# Patient Record
Sex: FEMALE | Race: WHITE | NOT HISPANIC OR LATINO | Employment: OTHER | URBAN - METROPOLITAN AREA
[De-identification: names, ages, dates, MRNs, and addresses within clinical notes are randomized per-mention and may not be internally consistent; named-entity substitution may affect disease eponyms.]

---

## 2017-02-27 ENCOUNTER — TELEPHONE (OUTPATIENT)
Dept: FAMILY MEDICINE | Facility: CLINIC | Age: 82
End: 2017-02-27

## 2017-02-27 NOTE — TELEPHONE ENCOUNTER
----- Message from Leann Meeks sent at 2/27/2017  8:59 AM CST -----  Please call patient in regards to ST ER f/u for blood in urine, [phone] (home)

## 2017-02-27 NOTE — TELEPHONE ENCOUNTER
Pt has appt with Dr Knight on Wednesday 03/01/2017 urologist for ED follow up [facility] for mass of bladder. Scheduled ED fu w Dr Crespo on 03/22/2017.

## 2017-03-03 ENCOUNTER — TELEPHONE (OUTPATIENT)
Dept: OPHTHALMOLOGY | Facility: CLINIC | Age: 82
End: 2017-03-03

## 2017-03-03 DIAGNOSIS — H40.053 OCULAR HYPERTENSION, BILATERAL: ICD-10-CM

## 2017-03-03 RX ORDER — LATANOPROST 50 UG/ML
1 SOLUTION/ DROPS OPHTHALMIC NIGHTLY
Qty: 2.5 ML | Refills: 1 | Status: SHIPPED | OUTPATIENT
Start: 2017-03-03 | End: 2017-05-09 | Stop reason: SDUPTHER

## 2017-03-03 NOTE — TELEPHONE ENCOUNTER
----- Message from Wilber Reyes sent at 3/3/2017 10:17 AM CST -----  Pt is states she needs a refill on her latanoprost .005 1 drop both eyes nightly./ States she will run about before Monday./ She can be reached at [phone]    [facility]  [address]  [address]  [place] [address]  Phone: [phone] Fax: [phone]

## 2017-03-08 ENCOUNTER — PATIENT OUTREACH (OUTPATIENT)
Dept: ADMINISTRATIVE | Facility: HOSPITAL | Age: 82
End: 2017-03-08
Payer: MEDICARE

## 2017-03-08 ENCOUNTER — INITIAL CONSULT (OUTPATIENT)
Dept: DERMATOLOGY | Facility: CLINIC | Age: 82
End: 2017-03-08
Payer: MEDICARE

## 2017-03-08 VITALS — HEIGHT: 65 IN | WEIGHT: 197 LBS | BODY MASS INDEX: 32.82 KG/M2

## 2017-03-08 DIAGNOSIS — L72.9 CYST OF SKIN: ICD-10-CM

## 2017-03-08 DIAGNOSIS — L57.0 MULTIPLE ACTINIC KERATOSES: Primary | ICD-10-CM

## 2017-03-08 DIAGNOSIS — L82.1 SEBORRHEIC KERATOSES: ICD-10-CM

## 2017-03-08 DIAGNOSIS — D48.5 NEOPLASM OF UNCERTAIN BEHAVIOR OF SKIN: ICD-10-CM

## 2017-03-08 DIAGNOSIS — Z12.83 SKIN CANCER SCREENING: ICD-10-CM

## 2017-03-08 DIAGNOSIS — Z85.828 PERSONAL HISTORY OF OTHER MALIGNANT NEOPLASM OF SKIN: ICD-10-CM

## 2017-03-08 PROCEDURE — 17000 DESTRUCT PREMALG LESION: CPT | Mod: S$GLB,,, | Performed by: DERMATOLOGY

## 2017-03-08 PROCEDURE — 17003 DESTRUCT PREMALG LES 2-14: CPT | Mod: S$GLB,,, | Performed by: DERMATOLOGY

## 2017-03-08 PROCEDURE — 1157F ADVNC CARE PLAN IN RCRD: CPT | Mod: S$GLB,,, | Performed by: DERMATOLOGY

## 2017-03-08 PROCEDURE — 1160F RVW MEDS BY RX/DR IN RCRD: CPT | Mod: S$GLB,,, | Performed by: DERMATOLOGY

## 2017-03-08 PROCEDURE — 1126F AMNT PAIN NOTED NONE PRSNT: CPT | Mod: S$GLB,,, | Performed by: DERMATOLOGY

## 2017-03-08 PROCEDURE — 1159F MED LIST DOCD IN RCRD: CPT | Mod: S$GLB,,, | Performed by: DERMATOLOGY

## 2017-03-08 PROCEDURE — 88305 TISSUE EXAM BY PATHOLOGIST: CPT | Performed by: PATHOLOGY

## 2017-03-08 PROCEDURE — 99214 OFFICE O/P EST MOD 30 MIN: CPT | Mod: 25,S$GLB,, | Performed by: DERMATOLOGY

## 2017-03-08 PROCEDURE — 99999 PR PBB SHADOW E&M-EST. PATIENT-LVL II: CPT | Mod: PBBFAC,,, | Performed by: DERMATOLOGY

## 2017-03-08 PROCEDURE — 11100 PR BIOPSY OF SKIN LESION: CPT | Mod: 59,S$GLB,, | Performed by: DERMATOLOGY

## 2017-03-08 NOTE — PROGRESS NOTES
Subjective:       Patient ID:  Holli Landrum is a 83 y.o. female who presents for   Chief Complaint   Patient presents with    Skin Check    Lesion     HPI Comments: High risk patient here for skin exam. Last seen in office on 4-  Lesion on left cheek for a few months, non healing, not treated  Lesion on right temple recently noticed, feels rough, not treated  Lesion on right neck for several months, feels rough, not treated  Lesion on right cheek recently noticed, may have been treated with cryo in the past  Lesion on left upper back for 2 years, feels firm, not treated  Lesion on left mid back for a few years, drains when squeezed, not treated    FINAL PATHOLOGIC DIAGNOSIS  1. Skin, right cheek, shave biopsy:  - PIGMENTED SEBORRHEIC KERATOSIS, MACULAR VARIANT.  MICROSCOPIC DESCRIPTION: Sections show an abrupt proliferation of benign basaloid keratinocytes exhibiting  acanthosis, hyperkeratosis, papillomatosis and horned pseudocyst formation. Numerous pigmented keratinocytes  are noted.  2. Skin, left brow, shave biopsy:  - SQUAMOUS CELL CARCINOMA IN SITU.  MICROSCOPIC DESCRIPTION: Sections show epidermis with full-thickness atypia, parakeratosis and variable  epidermal maturation. Dermal involvement is not seen.      Phx BCC right alar groove  Phx SCC central forehead  Phx BCC right forehead  Phx BCC glabella   BCC medial cheek s/p Mohs done by Dr. De La Garza 7-8-2016  SCCIS, left brow s/p Mohs done by Dr. De La Garza  History blood clot on Plavix                           recent stenting.     Review of Systems   Skin: Negative for itching, rash, daily sunscreen use, activity-related sunscreen use and recent sunburn.        Objective:    Physical Exam   Constitutional: She appears well-developed and well-nourished. No distress.   HENT:   Head:       Eyes: Lids are normal.  No conjunctival no injection.   Neurological: She is alert and oriented to person, place, and time. She is not disoriented.    Psychiatric: She has a normal mood and affect.   Skin:   Areas Examined (abnormalities noted in diagram):   Head / Face Inspection Performed  Neck Inspection Performed  Chest / Axilla Inspection Performed  Abdomen Inspection Performed  Back Inspection Performed  RUE Inspected  LUE Inspection Performed  RLE Inspected  LLE Inspection Performed              Diagram Legend     Erythematous scaling macule/papule c/w actinic keratosis       Vascular papule c/w angioma      Pigmented verrucoid papule/plaque c/w seborrheic keratosis      Yellow umbilicated papule c/w sebaceous hyperplasia      Irregularly shaped tan macule c/w lentigo     1-2 mm smooth white papules consistent with Milia      Movable subcutaneous cyst with punctum c/w epidermal inclusion cyst      Subcutaneous movable cyst c/w pilar cyst      Firm pink to brown papule c/w dermatofibroma      Pedunculated fleshy papule(s) c/w skin tag(s)      Evenly pigmented macule c/w junctional nevus     Mildly variegated pigmented, slightly irregular-bordered macule c/w mildly atypical nevus      Flesh colored to evenly pigmented papule c/w intradermal nevus       Pink pearly papule/plaque c/w basal cell carcinoma      Erythematous hyperkeratotic cursted plaque c/w SCC      Surgical scar with no sign of skin cancer recurrence      Open and closed comedones      Inflammatory papules and pustules      Verrucoid papule consistent consistent with wart     Erythematous eczematous patches and plaques     Dystrophic onycholytic nail with subungual debris c/w onychomycosis     Umbilicated papule    Erythematous-base heme-crusted tan verrucoid plaque consistent with inflamed seborrheic keratosis     Erythematous Silvery Scaling Plaque c/w Psoriasis     See annotation      Assessment / Plan:      Pathology Orders:      Normal Orders This Visit    Tissue Specimen To Pathology, Dermatology     Questions:    Directional Terms:  Other(comment)    Clinical information:  scc vs bcc vs  hak    Specific Site:  left cheek        Multiple actinic keratoses  Cryosurgery Procedure Note    Verbal consent from the patient is obtained and the patient is aware of the precancerous quality and need for treatment of these lesions. Liquid nitrogen cryosurgery is applied to the 7 actinic keratoses, as detailed in the physical exam, to produce a freeze injury. The patient is aware that blisters may form and is instructed on wound care with gentle cleansing and use of vaseline ointment to keep moist until healed. The patient is supplied a handout on cryosurgery and is instructed to call if lesions do not completely resolve. Discussed risk postinflammatory pigmentary changes.       Neoplasm of uncertain behavior of skin  -     Tissue Specimen To Pathology, Dermatology  If biopsy reveals malignancy, will refer to Dr. De La Garza for Mohs surgery consultation.    Shave biopsy procedure note:    Shave biopsy performed after verbal consent including risk of infection, scar, recurrence, need for additional treatment of site. Area prepped with alcohol, anesthetized with approximately 1.0cc of 1% lidocaine with epinephrine. Lesional tissue shaved with razor blade. Hemostasis achieved with application of aluminum chloride followed by hyfrecation. No complications. Dressing applied. Wound care explained.        Skin cancer screening  Area(s) of previous NMSC evaluated with no signs of recurrence.    Upper body skin examination performed today including at least 6 points as noted in physical examination. Suspicious lesions noted.    Personal history of other malignant neoplasm of skin  Area(s) of previous NMSC evaluated with no signs of recurrence.    Upper body skin examination performed today including at least 6 points as noted in physical examination. Suspicious lesions noted.      Cyst of skin, left upper and mid back  Will excise tomorrow     Seborrheic keratoses  These are benign inherited growths without a malignant  potential. Reassurance given to patient. No treatment is necessary.            Return in about 1 day (around 3/9/2017), or for cyst excision.

## 2017-03-08 NOTE — LETTER
March 14, 2017    Holli Landrum  [address]  [place] [address]             [facility]  [address]  [facility] [address]  Phone: [phone] Dear Mrs. Landrum:    We have tried to reach you by mychart unsuccessfully.    [facility] is committed to your overall health.  To help you get the most out of each of your visits, we will review your information to make sure you are up to date on all of your recommended tests and/or procedures.       Dr. Marcia Crespo has found that you may be due for an osteoporosis screening and possibly shingles and pneumonia immunizations.     If you have had any of the above done at another facility, please bring the records or information with you so that your record at [facility] will be complete.     If you are currently taking medication, please bring it with you to your appointment for review.     If you have any questions or concerns, please don't hesitate to call.    Thank you for letting us care for you,  Nichole Bradford LPN Clinical Care Coordinator  [facility]  [phone]

## 2017-03-08 NOTE — LETTER
March 8, 2017      Ad De La Garza MD  [address]  [facility] [address]           [facility]  [address] [facility] [address] [place] [address]  Phone: [phone]  Fax: [phone]          Patient: Holli Landrum   MR Number: [id number]   YOB: 1933   Date of Visit: 3/8/2017       Dear Dr. Ad De La Garza:    Thank you for referring Holli Landrum to me for evaluation. Attached you will find relevant portions of my assessment and plan of care.    If you have questions, please do not hesitate to call me. I look forward to following Holli Landrum along with you.    Sincerely,    Marcia Ritter MD    Enclosure  CC:  No Recipients    If you would like to receive this communication electronically, please contact [email or web address] or [phone] to request more information on [employer] access.    For providers and/or their staff who would like to refer a patient to [facility], please contact us through our one-stop-shop provider referral line, [facility], at [phone].    If you feel you have received this communication in error or would no longer like to receive these types of communications, please e-mail [email or web address]

## 2017-03-09 ENCOUNTER — PROCEDURE VISIT (OUTPATIENT)
Dept: DERMATOLOGY | Facility: CLINIC | Age: 82
End: 2017-03-09
Payer: MEDICARE

## 2017-03-09 DIAGNOSIS — L72.9 CYST OF SKIN: Primary | ICD-10-CM

## 2017-03-09 PROCEDURE — 12041 INTMD RPR N-HF/GENIT 2.5CM/<: CPT | Mod: 51,59,79,S$GLB | Performed by: DERMATOLOGY

## 2017-03-09 PROCEDURE — 99499 UNLISTED E&M SERVICE: CPT | Mod: S$GLB,,, | Performed by: DERMATOLOGY

## 2017-03-09 PROCEDURE — 11402 EXC TR-EXT B9+MARG 1.1-2 CM: CPT | Mod: 59,79,S$GLB, | Performed by: DERMATOLOGY

## 2017-03-09 PROCEDURE — 88304 TISSUE EXAM BY PATHOLOGIST: CPT | Performed by: PATHOLOGY

## 2017-03-09 PROCEDURE — 12031 INTMD RPR S/A/T/EXT 2.5 CM/<: CPT | Mod: 79,59,S$GLB, | Performed by: DERMATOLOGY

## 2017-03-09 PROCEDURE — 11421 EXC H-F-NK-SP B9+MARG 0.6-1: CPT | Mod: 79,51,S$GLB, | Performed by: DERMATOLOGY

## 2017-03-09 NOTE — PROGRESS NOTES
Pt here for excision of cyst x 2    PROCEDURE: Elliptical excision with intermediate layered repair in order to decrease dead space and decrease tension.    ANESTHETIC: 6 cc 1% Xylocaine with Epinephrine 1:100,000, buffered    SURGEON: Marcia Ritter MD      ASSISTANTS: Cecille Bradford MA      PREOPERATIVE DIAGNOSIS:  EIC     POSTOPERATIVE DIAGNOSIS:  Same as preoperative diagnosis    PATHOLOGIC DIAGNOSIS: Pending    LOCATION: left neck    INITIAL LESION SIZE: 0.6 cm    EXCISED DIAMETER: 0.8 cm    PREPARATION: The diagnosis, procedure, alternatives, benefits and risks, including but not limited to: infection, bleeding/bruising, drug reactions, pain, scar or cosmetic defect, local sensation disturbances, wound dehiscence (separation of wound edges after sutures removed) and/or recurrence of present condition were explained to the patient. The patient elected to proceed.  Patient's identity was verified using 2 patient identifiers and the side and site was verified.  Time out period with surgeon, assistant and patient in surgical suite was taken.    PROCEDURE: The location noted above was prepped and draped by Cecille Bradford MA in the usual sterile fashion. The area was anesthetized by myself. Lesional tissue was carefully marked and a fusiform elliptical excision was done with #15 blade carried down completely through the dermis into the deep subcutaneous tissues to the level of the non-muscle fascia, and dissection was carried out in that plane.  Electrocoagulation was used to obtain hemostasis. Blood loss was minimal. The wound was then approximated in a layered fashion with subcutaneous and intradermal sutures of 4.0 Monocryl, approximately 1 in number, and the wound was then superficially closed with simple interrupted sutures of 4.0 Prolene.    The patient tolerated the procedure well.    The area was cleaned and dressed appropriately and the patient was given wound care instructions, as well as an  appointment for follow-up evaluation.    LENGTH OF REPAIR: 1 cm      PROCEDURE: Elliptical excision with intermediate layered repair in order to decrease dead space and decrease tension.    ANESTHETIC: 9 cc 1% Xylocaine with Epinephrine 1:100,000, buffered    SURGEON: Marcia Ritter MD      ASSISTANTS: Cecille Bradford MA      PREOPERATIVE DIAGNOSIS:  EIC    POSTOPERATIVE DIAGNOSIS:  Same as preoperative diagnosis    PATHOLOGIC DIAGNOSIS: Pending    LOCATION: left back    INITIAL LESION SIZE: 1.5 cm    EXCISED DIAMETER: 1.5 cm    PREPARATION: The diagnosis, procedure, alternatives, benefits and risks, including but not limited to: infection, bleeding/bruising, drug reactions, pain, scar or cosmetic defect, local sensation disturbances, wound dehiscence (separation of wound edges after sutures removed) and/or recurrence of present condition were explained to the patient. The patient elected to proceed.  Patient's identity was verified using 2 patient identifiers and the side and site was verified.  Time out period with surgeon, assistant and patient in surgical suite was taken.    PROCEDURE: The location noted above was prepped and draped by Cecille Bradford MA in the usual sterile fashion. The area was anesthetized by myself. Lesional tissue was carefully marked and a fusiform elliptical excision was done with #15 blade carried down completely through the dermis into the deep subcutaneous tissues to the level of the non-muscle fascia, and dissection was carried out in that plane.  Electrocoagulation was used to obtain hemostasis. Blood loss was minimal. The wound was then approximated in a layered fashion with subcutaneous and intradermal sutures of 4.0 Monocryl, approximately 4 in number, and the wound was then superficially closed with simple interrupted sutures of 4.0 Prolene.    The patient tolerated the procedure well.    The area was cleaned and dressed appropriately and the patient was given wound care  instructions, as well as an appointment for follow-up evaluation.    LENGTH OF REPAIR: 2 cm

## 2017-03-21 ENCOUNTER — OFFICE VISIT (OUTPATIENT)
Dept: OPHTHALMOLOGY | Facility: CLINIC | Age: 82
End: 2017-03-21
Payer: MEDICARE

## 2017-03-21 DIAGNOSIS — H10.13 ALLERGIC CONJUNCTIVITIS, BILATERAL: ICD-10-CM

## 2017-03-21 DIAGNOSIS — H25.13 NUCLEAR SCLEROSIS, BILATERAL: ICD-10-CM

## 2017-03-21 DIAGNOSIS — H43.813 POSTERIOR VITREOUS DETACHMENT, BILATERAL: ICD-10-CM

## 2017-03-21 DIAGNOSIS — H33.322 ROUND HOLE OF RETINA WITHOUT DETACHMENT, LEFT: ICD-10-CM

## 2017-03-21 DIAGNOSIS — H40.053 OCULAR HYPERTENSION, BILATERAL: Primary | ICD-10-CM

## 2017-03-21 DIAGNOSIS — H57.12 EYE PAIN, LEFT: ICD-10-CM

## 2017-03-21 DIAGNOSIS — H52.7 REFRACTIVE ERROR: ICD-10-CM

## 2017-03-21 PROCEDURE — 99999 PR PBB SHADOW E&M-EST. PATIENT-LVL III: CPT | Mod: PBBFAC,,, | Performed by: OPHTHALMOLOGY

## 2017-03-21 PROCEDURE — 92012 INTRM OPH EXAM EST PATIENT: CPT | Mod: S$GLB,,, | Performed by: OPHTHALMOLOGY

## 2017-03-21 RX ORDER — KETOTIFEN FUMARATE 0.35 MG/ML
1 SOLUTION/ DROPS OPHTHALMIC 2 TIMES DAILY
COMMUNITY
End: 2017-04-07

## 2017-03-21 NOTE — MR AVS SNAPSHOT
[facility]  [address]  [facility] [address]  Phone: [phone]  Fax: [phone]                  Holli Landrum   3/21/2017 10:00 AM   Office Visit    Description:  Female : 6/15/1933   Provider:  Garcy James MD   Department:  [facility] - Ophthalmology           Reason for Visit     Glaucoma           Diagnoses this Visit        Comments    Ocular hypertension, bilateral    -  Primary     Eye pain, left         Nuclear sclerosis, bilateral         Posterior vitreous detachment, bilateral         Allergic conjunctivitis, bilateral         Round hole of retina without detachment, left         Refractive error                To Do List           Future Appointments        Provider Department Dept Phone    3/22/2017 10:20 AM Marcia Crespo MD [facility] [phone]    3/23/2017 11:15 AM NURSE, [facility] DERMATOLOGY [facility] [phone]    3/29/2017 2:15 PM Ad De La Garza MD [facility] Dermatology [phone]      Your Future Surgeries/Procedures     Apr 10, 2017   Surgery with Robb Knight MD   [facility] ([facility])    [address] [address] [address]   [phone]              Goals (5 Years of Data)     None      Follow-Up and Disposition     Return in about 3 months (around 2017) for IOP check.      [facility] On Call     [facility] On Call Nurse Care Line - 24/7 Assistance  Registered nurses in the [facility] On Call Center provide clinical advisement, health education, appointment booking, and other advisory services.  Call for this free service at [phone].             Medications           Message regarding Medications     Verify the changes and/or additions to your medication regime listed below are the same as discussed with your clinician today.  If any of these changes or additions are incorrect, please notify your healthcare provider.             Verify that the below list of  medications is an accurate representation of the medications you are currently taking.  If none reported, the list may be blank. If incorrect, please contact your healthcare provider. Carry this list with you in case of emergency.           Current Medications     clopidogrel (PLAVIX) 75 mg tablet Take 1 tablet (75 mg total) by mouth once daily.    dorzolamide (TRUSOPT) 2 % ophthalmic solution Place 1 drop into the left eye 2 (two) times daily.    fenofibrate 160 MG Tab Take 1 tablet (160 mg total) by mouth once daily.    fish oil-omega-3 fatty acids 300-1,000 mg capsule Take 2 g by mouth once daily.    fluticasone (FLONASE) 50 mcg/actuation nasal spray 2 sprays by Each Nare route once daily.    furosemide (LASIX) 40 MG tablet Take 1 tablet (40 mg total) by mouth once daily. 1 Tablet Oral Every day    GRAPE SEED EXTRACT ORAL Take by mouth.    ketotifen (ZADITOR) 0.025 % (0.035 %) ophthalmic solution Place 1 drop into both eyes 2 (two) times daily.    latanoprost (XALATAN) 0.005 % ophthalmic solution Place 1 drop into both eyes every evening.    metOLazone (ZAROXOLYN) 5 MG tablet Take 1 tablet (5 mg total) by mouth every Monday and Friday.    nitroGLYCERIN 0.4 MG/HR TD PT24 (NITRODUR) 0.4 mg/hr Place 1 patch onto the skin once daily.    OLIVE LEAF EXTRACT ORAL Take by mouth.    potassium chloride (KLOR-CON) 10 MEQ TbSR Take 1 tablet (10 mEq total) by mouth once daily.    SYMBICORT 160-4.5 mcg/actuation HFAA Take 2 puffs by mouth 2 (two) times daily.    tiotropium bromide (SPIRIVA RESPIMAT) 1.25 mcg/actuation Mist Inhale into the lungs.           Clinical Reference Information           Allergies as of 3/21/2017     Ciprofloxacin    Timoptic [Timolol Maleate]      Immunizations Administered on Date of Encounter - 3/21/2017     None      Language Assistance Services     ATTENTION: Language assistance services are available, free of charge. Please call [phone].      ATENCIÓN: sejal Dhillon  disposición servicios gratuitos de asistencia lingüística. Garth al [phone].     AR Ý: N?u b?n nói Ti?ng Vi?t, có các d?ch v? h? tr? ngôn ng? mi?n phí dành cho b?n. G?i s? [phone].         [facility] complies with applicable Federal civil rights laws and does not discriminate on the basis of race, color, national origin, age, disability, or sex.

## 2017-03-21 NOTE — PROGRESS NOTES
HPI     Glaucoma    Additional comments: 3 month iop ck           Comments   DLS: 12/20/16    Pt states no changes in va since last visit, doing well with new glasses.   Eyes have been irritated and itching for about 3+ weeks. Has been using   Zaditor BID and doing better but has not used this week. + has been having   a headache type pain behind OS x 3-4 wks. Pain ranges from 2-10 and takes   tylenol 2 tablets BID PO and this helps. Has dull ache today.     Gtts: Latanoprost QHS, Dorzolamide BID OU    Agree with above. Ache is present now, but better than it was this am   since she took Tylenol. Recent dentist appt - fine.         Last edited by Gracy James MD on 3/21/2017 11:42 AM.   (History)            Assessment /Plan     For exam results, see Encounter Report.    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 Ocular hypertension  IOP improved initially with Latanoprost (went back up off timolol - was d/c'd for breathing problems). Last HVF - scattered defects, no definite glaucoma pattern.     HRT done again last visit, dilated. There was some question of progression OD on prior visit, but not so much last time. OS seems to be progressing now. Cataracts may be starting to interfere with HVF and HRT, although SD ok today.    Remains open to TM/thin SS on gonioscopy, does not appear occludable. Optic nerve appears stable on last DFE OD, possible slight increase OS. IOP elevated OS last visit as well. Continue Latanoprost QHS OU, added Dorzolamide TWICE DAILY OS on 12/20/16 - IOP improved, eye pain persisits. RTC 3 months for IOP check.    Eye pain OS Consider having sinuses checked, does not appear to be having angle closure.     Long-standing, but constant over past 3 months.   2. Nuclear sclerosis   "Approaching visual significance, myopic shift. BAT not done. Explained this may be contributing to difficulty with vision.    3. Round hole of retina without detachment - Left Eye  Stable. RD precautions   4. Posterior vitreous detachment  "    Allergies/itching Zaditor BID prn   5. Hyperopia with astigmatism and presbyopia  MRx given last visit, feels she is doing well with new specs for now.                           "

## 2017-03-22 ENCOUNTER — OFFICE VISIT (OUTPATIENT)
Dept: FAMILY MEDICINE | Facility: CLINIC | Age: 82
End: 2017-03-22
Payer: MEDICARE

## 2017-03-22 VITALS
HEIGHT: 65 IN | TEMPERATURE: 98 F | DIASTOLIC BLOOD PRESSURE: 60 MMHG | WEIGHT: 198.19 LBS | BODY MASS INDEX: 33.02 KG/M2 | SYSTOLIC BLOOD PRESSURE: 120 MMHG

## 2017-03-22 DIAGNOSIS — R31.9 HEMATURIA, UNSPECIFIED: ICD-10-CM

## 2017-03-22 DIAGNOSIS — I70.0 ATHEROSCLEROSIS OF AORTA: ICD-10-CM

## 2017-03-22 DIAGNOSIS — J43.9 PULMONARY EMPHYSEMA, UNSPECIFIED EMPHYSEMA TYPE: Primary | ICD-10-CM

## 2017-03-22 PROCEDURE — 1159F MED LIST DOCD IN RCRD: CPT | Mod: S$GLB,,, | Performed by: FAMILY MEDICINE

## 2017-03-22 PROCEDURE — 1125F AMNT PAIN NOTED PAIN PRSNT: CPT | Mod: S$GLB,,, | Performed by: FAMILY MEDICINE

## 2017-03-22 PROCEDURE — 3078F DIAST BP <80 MM HG: CPT | Mod: S$GLB,,, | Performed by: FAMILY MEDICINE

## 2017-03-22 PROCEDURE — 99214 OFFICE O/P EST MOD 30 MIN: CPT | Mod: S$GLB,,, | Performed by: FAMILY MEDICINE

## 2017-03-22 PROCEDURE — 3074F SYST BP LT 130 MM HG: CPT | Mod: S$GLB,,, | Performed by: FAMILY MEDICINE

## 2017-03-22 PROCEDURE — 1157F ADVNC CARE PLAN IN RCRD: CPT | Mod: S$GLB,,, | Performed by: FAMILY MEDICINE

## 2017-03-22 PROCEDURE — 99499 UNLISTED E&M SERVICE: CPT | Mod: S$GLB,,, | Performed by: FAMILY MEDICINE

## 2017-03-22 PROCEDURE — 99999 PR PBB SHADOW E&M-EST. PATIENT-LVL III: CPT | Mod: PBBFAC,,, | Performed by: FAMILY MEDICINE

## 2017-03-22 PROCEDURE — 1160F RVW MEDS BY RX/DR IN RCRD: CPT | Mod: S$GLB,,, | Performed by: FAMILY MEDICINE

## 2017-03-22 RX ORDER — ACETAMINOPHEN 500 MG
5000 TABLET ORAL DAILY
COMMUNITY

## 2017-03-22 NOTE — PROGRESS NOTES
Subjective:       Patient ID: Holli Landrum is a 83 y.o. female.    Chief Complaint: Follow-up (ED follow-up. Pt went to ED for hematuria and will have bladder mass biopsy on 4/10.)    HPI   Patient here today for ER f/u.   Was seen on 2/26 for hematuria with clots. Scheduled for bladder bx on 4/10. Of note, interval improvement between scans.  No recurrence of hematuria since last scan.  Routine cards f/u. Last stent placed in October. On diuretics with fluid restriction.  States that she was told that she doesn't need a pneumonia shot currently - had them previously - squamous.  Derm excision in the spring for cancerous lesion on the L cheek.  Lungs have been ok of late. Was able to stop symbicort previously, but had to restart with winter uri/bronchitis.    Review of Systems   Constitutional: Negative for fatigue and unexpected weight change.   HENT: Negative for congestion, postnasal drip, rhinorrhea and sinus pressure.    Eyes: Negative for photophobia and visual disturbance.        Some irritation of the L eye, constant HA. ophtho susp for issue with cataract vs sinus.   Respiratory: Negative for cough and shortness of breath.    Cardiovascular: Negative for chest pain, palpitations and leg swelling.   Gastrointestinal: Negative for abdominal pain, blood in stool, constipation, diarrhea and nausea.   Genitourinary: Negative for difficulty urinating, dysuria, hematuria (see hpi), urgency and vaginal bleeding.   Musculoskeletal: Negative for arthralgias and gait problem.   Skin: Negative for rash and wound.   Neurological: Negative for dizziness (if she is dehydrated), light-headedness and headaches.   Psychiatric/Behavioral: Negative for sleep disturbance. The patient is not nervous/anxious.        Objective:      Physical Exam   Constitutional: She is oriented to person, place, and time. She appears well-developed and well-nourished. No distress.   HENT:   Head: Normocephalic and atraumatic.   Eyes:  Conjunctivae are normal. Right eye exhibits no discharge. Left eye exhibits no discharge. No scleral icterus.   Neck: Normal range of motion. Neck supple.   Cardiovascular: Normal rate and regular rhythm.    Pulmonary/Chest: Effort normal and breath sounds normal. No respiratory distress.   Abdominal: Soft. She exhibits no distension.   Musculoskeletal: Normal range of motion. She exhibits no edema.   Neurological: She is alert and oriented to person, place, and time.   Skin: Skin is warm and dry. No rash noted.   Psychiatric: She has a normal mood and affect. Her behavior is normal.   Nursing note and vitals reviewed.        Pulmonary emphysema, unspecified emphysema type  Stable without recent exacerbation. Doing well overall.  Atherosclerosis of aorta  Routine cards f/u every 4mos with lab. Last stent >6 mos ago, and maintained on plavix. Denies recent cp.  Hematuria, unspecified  Per urology, pending bx in area of concern next month.

## 2017-03-23 ENCOUNTER — CLINICAL SUPPORT (OUTPATIENT)
Dept: DERMATOLOGY | Facility: CLINIC | Age: 82
End: 2017-03-23
Payer: MEDICARE

## 2017-03-23 NOTE — PROGRESS NOTES
Suture removed from left posterior neck and left mid back  No sign of infection   Healing wound intact  Sutures removed without difficulty

## 2017-03-29 ENCOUNTER — INITIAL CONSULT (OUTPATIENT)
Dept: DERMATOLOGY | Facility: CLINIC | Age: 82
End: 2017-03-29
Payer: MEDICARE

## 2017-03-29 VITALS
SYSTOLIC BLOOD PRESSURE: 117 MMHG | BODY MASS INDEX: 32.49 KG/M2 | DIASTOLIC BLOOD PRESSURE: 81 MMHG | HEART RATE: 79 BPM | WEIGHT: 195 LBS | HEIGHT: 65 IN

## 2017-03-29 DIAGNOSIS — C44.320 SQUAMOUS CELL CARCINOMA, FACE: Primary | ICD-10-CM

## 2017-03-29 PROCEDURE — 1157F ADVNC CARE PLAN IN RCRD: CPT | Mod: S$GLB,,, | Performed by: DERMATOLOGY

## 2017-03-29 PROCEDURE — 99213 OFFICE O/P EST LOW 20 MIN: CPT | Mod: S$GLB,,, | Performed by: DERMATOLOGY

## 2017-03-29 PROCEDURE — 99999 PR PBB SHADOW E&M-EST. PATIENT-LVL III: CPT | Mod: PBBFAC,,, | Performed by: DERMATOLOGY

## 2017-03-29 PROCEDURE — 1159F MED LIST DOCD IN RCRD: CPT | Mod: S$GLB,,, | Performed by: DERMATOLOGY

## 2017-03-29 PROCEDURE — 1126F AMNT PAIN NOTED NONE PRSNT: CPT | Mod: S$GLB,,, | Performed by: DERMATOLOGY

## 2017-03-29 PROCEDURE — 1160F RVW MEDS BY RX/DR IN RCRD: CPT | Mod: S$GLB,,, | Performed by: DERMATOLOGY

## 2017-03-29 PROCEDURE — 3074F SYST BP LT 130 MM HG: CPT | Mod: S$GLB,,, | Performed by: DERMATOLOGY

## 2017-03-29 PROCEDURE — 3079F DIAST BP 80-89 MM HG: CPT | Mod: S$GLB,,, | Performed by: DERMATOLOGY

## 2017-03-29 NOTE — MR AVS SNAPSHOT
[facility]at[facility]  [address] [facility] [address]  [facility] [address]  Phone: [phone]                  Holli Landrum   3/29/2017 2:15 PM   Initial consult    Description:  Female : 6/15/1933   Provider:  Ad De La Garza MD   Department:  [place] - Dermatology           Reason for Visit     Squamous Cell Carcinoma                To Do List           Future Appointments        Provider Department Dept Phone    2017 10:30 AM Gracy James MD [facility] Ophthalmology [phone]      Your Future Surgeries/Procedures     Apr 10, 2017   Surgery with Robb Knight MD   [facility] ([facility])    [address] [facility] [address]   [phone]              Goals (5 Years of Data)     None      [facility]     [facility] -  Assistance  Registered nurses in the [facility] provide clinical advisement, health education, appointment booking, and other advisory services.  Call for this free service at [phone].             Medications           Message regarding Medications     Verify the changes and/or additions to your medication regime listed below are the same as discussed with your clinician today.  If any of these changes or additions are incorrect, please notify your healthcare provider.             Verify that the below list of medications is an accurate representation of the medications you are currently taking.  If none reported, the list may be blank. If incorrect, please contact your healthcare provider. Carry this list with you in case of emergency.           Current Medications     cholecalciferol, vitamin D3, (VITAMIN D3) 5,000 unit Tab Take 5,000 Units by mouth once daily.    clopidogrel (PLAVIX) 75 mg tablet Take 1 tablet (75 mg total) by mouth once daily.    CRANBERRY FRUIT ORAL Take by mouth.    dorzolamide (TRUSOPT) 2 % ophthalmic solution Place 1 drop into the left eye 2 (two) times daily.     "fenofibrate 160 MG Tab Take 1 tablet (160 mg total) by mouth once daily.    fish oil-omega-3 fatty acids 300-1,000 mg capsule Take 2 g by mouth once daily.    fluticasone (FLONASE) 50 mcg/actuation nasal spray 2 sprays by Each Nare route once daily.    furosemide (LASIX) 40 MG tablet Take 1 tablet (40 mg total) by mouth once daily. 1 Tablet Oral Every day    GRAPE SEED EXTRACT ORAL Take by mouth.    ketotifen (ZADITOR) 0.025 % (0.035 %) ophthalmic solution Place 1 drop into both eyes 2 (two) times daily.    Lactobacillus rhamnosus GG (CULTURELLE) 10 billion cell capsule Take 1 capsule by mouth once daily.    latanoprost (XALATAN) 0.005 % ophthalmic solution Place 1 drop into both eyes every evening.    metOLazone (ZAROXOLYN) 5 MG tablet Take 1 tablet (5 mg total) by mouth every Monday and Friday.    nitroGLYCERIN 0.4 MG/HR TD PT24 (NITRODUR) 0.4 mg/hr Place 1 patch onto the skin once daily.    OLIVE LEAF EXTRACT ORAL Take by mouth.    potassium chloride (KLOR-CON) 10 MEQ TbSR Take 1 tablet (10 mEq total) by mouth once daily.    SYMBICORT 160-4.5 mcg/actuation HFAA Take 2 puffs by mouth 2 (two) times daily.    tiotropium bromide (SPIRIVA RESPIMAT) 1.25 mcg/actuation Mist Inhale into the lungs.           Clinical Reference Information           Your Vitals Were     BP Pulse Height Weight BMI    117/81 (BP Location: Left arm, Patient Position: Sitting, BP Method: Automatic) 79 5' 5" (1.651 m) 88.5 kg (195 lb) 32.45 kg/m2      Blood Pressure          Most Recent Value    BP  117/81      Allergies as of 3/29/2017     Ciprofloxacin    Timoptic [Timolol Maleate]      Immunizations Administered on Date of Encounter - 3/29/2017     None      Language Assistance Services     ATTENTION: Language assistance services are available, free of charge. Please call [phone].      ATENCIÓN: Si habla darleen, tiene a mon disposición servicios gratuitos de asistencia lingüística. Llame al [phone].     CHÚ Ý: N?u b?n nói Ti?ng " Vi?t, có các d?ch v? h? tr? ngôn ng? mi?n phí dành cho b?n. G?i s? [phone].         [facility] complies with applicable Federal civil rights laws and does not discriminate on the basis of race, color, national origin, age, disability, or sex.

## 2017-03-29 NOTE — PROGRESS NOTES
ALLERGIES:  Ciprofloxacin and Timoptic [timolol maleate]    CHIEF COMPLAINT:  This 83 y.o. 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 Consultation requested by Marcia Ritter MD.    HISTORY OF PRESENT ILLNESS:   Location: Left cheek  Duration: 3months  Quality:   Context: status post biopsy by Marcia Ritter MD ; path = ; pathology accession #[id number], [facility]     Prior Treatment: none  See also the handwritten notes/diagrams scanned to chart for additional details.    Defibrillator: No  Pacemaker: No  Artificial heart valves: No  Artificial joints: No    REVIEW OF SYSTEMS:   General: general health good  Skin: has previous history of skin cancer(s)  CV: has hypertension, no artificial valves, has no chest pain; has multiple stents; also paroxysmal atrial fibrillation   Resp: has shortness of breath from COPD  Endo: has no diabetes  Hem/Lymph: taking prescribed anticoagulants-Plavix, has easy bruising/bleeding  Allergy/Immuno: has allergies as noted above  GI: has no history of hepatitis  MS: as noted above     PAST MEDICAL HISTORY:  Past Medical History:   Diagnosis Date    Arthritis     back pain, hands    Basal cell carcinoma     CAD (coronary artery disease)     Cataract     ou    COPD (chronic obstructive pulmonary disease)     no oxygen    DVT (deep venous thrombosis) 1968    right leg; 1978 left leg    GERD (gastroesophageal reflux disease) 11/20/2012    Glaucoma     HTN (hypertension) 11/20/2012    Hypertension     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/2012    Hx MI    JA on CPAP     Paroxysmal atrial fibrillation 11/20/2012    Paroxysmal ventricular tachycardia     per problem list    Squamous cell carcinoma     Urinary tract infection     frequent    Venous insufficiency of leg        PAST SURGICAL HISTORY:  Past Surgical History:    Procedure Laterality Date    CARDIAC CATHETERIZATION  2013, 2014    has 5 stents    CARDIAC SURGERY  2012    stents    CHOLECYSTECTOMY      HYSTERECTOMY  1969    OVARIAN CYST REMOVAL  teenager    TONSILLECTOMY      aw/denoids    VASCULAR SURGERY      to remove clot from right leg        SOCIAL HISTORY:  Dependencies: smoking status as noted below  Social History   Substance Use Topics    Smoking status: Former Smoker     Types: Cigarettes    Smokeless tobacco: Never Used      Comment: quit smoking 1954    Alcohol use No       PERTINENT MEDICATIONS:  See medications list.  Current Outpatient Prescriptions on File Prior to Visit   Medication Sig Dispense Refill    cholecalciferol, vitamin D3, (VITAMIN D3) 5,000 unit Tab Take 5,000 Units by mouth once daily.      clopidogrel (PLAVIX) 75 mg tablet Take 1 tablet (75 mg total) by mouth once daily. 90 tablet 3    CRANBERRY FRUIT ORAL Take by mouth.      dorzolamide (TRUSOPT) 2 % ophthalmic solution Place 1 drop into the left eye 2 (two) times daily. 10 mL 11    fenofibrate 160 MG Tab Take 1 tablet (160 mg total) by mouth once daily. 90 tablet 3    fish oil-omega-3 fatty acids 300-1,000 mg capsule Take 2 g by mouth once daily.      fluticasone (FLONASE) 50 mcg/actuation nasal spray 2 sprays by Each Nare route once daily. 16 g 11    furosemide (LASIX) 40 MG tablet Take 1 tablet (40 mg total) by mouth once daily. 1 Tablet Oral Every day 90 tablet 3    GRAPE SEED EXTRACT ORAL Take by mouth.      ketotifen (ZADITOR) 0.025 % (0.035 %) ophthalmic solution Place 1 drop into both eyes 2 (two) times daily.      Lactobacillus rhamnosus GG (CULTURELLE) 10 billion cell capsule Take 1 capsule by mouth once daily.      latanoprost (XALATAN) 0.005 % ophthalmic solution Place 1 drop into both eyes every evening. 2.5 mL 1    metOLazone (ZAROXOLYN) 5 MG tablet Take 1 tablet (5 mg total) by mouth every Monday and Friday. 30 tablet 3    nitroGLYCERIN 0.4 MG/HR TD PT24  (NITRODUR) 0.4 mg/hr Place 1 patch onto the skin once daily. 90 patch 3    OLIVE LEAF EXTRACT ORAL Take by mouth.      potassium chloride (KLOR-CON) 10 MEQ TbSR Take 1 tablet (10 mEq total) by mouth once daily. 90 tablet 3    SYMBICORT 160-4.5 mcg/actuation HFAA Take 2 puffs by mouth 2 (two) times daily.      tiotropium bromide (SPIRIVA RESPIMAT) 1.25 mcg/actuation Mist Inhale into the lungs.       No current facility-administered medications on file prior to visit.        ALLERGIES:  Ciprofloxacin and Timoptic [timolol maleate]    EXAM:  See also the handwritten notes/diagrams scanned to chart for additional details.  Constitutional  General appearance: well-developed, well-nourished, well-kempt older white female    Eyes  Inspection of conjunctivae and lids reveals no abnormalities; sclerae anicteric  Neurologic/Psychiatric  Alert,  normal orientation to time, place, person  Normal mood and affect with no evidence of depression, anxiety, agitation  Skin: see photo(s)  Head: background marked solar damage to exposed areas of skin; in addition, inspection and/or palpation reveals an approximately 1 cm pink sclerotic plaque on the left zygomatic cheek which feels freely movable over the underlying tissues on palpation;  she confirmed this as the site of the prior biopsy  Neck: examination reveals marked chronic solar damage  Right upper extremity: examination reveals marked chronic solar damage; few ecchymoses  Left upper extremity: examination reveals marked chronic solar damage; few ecchymoses    ASSESSMENT: 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 In particular, the discussion included, but was not limited to, the following:    One alternative at this point would be to defer further treatment and observe the lesion. With small skin cancers of this kind, it is possible that a biopsy can be sufficient to definitively treat a small skin cancer of this kind. Alternatively, some skin cancers are slow growing and do not require immediate treatment. The potential advantage of this choice would be to avoid the need for possibly unnecessary additional surgery. Among the potential disadvantages of this would be the possibility of enlargement of the lesion, more extensive spread of the lesion or recurrence at a later date, which might necessitate a larger and more complex surgery.    Radiation treatment can be an effective treatment for this type of skin cancer. The usual course of treatment is every weekday for several weeks. Local irritation will result from treatment, although no systemic side effects are expected. The potential advantage of radiation treatment is that it avoids the need for surgery. Among the disadvantages of radiation treatment are the length of treatment, the local inflammatory response, the absence of pathologic confirmation of the removal of the skin cancer, a possible increased risk of additional skin cancer in the treated area in later years, and a somewhat increased risk of recurrence at a later date.     Excisional surgery can be an effective treatment for this type of skin cancer. This would involve excision of the lesion with margin evaluation by submitting the specimen to a pathologist for either immediate marginal assessment via frozen section processing, or delayed marginal assessment by fixed-tissue processing. The potential advantage of this technique is that it offers a way of treating the lesion with some degree of histologic confirmation of tumor removal.  Among the disadvantages of this treatment are the possible need for re-excision if marginal involvement is identified, a somewhat greater likelihood of recurrence as compared to Mohs' surgery because of the less comprehensive margin evaluation inherent in the technique, and the general potential risks of surgery, including allergic reactions to the anesthetic and other materials used, infection, injury to nerves in the area with consequent loss of sensation or muscle function, and scarring or distortion of surrounding structures.    Mohs' surgery is a very effective treatment for this type of skin cancer. The potential advantage of Mohs' surgery is that this technique offers the greatest possible certainty of knowing that the skin cancer has been completely removed, with the removal of the least amount of normal tissue. The potential disadvantages of Mohs' surgery include the duration of the surgery, the possible need for a separate surgery for reconstruction following tumor removal, and scarring as a result. In addition, general potential risks of surgery as noted above also apply to treatment via Mohs' surgery.    In light of the nature of this tumor and the location on the face in an area of increased risk of recurrence,  Mohs' micrographic surgery was thought to be the most appropriate management choice, and this diagnosis is appropriate for treatment by Mohs' micrographic surgery.     Sufficient time was available for questions, and all questions were answered to her satisfaction. She fully understands the aims, risks, alternatives, and possible complications, and has elected to proceed with the surgery, and verbally consented to do so. The procedure will be scheduled in the near future.    Routine pre-op instructions were given to her.    The patient was instructed to continue Plavix prior to surgery.    --------------------------------------  Note: some or all of this note may have been generated using voice  recognition software and thus may contain grammatical and/or spelling errors.

## 2017-03-29 NOTE — LETTER
March 30, 2017      Marcia Ritter MD  [address] [facility] [address] [place] [address]           [facility]  [address] [facility] [address] [place] [address]  Phone: [phone]          Patient: Holli Landrum   MR Number: [id number]   YOB: 1933   Date of Visit: 3/29/2017       Dear Dr. Marcia Ritter:    Thank you for referring Holli Landrum to me for evaluation. Attached you will find relevant portions of my assessment and plan of care.    If you have questions, please do not hesitate to call me. I look forward to following Holli Landrum along with you.    Sincerely,    Ad De La Garza MD    Enclosure  CC:  No Recipients    If you would like to receive this communication electronically, please contact [email or web address] or [phone] to request more information on [employer] Link access.    For providers and/or their staff who would like to refer a patient to [facility], please contact us through our one-stop-shop provider referral line, [facility] , at [phone].    If you feel you have received this communication in error or would no longer like to receive these types of communications, please e-mail [email or web address]

## 2017-04-04 ENCOUNTER — PROCEDURE VISIT (OUTPATIENT)
Dept: DERMATOLOGY | Facility: CLINIC | Age: 82
End: 2017-04-04
Payer: MEDICARE

## 2017-04-04 VITALS
HEART RATE: 65 BPM | BODY MASS INDEX: 32.49 KG/M2 | SYSTOLIC BLOOD PRESSURE: 133 MMHG | HEIGHT: 65 IN | DIASTOLIC BLOOD PRESSURE: 67 MMHG | WEIGHT: 195 LBS

## 2017-04-04 DIAGNOSIS — C44.329 SQUAMOUS CELL CARCINOMA OF SKIN OF CHEEK: Primary | ICD-10-CM

## 2017-04-04 PROCEDURE — 99499 UNLISTED E&M SERVICE: CPT | Mod: S$GLB,,, | Performed by: DERMATOLOGY

## 2017-04-04 PROCEDURE — 17311 MOHS 1 STAGE H/N/HF/G: CPT | Mod: S$GLB,,, | Performed by: DERMATOLOGY

## 2017-04-04 PROCEDURE — 13132 CMPLX RPR F/C/C/M/N/AX/G/H/F: CPT | Mod: 51,S$GLB,, | Performed by: DERMATOLOGY

## 2017-04-04 NOTE — PROGRESS NOTES
ALLERGIES:  Ciprofloxacin and Timoptic [timolol maleate]    Current Outpatient Prescriptions:     cholecalciferol, vitamin D3, (VITAMIN D3) 5,000 unit Tab, Take 5,000 Units by mouth once daily., Disp: , Rfl:     clopidogrel (PLAVIX) 75 mg tablet, Take 1 tablet (75 mg total) by mouth once daily., Disp: 90 tablet, Rfl: 3    CRANBERRY FRUIT ORAL, Take by mouth., Disp: , Rfl:     dorzolamide (TRUSOPT) 2 % ophthalmic solution, Place 1 drop into the left eye 2 (two) times daily., Disp: 10 mL, Rfl: 11    fenofibrate 160 MG Tab, Take 1 tablet (160 mg total) by mouth once daily., Disp: 90 tablet, Rfl: 3    fish oil-omega-3 fatty acids 300-1,000 mg capsule, Take 2 g by mouth once daily., Disp: , Rfl:     fluticasone (FLONASE) 50 mcg/actuation nasal spray, 2 sprays by Each Nare route once daily., Disp: 16 g, Rfl: 11    furosemide (LASIX) 40 MG tablet, Take 1 tablet (40 mg total) by mouth once daily. 1 Tablet Oral Every day, Disp: 90 tablet, Rfl: 3    GRAPE SEED EXTRACT ORAL, Take by mouth., Disp: , Rfl:     ketotifen (ZADITOR) 0.025 % (0.035 %) ophthalmic solution, Place 1 drop into both eyes 2 (two) times daily., Disp: , Rfl:     Lactobacillus rhamnosus GG (CULTURELLE) 10 billion cell capsule, Take 1 capsule by mouth once daily., Disp: , Rfl:     latanoprost (XALATAN) 0.005 % ophthalmic solution, Place 1 drop into both eyes every evening., Disp: 2.5 mL, Rfl: 1    metOLazone (ZAROXOLYN) 5 MG tablet, Take 1 tablet (5 mg total) by mouth every Monday and Friday., Disp: 30 tablet, Rfl: 3    nitroGLYCERIN 0.4 MG/HR TD PT24 (NITRODUR) 0.4 mg/hr, Place 1 patch onto the skin once daily., Disp: 90 patch, Rfl: 3    OLIVE LEAF EXTRACT ORAL, Take by mouth., Disp: , Rfl:     potassium chloride (KLOR-CON) 10 MEQ TbSR, Take 1 tablet (10 mEq total) by mouth once daily., Disp: 90 tablet, Rfl: 3    SYMBICORT 160-4.5 mcg/actuation HFAA, Take 2 puffs by mouth 2 (two) times daily., Disp: , Rfl:     tiotropium bromide (SPIRIVA  RESPIMAT) 1.25 mcg/actuation Mist, Inhale into the lungs., Disp: , Rfl:   -------------------------------------------------------------  PROCEDURE: Mohs' Micrographic Surgery    SITE: left cheek    INDICATION: squamous cell carcinoma in an area at increased risk of recurrence    CASE NUMBER: [id number]      ANESTHETIC: 3 mL 1% Lidocaine with Epinephrine 1:100,000, buffered    SURGICAL PREP: Ethanol and Hibiclens    SURGEON: Ad De La Garza MD    ASSISTANTS: Marlene Sharp CST     PREOPERATIVE DIAGNOSIS: squamous cell carcinoma     POSTOPERATIVE DIAGNOSIS: squamous cell carcinoma     PATHOLOGIC DIAGNOSIS: squamous cell carcinoma     STAGES OF MOHS' SURGERY PERFORMED: one    TUMOR-FREE PLANE ACHIEVED: yes    HEMOSTASIS: Hyfrecation     SPECIMENS: one (one in stage A)    INITIAL LESION SIZE: 1.1 x 1.6 cm    FINAL DEFECT SIZE: 1.3 x 1.6 cm    WOUND REPAIR/DISPOSITION: see below    NARRATIVE:    The patient is a 83 y.o.female referred by Marcia Ritter MD with a history of cancer on the left cheek which was biopsied - pathology accession #[id number], [facility]. Findings revealed squamous cell carcinoma. Examination revealed a pink, ill-defined scar on the left zygomatic cheek at the site of prior biopsy, which was confirmed by reference to the photograph taken at the previous patient visit. In light of the ill-defined nature of this tumor and the location on the face, Mohs' micrographic surgery was thought to be the most appropriate management choice, and this diagnosis is appropriate for treatment by Mohs' micrographic surgery.  I discussed it with the patient and she fully understands the aims, risks, alternatives, and possible complications, and elects to proceed.  There are no medical or surgical contraindications to the procedure.     A signed informed consent was obtained.    PROCEDURE:  The patient was placed in the semi-recumbent position on the operating table in the Mohs' Surgery Suite. The area  "in question was thoroughly prepped with ethanol and Hibiclens, with particular care to avoid application to the immediate periocular skin . A sterile surgical marker was used to outline the clinically apparent margins of the involved area, and a narrow margin of normal-appearing skin. Reference marks were made at the periphery of the outlined area with the surgical marker. The proposed area of excision was measured and photographed. Local anesthesia of 1% Lidocaine with 1:100,000 epinephrine, buffered with sodium bicarbonate, was administered.  The total volume of anesthetic used throughout this portion of the procedure was as documented above. The area was prepared and draped in the standard manner. All of the grossly identifiable area of clinically abnormal tissue and an underlying/peripheral layer was taken and processed by the Mohs' technique.  Hemostasis was obtained with the hyfrecator. Tissue was taken from any areas of residual marginal involvement (if present) and processed by the Mohs' technique in as many stages as needed until a tumor-free plane was achieved.    Colors of inks used in the reference nicks at epidermal margins (if present) and/or inking of non-epithelial edges, if applicable, is represented on the Mohs map as follows: solid lines represent red ink, dots represent blue ink, jagged lines represent black ink, curlicues represent green ink, "xxx" represents yellow ink.    The first Mohs' layer consisted of one section(s) with 3 slide(s) evaluated. No residual tumor was noted at the margins of the first Mohs' layer. Histology of the specimen(s) showed changes consistent with chronic solar damage.    A total of one section(s) and 3 slide(s) were examined under the microscope via the Mohs technique.  A cancer free plane was reached after layer number one. Defect final size was as noted above.      The wound was covered with a nonadherent dressing between stages, and the patient allowed to wait in " the waiting area during these periods. The final defect was photographed at the completion of the Mohs' procedure.    See the separate procedure note which follows regarding repair of the defect following Mohs' surgery.       -----------------------------------------------    REPAIR FOLLOWING MOHS' MICROGRAPHIC SURGERY    PREOPERATIVE DIAGNOSIS: defect following Mohs' surgery for a squamous cell carcinoma    POSTOPERATIVE DIAGNOSIS: same    PROCEDURE PERFORMED: complex linear closure     ANESTHETIC: 5 mL 1% Lidocaine with Epinephrine 1:100,000, buffered     SURGICAL PREP: Hibiclens    SURGEON: Ad De La Garza MD     ASSISTANTS: as above    LOCATION: left cheek      INDICATIONS:  Earlier in the day, the patient underwent Mohs' micrographic surgical excision of a squamous cell carcinoma on the left cheek. Tumor free margins were achieved after layer number one.  Later in the day, the management of the resulting wound was addressed with the patient. I discussed the various wound management options with the patient and she fully understands the aims, risks, alternatives, and possible complications of the alternatives, and she elects to proceed with closure of the defect in the manner noted below.  There are no medical or surgical contraindications to the procedure.    A signed informed consent was previously obtained.    PROCEDURE:  Repair via complex closure:  The patient was returned to the procedure room following completion of the Mohs' procedure and final slide review. Because of the size, shape and location of the defect, simple closure could not be achieved without possible distortion of surrounding structures, excessive tension on the wound margins and an unacceptable risk of wound dehiscence, and the creation of standing cone deformities. Consideration was given the the site of the wound, the surrounding structures, and the orientation of closure necessary to provide the optimal functional and cosmetic  outcome. After devoting time to these considerations, and to the orientation of the vectors of maximal skin tension surrounding the defect, the area was prepped again and a fusiform closure was outlined on the skin surrounding the defect with a sterile surgical marker, to minimize tension across the wound. Additional anesthetic was infiltrated into the tissues surrounding the defect and the anticipated area of repair, to maintain anesthesia during the procedure. Preparation of the site for closure was then carried out by extending the defect through excision of triangles of superfluous tissue on either side of the wound to square the shoulders of the defect and to allow closure without distortion by standing cone deformities, creating a fusiform defect measuring 1.3 x 4.3 cm in size.  Wound margins were extensively undermined to allow advancement of the wound margins into the defect and to permit closure with minimal tension. After hemostasis was achieved with the hyfrecator, closure was accomplished with:      multiple #5-0 buried interrupted Vicryl suture(s) and    several #5-0 simple interrupted and vertical mattress Prolene suture(s) and    two #5-0 running locked Prolene suture(s) for final approximation of the wound margins.     The site was photographed following completion of the repair. Final dressing consisted of petrolatum, Telfa and tape.    Estimated blood loss for the total procedure was less than 5 mL.    Total operative time including tissue processing in the Mohs' laboratory and microscopic Mohs' frozen section slide review was 2 hour(s). Verbal and written wound care instructions were given to the patient, and she expressed understanding of these instructions. The patient tolerated the procedure well and left the operating room in good condition; she is to return in 7 days for suture removal.       Dr. De La Garza's cell phone number was given to the patient with instructions to call prn with any  problems.

## 2017-04-10 PROBLEM — D41.4 BLADDER NEOPLASM OF UNCERTAIN MALIGNANT POTENTIAL: Status: ACTIVE | Noted: 2017-04-10

## 2017-04-11 ENCOUNTER — OFFICE VISIT (OUTPATIENT)
Dept: DERMATOLOGY | Facility: CLINIC | Age: 82
End: 2017-04-11
Payer: MEDICARE

## 2017-04-11 DIAGNOSIS — Z48.02 VISIT FOR SUTURE REMOVAL: Primary | ICD-10-CM

## 2017-04-11 PROCEDURE — 99999 PR PBB SHADOW E&M-EST. PATIENT-LVL III: CPT | Mod: PBBFAC,,, | Performed by: DERMATOLOGY

## 2017-04-11 PROCEDURE — 99024 POSTOP FOLLOW-UP VISIT: CPT | Mod: S$GLB,,, | Performed by: DERMATOLOGY

## 2017-04-11 NOTE — PROGRESS NOTES
CC: 83 y.o.female patient is here for suture removal.     HPI: Patient is one week(s) s/p Mohs' micrographic surgery, fresh tissue technique of a squamous cell carcinoma on the left cheek, with subsequent repair   Patient reports no problems.    EXAM:  Sutures intact.  Wound healing well.  Good approximation of skin edges.  No undue erythema to surrounding skin or signs or symptoms of infection.    IMPRESSION:  Healing well post Mohs' micrographic surgery and repair    PLAN:  Site cleaned with peroxide, sutures removed  Dressed with petrolatum   Reviewed further care and expected course  Followup 6 weeks; call prn sooner

## 2017-05-08 ENCOUNTER — PATIENT MESSAGE (OUTPATIENT)
Dept: OPHTHALMOLOGY | Facility: CLINIC | Age: 82
End: 2017-05-08

## 2017-05-09 DIAGNOSIS — H40.053 OCULAR HYPERTENSION, BILATERAL: ICD-10-CM

## 2017-05-09 RX ORDER — LATANOPROST 50 UG/ML
1 SOLUTION/ DROPS OPHTHALMIC NIGHTLY
Qty: 2.5 ML | Refills: 1 | Status: SHIPPED | OUTPATIENT
Start: 2017-05-09 | End: 2017-07-26 | Stop reason: SDUPTHER

## 2017-05-24 ENCOUNTER — OFFICE VISIT (OUTPATIENT)
Dept: DERMATOLOGY | Facility: CLINIC | Age: 82
End: 2017-05-24
Payer: MEDICARE

## 2017-05-24 ENCOUNTER — HOSPITAL ENCOUNTER (OUTPATIENT)
Dept: RADIOLOGY | Facility: HOSPITAL | Age: 82
Discharge: HOME | End: 2017-05-24
Attending: FAMILY MEDICINE
Payer: MEDICARE

## 2017-05-24 DIAGNOSIS — Z98.890 HISTORY OF MOH'S MICROGRAPHIC SURGERY FOR SKIN CANCER: ICD-10-CM

## 2017-05-24 DIAGNOSIS — Z12.31 ENCOUNTER FOR SCREENING MAMMOGRAM FOR MALIGNANT NEOPLASM OF BREAST: ICD-10-CM

## 2017-05-24 DIAGNOSIS — Z48.02 VISIT FOR SUTURE REMOVAL: Primary | ICD-10-CM

## 2017-05-24 DIAGNOSIS — Z85.828 HISTORY OF MOH'S MICROGRAPHIC SURGERY FOR SKIN CANCER: ICD-10-CM

## 2017-05-24 PROCEDURE — 77067 SCR MAMMO BI INCL CAD: CPT | Mod: 26,,, | Performed by: RADIOLOGY

## 2017-05-24 PROCEDURE — 99999 PR PBB SHADOW E&M-EST. PATIENT-LVL II: CPT | Mod: PBBFAC,,, | Performed by: DERMATOLOGY

## 2017-05-24 PROCEDURE — 99024 POSTOP FOLLOW-UP VISIT: CPT | Mod: S$GLB,,, | Performed by: DERMATOLOGY

## 2017-05-24 PROCEDURE — 77063 BREAST TOMOSYNTHESIS BI: CPT | Mod: 26,,, | Performed by: RADIOLOGY

## 2017-07-06 NOTE — PROGRESS NOTES
"        Assessment /Plan     For exam results, see Encounter Report.    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 Ocular hypertension  IOP improved initially with Latanoprost (went back up off timolol - was d/c'd for breathing problems). Last HVF - scattered defects, no definite glaucoma pattern.     HRT done again last visit, dilated. There was some question of progression OD on prior visit, but not so much last time. OS seems to be progressing now. Cataracts may be starting to interfere with HVF and HRT, although SD ok today.    Remains open to TM/thin SS on last gonioscopy, does not appear occludable. Optic nerve - possible inferior notch on NDFE OD, appeared stable on last DFE OD, possible slight increase OS. IOP elevated OS last visit as well. Continue Latanoprost QHS OU, added Dorzolamide TWICE DAILY OS on 12/20/16 - IOP improved, eye pain persisits.   RTC 3 months for IOP check and OCT optic nerve.    Eye pain OS Consider having sinuses checked, does not appear to be having angle closure.     Long-standing, but constant over past 3 months.   2. Nuclear sclerosis  Approaching visual significance, myopic shift. BAT not done. Explained this may be contributing to difficulty with vision.    3. Round hole of retina without detachment - Left Eye  Stable. RD precautions   4. Posterior vitreous detachment  "    Allergies/itching Zaditor BID prn   5. Hyperopia with astigmatism and presbyopia  MRx given last visit, feels she is doing well with new specs for now.            RTC 3 months IOP check               "

## 2017-07-07 ENCOUNTER — OFFICE VISIT (OUTPATIENT)
Dept: OPHTHALMOLOGY | Facility: CLINIC | Age: 82
End: 2017-07-07
Payer: MEDICARE

## 2017-07-07 DIAGNOSIS — H10.13 ALLERGIC CONJUNCTIVITIS, BILATERAL: ICD-10-CM

## 2017-07-07 DIAGNOSIS — H40.053 OCULAR HYPERTENSION, BILATERAL: Primary | ICD-10-CM

## 2017-07-07 DIAGNOSIS — H57.12 EYE PAIN, LEFT: ICD-10-CM

## 2017-07-07 DIAGNOSIS — H52.7 REFRACTIVE ERROR: ICD-10-CM

## 2017-07-07 DIAGNOSIS — H33.322 ROUND HOLE OF RETINA WITHOUT DETACHMENT, LEFT: ICD-10-CM

## 2017-07-07 DIAGNOSIS — H25.13 NUCLEAR SCLEROSIS, BILATERAL: ICD-10-CM

## 2017-07-07 DIAGNOSIS — H43.813 POSTERIOR VITREOUS DETACHMENT, BILATERAL: ICD-10-CM

## 2017-07-07 PROCEDURE — 92012 INTRM OPH EXAM EST PATIENT: CPT | Mod: S$GLB,,, | Performed by: OPHTHALMOLOGY

## 2017-07-07 PROCEDURE — 99999 PR PBB SHADOW E&M-EST. PATIENT-LVL III: CPT | Mod: PBBFAC,,, | Performed by: OPHTHALMOLOGY

## 2017-07-26 DIAGNOSIS — H40.053 OCULAR HYPERTENSION, BILATERAL: ICD-10-CM

## 2017-07-27 RX ORDER — LATANOPROST 50 UG/ML
SOLUTION/ DROPS OPHTHALMIC
Qty: 2.5 ML | Refills: 11 | Status: SHIPPED | OUTPATIENT
Start: 2017-07-27 | End: 2017-12-13 | Stop reason: SDUPTHER

## 2017-07-28 DIAGNOSIS — H40.053 OCULAR HYPERTENSION, BILATERAL: ICD-10-CM

## 2017-07-31 DIAGNOSIS — H40.053 OCULAR HYPERTENSION, BILATERAL: ICD-10-CM

## 2017-07-31 RX ORDER — LATANOPROST 50 UG/ML
SOLUTION/ DROPS OPHTHALMIC
Qty: 2.5 ML | Refills: 1 | OUTPATIENT
Start: 2017-07-31

## 2017-08-01 RX ORDER — LATANOPROST 50 UG/ML
SOLUTION/ DROPS OPHTHALMIC
Qty: 2.5 ML | Refills: 1 | OUTPATIENT
Start: 2017-08-01

## 2017-08-21 ENCOUNTER — OFFICE VISIT (OUTPATIENT)
Dept: ORTHOPEDICS | Facility: CLINIC | Age: 82
End: 2017-08-21
Payer: MEDICARE

## 2017-08-21 ENCOUNTER — HOSPITAL ENCOUNTER (OUTPATIENT)
Dept: RADIOLOGY | Facility: HOSPITAL | Age: 82
Discharge: HOME | End: 2017-08-21
Attending: ORTHOPAEDIC SURGERY
Payer: MEDICARE

## 2017-08-21 VITALS — WEIGHT: 199 LBS | BODY MASS INDEX: 33.97 KG/M2 | HEIGHT: 64 IN

## 2017-08-21 DIAGNOSIS — M25.561 RIGHT KNEE PAIN, UNSPECIFIED CHRONICITY: Primary | ICD-10-CM

## 2017-08-21 DIAGNOSIS — G47.33 OBSTRUCTIVE SLEEP APNEA: ICD-10-CM

## 2017-08-21 DIAGNOSIS — M25.561 RIGHT KNEE PAIN, UNSPECIFIED CHRONICITY: ICD-10-CM

## 2017-08-21 DIAGNOSIS — M17.11 PRIMARY OSTEOARTHRITIS OF RIGHT KNEE: ICD-10-CM

## 2017-08-21 PROCEDURE — 1125F AMNT PAIN NOTED PAIN PRSNT: CPT | Mod: S$GLB,,, | Performed by: ORTHOPAEDIC SURGERY

## 2017-08-21 PROCEDURE — 3008F BODY MASS INDEX DOCD: CPT | Mod: S$GLB,,, | Performed by: ORTHOPAEDIC SURGERY

## 2017-08-21 PROCEDURE — 1159F MED LIST DOCD IN RCRD: CPT | Mod: S$GLB,,, | Performed by: ORTHOPAEDIC SURGERY

## 2017-08-21 PROCEDURE — 73562 X-RAY EXAM OF KNEE 3: CPT | Mod: 26,RT,, | Performed by: RADIOLOGY

## 2017-08-21 PROCEDURE — 99999 PR PBB SHADOW E&M-EST. PATIENT-LVL II: CPT | Mod: PBBFAC,,, | Performed by: ORTHOPAEDIC SURGERY

## 2017-08-21 PROCEDURE — 73560 X-RAY EXAM OF KNEE 1 OR 2: CPT | Mod: 26,59,LT, | Performed by: RADIOLOGY

## 2017-08-21 PROCEDURE — 99499 UNLISTED E&M SERVICE: CPT | Mod: S$GLB,,, | Performed by: ORTHOPAEDIC SURGERY

## 2017-08-21 PROCEDURE — 73560 X-RAY EXAM OF KNEE 1 OR 2: CPT | Mod: TC,PO,LT

## 2017-08-21 PROCEDURE — 99203 OFFICE O/P NEW LOW 30 MIN: CPT | Mod: S$GLB,,, | Performed by: ORTHOPAEDIC SURGERY

## 2017-08-21 PROCEDURE — 1157F ADVNC CARE PLAN IN RCRD: CPT | Mod: S$GLB,,, | Performed by: ORTHOPAEDIC SURGERY

## 2017-08-21 NOTE — PROGRESS NOTES
Holli Landrum, 84 years old, fell onto her right knee about nine days ago, has had   bruising and swelling and pain in the area since then, wanted to have it   checked out, 6/10 on good days, 8/10 on bad days, does report to have an old   patellar fracture years ago.    Exam today shows she has some swelling and bruising in the knee and down into   the leg itself.  Negative Adeline's sign.  Does have joint line tenderness.  No   instability about the knee and her extensor is functioning well as well as hip   flexors.    X-rays showed degenerative changes, no acute processes.    ASSESSMENT:  Knee contusion.    PLAN:  Symptomatic care, relative rest, follow up in a couple of weeks' time to   ensure she is getting better or sooner if she is having problems.      PBB/PN  dd: 08/21/2017 14:21:21 (CDT)  td: 08/21/2017 18:13:14 (CDT)  Doc ID   #[id number]  Job ID #[id number]    CC:     Further History  Aching pain  Worse with activity  Relieved with rest  No other associated symptoms  No other radiation    Further Exam  Alert and oriented  Pleasant  Contralateral limb has appropriate range of motion for age and condition  Contralateral limb has appropriate strength for age and condition  Contralateral limb has appropriate stability  for age and condition  No adenopathy  Pulses are appropriate for current condition  Skin is intact        Chief Complaint    Chief Complaint   Patient presents with    Right Knee - Pain       HPI  Holli Landrum is a 84 y.o.  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     never had hepatitis    History of IBS     HTN (hypertension) 11/20/2012    no meds for 1 -2 yrs//    Hypertension     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/2012    Hx MI    JA on CPAP     Paroxysmal atrial fibrillation 11/20/2012    Paroxysmal ventricular tachycardia     per problem list    Squamous cell carcinoma     skin-nose    Urinary tract infection     frequent    Venous insufficiency of leg        Past Surgical History  Past Surgical History:   Procedure Laterality Date    CARDIAC CATHETERIZATION  2013, 2014    has 5 stents    CARDIAC SURGERY  2012    stents    CHOLECYSTECTOMY      HYSTERECTOMY  1969    OVARIAN CYST REMOVAL  teenager    TONSILLECTOMY      aw/denoids    VASCULAR SURGERY      to remove clot from right leg       Medications  Current Outpatient Prescriptions   Medication Sig    ALBUTEROL SULFATE (VENTOLIN INHL) Inhale into the lungs.    cholecalciferol, vitamin D3, (VITAMIN D3) 5,000 unit Tab Take 5,000 Units by mouth once daily.    clopidogrel (PLAVIX) 75 mg tablet Take 1 tablet (75 mg total) by mouth once daily.    CRANBERRY/B.COAGULAN/C/CALCIUM (CRANBERRY-PROBIOTIC ORAL) Take 2 tablets by mouth once daily.    dorzolamide (TRUSOPT) 2 % ophthalmic solution Place 1 drop into the left eye 2 (two) times daily.    fenofibrate 160 MG Tab Take 1 tablet (160 mg total) by mouth once daily.    fish oil-omega-3 fatty acids 300-1,000 mg capsule Take 2 g by mouth once daily.    fluticasone (FLONASE) 50 mcg/actuation nasal spray 2 sprays by Each Nare route once daily. (Patient taking differently: 2 sprays by Each Nare route daily as needed for Allergies. )    furosemide (LASIX) 40 MG tablet Take 1 tablet (40 mg total) by mouth once daily. 1 Tablet Oral Every day    GRAPE SEED EXTRACT ORAL Take 1 tablet by mouth once daily.     latanoprost 0.005 % ophthalmic solution INSTILL ONE DROP INTO BOTH EYES EVERY EVENING     metOLazone (ZAROXOLYN) 5 MG tablet Take 1 tablet (5 mg total) by mouth every Monday and Friday.    nitroGLYCERIN 0.4 MG/HR TD PT24 (NITRODUR) 0.4 mg/hr PLACE 1 PATCH ONTO THE SKIN ONCE DAILY.    OLIVE LEAF EXTRACT ORAL Take by mouth.    potassium chloride (KLOR-CON) 10 MEQ TbSR Take 1 tablet (10 mEq total) by mouth once daily.    SYMBICORT 160-4.5 mcg/actuation HFAA Take 2 puffs by mouth 2 (two) times daily.     No current facility-administered medications for this visit.        Allergies  Review of patient's allergies indicates:   Allergen Reactions    Timoptic [timolol maleate] Shortness Of Breath    Ciprofloxacin Other (See Comments)     Muscle ache       Family History  Family History   Problem Relation Age of Onset    Diabetes Brother     Heart disease Brother     Diabetes Mother     Cancer Maternal Grandfather     Clotting disorder Son      bleeding after tonsillectomy only    Amblyopia Neg Hx     Blindness Neg Hx     Cataracts Neg Hx     Glaucoma Neg Hx     Hypertension Neg Hx     Macular degeneration Neg Hx     Retinal detachment Neg Hx     Strabismus Neg Hx     Stroke Neg Hx     Thyroid disease Neg Hx        Social History  Social History     Social History    Marital status:      Spouse name: N/A    Number of children: N/A    Years of education: N/A     Occupational History    Not on file.     Social History Main Topics    Smoking status: Former Smoker     Types: Cigarettes    Smokeless tobacco: Never Used      Comment: quit smoking 1954 //had smoked for 1 yr//    Alcohol use No    Drug use: No    Sexual activity: Not on file     Other Topics Concern    Not on file     Social History Narrative    No narrative on file               Review of Systems     Constitutional: Negative    HENT: Negative  Eyes: Negative  Respiratory: Negative  Cardiovascular: Negative  Musculoskeletal: HPI  Skin: Negative  Neurological: Negative  Hematological: Negative  Endocrine:  Negative                 Physical Exam    There were no vitals filed for this visit.  Body mass index is 34.16 kg/m².  Physical Examination:     General appearance -  well appearing, and in no distress  Mental status - awake  Neck - supple  Chest -  symmetric air entry  Heart - normal rate   Abdomen - soft      Assessment     1. Right knee pain, unspecified chronicity    2. Primary osteoarthritis of right knee    3. Obstructive sleep apnea          Plan

## 2017-08-21 NOTE — LETTER
August 21, 2017      [place] [id number] [facility] [id number]           [facility]  [address] [facility] [address] [place] [address]  Phone: [phone]          Patient: Holli Landrum   MR Number: [id number]   YOB: 1933   Date of Visit: 8/21/2017       Dear [facility]:    Thank you for referring Holli Landrum to me for evaluation. Attached you will find relevant portions of my assessment and plan of care.    If you have questions, please do not hesitate to call me. I look forward to following Holli Landrum along with you.    Sincerely,    Ad Ye MD    Enclosure  CC:  No Recipients    If you would like to receive this communication electronically, please contact [email or web address] or [phone] to request more information on [employer] access.    For providers and/or their staff who would like to refer a patient to [facility], please contact us through our one-stop-shop provider referral line, [facility], at [phone].    If you feel you have received this communication in error or would no longer like to receive these types of communications, please e-mail [email or web address]

## 2017-09-25 ENCOUNTER — OFFICE VISIT (OUTPATIENT)
Dept: FAMILY MEDICINE | Facility: CLINIC | Age: 82
End: 2017-09-25
Payer: MEDICARE

## 2017-09-25 VITALS
SYSTOLIC BLOOD PRESSURE: 134 MMHG | BODY MASS INDEX: 33.27 KG/M2 | WEIGHT: 194.88 LBS | DIASTOLIC BLOOD PRESSURE: 76 MMHG | HEIGHT: 64 IN | TEMPERATURE: 98 F | HEART RATE: 65 BPM

## 2017-09-25 DIAGNOSIS — L03.115 CELLULITIS OF RIGHT LOWER EXTREMITY: Primary | ICD-10-CM

## 2017-09-25 PROCEDURE — 3078F DIAST BP <80 MM HG: CPT | Mod: S$GLB,,, | Performed by: PHYSICIAN ASSISTANT

## 2017-09-25 PROCEDURE — 99213 OFFICE O/P EST LOW 20 MIN: CPT | Mod: S$GLB,,, | Performed by: PHYSICIAN ASSISTANT

## 2017-09-25 PROCEDURE — 1157F ADVNC CARE PLAN IN RCRD: CPT | Mod: S$GLB,,, | Performed by: PHYSICIAN ASSISTANT

## 2017-09-25 PROCEDURE — 1159F MED LIST DOCD IN RCRD: CPT | Mod: S$GLB,,, | Performed by: PHYSICIAN ASSISTANT

## 2017-09-25 PROCEDURE — 3075F SYST BP GE 130 - 139MM HG: CPT | Mod: S$GLB,,, | Performed by: PHYSICIAN ASSISTANT

## 2017-09-25 PROCEDURE — 99999 PR PBB SHADOW E&M-EST. PATIENT-LVL IV: CPT | Mod: PBBFAC,,, | Performed by: PHYSICIAN ASSISTANT

## 2017-09-25 PROCEDURE — 3008F BODY MASS INDEX DOCD: CPT | Mod: S$GLB,,, | Performed by: PHYSICIAN ASSISTANT

## 2017-09-25 PROCEDURE — 1125F AMNT PAIN NOTED PAIN PRSNT: CPT | Mod: S$GLB,,, | Performed by: PHYSICIAN ASSISTANT

## 2017-09-25 RX ORDER — ONDANSETRON 4 MG/1
4 TABLET, ORALLY DISINTEGRATING ORAL EVERY 8 HOURS PRN
Qty: 20 TABLET | Refills: 1 | Status: SHIPPED | OUTPATIENT
Start: 2017-09-25 | End: 2017-10-02

## 2017-09-25 RX ORDER — SULFAMETHOXAZOLE AND TRIMETHOPRIM 800; 160 MG/1; MG/1
1 TABLET ORAL 2 TIMES DAILY
Qty: 20 TABLET | Refills: 0 | Status: ON HOLD | OUTPATIENT
Start: 2017-09-25 | End: 2017-10-02

## 2017-09-25 NOTE — PROGRESS NOTES
Subjective:       Patient ID: Holli Landrum is a 84 y.o. female.    Chief Complaint: Cellulitis    HPI   Redness and tenderness R knee and lower leg x 2 days  Fever and nausea  Review of Systems   Constitutional: Positive for activity change, chills, fatigue and fever. Negative for appetite change, diaphoresis and unexpected weight change.   HENT: Negative.    Eyes: Negative.    Respiratory: Negative.  Negative for cough and shortness of breath.    Cardiovascular: Positive for leg swelling. Negative for chest pain.   Gastrointestinal: Positive for nausea. Negative for abdominal distention, abdominal pain, anal bleeding, blood in stool, constipation, diarrhea, rectal pain and vomiting.   Endocrine: Negative.    Genitourinary: Negative.    Musculoskeletal: Negative.    Skin: Positive for color change and rash.       Objective:      Physical Exam   Constitutional: She appears well-developed and well-nourished. No distress.   HENT:   Head: Normocephalic and atraumatic.   Eyes: Conjunctivae are normal. No scleral icterus.   Neck: Normal range of motion. Neck supple. No tracheal deviation present. No thyromegaly present.   Cardiovascular: Intact distal pulses.    Musculoskeletal: She exhibits edema and tenderness.   Lymphadenopathy:     She has no cervical adenopathy.   Skin: Skin is warm and dry. Rash noted. There is erythema.   Tender red and hot to touch R lower leg ant. Mid shin to knee  No drainage  No fluctuance   No post calf tenderness   Vitals reviewed.      Assessment:       1. Cellulitis of right lower extremity        Plan:       Holli was seen today for cellulitis.    Diagnoses and all orders for this visit:    Cellulitis of right lower extremity  -     sulfamethoxazole-trimethoprim 800-160mg (BACTRIM DS) 800-160 mg Tab; Take 1 tablet by mouth 2 (two) times daily.  -     ondansetron (ZOFRAN-ODT) 4 MG TbDL; Take 1 tablet (4 mg total) by mouth every 8 (eight) hours as needed.    rest and elevate  Recheck 72  hrs  Pt instructed to go to ER if sx worsen

## 2017-09-29 ENCOUNTER — OFFICE VISIT (OUTPATIENT)
Dept: PRIMARY CARE CLINIC | Facility: CLINIC | Age: 82
End: 2017-09-29
Payer: MEDICARE

## 2017-09-29 VITALS
HEIGHT: 64 IN | HEART RATE: 68 BPM | WEIGHT: 201.5 LBS | DIASTOLIC BLOOD PRESSURE: 76 MMHG | TEMPERATURE: 98 F | SYSTOLIC BLOOD PRESSURE: 118 MMHG | BODY MASS INDEX: 34.4 KG/M2

## 2017-09-29 DIAGNOSIS — L02.419 CELLULITIS AND ABSCESS OF LEG: Primary | ICD-10-CM

## 2017-09-29 DIAGNOSIS — L03.119 CELLULITIS AND ABSCESS OF LEG: Primary | ICD-10-CM

## 2017-09-29 PROBLEM — M79.604 RIGHT LEG PAIN: Status: ACTIVE | Noted: 2017-09-29

## 2017-09-29 PROBLEM — L03.115 CELLULITIS OF RIGHT LOWER EXTREMITY: Status: ACTIVE | Noted: 2017-09-29

## 2017-09-29 PROCEDURE — 3078F DIAST BP <80 MM HG: CPT | Mod: S$GLB,,, | Performed by: NURSE PRACTITIONER

## 2017-09-29 PROCEDURE — 3074F SYST BP LT 130 MM HG: CPT | Mod: S$GLB,,, | Performed by: NURSE PRACTITIONER

## 2017-09-29 PROCEDURE — 99213 OFFICE O/P EST LOW 20 MIN: CPT | Mod: S$GLB,,, | Performed by: NURSE PRACTITIONER

## 2017-09-29 PROCEDURE — 1157F ADVNC CARE PLAN IN RCRD: CPT | Mod: S$GLB,,, | Performed by: NURSE PRACTITIONER

## 2017-09-29 PROCEDURE — 99999 PR PBB SHADOW E&M-EST. PATIENT-LVL IV: CPT | Mod: PBBFAC,,, | Performed by: NURSE PRACTITIONER

## 2017-09-29 PROCEDURE — 1125F AMNT PAIN NOTED PAIN PRSNT: CPT | Mod: S$GLB,,, | Performed by: NURSE PRACTITIONER

## 2017-09-29 PROCEDURE — 3008F BODY MASS INDEX DOCD: CPT | Mod: S$GLB,,, | Performed by: NURSE PRACTITIONER

## 2017-09-29 PROCEDURE — 1159F MED LIST DOCD IN RCRD: CPT | Mod: S$GLB,,, | Performed by: NURSE PRACTITIONER

## 2017-09-29 NOTE — Clinical Note
Marcia Crespo MD,  I saw your patient today in the [facility].  If you have any questions, please do not hesitate to contact me.  Thank you!  CHANTE Pan

## 2017-09-29 NOTE — PROGRESS NOTES
Holli Landrum is a 84 y.o. female patient of Marcia Crespo MD who presents to the clinic today for   Chief Complaint   Patient presents with    Recurrent Skin Infections     right leg   .    HPI    Pt, who is not known to me, reports a new problem to me:  Worsening cellulitis on the RLE.  Seen 9/25/17 for an area below the knee and lat to midline.  Now the redness is worse, the pain is worse ad the redness is extending down to above the ankle midline.  Fever was down from 101.9.      These symptoms began 4 days ago and status is continuing to worsen despite 3-4 days of Bactrim.     Pt denies the following symptoms:  Current fever (did have 101.9 three days ago)    Aggravating factors include  .    Relieving factors include venous insufficiency .    OTC Medications tried are ibuprofen.    Prescription medications taken for symptoms are Bactrim.    Pertinent medical history:  H/o cellulitis x 2 in the past, 1 remote, 1 more recent    ROS    Constitutional:  No longer has fever.    Skin:  See chief complaint/HPI.    MS:  No new problems in bones, joints or muscles.      PAST MEDICAL HISTORY:  Past Medical History:   Diagnosis Date    Arthritis     back pain, hands    Basal cell carcinoma     CAD (coronary artery disease)     Cataract     ou    COPD (chronic obstructive pulmonary disease)     no oxygen    Coronary artery disease     DVT (deep venous thrombosis) 1968    right leg; 1978 left leg    GERD (gastroesophageal reflux disease) 11/20/2012    Glaucoma     H/O gastroesophageal reflux (GERD)     Hepatitis C antibody positive in blood     never had hepatitis    History of IBS     HTN (hypertension) 11/20/2012    no meds for 1 -2 yrs//    Hypertension     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/2012    Hx MI    JA on CPAP     Paroxysmal atrial fibrillation 11/20/2012    Paroxysmal ventricular tachycardia     per problem list    Squamous cell carcinoma     skin-nose     Urinary tract infection     frequent    Venous insufficiency of leg        PAST SURGICAL HISTORY:  Past Surgical History:   Procedure Laterality Date    CARDIAC CATHETERIZATION  2013, 2014    has 5 stents    CARDIAC SURGERY  2012    stents    CHOLECYSTECTOMY      HYSTERECTOMY  1969    OVARIAN CYST REMOVAL  teenager    TONSILLECTOMY      aw/denoids    VASCULAR SURGERY      to remove clot from right leg       SOCIAL HISTORY:  Social History     Social History    Marital status:      Spouse name: N/A    Number of children: N/A    Years of education: N/A     Occupational History    Not on file.     Social History Main Topics    Smoking status: Former Smoker     Types: Cigarettes    Smokeless tobacco: Never Used      Comment: quit smoking 1954 //had smoked for 1 yr//    Alcohol use No    Drug use: No    Sexual activity: Not on file     Other Topics Concern    Not on file     Social History Narrative    No narrative on file       FAMILY HISTORY:  Family History   Problem Relation Age of Onset    Diabetes Brother     Heart disease Brother     Diabetes Mother     Cancer Maternal Grandfather     Clotting disorder Son      bleeding after tonsillectomy only    Amblyopia Neg Hx     Blindness Neg Hx     Cataracts Neg Hx     Glaucoma Neg Hx     Hypertension Neg Hx     Macular degeneration Neg Hx     Retinal detachment Neg Hx     Strabismus Neg Hx     Stroke Neg Hx     Thyroid disease Neg Hx        ALLERGIES AND MEDICATIONS: updated and reviewed.  Review of patient's allergies indicates:   Allergen Reactions    Timoptic [timolol maleate] Shortness Of Breath    Ciprofloxacin Other (See Comments)     Muscle ache     Current Outpatient Prescriptions   Medication Sig Dispense Refill    ALBUTEROL SULFATE (VENTOLIN INHL) Inhale into the lungs.      cholecalciferol, vitamin D3, (VITAMIN D3) 5,000 unit Tab Take 5,000 Units by mouth once daily.      clopidogrel (PLAVIX) 75 mg tablet Take 1  tablet (75 mg total) by mouth once daily. 90 tablet 3    CRANBERRY/B.COAGULAN/C/CALCIUM (CRANBERRY-PROBIOTIC ORAL) Take 2 tablets by mouth once daily.      dorzolamide (TRUSOPT) 2 % ophthalmic solution Place 1 drop into the left eye 2 (two) times daily. 10 mL 11    fenofibrate 160 MG Tab Take 1 tablet (160 mg total) by mouth once daily. 90 tablet 3    fish oil-omega-3 fatty acids 300-1,000 mg capsule Take 2 g by mouth once daily.      fluticasone (FLONASE) 50 mcg/actuation nasal spray 2 sprays by Each Nare route once daily. (Patient taking differently: 2 sprays by Each Nare route daily as needed for Allergies. ) 16 g 11    furosemide (LASIX) 40 MG tablet Take 1 tablet (40 mg total) by mouth once daily. 1 Tablet Oral Every day 90 tablet 3    GRAPE SEED EXTRACT ORAL Take 1 tablet by mouth once daily.       latanoprost 0.005 % ophthalmic solution INSTILL ONE DROP INTO BOTH EYES EVERY EVENING 2.5 mL 11    metOLazone (ZAROXOLYN) 5 MG tablet Take 1 tablet (5 mg total) by mouth every Monday and Friday. 30 tablet 3    nitroGLYCERIN 0.4 MG/HR TD PT24 (NITRODUR) 0.4 mg/hr PLACE 1 PATCH ONTO THE SKIN ONCE DAILY. 90 patch 3    OLIVE LEAF EXTRACT ORAL Take by mouth.      ondansetron (ZOFRAN-ODT) 4 MG TbDL Take 1 tablet (4 mg total) by mouth every 8 (eight) hours as needed. 20 tablet 1    potassium chloride (KLOR-CON) 10 MEQ TbSR Take 1 tablet (10 mEq total) by mouth once daily. 90 tablet 3    sulfamethoxazole-trimethoprim 800-160mg (BACTRIM DS) 800-160 mg Tab Take 1 tablet by mouth 2 (two) times daily. 20 tablet 0    SYMBICORT 160-4.5 mcg/actuation HFAA Take 2 puffs by mouth 2 (two) times daily.       No current facility-administered medications for this visit.          PHYSICAL EXAM    Alert, coop 84 y.o. female patient in no acute distress, is not ill-appearing.    Vitals:    09/29/17 1040   BP: 118/76   Pulse: 68   Temp: 98 °F (36.7 °C)     VS reviewed.  VS stable.  CC, nursing note, medications & PMH all  reviewed today.    Head:  Normocephalic, atraumatic.    EENT:  Ext nose/ears normal.               Eyes with normal lids, not injected.     Resp:  Respirations even, unlabored    Heart:  Regular rate.  CV:  Right > left pedal edema    MS:  Ambulates via WC.     NEURO:  Alert and oriented x 4.  Responds appropriately during interaction.    Skin:  Warm, dry, color good.            A/an intensely red area approx 2.5 cm diameter is noted inf and lat to patella, tender and firm to touch.            Lighter red area is noted over the lower patellar area and midline down the ant lower leg to above the ankle.           The skin is warm to touch.            No pustules or vesicles noted.    Psych:  Responds appropriately throughout the visit.               Relaxed.  Well-groomed    Holli was seen today for recurrent skin infections.    Diagnoses and all orders for this visit:    Cellulitis and abscess of leg  Comments:  RLE, worsening redness after 48 hrs of Bactrim.  Had fever 48 hrs ago but fever gone now.  ER for evaluation.    Pt today presents with worsening redness and pain after 3 days of Bactrim for cellulitis..    This is a new problem to me.  No work up is planned.        Advised her to go to the ER as the sxs are worsening  Explained exam findings, diagnosis and treatment plan to patient and her daughter, with her during the visit.  Questions answered and patient states understanding.

## 2017-09-29 NOTE — PATIENT INSTRUCTIONS
Our resources show that IV antibiotics are indicated.  Please go over to the ER for further evaluation.    Thank you for using the [facility]!    NAT Pan, CNP, FNP-BC  [facility]  [facility]

## 2017-10-04 ENCOUNTER — TELEPHONE (OUTPATIENT)
Dept: FAMILY MEDICINE | Facility: CLINIC | Age: 82
End: 2017-10-04

## 2017-10-06 ENCOUNTER — LAB VISIT (OUTPATIENT)
Dept: LAB | Facility: HOSPITAL | Age: 82
End: 2017-10-06
Attending: INTERNAL MEDICINE
Payer: MEDICARE

## 2017-10-06 ENCOUNTER — OFFICE VISIT (OUTPATIENT)
Dept: FAMILY MEDICINE | Facility: CLINIC | Age: 82
End: 2017-10-06
Payer: MEDICARE

## 2017-10-06 VITALS
SYSTOLIC BLOOD PRESSURE: 138 MMHG | HEIGHT: 65 IN | DIASTOLIC BLOOD PRESSURE: 80 MMHG | BODY MASS INDEX: 32.42 KG/M2 | OXYGEN SATURATION: 95 % | TEMPERATURE: 99 F | HEART RATE: 78 BPM | WEIGHT: 194.56 LBS

## 2017-10-06 DIAGNOSIS — D75.839 THROMBOCYTOSIS: ICD-10-CM

## 2017-10-06 DIAGNOSIS — N28.9 RENAL INSUFFICIENCY: ICD-10-CM

## 2017-10-06 DIAGNOSIS — I25.84 CORONARY ARTERY DISEASE DUE TO CALCIFIED CORONARY LESION: ICD-10-CM

## 2017-10-06 DIAGNOSIS — I50.32 CHRONIC DIASTOLIC HEART FAILURE: ICD-10-CM

## 2017-10-06 DIAGNOSIS — I11.0 HYPERTENSIVE HEART DISEASE WITH HEART FAILURE: ICD-10-CM

## 2017-10-06 DIAGNOSIS — L03.115 CELLULITIS OF RIGHT LOWER EXTREMITY: Primary | ICD-10-CM

## 2017-10-06 DIAGNOSIS — R60.0 BILATERAL LEG EDEMA: ICD-10-CM

## 2017-10-06 DIAGNOSIS — M79.604 RIGHT LEG PAIN: ICD-10-CM

## 2017-10-06 DIAGNOSIS — L03.115 CELLULITIS OF RIGHT LOWER EXTREMITY: ICD-10-CM

## 2017-10-06 DIAGNOSIS — G47.33 OBSTRUCTIVE SLEEP APNEA: ICD-10-CM

## 2017-10-06 DIAGNOSIS — I25.10 CORONARY ARTERY DISEASE DUE TO CALCIFIED CORONARY LESION: ICD-10-CM

## 2017-10-06 DIAGNOSIS — I87.2 VENOUS INSUFFICIENCY: ICD-10-CM

## 2017-10-06 LAB
ANION GAP SERPL CALC-SCNC: 10 MMOL/L
BASOPHILS # BLD AUTO: 0.04 K/UL
BASOPHILS NFR BLD: 0.5 %
BUN SERPL-MCNC: 29 MG/DL
CALCIUM SERPL-MCNC: 10.2 MG/DL
CHLORIDE SERPL-SCNC: 102 MMOL/L
CO2 SERPL-SCNC: 22 MMOL/L
CREAT SERPL-MCNC: 1.6 MG/DL
CRP SERPL-MCNC: 1.5 MG/L
DIFFERENTIAL METHOD: ABNORMAL
EOSINOPHIL # BLD AUTO: 0.2 K/UL
EOSINOPHIL NFR BLD: 2.2 %
ERYTHROCYTE [DISTWIDTH] IN BLOOD BY AUTOMATED COUNT: 14.6 %
ERYTHROCYTE [SEDIMENTATION RATE] IN BLOOD BY WESTERGREN METHOD: 11 MM/HR
EST. GFR  (AFRICAN AMERICAN): 33.9 ML/MIN/1.73 M^2
EST. GFR  (NON AFRICAN AMERICAN): 29.4 ML/MIN/1.73 M^2
GLUCOSE SERPL-MCNC: 96 MG/DL
HCT VFR BLD AUTO: 39.4 %
HGB BLD-MCNC: 13.2 G/DL
LYMPHOCYTES # BLD AUTO: 3.1 K/UL
LYMPHOCYTES NFR BLD: 39.8 %
MAGNESIUM SERPL-MCNC: 2.3 MG/DL
MCH RBC QN AUTO: 29.1 PG
MCHC RBC AUTO-ENTMCNC: 33.5 G/DL
MCV RBC AUTO: 87 FL
MONOCYTES # BLD AUTO: 0.9 K/UL
MONOCYTES NFR BLD: 12.1 %
NEUTROPHILS # BLD AUTO: 3.5 K/UL
NEUTROPHILS NFR BLD: 45.1 %
PLATELET # BLD AUTO: 491 K/UL
PMV BLD AUTO: 9.9 FL
POTASSIUM SERPL-SCNC: 4.1 MMOL/L
RBC # BLD AUTO: 4.54 M/UL
SODIUM SERPL-SCNC: 134 MMOL/L
WBC # BLD AUTO: 7.77 K/UL

## 2017-10-06 PROCEDURE — 99999 PR PBB SHADOW E&M-EST. PATIENT-LVL IV: CPT | Mod: PBBFAC,,, | Performed by: INTERNAL MEDICINE

## 2017-10-06 PROCEDURE — 85651 RBC SED RATE NONAUTOMATED: CPT

## 2017-10-06 PROCEDURE — 99214 OFFICE O/P EST MOD 30 MIN: CPT | Mod: S$GLB,,, | Performed by: INTERNAL MEDICINE

## 2017-10-06 PROCEDURE — 80048 BASIC METABOLIC PNL TOTAL CA: CPT

## 2017-10-06 PROCEDURE — 36415 COLL VENOUS BLD VENIPUNCTURE: CPT | Mod: PO

## 2017-10-06 PROCEDURE — 83735 ASSAY OF MAGNESIUM: CPT

## 2017-10-06 PROCEDURE — 85025 COMPLETE CBC W/AUTO DIFF WBC: CPT

## 2017-10-06 PROCEDURE — 86140 C-REACTIVE PROTEIN: CPT

## 2017-10-06 PROCEDURE — 99499 UNLISTED E&M SERVICE: CPT | Mod: S$GLB,,, | Performed by: INTERNAL MEDICINE

## 2017-10-06 RX ORDER — MUPIROCIN 20 MG/G
OINTMENT TOPICAL
Qty: 22 G | Refills: 1 | Status: SHIPPED | OUTPATIENT
Start: 2017-10-06 | End: 2018-02-20 | Stop reason: ALTCHOICE

## 2017-10-06 RX ORDER — DOXYCYCLINE HYCLATE 100 MG
100 TABLET ORAL EVERY 12 HOURS
Qty: 14 TABLET | Refills: 0 | Status: SHIPPED | OUTPATIENT
Start: 2017-10-06 | End: 2017-10-13

## 2017-10-06 RX ORDER — DOXYCYCLINE HYCLATE 100 MG
100 TABLET ORAL EVERY 12 HOURS
Qty: 14 TABLET | Refills: 0 | Status: SHIPPED | OUTPATIENT
Start: 2017-10-06 | End: 2017-10-16

## 2017-10-06 NOTE — PROGRESS NOTES
Subjective:       Patient ID: Holli Landrum is a 84 y.o. female.    Chief Complaint: hospital follow up [facility]    HPI  Hosp recently Friday morning w d/ch Monday night. For cellulitis RLE; d/ch on T/S for 10 days. Tx w T/S prior to admit for 4.5 days and didn't work well enough. Pt fell 8/12/17 on RLE. RLE edema has improved; on lasix but stopped while she was taking her T/S before admit. On metolazone w diastolic dysfunction, and nl LVEF 55%.mild to mod AS and 5 coronary stents. Sees Dr Bryant week after next for eval. Leg doing a lot better. No fever or chills. Elevates her leg at home. Watches her salt intake. RLE pain doing a lot better.US neg for DVT.    Review of Systems   Constitutional: Negative for appetite change, fever and unexpected weight change.   HENT: Negative for congestion, postnasal drip, rhinorrhea and sinus pressure.          history of seasonal ALLERGIES    Eyes: Negative for discharge and itching.   Respiratory: Negative for cough, chest tightness, shortness of breath and wheezing.    Cardiovascular: Positive for leg swelling. Negative for chest pain and palpitations.        Has improved w tx cellulitis RLE.   Gastrointestinal: Negative for abdominal distention, abdominal pain, blood in stool, constipation, diarrhea, nausea and vomiting.        Denies melena as well   Endocrine: Negative for polydipsia, polyphagia and polyuria.   Genitourinary: Negative for dysuria and hematuria.   Musculoskeletal: Negative for arthralgias and myalgias.   Skin: Negative for rash.   Allergic/Immunologic: Negative for environmental allergies and food allergies.        Denies seasonal or perennial ALLERGIES.   Neurological: Negative for tremors, seizures and syncope.   Hematological: Negative for adenopathy. Bruises/bleeds easily.   Psychiatric/Behavioral:        Denies anxiety or depression       Objective:      Vitals:    10/06/17 1440   BP: 138/80   BP Location: Left arm   Patient Position: Sitting   BP Method:  "Medium (Manual)   Pulse: 78   Temp: 98.7 °F (37.1 °C)   TempSrc: Oral   SpO2: 95%   Weight: 88.3 kg (194 lb 8.9 oz)   Height: 5' 5" (1.651 m)     Body mass index is 32.38 kg/m².    Physical Exam   Constitutional: She is oriented to person, place, and time. She appears well-developed and well-nourished.   HENT:   Head: Normocephalic and atraumatic.   Eyes: EOM are normal.   Neck: Normal range of motion. Neck supple. No thyromegaly present.   No carotid bruits heard.   Cardiovascular: Normal rate, regular rhythm and normal heart sounds.  Exam reveals no gallop.    No murmur heard.  Pulmonary/Chest: Effort normal and breath sounds normal. No respiratory distress. She has no wheezes. She has no rales.   Abdominal: Soft. Bowel sounds are normal. She exhibits no distension. There is no tenderness. There is no rebound and no guarding.   Musculoskeletal: Normal range of motion. She exhibits edema.   Gustavo LE edema; no calf tenderness to palp; 2+ LLE>RLE edema; RLE pretib distally w some residual cellulitis.Tender.As well as upper lateral pretib area just below her knee; both areas w increased warmth and erythema/blanching. Varicose and spider veins noted.   Lymphadenopathy:     She has no cervical adenopathy.   Neurological: She is alert and oriented to person, place, and time.   Moves all 4 extremities fine.   Skin: No rash noted.   Psychiatric: She has a normal mood and affect. Her behavior is normal. Thought content normal.   Vitals reviewed.      Assessment:       1. Cellulitis of right lower extremity    2. Bilateral leg edema    3. Venous insufficiency    4. Right leg pain    5. Obstructive sleep apnea    6. Chronic diastolic heart failure    7. Coronary artery disease, h/o multivessel stents.    8. Hypertensive heart disease with heart failure        Plan:       Cellulitis of right lower extremity; finish T/S for next 6 days. Add doxycycline for 1 week 100 mg 2x a day; apply bactroban topical 2x a day as needed to " upper pretib cellulitis; likely has broken skin w scratching here.     Bilateral leg edema. Maintain less than 2 g sodium diet; elevate lower extremities at home; use compression stockings if possible.Cont her diuretics as directed.    Venous insufficiency; elevate her legs at home; restrict her salt.    Right leg pain; can use tylenol for pain    Obstructive sleep apnea; uses her CPAP each night.    Chronic diastolic heart failure; sees Dr Bryant week after next.    Coronary artery disease, h/o multivessel stents. Has been stable.    Hypertensive heart disease with heart failure. Maintain < 2 Gm Na a day diet, and monitor BP at home; keep a log. On metolazone and lasix.

## 2017-10-06 NOTE — PATIENT INSTRUCTIONS
Cellulitis of right lower extremity; finish T/S for next 6 days. Add doxycycline for 1 week 100 mg 2x a day; apply bactroban topical 2x a day as needed to upper pretib cellulitis; likely has broken skin w scratching here.     Bilateral leg edema. Maintain less than 2 g sodium diet; elevate lower extremities at home; use compression stockings if possible.Cont her diuretics as directed.    Venous insufficiency; elevate her legs at home; restrict her salt.    Right leg pain; can use tylenol for pain    Obstructive sleep apnea; use her CPAP each night.    Chronic diastolic heart failure; sees Dr Bryant week after next.    Coronary artery disease, h/o multivessel stents. Has been stable.    Hypertensive heart disease with heart failure. Maintain < 2 Gm Na a day diet, and monitor BP at home; keep a log. On metolazone and lasix.    See Dr Crespo in 10 days follow up; sooner if needed. Decrease her free water intake.

## 2017-10-10 DIAGNOSIS — N17.9 AKI (ACUTE KIDNEY INJURY): Primary | ICD-10-CM

## 2017-10-16 ENCOUNTER — OFFICE VISIT (OUTPATIENT)
Dept: DERMATOLOGY | Facility: CLINIC | Age: 82
End: 2017-10-16
Payer: MEDICARE

## 2017-10-16 ENCOUNTER — LAB VISIT (OUTPATIENT)
Dept: LAB | Facility: HOSPITAL | Age: 82
End: 2017-10-16
Attending: INTERNAL MEDICINE
Payer: MEDICARE

## 2017-10-16 VITALS — WEIGHT: 194 LBS | BODY MASS INDEX: 32.32 KG/M2 | RESPIRATION RATE: 16 BRPM | HEIGHT: 65 IN

## 2017-10-16 DIAGNOSIS — B07.8 COMMON WART: ICD-10-CM

## 2017-10-16 DIAGNOSIS — L82.1 SEBORRHEIC KERATOSES: ICD-10-CM

## 2017-10-16 DIAGNOSIS — Z12.83 SKIN CANCER SCREENING: ICD-10-CM

## 2017-10-16 DIAGNOSIS — Z85.828 PERSONAL HISTORY OF MALIGNANT NEOPLASM OF SKIN: Primary | ICD-10-CM

## 2017-10-16 DIAGNOSIS — N17.9 AKI (ACUTE KIDNEY INJURY): ICD-10-CM

## 2017-10-16 PROCEDURE — 17110 DESTRUCTION B9 LES UP TO 14: CPT | Mod: S$GLB,,, | Performed by: DERMATOLOGY

## 2017-10-16 PROCEDURE — 80048 BASIC METABOLIC PNL TOTAL CA: CPT

## 2017-10-16 PROCEDURE — 99214 OFFICE O/P EST MOD 30 MIN: CPT | Mod: 25,S$GLB,, | Performed by: DERMATOLOGY

## 2017-10-16 PROCEDURE — 36415 COLL VENOUS BLD VENIPUNCTURE: CPT | Mod: PO

## 2017-10-16 PROCEDURE — 99999 PR PBB SHADOW E&M-EST. PATIENT-LVL II: CPT | Mod: PBBFAC,,, | Performed by: DERMATOLOGY

## 2017-10-16 NOTE — PROGRESS NOTES
Subjective:       Patient ID:  Holli Landrum is a 84 y.o. female who presents for   Chief Complaint   Patient presents with    Follow-up     Last seen 3-8-17   Lesion - left lower back , dark in color & raised - daughter noticed  - never treated   Lesion R neck - raised - never treated  Lesion L shoulder - raised & dry - never treated   Lesion R cheek - dark in color - never treated     Recently was in ST for cellulitis right leg - Dr Alejo - [place]    Phx left cheek- superfically invasive SCC- Mohs - Dr De La Garza 4-4-17   Phx BCC right alar groove  Phx SCC central forehead  Phx BCC right forehead  Phx BCC glabella   BCC medial cheek s/p Mohs done by Dr. De La Garza 7-8-2016  SCCIS, left brow s/p Mohs done by Dr. De La Garza  History blood clot on Plavix          Review of Systems   Skin: Positive for dry skin.   Hematologic/Lymphatic: Bruises/bleeds easily.        Objective:    Physical Exam   Constitutional: She appears well-developed and well-nourished. No distress.   HENT:   Mouth/Throat: Lips normal.    Eyes: Lids are normal.  No conjunctival no injection.   Cardiovascular: There is no local extremity swelling and no dependent edema.     Neurological: She is alert and oriented to person, place, and time. She is not disoriented.   Psychiatric: She has a normal mood and affect.   Skin:   Areas Examined (abnormalities noted in diagram):   Head / Face Inspection Performed  Neck Inspection Performed  Chest / Axilla Inspection Performed  Abdomen Inspection Performed  Genitals / Buttocks / Groin Inspection Performed  Back Inspection Performed  RUE Inspected  LUE Inspection Performed  RLE Inspected  LLE Inspection Performed                   Diagram Legend     Erythematous scaling macule/papule c/w actinic keratosis       Vascular papule c/w angioma      Pigmented verrucoid papule/plaque c/w seborrheic keratosis      Yellow umbilicated papule c/w sebaceous hyperplasia      Irregularly shaped tan macule c/w  lentigo     1-2 mm smooth white papules consistent with Milia      Movable subcutaneous cyst with punctum c/w epidermal inclusion cyst      Subcutaneous movable cyst c/w pilar cyst      Firm pink to brown papule c/w dermatofibroma      Pedunculated fleshy papule(s) c/w skin tag(s)      Evenly pigmented macule c/w junctional nevus     Mildly variegated pigmented, slightly irregular-bordered macule c/w mildly atypical nevus      Flesh colored to evenly pigmented papule c/w intradermal nevus       Pink pearly papule/plaque c/w basal cell carcinoma      Erythematous hyperkeratotic cursted plaque c/w SCC      Surgical scar with no sign of skin cancer recurrence      Open and closed comedones      Inflammatory papules and pustules      Verrucoid papule consistent consistent with wart     Erythematous eczematous patches and plaques     Dystrophic onycholytic nail with subungual debris c/w onychomycosis     Umbilicated papule    Erythematous-base heme-crusted tan verrucoid plaque consistent with inflamed seborrheic keratosis     Erythematous Silvery Scaling Plaque c/w Psoriasis     See annotation      Assessment / Plan:        Personal history of malignant neoplasm of skin  Area(s) of previous NMSC evaluated with no signs of recurrence.    Upper body skin examination performed today including at least 6 points as noted in physical examination. No lesions suspicious for malignancy noted.      Skin cancer screening  Area(s) of previous NMSC evaluated with no signs of recurrence.    Upper body skin examination performed today including at least 6 points as noted in physical examination. No lesions suspicious for malignancy noted.      Common wart  Right neck  Cryosurgery procedure note:    Verbal consent from the patient is obtained. Liquid nitrogen cryosurgery is applied to 1 lesions to produce a freeze injury. The patient is aware that blisters may form and is instructed on wound care with gentle cleansing and use of  vaseline ointment to keep moist until healed. The patient is supplied a handout on cryosurgery and is instructed to call if lesions do not completely resolve. Risk of dyspigmentation discussed.     Seborrheic keratoses, face and extremities  These are benign inherited growths without a malignant potential. Reassurance given to patient. No treatment is necessary.              Return in about 6 months (around 4/16/2018).

## 2017-10-17 ENCOUNTER — OFFICE VISIT (OUTPATIENT)
Dept: FAMILY MEDICINE | Facility: CLINIC | Age: 82
End: 2017-10-17
Payer: MEDICARE

## 2017-10-17 VITALS
BODY MASS INDEX: 32.99 KG/M2 | HEART RATE: 69 BPM | SYSTOLIC BLOOD PRESSURE: 122 MMHG | DIASTOLIC BLOOD PRESSURE: 80 MMHG | OXYGEN SATURATION: 95 % | HEIGHT: 64 IN | WEIGHT: 193.25 LBS | TEMPERATURE: 98 F

## 2017-10-17 DIAGNOSIS — R60.0 LEG EDEMA: ICD-10-CM

## 2017-10-17 DIAGNOSIS — K21.9 GASTROESOPHAGEAL REFLUX DISEASE, ESOPHAGITIS PRESENCE NOT SPECIFIED: ICD-10-CM

## 2017-10-17 DIAGNOSIS — L03.115 CELLULITIS OF RIGHT LOWER EXTREMITY: Primary | ICD-10-CM

## 2017-10-17 DIAGNOSIS — R11.0 NAUSEA: ICD-10-CM

## 2017-10-17 DIAGNOSIS — J30.2 SEASONAL ALLERGIC RHINITIS, UNSPECIFIED CHRONICITY, UNSPECIFIED TRIGGER: ICD-10-CM

## 2017-10-17 DIAGNOSIS — I10 ESSENTIAL HYPERTENSION: ICD-10-CM

## 2017-10-17 DIAGNOSIS — N17.9 AKI (ACUTE KIDNEY INJURY): ICD-10-CM

## 2017-10-17 DIAGNOSIS — I87.2 VENOUS INSUFFICIENCY: ICD-10-CM

## 2017-10-17 DIAGNOSIS — K29.70 GASTRITIS WITHOUT BLEEDING, UNSPECIFIED CHRONICITY, UNSPECIFIED GASTRITIS TYPE: ICD-10-CM

## 2017-10-17 DIAGNOSIS — H10.10 SEASONAL ALLERGIC CONJUNCTIVITIS: ICD-10-CM

## 2017-10-17 LAB
ANION GAP SERPL CALC-SCNC: 11 MMOL/L
BUN SERPL-MCNC: 42 MG/DL
CALCIUM SERPL-MCNC: 9.7 MG/DL
CHLORIDE SERPL-SCNC: 97 MMOL/L
CO2 SERPL-SCNC: 27 MMOL/L
CREAT SERPL-MCNC: 1.2 MG/DL
EST. GFR  (AFRICAN AMERICAN): 48 ML/MIN/1.73 M^2
EST. GFR  (NON AFRICAN AMERICAN): 41.6 ML/MIN/1.73 M^2
GLUCOSE SERPL-MCNC: 96 MG/DL
POTASSIUM SERPL-SCNC: 3.7 MMOL/L
SODIUM SERPL-SCNC: 135 MMOL/L

## 2017-10-17 PROCEDURE — 99214 OFFICE O/P EST MOD 30 MIN: CPT | Mod: S$GLB,,, | Performed by: INTERNAL MEDICINE

## 2017-10-17 PROCEDURE — 99999 PR PBB SHADOW E&M-EST. PATIENT-LVL IV: CPT | Mod: PBBFAC,,, | Performed by: INTERNAL MEDICINE

## 2017-10-17 PROCEDURE — 99499 UNLISTED E&M SERVICE: CPT | Mod: S$GLB,,, | Performed by: INTERNAL MEDICINE

## 2017-10-17 RX ORDER — DOXYCYCLINE HYCLATE 100 MG
100 TABLET ORAL 2 TIMES DAILY
Qty: 14 TABLET | Refills: 0 | Status: SHIPPED | OUTPATIENT
Start: 2017-10-17 | End: 2017-10-24

## 2017-10-17 RX ORDER — OMEPRAZOLE 40 MG/1
40 CAPSULE, DELAYED RELEASE ORAL DAILY
Qty: 30 CAPSULE | Refills: 2 | Status: SHIPPED | OUTPATIENT
Start: 2017-10-17 | End: 2018-07-18

## 2017-10-17 NOTE — PROGRESS NOTES
Subjective:       Patient ID: Holli Landrum is a 84 y.o. female.    Chief Complaint: Hospital follow up from [facility] [[email or web address]]    HPI  Overall she's been doing ok except for nausea; started on zofran prn by  Tyson; started on in late September, placed initially on T/S around same time. Some belching. N o abd pain; vomit once Wed 1 week ago. 10/6/17 w me no nausea c/o's then. Though she claims 10/11/17 returned. Takes pepto-bismol occasionally. Some sinus drip; hx of seasonal allergies. Usually takes chlortrimeton. Allegra no help; dose?. ! Week doxycycline added to T/s course of which 6 days were left w last visit 10/6/17; bactroban also added topically.RLE has improved w cellulitis areas. Now only w residual cellulitis lower pretib on RLE.    Review of Systems   Constitutional: Negative for appetite change, fever and unexpected weight change.   HENT: Positive for rhinorrhea. Negative for congestion and sinus pressure.          history of seasonal ALLERGIES / perennial ALLERGIES; sneezing a lot daily. Has had allergy shots.   Eyes: Negative for discharge and itching.   Respiratory: Positive for wheezing. Negative for cough, chest tightness and shortness of breath.         On symbicort and ventolin as rescue. Dr Souza as her pulm. occ wheezing from time to time.   Cardiovascular: Positive for leg swelling. Negative for chest pain and palpitations.        Has been stable.   Gastrointestinal: Positive for nausea. Negative for abdominal distention, abdominal pain, blood in stool, constipation, diarrhea and vomiting.        Denies melena as well; some reflux; interm nausea.    Endocrine: Negative for polydipsia, polyphagia and polyuria.   Genitourinary: Negative for dysuria and hematuria.   Musculoskeletal: Negative for arthralgias and myalgias.   Skin: Negative for rash.   Allergic/Immunologic: Positive for environmental allergies.         seasonal / perennial ALLERGIES.   Neurological: Negative for tremors, seizures and  "syncope.   Hematological: Negative for adenopathy. Bruises/bleeds easily.        On plavix   Psychiatric/Behavioral:         Anxiety but no depression; doing better w being back home; recently living in her old house; now renting it out; and back at home.       Objective:      Vitals:    10/17/17 1304   BP: 122/80   BP Location: Left arm   Patient Position: Sitting   BP Method: Medium (Manual)   Pulse: 69   Temp: 98 °F (36.7 °C)   TempSrc: Oral   SpO2: 95%   Weight: 87.6 kg (193 lb 3.7 oz)   Height: 5' 4" (1.626 m)     Body mass index is 33.17 kg/m².    Physical Exam   Constitutional: She is oriented to person, place, and time. She appears well-developed and well-nourished.   HENT:   Head: Normocephalic and atraumatic.   TM's pink and congested. NM swollen slightly inflamed; clear mucus; no sinus tenderness to palp. Throat pink. Upper lid edema  conjunctivae injected.   Eyes: EOM are normal.   Neck: Normal range of motion. Neck supple. No thyromegaly present.   Cardiovascular: Normal rate, regular rhythm and normal heart sounds.  Exam reveals no gallop.    No murmur heard.  Pulmonary/Chest: Effort normal and breath sounds normal. No respiratory distress. She has no wheezes. She has no rales.   Abdominal: Soft. Bowel sounds are normal. She exhibits no distension. There is no tenderness. There is no rebound and no guarding.   Musculoskeletal: Normal range of motion. She exhibits no edema.   Large legs w mild edema; no calf tenderness to palp; RLE distal pretib w min cellulitis w some soreness; upper pretib cellulitis has essentially resolved.   Lymphadenopathy:     She has no cervical adenopathy.   Neurological: She is alert and oriented to person, place, and time.   Moves all 4 extremities fine.   Skin: No rash noted.   Psychiatric: She has a normal mood and affect. Her behavior is normal. Thought content normal.   Vitals reviewed.      Assessment:       1. Cellulitis of right lower extremity    2. Venous " insufficiency    3. Leg edema    4. Essential hypertension    5. Nausea    6. KAILYN (acute kidney injury)    7. Gastroesophageal reflux disease, esophagitis presence not specified    8. Gastritis without bleeding, unspecified chronicity, unspecified gastritis type        Plan:       Cellulitis of right lower extremity; add 7 days of doxycycline; elevate LE's at home.     Venous insufficiency; elevate LE's at home; < 2 Gm Na a day.    Leg edema; suspected from venous insufficiency.    Essential hypertension. Maintain < 2 Gm Na a day diet, and monitor BP at home; keep a log.    Nausea; has zofran for prn use; suspect from seasonal allergy drip;     KAILYN (acute kidney injury); felt due to T/S; improving off med; no NSAID's; push po fluids.    Gastroesophageal reflux disease, esophagitis presence not specified; omeprazole 40 mg a day    Gastritis without bleeding, unspecified chronicity, unspecified gastritis type; suspect due to her sinus drip    Seasonal allergies; claritin 10 mg a day for congestion; nasacort 1 spray 2x a day for congestion

## 2017-10-17 NOTE — PATIENT INSTRUCTIONS
Cellulitis of right lower extremity; add 7 days of doxycycline; elevate LE's at home.     Venous insufficiency; elevate LE's at home; < 2 Gm Na a day.    Leg edema; suspected from venous insufficiency.    Essential hypertension. Maintain < 2 Gm Na a day diet, and monitor BP at home; keep a log.    Nausea; has zofran for prn use; suspect from seasonal allergy drip;     KAILYN (acute kidney injury); felt due to T/S; improving off med; no NSAID's; push po fluids.    Gastroesophageal reflux disease, esophagitis presence not specified; omeprazole 40 mg a day    Gastritis without bleeding, unspecified chronicity, unspecified gastritis type; suspect due to her sinus drip    Seasonal allergies; claritin 10 mg a day for congestion; nasacort 1 spray 2x a day for congestion    Return to office in 4 weeks w labs prior. [facility] for her labs.

## 2017-11-01 ENCOUNTER — LAB VISIT (OUTPATIENT)
Dept: LAB | Facility: HOSPITAL | Age: 82
End: 2017-11-01
Attending: INTERNAL MEDICINE
Payer: MEDICARE

## 2017-11-01 DIAGNOSIS — N17.9 AKI (ACUTE KIDNEY INJURY): ICD-10-CM

## 2017-11-01 DIAGNOSIS — I10 ESSENTIAL HYPERTENSION: ICD-10-CM

## 2017-11-01 DIAGNOSIS — R60.0 LEG EDEMA: ICD-10-CM

## 2017-11-01 LAB
ANION GAP SERPL CALC-SCNC: 10 MMOL/L
BUN SERPL-MCNC: 23 MG/DL
CALCIUM SERPL-MCNC: 10.4 MG/DL
CHLORIDE SERPL-SCNC: 101 MMOL/L
CO2 SERPL-SCNC: 28 MMOL/L
CREAT SERPL-MCNC: 0.9 MG/DL
EST. GFR  (AFRICAN AMERICAN): >60 ML/MIN/1.73 M^2
EST. GFR  (NON AFRICAN AMERICAN): 58.9 ML/MIN/1.73 M^2
GLUCOSE SERPL-MCNC: 101 MG/DL
MAGNESIUM SERPL-MCNC: 1.9 MG/DL
POTASSIUM SERPL-SCNC: 3.9 MMOL/L
SODIUM SERPL-SCNC: 139 MMOL/L

## 2017-11-01 PROCEDURE — 80048 BASIC METABOLIC PNL TOTAL CA: CPT

## 2017-11-01 PROCEDURE — 36415 COLL VENOUS BLD VENIPUNCTURE: CPT | Mod: PO

## 2017-11-01 PROCEDURE — 83735 ASSAY OF MAGNESIUM: CPT

## 2017-11-02 ENCOUNTER — OFFICE VISIT (OUTPATIENT)
Dept: FAMILY MEDICINE | Facility: CLINIC | Age: 82
End: 2017-11-02
Payer: MEDICARE

## 2017-11-02 VITALS
HEART RATE: 73 BPM | HEIGHT: 64 IN | DIASTOLIC BLOOD PRESSURE: 80 MMHG | OXYGEN SATURATION: 95 % | WEIGHT: 197.56 LBS | BODY MASS INDEX: 33.73 KG/M2 | SYSTOLIC BLOOD PRESSURE: 137 MMHG | TEMPERATURE: 99 F

## 2017-11-02 DIAGNOSIS — L03.115 CELLULITIS OF RIGHT LOWER EXTREMITY: ICD-10-CM

## 2017-11-02 DIAGNOSIS — I87.2 VENOUS INSUFFICIENCY: Primary | ICD-10-CM

## 2017-11-02 DIAGNOSIS — M79.604 RIGHT LEG PAIN: ICD-10-CM

## 2017-11-02 DIAGNOSIS — Z86.718 HISTORY OF DVT OF LOWER EXTREMITY: ICD-10-CM

## 2017-11-02 DIAGNOSIS — R60.0 BILATERAL LOWER EXTREMITY EDEMA: ICD-10-CM

## 2017-11-02 DIAGNOSIS — K21.9 GASTROESOPHAGEAL REFLUX DISEASE WITHOUT ESOPHAGITIS: ICD-10-CM

## 2017-11-02 DIAGNOSIS — N28.9 RENAL INSUFFICIENCY: ICD-10-CM

## 2017-11-02 DIAGNOSIS — Z95.5 S/P CORONARY ARTERY STENT PLACEMENT: ICD-10-CM

## 2017-11-02 DIAGNOSIS — I11.0 HYPERTENSIVE HEART DISEASE WITH HEART FAILURE: ICD-10-CM

## 2017-11-02 DIAGNOSIS — E78.00 HYPERCHOLESTEREMIA: ICD-10-CM

## 2017-11-02 PROCEDURE — 99499 UNLISTED E&M SERVICE: CPT | Mod: S$GLB,,, | Performed by: INTERNAL MEDICINE

## 2017-11-02 PROCEDURE — 99214 OFFICE O/P EST MOD 30 MIN: CPT | Mod: S$GLB,,, | Performed by: INTERNAL MEDICINE

## 2017-11-02 PROCEDURE — 99999 PR PBB SHADOW E&M-EST. PATIENT-LVL V: CPT | Mod: PBBFAC,,, | Performed by: INTERNAL MEDICINE

## 2017-11-02 NOTE — PROGRESS NOTES
"Subjective:       Patient ID: Holli Landrum is a 84 y.o. female.    Chief Complaint: Follow up labs    HPI   Seeing pt today for Dr Crespo her PCP. HTN heart ds w CHF: BP avr 118-120/67-78; watches her salt intake.  In office today, 137/80; rushing. GERD: has been stable; prilosec helping her; less nauseated. Also off ab'c. Cellulitis has resolved upper leg and lower mid- pretib area except for an area of "knotting" around mid-pretib which is sore and around a quarter in size. Hx of DVT 1968 LE; doesn't remember which. 1985 LLE with several DVT's due to plywood trauma; one required removal surgically by vascular surgeon in [place]. No  Fever or chills; or SOB. Has some RLE mid calf soreness at times.  Has been staying off the leg due to the pain.  Need to rule out peripheral vascular disease in her right lower extremity as well as phlebitis versus DVT or combination thereof.  Case also discussed with her PCP, Dr. Crespo for her input as well.        CAD: No chest pain; chr SOB has been stable. Has seen Dr Edgardo arndt for her dyspnea, now sees Dr Souza; suspects mild asthma. HLP: on low fat high fiber, or tries. On fish oil and fenofibrate; stopped her simvastatin. Dr Bryant managing her lipids. Pt stays off her leg due to RLE pain still. Discussed w pt and daughter.       Patient's renal insufficiency has fairly markedly improved.  Has gone from an estimated GFR 35, 2 weeks ago to now 58.9.  Her creatinine has gone from 1.37 to now 0.9; may have been due to bactrim use.  Case discussed at length with the patient as well as her daughter at time of evaluation.    Review of Systems   Constitutional: Negative for appetite change, fever and unexpected weight change.   HENT: Positive for postnasal drip. Negative for congestion, rhinorrhea and sinus pressure.          history of seasonal ALLERGIES / perennial ALLERGIES   Eyes: Negative for discharge and itching.   Respiratory: Negative for cough, chest tightness, shortness " "of breath and wheezing.    Cardiovascular: Negative for chest pain, palpitations and leg swelling.   Gastrointestinal: Negative for abdominal distention, abdominal pain, blood in stool, constipation, diarrhea, nausea and vomiting.        Denies melena as well   Endocrine: Negative for polydipsia, polyphagia and polyuria.   Genitourinary: Negative for dysuria and hematuria.   Musculoskeletal: Negative for arthralgias and myalgias.   Skin: Negative for rash.   Allergic/Immunologic: Positive for environmental allergies. Negative for food allergies.        Denies seasonal or perennial ALLERGIES.   Neurological: Negative for tremors, seizures and syncope.   Hematological: Negative for adenopathy. Bruises/bleeds easily.   Psychiatric/Behavioral:        Denies anxiety or depression       Objective:      Vitals:    11/02/17 1346   BP: 137/80   BP Location: Left arm   Patient Position: Sitting   BP Method: Medium (Manual)   Pulse: 73   Temp: 98.5 °F (36.9 °C)   TempSrc: Oral   SpO2: 95%   Weight: 89.6 kg (197 lb 8.5 oz)   Height: 5' 4" (1.626 m)     Body mass index is 33.91 kg/m².    Physical Exam   Constitutional: She is oriented to person, place, and time. She appears well-developed and well-nourished.   HENT:   Head: Normocephalic and atraumatic.   Eyes: EOM are normal.   Neck: Normal range of motion. Neck supple. No thyromegaly present.   Supple; no carotid bruits heard.   Cardiovascular: Normal rate, regular rhythm and normal heart sounds.  Exam reveals no gallop.    No murmur heard.  Pulmonary/Chest: Effort normal and breath sounds normal. No respiratory distress. She has no wheezes. She has no rales.   Abdominal: Soft. Bowel sounds are normal. She exhibits no distension. There is no tenderness. There is no rebound and no guarding.   Musculoskeletal: Normal range of motion. She exhibits edema.   RLE: upper pretib cellulitis has resolved; mid-pretib cellulitis has resolved but small knot quarter size slightly tender; " post and lat mid-calf tender to palp.1-2+ RLE edema w DP 2+; LLE 2+ edema; varicose veins and spider veins noted   Lymphadenopathy:     She has no cervical adenopathy.   Neurological: She is alert and oriented to person, place, and time.   Moves all 4 extremities fine.   Skin: No rash noted.   Psychiatric: She has a normal mood and affect. Her behavior is normal. Thought content normal.   Vitals reviewed.      Assessment:       1. Venous insufficiency    2. Cellulitis of right lower extremity    3. Right leg pain    4. History of DVT of lower extremity    5. Bilateral lower extremity edema    6. S/P coronary artery stent placement    7. Hypertensive heart disease with heart failure    8. Hypercholesteremia    9. Gastroesophageal reflux disease without esophagitis    10. Renal insufficiency        Plan:       Venous insufficiency; LLE>RLE edema  -     US Lower Extremity Veins Bilateral; Future; Expected date: 11/02/2017    Cellulitis of right lower extremity; has resolved both R upper pretib and mid-pretib.    Right leg pain; mid-pretib; associated calf tenderness R mid-calf; r/o DVT; hx of recurrent LE DVT's.           R/o phlebitis. Cellulitis has resolved over this area. Thermal heat applications; tylenol arthritis for pain.              No NSAID's.  -     US Lower Extremity Veins Bilateral; Future; Expected date: 11/02/2017    History of DVT of lower extremity  -     US Lower Extremity Veins Bilateral; Future; Expected date: 11/02/2017    Bilateral lower extremity edema; suspect from rivera LE venous insufficiency.  -     US Lower Extremity Veins Bilateral; Future; Expected date: 11/02/2017    S/P coronary artery stent placement; sees Dr waddell; has been stable.    Hypertensive heart disease with heart failure. Maintain < 2 Gm Na a day diet, and monitor BP at home; keep a log.     Cont present management.    Hypercholesteremia. Maintain low fat high fiber diet, exercise regularly. Dr Waddell  managing.    Gastroesophageal reflux disease without esophagitis; has been stable; no bedtime snacks.      On prilosec.    Renal insufficiency; has markedly improved; adequate fluid intake. Suspect may been secondary to Bactrim antibiotic use

## 2017-11-02 NOTE — PATIENT INSTRUCTIONS
Venous insufficiency; LLE>RLE edema  -     US Lower Extremity Veins Bilateral; Future; Expected date: 11/02/2017    Cellulitis of right lower extremity; has resolved both R upper pretib and mid-pretib.    Right leg pain; mid-pretib; associated calf tenderness R mid-calf; r/o DVT; hx of recurrent LE DVT's.           R/o phlebitis. Cellulitis has resolved over this area. Thermal heat applications; tylenol arthritis for pain.  -     US Lower Extremity Veins Bilateral; Future; Expected date: 11/02/2017    History of DVT of lower extremity  -     US Lower Extremity Veins Bilateral; Future; Expected date: 11/02/2017    Bilateral lower extremity edema; suspect from rivera LE venous insufficiency.  -     US Lower Extremity Veins Bilateral; Future; Expected date: 11/02/2017    S/P coronary artery stent placement; sees Dr waddell; has been stable.    Hypertensive heart disease with heart failure. Maintain < 2 Gm Na a day diet, and monitor BP at home; keep a log.     Cont present management.    Hypercholesteremia. Maintain low fat high fiber diet, exercise regularly. Dr Waddell managing.    Gastroesophageal reflux disease without esophagitis; has been stable; no bedtime snacks.      On prilosec.    Return to see Dr Crespo in 3 weeks f/u or w me if needed.

## 2017-11-03 ENCOUNTER — OFFICE VISIT (OUTPATIENT)
Dept: OPHTHALMOLOGY | Facility: CLINIC | Age: 82
End: 2017-11-03
Payer: MEDICARE

## 2017-11-03 DIAGNOSIS — H10.13 ALLERGIC CONJUNCTIVITIS, BILATERAL: ICD-10-CM

## 2017-11-03 DIAGNOSIS — H43.813 POSTERIOR VITREOUS DETACHMENT, BILATERAL: ICD-10-CM

## 2017-11-03 DIAGNOSIS — H57.12 EYE PAIN, LEFT: ICD-10-CM

## 2017-11-03 DIAGNOSIS — H40.053 OCULAR HYPERTENSION, BILATERAL: ICD-10-CM

## 2017-11-03 DIAGNOSIS — H52.7 REFRACTIVE ERROR: ICD-10-CM

## 2017-11-03 DIAGNOSIS — H25.13 NUCLEAR SCLEROSIS, BILATERAL: Primary | ICD-10-CM

## 2017-11-03 DIAGNOSIS — H33.322 ROUND HOLE OF RETINA WITHOUT DETACHMENT, LEFT: ICD-10-CM

## 2017-11-03 PROCEDURE — 99999 PR PBB SHADOW E&M-EST. PATIENT-LVL III: CPT | Mod: PBBFAC,,, | Performed by: OPHTHALMOLOGY

## 2017-11-03 PROCEDURE — 92133 CPTRZD OPH DX IMG PST SGM ON: CPT | Mod: S$GLB,,, | Performed by: OPHTHALMOLOGY

## 2017-11-03 PROCEDURE — 92012 INTRM OPH EXAM EST PATIENT: CPT | Mod: S$GLB,,, | Performed by: OPHTHALMOLOGY

## 2017-11-03 NOTE — PROGRESS NOTES
HPI     3 month IOP Check, OCT Optic Nerve today. Denies eye pain, using    Latanoprost OU HS, Dorzamide OS BID did not use it this morning states she   is suffering with cellulitis in her Right leg.     Last edited by Marti Montague on 11/3/2017  9:55 AM. (History)            Assessment /Plan     For exam results, see Encounter Report.    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 Ocular hypertension  IOP improved initially with Latanoprost (went back up off timolol - was d/c'd for breathing problems). Last HVF - scattered defects, no definite glaucoma pattern - due for repeat test.     HRT done again last visit, dilated - no focal thinning. There was some question of progression OD on prior visit, but not so much last time. OS seems to be progressing now. Cataracts may be starting to interfere with HVF and HRT, although SD ok. OCT optic nerve shows thinning OU.     Remains open to TM/thin SS on last gonioscopy, does not appear occludable. Optic nerve - possible inferior notch on NDFE OD, appeared stable on last DFE OD, possible slight increase OS. IOP elevated OS last visit as well. Continue Latanoprost QHS OU, added Dorzolamide TWICE DAILY OS on 12/20/16 - IOP improved, eye pain persisits.   RTC 3 months for IOP check and HVF/DFE/HRT.    Eye pain OS Not currently having pain. Consider having sinuses checked, does not appear to be having angle closure.     Long-standing.   2. Nuclear sclerosis  Approaching visual significance, myopic shift. BAT borderline - misses a few letters on each line. Explained this may be contributing to difficulty with vision. Avoids night driving due to glare. OS appears worse than OD on retinoscopy undilated.    3. Round hole of retina  "without detachment - Left Eye  Stable. RD precautions   4. Posterior vitreous detachment  "    Allergies/itching Zaditor BID prn   5. Hyperopia with astigmatism and presbyopia  MRx given last visit, feels she is doing well with new specs for now.                         "

## 2017-11-06 ENCOUNTER — TELEPHONE (OUTPATIENT)
Dept: FAMILY MEDICINE | Facility: CLINIC | Age: 82
End: 2017-11-06

## 2017-11-07 ENCOUNTER — HOSPITAL ENCOUNTER (OUTPATIENT)
Dept: RADIOLOGY | Facility: HOSPITAL | Age: 82
Discharge: HOME | End: 2017-11-07
Attending: INTERNAL MEDICINE
Payer: MEDICARE

## 2017-11-07 DIAGNOSIS — I87.2 VENOUS INSUFFICIENCY: ICD-10-CM

## 2017-11-07 DIAGNOSIS — M79.604 RIGHT LEG PAIN: ICD-10-CM

## 2017-11-07 DIAGNOSIS — R60.0 BILATERAL LOWER EXTREMITY EDEMA: ICD-10-CM

## 2017-11-07 DIAGNOSIS — Z86.718 HISTORY OF DVT OF LOWER EXTREMITY: ICD-10-CM

## 2017-11-07 PROCEDURE — 93925 LOWER EXTREMITY STUDY: CPT | Mod: 26,,, | Performed by: RADIOLOGY

## 2017-11-07 PROCEDURE — 93970 EXTREMITY STUDY: CPT | Mod: 26,,, | Performed by: RADIOLOGY

## 2017-11-07 PROCEDURE — 93970 EXTREMITY STUDY: CPT | Mod: TC,PO

## 2017-11-07 PROCEDURE — 93925 LOWER EXTREMITY STUDY: CPT | Mod: TC,PO

## 2017-11-07 PROCEDURE — 93922 UPR/L XTREMITY ART 2 LEVELS: CPT | Mod: 26,,, | Performed by: RADIOLOGY

## 2017-11-13 ENCOUNTER — PATIENT OUTREACH (OUTPATIENT)
Dept: ADMINISTRATIVE | Facility: HOSPITAL | Age: 82
End: 2017-11-13

## 2017-11-13 NOTE — LETTER
November 20, 2017    Holli Landrum  [address] [facility]  [place] [address]             [facility]  [address]  [facility] [address]  Phone: [phone] Dear Mrs. Landrum:    We have tried to reach you by mychart unsuccessfully.    [facility] is committed to your overall health.  To help you get the most out of each of your visits, we will review your information to make sure you are up to date on all of your recommended tests and/or procedures.       Dr. Kingsley Alejo has found that your chart shows you may be due for osteoporosis screening and possibly some immunizations (shingles, pneumonia, and flu).     Medicare does not cover all immunizations to be given in the clinic.  Check your benefits to ensure that you do not need to receive your immunizations at the pharmacy.     If you have had any of the above done at another facility, please bring the records or information with you so that your record at [facility] will be complete.  If you would like to schedule any of these, please contact me.     If you are currently taking medication, please bring it with you to your appointment for review.     If you have any questions or concerns, please don't hesitate to call.    Thank you for letting us care for you,  Nichole Bradford LPN Clinical Care Coordinator  [facility] [place]  [phone]

## 2017-11-27 ENCOUNTER — OFFICE VISIT (OUTPATIENT)
Dept: FAMILY MEDICINE | Facility: CLINIC | Age: 82
End: 2017-11-27
Payer: MEDICARE

## 2017-11-27 VITALS
TEMPERATURE: 98 F | HEART RATE: 68 BPM | DIASTOLIC BLOOD PRESSURE: 78 MMHG | HEIGHT: 64 IN | BODY MASS INDEX: 33.37 KG/M2 | WEIGHT: 195.44 LBS | SYSTOLIC BLOOD PRESSURE: 118 MMHG

## 2017-11-27 DIAGNOSIS — I10 ESSENTIAL HYPERTENSION: ICD-10-CM

## 2017-11-27 DIAGNOSIS — K21.9 GASTROESOPHAGEAL REFLUX DISEASE WITHOUT ESOPHAGITIS: ICD-10-CM

## 2017-11-27 DIAGNOSIS — I80.9 PHLEBITIS: ICD-10-CM

## 2017-11-27 DIAGNOSIS — I25.10 CORONARY ARTERY DISEASE DUE TO CALCIFIED CORONARY LESION: ICD-10-CM

## 2017-11-27 DIAGNOSIS — I87.2 VENOUS INSUFFICIENCY OF RIGHT LOWER EXTREMITY: ICD-10-CM

## 2017-11-27 DIAGNOSIS — I25.84 CORONARY ARTERY DISEASE DUE TO CALCIFIED CORONARY LESION: ICD-10-CM

## 2017-11-27 DIAGNOSIS — L03.115 CELLULITIS OF RIGHT LOWER EXTREMITY: ICD-10-CM

## 2017-11-27 DIAGNOSIS — M79.604 RIGHT LEG PAIN: Primary | ICD-10-CM

## 2017-11-27 DIAGNOSIS — I50.32 CHRONIC DIASTOLIC HEART FAILURE: ICD-10-CM

## 2017-11-27 PROCEDURE — 99214 OFFICE O/P EST MOD 30 MIN: CPT | Mod: S$GLB,,, | Performed by: INTERNAL MEDICINE

## 2017-11-27 PROCEDURE — 99499 UNLISTED E&M SERVICE: CPT | Mod: S$GLB,,, | Performed by: INTERNAL MEDICINE

## 2017-11-27 PROCEDURE — 99999 PR PBB SHADOW E&M-EST. PATIENT-LVL IV: CPT | Mod: PBBFAC,,, | Performed by: INTERNAL MEDICINE

## 2017-11-27 PROCEDURE — 90662 IIV NO PRSV INCREASED AG IM: CPT | Mod: S$GLB,,, | Performed by: INTERNAL MEDICINE

## 2017-11-27 PROCEDURE — G0008 ADMIN INFLUENZA VIRUS VAC: HCPCS | Mod: S$GLB,,, | Performed by: INTERNAL MEDICINE

## 2017-11-27 RX ORDER — CLINDAMYCIN HYDROCHLORIDE 300 MG/1
300 CAPSULE ORAL 3 TIMES DAILY
Qty: 30 CAPSULE | Refills: 0 | Status: SHIPPED | OUTPATIENT
Start: 2017-11-27 | End: 2018-02-05

## 2017-11-27 NOTE — PATIENT INSTRUCTIONS
Right leg pain; hs improved fair amount w ab'c and thermal heat applications. Add cleocin 300 mg TID for 10 days.    Phlebitis; thermal heat applications; tylenol for pain.     Cellulitis of right lower extremity; cleocin for 10 day course. Elevate RLE at home. Thermal heat applications to distal pretib area.    Venous insufficiency of right lower extremity; sees cardiology Dr Bryant 1/17/18    Chronic diastolic heart failure; has been stable. On zaroxolyn, and lasix.    Essential hypertension. Maintain < 2 Gm Na a day diet, and monitor BP at home; keep a log.    Coronary artery disease, h/o multivessel stents.    GERD: no bedtime snacks; change omeprazole to 40 mg a day as needed for reflux. Can elevate HOB if needed.    Return to clinic in 4 weeks follow up.

## 2017-11-27 NOTE — PROGRESS NOTES
"Subjective:       Patient ID: Holli Landrum is a 84 y.o. female.    Chief Complaint: Here to reassess her RLE pain    HPI: Pain in her distal R pretib has improved w RLE distal pretib phlebitis w suspected cellulitis. Thermal heat applications by pt has helped a fair amount. Gustavo LE venous US was neg for DVT's of note. Pt w arturo reflux/insufficiency R greater saphenous veinat saphenous-femoral junction. Pt w hx L greater saphenous vein resection due to DVT's in past from plywood trauma. CAD/DHF has been stable; no chest pain, SOB, or palp.HTN: -120/70-75; at home; watches her salt.GERD controlled w omeprazole.   HPI    Review of Systems   Constitutional: Negative for appetite change, fever and unexpected weight change.   HENT: Negative for congestion, postnasal drip, rhinorrhea and sinus pressure.    Eyes: Negative for discharge and itching.   Respiratory: Negative for cough, chest tightness, shortness of breath and wheezing.    Cardiovascular: Negative for chest pain, palpitations and leg swelling.   Gastrointestinal: Negative for abdominal distention, abdominal pain, blood in stool, constipation, diarrhea, nausea and vomiting.        Omeprazole helping her reflux.   Endocrine: Negative for polydipsia, polyphagia and polyuria.   Genitourinary: Negative for dysuria and hematuria.   Musculoskeletal: Negative for arthralgias and myalgias.   Skin: Negative for rash.   Allergic/Immunologic: Negative for environmental allergies and food allergies.   Neurological: Negative for tremors, seizures and syncope.   Hematological: Negative for adenopathy. Bruises/bleeds easily.        On plavix.   Psychiatric/Behavioral:        Denies anxiety or depression.       Objective:      Vitals:    11/27/17 1553   BP: 118/78   BP Location: Left arm   Patient Position: Sitting   BP Method: Large (Manual)   Pulse: 68   Temp: 97.9 °F (36.6 °C)   TempSrc: Oral   Weight: 88.6 kg (195 lb 7 oz)   Height: 5' 4" (1.626 m)     Body mass index " is 33.55 kg/m².    Physical Exam   Constitutional: She is oriented to person, place, and time. She appears well-developed and well-nourished.   HENT:   Head: Normocephalic and atraumatic.   Eyes: EOM are normal.   Neck: Normal range of motion. Neck supple. No thyromegaly present.   Cardiovascular: Normal rate, regular rhythm and normal heart sounds.  Exam reveals no gallop.    No murmur heard.  Pulmonary/Chest: Effort normal and breath sounds normal. No respiratory distress. She has no wheezes. She has no rales.   Abdominal: Soft. Bowel sounds are normal. She exhibits no distension. There is no tenderness. There is no rebound and no guarding.   Musculoskeletal: Normal range of motion. She exhibits edema.   Spider veins rivera; 2+ LLE and 1+ RLE edema; obese legs; no calf tenderness to palp. RLE w distal pretib area of cellulitis; blanches; 3x1 cm size. Area of induration below it has resolved.   Lymphadenopathy:     She has no cervical adenopathy.   Neurological: She is alert and oriented to person, place, and time.   Moves all 4 extremities fine.   Skin: No rash noted.   Psychiatric: She has a normal mood and affect. Her behavior is normal. Thought content normal.   Vitals reviewed.      Assessment:       1. Right leg pain    2. Phlebitis    3. Cellulitis of right lower extremity    4. Venous insufficiency of right lower extremity    5. Gastroesophageal reflux disease without esophagitis    6. Chronic diastolic heart failure    7. Essential hypertension    8. Coronary artery disease, h/o multivessel stents.        Plan:       Right leg pain; hs improved fair amount w ab'c and thermal heat applications. Add cleocin 300 mg TID for 10 days.    Phlebitis; thermal heat applications; tylenol for pain.     Cellulitis of right lower extremity; cleocin for 10 day course. Elevate RLE at home. Thermal heat applications to distal pretib area.    Venous insufficiency of right lower extremity; sees cardiology Dr Bryant  1/17/18    Chronic diastolic heart failure; has been stable. On zaroxolyn, and lasix.    Essential hypertension. Maintain < 2 Gm Na a day diet, and monitor BP at home; keep a log.    Coronary artery disease, h/o multivessel stents.    GERD: no bedtime snacks; change omeprazole to 40 mg a day as needed for reflux. Can elevate HOB if needed.

## 2017-11-29 ENCOUNTER — OFFICE VISIT (OUTPATIENT)
Dept: FAMILY MEDICINE | Facility: CLINIC | Age: 82
End: 2017-11-29
Payer: MEDICARE

## 2017-11-29 VITALS
HEART RATE: 71 BPM | BODY MASS INDEX: 33.09 KG/M2 | WEIGHT: 193.81 LBS | SYSTOLIC BLOOD PRESSURE: 119 MMHG | DIASTOLIC BLOOD PRESSURE: 74 MMHG | HEIGHT: 64 IN

## 2017-11-29 DIAGNOSIS — G47.33 OBSTRUCTIVE SLEEP APNEA: ICD-10-CM

## 2017-11-29 DIAGNOSIS — I47.29 PAROXYSMAL VENTRICULAR TACHYCARDIA: ICD-10-CM

## 2017-11-29 DIAGNOSIS — Z78.0 POSTMENOPAUSAL STATE: ICD-10-CM

## 2017-11-29 DIAGNOSIS — N18.30 CHRONIC KIDNEY DISEASE, STAGE III (MODERATE): ICD-10-CM

## 2017-11-29 DIAGNOSIS — Z95.5 S/P CORONARY ARTERY STENT PLACEMENT: ICD-10-CM

## 2017-11-29 DIAGNOSIS — I10 ESSENTIAL HYPERTENSION: ICD-10-CM

## 2017-11-29 DIAGNOSIS — I11.0 HYPERTENSIVE HEART DISEASE WITH HEART FAILURE: ICD-10-CM

## 2017-11-29 DIAGNOSIS — I25.10 CORONARY ARTERY DISEASE INVOLVING NATIVE CORONARY ARTERY OF NATIVE HEART WITHOUT ANGINA PECTORIS: ICD-10-CM

## 2017-11-29 DIAGNOSIS — I25.84 CORONARY ARTERY DISEASE DUE TO CALCIFIED CORONARY LESION: ICD-10-CM

## 2017-11-29 DIAGNOSIS — I42.1 HYPERTROPHIC OBSTRUCTIVE CARDIOMYOPATHY: ICD-10-CM

## 2017-11-29 DIAGNOSIS — E78.00 HYPERCHOLESTEREMIA: ICD-10-CM

## 2017-11-29 DIAGNOSIS — I20.0 CRESCENDO ANGINA: ICD-10-CM

## 2017-11-29 DIAGNOSIS — I70.0 ATHEROSCLEROSIS OF AORTA: ICD-10-CM

## 2017-11-29 DIAGNOSIS — I87.2 VENOUS INSUFFICIENCY: ICD-10-CM

## 2017-11-29 DIAGNOSIS — Z00.00 ENCOUNTER FOR PREVENTIVE HEALTH EXAMINATION: Primary | ICD-10-CM

## 2017-11-29 DIAGNOSIS — L03.115 CELLULITIS OF RIGHT LOWER EXTREMITY: ICD-10-CM

## 2017-11-29 DIAGNOSIS — I50.32 CHRONIC DIASTOLIC HEART FAILURE: ICD-10-CM

## 2017-11-29 DIAGNOSIS — J44.9 CHRONIC OBSTRUCTIVE PULMONARY DISEASE, UNSPECIFIED COPD TYPE: ICD-10-CM

## 2017-11-29 DIAGNOSIS — R00.2 PALPITATIONS: ICD-10-CM

## 2017-11-29 DIAGNOSIS — I42.2 ASYMMETRIC SEPTAL HYPERTROPHY: ICD-10-CM

## 2017-11-29 DIAGNOSIS — I25.10 CORONARY ARTERY DISEASE DUE TO CALCIFIED CORONARY LESION: ICD-10-CM

## 2017-11-29 PROCEDURE — 99999 PR PBB SHADOW E&M-EST. PATIENT-LVL V: CPT | Mod: PBBFAC,,, | Performed by: NURSE PRACTITIONER

## 2017-11-29 PROCEDURE — 99499 UNLISTED E&M SERVICE: CPT | Mod: S$GLB,,, | Performed by: NURSE PRACTITIONER

## 2017-11-29 PROCEDURE — G0439 PPPS, SUBSEQ VISIT: HCPCS | Mod: S$GLB,,, | Performed by: NURSE PRACTITIONER

## 2017-11-29 NOTE — PATIENT INSTRUCTIONS
Counseling and Referral of Other Preventative  (Italic type indicates deductible and co-insurance are waived)    Patient Name: Holli Landrum  Today's Date: 11/29/2017      SERVICE LIMITATIONS RECOMMENDATION    Vaccines    · Pneumococcal (once after 65)    · Influenza (annually)    · Hepatitis B (if medium/high risk)    · Prevnar 13      Hepatitis B medium/high risk factors:       - End-stage renal disease       - Hemophiliacs who received Factor VII or         IX concentrates       - Clients of institutions for the mentally             retarded       - Persons who live in the same house as          a HepB carrier       - Homosexual men       - Illicit injectable drug abusers     Pneumococcal: Done, no repeat necessary     Influenza: Done, repeat in one year     Hepatitis B: N/A     Prevnar 13: Done, no repeat necessary    Mammogram (biennial age 50-74)  Annually (age 40 or over)  Done this year, repeat every year 5/2018    Pap (up to age 70 and after 70 if unknown history or abnormal study last 10 years)    N/A     The USPSTF recommends against screening for cervical cancer in women older than age 65 years who have had adequate prior screening and are not otherwise at high risk for cervical cancer.   and The USPSTF recommends against screening for cervical cancer in women who have had a hysterectomy with removal of the cervix and who do not have a history of a high-grade precancerous lesion (cervical intraepithelial neoplasia [HAILEE] grade 2 or 3) or cervical cancer.     Colorectal cancer screening (to age 75)    · Fecal occult blood test (annual)  · Flexible sigmoidoscopy (5y)  · Screening colonoscopy (10y)  · Barium enema   N/A    Diabetes self-management training (no USPSTF recommendations)  Requires referral by treating physician for patient with diabetes or renal disease. 10 hours of initial DSMT sessions of no less than 30 minutes each in a continuous 12-month period. 2 hours of follow-up DSMT in subsequent  years.  N/A    Bone mass measurements (age 65 & older, biennial)  Requires diagnosis related to osteoporosis or estrogen deficiency. Biennial benefit unless patient has history of long-term glucocorticoid  Scheduled, see appointments    Glaucoma screening (no USPSTF recommendation)  Diabetes mellitus, family history   , age 50 or over    American, age 65 or over  Recommend follow up with eye care professional regularly    Medical nutrition therapy for diabetes or renal disease (no recommended schedule)  Requires referral by treating physician for patient with diabetes or renal disease or kidney transplant within the past 3 years.  Can be provided in same year as diabetes self-management training (DSMT), and CMS recommends medical nutrition therapy take place after DSMT. Up to 3 hours for initial year and 2 hours in subsequent years.  N/A    Cardiovascular screening blood tests (every 5 years)  · Fasting lipid panel  Order as a panel if possible  Done this year, repeat every year     10/2017    Diabetes screening tests (at least every 3 years, Medicare covers annually or at 6-month intervals for prediabetic patients)  · Fasting blood sugar (FBS) or glucose tolerance test (GTT)  Patient must be diagnosed with one of the following:       - Hypertension       - Dyslipidemia       - Obesity (BMI 30kg/m2)       - Previous elevated impaired FBS or GTT       ... or any two of the following:       - Overweight (BMI 25 but <30)       - Family history of diabetes       - Age 65 or older       - History of gestational diabetes or birth of baby weighing more than 9 pounds  Done this year, repeat every year     10/2017    HIV screening (annually for increased risk patients)  · HIV-1 and HIV-2 by EIA, or BELL, rapid antibody test or oral mucosa transudate  Patients must be at increased risk for HIV infection per USPSTF guidelines or pregnant. Tests covered annually for patient at increased risk or as  requested by the patient. Pregnant patients may receive up to 3 tests during pregnancy.  Risks discussed, screening is not recommended    Smoking cessation counseling (up to 8 sessions per year)  Patients must be asymptomatic of tobacco-related conditions to receive as a preventative service.  Non-smoker    Subsequent annual wellness visit  At least 12 months since last AWV  Return in one year     The following information is provided to all patients.  This information is to help you find resources for any of the problems found today that may be affecting your health:                Living healthy guide: www.[facility].[place].[facility]      Understanding Diabetes: www.diabetes.org      Eating healthy: www.cdc.gov/healthyweight      CDC home safety checklist: www.cdc.gov/steadi/patient.html      Agency on Aging: www.goea.[place].[facility]      Alcoholics anonymous (AA): www.aa.org      Physical Activity: www.kodi.nih.gov/sj0bfse      Tobacco use: www.quitwithusla.org

## 2017-11-29 NOTE — PROGRESS NOTES
"Holli Landrum presented for a  Medicare AWV and comprehensive Health Risk Assessment today. The following components were reviewed and updated:    · Medical history  · Family History  · Social history  · Allergies and Current Medications  · Health Risk Assessment  · Health Maintenance  · Care Team     Review of Systems   Constitutional: Negative for chills, fever, malaise/fatigue and weight loss.   Respiratory: Negative for cough, shortness of breath and wheezing.    Cardiovascular: Negative for chest pain.   Gastrointestinal: Negative for abdominal pain, blood in stool, constipation, diarrhea, nausea and vomiting.   Skin: Negative for rash.   Neurological: Negative for dizziness, weakness and headaches.     ** See Completed Assessments for Annual Wellness Visit within the encounter summary.**     The following assessments were completed:  · Living Situation  · CAGE  · Depression Screening  · Timed Get Up and Go  · Whisper Test  · Cognitive Function Screening  ·   ·   ·   · Nutrition Screening  · ADL Screening  · PAQ Screening    Vitals:    11/29/17 1321   BP: 119/74   BP Location: Left arm   Patient Position: Sitting   BP Method: Large (Automatic)   Pulse: 71   Weight: 87.9 kg (193 lb 12.6 oz)   Height: 5' 4" (1.626 m)     Body mass index is 33.26 kg/m².  Physical Exam   Constitutional: She is oriented to person, place, and time. She appears well-nourished.   Cardiovascular: Normal rate, regular rhythm, normal heart sounds and intact distal pulses.    Pulmonary/Chest: Effort normal and breath sounds normal.   Neurological: She is alert and oriented to person, place, and time.   Skin: Skin is warm and dry. No rash noted.   Vitals reviewed.        Diagnoses and health risks identified today and associated recommendations/orders:    1. Encounter for preventive health examination  Reviewed and discussed health maintenance.    Declined update on zoster (can not afford), PPSV 23 (thinks she had it)  Declined scheduling " DEXA at this time  - DXA Bone Density Spine And Hip; Future    2. Chronic kidney disease, stage III (moderate)  Stable- continue current treatment and follow up routinely with PCP  Labs 10/2017  Avoid NSAIDs    3. Postmenopausal state  - DXA Bone Density Spine And Hip; Future    4. Chronic obstructive pulmonary disease, unspecified COPD type  Stable- continue current treatment and follow up routinely with PCP and pulmonary ()    5. Atherosclerosis of aorta  Stable- continue current treatment and follow up routinely with PCP and cardiology (Dr. Morrow)    6. Hypertensive heart disease with heart failure  Stable- continue current treatment and follow up routinely with PCP  and cardiology (Dr. Morrow)    7. Paroxysmal ventricular tachycardia  Stable- continue current treatment and follow up routinely with PCP  and cardiology (Dr. Morrow)    8. Asymmetric septal hypertrophy  Stable- continue current treatment and follow up routinely with PCP  and cardiology (Dr. Morrow)    9. Crescendo angina  Stable- continue current treatment and follow up routinely with PCP  and cardiology (Dr. Morrow)    10. Hypercholesteremia  Stable- continue current treatment and follow up routinely with PCP  and cardiology (Dr. Morrow)    11. Hypertrophic obstructive cardiomyopathy  Stable- continue current treatment and follow up routinely with PCP  and cardiology (Dr. Morrow)    12. Chronic diastolic heart failure  Stable- continue current treatment and follow up routinely with PCP  and cardiology (Dr. Morrow)    13. Palpitations  Stable- continue current treatment and follow up routinely with PCP  and cardiology (Dr. Morrow)    14. Coronary artery disease, h/o multivessel stents.  Stable- continue current treatment and follow up routinely with PCP  and cardiology (Dr. Morrow)    15. Essential hypertension  Stable- continue current treatment and follow up routinely with PCP  and cardiology (Dr. Morrow)    16. Venous insufficiency  Stable-  continue current treatment and follow up routinely with PCP  and cardiology (Dr. Morrow)    17. S/P coronary artery stent placement  Stable- continue current treatment and follow up routinely with PCP  and cardiology (Dr. Morrow)    18. Cellulitis of right lower extremity  Continue current treatment and follow up with Dr. Alejo as planned    19. Coronary artery disease involving native coronary artery of native heart without angina pectoris  Stable- continue current treatment and follow up routinely with PCP  and cardiology (Dr. Morrow)    20. Obstructive sleep apnea  Wears CPAP nightly and benefits from use    Provided Holli with a 5-10 year written screening schedule and personal prevention plan. Recommendations were developed using the USPSTF age appropriate recommendations. Education, counseling, and referrals were provided as needed. After Visit Summary printed and given to patient which includes a list of additional screenings\tests needed.    Mercy Alfredo NP

## 2017-12-13 ENCOUNTER — PATIENT MESSAGE (OUTPATIENT)
Dept: OPHTHALMOLOGY | Facility: CLINIC | Age: 82
End: 2017-12-13

## 2017-12-13 DIAGNOSIS — H40.053 OCULAR HYPERTENSION, BILATERAL: ICD-10-CM

## 2017-12-13 RX ORDER — LATANOPROST 50 UG/ML
1 SOLUTION/ DROPS OPHTHALMIC NIGHTLY
Qty: 3 BOTTLE | Refills: 3 | Status: SHIPPED | OUTPATIENT
Start: 2017-12-13 | End: 2018-06-19 | Stop reason: SDUPTHER

## 2017-12-13 RX ORDER — DORZOLAMIDE HCL 20 MG/ML
1 SOLUTION/ DROPS OPHTHALMIC 2 TIMES DAILY
Qty: 30 ML | Refills: 3 | Status: SHIPPED | OUTPATIENT
Start: 2017-12-13 | End: 2018-02-08 | Stop reason: SDUPTHER

## 2018-01-04 ENCOUNTER — OFFICE VISIT (OUTPATIENT)
Dept: FAMILY MEDICINE | Facility: CLINIC | Age: 83
End: 2018-01-04
Payer: MEDICARE

## 2018-01-04 VITALS
SYSTOLIC BLOOD PRESSURE: 126 MMHG | WEIGHT: 192.38 LBS | OXYGEN SATURATION: 95 % | HEIGHT: 64 IN | HEART RATE: 68 BPM | TEMPERATURE: 98 F | BODY MASS INDEX: 32.84 KG/M2 | DIASTOLIC BLOOD PRESSURE: 80 MMHG

## 2018-01-04 DIAGNOSIS — I87.2 VENOUS INSUFFICIENCY: ICD-10-CM

## 2018-01-04 DIAGNOSIS — M79.604 RIGHT LEG PAIN: ICD-10-CM

## 2018-01-04 DIAGNOSIS — N18.30 CKD (CHRONIC KIDNEY DISEASE) STAGE 3, GFR 30-59 ML/MIN: ICD-10-CM

## 2018-01-04 DIAGNOSIS — I11.0 HYPERTENSIVE HEART DISEASE WITH HEART FAILURE: ICD-10-CM

## 2018-01-04 DIAGNOSIS — I25.10 CORONARY ARTERY DISEASE DUE TO CALCIFIED CORONARY LESION: ICD-10-CM

## 2018-01-04 DIAGNOSIS — Z00.00 HEALTHCARE MAINTENANCE: ICD-10-CM

## 2018-01-04 DIAGNOSIS — E78.00 HYPERCHOLESTEREMIA: ICD-10-CM

## 2018-01-04 DIAGNOSIS — L03.115 CELLULITIS OF RIGHT LOWER EXTREMITY: Primary | ICD-10-CM

## 2018-01-04 DIAGNOSIS — I25.84 CORONARY ARTERY DISEASE DUE TO CALCIFIED CORONARY LESION: ICD-10-CM

## 2018-01-04 DIAGNOSIS — I50.32 CHRONIC DIASTOLIC HEART FAILURE: ICD-10-CM

## 2018-01-04 DIAGNOSIS — E83.52 HYPERCALCEMIA: ICD-10-CM

## 2018-01-04 PROCEDURE — 99499 UNLISTED E&M SERVICE: CPT | Mod: S$GLB,,, | Performed by: INTERNAL MEDICINE

## 2018-01-04 PROCEDURE — 99999 PR PBB SHADOW E&M-EST. PATIENT-LVL IV: CPT | Mod: PBBFAC,,, | Performed by: INTERNAL MEDICINE

## 2018-01-04 PROCEDURE — 99214 OFFICE O/P EST MOD 30 MIN: CPT | Mod: S$GLB,,, | Performed by: INTERNAL MEDICINE

## 2018-01-04 NOTE — PATIENT INSTRUCTIONS
Cellulitis of right lower extremity; resolved at present; suspect likely small area phlebitis R lower pretib area as well; resolved also.    Venous insufficiency; no LE edema at present; rarely though.    Right leg pain; has resolved w treatment.    Hypertensive heart disease with heart failure. Maintain < 2 Gm Na a day diet, and monitor BP at home; keep a log. Cont present meds.   CKD3: cont to improve off T/S, and advil; avoid NSAID's, and potential nephrotoxic meds. Follow renal function.    Chronic diastolic heart failure. Sees Dr Bryant as cardiologist; cont lasix and zaroxolyn.    Coronary artery disease, h/o multivessel stents. Has been stable; sees Dr Bryant; next apt 1/17/18    Hypercholesteremia. Maintain low fat high fiber diet, exercise regularly. Cont present meds; labs pending.        DEXA before f/u; labs 10-14 days before f/u.

## 2018-01-04 NOTE — PROGRESS NOTES
Subjective:       Patient ID: Holli Landrum is a 84 y.o. female.    Chief Complaint: Follow-up (right leg cellulitis )    HPI  Follow up today for RLE cellulitis mop up. US RLE was neg for DVT earlier. Pt has resolved RLE cellulitis; suspect RLE distal pretib associated phlebitis as well. Pain has resolved as well. Continues w serial improvement w GFR since off T/S and advil; now w GFR 58.9. No chest pain, SOB, or palp. BP avr at home about the same as here 126/80. Watches her salt intake, see medicare annual wellness performed results. Needs DEXA; hypercalcemia discussed as well; labs reviewed w pt. Total time: 215 pm-300 pm. >50% time spent w discussion, review, and counseling.    Review of Systems   Constitutional: Negative for appetite change, fever and unexpected weight change.   HENT: Negative for congestion, postnasal drip, rhinorrhea and sinus pressure.    Eyes: Negative for discharge and itching.   Respiratory: Negative for cough, chest tightness, shortness of breath and wheezing.    Cardiovascular: Negative for chest pain, palpitations and leg swelling.   Gastrointestinal: Negative for abdominal distention, abdominal pain, blood in stool, constipation, diarrhea, nausea and vomiting.   Endocrine: Negative for polydipsia, polyphagia and polyuria.   Genitourinary: Negative for dysuria and hematuria.   Musculoskeletal: Negative for arthralgias and myalgias.   Skin: Negative for rash.   Allergic/Immunologic: Negative for environmental allergies and food allergies.   Neurological: Negative for tremors, seizures and syncope.   Hematological: Negative for adenopathy. Bruises/bleeds easily.        On plavix   Psychiatric/Behavioral:        Denies anxiety or depression.       Objective:      Vitals:    01/04/18 1400   BP: 126/80   BP Location: Right arm   Patient Position: Sitting   BP Method: Medium (Manual)   Pulse: 68   Temp: 98.1 °F (36.7 °C)   TempSrc: Oral   SpO2: 95%   Weight: 87.2 kg (192 lb 5.6 oz)  "  Height: 5' 4" (1.626 m)     Body mass index is 33.02 kg/m².    Physical Exam   Constitutional: She is oriented to person, place, and time. She appears well-developed and well-nourished.   HENT:   Head: Normocephalic and atraumatic.   Eyes: EOM are normal.   Neck: Normal range of motion. Neck supple. No thyromegaly present.   Cardiovascular: Normal rate and regular rhythm.  Exam reveals no gallop.    No murmur heard.  SEM2-3/6 aortic; hx mild-mod AS.   Pulmonary/Chest: Effort normal and breath sounds normal. No respiratory distress. She has no wheezes. She has no rales.   Abdominal: Soft. Bowel sounds are normal. She exhibits no distension. There is no tenderness. There is no rebound and no guarding.   Musculoskeletal: Normal range of motion. She exhibits edema.   Tr rivera LE edema; spider veins; no calf tenderness to palp.   Lymphadenopathy:     She has no cervical adenopathy.   Neurological: She is alert and oriented to person, place, and time.   Moves all 4 extremities fine.   Skin: No rash noted.   Psychiatric: She has a normal mood and affect. Her behavior is normal. Thought content normal.   Vitals reviewed.      Assessment:       1. Cellulitis of right lower extremity    2. Venous insufficiency    3. Right leg pain    4. Hypertensive heart disease with heart failure    5. CKD (chronic kidney disease) stage 3, GFR 30-59 ml/min    6. Chronic diastolic heart failure    7. Coronary artery disease, h/o multivessel stents.    8. Hypercholesteremia    9. Healthcare maintenance    10. Hypercalcemia        Plan:       Cellulitis of right lower extremity; resolved at present; suspect likely small area phlebitis R lower pretib area as well; resolved also.    Venous insufficiency; min. LE edema at present; has improved. Elevate LE's at home. <2 Gm Na a day.    Right leg pain; has resolved w treatment.    Hypertensive heart disease with heart failure. Maintain < 2 Gm Na a day diet, and monitor BP at home; keep a log. Cont " present meds.   CKD3: cont to improve off T/S, and advil; avoid NSAID's, and potential nephrotoxic meds. Follow renal function.    Chronic diastolic heart failure. Sees Dr Bryant as cardiologist; cont lasix and zaroxolyn.    Coronary artery disease, h/o multivessel stents. Has been stable; sees Dr Bryant; next apt 1/17/18    Hypercholesteremia. Maintain low fat high fiber diet, exercise regularly. Cont present meds; labs pending.    Healthcare miantenance; DEXA scan needed.    Hypercalcemia; hold on Vit D and calcium supplements; limit dairy products; check PTH and vit D w repeat calcium levels.

## 2018-01-29 ENCOUNTER — TELEPHONE (OUTPATIENT)
Dept: OPHTHALMOLOGY | Facility: CLINIC | Age: 83
End: 2018-01-29

## 2018-01-29 NOTE — TELEPHONE ENCOUNTER
----- Message from Ramona Winston sent at 1/29/2018 12:00 PM CST -----  Contact: Holli De La Garza would like a call back to ask a question about her procedure and her insurance changing. Call [phone] (home)   thanks

## 2018-02-08 ENCOUNTER — TELEPHONE (OUTPATIENT)
Dept: OPHTHALMOLOGY | Facility: CLINIC | Age: 83
End: 2018-02-08

## 2018-02-08 RX ORDER — DORZOLAMIDE HCL 20 MG/ML
1 SOLUTION/ DROPS OPHTHALMIC 2 TIMES DAILY
Qty: 30 ML | Refills: 3 | Status: SHIPPED | OUTPATIENT
Start: 2018-02-08 | End: 2019-04-05 | Stop reason: SDUPTHER

## 2018-02-20 ENCOUNTER — OFFICE VISIT (OUTPATIENT)
Dept: OPHTHALMOLOGY | Facility: CLINIC | Age: 83
End: 2018-02-20
Payer: MEDICARE

## 2018-02-20 ENCOUNTER — CLINICAL SUPPORT (OUTPATIENT)
Dept: OPHTHALMOLOGY | Facility: CLINIC | Age: 83
End: 2018-02-20
Attending: OPHTHALMOLOGY
Payer: MEDICARE

## 2018-02-20 DIAGNOSIS — H25.13 NUCLEAR SCLEROSIS, BILATERAL: ICD-10-CM

## 2018-02-20 DIAGNOSIS — H33.322 ROUND HOLE OF RETINA WITHOUT DETACHMENT, LEFT: ICD-10-CM

## 2018-02-20 DIAGNOSIS — H43.813 POSTERIOR VITREOUS DETACHMENT, BILATERAL: ICD-10-CM

## 2018-02-20 DIAGNOSIS — H40.053 OCULAR HYPERTENSION, BILATERAL: ICD-10-CM

## 2018-02-20 DIAGNOSIS — H40.053 OCULAR HYPERTENSION, BILATERAL: Primary | ICD-10-CM

## 2018-02-20 DIAGNOSIS — H57.12 EYE PAIN, LEFT: ICD-10-CM

## 2018-02-20 DIAGNOSIS — H52.7 REFRACTIVE ERROR: ICD-10-CM

## 2018-02-20 DIAGNOSIS — H10.13 ALLERGIC CONJUNCTIVITIS, BILATERAL: ICD-10-CM

## 2018-02-20 PROCEDURE — 92083 EXTENDED VISUAL FIELD XM: CPT | Mod: S$GLB,,, | Performed by: OPHTHALMOLOGY

## 2018-02-20 PROCEDURE — 99999 PR PBB SHADOW E&M-EST. PATIENT-LVL III: CPT | Mod: PBBFAC,,, | Performed by: OPHTHALMOLOGY

## 2018-02-20 PROCEDURE — 92134 CPTRZ OPH DX IMG PST SGM RTA: CPT | Mod: S$GLB,,, | Performed by: OPHTHALMOLOGY

## 2018-02-20 PROCEDURE — 92014 COMPRE OPH EXAM EST PT 1/>: CPT | Mod: S$GLB,,, | Performed by: OPHTHALMOLOGY

## 2018-02-20 NOTE — PROGRESS NOTES
HPI     Eye Pain    Additional comments: pt states that OS feel like it os being stabbed//    comes and goes, coming more often that it use to//           Glaucoma    Additional comments: 3 month iop ch//latanoprost QHS OU, Dorzalamide BID   OS//           Comments   pt states that OS feel like it os being stabbed//  comes and goes, coming   more often that it use to//  Took tylenol and laid down on the bed/ that   was the day it went on for a while// most of the time it will come and not   last any longer than 15 min//    pt also states that she is having more floaters but then go away but have   had a few quite larger //    3 month iop ch//latanoprost QHS OU, Dorzalamide BID OS//    Agree with above. Happens sometimes couple times per week. Sometimes 15   minutes, sometimes over an hour after taking Dorzolamide and resting eyes.   Also seeing floaters more often OS.        Last edited by Gracy James MD on 2/20/2018  2:49 PM.   (History)        ROS     Positive for: Cardiovascular (HTN doing much better since stent placed),   Respiratory (SOB), Heme/Lymph (on Plavix/ASA QOD)    Negative for: Neurological (denies seizure/tremor/restless legs; remote   history of migraines), Genitourinary (denies flomax), Endocrine (denies   DM), Eyes (denies eye surgery)    Last edited by Gracy James MD on 2/20/2018  2:49 PM.   (History)        Assessment /Plan     For exam results, see Encounter Report.    Ocular hypertension, bilateral    Nuclear sclerosis, bilateral    Posterior vitreous detachment, bilateral    Eye pain, left    Allergic conjunctivitis, bilateral    Round hole of retina without detachment, left    Refractive error            1. Ocular hypertension  IOP improved initially with Latanoprost (went back up off timolol - was d/c'd for breathing problems). Today's HVF - possible enlarged blind spot vs early arcs, no definite glaucoma pattern; OS scattered defects WNL.     HRT done again  "today - no focal thinning. There was some question of LCD progression OD on last couple tests, but not so much last time. OS seems to be progressing now. Cataracts may be starting to interfere with HVF and HRT, although SD and VA ok. OCT optic nerve shows thinning OU.     Remains open to TM/thin SS on last gonioscopy, does not appear occludable. Optic nerve - possible inferior notch on NDFE OD, appeared stable on last DFE OD, possible slight increase OS. IOP elevated OS last visit as well. Continue Latanoprost QHS OU, added Dorzolamide TWICE DAILY OS on 12/20/16 - IOP improved, eye pain persisits.   RTC 4 months for IOP check. Ok to come in during eye pain to make sure it's not angle closure, but open to TM/SS on last gonio    Eye pain OS Not currently having pain. Consider having sinuses checked, does not appear to be having angle closure.     Long-standing.   2. Nuclear sclerosis  Approaching visual significance, myopic shift. BAT borderline - misses a few letters on each line. Explained this may be contributing to difficulty with vision. Avoids night driving due to glare. OS appears worse than OD on retinoscopy undilated.    3. Round hole of retina without detachment - Left Eye  Stable. RD precautions   4. Posterior vitreous detachment  "    Allergies/itching Zaditor BID prn   5. Hyperopia with astigmatism and presbyopia  MRx given last visit, feels she is doing well with new specs for now.                           "

## 2018-02-23 ENCOUNTER — HOSPITAL ENCOUNTER (OUTPATIENT)
Dept: RADIOLOGY | Facility: HOSPITAL | Age: 83
Discharge: HOME | End: 2018-02-23
Attending: INTERNAL MEDICINE
Payer: MEDICARE

## 2018-02-23 DIAGNOSIS — Z00.00 HEALTHCARE MAINTENANCE: ICD-10-CM

## 2018-02-23 PROCEDURE — 77080 DXA BONE DENSITY AXIAL: CPT | Mod: 26,,, | Performed by: RADIOLOGY

## 2018-02-23 PROCEDURE — 77080 DXA BONE DENSITY AXIAL: CPT | Mod: TC,PO

## 2018-03-05 ENCOUNTER — OFFICE VISIT (OUTPATIENT)
Dept: FAMILY MEDICINE | Facility: CLINIC | Age: 83
End: 2018-03-05
Payer: MEDICARE

## 2018-03-05 VITALS
SYSTOLIC BLOOD PRESSURE: 118 MMHG | OXYGEN SATURATION: 96 % | HEIGHT: 64 IN | RESPIRATION RATE: 18 BRPM | WEIGHT: 191.94 LBS | BODY MASS INDEX: 32.77 KG/M2 | TEMPERATURE: 98 F | DIASTOLIC BLOOD PRESSURE: 76 MMHG | HEART RATE: 66 BPM

## 2018-03-05 DIAGNOSIS — I10 ESSENTIAL HYPERTENSION: ICD-10-CM

## 2018-03-05 DIAGNOSIS — I87.2 VENOUS INSUFFICIENCY: ICD-10-CM

## 2018-03-05 DIAGNOSIS — E83.52 HYPERCALCEMIA: Primary | ICD-10-CM

## 2018-03-05 DIAGNOSIS — J44.9 CHRONIC OBSTRUCTIVE PULMONARY DISEASE, UNSPECIFIED COPD TYPE: ICD-10-CM

## 2018-03-05 DIAGNOSIS — I50.32 CHRONIC DIASTOLIC HEART FAILURE: ICD-10-CM

## 2018-03-05 PROBLEM — M79.604 RIGHT LEG PAIN: Status: RESOLVED | Noted: 2017-09-29 | Resolved: 2018-03-05

## 2018-03-05 PROBLEM — L03.115 CELLULITIS OF RIGHT LOWER EXTREMITY: Status: RESOLVED | Noted: 2017-09-29 | Resolved: 2018-03-05

## 2018-03-05 PROCEDURE — 99999 PR PBB SHADOW E&M-EST. PATIENT-LVL III: CPT | Mod: PBBFAC,,, | Performed by: FAMILY MEDICINE

## 2018-03-05 PROCEDURE — 99214 OFFICE O/P EST MOD 30 MIN: CPT | Mod: S$GLB,,, | Performed by: FAMILY MEDICINE

## 2018-03-05 RX ORDER — FLUTICASONE PROPIONATE 50 MCG
2 SPRAY, SUSPENSION (ML) NASAL DAILY PRN
Qty: 16 G | Refills: 11 | Status: SHIPPED | OUTPATIENT
Start: 2018-03-05

## 2018-03-05 NOTE — PROGRESS NOTES
Subjective:       Patient ID: Holli Landrum is a 84 y.o. female.    Chief Complaint: Dizziness and Follow-up (2 mo f/u RLE)    HPI   Patient in th eoffice for f/u.  PMH sig for COPD, HTN, angina with CAD/chf.    8-9/2017 had a fall with resultant abrasion and cellulitis to RLE. Required multiple abx rounds, hosp admission. States finally cleared.  2/2018 with URI, tx with augmentin from ENT. Sx resolved.  Slight dizziness today she noticed when standing. On fluid restriction from cardiology (5-glasses/day).   DEXA 1/2018 rev. She doesn't want to take more ca, states she has too much. Considering doing vit D supplementation. Aware of risks of fx.  Taking D-mannose for bladder. Notices that urinary flow is slow.  Notices that bowels have a much stronger smell, not loose.   Labs 1/2018 rev. Discussed elev Ca level.  US art/venous 11/2017 rev. Discussed venous insuff.  Maintained f/u with pulm/Souza, notes a decr of 10% function over time, but otherwise ok.    Review of Systems   Constitutional: Negative for fever and unexpected weight change.   HENT: Negative for congestion, postnasal drip, rhinorrhea and sinus pressure.    Eyes: Negative for discharge and itching.   Respiratory: Negative for cough, chest tightness, shortness of breath and wheezing.    Cardiovascular: Negative for chest pain, palpitations and leg swelling.   Gastrointestinal: Negative for abdominal pain, blood in stool, constipation, diarrhea and nausea.   Endocrine: Negative for polydipsia, polyphagia and polyuria.   Genitourinary: Negative for dysuria and hematuria.   Musculoskeletal: Negative for arthralgias and myalgias.   Skin: Negative for rash.   Allergic/Immunologic: Negative for environmental allergies and food allergies.   Neurological: Positive for dizziness (resolves with po fluid intake). Negative for tremors, seizures and syncope.   Hematological: Negative for adenopathy. Bruises/bleeds easily.        On plavix   Psychiatric/Behavioral:         Denies anxiety or depression.       Objective:      Physical Exam   Constitutional: She is oriented to person, place, and time. She appears well-developed and well-nourished.   HENT:   Head: Normocephalic and atraumatic.   Eyes: EOM are normal.   Neck: Normal range of motion. Neck supple. No thyromegaly present.   Cardiovascular: Normal rate and regular rhythm.  Exam reveals no gallop.    Murmur heard.   Systolic murmur is present with a grade of 3/6   Pulmonary/Chest: Effort normal and breath sounds normal. No respiratory distress. She has no wheezes. She has no rales.   Abdominal: Soft. Bowel sounds are normal. She exhibits no distension. There is no tenderness. There is no rebound and no guarding.   Musculoskeletal: Normal range of motion. She exhibits no edema.   Trace pedal edema   Lymphadenopathy:     She has no cervical adenopathy.   Neurological: She is alert and oriented to person, place, and time.   Skin: No rash noted.   Psychiatric: She has a normal mood and affect. Her behavior is normal. Thought content normal.   Nursing note and vitals reviewed.          Hypercalcemia  -     Ambulatory referral to Endocrinology  Noted on most recent lab with mildly elev PTH. Likely primary hyperpara. Will schedule f/u in endocrine for review. Ensure no additional ca supplementation.  Venous insufficiency  Stable ambulation with trace edema on exam today.   Essential hypertension  Controlled, cont regimen.  Chronic diastolic heart failure  Cont fluid restriction per cards. Dizziness noted in ros improves with fluid intake.  Chronic obstructive pulmonary disease, unspecified COPD type  symbicort daily.   Other orders-     fluticasone (FLONASE) 50 mcg/actuation nasal spray; 2 sprays (100 mcg total) by Each Nare route daily as needed for Allergies.

## 2018-05-07 ENCOUNTER — OFFICE VISIT (OUTPATIENT)
Dept: ENDOCRINOLOGY | Facility: CLINIC | Age: 83
End: 2018-05-07
Payer: MEDICARE

## 2018-05-07 VITALS
SYSTOLIC BLOOD PRESSURE: 128 MMHG | HEART RATE: 76 BPM | WEIGHT: 194.69 LBS | HEIGHT: 66 IN | BODY MASS INDEX: 31.29 KG/M2 | DIASTOLIC BLOOD PRESSURE: 80 MMHG | RESPIRATION RATE: 18 BRPM

## 2018-05-07 DIAGNOSIS — E83.52 HYPERCALCEMIA: Primary | ICD-10-CM

## 2018-05-07 DIAGNOSIS — E04.1 THYROID NODULE: ICD-10-CM

## 2018-05-07 DIAGNOSIS — M81.0 OSTEOPOROSIS, UNSPECIFIED OSTEOPOROSIS TYPE, UNSPECIFIED PATHOLOGICAL FRACTURE PRESENCE: ICD-10-CM

## 2018-05-07 PROCEDURE — 3074F SYST BP LT 130 MM HG: CPT | Mod: S$GLB,,, | Performed by: INTERNAL MEDICINE

## 2018-05-07 PROCEDURE — 99204 OFFICE O/P NEW MOD 45 MIN: CPT | Mod: S$GLB,,, | Performed by: INTERNAL MEDICINE

## 2018-05-07 PROCEDURE — 3079F DIAST BP 80-89 MM HG: CPT | Mod: S$GLB,,, | Performed by: INTERNAL MEDICINE

## 2018-05-07 PROCEDURE — 99999 PR PBB SHADOW E&M-EST. PATIENT-LVL IV: CPT | Mod: PBBFAC,,, | Performed by: INTERNAL MEDICINE

## 2018-05-07 NOTE — LETTER
May 7, 2018      Oj Souza MD  [address]  [address]  [facility] [address]           [facility]  [address] [facility] [address] [place] [address]  Phone: [phone]  Fax: [phone]          Patient: Holli Landrum   MR Number: [id number]   YOB: 1933   Date of Visit: 5/7/2018       Dear Dr. Oj Souza:    Thank you for referring Holli Landrum to me for evaluation. Attached you will find relevant portions of my assessment and plan of care.    If you have questions, please do not hesitate to call me. I look forward to following Holli Landrum along with you.    Sincerely,    Fletcher Walter,     Enclosure  CC:  No Recipients    If you would like to receive this communication electronically, please contact [email or web address] or [phone] to request more information on [employer] Link access.    For providers and/or their staff who would like to refer a patient to [facility], please contact us through our one-stop-shop provider referral line, [facility], at [phone].    If you feel you have received this communication in error or would no longer like to receive these types of communications, please e-mail [email or web address]

## 2018-05-07 NOTE — PROGRESS NOTES
CHIEF COMPLAINT: Osteoporosis  84 year old being seen as a new patient. Recently had a DEXA showing osteoporosis. Had an episode of hypercalcemia in Oct 2017. States that she had a fractured patella in 1990's after fall on a cement floor. Unsure age of menopause. No HRT. No kidney stones. Does not want to take any medications for her bones. Would like to take supplements. Had been on steroids for lung disease for many years.       PAST MEDICAL HISTORY/PAST SURGICAL HISTORY:  Reviewed in [facility]    SOCIAL HISTORY: No T/A    FAMILY HISTORY:  No known osteoporosis. Polycythemia vera. + CHF    MEDICATIONS/ALLERGIES: The patient's MedCard has been updated and reviewed.      ROS:   Constitutional: No recent significant weight change  Eyes: No recent visual changes  ENT: No dysphagia  Cardiovascular: Denies current anginal symptoms  Respiratory: Denies current respiratory difficulty  Gastrointestinal: Denies recent bowel disturbances  GenitoUrinary - No dysuria  Skin: No new skin rash  Neurologic: No focal neurologic complaints  Remainder ROS negative        PE:    GENERAL: Well developed, well nourished.  PSYCH:  appropriate mood and affect  EYES:  PERRL, EOM intact.  ENT: Nares patent, oropharynx clear, mucosa pink,   NECK: Supple, trachea midline, left thyroid nodule palpable  CHEST: Resp even and unlabored, CTA bilateral.  CARDIAC: RRR, S1, S2 heard, no murmurs, rubs, S3, or S4  ABDOMEN: Soft, non-tender, non-distended;  No organomegaly  VASCULAR:  DP pulses +2/4 bilaterally, no edema  NEURO: Gait steady, CN II-VII grossly intact  SKIN: No areas of breakdown, no acanthosis nigracans.    LABS   Results for EDWIN HART (MRN [id number]) as of 5/7/2018 14:15   Ref. Range 2/23/2018 09:11   Sodium Latest Ref Range: 136 - 145 mmol/L 137   Potassium Latest Ref Range: 3.5 - 5.1 mmol/L 3.5   Chloride Latest Ref Range: 95 - 110 mmol/L 101   CO2 Latest Ref Range: 23 - 29 mmol/L 28   Anion Gap Latest Ref Range: 8 - 16 mmol/L 8   BUN,  Bld Latest Ref Range: 8 - 23 mg/dL 24 (H)   Creatinine Latest Ref Range: 0.5 - 1.4 mg/dL 0.8   eGFR if non African American Latest Ref Range: >60 mL/min/1.73 m^2 >60.0   eGFR if African American Latest Ref Range: >60 mL/min/1.73 m^2 >60.0   Glucose Latest Ref Range: 70 - 110 mg/dL 101   Calcium Latest Ref Range: 8.7 - 10.5 mg/dL 10.2   Alkaline Phosphatase Latest Ref Range: 55 - 135 U/L 117   Total Protein Latest Ref Range: 6.0 - 8.4 g/dL 7.5   Albumin Latest Ref Range: 3.5 - 5.2 g/dL 3.7   Total Bilirubin Latest Ref Range: 0.1 - 1.0 mg/dL 0.4   AST Latest Ref Range: 10 - 40 U/L 15   ALT Latest Ref Range: 10 - 44 U/L 13   Vit D, 25-Hydroxy Latest Ref Range: 30 - 96 ng/mL 37   PTH Latest Ref Range: 9.0 - 77.0 pg/mL 83.0 (H)       DEXA BONE DENSITY SPINE HIP    CLINICAL HISTORY:  Encounter for general adult medical exam    TECHNIQUE:  DEXA scanning was performed over the left hip and lumbar spine.  Review of the images confirms satisfactory positioning and technique.    COMPARISON:  None    FINDINGS:  The L1-L4 vertebral bone mineral density is equal to 0.762 g/cm2 with a T score of -2.6 and a Z score of 0.3.    The left femoral neck bone mineral density is equal to 0.493g/cm2 with a T score of -3.2 and a Z score of -0.7.   Impression       Osteoporosis -- there is a 32% risk of a major osteoporotic fracture and a 14% risk of hip fracture in the next 10 years (FRAX).       Results for EDWIN HART (MRN [id number]) as of 5/7/2018 14:15   Ref. Range 10/13/2017 10:57   Calcium Latest Ref Range: 8.4 - 10.2 mg/dL 10.6 (H)         ASSESSMENT/PLAN:  1. Osteoporosis- will r/o secondary causes. Discussed using bisphosphonate for treatment of osteoporosis, but patient states does not want to use them due to concern over s/e. Discussed some of her concerns. When labs are back can readdress to see if any improvement.     2. Hypercalcemia- see # 1. Possibly PTH mediated. Will repeat labs.     3. Thyroid Nodule- check US and  TSH    4. History of long term steroid use- patient concerned about adrenal insufficiency Check 8 AM ACTH, COrtisol    FOLLOWUP  8 Am CMP. PTH, Phos, TSH, ACTH, Cortisol  Thyroid US

## 2018-05-15 ENCOUNTER — HOSPITAL ENCOUNTER (OUTPATIENT)
Dept: RADIOLOGY | Facility: HOSPITAL | Age: 83
Discharge: HOME | End: 2018-05-15
Attending: INTERNAL MEDICINE
Payer: MEDICARE

## 2018-05-15 DIAGNOSIS — E04.1 THYROID NODULE: ICD-10-CM

## 2018-05-15 PROCEDURE — 76536 US EXAM OF HEAD AND NECK: CPT | Mod: TC,PO

## 2018-05-15 PROCEDURE — 76536 US EXAM OF HEAD AND NECK: CPT | Mod: 26,,, | Performed by: RADIOLOGY

## 2018-06-09 ENCOUNTER — TELEPHONE (OUTPATIENT)
Dept: ENDOCRINOLOGY | Facility: CLINIC | Age: 83
End: 2018-06-09

## 2018-06-19 ENCOUNTER — OFFICE VISIT (OUTPATIENT)
Dept: OPHTHALMOLOGY | Facility: CLINIC | Age: 83
End: 2018-06-19
Payer: MEDICARE

## 2018-06-19 DIAGNOSIS — H52.7 REFRACTIVE ERROR: ICD-10-CM

## 2018-06-19 DIAGNOSIS — H43.813 POSTERIOR VITREOUS DETACHMENT, BILATERAL: ICD-10-CM

## 2018-06-19 DIAGNOSIS — H40.053 OCULAR HYPERTENSION, BILATERAL: Primary | ICD-10-CM

## 2018-06-19 DIAGNOSIS — H33.322 ROUND HOLE OF RETINA WITHOUT DETACHMENT, LEFT: ICD-10-CM

## 2018-06-19 DIAGNOSIS — H57.12 EYE PAIN, LEFT: ICD-10-CM

## 2018-06-19 DIAGNOSIS — H25.13 NUCLEAR SCLEROSIS, BILATERAL: ICD-10-CM

## 2018-06-19 DIAGNOSIS — H10.13 ALLERGIC CONJUNCTIVITIS, BILATERAL: ICD-10-CM

## 2018-06-19 PROCEDURE — 99999 PR PBB SHADOW E&M-EST. PATIENT-LVL III: CPT | Mod: PBBFAC,,, | Performed by: OPHTHALMOLOGY

## 2018-06-19 PROCEDURE — 92012 INTRM OPH EXAM EST PATIENT: CPT | Mod: S$GLB,,, | Performed by: OPHTHALMOLOGY

## 2018-06-19 RX ORDER — LATANOPROST 50 UG/ML
1 SOLUTION/ DROPS OPHTHALMIC NIGHTLY
Qty: 3 BOTTLE | Refills: 3 | Status: SHIPPED | OUTPATIENT
Start: 2018-06-19 | End: 2018-12-11 | Stop reason: SDUPTHER

## 2018-06-19 NOTE — PROGRESS NOTES
HPI     Glaucoma    Additional comments: 4 month iop ck            Comments   DLS: 2/20/18    Pt states no change in va since last visit. Eyes still itch still. Has   been using zaditor but does not seem to help much.     Gtts: Dorzolamide BID, Latanoprost QHS OU       Last edited by Cheryle Quintana on 6/19/2018  1:18 PM. (History)            Assessment /Plan     For exam results, see Encounter Report.    Ocular hypertension, bilateral  -     Posterior Segment OCT Optic Nerve- Both eyes; Standing    Nuclear sclerosis, bilateral    Posterior vitreous detachment, bilateral    Eye pain, left    Allergic conjunctivitis, bilateral    Round hole of retina without detachment, left    Refractive error            1. Ocular hypertension  IOP improved initially with Latanoprost (went back up off timolol - was d/c'd for breathing problems). Last HVF - possible enlarged blind spot vs early arcs, no definite glaucoma pattern; OS scattered defects WNL.     HRT done again last visit - no focal thinning. There was some question of LCD progression OD on last couple tests, but not so much last time. OS seems to be progressing now. Cataracts may be starting to interfere with HVF and HRT, although SD and VA ok. OCT optic nerve shows thinning OU.     Remains open to TM/thin SS on last gonioscopy, does not appear occludable. Optic nerve - possible inferior notch on NDFE OD, appeared stable on last DFE OD, possible slight increase OS. IOP elevated OS last visit as well. Continue Latanoprost QHS OU, added Dorzolamide TWICE DAILY OS on 12/20/16 - IOP improved, eye pain persisits.   RTC 4 months for IOP check with OCT nerve. Ok to come in during eye pain to make sure it's not angle closure, but open to TM/SS on last gonio    Eye pain OS Not currently having pain. Consider having sinuses checked, does not appear to be having angle closure.     Long-standing.   2. Nuclear sclerosis  Approaching visual significance, myopic shift. BAT borderline  "- misses a few letters on each line. Explained this may be contributing to difficulty with vision. Avoids night driving due to glare.    3. Round hole of retina without detachment - Left Eye  Stable. RD precautions   4. Posterior vitreous detachment  "    Allergies/itching Zaditor BID prn   5. Hyperopia with astigmatism and presbyopia  MRx given last visit, feels she is doing well with new specs for now.                         "

## 2018-06-26 ENCOUNTER — TELEPHONE (OUTPATIENT)
Dept: ENDOCRINOLOGY | Facility: CLINIC | Age: 83
End: 2018-06-26

## 2018-06-26 DIAGNOSIS — R68.89 ABNORMAL ENDOCRINE LABORATORY TEST FINDING: Primary | ICD-10-CM

## 2018-06-26 NOTE — TELEPHONE ENCOUNTER
Dr Walter pt concerned she got her results via [facility], states her PTH is high and her ultrasound results she saw was abnormal, please advise

## 2018-06-26 NOTE — TELEPHONE ENCOUNTER
----- Message from Beatriz Velez sent at 6/26/2018 12:26 PM CDT -----  Contact: Patient  Type:  Patient Returning Call    Who Called:  Patient   Who Left Message for Patient:  Linda  Does the patient know what this is regarding?:    Best Call Back Number:    Additional Information:

## 2018-06-27 ENCOUNTER — TELEPHONE (OUTPATIENT)
Dept: ENDOCRINOLOGY | Facility: CLINIC | Age: 83
End: 2018-06-27

## 2018-06-27 NOTE — TELEPHONE ENCOUNTER
Let her know PTH mildly elevated.   Would like to repeat in 4 months  In 4 months needs CMP, PTH, Phos, 24 hr urine Ca/Cr    Also has bilateral nodules. WIll need bilateral FNA.

## 2018-06-27 NOTE — TELEPHONE ENCOUNTER
Spoke with pt, aware of results, scheduled rivera fna, will need 24 hour urine supplies as well for four month labs aware of hold Plavix Friday before procedure and ok to start back Tuesday after

## 2018-06-27 NOTE — TELEPHONE ENCOUNTER
----- Message from Jaquelin Reyes sent at 6/27/2018  1:44 PM CDT -----  Type:  Patient Returning Call    Who Called: self   Who Left Message for Patient:  NA   Does the patient know what this is regarding?:  Patient returning a phone call.  Best Call Back Number:  [phone]  Additional Information:

## 2018-07-02 ENCOUNTER — OFFICE VISIT (OUTPATIENT)
Dept: ENDOCRINOLOGY | Facility: CLINIC | Age: 83
End: 2018-07-02
Payer: MEDICARE

## 2018-07-02 DIAGNOSIS — E04.2 MULTINODULAR GOITER: Primary | ICD-10-CM

## 2018-07-02 PROCEDURE — 99499 UNLISTED E&M SERVICE: CPT | Mod: S$GLB,,, | Performed by: INTERNAL MEDICINE

## 2018-07-02 PROCEDURE — 10022 PR FINE NEEDLE ASP;W/IMAGING GUIDANCE: CPT | Mod: S$GLB,,, | Performed by: INTERNAL MEDICINE

## 2018-07-02 PROCEDURE — 76942 ECHO GUIDE FOR BIOPSY: CPT | Mod: S$GLB,,, | Performed by: INTERNAL MEDICINE

## 2018-07-02 PROCEDURE — 88173 CYTOPATH EVAL FNA REPORT: CPT | Mod: 59 | Performed by: PATHOLOGY

## 2018-07-02 NOTE — PROGRESS NOTES
Thyroid ultrasound for FNA    Indication:  Ultrasound guidance for fine needle aspiration biopsy isthmus and right nodule  Procedure time out noted. Patient's name, , and location of biopsy were verified with patient. Discussed risks of procedure and risks of a non diagnostic biopsy    Real time ultrasound was performed in 2 planes using a Manny ultrasound machine and 12 MHz probe.   Both nodules were identified and described in report.  Informed consent obtained and questions answered. FNA guidance was performed using direct u/s guidance to confirm accurate needle placement.  4 aspirations each nodule were made using 25 gauge needles.  Samples were submitted for cytology.  The patient tolerated the procedure well without complication.  After care instructions were provided.        Impression:  Uncomplicated FNA biopsy of both thyroid nodules under U/S guidance.      Top- isthmus/ Bottom right nodule

## 2018-07-11 PROBLEM — E87.6 HYPOKALEMIA: Status: ACTIVE | Noted: 2018-07-11

## 2018-07-11 PROBLEM — R33.9 URINARY RETENTION: Status: ACTIVE | Noted: 2018-07-11

## 2018-07-12 PROBLEM — I48.91 NEW ONSET ATRIAL FIBRILLATION: Status: ACTIVE | Noted: 2018-07-12

## 2018-07-13 ENCOUNTER — TELEPHONE (OUTPATIENT)
Dept: ENDOCRINOLOGY | Facility: CLINIC | Age: 83
End: 2018-07-13

## 2018-07-13 ENCOUNTER — TELEPHONE (OUTPATIENT)
Dept: FAMILY MEDICINE | Facility: CLINIC | Age: 83
End: 2018-07-13

## 2018-07-13 DIAGNOSIS — E04.2 MULTINODULAR GOITER: Primary | ICD-10-CM

## 2018-07-13 NOTE — TELEPHONE ENCOUNTER
Pt likely inpatient at present but message left for pt to call back clinic  Message given per Jose Angel:  The right nodule was non diagnostic. The other was benign. Lates see her back in 6 months with a THyroid US. If any change can discuss re biopsy.

## 2018-07-13 NOTE — TELEPHONE ENCOUNTER
----- Message from Jaquelin Reyes sent at 7/13/2018 11:10 AM CDT -----  Type:  Test Results    Who Called:  Self   Name of Test (Lab/Mammo/Etc):  Biopsy results  Date of Test:  Ordering Provider:  Dr Walter  Where the test was performed:  [facility]   Best Call Back Number:  [phone]  Additional Information:

## 2018-07-13 NOTE — TELEPHONE ENCOUNTER
----- Message from Beatriz Velez sent at 7/13/2018  4:30 PM CDT -----  Contact: Patient  Type:  Sooner Apoointment Request    Caller is requesting a sooner appointment.  Caller declined first available appointment listed below.  Caller will not accept being placed on the waitlist and is requesting a message be sent to doctor.    Name of Caller:  Patient   When is the first available appointment?  9/13  Symptoms:  1 month hospital follow-up  Best Call Back Number:    Additional Information:

## 2018-07-13 NOTE — TELEPHONE ENCOUNTER
Pt would like a 1-month hospital to see pcp. Pt wants to be seen only by pcp. Please review and advise. Pt was in ed on 7-

## 2018-07-13 NOTE — TELEPHONE ENCOUNTER
The right nodule was non diagnostic. The other was benign. Lates see her back in 6 months with a THyroid US. If any change can discuss re biopsy.

## 2018-08-07 ENCOUNTER — OFFICE VISIT (OUTPATIENT)
Dept: FAMILY MEDICINE | Facility: CLINIC | Age: 83
End: 2018-08-07
Payer: MEDICARE

## 2018-08-07 ENCOUNTER — LAB VISIT (OUTPATIENT)
Dept: LAB | Facility: HOSPITAL | Age: 83
End: 2018-08-07
Attending: FAMILY MEDICINE
Payer: MEDICARE

## 2018-08-07 VITALS
OXYGEN SATURATION: 95 % | RESPIRATION RATE: 18 BRPM | HEART RATE: 60 BPM | WEIGHT: 196 LBS | DIASTOLIC BLOOD PRESSURE: 68 MMHG | TEMPERATURE: 99 F | SYSTOLIC BLOOD PRESSURE: 124 MMHG | HEIGHT: 65 IN | BODY MASS INDEX: 32.65 KG/M2

## 2018-08-07 DIAGNOSIS — D41.4 BLADDER NEOPLASM OF UNCERTAIN MALIGNANT POTENTIAL: ICD-10-CM

## 2018-08-07 DIAGNOSIS — R33.9 URINARY RETENTION: Primary | ICD-10-CM

## 2018-08-07 DIAGNOSIS — R53.83 FATIGUE, UNSPECIFIED TYPE: ICD-10-CM

## 2018-08-07 DIAGNOSIS — I48.91 ATRIAL FIBRILLATION, UNSPECIFIED TYPE: ICD-10-CM

## 2018-08-07 DIAGNOSIS — K92.1 BLACK STOOL: ICD-10-CM

## 2018-08-07 LAB
BASOPHILS # BLD AUTO: 0.07 K/UL
BASOPHILS NFR BLD: 1 %
DIFFERENTIAL METHOD: ABNORMAL
EOSINOPHIL # BLD AUTO: 0.3 K/UL
EOSINOPHIL NFR BLD: 4.8 %
ERYTHROCYTE [DISTWIDTH] IN BLOOD BY AUTOMATED COUNT: 14.1 %
HCT VFR BLD AUTO: 40.5 %
HGB BLD-MCNC: 13.9 G/DL
IMM GRANULOCYTES # BLD AUTO: 0.01 K/UL
IMM GRANULOCYTES NFR BLD AUTO: 0.1 %
IRON SERPL-MCNC: 66 UG/DL
LYMPHOCYTES # BLD AUTO: 3 K/UL
LYMPHOCYTES NFR BLD: 43.3 %
MCH RBC QN AUTO: 29 PG
MCHC RBC AUTO-ENTMCNC: 34.3 G/DL
MCV RBC AUTO: 85 FL
MONOCYTES # BLD AUTO: 0.8 K/UL
MONOCYTES NFR BLD: 11.5 %
NEUTROPHILS # BLD AUTO: 2.8 K/UL
NEUTROPHILS NFR BLD: 39.3 %
NRBC BLD-RTO: 0 /100 WBC
PLATELET # BLD AUTO: 357 K/UL
PMV BLD AUTO: 10.4 FL
RBC # BLD AUTO: 4.79 M/UL
SATURATED IRON: 15 %
TOTAL IRON BINDING CAPACITY: 443 UG/DL
TRANSFERRIN SERPL-MCNC: 299 MG/DL
WBC # BLD AUTO: 7.02 K/UL

## 2018-08-07 PROCEDURE — 99999 PR PBB SHADOW E&M-EST. PATIENT-LVL IV: CPT | Mod: PBBFAC,,, | Performed by: FAMILY MEDICINE

## 2018-08-07 PROCEDURE — 87088 URINE BACTERIA CULTURE: CPT

## 2018-08-07 PROCEDURE — 87077 CULTURE AEROBIC IDENTIFY: CPT

## 2018-08-07 PROCEDURE — 99214 OFFICE O/P EST MOD 30 MIN: CPT | Mod: S$GLB,,, | Performed by: FAMILY MEDICINE

## 2018-08-07 PROCEDURE — 87086 URINE CULTURE/COLONY COUNT: CPT

## 2018-08-07 PROCEDURE — 3078F DIAST BP <80 MM HG: CPT | Mod: S$GLB,,, | Performed by: FAMILY MEDICINE

## 2018-08-07 PROCEDURE — 36415 COLL VENOUS BLD VENIPUNCTURE: CPT | Mod: PN

## 2018-08-07 PROCEDURE — 3074F SYST BP LT 130 MM HG: CPT | Mod: S$GLB,,, | Performed by: FAMILY MEDICINE

## 2018-08-07 PROCEDURE — 83540 ASSAY OF IRON: CPT

## 2018-08-07 PROCEDURE — 87186 SC STD MICRODIL/AGAR DIL: CPT

## 2018-08-07 PROCEDURE — 85025 COMPLETE CBC W/AUTO DIFF WBC: CPT

## 2018-08-07 RX ORDER — OMEPRAZOLE 40 MG/1
40 CAPSULE, DELAYED RELEASE ORAL DAILY
Qty: 30 CAPSULE | Refills: 2 | Status: SHIPPED | OUTPATIENT
Start: 2018-08-07 | End: 2018-10-27 | Stop reason: SDUPTHER

## 2018-08-07 NOTE — Clinical Note
Hi, I had to start Ms Landrum back on prilosec today. She is starting with black stools and nausea. She will dose it opposite her plavix during the day, and is scheduled to see GI as well. Please don't hesitate to contact me with any questions or concerns. Sincerely, Marcia Crespo MD

## 2018-08-07 NOTE — PROGRESS NOTES
Subjective:       Patient ID: Holli Landrum is a 85 y.o. female.    Chief Complaint: Hospital Follow Up ([facility] afib)      Holli Landrum is in the office for hospital f/u.    HPI  Since LOV, she had a hosp admission for urinary retention (almost a liter), and afib. Discharged with indwelling french.  Past Medical History:   Diagnosis Date    COPD (chronic obstructive pulmonary disease)     no oxygen    GERD (gastroesophageal reflux disease) 11/20/2012    Paroxysmal ventricular tachycardia     per problem list     Has cystoscope with urology tomorrow to try and determine if the french can be removed.  She feels poorly. We discussed that her BP has been stable-low, but still has some fluctuations. She occ gets lightheaded on her feet.   She is measuring her I/O, and notes that it's only off by 50cc/day.     She also admits to a black stool since coming home from the hospital. She notes that she has been nauseated for a while, frequent. Recalls having been on ppi in years past, but was dc'd a while back. Is overdue for both egd and c-scope, but does not feel well enough to do so. No nsaid use.    Has seen ent/sanchez and ophtho re: L hemifacial pain. No rash. Has tried nasal spray, antihistamine without relief.     Current Outpatient Prescriptions:     ALBUTEROL SULFATE (VENTOLIN INHL), Inhale 2 puffs into the lungs 4 (four) times daily as needed. , Disp: , Rfl:     cholecalciferol, vitamin D3, (VITAMIN D3) 5,000 unit Tab, Take 5,000 Units by mouth once daily., Disp: , Rfl:     clopidogrel (PLAVIX) 75 mg tablet, Take 1 tablet (75 mg total) by mouth once daily., Disp: 90 tablet, Rfl: 3    co-enzyme Q-10 30 mg capsule, Take 30 mg by mouth 3 (three) times daily., Disp: , Rfl:     CRANBERRY/B.COAGULAN/C/CALCIUM (CRANBERRY-PROBIOTIC ORAL), Take 2 tablets by mouth once daily., Disp: , Rfl:     dorzolamide (TRUSOPT) 2 % ophthalmic solution, Place 1 drop into the left eye 2 (two) times daily., Disp: 30 mL, Rfl: 3     fish oil-omega-3 fatty acids 300-1,000 mg capsule, Take 2 g by mouth once daily., Disp: , Rfl:     fluticasone (FLONASE) 50 mcg/actuation nasal spray, 2 sprays (100 mcg total) by Each Nare route daily as needed for Allergies., Disp: 16 g, Rfl: 11    fluticasone-vilanterol (BREO ELLIPTA) 100-25 mcg/dose diskus inhaler, Inhale 1 puff into the lungs once daily. Controller, Disp: 60 each, Rfl: 11    furosemide (LASIX) 40 MG tablet, Take 1 tablet (40 mg total) by mouth once daily. 1 Tablet Oral Every day, Disp: 90 tablet, Rfl: 3    GRAPE SEED EXTRACT ORAL, Take 1 tablet by mouth once daily. , Disp: , Rfl:     latanoprost 0.005 % ophthalmic solution, Place 1 drop into both eyes every evening., Disp: 3 Bottle, Rfl: 3    magnesium oxide (MAG-OX) 400 mg tablet, Take 400 mg by mouth once daily., Disp: , Rfl:     metOLazone (ZAROXOLYN) 5 MG tablet, Take 1 tablet (5 mg total) by mouth every Monday and Friday., Disp: 24 tablet, Rfl: 3    mirabegron (MYRBETRIQ) 25 mg Tb24 ER tablet, Take 25 mg by mouth once daily., Disp: , Rfl:     nitroGLYCERIN 0.4 MG/HR TD PT24 (NITRODUR) 0.4 mg/hr, PLACE 1 PATCH ONTO THE SKIN ONCE DAILY., Disp: 90 patch, Rfl: 3    OLIVE LEAF EXTRACT ORAL, Take by mouth., Disp: , Rfl:     potassium chloride (KLOR-CON) 10 MEQ TbSR, Take 1 tablet (10 mEq total) by mouth once daily., Disp: 90 tablet, Rfl: 3    sotalol (BETAPACE) 80 MG tablet, Take 0.5 tablets (40 mg total) by mouth once daily., Disp: 45 tablet, Rfl: 3    The ASCVD Risk score (Newcomb BRENT Guzman., et al., 2013) failed to calculate for the following reasons:    The 2013 ASCVD risk score is only valid for ages 40 to 79    The patient has a prior MI or stroke diagnosis     No results found for: HGBA1C  Lab Results   Component Value Date    LDLCALC 80.2 10/13/2017    CREATININE 0.86 07/18/2018   Labs 2018 rev.    Review of Systems   Constitutional: Negative for fever and unexpected weight change.   HENT: Negative for congestion, postnasal drip,  rhinorrhea and sinus pressure.    Eyes: Negative for discharge and itching.   Respiratory: Negative for cough and shortness of breath.    Cardiovascular: Negative for chest pain, palpitations and leg swelling.   Gastrointestinal: Positive for blood in stool. Negative for abdominal pain, constipation, diarrhea and nausea.   Endocrine: Negative for polydipsia, polyphagia and polyuria.   Genitourinary: Negative for difficulty urinating and dysuria.   Musculoskeletal: Negative for arthralgias and myalgias.   Skin: Negative for color change and rash.   Allergic/Immunologic: Negative for environmental allergies and food allergies.   Neurological: Positive for headaches (L hemifacial pain). Negative for dizziness (resolves with po fluid intake), tremors, seizures and syncope.   Hematological: Negative for adenopathy. Bruises/bleeds easily.        On plavix   Psychiatric/Behavioral: Negative for dysphoric mood and sleep disturbance.        Denies anxiety or depression.       Objective:      Physical Exam   Constitutional: She is oriented to person, place, and time. She appears well-developed and well-nourished. No distress.   Appears tired.   HENT:   Head: Normocephalic and atraumatic.   Mouth/Throat: Oropharynx is clear and moist.   Eyes: Conjunctivae are normal. Right eye exhibits no discharge. Left eye exhibits no discharge. No scleral icterus.   Neck: Normal range of motion. Neck supple.   Cardiovascular: Normal rate.    Murmur heard.  Pulmonary/Chest: Effort normal and breath sounds normal. No respiratory distress.   Abdominal: Soft. She exhibits no distension.   Musculoskeletal: Normal range of motion. She exhibits no edema.   Lymphadenopathy:     She has no cervical adenopathy.   Neurological: She is alert and oriented to person, place, and time. No cranial nerve deficit.   Skin: Skin is warm and dry. No rash noted.   Psychiatric: She has a normal mood and affect. Her behavior is normal.   Nursing note and vitals  reviewed.      Screening recommendations appropriate to age and health status were reviewed.    Urinary retention  -     Urinalysis; Future  -     Urine culture  F/u with urology. Send urine for ua/cx today.  Fatigue, unspecified type  -     CBC auto differential; Future; Expected date: 08/07/2018  -     Iron and TIBC; Future; Expected date: 08/07/2018  Likely mild anemia/iron def starting. Will update lab today.  Black stool  -     omeprazole (PRILOSEC) 40 MG capsule; Take 1 capsule (40 mg total) by mouth once daily.  Dispense: 30 capsule; Refill: 2  -     Ambulatory referral to Gastroenterology  Gerd diet.  Alarm sx reviewed for emergent f/u.  Start prilosec daily opposite plavix dosing - msg sent to cardiology.  Atrial fibrillation, unspecified type  Per cards.  Bladder neoplasm of uncertain malignant potential

## 2018-08-10 ENCOUNTER — TELEPHONE (OUTPATIENT)
Dept: FAMILY MEDICINE | Facility: CLINIC | Age: 83
End: 2018-08-10

## 2018-08-10 DIAGNOSIS — R79.89 ELEVATED PLATELET COUNT: Primary | ICD-10-CM

## 2018-08-10 LAB — BACTERIA UR CULT: NORMAL

## 2018-08-10 RX ORDER — SULFAMETHOXAZOLE AND TRIMETHOPRIM 800; 160 MG/1; MG/1
TABLET ORAL
COMMUNITY
Start: 2018-08-09 | End: 2018-08-14

## 2018-08-10 NOTE — TELEPHONE ENCOUNTER
Spoke to pt and advised that she needs to stop bactrim and contact Her urologist (Dr Robb Knight) to prescribe another antibiotic.  Pt verbalized understanding.  Culture results faxed to Dr Moeller office.

## 2018-08-10 NOTE — TELEPHONE ENCOUNTER
Pt's urine culture indicates resistance to bactrim. If urology put her on that yesterday, she should call them and have it switched. Please find out which uro she's seeing to fax cx/sens.

## 2018-08-13 ENCOUNTER — TELEPHONE (OUTPATIENT)
Dept: FAMILY MEDICINE | Facility: CLINIC | Age: 83
End: 2018-08-13

## 2018-08-13 NOTE — TELEPHONE ENCOUNTER
----- Message from Luz Mason sent at 8/13/2018  7:07 AM CDT -----  Type: Needs Medical Advice    Who Called:  Patient  Best Call Back Number: [phone]  Additional Information: Patient requesting to speak with nurse concerning pain she is experiencing and to discuss medication/please call back to advise.

## 2018-08-14 ENCOUNTER — OFFICE VISIT (OUTPATIENT)
Dept: UROLOGY | Facility: CLINIC | Age: 83
End: 2018-08-14
Payer: MEDICARE

## 2018-08-14 VITALS
DIASTOLIC BLOOD PRESSURE: 79 MMHG | BODY MASS INDEX: 32.65 KG/M2 | HEART RATE: 61 BPM | SYSTOLIC BLOOD PRESSURE: 130 MMHG | HEIGHT: 65 IN | WEIGHT: 196 LBS

## 2018-08-14 DIAGNOSIS — N30.00 ACUTE CYSTITIS WITHOUT HEMATURIA: ICD-10-CM

## 2018-08-14 DIAGNOSIS — R33.9 INCOMPLETE BLADDER EMPTYING: ICD-10-CM

## 2018-08-14 DIAGNOSIS — R33.9 URINARY RETENTION: Primary | ICD-10-CM

## 2018-08-14 LAB
BILIRUB SERPL-MCNC: NORMAL MG/DL
BLOOD URINE, POC: NORMAL
COLOR, POC UA: YELLOW
GLUCOSE UR QL STRIP: NORMAL
KETONES UR QL STRIP: NORMAL
LEUKOCYTE ESTERASE URINE, POC: NORMAL
NITRITE, POC UA: NORMAL
PH, POC UA: 6
PROTEIN, POC: NORMAL
SPECIFIC GRAVITY, POC UA: 1.01
UROBILINOGEN, POC UA: 0.2

## 2018-08-14 PROCEDURE — 3075F SYST BP GE 130 - 139MM HG: CPT | Mod: S$GLB,,, | Performed by: UROLOGY

## 2018-08-14 PROCEDURE — 3078F DIAST BP <80 MM HG: CPT | Mod: S$GLB,,, | Performed by: UROLOGY

## 2018-08-14 PROCEDURE — 81002 URINALYSIS NONAUTO W/O SCOPE: CPT | Mod: S$GLB,,, | Performed by: UROLOGY

## 2018-08-14 PROCEDURE — 99999 PR PBB SHADOW E&M-EST. PATIENT-LVL IV: CPT | Mod: PBBFAC,,, | Performed by: UROLOGY

## 2018-08-14 PROCEDURE — 99204 OFFICE O/P NEW MOD 45 MIN: CPT | Mod: 25,S$GLB,, | Performed by: UROLOGY

## 2018-08-14 RX ORDER — AMOXICILLIN AND CLAVULANATE POTASSIUM 500; 125 MG/1; MG/1
1 TABLET, FILM COATED ORAL 2 TIMES DAILY
Qty: 14 TABLET | Refills: 1 | Status: SHIPPED | OUTPATIENT
Start: 2018-08-14 | End: 2018-08-14 | Stop reason: SDUPTHER

## 2018-08-14 RX ORDER — NITROFURANTOIN 25; 75 MG/1; MG/1
100 CAPSULE ORAL 2 TIMES DAILY
COMMUNITY
End: 2018-09-04

## 2018-08-14 RX ORDER — AMOXICILLIN AND CLAVULANATE POTASSIUM 500; 125 MG/1; MG/1
1 TABLET, FILM COATED ORAL 2 TIMES DAILY
Qty: 14 TABLET | Refills: 1 | Status: SHIPPED | OUTPATIENT
Start: 2018-08-14 | End: 2018-08-21

## 2018-08-15 NOTE — PROGRESS NOTES
UROLOGY [place]  8 14 18    Cc recurrent uti    Age 85 , had uti. Has hx of gross hematuria and was seen by dr leah garcia. Dr garcia did a cystoscopy and found an erythematous area that he felt might be suspicious. This was biopsied and the report was benign ('chronic cystitis'). Pt also had a retrograde cystogram and was found to have multiple large diverticula in her bladder, including a major size one on the L lateral wall superiorly. Excision of the diverticulum was considered, but pt has a number of medical issues, including atrial fibrillation and use of anticoagulants, and the operation did not take place. Pt has also had urinary retention and has needed french drainage. Was treated for a recent uti, but the antibiotic given happened to be listed as ineffective in the sensitivity report.     Pt says she has trouble emptying her bladder, and certain maneuvers may help her drain the bladder better; for example, she notices that when she stands over the toilet she can more completely empty than is she stays seated the whole time.      [facility]    Surgical:  has a past surgical history that includes Vascular surgery; Cholecystectomy; Ovarian cyst removal (teenager); Hysterectomy (1969); Tonsillectomy; Cardiac surgery (2012); Cardiac catheterization (2013, 2014); CYSTOSCOPY W/BLADDER BIOPSY with Fulguration  (N/A, 4/10/2017); ANGIOGRAM-CORONARY (Left, 10/27/2016); CYSTOSCOPY (N/A, 2/24/2015); and CYSTOGRAM (N/A, 2/24/2015).    Medical:  has a past medical history of Atrial fibrillation, COPD (chronic obstructive pulmonary disease), Essential hypertension, GERD (gastroesophageal reflux disease), Hypertensive heart disease with heart failure, and Paroxysmal ventricular tachycardia.    Familial: no fh of renal disease    Social: , lives in [place], [place]    Meds:   Current Outpatient Medications on File Prior to Visit   Medication Sig Dispense Refill    ALBUTEROL SULFATE ( Inhale 2 puffs in      cholecalciferol,  vitamin D3, (VITAMIN D3) 5,000 unit Tab Take 5,000 Units by mouth once daily.      clopidogrel (PLAVIX) 75 mg tablet Take 1 tablet (75 mg total) by  90 tablet 3    co-enzyme Q-10 30 mg capsule Take 30 mg by mouth 3       dorzolamide (TRUSOPT) 2 % ophthalmic solution Place 1 drop into the left eye 2  30 mL 3    fish oil-omega-3 fatty acids 300-1,000 mg capsule Take 2 g by mouth once daily.      fluticasone (FLONASE) 50 mcg/actuation nasal spray 2 sprays (100 mcg total) by E. 16 g 11    fluticasone-vilanterol (BREO ELLIPTA) 100-25 mcg/dose diskus inhaler Inhale 1 puff into the lungs once daily. Controller 60 each 11    furosemide (LASIX) 40 MG tablet Take 1 tablet (40 mg total) by mouth onc 90 tablet 3    GRAPE SEED EXTRACT ORAL Take 1 tablet by mouth once daily.       latanoprost 0.005 % o Place 1 drop into 3 Bottle 3    magnesium oxide (MAG-OX) 400 mg tablet Take 400 mg by mouth once daily.      metOLazone (ZAROXOLYN) 5 MG tablet Take 1 tablet (5 mg total)  24 tablet 3    mirabegron (MYRBETRIQ) 25 mg Tb24 ER tablet Take 25 mg by mouth once daily.      nitrofurantoin, macrocrystal-monohydrate, (MACROBID) 100 MG capsule Take 100 mg by mouth 2 (two) times daily.      nitroGLYCERIN 0.4 MG/HR TD PT24 (NITRODUR) 0.4 mg/hr PLACE 1 PATCH ONTO TH 90 patch 3    OLIVE LEAF EXTRACT ORAL Take by mouth.      omeprazole (PRILOSEC) 40 MG capsule Take 1 capsule (40 mg total) by 30 capsule 2    potassium chloride (KLOR-CON) 10 MEQ TbSR Take 1 tablet (10 mEq total)  90 tablet 3    sotalol (BETAPACE) 80 MG tablet Take 0.5 tablets (40 mg total)  45 tablet 3    CRANBERRY/B.COAGULAN/C/ Take 2 tablets by mouth once daily.         Pt alert, oriented, imposing personality, no distress  HEENT: wnl.  Neck: supple, no JVD, no lymphadenopathy  Chest: CV NSR  Lungs: normal chest expansion, no labored breathing  Abdomen flat, nontender, no organomegaly, no masses.  Extremities: no edema, peripheral pulses nl  Neuro:  preserved    IMP    Uti, will start on augmentin 500 bid x 7 days, which is effective according to the sensitivity report.  If pt has continued difficulty emptying her bladder she may need to learn to do intermittent self catheterization.  Even if the patient achieved improved emptying of the bladder (by maneuvering, but self cath, etc), the large diverticulum would likely not completely empty and surgical excision could be considered.   This would entail an open pelvic exploration with excision of the large diverticulum and possibly a smaller one near the large one. There is a possibility that we could arrange to get the procedure done robotically, will check with dr gal cross in the [facility]. Would have to stop plavix.  Stop the myrbetriq for now, since she is having difficulty with bladder empyting.

## 2018-08-21 ENCOUNTER — TELEPHONE (OUTPATIENT)
Dept: UROLOGY | Facility: CLINIC | Age: 83
End: 2018-08-21

## 2018-08-21 DIAGNOSIS — N30.00 ACUTE CYSTITIS WITHOUT HEMATURIA: Primary | ICD-10-CM

## 2018-08-21 NOTE — TELEPHONE ENCOUNTER
Spoke to pt and she is going to bring in a urine sample to the lab tomorrow. Pt finished her antibiotics today. Pt is still having dysuria and there is an odor to her urine.

## 2018-08-21 NOTE — TELEPHONE ENCOUNTER
----- Message from Liam Nichols sent at 8/21/2018  8:07 AM CDT -----  Contact: Patient  Type: Needs Medical Advice    Who Called:  patient  Symptoms (please be specific):    How long has patient had these symptoms:    Pharmacy name and phone #:  [facility]  Best Call Back Number: [phone]  Additional Information: patient called to advise that she is burning when urinating need to know what to do? If sending medication send to [facility] not [facility] today.

## 2018-08-22 ENCOUNTER — LAB VISIT (OUTPATIENT)
Dept: LAB | Facility: HOSPITAL | Age: 83
End: 2018-08-22
Attending: UROLOGY
Payer: MEDICARE

## 2018-08-22 DIAGNOSIS — N30.00 ACUTE CYSTITIS WITHOUT HEMATURIA: ICD-10-CM

## 2018-08-22 LAB
BACTERIA #/AREA URNS HPF: ABNORMAL /HPF
BILIRUB UR QL STRIP: NEGATIVE
CLARITY UR: CLEAR
COLOR UR: YELLOW
GLUCOSE UR QL STRIP: NEGATIVE
HGB UR QL STRIP: ABNORMAL
HYALINE CASTS #/AREA URNS LPF: 2 /LPF
KETONES UR QL STRIP: NEGATIVE
LEUKOCYTE ESTERASE UR QL STRIP: ABNORMAL
MICROSCOPIC COMMENT: ABNORMAL
NITRITE UR QL STRIP: NEGATIVE
PH UR STRIP: 6 [PH] (ref 5–8)
PROT UR QL STRIP: NEGATIVE
RBC #/AREA URNS HPF: 2 /HPF (ref 0–4)
SP GR UR STRIP: 1.01 (ref 1–1.03)
URN SPEC COLLECT METH UR: ABNORMAL
WBC #/AREA URNS HPF: 2 /HPF (ref 0–5)

## 2018-08-22 PROCEDURE — 81000 URINALYSIS NONAUTO W/SCOPE: CPT | Mod: PO

## 2018-08-22 PROCEDURE — 87077 CULTURE AEROBIC IDENTIFY: CPT

## 2018-08-22 PROCEDURE — 87086 URINE CULTURE/COLONY COUNT: CPT

## 2018-08-22 PROCEDURE — 87088 URINE BACTERIA CULTURE: CPT

## 2018-08-22 PROCEDURE — 87186 SC STD MICRODIL/AGAR DIL: CPT

## 2018-08-24 ENCOUNTER — TELEPHONE (OUTPATIENT)
Dept: UROLOGY | Facility: CLINIC | Age: 83
End: 2018-08-24

## 2018-08-24 ENCOUNTER — CLINICAL SUPPORT (OUTPATIENT)
Dept: UROLOGY | Facility: CLINIC | Age: 83
End: 2018-08-24
Payer: MEDICARE

## 2018-08-24 DIAGNOSIS — R33.9 URINARY RETENTION: Primary | ICD-10-CM

## 2018-08-24 LAB — POC RESIDUAL URINE VOLUME: 340 ML (ref 0–100)

## 2018-08-24 PROCEDURE — 51702 INSERT TEMP BLADDER CATH: CPT | Mod: S$GLB,,, | Performed by: UROLOGY

## 2018-08-24 PROCEDURE — 51798 US URINE CAPACITY MEASURE: CPT | Mod: S$GLB,,, | Performed by: UROLOGY

## 2018-08-24 PROCEDURE — 99999 PR PBB SHADOW E&M-EST. PATIENT-LVL III: CPT | Mod: PBBFAC,,,

## 2018-08-24 RX ORDER — FLUCONAZOLE 100 MG/1
100 TABLET ORAL DAILY
COMMUNITY
End: 2018-09-04

## 2018-08-24 RX ORDER — AMOXICILLIN 500 MG/1
500 CAPSULE ORAL 2 TIMES DAILY
COMMUNITY
End: 2018-10-04

## 2018-08-24 NOTE — TELEPHONE ENCOUNTER
Spoke to pt and booked her an apt for today. She states she has urinated some but not very much over the past few days. . Will do a pvr and see what the residual is.

## 2018-08-24 NOTE — PROGRESS NOTES
PVR showed 340 mL is the pt's bladder. Per dr weber, Pt was placed on table in supine position draped appropriately and catheterized using sterile technique with a 16 Fr straight indwelling catheter. Bladder was drained of 400 mL of yellow cloudy urine and pt was able to site up. Pt was taught catheter care and given a leg bag.  Pt tolerated procedure well.  Pt has a culture pending. Pt also has a yeast infection. Dr. Weber is prescribing another antibiotic( she just finished Augmentin) and some diflucan for the yeast infection.

## 2018-08-24 NOTE — TELEPHONE ENCOUNTER
----- Message from Gloria Montgomrey sent at 8/24/2018  7:04 AM CDT -----  Contact: self  Patient not able to pass urine since yesterday. Needs to know if you can catheterize  her today. Refused on call nurse. Please call back at [phone] (home)

## 2018-08-25 LAB — BACTERIA UR CULT: NORMAL

## 2018-08-27 RX ORDER — AMPICILLIN 500 MG/1
500 CAPSULE ORAL 2 TIMES DAILY
Qty: 14 CAPSULE | Refills: 0 | Status: SHIPPED | OUTPATIENT
Start: 2018-08-27 | End: 2018-09-04

## 2018-09-04 ENCOUNTER — PROCEDURE VISIT (OUTPATIENT)
Dept: UROLOGY | Facility: CLINIC | Age: 83
End: 2018-09-04
Payer: MEDICARE

## 2018-09-04 VITALS
HEIGHT: 65 IN | BODY MASS INDEX: 30.59 KG/M2 | HEART RATE: 85 BPM | SYSTOLIC BLOOD PRESSURE: 132 MMHG | DIASTOLIC BLOOD PRESSURE: 70 MMHG | WEIGHT: 183.63 LBS

## 2018-09-04 DIAGNOSIS — R33.9 INCOMPLETE BLADDER EMPTYING: ICD-10-CM

## 2018-09-04 DIAGNOSIS — D41.4 BLADDER NEOPLASM OF UNCERTAIN MALIGNANT POTENTIAL: ICD-10-CM

## 2018-09-04 DIAGNOSIS — R33.9 URINARY RETENTION: ICD-10-CM

## 2018-09-04 LAB
BILIRUB SERPL-MCNC: NORMAL MG/DL
BLOOD URINE, POC: NORMAL
COLOR, POC UA: YELLOW
GLUCOSE UR QL STRIP: NORMAL
KETONES UR QL STRIP: NORMAL
LEUKOCYTE ESTERASE URINE, POC: NORMAL
NITRITE, POC UA: NORMAL
PH, POC UA: 6
PROTEIN, POC: NORMAL
SPECIFIC GRAVITY, POC UA: 1.01
UROBILINOGEN, POC UA: NORMAL

## 2018-09-04 PROCEDURE — 52000 CYSTOURETHROSCOPY: CPT | Mod: S$PBB,,, | Performed by: UROLOGY

## 2018-09-04 PROCEDURE — 51798 US URINE CAPACITY MEASURE: CPT | Mod: PBBFAC,PO | Performed by: UROLOGY

## 2018-09-04 PROCEDURE — 52000 CYSTOURETHROSCOPY: CPT | Mod: PBBFAC,PO | Performed by: UROLOGY

## 2018-09-04 PROCEDURE — 81002 URINALYSIS NONAUTO W/O SCOPE: CPT | Mod: PBBFAC,PO | Performed by: UROLOGY

## 2018-09-04 NOTE — PROGRESS NOTES
"UROLOGY [place]  9 4 18    Cc here for cystoscopy    Age 85, with recurrent uti and has had gross hematuria. Dr garcia cystoscoped the patient and found a suspicious erythematous area in the bladder; biopsies came back "chronic cystitis") and a retrograde cystogram showed multiple bladder diverticula.      Pt says she has trouble emptying her bladder, and certain maneuvers may help her drain the bladder better; for example, she notices that when she stands over the toilet she can more completely empty than is she stays seated the whole time.    Recently she needed a french catheter for very poor bladder emptying and it was removed today      Very atrophic vulvar and vaginal area  Difficult to access vaginal introitus. Thinning mucosa  Urethral can be accessed with proper retraction  CYSTOSCOPY [facility] flexible. Urethral meatus accessible but somewhat deep. No diverticulum. Bladder cavity distends well with water filling and immediately showed multiple vesical diverticula. The largest is a located on the L superolateral wall and a much smaller one slightly below. There are multiple smaller diverticula. all diverticula have wide open necks and communicate easily with the main bladder cavity. the bladder mucosa shows an erythematous area near the dome that appears to have a scar in it, probably the area recently biopsied by dr garcia. This area has localized edema, but does not represent an exophytic growth, but a flat growth, perhaps minimally papillary. No bleeding is happening from this lesion nor from any other area of the bladder. There is marked trabeculation. Location and shape of the ureteral orifices normal. Pt tolerated the procedure well.     BLADDERSCAN ULTRASOUND  63  ml residual       IMP  Relatively good bladder emptying (minimal residual of 63 ml, normal for age)  Large bladder diverticula. These lesions have the advantage that they have: a) large diverticular necks, and b) are not located in dependent " areas of the bladder. These features makes these diverticula less likely to retain urine postvoid.   I have already talked with dr gal cross of [facility] in [place], in case we would want to consider laparoscopic exploration and excision of the diverticula. Pt understands, and agreed that if anything has to be done she would prefer a laparoscopic approach. Owing to pt age and comorbidities we are not immediately inclined towards surgical referral for these diverticula.    We can use prophylactic antibiotics for recurrent uti's  She has a suspicious area in the bladder that has already been biopsied by dr garcia, but I would like to observe again, RTC 1 year or sooner of problems.    Pt could be taught intermittent self cath if she is unable to void, but today she showed that she is able. If intermittent self cath, may need to use a rigid cath to put it in by feel.

## 2018-09-22 ENCOUNTER — OFFICE VISIT (OUTPATIENT)
Dept: URGENT CARE | Facility: CLINIC | Age: 83
End: 2018-09-22
Payer: MEDICARE

## 2018-09-22 VITALS
HEIGHT: 65 IN | BODY MASS INDEX: 30.49 KG/M2 | TEMPERATURE: 97 F | WEIGHT: 183 LBS | DIASTOLIC BLOOD PRESSURE: 80 MMHG | HEART RATE: 62 BPM | RESPIRATION RATE: 18 BRPM | OXYGEN SATURATION: 95 % | SYSTOLIC BLOOD PRESSURE: 129 MMHG

## 2018-09-22 DIAGNOSIS — S05.02XA ABRASION OF LEFT CORNEA, INITIAL ENCOUNTER: Primary | ICD-10-CM

## 2018-09-22 DIAGNOSIS — H11.32 SUBCONJUNCTIVAL HEMORRHAGE OF LEFT EYE: ICD-10-CM

## 2018-09-22 PROCEDURE — 1101F PT FALLS ASSESS-DOCD LE1/YR: CPT | Mod: S$GLB,,, | Performed by: PHYSICIAN ASSISTANT

## 2018-09-22 PROCEDURE — 3074F SYST BP LT 130 MM HG: CPT | Mod: S$GLB,,, | Performed by: PHYSICIAN ASSISTANT

## 2018-09-22 PROCEDURE — 3079F DIAST BP 80-89 MM HG: CPT | Mod: S$GLB,,, | Performed by: PHYSICIAN ASSISTANT

## 2018-09-22 PROCEDURE — 99214 OFFICE O/P EST MOD 30 MIN: CPT | Mod: S$GLB,,, | Performed by: PHYSICIAN ASSISTANT

## 2018-09-22 RX ORDER — POLYMYXIN B SULFATE AND TRIMETHOPRIM 1; 10000 MG/ML; [USP'U]/ML
1 SOLUTION OPHTHALMIC EVERY 4 HOURS
Qty: 10 ML | Refills: 0 | Status: SHIPPED | OUTPATIENT
Start: 2018-09-22 | End: 2018-09-29

## 2018-09-22 NOTE — PROGRESS NOTES
"Subjective:       Patient ID: Holli Landrum is a 85 y.o. female.    Vitals:  height is 5' 5" (1.651 m) and weight is 83 kg (183 lb). Her temperature is 97.3 °F (36.3 °C). Her blood pressure is 129/80 and her pulse is 62. Her respiration is 18 and oxygen saturation is 95%.     Chief Complaint: Eye Problem    Patient woke up with left eye irritation. Eye is now red and painful.  Denies floaters or flashes.      Eye Problem    The left eye is affected. This is a new problem. The current episode started today. The problem occurs constantly. The problem has been unchanged. There was no injury mechanism. Associated symptoms include blurred vision, eye redness and a foreign body sensation. Pertinent negatives include no fever, nausea, photophobia or vomiting. She has tried nothing for the symptoms.     Review of Systems   Constitution: Negative for chills and fever.   HENT: Negative for congestion.    Eyes: Positive for blurred vision, pain and redness. Negative for photophobia.   Gastrointestinal: Negative for nausea and vomiting.   Neurological: Negative for headaches.       Objective:      Physical Exam   Constitutional: She is oriented to person, place, and time. She appears well-developed and well-nourished.   HENT:   Head: Normocephalic and atraumatic.   Right Ear: External ear normal.   Left Ear: External ear normal.   Nose: Nose normal.   Mouth/Throat: Oropharynx is clear and moist.   Eyes: EOM and lids are normal. Pupils are equal, round, and reactive to light. Left conjunctiva has a hemorrhage.   Slit lamp exam:       The left eye shows fluorescein uptake.       Neck: Trachea normal, full passive range of motion without pain and phonation normal. Neck supple.   Musculoskeletal: Normal range of motion.   Neurological: She is alert and oriented to person, place, and time.   Skin: Skin is warm, dry and intact.   Psychiatric: She has a normal mood and affect. Her speech is normal and behavior is normal. Judgment and " thought content normal. Cognition and memory are normal.   Nursing note and vitals reviewed.      Assessment:       1. Abrasion of left cornea, initial encounter    2. Subconjunctival hemorrhage of left eye        Plan:         Abrasion of left cornea, initial encounter    Subconjunctival hemorrhage of left eye    Other orders  -     polymyxin B sulf-trimethoprim (POLYTRIM) 10,000 unit- 1 mg/mL Drop; Place 1 drop into the left eye every 4 (four) hours. for 7 days  Dispense: 10 mL; Refill: 0     Patient is on Plavix.  She has been experiencing rhinorrhea and sneezing recently.  Reassured that subconjunctival hemorrhage would eventually shelby.  She did have foreseen uptake on left eye.  No foreign bodies found.  I discussed with the patient the importance of follow up if their symptoms have not resolved in 1-2 week's time. Patient verbalized understanding and will RTC or go to the nearest ER if symptoms persist or worsen.

## 2018-09-23 ENCOUNTER — TELEPHONE (OUTPATIENT)
Dept: URGENT CARE | Facility: CLINIC | Age: 83
End: 2018-09-23

## 2018-09-24 ENCOUNTER — OFFICE VISIT (OUTPATIENT)
Dept: OPHTHALMOLOGY | Facility: CLINIC | Age: 83
End: 2018-09-24
Payer: MEDICARE

## 2018-09-24 ENCOUNTER — DOCUMENTATION ONLY (OUTPATIENT)
Dept: SURGERY | Facility: HOSPITAL | Age: 83
End: 2018-09-24

## 2018-09-24 DIAGNOSIS — H25.13 AGE-RELATED NUCLEAR CATARACT OF BOTH EYES: ICD-10-CM

## 2018-09-24 DIAGNOSIS — H11.32 SUBCONJUNCTIVAL HEMORRHAGE OF LEFT EYE: Primary | ICD-10-CM

## 2018-09-24 DIAGNOSIS — H40.053 OCULAR HYPERTENSION, BILATERAL: ICD-10-CM

## 2018-09-24 PROCEDURE — 99999 PR PBB SHADOW E&M-EST. PATIENT-LVL II: CPT | Mod: PBBFAC,,, | Performed by: OPHTHALMOLOGY

## 2018-09-24 PROCEDURE — 99212 OFFICE O/P EST SF 10 MIN: CPT | Mod: PBBFAC,PO | Performed by: OPHTHALMOLOGY

## 2018-09-24 PROCEDURE — 92012 INTRM OPH EXAM EST PATIENT: CPT | Mod: S$PBB,,, | Performed by: OPHTHALMOLOGY

## 2018-09-24 NOTE — PROGRESS NOTES
HPI     Eye Problem      Additional comments: OS pain went to urgent care Sat they gave polyb   sulfateand trime drops instilling every 4 hrs pt wakes up even overnite//   turned  purpilish red on Sat               Comments     OS pain went to urgent care Sat they gave polyb sulfateand trime drops   instilling every 4 hrs pt wakes up even overnite// turned  purpilish red   on Sat// pt feeling woozy now had a triage out front before I brought her   back//            Last edited by Elif Jones on 9/24/2018  3:31 PM. (History)            Assessment /Plan     For exam results, see Encounter Report.    Subconjunctival hemorrhage of left eye- +Plavix  -no intervention required, long standing eye pain, no angle closure +sinus congestion  -ATs prn    Age-related nuclear cataract of both eyes- borderline  -F/U for next visit will refract and dilate and evaluate cataracts for CE/IOL    Ocular hypertension, bilateral- good IOP  -continue dorazolamide 1 drop BID OS  -continue latanoprost qhs ou

## 2018-09-24 NOTE — SIGNIFICANT EVENT
"Response Team - Patient Flow Sheet     Date: 2018  Time: 1518  Location:Second floor lobby  Name: Holli Landrum  : 6/15/1933  MRN: [id number]  PCP: Robb Knight MD  Allergies:   Review of patient's allergies indicates:   Allergen Reactions    Timoptic [timolol maleate] Shortness Of Breath    Ciprofloxacin Other (See Comments)     Muscle ache     Past Medical History:    Past Medical History:   Diagnosis Date    Arthritis     Atrial fibrillation 2012    Cataract     COPD (chronic obstructive pulmonary disease)     no oxygen    Essential hypertension 2010    GERD (gastroesophageal reflux disease) 2012    Glaucoma     Hypertensive heart disease with heart failure 2013    Paroxysmal ventricular tachycardia     per problem list       Reason for response team call: patient w/ c/o feeling like she's going to pass out.    Injury: NO     Vitals:  Time BP HR Resp SPO2 Glucose   1516 140/98 60 18 95                                        EKG performed: No  EKG Ordered By: N/A  EKG Read By: N/A      Time Notes   1518 Responded to triage and patient has c/o feeling "like she's going to pass out". VSS. Patient AAOX3. Responding appropriately to all questions. Pt's has LT eye filled with blood. Patient has appt today with Dr. Rose.  sent back to determine if patient can be brought sooner for appt.    1521 Notified that room is being opened for patient.    1525 Patient sent back to office to see Dr. Rose.              Response Team Members:   SHOLA Emerson, SHOLA Gusman RN    Provider Response Called: No    Provider Name/Time: N/A    Ambulance Called: No      "

## 2018-10-04 NOTE — H&P (VIEW-ONLY)
Subjective:    Patient ID:  Holli Landrum is a 85 y.o. female who presents for routine follow up appointment for coronary artery disease, hypertension, hypercholesterolemia,  Congestive heart failure, PAF and diastolic dysfunction.       HPI   Ms. Landrum is here for her routine follow-up appointment, she feels OK from cardiac stand point but have numerous vague and nonspecific complaints including issues with recurrent UTIs which is now held after she has undergone urology procedure.  She also had subconjunctival bleed which was painful but the pain is gradually improving.  Her weight has been stable at her home scale she runs somewhere between 189-192 lb.  Is still takes her diuretics as prescribed and has no problems in regards to that.  Only takes sotalol 40 mg once a day, rhythm is sinus today with mild bradycardia.    Current Outpatient Medications   Medication Sig    ALBUTEROL SULFATE (VENTOLIN INHL) Inhale 2 puffs into the lungs 4 (four) times daily as needed.     cholecalciferol, vitamin D3, (VITAMIN D3) 5,000 unit Tab Take 5,000 Units by mouth once daily.    clopidogrel (PLAVIX) 75 mg tablet Take 1 tablet (75 mg total) by mouth once daily.    co-enzyme Q-10 30 mg capsule Take 30 mg by mouth 3 (three) times daily.    CRANBERRY/B.COAGULAN/C/CALCIUM (CRANBERRY-PROBIOTIC ORAL) Take 2 tablets by mouth once daily.    dorzolamide (TRUSOPT) 2 % ophthalmic solution Place 1 drop into the left eye 2 (two) times daily.    fish oil-omega-3 fatty acids 300-1,000 mg capsule Take 2 g by mouth once daily.    fluticasone (FLONASE) 50 mcg/actuation nasal spray 2 sprays (100 mcg total) by Each Nare route daily as needed for Allergies.    fluticasone-vilanterol (BREO ELLIPTA) 100-25 mcg/dose diskus inhaler Inhale 1 puff into the lungs once daily. Controller    furosemide (LASIX) 40 MG tablet Take 1 tablet (40 mg total) by mouth once daily. 1 Tablet Oral Every day    GRAPE SEED EXTRACT ORAL Take 1 tablet by mouth once  "daily.     latanoprost 0.005 % ophthalmic solution Place 1 drop into both eyes every evening.    magnesium oxide (MAG-OX) 400 mg tablet Take 400 mg by mouth once daily.    metOLazone (ZAROXOLYN) 5 MG tablet Take 1 tablet (5 mg total) by mouth every Monday and Friday.    nitroGLYCERIN 0.4 MG/HR TD PT24 (NITRODUR) 0.4 mg/hr PLACE 1 PATCH ONTO THE SKIN ONCE DAILY.    OLIVE LEAF EXTRACT ORAL Take by mouth.    omeprazole (PRILOSEC) 40 MG capsule Take 1 capsule (40 mg total) by mouth once daily.    potassium chloride (KLOR-CON) 10 MEQ TbSR Take 1 tablet (10 mEq total) by mouth once daily.    sotalol (BETAPACE) 80 MG tablet Take 0.5 tablets (40 mg total) by mouth once daily.     No current facility-administered medications for this visit.        Review of Systems   Constitution: Negative for fever, malaise/fatigue, weight gain and weight loss.   HENT: Negative for tinnitus.    Eyes: Negative.    Cardiovascular: Positive for dyspnea on exertion. Negative for chest pain, claudication, cyanosis, irregular heartbeat, leg swelling, near-syncope, orthopnea, palpitations, paroxysmal nocturnal dyspnea and syncope.   Respiratory: Positive for shortness of breath.    Endocrine: Negative.    Skin: Negative.    Musculoskeletal: Negative.    Gastrointestinal: Negative.    Neurological: Negative.  Negative for headaches.   Psychiatric/Behavioral: Negative.         Objective:  Blood pressure 138/80, pulse (!) 57, resp. rate 12, height 5' 4" (1.626 m), weight 88 kg (194 lb).        Physical Exam   Constitutional: She is oriented to person, place, and time. She appears well-nourished. No distress.   HENT:   Head: Normocephalic.   Nose: Nose normal.   Mouth/Throat: Mucous membranes are normal.   Eyes: Pupils are equal, round, and reactive to light.   Neck: Neck supple. No JVD present. Carotid bruit is not present.   Cardiovascular: Normal rate, regular rhythm and normal pulses. Exam reveals no gallop and no friction rub.   Murmur " (2/6 systolic murmur at the right upper sternal bor) heard.  Pulmonary/Chest: Effort normal and breath sounds normal. No respiratory distress. She has no wheezes. She has no rales.   Abdominal: Soft. Bowel sounds are normal. She exhibits no distension and no mass. There is no hepatosplenomegaly. There is no tenderness.   Musculoskeletal: She exhibits edema (Trace to 1+). She exhibits no tenderness.   Neurological: She is alert and oriented to person, place, and time. She has normal strength. She displays no tremor.   Skin: No rash noted. No cyanosis. Nails show no clubbing.   Psychiatric: She has a normal mood and affect. Cognition and memory are normal.   Nursing note and vitals reviewed.    ECG:  Sinus bradycardia with first-degree AV block and occasional PACs  Non-specific lateral ST-T changes  Abnormal ECG      Assessment:       1. Essential hypertension    2. Coronary artery disease, h/o multivessel stents.    3. Chronic diastolic heart failure    4. Hypertensive heart disease with heart failure    5. Paroxysmal atrial fibrillation    6. Hypercholesteremia    7. Obstructive sleep apnea    8. Gastroesophageal reflux disease without esophagitis    9. Hypertrophic obstructive cardiomyopathy         Plan:       1.  Cardiovascular status is stable, mild bradycardia which is asymptomatic and sinus rhythm.  We will continue same medications without any changes.  2.  Labs per primary service.  3.  Follow-up in 3 months, or earlier if necessary.

## 2018-10-16 ENCOUNTER — IMMUNIZATION (OUTPATIENT)
Dept: PHARMACY | Facility: CLINIC | Age: 83
End: 2018-10-16
Payer: MEDICARE

## 2018-10-16 ENCOUNTER — OFFICE VISIT (OUTPATIENT)
Dept: OPHTHALMOLOGY | Facility: CLINIC | Age: 83
End: 2018-10-16
Payer: MEDICARE

## 2018-10-16 DIAGNOSIS — H43.813 POSTERIOR VITREOUS DETACHMENT, BILATERAL: ICD-10-CM

## 2018-10-16 DIAGNOSIS — H25.13 AGE-RELATED NUCLEAR CATARACT OF BOTH EYES: Primary | ICD-10-CM

## 2018-10-16 DIAGNOSIS — H40.053 OCULAR HYPERTENSION, BILATERAL: ICD-10-CM

## 2018-10-16 PROCEDURE — 99213 OFFICE O/P EST LOW 20 MIN: CPT | Mod: PBBFAC,PO | Performed by: OPHTHALMOLOGY

## 2018-10-16 PROCEDURE — 92133 CPTRZD OPH DX IMG PST SGM ON: CPT | Mod: PBBFAC,PO | Performed by: OPHTHALMOLOGY

## 2018-10-16 PROCEDURE — 92015 DETERMINE REFRACTIVE STATE: CPT | Mod: ,,, | Performed by: OPHTHALMOLOGY

## 2018-10-16 PROCEDURE — 99999 PR PBB SHADOW E&M-EST. PATIENT-LVL III: CPT | Mod: PBBFAC,,, | Performed by: OPHTHALMOLOGY

## 2018-10-16 PROCEDURE — 92014 COMPRE OPH EXAM EST PT 1/>: CPT | Mod: S$PBB,,, | Performed by: OPHTHALMOLOGY

## 2018-10-16 NOTE — LETTER
October 16, 2018      Gracy James MD  [address] [place] [address] Dr  [address]  [facility] [address]           [facility]  [address] [facility] [address] [place] [address]  Phone: [phone]  Fax: [phone]          Patient: Holli Landrum   MR Number: [id number]   YOB: 1933   Date of Visit: 10/16/2018       Dear Dr. Gracy James:    Thank you for referring Holli Landrum to me for evaluation. Attached you will find relevant portions of my assessment and plan of care.    If you have questions, please do not hesitate to call me. I look forward to following Holli Landrum along with you.    Sincerely,    Aleksandar Rose Jr., MD    Enclosure  CC:  No Recipients    If you would like to receive this communication electronically, please contact [email or web address] or [phone] to request more information on [employer] Link access.    For providers and/or their staff who would like to refer a patient to [facility], please contact us through our one-stop-shop provider referral line, [facility], at [phone].    If you feel you have received this communication in error or would no longer like to receive these types of communications, please e-mail [email or web address]

## 2018-10-16 NOTE — PROGRESS NOTES
HPI     Glaucoma      Additional comments: 4 month iop ch//  Dorzolamide BID OS// Latanoprost   QHS OU//              Blurred Vision      Additional comments: blurred va OU// MRX and DFE today//               Comments     4 month iop ch//  Dorzolamide BID OS// Latanoprost QHS OU//  blurred va OU// MRX and DFE today//           Last edited by Elif Jones on 10/16/2018  1:37 PM. (History)            Assessment /Plan     For exam results, see Encounter Report.    Age-related nuclear cataract of both eyes  -schedule cataract surgery  Left eye  -R&B benefits discussed with patient  -Risks of worse vision, loss of vision, infection, bleeding, re-surgery,and may need to have surgery done by a different physician was explained to the patient  -patient was also counseled on premium lens options, laser cataract, and astigmatic correction  -patient was also counseled that they may still need glasses after surgery to help improve their vision, especially the need for reading glasses for reading after surgery  -patient understood the risks involved with cataract surgery and wanted to move forward with surgery    Ocular hypertension, bilateral- OCT WNL OU  -     Posterior Segment OCT Optic Nerve- Both eyes  Continue trusopt bid OU and latanoprost qhs ou  Posterior vitreous detachment, bilateral- atrophic hole OS w/ no srf  -Patient was counseled on the following:  IF YOU HAVE:  1. Increase in floaters or flashing lights  2. Worsening vision  3. Appearance of a persistent curtain  4. Shadow anywhere in the field  of vision  Return immediately if these symptoms develop.

## 2018-10-18 ENCOUNTER — PATIENT MESSAGE (OUTPATIENT)
Dept: OPHTHALMOLOGY | Facility: CLINIC | Age: 83
End: 2018-10-18

## 2018-10-18 ENCOUNTER — TELEPHONE (OUTPATIENT)
Dept: OPHTHALMOLOGY | Facility: CLINIC | Age: 83
End: 2018-10-18

## 2018-10-18 NOTE — TELEPHONE ENCOUNTER
Does she need to hold any meds? If the last note will suffice than she is ok to have cataract procedure

## 2018-10-19 ENCOUNTER — OFFICE VISIT (OUTPATIENT)
Dept: OPHTHALMOLOGY | Facility: CLINIC | Age: 83
End: 2018-10-19
Payer: MEDICARE

## 2018-10-19 DIAGNOSIS — H25.12 AGE-RELATED NUCLEAR CATARACT OF LEFT EYE: Primary | ICD-10-CM

## 2018-10-19 PROCEDURE — 99213 OFFICE O/P EST LOW 20 MIN: CPT | Mod: PBBFAC,PO | Performed by: OPHTHALMOLOGY

## 2018-10-19 PROCEDURE — 99499 UNLISTED E&M SERVICE: CPT | Mod: S$PBB,,, | Performed by: OPHTHALMOLOGY

## 2018-10-19 PROCEDURE — 99999 PR PBB SHADOW E&M-EST. PATIENT-LVL III: CPT | Mod: PBBFAC,,, | Performed by: OPHTHALMOLOGY

## 2018-10-19 RX ORDER — PROPARACAINE HYDROCHLORIDE 5 MG/ML
1 SOLUTION/ DROPS OPHTHALMIC
Status: CANCELLED | OUTPATIENT
Start: 2018-11-01 | End: 2018-11-01

## 2018-10-19 RX ORDER — TOBRAMYCIN 3 MG/ML
1 SOLUTION/ DROPS OPHTHALMIC EVERY 6 HOURS
Qty: 5 ML | Refills: 3 | Status: SHIPPED | OUTPATIENT
Start: 2018-11-02 | End: 2019-01-01

## 2018-10-19 RX ORDER — PHENYLEPHRINE HYDROCHLORIDE 25 MG/ML
1 SOLUTION/ DROPS OPHTHALMIC
Status: CANCELLED | OUTPATIENT
Start: 2018-11-01 | End: 2018-11-01

## 2018-10-19 RX ORDER — DICLOFENAC SODIUM 1 MG/ML
1 SOLUTION/ DROPS OPHTHALMIC 4 TIMES DAILY
Qty: 5 ML | Refills: 3 | Status: SHIPPED | OUTPATIENT
Start: 2018-10-29 | End: 2018-11-28

## 2018-10-19 RX ORDER — PREDNISOLONE ACETATE 10 MG/ML
1 SUSPENSION/ DROPS OPHTHALMIC 4 TIMES DAILY
Qty: 5 ML | Refills: 3 | Status: SHIPPED | OUTPATIENT
Start: 2018-11-02 | End: 2018-12-02

## 2018-10-19 RX ORDER — TROPICAMIDE 10 MG/ML
1 SOLUTION/ DROPS OPHTHALMIC
Status: CANCELLED | OUTPATIENT
Start: 2018-11-01 | End: 2018-11-01

## 2018-10-19 RX ORDER — PHENYLEPHRINE HYDROCHLORIDE 100 MG/ML
1 SOLUTION/ DROPS OPHTHALMIC
Status: CANCELLED | OUTPATIENT
Start: 2018-11-01 | End: 2018-11-01

## 2018-10-19 NOTE — PROGRESS NOTES
HPI     Pre-op Exam      Additional comments: phaco iol OS to be d 11/1/2018 in [place]//              Comments     phaco iol OS to be d 11/1/2018 in [facility]//          Last edited by Elif Jones on 10/19/2018  1:20 PM. (History)            Assessment /Plan     For exam results, see Encounter Report.    Age-related nuclear cataract of left eye  -     Case Request Operating Room: PHACOEMULSIFICATION, CATARACT  -     diclofenac (VOLTAREN) 0.1 % ophthalmic solution; Place 1 drop into the left eye 4 (four) times daily. Start drops 3 days before surgery  Dispense: 5 mL; Refill: 3  -     prednisoLONE acetate (PRED FORTE) 1 % DrpS; Place 1 drop into the left eye 4 (four) times daily.  Dispense: 1 Bottle; Refill: 3  -     tobramycin sulfate 0.3% (TOBREX) 0.3 % ophthalmic solution; Place 1 drop into the left eye every 6 (six) hours. for 7 days  Dispense: 1 Bottle; Refill: 3  -     Place in Outpatient; Standing    Other orders  -     phenylephrine HCL 2.5% ophthalmic solution 1 drop  -     phenylephrine HCL 10% ophthalmic solution 1 drop  -     proparacaine 0.5 % ophthalmic solution 1 drop  -     tropicamide 1% ophthalmic solution 1 drop

## 2018-10-27 DIAGNOSIS — K92.1 BLACK STOOL: ICD-10-CM

## 2018-10-29 RX ORDER — OMEPRAZOLE 40 MG/1
40 CAPSULE, DELAYED RELEASE ORAL DAILY
Qty: 90 CAPSULE | Refills: 1 | Status: SHIPPED | OUTPATIENT
Start: 2018-10-29 | End: 2019-01-04

## 2018-10-30 ENCOUNTER — LAB VISIT (OUTPATIENT)
Dept: LAB | Facility: HOSPITAL | Age: 83
End: 2018-10-30
Attending: INTERNAL MEDICINE
Payer: MEDICARE

## 2018-10-30 DIAGNOSIS — R68.89 ABNORMAL ENDOCRINE LABORATORY TEST FINDING: ICD-10-CM

## 2018-10-30 LAB
ALBUMIN SERPL BCP-MCNC: 3.8 G/DL
ALP SERPL-CCNC: 103 U/L
ALT SERPL W/O P-5'-P-CCNC: 20 U/L
ANION GAP SERPL CALC-SCNC: 8 MMOL/L
AST SERPL-CCNC: 21 U/L
BILIRUB SERPL-MCNC: 0.5 MG/DL
BUN SERPL-MCNC: 18 MG/DL
CALCIUM SERPL-MCNC: 9.9 MG/DL
CHLORIDE SERPL-SCNC: 101 MMOL/L
CO2 SERPL-SCNC: 30 MMOL/L
CREAT SERPL-MCNC: 0.9 MG/DL
EST. GFR  (AFRICAN AMERICAN): >60 ML/MIN/1.73 M^2
EST. GFR  (NON AFRICAN AMERICAN): 58.5 ML/MIN/1.73 M^2
GLUCOSE SERPL-MCNC: 80 MG/DL
PHOSPHATE SERPL-MCNC: 3 MG/DL
POTASSIUM SERPL-SCNC: 3.2 MMOL/L
PROT SERPL-MCNC: 7.2 G/DL
PTH-INTACT SERPL-MCNC: 111 PG/ML
SODIUM SERPL-SCNC: 139 MMOL/L

## 2018-10-30 PROCEDURE — 83970 ASSAY OF PARATHORMONE: CPT

## 2018-10-30 PROCEDURE — 80053 COMPREHEN METABOLIC PANEL: CPT

## 2018-10-30 PROCEDURE — 84100 ASSAY OF PHOSPHORUS: CPT

## 2018-10-30 PROCEDURE — 36415 COLL VENOUS BLD VENIPUNCTURE: CPT | Mod: PO

## 2018-10-31 ENCOUNTER — ANESTHESIA EVENT (OUTPATIENT)
Dept: SURGERY | Facility: HOSPITAL | Age: 83
End: 2018-10-31
Payer: MEDICARE

## 2018-11-01 ENCOUNTER — ANESTHESIA (OUTPATIENT)
Dept: SURGERY | Facility: HOSPITAL | Age: 83
End: 2018-11-01
Payer: MEDICARE

## 2018-11-01 ENCOUNTER — HOSPITAL ENCOUNTER (OUTPATIENT)
Facility: HOSPITAL | Age: 83
Discharge: HOME | End: 2018-11-01
Attending: OPHTHALMOLOGY | Admitting: OPHTHALMOLOGY
Payer: MEDICARE

## 2018-11-01 VITALS
SYSTOLIC BLOOD PRESSURE: 142 MMHG | WEIGHT: 190.5 LBS | BODY MASS INDEX: 30.62 KG/M2 | TEMPERATURE: 97 F | HEART RATE: 50 BPM | DIASTOLIC BLOOD PRESSURE: 67 MMHG | RESPIRATION RATE: 16 BRPM | OXYGEN SATURATION: 100 % | HEIGHT: 66 IN

## 2018-11-01 DIAGNOSIS — H25.12 AGE-RELATED NUCLEAR CATARACT OF LEFT EYE: ICD-10-CM

## 2018-11-01 PROCEDURE — 36000707: Mod: PO | Performed by: OPHTHALMOLOGY

## 2018-11-01 PROCEDURE — 25000003 PHARM REV CODE 250: Mod: PO | Performed by: ANESTHESIOLOGY

## 2018-11-01 PROCEDURE — 63600175 PHARM REV CODE 636 W HCPCS: Mod: PO | Performed by: OPHTHALMOLOGY

## 2018-11-01 PROCEDURE — 71000015 HC POSTOP RECOV 1ST HR: Mod: PO | Performed by: OPHTHALMOLOGY

## 2018-11-01 PROCEDURE — D9220A PRA ANESTHESIA: Mod: ANES,,, | Performed by: ANESTHESIOLOGY

## 2018-11-01 PROCEDURE — 63600175 PHARM REV CODE 636 W HCPCS: Mod: PO | Performed by: ANESTHESIOLOGY

## 2018-11-01 PROCEDURE — D9220A PRA ANESTHESIA: Mod: CRNA,,, | Performed by: NURSE ANESTHETIST, CERTIFIED REGISTERED

## 2018-11-01 PROCEDURE — 36000706: Mod: PO | Performed by: OPHTHALMOLOGY

## 2018-11-01 PROCEDURE — V2632 POST CHMBR INTRAOCULAR LENS: HCPCS | Mod: PO | Performed by: OPHTHALMOLOGY

## 2018-11-01 PROCEDURE — 66984 XCAPSL CTRC RMVL W/O ECP: CPT | Mod: LT,,, | Performed by: OPHTHALMOLOGY

## 2018-11-01 PROCEDURE — 25000003 PHARM REV CODE 250: Mod: PO | Performed by: OPHTHALMOLOGY

## 2018-11-01 PROCEDURE — 63600175 PHARM REV CODE 636 W HCPCS: Mod: PO | Performed by: NURSE ANESTHETIST, CERTIFIED REGISTERED

## 2018-11-01 PROCEDURE — 37000008 HC ANESTHESIA 1ST 15 MINUTES: Mod: PO | Performed by: OPHTHALMOLOGY

## 2018-11-01 PROCEDURE — 37000009 HC ANESTHESIA EA ADD 15 MINS: Mod: PO | Performed by: OPHTHALMOLOGY

## 2018-11-01 DEVICE — LENS 21.5 ACRYSOF: Type: IMPLANTABLE DEVICE | Site: LENS | Status: FUNCTIONAL

## 2018-11-01 RX ORDER — LIDOCAINE HYDROCHLORIDE 10 MG/ML
1 INJECTION, SOLUTION EPIDURAL; INFILTRATION; INTRACAUDAL; PERINEURAL ONCE
Status: DISCONTINUED | OUTPATIENT
Start: 2018-11-01 | End: 2018-11-01 | Stop reason: HOSPADM

## 2018-11-01 RX ORDER — SODIUM CHLORIDE, SODIUM LACTATE, POTASSIUM CHLORIDE, CALCIUM CHLORIDE 600; 310; 30; 20 MG/100ML; MG/100ML; MG/100ML; MG/100ML
INJECTION, SOLUTION INTRAVENOUS CONTINUOUS
Status: DISCONTINUED | OUTPATIENT
Start: 2018-11-01 | End: 2018-11-01 | Stop reason: HOSPADM

## 2018-11-01 RX ORDER — TROPICAMIDE 10 MG/ML
1 SOLUTION/ DROPS OPHTHALMIC
Status: COMPLETED | OUTPATIENT
Start: 2018-11-01 | End: 2018-11-01

## 2018-11-01 RX ORDER — PHENYLEPHRINE HYDROCHLORIDE 25 MG/ML
1 SOLUTION/ DROPS OPHTHALMIC
Status: COMPLETED | OUTPATIENT
Start: 2018-11-01 | End: 2018-11-01

## 2018-11-01 RX ORDER — EPINEPHRINE 1 MG/ML
INJECTION INTRAMUSCULAR; INTRAVENOUS; SUBCUTANEOUS
Status: DISCONTINUED | OUTPATIENT
Start: 2018-11-01 | End: 2018-11-01 | Stop reason: HOSPADM

## 2018-11-01 RX ORDER — SODIUM CHLORIDE 0.9 % (FLUSH) 0.9 %
3 SYRINGE (ML) INJECTION
Status: DISCONTINUED | OUTPATIENT
Start: 2018-11-01 | End: 2018-11-01 | Stop reason: HOSPADM

## 2018-11-01 RX ORDER — LIDOCAINE HYDROCHLORIDE 10 MG/ML
INJECTION INFILTRATION; PERINEURAL
Status: DISCONTINUED | OUTPATIENT
Start: 2018-11-01 | End: 2018-11-01 | Stop reason: HOSPADM

## 2018-11-01 RX ORDER — PROPARACAINE HYDROCHLORIDE 5 MG/ML
1 SOLUTION/ DROPS OPHTHALMIC
Status: COMPLETED | OUTPATIENT
Start: 2018-11-01 | End: 2018-11-01

## 2018-11-01 RX ORDER — MOXIFLOXACIN 5 MG/ML
SOLUTION/ DROPS OPHTHALMIC
Status: DISCONTINUED | OUTPATIENT
Start: 2018-11-01 | End: 2018-11-01 | Stop reason: HOSPADM

## 2018-11-01 RX ORDER — ONDANSETRON 2 MG/ML
4 INJECTION INTRAMUSCULAR; INTRAVENOUS ONCE
Status: COMPLETED | OUTPATIENT
Start: 2018-11-01 | End: 2018-11-01

## 2018-11-01 RX ORDER — MIDAZOLAM HYDROCHLORIDE 1 MG/ML
INJECTION, SOLUTION INTRAMUSCULAR; INTRAVENOUS
Status: DISCONTINUED | OUTPATIENT
Start: 2018-11-01 | End: 2018-11-01

## 2018-11-01 RX ORDER — PHENYLEPHRINE HYDROCHLORIDE 100 MG/ML
1 SOLUTION/ DROPS OPHTHALMIC
Status: COMPLETED | OUTPATIENT
Start: 2018-11-01 | End: 2018-11-01

## 2018-11-01 RX ORDER — ACETAMINOPHEN 325 MG/1
650 TABLET ORAL EVERY 4 HOURS PRN
Status: CANCELLED | OUTPATIENT
Start: 2018-11-01

## 2018-11-01 RX ADMIN — TROPICAMIDE 1 DROP: 10 SOLUTION/ DROPS OPHTHALMIC at 06:11

## 2018-11-01 RX ADMIN — PROPARACAINE HYDROCHLORIDE 1 DROP: 5 SOLUTION/ DROPS OPHTHALMIC at 06:11

## 2018-11-01 RX ADMIN — PHENYLEPHRINE HYDROCHLORIDE 1 DROP: 25 SOLUTION/ DROPS OPHTHALMIC at 06:11

## 2018-11-01 RX ADMIN — PHENYLEPHRINE HYDROCHLORIDE 1 DROP: 100 SOLUTION/ DROPS OPHTHALMIC at 06:11

## 2018-11-01 RX ADMIN — ONDANSETRON 4 MG: 2 INJECTION INTRAMUSCULAR; INTRAVENOUS at 06:11

## 2018-11-01 RX ADMIN — SODIUM CHLORIDE, SODIUM LACTATE, POTASSIUM CHLORIDE, AND CALCIUM CHLORIDE: .6; .31; .03; .02 INJECTION, SOLUTION INTRAVENOUS at 06:11

## 2018-11-01 RX ADMIN — MIDAZOLAM HYDROCHLORIDE 0.5 MG: 1 INJECTION, SOLUTION INTRAMUSCULAR; INTRAVENOUS at 07:11

## 2018-11-01 NOTE — DISCHARGE INSTRUCTIONS
Recovery After Procedural Sedation (Adult)  You have been given medicine by vein to make you sleep during your surgery. This may have included both a pain medicine and sleeping medicine. Most of the effects have worn off. But you may still have some drowsiness for the next 6 to 8 hours.  Home care  Follow these guidelines when you get home:  · For the next 8 hours, you should be watched by a responsible adult. This person should make sure your condition is not getting worse.  · Don't drink any alcohol for the next 24 hours.  · Don't drive, operate dangerous machinery, or make important business or personal decisions during the next 24 hours.  Note: Your healthcare provider may tell you not to take any medicine by mouth for pain or sleep in the next 4 hours. These medicines may react with the medicines you were given in the hospital. This could cause a much stronger response than usual.  Follow-up care  Follow up with your healthcare provider if you are not alert and back to your usual level of activity within 12 hours.  When to seek medical advice  Call your healthcare provider right away if any of these occur:  · Drowsiness gets worse  · Weakness or dizziness gets worse  · Repeated vomiting  · You can't be awakened   Date Last Reviewed: 10/18/2016  © 0121-3689 [employer]. [address] [place] [address], [place] [address]. All rights reserved. This information is not intended as a substitute for professional medical care. Always follow your healthcare professional's instructions.    No Bending or lifting.   Do not remove patch.   Do not use drops.  Follow up tomorrow in clinic.

## 2018-11-01 NOTE — ANESTHESIA POSTPROCEDURE EVALUATION
"Anesthesia Post Evaluation    Patient: Holli Landrum    Procedure(s) Performed: Procedure(s) (LRB):  PHACOEMULSIFICATION, CATARACT (Left)    Final Anesthesia Type: MAC  Patient location during evaluation: PACU  Patient participation: Yes- Able to Participate  Level of consciousness: awake and alert  Post-procedure vital signs: reviewed and stable  Pain management: adequate  Airway patency: patent  PONV status at discharge: No PONV  Anesthetic complications: no      Cardiovascular status: blood pressure returned to baseline  Respiratory status: unassisted  Hydration status: euvolemic  Follow-up not needed.        Visit Vitals  BP (!) 142/67   Pulse (!) 50   Temp 36.3 °C (97.3 °F) (Skin)   Resp 16   Ht 5' 5.5" (1.664 m)   Wt 86.4 kg (190 lb 8 oz)   SpO2 100%   Breastfeeding? No   BMI 31.22 kg/m²       Pain/Anthony Score: Pain Assessment Performed: Yes (11/1/2018  9:05 AM)  Presence of Pain: denies (11/1/2018  8:39 AM)  Pain Rating Prior to Med Admin: 0 (11/1/2018  8:07 AM)  Pain Rating Post Med Admin: 0 (11/1/2018  8:07 AM)        "

## 2018-11-01 NOTE — BRIEF OP NOTE
Operative Note     SUMMARY     Surgery Date: 11/1/2018     Surgeon(s) and Role:     * Aleksandar Rose Jr., MD - Primary    Pre-op Diagnosis:  Age-related nuclear cataract of left eye [H25.12]    Post-op Diagnosis:  Age-related nuclear cataract of left eye [H25.12]    Procedure(s) (LRB):  PHACOEMULSIFICATION, CATARACT (Left)    Anesthesia: Monitor Anesthesia Care    Findings/Key Components:  Same as post op diagnosis    Estimated Blood Loss: * No values recorded between 11/1/2018  7:30 AM and 11/1/2018  8:05 AM *         Specimens (From admission, onward)    None            Discharge Note      SUMMARY     Admit Date: 11/1/2018    Attending Physician: Aleksandar Rose Jr., MD     Discharge Physician: Aleksandar Rose Jr., MD    Discharge Date: 11/1/2018     Final Diagnosis: Age-related nuclear cataract of left eye [H25.12]    Hospital Course: The patient was admitted for outpatient surgery and tolerated the procedure well. The patient was discharged home in stable condition the same day.    Diet: Advance as tolerated    Follow-Up: Follow up with Dr. James, next day, as previously scheduled  Activity as tolerated.      Activity: Per Handout Instructions    Disposition: Home or self care    Patient Instructions:   Current Discharge Medication List      CONTINUE these medications which have NOT CHANGED    Details   ALBUTEROL SULFATE (VENTOLIN INHL) Inhale 2 puffs into the lungs 4 (four) times daily as needed.       cholecalciferol, vitamin D3, (VITAMIN D3) 5,000 unit Tab Take 5,000 Units by mouth once daily.      clopidogrel (PLAVIX) 75 mg tablet Take 1 tablet (75 mg total) by mouth once daily.  Qty: 90 tablet, Refills: 3      co-enzyme Q-10 30 mg capsule Take 30 mg by mouth 3 (three) times daily.      CRANBERRY/B.COAGULAN/C/CALCIUM (CRANBERRY-PROBIOTIC ORAL) Take 2 tablets by mouth once daily.      diclofenac (VOLTAREN) 0.1 % ophthalmic solution Place 1 drop into the left eye 4 (four) times daily. Start drops 3 days  before surgery  Qty: 5 mL, Refills: 3    Associated Diagnoses: Age-related nuclear cataract of left eye      dorzolamide (TRUSOPT) 2 % ophthalmic solution Place 1 drop into the left eye 2 (two) times daily.  Qty: 30 mL, Refills: 3    Comments: Ok to dispense 90 day supply.      fish oil-omega-3 fatty acids 300-1,000 mg capsule Take 2 g by mouth once daily.      fluticasone-vilanterol (BREO ELLIPTA) 100-25 mcg/dose diskus inhaler Inhale 1 puff into the lungs once daily. Controller  Qty: 60 each, Refills: 11    Associated Diagnoses: Chronic obstructive pulmonary disease, unspecified COPD type      furosemide (LASIX) 40 MG tablet Take 1 tablet (40 mg total) by mouth once daily. 1 Tablet Oral Every day  Qty: 90 tablet, Refills: 3      GRAPE SEED EXTRACT ORAL Take 1 tablet by mouth once daily.       latanoprost 0.005 % ophthalmic solution Place 1 drop into both eyes every evening.  Qty: 3 Bottle, Refills: 3    Comments: Ok to dispense 90 day supply.  Associated Diagnoses: Ocular hypertension, bilateral      magnesium oxide (MAG-OX) 400 mg tablet Take 400 mg by mouth once daily.      metOLazone (ZAROXOLYN) 5 MG tablet Take 1 tablet (5 mg total) by mouth every Monday and Friday.  Qty: 24 tablet, Refills: 3      nitroGLYCERIN 0.4 MG/HR TD PT24 (NITRODUR) 0.4 mg/hr PLACE 1 PATCH ONTO THE SKIN ONCE DAILY.  Qty: 90 patch, Refills: 3      OLIVE LEAF EXTRACT ORAL Take by mouth.      omeprazole (PRILOSEC) 40 MG capsule Take 1 capsule (40 mg total) by mouth once daily.  Qty: 90 capsule, Refills: 1    Comments: This prescription was filled on 10/27/2018. Any refills authorized will be placed on file.  Associated Diagnoses: Black stool      potassium chloride (KLOR-CON) 10 MEQ TbSR Take 1 tablet (10 mEq total) by mouth once daily.  Qty: 90 tablet, Refills: 3      prednisoLONE acetate (PRED FORTE) 1 % DrpS Place 1 drop into the left eye 4 (four) times daily.  Qty: 5 mL, Refills: 3    Associated Diagnoses: Age-related nuclear cataract  of left eye      sotalol (BETAPACE) 80 MG tablet Take 0.5 tablets (40 mg total) by mouth once daily.  Qty: 45 tablet, Refills: 3      fluticasone (FLONASE) 50 mcg/actuation nasal spray 2 sprays (100 mcg total) by Each Nare route daily as needed for Allergies.  Qty: 16 g, Refills: 11      tobramycin sulfate 0.3% (TOBREX) 0.3 % ophthalmic solution Place 1 drop into the left eye every 6 (six) hours. for 7 days  Qty: 5 mL, Refills: 3    Associated Diagnoses: Age-related nuclear cataract of left eye             Discharge Procedure Orders (must include Diet, Follow-up, Activity)  Diet: Regular  Follow-up: Tomorrow in the eye clinic with Dr. Rose  Activity: no bending, no lifting, no strenuous activity, make sure to keep head elevated

## 2018-11-01 NOTE — OR NURSING
"I observed the scrub tech tighten the cannula on the syringe a perform the initial "squirt" to assure the cannula is safely on the syringe.   "

## 2018-11-01 NOTE — INTERVAL H&P NOTE
"See PCP H&P. No changes noted. Heart and lungs per anesthesia. "This patient has been cleared for surgery in an ambulatory surgery center/facility."  "

## 2018-11-01 NOTE — OR NURSING
Vss, nathaniel po fluids, denies pain, ambulates easily. IV removed, catheter intact. Discharge instructions provided and states understanding. States ready to go home.  Discharged from facility with family.

## 2018-11-01 NOTE — OP NOTE
Date of surgery: 11/1/2018    Operative Report    Preoperative Diagnosis: Nuclear sclerosis, left eye    Postoperative Diagnosis: Nuclear sclerosis, left eye    Procedure: Phacoemulsification with posterior chamber intraocular lens, left eye    Surgeon: Aleksandar Rose Jr. M.D.    Anesthesia: MAC with topical     Brief Preoperative Note:  The patient was seen in the office with cataract of the left eye.  Because of the impairment of the patient's reading, driving, ambulation, and other activities of daily living needing a good quality of vision, the patient elected to remove the cataract and place a acrylic lens implant, if possible    Procedure in detail:    Informed consent was obtained. Indications, risks and alternatives to the procedure were explained to the patient. The patient was given the opportunity to ask questions and consented to the procedure in writing. In the preoperative holding area, the patients identity and operative site were confirmed.  Topical anesthetic was administered, consisting of alternating 0.5% Proparacaine and 4% preservative-free Lidocaine Q 5 minutes times 3.  The patient was taken to the operative suite.  A time-out was performed.  The left eye was prepped with 5% Betadine and draped in sterile fashion.  A lid speculum was placed in the left eye.  The temporal clear corneal incision was developed using a LRI eric knife and crescent blade for a 3 plane incision.  A paracentesis was done with a 1mm eric blade.  Before instillation of the Viscoat, approximately 0.1 to 0.3cc of diluted preservative free intracameral lidocaine was instilled.  Viscoat was injected into the anterior chamber with a 27-gauge needle.  A 2.4mm eric blade was used to make a temporal clear corneal incision.  Afterwards, a cystotome was used to perform a continuous curvilinear capsulorrhexis with the assistance of utrata forceps.  Hydrodissection was performed using balanced salt solution,injected  under the anterior capsule using a joe cannula and hydrodelineation was performed using a blunt-tipped cannula.  Next, phacoemulsification performed in a divide and conquer fashion with the use of a nucleus rotator to turn the lens and protect the posterior capsule.  Cortical material was then removed using irrigation and aspiration.  Healon was then injected into the anterior chamber and into capsular bag to inflate the bag for the placement of the lens implant and then an Xiang SN60WF 21.0 D lens was injected into the capsular bag and rotated in position with the assistance of a Sinsky hook.  Irrigation and aspiration were used to remove the remaining viscoelastic. Next, Miochol was injected into the anterior chamber and the pupil was allowed to constrict in a symmetrical fashion.  A collagen shield was placed into the eye and the eye was covered with a Thompson shield.  The patient tolerated the procedure well and was transferred to the recovery area in good condition.  Estimated blood loss:  none  Specimens:   none  Complications:  none  Disposition:   stable to PACU

## 2018-11-01 NOTE — TRANSFER OF CARE
"Anesthesia Transfer of Care Note    Patient: Holli Landrum    Procedure(s) Performed: Procedure(s) (LRB):  PHACOEMULSIFICATION, CATARACT (Left)    Patient location: PACU    Anesthesia Type: MAC    Transport from OR: Transported from OR on room air with adequate spontaneous ventilation    Post pain: adequate analgesia    Post assessment: no apparent anesthetic complications    Post vital signs: stable    Level of consciousness: awake and sedated    Nausea/Vomiting: no nausea/vomiting    Complications: none    Transfer of care protocol was followed      Last vitals:   Visit Vitals  /63 (BP Location: Right arm, Patient Position: Lying)   Pulse (!) 50   Temp 36.3 °C (97.3 °F) (Skin)   Resp 18   Ht 5' 5.5" (1.664 m)   Wt 86.4 kg (190 lb 8 oz)   SpO2 (!) 94%   Breastfeeding? No   BMI 31.22 kg/m²     "

## 2018-11-01 NOTE — ANESTHESIA PREPROCEDURE EVALUATION
11/01/2018  Holli Landrum is a 85 y.o., female.    Anesthesia Evaluation    I have reviewed the Patient Summary Reports.    I have reviewed the Nursing Notes.   I have reviewed the Medications.     Review of Systems  Anesthesia Hx:  Denies Family Hx of Anesthesia complications.   Denies Personal Hx of Anesthesia complications.   Cardiovascular:   Hypertension CAD asymptomatic CABG/stent   Denies Angina. CHF ECG has been reviewed. Stent x 6, preserved LVFx, Moderate AS   Pulmonary:   COPD Sleep Apnea, CPAP    Hepatic/GI:   GERD Current nausea       Physical Exam  General:  Obesity    Airway/Jaw/Neck:  Airway Findings: Mouth Opening: Normal Tongue: Normal  General Airway Assessment: Adult  Mallampati: II  TM Distance: Normal, at least 6 cm         Dental:  Dental Findings: upper front caps, molar caps   Chest/Lungs:  Chest/Lungs Clear    Heart/Vascular:  Heart Findings: Rate: Bradycardia  Rhythm: Regular Rhythm             Anesthesia Plan  Type of Anesthesia, risks & benefits discussed:  Anesthesia Type:  MAC  Patient's Preference:   Intra-op Monitoring Plan: standard ASA monitors  Intra-op Monitoring Plan Comments:   Post Op Pain Control Plan:   Post Op Pain Control Plan Comments:   Induction:   IV  Beta Blocker:  Patient is on a Beta-Blocker and has received one dose within the past 24 hours (No further documentation required).       Informed Consent: Patient understands risks and agrees with Anesthesia plan.  Questions answered. Anesthesia consent signed with patient.  ASA Score: 3     Day of Surgery Review of History & Physical:    H&P update referred to the surgeon.         Ready For Surgery From Anesthesia Perspective.

## 2018-11-02 ENCOUNTER — OFFICE VISIT (OUTPATIENT)
Dept: OPHTHALMOLOGY | Facility: CLINIC | Age: 83
End: 2018-11-02
Payer: MEDICARE

## 2018-11-02 DIAGNOSIS — H40.053 OCULAR HYPERTENSION, BILATERAL: ICD-10-CM

## 2018-11-02 DIAGNOSIS — Z98.42 STATUS POST CATARACT EXTRACTION AND INSERTION OF INTRAOCULAR LENS OF LEFT EYE: Primary | ICD-10-CM

## 2018-11-02 DIAGNOSIS — Z96.1 STATUS POST CATARACT EXTRACTION AND INSERTION OF INTRAOCULAR LENS OF LEFT EYE: Primary | ICD-10-CM

## 2018-11-02 PROCEDURE — 99999 PR PBB SHADOW E&M-EST. PATIENT-LVL II: CPT | Mod: PBBFAC,,, | Performed by: OPHTHALMOLOGY

## 2018-11-02 PROCEDURE — 99024 POSTOP FOLLOW-UP VISIT: CPT | Mod: S$GLB,,, | Performed by: OPHTHALMOLOGY

## 2018-11-02 NOTE — PROGRESS NOTES
HPI     Post-op Evaluation      Additional comments: 1 d po phaco iol OS d 11/1/2018//                Comments     1 d po phaco iol OS d 11/1/2018//    Tech instilled Pred, Diclo, and   tobramycin while pt here//  Had a flat black dot sat there for about 10 minutes and went away pt   states it wasn't a floater// no flashes//          Last edited by Elif Jones on 11/2/2018 10:36 AM. (History)        ROS     Negative for: Constitutional, Gastrointestinal, Neurological, Skin,   Genitourinary, Musculoskeletal, HENT, Endocrine, Cardiovascular, Eyes,   Respiratory, Psychiatric, Allergic/Imm, Heme/Lymph    Last edited by Aleksandar Rose Jr., MD on 11/2/2018  1:26 PM. (History)        Assessment /Plan     For exam results, see Encounter Report.    Status post cataract extraction and insertion of intraocular lens of left eye  Prednisolone acetate (pink or white top drop) 1 drop 4x daily left eye; shake well before using drop  Vigamox 1 drop 4x daily left eye (tan top)  Diclofenac 1 drop 4x daily left eye (gray top)  No bending no lifting  Keep head elevated when laying down  Keep dry   F/u 1 week  Ocular hypertension, bilateral  Continue trusopt bid  OU

## 2018-11-07 ENCOUNTER — OFFICE VISIT (OUTPATIENT)
Dept: OPHTHALMOLOGY | Facility: CLINIC | Age: 83
End: 2018-11-07
Payer: MEDICARE

## 2018-11-07 DIAGNOSIS — Z98.42 STATUS POST CATARACT EXTRACTION AND INSERTION OF INTRAOCULAR LENS OF LEFT EYE: Primary | ICD-10-CM

## 2018-11-07 DIAGNOSIS — Z96.1 STATUS POST CATARACT EXTRACTION AND INSERTION OF INTRAOCULAR LENS OF LEFT EYE: Primary | ICD-10-CM

## 2018-11-07 PROCEDURE — 99024 POSTOP FOLLOW-UP VISIT: CPT | Mod: S$GLB,,, | Performed by: OPHTHALMOLOGY

## 2018-11-07 PROCEDURE — 99999 PR PBB SHADOW E&M-EST. PATIENT-LVL IV: CPT | Mod: PBBFAC,,, | Performed by: OPHTHALMOLOGY

## 2018-11-07 NOTE — PROGRESS NOTES
HPI     Post-op Evaluation      Additional comments: 1 wk sp phaco iol OS d 11/1/2018// instilled drops   as directed//              Comments     1 wk sp phaco iol OS d 11/1/2018// instilled drops as directed//  Pt   states that last night and this AM the drop feel like acid when she puts   them in.  They made her cry. Pt feels like a hair in her upper right   corner of right eye//          Last edited by Elif Jones on 11/7/2018  2:26 PM. (History)        ROS     Negative for: Constitutional, Gastrointestinal, Neurological, Skin,   Genitourinary, Musculoskeletal, HENT, Endocrine, Cardiovascular, Eyes,   Respiratory, Psychiatric, Allergic/Imm, Heme/Lymph    Last edited by Aleksandar Rose Jr., MD on 11/7/2018  3:11 PM. (History)        Assessment /Plan     For exam results, see Encounter Report.    Status post cataract extraction and insertion of intraocular lens of left eye    Prednisolone acetate (pink or white top drop) 1 drop 4x daily left eye; shake well before using drop  Vigamox 1 drop 4x daily left eye (tan top)  Diclofenac 1 drop 4x daily left eye (gray top)  No bending no lifting  Keep head elevated when laying down  Keep dry   F/u 1 week  Follow-up in about 3 weeks (around 11/28/2018) for POM #1 Cataract surgery left eye.

## 2018-12-05 ENCOUNTER — OFFICE VISIT (OUTPATIENT)
Dept: OPHTHALMOLOGY | Facility: CLINIC | Age: 83
End: 2018-12-05
Payer: MEDICARE

## 2018-12-05 DIAGNOSIS — H25.11 AGE-RELATED NUCLEAR CATARACT OF RIGHT EYE: ICD-10-CM

## 2018-12-05 DIAGNOSIS — Z96.1 STATUS POST CATARACT EXTRACTION AND INSERTION OF INTRAOCULAR LENS OF LEFT EYE: Primary | ICD-10-CM

## 2018-12-05 DIAGNOSIS — H40.053 OCULAR HYPERTENSION, BILATERAL: ICD-10-CM

## 2018-12-05 DIAGNOSIS — Z98.42 STATUS POST CATARACT EXTRACTION AND INSERTION OF INTRAOCULAR LENS OF LEFT EYE: Primary | ICD-10-CM

## 2018-12-05 PROCEDURE — 99999 PR PBB SHADOW E&M-EST. PATIENT-LVL III: CPT | Mod: PBBFAC,,, | Performed by: OPHTHALMOLOGY

## 2018-12-05 PROCEDURE — 99024 POSTOP FOLLOW-UP VISIT: CPT | Mod: S$GLB,,, | Performed by: OPHTHALMOLOGY

## 2018-12-05 RX ORDER — PHENYLEPHRINE HYDROCHLORIDE 25 MG/ML
1 SOLUTION/ DROPS OPHTHALMIC
Status: CANCELLED | OUTPATIENT
Start: 2018-12-13 | End: 2018-12-13

## 2018-12-05 RX ORDER — PHENYLEPHRINE HYDROCHLORIDE 100 MG/ML
1 SOLUTION/ DROPS OPHTHALMIC
Status: CANCELLED | OUTPATIENT
Start: 2018-12-13 | End: 2018-12-13

## 2018-12-05 RX ORDER — PROPARACAINE HYDROCHLORIDE 5 MG/ML
1 SOLUTION/ DROPS OPHTHALMIC
Status: CANCELLED | OUTPATIENT
Start: 2018-12-13 | End: 2018-12-13

## 2018-12-05 RX ORDER — TROPICAMIDE 10 MG/ML
1 SOLUTION/ DROPS OPHTHALMIC
Status: CANCELLED | OUTPATIENT
Start: 2018-12-13 | End: 2018-12-13

## 2018-12-05 NOTE — H&P
CC: H&P for cataract surgery  HPI: Patient has been diagnosed with cataracts, which are interfering with daily activities due to blurred vision and glare which cannot adequately be corrected with glasses.   PMH:  Past Medical History:   Diagnosis Date    Arthritis     Atrial fibrillation 2012    Cancer     skin cancer to face    Cataract     OD    CHF (congestive heart failure)     COPD (chronic obstructive pulmonary disease)     no oxygen    Coronary artery disease     6 stents    Essential hypertension 2010    GERD (gastroesophageal reflux disease) 2012    Glaucoma     Hypertensive heart disease with heart failure 2013    JA on CPAP     Paroxysmal ventricular tachycardia     per problem list       FH:  Family History   Problem Relation Age of Onset    Diabetes Brother     Heart disease Brother     Diabetes Mother     Cancer Maternal Grandfather     Clotting disorder Son         bleeding after tonsillectomy only    Amblyopia Neg Hx     Blindness Neg Hx     Cataracts Neg Hx     Glaucoma Neg Hx     Hypertension Neg Hx     Macular degeneration Neg Hx     Retinal detachment Neg Hx     Strabismus Neg Hx     Stroke Neg Hx     Thyroid disease Neg Hx        SH:  Social History     Socioeconomic History    Marital status:      Spouse name: Not on file    Number of children: Not on file    Years of education: Not on file    Highest education level: Not on file   Social Needs    Financial resource strain: Not on file    Food insecurity - worry: Not on file    Food insecurity - inability: Not on file    Transportation needs - medical: Not on file    Transportation needs - non-medical: Not on file   Occupational History    Not on file   Tobacco Use    Smoking status: Former Smoker     Types: Cigarettes     Last attempt to quit:      Years since quittin.9    Smokeless tobacco: Never Used    Tobacco comment: quit smoking  //had smoked for 1 yr//    Substance and Sexual Activity    Alcohol use: No     Alcohol/week: 0.0 oz    Drug use: No    Sexual activity: No     Partners: Male     Birth control/protection: Abstinence   Other Topics Concern    Are you pregnant or think you may be? Not Asked    Breast-feeding Not Asked   Social History Narrative    Not on file       ROS:  HEENT: Blurred vision  CV: no chest pain  Pulm:  No SOB  Please see my last exam note for additional ROS details    PE:  BP:120/79  Pulse:67  HEENT: Cataract  CV: N S1 S2 +murmur  Pulm:CTAB no wheezes, rales, or ronchi  Abd: soft, nabs, NTND, no masses  Extremeties:no edema    A/P  1. Cataract - Indications, risks and alternatives to the procedure were explained to the patient. The patient was given the opportunity to ask questions and consented to the procedure in writing.  Proceed with cataract surgery as scheduled.  2. Other health issues - patient has been cleared by their PCP. See last note for details.

## 2018-12-05 NOTE — PROGRESS NOTES
HPI     Post-op Evaluation      Additional comments: 1 month s/p phaco iol OS 11/1/18              Comments     Pt states vision doing well OS. No pain or irritation. Needs refill of   latanoprost sent to [facility].           Last edited by Cheryle Quintana on 12/5/2018  2:01 PM. (History)        ROS     Negative for: Constitutional, Gastrointestinal, Neurological, Skin,   Genitourinary, Musculoskeletal, HENT, Endocrine, Cardiovascular, Eyes,   Respiratory, Psychiatric, Allergic/Imm, Heme/Lymph    Last edited by Aleksandar Rose Jr., MD on 12/5/2018  2:27 PM. (History)        Assessment /Plan     For exam results, see Encounter Report.    Status post cataract extraction and insertion of intraocular lens of left eye  Doing well, satisfactory course, d/c drops  Ocular hypertension, bilateral  Continue dorzolamide and latanoprost OU  Age-related nuclear cataract of right eye  -     Case Request Operating Room: PHACOEMULSIFICATION, CATARACT

## 2018-12-11 DIAGNOSIS — H40.053 OCULAR HYPERTENSION, BILATERAL: ICD-10-CM

## 2018-12-12 ENCOUNTER — ANESTHESIA EVENT (OUTPATIENT)
Dept: SURGERY | Facility: HOSPITAL | Age: 83
End: 2018-12-12
Payer: MEDICARE

## 2018-12-12 ENCOUNTER — TELEPHONE (OUTPATIENT)
Dept: FAMILY MEDICINE | Facility: CLINIC | Age: 83
End: 2018-12-12

## 2018-12-12 RX ORDER — LATANOPROST 50 UG/ML
1 SOLUTION/ DROPS OPHTHALMIC NIGHTLY
Qty: 3 BOTTLE | Refills: 3 | Status: SHIPPED | OUTPATIENT
Start: 2018-12-12 | End: 2019-03-29 | Stop reason: SDUPTHER

## 2018-12-12 NOTE — TELEPHONE ENCOUNTER
----- Message from Meche Alexandre sent at 12/12/2018  2:37 PM CST -----  Type: Needs Medical Advice    Who Called:  Patient  Best Call Back Number: [phone]  Additional Information: Needs to reschedule her Wellness Visit on 12/14/18. Please call with availability.

## 2018-12-13 ENCOUNTER — HOSPITAL ENCOUNTER (OUTPATIENT)
Facility: HOSPITAL | Age: 83
Discharge: HOME | End: 2018-12-13
Attending: OPHTHALMOLOGY | Admitting: OPHTHALMOLOGY
Payer: MEDICARE

## 2018-12-13 ENCOUNTER — ANESTHESIA (OUTPATIENT)
Dept: SURGERY | Facility: HOSPITAL | Age: 83
End: 2018-12-13
Payer: MEDICARE

## 2018-12-13 VITALS
TEMPERATURE: 98 F | WEIGHT: 190 LBS | RESPIRATION RATE: 20 BRPM | DIASTOLIC BLOOD PRESSURE: 77 MMHG | BODY MASS INDEX: 30.53 KG/M2 | SYSTOLIC BLOOD PRESSURE: 157 MMHG | OXYGEN SATURATION: 98 % | HEIGHT: 66 IN | HEART RATE: 58 BPM

## 2018-12-13 DIAGNOSIS — H25.9: ICD-10-CM

## 2018-12-13 DIAGNOSIS — H25.12 AGE-RELATED NUCLEAR CATARACT OF LEFT EYE: ICD-10-CM

## 2018-12-13 DIAGNOSIS — H25.11 AGE-RELATED NUCLEAR CATARACT OF RIGHT EYE: ICD-10-CM

## 2018-12-13 PROCEDURE — 71000033 HC RECOVERY, INTIAL HOUR: Mod: PO | Performed by: OPHTHALMOLOGY

## 2018-12-13 PROCEDURE — D9220A PRA ANESTHESIA: Mod: CRNA,,, | Performed by: NURSE ANESTHETIST, CERTIFIED REGISTERED

## 2018-12-13 PROCEDURE — D9220A PRA ANESTHESIA: Mod: ANES,,, | Performed by: ANESTHESIOLOGY

## 2018-12-13 PROCEDURE — 25000003 PHARM REV CODE 250: Mod: PO | Performed by: ANESTHESIOLOGY

## 2018-12-13 PROCEDURE — 71000015 HC POSTOP RECOV 1ST HR: Mod: PO | Performed by: OPHTHALMOLOGY

## 2018-12-13 PROCEDURE — 37000008 HC ANESTHESIA 1ST 15 MINUTES: Mod: PO | Performed by: OPHTHALMOLOGY

## 2018-12-13 PROCEDURE — V2632 POST CHMBR INTRAOCULAR LENS: HCPCS | Mod: PO | Performed by: OPHTHALMOLOGY

## 2018-12-13 PROCEDURE — 66984 XCAPSL CTRC RMVL W/O ECP: CPT | Mod: 79,RT,, | Performed by: OPHTHALMOLOGY

## 2018-12-13 PROCEDURE — 63600175 PHARM REV CODE 636 W HCPCS: Mod: PO | Performed by: NURSE ANESTHETIST, CERTIFIED REGISTERED

## 2018-12-13 PROCEDURE — 37000009 HC ANESTHESIA EA ADD 15 MINS: Mod: PO | Performed by: OPHTHALMOLOGY

## 2018-12-13 PROCEDURE — 36000706: Mod: PO | Performed by: OPHTHALMOLOGY

## 2018-12-13 PROCEDURE — 36000707: Mod: PO | Performed by: OPHTHALMOLOGY

## 2018-12-13 PROCEDURE — 25000003 PHARM REV CODE 250: Mod: PO | Performed by: OPHTHALMOLOGY

## 2018-12-13 PROCEDURE — 63600175 PHARM REV CODE 636 W HCPCS: Mod: PO | Performed by: OPHTHALMOLOGY

## 2018-12-13 DEVICE — LENS 22.0: Type: IMPLANTABLE DEVICE | Site: EYE | Status: FUNCTIONAL

## 2018-12-13 RX ORDER — MOXIFLOXACIN 5 MG/ML
SOLUTION/ DROPS OPHTHALMIC
Status: DISCONTINUED | OUTPATIENT
Start: 2018-12-13 | End: 2018-12-13 | Stop reason: HOSPADM

## 2018-12-13 RX ORDER — ONDANSETRON 2 MG/ML
INJECTION INTRAMUSCULAR; INTRAVENOUS
Status: DISCONTINUED | OUTPATIENT
Start: 2018-12-13 | End: 2018-12-13

## 2018-12-13 RX ORDER — PHENYLEPHRINE HYDROCHLORIDE 25 MG/ML
1 SOLUTION/ DROPS OPHTHALMIC
Status: COMPLETED | OUTPATIENT
Start: 2018-12-13 | End: 2018-12-13

## 2018-12-13 RX ORDER — PHENYLEPHRINE HYDROCHLORIDE 100 MG/ML
1 SOLUTION/ DROPS OPHTHALMIC
Status: COMPLETED | OUTPATIENT
Start: 2018-12-13 | End: 2018-12-13

## 2018-12-13 RX ORDER — PROPARACAINE HYDROCHLORIDE 5 MG/ML
1 SOLUTION/ DROPS OPHTHALMIC
Status: COMPLETED | OUTPATIENT
Start: 2018-12-13 | End: 2018-12-13

## 2018-12-13 RX ORDER — LIDOCAINE HYDROCHLORIDE 10 MG/ML
1 INJECTION, SOLUTION EPIDURAL; INFILTRATION; INTRACAUDAL; PERINEURAL ONCE
Status: DISCONTINUED | OUTPATIENT
Start: 2018-12-13 | End: 2018-12-13 | Stop reason: HOSPADM

## 2018-12-13 RX ORDER — EPINEPHRINE 1 MG/ML
INJECTION INTRAMUSCULAR; INTRAVENOUS; SUBCUTANEOUS
Status: DISCONTINUED | OUTPATIENT
Start: 2018-12-13 | End: 2018-12-13 | Stop reason: HOSPADM

## 2018-12-13 RX ORDER — SODIUM CHLORIDE, SODIUM LACTATE, POTASSIUM CHLORIDE, CALCIUM CHLORIDE 600; 310; 30; 20 MG/100ML; MG/100ML; MG/100ML; MG/100ML
INJECTION, SOLUTION INTRAVENOUS CONTINUOUS
Status: DISCONTINUED | OUTPATIENT
Start: 2018-12-13 | End: 2018-12-13 | Stop reason: HOSPADM

## 2018-12-13 RX ORDER — TROPICAMIDE 10 MG/ML
1 SOLUTION/ DROPS OPHTHALMIC
Status: COMPLETED | OUTPATIENT
Start: 2018-12-13 | End: 2018-12-13

## 2018-12-13 RX ORDER — ACETAMINOPHEN 325 MG/1
650 TABLET ORAL EVERY 4 HOURS PRN
Status: DISCONTINUED | OUTPATIENT
Start: 2018-12-13 | End: 2018-12-13 | Stop reason: HOSPADM

## 2018-12-13 RX ORDER — MIDAZOLAM HYDROCHLORIDE 1 MG/ML
INJECTION, SOLUTION INTRAMUSCULAR; INTRAVENOUS
Status: DISCONTINUED | OUTPATIENT
Start: 2018-12-13 | End: 2018-12-13

## 2018-12-13 RX ORDER — LIDOCAINE HYDROCHLORIDE 10 MG/ML
INJECTION INFILTRATION; PERINEURAL
Status: DISCONTINUED | OUTPATIENT
Start: 2018-12-13 | End: 2018-12-13 | Stop reason: HOSPADM

## 2018-12-13 RX ORDER — LIDOCAINE HYDROCHLORIDE 40 MG/ML
INJECTION, SOLUTION RETROBULBAR
Status: DISCONTINUED | OUTPATIENT
Start: 2018-12-13 | End: 2018-12-13 | Stop reason: HOSPADM

## 2018-12-13 RX ORDER — SODIUM CHLORIDE 0.9 % (FLUSH) 0.9 %
3 SYRINGE (ML) INJECTION
Status: DISCONTINUED | OUTPATIENT
Start: 2018-12-13 | End: 2018-12-13 | Stop reason: HOSPADM

## 2018-12-13 RX ADMIN — PROPARACAINE HYDROCHLORIDE 1 DROP: 5 SOLUTION/ DROPS OPHTHALMIC at 07:12

## 2018-12-13 RX ADMIN — NITROGLYCERIN 0.25 INCH: 20 OINTMENT TOPICAL at 07:12

## 2018-12-13 RX ADMIN — PHENYLEPHRINE HYDROCHLORIDE 1 DROP: 100 SOLUTION/ DROPS OPHTHALMIC at 07:12

## 2018-12-13 RX ADMIN — TROPICAMIDE 1 DROP: 10 SOLUTION/ DROPS OPHTHALMIC at 07:12

## 2018-12-13 RX ADMIN — PHENYLEPHRINE HYDROCHLORIDE 1 DROP: 25 SOLUTION/ DROPS OPHTHALMIC at 07:12

## 2018-12-13 RX ADMIN — SODIUM CHLORIDE, SODIUM LACTATE, POTASSIUM CHLORIDE, AND CALCIUM CHLORIDE: .6; .31; .03; .02 INJECTION, SOLUTION INTRAVENOUS at 07:12

## 2018-12-13 RX ADMIN — MIDAZOLAM HYDROCHLORIDE 1 MG: 1 INJECTION, SOLUTION INTRAMUSCULAR; INTRAVENOUS at 08:12

## 2018-12-13 RX ADMIN — ONDANSETRON 4 MG: 2 INJECTION, SOLUTION INTRAMUSCULAR; INTRAVENOUS at 08:12

## 2018-12-13 NOTE — ANESTHESIA POSTPROCEDURE EVALUATION
"Anesthesia Post Evaluation    Patient: Holli Landrum    Procedure(s) Performed: Procedure(s) (LRB):  PHACOEMULSIFICATION, CATARACT (Right)    Final Anesthesia Type: MAC  Patient location during evaluation: PACU  Patient participation: Yes- Able to Participate  Level of consciousness: awake and alert and oriented  Post-procedure vital signs: reviewed and stable  Pain management: adequate  Airway patency: patent  PONV status at discharge: No PONV  Anesthetic complications: no      Cardiovascular status: blood pressure returned to baseline and stable  Respiratory status: unassisted and spontaneous ventilation  Hydration status: euvolemic  Follow-up not needed.        Visit Vitals  BP (!) 157/77 (BP Location: Left arm, Patient Position: Sitting)   Pulse (!) 58   Temp 36.6 °C (97.8 °F) (Skin)   Resp 20   Ht 5' 5.5" (1.664 m)   Wt 86.2 kg (190 lb)   SpO2 98%   Breastfeeding? No   BMI 31.14 kg/m²       Pain/Anthony Score: Anthony Score: 10 (12/13/2018  9:33 AM)        "

## 2018-12-13 NOTE — TRANSFER OF CARE
"Anesthesia Transfer of Care Note    Patient: Holli Landrum    Procedure(s) Performed: Procedure(s) (LRB):  PHACOEMULSIFICATION, CATARACT (Right)    Patient location: PACU    Anesthesia Type: MAC    Transport from OR: Transported from OR on room air with adequate spontaneous ventilation    Post pain: adequate analgesia    Post assessment: no apparent anesthetic complications and tolerated procedure well    Post vital signs: stable    Level of consciousness: awake and alert    Nausea/Vomiting: no nausea/vomiting    Complications: none    Transfer of care protocol was followed      Last vitals:   Visit Vitals  BP (!) 162/80 (BP Location: Left arm, Patient Position: Lying)   Pulse (!) 55   Temp 36.6 °C (97.8 °F) (Skin)   Resp 18   Ht 5' 5.5" (1.664 m)   Wt 86.2 kg (190 lb)   SpO2 97%   Breastfeeding? No   BMI 31.14 kg/m²     "

## 2018-12-13 NOTE — BRIEF OP NOTE
Operative Note     SUMMARY     Surgery Date: 12/13/2018     Surgeon(s) and Role:     * Aleksandar Rose Jr., MD - Primary    Pre-op Diagnosis:  Age-related nuclear cataract of right eye [H25.11]    Post-op Diagnosis:  Age-related nuclear cataract of right eye [H25.11]    Procedure(s) (LRB):  PHACOEMULSIFICATION, CATARACT (Right)    Anesthesia: Monitor Anesthesia Care    Findings/Key Components:  Same as post op diagnosis    Estimated Blood Loss: * No values recorded between 12/13/2018  8:39 AM and 12/13/2018  9:10 AM *         Specimens (From admission, onward)    None            Discharge Note      SUMMARY     Admit Date: 12/13/2018    Attending Physician: Aleksandar Rose Jr., MD     Discharge Physician: Aleksandar Rose Jr., MD    Discharge Date: 12/13/2018     Final Diagnosis: Age-related nuclear cataract of right eye [H25.11]    Hospital Course: The patient was admitted for outpatient surgery and tolerated the procedure well. The patient was discharged home in stable condition the same day.    Diet: Advance as tolerated    Follow-Up: Follow up with Dr. Rose, next day, as previously scheduled  Activity as tolerated.      Activity: Per Handout Instructions    Disposition: Home or self care    Patient Instructions:   Current Discharge Medication List      CONTINUE these medications which have NOT CHANGED    Details   ALBUTEROL SULFATE (VENTOLIN INHL) Inhale 2 puffs into the lungs 4 (four) times daily as needed.       clopidogrel (PLAVIX) 75 mg tablet Take 1 tablet (75 mg total) by mouth once daily.  Qty: 90 tablet, Refills: 3      co-enzyme Q-10 30 mg capsule Take 30 mg by mouth 3 (three) times daily.      CRANBERRY/B.COAGULAN/C/CALCIUM (CRANBERRY-PROBIOTIC ORAL) Take 2 tablets by mouth once daily.      dorzolamide (TRUSOPT) 2 % ophthalmic solution Place 1 drop into the left eye 2 (two) times daily.  Qty: 30 mL, Refills: 3    Comments: Ok to dispense 90 day supply.      fish oil-omega-3 fatty acids 300-1,000 mg  capsule Take 2 g by mouth once daily.      fluticasone-vilanterol (BREO ELLIPTA) 100-25 mcg/dose diskus inhaler Inhale 1 puff into the lungs once daily. Controller  Qty: 60 each, Refills: 11    Associated Diagnoses: Chronic obstructive pulmonary disease, unspecified COPD type      furosemide (LASIX) 40 MG tablet Take 1 tablet (40 mg total) by mouth once daily. 1 Tablet Oral Every day  Qty: 90 tablet, Refills: 3      GRAPE SEED EXTRACT ORAL Take 1 tablet by mouth once daily.       magnesium oxide (MAG-OX) 400 mg tablet Take 400 mg by mouth once daily.      metOLazone (ZAROXOLYN) 5 MG tablet Take 1 tablet (5 mg total) by mouth every Monday and Friday.  Qty: 24 tablet, Refills: 3      nitroGLYCERIN 0.4 MG/HR TD PT24 (NITRODUR) 0.4 mg/hr PLACE 1 PATCH ONTO THE SKIN ONCE DAILY.  Qty: 90 patch, Refills: 3      OLIVE LEAF EXTRACT ORAL Take by mouth.      potassium chloride (KLOR-CON) 10 MEQ TbSR Take 1 tablet (10 mEq total) by mouth once daily.  Qty: 90 tablet, Refills: 3      sotalol (BETAPACE) 80 MG tablet Take one-half tablet (40 mg total) by mouth once daily.  Qty: 45 tablet, Refills: 3      cholecalciferol, vitamin D3, (VITAMIN D3) 5,000 unit Tab Take 5,000 Units by mouth once daily.      fluticasone (FLONASE) 50 mcg/actuation nasal spray 2 sprays (100 mcg total) by Each Nare route daily as needed for Allergies.  Qty: 16 g, Refills: 11      latanoprost 0.005 % ophthalmic solution Place 1 drop into both eyes every evening.  Qty: 3 Bottle, Refills: 3    Comments: Ok to dispense 90 day supply.  Associated Diagnoses: Ocular hypertension, bilateral      omeprazole (PRILOSEC) 40 MG capsule Take 1 capsule (40 mg total) by mouth once daily.  Qty: 90 capsule, Refills: 1    Comments: This prescription was filled on 10/27/2018. Any refills authorized will be placed on file.  Associated Diagnoses: Black stool      tobramycin sulfate 0.3% (TOBREX) 0.3 % ophthalmic solution Place 1 drop into the left eye every 6 (six) hours. for 7  days  Qty: 5 mL, Refills: 3    Associated Diagnoses: Age-related nuclear cataract of left eye             Discharge Procedure Orders (must include Diet, Follow-up, Activity)   Discharge Procedure Orders (must include Diet, Follow-up, Activity)   Diet general     Lifting restrictions     Call MD for:  persistent nausea and vomiting     Call MD for:  severe uncontrolled pain     Leave dressing on - Keep it clean, dry, and intact until clinic visit

## 2018-12-13 NOTE — OP NOTE
Date of surgery: 12/13/2018    Operative Report    Preoperative Diagnosis: Nuclear sclerosis, right eye    Postoperative Diagnosis: Nuclear sclerosis, right eye    Procedure: Phacoemulsification with posterior chamber intraocular lens, right eye    Surgeon: Aleksandar Rose Jr. M.D.    Anesthesia: MAC with topical     Brief Preoperative Note:  The patient was seen in the office with cataract of the right eye.  Because of the impairment of the patient's reading, driving, ambulation, and other activities of daily living needing a good quality of vision, the patient elected to remove the cataract and place a acrylic lens implant, if possible    Procedure in detail:    Informed consent was obtained. Indications, risks and alternatives to the procedure were explained to the patient. The patient was given the opportunity to ask questions and consented to the procedure in writing. In the preoperative holding area, the patients identity and operative site were confirmed.  Topical anesthetic was administered, consisting of alternating 0.5% Proparacaine and 4% preservative-free Lidocaine Q 5 minutes times 3.  The patient was taken to the operative suite.  A time-out was performed.  The right eye was prepped with 5% Betadine and draped in sterile fashion.  A lid speculum was placed in the right eye.  The temporal clear corneal incision was developed using a LRI eric knife and crescent blade for a 3 plane incision.  A paracentesis was done with a 1mm eric blade.  Before instillation of the Viscoat, approximately 0.1 to 0.3cc of diluted preservative free intracameral lidocaine was instilled.  Viscoat was injected into the anterior chamber with a 27-gauge needle.  A 2.4mm eric blade was used to make a temporal clear corneal incision.  Afterwards, a cystotome was used to perform a continuous curvilinear capsulorrhexis with the assistance of utrata forceps.  Hydrodissection was performed using balanced salt  solution,injected under the anterior capsule using a joe cannula and hydrodelineation was performed using a blunt-tipped cannula.  Next, phacoemulsification performed in a divide and conquer fashion with the use of a nucleus rotator to turn the lens and protect the posterior capsule.  Cortical material was then removed using irrigation and aspiration.  Healon was then injected into the anterior chamber and into capsular bag to inflate the bag for the placement of the lens implant  and then an Xiang SN60WF 22.0 D lens was injected into the capsular bag and rotated in position with the assistance of a Sinsky hook.  Irrigation and aspiration were used to remove the remaining viscoelastic. Next, Miochol was injected into the anterior chamber and the pupil was allowed to constrict in a symmetrical fashion.  A collagen shield was placed into the eye and the eye was covered with a Thompson shield.  The patient tolerated the procedure well and was transferred to the recovery area in good condition.  Estimated blood loss:  none  Specimens:   none  Complications:  none  Disposition:   stable to PACU

## 2018-12-13 NOTE — DISCHARGE INSTRUCTIONS

## 2018-12-13 NOTE — ANESTHESIA PREPROCEDURE EVALUATION
12/13/2018  Holli Landrum is a 85 y.o., female.    Anesthesia Evaluation    I have reviewed the Patient Summary Reports.    I have reviewed the Nursing Notes.   I have reviewed the Medications.     Review of Systems  Anesthesia Hx:  No problems with previous Anesthesia    Social:  Former Smoker    Hematology/Oncology:         -- Cancer in past history:   Cardiovascular:   Hypertension, well controlled CAD  Dysrhythmias (Paroxysmal Vtach, AFib) atrial fibrillation Angina CHF (EF 55% in 2016)    Pulmonary:   COPD, mild Sleep Apnea, CPAP    Renal/:  Renal/ Normal     Hepatic/GI:   GERD    Musculoskeletal:   Arthritis     Neurological:  Neurology Normal    Endocrine:  Endocrine Normal        Physical Exam  General:  Well nourished    Airway/Jaw/Neck:  Airway Findings: Mouth Opening: Normal Tongue: Normal  General Airway Assessment: Adult  Oropharynx Findings:  Mallampati: II  Jaw/Neck Findings:  Neck ROM: Normal ROM     Eyes/Ears/Nose:  Eyes/Ears/Nose Findings:    Dental:  Dental Findings:   Chest/Lungs:  Chest/Lungs Findings: Normal Respiratory Rate     Heart/Vascular:  Heart Findings: Rate: Normal  Rhythm: Regular Rhythm        Mental Status:  Mental Status Findings:  Cooperative, Alert and Oriented         Anesthesia Plan  Type of Anesthesia, risks & benefits discussed:  Anesthesia Type:  general  Patient's Preference:   Intra-op Monitoring Plan: standard ASA monitors  Intra-op Monitoring Plan Comments:   Post Op Pain Control Plan: multimodal analgesia  Post Op Pain Control Plan Comments:   Induction:   IV  Beta Blocker:  Patient is on a Beta-Blocker and has received one dose within the past 24 hours (No further documentation required).       Informed Consent: Patient understands risks and agrees with Anesthesia plan.  Questions answered. Anesthesia consent signed with patient.  ASA Score: 3     Day of  Surgery Review of History & Physical:  There are no significant changes.   H&P completed by Anesthesiologist.       Ready For Surgery From Anesthesia Perspective.

## 2018-12-14 ENCOUNTER — OFFICE VISIT (OUTPATIENT)
Dept: OPHTHALMOLOGY | Facility: CLINIC | Age: 83
End: 2018-12-14
Payer: MEDICARE

## 2018-12-14 DIAGNOSIS — Z98.41 STATUS POST CATARACT EXTRACTION AND INSERTION OF INTRAOCULAR LENS OF RIGHT EYE: Primary | ICD-10-CM

## 2018-12-14 DIAGNOSIS — Z96.1 STATUS POST CATARACT EXTRACTION AND INSERTION OF INTRAOCULAR LENS OF RIGHT EYE: Primary | ICD-10-CM

## 2018-12-14 PROCEDURE — 99999 PR PBB SHADOW E&M-EST. PATIENT-LVL II: CPT | Mod: PBBFAC,,, | Performed by: OPHTHALMOLOGY

## 2018-12-14 PROCEDURE — 99024 POSTOP FOLLOW-UP VISIT: CPT | Mod: S$GLB,,, | Performed by: OPHTHALMOLOGY

## 2018-12-14 NOTE — PROGRESS NOTES
HPI     Post-op Evaluation      Additional comments: 1 d po phaco iol OD d 12/13/2018//  tech instilled   pred tobramycin, diclo OD//              Comments     1 d po phaco iol OD d 12/13/2018//  tech instilled pred tobramycin, diclo   OD//  No pain, no flashes or floaters//          Last edited by Elif Jones on 12/14/2018  9:52 AM. (History)            Assessment /Plan     For exam results, see Encounter Report.    Status post cataract extraction and insertion of intraocular lens of right eye    Prednisolone acetate (pink or white top drop) 1 drop 4x daily Right eye; shake well before using drop  Vigamox 1 drop 4x daily right eye (tan top)  Diclofenac 1 drop 4x daily right eye (gray top)  No bending no lifting  Keep head elevated when laying down  Keep dry   F/u 1 week

## 2018-12-20 ENCOUNTER — OFFICE VISIT (OUTPATIENT)
Dept: OPHTHALMOLOGY | Facility: CLINIC | Age: 83
End: 2018-12-20
Payer: MEDICARE

## 2018-12-20 DIAGNOSIS — Z98.41 STATUS POST CATARACT EXTRACTION AND INSERTION OF INTRAOCULAR LENS OF RIGHT EYE: Primary | ICD-10-CM

## 2018-12-20 DIAGNOSIS — Z96.1 STATUS POST CATARACT EXTRACTION AND INSERTION OF INTRAOCULAR LENS OF RIGHT EYE: Primary | ICD-10-CM

## 2018-12-20 PROCEDURE — 99999 PR PBB SHADOW E&M-EST. PATIENT-LVL III: CPT | Mod: PBBFAC,,, | Performed by: OPHTHALMOLOGY

## 2018-12-20 PROCEDURE — 99024 POSTOP FOLLOW-UP VISIT: CPT | Mod: S$GLB,,, | Performed by: OPHTHALMOLOGY

## 2018-12-20 NOTE — PROGRESS NOTES
HPI     Post-op Evaluation      Additional comments: 1 wk s/p phaco iol OD 12/13              Comments     DLS: 12/14/18    Pt states has been having some severe burning at nasal corner of right eye   when she puts the drops in. + fells like something smoggy inferior temple   OD off and on. Vision seems ok.     Gtts: Prednisolone, Vigamox, Diclofenac QID OD, Trusopt BID OS, QHS OD,   Latanoprost QHS OS, Systane PRN OU          Last edited by Cheryle Quintana on 12/20/2018  3:40 PM. (History)        ROS     Negative for: Constitutional, Gastrointestinal, Neurological, Skin,   Genitourinary, Musculoskeletal, HENT, Endocrine, Cardiovascular, Eyes,   Respiratory, Psychiatric, Allergic/Imm, Heme/Lymph    Last edited by Aleksandar Rose Jr., MD on 12/20/2018  4:07 PM. (History)        Assessment /Plan     For exam results, see Encounter Report.    Status post cataract extraction and insertion of intraocular lens of right eye    Stop Moxifloxacin  Decrease PF and Diclofenac 1x daily OD  Follow-up in about 3 weeks (around 1/10/2019) for POM#1 cataract surgery .

## 2018-12-29 ENCOUNTER — OFFICE VISIT (OUTPATIENT)
Dept: URGENT CARE | Facility: CLINIC | Age: 83
End: 2018-12-29
Payer: MEDICARE

## 2018-12-29 VITALS
SYSTOLIC BLOOD PRESSURE: 135 MMHG | OXYGEN SATURATION: 95 % | DIASTOLIC BLOOD PRESSURE: 95 MMHG | TEMPERATURE: 97 F | HEART RATE: 103 BPM | RESPIRATION RATE: 14 BRPM

## 2018-12-29 DIAGNOSIS — R05.9 COUGH: Primary | ICD-10-CM

## 2018-12-29 DIAGNOSIS — R10.9 FLANK PAIN: ICD-10-CM

## 2018-12-29 LAB
BILIRUB UR QL STRIP: NEGATIVE
CTP QC/QA: YES
FLUAV AG NPH QL: NEGATIVE
FLUBV AG NPH QL: NEGATIVE
GLUCOSE UR QL STRIP: NEGATIVE
KETONES UR QL STRIP: NEGATIVE
LEUKOCYTE ESTERASE UR QL STRIP: POSITIVE
PH, POC UA: 5.5 (ref 5–8)
POC BLOOD, URINE: NEGATIVE
POC NITRATES, URINE: NEGATIVE
PROT UR QL STRIP: POSITIVE
SP GR UR STRIP: 1.02 (ref 1–1.03)
UROBILINOGEN UR STRIP-ACNC: NORMAL (ref 0.1–1.1)

## 2018-12-29 PROCEDURE — 1101F PT FALLS ASSESS-DOCD LE1/YR: CPT | Mod: S$GLB,,, | Performed by: PHYSICIAN ASSISTANT

## 2018-12-29 PROCEDURE — 81003 URINALYSIS AUTO W/O SCOPE: CPT | Mod: QW,S$GLB,, | Performed by: PHYSICIAN ASSISTANT

## 2018-12-29 PROCEDURE — 99214 OFFICE O/P EST MOD 30 MIN: CPT | Mod: 25,S$GLB,, | Performed by: PHYSICIAN ASSISTANT

## 2018-12-29 PROCEDURE — 3075F SYST BP GE 130 - 139MM HG: CPT | Mod: S$GLB,,, | Performed by: PHYSICIAN ASSISTANT

## 2018-12-29 PROCEDURE — 87804 INFLUENZA ASSAY W/OPTIC: CPT | Mod: 59,QW,S$GLB, | Performed by: PHYSICIAN ASSISTANT

## 2018-12-29 PROCEDURE — 3080F DIAST BP >= 90 MM HG: CPT | Mod: S$GLB,,, | Performed by: PHYSICIAN ASSISTANT

## 2018-12-29 RX ORDER — BENZONATATE 100 MG/1
100 CAPSULE ORAL EVERY 6 HOURS PRN
Qty: 60 CAPSULE | Refills: 1 | Status: SHIPPED | OUTPATIENT
Start: 2018-12-29 | End: 2019-12-29

## 2018-12-29 RX ORDER — ALBUTEROL SULFATE 0.83 MG/ML
2.5 SOLUTION RESPIRATORY (INHALATION) EVERY 6 HOURS PRN
Qty: 1 BOX | Refills: 11 | Status: SHIPPED | OUTPATIENT
Start: 2018-12-29 | End: 2019-10-08

## 2018-12-29 RX ORDER — DOXYCYCLINE 100 MG/1
100 CAPSULE ORAL 2 TIMES DAILY
Qty: 14 CAPSULE | Refills: 0 | Status: SHIPPED | OUTPATIENT
Start: 2018-12-29 | End: 2019-01-05

## 2018-12-29 NOTE — PATIENT INSTRUCTIONS

## 2018-12-29 NOTE — PROGRESS NOTES
Subjective:       Patient ID: Holli Landrum is a 85 y.o. female.    Vitals:  oral temperature is 97.4 °F (36.3 °C). Her blood pressure is 135/95 (abnormal) and her pulse is 103. Her respiration is 14 and oxygen saturation is 95%.     Chief Complaint: Nasal Congestion    Pt c/o nasal congestion, over 7 days, post nasal drip, productive cough, headaches, body aches, taking mucinex and albuterol nebulizer with mild relief, pt also c/o right side kidney pain, intermittent for 3 days, dysuria, urgency and frequency,       URI    This is a new problem. The current episode started in the past 7 days. The problem has been unchanged. Associated symptoms include congestion, coughing, dysuria, headaches, sinus pain and a sore throat. Pertinent negatives include no ear pain, nausea, rash, vomiting or wheezing. Treatments tried: mucinex, albuterol nebulizer  The treatment provided mild relief.       Constitution: Positive for fatigue. Negative for chills, sweating and fever.   HENT: Positive for congestion, postnasal drip, sinus pain, sinus pressure and sore throat. Negative for ear pain and voice change.    Neck: Negative for painful lymph nodes.   Eyes: Negative for eye redness.   Respiratory: Positive for cough and sputum production. Negative for chest tightness, bloody sputum, COPD, shortness of breath, stridor, wheezing and asthma.    Gastrointestinal: Negative for nausea and vomiting.   Genitourinary: Positive for dysuria, frequency, urgency and flank pain.   Musculoskeletal: Positive for muscle ache.   Skin: Negative for rash.   Allergic/Immunologic: Negative for seasonal allergies and asthma.   Neurological: Positive for headaches.   Hematologic/Lymphatic: Negative for swollen lymph nodes.       Objective:      Physical Exam   Constitutional: She is oriented to person, place, and time. She appears well-developed and well-nourished. She is cooperative.  Non-toxic appearance. She does not appear ill. No distress.   HENT:    Head: Normocephalic and atraumatic.   Right Ear: Hearing, tympanic membrane, external ear and ear canal normal.   Left Ear: Hearing, tympanic membrane, external ear and ear canal normal.   Nose: No mucosal edema, rhinorrhea or nasal deformity. No epistaxis. Right sinus exhibits maxillary sinus tenderness. Right sinus exhibits no frontal sinus tenderness. Left sinus exhibits maxillary sinus tenderness. Left sinus exhibits no frontal sinus tenderness.   Mouth/Throat: Uvula is midline, oropharynx is clear and moist and mucous membranes are normal. No trismus in the jaw. Normal dentition. No uvula swelling. No posterior oropharyngeal erythema.   Eyes: Conjunctivae and lids are normal. No scleral icterus.   Sclera clear bilat   Neck: Trachea normal, full passive range of motion without pain and phonation normal. Neck supple.   Cardiovascular: Normal rate, regular rhythm, normal heart sounds, intact distal pulses and normal pulses.   Pulmonary/Chest: Effort normal and breath sounds normal. No respiratory distress.   Abdominal: Soft. Normal appearance and bowel sounds are normal. She exhibits no distension. There is no tenderness.   Musculoskeletal: Normal range of motion. She exhibits no edema or deformity.   Neurological: She is alert and oriented to person, place, and time. She exhibits normal muscle tone. Coordination normal.   Skin: Skin is warm, dry and intact. She is not diaphoretic. No pallor.   Psychiatric: She has a normal mood and affect. Her speech is normal and behavior is normal. Judgment and thought content normal. Cognition and memory are normal.   Nursing note and vitals reviewed.      Assessment:       1. Cough    2. Flank pain        Plan:         Cough  -     POCT Influenza A/B    Flank pain  -     POCT Urinalysis, Dipstick, Automated, W/O Scope    Other orders  -     doxycycline (VIBRAMYCIN) 100 MG Cap; Take 1 capsule (100 mg total) by mouth 2 (two) times daily. for 7 days  Dispense: 14 capsule;  Refill: 0  -     albuterol (PROVENTIL) 2.5 mg /3 mL (0.083 %) nebulizer solution; Take 3 mLs (2.5 mg total) by nebulization every 6 (six) hours as needed for Wheezing. Rescue  Dispense: 1 Box; Refill: 11  -     benzonatate (TESSALON PERLES) 100 MG capsule; Take 1 capsule (100 mg total) by mouth every 6 (six) hours as needed.  Dispense: 60 capsule; Refill: 1     Patient coughing up brown mucus.  Has history of COPD.  Use nebulizer this morning.  No wheezing on auscultation in the clinic.  She needs a refill of the albuterol solution.  I will also cover for bacterial sinusitis with doxycycline as well as give her Tessalon Perles for the cough.  I have asked that she call her primary care physician Monday morning if she is not improved by then.  She should go straight to the ED if she develops any fever, chills, nausea, vomiting or worsening of symptoms.  Both she and daughter expressed understanding and agree with plan.

## 2018-12-31 ENCOUNTER — TELEPHONE (OUTPATIENT)
Dept: URGENT CARE | Facility: CLINIC | Age: 83
End: 2018-12-31

## 2019-01-03 ENCOUNTER — TELEPHONE (OUTPATIENT)
Dept: FAMILY MEDICINE | Facility: CLINIC | Age: 84
End: 2019-01-03

## 2019-01-03 NOTE — TELEPHONE ENCOUNTER
----- Message from Mikey Desai sent at 1/3/2019  7:45 AM CST -----  Contact: same  Patient called in and stated she went to an urgent care last week end and was tested for the flu but it was negative.  Patient stated she feels worse.  Patient stated she was given a Rx for Doxy & Kerri Pearls.  Patient stated she also has been using her nebulizer.  Patient stated she has COPD also.  Patient wanted to see if she could be seen today 1/3/19?      Patient would like a call back at [phone]

## 2019-01-04 ENCOUNTER — OFFICE VISIT (OUTPATIENT)
Dept: FAMILY MEDICINE | Facility: CLINIC | Age: 84
End: 2019-01-04
Payer: MEDICARE

## 2019-01-04 VITALS
TEMPERATURE: 97 F | DIASTOLIC BLOOD PRESSURE: 78 MMHG | SYSTOLIC BLOOD PRESSURE: 122 MMHG | HEART RATE: 56 BPM | HEIGHT: 66 IN | OXYGEN SATURATION: 96 % | WEIGHT: 192.13 LBS | BODY MASS INDEX: 30.88 KG/M2

## 2019-01-04 DIAGNOSIS — J01.90 ACUTE SINUSITIS, RECURRENCE NOT SPECIFIED, UNSPECIFIED LOCATION: Primary | ICD-10-CM

## 2019-01-04 PROCEDURE — 99213 PR OFFICE/OUTPT VISIT, EST, LEVL III, 20-29 MIN: ICD-10-PCS | Mod: S$GLB,,, | Performed by: NURSE PRACTITIONER

## 2019-01-04 PROCEDURE — 99213 OFFICE O/P EST LOW 20 MIN: CPT | Mod: S$GLB,,, | Performed by: NURSE PRACTITIONER

## 2019-01-04 PROCEDURE — 3074F PR MOST RECENT SYSTOLIC BLOOD PRESSURE < 130 MM HG: ICD-10-PCS | Mod: CPTII,S$GLB,, | Performed by: NURSE PRACTITIONER

## 2019-01-04 PROCEDURE — 1101F PR PT FALLS ASSESS DOC 0-1 FALLS W/OUT INJ PAST YR: ICD-10-PCS | Mod: CPTII,S$GLB,, | Performed by: NURSE PRACTITIONER

## 2019-01-04 PROCEDURE — 3078F PR MOST RECENT DIASTOLIC BLOOD PRESSURE < 80 MM HG: ICD-10-PCS | Mod: CPTII,S$GLB,, | Performed by: NURSE PRACTITIONER

## 2019-01-04 PROCEDURE — 3078F DIAST BP <80 MM HG: CPT | Mod: CPTII,S$GLB,, | Performed by: NURSE PRACTITIONER

## 2019-01-04 PROCEDURE — 99999 PR PBB SHADOW E&M-EST. PATIENT-LVL V: CPT | Mod: PBBFAC,,, | Performed by: NURSE PRACTITIONER

## 2019-01-04 PROCEDURE — 99999 PR PBB SHADOW E&M-EST. PATIENT-LVL V: ICD-10-PCS | Mod: PBBFAC,,, | Performed by: NURSE PRACTITIONER

## 2019-01-04 PROCEDURE — 3074F SYST BP LT 130 MM HG: CPT | Mod: CPTII,S$GLB,, | Performed by: NURSE PRACTITIONER

## 2019-01-04 PROCEDURE — 1101F PT FALLS ASSESS-DOCD LE1/YR: CPT | Mod: CPTII,S$GLB,, | Performed by: NURSE PRACTITIONER

## 2019-01-04 RX ORDER — PREDNISONE 10 MG/1
TABLET ORAL
Qty: 10 TABLET | Refills: 0 | Status: SHIPPED | OUTPATIENT
Start: 2019-01-04 | End: 2019-01-20

## 2019-01-04 RX ORDER — DOXYCYCLINE 100 MG/1
100 CAPSULE ORAL 2 TIMES DAILY
Qty: 6 CAPSULE | Refills: 0 | Status: SHIPPED | OUTPATIENT
Start: 2019-01-05 | End: 2019-01-20

## 2019-01-04 NOTE — PROGRESS NOTES
This dictation has been generated using Modal Fluency Dictation some phonetic errors may occur. Please contact author for clarification if needed.     Problem List Items Addressed This Visit     None      Visit Diagnoses     Acute sinusitis, recurrence not specified, unspecified location    -  Primary          Orders Placed This Encounter    doxycycline (MONODOX) 100 MG capsule    predniSONE (DELTASONE) 10 MG tablet       Sinusitis.  Extend antibiotic coverage to a total of 10 days.  Add Mucinex for the thick sputum. No pneumonia.   Patient Instructions   Zyrtec(cetirizine) for runny nose, post nasal drip, and congestion. Take in the PM  Mucinex DM daily in the morning.    Delsym at night for cough.    I cautioned the patient because of her cardiac history about Sudafed use.    Follow-up if symptoms worsen or fail to improve.    ________________________________________________________________  ________________________________________________________________      Chief Complaint   Patient presents with    URI     History of present illness  This 85 y.o. presents today for complaint of following up on cough and cold symptoms.  Patient notes she went to the urgent care on December 20 night for cough cold and shortness of breath.  She notes that she had a antibiotic prescription for 7 days.  She was diagnosed with sinusitis per patient report.  I reviewed the urgent care note and see that she did have shortness of breath and respiratory treatments.  Patient notes she had been improving but worsened yesterday.  She notes increased coughing and productive of thick white sputum.  The color has changed from yellow to white which she perceives as improvement.  She is concerned about the thickness of the sputum this morning.  She is no longer taking Mucinex.  She did take Zyrtec.  Review of systems  Denies any fever or chills  Patient notes sinus pressure no sore throat.  Ears improved.  No chest pain.  Shortness of breath  controlled.  No nausea vomiting or diarrhea    Reviewed pt new to me  Past Medical History:   Diagnosis Date    Anticoagulant long-term use     Plavix    Arthritis     Atrial fibrillation 11/20/2012    Cancer     skin cancer to face    Cataract     OU done//    CHF (congestive heart failure)     COPD (chronic obstructive pulmonary disease)     no oxygen    Coronary artery disease     6 stents    Essential hypertension 5/5/2010    GERD (gastroesophageal reflux disease) 11/20/2012    Glaucoma     Hypertensive heart disease with heart failure 02/05/2013    JA on CPAP     Paroxysmal ventricular tachycardia     per problem list       Past Surgical History:   Procedure Laterality Date    ANGIOGRAM-CORONARY Left 10/27/2016    [facility] CATH    CARDIAC CATHETERIZATION  2013, 2014    has 5 stents    CARDIAC SURGERY  2012    stents    CATARACT EXTRACTION W/  INTRAOCULAR LENS IMPLANT Left 11/01/2018    Dr Rose    CATARACT EXTRACTION W/  INTRAOCULAR LENS IMPLANT Right 12/13/2018    Dr Rose//    CHOLECYSTECTOMY      CYSTOGRAM N/A 2/24/2015    [facility]    CYSTOSCOPY N/A 2/24/2015    [facility] at [facility] OR    CYSTOSCOPY W/BLADDER BIOPSY with Fulguration  N/A 4/10/2017    Performed [facility] Robb Knight [facility] at [facility] OR    HYSTERECTOMY  1969    OVARIAN CYST REMOVAL  teenager    PHACOEMULSIFICATION, CATARACT Right 12/13/2018    [facility] Jr[facility] at [facility] OR    PHACOEMULSIFICATION, CATARACT Left 11/1/2018    [facility] OR    TONSILLECTOMY      aw/denoids    VASCULAR SURGERY      to remove clot from right leg       Family History   Problem Relation Age of Onset    Diabetes Brother     Heart disease Brother     Diabetes Mother     Cancer Maternal Grandfather     Clotting disorder Son         bleeding after tonsillectomy only    Amblyopia Neg Hx     Blindness Neg Hx      Cataracts Neg Hx     Glaucoma Neg Hx     Hypertension Neg Hx     Macular degeneration Neg Hx     Retinal detachment Neg Hx     Strabismus Neg Hx     Stroke Neg Hx     Thyroid disease Neg Hx        Social History     Socioeconomic History    Marital status:      Spouse name: None    Number of children: None    Years of education: None    Highest education level: None   Social Needs    Financial resource strain: None    Food insecurity - worry: None    Food insecurity - inability: None    Transportation needs - medical: None    Transportation needs - non-medical: None   Occupational History    None   Tobacco Use    Smoking status: Former Smoker     Types: Cigarettes     Last attempt to quit:      Years since quittin.0    Smokeless tobacco: Never Used    Tobacco comment: quit smoking  //had smoked for 1 yr//   Substance and Sexual Activity    Alcohol use: No     Alcohol/week: 0.0 oz    Drug use: No    Sexual activity: No     Partners: Male     Birth control/protection: Abstinence   Other Topics Concern    Are you pregnant or think you may be? Not Asked    Breast-feeding Not Asked   Social History Narrative    None       Current Outpatient Medications   Medication Sig Dispense Refill    cholecalciferol, vitamin D3, (VITAMIN D3) 5,000 unit Tab Take 5,000 Units by mouth once daily.      clopidogrel (PLAVIX) 75 mg tablet Take 1 tablet (75 mg total) by mouth once daily. 90 tablet 3    co-enzyme Q-10 30 mg capsule Take 30 mg by mouth 3 (three) times daily.      CRANBERRY/B.COAGULAN/C/CALCIUM (CRANBERRY-PROBIOTIC ORAL) Take 2 tablets by mouth once daily.      dorzolamide (TRUSOPT) 2 % ophthalmic solution Place 1 drop into the left eye 2 (two) times daily. 30 mL 3    doxycycline (VIBRAMYCIN) 100 MG Cap Take 1 capsule (100 mg total) by mouth 2 (two) times daily. for 7 days 14 capsule 0    fish oil-omega-3 fatty acids 300-1,000 mg capsule Take 2 g by mouth once daily.       furosemide (LASIX) 40 MG tablet Take 1 tablet (40 mg total) by mouth once daily. 1 Tablet Oral Every day 90 tablet 3    GRAPE SEED EXTRACT ORAL Take 1 tablet by mouth once daily.       latanoprost 0.005 % ophthalmic solution Place 1 drop into both eyes every evening. 3 Bottle 3    magnesium oxide (MAG-OX) 400 mg tablet Take 400 mg by mouth once daily.      metOLazone (ZAROXOLYN) 5 MG tablet Take 1 tablet (5 mg total) by mouth every Monday and Friday. 24 tablet 3    nitroGLYCERIN 0.4 MG/HR TD PT24 (NITRODUR) 0.4 mg/hr PLACE 1 PATCH ONTO THE SKIN ONCE DAILY. 90 patch 3    OLIVE LEAF EXTRACT ORAL Take by mouth.      potassium chloride (KLOR-CON) 10 MEQ TbSR Take 1 tablet (10 mEq total) by mouth once daily. 90 tablet 3    sotalol (BETAPACE) 80 MG tablet Take one-half tablet (40 mg total) by mouth once daily. 45 tablet 3    albuterol (PROVENTIL) 2.5 mg /3 mL (0.083 %) nebulizer solution Take 3 mLs (2.5 mg total) by nebulization every 6 (six) hours as needed for Wheezing. Rescue 1 Box 11    benzonatate (TESSALON PERLES) 100 MG capsule Take 1 capsule (100 mg total) by mouth every 6 (six) hours as needed. 60 capsule 1    [START ON 1/5/2019] doxycycline (MONODOX) 100 MG capsule Take 1 capsule (100 mg total) by mouth 2 (two) times daily. 6 capsule 0    fluticasone (FLONASE) 50 mcg/actuation nasal spray 2 sprays (100 mcg total) by Each Nare route daily as needed for Allergies. 16 g 11    predniSONE (DELTASONE) 10 MG tablet 4 by mouth once today, then 3 by mouth tomorrow, then 2 by mouth on day 3, then one by mouth on the last day. 10 tablet 0     No current facility-administered medications for this visit.        Review of patient's allergies indicates:   Allergen Reactions    Timoptic [timolol maleate] Shortness Of Breath    Ciprofloxacin Other (See Comments)     Muscle ache       Physical examination  Vitals Reviewed.  Stable.  No desaturation.  Pulse stable.  Gen. Well-dressed well-nourished   Skin warm dry  and intact.  No rashes noted.  HEENT.  TM intact bilateral with normal light reflex.  No mastoid tenderness during percussion.  Nares patent bilateral with clear nasal rhinorrhea noted.  Pharynx is unremarkable except postnasal drip.  No maxillary or frontal sinus tenderness when percussed.    Neck is supple without adenopathy  Chest.  Respirations are even unlabored.  Lungs are clear to auscultation.  Cardiac regular rate and rhythm.  No chest wall adenopathy noted.  Neuro. Awake alert oriented x4.  Normal judgment and cognition noted.  Extremities no clubbing cyanosis or edema noted.     Call or return to clinic prn if these symptoms worsen or fail to improve as anticipated.

## 2019-01-04 NOTE — PATIENT INSTRUCTIONS
Zyrtec(cetirizine) for runny nose, post nasal drip, and congestion. Take in the PM  Mucinex DM daily in the morning.    Delsym at night for cough.

## 2019-01-16 ENCOUNTER — OFFICE VISIT (OUTPATIENT)
Dept: OPHTHALMOLOGY | Facility: CLINIC | Age: 84
End: 2019-01-16
Payer: MEDICARE

## 2019-01-16 DIAGNOSIS — Z98.41 STATUS POST CATARACT EXTRACTION AND INSERTION OF INTRAOCULAR LENS OF RIGHT EYE: Primary | ICD-10-CM

## 2019-01-16 DIAGNOSIS — Z98.42 STATUS POST CATARACT EXTRACTION AND INSERTION OF INTRAOCULAR LENS OF LEFT EYE: ICD-10-CM

## 2019-01-16 DIAGNOSIS — Z96.1 STATUS POST CATARACT EXTRACTION AND INSERTION OF INTRAOCULAR LENS OF LEFT EYE: ICD-10-CM

## 2019-01-16 DIAGNOSIS — H40.053 OCULAR HYPERTENSION, BILATERAL: ICD-10-CM

## 2019-01-16 DIAGNOSIS — Z96.1 STATUS POST CATARACT EXTRACTION AND INSERTION OF INTRAOCULAR LENS OF RIGHT EYE: Primary | ICD-10-CM

## 2019-01-16 PROCEDURE — 99999 PR PBB SHADOW E&M-EST. PATIENT-LVL II: CPT | Mod: PBBFAC,,, | Performed by: OPHTHALMOLOGY

## 2019-01-16 PROCEDURE — 99024 POSTOP FOLLOW-UP VISIT: CPT | Mod: S$GLB,,, | Performed by: OPHTHALMOLOGY

## 2019-01-16 PROCEDURE — 99999 PR PBB SHADOW E&M-EST. PATIENT-LVL II: ICD-10-PCS | Mod: PBBFAC,,, | Performed by: OPHTHALMOLOGY

## 2019-01-16 PROCEDURE — 99024 PR POST-OP FOLLOW-UP VISIT: ICD-10-PCS | Mod: S$GLB,,, | Performed by: OPHTHALMOLOGY

## 2019-01-16 NOTE — PROGRESS NOTES
HPI     Post-op Evaluation      Additional comments: 1 month s/p phaco iol OU               Comments     DLS: 12/20/18    Pt states vision doing well at dist OU. Uses OTC readers for near. Has   been having some throbbing pain OS but may be due to sinus issues. Had   upper resporatory problems since New Years. Finishing drops today.          Last edited by Cheryle Quintana on 1/16/2019  1:19 PM. (History)            Assessment /Plan     For exam results, see Encounter Report.    Status post cataract extraction and insertion of intraocular lens of right eye    Status post cataract extraction and insertion of intraocular lens of left eye    Ocular hypertension, bilateral    Satisfactory course, doing well, D/c drops  Continue dorzolamide BID Ou and latanoprost QHS OU  Follow-up in about 4 months (around 5/16/2019) for IOP and Medication check.

## 2019-01-20 ENCOUNTER — OFFICE VISIT (OUTPATIENT)
Dept: URGENT CARE | Facility: CLINIC | Age: 84
End: 2019-01-20
Payer: MEDICARE

## 2019-01-20 VITALS
HEART RATE: 109 BPM | TEMPERATURE: 99 F | SYSTOLIC BLOOD PRESSURE: 153 MMHG | DIASTOLIC BLOOD PRESSURE: 96 MMHG | OXYGEN SATURATION: 96 % | BODY MASS INDEX: 30.86 KG/M2 | HEIGHT: 66 IN | WEIGHT: 192 LBS

## 2019-01-20 DIAGNOSIS — M54.9 BACK PAIN, UNSPECIFIED BACK LOCATION, UNSPECIFIED BACK PAIN LATERALITY, UNSPECIFIED CHRONICITY: Primary | ICD-10-CM

## 2019-01-20 DIAGNOSIS — R50.9 FEVER, UNSPECIFIED FEVER CAUSE: ICD-10-CM

## 2019-01-20 DIAGNOSIS — R05.9 COUGH: ICD-10-CM

## 2019-01-20 DIAGNOSIS — N39.0 URINARY TRACT INFECTION WITHOUT HEMATURIA, SITE UNSPECIFIED: ICD-10-CM

## 2019-01-20 LAB
BILIRUB UR QL STRIP: NEGATIVE
GLUCOSE UR QL STRIP: NEGATIVE
KETONES UR QL STRIP: NEGATIVE
LEUKOCYTE ESTERASE UR QL STRIP: POSITIVE
PH, POC UA: 5.5 (ref 5–8)
POC BLOOD, URINE: NEGATIVE
POC NITRATES, URINE: NEGATIVE
PROT UR QL STRIP: NEGATIVE
SP GR UR STRIP: 1.01 (ref 1–1.03)
UROBILINOGEN UR STRIP-ACNC: NORMAL (ref 0.1–1.1)

## 2019-01-20 PROCEDURE — 99214 OFFICE O/P EST MOD 30 MIN: CPT | Mod: 25,S$GLB,, | Performed by: PHYSICIAN ASSISTANT

## 2019-01-20 PROCEDURE — 3080F DIAST BP >= 90 MM HG: CPT | Mod: CPTII,S$GLB,, | Performed by: PHYSICIAN ASSISTANT

## 2019-01-20 PROCEDURE — 1101F PR PT FALLS ASSESS DOC 0-1 FALLS W/OUT INJ PAST YR: ICD-10-PCS | Mod: CPTII,S$GLB,, | Performed by: PHYSICIAN ASSISTANT

## 2019-01-20 PROCEDURE — 81003 POCT URINALYSIS, DIPSTICK, AUTOMATED, W/O SCOPE: ICD-10-PCS | Mod: QW,S$GLB,, | Performed by: PHYSICIAN ASSISTANT

## 2019-01-20 PROCEDURE — 1101F PT FALLS ASSESS-DOCD LE1/YR: CPT | Mod: CPTII,S$GLB,, | Performed by: PHYSICIAN ASSISTANT

## 2019-01-20 PROCEDURE — 81003 URINALYSIS AUTO W/O SCOPE: CPT | Mod: QW,S$GLB,, | Performed by: PHYSICIAN ASSISTANT

## 2019-01-20 PROCEDURE — 71046 XR CHEST PA AND LATERAL: ICD-10-PCS | Mod: S$GLB,,, | Performed by: RADIOLOGY

## 2019-01-20 PROCEDURE — 3080F PR MOST RECENT DIASTOLIC BLOOD PRESSURE >= 90 MM HG: ICD-10-PCS | Mod: CPTII,S$GLB,, | Performed by: PHYSICIAN ASSISTANT

## 2019-01-20 PROCEDURE — 3077F PR MOST RECENT SYSTOLIC BLOOD PRESSURE >= 140 MM HG: ICD-10-PCS | Mod: CPTII,S$GLB,, | Performed by: PHYSICIAN ASSISTANT

## 2019-01-20 PROCEDURE — 71046 X-RAY EXAM CHEST 2 VIEWS: CPT | Mod: S$GLB,,, | Performed by: RADIOLOGY

## 2019-01-20 PROCEDURE — 3077F SYST BP >= 140 MM HG: CPT | Mod: CPTII,S$GLB,, | Performed by: PHYSICIAN ASSISTANT

## 2019-01-20 PROCEDURE — 99214 PR OFFICE/OUTPT VISIT, EST, LEVL IV, 30-39 MIN: ICD-10-PCS | Mod: 25,S$GLB,, | Performed by: PHYSICIAN ASSISTANT

## 2019-01-20 RX ORDER — AMOXICILLIN AND CLAVULANATE POTASSIUM 500; 125 MG/1; MG/1
1 TABLET, FILM COATED ORAL 2 TIMES DAILY
Qty: 20 TABLET | Refills: 0 | Status: SHIPPED | OUTPATIENT
Start: 2019-01-20 | End: 2019-01-30

## 2019-01-20 NOTE — PROGRESS NOTES
"Subjective:       Patient ID: Holli Landrum is a 85 y.o. female.    Vitals:  height is 5' 5.5" (1.664 m) and weight is 87.1 kg (192 lb). Her oral temperature is 99.4 °F (37.4 °C). Her blood pressure is 153/96 (abnormal) and her pulse is 109. Her oxygen saturation is 96%.     Chief Complaint: Fever     Symptoms started 2 days ago with severe cough. Started nebulizer, flonase and tessalon pearls yesterday and today.   Does not want Bactrim.      Fever    This is a new problem. The current episode started in the past 7 days. The problem occurs daily. The problem has been waxing and waning. Associated symptoms include coughing and headaches. Pertinent negatives include no congestion, ear pain, nausea, rash, sore throat, vomiting or wheezing.       Constitution: Positive for fever. Negative for chills, sweating and fatigue.   HENT: Positive for sinus pain and sinus pressure. Negative for ear pain, congestion, sore throat and voice change.    Neck: Negative for painful lymph nodes.   Eyes: Negative for eye redness.   Respiratory: Positive for cough. Negative for chest tightness, sputum production, bloody sputum, COPD, shortness of breath, stridor, wheezing and asthma.    Gastrointestinal: Negative for nausea and vomiting.   Genitourinary: Positive for frequency, urgency and bladder incontinence.   Musculoskeletal: Positive for back pain. Negative for muscle ache.   Skin: Negative for rash.   Allergic/Immunologic: Negative for seasonal allergies and asthma.   Neurological: Positive for headaches.   Hematologic/Lymphatic: Negative for swollen lymph nodes.       Objective:      Physical Exam   Constitutional: She is oriented to person, place, and time. She appears well-developed and well-nourished. She is cooperative.  Non-toxic appearance. She does not appear ill. No distress.   HENT:   Head: Normocephalic and atraumatic.   Right Ear: Hearing, tympanic membrane, external ear and ear canal normal.   Left Ear: Hearing, " tympanic membrane, external ear and ear canal normal.   Nose: Nose normal. No mucosal edema, rhinorrhea or nasal deformity. No epistaxis. Right sinus exhibits no maxillary sinus tenderness and no frontal sinus tenderness. Left sinus exhibits no maxillary sinus tenderness and no frontal sinus tenderness.   Mouth/Throat: Uvula is midline, oropharynx is clear and moist and mucous membranes are normal. No trismus in the jaw. Normal dentition. No uvula swelling. No posterior oropharyngeal erythema.   Eyes: Conjunctivae and lids are normal. No scleral icterus.   Sclera clear bilat   Neck: Trachea normal, full passive range of motion without pain and phonation normal. Neck supple.   Cardiovascular: Regular rhythm, normal heart sounds, intact distal pulses and normal pulses. Tachycardia present.   Pulmonary/Chest: Effort normal and breath sounds normal. No respiratory distress.   Abdominal: Soft. Normal appearance and bowel sounds are normal. She exhibits no distension. There is no tenderness.   Musculoskeletal: Normal range of motion. She exhibits no edema or deformity.   Neurological: She is alert and oriented to person, place, and time. She exhibits normal muscle tone. Coordination normal.   Skin: Skin is warm, dry and intact. She is not diaphoretic. No pallor.   Psychiatric: She has a normal mood and affect. Her speech is normal and behavior is normal. Judgment and thought content normal. Cognition and memory are normal.   Nursing note and vitals reviewed.      Assessment:       1. Back pain, unspecified back location, unspecified back pain laterality, unspecified chronicity    2. Fever, unspecified fever cause    3. Urinary tract infection without hematuria, site unspecified    4. Cough        Plan:         Back pain, unspecified back location, unspecified back pain laterality, unspecified chronicity  -     POCT Urinalysis, Dipstick, Automated, W/O Scope  Twenty-five leukocytes    Fever, unspecified fever cause  -      X-Ray Chest PA And Lateral; Future; Expected date: 01/20/2019  No acute process seen    Urinary tract infection without hematuria, site unspecified    Cough    Other orders  -     amoxicillin-clavulanate 500-125mg (AUGMENTIN) 500-125 mg Tab; Take 1 tablet (500 mg total) by mouth 2 (two) times daily. for 10 days  Dispense: 20 tablet; Refill: 0     Patient states she did not take her sotalol this morning.  No acute process seen on chest x-ray.  We had a long discussion that I cannot rule out other cardiac or pulmonary reasons for her tachycardia, shortness of breath and cough.  Patient and family member have opted to go to the ED for further evaluation and treatment.    You must understand that you've received an Urgent Care treatment only and that you may be released before all your medical problems are known or treated. You, the patient, will arrange for follow up care as instructed.  Follow up with your PCP or specialty clinic as directed in the next 1-2 weeks if not improved or as needed.  You can call [phone] to schedule an appointment with the appropriate provider.  If your condition worsens we recommend that you receive another evaluation at the emergency room immediately or contact your primary medical clinics after hours call service to discuss your concerns.  Please return here or go to the Emergency Department for any concerns or worsening of condition.

## 2019-01-23 ENCOUNTER — TELEPHONE (OUTPATIENT)
Dept: URGENT CARE | Facility: CLINIC | Age: 84
End: 2019-01-23

## 2019-03-13 ENCOUNTER — OFFICE VISIT (OUTPATIENT)
Dept: FAMILY MEDICINE | Facility: CLINIC | Age: 84
End: 2019-03-13
Payer: MEDICARE

## 2019-03-13 ENCOUNTER — LAB VISIT (OUTPATIENT)
Dept: LAB | Facility: HOSPITAL | Age: 84
End: 2019-03-13
Payer: MEDICARE

## 2019-03-13 VITALS
HEIGHT: 65 IN | SYSTOLIC BLOOD PRESSURE: 124 MMHG | DIASTOLIC BLOOD PRESSURE: 80 MMHG | OXYGEN SATURATION: 95 % | BODY MASS INDEX: 32.73 KG/M2 | WEIGHT: 196.44 LBS | HEART RATE: 58 BPM

## 2019-03-13 DIAGNOSIS — I70.0 ATHEROSCLEROSIS OF AORTA: ICD-10-CM

## 2019-03-13 DIAGNOSIS — M79.672 LEFT FOOT PAIN: ICD-10-CM

## 2019-03-13 DIAGNOSIS — M79.672 LEFT FOOT PAIN: Primary | ICD-10-CM

## 2019-03-13 DIAGNOSIS — I10 ESSENTIAL HYPERTENSION: ICD-10-CM

## 2019-03-13 LAB
ANION GAP SERPL CALC-SCNC: 14 MMOL/L
BASOPHILS # BLD AUTO: 0.06 K/UL
BASOPHILS NFR BLD: 0.7 %
BUN SERPL-MCNC: 25 MG/DL
CALCIUM SERPL-MCNC: 10.5 MG/DL
CHLORIDE SERPL-SCNC: 99 MMOL/L
CO2 SERPL-SCNC: 25 MMOL/L
CREAT SERPL-MCNC: 1 MG/DL
DIFFERENTIAL METHOD: ABNORMAL
EOSINOPHIL # BLD AUTO: 0.4 K/UL
EOSINOPHIL NFR BLD: 4.9 %
ERYTHROCYTE [DISTWIDTH] IN BLOOD BY AUTOMATED COUNT: 14.7 %
EST. GFR  (AFRICAN AMERICAN): 59.4 ML/MIN/1.73 M^2
EST. GFR  (NON AFRICAN AMERICAN): 51.5 ML/MIN/1.73 M^2
GLUCOSE SERPL-MCNC: 93 MG/DL
HCT VFR BLD AUTO: 46.7 %
HGB BLD-MCNC: 15 G/DL
IMM GRANULOCYTES # BLD AUTO: 0.02 K/UL
IMM GRANULOCYTES NFR BLD AUTO: 0.2 %
LYMPHOCYTES # BLD AUTO: 2.8 K/UL
LYMPHOCYTES NFR BLD: 34.9 %
MCH RBC QN AUTO: 28.4 PG
MCHC RBC AUTO-ENTMCNC: 32.1 G/DL
MCV RBC AUTO: 88 FL
MONOCYTES # BLD AUTO: 0.9 K/UL
MONOCYTES NFR BLD: 11 %
NEUTROPHILS # BLD AUTO: 3.9 K/UL
NEUTROPHILS NFR BLD: 48.3 %
NRBC BLD-RTO: 0 /100 WBC
PLATELET # BLD AUTO: 326 K/UL
PMV BLD AUTO: 11.8 FL
POTASSIUM SERPL-SCNC: 3.4 MMOL/L
RBC # BLD AUTO: 5.29 M/UL
SODIUM SERPL-SCNC: 138 MMOL/L
URATE SERPL-MCNC: 12.9 MG/DL
WBC # BLD AUTO: 8.09 K/UL

## 2019-03-13 PROCEDURE — 1101F PT FALLS ASSESS-DOCD LE1/YR: CPT | Mod: HCNC,CPTII,S$GLB, | Performed by: NURSE PRACTITIONER

## 2019-03-13 PROCEDURE — 36415 COLL VENOUS BLD VENIPUNCTURE: CPT | Mod: HCNC,PN

## 2019-03-13 PROCEDURE — 3079F DIAST BP 80-89 MM HG: CPT | Mod: HCNC,CPTII,S$GLB, | Performed by: NURSE PRACTITIONER

## 2019-03-13 PROCEDURE — 3074F SYST BP LT 130 MM HG: CPT | Mod: HCNC,CPTII,S$GLB, | Performed by: NURSE PRACTITIONER

## 2019-03-13 PROCEDURE — 99499 UNLISTED E&M SERVICE: CPT | Mod: HCNC,S$GLB,, | Performed by: NURSE PRACTITIONER

## 2019-03-13 PROCEDURE — 99499 RISK ADDL DX/OHS AUDIT: ICD-10-PCS | Mod: HCNC,S$GLB,, | Performed by: NURSE PRACTITIONER

## 2019-03-13 PROCEDURE — 1101F PR PT FALLS ASSESS DOC 0-1 FALLS W/OUT INJ PAST YR: ICD-10-PCS | Mod: HCNC,CPTII,S$GLB, | Performed by: NURSE PRACTITIONER

## 2019-03-13 PROCEDURE — 85025 COMPLETE CBC W/AUTO DIFF WBC: CPT | Mod: HCNC

## 2019-03-13 PROCEDURE — 99999 PR PBB SHADOW E&M-EST. PATIENT-LVL V: CPT | Mod: PBBFAC,HCNC,, | Performed by: NURSE PRACTITIONER

## 2019-03-13 PROCEDURE — 3079F PR MOST RECENT DIASTOLIC BLOOD PRESSURE 80-89 MM HG: ICD-10-PCS | Mod: HCNC,CPTII,S$GLB, | Performed by: NURSE PRACTITIONER

## 2019-03-13 PROCEDURE — 84550 ASSAY OF BLOOD/URIC ACID: CPT | Mod: HCNC

## 2019-03-13 PROCEDURE — 99999 PR PBB SHADOW E&M-EST. PATIENT-LVL V: ICD-10-PCS | Mod: PBBFAC,HCNC,, | Performed by: NURSE PRACTITIONER

## 2019-03-13 PROCEDURE — 3074F PR MOST RECENT SYSTOLIC BLOOD PRESSURE < 130 MM HG: ICD-10-PCS | Mod: HCNC,CPTII,S$GLB, | Performed by: NURSE PRACTITIONER

## 2019-03-13 PROCEDURE — 99214 OFFICE O/P EST MOD 30 MIN: CPT | Mod: HCNC,S$GLB,, | Performed by: NURSE PRACTITIONER

## 2019-03-13 PROCEDURE — 80048 BASIC METABOLIC PNL TOTAL CA: CPT | Mod: HCNC

## 2019-03-13 PROCEDURE — 99214 PR OFFICE/OUTPT VISIT, EST, LEVL IV, 30-39 MIN: ICD-10-PCS | Mod: HCNC,S$GLB,, | Performed by: NURSE PRACTITIONER

## 2019-03-13 NOTE — PROGRESS NOTES
This dictation has been generated using Modal Fluency Dictation some phonetic errors may occur. Please contact author for clarification if needed.     Problem List Items Addressed This Visit     Essential hypertension    Atherosclerosis of aorta    Overview     04/2016 on cxr and ct. Difficulty tolerating statin.            Other Visit Diagnoses     Left foot pain    -  Primary    Relevant Orders    CBC auto differential    Uric acid    Basic metabolic panel          Orders Placed This Encounter    CBC auto differential    Uric acid    Basic metabolic panel     Ext pain. Rule out gout and cellulitis. Activity limited as tolerated. Check labs and follow up. I will review and add meds as needed.     Follow-up in about 2 days (around 3/15/2019).    ________________________________________________________________  ________________________________________________________________      Chief Complaint   Patient presents with    Cellulitis     possible cellulitis of left leg     History of present illness  This 85 y.o. presents today for complaint of foot pain and redness. Worse yesterday present for 2+days. No fever chills nausea or vomiting. Denies pain redness elsewhere. Denies history of gout. Limits fluids due for cardiac. Pt denies a history of injury.   ROS:   CONST: weight stable.  EYES: no vision change.  ENT: No sore throat, headache, or earache.  CV: no chest pain w/ exertion.  RESP: No shortness of breath.  GI: no nausea, vomiting, diarrhea. No dysphagia. Appetite good.   : no urinary issues.  MUSCULOSKELETAL: no new myalgias or arthralgias.  SKIN: no new changes.  NEURO: no focal deficits. Denies headache.  PSYCH: no new issues.  ENDOCRINE: no polyuria.  HEME: no lymph nodes.  ALLERGY: no general pruritis.      Reviewed histories. No dm.   Past Medical History:   Diagnosis Date    Anticoagulant long-term use     Plavix    Arthritis     Atrial fibrillation 11/20/2012    Cancer     skin cancer to face     Cataract     OU done//    CHF (congestive heart failure)     COPD (chronic obstructive pulmonary disease)     no oxygen    Coronary artery disease     6 stents    Essential hypertension 5/5/2010    GERD (gastroesophageal reflux disease) 11/20/2012    Glaucoma     Hypertensive heart disease with heart failure 02/05/2013    JA on CPAP     Paroxysmal ventricular tachycardia     per problem list       Past Surgical History:   Procedure Laterality Date    ANGIOGRAM-CORONARY Left 10/27/2016    [facility] CATH    CARDIAC CATHETERIZATION  2013, 2014    has 5 stents    CARDIAC SURGERY  2012    stents    CATARACT EXTRACTION W/  INTRAOCULAR LENS IMPLANT Left 11/01/2018    Dr Rose    CATARACT EXTRACTION W/  INTRAOCULAR LENS IMPLANT Right 12/13/2018    Dr Rose//    CHOLECYSTECTOMY      CYSTOGRAM N/A 2/24/2015    [facility] at [facility] OR    CYSTOSCOPY N/A 2/24/2015    [facility] OR    CYSTOSCOPY W/BLADDER BIOPSY with Fulguration  N/A 4/10/2017    [facility] by Robb Knight [facility] at [facility] OR    HYSTERECTOMY  1969    OVARIAN CYST REMOVAL  teenager    PHACOEMULSIFICATION, CATARACT Right 12/13/2018    [facility] Aleksandar Rose Jr., [facility] at [facility] OR    PHACOEMULSIFICATION, CATARACT Left 11/1/2018    [facility] at [facility] OR    TONSILLECTOMY      aw/denoids    VASCULAR SURGERY      to remove clot from right leg       Family History   Problem Relation Age of Onset    Diabetes Brother     Heart disease Brother     Diabetes Mother     Cancer Maternal Grandfather     Clotting disorder Son         bleeding after tonsillectomy only    Amblyopia Neg Hx     Blindness Neg Hx     Cataracts Neg Hx     Glaucoma Neg Hx     Hypertension Neg Hx     Macular degeneration Neg Hx     Retinal detachment Neg Hx     Strabismus Neg Hx     Stroke Neg Hx     Thyroid disease Neg Hx        Social History     Socioeconomic  History    Marital status:      Spouse name: None    Number of children: None    Years of education: None    Highest education level: None   Social Needs    Financial resource strain: None    Food insecurity - worry: None    Food insecurity - inability: None    Transportation needs - medical: None    Transportation needs - non-medical: None   Occupational History    None   Tobacco Use    Smoking status: Former Smoker     Types: Cigarettes     Last attempt to quit:      Years since quittin.2    Smokeless tobacco: Never Used    Tobacco comment: quit smoking  //had smoked for 1 yr//   Substance and Sexual Activity    Alcohol use: No     Alcohol/week: 0.0 oz    Drug use: No    Sexual activity: No     Partners: Male     Birth control/protection: Abstinence   Other Topics Concern    Are you pregnant or think you may be? Not Asked    Breast-feeding Not Asked   Social History Narrative    None       Current Outpatient Medications   Medication Sig Dispense Refill    cholecalciferol, vitamin D3, (VITAMIN D3) 5,000 unit Tab Take 5,000 Units by mouth once daily.      clopidogrel (PLAVIX) 75 mg tablet Take 1 tablet (75 mg total) by mouth once daily. 90 tablet 3    co-enzyme Q-10 30 mg capsule Take 30 mg by mouth 3 (three) times daily.      CRANBERRY/B.COAGULAN/C/CALCIUM (CRANBERRY-PROBIOTIC ORAL) Take 2 tablets by mouth once daily.      dorzolamide (TRUSOPT) 2 % ophthalmic solution Place 1 drop into the left eye 2 (two) times daily. 30 mL 3    albuterol (PROVENTIL) 2.5 mg /3 mL (0.083 %) nebulizer solution Take 3 mLs (2.5 mg total) by nebulization every 6 (six) hours as needed for Wheezing. Rescue 1 Box 11    albuterol sulfate 2.5 mg/0.5 mL Nebu Take 2.5 mg by nebulization every 4 (four) hours as needed. Rescue 20 each 0    benzonatate (TESSALON PERLES) 100 MG capsule Take 1 capsule (100 mg total) by mouth every 6 (six) hours as needed. 60 capsule 1    fish oil-omega-3 fatty acids  300-1,000 mg capsule Take 2 g by mouth once daily.      fluticasone (FLONASE) 50 mcg/actuation nasal spray 2 sprays (100 mcg total) by Each Nare route daily as needed for Allergies. 16 g 11    furosemide (LASIX) 40 MG tablet Take 1 tablet (40 mg total) by mouth once daily. 1 Tablet Oral Every day 90 tablet 3    GRAPE SEED EXTRACT ORAL Take 1 tablet by mouth once daily.       latanoprost 0.005 % ophthalmic solution Place 1 drop into both eyes every evening. 3 Bottle 3    magnesium oxide (MAG-OX) 400 mg tablet Take 400 mg by mouth once daily.      metOLazone (ZAROXOLYN) 5 MG tablet Take 1 tablet (5 mg total) by mouth every Monday and Friday. 24 tablet 3    nitroGLYCERIN 0.4 MG/HR TD PT24 (NITRODUR) 0.4 mg/hr PLACE 1 PATCH ONTO THE SKIN ONCE DAILY. 90 patch 3    OLIVE LEAF EXTRACT ORAL Take by mouth.      potassium chloride (KLOR-CON) 10 MEQ TbSR Take 1 tablet (10 mEq total) by mouth 2 (two) times daily. 180 tablet 3    sotalol (BETAPACE) 80 MG tablet Take one-half tablet (40 mg total) by mouth once daily. 45 tablet 3     No current facility-administered medications for this visit.        Review of patient's allergies indicates:   Allergen Reactions    Timoptic [timolol maleate] Shortness Of Breath    Ciprofloxacin Other (See Comments)     Muscle ache       Physical examination  Vitals Reviewed  Gen. Well-dressed well-nourished   Skin warm dry and intact.  No rashes noted.  Neck is supple without adenopathy  Chest.  Respirations are even unlabored.  Lungs are clear to auscultation.  Cardiac regular rate and rhythm.  No chest wall adenopathy noted.  Neuro. Awake alert oriented x4.  Normal judgment and cognition noted.  Extremities no clubbing cyanosis or edema noted. Faint redness top of left foot. None at ankle. No marked edema. No localized warmth. Ankle is stable.     Call or return to clinic prn if these symptoms worsen or fail to improve as anticipated.

## 2019-03-14 ENCOUNTER — TELEPHONE (OUTPATIENT)
Dept: FAMILY MEDICINE | Facility: CLINIC | Age: 84
End: 2019-03-14

## 2019-03-14 DIAGNOSIS — M10.9 GOUT, UNSPECIFIED CAUSE, UNSPECIFIED CHRONICITY, UNSPECIFIED SITE: ICD-10-CM

## 2019-03-14 NOTE — TELEPHONE ENCOUNTER
Redness in foot suspicious for gout. The gout level was elevated. Continue water intake. Avoid meals high in protein. It appeared better at visit so continue to monitor.

## 2019-03-15 ENCOUNTER — OFFICE VISIT (OUTPATIENT)
Dept: FAMILY MEDICINE | Facility: CLINIC | Age: 84
End: 2019-03-15
Payer: MEDICARE

## 2019-03-15 VITALS
SYSTOLIC BLOOD PRESSURE: 120 MMHG | DIASTOLIC BLOOD PRESSURE: 82 MMHG | WEIGHT: 183.88 LBS | OXYGEN SATURATION: 95 % | HEIGHT: 65 IN | BODY MASS INDEX: 30.64 KG/M2 | HEART RATE: 63 BPM

## 2019-03-15 DIAGNOSIS — M10.9 GOUT, UNSPECIFIED CAUSE, UNSPECIFIED CHRONICITY, UNSPECIFIED SITE: Primary | ICD-10-CM

## 2019-03-15 PROCEDURE — 3079F DIAST BP 80-89 MM HG: CPT | Mod: HCNC,CPTII,S$GLB, | Performed by: NURSE PRACTITIONER

## 2019-03-15 PROCEDURE — 3074F SYST BP LT 130 MM HG: CPT | Mod: HCNC,CPTII,S$GLB, | Performed by: NURSE PRACTITIONER

## 2019-03-15 PROCEDURE — 3074F PR MOST RECENT SYSTOLIC BLOOD PRESSURE < 130 MM HG: ICD-10-PCS | Mod: HCNC,CPTII,S$GLB, | Performed by: NURSE PRACTITIONER

## 2019-03-15 PROCEDURE — 99214 PR OFFICE/OUTPT VISIT, EST, LEVL IV, 30-39 MIN: ICD-10-PCS | Mod: HCNC,S$GLB,, | Performed by: NURSE PRACTITIONER

## 2019-03-15 PROCEDURE — 1101F PR PT FALLS ASSESS DOC 0-1 FALLS W/OUT INJ PAST YR: ICD-10-PCS | Mod: HCNC,CPTII,S$GLB, | Performed by: NURSE PRACTITIONER

## 2019-03-15 PROCEDURE — 99999 PR PBB SHADOW E&M-EST. PATIENT-LVL IV: ICD-10-PCS | Mod: PBBFAC,HCNC,, | Performed by: NURSE PRACTITIONER

## 2019-03-15 PROCEDURE — 3079F PR MOST RECENT DIASTOLIC BLOOD PRESSURE 80-89 MM HG: ICD-10-PCS | Mod: HCNC,CPTII,S$GLB, | Performed by: NURSE PRACTITIONER

## 2019-03-15 PROCEDURE — 99214 OFFICE O/P EST MOD 30 MIN: CPT | Mod: HCNC,S$GLB,, | Performed by: NURSE PRACTITIONER

## 2019-03-15 PROCEDURE — 1101F PT FALLS ASSESS-DOCD LE1/YR: CPT | Mod: HCNC,CPTII,S$GLB, | Performed by: NURSE PRACTITIONER

## 2019-03-15 PROCEDURE — 99999 PR PBB SHADOW E&M-EST. PATIENT-LVL IV: CPT | Mod: PBBFAC,HCNC,, | Performed by: NURSE PRACTITIONER

## 2019-03-15 NOTE — PROGRESS NOTES
This dictation has been generated using Modal Fluency Dictation some phonetic errors may occur. Please contact author for clarification if needed.     Problem List Items Addressed This Visit     Gout - Primary    Overview     Foot pain March 2019 with elevated uric acid level.                   GOUT. Discussed diet for 20 minutes. Activity limited as tolerated. Check labs and follow up.   In excessive of 25 minutes spent with patient with greater than 50% of time dedicated to education on symptoms, diagnosis, treatments, and coordination of care.  We had a long discussion about protein and dehydration.  She does take diuretics.  Explained to her that more than 4 oz of protein at 1 setting could contribute to gout flares.  She needs to spread out her protein intake throughout the day.  She does need to continue her 5 glasses of water 8 oz.    No Follow-up on file.    ________________________________________________________________  ________________________________________________________________      Chief Complaint   Patient presents with    Follow-up     follow up on gout on left foot     History of present illness  This 85 y.o. presents today for complaint of left foot pain follow-up.  Symptoms are improving.  She has been watching her water intake.  She has had a difficult time understanding what protein intake to limit.  She does identify beans and me as being a source of protein.  We discussed measured amounts of protein in the diet.  The foot pain is improved.  She is walking without assistance.  She denies any fever or chills.  Overall symptoms dramatically improved.  LOV this week: complaint of foot pain and redness. Worse yesterday present for 2+days. No fever chills nausea or vomiting. Denies pain redness elsewhere. Denies history of gout. Limits fluids due for cardiac. Pt denies a history of injury.   ROS:   CONST: weight stable.  EYES: no vision change.  ENT: No sore throat, headache, or earache.  CV:  no chest pain w/ exertion.  RESP: No shortness of breath.  GI: no nausea, vomiting, diarrhea. No dysphagia. Appetite good.   : no urinary issues.  MUSCULOSKELETAL: no new myalgias or arthralgias.  SKIN: no new changes.  NEURO: no focal deficits. Denies headache.  PSYCH: no new issues.  ENDOCRINE: no polyuria.  HEME: no lymph nodes.  ALLERGY: no general pruritis.      Reviewed histories. No dm.   Past Medical History:   Diagnosis Date    Anticoagulant long-term use     Plavix    Arthritis     Atrial fibrillation 11/20/2012    Cancer     skin cancer to face    Cataract     OU done//    CHF (congestive heart failure)     COPD (chronic obstructive pulmonary disease)     no oxygen    Coronary artery disease     6 stents    Essential hypertension 5/5/2010    GERD (gastroesophageal reflux disease) 11/20/2012    Glaucoma     Hypertensive heart disease with heart failure 02/05/2013    JA on CPAP     Paroxysmal ventricular tachycardia     per problem list       Past Surgical History:   Procedure Laterality Date    ANGIOGRAM-CORONARY Left 10/27/2016    [facility] CATH    CARDIAC CATHETERIZATION  2013, 2014    has 5 stents    CARDIAC SURGERY  2012    stents    CATARACT EXTRACTION W/  INTRAOCULAR LENS IMPLANT Left 11/01/2018    Dr Rose    CATARACT EXTRACTION W/  INTRAOCULAR LENS IMPLANT Right 12/13/2018    Dr Rose//    CHOLECYSTECTOMY      CYSTOGRAM N/A 2/24/2015    [facility] OR    CYSTOSCOPY N/A 2/24/2015    [facility] OR    CYSTOSCOPY W/BLADDER BIOPSY with Fulguration  N/A 4/10/2017    [facility] by Robb Knight [facility] at [facility] OR    HYSTERECTOMY  1969    OVARIAN CYST REMOVAL  teenager    PHACOEMULSIFICATION, CATARACT Right 12/13/2018    Per[facility]ar Rose Jr., [facility] at [facility] OR    PHACOEMULSIFICATION, CATARACT Left 11/1/2018    [facility] OR    TONSILLECTOMY       aw/denoids    VASCULAR SURGERY      to remove clot from right leg       Family History   Problem Relation Age of Onset    Diabetes Brother     Heart disease Brother     Diabetes Mother     Cancer Maternal Grandfather     Clotting disorder Son         bleeding after tonsillectomy only    Amblyopia Neg Hx     Blindness Neg Hx     Cataracts Neg Hx     Glaucoma Neg Hx     Hypertension Neg Hx     Macular degeneration Neg Hx     Retinal detachment Neg Hx     Strabismus Neg Hx     Stroke Neg Hx     Thyroid disease Neg Hx        Social History     Socioeconomic History    Marital status:      Spouse name: None    Number of children: None    Years of education: None    Highest education level: None   Social Needs    Financial resource strain: None    Food insecurity - worry: None    Food insecurity - inability: None    Transportation needs - medical: None    Transportation needs - non-medical: None   Occupational History    None   Tobacco Use    Smoking status: Former Smoker     Types: Cigarettes     Last attempt to quit:      Years since quittin.2    Smokeless tobacco: Never Used    Tobacco comment: quit smoking  //had smoked for 1 yr//   Substance and Sexual Activity    Alcohol use: No     Alcohol/week: 0.0 oz    Drug use: No    Sexual activity: No     Partners: Male     Birth control/protection: Abstinence   Other Topics Concern    Are you pregnant or think you may be? Not Asked    Breast-feeding Not Asked   Social History Narrative    None       Current Outpatient Medications   Medication Sig Dispense Refill    albuterol (PROVENTIL) 2.5 mg /3 mL (0.083 %) nebulizer solution Take 3 mLs (2.5 mg total) by nebulization every 6 (six) hours as needed for Wheezing. Rescue 1 Box 11    albuterol sulfate 2.5 mg/0.5 mL Nebu Take 2.5 mg by nebulization every 4 (four) hours as needed. Rescue 20 each 0    cholecalciferol, vitamin D3, (VITAMIN D3) 5,000 unit Tab Take 5,000 Units  by mouth once daily.      clopidogrel (PLAVIX) 75 mg tablet Take 1 tablet (75 mg total) by mouth once daily. 90 tablet 3    co-enzyme Q-10 30 mg capsule Take 30 mg by mouth 3 (three) times daily.      CRANBERRY/B.COAGULAN/C/CALCIUM (CRANBERRY-PROBIOTIC ORAL) Take 2 tablets by mouth once daily.      dorzolamide (TRUSOPT) 2 % ophthalmic solution Place 1 drop into the left eye 2 (two) times daily. 30 mL 3    fish oil-omega-3 fatty acids 300-1,000 mg capsule Take 2 g by mouth once daily.      furosemide (LASIX) 40 MG tablet Take 1 tablet (40 mg total) by mouth once daily. 1 Tablet Oral Every day 90 tablet 3    GRAPE SEED EXTRACT ORAL Take 1 tablet by mouth once daily.       latanoprost 0.005 % ophthalmic solution Place 1 drop into both eyes every evening. 3 Bottle 3    magnesium oxide (MAG-OX) 400 mg tablet Take 400 mg by mouth once daily.      metOLazone (ZAROXOLYN) 5 MG tablet Take 1 tablet (5 mg total) by mouth every Monday and Friday. 24 tablet 3    OLIVE LEAF EXTRACT ORAL Take by mouth.      potassium chloride (KLOR-CON) 10 MEQ TbSR Take 1 tablet (10 mEq total) by mouth 2 (two) times daily. 180 tablet 3    sotalol (BETAPACE) 80 MG tablet Take one-half tablet (40 mg total) by mouth once daily. 45 tablet 3    benzonatate (TESSALON PERLES) 100 MG capsule Take 1 capsule (100 mg total) by mouth every 6 (six) hours as needed. 60 capsule 1    fluticasone (FLONASE) 50 mcg/actuation nasal spray 2 sprays (100 mcg total) by Each Nare route daily as needed for Allergies. 16 g 11    nitroGLYCERIN 0.4 MG/HR TD PT24 (NITRODUR) 0.4 mg/hr PLACE 1 PATCH ONTO THE SKIN ONCE DAILY. 90 patch 3     No current facility-administered medications for this visit.        Review of patient's allergies indicates:   Allergen Reactions    Timoptic [timolol maleate] Shortness Of Breath    Ciprofloxacin Other (See Comments)     Muscle ache       Physical examination  Vitals Reviewed  Gen. Well-dressed well-nourished   Skin warm dry  and intact.  No rashes noted.  Neck is supple without adenopathy  Chest.  Respirations are even unlabored.  Lungs are clear to auscultation.  Cardiac regular rate and rhythm.  No chest wall adenopathy noted.  Neuro. Awake alert oriented x4.  Normal judgment and cognition noted.  Extremities no clubbing cyanosis or edema noted. Redness top of left foot(almost resolve). None at ankle. No marked edema. No localized warmth. Ankle is stable.     Call or return to clinic prn if these symptoms worsen or fail to improve as anticipated.

## 2019-03-29 DIAGNOSIS — H40.053 OCULAR HYPERTENSION, BILATERAL: ICD-10-CM

## 2019-03-29 RX ORDER — LATANOPROST 50 UG/ML
1 SOLUTION/ DROPS OPHTHALMIC NIGHTLY
Qty: 5 ML | Refills: 6 | Status: SHIPPED | OUTPATIENT
Start: 2019-03-29 | End: 2020-06-24

## 2019-04-05 RX ORDER — DORZOLAMIDE HCL 20 MG/ML
1 SOLUTION/ DROPS OPHTHALMIC 2 TIMES DAILY
Qty: 30 ML | Refills: 3 | Status: SHIPPED | OUTPATIENT
Start: 2019-04-05 | End: 2019-11-14 | Stop reason: SDUPTHER

## 2019-05-17 ENCOUNTER — OFFICE VISIT (OUTPATIENT)
Dept: OPHTHALMOLOGY | Facility: CLINIC | Age: 84
End: 2019-05-17
Payer: MEDICARE

## 2019-05-17 DIAGNOSIS — H10.13 ALLERGIC CONJUNCTIVITIS, BILATERAL: ICD-10-CM

## 2019-05-17 DIAGNOSIS — Z96.1 PSEUDOPHAKIA OF BOTH EYES: ICD-10-CM

## 2019-05-17 DIAGNOSIS — H40.053 OCULAR HYPERTENSION, BILATERAL: Primary | ICD-10-CM

## 2019-05-17 DIAGNOSIS — H43.813 POSTERIOR VITREOUS DETACHMENT, BILATERAL: ICD-10-CM

## 2019-05-17 PROCEDURE — 92012 PR EYE EXAM, EST PATIENT,INTERMED: ICD-10-PCS | Mod: HCNC,S$GLB,, | Performed by: OPHTHALMOLOGY

## 2019-05-17 PROCEDURE — 99999 PR PBB SHADOW E&M-EST. PATIENT-LVL III: ICD-10-PCS | Mod: PBBFAC,HCNC,, | Performed by: OPHTHALMOLOGY

## 2019-05-17 PROCEDURE — 99999 PR PBB SHADOW E&M-EST. PATIENT-LVL III: CPT | Mod: PBBFAC,HCNC,, | Performed by: OPHTHALMOLOGY

## 2019-05-17 PROCEDURE — 92012 INTRM OPH EXAM EST PATIENT: CPT | Mod: HCNC,S$GLB,, | Performed by: OPHTHALMOLOGY

## 2019-05-17 NOTE — PATIENT INSTRUCTIONS
"  What Is Glaucoma?    Glaucoma is an eye disease that can cause blindness. If caught early, it can usually be controlled. But it often has no symptoms, so you need regular eye exams. Glaucoma usually begins when pressure builds up in the eye. This pressure can damage the optic nerve. The optic nerve sends messages to the brain so you can see. There are two main kinds of glaucoma: "open-angle" and "closed-angle."  Drainage area  The eye is always producing fluid. The eye's drainage areas may become clogged or blocked. Too much fluid stays in the eye. This increases eye pressure.  Optic nerve  Too much pressure in the eye can damage the optic nerve. If damaged, this nerve cannot send the messages to the brain that let you see.  Open-Angle Glaucoma  Open-angle is the most common kind of glaucoma. It occurs slowly as people age. The drainage area in the eye becomes clogged. Not enough fluid drains from the eye, so pressure slowly builds up. This causes gradual loss of side (peripheral) vision. You may not even notice changes until much of your vision is lost.  Closed-Angle Glaucoma  Closed-angle glaucoma is less common than open-angle. It usually comes on quickly. The drainage area in the eye suddenly becomes completely blocked. Eye pressure builds rapidly. You may notice blurred vision and rainbow halos around lights. You may also have headaches, nausea, vomiting, and severe pain. If not treated right away, blindness can occur quickly.  Date Last Reviewed: 6/1/2015  © 9283-2054 [employer]. [address] [place] [address], [place] [address]. All rights reserved. This information is not intended as a substitute for professional medical care. Always follow your healthcare professional's instructions.        "

## 2019-05-17 NOTE — PROGRESS NOTES
HPI     Glaucoma      Additional comments: 4 month iop ck              Comments     DLS: 1/16/19    Pt states feels right eye upper lid is lower then the left eye. + left eye   feels sore off and on, no pain just sore.     Gtts: Trusopt BID OS, Latanoprost QHS OU, Systane PRN OU            Last edited by Cheryle Quintana on 5/17/2019  1:34 PM. (History)        ROS     Negative for: Constitutional, Gastrointestinal, Neurological, Skin,   Genitourinary, Musculoskeletal, HENT, Endocrine, Cardiovascular, Eyes,   Respiratory, Psychiatric, Allergic/Imm, Heme/Lymph    Last edited by Aleksandar Rose Jr., MD on 5/17/2019  1:58 PM. (History)        Assessment /Plan     For exam results, see Encounter Report.  Ocular hypertension, bilateral- good IOP  Continue with Trusopt OS and latanoprost QHS OU  Pseudophakia of both eyes  Stable, observe  Posterior vitreous detachment, bilateral  RD precautions given  Allergic conjunctivitis, bilateral  Increase systane 3-4x daily OU  Follow up in about 4 months (around 9/17/2019) for IOP and Medication check.

## 2019-07-03 ENCOUNTER — TELEPHONE (OUTPATIENT)
Dept: FAMILY MEDICINE | Facility: CLINIC | Age: 84
End: 2019-07-03

## 2019-07-03 ENCOUNTER — LAB VISIT (OUTPATIENT)
Dept: LAB | Facility: HOSPITAL | Age: 84
End: 2019-07-03
Attending: FAMILY MEDICINE
Payer: MEDICARE

## 2019-07-03 DIAGNOSIS — R30.0 DYSURIA: Primary | ICD-10-CM

## 2019-07-03 DIAGNOSIS — M10.9 GOUT, UNSPECIFIED CAUSE, UNSPECIFIED CHRONICITY, UNSPECIFIED SITE: ICD-10-CM

## 2019-07-03 DIAGNOSIS — R30.0 DYSURIA: ICD-10-CM

## 2019-07-03 LAB
BACTERIA #/AREA URNS HPF: ABNORMAL /HPF
BILIRUB UR QL STRIP: NEGATIVE
CAOX CRY URNS QL MICRO: ABNORMAL
CLARITY UR: ABNORMAL
COLOR UR: YELLOW
GLUCOSE UR QL STRIP: NEGATIVE
HGB UR QL STRIP: ABNORMAL
HYALINE CASTS #/AREA URNS LPF: 1 /LPF
KETONES UR QL STRIP: NEGATIVE
LEUKOCYTE ESTERASE UR QL STRIP: ABNORMAL
MICROSCOPIC COMMENT: ABNORMAL
NITRITE UR QL STRIP: NEGATIVE
PH UR STRIP: 6 [PH] (ref 5–8)
PROT UR QL STRIP: ABNORMAL
RBC #/AREA URNS HPF: 1 /HPF (ref 0–4)
SP GR UR STRIP: 1.02 (ref 1–1.03)
SQUAMOUS #/AREA URNS HPF: 1 /HPF
URN SPEC COLLECT METH UR: ABNORMAL
WBC #/AREA URNS HPF: 2 /HPF (ref 0–5)

## 2019-07-03 PROCEDURE — 87086 URINE CULTURE/COLONY COUNT: CPT | Mod: HCNC

## 2019-07-03 PROCEDURE — 81000 URINALYSIS NONAUTO W/SCOPE: CPT | Mod: HCNC,PO

## 2019-07-03 RX ORDER — ALLOPURINOL 100 MG/1
100 TABLET ORAL DAILY
Qty: 30 TABLET | Refills: 1 | Status: SHIPPED | OUTPATIENT
Start: 2019-07-03 | End: 2019-08-10 | Stop reason: SDUPTHER

## 2019-07-03 RX ORDER — COLCHICINE 0.6 MG/1
0.6 TABLET ORAL DAILY
Qty: 30 TABLET | Refills: 0 | Status: SHIPPED | OUTPATIENT
Start: 2019-07-03 | End: 2019-08-07

## 2019-07-03 NOTE — TELEPHONE ENCOUNTER
Estimated Creatinine Clearance: 36.7 mL/min (based on SCr of 1.2 mg/dL).     1. Repeat urine for cx.  2. Start allopurinol and colchicine (once daily for each) for high uric acid levels.   3. Repeat bmp and uric acid in 10 days. Review gout diet. Increase water intake.

## 2019-07-03 NOTE — TELEPHONE ENCOUNTER
Tried to reach pt. No answer, will try again later and I left a message on answering machine.    Contacting to inform pt of results and advise per provider. (see note below)    Estimated Creatinine Clearance: 36.7 mL/min (based on SCr of 1.2 mg/dL).      1. Repeat urine for cx.  2. Start allopurinol and colchicine (once daily for each) for high uric acid levels.   3. Repeat bmp and uric acid in 10 days. Review gout diet. Increase water intake.

## 2019-07-03 NOTE — TELEPHONE ENCOUNTER
----- Message from Elvia Cardenas sent at 7/3/2019 11:12 AM CDT -----  Contact: Holli pt  Type:  Sooner Apoointment Request    Caller is requesting a sooner appointment.  Caller declined first available appointment listed below.  Caller will not accept being placed on the waitlist and is requesting a message be sent to doctor.    Name of Caller:  Holli  When is the first available appointment?  09/03/19  Symptoms:  abnormal urinalysis/abnormal uric acid test  Best Call Back Number:  [phone]  Additional Information:  She was told to follow up asap w/ her pcp. No appts avail soon. Pls call to fit pt in

## 2019-07-03 NOTE — TELEPHONE ENCOUNTER
----- Message from Elvia Cardenas sent at 7/3/2019  2:02 PM CDT -----  Contact: Holli pt  Type:  Patient Returning Call    Who Called:  Holli  Who Left Message for Patient:  Sophie  Does the patient know what this is regarding?:  results  Best Call Back Number:  [phone]  Additional Information:  Pls call pt regarding her results. She just missed the call when sophie called her

## 2019-07-03 NOTE — TELEPHONE ENCOUNTER
Spoke w/ pt. Informed pt about results and recommendations per provider. pt verbalized understanding.    Estimated Creatinine Clearance: 36.7 mL/min (based on SCr of 1.2 mg/dL).      1. Repeat urine for cx.  2. Start allopurinol and colchicine (once daily for each) for high uric acid levels.   3. Repeat bmp and uric acid in 10 days. Review gout diet. Increase water intake.     Pt will keep appts and see if she can come in 07/03/2019 to do urine.

## 2019-07-05 LAB — BACTERIA UR CULT: NORMAL

## 2019-07-10 ENCOUNTER — HOSPITAL ENCOUNTER (OUTPATIENT)
Dept: RADIOLOGY | Facility: HOSPITAL | Age: 84
Discharge: HOME | End: 2019-07-10
Attending: FAMILY MEDICINE
Payer: MEDICARE

## 2019-07-10 ENCOUNTER — OFFICE VISIT (OUTPATIENT)
Dept: FAMILY MEDICINE | Facility: CLINIC | Age: 84
End: 2019-07-10
Payer: MEDICARE

## 2019-07-10 VITALS
SYSTOLIC BLOOD PRESSURE: 136 MMHG | HEIGHT: 65 IN | TEMPERATURE: 98 F | RESPIRATION RATE: 18 BRPM | HEART RATE: 54 BPM | WEIGHT: 192 LBS | OXYGEN SATURATION: 92 % | BODY MASS INDEX: 31.99 KG/M2 | DIASTOLIC BLOOD PRESSURE: 84 MMHG

## 2019-07-10 DIAGNOSIS — M25.429 ELBOW SWELLING, UNSPECIFIED LATERALITY: ICD-10-CM

## 2019-07-10 DIAGNOSIS — E79.0 HYPERURICEMIA: ICD-10-CM

## 2019-07-10 DIAGNOSIS — R30.0 DYSURIA: Primary | ICD-10-CM

## 2019-07-10 DIAGNOSIS — I48.0 PAROXYSMAL ATRIAL FIBRILLATION: ICD-10-CM

## 2019-07-10 DIAGNOSIS — N18.30 CKD (CHRONIC KIDNEY DISEASE) STAGE 3, GFR 30-59 ML/MIN: ICD-10-CM

## 2019-07-10 DIAGNOSIS — R11.0 NAUSEA: ICD-10-CM

## 2019-07-10 LAB
BACTERIA #/AREA URNS AUTO: ABNORMAL /HPF
BILIRUB UR QL STRIP: NEGATIVE
CLARITY UR REFRACT.AUTO: ABNORMAL
COLOR UR AUTO: ABNORMAL
GLUCOSE UR QL STRIP: NEGATIVE
HGB UR QL STRIP: ABNORMAL
KETONES UR QL STRIP: NEGATIVE
LEUKOCYTE ESTERASE UR QL STRIP: ABNORMAL
MICROSCOPIC COMMENT: ABNORMAL
NITRITE UR QL STRIP: NEGATIVE
PH UR STRIP: 6 [PH] (ref 5–8)
PROT UR QL STRIP: NEGATIVE
RBC #/AREA URNS AUTO: 19 /HPF (ref 0–4)
SP GR UR STRIP: 1.01 (ref 1–1.03)
SQUAMOUS #/AREA URNS AUTO: 0 /HPF
URN SPEC COLLECT METH UR: ABNORMAL
WBC #/AREA URNS AUTO: >100 /HPF (ref 0–5)

## 2019-07-10 PROCEDURE — 99214 OFFICE O/P EST MOD 30 MIN: CPT | Mod: HCNC,S$GLB,, | Performed by: FAMILY MEDICINE

## 2019-07-10 PROCEDURE — 73080 XR ELBOW COMPLETE 3 VIEW LEFT: ICD-10-PCS | Mod: 26,HCNC,LT, | Performed by: RADIOLOGY

## 2019-07-10 PROCEDURE — 99214 PR OFFICE/OUTPT VISIT, EST, LEVL IV, 30-39 MIN: ICD-10-PCS | Mod: HCNC,S$GLB,, | Performed by: FAMILY MEDICINE

## 2019-07-10 PROCEDURE — 99499 RISK ADDL DX/OHS AUDIT: ICD-10-PCS | Mod: HCNC,S$GLB,, | Performed by: FAMILY MEDICINE

## 2019-07-10 PROCEDURE — 73080 X-RAY EXAM OF ELBOW: CPT | Mod: TC,HCNC,PN,LT

## 2019-07-10 PROCEDURE — 99999 PR PBB SHADOW E&M-EST. PATIENT-LVL IV: ICD-10-PCS | Mod: PBBFAC,HCNC,, | Performed by: FAMILY MEDICINE

## 2019-07-10 PROCEDURE — 73080 X-RAY EXAM OF ELBOW: CPT | Mod: 26,HCNC,LT, | Performed by: RADIOLOGY

## 2019-07-10 PROCEDURE — 87086 URINE CULTURE/COLONY COUNT: CPT | Mod: HCNC

## 2019-07-10 PROCEDURE — 99999 PR PBB SHADOW E&M-EST. PATIENT-LVL IV: CPT | Mod: PBBFAC,HCNC,, | Performed by: FAMILY MEDICINE

## 2019-07-10 PROCEDURE — 81001 URINALYSIS AUTO W/SCOPE: CPT | Mod: HCNC

## 2019-07-10 PROCEDURE — 99499 UNLISTED E&M SERVICE: CPT | Mod: HCNC,S$GLB,, | Performed by: FAMILY MEDICINE

## 2019-07-10 PROCEDURE — 1101F PR PT FALLS ASSESS DOC 0-1 FALLS W/OUT INJ PAST YR: ICD-10-PCS | Mod: HCNC,CPTII,S$GLB, | Performed by: FAMILY MEDICINE

## 2019-07-10 PROCEDURE — 1101F PT FALLS ASSESS-DOCD LE1/YR: CPT | Mod: HCNC,CPTII,S$GLB, | Performed by: FAMILY MEDICINE

## 2019-07-10 NOTE — PROGRESS NOTES
Subjective:       Patient ID: Holli Landrum is a 86 y.o. female.    Chief Complaint: Results; Nausea; and Foot Pain (left foot, with swelling)    HPI  Patient in the office for sx review.  Foot pain: metatarsal fx dx in outside podiatry. Wearing a boot with little activity. Swelling had initially improved, but still uncomfortable. She'd initially attributed pain to gout.    Also, notes swelling along L elbow. Nontender. No trauma noted.  Acute onset HA and nausea x 1 day. Able to maintain bland po intake. Stools are loose/watery. Feels nauseated in the office.   Started allopurinol last week. Stools have been loose since. No black stools or blood noted.   Slight increase in gerd c/o prior, increased belching as noted in visit.  Requests repeat urine study due to hx recurrent UTIs.  Labs 2019 rev.    Review of Systems:  Review of Systems   Constitutional: Positive for fatigue. Negative for fever and unexpected weight change.   HENT: Negative for congestion, postnasal drip, rhinorrhea and sinus pressure.    Eyes: Negative for discharge and itching.   Respiratory: Negative for cough and shortness of breath.    Cardiovascular: Negative for chest pain, palpitations and leg swelling.   Gastrointestinal: Positive for nausea. Negative for abdominal pain, blood in stool, constipation and diarrhea (loose).   Genitourinary: Positive for frequency. Negative for difficulty urinating and dysuria.   Musculoskeletal: Positive for arthralgias. Negative for myalgias.   Skin: Negative for color change and rash.   Neurological: Positive for headaches (L hemifacial pain). Negative for dizziness (resolves with po fluid intake), tremors, seizures and syncope.   Hematological: Negative for adenopathy. Bruises/bleeds easily.        On plavix   Psychiatric/Behavioral: Negative for dysphoric mood and sleep disturbance.        Denies anxiety or depression.       Objective:     Vitals:    07/10/19 1004   BP: 136/84   BP Location: Left arm  "  Patient Position: Sitting   BP Method: Large (Manual)   Pulse: (!) 54   Resp: 18   Temp: 97.6 °F (36.4 °C)   TempSrc: Oral   SpO2: (!) 92%   Weight: 87.1 kg (192 lb)   Height: 5' 5" (1.651 m)          Physical Exam   Constitutional: She is oriented to person, place, and time. She appears well-developed and well-nourished. No distress.   HENT:   Head: Normocephalic and atraumatic.   Eyes: Conjunctivae are normal. Right eye exhibits no discharge. Left eye exhibits no discharge. No scleral icterus.   Neck: Normal range of motion. Neck supple.   Cardiovascular: Normal rate and regular rhythm.   Pulmonary/Chest: Effort normal and breath sounds normal. No respiratory distress.   Abdominal: Soft. She exhibits no distension. There is no tenderness.   Frequent belching   Musculoskeletal: Normal range of motion. She exhibits no edema.        Left ankle: She exhibits swelling. She exhibits normal range of motion and no ecchymosis.        Feet:    Boot removed for exam.  Ambulatory with rollator.   Neurological: She is alert and oriented to person, place, and time.   Skin: Skin is warm and dry. No rash noted.   Psychiatric: She has a normal mood and affect. Her behavior is normal.   Nursing note and vitals reviewed.        Assessment & Plan:  Dysuria  -     Urinalysis; Future  -     Urine culture  -     Ambulatory Referral to Outpatient Case Management  Pending review with cx. Not currently on abx. cx early this week was negative. Prev established with uro/garcia.  Elbow swelling, unspecified laterality  -     X-Ray Elbow Complete Left; Future; Expected date: 07/10/2019  Consider bursitis vs soft tissue swelling. Update xray for review. rec RICE.  Hyperuricemia  -     Ambulatory Referral to Outpatient Case Management  Lab today.   Hold colchicine given diarrhea. Cont allopurinol.  CKD (chronic kidney disease) stage 3, GFR 30-59 ml/min  -     Ambulatory Referral to Outpatient Case Management  Stable on prev lab. Cont " intermittent monitoring.   Paroxysmal atrial fibrillation  -     Ambulatory Referral to Outpatient Case Management  Stable. Cont routine cards f/u.   Other orders  -     Urinalysis Microscopic  -     ondansetron (ZOFRAN) 4 MG tablet; Take 1 tablet (4 mg total) by mouth every 12 (twelve) hours as needed for Nausea.  Dispense: 10 tablet; Refill: 0 Side effects and precautions of medication use reviewed with patient, expressed understanding. No questions or concerns. Pt recalls that she has taken prev without issue (given during hosp stay).  Nausea  Etiology unclear at this time. Consider acute viral vs gastritis with medications (colchicine). Recommended bland diet, increased fluids as tolerated. Hold colchicine.   zofran prn.

## 2019-07-11 LAB — BACTERIA UR CULT: NORMAL

## 2019-07-11 RX ORDER — ONDANSETRON 4 MG/1
4 TABLET, FILM COATED ORAL EVERY 12 HOURS PRN
Qty: 10 TABLET | Refills: 0 | Status: SHIPPED | OUTPATIENT
Start: 2019-07-11 | End: 2019-10-08

## 2019-07-12 ENCOUNTER — TELEPHONE (OUTPATIENT)
Dept: FAMILY MEDICINE | Facility: CLINIC | Age: 84
End: 2019-07-12

## 2019-07-12 DIAGNOSIS — M25.429 EFFUSION OF ELBOW, UNSPECIFIED LATERALITY: Primary | ICD-10-CM

## 2019-07-12 NOTE — TELEPHONE ENCOUNTER
Large joint effusion (in elbow). Schedule with ortho to review. Recommend brace or sling if any discomfort associated.

## 2019-07-24 ENCOUNTER — OFFICE VISIT (OUTPATIENT)
Dept: ORTHOPEDICS | Facility: CLINIC | Age: 84
End: 2019-07-24
Payer: MEDICARE

## 2019-07-24 VITALS
WEIGHT: 192 LBS | DIASTOLIC BLOOD PRESSURE: 80 MMHG | HEIGHT: 65 IN | HEART RATE: 65 BPM | BODY MASS INDEX: 31.99 KG/M2 | SYSTOLIC BLOOD PRESSURE: 140 MMHG

## 2019-07-24 DIAGNOSIS — M75.102 TEAR OF LEFT ROTATOR CUFF, UNSPECIFIED TEAR EXTENT: ICD-10-CM

## 2019-07-24 DIAGNOSIS — M25.512 LEFT SHOULDER PAIN, UNSPECIFIED CHRONICITY: ICD-10-CM

## 2019-07-24 DIAGNOSIS — M25.522 LEFT ELBOW PAIN: Primary | ICD-10-CM

## 2019-07-24 DIAGNOSIS — R22.32 ELBOW MASS, LEFT: Primary | ICD-10-CM

## 2019-07-24 PROCEDURE — 1101F PT FALLS ASSESS-DOCD LE1/YR: CPT | Mod: HCNC,CPTII,S$GLB, | Performed by: ORTHOPAEDIC SURGERY

## 2019-07-24 PROCEDURE — 1101F PR PT FALLS ASSESS DOC 0-1 FALLS W/OUT INJ PAST YR: ICD-10-PCS | Mod: HCNC,CPTII,S$GLB, | Performed by: ORTHOPAEDIC SURGERY

## 2019-07-24 PROCEDURE — 99999 PR PBB SHADOW E&M-EST. PATIENT-LVL III: CPT | Mod: PBBFAC,HCNC,, | Performed by: ORTHOPAEDIC SURGERY

## 2019-07-24 PROCEDURE — 99999 PR PBB SHADOW E&M-EST. PATIENT-LVL III: ICD-10-PCS | Mod: PBBFAC,HCNC,, | Performed by: ORTHOPAEDIC SURGERY

## 2019-07-24 PROCEDURE — 99214 OFFICE O/P EST MOD 30 MIN: CPT | Mod: HCNC,S$GLB,, | Performed by: ORTHOPAEDIC SURGERY

## 2019-07-24 PROCEDURE — 99214 PR OFFICE/OUTPT VISIT, EST, LEVL IV, 30-39 MIN: ICD-10-PCS | Mod: HCNC,S$GLB,, | Performed by: ORTHOPAEDIC SURGERY

## 2019-07-24 NOTE — PROGRESS NOTES
Past Medical History:   Diagnosis Date    Cataract     OU done//    COPD (chronic obstructive pulmonary disease)     no oxygen    Essential hypertension 5/5/2010    GERD (gastroesophageal reflux disease) 11/20/2012    Glaucoma     Hypertensive heart disease with heart failure 02/05/2013    Paroxysmal ventricular tachycardia     per problem list       Past Surgical History:   Procedure Laterality Date    ANGIOGRAM-CORONARY Left 10/27/2016    [facility] CATH    CARDIAC CATHETERIZATION  2013, 2014    has 5 stents    CARDIAC SURGERY  2012    stents    CATARACT EXTRACTION W/  INTRAOCULAR LENS IMPLANT Left 11/01/2018    Dr Rsoe    CATARACT EXTRACTION W/  INTRAOCULAR LENS IMPLANT Right 12/13/2018    Dr Rose//    CHOLECYSTECTOMY      CYSTOGRAM N/A 2/24/2015    [facility]    CYSTOSCOPY N/A 2/24/2015    [facility] OR    CYSTOSCOPY W/BLADDER BIOPSY with Fulguration  N/A 4/10/2017    [facility]formed [facility] Robb Knight [facility] at [facility] OR    HYSTERECTOMY  1969    OVARIAN CYST REMOVAL  teenager    PHACOEMULSIFICATION, CATARACT Right 12/13/2018    [facility]ksandar Rose Jr[facility] OR    PHACOEMULSIFICATION, CATARACT Left 11/1/2018    [facility] Jr[facility] OR    TONSILLECTOMY      aw/denoids    VASCULAR SURGERY      to remove clot from right leg       Current Outpatient Medications   Medication Sig    acetaminophen (TYLENOL) 650 MG TbSR Take 650 mg by mouth as needed.    albuterol (PROVENTIL) 2.5 mg /3 mL (0.083 %) nebulizer solution Take 3 mLs (2.5 mg total) by nebulization every 6 (six) hours as needed for Wheezing. Rescue    albuterol sulfate 2.5 mg/0.5 mL Nebu Take 2.5 mg by nebulization every 4 (four) hours as needed. Rescue    allopurinol (ZYLOPRIM) 100 MG tablet Take 1 tablet (100 mg total) by mouth once daily.    benzonatate (TESSALON PERLES) 100 MG capsule Take 1 capsule (100 mg  total) by mouth every 6 (six) hours as needed.    cholecalciferol, vitamin D3, (VITAMIN D3) 5,000 unit Tab Take 5,000 Units by mouth once daily.    clopidogrel (PLAVIX) 75 mg tablet Take 1 tablet (75 mg total) by mouth once daily.    co-enzyme Q-10 30 mg capsule Take 30 mg by mouth 3 (three) times daily.    colchicine (COLCRYS) 0.6 mg tablet Take 1 tablet (0.6 mg total) by mouth once daily.    dorzolamide (TRUSOPT) 2 % ophthalmic solution Place 1 drop into the left eye 2 (two) times daily.    fluticasone (FLONASE) 50 mcg/actuation nasal spray 2 sprays (100 mcg total) by Each Nare route daily as needed for Allergies.    furosemide (LASIX) 40 MG tablet Take 1 tablet (40 mg total) by mouth once daily. 1 Tablet Oral Every day (Patient taking differently: Take 20 mg by mouth once daily. 1 Tablet Oral Every day)    GRAPE SEED EXTRACT ORAL Take 1 tablet by mouth once daily.     Lactobacillus rhamnosus GG (CULTURELLE) 10 billion cell capsule Take 1 capsule by mouth once daily.    latanoprost 0.005 % ophthalmic solution Place 1 drop into both eyes every evening.    magnesium oxide (MAG-OX) 400 mg tablet Take 400 mg by mouth once daily.    metOLazone (ZAROXOLYN) 5 MG tablet Take 1 tablet (5 mg total) by mouth every Monday and Friday.    nitroGLYCERIN 0.4 MG/HR TD PT24 (NITRODUR) 0.4 mg/hr PLACE 1 PATCH ONTO THE SKIN ONCE DAILY.    OLIVE LEAF EXTRACT ORAL Take by mouth.    ondansetron (ZOFRAN) 4 MG tablet Take 1 tablet (4 mg total) by mouth every 12 (twelve) hours as needed for Nausea.    potassium chloride (KLOR-CON) 10 MEQ TbSR Take 1 tablet (10 mEq total) by mouth 2 (two) times daily.    sotalol (SOTALOL AF) 80 MG tablet Take 40 mg by mouth 2 (two) times daily.     No current facility-administered medications for this visit.        Review of patient's allergies indicates:   Allergen Reactions    Timoptic [timolol maleate] Shortness Of Breath    Bactrim ds [sulfamethoxazole-trimethoprim]     Ciprofloxacin  Other (See Comments)     Muscle ache       Family History   Problem Relation Age of Onset    Diabetes Brother     Heart disease Brother     Diabetes Mother     Cancer Maternal Grandfather     Clotting disorder Son         bleeding after tonsillectomy only    Amblyopia Neg Hx     Blindness Neg Hx     Cataracts Neg Hx     Glaucoma Neg Hx     Hypertension Neg Hx     Macular degeneration Neg Hx     Retinal detachment Neg Hx     Strabismus Neg Hx     Stroke Neg Hx     Thyroid disease Neg Hx        Social History     Socioeconomic History    Marital status:      Spouse name: Not on file    Number of children: Not on file    Years of education: Not on file    Highest education level: Not on file   Occupational History    Not on file   Social Needs    Financial resource strain: Not on file    Food insecurity:     Worry: Not on file     Inability: Not on file    Transportation needs:     Medical: Not on file     Non-medical: Not on file   Tobacco Use    Smoking status: Former Smoker     Types: Cigarettes     Last attempt to quit:      Years since quittin.6    Smokeless tobacco: Never Used    Tobacco comment: quit smoking  //had smoked for 1 yr//   Substance and Sexual Activity    Alcohol use: No     Alcohol/week: 0.0 oz    Drug use: No    Sexual activity: Never     Partners: Male     Birth control/protection: Abstinence   Lifestyle    Physical activity:     Days per week: Not on file     Minutes per session: Not on file    Stress: Not on file   Relationships    Social connections:     Talks on phone: Not on file     Gets together: Not on file     Attends Hindu service: Not on file     Active member of club or organization: Not on file     Attends meetings of clubs or organizations: Not on file     Relationship status: Not on file   Other Topics Concern    Are you pregnant or think you may be? Not Asked    Breast-feeding Not Asked   Social History Narrative    Not on  file       Chief Complaint:   Chief Complaint   Patient presents with    Elbow Pain     elbow pain and swelling        History of present illness:  This is a 86-year-old female seen for left elbow swelling.  Elbow does not hurt.  Has been swollen for several months if not longer now.  Has more pain in the left shoulder.  Rates the pain is 6/10 in the shoulder.  Difficulty raising it up at night.  Not relieved with Tylenol.      Review of Systems:    Constitution: Negative for chills, fever, and sweats.  Negative for unexplained weight loss.    HENT:  Negative for headaches and blurry vision.    Cardiovascular:Negative for chest pain or irregular heart beat. Negative for hypertension.    Respiratory:  Negative for cough and shortness of breath.    Gastrointestinal: Negative for abdominal pain, heartburn, melena, nausea, and vomitting.    Genitourinary:  Negative bladder incontinence and dysuria.    Musculoskeletal:  See HPI    Neurological: Negative for numbness.    Psychiatric/Behavioral: Negative for depression.  The patient is not nervous/anxious.      Endocrine: Negative for polyuria    Hematologic/Lymphatic: Negative for bleeding problem.  Does not bruise/bleed easily.    Skin: Negative for poor would healing and rash      Physical Examination:    Vital Signs:  There were no vitals filed for this visit.    Body mass index is 31.95 kg/m².    This a well-developed, well nourished patient in no acute distress.  They are alert and oriented and cooperative to examination.  Pt. walks without an antalgic gait.      Examination of the left shoulder and elbow shows no rashes or erythema. There is what feels like a possible lipoma along the lateral elbow. The patient has full range of motion in forward flexion, external rotation, and internal rotation to the mid T-spine. The patient has markedly positive impingement signs. - Chesterfield's test. - Speeds test. Nontender to palpation over a.c. joint.Passive range of motion:  Forward flexion of 180°, external rotation at 90° of 90°, internal rotation of 50°, and external rotation at 0° of 50°. 2+ radial pulse. Intact axillary, radial, median and ulnar sensation.  For out of 5 resisted forward flexion, external rotation, and negative lift off test.  No pain with elbow range of motion.    Examination of the right t elbow shows no signs of rashes or erythema. The patient has a small mass in the same area as the left but not nearly as large. The patient has full range of motion from 0-160°. Patient has full pronation and supination. Patient is nontender along the medial epicondyle and nontender over the lateral epicondyle. Nontender over the olecranon process.  Nontender along the course of the UCL. Patient has a negative valgus stress test and milking maneuver. Negative Tinel's sign over the cubital tunnel. 2+ radial pulse. Intact light touch sensation.       X-rays:  X-rays of the left elbow are available for review which show possible  elbow effusion. Minimal elbow arthritis     Assessment::  Left rotator cuff tear  Left elbow mass    Plan:  I reviewed the findings with her today. I recommended an MRI to further evaluate the left elbow mass given how long it has been there and it is getting larger.  We also get an MRI of her left shoulder look for rotator cuff tear.    This note was created using [employer] voice recognition software that occasionally misinterpreted phrases or words.    Consult note is delivered via Epic messaging service.

## 2019-07-24 NOTE — LETTER
July 24, 2019      Marcia Crespo MD  [address]  [facility] [address]           [facility]  [address] [facility] [address] [place] [address]  Phone: [phone]          Patient: Holli Landrum   MR Number: [id number]   YOB: 1933   Date of Visit: 7/24/2019       Dear Dr. Marcia Crespo:    Thank you for referring Holli Landrum to me for evaluation. Attached you will find relevant portions of my assessment and plan of care.    If you have questions, please do not hesitate to call me. I look forward to following Holli Landrum along with you.    Sincerely,    Domingo Ty MD    Enclosure  CC:  No Recipients    If you would like to receive this communication electronically, please contact [email or web address] or [phone] to request more information on [employer] Link access.    For providers and/or their staff who would like to refer a patient to [facility], please contact us through our one-stop-shop provider referral line, [facility] , at [phone].    If you feel you have received this communication in error or would no longer like to receive these types of communications, please e-mail [email or web address]

## 2019-07-25 ENCOUNTER — OUTPATIENT CASE MANAGEMENT (OUTPATIENT)
Dept: ADMINISTRATIVE | Facility: OTHER | Age: 84
End: 2019-07-25

## 2019-07-25 NOTE — LETTER
August 8, 2019    Holli Landrum  [address]  [place] [address]             [facility] [address] [facility] [address] Dear Ms. Landrum,    I work with [facility]'s Outpatient Care Management Department. We received a referral from your PCP, Dr. Marcia Crespo to call you to review your medical history. These services are free of charge and are offered to [facility] patients who have recently been discharged from any of our facilities or who have complex medical conditions that may require the skill of a nurse to assist with management. When we receive a referral, we attempt to contact you to go over some health questions in order to determine how we can best assist you with your health care needs. I attempted to reach you by telephone, but was unsuccessful.    I am a Registered Nurse who specializes in connecting patients with available medical and financial resources as well as addressing any educational needs that may be indicated. We also have a  on our team of providers that is available to assist with any social or mental health needs that may be indicated. Our department works closely with the [facility] to help you manage your health condition(s) safely within your living environment. Our goal is to help you function at the healthiest and highest level possible.     If you would like to enroll in this free program, please give me a call at the contact number provided below. We will go over some questions regarding your health in order to determine how we can best assist you with your health care needs. This is a voluntary program and once enrolled, you may dis-enroll at any time.     I can be reached directly at [phone] from 8:00 AM to 4:30 PM, Monday through Friday. The general Outpatient Care Management Department can be reached at [phone] from 8:00AM to 4:30PM, Monday through Friday. [facility] also has a program where a nurse is available 24/7 to  answer questions or provide medical advice. That number is [phone]. Please feel free to contact us for any questions or concerns.    Kind Regards,        Joanne Gonzalez, RN  Outpatient Care Management  [phone]

## 2019-07-25 NOTE — PROGRESS NOTES
"Summary:  07/25/19 RN-CM received OPCM referral for High Risk (76%) patient from PCP, Dr.Lauren Crespo. Reason for referral: Dysuria; Hyperuricemia; CKD (chronic kidney disease) stage 3, GFR 30-59 ml/min; Paroxysmal atrial fibrillation. Chart review completed. Will attempt to contact patient/caregiver to complete initial assessment for OPCM next week. Bel Vázquez RN-CLIFF    08/02/2019 (1st Attempt) RN-CM attempted to contact patient to complete initial assessment for OPCM. Called [phone]; no answer; left message requesting a return call. Will attempt to contact patient at a later date. Bel Vázquez RN-CLIFF    08/08/2019 (2nd Attempt)  RN-CM attempted to contact patient to complete initial assessment for OPCM. Called [phone]; no answer; left message requesting a return call. Portal states active, however I do not see any recent patient activity in portal. (3rd Attempt) Will send attempt letter to patient via [employer] and through patient portal (in the event that patient or caregiver checks portal). Will allow time for patient/caregiver to receive attempt letter. If no response to attempt letter by 8/15/19, will close case. Bel Vázquez RN-OPCM    08/15/2018 (4th Attempt) RN-CM contacted patient today. Spoke with Ms. De La Garza who stated that she had been very busy the past couple of weeks with doctor appointments and MRIs. She apologized for not returning my calls and stated that she does want to speak with me about OPCM. States that she has a "fractured foot" and is heading out the door again with her daughter to go to another appointment but will be home all day tomorrow and requested a call back about this same time tomorrow. States that she will be able to complete the assessment with me then. Will call patient back about 9:30 tomorrow as requested. Bel Vázquez RN-OPCM    08/16/2019 (5th Attempt) RN-CM attempted to contact patient today as requested. Called [phone]; no answer; left message requesting a " return call. Due to inability to make contact with patient, this RN-CM will close case. If patient returns call and wishes to enroll in OPCM services, will request new referral. - ALFREDO Gonzalez RN-OPCM    Interventions:  - Chart review completed 7/25/19.   - Left message requesting a return call. 8/2/19, 8/8/19. 8/16/19  - Sent attempt letter via [employer]PS and through patient portal 8/8/19.  - Spoke with patient who requested a call back tomorrow to complete assessment. - 8/15/18  - Closed case 8/19/19    Plan:  - If patient returns call and wishes to enroll in OPCM services, will request new referral.

## 2019-08-07 ENCOUNTER — OFFICE VISIT (OUTPATIENT)
Dept: UROLOGY | Facility: CLINIC | Age: 84
End: 2019-08-07
Payer: MEDICARE

## 2019-08-07 VITALS
SYSTOLIC BLOOD PRESSURE: 120 MMHG | BODY MASS INDEX: 31.95 KG/M2 | HEART RATE: 60 BPM | DIASTOLIC BLOOD PRESSURE: 68 MMHG | HEIGHT: 65 IN

## 2019-08-07 DIAGNOSIS — R30.0 DYSURIA: Primary | ICD-10-CM

## 2019-08-07 DIAGNOSIS — R33.9 INCOMPLETE BLADDER EMPTYING: ICD-10-CM

## 2019-08-07 DIAGNOSIS — N30.00 ACUTE CYSTITIS WITHOUT HEMATURIA: ICD-10-CM

## 2019-08-07 LAB
BILIRUB SERPL-MCNC: NORMAL MG/DL
BLOOD URINE, POC: NORMAL
COLOR, POC UA: NORMAL
GLUCOSE UR QL STRIP: NORMAL
KETONES UR QL STRIP: NORMAL
LEUKOCYTE ESTERASE URINE, POC: NORMAL
NITRITE, POC UA: NORMAL
PH, POC UA: 7
POC RESIDUAL URINE VOLUME: 193 ML (ref 0–100)
PROTEIN, POC: 100
SPECIFIC GRAVITY, POC UA: 1.01
UROBILINOGEN, POC UA: 0.2

## 2019-08-07 PROCEDURE — 99999 PR PBB SHADOW E&M-EST. PATIENT-LVL III: ICD-10-PCS | Mod: PBBFAC,HCNC,, | Performed by: UROLOGY

## 2019-08-07 PROCEDURE — 87086 URINE CULTURE/COLONY COUNT: CPT | Mod: HCNC

## 2019-08-07 PROCEDURE — 99214 PR OFFICE/OUTPT VISIT, EST, LEVL IV, 30-39 MIN: ICD-10-PCS | Mod: 25,HCNC,S$GLB, | Performed by: UROLOGY

## 2019-08-07 PROCEDURE — 81002 POCT URINE DIPSTICK WITHOUT MICROSCOPE: ICD-10-PCS | Mod: HCNC,S$GLB,, | Performed by: UROLOGY

## 2019-08-07 PROCEDURE — 99214 OFFICE O/P EST MOD 30 MIN: CPT | Mod: 25,HCNC,S$GLB, | Performed by: UROLOGY

## 2019-08-07 PROCEDURE — 1101F PR PT FALLS ASSESS DOC 0-1 FALLS W/OUT INJ PAST YR: ICD-10-PCS | Mod: HCNC,CPTII,S$GLB, | Performed by: UROLOGY

## 2019-08-07 PROCEDURE — 99999 PR PBB SHADOW E&M-EST. PATIENT-LVL III: CPT | Mod: PBBFAC,HCNC,, | Performed by: UROLOGY

## 2019-08-07 PROCEDURE — 81002 URINALYSIS NONAUTO W/O SCOPE: CPT | Mod: HCNC,S$GLB,, | Performed by: UROLOGY

## 2019-08-07 PROCEDURE — 1101F PT FALLS ASSESS-DOCD LE1/YR: CPT | Mod: HCNC,CPTII,S$GLB, | Performed by: UROLOGY

## 2019-08-07 RX ORDER — METHYLPREDNISOLONE 4 MG/1
TABLET ORAL
Refills: 0 | COMMUNITY
Start: 2019-07-17 | End: 2019-08-07

## 2019-08-07 NOTE — PROGRESS NOTES
"UROLOGY [place]  8 7 17    CC cannot empty bladder      Age 86, here with daughter. Daughter did not participate in the conversation. Pt says she feels she does not empty the bladder well.   Symptoms of infection but two cultures were negative  Allopurinol for given for increased uric acid    Overall says the nocturia x 1-2 and not bad, and daytime frequency not bad. Her main problem is she feels she cannot empty.     Last year was here and pointed out that Dr garcia had cystoscoped her for gross hematuria and found a suspicious erythematous area in the bladder; biopsies came back "chronic cystitis". Also, a retrograde cystogram showed multiple bladder diverticula.      certain maneuvers help her drain the bladder better; for example, she notices that when she stands over the toilet she can more completely empty than is she stays seated the whole time.    When we did a bladder scan when she was here in sept 2018 the residual was 63 ml.     We cystoscoped her at the time and found the same erythematous area that was noticed by dr garcia.        IMP  Persistent feeling of unempty bladder  Hx of erythematous area in bladder     Will recystoscope. Will make sure we do urethral dilatation.   Will do bladder scan    Perhaps pt may need to be taught intermittent self cath    Counseled 35 min  Over 50% of time in counseling        "

## 2019-08-08 ENCOUNTER — PATIENT MESSAGE (OUTPATIENT)
Dept: ADMINISTRATIVE | Facility: OTHER | Age: 84
End: 2019-08-08

## 2019-08-09 LAB — BACTERIA UR CULT: NO GROWTH

## 2019-08-12 RX ORDER — ALLOPURINOL 100 MG/1
100 TABLET ORAL DAILY
Qty: 90 TABLET | Refills: 1 | Status: SHIPPED | OUTPATIENT
Start: 2019-08-12 | End: 2019-12-03 | Stop reason: SDUPTHER

## 2019-08-13 ENCOUNTER — TELEPHONE (OUTPATIENT)
Dept: ORTHOPEDICS | Facility: CLINIC | Age: 84
End: 2019-08-13

## 2019-08-13 NOTE — TELEPHONE ENCOUNTER
----- Message from Edy South sent at 8/13/2019  1:03 PM CDT -----  Contact: pt  Will take the 8:45 tomorrow, [phone]

## 2019-08-14 ENCOUNTER — OFFICE VISIT (OUTPATIENT)
Dept: ORTHOPEDICS | Facility: CLINIC | Age: 84
End: 2019-08-14
Payer: MEDICARE

## 2019-08-14 VITALS
HEART RATE: 57 BPM | WEIGHT: 192 LBS | SYSTOLIC BLOOD PRESSURE: 124 MMHG | DIASTOLIC BLOOD PRESSURE: 72 MMHG | BODY MASS INDEX: 31.99 KG/M2 | HEIGHT: 65 IN

## 2019-08-14 DIAGNOSIS — M75.82 ROTATOR CUFF TENDINITIS, LEFT: Primary | ICD-10-CM

## 2019-08-14 PROCEDURE — 20610 LARGE JOINT ASPIRATION/INJECTION: L SUBACROMIAL BURSA: ICD-10-PCS | Mod: HCNC,LT,S$GLB, | Performed by: ORTHOPAEDIC SURGERY

## 2019-08-14 PROCEDURE — 1101F PR PT FALLS ASSESS DOC 0-1 FALLS W/OUT INJ PAST YR: ICD-10-PCS | Mod: HCNC,CPTII,S$GLB, | Performed by: ORTHOPAEDIC SURGERY

## 2019-08-14 PROCEDURE — 20610 DRAIN/INJ JOINT/BURSA W/O US: CPT | Mod: HCNC,LT,S$GLB, | Performed by: ORTHOPAEDIC SURGERY

## 2019-08-14 PROCEDURE — 99213 OFFICE O/P EST LOW 20 MIN: CPT | Mod: 25,HCNC,S$GLB, | Performed by: ORTHOPAEDIC SURGERY

## 2019-08-14 PROCEDURE — 99213 PR OFFICE/OUTPT VISIT, EST, LEVL III, 20-29 MIN: ICD-10-PCS | Mod: 25,HCNC,S$GLB, | Performed by: ORTHOPAEDIC SURGERY

## 2019-08-14 PROCEDURE — 1101F PT FALLS ASSESS-DOCD LE1/YR: CPT | Mod: HCNC,CPTII,S$GLB, | Performed by: ORTHOPAEDIC SURGERY

## 2019-08-14 PROCEDURE — 99999 PR PBB SHADOW E&M-EST. PATIENT-LVL III: CPT | Mod: PBBFAC,HCNC,, | Performed by: ORTHOPAEDIC SURGERY

## 2019-08-14 PROCEDURE — 99999 PR PBB SHADOW E&M-EST. PATIENT-LVL III: ICD-10-PCS | Mod: PBBFAC,HCNC,, | Performed by: ORTHOPAEDIC SURGERY

## 2019-08-14 RX ORDER — TRIAMCINOLONE ACETONIDE 40 MG/ML
40 INJECTION, SUSPENSION INTRA-ARTICULAR; INTRAMUSCULAR
Status: DISCONTINUED | OUTPATIENT
Start: 2019-08-14 | End: 2019-08-14 | Stop reason: HOSPADM

## 2019-08-14 RX ADMIN — TRIAMCINOLONE ACETONIDE 40 MG: 40 INJECTION, SUSPENSION INTRA-ARTICULAR; INTRAMUSCULAR at 09:08

## 2019-08-14 NOTE — PROGRESS NOTES
Past Medical History:   Diagnosis Date    Cataract     OU done//    COPD (chronic obstructive pulmonary disease)     no oxygen    Essential hypertension 5/5/2010    GERD (gastroesophageal reflux disease) 11/20/2012    Glaucoma     Hypertensive heart disease with heart failure 02/05/2013    Paroxysmal ventricular tachycardia     per problem list       Past Surgical History:   Procedure Laterality Date    ANGIOGRAM-CORONARY Left 10/27/2016    Performed by Chuy [facility] at [facility] CATH    CARDIAC CATHETERIZATION  2013, 2014    has 5 stents    CARDIAC SURGERY  2012    stents    CATARACT EXTRACTION W/  INTRAOCULAR LENS IMPLANT Left 11/01/2018    Dr Rose    CATARACT EXTRACTION W/  INTRAOCULAR LENS IMPLANT Right 12/13/2018    Dr Rose//    CHOLECYSTECTOMY      CYSTOGRAM N/A 2/24/2015    Performed [facility] Dat Dorsey MD at [facility] OR    CYSTOSCOPY N/A 2/24/2015    Performed by Dat Dorsey MD at [facility] OR    CYSTOSCOPY W/BLADDER BIOPSY with Fulguration  N/A 4/10/2017    Performed by Robb Knight MD at [facility] OR    HYSTERECTOMY  1969    OVARIAN CYST REMOVAL  teenager    PHACOEMULSIFICATION, CATARACT Right 12/13/2018    Performed by Aleksandar Rose Jr., MD at [facility] OR    PHACOEMULSIFICATION, CATARACT Left 11/1/2018    Performed by Aleksandar Rose Jr., MD at [facility] OR    TONSILLECTOMY      aw/denoids    VASCULAR SURGERY      to remove clot from right leg       Current Outpatient Medications   Medication Sig    acetaminophen (TYLENOL) 650 MG TbSR Take 650 mg by mouth as needed.    albuterol (PROVENTIL) 2.5 mg /3 mL (0.083 %) nebulizer solution Take 3 mLs (2.5 mg total) by nebulization every 6 (six) hours as needed for Wheezing. Rescue    albuterol sulfate 2.5 mg/0.5 mL Nebu Take 2.5 mg by nebulization every 4 (four) hours as needed. Rescue    allopurinol (ZYLOPRIM) 100 MG tablet Take 1 tablet (100 mg total) by mouth once daily.    benzonatate (TESSALON PERLES) 100 MG capsule Take 1 capsule (100 mg  total) by mouth every 6 (six) hours as needed.    cholecalciferol, vitamin D3, (VITAMIN D3) 5,000 unit Tab Take 5,000 Units by mouth once daily.    clopidogrel (PLAVIX) 75 mg tablet Take 1 tablet (75 mg total) by mouth once daily.    dorzolamide (TRUSOPT) 2 % ophthalmic solution Place 1 drop into the left eye 2 (two) times daily.    fluticasone (FLONASE) 50 mcg/actuation nasal spray 2 sprays (100 mcg total) by Each Nare route daily as needed for Allergies.    furosemide (LASIX) 40 MG tablet Take 1 tablet (40 mg total) by mouth once daily. 1 Tablet Oral Every day    GRAPE SEED EXTRACT ORAL Take 1 tablet by mouth once daily.     Lactobacillus rhamnosus GG (CULTURELLE) 10 billion cell capsule Take 1 capsule by mouth once daily.    latanoprost 0.005 % ophthalmic solution Place 1 drop into both eyes every evening.    magnesium oxide (MAG-OX) 400 mg tablet Take 400 mg by mouth once daily.    metOLazone (ZAROXOLYN) 5 MG tablet Take 1 tablet (5 mg total) by mouth every Monday and Friday.    nitroGLYCERIN 0.4 MG/HR TD PT24 (NITRODUR) 0.4 mg/hr PLACE 1 PATCH ONTO THE SKIN ONCE DAILY.    OLIVE LEAF EXTRACT ORAL Take by mouth once daily.     ondansetron (ZOFRAN) 4 MG tablet Take 1 tablet (4 mg total) by mouth every 12 (twelve) hours as needed for Nausea.    potassium chloride (KLOR-CON) 10 MEQ TbSR Take 1 tablet (10 mEq total) by mouth 2 (two) times daily.    sotalol (SOTALOL AF) 80 MG tablet Take 40 mg by mouth 2 (two) times daily.     No current facility-administered medications for this visit.        Review of patient's allergies indicates:   Allergen Reactions    Timoptic [timolol maleate] Shortness Of Breath    Bactrim ds [sulfamethoxazole-trimethoprim]     Ciprofloxacin Other (See Comments)     Muscle ache       Family History   Problem Relation Age of Onset    Diabetes Brother     Heart disease Brother     Diabetes Mother     Cancer Maternal Grandfather     Clotting disorder Son         bleeding  after tonsillectomy only    Amblyopia Neg Hx     Blindness Neg Hx     Cataracts Neg Hx     Glaucoma Neg Hx     Hypertension Neg Hx     Macular degeneration Neg Hx     Retinal detachment Neg Hx     Strabismus Neg Hx     Stroke Neg Hx     Thyroid disease Neg Hx        Social History     Socioeconomic History    Marital status:      Spouse name: Not on file    Number of children: Not on file    Years of education: Not on file    Highest education level: Not on file   Occupational History    Not on file   Social Needs    Financial resource strain: Not on file    Food insecurity:     Worry: Not on file     Inability: Not on file    Transportation needs:     Medical: Not on file     Non-medical: Not on file   Tobacco Use    Smoking status: Former Smoker     Types: Cigarettes     Last attempt to quit:      Years since quittin.6    Smokeless tobacco: Never Used    Tobacco comment: quit smoking  //had smoked for 1 yr//   Substance and Sexual Activity    Alcohol use: No     Alcohol/week: 0.0 oz    Drug use: No    Sexual activity: Never     Partners: Male     Birth control/protection: Abstinence   Lifestyle    Physical activity:     Days per week: Not on file     Minutes per session: Not on file    Stress: Not on file   Relationships    Social connections:     Talks on phone: Not on file     Gets together: Not on file     Attends Hinduism service: Not on file     Active member of club or organization: Not on file     Attends meetings of clubs or organizations: Not on file     Relationship status: Not on file   Other Topics Concern    Are you pregnant or think you may be? Not Asked    Breast-feeding Not Asked   Social History Narrative    Not on file       Chief Complaint:   Chief Complaint   Patient presents with    Elbow Pain     L elbow mri results     Shoulder Pain     L shoulder mri results        History of present illness:  This is a 86-year-old female seen for left  elbow swelling.  Elbow does not hurt.  Has been swollen for several months if not longer now.  Has more pain in the left shoulder.  Rates the pain is 6/10 in the shoulder.  Difficulty raising it up at night.  Not relieved with Tylenol.  MRI did not show a large rotator cuff tear and no elbow mass.      Review of Systems:    Constitution: Negative for chills, fever, and sweats.  Negative for unexplained weight loss.    HENT:  Negative for headaches and blurry vision.    Cardiovascular:Negative for chest pain or irregular heart beat. Negative for hypertension.    Respiratory:  Negative for cough and shortness of breath.    Gastrointestinal: Negative for abdominal pain, heartburn, melena, nausea, and vomitting.    Genitourinary:  Negative bladder incontinence and dysuria.    Musculoskeletal:  See HPI    Neurological: Negative for numbness.    Psychiatric/Behavioral: Negative for depression.  The patient is not nervous/anxious.      Endocrine: Negative for polyuria    Hematologic/Lymphatic: Negative for bleeding problem.  Does not bruise/bleed easily.    Skin: Negative for poor would healing and rash      Physical Examination:    Vital Signs:    Vitals:    08/14/19 0903   BP: 124/72   Pulse: (!) 57       Body mass index is 31.95 kg/m².    This a well-developed, well nourished patient in no acute distress.  They are alert and oriented and cooperative to examination.  Pt. walks without an antalgic gait.      Examination of the left shoulder and elbow shows no rashes or erythema. There is what feels like a possible lipoma along the lateral elbow. The patient has full range of motion in forward flexion, external rotation, and internal rotation to the mid T-spine. The patient has markedly positive impingement signs. - [place]'s test. - Speeds test. Nontender to palpation over a.c. joint.Passive range of motion: Forward flexion of 180°, external rotation at 90° of 90°, internal rotation of 50°, and external rotation at 0° of  50°. 2+ radial pulse. Intact axillary, radial, median and ulnar sensation.  For out of 5 resisted forward flexion, external rotation, and negative lift off test.  No pain with elbow range of motion.    Examination of the right t elbow shows no signs of rashes or erythema. The patient has a small mass in the same area as the left but not nearly as large. The patient has full range of motion from 0-160°. Patient has full pronation and supination. Patient is nontender along the medial epicondyle and nontender over the lateral epicondyle. Nontender over the olecranon process.  Nontender along the course of the UCL. Patient has a negative valgus stress test and milking maneuver. Negative Tinel's sign over the cubital tunnel. 2+ radial pulse. Intact light touch sensation.       X-rays:  X-rays of the left elbow are available for review which show possible  elbow effusion. Minimal elbow arthritis    MRI of the left elbow:1. The common extensor tendon demonstrates suspected mild tendinosis.  2. Mild elbow joint arthritic changes are seen.  3. Additional findings and details as above.    MRI of the left shoulder:1. Supraspinatus tendinosis is seen with partial-thickness articular surface and bursal surface tearing.  2. Partial-thickness articular surface and interstitial tearing involving the distal central to superior subscapularis tendon.  3. Small glenohumeral joint effusion is seen with stranding in the axillary pouch which may be related to synovitis.  An element of adhesive capsulitis in the appropriate clinical setting is not excluded.  4. There appears to be diffuse/extensive degenerative tearing of the glenoid labrum.  5. Moderate glenohumeral and AC joint arthritic changes are seen.  6. Additional findings and details as above.     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 There is no distinct elbow mass or tumor.  Patient  has some degenerative change around the shoulder without anything surgically necessary.  I recommended trying a cortisone injection to see if it would help relieve some of her pain particularly at night.    This note was created using [employer] voice recognition software that occasionally misinterpreted phrases or words.    Consult note is delivered via Epic messaging service.

## 2019-08-14 NOTE — PROCEDURES
Large Joint Aspiration/Injection: L subacromial bursa  Date/Time: 8/14/2019 9:30 AM  Performed by: Domingo Ty MD  Authorized by: Domingo Ty MD     Consent Done?:  Yes (Verbal)  Indications:  Pain  Procedure site marked: Yes    Timeout: Prior to procedure the correct patient, procedure, and site was verified    Anesthesia    Anesthetic: lidocaine 1% without epinephrine and bupivacaine 0.25% without epinephrine  Anesthetic total: 6mL    Location:  Shoulder  Site:  L subacromial bursa  Prep: Patient was prepped and draped in usual sterile fashion    Needle size:  20 G  Ultrasonic Guidance for needle placement: No  Approach:  Posterior  Medications:  40 mg triamcinolone acetonide 40 mg/mL  Patient tolerance:  Patient tolerated the procedure well with no immediate complications

## 2019-08-16 ENCOUNTER — TELEPHONE (OUTPATIENT)
Dept: UROLOGY | Facility: CLINIC | Age: 84
End: 2019-08-16

## 2019-08-16 NOTE — TELEPHONE ENCOUNTER
----- Message from Tammy Kirk sent at 8/16/2019  2:18 PM CDT -----  Type: Needs Medical Advice    Who Called:  patient  Symptoms (please be specific):  na  How long has patient had these symptoms:  kimberly  Pharmacy name and phone #:  kimberly  Best Call Back Number: [phone]  Additional Information: patient is still burning and hurting when urinating and is scheduled for a cystoscopy on Tuesday 08 20 19/please call

## 2019-08-20 ENCOUNTER — PROCEDURE VISIT (OUTPATIENT)
Dept: UROLOGY | Facility: CLINIC | Age: 84
End: 2019-08-20
Payer: MEDICARE

## 2019-08-20 VITALS
WEIGHT: 191 LBS | BODY MASS INDEX: 31.82 KG/M2 | SYSTOLIC BLOOD PRESSURE: 140 MMHG | DIASTOLIC BLOOD PRESSURE: 82 MMHG | HEIGHT: 65 IN | HEART RATE: 52 BPM

## 2019-08-20 DIAGNOSIS — N39.0 RECURRENT UTI: ICD-10-CM

## 2019-08-20 DIAGNOSIS — R33.9 INCOMPLETE BLADDER EMPTYING: ICD-10-CM

## 2019-08-20 DIAGNOSIS — R30.0 DYSURIA: Primary | ICD-10-CM

## 2019-08-20 DIAGNOSIS — N37 URETHRAL STRICTURE DUE TO INFECTION: ICD-10-CM

## 2019-08-20 LAB — POC RESIDUAL URINE VOLUME: 344 ML (ref 0–100)

## 2019-08-20 PROCEDURE — 52000 PR CYSTOURETHROSCOPY: ICD-10-PCS | Mod: HCNC,S$GLB,, | Performed by: UROLOGY

## 2019-08-20 PROCEDURE — 51798 US URINE CAPACITY MEASURE: CPT | Mod: 51,HCNC,S$GLB, | Performed by: UROLOGY

## 2019-08-20 PROCEDURE — 52000 CYSTOURETHROSCOPY: CPT | Mod: HCNC,S$GLB,, | Performed by: UROLOGY

## 2019-08-20 PROCEDURE — 51798 PR MEAS,POST-VOID RES,US,NON-IMAGING: ICD-10-PCS | Mod: 51,HCNC,S$GLB, | Performed by: UROLOGY

## 2019-08-20 NOTE — PROCEDURES
Procedures     UROLOGY NIKIA  8 20 19     CC cannot empty bladder        Age 86, here for urologic w/u. Pt says she has been unable to empty the bladder, and at times she can tell that the voiding stream is poor. In the past she has needed dilatation of the urethra, and feels that it may be the case now.       pe  Abdomen a bit prominent, nontender, no masses.  External genitalia with severe changes of thinning and pallor of vulvar and vaginal mucosa. The urethral meatus is deep. It is small.     FEMALE URETHRAL DILATATION PROCEDURE; cystoscopy  The genital area was prepped   We lubricated the urethra and isaias sounds   We started with the 18F sound and continued with progressive sizes in even numbers. There was some resistance as the dilators were pushed in, minimal discomfort. The last size used was 26F. There was no obvious bleeding.  We then placed the olympus flexible cystoscope. Urethra with no residual stricture or any diverticulum. Bladder cavity distends symmetrically and equally when distended with water. There is advanced trabeculation and deep cellulae formation. The mucosa had some areas of irritation, but no suspicious areas suggestive of tumor. Location and shape of the ureteral orifices normal. Pt tolerated the procedure well.     BLADDERSCAN ULTRASOUND  344  ml residual         IMP  Incomplete bladder emptying  Discussed self catheterization, pt not ready. Also, her meatus is a bit deep and catheterization might not be easy  Urethral stricture, now s/p dilatation  Pt reassured, RTC prn  May benefit from use of topical hormonal cream

## 2019-09-06 ENCOUNTER — TELEPHONE (OUTPATIENT)
Dept: FAMILY MEDICINE | Facility: CLINIC | Age: 84
End: 2019-09-06

## 2019-09-06 RX ORDER — ALLOPURINOL 100 MG/1
100 TABLET ORAL DAILY
Qty: 90 TABLET | Refills: 1 | Status: CANCELLED | OUTPATIENT
Start: 2019-09-06

## 2019-09-06 NOTE — TELEPHONE ENCOUNTER
----- Message from Lucero Owenza sent at 9/6/2019 11:46 AM CDT -----  Contact: self [phone]  She needs a refill of allopurinol, she wants to know if she needs to do lab testing first?  Please call her.  Thank you!

## 2019-09-18 ENCOUNTER — IMMUNIZATION (OUTPATIENT)
Dept: PHARMACY | Facility: CLINIC | Age: 84
End: 2019-09-18

## 2019-09-18 ENCOUNTER — OFFICE VISIT (OUTPATIENT)
Dept: OPHTHALMOLOGY | Facility: CLINIC | Age: 84
End: 2019-09-18
Payer: MEDICARE

## 2019-09-18 DIAGNOSIS — H43.813 POSTERIOR VITREOUS DETACHMENT, BILATERAL: ICD-10-CM

## 2019-09-18 DIAGNOSIS — H40.053 OCULAR HYPERTENSION, BILATERAL: Primary | ICD-10-CM

## 2019-09-18 DIAGNOSIS — H10.13 ALLERGIC CONJUNCTIVITIS, BILATERAL: ICD-10-CM

## 2019-09-18 DIAGNOSIS — H26.493 PCO (POSTERIOR CAPSULAR OPACIFICATION), BILATERAL: ICD-10-CM

## 2019-09-18 DIAGNOSIS — Z96.1 PSEUDOPHAKIA OF BOTH EYES: ICD-10-CM

## 2019-09-18 PROCEDURE — 99999 PR PBB SHADOW E&M-EST. PATIENT-LVL III: ICD-10-PCS | Mod: PBBFAC,HCNC,, | Performed by: OPHTHALMOLOGY

## 2019-09-18 PROCEDURE — 99999 PR PBB SHADOW E&M-EST. PATIENT-LVL III: CPT | Mod: PBBFAC,HCNC,, | Performed by: OPHTHALMOLOGY

## 2019-09-18 PROCEDURE — 92012 PR EYE EXAM, EST PATIENT,INTERMED: ICD-10-PCS | Mod: HCNC,S$GLB,, | Performed by: OPHTHALMOLOGY

## 2019-09-18 PROCEDURE — 92012 INTRM OPH EXAM EST PATIENT: CPT | Mod: HCNC,S$GLB,, | Performed by: OPHTHALMOLOGY

## 2019-09-18 NOTE — PATIENT INSTRUCTIONS
YAG Capsulotomy: How a YAG Laser Works    A laser is a strong beam of energy that can be focused on a tiny point. A laser can be precisely controlled. This makes it safe and reliable.  The YAG laser  For a capsulotomy, your eye doctor uses a YAG laser. The YAG laser gives off fast, tiny bursts of energy. A special contact lens may be used to help focus the laser on the right spot. The laser passes through the front of your eye. It also passes through your new IOL (intraocular lens). It doesnt harm them. When the laser reaches your posterior capsule, it makes a tiny opening. Light can then enter your eye again. Your posterior capsule is still able to hold your IOL in place.     Date Last Reviewed: 6/13/2015  © 7030-3387 [employer] [address] [place] [address], [place] [address]. All rights reserved. This information is not intended as a substitute for professional medical care. Always follow your healthcare professional's instructions.

## 2019-09-18 NOTE — PROGRESS NOTES
HPI     Glaucoma      Additional comments: 4 month iop ck              Comments     DLS: 5/17/19    Pt states does not feel she sees as well since last visit.     Gtts: Latanoprost QHS OU, Dorzolamide BID OS          Last edited by Cheryle Quintana on 9/18/2019 11:11 AM. (History)        ROS     Negative for: Constitutional, Gastrointestinal, Neurological, Skin,   Genitourinary, Musculoskeletal, HENT, Endocrine, Cardiovascular, Eyes,   Respiratory, Psychiatric, Allergic/Imm, Heme/Lymph    Last edited by Aleksandar Rose Jr., MD on 9/18/2019  2:10 PM. (History)        Assessment /Plan     For exam results, see Encounter Report.    Ocular hypertension, bilateral  IOP stable  Continue latanoprost QHS and Dorzolamide BID OS  PCO (posterior capsular opacification), bilateral  Schedule YAG OU  Follow up in about 4 months (around 1/18/2020) for IOP and Medication check.  Pseudophakia of both eyes  Stable, observe  Posterior vitreous detachment, bilateral  RD precautions  Allergic conjunctivitis, bilateral  Asymptomatic, AT's and alaway daily OU prn

## 2019-10-15 NOTE — PROGRESS NOTES
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 There is a small papule with a Prolene suture fragment remaining to the scar    IMPRESSION: Well healed post Mohs' micrographic surgery  Residual suture fragment    PLAN:  Suture fragment removed  Discussed anticipated course  Followup to Dr. Ritter in 1-2 months; prn to me      [de-identified] : 64 y.o F presents w the cc of feeling grape-like lumps to the rectal area, she reports having hemorrhoids which feel bothersome and c/o having an odor. She denies any rectal irritation, denies bleeding, denies any itching or pain. She admits to being asymptomatic. Has some constipation, at times. No abdominal pain. Patient reports having a thrombosed hemorrhoid, years ago. No recent flare-ups. \par She admits to having a colonoscopy done, with most recent test done this past year.

## 2019-10-23 ENCOUNTER — TELEPHONE (OUTPATIENT)
Dept: OPHTHALMOLOGY | Facility: CLINIC | Age: 84
End: 2019-10-23

## 2019-11-02 ENCOUNTER — NURSE TRIAGE (OUTPATIENT)
Dept: ADMINISTRATIVE | Facility: CLINIC | Age: 84
End: 2019-11-02

## 2019-11-02 ENCOUNTER — OFFICE VISIT (OUTPATIENT)
Dept: URGENT CARE | Facility: CLINIC | Age: 84
End: 2019-11-02
Payer: MEDICARE

## 2019-11-02 VITALS
DIASTOLIC BLOOD PRESSURE: 83 MMHG | RESPIRATION RATE: 16 BRPM | OXYGEN SATURATION: 95 % | HEART RATE: 54 BPM | BODY MASS INDEX: 32.49 KG/M2 | WEIGHT: 195 LBS | HEIGHT: 65 IN | TEMPERATURE: 97 F | SYSTOLIC BLOOD PRESSURE: 133 MMHG

## 2019-11-02 DIAGNOSIS — M79.89 PAIN AND SWELLING OF RIGHT LOWER EXTREMITY: Primary | ICD-10-CM

## 2019-11-02 DIAGNOSIS — M79.604 PAIN AND SWELLING OF RIGHT LOWER EXTREMITY: Primary | ICD-10-CM

## 2019-11-02 PROCEDURE — 1101F PR PT FALLS ASSESS DOC 0-1 FALLS W/OUT INJ PAST YR: ICD-10-PCS | Mod: CPTII,S$GLB,, | Performed by: PHYSICIAN ASSISTANT

## 2019-11-02 PROCEDURE — 99214 PR OFFICE/OUTPT VISIT, EST, LEVL IV, 30-39 MIN: ICD-10-PCS | Mod: S$GLB,,, | Performed by: PHYSICIAN ASSISTANT

## 2019-11-02 PROCEDURE — 1101F PT FALLS ASSESS-DOCD LE1/YR: CPT | Mod: CPTII,S$GLB,, | Performed by: PHYSICIAN ASSISTANT

## 2019-11-02 PROCEDURE — 99214 OFFICE O/P EST MOD 30 MIN: CPT | Mod: S$GLB,,, | Performed by: PHYSICIAN ASSISTANT

## 2019-11-02 NOTE — TELEPHONE ENCOUNTER
Last night, while looking in the mirror, she thought her slip was bunched up underneath her skirt, when she went to fix it, she realized it was not her slip, but a large amount of swelling around her right knee.  She slept with it elevated, and this morning the swelling is decreased quite a bit, but it is very sore from her foot all the way up to her thigh and the thigh feels like it wants to give out when she tries to walk.  She has a hx of DVT.  There are no changes to her skin that she can appreciate; pt states she's had cellulitis before and this does not seem the same to her.    Reason for Disposition   [1] Thigh or calf pain AND [2] only 1 side AND [3] present > 1 hour    Additional Information   Negative: Sounds like a life-threatening emergency to the triager   Negative: Followed a knee injury   Negative: Leg pain is main symptom   Negative: Knee swelling is main symptom   Negative: [1] Swollen joint AND [2] fever   Negative: [1] Red area or streak AND [2] fever   Negative: Patient sounds very sick or weak to the triager   Negative: [1] SEVERE pain (e.g., excruciating, unable to walk) AND [2] not improved after 2 hours of pain medicine   Negative: [1] Can't move swollen joint at all AND [2] no fever    Protocols used: KNEE PAIN-A-AH

## 2019-11-02 NOTE — PATIENT INSTRUCTIONS
ER Referral   Your condition is serious and requires immediate attention and evaluation in an ER setting.  You were referred to [facility].

## 2019-11-02 NOTE — PROGRESS NOTES
"Subjective:       Patient ID: Holli Landrum is a 86 y.o. female.    Vitals:  height is 5' 5" (1.651 m) and weight is 88.5 kg (195 lb). Her oral temperature is 97.4 °F (36.3 °C). Her blood pressure is 133/83 and her pulse is 54 (abnormal). Her respiration is 16 and oxygen saturation is 95%.     Chief Complaint: Mass    Patient with PMHx of DVT, CAD, COPD, HTN and recurrent skin infections/cellulitis presents to urgent care with R lower leg swelling and pain. Patient reports that noticed the swelling whenever she was getting dressed and it is painful with walking. Patient reports that "she called the call center and they told her to come here because we had US". Patient reports that the pain is worse with walking and palpation and better with rest/elevation. Patient currently denies fever, chills, CP, SOB, abdominal pain, N/V, headache, blurry vision, dizziness or syncope.     Mass   Associated symptoms include myalgias. Pertinent negatives include no abdominal pain, arthralgias, chest pain, chills, congestion, coughing, diaphoresis, fatigue, fever, headaches, joint swelling, nausea, neck pain, rash, sore throat, vomiting or weakness.       Constitution: Negative for chills, sweating, fatigue and fever.   HENT: Negative for ear pain, congestion, sore throat, trouble swallowing and voice change.    Neck: Negative for neck pain, neck stiffness, painful lymph nodes and neck swelling.   Cardiovascular: Negative for chest pain, leg swelling, palpitations, sob on exertion and passing out.   Eyes: Negative for eye pain, eye redness, photophobia, double vision, blurred vision and eyelid swelling.   Respiratory: Negative for chest tightness, cough, sputum production, bloody sputum and shortness of breath.    Gastrointestinal: Negative for abdominal pain, abdominal bloating, nausea, vomiting, constipation, diarrhea and heartburn.   Genitourinary: Negative for dysuria, frequency, urgency and history of kidney stones. "   Musculoskeletal: Positive for pain and muscle ache. Negative for trauma, joint pain, joint swelling, abnormal ROM of joint, back pain and muscle cramps.   Skin: Negative for color change, pale, rash and bruising.   Allergic/Immunologic: Negative for seasonal allergies.   Neurological: Negative for dizziness, light-headedness, passing out, facial drooping, speech difficulty, loss of balance, headaches, altered mental status, loss of consciousness and seizures.   Hematologic/Lymphatic: Negative for swollen lymph nodes.   Psychiatric/Behavioral: Negative for altered mental status, nervous/anxious, sleep disturbance and depression. The patient is not nervous/anxious.        Objective:      Physical Exam   Constitutional: She is oriented to person, place, and time. Vital signs are normal. She appears well-developed and well-nourished. She is active and cooperative.  Non-toxic appearance. She does not appear ill. No distress.   Patient is stable, seated comfortably in NAD.   HENT:   Head: Normocephalic and atraumatic.   Right Ear: External ear normal.   Left Ear: External ear normal.   Nose: Nose normal.   Mouth/Throat: Uvula is midline, oropharynx is clear and moist and mucous membranes are normal. No posterior oropharyngeal erythema.   Eyes: Conjunctivae and lids are normal.   Neck: Trachea normal, normal range of motion, full passive range of motion without pain and phonation normal. Neck supple.   Cardiovascular: Normal rate, regular rhythm, normal heart sounds, intact distal pulses and normal pulses.   Pulmonary/Chest: Effort normal and breath sounds normal.   Abdominal: Soft. Normal appearance and bowel sounds are normal. She exhibits no abdominal bruit, no pulsatile midline mass and no mass.   Musculoskeletal: She exhibits no edema or deformity.        Right lower leg: She exhibits tenderness and swelling. She exhibits no bony tenderness, no edema, no deformity and no laceration.        Left lower leg: Normal.    FROM to upper and lower extremities bilateral. 5/5 strength and full sensation bilateral. TTP and moderate amount of swelling over posterior aspect of RLE. RLE Calf tenderness and positive Homans sign. Negative [place] test. 2+ DP pulses bilateral. No numbness or tingling. Negative straight leg raise. Able to ambulate without difficulty but patient reports pain with walking.   Lymphadenopathy:     She has no cervical adenopathy.   Neurological: She is alert and oriented to person, place, and time. She has normal strength and normal reflexes. No cranial nerve deficit or sensory deficit. Gait normal.   Skin: Skin is warm, dry, intact, not diaphoretic and no rash. Capillary refill takes less than 2 seconds. not right lower leg  Psychiatric: She has a normal mood and affect. Her speech is normal and behavior is normal. Judgment and thought content normal. Cognition and memory are normal.   Nursing note and vitals reviewed.        Assessment:       1. Pain and swelling of right lower extremity        Plan:     Discussed case with Dr. Estrella and agreed with patient's plan of care to advise further evaluation in the ER for US due to patient's history and physical exam. Patient aware, verbalized understanding and agreed with plan of care.    Pain and swelling of right lower extremity  -     Refer to Emergency Dept.      Patient Instructions   ER Referral   Your condition is serious and requires immediate attention and evaluation in an ER setting.  You were referred to [facility].

## 2019-11-05 ENCOUNTER — PROCEDURE VISIT (OUTPATIENT)
Dept: OPHTHALMOLOGY | Facility: CLINIC | Age: 84
End: 2019-11-05
Payer: MEDICARE

## 2019-11-05 DIAGNOSIS — H43.813 POSTERIOR VITREOUS DETACHMENT, BILATERAL: ICD-10-CM

## 2019-11-05 DIAGNOSIS — Z96.1 PSEUDOPHAKIA OF BOTH EYES: ICD-10-CM

## 2019-11-05 DIAGNOSIS — H40.053 OCULAR HYPERTENSION, BILATERAL: ICD-10-CM

## 2019-11-05 DIAGNOSIS — H26.493 PCO (POSTERIOR CAPSULAR OPACIFICATION), BILATERAL: Primary | ICD-10-CM

## 2019-11-05 PROCEDURE — 66821 AFTER CATARACT LASER SURGERY: CPT | Mod: 50,S$GLB,, | Performed by: OPHTHALMOLOGY

## 2019-11-05 PROCEDURE — 92012 INTRM OPH EXAM EST PATIENT: CPT | Mod: 57,S$GLB,, | Performed by: OPHTHALMOLOGY

## 2019-11-05 PROCEDURE — 92012 PR EYE EXAM, EST PATIENT,INTERMED: ICD-10-PCS | Mod: 57,S$GLB,, | Performed by: OPHTHALMOLOGY

## 2019-11-05 PROCEDURE — 66821 YAG CAPSULOTOMY - OU - BOTH EYES: ICD-10-PCS | Mod: 50,S$GLB,, | Performed by: OPHTHALMOLOGY

## 2019-11-05 NOTE — PROGRESS NOTES
HPI     Glaucoma      Additional comments: 4 month iop ck and yag cap OU              Comments     DLS: 9/18/19    Pt states has been getting harder to read even with a magnifying glass   since last visit.     Gtts: Latanoprost QHS OU, Dorzolamide BID OS          Last edited by Cheryle Quintana on 11/5/2019  2:53 PM. (History)        ROS     Negative for: Constitutional, Gastrointestinal, Neurological, Skin,   Genitourinary, Musculoskeletal, HENT, Endocrine, Cardiovascular, Eyes,   Respiratory, Psychiatric, Allergic/Imm, Heme/Lymph    Last edited by Aleksandar Rose Jr., MD on 11/5/2019  4:39 PM. (History)        Assessment /Plan     For exam results, see Encounter Report.    PCO (posterior capsular opacification), bilateral  -     Yag Capsulotomy - OU - Both Eyes    Pseudophakia of both eyes    Ocular hypertension, bilateral    Posterior vitreous detachment, bilateral      [YAG laser] Procedure Note: Informed consent obtained.  YAG laser applied to posterior capsule.  Tolerated well.  Iopidine pre- and post- procedure.    F/u 2 weeks

## 2019-11-18 ENCOUNTER — TELEPHONE (OUTPATIENT)
Dept: FAMILY MEDICINE | Facility: CLINIC | Age: 84
End: 2019-11-18

## 2019-11-18 NOTE — TELEPHONE ENCOUNTER
----- Message from Eloina Lawler sent at 11/18/2019 11:21 AM CST -----  Contact: patient   Type:  Sooner Apoointment Request    Caller is requesting a sooner appointment.  Caller declined first available appointment listed below.  Caller will not accept being placed on the waitlist and is requesting a message be sent to doctor.    Name of Caller: patient   When is the first available appointment?  January   Symptoms:  Er follow up   Best Call Back Number:  [phone] (home)   Additional Information: pt is requesting to be seen this week only by Dr Crespo

## 2019-11-19 ENCOUNTER — LAB VISIT (OUTPATIENT)
Dept: LAB | Facility: HOSPITAL | Age: 84
End: 2019-11-19
Attending: FAMILY MEDICINE
Payer: MEDICARE

## 2019-11-19 ENCOUNTER — TELEPHONE (OUTPATIENT)
Dept: FAMILY MEDICINE | Facility: CLINIC | Age: 84
End: 2019-11-19

## 2019-11-19 DIAGNOSIS — R30.0 DYSURIA: Primary | ICD-10-CM

## 2019-11-19 DIAGNOSIS — R30.0 DYSURIA: ICD-10-CM

## 2019-11-19 LAB
BACTERIA #/AREA URNS HPF: ABNORMAL /HPF
BILIRUB UR QL STRIP: NEGATIVE
CLARITY UR: ABNORMAL
COLOR UR: YELLOW
GLUCOSE UR QL STRIP: NEGATIVE
HGB UR QL STRIP: ABNORMAL
KETONES UR QL STRIP: NEGATIVE
LEUKOCYTE ESTERASE UR QL STRIP: ABNORMAL
MICROSCOPIC COMMENT: ABNORMAL
NITRITE UR QL STRIP: NEGATIVE
PH UR STRIP: 6 [PH] (ref 5–8)
PROT UR QL STRIP: NEGATIVE
RBC #/AREA URNS HPF: 0 /HPF (ref 0–4)
SP GR UR STRIP: 1.01 (ref 1–1.03)
SQUAMOUS #/AREA URNS HPF: 1 /HPF
URN SPEC COLLECT METH UR: ABNORMAL
WBC #/AREA URNS HPF: >100 /HPF (ref 0–5)
WBC CLUMPS URNS QL MICRO: ABNORMAL

## 2019-11-19 PROCEDURE — 87088 URINE BACTERIA CULTURE: CPT

## 2019-11-19 PROCEDURE — 81000 URINALYSIS NONAUTO W/SCOPE: CPT | Mod: PO

## 2019-11-19 PROCEDURE — 87186 SC STD MICRODIL/AGAR DIL: CPT

## 2019-11-19 PROCEDURE — 87086 URINE CULTURE/COLONY COUNT: CPT

## 2019-11-19 PROCEDURE — 87077 CULTURE AEROBIC IDENTIFY: CPT

## 2019-11-19 NOTE — TELEPHONE ENCOUNTER
----- Message from Liam Nichols sent at 11/19/2019  8:27 AM CST -----  Contact: patient  Type: Needs Medical Advice    Who Called:  patient  Symptoms (please be specific):  Frequent urination/burning  How long has patient had these symptoms:    Pharmacy name and phone #:    Best Call Back Number: [phone]  Additional Information: requesting order for urinalysis patient has uti would like to have this done today

## 2019-11-19 NOTE — TELEPHONE ENCOUNTER
Pt is c/o urinary frequency and dysuria x2 days. She is hoping to drop off a urine sample today so she and Dr. Crespo can review the results at her appt on Friday.

## 2019-11-20 ENCOUNTER — OFFICE VISIT (OUTPATIENT)
Dept: OPHTHALMOLOGY | Facility: CLINIC | Age: 84
End: 2019-11-20
Payer: MEDICARE

## 2019-11-20 DIAGNOSIS — H40.053 OCULAR HYPERTENSION, BILATERAL: ICD-10-CM

## 2019-11-20 DIAGNOSIS — H26.493 PCO (POSTERIOR CAPSULAR OPACIFICATION), BILATERAL: Primary | ICD-10-CM

## 2019-11-20 DIAGNOSIS — Z96.1 PSEUDOPHAKIA OF BOTH EYES: ICD-10-CM

## 2019-11-20 DIAGNOSIS — Z98.890 S/P YAG CAPSULOTOMY, BILATERAL: ICD-10-CM

## 2019-11-20 PROCEDURE — 99024 PR POST-OP FOLLOW-UP VISIT: ICD-10-PCS | Mod: S$GLB,,, | Performed by: OPHTHALMOLOGY

## 2019-11-20 PROCEDURE — 99999 PR PBB SHADOW E&M-EST. PATIENT-LVL II: CPT | Mod: PBBFAC,,, | Performed by: OPHTHALMOLOGY

## 2019-11-20 PROCEDURE — 99024 POSTOP FOLLOW-UP VISIT: CPT | Mod: S$GLB,,, | Performed by: OPHTHALMOLOGY

## 2019-11-20 PROCEDURE — 99999 PR PBB SHADOW E&M-EST. PATIENT-LVL II: ICD-10-PCS | Mod: PBBFAC,,, | Performed by: OPHTHALMOLOGY

## 2019-11-20 NOTE — PROGRESS NOTES
HPI     Post-op Evaluation      Additional comments: 2 wk s/p yag cap OU              Comments     DLS: 11/5/19    Pt states OS has been a little irritated off and on since procedure.     Gtts: Dorzolamide BID OS, Latanoprost QHS OU          Last edited by Cheryle Quintana on 11/20/2019  1:42 PM. (History)            Assessment /Plan     For exam results, see Encounter Report.    PCO (posterior capsular opacification), bilateral    S/P YAG capsulotomy, bilateral    Pseudophakia of both eyes    Ocular hypertension, bilateral      Doing well, satisfactory course  Continue Dorzolamide BID OS and Latanoprost QHS OU  Follow up in about 4 months (around 3/20/2020) for IOP and Medication check.

## 2019-11-22 ENCOUNTER — LAB VISIT (OUTPATIENT)
Dept: LAB | Facility: HOSPITAL | Age: 84
End: 2019-11-22
Attending: FAMILY MEDICINE
Payer: MEDICARE

## 2019-11-22 ENCOUNTER — OFFICE VISIT (OUTPATIENT)
Dept: FAMILY MEDICINE | Facility: CLINIC | Age: 84
End: 2019-11-22
Payer: MEDICARE

## 2019-11-22 VITALS
RESPIRATION RATE: 18 BRPM | HEART RATE: 53 BPM | WEIGHT: 195.44 LBS | OXYGEN SATURATION: 96 % | HEIGHT: 65 IN | BODY MASS INDEX: 32.56 KG/M2 | TEMPERATURE: 98 F | DIASTOLIC BLOOD PRESSURE: 74 MMHG | SYSTOLIC BLOOD PRESSURE: 126 MMHG

## 2019-11-22 DIAGNOSIS — E04.2 MULTINODULAR THYROID: ICD-10-CM

## 2019-11-22 DIAGNOSIS — R53.83 FATIGUE, UNSPECIFIED TYPE: ICD-10-CM

## 2019-11-22 DIAGNOSIS — Z86.2 HISTORY OF ANEMIA: ICD-10-CM

## 2019-11-22 DIAGNOSIS — E79.0 HYPERURICEMIA: ICD-10-CM

## 2019-11-22 DIAGNOSIS — N39.0 URINARY TRACT INFECTION WITHOUT HEMATURIA, SITE UNSPECIFIED: ICD-10-CM

## 2019-11-22 DIAGNOSIS — R06.02 SHORTNESS OF BREATH: ICD-10-CM

## 2019-11-22 DIAGNOSIS — Z12.31 SCREENING MAMMOGRAM FOR HIGH-RISK PATIENT: ICD-10-CM

## 2019-11-22 DIAGNOSIS — K58.9 IRRITABLE BOWEL SYNDROME, UNSPECIFIED TYPE: ICD-10-CM

## 2019-11-22 DIAGNOSIS — Z00.00 ROUTINE HEALTH MAINTENANCE: Primary | ICD-10-CM

## 2019-11-22 DIAGNOSIS — D75.839 THROMBOCYTOSIS: ICD-10-CM

## 2019-11-22 DIAGNOSIS — E78.5 HYPERLIPIDEMIA, UNSPECIFIED HYPERLIPIDEMIA TYPE: ICD-10-CM

## 2019-11-22 DIAGNOSIS — R53.1 WEAKNESS: ICD-10-CM

## 2019-11-22 DIAGNOSIS — Z79.899 HIGH RISK MEDICATIONS (NOT ANTICOAGULANTS) LONG-TERM USE: ICD-10-CM

## 2019-11-22 LAB
25(OH)D3+25(OH)D2 SERPL-MCNC: 65 NG/ML (ref 30–96)
ALBUMIN SERPL BCP-MCNC: 4 G/DL (ref 3.5–5.2)
ALP SERPL-CCNC: 130 U/L (ref 55–135)
ALT SERPL W/O P-5'-P-CCNC: 21 U/L (ref 10–44)
ANION GAP SERPL CALC-SCNC: 8 MMOL/L (ref 8–16)
AST SERPL-CCNC: 20 U/L (ref 10–40)
BACTERIA UR CULT: ABNORMAL
BASOPHILS # BLD AUTO: 0.08 K/UL (ref 0–0.2)
BASOPHILS NFR BLD: 1.2 % (ref 0–1.9)
BILIRUB SERPL-MCNC: 0.5 MG/DL (ref 0.1–1)
BUN SERPL-MCNC: 21 MG/DL (ref 8–23)
CALCIUM SERPL-MCNC: 10.4 MG/DL (ref 8.7–10.5)
CHLORIDE SERPL-SCNC: 101 MMOL/L (ref 95–110)
CHOLEST SERPL-MCNC: 270 MG/DL (ref 120–199)
CHOLEST/HDLC SERPL: 6 {RATIO} (ref 2–5)
CO2 SERPL-SCNC: 28 MMOL/L (ref 23–29)
CREAT SERPL-MCNC: 0.9 MG/DL (ref 0.5–1.4)
DIFFERENTIAL METHOD: ABNORMAL
EOSINOPHIL # BLD AUTO: 0.2 K/UL (ref 0–0.5)
EOSINOPHIL NFR BLD: 2.6 % (ref 0–8)
ERYTHROCYTE [DISTWIDTH] IN BLOOD BY AUTOMATED COUNT: 15 % (ref 11.5–14.5)
EST. GFR  (AFRICAN AMERICAN): >60 ML/MIN/1.73 M^2
EST. GFR  (NON AFRICAN AMERICAN): 58.1 ML/MIN/1.73 M^2
FERRITIN SERPL-MCNC: 114 NG/ML (ref 20–300)
GLUCOSE SERPL-MCNC: 96 MG/DL (ref 70–110)
HCT VFR BLD AUTO: 44 % (ref 37–48.5)
HDLC SERPL-MCNC: 45 MG/DL (ref 40–75)
HDLC SERPL: 16.7 % (ref 20–50)
HGB BLD-MCNC: 13.8 G/DL (ref 12–16)
IMM GRANULOCYTES # BLD AUTO: 0.02 K/UL (ref 0–0.04)
IMM GRANULOCYTES NFR BLD AUTO: 0.3 % (ref 0–0.5)
IRON SERPL-MCNC: 92 UG/DL (ref 30–160)
LDLC SERPL CALC-MCNC: 179.2 MG/DL (ref 63–159)
LYMPHOCYTES # BLD AUTO: 2.6 K/UL (ref 1–4.8)
LYMPHOCYTES NFR BLD: 38.7 % (ref 18–48)
MCH RBC QN AUTO: 28.9 PG (ref 27–31)
MCHC RBC AUTO-ENTMCNC: 31.4 G/DL (ref 32–36)
MCV RBC AUTO: 92 FL (ref 82–98)
MONOCYTES # BLD AUTO: 0.9 K/UL (ref 0.3–1)
MONOCYTES NFR BLD: 13 % (ref 4–15)
NEUTROPHILS # BLD AUTO: 2.9 K/UL (ref 1.8–7.7)
NEUTROPHILS NFR BLD: 44.2 % (ref 38–73)
NONHDLC SERPL-MCNC: 225 MG/DL
NRBC BLD-RTO: 0 /100 WBC
PHOSPHATE SERPL-MCNC: 3.4 MG/DL (ref 2.7–4.5)
PLATELET # BLD AUTO: 367 K/UL (ref 150–350)
PMV BLD AUTO: 11.3 FL (ref 9.2–12.9)
POTASSIUM SERPL-SCNC: 4.2 MMOL/L (ref 3.5–5.1)
PROT SERPL-MCNC: 7.1 G/DL (ref 6–8.4)
PTH-INTACT SERPL-MCNC: 62 PG/ML (ref 9–77)
RBC # BLD AUTO: 4.78 M/UL (ref 4–5.4)
SATURATED IRON: 20 % (ref 20–50)
SODIUM SERPL-SCNC: 137 MMOL/L (ref 136–145)
TOTAL IRON BINDING CAPACITY: 465 UG/DL (ref 250–450)
TRANSFERRIN SERPL-MCNC: 314 MG/DL (ref 200–375)
TRIGL SERPL-MCNC: 229 MG/DL (ref 30–150)
TSH SERPL DL<=0.005 MIU/L-ACNC: 0.98 UIU/ML (ref 0.4–4)
URATE SERPL-MCNC: 7.2 MG/DL (ref 2.4–5.7)
VIT B12 SERPL-MCNC: 730 PG/ML (ref 210–950)
WBC # BLD AUTO: 6.64 K/UL (ref 3.9–12.7)

## 2019-11-22 PROCEDURE — 1125F AMNT PAIN NOTED PAIN PRSNT: CPT | Mod: HCNC,S$GLB,, | Performed by: FAMILY MEDICINE

## 2019-11-22 PROCEDURE — 84443 ASSAY THYROID STIM HORMONE: CPT | Mod: HCNC

## 2019-11-22 PROCEDURE — 1159F PR MEDICATION LIST DOCUMENTED IN MEDICAL RECORD: ICD-10-PCS | Mod: HCNC,S$GLB,, | Performed by: FAMILY MEDICINE

## 2019-11-22 PROCEDURE — 1159F MED LIST DOCD IN RCRD: CPT | Mod: HCNC,S$GLB,, | Performed by: FAMILY MEDICINE

## 2019-11-22 PROCEDURE — 83970 ASSAY OF PARATHORMONE: CPT | Mod: HCNC

## 2019-11-22 PROCEDURE — 99214 PR OFFICE/OUTPT VISIT, EST, LEVL IV, 30-39 MIN: ICD-10-PCS | Mod: HCNC,S$GLB,, | Performed by: FAMILY MEDICINE

## 2019-11-22 PROCEDURE — 83540 ASSAY OF IRON: CPT | Mod: HCNC

## 2019-11-22 PROCEDURE — 1125F PR PAIN SEVERITY QUANTIFIED, PAIN PRESENT: ICD-10-PCS | Mod: HCNC,S$GLB,, | Performed by: FAMILY MEDICINE

## 2019-11-22 PROCEDURE — 80053 COMPREHEN METABOLIC PANEL: CPT | Mod: HCNC

## 2019-11-22 PROCEDURE — 99999 PR PBB SHADOW E&M-EST. PATIENT-LVL V: CPT | Mod: PBBFAC,,, | Performed by: FAMILY MEDICINE

## 2019-11-22 PROCEDURE — 82306 VITAMIN D 25 HYDROXY: CPT | Mod: HCNC

## 2019-11-22 PROCEDURE — 99214 OFFICE O/P EST MOD 30 MIN: CPT | Mod: HCNC,S$GLB,, | Performed by: FAMILY MEDICINE

## 2019-11-22 PROCEDURE — 80061 LIPID PANEL: CPT | Mod: HCNC

## 2019-11-22 PROCEDURE — 99999 PR PBB SHADOW E&M-EST. PATIENT-LVL V: ICD-10-PCS | Mod: PBBFAC,,, | Performed by: FAMILY MEDICINE

## 2019-11-22 PROCEDURE — 82728 ASSAY OF FERRITIN: CPT | Mod: HCNC

## 2019-11-22 PROCEDURE — 1101F PT FALLS ASSESS-DOCD LE1/YR: CPT | Mod: HCNC,CPTII,S$GLB, | Performed by: FAMILY MEDICINE

## 2019-11-22 PROCEDURE — 84100 ASSAY OF PHOSPHORUS: CPT | Mod: HCNC

## 2019-11-22 PROCEDURE — 82607 VITAMIN B-12: CPT | Mod: HCNC

## 2019-11-22 PROCEDURE — 36415 COLL VENOUS BLD VENIPUNCTURE: CPT | Mod: HCNC,PN

## 2019-11-22 PROCEDURE — 84550 ASSAY OF BLOOD/URIC ACID: CPT | Mod: HCNC

## 2019-11-22 PROCEDURE — 1101F PR PT FALLS ASSESS DOC 0-1 FALLS W/OUT INJ PAST YR: ICD-10-PCS | Mod: HCNC,CPTII,S$GLB, | Performed by: FAMILY MEDICINE

## 2019-11-22 PROCEDURE — 85025 COMPLETE CBC W/AUTO DIFF WBC: CPT | Mod: HCNC

## 2019-11-22 RX ORDER — AMOXICILLIN AND CLAVULANATE POTASSIUM 875; 125 MG/1; MG/1
1 TABLET, FILM COATED ORAL 2 TIMES DAILY
Qty: 14 TABLET | Refills: 0 | Status: SHIPPED | OUTPATIENT
Start: 2019-11-22 | End: 2019-12-03 | Stop reason: SDUPTHER

## 2019-11-22 NOTE — PROGRESS NOTES
Subjective:       Patient ID: Holli Landrum is a 86 y.o. female.    Chief Complaint: Diarrhea; Fatigue; and Extremity Weakness      Holli Landrum is in the office for sx review.    HPI    Past Medical History:   Diagnosis Date    Cataract     OU done//    COPD (chronic obstructive pulmonary disease)     no oxygen    Essential hypertension 5/5/2010    GERD (gastroesophageal reflux disease) 11/20/2012    Glaucoma     Hypertensive heart disease with heart failure 02/05/2013    Paroxysmal ventricular tachycardia     per problem list     She notes issues with ongoing constipation to diarrhea. No blood visible. Stools are loose with   Current metatarsal fx - her puppy jumped on her foot - spent 2 mos in a boot.   ER visit early in the month for R knee swelling. U/s neg for DVT. Swelling completely resolved later that day.   Recalls that she canceled her lab and imaging for endo bc of family stressors.   Reports significant fatigue. Has been told her heart was fine.     Current Outpatient Medications:     acetaminophen (TYLENOL) 650 MG TbSR, Take 650 mg by mouth as needed., Disp: , Rfl:     albuterol sulfate 2.5 mg/0.5 mL Nebu, Take 2.5 mg by nebulization every 4 (four) hours as needed. Rescue, Disp: 20 each, Rfl: 0    allopurinol (ZYLOPRIM) 100 MG tablet, Take 1 tablet (100 mg total) by mouth once daily., Disp: 90 tablet, Rfl: 1    benzonatate (TESSALON PERLES) 100 MG capsule, Take 1 capsule (100 mg total) by mouth every 6 (six) hours as needed., Disp: 60 capsule, Rfl: 1    cholecalciferol, vitamin D3, (VITAMIN D3) 5,000 unit Tab, Take 5,000 Units by mouth once daily., Disp: , Rfl:     clopidogrel (PLAVIX) 75 mg tablet, TAKE 1 TABLET (75 MG TOTAL) BY MOUTH ONCE DAILY., Disp: 90 tablet, Rfl: 3    dorzolamide (TRUSOPT) 2 % ophthalmic solution, Place 1 drop into the left eye 2 (two) times daily., Disp: 30 mL, Rfl: 3    fluticasone (FLONASE) 50 mcg/actuation nasal spray, 2 sprays (100 mcg total) by Each Nare  route daily as needed for Allergies., Disp: 16 g, Rfl: 11    fluticasone/vilanterol (BREO ELLIPTA INHL), Inhale 1 puff into the lungs once daily., Disp: , Rfl:     furosemide (LASIX) 40 MG tablet, TAKE 1 TABLET EVERY DAY, Disp: 90 tablet, Rfl: 3    GRAPE SEED EXTRACT ORAL, Take 1 tablet by mouth once daily. , Disp: , Rfl:     Lactobacillus rhamnosus GG (CULTURELLE) 10 billion cell capsule, Take 1 capsule by mouth once daily., Disp: , Rfl:     latanoprost 0.005 % ophthalmic solution, Place 1 drop into both eyes every evening., Disp: 5 mL, Rfl: 6    magnesium oxide (MAG-OX) 400 mg tablet, Take 400 mg by mouth once daily., Disp: , Rfl:     metOLazone (ZAROXOLYN) 5 MG tablet, Take 1 tablet (5 mg total) by mouth every Monday and Friday., Disp: 24 tablet, Rfl: 3    nitroGLYCERIN 0.4 MG/HR TD PT24 (NITRODUR) 0.4 mg/hr, PLACE 1 PATCH ONTO THE SKIN ONCE DAILY., Disp: 90 patch, Rfl: 3    OLIVE LEAF EXTRACT ORAL, Take by mouth once daily. , Disp: , Rfl:     potassium chloride (KLOR-CON) 10 MEQ TbSR, Take 1 tablet (10 mEq total) by mouth 2 (two) times daily., Disp: 180 tablet, Rfl: 3    sotalol (SOTALOL AF) 80 MG tablet, Take 40 mg by mouth 2 (two) times daily., Disp: , Rfl:     The ASCVD Risk score (Phani DC Jr., et al., 2013) failed to calculate for the following reasons:    The 2013 ASCVD risk score is only valid for ages 40 to 79     No results found for: HGBA1C  Lab Results   Component Value Date    LDLCALC 80.2 10/13/2017    CREATININE 1.0 07/10/2019   labs 2019 rev.  Discussed cxr 2019. Pt has upcoming pulm/atkins appt for review.  Discussed ct chest/abd/pelvis 2017 without acute finding.  Reviewed ucx results.     Review of Systems   Constitutional: Positive for fatigue. Negative for fever and unexpected weight change.   HENT: Negative for congestion, postnasal drip, rhinorrhea and sinus pressure.    Eyes: Negative for discharge and itching.   Respiratory: Positive for shortness of breath (with prolonged  activity). Negative for cough.    Cardiovascular: Negative for chest pain, palpitations and leg swelling.   Gastrointestinal: Positive for constipation and diarrhea (loose). Negative for abdominal pain, blood in stool and nausea.   Genitourinary: Positive for frequency. Negative for difficulty urinating and dysuria.   Musculoskeletal: Positive for arthralgias. Negative for myalgias.   Skin: Negative for color change and rash.   Neurological: Positive for headaches (L hemifacial pain). Negative for dizziness (resolves with po fluid intake), tremors, seizures and syncope.   Hematological: Negative for adenopathy. Bruises/bleeds easily.        On plavix   Psychiatric/Behavioral: Negative for dysphoric mood and sleep disturbance.        Denies anxiety or depression.           Objective:      Physical Exam   Constitutional: She is oriented to person, place, and time. She appears well-developed and well-nourished. No distress.   Appears tired.   HENT:   Head: Normocephalic and atraumatic.   Mouth/Throat: Oropharynx is clear and moist.   Eyes: Conjunctivae are normal. Right eye exhibits no discharge. Left eye exhibits no discharge. No scleral icterus.   Neck: Normal range of motion. Neck supple.   Cardiovascular: Normal rate.   Murmur heard.  Pulmonary/Chest: Effort normal and breath sounds normal. No respiratory distress.   Abdominal: Soft. She exhibits no distension. There is no tenderness. There is no guarding.   Musculoskeletal: Normal range of motion. She exhibits no edema.   Lymphadenopathy:     She has no cervical adenopathy.   Neurological: She is alert and oriented to person, place, and time. No cranial nerve deficit.   Skin: Skin is warm and dry. No rash noted.   Psychiatric: She has a normal mood and affect. Her behavior is normal.   Nursing note and vitals reviewed.          Screening recommendations appropriate to age and health status were reviewed.    Assessment & Plan:    Routine health maintenance  Crcl  est 56  Irritable bowel syndrome, unspecified type  -     Ambulatory referral to Gastroenterology  -     US Abdomen Complete; Future; Expected date: 11/22/2019  -     US Pelvis Comp with Transvag NON-OB (xpd; Future; Expected date: 11/22/2019  Needs to add fiber - reviewed otcs - and also to see gi. No scope in >10 years.  Fatigue, unspecified type  -     Iron and TIBC; Future; Expected date: 11/22/2019  -     Ferritin; Future; Expected date: 11/22/2019  -     Vitamin D; Future; Expected date: 11/22/2019  -     Vitamin B12; Future; Expected date: 11/22/2019  Etiology unclear. Pt has had reassuring evals with both cards and pulm.  High risk medications (not anticoagulants) long-term use  -     Iron and TIBC; Future; Expected date: 11/22/2019  -     Ferritin; Future; Expected date: 11/22/2019  -     Vitamin D; Future; Expected date: 11/22/2019  -     Vitamin B12; Future; Expected date: 11/22/2019    Hyperuricemia  -     Comprehensive metabolic panel; Future; Expected date: 11/22/2019  -     Uric acid; Future; Expected date: 11/22/2019    Hyperlipidemia, unspecified hyperlipidemia type  -     Lipid panel; Future; Expected date: 11/22/2019  -     TSH; Future; Expected date: 11/22/2019  Stable, cont regimen.  Urinary tract infection without hematuria, site unspecified  -     amoxicillin-clavulanate 875-125mg (AUGMENTIN) 875-125 mg per tablet; Take 1 tablet by mouth 2 (two) times daily.  Dispense: 14 tablet; Refill: 0  -     CBC auto differential; Future; Expected date: 11/22/2019  Side effects and precautions of medication use reviewed with patient, expressed understanding. No questions or concerns. Ensure adequate water intake.   Weakness  -     Iron and TIBC; Future; Expected date: 11/22/2019  -     Ferritin; Future; Expected date: 11/22/2019  -     Vitamin D; Future; Expected date: 11/22/2019  -     Vitamin B12; Future; Expected date: 11/22/2019  Labs today.  Multinodular thyroid  -     TSH; Future; Expected date:  11/22/2019  -     US Soft Tissue Head Neck Thyroid; Future; Expected date: 11/22/2019  -     PTH, intact; Future; Expected date: 11/22/2019  -     PHOSPHORUS; Future; Expected date: 11/22/2019  Overdue for lab and imaging. Pt requests updated orders and will f/u with endo/gabi.  Screening mammogram for high-risk patient  -     Mammo Digital Screening Bilateral With CAD; Future; Expected date: 11/22/2019  History of anemia  -     CBC auto differential; Future; Expected date: 11/22/2019  -     Iron and TIBC; Future; Expected date: 11/22/2019  -     Ferritin; Future; Expected date: 11/22/2019  Stable per previous. Review with iron given fatigue c/o.  Thrombocytosis  -     CBC auto differential; Future; Expected date: 11/22/2019  -     Ferritin; Future; Expected date: 11/22/2019  Incidental finding on previous. Cont labs.  Shortness of breath  -     CT Chest Without Contrast; Future; Expected date: 11/22/2019  Etiology unclear. Update imaging. Keep upcoming appt [facility] pulm/wilbert.

## 2019-11-25 RX ORDER — DORZOLAMIDE HCL 20 MG/ML
1 SOLUTION/ DROPS OPHTHALMIC 2 TIMES DAILY
Qty: 10 ML | Refills: 11 | Status: SHIPPED | OUTPATIENT
Start: 2019-11-25 | End: 2021-02-22

## 2019-11-29 ENCOUNTER — HOSPITAL ENCOUNTER (OUTPATIENT)
Dept: RADIOLOGY | Facility: HOSPITAL | Age: 84
Discharge: HOME | End: 2019-11-29
Attending: FAMILY MEDICINE
Payer: MEDICARE

## 2019-11-29 DIAGNOSIS — K58.9 IRRITABLE BOWEL SYNDROME, UNSPECIFIED TYPE: ICD-10-CM

## 2019-11-29 PROCEDURE — 76700 US ABDOMEN COMPLETE: ICD-10-PCS | Mod: 26,HCNC,, | Performed by: RADIOLOGY

## 2019-11-29 PROCEDURE — 76700 US EXAM ABDOM COMPLETE: CPT | Mod: 26,HCNC,, | Performed by: RADIOLOGY

## 2019-11-29 PROCEDURE — 76700 US EXAM ABDOM COMPLETE: CPT | Mod: TC,HCNC,PO

## 2019-12-03 ENCOUNTER — OUTPATIENT CASE MANAGEMENT (OUTPATIENT)
Dept: ADMINISTRATIVE | Facility: OTHER | Age: 84
End: 2019-12-03

## 2019-12-03 ENCOUNTER — TELEPHONE (OUTPATIENT)
Dept: FAMILY MEDICINE | Facility: CLINIC | Age: 84
End: 2019-12-03

## 2019-12-03 DIAGNOSIS — N39.0 URINARY TRACT INFECTION WITHOUT HEMATURIA, SITE UNSPECIFIED: ICD-10-CM

## 2019-12-03 RX ORDER — AMOXICILLIN AND CLAVULANATE POTASSIUM 875; 125 MG/1; MG/1
1 TABLET, FILM COATED ORAL 2 TIMES DAILY
Qty: 14 TABLET | Refills: 0 | Status: SHIPPED | OUTPATIENT
Start: 2019-12-03 | End: 2019-12-16 | Stop reason: ALTCHOICE

## 2019-12-03 RX ORDER — BROMPHENIRAMINE MALEATE, DEXTROMETHORPHAN HBR, PHENYLEPHRINE HCL, DIPHENHYDRAMINE HCL, PHENYLEPHRINE HCL 0.52G
0.52 KIT ORAL DAILY
COMMUNITY
End: 2020-08-31

## 2019-12-03 NOTE — TELEPHONE ENCOUNTER
Resume augmentin. Schedule f/u with uro/gabrielp re: urinary retention, etc. He has considering intermittent self cath at LOV.

## 2019-12-03 NOTE — TELEPHONE ENCOUNTER
----- Message from Nancy Vences RN sent at 12/3/2019 11:17 AM CST -----  Contact: Nancy Vences RN [facility] Outpatient Complex Care [facility] EXT. [id number]  Patient finished her antibiotic on 12/30/19 for a UTI. Patient is still having burning with urination and  urge to go with little urine output.   Please call and advise patient.     Thank you for your assistance,   Nancy Vences RN [facility]

## 2019-12-03 NOTE — LETTER
December 3, 2019    Holli SELF Bude  [id number] [id number]             [facility] [address] [address] Dear Mrs. Landrum:    Welcome to [facility]s Complex Care Management Program.  It was a pleasure talking with you today.  My name is Nancy Vences RN CCM and I look forward to being your Care Manager.  My goal is to help you function at the healthiest and highest level possible.  You can contact me directly at [phone].    As an [facility] patient with [employer], some of the services we may be able to provide include:      Development of an individualized care plan with a Registered Nurse    Connection with a    Connection with available resources and services     Coordinate communication among your care team members    Provide coaching and education    Help you understand your doctors treatment plan   Help you obtain information about your insurance coverage.     All services provided by [facility]s Complex Care Managers and other care team members are coordinated with and communicated to your primary care team.    As part of your enrollment, you will be receiving education materials and more information about these services in your [facility] account, by phone or through the mail.  If you do not wish to participate or receive information, please contact our office at [phone].      Sincerely,        Nancy Vences RN CCM [facility]   Out-patient RN Complex Care Manager

## 2019-12-03 NOTE — TELEPHONE ENCOUNTER
----- Message from Nancy Vences RN sent at 12/3/2019 12:17 PM CST -----  Contact: Nancy Venecs RN [facility] Outpatient Complex Care Management EXT. [id number]  Med [facility] done this morning. Patient also taking two over the counter medications.  D-Mannose 500 mg daily for UTI and  Metamucil - 1 capsule daily for constipation.  Please add to her medication list.     Thank you for your assistance,   Nancy Vences RN [facility] OPCM

## 2019-12-03 NOTE — TELEPHONE ENCOUNTER
Med list updated  Reviewed Dr. Crespo's recommendations with pt, she expressed understanding. She will move up her appt with Dr. Tran

## 2019-12-04 ENCOUNTER — TELEPHONE (OUTPATIENT)
Dept: UROLOGY | Facility: CLINIC | Age: 84
End: 2019-12-04

## 2019-12-04 RX ORDER — ALLOPURINOL 100 MG/1
100 TABLET ORAL DAILY
Qty: 90 TABLET | Refills: 1 | Status: SHIPPED | OUTPATIENT
Start: 2019-12-04 | End: 2020-06-24

## 2019-12-04 NOTE — TELEPHONE ENCOUNTER
----- Message from Margarita Waters sent at 12/4/2019 12:09 PM CST -----  Contact: self   Patient want to know if you can fit her in on December 16th patient has taking all her antibiotic and no difference please call back at [phone] (home)       Case number [id number]

## 2019-12-11 ENCOUNTER — HOSPITAL ENCOUNTER (OUTPATIENT)
Dept: RADIOLOGY | Facility: HOSPITAL | Age: 84
Discharge: HOME | End: 2019-12-11
Attending: FAMILY MEDICINE
Payer: MEDICARE

## 2019-12-11 ENCOUNTER — CLINICAL SUPPORT (OUTPATIENT)
Dept: UROLOGY | Facility: CLINIC | Age: 84
End: 2019-12-11
Payer: MEDICARE

## 2019-12-11 DIAGNOSIS — N30.00 ACUTE CYSTITIS WITHOUT HEMATURIA: Primary | ICD-10-CM

## 2019-12-11 DIAGNOSIS — Z12.31 SCREENING MAMMOGRAM FOR HIGH-RISK PATIENT: ICD-10-CM

## 2019-12-11 DIAGNOSIS — K58.9 IRRITABLE BOWEL SYNDROME, UNSPECIFIED TYPE: ICD-10-CM

## 2019-12-11 DIAGNOSIS — E04.2 MULTINODULAR THYROID: ICD-10-CM

## 2019-12-11 DIAGNOSIS — R06.02 SHORTNESS OF BREATH: ICD-10-CM

## 2019-12-11 PROCEDURE — 76536 US EXAM OF HEAD AND NECK: CPT | Mod: TC,HCNC,PO

## 2019-12-11 PROCEDURE — 76856 US EXAM PELVIC COMPLETE: CPT | Mod: TC,HCNC,PO

## 2019-12-11 PROCEDURE — 71250 CT THORAX DX C-: CPT | Mod: TC,HCNC,PO

## 2019-12-11 PROCEDURE — 76536 US SOFT TISSUE HEAD NECK THYROID: ICD-10-PCS | Mod: 26,HCNC,, | Performed by: RADIOLOGY

## 2019-12-11 PROCEDURE — 71250 CT CHEST WITHOUT CONTRAST: ICD-10-PCS | Mod: 26,HCNC,, | Performed by: RADIOLOGY

## 2019-12-11 PROCEDURE — 71250 CT THORAX DX C-: CPT | Mod: 26,HCNC,, | Performed by: RADIOLOGY

## 2019-12-11 PROCEDURE — 87088 URINE BACTERIA CULTURE: CPT | Mod: HCNC

## 2019-12-11 PROCEDURE — 87077 CULTURE AEROBIC IDENTIFY: CPT | Mod: HCNC

## 2019-12-11 PROCEDURE — 77067 MAMMO DIGITAL SCREENING BILAT WITH TOMOSYNTHESIS_CAD: ICD-10-PCS | Mod: 26,HCNC,, | Performed by: RADIOLOGY

## 2019-12-11 PROCEDURE — 77067 SCR MAMMO BI INCL CAD: CPT | Mod: TC,HCNC,PO

## 2019-12-11 PROCEDURE — 77067 SCR MAMMO BI INCL CAD: CPT | Mod: 26,HCNC,, | Performed by: RADIOLOGY

## 2019-12-11 PROCEDURE — 76856 US EXAM PELVIC COMPLETE: CPT | Mod: 26,HCNC,, | Performed by: RADIOLOGY

## 2019-12-11 PROCEDURE — 76536 US EXAM OF HEAD AND NECK: CPT | Mod: 26,HCNC,, | Performed by: RADIOLOGY

## 2019-12-11 PROCEDURE — 76856 US PELVIS COMPLETE NON OB: ICD-10-PCS | Mod: 26,HCNC,, | Performed by: RADIOLOGY

## 2019-12-11 PROCEDURE — 77063 MAMMO DIGITAL SCREENING BILAT WITH TOMOSYNTHESIS_CAD: ICD-10-PCS | Mod: 26,HCNC,, | Performed by: RADIOLOGY

## 2019-12-11 PROCEDURE — 87186 SC STD MICRODIL/AGAR DIL: CPT | Mod: HCNC

## 2019-12-11 PROCEDURE — 77063 BREAST TOMOSYNTHESIS BI: CPT | Mod: 26,HCNC,, | Performed by: RADIOLOGY

## 2019-12-11 PROCEDURE — 87086 URINE CULTURE/COLONY COUNT: CPT | Mod: HCNC

## 2019-12-11 NOTE — PROGRESS NOTES
Pt believes she has a uti, urine sample collected to send for culture. Pt was also prescribed estrace vaginal cream today. [facility].

## 2019-12-12 ENCOUNTER — TELEPHONE (OUTPATIENT)
Dept: FAMILY MEDICINE | Facility: CLINIC | Age: 84
End: 2019-12-12

## 2019-12-12 ENCOUNTER — OUTPATIENT CASE MANAGEMENT (OUTPATIENT)
Dept: ADMINISTRATIVE | Facility: OTHER | Age: 84
End: 2019-12-12

## 2019-12-12 DIAGNOSIS — E04.2 MULTINODULAR THYROID: Primary | ICD-10-CM

## 2019-12-12 NOTE — TELEPHONE ENCOUNTER
Pt expressed understanding, she has an ENT, DR. Sandhu. She will schedule with him and call us back if she needs results faxed over. She will access report on [facility]

## 2019-12-13 ENCOUNTER — TELEPHONE (OUTPATIENT)
Dept: FAMILY MEDICINE | Facility: CLINIC | Age: 84
End: 2019-12-13

## 2019-12-13 DIAGNOSIS — D35.00 ADRENAL ADENOMA, UNSPECIFIED LATERALITY: Primary | ICD-10-CM

## 2019-12-13 DIAGNOSIS — R91.1 LUNG NODULE: ICD-10-CM

## 2019-12-13 NOTE — TELEPHONE ENCOUNTER
From lung imaging. Some change in size of nodules. Plan to repeat in 6mos. They also found a new adrenal cyst. Needs additional imaging - ct ordered.

## 2019-12-13 NOTE — TELEPHONE ENCOUNTER
Pt scheduled for abd CT. Pt sees Dr. Walter and had Thyroid nodules biopsied this past year. Pt would like to know if she still needs to see an ENT Specialist to repeat or if she should have Dr. Walter follow with this in March at her next appt in March

## 2019-12-14 LAB — BACTERIA UR CULT: ABNORMAL

## 2019-12-16 ENCOUNTER — HOSPITAL ENCOUNTER (OUTPATIENT)
Dept: RADIOLOGY | Facility: HOSPITAL | Age: 84
Discharge: HOME | End: 2019-12-16
Attending: FAMILY MEDICINE
Payer: MEDICARE

## 2019-12-16 ENCOUNTER — OFFICE VISIT (OUTPATIENT)
Dept: GASTROENTEROLOGY | Facility: CLINIC | Age: 84
End: 2019-12-16
Payer: MEDICARE

## 2019-12-16 VITALS
HEART RATE: 59 BPM | BODY MASS INDEX: 31.26 KG/M2 | DIASTOLIC BLOOD PRESSURE: 65 MMHG | RESPIRATION RATE: 18 BRPM | SYSTOLIC BLOOD PRESSURE: 106 MMHG | HEIGHT: 65 IN | WEIGHT: 187.63 LBS

## 2019-12-16 DIAGNOSIS — Z87.440 HISTORY OF UTI: ICD-10-CM

## 2019-12-16 DIAGNOSIS — R19.8 IRREGULAR BOWEL HABITS: Primary | ICD-10-CM

## 2019-12-16 DIAGNOSIS — R15.9 INCONTINENCE OF FECES, UNSPECIFIED FECAL INCONTINENCE TYPE: ICD-10-CM

## 2019-12-16 DIAGNOSIS — R39.89 BLADDER PAIN: ICD-10-CM

## 2019-12-16 DIAGNOSIS — R63.4 WEIGHT LOSS: ICD-10-CM

## 2019-12-16 DIAGNOSIS — Z87.19 HISTORY OF IBS: ICD-10-CM

## 2019-12-16 DIAGNOSIS — Z87.898 HISTORY OF SHORTNESS OF BREATH: ICD-10-CM

## 2019-12-16 DIAGNOSIS — I70.0 ATHEROSCLEROSIS OF AORTA: Primary | ICD-10-CM

## 2019-12-16 DIAGNOSIS — R13.14 PHARYNGOESOPHAGEAL DYSPHAGIA: ICD-10-CM

## 2019-12-16 DIAGNOSIS — R53.83 FATIGUE, UNSPECIFIED TYPE: ICD-10-CM

## 2019-12-16 DIAGNOSIS — R39.9 URINARY SYMPTOM OR SIGN: ICD-10-CM

## 2019-12-16 DIAGNOSIS — D35.00 ADRENAL ADENOMA, UNSPECIFIED LATERALITY: ICD-10-CM

## 2019-12-16 PROCEDURE — 1159F MED LIST DOCD IN RCRD: CPT | Mod: HCNC,S$GLB,, | Performed by: NURSE PRACTITIONER

## 2019-12-16 PROCEDURE — 99204 OFFICE O/P NEW MOD 45 MIN: CPT | Mod: HCNC,S$GLB,, | Performed by: NURSE PRACTITIONER

## 2019-12-16 PROCEDURE — 74178 CT ABD&PLV WO CNTR FLWD CNTR: CPT | Mod: 26,HCNC,, | Performed by: RADIOLOGY

## 2019-12-16 PROCEDURE — 1126F AMNT PAIN NOTED NONE PRSNT: CPT | Mod: HCNC,S$GLB,, | Performed by: NURSE PRACTITIONER

## 2019-12-16 PROCEDURE — 1126F PR PAIN SEVERITY QUANTIFIED, NO PAIN PRESENT: ICD-10-PCS | Mod: HCNC,S$GLB,, | Performed by: NURSE PRACTITIONER

## 2019-12-16 PROCEDURE — 74178 CT ABDOMEN PELVIS W WO CONTRAST: ICD-10-PCS | Mod: 26,HCNC,, | Performed by: RADIOLOGY

## 2019-12-16 PROCEDURE — 1159F PR MEDICATION LIST DOCUMENTED IN MEDICAL RECORD: ICD-10-PCS | Mod: HCNC,S$GLB,, | Performed by: NURSE PRACTITIONER

## 2019-12-16 PROCEDURE — 1101F PR PT FALLS ASSESS DOC 0-1 FALLS W/OUT INJ PAST YR: ICD-10-PCS | Mod: HCNC,CPTII,S$GLB, | Performed by: NURSE PRACTITIONER

## 2019-12-16 PROCEDURE — 99999 PR PBB SHADOW E&M-EST. PATIENT-LVL V: ICD-10-PCS | Mod: PBBFAC,HCNC,, | Performed by: NURSE PRACTITIONER

## 2019-12-16 PROCEDURE — 25500020 PHARM REV CODE 255: Mod: HCNC,PO | Performed by: FAMILY MEDICINE

## 2019-12-16 PROCEDURE — 99204 PR OFFICE/OUTPT VISIT, NEW, LEVL IV, 45-59 MIN: ICD-10-PCS | Mod: HCNC,S$GLB,, | Performed by: NURSE PRACTITIONER

## 2019-12-16 PROCEDURE — 74178 CT ABD&PLV WO CNTR FLWD CNTR: CPT | Mod: TC,HCNC,PO

## 2019-12-16 PROCEDURE — 1101F PT FALLS ASSESS-DOCD LE1/YR: CPT | Mod: HCNC,CPTII,S$GLB, | Performed by: NURSE PRACTITIONER

## 2019-12-16 PROCEDURE — 99999 PR PBB SHADOW E&M-EST. PATIENT-LVL V: CPT | Mod: PBBFAC,HCNC,, | Performed by: NURSE PRACTITIONER

## 2019-12-16 RX ORDER — ALUMINUM HYDROXIDE, MAGNESIUM HYDROXIDE, AND SIMETHICONE 2400; 240; 2400 MG/30ML; MG/30ML; MG/30ML
SUSPENSION ORAL EVERY 6 HOURS PRN
COMMUNITY
End: 2020-08-31

## 2019-12-16 RX ADMIN — IOHEXOL 100 ML: 350 INJECTION, SOLUTION INTRAVENOUS at 09:12

## 2019-12-16 NOTE — PROGRESS NOTES
Subjective:       Patient ID: Holli Landrum is a 86 y.o. female Body mass index is 31.22 kg/m².    Chief Complaint: Other (irregular bowel habits)    This patient is new to me.  Referring Provider: Dr. Marcia Crespo for IBS.    GI Problem   The primary symptoms include weight loss (lost about 8 lbs over the past month), fatigue, nausea (occasional, slight), diarrhea (denies currently) and dysuria (history of UTI's; seeing urologist tomorrow; took 2 rounds of antibiotics for it). Primary symptoms do not include fever, abdominal pain, vomiting, melena, hematemesis or hematochezia. The illness began more than 7 days ago (history of irregular bowel habits for several years). The problem has been gradually worsening (since ~11/2019).   The illness is also significant for dysphagia (occurs with liquids, denies problems with food or pills), constipation (currently) and tenesmus. The illness does not include chills or odynophagia. Associated symptoms comments: Chronic history of irregular bowel habits for several years of both constipation (currently, lasts for 2-3 days, relieved when she takes Mylanta, started magnesium supplement ~10/2019) and diarrhea (last had on 12/14/19 with fecal incontinence); worsened when she saw Dr. Crespo in 11/2019  TREATMENT: metamucil once daily started in 11/2019, probiotic daily. Significant associated medical issues include irritable bowel syndrome. Associated medical issues do not include inflammatory bowel disease or GERD.     Review of Systems   Constitutional: Positive for fatigue and weight loss (lost about 8 lbs over the past month). Negative for appetite change, chills and fever.   HENT: Positive for trouble swallowing. Negative for sore throat.    Respiratory: Positive for shortness of breath (history of COPD). Negative for cough and choking.    Cardiovascular: Negative for chest pain.   Gastrointestinal: Positive for constipation (currently), diarrhea (denies currently),  dysphagia (occurs with liquids, denies problems with food or pills) and nausea (occasional, slight). Negative for abdominal pain, anal bleeding, blood in stool, hematemesis, hematochezia, melena, rectal pain and vomiting.   Genitourinary: Positive for difficulty urinating, dysuria (history of UTI's; seeing urologist tomorrow; took 2 rounds of antibiotics for it) and pelvic pain (reports bladder pain). Negative for flank pain.   Neurological: Negative for weakness.       No LMP recorded. Patient has had a hysterectomy.  Past Medical History:   Diagnosis Date    Cataract     OU done//    COPD (chronic obstructive pulmonary disease)     no oxygen    Essential hypertension 5/5/2010    GERD (gastroesophageal reflux disease) 11/20/2012    Glaucoma     Hypertensive heart disease with heart failure 02/05/2013    Paroxysmal ventricular tachycardia     per problem list     Past Surgical History:   Procedure Laterality Date    BREAST BIOPSY Left 1995    neg    BREAST BIOPSY Right 1984    neg    BREAST BIOPSY Right 1992    neg    CARDIAC CATHETERIZATION  2013, 2014    has 5 stents    CARDIAC SURGERY  2012    stents    CATARACT EXTRACTION W/  INTRAOCULAR LENS IMPLANT Left 11/01/2018    Dr Rose    CATARACT EXTRACTION W/  INTRAOCULAR LENS IMPLANT Right 12/13/2018    Dr Rose//    CHOLECYSTECTOMY      COLONOSCOPY  ~2005    Dr. Edward; normal per patient report    HYSTERECTOMY  1969    OVARIAN CYST REMOVAL  teenager    PHACOEMULSIFICATION OF CATARACT Left 11/1/2018    Procedure: PHACOEMULSIFICATION, CATARACT;  Surgeon: Aleksandar Rose Jr., MD;  Location: [facility] OR;  Service: Ophthalmology;  Laterality: Left;    PHACOEMULSIFICATION OF CATARACT Right 12/13/2018    Procedure: PHACOEMULSIFICATION, CATARACT;  Surgeon: Aleksandar Rose Jr., MD;  Location: [facility] OR;  Service: Ophthalmology;  Laterality: Right;    TONSILLECTOMY      aw/denoids    UPPER GASTROINTESTINAL ENDOSCOPY  ~2005    VASCULAR SURGERY      to  remove clot from right leg    Yag Capsulotomy Bilateral 11/05/2019    Dr Rose     Family History   Problem Relation Age of Onset    Diabetes Brother     Heart disease Brother     Diabetes Mother     Cancer Maternal Grandfather     Clotting disorder Son         bleeding after tonsillectomy only    Amblyopia Neg Hx     Blindness Neg Hx     Cataracts Neg Hx     Glaucoma Neg Hx     Hypertension Neg Hx     Macular degeneration Neg Hx     Retinal detachment Neg Hx     Strabismus Neg Hx     Stroke Neg Hx     Thyroid disease Neg Hx      Wt Readings from Last 10 Encounters:   12/16/19 85.1 kg (187 lb 9.8 oz)   11/22/19 88.6 kg (195 lb 7 oz)   11/02/19 88.1 kg (194 lb 3.6 oz)   11/02/19 88.5 kg (195 lb)   10/08/19 88.5 kg (195 lb)   08/20/19 86.6 kg (191 lb)   08/14/19 87.1 kg (192 lb)   07/24/19 87.1 kg (192 lb)   07/10/19 87.1 kg (192 lb)   07/01/19 87.1 kg (192 lb)     Lab Results   Component Value Date    WBC 6.64 11/22/2019    HGB 13.8 11/22/2019    HCT 44.0 11/22/2019    MCV 92 11/22/2019     (H) 11/22/2019     CMP  Sodium   Date Value Ref Range Status   11/22/2019 137 136 - 145 mmol/L Final   08/15/2015 135 (L) 137 - 145 MMOL/L Final     Potassium   Date Value Ref Range Status   11/22/2019 4.2 3.5 - 5.1 mmol/L Final   08/15/2015 4.0 3.5 - 5.1 MMOL/L Final     Chloride   Date Value Ref Range Status   11/22/2019 101 95 - 110 mmol/L Final   08/15/2015 99 98 - 107 MMOL/L Final     CO2   Date Value Ref Range Status   11/22/2019 28 23 - 29 mmol/L Final     Glucose   Date Value Ref Range Status   11/22/2019 96 70 - 110 mg/dL Final     BUN, Bld   Date Value Ref Range Status   11/22/2019 21 8 - 23 mg/dL Final     Creatinine   Date Value Ref Range Status   11/22/2019 0.9 0.5 - 1.4 mg/dL Final   08/15/2015 1.05 (H) 0.52 - 1.04 MG/DL Final     Calcium   Date Value Ref Range Status   11/22/2019 10.4 8.7 - 10.5 mg/dL Final     Total Protein   Date Value Ref Range Status   11/22/2019 7.1 6.0 - 8.4 g/dL  Final     Albumin   Date Value Ref Range Status   11/22/2019 4.0 3.5 - 5.2 g/dL Final   08/15/2015 4.4 3.5 - 5.0 G/DL Final     Total Bilirubin   Date Value Ref Range Status   11/22/2019 0.5 0.1 - 1.0 mg/dL Final     Comment:     For infants and newborns, interpretation of results should be based  on gestational age, weight and in agreement with clinical  observations.  Premature Infant recommended reference ranges:  Up to 24 hours.............<8.0 mg/dL  Up to 48 hours............<12.0 mg/dL  3-5 days..................<15.0 mg/dL  6-29 days.................<15.0 mg/dL       Alkaline Phosphatase   Date Value Ref Range Status   11/22/2019 130 55 - 135 U/L Final     AST ([place])   Date Value Ref Range Status   04/05/2016 23 14 - 36 U/L Final     AST   Date Value Ref Range Status   11/22/2019 20 10 - 40 U/L Final     ALT   Date Value Ref Range Status   11/22/2019 21 10 - 44 U/L Final     Anion Gap   Date Value Ref Range Status   11/22/2019 8 8 - 16 mmol/L Final     eGFR if    Date Value Ref Range Status   11/22/2019 >60.0 >60 mL/min/1.73 m^2 Final     eGFR if non    Date Value Ref Range Status   11/22/2019 58.1 (A) >60 mL/min/1.73 m^2 Final     Comment:     Calculation used to obtain the estimated glomerular filtration  rate (eGFR) is the CKD-EPI equation.        Lab Results   Component Value Date    TSH 0.982 11/22/2019     Reviewed prior medical records including radiology report of 12/16/19 ct abdomen pelvis; 12/11/19 pelvic ultrasound; 11/29/19 abdominal ultrasound.    Objective:      Physical Exam   Constitutional: She is oriented to person, place, and time. She appears well-developed and well-nourished. No distress.   HENT:   Mouth/Throat: Oropharynx is clear and moist and mucous membranes are normal. No oral lesions. No oropharyngeal exudate.   Eyes: Pupils are equal, round, and reactive to light. Conjunctivae are normal. No scleral icterus.   Pulmonary/Chest: Effort  normal and breath sounds normal. No respiratory distress. She has no wheezes.   Abdominal: Soft. Normal appearance and bowel sounds are normal. She exhibits no distension, no abdominal bruit and no mass. There is no tenderness. There is no rigidity, no rebound, no guarding, no tenderness at McBurney's point and negative Lozoya's sign.   Neurological: She is alert and oriented to person, place, and time.   Skin: Skin is warm and dry. No rash noted. She is not diaphoretic. No erythema. No pallor.   Non-jaundiced   Psychiatric: She has a normal mood and affect. Her behavior is normal. Judgment and thought content normal.   Nursing note and vitals reviewed.      Assessment:       1. Irregular bowel habits    2. History of IBS    3. Fatigue, unspecified type    4. History of UTI    5. Incontinence of feces, unspecified fecal incontinence type    6. History of shortness of breath    7. Pharyngoesophageal dysphagia    8. Weight loss    9. Bladder pain    10. Urinary symptom or sign        Plan:       Irregular bowel habits & History of IBS  -     Stool Exam-Ova,Cysts,Parasites; Future; Expected date: 12/16/2019  -     Fecal fat, qualitative; Future; Expected date: 12/16/2019  -     Giardia / Cryptosporidum, EIA; Future; Expected date: 12/16/2019  -     Occult blood x 1, stool; Future; Expected date: 12/16/2019  -     pH, stool; Future; Expected date: 12/16/2019  -     Rotavirus antigen, stool; Future; Expected date: 12/16/2019  -     WBC, Stool; Future; Expected date: 12/16/2019  -     Stool culture; Future; Expected date: 12/16/2019  -     Clostridium difficile EIA; Future; Expected date: 12/16/2019  -     Adenovirus Molecular Detection, PCR, Non-Blood Stool; Future; Expected date: 12/16/2019  -     Pancreatic elastase, fecal; Future; Expected date: 12/16/2019  - CONTINUE OTC probiotic as directed  - avoid lactose  - discussed with patient that certain medications, such as magnesium, may be contributing to symptoms. I  "recommend follow-up with provider who manages medication to discuss about possible alternative therapy, patient verbalized understanding  - Possible colonoscopy pending results of testing and if symptoms persist    Fatigue, unspecified type  Recommend follow-up with Primary Care Provider for continued evaluation and management.  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    Incontinence of feces, unspecified fecal incontinence type  - recommend high fiber diet to help bulk up the stool, especially soluble fiber since this can help bulk up the stool consistency and may help to slow down how fast the stool goes through the colon and can prevent diarrhea; Recommend high fiber diet (20-30 grams of fiber daily)/OTC fiber supplements daily as directed, such as Benefiber.  - possible referral to general surgery and/or pelvic floor physical therapy, if symptoms persist despite use of above recommendations    History of shortness of breath  Recommend follow-up with Primary Care Provider for continued evaluation and management.    Pharyngoesophageal dysphagia  - schedule EGD, discussed procedure with patient and possible esophageal dilation may be performed during procedure if indicated, patient verbalized understanding  - educated patient to eat smaller more frequent meals and to eat slowly and advised to eat a soft diet.  - possible UGI/esophagram/esophageal manometry if symptoms persist    Weight loss  - encouraged PO intake and daily calorie counts to ensure adequate nutrition is taken in, recommend at least 2,000 calories a day  - recommend nutritional drinks, such as Boost, Ensure or Glucerna, to supplement nutrition needs  - schedule EGD, discussed procedure with patient, including risks and benefits, patient verbalized understanding  - Possible colonoscopy pending results of testing " and if symptoms persist    History of UTI, Bladder pain & Urinary symptom or sign  Recommend follow-up with Primary Care Provider/urology for continued evaluation and management.    Follow up in about 1 month (around 1/16/2020), or if symptoms worsen or fail to improve.      If no improvement in symptoms or symptoms worsen, call/follow-up at clinic or go to ER.

## 2019-12-16 NOTE — LETTER
December 20, 2019      Marcia Crespo MD  [address]  [place] [address]           [facility] [address] [place] [address]  Phone: [phone]          Patient: Holli Landrum   MR Number: [id number]   YOB: 1933   Date of Visit: 12/16/2019       Dear Dr. Marcia Crespo:    Thank you for referring Holli Landrum to me for evaluation. Attached you will find relevant portions of my assessment and plan of care.    If you have questions, please do not hesitate to call me. I look forward to following Holli Landrum along with you.    Sincerely,    Merari Sal, [facility]    Enclosure  CC:  No Recipients    If you would like to receive this communication electronically, please contact [email or web address] or [phone] to request more information on [employer] access.    For providers and/or their staff who would like to refer a patient to [facility], please contact us through our one-stop-shop provider referral line, [facility] , at [phone].    If you feel you have received this communication in error or would no longer like to receive these types of communications, please e-mail [email or web address]

## 2019-12-16 NOTE — Clinical Note
Please schedule patient for EGD (if patient is agreeable) if not done so already for dysphagia & weight loss.ThanksKTP

## 2019-12-16 NOTE — PATIENT INSTRUCTIONS
Eating a High-Fiber Diet  Fiber is what gives strength and structure to plants. Most grains, beans, vegetables, and fruits contain fiber. Foods rich in fiber are often low in calories and fat, and they fill you up more. They may also reduce your risks for certain health problems. To find out the amount of fiber in canned, packaged, or frozen foods, read the Nutrition Facts label. It tells you how much fiber is in a serving.    Types of fiber and their benefits  There are two types of fiber: insoluble and soluble. They both aid digestion and help you maintain a healthy weight.  · Insoluble fiber. This is found in whole grains, cereals, certain fruits and vegetables such as apple skin, corn, and carrots. Insoluble fiber may prevent constipation and reduce the risk for certain types of cancer.  · Soluble fiber. This type of fiber is in oats, beans, and certain fruits and vegetables such as strawberries and peas. Soluble fiber can reduce cholesterol, which may help lower the risk for heart disease. It also helps control blood sugar levels.  Look for high-fiber foods  Try these foods to add fiber to your diet:  · Whole-grain breads and cereals. Try to eat 6 to 8 ounces a day. Include wheat and oat bran cereals, whole-wheat muffins or toast, and corn tortillas in your meals.  · Fruits. Try to eat 2 cups a day. Apples, oranges, strawberries, pears, and bananas are good sources. (Note: Fruit juice is low in fiber.)  · Vegetables. Try to eat at least 2.5 cups a day. Add asparagus, carrots, broccoli, peas, and corn to your meals.  · Beans. One cup of cooked lentils gives you over 15 grams of fiber. Try navy beans, lentils, and chickpeas.  · Seeds. A small handful of seeds gives you about 3 grams of fiber. Try sunflower seeds.  Keep track of your fiber  Keep track of how much fiber you eat. Start by reading food labels. Then eat a variety of foods high in fiber. As you begin to eat more fiber, ask your healthcare provider  how much water you should be drinking to keep your digestive system working smoothly.  You should aim for a certain amount of fiber in your diet each day. If you are a woman, that amount is between 25 and 28 grams per day. Men should aim for 30 to 33 grams per day. After age 50, your daily fiber needs drop to 22 grams for women and 28 grams for men.  Before you reach for the fiber supplements, think about this. Fiber is found naturally in healthy whole foods. It gives you that feeling of fullness after you eat. Taking fiber supplements or eating fiber-enriched foods will not give you this full feeling.  Your fiber intake is a good measure for the quality of your overall diet. If you are missing out on your daily amount of fiber, you may be lacking other important nutrients as well.  Date Last Reviewed: 5/11/2015 © 2000-2017 [employer]. [address] [place] [address]. All rights reserved. This information is not intended as a substitute for professional medical care. Always follow your healthcare professional's instructions.          Uncertain Causes of Diarrhea (Adult)    Diarrhea is when stools are loose and watery. This can be caused by:  · Viral infections  · Bacterial infections  · Food poisoning  · Parasites  · Irritable bowel syndrome (IBS)  · Inflammatory bowel diseases such as ulcerative colitis, Crohn's disease, and celiac disease  · Food intolerance, such as to lactose, the sugar found in milk and milk products  · Reaction to medicines like antibiotics, laxatives, cancer drugs, and antacids  Along with diarrhea, you may also have:  · Abdominal pain and cramping  · Nausea and vomiting  · Loss of bowel control  · Fever and chills  · Bloody stools  In some cases, antibiotics may help to treat diarrhea. You may have a stool sample test. This is done to see what is causing your diarrhea, and if antibiotics will help treat it. The results of a stool sample test may take up to 2 days.  The healthcare provider may not give you antibiotics until he or she has the stool test results.  Diarrhea can cause dehydration. This is the loss of too much water and other fluids from the body. When this occurs, body fluid must be replaced. This can be done with oral rehydration solutions. Oral rehydration solutions are available at drugstores and grocery stores without a prescription.  Home care  Follow all instructions given by your healthcare provider. Rest at home for the next 24 hours, or until you feel better. Avoid caffeine, tobacco, and alcohol. These can make diarrhea, cramping, and pain worse.  If taking medicines:  · Dont take over-the-counter diarrhea or nausea medicines unless your healthcare provider tells you to.  · You may use acetaminophen or NSAID medicines like ibuprofen or naproxen to reduce pain and fever. Dont use these if you have chronic liver or kidney disease, or ever had a stomach ulcer or gastrointestinal bleeding. Don't use NSAID medicines if you are already taking one for another condition (like arthritis) or are on daily aspirin therapy (such as for heart disease or after a stroke). Talk with your healthcare provider first.  · If antibiotics were prescribed, be sure you take them until they are finished. Dont stop taking them even when you feel better. Antibiotics must be taken as a full course.  To prevent the spread of illness:  · Remember that washing with soap and water and using alcohol-based  is the best way to prevent the spread of infection.  · Clean the toilet after each use.  · Wash your hands before eating.  · Wash your hands before and after preparing food. Keep in mind that people with diarrhea or vomiting should not prepare food for others.  · Wash your hands after using cutting boards, countertops, and knives that have been in contact with raw foods.  · Wash and then peel fruits and vegetables.  · Keep uncooked meats away from cooked and ready-to-eat  foods.  · Use a food thermometer when cooking. Cook poultry to at least 165°F (74°C). Cook ground meat (beef, veal, pork, lamb) to at least 160°F (71°C). Cook fresh beef, veal, lamb, and pork to at least 145°F (63°C).  · Dont eat raw or undercooked eggs (poached or susan side up), poultry, meat, or unpasteurized milk and juices.  Food and drinks  The main goal while treating vomiting or diarrhea is to prevent dehydration. This is done by taking small amounts of liquids often.  · Keep in mind that liquids are more important than food right now.  · Drink only small amounts of liquids at a time.  · Dont force yourself to eat, especially if you are having cramping, vomiting, or diarrhea. Dont eat large amounts at a time, even if you are hungry.  · If you eat, avoid fatty, greasy, spicy, or fried foods.  · Dont eat dairy foods or drink milk if you have diarrhea. These can make diarrhea worse.  During the first 24 hours you can try:  · Oral rehydration solutions. Do not use sports drinks. They have too much sugar and not enough electrolytes.  · Soft drinks without caffeine  · Ginger ale  · Water (plain or flavored)  · Decaf tea or coffee  · Clear broth, consommé, or bouillon  · Gelatin, popsicles, or frozen fruit juice bars  The second 24 hours, if you are feeling better, you can add:  · Hot cereal, plain toast, bread, rolls, or crackers  · Plain noodles, rice, mashed potatoes, chicken noodle soup, or rice soup  · Unsweetened canned fruit (no pineapple)  · Bananas  As you recover:  · Limit fat intake to less than 15 grams per day. Dont eat margarine, butter, oils, mayonnaise, sauces, gravies, fried foods, peanut butter, meat, poultry, or fish.  · Limit fiber. Dont eat raw or cooked vegetables, fresh fruits except bananas, or bran cereals.  · Limit caffeine and chocolate.  · Limit dairy.  · Dont use spices or seasonings except salt.  · Go back to your normal diet over time, as you feel better and your symptoms  improve.  · If the symptoms come back, go back to a simple diet or clear liquids.  Follow-up care  Follow up with your healthcare provider, or as advised. If a stool sample was taken or cultures were done, call the healthcare provider for the results as instructed.  Call 911  Call 911 if you have any of these symptoms:  · Trouble breathing  · Confusion  · Extreme drowsiness or trouble walking  · Loss of consciousness  · Rapid heart rate  · Chest pain  · Stiff neck  · Seizure  When to seek medical advice  Call your healthcare provider right away if any of these occur:  · Abdominal pain that gets worse  · Constant lower right abdominal pain  · Continued vomiting and inability to keep liquids down  · Diarrhea more than 5 times a day  · Blood in vomit or stool  · Dark urine or no urine for 8 hours, dry mouth and tongue, tiredness, weakness, or dizziness  · Drowsiness  · New rash  · You dont get better in 2 to 3 days  · Fever of 100.4°F (38°C) or higher that doesnt get lower with medicine  Date Last Reviewed: 1/3/2016  © 6203-5394 [employer]. [address] [place] [address], [place] [address]. All rights reserved. This information is not intended as a substitute for professional medical care. Always follow your healthcare professional's instructions.

## 2019-12-17 ENCOUNTER — OUTPATIENT CASE MANAGEMENT (OUTPATIENT)
Dept: ADMINISTRATIVE | Facility: OTHER | Age: 84
End: 2019-12-17

## 2019-12-17 ENCOUNTER — OFFICE VISIT (OUTPATIENT)
Dept: UROLOGY | Facility: CLINIC | Age: 84
End: 2019-12-17
Payer: MEDICARE

## 2019-12-17 VITALS
BODY MASS INDEX: 31.22 KG/M2 | SYSTOLIC BLOOD PRESSURE: 130 MMHG | DIASTOLIC BLOOD PRESSURE: 74 MMHG | HEART RATE: 56 BPM | HEIGHT: 65 IN

## 2019-12-17 DIAGNOSIS — N32.3 DIVERTICULA, BLADDER: ICD-10-CM

## 2019-12-17 DIAGNOSIS — N30.00 ACUTE CYSTITIS WITHOUT HEMATURIA: Primary | ICD-10-CM

## 2019-12-17 PROCEDURE — 99999 PR PBB SHADOW E&M-EST. PATIENT-LVL III: CPT | Mod: PBBFAC,HCNC,, | Performed by: UROLOGY

## 2019-12-17 PROCEDURE — 1101F PT FALLS ASSESS-DOCD LE1/YR: CPT | Mod: HCNC,CPTII,S$GLB, | Performed by: UROLOGY

## 2019-12-17 PROCEDURE — 99215 PR OFFICE/OUTPT VISIT, EST, LEVL V, 40-54 MIN: ICD-10-PCS | Mod: HCNC,S$GLB,, | Performed by: UROLOGY

## 2019-12-17 PROCEDURE — 1125F AMNT PAIN NOTED PAIN PRSNT: CPT | Mod: HCNC,S$GLB,, | Performed by: UROLOGY

## 2019-12-17 PROCEDURE — 99999 PR PBB SHADOW E&M-EST. PATIENT-LVL III: ICD-10-PCS | Mod: PBBFAC,HCNC,, | Performed by: UROLOGY

## 2019-12-17 PROCEDURE — 1159F PR MEDICATION LIST DOCUMENTED IN MEDICAL RECORD: ICD-10-PCS | Mod: HCNC,S$GLB,, | Performed by: UROLOGY

## 2019-12-17 PROCEDURE — 99215 OFFICE O/P EST HI 40 MIN: CPT | Mod: HCNC,S$GLB,, | Performed by: UROLOGY

## 2019-12-17 PROCEDURE — 1125F PR PAIN SEVERITY QUANTIFIED, PAIN PRESENT: ICD-10-PCS | Mod: HCNC,S$GLB,, | Performed by: UROLOGY

## 2019-12-17 PROCEDURE — 1101F PR PT FALLS ASSESS DOC 0-1 FALLS W/OUT INJ PAST YR: ICD-10-PCS | Mod: HCNC,CPTII,S$GLB, | Performed by: UROLOGY

## 2019-12-17 PROCEDURE — 1159F MED LIST DOCD IN RCRD: CPT | Mod: HCNC,S$GLB,, | Performed by: UROLOGY

## 2019-12-17 RX ORDER — CIPROFLOXACIN 500 MG/1
500 TABLET ORAL 2 TIMES DAILY
Qty: 10 TABLET | Refills: 0 | Status: SHIPPED | OUTPATIENT
Start: 2019-12-17 | End: 2019-12-22

## 2019-12-17 NOTE — PROGRESS NOTES
UROLOGY [place]  12 17 19    Cc follow up    Age 86, says had a uti and was treated and then needed another round of antibiotics. Says she is perpetually feeling discomfort in the vaginal area, and last week brought a urine sample to our nurse to see if she is or she is not infected.     The urine cultured Pseudomonas aeruginosa > (10)5, pansensitive to all 7 antibiotics tested. The only oral option is cipro, and all others are iv antibiotics.     We discussed with pt the possibility of placing her on an iv infusion, or using cipro. She says she had a reaction to cipro several years ago, she was taking high doses for a long time. It gave her pain in the legs. She says she is willing to try it again.    Also, she is known to have multiple bladder diverticula. She questions whether the diverticula could be the reason she gets her constant uti's.     She says her nocturia is so severe that she is soon going to ask me to place a french in her for 2-3 weeks and see if she can get some sleep at that time.    IMP  rec uti  Her urethral meatus is small and deep in the anterior vaginal wall, as noted in previous visit. This is likely an important factor in her uti etiology.   Pt also has severe atrophic vaginitis  Has nocturia and urinary frequency (overactive bladder)  Bladder diverticula are present. We discussed whether operating on the diverticula (open pelvic exploration with removal of bladder diverticula) (two large ones on the L side of the bladder), would help her recurrent uti problem. The fiverticula are not in the dependent portion of the bladder, where they might not drain well with voiding; they are located higher up, which means they might drain well. If they drain well, it is doubtful that the diverticula would be blamed for her uti recurrence.     We could do a cystography in the radiology department: placing a french and doing a retrograde cystogram (to prove that the bladder and diverticula filled well).  Then she can void in the bathroom, and then a postvoid film again in the lying position, to see if contrast is still present in the diverticula.     It surgery is eventually planned we would decide if she could have a robotic type surgery or open pelvic surgery.

## 2019-12-17 NOTE — PROGRESS NOTES
12/17/19-  Attempt to follow up with  patient for outpatient case management.Patient is at the dentist at this time. Will follow up with patient tomorrow. RN OPCM 1'st follow up attempt.   12/18/19-  Attempt to follow up with  patient for outpatient case management. Patient is at lunch with some friends. Will call patient back after [place] [facility]. RNOPCM 2'nd follow up attempt.   01/02/20-  Attempt to follow up with patient for outpatient case management. Patient states that this is not a good time could I call back. RN OPCM 3'rd follow up attempt.   Will send message to Dr. Crespo. Patient has an appointment with Dr. Crespo on 01/03/20 at 10:40 am.   01/07/20-  Received note from ROMELIA Toribio that Sherri from [facility] would be contacting me in regards to patient's admission to their service. Received phone call from Sherri from [facility] and they will be able to see the patient tomorrow. [facility] will call the patient with an update. Called and spoke to the patient who states that Maryellen did call from [facility] and talk to her with an update. Patient was discharged from the hospital on 01/05/19 with a french catheter. Patient is discussing her health care needs with her doctors. Patient knows the signs and symptoms of a UTI. She is checking her temperature and monitoring her intake and output. Patient is watching her fluid intake because of her CHF. 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 Patient's grandson will wed on 02/02/20. Will follow up with patient in one week for update.       PLAN-  Update ROMELIA Toribio   Review weighing daily and keeping  Log  Review receiving pill box , UTI and CHF educational information.    Outpatient Care Management  Plan of Care Follow Up Visit    Patient: Holli Landrum  MRN: [id number]  Date of Service: 12/17/2019  Completed by: Nancy Vences  RN  Referral Date: 11/22/2019  Program: Case Management (High Risk)    Reason for Visit   Patient presents with    OPCM RN First Follow-Up Attempt     12/17/19    OPCM RN Second Follow-Up Attempt     12/18/19    Update Plan Of Care     01/07/20       Brief Summary: see above note     Patient Summary     Involvement of Care:  Do I have permission to speak with other family members about your care?   daughter-Annabel     Patient Reported Labs & Vitals:  1.  Any Patient Reported Labs & Vitals?     2.  Patient Reported Blood Pressure:     3.  Patient Reported Pulse:     4.  Patient Reported Weight (Kg):     5.  Patient Reported Blood Glucose (mg/dl):       Medical History:  Reviewed medical history with patient and/or caregiver    Social History:  Reviewed social history with patient and/or caregiver    Clinical Assessment     Reviewed and provided basic information on available community resources for mental health, transportation, wellness resources, and palliative care programs with patient and/or caregiver.    Complex Care Plan     Care plan was discussed and completed today with input from patient and/or caregiver.    Goals      Patient/caregiver will have an action plan in place to manage and prevent complications of HF prior to discharge from OPCM - Priority: medium       Overall Time to Completion  2 months from 12/03/2019     OPCM Identified Patient Barriers:               OPCM Identified Disease Education Barriers:  Heart Failure:  Care Plan Created   Heart Disease:  Care Plan created          Short Term Goals:  Patient/caregiver will measure and record the weight 1 times per day.  Interventions   · Assess for availability of working scale in home setting.12/03/19  · Encourage Dietary Compliance.12/03/19  · Encourage Exercise.  · Encourage Medication Compliance.12/03/19  · Recognize and provide educational material (MOHAN).12/03/19  · Review eating/nutrition habits.  Patient/Caregiver agrees to weigh daily  and keep a log  by 01/30/2020  · Patient/Caregiver agrees to OPCM follow up within 14 days to assess progress to goal.      Status  · Partially met      Patient/caregiver will notify doctor if patient gains more than 3 pounds in one day or 5 pounds in one week.  Interventions .  · Empower patient/caregiver to discuss treatment plan with Physician/care team.  · Encourage Medication Compliance.12/03/19  · Recognize and provide educational material (KRAMES).12/03/19  · Review eating/nutrition habits.  Patient/Caregiver agrees to know when to call her cardiologist about weight gain by 01/30/2020  · Patient/Caregiver agrees to OPCM follow up within 14 days to assess progress to goal.      Status  · Partially met      Patient/caregiver will verbalize 2 signs and symptoms of Heart Failure.   Interventions   · Assess for availability of working scale in home setting.12/03/19  · Encourage Medication Compliance.12/03/19  · Recognize and provide educational material (KRAMES).12/03/19  · Review eating/nutrition habits.  Patient/Caregiver agrees to verbalize two signs and symptoms of heart failure   by  01/30/2020  · Patient/Caregiver agrees to OPCM follow up within 14 days to assess progress to goal.      Status  · Partially met             Clinical Reference Documents Added to Patient Instructions-MAILED 12/03/19       Document    HEART FAILURE, WHAT IS (ENGLISH)    HEART FAILURE: MAKING CHANGES TO YOUR DIET (ENGLISH)    HEART FAILURE: WARNING SIGNS OF A FLARE-UP (ENGLISH)    URINARY TRACT INFECTIONS IN WOMEN (ENGLISH)  FOOD SOURCES OF VITAMINS AND MINERALS FROM NUTRITION CARE MANUAL   CHOLESTEROL LOWERING NUTRITION THERAPY FROM NUTRITION CARE MANUAL              Patient/caregiver will have an action plan in place to manage and prevent complications of UTI  prior to discharge from OPCM. - Priority: medium       Overall Time to Completion  2 months from 12/03/2019     OPCM Identified Patient Barriers:                  OPCM  Identified Disease Education Barriers:  Heart Failure:  Care plan created   Heart Disease:  Care plan created           Short Term Goals  Patient/caregiver will discuss health care needs with Physician and care team during visits or using Patient Portal within 1 month.  Interventions   · Empower patient/caregiver to discuss treatment plan with Physician/care team.12/03/19  · Provide contact information for [facility] contact information. 01/07/20  · Provide contact information for Outpatient Case Management contact information.12/03/19  · Refer to Outpatient Case Management Social Worker.12/03/19  Patient/Caregiver agrees to talk to dr office about her UTI  by 12/24/19  · Patient/Caregiver agrees to OPCM follow up within 14 days to assess progress to goal.      Status  · Met      Patient/caregiver will verbalize 2 signs and symptoms of UTI within 2 months.   Interventions   · Empower patient/caregiver to discuss treatment plan with Physician/care team.12/03/19 01/07/20  · Encourage Medication Compliance.01/07/20  · Recognize and provide educational material (MOHAN).12/03/19  Patient/Caregiver agrees to know 2 signs and symptoms of UTI  by  01/30/2020  · Patient/Caregiver agrees to OPCM follow up within 14 days to assess progress to goal.      Status  · Met      Patient/caregiver will verbalize importance of early intervention for symptoms of UTI within 2 months.  Interventions   · Encourage Dietary Compliance.01/07/20  · Encourage Medication Compliance.01/07/20  · Recognize and provide educational material (KRAMES).12/03/19  Patient/Caregiver agrees to review educational material on UTI  by  01/20/2020  · Patient/Caregiver agrees to OPCM follow up within 14 days to assess progress to goal.      Status  · Partially met           Clinical Reference Documents Added to Patient Instructions- Mailed 12/03/19       Document    HEART FAILURE, WHAT IS (ENGLISH)    HEART FAILURE: MAKING CHANGES TO YOUR DIET (ENGLISH)     HEART FAILURE: WARNING SIGNS OF A FLARE-UP (ENGLISH)    URINARY TRACT INFECTIONS IN WOMEN (ENGLISH)             Patient/caregiver will have knowledge of resources available in order to obtain the services that are needed prior to discharge from OPCM. - Priority: Moderate      Overall Time to Completion  3 weeks from 12/19/2019     OPCM Identified Patient Barriers:     OPCM Identified Disease Education Barriers:  Heart Failure:  Care plan created   Heart Disease:  Care plan created        Social Work Identified Patient Barriers:   Cognitive: Resolved-No needs identified  Support: Care Plan created  Advanced Care Planning: Care Plan created  Safety: Resolved-No needs identified  Mental Health: Resolved-No needs identified     Short Term Goals  Patient/caregiver will identify 1 community resources to homemaker services and Advance Care Planning within 3 weeks.  Interventions   · Collaborate with OPCM RN as appropriate to meet patient needs-ongoing.  · Contact [place] [facility] to verify available resources and application process.   · Discuss and provide Advance Directive Form-12/19/19 mailed.  · Discuss patient care needs and potential barriers-12/19/19.  · Provide education on advance care planning-12/19/19.  · Provide supportive counseling-12/19/19.  · Review [employer] insurance benefits with patient/caregiver-12/19/19 mailed Well Dine, OTC ($30 every 3 months), Silver Sneakers.    Patient/Caregiver agrees to review mailed resources by 1/2/20-done.  Patient/Caregiver agrees to OPCM follow up within 2 weeks to assess progress to goal.      Status  · Partially met                  Patient Instructions     Instructions were provided via the [employer] patient resources and are available for the patient to view on the patient portal.    Next Steps: see above note     Follow up in about 7 days (around 1/14/2020) for RN Follow up call.      Todays OPCM Self-Management Care Plan was developed with the patients/caregivers input  and was based on identified barriers from todays assessment.  Goals were written today with the patient/caregiver and the patient has agreed to work towards these goals to improve his/her overall well-being. Patient verbalized understanding of the care plan, goals, and all of today's instructions. Encouraged patient/caregiver to communicate with his/her physician and health care team about health conditions and the treatment plan.  Provided my contact information today and encouraged patient/caregiver to call me with any questions as needed.

## 2019-12-18 NOTE — PROGRESS NOTES
2nd attempt to complete Social Work Assessment for OPCM.  Pt answered the phone but stated she was unable to complete assessment today as she was having lunch with friends.  Had difficulty hearing/understanding pt due to loud noise from her phone during conversation.  Pt requests call 12/19/19 around 9am

## 2019-12-19 NOTE — PROGRESS NOTES
LCSW received a referral from OPCM RN for the following patient identified barriers: support (house cleaning). Pt maintains active, independent lifestyle.  Support system is good.  Pt is the surviving spouse of a  who served during the [place] War.  She is familiar with the Aid and Attend[employer] pension; although likely won't qualify at this time as she is independent.  Will mail information for possible future need.

## 2019-12-19 NOTE — PROGRESS NOTES
Outpatient Care Management   - High Risk Patient Assessment    Patient: Holli Landrum  MRN:  [id number]  Date of Service:  12/19/2019  Completed by:  Malu Alvarez LCSW  Referral Date: 11/22/2019  Program: Case Management (High Risk)    Reason for Visit   Patient presents with    OPCM Chart Review     12/12/19    OPCM SW First Assessment Attempt     12/12/19    OPCM SW Second Assessment Attempt     12/18/19    Social Work Assessment - High Risk    Plan Of Care       Patient Summary     OPCM Social Work Assessment (High Risk)    Involvement of Care  Do I have permission to speak with other family members about your care?:  Yes (Comment: daughter Annabel Jolly, Ginnydarius Burris-granddaughter)  Assessment completed by:  Patient  Cognitive  Which of the following can you state?:  Name, Date of birth, Address, Year, President  Cognitive barriers?:  No  General  Marital status:    Current employment status:  Retired and not working  Support  Level of Caregiver support:  Member independent and does not need caregiver assistance (Comment: Pt lives in 900 square ft. home on daughter and son in laws property.  Pt is ambulatory (uses cane as needed), indep with ADL's, maintains active lifestyle, drives)  Support system:  Children, Friends, [employer] organization, Community organization (Comment: Attends [employer] as able, attends local library weekly as able for crocheting, involved with book club)  Is the caregiver reporting burnout?:  No  Support Barriers?:  No  Advanced Care Planning  Do you have any of the following?:  Living will (Comment: Pt wants to consider changing Living Will.  Per pt request, will mail current copy of Living Will along with Advance Care Planning packet)  If yes, do we have a copy?:  Yes  If no, would you like Advance Directive resources?:  Yes  Advance Care Planning resources provided?:  Yes  Is Advance Care Planning an area of need?:  Yes  Financial  Current medical  coverage:  Medicare Advantage (Comment: [employer])  Have you applied for government assistance programs?:  No (Comment: not eligible due to income)  Are you unable to pay any of the following?:  None  Gross monthly income:  1200  Other assets:  home, car  Financial Support Barriers?:  No  Safety  Significant change in functioning?:  Disease progression  Safety barriers?:  No   History  Do you or your spouse currently or formerly serve in the ?:  Yes, formerly   branch:  [employer]  Wartime service?:  Yes (Comment: [place])   discharge type:  Honorable  Current use of VA services?:  No  Disaster Plan  Established evacuation plan?:  Yes  [place] residence:  [place]  Evacuation location:  situational; family would provide  Registered for evacuation?:  No  Ability to evacuate:  N/A  Mental Health Status  Emotional status:  Telephonic/Unable to assess  Have you recenetly lost a loved one?:  No  Psychiatric diagnosis:  denies  Current mental health treatment:  No  Would you like mental health resources?:  No  Current symptoms:  Sleep disturbances (Comment: sleeping difficulty due to frequent urination)  Mental/Behavioral/Environmental risk:  None  Mental Health Barriers?:  No  Addictive Behaviors  Current alcohol consumption?:  No  Current substance abuse?:  No  Gambling habits?:  No  Was the PHQ depression screening completed?:  No  Was the GUS-7 completed?:  No  Resources  Support:  Other (see comment) (Comment: Senior Resource Guide for homemaker services)         Complex Care Plan     Care plan was discussed and completed today with input from patient and/or caregiver.    Goals      Patient/caregiver will have an action plan in place to manage and prevent complications of HF prior to discharge from OPCM - Priority: medium       Overall Time to Completion  2 months from 12/03/2019     OPCM Identified Patient Barriers:               OPCM Identified Disease Education Barriers:  Heart Failure:   Care Plan Created   Heart Disease:  Care Plan created          Short Term Goals:  Patient/caregiver will measure and record the weight 1 times per day.  Interventions   · Assess for availability of working scale in home setting.12/03/19  · Encourage Dietary Compliance.12/03/19  · Encourage Exercise.  · Encourage Medication Compliance.12/03/19  · Recognize and provide educational material (KRAMES).12/03/19  · Review eating/nutrition habits.  Patient/Caregiver agrees to weigh daily and keep a log  by 01/30/2020  · Patient/Caregiver agrees to OPCM follow up within 14 days to assess progress to goal.      Status  · Partially met      Patient/caregiver will notify doctor if patient gains more than 3 pounds in one day or 5 pounds in one week.  Interventions .  · Empower patient/caregiver to discuss treatment plan with Physician/care team.  · Encourage Medication Compliance.12/03/19  · Recognize and provide educational material (KRAMES).12/03/19  · Review eating/nutrition habits.  Patient/Caregiver agrees to know when to call her cardiologist about weight gain by 01/30/2020  · Patient/Caregiver agrees to OPCM follow up within 14 days to assess progress to goal.      Status  · Partially met      Patient/caregiver will verbalize 2 signs and symptoms of Heart Failure.   Interventions   · Assess for availability of working scale in home setting.12/03/19  · Encourage Medication Compliance.12/03/19  · Recognize and provide educational material (KRAMES).12/03/19  · Review eating/nutrition habits.  Patient/Caregiver agrees to verbalize two signs and symptoms of heart failure   by  01/30/2020  · Patient/Caregiver agrees to OPCM follow up within 14 days to assess progress to goal.      Status  · Partially met             Clinical Reference Documents Added to Patient Instructions-MAILED 12/03/19       Document    HEART FAILURE, WHAT IS (ENGLISH)    HEART FAILURE: MAKING CHANGES TO YOUR DIET (ENGLISH)    HEART FAILURE: WARNING SIGNS  OF A FLARE-UP (ENGLISH)    URINARY TRACT INFECTIONS IN WOMEN (ENGLISH)  FOOD SOURCES OF VITAMINS AND MINERALS FROM NUTRITION CARE MANUAL   CHOLESTEROL LOWERING NUTRITION THERAPY FROM NUTRITION CARE MANUAL              Patient/caregiver will have an action plan in place to manage and prevent complications of UTI  prior to discharge from OPCM. - Priority: medium       Overall Time to Completion  2 months from 12/03/2019     OPCM Identified Patient Barriers:                  OPCM Identified Disease Education Barriers:  Heart Failure:  Care plan created   Heart Disease:  Care plan created           Short Term Goals  Patient/caregiver will discuss health care needs with Physician and care team during visits or using Patient Portal within 1 month.  Interventions   · Empower patient/caregiver to discuss treatment plan with Physician/care team.12/03/19  · Provide contact information for [facility] contact information.  · Provide contact information for Outpatient Case Management contact information.12/03/19  · Refer to Outpatient Case Management Social Worker.12/03/19  Patient/Caregiver agrees to talk to dr office about her UTI  by 12/24/19  · Patient/Caregiver agrees to OPCM follow up within 14 days to assess progress to goal.      Status  · Partially met      Patient/caregiver will verbalize 2 signs and symptoms of UTI within 2 months.   Interventions   · Empower patient/caregiver to discuss treatment plan with Physician/care team.12/03/19  · Encourage Medication Compliance.  · Recognize and provide educational material (MOHAN).12/03/19  Patient/Caregiver agrees to know 2 signs and symptoms of UTI  by  01/30/2020  · Patient/Caregiver agrees to OPCM follow up within 14 days to assess progress to goal.      Status  · Partially met      Patient/caregiver will verbalize importance of early intervention for symptoms of UTI within 2 months.  Interventions   · Encourage Dietary Compliance.  · Encourage Medication  Compliance.  · Recognize and provide educational material (MOHAN).12/03/19  Patient/Caregiver agrees to review educational material on UTI  by  01/20/2020  · Patient/Caregiver agrees to OPCM follow up within 14 days to assess progress to goal.      Status  · Partially met           Clinical Reference Documents Added to Patient Instructions- Mailed 12/03/19       Document    HEART FAILURE, WHAT IS (ENGLISH)    HEART FAILURE: MAKING CHANGES TO YOUR DIET (ENGLISH)    HEART FAILURE: WARNING SIGNS OF A FLARE-UP (ENGLISH)    URINARY TRACT INFECTIONS IN WOMEN (ENGLISH)             Patient/caregiver will have knowledge of resources available in order to obtain the services that are needed prior to discharge from OPCM. - Priority: Moderate      Overall Time to Completion  3 weeks from 12/19/2019     OPCM Identified Patient Barriers:     OPCM Identified Disease Education Barriers:  Heart Failure:  Care plan created   Heart Disease:  Care plan created        Social Work Identified Patient Barriers:   Cognitive: Resolved-No needs identified  Support: Care Plan created  Advanced Care Planning: Care Plan created  Safety: Resolved-No needs identified  Mental Health: Resolved-No needs identified     Short Term Goals  Patient/caregiver will identify 1 community resources to homemaker services and Advance Care Planning within 3 weeks.  Interventions   · Collaborate with OPCM RN as appropriate to meet patient needs-ongoing.  · Contact [place] [facility] to verify available resources and application process.   · Discuss and provide Advance Directive Form-12/19/19 mailed.  · Discuss patient care needs and potential barriers-12/19/19.  · Provide education on advance care planning-12/19/19.  · Provide supportive counseling-12/19/19.  · Review [employer] insurance benefits with patient/caregiver-12/19/19 mailed Well Dine, OTC ($30 every 3 months), Silver Sneakers.    Patient/Caregiver agrees to review mailed resources by  1/2/20.  Patient/Caregiver agrees to OPCM follow up within 2 weeks to assess progress to goal.      Status  · Partially met                  Patient Instructions     Follow up in about 3 weeks (around 1/2/2020) for call with SW.    Todays OPCM Self-Management Care Plan was developed with the patients/caregivers input and was based on identified barriers from todays assessment.  Goals were written today with the patient/caregiver and the patient has agreed to work towards these goals to improve his/her overall well-being. Patient verbalized understanding of the care plan, goals, and all of today's instructions. Encouraged patient/caregiver to communicate with his/her physician and health care team about health conditions and the treatment plan.  Provided my contact information today and encouraged patient/caregiver to call me with any questions as needed.

## 2019-12-27 ENCOUNTER — CLINICAL SUPPORT (OUTPATIENT)
Dept: CARDIOLOGY | Facility: CLINIC | Age: 84
End: 2019-12-27
Attending: FAMILY MEDICINE
Payer: MEDICARE

## 2019-12-27 ENCOUNTER — TELEPHONE (OUTPATIENT)
Dept: GASTROENTEROLOGY | Facility: CLINIC | Age: 84
End: 2019-12-27

## 2019-12-27 VITALS — BODY MASS INDEX: 31.16 KG/M2 | HEIGHT: 65 IN | WEIGHT: 187 LBS

## 2019-12-27 DIAGNOSIS — I70.0 ATHEROSCLEROSIS OF AORTA: ICD-10-CM

## 2019-12-27 LAB
ASCENDING AORTA: 3.06 CM
AV INDEX (PROSTH): 0.46
AV MEAN GRADIENT: 15 MMHG
AV PEAK GRADIENT: 26 MMHG
AV VALVE AREA: 1.37 CM2
AV VELOCITY RATIO: 0.42
BSA FOR ECHO PROCEDURE: 1.97 M2
CV ECHO LV RWT: 0.39 CM
DOP CALC AO PEAK VEL: 2.54 M/S
DOP CALC AO VTI: 67.45 CM
DOP CALC LVOT AREA: 3 CM2
DOP CALC LVOT DIAMETER: 1.96 CM
DOP CALC LVOT PEAK VEL: 1.06 M/S
DOP CALC LVOT STROKE VOLUME: 92.67 CM3
DOP CALCLVOT PEAK VEL VTI: 30.73 CM
E WAVE DECELERATION TIME: 246.81 MSEC
E/A RATIO: 1.48
E/E' RATIO: 16.73 M/S
ECHO LV POSTERIOR WALL: 0.88 CM (ref 0.6–1.1)
FRACTIONAL SHORTENING: 40 % (ref 28–44)
INTERVENTRICULAR SEPTUM: 1.06 CM (ref 0.6–1.1)
IVRT: 0.11 MSEC
LA MAJOR: 4.43 CM
LA WIDTH: 3.7 CM
LEFT ATRIUM SIZE: 4.02 CM
LEFT INTERNAL DIMENSION IN SYSTOLE: 2.67 CM (ref 2.1–4)
LEFT VENTRICLE DIASTOLIC VOLUME INDEX: 47.28 ML/M2
LEFT VENTRICLE DIASTOLIC VOLUME: 90.89 ML
LEFT VENTRICLE MASS INDEX: 76 G/M2
LEFT VENTRICLE SYSTOLIC VOLUME INDEX: 13.7 ML/M2
LEFT VENTRICLE SYSTOLIC VOLUME: 26.39 ML
LEFT VENTRICULAR INTERNAL DIMENSION IN DIASTOLE: 4.47 CM (ref 3.5–6)
LEFT VENTRICULAR MASS: 145.42 G
LV LATERAL E/E' RATIO: 15.33 M/S
LV SEPTAL E/E' RATIO: 18.4 M/S
MV PEAK A VEL: 0.62 M/S
MV PEAK E VEL: 0.92 M/S
PISA TR MAX VEL: 2.84 M/S
RA MAJOR: 4.01 CM
RA PRESSURE: 3 MMHG
RA WIDTH: 3.45 CM
RV TISSUE DOPPLER FREE WALL SYSTOLIC VELOCITY 1 (APICAL 4 CHAMBER VIEW): 11.25 CM/S
SINUS: 2.39 CM
STJ: 2.04 CM
TDI LATERAL: 0.06 M/S
TDI SEPTAL: 0.05 M/S
TDI: 0.06 M/S
TR MAX PG: 32 MMHG
TRICUSPID ANNULAR PLANE SYSTOLIC EXCURSION: 1.68 CM
TV REST PULMONARY ARTERY PRESSURE: 35 MMHG

## 2019-12-27 PROCEDURE — 93306 ECHO (CUPID ONLY): ICD-10-PCS | Mod: HCNC,S$GLB,, | Performed by: INTERNAL MEDICINE

## 2019-12-27 PROCEDURE — 93306 TTE W/DOPPLER COMPLETE: CPT | Mod: HCNC,S$GLB,, | Performed by: INTERNAL MEDICINE

## 2019-12-27 PROCEDURE — 99999 PR PBB SHADOW E&M-EST. PATIENT-LVL I: ICD-10-PCS | Mod: PBBFAC,HCNC,,

## 2019-12-27 PROCEDURE — 99999 PR PBB SHADOW E&M-EST. PATIENT-LVL I: CPT | Mod: PBBFAC,HCNC,,

## 2019-12-27 NOTE — TELEPHONE ENCOUNTER
Please call to inform & review the results with the patient- stool studies showed acidic stool; otherwise, negative/normal results. Acidic stool can indicate difficulty digesting certain dietary items. Recommend to avoid lactose products.    Continue with previous recommendations. If no improvement in symptoms or symptoms worsen, call/follow-up at clinic or go to ER.  Please release results to patient's mychart once you have discussed results and recommendations with patient.  Thanks,

## 2019-12-31 ENCOUNTER — TELEPHONE (OUTPATIENT)
Dept: GASTROENTEROLOGY | Facility: CLINIC | Age: 84
End: 2019-12-31

## 2020-01-02 ENCOUNTER — TELEPHONE (OUTPATIENT)
Dept: FAMILY MEDICINE | Facility: CLINIC | Age: 85
End: 2020-01-02

## 2020-01-02 NOTE — TELEPHONE ENCOUNTER
----- Message from Nancy Vences RN sent at 1/2/2020  4:20 PM CST -----  Contact: Nancy Vences RN [facility] Case Managment  EXT. [phone]  Received referral from Dr. Crespo on 11/22/19.  Assessment done with patient on 12/03/19.   I have called three times to follow up with patient and she has been unavailable.   Patient has an appointment with Dr. Crespo on 01/03/20 at 10:40 am.   I would appreciate your assistance in this matter.   She can call me at [phone].         Thank you,   Nancy Vences RN [facility] OPCM

## 2020-01-03 ENCOUNTER — OFFICE VISIT (OUTPATIENT)
Dept: FAMILY MEDICINE | Facility: CLINIC | Age: 85
End: 2020-01-03
Payer: MEDICARE

## 2020-01-03 VITALS
OXYGEN SATURATION: 96 % | HEIGHT: 65 IN | DIASTOLIC BLOOD PRESSURE: 70 MMHG | SYSTOLIC BLOOD PRESSURE: 120 MMHG | RESPIRATION RATE: 18 BRPM | WEIGHT: 194 LBS | BODY MASS INDEX: 32.32 KG/M2 | HEART RATE: 60 BPM | TEMPERATURE: 98 F

## 2020-01-03 DIAGNOSIS — N30.00 ACUTE CYSTITIS WITHOUT HEMATURIA: Primary | ICD-10-CM

## 2020-01-03 DIAGNOSIS — K52.9 AGE (ACUTE GASTROENTERITIS): ICD-10-CM

## 2020-01-03 PROBLEM — N18.30 CKD (CHRONIC KIDNEY DISEASE) STAGE 3, GFR 30-59 ML/MIN: Status: ACTIVE | Noted: 2020-01-03

## 2020-01-03 PROBLEM — R30.0 DYSURIA: Status: ACTIVE | Noted: 2020-01-03

## 2020-01-03 PROCEDURE — 99214 OFFICE O/P EST MOD 30 MIN: CPT | Mod: HCNC,S$GLB,, | Performed by: FAMILY MEDICINE

## 2020-01-03 PROCEDURE — 1126F PR PAIN SEVERITY QUANTIFIED, NO PAIN PRESENT: ICD-10-PCS | Mod: HCNC,S$GLB,, | Performed by: FAMILY MEDICINE

## 2020-01-03 PROCEDURE — 99999 PR PBB SHADOW E&M-EST. PATIENT-LVL III: CPT | Mod: PBBFAC,HCNC,, | Performed by: FAMILY MEDICINE

## 2020-01-03 PROCEDURE — 1101F PR PT FALLS ASSESS DOC 0-1 FALLS W/OUT INJ PAST YR: ICD-10-PCS | Mod: HCNC,CPTII,S$GLB, | Performed by: FAMILY MEDICINE

## 2020-01-03 PROCEDURE — 1159F MED LIST DOCD IN RCRD: CPT | Mod: HCNC,S$GLB,, | Performed by: FAMILY MEDICINE

## 2020-01-03 PROCEDURE — 99999 PR PBB SHADOW E&M-EST. PATIENT-LVL III: ICD-10-PCS | Mod: PBBFAC,HCNC,, | Performed by: FAMILY MEDICINE

## 2020-01-03 PROCEDURE — 1101F PT FALLS ASSESS-DOCD LE1/YR: CPT | Mod: HCNC,CPTII,S$GLB, | Performed by: FAMILY MEDICINE

## 2020-01-03 PROCEDURE — 1126F AMNT PAIN NOTED NONE PRSNT: CPT | Mod: HCNC,S$GLB,, | Performed by: FAMILY MEDICINE

## 2020-01-03 PROCEDURE — 1159F PR MEDICATION LIST DOCUMENTED IN MEDICAL RECORD: ICD-10-PCS | Mod: HCNC,S$GLB,, | Performed by: FAMILY MEDICINE

## 2020-01-03 PROCEDURE — 99214 PR OFFICE/OUTPT VISIT, EST, LEVL IV, 30-39 MIN: ICD-10-PCS | Mod: HCNC,S$GLB,, | Performed by: FAMILY MEDICINE

## 2020-01-03 NOTE — PROGRESS NOTES
"Subjective:       Patient ID: Holli Landrum is a 86 y.o. female.    Chief Complaint: Follow-up and Dysuria    HPI  Patient in the office for f/u and review.  She is having increased dysuria today. Could not get a urine sample - specihat fell into the toilet.   Having loose stools and some nausea today. Afebrile, but does recall low grade temp this past week.   Recalls discussion with uro/tia. She declined hosp admission with iv abx for pseudomonas UTI. Of note, intolerant of oral cipro. Willing to revisit this.     Review of Systems:  Review of Systems   Constitutional: Positive for fatigue. Negative for unexpected weight change.   Gastrointestinal: Positive for diarrhea and nausea. Negative for blood in stool and vomiting.   Genitourinary: Positive for difficulty urinating and dysuria. Negative for hematuria.   Musculoskeletal: Positive for arthralgias and myalgias.       Objective:     Vitals:    01/03/20 1106   BP: 120/70   BP Location: Left arm   Patient Position: Sitting   BP Method: Medium (Manual)   Pulse: 60   Resp: 18   Temp: 97.9 °F (36.6 °C)   TempSrc: Oral   SpO2: 96%   Weight: 88 kg (194 lb 0.1 oz)   Height: 5' 5" (1.651 m)          Physical Exam   Constitutional: She is oriented to person, place, and time. She appears well-developed and well-nourished.  Non-toxic appearance. She appears ill. No distress.   HENT:   Head: Normocephalic and atraumatic.   Eyes: Conjunctivae are normal. Right eye exhibits no discharge. Left eye exhibits no discharge. No scleral icterus.   Neck: Normal range of motion. Neck supple.   Cardiovascular: Normal rate.   Pulmonary/Chest: Effort normal. No respiratory distress.   Abdominal: Soft. She exhibits no distension.   Belching noted   Musculoskeletal: Normal range of motion. She exhibits no edema.   Neurological: She is alert and oriented to person, place, and time.   Skin: Skin is warm and dry. No rash noted.   Psychiatric: She has a normal mood and affect. Her behavior " is normal.   Nursing note and vitals reviewed.        Assessment & Plan:  Acute cystitis without hematuria  Call placed to uro/tia's office. Pt directed to the ER per their recs.   Of note, +pseudomonas urine cx with intolerance to oral cipro. Pt willing to be admitted for tx at this time.   AGE (acute gastroenteritis)  Suspected for age given acute onset nausea with diarrhea, but cannot r/o sepsis given above. ER eval for iv fluids, lab, and urology consult as above.

## 2020-01-04 PROBLEM — K58.0 IRRITABLE BOWEL SYNDROME WITH DIARRHEA: Status: ACTIVE | Noted: 2020-01-04

## 2020-01-06 ENCOUNTER — TELEPHONE (OUTPATIENT)
Dept: UROLOGY | Facility: CLINIC | Age: 85
End: 2020-01-06

## 2020-01-06 NOTE — TELEPHONE ENCOUNTER
----- Message from Rajeev Red sent at 1/6/2020 10:11 AM CST -----  Contact: EDWIN HATR [[id number]]  Name of Who is Calling: EDWIN HART [[id number]]      What is the request in detail: Would like to speak with staff in regards to scheduling hospital f/u. Patient is requesting either 1/14/ or 1/16 when she is in the area. Next available is 1/21, which patient refused. Please advise      Can the clinic reply by [facility]: no      [facility]: [phone]

## 2020-01-06 NOTE — TELEPHONE ENCOUNTER
Spoke to pt and booked her an apt with dr herrera as a consult per dr weber. 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

## 2020-01-07 ENCOUNTER — OUTPATIENT CASE MANAGEMENT (OUTPATIENT)
Dept: ADMINISTRATIVE | Facility: OTHER | Age: 85
End: 2020-01-07

## 2020-01-07 NOTE — PROGRESS NOTES
Outpatient Care Management   - Care Plan Follow Up    Patient: Holli Landrum  MRN:  [id number]  Date of Service:  1/7/2020  Completed by:  Malu Alvarez LCSW  Referral Date: 11/22/2019  Program: Case Management (High Risk)    Reason for Visit   Patient presents with    Update Plan Of Care     Brief Summary:  Per chart, pt was admitted to [facility] observation status on 1/3/2020 and discharged home with [facility] on 1/5/2020.  Follow up call made with pt who confirms receipt of mailed resources and has briefly read through materials.  Pt states she wasn't well and ultimately was admitted to [facility].  Per pt, she discharged home with a french catheter and [facility] hasn't seen or contacted her yet.  Pt verbalizes being upset that [facility] hasn't contacted her and stated she would change to [facility] if they can't see her.  This LCSW offered to contact [facility] re: referral; pt expresses appreciation.  Contacted Sherri with [facility] [phone] re: referral; per chart referral was made on 1/5/2020 to Jessa with [facility].  Sherri confirms Jessa was on call but Sherri was not aware of referral; states they aren't able to see pt today.  Sherri states she will try to arrange Wednesday schedule to fit pt in and call back.  This [facility] provided Sherri with contact number for OPCM RN Nancy Vences and requested she call back ASAP re: [facility] ability to see pt tomorrow.  Collaborated with OPCM RN with request to follow up with Sherri and pt re: status of [facility].    Complex Care Plan     Care plan was discussed and completed today with input from patient and/or caregiver.    Goals      Patient/caregiver will have an action plan in place to manage and prevent complications of HF prior to discharge from OPCM - Priority: medium       Overall Time to Completion  2 months from 12/03/2019     OPCM Identified Patient Barriers:               OPCM Identified Disease Education Barriers:  Heart Failure:  Care Plan Created   Heart Disease:  Care Plan  created          Short Term Goals:  Patient/caregiver will measure and record the weight 1 times per day.  Interventions   · Assess for availability of working scale in home setting.12/03/19  · Encourage Dietary Compliance.12/03/19  · Encourage Exercise.  · Encourage Medication Compliance.12/03/19  · Recognize and provide educational material (KRAMES).12/03/19  · Review eating/nutrition habits.  Patient/Caregiver agrees to weigh daily and keep a log  by 01/30/2020  · Patient/Caregiver agrees to OPCM follow up within 14 days to assess progress to goal.      Status  · Partially met      Patient/caregiver will notify doctor if patient gains more than 3 pounds in one day or 5 pounds in one week.  Interventions .  · Empower patient/caregiver to discuss treatment plan with Physician/care team.  · Encourage Medication Compliance.12/03/19  · Recognize and provide educational material (KRAMES).12/03/19  · Review eating/nutrition habits.  Patient/Caregiver agrees to know when to call her cardiologist about weight gain by 01/30/2020  · Patient/Caregiver agrees to OPCM follow up within 14 days to assess progress to goal.      Status  · Partially met      Patient/caregiver will verbalize 2 signs and symptoms of Heart Failure.   Interventions   · Assess for availability of working scale in home setting.12/03/19  · Encourage Medication Compliance.12/03/19  · Recognize and provide educational material (KRAMES).12/03/19  · Review eating/nutrition habits.  Patient/Caregiver agrees to verbalize two signs and symptoms of heart failure   by  01/30/2020  · Patient/Caregiver agrees to OPCM follow up within 14 days to assess progress to goal.      Status  · Partially met             Clinical Reference Documents Added to Patient Instructions-MAILED 12/03/19       Document    HEART FAILURE, WHAT IS (ENGLISH)    HEART FAILURE: MAKING CHANGES TO YOUR DIET (ENGLISH)    HEART FAILURE: WARNING SIGNS OF A FLARE-UP (ENGLISH)    URINARY TRACT  INFECTIONS IN WOMEN (ENGLISH)  FOOD SOURCES OF VITAMINS AND MINERALS FROM NUTRITION CARE MANUAL   CHOLESTEROL LOWERING NUTRITION THERAPY FROM NUTRITION CARE MANUAL              Patient/caregiver will have an action plan in place to manage and prevent complications of UTI  prior to discharge from OPCM. - Priority: medium       Overall Time to Completion  2 months from 12/03/2019     OPCM Identified Patient Barriers:                  OPCM Identified Disease Education Barriers:  Heart Failure:  Care plan created   Heart Disease:  Care plan created           Short Term Goals  Patient/caregiver will discuss health care needs with Physician and care team during visits or using Patient Portal within 1 month.  Interventions   · Empower patient/caregiver to discuss treatment plan with Physician/care team.12/03/19  · Provide contact information for [facility] contact information.  · Provide contact information for Outpatient Case Management contact information.12/03/19  · Refer to Outpatient Case Management Social Worker.12/03/19  Patient/Caregiver agrees to talk to dr office about her UTI  by 12/24/19  · Patient/Caregiver agrees to OPCM follow up within 14 days to assess progress to goal.      Status  · Partially met      Patient/caregiver will verbalize 2 signs and symptoms of UTI within 2 months.   Interventions   · Empower patient/caregiver to discuss treatment plan with Physician/care team.12/03/19  · Encourage Medication Compliance.  · Recognize and provide educational material (MOHAN).12/03/19  Patient/Caregiver agrees to know 2 signs and symptoms of UTI  by  01/30/2020  · Patient/Caregiver agrees to OPCM follow up within 14 days to assess progress to goal.      Status  · Partially met      Patient/caregiver will verbalize importance of early intervention for symptoms of UTI within 2 months.  Interventions   · Encourage Dietary Compliance.  · Encourage Medication Compliance.  · Recognize and provide educational  material (KRASTEPHY).12/03/19  Patient/Caregiver agrees to review educational material on UTI  by  01/20/2020  · Patient/Caregiver agrees to OPCM follow up within 14 days to assess progress to goal.      Status  · Partially met           Clinical Reference Documents Added to Patient Instructions- Mailed 12/03/19       Document    HEART FAILURE, WHAT IS (ENGLISH)    HEART FAILURE: MAKING CHANGES TO YOUR DIET (ENGLISH)    HEART FAILURE: WARNING SIGNS OF A FLARE-UP (ENGLISH)    URINARY TRACT INFECTIONS IN WOMEN (ENGLISH)             Patient/caregiver will have knowledge of resources available in order to obtain the services that are needed prior to discharge from OPCM. - Priority: Moderate      Overall Time to Completion  3 weeks from 12/19/2019     OPCM Identified Patient Barriers:     OPCM Identified Disease Education Barriers:  Heart Failure:  Care plan created   Heart Disease:  Care plan created        Social Work Identified Patient Barriers:   Cognitive: Resolved-No needs identified  Support: Care Plan created  Advanced Care Planning: Care Plan created  Safety: Resolved-No needs identified  Mental Health: Resolved-No needs identified     Short Term Goals  Patient/caregiver will identify 1 community resources to homemaker services and Advance Care Planning within 3 weeks.  Interventions   · Collaborate with OPCM RN as appropriate to meet patient needs-ongoing.  · Contact [place] [facility] to verify available resources and application process.   · Discuss and provide Advance Directive Form-12/19/19 mailed.  · Discuss patient care needs and potential barriers-12/19/19.  · Provide education on advance care planning-12/19/19.  · Provide supportive counseling-12/19/19.  · Review [employer] insurance benefits with patient/caregiver-12/19/19 mailed Well Dine, OTC ($30 every 3 months), Silver Sneakers.    Patient/Caregiver agrees to review mailed resources by 1/2/20-done.  Patient/Caregiver agrees to OPCM follow up within 2 weeks  to assess progress to goal.      Status  · Partially met                  Patient Instructions     Instructions were provided via the [employer] patient resources and are available for the patient to view on the patient portal.    Follow up in about 2 weeks (around 1/21/2020) for call with KIESHA.    Kevins OPCM Self-Management Care Plan was developed with the patients/caregivers input and was based on identified barriers from todays assessment.  Goals were written today with the patient/caregiver and the patient has agreed to work towards these goals to improve his/her overall well-being. Patient verbalized understanding of the care plan, goals, and all of today's instructions. Encouraged patient/caregiver to communicate with his/her physician and health care team about health conditions and the treatment plan.  Provided my contact information today and encouraged patient/caregiver to call me with any questions as needed.

## 2020-01-08 PROCEDURE — G0180 MD CERTIFICATION HHA PATIENT: HCPCS | Mod: ,,, | Performed by: FAMILY MEDICINE

## 2020-01-08 PROCEDURE — G0180 PR HOME HEALTH MD CERTIFICATION: ICD-10-PCS | Mod: ,,, | Performed by: FAMILY MEDICINE

## 2020-01-14 PROBLEM — R91.8 PULMONARY NODULES: Status: ACTIVE | Noted: 2020-01-14

## 2020-01-14 PROBLEM — J42 CHRONIC BRONCHITIS: Status: ACTIVE | Noted: 2020-01-14

## 2020-01-14 PROBLEM — I27.20 PULMONARY HYPERTENSION: Status: ACTIVE | Noted: 2020-01-14

## 2020-01-14 PROBLEM — R53.83 FATIGUE: Status: ACTIVE | Noted: 2020-01-14

## 2020-01-15 ENCOUNTER — TELEPHONE (OUTPATIENT)
Dept: UROLOGY | Facility: CLINIC | Age: 85
End: 2020-01-15

## 2020-01-15 ENCOUNTER — TELEPHONE (OUTPATIENT)
Dept: HOME HEALTH SERVICES | Facility: HOSPITAL | Age: 85
End: 2020-01-15

## 2020-01-15 ENCOUNTER — EXTERNAL HOME HEALTH (OUTPATIENT)
Dept: HOME HEALTH SERVICES | Facility: HOSPITAL | Age: 85
End: 2020-01-15
Payer: MEDICARE

## 2020-01-15 NOTE — TELEPHONE ENCOUNTER
----- Message from Nell Sanders sent at 1/15/2020 11:44 AM CST -----  Contact: [facility]/Homehealth  Type: Needs Medical Advice    Who Called:  Ly  Best Call Back Number: [phone]  Additional Information: Ly reports patients catheter is being replaced in the home today, the bag had only red bloody urine.  After changing bag there is no urine just blood.  Please call as soon as possible.  Thanks!

## 2020-01-15 NOTE — TELEPHONE ENCOUNTER
Spoke to juan c and gave order to irrigate catheter today with 100 mL of sterile water that she had with her while at the home. They will send a nurse out tomorrow to irrigate further if need with 300mL-500 ml. If urine output is still dark red. Pt's urine is starting to clear up the nurse stated now. PT states the lab at [facility] deflated the balloon and her cathter came out last night so home health had to come and put in a new one today. Pt had 300mL output of dark red urine. That was drained from bag and now she has 25 mL of light red urine.

## 2020-01-15 NOTE — TELEPHONE ENCOUNTER
----- Message from Jane Rivas sent at 1/15/2020 12:51 PM CST -----  Contact: [facility]  [facility]ianne-[facility] calling states left a message about hour ago pt having bleeding in cathter. And she don't want to leave the pt ,had marked the message as stat. And no one has called back....[phone]

## 2020-01-16 ENCOUNTER — OUTPATIENT CASE MANAGEMENT (OUTPATIENT)
Dept: ADMINISTRATIVE | Facility: OTHER | Age: 85
End: 2020-01-16

## 2020-01-16 ENCOUNTER — TELEPHONE (OUTPATIENT)
Dept: UROLOGY | Facility: CLINIC | Age: 85
End: 2020-01-16

## 2020-01-16 NOTE — TELEPHONE ENCOUNTER
6am 350cc dark red urine  11am 400cc dark red  Irrigated 300cc saline, returned 325 bright red urine.   Lisa from [facility]    I advised to contact cardiologist and make aware of the bleeding and plavix, patient has appt with Dr. Feliz on 1/22 to discuss surgery.  Please advised if any other measures need to be taken.

## 2020-01-16 NOTE — PROGRESS NOTES
Outpatient Care Management   - Care Plan Follow Up    Patient: Holli Landrum  MRN:  [id number]  Date of Service:  1/16/2020  Completed by:  Malu Alvarez LCSW  Referral Date: 11/22/2019  Program: Case Management (High Risk)    Reason for Visit   Patient presents with    Update Plan Of Care     Brief Summary:  Conference call with OPCM RN to pt.  Pt continues to receive [facility] and [facility].  Pt has a friend who stays with her during the day as needed.  Support system is good.  Pt states she only received mailed resources from RN not SW.  Will mail again per her request.      Complex Care Plan     Care plan was discussed and completed today with input from patient and/or caregiver.    Goals      Patient/caregiver will have an action plan in place to manage and prevent complications of HF prior to discharge from OPCM - Priority: medium       Overall Time to Completion  2 months from 12/03/2019     OPCM Identified Patient Barriers:               OPCM Identified Disease Education Barriers:  Heart Failure:  Care Plan Created   Heart Disease:  Care Plan created          Short Term Goals:  Patient/caregiver will measure and record the weight 1 times per day.  Interventions   · Assess for availability of working scale in home setting.12/03/19 01/16/20  · Encourage Dietary Compliance.12/03/19 01/16/20  · Encourage Exercise.  · Encourage Medication Compliance.12/03/19  · Recognize and provide educational material (MOHAN).12/03/19  · Review eating/nutrition habits.01/16/20  Patient/Caregiver agrees to weigh daily and keep a log  by 01/30/2020  · Patient/Caregiver agrees to OPCM follow up within 14 days to assess progress to goal.      Status  · Partially met      Patient/caregiver will notify doctor if patient gains more than 3 pounds in one day or 5 pounds in one week.  Interventions .  · Empower patient/caregiver to discuss treatment plan with Physician/care  team.01/16/20  · Encourage Medication Compliance.12/03/19  · Recognize and provide educational material (KRAMES).12/03/19  · Review eating/nutrition habits.01/16/20  Patient/Caregiver agrees to know when to call her cardiologist about weight gain by 01/30/2020  · Patient/Caregiver agrees to OPCM follow up within 14 days to assess progress to goal.      Status  · Met      Patient/caregiver will verbalize 2 signs and symptoms of Heart Failure.   Interventions   · Assess for availability of working scale in home setting.12/03/19 01/16/20  · Encourage Medication Compliance.12/03/19  · Recognize and provide educational material (KRAMES).12/03/19  · Review eating/nutrition habits.01/16/20  Patient/Caregiver agrees to verbalize two signs and symptoms of heart failure   by  01/30/2020  · Patient/Caregiver agrees to OPCM follow up within 14 days to assess progress to goal.      Status  · Met             Clinical Reference Documents Added to Patient Instructions-MAILED 12/03/19       Document    HEART FAILURE, WHAT IS (ENGLISH)    HEART FAILURE: MAKING CHANGES TO YOUR DIET (ENGLISH)    HEART FAILURE: WARNING SIGNS OF A FLARE-UP (ENGLISH)    URINARY TRACT INFECTIONS IN WOMEN (ENGLISH)  FOOD SOURCES OF VITAMINS AND MINERALS FROM NUTRITION CARE MANUAL   CHOLESTEROL LOWERING NUTRITION THERAPY FROM NUTRITION CARE MANUAL              Patient/caregiver will have knowledge of resources available in order to obtain the services that are needed prior to discharge from OPCM. - Priority: Moderate      Overall Time to Completion  3 weeks from 12/19/2019     OPCM Identified Patient Barriers:     OPCM Identified Disease Education Barriers:  Heart Failure:  Care plan created   Heart Disease:  Care plan created        Social Work Identified Patient Barriers:   Cognitive: Resolved-No needs identified  Support: Care Plan created  Advanced Care Planning: Care Plan created  Safety: Resolved-No needs identified  Mental Health: Resolved-No needs  identified     Short Term Goals  Patient/caregiver will identify 1 community resources to homemaker services and Advance Care Planning within 3 weeks.  Interventions   · Collaborate with OPCM RN as appropriate to meet patient needs-ongoing.  · Contact [place] on [facility] to verify available resources and application process.   · Discuss and provide Advance Directive Form-12/19/19 mailed.  · Discuss patient care needs and potential barriers-12/19/19.  · Provide education on advance care planning-12/19/19.  · Provide supportive counseling-12/19/19.  · Review [employer] insurance benefits with patient/caregiver-12/19/19 mailed Well Dine, OTC ($30 every 3 months), Silver Sneakers.    Patient/Caregiver agrees to review mailed resources or call [facility] for homemaker services by 1/30/2020.  Patient/Caregiver agrees to OPCM follow up within 2 weeks to assess progress to goal.     Patient/Caregiver agrees to review mailed resources by 1/2/20-not received; mailing again per pt request  Patient/Caregiver agrees to OPCM follow up within 2 weeks to assess progress to goal.      Status  · Partially met                  Patient Instructions     Instructions were provided via the [employer] patient resources and are available for the patient to view on the patient portal.    Follow up in about 2 weeks (around 1/30/2020) for call with KIESHA.    Todays OPCM Self-Management Care Plan was developed with the patients/caregivers input and was based on identified barriers from todays assessment.  Goals were written today with the patient/caregiver and the patient has agreed to work towards these goals to improve his/her overall well-being. Patient verbalized understanding of the care plan, goals, and all of today's instructions. Encouraged patient/caregiver to communicate with his/her physician and health care team about health conditions and the treatment plan.  Provided my contact information today and encouraged patient/caregiver to call me with any  questions as needed.

## 2020-01-16 NOTE — PROGRESS NOTES
01/16/20-  Conference call with ROMELIA Toribio to patient. Patient has her french and it is draining a pink blood tinge urine into the bag. Home health nurse has been to the house. Cardiology has called and she is holding per plavix starting tomorrow. Patient knows the signs and symptoms of a UTI and when to call the doctor. Patient received the educational information and pill box. Reviewed with patient cornellsmacho on call, verbalized understanding. Patient is weighting daily. Patient is not exercising at this time. Reviewed eating and nutrition habits with the patient. Patient knows to call her doctor about weight gain. Patient knows the signs and symptoms of heart failure.Patient has a friend staying with her at this time.   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 Patient's grandson will wed on 02/02/20. Will follow up with patient in two weeks.   ROMELIA Toribio talked to patient about community resources and advance directives.     PLAN-  Update ROMELIA Toribio   Review weighing daily and keeping  Log  Review appt with surgeon on 01/22/20    Outpatient Care Management  Plan of Care Follow Up Visit    Patient: Holli Landrum  MRN: [id number]  Date of Service: 01/16/2020  Completed by: Nancy Vences RN  Referral Date: 11/22/2019  Program: Case Management (High Risk)    Reason for Visit   Patient presents with    Update Plan Of Care       Brief Summary: see above note     Patient Summary     Involvement of Care:  Do I have permission to speak with other family members about your care?    yes      Patient Reported Labs & Vitals:  1.  Any Patient Reported Labs & Vitals?     2.  Patient Reported Blood Pressure:     3.  Patient Reported Pulse:     4.  Patient Reported Weight (Kg):     5.  Patient Reported Blood Glucose (mg/dl):       Medical History:  Reviewed medical history with patient and/or caregiver    Social  History:  Reviewed social history with patient and/or caregiver    Clinical Assessment     Reviewed and provided basic information on available community resources for mental health, transportation, wellness resources, and palliative care programs with patient and/or caregiver.    Complex Care Plan     Care plan was discussed and completed today with input from patient and/or caregiver.    Goals      Patient/caregiver will have an action plan in place to manage and prevent complications of HF prior to discharge from OPCM - Priority: medium       Overall Time to Completion  2 months from 12/03/2019     OPCM Identified Patient Barriers:               OPCM Identified Disease Education Barriers:  Heart Failure:  Care Plan Created   Heart Disease:  Care Plan created          Short Term Goals:  Patient/caregiver will measure and record the weight 1 times per day.  Interventions   · Assess for availability of working scale in home setting.12/03/19 01/16/20  · Encourage Dietary Compliance.12/03/19 01/16/20  · Encourage Exercise.  · Encourage Medication Compliance.12/03/19  · Recognize and provide educational material (KRAMES).12/03/19  · Review eating/nutrition habits.01/16/20  Patient/Caregiver agrees to weigh daily and keep a log  by 01/30/2020  · Patient/Caregiver agrees to OPCM follow up within 14 days to assess progress to goal.      Status  · Partially met      Patient/caregiver will notify doctor if patient gains more than 3 pounds in one day or 5 pounds in one week.  Interventions .  · Empower patient/caregiver to discuss treatment plan with Physician/care team.01/16/20  · Encourage Medication Compliance.12/03/19  · Recognize and provide educational material (KRAMES).12/03/19  · Review eating/nutrition habits.01/16/20  Patient/Caregiver agrees to know when to call her cardiologist about weight gain by 01/30/2020  · Patient/Caregiver agrees to OPCM follow up within 14 days to assess progress to goal.       Status  · Met      Patient/caregiver will verbalize 2 signs and symptoms of Heart Failure.   Interventions   · Assess for availability of working scale in home setting.12/03/19 01/16/20  · Encourage Medication Compliance.12/03/19  · Recognize and provide educational material (MOHAN).12/03/19  · Review eating/nutrition habits.01/16/20  Patient/Caregiver agrees to verbalize two signs and symptoms of heart failure   by  01/30/2020  · Patient/Caregiver agrees to OPCM follow up within 14 days to assess progress to goal.      Status  · Met             Clinical Reference Documents Added to Patient Instructions-MAILED 12/03/19       Document    HEART FAILURE, WHAT IS (ENGLISH)    HEART FAILURE: MAKING CHANGES TO YOUR DIET (ENGLISH)    HEART FAILURE: WARNING SIGNS OF A FLARE-UP (ENGLISH)    URINARY TRACT INFECTIONS IN WOMEN (ENGLISH)  FOOD SOURCES OF VITAMINS AND MINERALS FROM NUTRITION CARE MANUAL   CHOLESTEROL LOWERING NUTRITION THERAPY FROM NUTRITION CARE MANUAL              Patient/caregiver will have knowledge of resources available in order to obtain the services that are needed prior to discharge from OPCM. - Priority: Moderate      Overall Time to Completion  3 weeks from 12/19/2019     OPCM Identified Patient Barriers:     OPCM Identified Disease Education Barriers:  Heart Failure:  Care plan created   Heart Disease:  Care plan created        Social Work Identified Patient Barriers:   Cognitive: Resolved-No needs identified  Support: Care Plan created  Advanced Care Planning: Care Plan created  Safety: Resolved-No needs identified  Mental Health: Resolved-No needs identified     Short Term Goals  Patient/caregiver will identify 1 community resources to homemaker services and Advance Care Planning within 3 weeks.  Interventions   · Collaborate with OPCM RN as appropriate to meet patient needs-ongoing.  · Contact [place] [facility] to verify available resources and application process.   · Discuss and provide Advance  Directive Form-12/19/19 mailed.  · Discuss patient care needs and potential barriers-12/19/19.  · Provide education on advance care planning-12/19/19.  · Provide supportive counseling-12/19/19.  · Review [employer] insurance benefits with patient/caregiver-12/19/19 mailed Well Dine, OTC ($30 every 3 months), Silver Sneakers.    Patient/Caregiver agrees to review mailed resources by 1/2/20-done.  Patient/Caregiver agrees to OPCM follow up within 2 weeks to assess progress to goal.      Status  · Partially met                  Patient Instructions     Instructions were provided via the [employer] patient [employer] and are available for the patient to view on the patient portal.    Next Steps: see above note     Follow up in about 2 weeks (around 1/30/2020) for RN Follow up call.      Todays OPCM Self-Management Care Plan was developed with the patients/caregivers input and was based on identified barriers from todays assessment.  Goals were written today with the patient/caregiver and the patient has agreed to work towards these goals to improve his/her overall well-being. Patient verbalized understanding of the care plan, goals, and all of today's instructions. Encouraged patient/caregiver to communicate with his/her physician and health care team about health conditions and the treatment plan.  Provided my contact information today and encouraged patient/caregiver to call me with any questions as needed.

## 2020-01-21 ENCOUNTER — TELEPHONE (OUTPATIENT)
Dept: UROLOGY | Facility: CLINIC | Age: 85
End: 2020-01-21

## 2020-01-21 NOTE — TELEPHONE ENCOUNTER
[facility] nurse states she changes patient's french and was getting very little drainage in the bag. Patient has appt with Dr. Feliz tomorrow at 2pm

## 2020-01-21 NOTE — TELEPHONE ENCOUNTER
----- Message from Caty Mondragon sent at 1/21/2020  3:28 PM CST -----  Contact: [facility]  Type: Needs Medical Advice    Who Called:  Gerri Vaz Call Back Number: [phone]  Additional Information: the pt catheter is not draining needs advice and a call back

## 2020-01-22 ENCOUNTER — OFFICE VISIT (OUTPATIENT)
Dept: UROLOGY | Facility: CLINIC | Age: 85
End: 2020-01-22
Payer: MEDICARE

## 2020-01-22 DIAGNOSIS — N39.0 URINARY TRACT INFECTION WITHOUT HEMATURIA, SITE UNSPECIFIED: ICD-10-CM

## 2020-01-22 DIAGNOSIS — R33.9 RETENTION OF URINE: ICD-10-CM

## 2020-01-22 DIAGNOSIS — N37 URETHRAL STRICTURE DUE TO INFECTION: ICD-10-CM

## 2020-01-22 DIAGNOSIS — N32.3 DIVERTICULA, BLADDER: ICD-10-CM

## 2020-01-22 DIAGNOSIS — R30.0 DYSURIA: ICD-10-CM

## 2020-01-22 DIAGNOSIS — N39.0 RECURRENT UTI: Primary | ICD-10-CM

## 2020-01-22 DIAGNOSIS — N32.3 ACQUIRED BLADDER DIVERTICULUM: ICD-10-CM

## 2020-01-22 LAB
BILIRUB SERPL-MCNC: ABNORMAL MG/DL
BLOOD URINE, POC: ABNORMAL
COLOR, POC UA: YELLOW
GLUCOSE UR QL STRIP: ABNORMAL
KETONES UR QL STRIP: ABNORMAL
LEUKOCYTE ESTERASE URINE, POC: ABNORMAL
NITRITE, POC UA: ABNORMAL
PH, POC UA: 6
PROTEIN, POC: ABNORMAL
SPECIFIC GRAVITY, POC UA: 1.02
UROBILINOGEN, POC UA: 0.2

## 2020-01-22 PROCEDURE — 99214 PR OFFICE/OUTPT VISIT, EST, LEVL IV, 30-39 MIN: ICD-10-PCS | Mod: HCNC,25,S$GLB, | Performed by: UROLOGY

## 2020-01-22 PROCEDURE — 81002 POCT URINE DIPSTICK WITHOUT MICROSCOPE: ICD-10-PCS | Mod: HCNC,S$GLB,, | Performed by: UROLOGY

## 2020-01-22 PROCEDURE — 1159F PR MEDICATION LIST DOCUMENTED IN MEDICAL RECORD: ICD-10-PCS | Mod: HCNC,S$GLB,, | Performed by: UROLOGY

## 2020-01-22 PROCEDURE — 1101F PT FALLS ASSESS-DOCD LE1/YR: CPT | Mod: HCNC,CPTII,S$GLB, | Performed by: UROLOGY

## 2020-01-22 PROCEDURE — 99999 PR PBB SHADOW E&M-EST. PATIENT-LVL I: ICD-10-PCS | Mod: PBBFAC,HCNC,, | Performed by: UROLOGY

## 2020-01-22 PROCEDURE — 81002 URINALYSIS NONAUTO W/O SCOPE: CPT | Mod: HCNC,S$GLB,, | Performed by: UROLOGY

## 2020-01-22 PROCEDURE — 1159F MED LIST DOCD IN RCRD: CPT | Mod: HCNC,S$GLB,, | Performed by: UROLOGY

## 2020-01-22 PROCEDURE — 1101F PR PT FALLS ASSESS DOC 0-1 FALLS W/OUT INJ PAST YR: ICD-10-PCS | Mod: HCNC,CPTII,S$GLB, | Performed by: UROLOGY

## 2020-01-22 PROCEDURE — 99214 OFFICE O/P EST MOD 30 MIN: CPT | Mod: HCNC,25,S$GLB, | Performed by: UROLOGY

## 2020-01-22 PROCEDURE — 99999 PR PBB SHADOW E&M-EST. PATIENT-LVL I: CPT | Mod: PBBFAC,HCNC,, | Performed by: UROLOGY

## 2020-01-22 NOTE — PROGRESS NOTES
Subjective:       Patient ID: Holli Landrum is a 86 y.o. female.    Chief Complaint: No chief complaint on file.    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  She is frustrated with her situation and seeks intervention      Past Medical History:   Diagnosis Date    Cataract     OU done//    COPD (chronic obstructive pulmonary disease)     no oxygen    Essential hypertension 5/5/2010    GERD (gastroesophageal reflux disease) 11/20/2012    Glaucoma     Hypertensive heart disease with heart failure 02/05/2013    Paroxysmal ventricular tachycardia     per problem list      Past Surgical History:   Procedure Laterality Date    BREAST BIOPSY Left 1995    neg    BREAST BIOPSY Right 1984    neg    BREAST BIOPSY Right 1992    neg    CARDIAC CATHETERIZATION  2013, 2014    has 5 stents    CARDIAC SURGERY  2012    stents    CATARACT EXTRACTION W/  INTRAOCULAR LENS IMPLANT Left 11/01/2018    Dr Rose    CATARACT EXTRACTION W/  INTRAOCULAR LENS IMPLANT Right 12/13/2018    Dr Rose//    CHOLECYSTECTOMY      COLONOSCOPY  ~2005    Dr. Edward; normal per patient report    HYSTERECTOMY  1969    OVARIAN CYST REMOVAL  teenager    PHACOEMULSIFICATION OF CATARACT Left 11/1/2018    Procedure: PHACOEMULSIFICATION, CATARACT;  Surgeon: Aleksandar Rose Jr., MD;  Location: [facility] OR;  Service: Ophthalmology;  Laterality: Left;    PHACOEMULSIFICATION OF CATARACT Right 12/13/2018    Procedure: PHACOEMULSIFICATION, CATARACT;  Surgeon: Aleksandar Rose Jr., MD;  Location: [facility] OR;  Service: Ophthalmology;  Laterality: Right;    TONSILLECTOMY      aw/denoids    UPPER GASTROINTESTINAL ENDOSCOPY  ~2005    VASCULAR SURGERY      to remove clot from right leg    Yag Capsulotomy Bilateral 11/05/2019    Dr Rose     Social History     Socioeconomic History    Marital status:      Spouse name: Not on file    Number  of children: Not on file    Years of education: Not on file    Highest education level: Not on file   Occupational History    Not on file   Social Needs    Financial resource strain: Not on file    Food insecurity:     Worry: Not on file     Inability: Not on file    Transportation needs:     Medical: Not on file     Non-medical: Not on file   Tobacco Use    Smoking status: Former Smoker     Types: Cigarettes     Last attempt to quit:      Years since quittin.1    Smokeless tobacco: Never Used    Tobacco comment: quit smoking  //had smoked for 1 yr//   Substance and Sexual Activity    Alcohol use: No     Alcohol/week: 0.0 standard drinks    Drug use: No    Sexual activity: Never     Partners: Male     Birth control/protection: Abstinence   Lifestyle    Physical activity:     Days per week: Not on file     Minutes per session: Not on file    Stress: Not on file   Relationships    Social connections:     Talks on phone: Not on file     Gets together: Not on file     Attends Yarsani service: Not on file     Active member of club or organization: Not on file     Attends meetings of clubs or organizations: Not on file     Relationship status: Not on file   Other Topics Concern    Are you pregnant or think you may be? Not Asked    Breast-feeding Not Asked   Social History Narrative    Not on file       Family History   Problem Relation Age of Onset    Diabetes Brother     Heart disease Brother     Diabetes Mother     Cancer Maternal Grandfather     Clotting disorder Son         bleeding after tonsillectomy only    Amblyopia Neg Hx     Blindness Neg Hx     Cataracts Neg Hx     Glaucoma Neg Hx     Hypertension Neg Hx     Macular degeneration Neg Hx     Retinal detachment Neg Hx     Strabismus Neg Hx     Stroke Neg Hx     Thyroid disease Neg Hx       Review of patient's allergies indicates:   Allergen Reactions    Timoptic [timolol maleate] Shortness Of Breath    Bactrim ds  [sulfamethoxazole-trimethoprim]     Ciprofloxacin Other (See Comments)     Muscle ache     Medication List with Changes/Refills   Current Medications    ACETAMINOPHEN (TYLENOL) 650 MG TBSR    Take 650 mg by mouth as needed.     ALBUTEROL SULFATE 2.5 MG/0.5 ML NEBU    Take 2.5 mg by nebulization every 4 (four) hours as needed. Rescue    ALLOPURINOL (ZYLOPRIM) 100 MG TABLET    Take 1 tablet (100 mg total) by mouth once daily.    ALUMINUM & MAGNESIUM HYDROXIDE-SIMETHICONE (MYLANTA MAX STRENGTH) 400-400-40 MG/5 ML SUSPENSION    Take by mouth every 6 (six) hours as needed for Indigestion (diarrhea).    CHOLECALCIFEROL, VITAMIN D3, (VITAMIN D3) 5,000 UNIT TAB    Take 5,000 Units by mouth once daily.    CLOPIDOGREL (PLAVIX) 75 MG TABLET    Take 1 tablet (75 mg total) by mouth once daily.    D-MANNOSE ORAL    Take 2 tablets by mouth once daily.     DORZOLAMIDE (TRUSOPT) 2 % OPHTHALMIC SOLUTION    Place 1 drop into the left eye 2 (two) times daily.    FLUTICASONE (FLONASE) 50 MCG/ACTUATION NASAL SPRAY    2 sprays (100 mcg total) by Each Nare route daily as needed for Allergies.    FLUTICASONE FUROATE-VILANTEROL (BREO ELLIPTA) 100-25 MCG/DOSE DISKUS INHALER    Inhale 1 puff into the lungs once daily.    FUROSEMIDE (LASIX) 40 MG TABLET    Take 1 tablet (40 mg total) by mouth once daily.    GRAPE SEED EXTRACT ORAL    Take 1 tablet by mouth once daily.     LACTOBACILLUS RHAMNOSUS GG (CULTURELLE) 10 BILLION CELL CAPSULE    Take 1 capsule by mouth once daily.    LATANOPROST 0.005 % OPHTHALMIC SOLUTION    Place 1 drop into both eyes every evening.    MAGNESIUM OXIDE (MAG-OX) 400 MG TABLET    Take 800 mg by mouth once daily.     METOLAZONE (ZAROXOLYN) 5 MG TABLET    Take 1 tablet (5 mg total) by mouth every Monday and Friday.    NITROGLYCERIN 0.4 MG/HR TD PT24 (NITRODUR) 0.4 MG/HR    APPLY 1 PATCH ONTO THE SKIN ONCE DAILY. ALLOW PATCH TO REMAIN ON THE AREA FOR 12 TO 14 HOURS IN A 24 HOUR PERIOD AS DIRECTED    OLIVE LEAF EXTRACT  ORAL    Take 1 tablet by mouth once daily.     OREGANO OIL ORAL    Take 1 tablet by mouth every morning.    POTASSIUM CHLORIDE (KLOR-CON) 10 MEQ TBSR    TAKE 1 TABLET (10 MEQ TOTAL) BY MOUTH 2 (TWO) TIMES DAILY.    PSYLLIUM 0.52 GRAM CAPSULE    Take 0.52 g by mouth once daily.    SOTALOL (SOTALOL AF) 80 MG TABLET    Take 40 mg by mouth 2 (two) times daily.      Review of Systems   Constitutional: Negative for activity change, appetite change, fatigue, fever and unexpected weight change.   HENT: Negative for congestion.    Eyes: Negative for visual disturbance.   Respiratory: Negative for cough, chest tightness, shortness of breath and wheezing.    Cardiovascular: Negative for chest pain, palpitations and leg swelling.   Gastrointestinal: Negative for abdominal distention, abdominal pain, anal bleeding, constipation, nausea and vomiting.   Genitourinary: Positive for difficulty urinating. Negative for dyspareunia, dysuria, flank pain, frequency, hematuria, pelvic pain, urgency, vaginal bleeding, vaginal discharge and vaginal pain.        Retention of urine   Musculoskeletal: Negative for arthralgias and back pain.   Skin: Negative.    Neurological: Negative for weakness.   Psychiatric/Behavioral: Negative for agitation, confusion and sleep disturbance. The patient is not nervous/anxious.        Objective:      Physical Exam   Nursing note and vitals reviewed.  Constitutional: She is oriented to person, place, and time. She appears well-developed and well-nourished. No distress.   HENT:   Head: Normocephalic and atraumatic.   Eyes: Pupils are equal, round, and reactive to light.   Neck: Neck supple.   Cardiovascular: Normal rate.    Pulmonary/Chest: Effort normal. No respiratory distress. She has no wheezes.   Abdominal: Soft. She exhibits no distension and no mass. There is no tenderness. There is no rebound and no guarding.   Genitourinary:   Genitourinary Comments: Ballard in place wit clear urine   Musculoskeletal:  She exhibits no edema.   Neurological: She is alert and oriented to person, place, and time.   Skin: Skin is warm. She is not diaphoretic.     Psychiatric: She has a normal mood and affect.         Sodium   Date Value Ref Range Status   01/14/2020 137 136 - 145 mmol/L Final   08/15/2015 135 (L) 137 - 145 MMOL/L Final     Potassium   Date Value Ref Range Status   01/14/2020 3.7 3.5 - 5.1 mmol/L Final   08/15/2015 4.0 3.5 - 5.1 MMOL/L Final     Chloride   Date Value Ref Range Status   01/14/2020 97 95 - 110 mmol/L Final   08/15/2015 99 98 - 107 MMOL/L Final     CO2   Date Value Ref Range Status   01/14/2020 27 22 - 31 mmol/L Final     Creatinine   Date Value Ref Range Status   01/14/2020 0.94 0.50 - 1.40 mg/dL Final   08/15/2015 1.05 (H) 0.52 - 1.04 MG/DL Final     Glucose   Date Value Ref Range Status   01/14/2020 95 70 - 110 mg/dL Final     Comment:     The ADA recommends the following guidelines for fasting glucose:  Normal:       less than 100 mg/dL  Prediabetes:  100 mg/dL to 125 mg/dL  Diabetes:     126 mg/dL or higher       Magnesium   Date Value Ref Range Status   01/05/2020 2.0 1.6 - 2.6 mg/dL Final     AST ([place])   Date Value Ref Range Status   04/05/2016 23 14 - 36 U/L Final     AST   Date Value Ref Range Status   01/03/2020 23 14 - 36 U/L Final     ALT   Date Value Ref Range Status   01/03/2020 13 10 - 44 U/L Final     WBC   Date Value Ref Range Status   01/05/2020 7.65 3.90 - 12.70 K/uL Final     Hemoglobin   Date Value Ref Range Status   01/05/2020 13.6 12.0 - 16.0 g/dL Final     Hematocrit   Date Value Ref Range Status   01/05/2020 41.0 37.0 - 48.5 % Final     Platelets   Date Value Ref Range Status   01/05/2020 286 150 - 350 K/uL Final     INR   Date Value Ref Range Status   04/15/2019 1.0  Final          Assessment/Plan: Recurrent UTI's - Will plan cystoscopy                                  Large left lateral wall bladder diverticulum - Will further evaluate with                                                                                                                  Cystoscopy                                                                                                              Keep french in place for now       Problem List Items Addressed This Visit        Renal/    Recurrent UTI - Primary    Relevant Orders    POCT urine dipstick without microscope (Completed)

## 2020-01-31 ENCOUNTER — OUTPATIENT CASE MANAGEMENT (OUTPATIENT)
Dept: ADMINISTRATIVE | Facility: OTHER | Age: 85
End: 2020-01-31

## 2020-01-31 NOTE — PROGRESS NOTES
Outpatient Care Management   - Care Plan Follow Up    Patient: Holli Landrum  MRN:  [id number]  Date of Service:  1/31/2020  Completed by:  Malu Alvarez LCSW  Referral Date: 11/22/2019  Program: Case Management (High Risk)    Reason for Visit   Patient presents with    Case Closure     Brief Summary:  Conference call with OPCM RN to pt.  Pt is reportedly doing ok; continues to receive HH and daughters are providing support as needed.  Pt confirms receipt of mailed resources but hasn't had a chance to review yet.  Goal met; no other needs identified.  Pt agrees to case closure and understands she can call should any needs arise in the future.    Complex Care Plan     Care plan was discussed and completed today with input from patient and/or caregiver.    Goals      Patient/caregiver will have an action plan in place to manage and prevent complications of HF prior to discharge from OPCM - Priority: medium       Overall Time to Completion  2 months from 12/03/2019     OPCM Identified Patient Barriers:               OPCM Identified Disease Education Barriers:  Heart Failure:  Care Plan Created   Heart Disease:  Care Plan created          Short Term Goals:  Patient/caregiver will measure and record the weight 1 times per day.  Interventions   · Assess for availability of working scale in home setting.12/03/19 01/16/20  · Encourage Dietary Compliance.12/03/19 01/16/20  · Encourage Exercise.  · Encourage Medication Compliance.12/03/19  · Recognize and provide educational material (MOHAN).12/03/19  · Review eating/nutrition habits.01/16/20  Patient/Caregiver agrees to weigh daily and keep a log  by 01/30/2020  · Patient/Caregiver agrees to OPCM follow up within 14 days to assess progress to goal.      Status  · Partially met      Patient/caregiver will notify doctor if patient gains more than 3 pounds in one day or 5 pounds in one week.  Interventions .  · Empower patient/caregiver to discuss treatment  plan with Physician/care team.01/16/20  · Encourage Medication Compliance.12/03/19  · Recognize and provide educational material (KRAMES).12/03/19  · Review eating/nutrition habits.01/16/20  Patient/Caregiver agrees to know when to call her cardiologist about weight gain by 01/30/2020  · Patient/Caregiver agrees to OPCM follow up within 14 days to assess progress to goal.      Status  · Met      Patient/caregiver will verbalize 2 signs and symptoms of Heart Failure.   Interventions   · Assess for availability of working scale in home setting.12/03/19 01/16/20  · Encourage Medication Compliance.12/03/19  · Recognize and provide educational material (KRAMES).12/03/19  · Review eating/nutrition habits.01/16/20  Patient/Caregiver agrees to verbalize two signs and symptoms of heart failure   by  01/30/2020  · Patient/Caregiver agrees to OPCM follow up within 14 days to assess progress to goal.      Status  · Met             Clinical Reference Documents Added to Patient Instructions-MAILED 12/03/19       Document    HEART FAILURE, WHAT IS (ENGLISH)    HEART FAILURE: MAKING CHANGES TO YOUR DIET (ENGLISH)    HEART FAILURE: WARNING SIGNS OF A FLARE-UP (ENGLISH)    URINARY TRACT INFECTIONS IN WOMEN (ENGLISH)  FOOD SOURCES OF VITAMINS AND MINERALS FROM NUTRITION CARE MANUAL   CHOLESTEROL LOWERING NUTRITION THERAPY FROM NUTRITION CARE MANUAL              Patient/caregiver will have knowledge of resources available in order to obtain the services that are needed prior to discharge from OPCM. - Priority: Moderate      Overall Time to Completion  3 weeks from 12/19/2019     OPCM Identified Patient Barriers:     OPCM Identified Disease Education Barriers:  Heart Failure:  Care plan created   Heart Disease:  Care plan created        Social Work Identified Patient Barriers:   Cognitive: Resolved-No needs identified  Support: Care Plan created  Advanced Care Planning: Care Plan created  Safety: Resolved-No needs identified  Mental  Health: Resolved-No needs identified     Short Term Goals  Patient/caregiver will identify 1 community resources to homemaker services and Advance Care Planning within 3 weeks.  Interventions   · Collaborate with OPCM RN as appropriate to meet patient needs-ongoing.  · Contact [employer] [facility] to verify available resources and application process.   · Discuss and provide Advance Directive Form-12/19/19 mailed.  · Discuss patient care needs and potential barriers-12/19/19.  · Provide education on advance care planning-12/19/19.  · Provide supportive counseling-12/19/19.  · Review [employer] insurance benefits with patient/caregiver-12/19/19 mailed Well Dine, OTC ($30 every 3 months), Silver Sneakers.    Patient/Caregiver agrees to review mailed resources or call [employer] for homemaker services by 1/30/2020-received but not reviewed; has not called [employer].  Patient/Caregiver agrees to OPCM follow up within 2 weeks to assess progress to goal.     Patient/Caregiver agrees to review mailed resources by 1/2/20-not received; mailing again per pt request  Patient/Caregiver agrees to OPCM follow up within 2 weeks to assess progress to goal.      Status  · Met                  Patient Instructions     Instructions were provided via the [employer] patient resources and are available for the patient to view on the patient portal.    No follow-ups on file.    Todays OPCM Self-Management Care Plan was developed with the patients/caregivers input and was based on identified barriers from todays assessment.  Goals were written today with the patient/caregiver and the patient has agreed to work towards these goals to improve his/her overall well-being. Patient verbalized understanding of the care plan, goals, and all of today's instructions. Encouraged patient/caregiver to communicate with his/her physician and health care team about health conditions and the treatment plan.  Provided my contact information today and encouraged  patient/caregiver to call me with any questions as needed.

## 2020-01-31 NOTE — PROGRESS NOTES
01/31/20-  Conference call with ROMELIA Toribio to patient. Called and spoke to patient. Patient received educational material mailed to her. She has not read all her educational material. Patient had an  appointment on 01/22/20 with urology. Urologist has her scheduled for exploratory surgery on 02/12/20. Patient with french but no new medications. Patient's home health nurse came today..Reviewed with patient diet/ nutrition habits. Patient is exercising with PT. Patient is weighing between 189 to 191 pound daily. ROMELIA ToribioM reviewed with patient community resources. Patient asked to be called after 02/12/20.    PLAN-  Review appt with surgeon on 02/12/20    Outpatient Care Management  Plan of Care Follow Up Visit    Patient: Holli Landrum  MRN: [id number]  Date of Service: 01/31/2020  Completed by: Nancy Vences RN  Referral Date: 11/22/2019  Program: Case Management (High Risk)    Reason for Visit   Patient presents with    Update Plan Of Care       Brief Summary: See above note     Patient Summary     Involvement of Care:  Do I have permission to speak with other family members about your care?   daughter-Annabel     Patient Reported Labs & Vitals:  1.  Any Patient Reported Labs & Vitals?     2.  Patient Reported Blood Pressure:     3.  Patient Reported Pulse:     4.  Patient Reported Weight (Kg):     5.  Patient Reported Blood Glucose (mg/dl):       Medical History:  Reviewed medical history with patient and/or caregiver    Social History:  Reviewed social history with patient and/or caregiver    Clinical Assessment     Reviewed and provided basic information on available community resources for mental health, transportation, wellness resources, and palliative care programs with patient and/or caregiver.    Complex Care Plan     Care plan was discussed and completed today with input from patient and/or caregiver.    Goals      Patient/caregiver will have an action plan in place to manage and prevent  complications of UTI prior to discharge from OPCM. - Priority: Medium       Overall Time to Completion  2 months from 01/31/2020     OPCM Identified Patient Needs:            OPCM Identified Disease Education Need s:      Short Term Goals  Patient/caregiver will verbalize 2 interventions to decrease UTI  within 2 weeks.  Interventions   · Assess patient's ability to perform ADLs. 01/31/20  · Empower patient/caregiver to discuss treatment plan with Physician/care team.01/31  · Encourage Medication Compliance.  · Recognize and provide educational material (MOHAN).  Patient/Caregiver agrees to appt on 02/12/20 by  02/12/20  · Patient/Caregiver agrees to OPCM follow up within 14 days  to assess progress to goal.      Status  · Partially met      Patient/caregiver will verbalize 2 signs and symptoms of UTI.   Interventions   · Encourage Medication Compliance.  · Recognize and provide educational material (MOHAN).  Patient/Caregiver agrees to know signs and symptoms of UTI by 02/29/20  · Patient/Caregiver agrees to OPCM follow up within 14 days  to assess progress to goal.      Status  · Partially met                Patient Instructions     Instructions were provided via the [employer] patient resources and are available for the patient to view on the patient portal.        Follow up in about 2 weeks (around 2/14/2020) for RN Follow up call.      Todays OPCM Self-Management Care Plan was developed with the patients/caregivers input and was based on identified barriers from todays assessment.  Goals were written today with the patient/caregiver and the patient has agreed to work towards these goals to improve his/her overall well-being. Patient verbalized understanding of the care plan, goals, and all of today's instructions. Encouraged patient/caregiver to communicate with his/her physician and health care team about health conditions and the treatment plan.  Provided my contact information today and encouraged  patient/caregiver to call me with any questions as needed.

## 2020-02-05 ENCOUNTER — TELEPHONE (OUTPATIENT)
Dept: UROLOGY | Facility: CLINIC | Age: 85
End: 2020-02-05

## 2020-02-05 NOTE — TELEPHONE ENCOUNTER
----- Message from Elvia Cardenas sent at 2/5/2020 12:31 PM CST -----  Contact: Lisa torres/ [facility]  Type: Needs Medical Advice    Who Called:  Lisa  Symptoms (please be specific):  Pt  Has sediment in her catheter/no other symptoms  How long has patient had these symptom  Best Call Back Number: [phone]  Additional Information: Pls call if any further needed

## 2020-02-05 NOTE — TELEPHONE ENCOUNTER
----- Message from Ashish Max sent at 2/5/2020  3:37 PM CST -----  Contact: Lisa torres/ [facility]  Type:  Patient Returning Call    Who Called:  Lisa  Who Left Message for Patient:  Richard Holliday  Does the patient know what this is regarding?:  See previous message  Best Call Back Number:  [phone]  Additional Information:  NA

## 2020-02-05 NOTE — TELEPHONE ENCOUNTER
[facility] nurse calling to advised of sediment in patient's catheter. No other S/S of infection. Advised to increase fluid intake to flush bladder and monitor urine over next few days. Expressed understanding

## 2020-02-10 ENCOUNTER — TELEPHONE (OUTPATIENT)
Dept: UROLOGY | Facility: CLINIC | Age: 85
End: 2020-02-10

## 2020-02-10 NOTE — TELEPHONE ENCOUNTER
Advised to get urine for culture. May be finished processing before surgery. [facility] nurse will obtain urine and send for culture.

## 2020-02-10 NOTE — TELEPHONE ENCOUNTER
----- Message from Lydia Hernandez sent at 2/10/2020 10:37 AM CST -----  Contact: mykel with [facility] [phone]   mykel with [facility] [phone] returning phone call   Patient have cloudy urine and now experiencing some burning.  Is there anything additional that need to be done?

## 2020-02-10 NOTE — TELEPHONE ENCOUNTER
----- Message from Megha Roa sent at 2/7/2020  4:50 PM CST -----  Contact: Ashish from [facility] [phone]  Ashish called from [facility] called to report the patient's urine is cloudy and darker than normal no fever noted.

## 2020-02-11 ENCOUNTER — LAB VISIT (OUTPATIENT)
Dept: LAB | Facility: HOSPITAL | Age: 85
End: 2020-02-11
Attending: UROLOGY
Payer: MEDICARE

## 2020-02-11 ENCOUNTER — ANESTHESIA EVENT (OUTPATIENT)
Dept: SURGERY | Facility: HOSPITAL | Age: 85
End: 2020-02-11
Payer: MEDICARE

## 2020-02-11 DIAGNOSIS — N32.3 DIVERTICULUM OF BLADDER: ICD-10-CM

## 2020-02-11 DIAGNOSIS — N39.0 URINARY TRACT INFECTION, SITE NOT SPECIFIED: Primary | ICD-10-CM

## 2020-02-11 LAB
AMORPH CRY URNS QL MICRO: ABNORMAL
BACTERIA #/AREA URNS HPF: ABNORMAL /HPF
BILIRUB UR QL STRIP: NEGATIVE
CLARITY UR: ABNORMAL
COLOR UR: YELLOW
GLUCOSE UR QL STRIP: NEGATIVE
HGB UR QL STRIP: ABNORMAL
KETONES UR QL STRIP: NEGATIVE
LEUKOCYTE ESTERASE UR QL STRIP: ABNORMAL
MICROSCOPIC COMMENT: ABNORMAL
NITRITE UR QL STRIP: POSITIVE
PH UR STRIP: 7 [PH] (ref 5–8)
PROT UR QL STRIP: NEGATIVE
RBC #/AREA URNS HPF: 2 /HPF (ref 0–4)
SP GR UR STRIP: 1.01 (ref 1–1.03)
URN SPEC COLLECT METH UR: ABNORMAL
WBC #/AREA URNS HPF: 20 /HPF (ref 0–5)

## 2020-02-11 PROCEDURE — 87086 URINE CULTURE/COLONY COUNT: CPT | Mod: HCNC

## 2020-02-11 PROCEDURE — 81000 URINALYSIS NONAUTO W/SCOPE: CPT | Mod: HCNC,PO

## 2020-02-12 ENCOUNTER — HOSPITAL ENCOUNTER (OUTPATIENT)
Facility: HOSPITAL | Age: 85
Discharge: HOME | End: 2020-02-12
Attending: UROLOGY | Admitting: UROLOGY
Payer: MEDICARE

## 2020-02-12 ENCOUNTER — HOSPITAL ENCOUNTER (OUTPATIENT)
Dept: RADIOLOGY | Facility: HOSPITAL | Age: 85
Discharge: HOME | End: 2020-02-12
Attending: UROLOGY | Admitting: UROLOGY
Payer: MEDICARE

## 2020-02-12 ENCOUNTER — ANESTHESIA (OUTPATIENT)
Dept: SURGERY | Facility: HOSPITAL | Age: 85
End: 2020-02-12
Payer: MEDICARE

## 2020-02-12 DIAGNOSIS — R33.9 URINARY RETENTION: Primary | ICD-10-CM

## 2020-02-12 DIAGNOSIS — N39.0 RECURRENT UTI: ICD-10-CM

## 2020-02-12 DIAGNOSIS — N39.0 URINARY TRACT INFECTION WITHOUT HEMATURIA, SITE UNSPECIFIED: ICD-10-CM

## 2020-02-12 DIAGNOSIS — H25.10 NUCLEAR SCLEROSIS: ICD-10-CM

## 2020-02-12 LAB
BACTERIA UR CULT: NORMAL
BACTERIA UR CULT: NORMAL

## 2020-02-12 PROCEDURE — 25500020 PHARM REV CODE 255: Mod: HCNC,PO | Performed by: UROLOGY

## 2020-02-12 PROCEDURE — 76000 FLUOROSCOPY <1 HR PHYS/QHP: CPT | Mod: TC,HCNC,PO

## 2020-02-12 PROCEDURE — D9220A PRA ANESTHESIA: Mod: HCNC,CRNA,, | Performed by: NURSE ANESTHETIST, CERTIFIED REGISTERED

## 2020-02-12 PROCEDURE — 63600175 PHARM REV CODE 636 W HCPCS: Mod: HCNC,PO | Performed by: ANESTHESIOLOGY

## 2020-02-12 PROCEDURE — 25000003 PHARM REV CODE 250: Mod: HCNC,PO | Performed by: UROLOGY

## 2020-02-12 PROCEDURE — 71000015 HC POSTOP RECOV 1ST HR: Mod: HCNC,PO | Performed by: UROLOGY

## 2020-02-12 PROCEDURE — 71000033 HC RECOVERY, INTIAL HOUR: Mod: HCNC,PO | Performed by: UROLOGY

## 2020-02-12 PROCEDURE — 37000009 HC ANESTHESIA EA ADD 15 MINS: Mod: HCNC,PO | Performed by: UROLOGY

## 2020-02-12 PROCEDURE — C1769 GUIDE WIRE: HCPCS | Mod: HCNC,PO | Performed by: UROLOGY

## 2020-02-12 PROCEDURE — D9220A PRA ANESTHESIA: Mod: HCNC,ANES,, | Performed by: ANESTHESIOLOGY

## 2020-02-12 PROCEDURE — C1758 CATHETER, URETERAL: HCPCS | Mod: HCNC,PO | Performed by: UROLOGY

## 2020-02-12 PROCEDURE — D9220A PRA ANESTHESIA: ICD-10-PCS | Mod: HCNC,ANES,, | Performed by: ANESTHESIOLOGY

## 2020-02-12 PROCEDURE — 37000008 HC ANESTHESIA 1ST 15 MINUTES: Mod: HCNC,PO | Performed by: UROLOGY

## 2020-02-12 PROCEDURE — 36000707: Mod: HCNC,PO | Performed by: UROLOGY

## 2020-02-12 PROCEDURE — 36000706: Mod: HCNC,PO | Performed by: UROLOGY

## 2020-02-12 PROCEDURE — 63600175 PHARM REV CODE 636 W HCPCS: Mod: HCNC,PO | Performed by: NURSE ANESTHETIST, CERTIFIED REGISTERED

## 2020-02-12 PROCEDURE — D9220A PRA ANESTHESIA: ICD-10-PCS | Mod: HCNC,CRNA,, | Performed by: NURSE ANESTHETIST, CERTIFIED REGISTERED

## 2020-02-12 RX ORDER — LIDOCAINE HCL/PF 100 MG/5ML
SYRINGE (ML) INTRAVENOUS
Status: DISCONTINUED | OUTPATIENT
Start: 2020-02-12 | End: 2020-02-12

## 2020-02-12 RX ORDER — ACETAMINOPHEN 325 MG/1
650 TABLET ORAL EVERY 4 HOURS PRN
Status: DISCONTINUED | OUTPATIENT
Start: 2020-02-12 | End: 2020-02-12 | Stop reason: HOSPADM

## 2020-02-12 RX ORDER — MIDAZOLAM HYDROCHLORIDE 1 MG/ML
INJECTION, SOLUTION INTRAMUSCULAR; INTRAVENOUS
Status: DISCONTINUED | OUTPATIENT
Start: 2020-02-12 | End: 2020-02-12

## 2020-02-12 RX ORDER — FENTANYL CITRATE 50 UG/ML
INJECTION, SOLUTION INTRAMUSCULAR; INTRAVENOUS
Status: DISCONTINUED | OUTPATIENT
Start: 2020-02-12 | End: 2020-02-12

## 2020-02-12 RX ORDER — CEPHALEXIN 250 MG/1
250 CAPSULE ORAL DAILY
Qty: 30 CAPSULE | Refills: 3 | Status: SHIPPED | OUTPATIENT
Start: 2020-02-12 | End: 2020-06-26

## 2020-02-12 RX ORDER — HYDROCODONE BITARTRATE AND ACETAMINOPHEN 5; 325 MG/1; MG/1
1 TABLET ORAL EVERY 4 HOURS PRN
Status: DISCONTINUED | OUTPATIENT
Start: 2020-02-12 | End: 2020-02-12 | Stop reason: HOSPADM

## 2020-02-12 RX ORDER — OXYBUTYNIN CHLORIDE 5 MG/1
5 TABLET ORAL 2 TIMES DAILY
Qty: 60 TABLET | Refills: 3 | Status: SHIPPED | OUTPATIENT
Start: 2020-02-12 | End: 2020-06-03 | Stop reason: SDUPTHER

## 2020-02-12 RX ORDER — PROPOFOL 10 MG/ML
VIAL (ML) INTRAVENOUS CONTINUOUS PRN
Status: DISCONTINUED | OUTPATIENT
Start: 2020-02-12 | End: 2020-02-12

## 2020-02-12 RX ORDER — FENTANYL CITRATE 50 UG/ML
25 INJECTION, SOLUTION INTRAMUSCULAR; INTRAVENOUS EVERY 5 MIN PRN
Status: DISCONTINUED | OUTPATIENT
Start: 2020-02-12 | End: 2020-02-12 | Stop reason: HOSPADM

## 2020-02-12 RX ORDER — PROPOFOL 10 MG/ML
VIAL (ML) INTRAVENOUS
Status: DISCONTINUED | OUTPATIENT
Start: 2020-02-12 | End: 2020-02-12

## 2020-02-12 RX ORDER — ACETAMINOPHEN 10 MG/ML
INJECTION, SOLUTION INTRAVENOUS
Status: DISCONTINUED | OUTPATIENT
Start: 2020-02-12 | End: 2020-02-12

## 2020-02-12 RX ORDER — DEXAMETHASONE SODIUM PHOSPHATE 4 MG/ML
8 INJECTION, SOLUTION INTRA-ARTICULAR; INTRALESIONAL; INTRAMUSCULAR; INTRAVENOUS; SOFT TISSUE
Status: COMPLETED | OUTPATIENT
Start: 2020-02-12 | End: 2020-02-12

## 2020-02-12 RX ORDER — SODIUM CHLORIDE, SODIUM LACTATE, POTASSIUM CHLORIDE, CALCIUM CHLORIDE 600; 310; 30; 20 MG/100ML; MG/100ML; MG/100ML; MG/100ML
INJECTION, SOLUTION INTRAVENOUS CONTINUOUS
Status: DISCONTINUED | OUTPATIENT
Start: 2020-02-12 | End: 2020-10-02

## 2020-02-12 RX ORDER — HYDROCODONE BITARTRATE AND ACETAMINOPHEN 10; 325 MG/1; MG/1
1 TABLET ORAL EVERY 4 HOURS PRN
Status: DISCONTINUED | OUTPATIENT
Start: 2020-02-12 | End: 2020-02-12 | Stop reason: HOSPADM

## 2020-02-12 RX ORDER — DEXAMETHASONE SODIUM PHOSPHATE 4 MG/ML
INJECTION, SOLUTION INTRA-ARTICULAR; INTRALESIONAL; INTRAMUSCULAR; INTRAVENOUS; SOFT TISSUE
Status: DISCONTINUED | OUTPATIENT
Start: 2020-02-12 | End: 2020-02-12

## 2020-02-12 RX ORDER — SODIUM CHLORIDE 0.9 G/100ML
IRRIGANT IRRIGATION
Status: DISCONTINUED | OUTPATIENT
Start: 2020-02-12 | End: 2020-02-12 | Stop reason: HOSPADM

## 2020-02-12 RX ORDER — ONDANSETRON 2 MG/ML
INJECTION INTRAMUSCULAR; INTRAVENOUS
Status: DISCONTINUED | OUTPATIENT
Start: 2020-02-12 | End: 2020-02-12

## 2020-02-12 RX ORDER — LIDOCAINE HYDROCHLORIDE 10 MG/ML
1 INJECTION, SOLUTION EPIDURAL; INFILTRATION; INTRACAUDAL; PERINEURAL ONCE
Status: DISCONTINUED | OUTPATIENT
Start: 2020-02-12 | End: 2020-10-02

## 2020-02-12 RX ADMIN — DEXAMETHASONE SODIUM PHOSPHATE 8 MG: 4 INJECTION, SOLUTION INTRAMUSCULAR; INTRAVENOUS at 11:02

## 2020-02-12 RX ADMIN — PROPOFOL 50 MCG/KG/MIN: 10 INJECTION, EMULSION INTRAVENOUS at 12:02

## 2020-02-12 RX ADMIN — PROPOFOL 25 MG: 10 INJECTION, EMULSION INTRAVENOUS at 12:02

## 2020-02-12 RX ADMIN — DEXAMETHASONE SODIUM PHOSPHATE 4 MG: 4 INJECTION, SOLUTION INTRAMUSCULAR; INTRAVENOUS at 01:02

## 2020-02-12 RX ADMIN — DEXTROSE 2 G: 50 INJECTION, SOLUTION INTRAVENOUS at 12:02

## 2020-02-12 RX ADMIN — LIDOCAINE HYDROCHLORIDE 100 MG: 20 INJECTION PARENTERAL at 12:02

## 2020-02-12 RX ADMIN — SODIUM CHLORIDE, SODIUM LACTATE, POTASSIUM CHLORIDE, AND CALCIUM CHLORIDE: .6; .31; .03; .02 INJECTION, SOLUTION INTRAVENOUS at 11:02

## 2020-02-12 RX ADMIN — MIDAZOLAM HYDROCHLORIDE 0.5 MG: 1 INJECTION, SOLUTION INTRAMUSCULAR; INTRAVENOUS at 12:02

## 2020-02-12 RX ADMIN — ONDANSETRON 4 MG: 2 INJECTION, SOLUTION INTRAMUSCULAR; INTRAVENOUS at 12:02

## 2020-02-12 RX ADMIN — FENTANYL CITRATE 25 MCG: 50 INJECTION, SOLUTION INTRAMUSCULAR; INTRAVENOUS at 12:02

## 2020-02-12 RX ADMIN — ACETAMINOPHEN 1000 MG: 10 INJECTION, SOLUTION INTRAVENOUS at 12:02

## 2020-02-12 NOTE — H&P
[facility]  Urology  History & Physical    Patient Name: Holli Landrum  MRN: [id number]  Admission Date: 2/12/2020  Code Status: Prior   Attending Provider: Gasper Feliz MD   Primary Care Physician: Marcia Crespo MD  Principal Problem:<principal problem not specified>    Subjective:     HPI: Recurrrent UTI'S with incomplete bladder emptying    Past Medical History:   Diagnosis Date    Arthritis     Cancer     skin cancer to face    Cataract     OU done//    CHF (congestive heart failure)     COPD (chronic obstructive pulmonary disease)     no oxygen; patient denies    Coronary artery disease     Essential hypertension 5/5/2010    GERD (gastroesophageal reflux disease) 11/20/2012    Glaucoma     Hypertensive heart disease with heart failure 02/05/2013    JA (obstructive sleep apnea)     does not use cpap currently    Paroxysmal ventricular tachycardia     per problem list       Past Surgical History:   Procedure Laterality Date    BREAST BIOPSY Left 1995    neg    BREAST BIOPSY Right 1984    neg    BREAST BIOPSY Right 1992    neg    CARDIAC CATHETERIZATION  2013, 2014,2016    has 7 stents    CARDIAC SURGERY  2012    stents    CATARACT EXTRACTION W/  INTRAOCULAR LENS IMPLANT Left 11/01/2018    Dr Rose    CATARACT EXTRACTION W/  INTRAOCULAR LENS IMPLANT Right 12/13/2018    Dr Rose//    CHOLECYSTECTOMY      COLONOSCOPY  ~2005    Dr. Edward; normal per patient report    HYSTERECTOMY  1969    OVARIAN CYST REMOVAL  teenager    PHACOEMULSIFICATION OF CATARACT Left 11/1/2018    Procedure: PHACOEMULSIFICATION, CATARACT;  Surgeon: Aleksandar Rose Jr., MD;  Location: [facility] OR;  Service: Ophthalmology;  Laterality: Left;    PHACOEMULSIFICATION OF CATARACT Right 12/13/2018    Procedure: PHACOEMULSIFICATION, CATARACT;  Surgeon: Aleksandar Rose Jr., MD;  Location: [facility] OR;  Service: Ophthalmology;  Laterality: Right;    TONSILLECTOMY      aw/denoids    UPPER  GASTROINTESTINAL ENDOSCOPY  ~    VASCULAR SURGERY      to remove clot from right leg    Yag Capsulotomy Bilateral 2019    Dr Rose       Review of patient's allergies indicates:   Allergen Reactions    Timoptic [timolol maleate] Shortness Of Breath    Bactrim ds [sulfamethoxazole-trimethoprim]     Ciprofloxacin Other (See Comments)     Muscle ache       Family History     Problem Relation (Age of Onset)    Cancer Maternal Grandfather    Clotting disorder Son    Diabetes Brother, Mother    Heart disease Brother          Tobacco Use    Smoking status: Former Smoker     Types: Cigarettes     Last attempt to quit:      Years since quittin.1    Smokeless tobacco: Never Used    Tobacco comment: quit smoking  //had smoked for 1 yr//   Substance and Sexual Activity    Alcohol use: No     Alcohol/week: 0.0 standard drinks    Drug use: No    Sexual activity: Never     Partners: Male     Birth control/protection: Abstinence       Review of Systems   Constitutional: Positive for activity change and fatigue.   HENT: Negative.    Eyes: Negative.    Respiratory: Negative.    Cardiovascular: Negative.    Gastrointestinal: Negative.    Genitourinary: Positive for difficulty urinating, frequency and urgency.   Musculoskeletal: Negative.    Skin: Negative.    Neurological: Negative.    Psychiatric/Behavioral: Negative.        Objective:     Temp:  [97 °F (36.1 °C)] 97 °F (36.1 °C)  Pulse:  [53] 53  Resp:  [16] 16  SpO2:  [94 %] 94 %  BP: (151)/(75) 151/75     Body mass index is 31.62 kg/m².    No intake/output data recorded.       Lines/Drains/Airways     Drain                 Urethral Catheter 20 1620 Straight-tip 16 Fr. 39 days          Peripheral Intravenous Line                 Peripheral IV - Single Lumen 20 1149 20 G Left Hand less than 1 day                Physical Exam   Nursing note and vitals reviewed.  Constitutional: She is oriented to person, place, and time. She appears  well-developed and well-nourished.   HENT:   Head: Normocephalic and atraumatic.   Eyes: EOM are normal. Pupils are equal, round, and reactive to light.   Neck: Normal range of motion. Neck supple.   Cardiovascular: Normal rate.    Pulmonary/Chest: Effort normal.   Abdominal: Soft.   Musculoskeletal: Normal range of motion.   Neurological: She is alert and oriented to person, place, and time.   Skin: Skin is warm and dry.     Psychiatric: She has a normal mood and affect. Her behavior is normal. Judgment and thought content normal.       Significant Labs:  BMP:  No results for input(s): NA, K, CL, CO2, BUN, CREATININE, LABGLOM, GLUCOSE, CALCIUM in the last 168 hours.    CBC:  No results for input(s): WBC, HGB, HCT, PLT in the last 168 hours.    Recent Lab Results     None          Significant Imaging:  All pertinent imaging results/findings from the past 24 hours have been reviewed.    Assessment and Plan:     There are no hospital problems to display for this patient.      VTE Risk Mitigation (From admission, onward)    None        Cystoscopy [facility]    Gasper Feliz MD  Urology  [facility]

## 2020-02-12 NOTE — TRANSFER OF CARE
"Anesthesia Transfer of Care Note    Patient: Holli Landrum    Procedure(s) Performed: Procedure(s) (LRB):  CYSTOSCOPY, WITH RETROGRADE PYELOGRAM (Bilateral)    Patient location: PACU    Anesthesia Type: general    Transport from OR: Transported from OR on room air with adequate spontaneous ventilation    Post pain: adequate analgesia    Post assessment: no apparent anesthetic complications and tolerated procedure well    Post vital signs: stable    Level of consciousness: awake, alert and sedated    Nausea/Vomiting: no nausea/vomiting    Complications: none    Transfer of care protocol was followed      Last vitals:   Visit Vitals  BP (!) 151/75 (BP Location: Right arm, Patient Position: Lying)   Pulse (!) 53   Temp 36.1 °C (97 °F) (Skin)   Resp 16   Ht 5' 5" (1.651 m)   Wt 86.2 kg (190 lb)   LMP  (LMP Unknown)   SpO2 (!) 94%   Breastfeeding? No   BMI 31.62 kg/m²     "

## 2020-02-12 NOTE — PLAN OF CARE
13:00pm-16 Telugu french catheter inserted by    following the procedure. 400 ml peach colored urine noted in french bag.-Kelsy JOLLEY.

## 2020-02-12 NOTE — BRIEF OP NOTE
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    DISPOSITION: Home or Self Care    FINAL DIAGNOSIS:  <principal problem not specified>    FOLLOWUP: In clinic    DISCHARGE INSTRUCTIONS:    Discharge Procedure Orders   Diet general     Call MD for:  temperature >100.4     Call MD for:  persistent nausea and vomiting     Call MD for:  severe uncontrolled pain     Call MD for:  difficulty breathing, headache or visual disturbances     No dressing needed

## 2020-02-12 NOTE — ANESTHESIA PREPROCEDURE EVALUATION
02/12/2020  Holli Landrum is a 86 y.o., female.    Anesthesia Evaluation         Review of Systems  Anesthesia Hx:  No problems with previous Anesthesia   Cardiovascular:   Hypertension CAD   CHF · Normal left ventricular systolic function. The estimated ejection fraction is 60%  · Grade II (moderate) left ventricular diastolic dysfunction consistent with pseudonormalization.  · Eccentric left ventricular hypertrophy.  · Mildly reduced right ventricular systolic function.  · Moderate left atrial enlargement.  · Mild aortic valve stenosis.  · Aortic valve area is 1.37 cm2; peak velocity is 2.54 m/s; mean gradient is 15 mmHg.  · Mild aortic regurgitation.  · Mild mitral regurgitation.  · Mild tricuspid regurgitation.  · Normal central venous pressure (3 mm Hg).  · The estimated PA systolic pressure is 35 mm Hg   Pulmonary:   COPD Sleep Apnea    Renal/:   Chronic Renal Disease, CRI    Hepatic/GI:   GERD    Neurological:  Neurology Normal    Endocrine:  Endocrine Normal        Physical Exam  General:  Well nourished    Airway/Jaw/Neck:  Airway Findings: Mouth Opening: Normal Tongue: Normal  General Airway Assessment: Adult  Mallampati: II  TM Distance: Normal, at least 6 cm       Chest/Lungs:  Chest/Lungs Clear    Heart/Vascular:  Heart Findings: Normal            Anesthesia Plan  Type of Anesthesia, risks & benefits discussed:  Anesthesia Type:  general  Patient's Preference:   Intra-op Monitoring Plan: standard ASA monitors  Intra-op Monitoring Plan Comments:   Post Op Pain Control Plan: multimodal analgesia and IV/PO Opioids PRN  Post Op Pain Control Plan Comments: Opioids and analgesic adjuvants as needed  Regional blocks if applicable/indicated  Induction:   IV  Beta Blocker:  Patient is not currently on a Beta-Blocker (No further documentation required).       Informed Consent: Patient understands risks  and agrees with Anesthesia plan.  Questions answered. Anesthesia consent signed with patient.  ASA Score: 3     Day of Surgery Review of History & Physical:    H&P update referred to the surgeon.     Anesthesia Plan Notes: I have personally evaluated the patient and discussed risk/benefits/alternatives of general anesthesia.        Ready For Surgery From Anesthesia Perspective.

## 2020-02-12 NOTE — DISCHARGE INSTRUCTIONS
CYSTOSCOPE    DO'S   Minimal activity for first 24 hours.   Eat light meals for the first 24 hours.   Drinks lots of fluids for 7 days.   Burning and blood tinged urine is normal day of procedure.     DON'T   No driving for next 24 hours or while taking narcotic medication.   No tub baths, shower only for 7 days.  DO NOT TAKE ANY ADDITIONAL TYLENOL/ACETAMINOPHEN WHILE TAKING NARCOTIC PAIN MEDICATION.      CALL PHYSICIAN FOR:   Fever greater than 101.   Persistent pain not relieved by pain meds.   Blood in urine with clots lasting greater than 24 hours.    RETURN APPOINTMENT AS INSTRUCTED  CALL [phone] for doctor.            Ballard Catheter Care    A Ballard catheter is a rubber tube that is placed through the urethra (opening where urine comes out) and into the bladder. This helps drain urine from the bladder. There is a small balloon on the end of the tube that is inflated after insertion. This keeps the catheter from sliding out of the bladder.  A Ballard catheter is used to treat urinary retention (unable to pass urine). It is also used when there is incontinence (loss of bladder control).  Home care  · Finish taking any prescribed antibiotic even if you are feeling better before then.  · It is important to keep bacteria from getting into the collection bag. Do not disconnect the catheter from the collection bag.  · Use a leg band to secure the drainage tube, so it does not pull on the catheter. Drain the collection bag when it becomes full using the drain spout at the bottom of the bag.  · Do not try to pull or remove your catheter. This will injure your urethra. It must be removed by your healthcare provider or nurse.  Follow-up care  Follow up with your healthcare provider as advised for repeat urine testing and catheter removal or replacement.  When to seek medical advice  Call your healthcare provider right away if any of these occur:  · Fever of 100.4ºF (38ºC) or higher, or as directed by your healthcare  provider  · Bladder pain or fullness  · Abdominal swelling, nausea or vomiting, or back pain  · Blood or urine leakage around the catheter  · Bloody urine coming from the catheter (if a new symptom)  · Catheter falls out  · Catheter stops draining for 6 hours  · Weakness, dizziness, or fainting  Date Last Reviewed: 10/1/2016  © 7404-2257 [employer]. [address] [place] [address]. All rights reserved. This information is not intended as a substitute for professional medical care. Always follow your healthcare professional's instructions.        Discharge Instructions: After Your Surgery  Youve just had surgery. During surgery, you were given medicine called anesthesia to keep you relaxed and free of pain. After surgery, you may have some pain or nausea. This is common. Here are some tips for feeling better and getting well after surgery.     Stay on schedule with your medicine.   Going home  Your healthcare provider will show you how to take care of yourself when you go home. He or she will also answer your questions. Have an adult family member or friend drive you home. For the first 24 hours after your surgery:  · Do not drive or use heavy equipment.  · Do not make important decisions or sign legal papers.  · Do not drink alcohol.  · Have someone stay with you, if needed. He or she can watch for problems and help keep you safe.  Be sure to go to all follow-up visits with your healthcare provider. And rest after your surgery for as long as your healthcare provider tells you to.  Coping with pain  If you have pain after surgery, pain medicine will help you feel better. Take it as told, before pain becomes severe. Also, ask your healthcare provider or pharmacist about other ways to control pain. This might be with heat, ice, or relaxation. And follow any other instructions your surgeon or nurse gives you.  Tips for taking pain medicine  To get the best relief possible, remember these  points:  · Pain medicines can upset your stomach. Taking them with a little food may help.  · Most pain relievers taken by mouth need at least 20 to 30 minutes to start to work.  · Taking medicine on a schedule can help you remember to take it. Try to time your medicine so that you can take it before starting an activity. This might be before you get dressed, go for a walk, or sit down for dinner.  · Constipation is a common side effect of pain medicines. Call your healthcare provider before taking any medicines such as laxatives or stool softeners to help ease constipation. Also ask if you should skip any foods. Drinking lots of fluids and eating foods such as fruits and vegetables that are high in fiber can also help. Remember, do not take laxatives unless your surgeon has prescribed them.  · Drinking alcohol and taking pain medicine can cause dizziness and slow your breathing. It can even be deadly. Do not drink alcohol while taking pain medicine.  · Pain medicine can make you react more slowly to things. Do not drive or run machinery while taking pain medicine.  Your healthcare provider may tell you to take acetaminophen to help ease your pain. Ask him or her how much you are supposed to take each day. Acetaminophen or other pain relievers may interact with your prescription medicines or other over-the-counter (OTC) medicines. Some prescription medicines have acetaminophen and other ingredients. Using both prescription and OTC acetaminophen for pain can cause you to overdose. Read the labels on your OTC medicines with care. This will help you to clearly know the list of ingredients, how much to take, and any warnings. It may also help you not take too much acetaminophen. If you have questions or do not understand the information, ask your pharmacist or healthcare provider to explain it to you before you take the OTC medicine.  Managing nausea  Some people have an upset stomach after surgery. This is often  because of anesthesia, pain, or pain medicine, or the stress of surgery. These tips will help you handle nausea and eat healthy foods as you get better. If you were on a special food plan before surgery, ask your healthcare provider if you should follow it while you get better. These tips may help:  · Do not push yourself to eat. Your body will tell you when to eat and how much.  · Start off with clear liquids and soup. They are easier to digest.  · Next try semi-solid foods, such as mashed potatoes, applesauce, and gelatin, as you feel ready.  · Slowly move to solid foods. Dont eat fatty, rich, or spicy foods at first.  · Do not force yourself to have 3 large meals a day. Instead eat smaller amounts more often.  · Take pain medicines with a small amount of solid food, such as crackers or toast, to avoid nausea.     Call your surgeon if  · You still have pain an hour after taking medicine. The medicine may not be strong enough.  · You feel too sleepy, dizzy, or groggy. The medicine may be too strong.  · You have side effects like nausea, vomiting, or skin changes, such as rash, itching, or hives.       If you have obstructive sleep apnea  You were given anesthesia medicine during surgery to keep you comfortable and free of pain. After surgery, you may have more apnea spells because of this medicine and other medicines you were given. The spells may last longer than usual.   At home:  · Keep using the continuous positive airway pressure (CPAP) device when you sleep. Unless your healthcare provider tells you not to, use it when you sleep, day or night. CPAP is a common device used to treat obstructive sleep apnea.  · Talk with your provider before taking any pain medicine, muscle relaxants, or sedatives. Your provider will tell you about the possible dangers of taking these medicines.  Date Last Reviewed: 12/1/2016  © 5246-2521 [employer]. [address] [place] [address], [place] [address]. All rights  reserved. This information is not intended as a substitute for professional medical care. Always follow your healthcare professional's instructions.

## 2020-02-13 VITALS
WEIGHT: 190 LBS | TEMPERATURE: 98 F | RESPIRATION RATE: 16 BRPM | DIASTOLIC BLOOD PRESSURE: 66 MMHG | HEIGHT: 65 IN | SYSTOLIC BLOOD PRESSURE: 143 MMHG | HEART RATE: 60 BPM | OXYGEN SATURATION: 95 % | BODY MASS INDEX: 31.65 KG/M2

## 2020-02-13 NOTE — ANESTHESIA POSTPROCEDURE EVALUATION
Anesthesia Post Evaluation    Patient: Holli Landrum    Procedure(s) Performed: Procedure(s) (LRB):  CYSTOSCOPY, WITH RETROGRADE PYELOGRAM (Bilateral)    Final Anesthesia Type: general    Patient location during evaluation: PACU  Patient participation: Yes- Able to Participate  Level of consciousness: awake and alert and oriented  Post-procedure vital signs: reviewed and stable  Pain management: adequate  Airway patency: patent    PONV status at discharge: No PONV  Anesthetic complications: no      Cardiovascular status: blood pressure returned to baseline  Respiratory status: unassisted, spontaneous ventilation and room air  Hydration status: euvolemic  Follow-up not needed.          Vitals Value Taken Time   /66 2/12/2020  2:09 PM   Temp 36.6 °C (97.8 °F) 2/12/2020  2:09 PM   Pulse 60 2/12/2020  2:09 PM   Resp 16 2/12/2020  2:09 PM   SpO2 95 % 2/12/2020  2:09 PM         Event Time     Out of Recovery 14:00:00          Pain/Anthony Score: Anthony Score: 10 (2/12/2020  2:09 PM)         PAST SURGICAL HISTORY:  History of cholecystectomy     History of hysterectomy     History of lumpectomy

## 2020-02-14 ENCOUNTER — OUTPATIENT CASE MANAGEMENT (OUTPATIENT)
Dept: ADMINISTRATIVE | Facility: OTHER | Age: 85
End: 2020-02-14

## 2020-02-14 NOTE — PROGRESS NOTES
02/14/20-  Called and spoke to patient. Patient received educational material mailed to her. Patient had on 02/12/20  Procedure(s) Performed: Procedure(s) (LRB):CYSTOSCOPY, WITH RETROGRADE PYELOGRAM (Bilateral). Patient discharged home with french which is draining clear urine. Patient have a small amount of pink discharge around urethra. Patient with no fever. Patient was discharged with Keflex one a day for four months per patient. Patient denies pain. Patient is keeping a log of her I & O with the french.   Reviewed with patient instructions and new medications given to patient at the visit. Patient to make a follow up appointment with her urologist. Patient knows signs and symptoms of UTI and when to call the doctor. Patient complimented [facility] on how professional they are and assisting her with all of her needs.       PLAN-  Follow up in two weeks  Review signs and symptoms of UTI  Close if no new needs     Outpatient Care Management  Plan of Care Follow Up Visit    Patient: Holli Landrum  MRN: [id number]  Date of Service: 02/14/2020  Completed by: Nancy Vences RN  Referral Date: 11/22/2019  Program: Case Management (High Risk)    Reason for Visit   Patient presents with    Update Plan Of Care       Brief Summary: See above note     Patient Summary     Involvement of Care:  Do I have permission to speak with other family members about your care?   daughter     Patient Reported Labs & Vitals:  1.  Any Patient Reported Labs & Vitals?     2.  Patient Reported Blood Pressure:     3.  Patient Reported Pulse:     4.  Patient Reported Weight (Kg):     5.  Patient Reported Blood Glucose (mg/dl):       Medical History:  Reviewed medical history with patient and/or caregiver    Social History:  Reviewed social history with patient and/or caregiver    Clinical Assessment     Reviewed and provided basic information on available community resources for mental health, transportation, wellness resources, and  palliative care programs with patient and/or caregiver.    Complex Care Plan     Care plan was discussed and completed today with input from patient and/or caregiver.    Goals      Patient/caregiver will have an action plan in place to manage and prevent complications of UTI prior to discharge from OPCM. - Priority: Medium       Overall Time to Completion  2 months from 01/31/2020     OPCM Identified Patient Needs:            OPCM Identified Disease Education Need s:      Short Term Goals  Patient/caregiver will verbalize 2 interventions to decrease UTI  within 2 weeks.  Interventions   · Assess patient's ability to perform ADLs. 01/31/20 02/14/20  · Empower patient/caregiver to discuss treatment plan with Physician/care team.01/31/20   · Encourage Medication Compliance.02/14/20  · Recognize and provide educational material (MOHAN).  Patient/Caregiver agrees to appt on 02/12/20 by  02/12/20  · Patient/Caregiver agrees to OPCM follow up within 14 days  to assess progress to goal.      Status  · Met      Patient/caregiver will verbalize 2 signs and symptoms of UTI.   Interventions   · Encourage Medication Compliance.02/14/20  · Recognize and provide educational material (MOHAN).  Patient/Caregiver agrees to know signs and symptoms of UTI by 02/29/20  · Patient/Caregiver agrees to OPCM follow up within 14 days  to assess progress to goal.      Status  · Partially met                Patient Instructions     Instructions were provided via the [employer] patient resources and are available for the patient to view on the patient portal.    Next Steps: see above note     Follow up in about 2 weeks (around 2/28/2020).      Todays OPCM Self-Management Care Plan was developed with the patients/caregivers input and was based on identified barriers from todays assessment.  Goals were written today with the patient/caregiver and the patient has agreed to work towards these goals to improve his/her overall well-being. Patient  verbalized understanding of the care plan, goals, and all of today's instructions. Encouraged patient/caregiver to communicate with his/her physician and health care team about health conditions and the treatment plan.  Provided my contact information today and encouraged patient/caregiver to call me with any questions as needed.

## 2020-02-17 ENCOUNTER — TELEPHONE (OUTPATIENT)
Dept: FAMILY MEDICINE | Facility: CLINIC | Age: 85
End: 2020-02-17

## 2020-02-17 NOTE — TELEPHONE ENCOUNTER
----- Message from Alison  sent at 2/17/2020  4:30 PM CST -----  Contact: Ana Maria OHGORGE  Type: Needs Medical Advice    Who Called:      Best Call Back Number:     Additional Information: Requesting a call back from Nurse, regarding medication interaction between potassium chloride (KLOR-CON) 10 MEQ and oxybutynin (DITROPAN) 5 MG Tab ,please call back with questions

## 2020-02-17 NOTE — TELEPHONE ENCOUNTER
Spoke with pt's HH nurse, Ana Maria. She wants to make sure that DR. Crespo is aware of possible interaction between oxybutynin and potassium. She was started on Oxybutynin by urology in the hospital. Please advise.

## 2020-02-28 ENCOUNTER — OUTPATIENT CASE MANAGEMENT (OUTPATIENT)
Dept: ADMINISTRATIVE | Facility: OTHER | Age: 85
End: 2020-02-28

## 2020-02-28 NOTE — PROGRESS NOTES
02/28/20-  Called and spoke to patient. Patient has her french draining clear yellow urine. Patient will have her frecnh assessment at her next  appointment in April. Patient knows the signs and symptoms of UTI. Patient denies symptoms of UTI at this time. Patient to have angiogram on 03/03/20. Patient with shortness of breath and dyspnea on exertion. Patient with several angiograms in the past so she is ready for the procedure. Will follow up next week after procdeure.       PLAN-  [facility] on 03/03/20  Patient request to be called on 03/05/20.     Outpatient Care Management  Plan of Care Follow Up Visit    Patient: Holli Landrum  MRN: [id number]  Date of Service: 02/28/2020  Completed by: Nancy Vences RN  Referral Date: 11/22/2019  Program: Case Management (High Risk)    No chief complaint on file.      Brief Summary: see note     Patient Summary     Involvement of Care:  Do I have permission to speak with other family members about your care?    yes-daughter     Patient Reported Labs & Vitals:  1.  Any Patient Reported Labs & Vitals?     2.  Patient Reported Blood Pressure:     3.  Patient Reported Pulse:     4.  Patient Reported Weight (Kg):     5.  Patient Reported Blood Glucose (mg/dl):       Medical History:  Reviewed medical history with patient and/or caregiver    Social History:  Reviewed social history with patient and/or caregiver    Clinical Assessment     Reviewed and provided basic information on available community resources for mental health, transportation, wellness resources, and palliative care programs with patient and/or caregiver.    Complex Care Plan     Care plan was discussed and completed today with input from patient and/or caregiver.    Goals      Patient/caregiver will have an action plan in place to manage and prevent complications of CAD prior to discharge from OPCM - Priority: Medium       Overall Time to Completion  2 months from 02/28/2020     OPCM Identified Patient  Needs:            OPCM Identified Disease Education Needs:      Short Term Goals  Patient/caregiver will verbalize 2 red flags of Coronary Artery Disease   and know when to contact Physician within 3 weeks.  Interventions   · Empower patient/caregiver to discuss treatment plan with Physician/care team.02/28/20  · Encourage compliance with Physician follow-ups.02/28/20  · Encourage Dietary Compliance.  · Encourage Medication Compliance.  Patient/Caregiver agrees to go to [facility] appt by  03/03/20  · Patient/Caregiver agrees to OPCM follow up within 7 days  to assess progress to goal.    Status  · Partially met          Patient/caregiver will verbalize 1 ways of preventing complications due to disease process within 3 weeks.  Interventions   · Encourage compliance with Physician follow-ups.  · Encourage Dietary Compliance.  · Encourage Medication Compliance.  · Recognize and provide educational material ([employer]).  · Review eating/nutrition habits.  Patient/Caregiver agrees to take medications as prescribed  by  03/07/20  · Patient/Caregiver agrees to OPCM follow up within 7 days to assess progress to goal.      Status  · Partially met                Patient Instructions     Instructions were provided via the [employer] patient resources and are available for the patient to view on the patient portal.    Next Steps: see note     Follow up in about 6 days (around 3/5/2020) for RN Follow up call.      Todays OPCM Self-Management Care Plan was developed with the patients/caregivers input and was based on identified barriers from todays assessment.  Goals were written today with the patient/caregiver and the patient has agreed to work towards these goals to improve his/her overall well-being. Patient verbalized understanding of the care plan, goals, and all of today's instructions. Encouraged patient/caregiver to communicate with his/her physician and health care team about health conditions and the treatment plan.  Provided  my contact information today and encouraged patient/caregiver to call me with any questions as needed.

## 2020-03-04 NOTE — OP NOTE
Date of Surgery: 2020    Patient : Holli Landrum    ; 06/15/1933    PREOPERATIVE DIAGNOSES:  1.  Recurrent urinary tract infection.  2.  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    POSTOPERATIVE DIAGNOSES:  1.  Recurrent urinary tract infection.  2.  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    PROCEDURES:  1.  Cystourethroscopy with urethral calibration and dilation.  2.  Bilateral retrograde pyelogram.  3.  Exchange of Ballard catheter.    Surgeon: Gasper Feliz MD FACS    ANESTHESIA:  MAC and local.    IMMEDIATE COMPLICATIONS:  None.    ESTIMATED BLOOD LOSS:  Minimal.    BRIEF HISTORY:  This is an 86-year-old female with multiple medical issues, scheduled for the above procedure.  Risks and benefits were explained.  Appropriate consents were signed.    PROCEDURE IN DETAIL:  The patient was taken to the OR and laid in supine position.  Appropriate anesthesia was administered.  The patient was then placed in the dorsal lithotomy position.  Genitalia were prepped and draped in a standard surgical fashion after the previously placed Ballard catheter had been removed.  The urethra was calibrated to a size 24-Iraqi.  The 22-Iraqi rigid cystoscope was placed in the bladder.  Chronic inflammatory changes were noted in the bladder; however, there was no evidence of any bladder tumors or suspicious lesions.  There was a large bladder diverticulum off of the left side of the bladder; however, it was a large opening and the diverticulum appeared to be emptying adequately.  Both ureteral orifices appeared to have clear efflux.  Bladder capacity was somewhat decreased.    A 5-Iraqi open-ended catheter was placed into the left and right ureteral orifices respectively.  Both ureters were normal along their course without any filling defects.  The upper collecting system was normal and the calices were fairly sharp.  Adequate drainage was noted bilaterally.  At this point, a  16-Faroese Ballard catheter was placed and was draining adequately.  The patient tolerated the procedure well and was transferred to the Recovery Room in stable condition.

## 2020-03-05 ENCOUNTER — OUTPATIENT CASE MANAGEMENT (OUTPATIENT)
Dept: ADMINISTRATIVE | Facility: OTHER | Age: 85
End: 2020-03-05

## 2020-03-05 NOTE — PROGRESS NOTES
03/05/20-  Patient admitted to the hospital on 03/03/20 for LHC and discharged home on 03/04/20. Called and spoke to patient. Patient had LHC with three stents and an angiogram. Patient's home health nurse out this morning and removed dressing. Patient is bruised but no bleeding. Patient is just tired and taking it easy. Encouraged safety and eating a healthy diet, verbalized understanding. Patient's phone is cutting in and out. Will follow up with patient next week.     PLAN-  Follow up next week   Patient/caregiver will verbalize 2 red flags of Coronary Artery Disease   and know when to contact Physician    Outpatient Care Management  Plan of Care Follow Up Visit    Patient: Holli Landrum  MRN: [id number]  Date of Service: 03/05/2020  Completed by: Nancy Vences RN  Referral Date: 11/22/2019  Program: Case Management (High Risk)    Reason for Visit   Patient presents with    Update Plan Of Care       Brief Summary: see note     Patient Summary     Involvement of Care:  Do I have permission to speak with other family members about your care?   daughter     Patient Reported Labs & Vitals:  1.  Any Patient Reported Labs & Vitals?     2.  Patient Reported Blood Pressure:     3.  Patient Reported Pulse:     4.  Patient Reported Weight (Kg):     5.  Patient Reported Blood Glucose (mg/dl):       Medical History:  Reviewed medical history with patient and/or caregiver    Social History:  Reviewed social history with patient and/or caregiver    Clinical Assessment     Reviewed and provided basic information on available community resources for mental health, transportation, wellness resources, and palliative care programs with patient and/or caregiver.    Complex Care Plan     Care plan was discussed and completed today with input from patient and/or caregiver.    Goals      Patient/caregiver will have an action plan in place to manage and prevent complications of CAD prior to discharge from OPCM - Priority: Medium        Overall Time to Completion  2 months from 02/28/2020     OPCM Identified Patient Needs:            OPCM Identified Disease Education Needs:      Short Term Goals  Patient/caregiver will verbalize 2 red flags of Coronary Artery Disease   and know when to contact Physician within 3 weeks.  Interventions   · Empower patient/caregiver to discuss treatment plan with Physician/care team.02/28/20 03/05/20  · Encourage compliance with Physician follow-ups.02/28/20  · Encourage Dietary Compliance.03/05/20  · Encourage Medication Compliance.  Patient/Caregiver agrees to go to [facility] appt by  03/03/20-done   · Patient/Caregiver agrees to OPCM follow up within 7 days  to assess progress to goal.    Status  · Partially met          Patient/caregiver will verbalize 1 ways of preventing complications due to disease process within 3 weeks.  Interventions   · Encourage compliance with Physician follow-ups.  · Encourage Dietary Compliance.03/05/20  · Encourage Medication Compliance.  · Recognize and provide educational material ([employer]).  · Review eating/nutrition habits.03/05/20  Patient/Caregiver agrees to take medications as prescribed  by  03/07/20 by 03/15/20  · Patient/Caregiver agrees to OPCM follow up within 7 days to assess progress to goal.      Status  · Partially met                Patient Instructions     Instructions were provided via the [employer] patient resources and are available for the patient to view on the patient portal.    Next Steps: see note     Follow up in about 1 week (around 3/12/2020) for RN Follow up call.      Todays OPCM Self-Management Care Plan was developed with the patients/caregivers input and was based on identified barriers from todays assessment.  Goals were written today with the patient/caregiver and the patient has agreed to work towards these goals to improve his/her overall well-being. Patient verbalized understanding of the care plan, goals, and all of today's instructions. Encouraged  patient/caregiver to communicate with his/her physician and health care team about health conditions and the treatment plan.  Provided my contact information today and encouraged patient/caregiver to call me with any questions as needed.

## 2020-03-06 ENCOUNTER — TELEPHONE (OUTPATIENT)
Dept: FAMILY MEDICINE | Facility: CLINIC | Age: 85
End: 2020-03-06

## 2020-03-06 NOTE — TELEPHONE ENCOUNTER
----- Message from Elvia Cardenas sent at 3/6/2020  4:22 PM CST -----  Contact: Rajeev torres/ [facility]  Type: Needs Medical Advice    Who Called:  Rajeev Vaz Call Back Number: [phone]  Additional Information: pt was recertified for 8 more weeks for home health/she has 2 new cardiac stents

## 2020-03-08 PROCEDURE — G0179 MD RECERTIFICATION HHA PT: HCPCS | Mod: ,,, | Performed by: FAMILY MEDICINE

## 2020-03-08 PROCEDURE — G0179 PR HOME HEALTH MD RECERTIFICATION: ICD-10-PCS | Mod: ,,, | Performed by: FAMILY MEDICINE

## 2020-03-11 ENCOUNTER — PATIENT OUTREACH (OUTPATIENT)
Dept: ADMINISTRATIVE | Facility: OTHER | Age: 85
End: 2020-03-11

## 2020-03-12 ENCOUNTER — EXTERNAL HOME HEALTH (OUTPATIENT)
Dept: HOME HEALTH SERVICES | Facility: HOSPITAL | Age: 85
End: 2020-03-12
Payer: MEDICARE

## 2020-03-13 ENCOUNTER — OUTPATIENT CASE MANAGEMENT (OUTPATIENT)
Dept: ADMINISTRATIVE | Facility: OTHER | Age: 85
End: 2020-03-13

## 2020-03-13 NOTE — PROGRESS NOTES
03/13/20-Called and spoke to patient. Patient has a cardiology appointment on 03/18/20 for a check up after her stent placement. Patient has home health nurse. Patient is taking all her medications. Reviewed eating a low salt diet and derading labels, verbalized understanding. Daughters have quarantined her at home because of the virus. Reviewed safety precautions with patient, verbalized understanding.         PLAN-  Follow up in two weeks   Patient/caregiver will verbalize 2 red flags of Coronary Artery Disease   and know when to contact Physician    Outpatient Care Management  Plan of Care Follow Up Visit    Patient: Holli Landrum  MRN: [id number]  Date of Service: 03/13/2020  Completed by: Nancy Vences RN  Referral Date: 11/22/2019  Program: Case Management (High Risk)    Reason for Visit   Patient presents with    Update Plan Of Care       Brief Summary: see note     Patient Summary     Involvement of Care:  Do I have permission to speak with other family members about your care?   daughter    Patient Reported Labs & Vitals:  1.  Any Patient Reported Labs & Vitals?     2.  Patient Reported Blood Pressure:     3.  Patient Reported Pulse:     4.  Patient Reported Weight (Kg):     5.  Patient Reported Blood Glucose (mg/dl):       Medical History:  Reviewed medical history with patient and/or caregiver    Social History:  Reviewed social history with patient and/or caregiver    Clinical Assessment     Reviewed and provided basic information on available community resources for mental health, transportation, wellness resources, and palliative care programs with patient and/or caregiver.    Complex Care Plan     Care plan was discussed and completed today with input from patient and/or caregiver.    Goals      Patient/caregiver will have an action plan in place to manage and prevent complications of CAD prior to discharge from OPCM - Priority: Medium       Overall Time to Completion  2 months from 02/28/2020      OPCM Identified Patient Needs:            OPCM Identified Disease Education Needs:      Short Term Goals  Patient/caregiver will verbalize 2 red flags of Coronary Artery Disease   and know when to contact Physician within 3 weeks.  Interventions   · Empower patient/caregiver to discuss treatment plan with Physician/care team.02/28/20 03/05/20 03/13/20  · Encourage compliance with Physician follow-ups.02/28/20 03/13/20  · Encourage Dietary Compliance.03/05/20 03/13/20  · Encourage Medication Compliance.03/13/20  Patient/Caregiver agrees to go to [facility] appt by  03/03/20-done   · Patient/Caregiver agrees to OPCM follow up within 7 days  to assess progress to goal.    Status  · Partially met    3      Patient/caregiver will verbalize 1 ways of preventing complications due to disease process within 3 weeks.  Interventions   · Encourage compliance with Physician follow-ups.03/13/20  · Encourage Dietary Compliance.03/05/20  · Encourage Medication Compliance.03/13/20  · Recognize and provide educational material (MOHAN).  · Review eating/nutrition habits.03/05/20 03/13/20  Patient/Caregiver agrees to take medications as prescribed  by  03/07/20 by 03/15/20-done  · Patient/Caregiver agrees to OPCM follow up within 7 days to assess progress to goal.      Status  · Partially met                Patient Instructions     Instructions were provided via the [employer] patient resources and are available for the patient to view on the patient portal.    Next Steps: see note     Follow up in about 2 weeks (around 3/27/2020) for RN Follow up call.      [facility] OPCM Self-Management Care Plan was developed with the patients/caregivers input and was based on identified barriers from todays assessment.  Goals were written today with the patient/caregiver and the patient has agreed to work towards these goals to improve his/her overall well-being. Patient verbalized understanding of the care plan, goals, and all of today's instructions.  Encouraged patient/caregiver to communicate with his/her physician and health care team about health conditions and the treatment plan.  Provided my contact information today and encouraged patient/caregiver to call me with any questions as needed.

## 2020-03-19 PROBLEM — I25.110 CORONARY ARTERY DISEASE WITH UNSTABLE ANGINA PECTORIS: Status: ACTIVE | Noted: 2020-03-19

## 2020-03-24 ENCOUNTER — TELEPHONE (OUTPATIENT)
Dept: ENDOCRINOLOGY | Facility: CLINIC | Age: 85
End: 2020-03-24

## 2020-03-24 DIAGNOSIS — E04.2 MULTINODULAR GOITER: Primary | ICD-10-CM

## 2020-03-24 NOTE — TELEPHONE ENCOUNTER
87 y/o.  Scheduled to see you on Fri for thyroid nodule.  Can you please look at US from 12/2019 and advise on when she should be seen?  Pt was under the impression she'd be getting a biopsy when seen on Friday.

## 2020-03-24 NOTE — TELEPHONE ENCOUNTER
Let her know I reviewed the US. There was some growth. At this point would see her back in 6 months with thyroid US

## 2020-03-26 ENCOUNTER — TELEPHONE (OUTPATIENT)
Dept: UROLOGY | Facility: CLINIC | Age: 85
End: 2020-03-26

## 2020-03-26 NOTE — TELEPHONE ENCOUNTER
----- Message from Liam Nichols sent at 3/26/2020 11:50 AM CDT -----  Contact: Lisa  Type: Needs Medical Advice    Who Called:  Nurse with [facility]  Symptoms (please be specific):    How long has patient had these symptoms:   Pharmacy name and phone #:    Best Call Back Number: [phone]  Additional Information: called to report cloudy urine with sediment in it no fever,wanted to know if there is any further recommendations? Call back to advise

## 2020-03-27 ENCOUNTER — OUTPATIENT CASE MANAGEMENT (OUTPATIENT)
Dept: ADMINISTRATIVE | Facility: OTHER | Age: 85
End: 2020-03-27

## 2020-03-27 NOTE — PROGRESS NOTES
03/27/20-  Called and spoke to patient. Patient had an abnormal EKG at her cardiology appointment. Cardiology sent her to the hospital for for admission for a C on 03/19/20. Patient was discharged from the hospital on 03/20/20. Patient had a virtual visit with pulmonology on 03/24/20. Patient with no new medications and denies shortness of breath Patient was outside walking in the yard when RN called her.Patient has [facility] nurse. Reviewed [facility] on call phone number and [facility] hotline phone number with patient. Patient has daughter buying her groceries. She is not going to [employer] since the virus and stay at home order from the governor. Answered all patients questions. Will close case and dis-enroll patient from OPCM. Patient has RN OPCM contact information.        PLAN-  Case Closure

## 2020-04-13 ENCOUNTER — PATIENT OUTREACH (OUTPATIENT)
Dept: ADMINISTRATIVE | Facility: OTHER | Age: 85
End: 2020-04-13

## 2020-04-14 ENCOUNTER — OFFICE VISIT (OUTPATIENT)
Dept: UROLOGY | Facility: CLINIC | Age: 85
End: 2020-04-14
Payer: MEDICARE

## 2020-04-14 DIAGNOSIS — N39.0 RECURRENT UTI: Primary | ICD-10-CM

## 2020-04-14 DIAGNOSIS — N32.3 BLADDER DIVERTICULUM: ICD-10-CM

## 2020-04-14 DIAGNOSIS — N32.89 BLADDER SPASM: ICD-10-CM

## 2020-04-14 PROCEDURE — 99213 PR OFFICE/OUTPT VISIT, EST, LEVL III, 20-29 MIN: ICD-10-PCS | Mod: 95,,, | Performed by: UROLOGY

## 2020-04-14 PROCEDURE — 1101F PT FALLS ASSESS-DOCD LE1/YR: CPT | Mod: CPTII,95,, | Performed by: UROLOGY

## 2020-04-14 PROCEDURE — 99213 OFFICE O/P EST LOW 20 MIN: CPT | Mod: 95,,, | Performed by: UROLOGY

## 2020-04-14 PROCEDURE — 1159F PR MEDICATION LIST DOCUMENTED IN MEDICAL RECORD: ICD-10-PCS | Mod: 95,,, | Performed by: UROLOGY

## 2020-04-14 PROCEDURE — 1159F MED LIST DOCD IN RCRD: CPT | Mod: 95,,, | Performed by: UROLOGY

## 2020-04-14 PROCEDURE — 1101F PR PT FALLS ASSESS DOC 0-1 FALLS W/OUT INJ PAST YR: ICD-10-PCS | Mod: CPTII,95,, | Performed by: UROLOGY

## 2020-04-14 NOTE — PROGRESS NOTES
UROLOGY [place]  4 14 20    The patient location is: Home  The chief complaint leading to consultation is: urologic follow up for recurrent uti's  Visit type: Virtual visit with synchronous audio and video  Total time spent with patient: 10 min  Each patient to whom cornell[facility] provides medical services by telemedicine is:  (1) informed of the relationship between the physician and patient and the respective role of any other health care provider with respect to management of the patient; and (2) notified that he or she may decline to receive medical services by telemedicine and may withdraw from such care at any time.    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 We had discussed whether surgical removal of the diverticulum might help her situation, as she had been told that the diverticulum could retain urine. She was referred to dr hakeem herrera in case he could perform a robotic excision of the bladder diverticulum. Dr herrera took her to the OR at [facility] on 2 12 20. He performed cystoscopy and bilateral retrograde pyelography and evaluated the emptying of the kidneys into the bladder and the emptying of the diverticulum into the bladder. Conclusion was that the diverticulum was emptying well, and therefore there was no basis on which to recommend surgical excision in this 87 yo patient.     Pt has been well since then, and has no complaints. She was having some bladder spasms at around that time, and since then she has been put on ditropan 5 mg bid and she feels much better.     In addition to that, she was placed on keflex 250 mg daily as a uti prevention and seems to be working well.     IMP  Recurrent uti; doing well on prophylactic antibiotics  Bladder diverticulum: draining well, so no need to do surgical excision  Bladder spasms, can stay on ditropan 5 mg bid  RTC 6 mo

## 2020-04-24 ENCOUNTER — TELEPHONE (OUTPATIENT)
Dept: GASTROENTEROLOGY | Facility: CLINIC | Age: 85
End: 2020-04-24

## 2020-04-29 ENCOUNTER — TELEPHONE (OUTPATIENT)
Dept: FAMILY MEDICINE | Facility: CLINIC | Age: 85
End: 2020-04-29

## 2020-04-29 DIAGNOSIS — K21.9 GASTROESOPHAGEAL REFLUX DISEASE WITHOUT ESOPHAGITIS: Primary | ICD-10-CM

## 2020-04-29 NOTE — TELEPHONE ENCOUNTER
Spoke with pt's HH nurse, Lisa, she states pt is c/o increased indigestion lately, tums as needed with no relief. Burning and belching with meals. She took nexium in the past but is not taking anything regularly now. She was scheduled for endoscopy but it was rescheduled due to COVID. Please advise.

## 2020-04-29 NOTE — TELEPHONE ENCOUNTER
----- Message from Megha Roa sent at 4/29/2020 10:20 AM CDT -----  Contact: Nurse Gonzalez with [facility] [phone]  Nurse Gonzalez with [facility] called to ask if you will be able  To call the patient in some  Medication for Indigestion.   Pharmacy [facility]   Call back [phone]

## 2020-04-29 NOTE — TELEPHONE ENCOUNTER
----- Message from Shubham Mcmahon sent at 4/29/2020 12:35 PM CDT -----  Type:  Patient Returning Call    Who Called:  Lisa/[facility]  Who Left Message for Patient: NA  Does the patient know what this is regarding?:    Best Call Back Number:  [phone]  Additional Information:

## 2020-04-30 RX ORDER — ESOMEPRAZOLE MAGNESIUM 40 MG/1
40 CAPSULE, DELAYED RELEASE ORAL
Qty: 90 CAPSULE | Refills: 0 | Status: SHIPPED | OUTPATIENT
Start: 2020-04-30 | End: 2020-10-02

## 2020-05-05 ENCOUNTER — TELEPHONE (OUTPATIENT)
Dept: FAMILY MEDICINE | Facility: CLINIC | Age: 85
End: 2020-05-05

## 2020-05-05 NOTE — TELEPHONE ENCOUNTER
Spoke with pt's HH nurse, Gerri. She wanted Dr. Crespo aware of possible interaction between plavix and nexium

## 2020-05-05 NOTE — TELEPHONE ENCOUNTER
----- Message from Megha Roa sent at 5/5/2020  9:46 AM CDT -----  Contact: Gerri with [facility] [phone]  Gerri with [facility] [phone]  Called to let you know the patient may have a possible reaction to the Esomeprazole and Plavix.

## 2020-05-06 ENCOUNTER — PATIENT MESSAGE (OUTPATIENT)
Dept: ADMINISTRATIVE | Facility: HOSPITAL | Age: 85
End: 2020-05-06

## 2020-05-07 ENCOUNTER — TELEPHONE (OUTPATIENT)
Dept: OPHTHALMOLOGY | Facility: CLINIC | Age: 85
End: 2020-05-07

## 2020-05-11 ENCOUNTER — TELEPHONE (OUTPATIENT)
Dept: UROLOGY | Facility: CLINIC | Age: 85
End: 2020-05-11

## 2020-05-11 NOTE — TELEPHONE ENCOUNTER
Spoke to oliver and she spoke to pt and she has a slight odor to the urine and it is darker in color. Advised that the pt stay hydrated and drink plenty of water and if any other symptoms like dysuria or fever develop to call us. Pt has not fever or dysuria at this time she states.

## 2020-05-11 NOTE — TELEPHONE ENCOUNTER
----- Message from Margarita Waters sent at 5/11/2020  9:51 AM CDT -----  Contact: [facility], Lisa Gonzalez want to speak with a nurse regarding patient having dark urine with older need some medical advice please call back at [phone]    Case number [id number]

## 2020-05-12 ENCOUNTER — EXTERNAL HOME HEALTH (OUTPATIENT)
Dept: HOME HEALTH SERVICES | Facility: HOSPITAL | Age: 85
End: 2020-05-12
Payer: MEDICARE

## 2020-05-12 ENCOUNTER — PATIENT OUTREACH (OUTPATIENT)
Dept: ADMINISTRATIVE | Facility: OTHER | Age: 85
End: 2020-05-12

## 2020-05-12 NOTE — PROGRESS NOTES
Chart reviewed.   Immunizations: Triggered Imm Registry     Orders placed: n/a  Upcoming appts to satisfy MIAH topics: n/a

## 2020-05-14 ENCOUNTER — OFFICE VISIT (OUTPATIENT)
Dept: OPHTHALMOLOGY | Facility: CLINIC | Age: 85
End: 2020-05-14
Payer: MEDICARE

## 2020-05-14 DIAGNOSIS — H40.053 OCULAR HYPERTENSION, BILATERAL: Primary | ICD-10-CM

## 2020-05-14 DIAGNOSIS — Z96.1 PSEUDOPHAKIA OF BOTH EYES: ICD-10-CM

## 2020-05-14 DIAGNOSIS — H43.813 POSTERIOR VITREOUS DETACHMENT, BILATERAL: ICD-10-CM

## 2020-05-14 PROCEDURE — 92012 INTRM OPH EXAM EST PATIENT: CPT | Mod: HCNC,S$GLB,, | Performed by: OPHTHALMOLOGY

## 2020-05-14 PROCEDURE — 99999 PR PBB SHADOW E&M-EST. PATIENT-LVL III: ICD-10-PCS | Mod: PBBFAC,HCNC,, | Performed by: OPHTHALMOLOGY

## 2020-05-14 PROCEDURE — 92012 PR EYE EXAM, EST PATIENT,INTERMED: ICD-10-PCS | Mod: HCNC,S$GLB,, | Performed by: OPHTHALMOLOGY

## 2020-05-14 PROCEDURE — 99999 PR PBB SHADOW E&M-EST. PATIENT-LVL III: CPT | Mod: PBBFAC,HCNC,, | Performed by: OPHTHALMOLOGY

## 2020-05-14 NOTE — PATIENT INSTRUCTIONS
Glaucoma, Open-Angle (Chronic)    The eye is a fluid-filled globe with a lens in the front and a light-sensitive screen in the back (retina). The optic nerve conducts light signals from the retina to the brain and allows you to see visual images. Eye fluid is constantly produced within the eye. Excess fluid drains out into the bloodstream.  Open-angle glaucoma is a condition where the fluid pressure in the eye gradually increases and reduces blood flow to the optic nerve. This causes a gradual loss of vision, over months to years, which may progress to complete blindness if not treated. The cause of glaucoma is not known.  Open-angle glaucoma is painless. The first symptoms may be loss of peripheral (side) vision. Since most people dont pay much attention to their peripheral vision, you may have a lot of vision loss before you become aware of the problem. The vision loss is permanent.  Open-angle glaucoma can be treated with eye drops, surgery, and sometimes pills. These help to lower the pressure in the eye. Treatment is usually successful at keeping pressures low and preventing further vision loss. However, the condition cannot be cured. You will need to receive treatment for the rest of your life. Regular follow-up care with an ophthalmologist (a medical doctor who specializes in eye care) is very important to follow your response to the medicines.  Home care  · Take prescribed medicines exactly as directed.  · The eye needs certain vitamins and minerals for good health--especially vitamins A, C, and E, as well as zinc and copper. Eat a healthy diet full of fruits and vegetables to ensure that you get enough of these nutrients. If you have trouble following a balanced diet, consider taking a vitamin and mineral supplement.  · Drink 6 to 8 glasses of water in the course of a day. Drinking too much at one time (more than 1 quart) may increase eye pressure.  · Limit the amount of caffeine that you  drink.  · Regular exercise (3 times a week) may help reduce eye pressure. Avoid exercise positions with your head below your waist (such as bending over). This position will increase eye pressure. Talk to your healthcare provider about an appropriate exercise program for you.  · Protect your eyes. An eye injury can cause increased eye pressure. Wear safety glasses or goggles when you play sports, use tools or machinery, or work with chemicals.  Follow-up care  Follow up with your eye doctor, or as advised. Regular appointments will help make sure that your treatment is helping to keep your eyes at a safe pressure. Note: Open-angle glaucoma tends to run in families. Other family members over the age of 40 should also be examined by an eye doctor.  When to seek medical advice  Call your healthcare provider right away if any of these occur:  · Further loss of peripheral vision  · Blurred vision  · Eye pain or redness  · Severe headache  · Wolsey halos around lights  · Sudden loss of vision  Date Last Reviewed: 6/22/2015  © 6262-2644 [employer]. [address] [place] [address], [place] [address]. All rights reserved. This information is not intended as a substitute for professional medical care. Always follow your healthcare professional's instructions.

## 2020-05-14 NOTE — PROGRESS NOTES
HPI     Glaucoma      Additional comments: 4 month iop ck              Comments     DLS: 11/20/19    Pt states has had 3 stents since last since. Has some trouble seeing the   computer. Just uses OTC readers.           Last edited by Cheryle Quintana on 5/14/2020  2:32 PM. (History)        ROS     Negative for: Constitutional, Gastrointestinal, Neurological, Skin,   Genitourinary, Musculoskeletal, HENT, Endocrine, Cardiovascular, Eyes,   Respiratory, Psychiatric, Allergic/Imm, Heme/Lymph    Last edited by Aleksandar Rose Jr., MD on 5/14/2020  2:50 PM. (History)        Assessment /Plan     For exam results, see Encounter Report.    Ocular hypertension, bilateral    Pseudophakia of both eyes    Posterior vitreous detachment, bilateral      IOP OD 12 OS 14; stable  Continue Dorzolamide BID OS and Latanoprost QHS OU  RD precautions  Follow up in about 4 months (around 9/14/2020) for IOP and Medication check, dilation.

## 2020-05-20 ENCOUNTER — TELEPHONE (OUTPATIENT)
Dept: UROLOGY | Facility: CLINIC | Age: 85
End: 2020-05-20

## 2020-05-20 NOTE — TELEPHONE ENCOUNTER
[facility] nurse states patient c/o decreased output and some bladder pain. Stated urine is still malodorous. [facility] nurse states she is visiting patient tomorrow and would like to be able to irrigate if needed and collect sample for UA/UC if patient appears infected. Advised ok to do as requested.

## 2020-05-21 ENCOUNTER — TELEPHONE (OUTPATIENT)
Dept: UROLOGY | Facility: CLINIC | Age: 85
End: 2020-05-21

## 2020-05-21 ENCOUNTER — LAB VISIT (OUTPATIENT)
Dept: LAB | Facility: HOSPITAL | Age: 85
End: 2020-05-21
Attending: UROLOGY
Payer: MEDICARE

## 2020-05-21 DIAGNOSIS — N39.0 URINARY TRACT INFECTION, SITE NOT SPECIFIED: Primary | ICD-10-CM

## 2020-05-21 LAB
BACTERIA #/AREA URNS HPF: ABNORMAL /HPF
BILIRUB UR QL STRIP: NEGATIVE
CLARITY UR: ABNORMAL
COLOR UR: YELLOW
GLUCOSE UR QL STRIP: NEGATIVE
HGB UR QL STRIP: ABNORMAL
HYALINE CASTS #/AREA URNS LPF: 0 /LPF
KETONES UR QL STRIP: NEGATIVE
LEUKOCYTE ESTERASE UR QL STRIP: ABNORMAL
MICROSCOPIC COMMENT: ABNORMAL
NITRITE UR QL STRIP: POSITIVE
PH UR STRIP: 8 [PH] (ref 5–8)
PROT UR QL STRIP: ABNORMAL
RBC #/AREA URNS HPF: 4 /HPF (ref 0–4)
SP GR UR STRIP: 1 (ref 1–1.03)
TRI-PHOS CRY URNS QL MICRO: ABNORMAL
URN SPEC COLLECT METH UR: ABNORMAL
WBC #/AREA URNS HPF: 20 /HPF (ref 0–5)

## 2020-05-21 PROCEDURE — 87186 SC STD MICRODIL/AGAR DIL: CPT | Mod: HCNC

## 2020-05-21 PROCEDURE — 87077 CULTURE AEROBIC IDENTIFY: CPT | Mod: HCNC

## 2020-05-21 PROCEDURE — 87088 URINE BACTERIA CULTURE: CPT | Mod: HCNC

## 2020-05-21 PROCEDURE — 87086 URINE CULTURE/COLONY COUNT: CPT | Mod: HCNC

## 2020-05-21 PROCEDURE — 81000 URINALYSIS NONAUTO W/SCOPE: CPT | Mod: HCNC,PO

## 2020-05-21 RX ORDER — NITROFURANTOIN 25; 75 MG/1; MG/1
100 CAPSULE ORAL 2 TIMES DAILY
Qty: 20 CAPSULE | Refills: 0 | Status: SHIPPED | OUTPATIENT
Start: 2020-05-21 | End: 2020-05-31

## 2020-05-21 NOTE — TELEPHONE ENCOUNTER
----- Message from Zoraida Kirby sent at 5/21/2020 10:15 AM CDT -----  Contact: mykel from [facility]  Requesting new prescriptions for the patient.     They can be reached at [phone]      Thanks  KB

## 2020-05-24 LAB — BACTERIA UR CULT: ABNORMAL

## 2020-05-25 ENCOUNTER — TELEPHONE (OUTPATIENT)
Dept: UROLOGY | Facility: CLINIC | Age: 85
End: 2020-05-25

## 2020-05-25 DIAGNOSIS — N39.0 RECURRENT UTI: Primary | ICD-10-CM

## 2020-05-25 RX ORDER — AMOXICILLIN AND CLAVULANATE POTASSIUM 500; 125 MG/1; MG/1
1 TABLET, FILM COATED ORAL 2 TIMES DAILY
Qty: 7 TABLET | Refills: 0 | Status: SHIPPED | OUTPATIENT
Start: 2020-05-25 | End: 2020-06-03 | Stop reason: ALTCHOICE

## 2020-05-25 NOTE — TELEPHONE ENCOUNTER
----- Message from Caty Mondragon sent at 5/25/2020 12:25 PM CDT -----  Contact: patient  Type: Needs Medical Advice  Who Called:  Patient  Pharmacy name and phone #:  [facility]  Best Call Back Number: [phone] (home)   Additional Information: the patient said she needs a new Rx for a UTI to [facility] today please she said she neeeds a new Rx the one she has is not a good one and is not helping

## 2020-05-25 NOTE — TELEPHONE ENCOUNTER
Patient states macrobid is not working (bacteria not tested against), please send different antibiotic. Randruniraj patient

## 2020-05-25 NOTE — TELEPHONE ENCOUNTER
----- Message from Mark Persaud sent at 5/25/2020  2:13 PM CDT -----  Contact: [facility]   [phone]  Type: Needs Medical Advice    Who Called:  [facility]   [phone]    Symptoms (please be specific): Bladder issues.    How long has patient had these symptoms:  For a while now.    Pharmacy name and phone #:     [facility] [place]     Best Call Back Number:[facility] [phone]    Additional Information:      Advised needs to speak with Josi about a new medication antibiotic for her condition. She has checked with the pharmacy Rx not there yet. Please call.

## 2020-05-26 ENCOUNTER — DOCUMENT SCAN (OUTPATIENT)
Dept: HOME HEALTH SERVICES | Facility: HOSPITAL | Age: 85
End: 2020-05-26
Payer: MEDICARE

## 2020-05-27 ENCOUNTER — TELEPHONE (OUTPATIENT)
Dept: UROLOGY | Facility: CLINIC | Age: 85
End: 2020-05-27

## 2020-05-27 NOTE — TELEPHONE ENCOUNTER
----- Message from Sophia Rodrigezkalen sent at 5/27/2020 10:49 AM CDT -----  Contact: Seble torres/ [facility]  Type:  Pharmacy Calling to Clarify an RX    Name of Caller:  Seble  [facility]   Prescription Name:  amoxicillin-clavulanate 500-125mg (AUGMENTIN) 500-125 mg Tab   What do they need to clarify?:  Dosage directions  Best Call Back Number:  [phone]   Additional Information:  na

## 2020-05-28 ENCOUNTER — TELEPHONE (OUTPATIENT)
Dept: UROLOGY | Facility: CLINIC | Age: 85
End: 2020-05-28

## 2020-05-28 NOTE — TELEPHONE ENCOUNTER
----- Message from Ml Valles sent at 5/28/2020 11:43 AM CDT -----  Contact: Sherri  Type: Needs Medical Advice    Who Called:  Home health nurseSherri  Symptoms (please be specific):  Pt states catheter issues    Best Call Back Number: [phone]     Additional Information:   Nurse requesting a call back to discuss patient's catheter issues and if dr wants her to send nurse out.

## 2020-05-28 NOTE — TELEPHONE ENCOUNTER
[facility] calling to advise patient called them and stated she felt like her catheter was coming out and felt very uncomfortable and wanted to be irrigated. Advised to have nurse assess the catheter and balloon to assure it is not coming out and unless there is no urine flow into the bag, do not irrigate. [facility]HC expressed understanding

## 2020-05-28 NOTE — TELEPHONE ENCOUNTER
mykel from [facility] calling to advise patient demanded french to be changed, so french was changed, but was secure and not coming out. She did not irrigate.

## 2020-06-03 ENCOUNTER — OFFICE VISIT (OUTPATIENT)
Dept: FAMILY MEDICINE | Facility: CLINIC | Age: 85
End: 2020-06-03
Payer: MEDICARE

## 2020-06-03 VITALS
HEIGHT: 65 IN | SYSTOLIC BLOOD PRESSURE: 124 MMHG | DIASTOLIC BLOOD PRESSURE: 68 MMHG | BODY MASS INDEX: 32.06 KG/M2 | WEIGHT: 192.44 LBS | TEMPERATURE: 98 F | OXYGEN SATURATION: 94 % | HEART RATE: 50 BPM

## 2020-06-03 DIAGNOSIS — L03.116 CELLULITIS OF LEFT LOWER EXTREMITY: Primary | ICD-10-CM

## 2020-06-03 DIAGNOSIS — N39.0 RECURRENT UTI (URINARY TRACT INFECTION): ICD-10-CM

## 2020-06-03 PROCEDURE — 1159F MED LIST DOCD IN RCRD: CPT | Mod: HCNC,S$GLB,, | Performed by: NURSE PRACTITIONER

## 2020-06-03 PROCEDURE — 99214 PR OFFICE/OUTPT VISIT, EST, LEVL IV, 30-39 MIN: ICD-10-PCS | Mod: HCNC,S$GLB,, | Performed by: NURSE PRACTITIONER

## 2020-06-03 PROCEDURE — 99999 PR PBB SHADOW E&M-EST. PATIENT-LVL IV: ICD-10-PCS | Mod: PBBFAC,HCNC,, | Performed by: NURSE PRACTITIONER

## 2020-06-03 PROCEDURE — 1101F PT FALLS ASSESS-DOCD LE1/YR: CPT | Mod: HCNC,CPTII,S$GLB, | Performed by: NURSE PRACTITIONER

## 2020-06-03 PROCEDURE — 99999 PR PBB SHADOW E&M-EST. PATIENT-LVL IV: CPT | Mod: PBBFAC,HCNC,, | Performed by: NURSE PRACTITIONER

## 2020-06-03 PROCEDURE — 1101F PR PT FALLS ASSESS DOC 0-1 FALLS W/OUT INJ PAST YR: ICD-10-PCS | Mod: HCNC,CPTII,S$GLB, | Performed by: NURSE PRACTITIONER

## 2020-06-03 PROCEDURE — 1126F PR PAIN SEVERITY QUANTIFIED, NO PAIN PRESENT: ICD-10-PCS | Mod: HCNC,S$GLB,, | Performed by: NURSE PRACTITIONER

## 2020-06-03 PROCEDURE — 99214 OFFICE O/P EST MOD 30 MIN: CPT | Mod: HCNC,S$GLB,, | Performed by: NURSE PRACTITIONER

## 2020-06-03 PROCEDURE — 1126F AMNT PAIN NOTED NONE PRSNT: CPT | Mod: HCNC,S$GLB,, | Performed by: NURSE PRACTITIONER

## 2020-06-03 PROCEDURE — 1159F PR MEDICATION LIST DOCUMENTED IN MEDICAL RECORD: ICD-10-PCS | Mod: HCNC,S$GLB,, | Performed by: NURSE PRACTITIONER

## 2020-06-03 RX ORDER — MUPIROCIN 20 MG/G
OINTMENT TOPICAL 3 TIMES DAILY
Qty: 30 G | Refills: 0 | Status: SHIPPED | OUTPATIENT
Start: 2020-06-03 | End: 2020-08-31

## 2020-06-03 RX ORDER — CLINDAMYCIN HYDROCHLORIDE 300 MG/1
300 CAPSULE ORAL 2 TIMES DAILY
Qty: 10 CAPSULE | Refills: 0 | Status: SHIPPED | OUTPATIENT
Start: 2020-06-03 | End: 2020-06-08

## 2020-06-03 RX ORDER — OXYBUTYNIN CHLORIDE 5 MG/1
5 TABLET ORAL 2 TIMES DAILY
Qty: 60 TABLET | Refills: 3 | Status: SHIPPED | OUTPATIENT
Start: 2020-06-03 | End: 2020-09-28 | Stop reason: SDUPTHER

## 2020-06-03 NOTE — PROGRESS NOTES
Subjective:       Patient ID: Holli Landrum is a 86 y.o. female.    Chief Complaint: Rash (antibiotic cream x2d, alchol, itching, calf left leg)    Ms Landrum is a new patient to me.  She is here for a red irritated area on her left leg calf area.  She feels she was bitten by a bug or a mosquito and did scratch at it for a long time.  She had multiple issues and episodes with cellulitis in the past that did take over 6 months to resolve with multiple rounds of antibiotic treatment.  She currently has a urinary catheter that has contributed to multiple urinary tract infections.  She denies current UTI symptoms at this time. Last day of treatment was two days ago.     Vitals:    06/03/20 1255   BP: 124/68   Pulse: (!) 50   Temp: 98 °F (36.7 °C)     Review of Systems   Constitutional: Positive for activity change. Negative for appetite change, chills, fatigue and fever.   HENT: Negative for congestion, ear pain, postnasal drip, rhinorrhea, sinus pressure, sinus pain, sneezing, sore throat and trouble swallowing.    Eyes: Negative for pain, redness and visual disturbance.   Respiratory: Positive for cough. Negative for choking, chest tightness, shortness of breath and wheezing.    Cardiovascular: Negative for chest pain, palpitations and leg swelling.   Gastrointestinal: Negative for abdominal pain, blood in stool, constipation, diarrhea, nausea and vomiting.   Endocrine: Negative.    Genitourinary: Negative for decreased urine volume, difficulty urinating, dysuria, flank pain, hematuria and urgency.   Musculoskeletal: Negative for back pain, gait problem, myalgias and neck pain.   Skin: Positive for color change and rash. Negative for pallor.   Allergic/Immunologic: Negative.    Neurological: Negative for dizziness, speech difficulty, weakness, light-headedness, numbness and headaches.   Hematological: Does not bruise/bleed easily.   Psychiatric/Behavioral: Negative for self-injury and suicidal ideas. The patient is not  nervous/anxious.        Past Medical History:   Diagnosis Date    Anticoagulant long-term use     Arthritis     Asthma     Cancer     skin cancer to face    Cataract     OU done//    CHF (congestive heart failure)     COPD (chronic obstructive pulmonary disease)     no oxygen; patient denies    Essential hypertension 5/5/2010    GERD (gastroesophageal reflux disease) 11/20/2012    Glaucoma     Hypertensive heart disease with heart failure 02/05/2013    Paroxysmal ventricular tachycardia     per problem list       Objective:      Physical Exam   Constitutional: She is oriented to person, place, and time. Vital signs are normal. She appears well-developed and well-nourished. She is cooperative.  Non-toxic appearance. She does not have a sickly appearance. She does not appear ill. No distress.   HENT:   Head: Normocephalic and atraumatic. Head is without right periorbital erythema and without left periorbital erythema.   Right Ear: Hearing and external ear normal.   Left Ear: Hearing and external ear normal.   Nose: Nose normal. No mucosal edema or rhinorrhea. Right sinus exhibits no maxillary sinus tenderness and no frontal sinus tenderness. Left sinus exhibits no maxillary sinus tenderness and no frontal sinus tenderness.   Mouth/Throat: Uvula is midline, oropharynx is clear and moist and mucous membranes are normal.   Eyes: Pupils are equal, round, and reactive to light. Conjunctivae, EOM and lids are normal.   Neck: Trachea normal, normal range of motion and full passive range of motion without pain. Neck supple.   Cardiovascular: Regular rhythm, normal heart sounds, intact distal pulses and normal pulses. Bradycardia present.   Pulmonary/Chest: Effort normal and breath sounds normal. No stridor. No apnea, no tachypnea and no bradypnea. No respiratory distress. She has no decreased breath sounds. She has no wheezes. She has no rhonchi. She has no rales.   94% baseline for the patient 02, all lung field  are clear to auscultation   Abdominal: Soft. Bowel sounds are normal.   Genitourinary: There is no rash or tenderness on the right labia. There is no rash or tenderness on the left labia.   Genitourinary Comments: Urinary catheter present, negative for signs of infection, hematuria or mucus secured to the inner left thigh. Negative for chaffing or excessive dryness.   Musculoskeletal: Normal range of motion.   Neurological: She is alert and oriented to person, place, and time.   Skin: Skin is warm, dry and intact. Capillary refill takes less than 2 seconds. Rash noted. No lesion and no petechiae noted. Rash is maculopapular. She is not diaphoretic. No erythema. No pallor.   See photo.   Psychiatric: She has a normal mood and affect. Her speech is normal and behavior is normal. Judgment and thought content normal. Cognition and memory are impaired.   Nursing note and vitals reviewed.      Assessment:       1. Cellulitis of left lower extremity    2. Recurrent UTI (urinary tract infection)        Plan:       Cellulitis of left lower extremity  -     clindamycin (CLEOCIN) 300 MG capsule; Take 1 capsule (300 mg total) by mouth 2 (two) times a day. for 5 days  Dispense: 10 capsule; Refill: 0  -     mupirocin (BACTROBAN) 2 % ointment; Apply topically 3 (three) times daily.  Dispense: 30 g; Refill: 0    Recurrent UTI (urinary tract infection)  -     oxybutynin (DITROPAN) 5 MG Tab; Take 1 tablet (5 mg total) by mouth 2 (two) times daily.  Dispense: 60 tablet; Refill: 3  -     Urinalysis, Reflex to Urine Culture Urine, Catheterized; Future; Expected date: 06/04/2020    Patient requested a refill on her oxybuytin and a repeat urine culture to be drawn by home health.  Call for temperature greater than 101, pain uncontrolled by oral medications, Nausea/vomiting, inability to tolerate oral medications, chest pain, shortness of breath, altered mental status, or any acute events      Educated on supportive care and return  precautions to the clinic.  Follow up for further evaluation if S/S worsen or fail to improve.  Follow up in about 2 weeks (around 6/17/2020), or if symptoms worsen or fail to improve.

## 2020-06-04 ENCOUNTER — LAB VISIT (OUTPATIENT)
Dept: LAB | Facility: HOSPITAL | Age: 85
End: 2020-06-04
Attending: NURSE PRACTITIONER
Payer: MEDICARE

## 2020-06-04 DIAGNOSIS — N39.0 URINARY TRACT INFECTION, SITE NOT SPECIFIED: Primary | ICD-10-CM

## 2020-06-04 LAB
BILIRUB UR QL STRIP: NEGATIVE
CLARITY UR: CLEAR
COLOR UR: YELLOW
GLUCOSE UR QL STRIP: NEGATIVE
HGB UR QL STRIP: NEGATIVE
KETONES UR QL STRIP: NEGATIVE
LEUKOCYTE ESTERASE UR QL STRIP: ABNORMAL
MICROSCOPIC COMMENT: ABNORMAL
NITRITE UR QL STRIP: NEGATIVE
PH UR STRIP: 7.5 [PH] (ref 5–8)
PROT UR QL STRIP: NEGATIVE
SP GR UR STRIP: 1.01 (ref 1–1.03)
SQUAMOUS #/AREA URNS HPF: ABNORMAL /HPF
URN SPEC COLLECT METH UR: ABNORMAL
WBC #/AREA URNS HPF: 60 /HPF (ref 0–5)
YEAST URNS QL MICRO: ABNORMAL

## 2020-06-04 PROCEDURE — 87106 FUNGI IDENTIFICATION YEAST: CPT | Mod: HCNC

## 2020-06-04 PROCEDURE — 87086 URINE CULTURE/COLONY COUNT: CPT | Mod: HCNC

## 2020-06-04 PROCEDURE — 81000 URINALYSIS NONAUTO W/SCOPE: CPT | Mod: HCNC,PO

## 2020-06-04 PROCEDURE — 87088 URINE BACTERIA CULTURE: CPT | Mod: HCNC

## 2020-06-05 DIAGNOSIS — B37.41 YEAST CYSTITIS: Primary | ICD-10-CM

## 2020-06-07 LAB — BACTERIA UR CULT: ABNORMAL

## 2020-06-10 ENCOUNTER — DOCUMENT SCAN (OUTPATIENT)
Dept: HOME HEALTH SERVICES | Facility: HOSPITAL | Age: 85
End: 2020-06-10
Payer: MEDICARE

## 2020-06-11 PROBLEM — E27.9 ADRENAL NODULE: Status: ACTIVE | Noted: 2020-06-11

## 2020-06-11 PROBLEM — E27.8 ADRENAL NODULE: Status: ACTIVE | Noted: 2020-06-11

## 2020-06-26 ENCOUNTER — OFFICE VISIT (OUTPATIENT)
Dept: GASTROENTEROLOGY | Facility: CLINIC | Age: 85
End: 2020-06-26
Payer: MEDICARE

## 2020-06-26 VITALS — BODY MASS INDEX: 31.82 KG/M2 | HEIGHT: 65 IN | WEIGHT: 191 LBS

## 2020-06-26 DIAGNOSIS — Z90.49 S/P CHOLECYSTECTOMY: ICD-10-CM

## 2020-06-26 DIAGNOSIS — Z79.01 CURRENT USE OF ANTICOAGULANT THERAPY: ICD-10-CM

## 2020-06-26 DIAGNOSIS — K21.9 GASTROESOPHAGEAL REFLUX DISEASE, ESOPHAGITIS PRESENCE NOT SPECIFIED: Primary | ICD-10-CM

## 2020-06-26 DIAGNOSIS — Z95.5 S/P CORONARY ARTERY STENT PLACEMENT: ICD-10-CM

## 2020-06-26 DIAGNOSIS — R13.12 OROPHARYNGEAL DYSPHAGIA: ICD-10-CM

## 2020-06-26 PROCEDURE — 1159F PR MEDICATION LIST DOCUMENTED IN MEDICAL RECORD: ICD-10-PCS | Mod: HCNC,S$GLB,, | Performed by: NURSE PRACTITIONER

## 2020-06-26 PROCEDURE — 1101F PR PT FALLS ASSESS DOC 0-1 FALLS W/OUT INJ PAST YR: ICD-10-PCS | Mod: HCNC,CPTII,S$GLB, | Performed by: NURSE PRACTITIONER

## 2020-06-26 PROCEDURE — 1101F PT FALLS ASSESS-DOCD LE1/YR: CPT | Mod: HCNC,CPTII,S$GLB, | Performed by: NURSE PRACTITIONER

## 2020-06-26 PROCEDURE — 99999 PR PBB SHADOW E&M-EST. PATIENT-LVL III: ICD-10-PCS | Mod: PBBFAC,HCNC,, | Performed by: NURSE PRACTITIONER

## 2020-06-26 PROCEDURE — 99499 UNLISTED E&M SERVICE: CPT | Mod: HCNC,S$GLB,, | Performed by: NURSE PRACTITIONER

## 2020-06-26 PROCEDURE — 99214 OFFICE O/P EST MOD 30 MIN: CPT | Mod: HCNC,S$GLB,, | Performed by: NURSE PRACTITIONER

## 2020-06-26 PROCEDURE — 1125F PR PAIN SEVERITY QUANTIFIED, PAIN PRESENT: ICD-10-PCS | Mod: HCNC,S$GLB,, | Performed by: NURSE PRACTITIONER

## 2020-06-26 PROCEDURE — 99499 RISK ADDL DX/OHS AUDIT: ICD-10-PCS | Mod: HCNC,S$GLB,, | Performed by: NURSE PRACTITIONER

## 2020-06-26 PROCEDURE — 99214 PR OFFICE/OUTPT VISIT, EST, LEVL IV, 30-39 MIN: ICD-10-PCS | Mod: HCNC,S$GLB,, | Performed by: NURSE PRACTITIONER

## 2020-06-26 PROCEDURE — 1159F MED LIST DOCD IN RCRD: CPT | Mod: HCNC,S$GLB,, | Performed by: NURSE PRACTITIONER

## 2020-06-26 PROCEDURE — 1125F AMNT PAIN NOTED PAIN PRSNT: CPT | Mod: HCNC,S$GLB,, | Performed by: NURSE PRACTITIONER

## 2020-06-26 PROCEDURE — 99999 PR PBB SHADOW E&M-EST. PATIENT-LVL III: CPT | Mod: PBBFAC,HCNC,, | Performed by: NURSE PRACTITIONER

## 2020-06-26 RX ORDER — CIMETIDINE 800 MG
800 TABLET ORAL NIGHTLY
Qty: 30 TABLET | Refills: 2 | Status: SHIPPED | OUTPATIENT
Start: 2020-06-26 | End: 2020-10-08 | Stop reason: SDUPTHER

## 2020-06-26 NOTE — PROGRESS NOTES
Subjective:       Patient ID: Holli Landrum is a 87 y.o. female, Body mass index is 31.78 kg/m².    Chief Complaint: Gastroesophageal Reflux      Patient is new to me. Established patient of Merari Sal NP.    Gastroesophageal Reflux  She complains of dysphagia (dysphagia to solids, especially rice; started 2/2020; has occurred three times since; denies trouble swallowing liquid) and heartburn. She reports no abdominal pain, no belching, no chest pain, no choking, no coughing, no early satiety, no globus sensation, no hoarse voice, no nausea or no sore throat. This is a chronic problem. The current episode started more than 1 year ago. The problem occurs frequently. The problem has been gradually worsening (especially over the past couple months). The heartburn is located in the substernum, right chest and left chest. The heartburn is of moderate intensity. The heartburn does not wake her from sleep. The heartburn does not limit her activity. The heartburn doesn't change with position. The symptoms are aggravated by certain foods (especially red sauce and fatty foods). Pertinent negatives include no anemia, melena or weight loss. Risk factors include obesity and lack of exercise. She has tried a PPI and an antacid (Recently started back on Nexium 40 mg once daily and Mylanta and Tums OTC PRN- somewhat effective) for the symptoms. Past procedures include an EGD.     Review of Systems   Constitutional: Negative for appetite change, fever, unexpected weight change and weight loss.   HENT: Positive for trouble swallowing. Negative for hoarse voice and sore throat.    Respiratory: Negative for cough, choking and shortness of breath.    Cardiovascular: Negative for chest pain.        Recent stents in 3/2020   Gastrointestinal: Positive for dysphagia (dysphagia to solids, especially rice; started 2/2020; has occurred three times since; denies trouble swallowing liquid) and heartburn. Negative for abdominal pain,  blood in stool, constipation, diarrhea, melena, nausea and vomiting.   Genitourinary: Negative for difficulty urinating and dysuria.        Ballard catheter in place   Musculoskeletal: Positive for gait problem.   Skin: Negative for rash.   Neurological: Negative for speech difficulty.   Psychiatric/Behavioral: Negative for confusion.       Past Medical History:   Diagnosis Date    Anticoagulant long-term use     Arthritis     Asthma     Cancer     skin cancer to face    Cataract     OU done//    CHF (congestive heart failure)     COPD (chronic obstructive pulmonary disease)     no oxygen; patient denies    Essential hypertension 5/5/2010    GERD (gastroesophageal reflux disease) 11/20/2012    Glaucoma     Hypertensive heart disease with heart failure 02/05/2013    Paroxysmal ventricular tachycardia     per problem list      Past Surgical History:   Procedure Laterality Date    BREAST BIOPSY Left 1995    neg    BREAST BIOPSY Right 1984    neg    BREAST BIOPSY Right 1992    neg    CARDIAC CATHETERIZATION  2013, 2014,2016    has 7 stents    CARDIAC SURGERY  2012    stents    CATARACT EXTRACTION W/  INTRAOCULAR LENS IMPLANT Left 11/01/2018    Dr Rose    CATARACT EXTRACTION W/  INTRAOCULAR LENS IMPLANT Right 12/13/2018    Dr Rose//    CHOLECYSTECTOMY      COLONOSCOPY  ~2005    Dr. Edward; normal per patient report    CORONARY ANGIOGRAPHY N/A 3/20/2020    Procedure: ANGIOGRAM, CORONARY ARTERY;  Surgeon: Chuy Bryant MD;  Location: [facility] CATH;  Service: Cardiology;  Laterality: N/A;    CYSTOSCOPY W/ RETROGRADES Bilateral 2/12/2020    Procedure: CYSTOSCOPY, WITH RETROGRADE PYELOGRAM;  Surgeon: Gasper Feliz MD;  Location: [facility] OR;  Service: Urology;  Laterality: Bilateral;    HYSTERECTOMY  1969    LEFT HEART CATHETERIZATION Left 3/3/2020    Procedure: Left heart cath;  Surgeon: Chuy Bryant MD;  Location: [facility] CATH;  Service: Cardiology;  Laterality: Left;    LEFT HEART CATHETERIZATION  Left 3/20/2020    Procedure: Left heart cath;  Surgeon: Chuy Bryant MD;  Location: [facility] CATH;  Service: Cardiology;  Laterality: Left;    OVARIAN CYST REMOVAL  teenager    PHACOEMULSIFICATION OF CATARACT Left 11/1/2018    Procedure: PHACOEMULSIFICATION, CATARACT;  Surgeon: Aleksandar Rose Jr., MD;  Location: [facility] OR;  Service: Ophthalmology;  Laterality: Left;    PHACOEMULSIFICATION OF CATARACT Right 12/13/2018    Procedure: PHACOEMULSIFICATION, CATARACT;  Surgeon: Aleksandar Rose Jr., MD;  Location: [facility] OR;  Service: Ophthalmology;  Laterality: Right;    TONSILLECTOMY      aw/denoids    UPPER GASTROINTESTINAL ENDOSCOPY  ~2005    Dr. Solorzano    VASCULAR SURGERY      to remove clot from right leg    Yag Capsulotomy Bilateral 11/05/2019    Dr Rose      Family History   Problem Relation Age of Onset    Diabetes Brother     Heart disease Brother     Diabetes Mother     Cancer Maternal Grandfather     Clotting disorder Son         bleeding after tonsillectomy only    Amblyopia Neg Hx     Blindness Neg Hx     Cataracts Neg Hx     Glaucoma Neg Hx     Hypertension Neg Hx     Macular degeneration Neg Hx     Retinal detachment Neg Hx     Strabismus Neg Hx     Stroke Neg Hx     Thyroid disease Neg Hx       Wt Readings from Last 10 Encounters:   06/26/20 86.6 kg (191 lb)   06/11/20 86.8 kg (191 lb 6.4 oz)   06/03/20 87.3 kg (192 lb 7.4 oz)   04/08/20 85.7 kg (189 lb)   03/19/20 86.1 kg (189 lb 13.1 oz)   03/19/20 88 kg (194 lb)   03/04/20 87.1 kg (192 lb 0.3 oz)   02/27/20 89.4 kg (197 lb)   02/12/20 86.2 kg (190 lb)   01/14/20 86.5 kg (190 lb 9.6 oz)     Lab Results   Component Value Date    WBC 7.17 03/19/2020    HGB 14.0 03/19/2020    HCT 40.5 03/19/2020    MCV 85 03/19/2020     (H) 03/19/2020     CMP  Sodium   Date Value Ref Range Status   03/19/2020 132 (L) 136 - 145 mmol/L Final   08/15/2015 135 (L) 137 - 145 MMOL/L Final     Potassium   Date Value Ref Range Status   03/19/2020 3.7 3.5  - 5.1 mmol/L Final   08/15/2015 4.0 3.5 - 5.1 MMOL/L Final     Chloride   Date Value Ref Range Status   03/19/2020 90 (L) 95 - 110 mmol/L Final   08/15/2015 99 98 - 107 MMOL/L Final     CO2   Date Value Ref Range Status   03/19/2020 30 22 - 31 mmol/L Final     Glucose   Date Value Ref Range Status   03/19/2020 105 70 - 110 mg/dL Final     Comment:     The ADA recommends the following guidelines for fasting glucose:  Normal:       less than 100 mg/dL  Prediabetes:  100 mg/dL to 125 mg/dL  Diabetes:     126 mg/dL or higher       BUN, Bld   Date Value Ref Range Status   03/19/2020 43 (H) 7 - 18 mg/dL Final     Creatinine   Date Value Ref Range Status   03/19/2020 0.96 0.50 - 1.40 mg/dL Final   08/15/2015 1.05 (H) 0.52 - 1.04 MG/DL Final     Calcium   Date Value Ref Range Status   03/19/2020 10.2 8.4 - 10.2 mg/dL Final     Total Protein   Date Value Ref Range Status   01/03/2020 8.0 6.0 - 8.4 g/dL Final     Albumin   Date Value Ref Range Status   01/03/2020 4.7 3.5 - 5.2 g/dL Final   08/15/2015 4.4 3.5 - 5.0 G/DL Final     Total Bilirubin   Date Value Ref Range Status   01/03/2020 0.7 0.2 - 1.3 mg/dL Final     Alkaline Phosphatase   Date Value Ref Range Status   01/03/2020 98 38 - 145 U/L Final     AST ([place])   Date Value Ref Range Status   04/05/2016 23 14 - 36 U/L Final     AST   Date Value Ref Range Status   01/03/2020 23 14 - 36 U/L Final     ALT   Date Value Ref Range Status   01/03/2020 13 10 - 44 U/L Final     Anion Gap   Date Value Ref Range Status   03/19/2020 12 8 - 16 mmol/L Final     eGFR if    Date Value Ref Range Status   03/19/2020 >60 >60 mL/min/1.73 m^2 Final     eGFR if non    Date Value Ref Range Status   03/19/2020 54 (A) >60 mL/min/1.73 m^2 Final     Comment:     Calculation used to obtain the estimated glomerular filtration  rate (eGFR) is the CKD-EPI equation.                 Reviewed prior medical records including radiology report of CT of abdomen and  pelvis 12/16/19 & endoscopy history (see surgical history).     Objective:      Physical Exam  Constitutional:       General: She is not in acute distress.     Appearance: She is well-developed.   HENT:      Head: Normocephalic.      Right Ear: Hearing normal.      Left Ear: Hearing normal.      Nose: Nose normal.      Mouth/Throat:      Comments: Pt wearing mask due to COVID concerns  Eyes:      General: Lids are normal.      Conjunctiva/sclera: Conjunctivae normal.      Pupils: Pupils are equal, round, and reactive to light.   Neck:      Musculoskeletal: Normal range of motion.      Trachea: Trachea normal.   Cardiovascular:      Rate and Rhythm: Normal rate and regular rhythm.      Heart sounds: Normal heart sounds. No murmur.   Pulmonary:      Effort: Pulmonary effort is normal. No respiratory distress.      Breath sounds: Normal breath sounds. No stridor. No wheezing.   Abdominal:      General: Bowel sounds are normal. There is no distension.      Palpations: Abdomen is soft. There is no mass.      Tenderness: There is no abdominal tenderness. There is no guarding or rebound.   Musculoskeletal: Normal range of motion.      Comments: Presents in wheelchair   Skin:     General: Skin is warm and dry.      Findings: No rash.      Comments: Non jaundiced   Neurological:      Mental Status: She is alert and oriented to person, place, and time.   Psychiatric:         Speech: Speech normal.         Behavior: Behavior normal. Behavior is cooperative.           Assessment:       1. Gastroesophageal reflux disease, esophagitis presence not specified    2. Oropharyngeal dysphagia    3. S/P coronary artery stent placement    4. Current use of anticoagulant therapy    5. S/P cholecystectomy           Plan:   All diagnoses and orders for this visit:    Gastroesophageal reflux disease, esophagitis presence not specified  - FL Upper GI to include esophagram; Future; Expected date: 06/26/2020 (due to recent cardiac stent  placement unable to perform EGD)  - Start: cimetidine (TAGAMET) 800 MG tablet; Take 1 tablet (800 mg total) by mouth every evening.  Dispense: 30 tablet; Refill: 2  - Continue Nexium 40 mg once daily  - Take PPI in the morning 30 minutes before breakfast  - Recommend to avoid large meals, avoid eating within 3 hours of bedtime, elevate head of bed if nocturnal symptoms are present, smoking cessation (if current smoker), & weight loss (if overweight).   - Recommend minimize/avoid high-fat foods, chocolate, caffeine, citrus, alcohol, & tomato products.  - Advised to avoid/limit use of NSAID's, since they can cause GI upset, bleeding, and/or ulcers. If needed, take with food.     Oropharyngeal dysphagia  - FL Upper GI to include esophagram; Future; Expected date: 06/26/2020 (due to recent cardiac stent placement unable to perform EGD)  - Educated patient to eat smaller more frequent meals and to eat slowly and advised to eat a soft diet.    S/P coronary artery stent placement    Current use of anticoagulant therapy    S/P cholecystectomy    If no improvement in symptoms or symptoms worsen, call/follow-up at clinic or go to ER

## 2020-06-26 NOTE — PATIENT INSTRUCTIONS
Soft Diet  Your healthcare provider has prescribed a soft diet (also called gastrointestinal soft diet). This means eating foods that are soft, low in fiber, and easy to digest. This diet is for people with digestive problems. A soft diet provides foods that are easy to chew and swallow. Foods should be bite-size and very soft or moist. Follow your healthcare providers specific instructions about what foods and drinks you may have. The general guidelines below can help you get started on this diet.       Beverages  OK: Milk, tea, coffee, fruit juices, carbonated beverages, nutrition shakes, and drinks (Note: Thin liquids may be hard to swallow. They may need to be thickened.)  Avoid: None, unless they need to be thickened  Breads and crackers  OK: Refined white, wheat, or seedless rye bread; dairan or soda crackers that have been moistened; plain rolls or bagels; very soft tortillas  Avoid: Whole-grain breads, rolls, or bagels with nuts, raisins, or seeds; crackers, croutons, taco shells  Cereals and grains  OK: Cooked cereals, plain dry cereals that have been moistened, plain macaroni, spaghetti, noodles, rice  Avoid: Whole-grain cereals and granola, or cereals containing bran, raisins, seeds or nuts; coconut; brown or wild rice  Desserts and sweets  OK: Moist cake; soft fruit pie with bottom crust only; soft cookies moistened in milk or other liquid; gelatin, custard, pudding, plain ice cream, plain sherbet, sugar, honey, clear jelly  Avoid: Pastries, desserts, and ice cream that have nuts, coconut, seeds, or dried fruit; popcorn; chips of any kind including potato chips and tortilla chips; jam, marmalade  Eggs and cheese  OK: Poached, soft boiled, or scrambled eggs; cottage cheese, ricotta cheese, cream cheese, cheese sauces, or cheese melted in other dishes  Avoid: Crisp fried eggs, cheese slices and cubes  Fruits  OK: Avocado, banana, baked peeled apple, applesauce, peeled ripe peaches or pears, canned fruit  (apricots, cherries, peaches, pears), melons  Avoid: Raw apple, dried fruits, coconut, pineapple, grapes, fruit navi, fruit snacks  Meat and fish  OK: All fresh meat, poultry, or fish that is cooked until tender  Avoid: Meat, fish, or poultry that is fried; tough or stringy meat including melo, sausage, bratwurst, jerky, corned beef  Other protein foods  OK: Tofu, baked beans, smooth peanut butter or other nut or seed butters  Avoid: Deep-fried tofu; crunchy peanut or other nut or seed butters; nuts or seeds that are whole or chopped  Soups  OK: All soups, but they may need to be thickened. Thin liquid may be too hard to swallow.  Avoid: Soups made with stringy meat pieces or chunky vegetables  Vegetables  OK: Peeled and well-cooked potatoes or sweet potatoes; fresh, cooked, canned, or frozen vegetables without seeds, skin, or coarse fiber  Avoid: Raw vegetables, deep-fried vegetables (such as tempura), and corn  Date Last Reviewed: 8/1/2016 © 2000-2017 [employer]. [address] [place] [address]. All rights reserved. This information is not intended as a substitute for professional medical care. Always follow your healthcare professional's instructions.      GERD (Adult)    The esophagus is a tube that carries food from the mouth to the stomach. A valve at the lower end of the esophagus prevents stomach acid from flowing upward. When this valve doesn't work properly, stomach contents may repeatedly flow back up (reflux) into the esophagus. This is called gastroesophageal reflux disease (GERD). GERD can irritate the esophagus. It can cause problems with swallowing or breathing. In severe cases, GERD can cause recurrent pneumonia or other serious problems.  Symptoms of reflux include burning, pressure or sharp pain in the upper abdomen or mid to lower chest. The pain can spread to the neck, back, or shoulder. There may be belching, an acid taste in the back of the throat, chronic cough,  "or sore throat or hoarseness. GERD symptoms often occur during the day after a big meal. They can also occur at night when lying down.   Home care  Lifestyle changes can help reduce symptoms. If needed, medicines may be prescribed. Symptoms often improve with treatment, but if treatment is stopped, the symptoms often return after a few months. So most persons with GERD will need to continue treatment.  Lifestyle changes  · Limit or avoid fatty, fried, and spicy foods, as well as coffee, chocolate, mint, and foods with high acid content such as tomatoes and citrus fruit and juices (orange, grapefruit, lemon).  · Dont eat large meals, especially at night. Frequent, smaller meals are best. Do not lie down right after eating. And dont eat anything 3 hours before going to bed.  · Avoid drinking alcohol and smoking. As much as possible, stay away from second hand smoke.  · If you are overweight, losing weight will reduce symptoms.   · Avoid wearing tight clothing around your stomach area.  · If your symptoms occur during sleep, use a foam wedge to elevate your upper body (not just your head.) Or, place 4" blocks under the head of your bed.  Medicines  If needed, medicines can help relieve the symptoms of GERD and prevent damage to the esophagus. Discuss a medicine plan with your healthcare provider. This may include one or more of the following medicines:  · Antacids to help neutralize the normal acids in your stomach.  · Acid blockers (H2 blockers) to decrease acid production.  · Acid inhibitors (PPIs) to decrease acid production in a different way than the blockers. They may work better, but can take a little longer to take effect.  Take an antacid 30-60 minutes after eating and at bedtime, but not at the same time as an acid blocker.  Try not to take medicines such as ibuprofen and aspirin. If you are taking aspirin for your heart or other medical reasons, talk to your healthcare provider about stopping " it.  Follow-up care  Follow up with your healthcare provider or as advised by our staff.  When to seek medical advice  Call your healthcare provider if any of the following occur:  · Stomach pain gets worse or moves to the lower right abdomen (appendix area)  · Chest pain appears or gets worse, or spreads to the back, neck, shoulder, or arm  · Frequent vomiting (cant keep down liquids)  · Blood in the stool or vomit (red or black in color)  · Feeling weak or dizzy  · Fever of 100.4ºF (38ºC) or higher, or as directed by your healthcare provider  Date Last Reviewed: 6/23/2015  © 7287-3100 [employer]. [address] [place] [address], [place] [address]. All rights reserved. This information is not intended as a substitute for professional medical care. Always follow your healthcare professional's instructions.

## 2020-06-30 ENCOUNTER — PATIENT OUTREACH (OUTPATIENT)
Dept: ADMINISTRATIVE | Facility: OTHER | Age: 85
End: 2020-06-30

## 2020-06-30 NOTE — PROGRESS NOTES
Requested updates within Care Everywhere.  Patient's chart was reviewed for overdue MIAH topics.  Immunizations reconciled.

## 2020-07-02 ENCOUNTER — OFFICE VISIT (OUTPATIENT)
Dept: DERMATOLOGY | Facility: CLINIC | Age: 85
End: 2020-07-02
Payer: MEDICARE

## 2020-07-02 VITALS — RESPIRATION RATE: 18 BRPM | BODY MASS INDEX: 31.78 KG/M2 | HEIGHT: 65 IN

## 2020-07-02 DIAGNOSIS — L82.0 INFLAMED SEBORRHEIC KERATOSIS: ICD-10-CM

## 2020-07-02 DIAGNOSIS — L81.4 LENTIGINES: ICD-10-CM

## 2020-07-02 DIAGNOSIS — L82.1 SEBORRHEIC KERATOSES: ICD-10-CM

## 2020-07-02 DIAGNOSIS — D18.01 ANGIOMA OF SKIN: ICD-10-CM

## 2020-07-02 DIAGNOSIS — L57.0 AK (ACTINIC KERATOSIS): Primary | ICD-10-CM

## 2020-07-02 DIAGNOSIS — Z85.828 HISTORY OF NONMELANOMA SKIN CANCER: ICD-10-CM

## 2020-07-02 PROCEDURE — 1126F PR PAIN SEVERITY QUANTIFIED, NO PAIN PRESENT: ICD-10-PCS | Mod: HCNC,S$GLB,, | Performed by: DERMATOLOGY

## 2020-07-02 PROCEDURE — 99999 PR PBB SHADOW E&M-EST. PATIENT-LVL II: CPT | Mod: PBBFAC,HCNC,, | Performed by: DERMATOLOGY

## 2020-07-02 PROCEDURE — 17110 DESTRUCTION B9 LES UP TO 14: CPT | Mod: HCNC,S$GLB,, | Performed by: DERMATOLOGY

## 2020-07-02 PROCEDURE — 17110 PR DESTRUCTION BENIGN LESIONS UP TO 14: ICD-10-PCS | Mod: HCNC,S$GLB,, | Performed by: DERMATOLOGY

## 2020-07-02 PROCEDURE — 17003 DESTRUCT PREMALG LES 2-14: CPT | Mod: HCNC,59,S$GLB, | Performed by: DERMATOLOGY

## 2020-07-02 PROCEDURE — 1101F PT FALLS ASSESS-DOCD LE1/YR: CPT | Mod: HCNC,CPTII,S$GLB, | Performed by: DERMATOLOGY

## 2020-07-02 PROCEDURE — 17003 DESTRUCTION, PREMALIGNANT LESIONS; SECOND THROUGH 14 LESIONS: ICD-10-PCS | Mod: HCNC,59,S$GLB, | Performed by: DERMATOLOGY

## 2020-07-02 PROCEDURE — 1159F PR MEDICATION LIST DOCUMENTED IN MEDICAL RECORD: ICD-10-PCS | Mod: HCNC,S$GLB,, | Performed by: DERMATOLOGY

## 2020-07-02 PROCEDURE — 1101F PR PT FALLS ASSESS DOC 0-1 FALLS W/OUT INJ PAST YR: ICD-10-PCS | Mod: HCNC,CPTII,S$GLB, | Performed by: DERMATOLOGY

## 2020-07-02 PROCEDURE — 99213 OFFICE O/P EST LOW 20 MIN: CPT | Mod: 25,HCNC,S$GLB, | Performed by: DERMATOLOGY

## 2020-07-02 PROCEDURE — 17000 PR DESTRUCTION(LASER SURGERY,CRYOSURGERY,CHEMOSURGERY),PREMALIGNANT LESIONS,FIRST LESION: ICD-10-PCS | Mod: HCNC,59,S$GLB, | Performed by: DERMATOLOGY

## 2020-07-02 PROCEDURE — 1126F AMNT PAIN NOTED NONE PRSNT: CPT | Mod: HCNC,S$GLB,, | Performed by: DERMATOLOGY

## 2020-07-02 PROCEDURE — 1159F MED LIST DOCD IN RCRD: CPT | Mod: HCNC,S$GLB,, | Performed by: DERMATOLOGY

## 2020-07-02 PROCEDURE — 99213 PR OFFICE/OUTPT VISIT, EST, LEVL III, 20-29 MIN: ICD-10-PCS | Mod: 25,HCNC,S$GLB, | Performed by: DERMATOLOGY

## 2020-07-02 PROCEDURE — 17000 DESTRUCT PREMALG LESION: CPT | Mod: HCNC,59,S$GLB, | Performed by: DERMATOLOGY

## 2020-07-02 PROCEDURE — 99999 PR PBB SHADOW E&M-EST. PATIENT-LVL II: ICD-10-PCS | Mod: PBBFAC,HCNC,, | Performed by: DERMATOLOGY

## 2020-07-02 NOTE — PROGRESS NOTES
Subjective:       Patient ID:  Holli Landrum is a 87 y.o. female who presents for   Chief Complaint   Patient presents with    Lesion     LOV 10/16/17 with LT  C/o lesions to L forehead and L temple for unknown period of time. States lesions have become very itchy and tender. Not treating.   C/o lesion to R breast x years. States lesion has gotten larger. Denies change in size or spontaneous bleeding. Not treating.    Also has lesions to bilat neck she would like examined    Phx left cheek- superfically invasive SCC- Mohs - Dr De La Garza 4-4-17   Phx BCC right alar groove  Phx SCC central forehead  Phx BCC right forehead  Phx BCC glabella   BCC medial cheek s/p Mohs done by Dr. De La Garza 7-8-2016  SCCIS, left brow s/p Mohs done by Dr. De La Garza  History blood clot on Plavix      Past Medical History:  No date: Anticoagulant long-term use  No date: Arthritis  No date: Asthma  No date: Cancer      Comment:  skin cancer to face  No date: Cataract      Comment:  OU done//  No date: CHF (congestive heart failure)  No date: COPD (chronic obstructive pulmonary disease)      Comment:  no oxygen; patient denies  5/5/2010: Essential hypertension  11/20/2012: GERD (gastroesophageal reflux disease)  No date: Glaucoma  02/05/2013: Hypertensive heart disease with heart failure  No date: Paroxysmal ventricular tachycardia      Comment:  per problem list        Review of Systems   Constitutional: Negative for fever and chills.   HENT: Negative for sore throat.    Respiratory: Negative for cough.    Gastrointestinal: Negative for nausea and vomiting.   Skin: Negative for itching, rash and dry skin.        Objective:    Physical Exam   Constitutional: She appears well-developed and well-nourished. No distress.   Neurological: She is alert and oriented to person, place, and time. She is not disoriented.   Psychiatric: She has a normal mood and affect.   Skin:   Areas Examined (abnormalities noted in diagram):   Head / Face  Inspection Performed  Neck Inspection Performed  Chest / Axilla Inspection Performed  Abdomen Inspection Performed  Back Inspection Performed                   Diagram Legend     Erythematous scaling macule/papule c/w actinic keratosis       Vascular papule c/w angioma      Pigmented verrucoid papule/plaque c/w seborrheic keratosis      Yellow umbilicated papule c/w sebaceous hyperplasia      Irregularly shaped tan macule c/w lentigo     1-2 mm smooth white papules consistent with Milia      Movable subcutaneous cyst with punctum c/w epidermal inclusion cyst      Subcutaneous movable cyst c/w pilar cyst      Firm pink to brown papule c/w dermatofibroma      Pedunculated fleshy papule(s) c/w skin tag(s)      Evenly pigmented macule c/w junctional nevus     Mildly variegated pigmented, slightly irregular-bordered macule c/w mildly atypical nevus      Flesh colored to evenly pigmented papule c/w intradermal nevus       Pink pearly papule/plaque c/w basal cell carcinoma      Erythematous hyperkeratotic cursted plaque c/w SCC      Surgical scar with no sign of skin cancer recurrence      Open and closed comedones      Inflammatory papules and pustules      Verrucoid papule consistent consistent with wart     Erythematous eczematous patches and plaques     Dystrophic onycholytic nail with subungual debris c/w onychomycosis     Umbilicated papule    Erythematous-base heme-crusted tan verrucoid plaque consistent with inflamed seborrheic keratosis     Erythematous Silvery Scaling Plaque c/w Psoriasis     See annotation      Assessment / Plan:        AK (actinic keratosis)  Premalignant nature discussed     Cryosurgery Procedure Note    Verbal consent from the patient is obtained including, but not limited to, risk of hypopigmentation/hyperpigmentation, scar, recurrence of lesion. The patient is aware of the precancerous quality and need for treatment of these lesions. Liquid nitrogen cryosurgery is applied to the 10 actinic  keratoses, as detailed in the physical exam, to produce a freeze injury. The patient is aware that blisters may form and is instructed on wound care with gentle cleansing and use of vaseline ointment to keep moist until healed. The patient is supplied a handout on cryosurgery and is instructed to call if lesions do not completely resolve.      Seborrheic keratoses  These are benign inherited growths without a malignant potential. Reassurance given to patient. No treatment is necessary.       Lentigines  This is a benign hyperpigmented sun induced lesion. Daily sun protection will reduce the number of new lesions. Treatment of these benign lesions are considered cosmetic.      Angioma of skin  This is a benign vascular lesion. Reassurance given. No treatment required.     Inflamed seborrheic keratosis  - Cryotherapy today: 1 benign lesion(s) treated.  - Cryotherapy was recommended for treatment today which patient was agreeable to. The risks, benefits, indications, alternatives, and complications were discussed, and informed consent was obtained. Specifically, the risks of permanent scar, loss or darkening of skin color, blister and recurrence of lesion were discussed. The patient tolerated the procedure well without complications. Wound care instructions were given.      History of nonmelanoma skin cancer  Area(s) of previous NMSC evaluated with no signs of recurrence.    Upper body skin examination performed today including at least 6 points as noted in physical examination. No lesions suspicious for malignancy noted.               Follow up in about 1 year (around 7/2/2021) for skin check.

## 2020-07-06 PROCEDURE — G0179 PR HOME HEALTH MD RECERTIFICATION: ICD-10-PCS | Mod: ,,, | Performed by: FAMILY MEDICINE

## 2020-07-06 PROCEDURE — G0179 MD RECERTIFICATION HHA PT: HCPCS | Mod: ,,, | Performed by: FAMILY MEDICINE

## 2020-07-10 ENCOUNTER — HOSPITAL ENCOUNTER (OUTPATIENT)
Dept: RADIOLOGY | Facility: HOSPITAL | Age: 85
Discharge: HOME | End: 2020-07-10
Attending: NURSE PRACTITIONER
Payer: MEDICARE

## 2020-07-10 DIAGNOSIS — R13.12 OROPHARYNGEAL DYSPHAGIA: ICD-10-CM

## 2020-07-10 DIAGNOSIS — K21.9 GASTROESOPHAGEAL REFLUX DISEASE, ESOPHAGITIS PRESENCE NOT SPECIFIED: ICD-10-CM

## 2020-07-10 PROCEDURE — 25500020 PHARM REV CODE 255: Mod: HCNC,PO | Performed by: NURSE PRACTITIONER

## 2020-07-10 PROCEDURE — A9698 NON-RAD CONTRAST MATERIALNOC: HCPCS | Mod: HCNC,PO | Performed by: NURSE PRACTITIONER

## 2020-07-10 PROCEDURE — 74240 X-RAY XM UPR GI TRC 1CNTRST: CPT | Mod: 26,HCNC,, | Performed by: RADIOLOGY

## 2020-07-10 PROCEDURE — 74240 FL UPPER GI TO INCLUDE ESOPHAGRAM: ICD-10-PCS | Mod: 26,HCNC,, | Performed by: RADIOLOGY

## 2020-07-10 PROCEDURE — 74240 X-RAY XM UPR GI TRC 1CNTRST: CPT | Mod: TC,HCNC,FY,PO

## 2020-07-10 RX ADMIN — Medication 140 ML: at 10:07

## 2020-07-10 RX ADMIN — BARIUM SULFATE 340 ML: 0.6 SUSPENSION ORAL at 10:07

## 2020-07-13 ENCOUNTER — TELEPHONE (OUTPATIENT)
Dept: GASTROENTEROLOGY | Facility: CLINIC | Age: 85
End: 2020-07-13

## 2020-07-13 DIAGNOSIS — Z01.812 PRE-PROCEDURE LAB EXAM: ICD-10-CM

## 2020-07-13 NOTE — TELEPHONE ENCOUNTER
Please notify patient her Upper GI showed significant GERD, esophageal dysmotility, and a small hiatal hernia. Recommend an EGD with dilation. Please schedule with Dr. Solorzano to be done at [facility].

## 2020-07-14 ENCOUNTER — EXTERNAL HOME HEALTH (OUTPATIENT)
Dept: HOME HEALTH SERVICES | Facility: HOSPITAL | Age: 85
End: 2020-07-14
Payer: MEDICARE

## 2020-07-17 ENCOUNTER — TELEPHONE (OUTPATIENT)
Dept: GASTROENTEROLOGY | Facility: CLINIC | Age: 85
End: 2020-07-17

## 2020-07-17 NOTE — TELEPHONE ENCOUNTER
Spoke with patient and scheduled her EGD at [facility]. Instructions placed in the mail.

## 2020-07-17 NOTE — TELEPHONE ENCOUNTER
Ms. Landrum had and abnormal upper GI study, and need's to have an EGD with dilation.   She has been scheduled for 8/13/20.    Will she be able to hold her Plavix for 5 day's prior to the procedure?    Thank you

## 2020-07-19 ENCOUNTER — PATIENT OUTREACH (OUTPATIENT)
Dept: ADMINISTRATIVE | Facility: OTHER | Age: 85
End: 2020-07-19

## 2020-07-20 ENCOUNTER — TELEPHONE (OUTPATIENT)
Dept: GASTROENTEROLOGY | Facility: CLINIC | Age: 85
End: 2020-07-20

## 2020-07-20 NOTE — TELEPHONE ENCOUNTER
Pt  Is scheduled for an EGD with dilation (due to abnormal upper GI) on 8/13.   Cardiology says that she cannot hold the Plavix.

## 2020-07-20 NOTE — TELEPHONE ENCOUNTER
----- Message from Sasha Castanon MA sent at 7/20/2020 11:31 AM CDT -----  Regarding: Clearance  Patricia has signed a clearance form that I can scan into her media or fax to you. Patient cannot hold plavix due to recent stent.

## 2020-07-20 NOTE — TELEPHONE ENCOUNTER
If pt still having dysphagia on nexium and tagamet, can do EGD on plavix. If symptoms improving, recommend continue medical therapy and post-pone EGD.

## 2020-07-21 ENCOUNTER — OFFICE VISIT (OUTPATIENT)
Dept: UROLOGY | Facility: CLINIC | Age: 85
End: 2020-07-21
Payer: MEDICARE

## 2020-07-21 VITALS
HEART RATE: 59 BPM | BODY MASS INDEX: 32.32 KG/M2 | SYSTOLIC BLOOD PRESSURE: 122 MMHG | DIASTOLIC BLOOD PRESSURE: 84 MMHG | HEIGHT: 65 IN | WEIGHT: 194 LBS

## 2020-07-21 DIAGNOSIS — N18.30 CHRONIC RENAL INSUFFICIENCY, STAGE 3 (MODERATE): ICD-10-CM

## 2020-07-21 DIAGNOSIS — N39.0 RECURRENT UTI: Primary | ICD-10-CM

## 2020-07-21 DIAGNOSIS — N31.9 NEUROGENIC BLADDER: ICD-10-CM

## 2020-07-21 LAB
BILIRUB SERPL-MCNC: ABNORMAL MG/DL
BLOOD URINE, POC: ABNORMAL
CLARITY, POC UA: CLEAR
COLOR, POC UA: YELLOW
GLUCOSE UR QL STRIP: ABNORMAL
KETONES UR QL STRIP: ABNORMAL
LEUKOCYTE ESTERASE URINE, POC: ABNORMAL
NITRITE, POC UA: POSITIVE
PH, POC UA: 6
PROTEIN, POC: ABNORMAL
SPECIFIC GRAVITY, POC UA: 1.02
UROBILINOGEN, POC UA: 0.2

## 2020-07-21 PROCEDURE — 1101F PT FALLS ASSESS-DOCD LE1/YR: CPT | Mod: HCNC,CPTII,S$GLB, | Performed by: UROLOGY

## 2020-07-21 PROCEDURE — 99999 PR PBB SHADOW E&M-EST. PATIENT-LVL III: CPT | Mod: PBBFAC,HCNC,, | Performed by: UROLOGY

## 2020-07-21 PROCEDURE — 1159F MED LIST DOCD IN RCRD: CPT | Mod: HCNC,S$GLB,, | Performed by: UROLOGY

## 2020-07-21 PROCEDURE — 99999 PR PBB SHADOW E&M-EST. PATIENT-LVL III: ICD-10-PCS | Mod: PBBFAC,HCNC,, | Performed by: UROLOGY

## 2020-07-21 PROCEDURE — 1126F PR PAIN SEVERITY QUANTIFIED, NO PAIN PRESENT: ICD-10-PCS | Mod: HCNC,S$GLB,, | Performed by: UROLOGY

## 2020-07-21 PROCEDURE — 1101F PR PT FALLS ASSESS DOC 0-1 FALLS W/OUT INJ PAST YR: ICD-10-PCS | Mod: HCNC,CPTII,S$GLB, | Performed by: UROLOGY

## 2020-07-21 PROCEDURE — 1159F PR MEDICATION LIST DOCUMENTED IN MEDICAL RECORD: ICD-10-PCS | Mod: HCNC,S$GLB,, | Performed by: UROLOGY

## 2020-07-21 PROCEDURE — 99214 PR OFFICE/OUTPT VISIT, EST, LEVL IV, 30-39 MIN: ICD-10-PCS | Mod: HCNC,25,S$GLB, | Performed by: UROLOGY

## 2020-07-21 PROCEDURE — 1126F AMNT PAIN NOTED NONE PRSNT: CPT | Mod: HCNC,S$GLB,, | Performed by: UROLOGY

## 2020-07-21 PROCEDURE — 99214 OFFICE O/P EST MOD 30 MIN: CPT | Mod: HCNC,25,S$GLB, | Performed by: UROLOGY

## 2020-07-21 PROCEDURE — 81002 POCT URINE DIPSTICK WITHOUT MICROSCOPE: ICD-10-PCS | Mod: HCNC,S$GLB,, | Performed by: UROLOGY

## 2020-07-21 PROCEDURE — 81002 URINALYSIS NONAUTO W/O SCOPE: CPT | Mod: HCNC,S$GLB,, | Performed by: UROLOGY

## 2020-07-21 NOTE — PROGRESS NOTES
UROLOGY NIKIA  7 21 20    Cc discuss catheter    Age 87, comes in to discuss catheter. Says has been having french catheter since jan 2020, and she has tolerated it well. A nurse who is assisting her observed that he would prefer if the balloon size of the catheter were a bit larger, as the 10 cc balloon appears to be at risk of falling out.     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 We had discussed whether surgical removal of the diverticulum might help her situation, as she had been told that the diverticulum could retain urine. She was referred to dr hakeem herrera in case he could perform a robotic excision of the bladder diverticulum. Dr herrera took her to the OR at [facility] on 2 12 20. He performed cystoscopy and bilateral retrograde pyelography and evaluated the emptying of the kidneys into the bladder and the emptying of the diverticulum into the bladder. Conclusion was that the diverticulum was emptying well, and therefore there was no basis on which to recommend surgical excision in this elderly patient.      Pt has been well since then, and has no complaints. She was having some bladder spasms at around that time, and since then she has been put on ditropan 5 mg bid and she feels much better.      In addition to that, she was placed on keflex 250 mg daily as a uti prevention and seems to be working well.      IMP  Recurrent uti; doing well on prophylactic antibiotics  Neurogenic bladder, needing french drainage  Chronic renal insufficiency, cr 1.75, eGFR 26  Bladder diverticulum: draining well, so no need to do surgical excision  Bladder spasms, can stay on ditropan 5 mg bid  RTC 6 mo        Counseled 35 min  Over 50% of time in counseling

## 2020-07-23 ENCOUNTER — TELEPHONE (OUTPATIENT)
Dept: UROLOGY | Facility: CLINIC | Age: 85
End: 2020-07-23

## 2020-07-23 NOTE — TELEPHONE ENCOUNTER
----- Message from Shahida Dallas sent at 7/23/2020 12:50 PM CDT -----  Regarding: advice  Contact: Lisa with [facility]  Type: Needs Medical Advice  Who Called:  Lisa with Home Health  Symptoms (please be specific):    How long has patient had these symptoms:    Pharmacy name and phone #:    Best Call Back Number: [phone]  Additional Information: Lisa states the larger catheter she was not able to insert. She put in the same size she had before. SHe wanted to let the doctor the same size catheter she had with a larger balloon. She will place it next week when she gets it. Please call Lisa if any questions. Thanks!

## 2020-07-24 ENCOUNTER — DOCUMENT SCAN (OUTPATIENT)
Dept: HOME HEALTH SERVICES | Facility: HOSPITAL | Age: 85
End: 2020-07-24
Payer: MEDICARE

## 2020-07-28 ENCOUNTER — TELEPHONE (OUTPATIENT)
Dept: FAMILY MEDICINE | Facility: CLINIC | Age: 85
End: 2020-07-28

## 2020-07-28 NOTE — TELEPHONE ENCOUNTER
Spoke with pt, she states she has been battling runny nose and sore throat for last couple days. Daughter looked in her throat and noticed white patches. Denies fever, SOB, body aches, nausea or diarrhea. Appt scheduled at her convenience.

## 2020-07-28 NOTE — TELEPHONE ENCOUNTER
----- Message from Shubham Mcmahon sent at 7/28/2020  3:05 PM CDT -----  Type: Needs Medical Advice  Who Called:  Patient  Symptoms (please be specific):  Sore throat, cough, runny nose-No fever  How long has patient had these symptoms:  5-6 days    Best Call Back Number: [phone]  Additional Information: Patient states that she had left a message earlier today for an appointment for Wednesday, 07/29.    Please call to advise

## 2020-07-28 NOTE — TELEPHONE ENCOUNTER
----- Message from Mikey NARANJO Frisard sent at 7/28/2020 12:24 PM CDT -----  Contact: patient  Patient called in and stated she has a bad cough & sore throat & feels like she has some type of infection because she is coughing up mucus.  Patient refused virtual.    Patient wanted to see if should could be seen tomorrow Wednesday 7/29/20, anytime.      Patient call back number is [phone]

## 2020-07-29 ENCOUNTER — OFFICE VISIT (OUTPATIENT)
Dept: FAMILY MEDICINE | Facility: CLINIC | Age: 85
End: 2020-07-29
Payer: MEDICARE

## 2020-07-29 DIAGNOSIS — B96.89 ACUTE BACTERIAL BRONCHITIS: ICD-10-CM

## 2020-07-29 DIAGNOSIS — B96.89 ACUTE BACTERIAL SINUSITIS: ICD-10-CM

## 2020-07-29 DIAGNOSIS — B96.89 ACUTE BACTERIAL PHARYNGITIS: Primary | ICD-10-CM

## 2020-07-29 DIAGNOSIS — R51.9 ACUTE NONINTRACTABLE HEADACHE, UNSPECIFIED HEADACHE TYPE: ICD-10-CM

## 2020-07-29 DIAGNOSIS — R05.9 COUGH IN ADULT: ICD-10-CM

## 2020-07-29 DIAGNOSIS — J20.8 ACUTE BACTERIAL BRONCHITIS: ICD-10-CM

## 2020-07-29 DIAGNOSIS — J30.2 SEASONAL ALLERGIES: ICD-10-CM

## 2020-07-29 DIAGNOSIS — J02.8 ACUTE BACTERIAL PHARYNGITIS: Primary | ICD-10-CM

## 2020-07-29 DIAGNOSIS — J01.90 ACUTE BACTERIAL SINUSITIS: ICD-10-CM

## 2020-07-29 DIAGNOSIS — R09.82 POSTNASAL DRIP: ICD-10-CM

## 2020-07-29 PROCEDURE — 99214 OFFICE O/P EST MOD 30 MIN: CPT | Mod: HCNC,95,, | Performed by: INTERNAL MEDICINE

## 2020-07-29 PROCEDURE — 1159F PR MEDICATION LIST DOCUMENTED IN MEDICAL RECORD: ICD-10-PCS | Mod: HCNC,95,, | Performed by: INTERNAL MEDICINE

## 2020-07-29 PROCEDURE — 99214 PR OFFICE/OUTPT VISIT, EST, LEVL IV, 30-39 MIN: ICD-10-PCS | Mod: HCNC,95,, | Performed by: INTERNAL MEDICINE

## 2020-07-29 PROCEDURE — 1159F MED LIST DOCD IN RCRD: CPT | Mod: HCNC,95,, | Performed by: INTERNAL MEDICINE

## 2020-07-29 PROCEDURE — 1101F PR PT FALLS ASSESS DOC 0-1 FALLS W/OUT INJ PAST YR: ICD-10-PCS | Mod: HCNC,CPTII,95, | Performed by: INTERNAL MEDICINE

## 2020-07-29 PROCEDURE — 1101F PT FALLS ASSESS-DOCD LE1/YR: CPT | Mod: HCNC,CPTII,95, | Performed by: INTERNAL MEDICINE

## 2020-07-29 RX ORDER — CEPHALEXIN 500 MG/1
500 CAPSULE ORAL EVERY 12 HOURS
Qty: 14 CAPSULE | Refills: 0 | Status: SHIPPED | OUTPATIENT
Start: 2020-07-29 | End: 2020-08-31

## 2020-07-29 NOTE — PATIENT INSTRUCTIONS
Acute bacterial pharyngitis:  Warm saltwater gargle as needed.   -     COVID-19 Routine Screening; Future; Expected date: 07/29/2020  -     cephALEXin (KEFLEX) 500 MG capsule; Take 1 capsule (500 mg total) by mouth every 12 (twelve) hours.  Dispense: 14 capsule; Refill: 0    Acute bacterial sinusitis:  Claritin 10 mg once a day over-the-counter as needed for congestion and Flonase 1 spray twice a day also nasally as needed for congestion  -     COVID-19 Routine Screening; Future; Expected date: 07/29/2020  -     cephALEXin (KEFLEX) 500 MG capsule; Take 1 capsule (500 mg total) by mouth every 12 (twelve) hours.  Dispense: 14 capsule; Refill: 0    Acute bacterial bronchitis:  Mucinex DM as needed for cough and congestion.  She can also use her Tessalon Perles that she has at home as well for cough.  -     COVID-19 Routine Screening; Future; Expected date: 07/29/2020  -     cephALEXin (KEFLEX) 500 MG capsule; Take 1 capsule (500 mg total) by mouth every 12 (twelve) hours.  Dispense: 14 capsule; Refill: 0    Cough in adult:  Mucinex DM and Tessalon Perles can both be used for cough; her Mucinex DM for cough with congestion.  -     COVID-19 Routine Screening; Future; Expected date: 07/29/2020    Postnasal drip:  Use her Claritin and Flonase as needed for postnasal drip.  -     COVID-19 Routine Screening; Future; Expected date: 07/29/2020    Acute nonintractable headache, unspecified headache type:  Can use Tylenol over-the-counter as needed for pain.  And as long she has a headache with use Mucinex DM as well to thin in a mucous and help it drain.  -     COVID-19 Routine Screening; Future; Expected date: 07/29/2020      Seasonal allergies:  Warm saltwater gargle as needed for sore throat or postnasal drip.  Can also use Simply Saline before she uses her Flonase for irrigation nasally twice a day.  Claritin 10 mg a day over-the-counter as needed for congestion.  Flonase 1 spray twice a day as needed for congestion

## 2020-07-29 NOTE — PROGRESS NOTES
Subjective:      The patient location is:  home  The chief complaint leading to consultation is:  Cough and sore throat.  Visit type: audiovisual    Face to Face time with patient:  10:25 a.m. till 11:03 a.m..  48 minutes of total time spent on the encounter, which includes face to face time and non-face to face time preparing to see the patient (eg, review of tests), Obtaining and/or reviewing separately obtained history, Documenting clinical information in the electronic or other health record, Independently interpreting results (not separately reported) and communicating results to the patient/family/caregiver, or Care coordination (not separately reported).     Each patient to whom he or she provides medical services by telemedicine is:  (1) informed of the relationship between the physician and patient and the respective role of any other health care provider with respect to management of the patient; and (2) notified that he or she may decline to receive medical services by telemedicine and may withdraw from such care at any time.    Notes:        Patient ID: Holli Landrum is a 87 y.o. female.    Chief Complaint: No chief complaint on file.    HPI Seeing patient for her PCP Dr. Crespo; assessment of cough and sore throat.  Patient reportedly with a raspy voice but no fever or chills.  Only exposure to medical facility has been to Dr. Tran and Dr. Bryant's clinics.  Wednesday night and Thursday night started with postnasal drip since doing stuff outside recently.  Runny nose and cough; she does have a history of allergies and has been out in the wind lately.  Sunday night and Monday she noticed a sore throat.  She does have shortness of breath but it is due to her cardiac condition which has been stable.  She has been coughing up white phlegm last 2 days.  She had headache this morning but does not have any fatigue.  She is allergic to sulfur and Cipro.    Review of Systems   Constitutional: Negative for fever  and unexpected weight change.   HENT: Positive for congestion, sneezing and sore throat. Negative for ear pain, postnasal drip and rhinorrhea.    Respiratory: Positive for shortness of breath. Negative for cough, chest tightness and wheezing.         Chronic yet stable due to her cardiac condition   Cardiovascular: Negative for chest pain, palpitations and leg swelling.   Gastrointestinal: Negative for abdominal pain, blood in stool, constipation, diarrhea, nausea and vomiting.   Genitourinary: Negative for dysuria and hematuria.   Musculoskeletal: Negative for arthralgias and myalgias.   Skin: Negative for rash.   Neurological: Negative for tremors and weakness.   Psychiatric/Behavioral: Negative for dysphoric mood. The patient is not nervous/anxious.        Objective:      There were no vitals filed for this visit.  There is no height or weight on file to calculate BMI.  Wt Readings from Last 3 Encounters:   07/21/20 88 kg (194 lb 0.1 oz)   07/15/20 88 kg (194 lb)   06/26/20 86.6 kg (191 lb)        Physical Exam  Constitutional:       General: She is not in acute distress.     Appearance: She is well-developed.      Comments: Alert and responsive answers questions appropriately and with good thought content   HENT:      Head: Normocephalic and atraumatic.      Mouth/Throat:      Comments: Patient notes white spots on her throat; not mentioned about tongue  Neck:      Musculoskeletal: Normal range of motion.   Pulmonary:      Effort: Pulmonary effort is normal. No respiratory distress.      Comments: No signs of respiratory distress; breathing comfortably with speech  Neurological:      Mental Status: She is alert and oriented to person, place, and time.   Psychiatric:         Behavior: Behavior normal.         Thought Content: Thought content normal.         Judgment: Judgment normal.         Assessment:       1. Acute bacterial pharyngitis    2. Acute bacterial sinusitis    3. Acute bacterial bronchitis    4.  Cough in adult    5. Postnasal drip    6. Acute nonintractable headache, unspecified headache type        Plan:       Acute bacterial pharyngitis:  Warm saltwater gargle as needed.  Chloraseptic spray can also be used for sore throat.  -     COVID-19 Routine Screening; Future; Expected date: 07/29/2020  -     cephALEXin (KEFLEX) 500 MG capsule; Take 1 capsule (500 mg total) by mouth every 12 (twelve) hours.  Dispense: 14 capsule; Refill: 0    Acute bacterial sinusitis:  Warm saltwater gargle as needed; Claritin 10 mg once a day over-the-counter as needed for congestion and Flonase 1 spray twice a day also nasally as needed for congestion; can also use Simply Saline for saline nasal irrigation prior to Flonase use  -     COVID-19 Routine Screening; Future; Expected date: 07/29/2020  -     cephALEXin (KEFLEX) 500 MG capsule; Take 1 capsule (500 mg total) by mouth every 12 (twelve) hours.  Dispense: 14 capsule; Refill: 0    Acute bacterial bronchitis:  Mucinex DM as needed for cough and congestion.  She can also use her Tessalon Perles that she has at home as well for cough.  -     COVID-19 Routine Screening; Future; Expected date: 07/29/2020  -     cephALEXin (KEFLEX) 500 MG capsule; Take 1 capsule (500 mg total) by mouth every 12 (twelve) hours.  Dispense: 14 capsule; Refill: 0    Cough in adult:  Mucinex DM and Tessalon Perles can both be used for cough; her Mucinex DM for cough with congestion.  -     COVID-19 Routine Screening; Future; Expected date: 07/29/2020    Postnasal drip:  Use her Claritin and Flonase as needed for postnasal drip.  -     COVID-19 Routine Screening; Future; Expected date: 07/29/2020    Acute nonintractable headache, unspecified headache type:  Can use Tylenol over-the-counter as needed for pain.  And as long she has a headache can use Mucinex DM as well to thin her mucous and help it drain.  -     COVID-19 Routine Screening; Future; Expected date: 07/29/2020    Seasonal allergies:  Warm  saltwater gargle as needed for sore throat or postnasal drip.  Can also use Simply Saline before she uses her Flonase for irrigation nasally twice a day.  Claritin 10 mg a day over-the-counter as needed for congestion.  Flonase 1 spray twice a day as needed for congestion

## 2020-08-08 ENCOUNTER — TELEPHONE (OUTPATIENT)
Dept: FAMILY MEDICINE | Facility: CLINIC | Age: 85
End: 2020-08-08

## 2020-08-09 NOTE — TELEPHONE ENCOUNTER
Please ensure patient has a follow-up appointment with Dr. Crespo in the next 7-10 days.  It could be a virtual visit if patient desires for follow-up; please also remind her her after visit summary is available for viewing in her portal

## 2020-08-10 ENCOUNTER — LAB VISIT (OUTPATIENT)
Dept: FAMILY MEDICINE | Facility: CLINIC | Age: 85
End: 2020-08-10
Payer: MEDICARE

## 2020-08-10 DIAGNOSIS — Z01.812 PRE-PROCEDURE LAB EXAM: ICD-10-CM

## 2020-08-10 PROCEDURE — U0003 INFECTIOUS AGENT DETECTION BY NUCLEIC ACID (DNA OR RNA); SEVERE ACUTE RESPIRATORY SYNDROME CORONAVIRUS 2 (SARS-COV-2) (CORONAVIRUS DISEASE [COVID-19]), AMPLIFIED PROBE TECHNIQUE, MAKING USE OF HIGH THROUGHPUT TECHNOLOGIES AS DESCRIBED BY CMS-2020-01-R: HCPCS | Mod: HCNC

## 2020-08-11 LAB — SARS-COV-2 RNA RESP QL NAA+PROBE: NOT DETECTED

## 2020-08-13 PROBLEM — R13.10 DYSPHAGIA: Status: ACTIVE | Noted: 2020-08-13

## 2020-08-31 ENCOUNTER — OFFICE VISIT (OUTPATIENT)
Dept: FAMILY MEDICINE | Facility: CLINIC | Age: 85
End: 2020-08-31
Payer: MEDICARE

## 2020-08-31 VITALS
SYSTOLIC BLOOD PRESSURE: 110 MMHG | HEIGHT: 65 IN | TEMPERATURE: 98 F | BODY MASS INDEX: 32.4 KG/M2 | DIASTOLIC BLOOD PRESSURE: 68 MMHG | WEIGHT: 194.44 LBS | HEART RATE: 60 BPM

## 2020-08-31 DIAGNOSIS — Z23 IMMUNIZATION DUE: ICD-10-CM

## 2020-08-31 DIAGNOSIS — Z97.8 FOLEY CATHETER IN PLACE: ICD-10-CM

## 2020-08-31 DIAGNOSIS — R13.10 DYSPHAGIA, UNSPECIFIED TYPE: Primary | ICD-10-CM

## 2020-08-31 DIAGNOSIS — E78.5 HYPERLIPIDEMIA, UNSPECIFIED HYPERLIPIDEMIA TYPE: ICD-10-CM

## 2020-08-31 DIAGNOSIS — M10.9 GOUT, UNSPECIFIED CAUSE, UNSPECIFIED CHRONICITY, UNSPECIFIED SITE: ICD-10-CM

## 2020-08-31 DIAGNOSIS — I10 ESSENTIAL HYPERTENSION: ICD-10-CM

## 2020-08-31 DIAGNOSIS — E83.52 SERUM CALCIUM ELEVATED: ICD-10-CM

## 2020-08-31 DIAGNOSIS — N39.0 RECURRENT UTI (URINARY TRACT INFECTION): ICD-10-CM

## 2020-08-31 PROCEDURE — 99213 OFFICE O/P EST LOW 20 MIN: CPT | Mod: HCNC,25,S$GLB, | Performed by: FAMILY MEDICINE

## 2020-08-31 PROCEDURE — 99213 PR OFFICE/OUTPT VISIT, EST, LEVL III, 20-29 MIN: ICD-10-PCS | Mod: HCNC,25,S$GLB, | Performed by: FAMILY MEDICINE

## 2020-08-31 PROCEDURE — 90732 PPSV23 VACC 2 YRS+ SUBQ/IM: CPT | Mod: HCNC,S$GLB,, | Performed by: FAMILY MEDICINE

## 2020-08-31 PROCEDURE — 99999 PR PBB SHADOW E&M-EST. PATIENT-LVL V: CPT | Mod: PBBFAC,HCNC,, | Performed by: FAMILY MEDICINE

## 2020-08-31 PROCEDURE — G0009 ADMIN PNEUMOCOCCAL VACCINE: HCPCS | Mod: HCNC,S$GLB,, | Performed by: FAMILY MEDICINE

## 2020-08-31 PROCEDURE — 87186 SC STD MICRODIL/AGAR DIL: CPT | Mod: HCNC

## 2020-08-31 PROCEDURE — 90732 PNEUMOCOCCAL POLYSACCHARIDE VACCINE 23-VALENT =>2YO SQ IM: ICD-10-PCS | Mod: HCNC,S$GLB,, | Performed by: FAMILY MEDICINE

## 2020-08-31 PROCEDURE — 87088 URINE BACTERIA CULTURE: CPT | Mod: HCNC

## 2020-08-31 PROCEDURE — 87086 URINE CULTURE/COLONY COUNT: CPT | Mod: HCNC

## 2020-08-31 PROCEDURE — G0009 PNEUMOCOCCAL POLYSACCHARIDE VACCINE 23-VALENT =>2YO SQ IM: ICD-10-PCS | Mod: HCNC,S$GLB,, | Performed by: FAMILY MEDICINE

## 2020-08-31 PROCEDURE — 87077 CULTURE AEROBIC IDENTIFY: CPT | Mod: HCNC

## 2020-08-31 PROCEDURE — 99999 PR PBB SHADOW E&M-EST. PATIENT-LVL V: ICD-10-PCS | Mod: PBBFAC,HCNC,, | Performed by: FAMILY MEDICINE

## 2020-08-31 RX ORDER — COLCHICINE 0.6 MG/1
0.6 TABLET ORAL DAILY
Qty: 30 TABLET | Refills: 0 | Status: SHIPPED | OUTPATIENT
Start: 2020-08-31 | End: 2022-02-24

## 2020-08-31 NOTE — PROGRESS NOTES
Subjective:       Patient ID: Holli Landrum is a 87 y.o. female.    Chief Complaint: Follow-up (F/u, per Dr medrano. recent EGD) and Toe Pain (pt restarted old rx of allopurinol and colchicine)      Holli Landrum is in the office for f/u and review.    HPI  Since LOV, she restarted allopurinol and colchicine for presumptive gout on the L great toe.  Working on GI issues. Barium study showed both dysmotility and narrowing (addressed in egd). She does feel like swallowing is somewhat improved, but has had 2 episodes of recurrence since.   Has a french in place. Requests cx for cloudy urine.  She used otcs to try and improve her lipid panel in place of crestor. Cardiology/salam has since resumed. Had 3 stents in March.   Uses lasix infrequently for fluid retention. Monitors her weight regularly.   Past Medical History:   Diagnosis Date    Anticoagulant long-term use     Arthritis     Asthma     Basal cell carcinoma     Cancer     skin cancer to face    Cataract     OU done//    CHF (congestive heart failure)     COPD (chronic obstructive pulmonary disease)     no oxygen; patient denies    Essential hypertension 5/5/2010    GERD (gastroesophageal reflux disease) 11/20/2012    Glaucoma     Hypertensive heart disease with heart failure 02/05/2013    Paroxysmal ventricular tachycardia     per problem list    Squamous cell carcinoma of skin          Current Outpatient Medications:     acetaminophen (TYLENOL) 650 MG TbSR, Take 650 mg by mouth as needed. , Disp: , Rfl:     albuterol (PROVENTIL/VENTOLIN HFA) 90 mcg/actuation inhaler, Inhale 1-2 puffs into the lungs every 6 (six) hours as needed for Wheezing. Rescue, Disp: 18 g, Rfl: 5    allopurinoL (ZYLOPRIM) 100 MG tablet, Take 1 tablet (100 mg total) by mouth once daily., Disp: 90 tablet, Rfl: 1    cholecalciferol, vitamin D3, (VITAMIN D3) 5,000 unit Tab, Take 5,000 Units by mouth once daily., Disp: , Rfl:     cimetidine (TAGAMET) 800 MG tablet, Take 1  tablet (800 mg total) by mouth every evening., Disp: 30 tablet, Rfl: 2    clopidogrel (PLAVIX) 75 mg tablet, Take 1 tablet (75 mg total) by mouth once daily., Disp: 90 tablet, Rfl: 3    D-MANNOSE ORAL, Take 2 tablets by mouth once daily. , Disp: , Rfl:     dorzolamide (TRUSOPT) 2 % ophthalmic solution, Place 1 drop into the left eye 2 (two) times daily., Disp: 10 mL, Rfl: 11    esomeprazole (NEXIUM) 40 MG capsule, Take 1 capsule (40 mg total) by mouth before breakfast., Disp: 90 capsule, Rfl: 0    fluticasone (FLONASE) 50 mcg/actuation nasal spray, 2 sprays (100 mcg total) by Each Nare route daily as needed for Allergies., Disp: 16 g, Rfl: 11    fluticasone furoate-vilanteroL (BREO ELLIPTA) 100-25 mcg/dose diskus inhaler, Inhale 1 puff into the lungs once daily., Disp: 90 each, Rfl: 3    furosemide (LASIX) 40 MG tablet, Take 1 tablet (40 mg total) by mouth once daily., Disp: 90 tablet, Rfl: 3    GRAPE SEED EXTRACT ORAL, Take 1 tablet by mouth once daily. , Disp: , Rfl:     Lactobacillus rhamnosus GG (CULTURELLE) 10 billion cell capsule, Take 1 capsule by mouth once daily., Disp: , Rfl:     latanoprost 0.005 % ophthalmic solution, Place 1 drop into both eyes every evening., Disp: 5 mL, Rfl: 6    magnesium oxide (MAG-OX) 400 mg tablet, Take 800 mg by mouth once daily. , Disp: , Rfl:     nitroGLYCERIN 0.4 MG/HR TD PT24 (NITRODUR) 0.4 mg/hr, APPLY 1 PATCH ONTO THE SKIN ONCE DAILY. ALLOW PATCH TO REMAIN ON THE AREA FOR 12 TO 14 HOURS IN A 24 HOUR PERIOD AS DIRECTED (Patient not taking: Reported on 8/31/2020), Disp: 90 patch, Rfl: 0    OLIVE LEAF EXTRACT ORAL, Take 1 tablet by mouth once daily. , Disp: , Rfl:     omega-3 fatty acids/fish oil (FISH OIL-OMEGA-3 FATTY ACIDS) 300-1,000 mg capsule, Take 2 capsules by mouth once daily., Disp: , Rfl:     oxybutynin (DITROPAN) 5 MG Tab, Take 1 tablet (5 mg total) by mouth 2 (two) times daily., Disp: 60 tablet, Rfl: 3    potassium chloride (KLOR-CON) 10 MEQ TbSR,  TAKE 1 TABLET (10 MEQ TOTAL) BY MOUTH 2 (TWO) TIMES DAILY., Disp: 180 tablet, Rfl: 3    rosuvastatin (CRESTOR) 40 MG Tab, Take 1 tablet (40 mg total) by mouth once daily., Disp: 90 tablet, Rfl: 3    sotaloL (BETAPACE) 80 MG tablet, take ONE-HALF tablet BY MOUTH TWICE DAILY, Disp: 30 tablet, Rfl: 11  No current facility-administered medications for this visit.     Facility-Administered Medications Ordered in Other Visits:     lactated ringers infusion, , Intravenous, Continuous, William Harris MD, Last Rate: 10 mL/hr at 02/12/20 1150    lidocaine (PF) 10 mg/ml (1%) injection 10 mg, 1 mL, Intradermal, Once, William Harris MD    The ASCVD Risk score (Montgomeryzoran KENNEDY Jr., et al., 2013) failed to calculate for the following reasons:    The 2013 ASCVD risk score is only valid for ages 40 to 79     No results found for: HGBA1C  Lab Results   Component Value Date    LDLCALC 45.0 (L) 08/05/2020    CREATININE 1.32 08/05/2020    CREATININE 1.32 08/05/2020   labs 2020 rev. Discussed calcium elevation noted x 2. Has appt with endo/gabi later this fall for thyroid u/s and f/u re: hypercalcemia.     Review of Systems   Constitutional: Negative for appetite change, fever and unexpected weight change.   HENT: Positive for trouble swallowing (somewhat improved). Negative for sore throat.    Respiratory: Negative for cough and shortness of breath.    Cardiovascular: Negative for chest pain.        Recent stents in 3/2020   Gastrointestinal: Negative for abdominal pain, blood in stool, constipation, diarrhea and nausea.   Genitourinary: Negative for hematuria and urgency.        Ballard catheter in place; notes cloudy urine   Musculoskeletal: Positive for gait problem. Negative for joint swelling.   Skin: Negative for color change and rash.   Neurological: Negative for dizziness and speech difficulty.   Psychiatric/Behavioral: Negative for confusion and sleep disturbance.           Objective:      Physical Exam  Vitals signs and  nursing note reviewed.   Constitutional:       General: She is not in acute distress.     Appearance: She is well-developed. She is obese.   HENT:      Head: Normocephalic and atraumatic.   Eyes:      General: No scleral icterus.        Right eye: No discharge.         Left eye: No discharge.      Conjunctiva/sclera: Conjunctivae normal.   Neck:      Musculoskeletal: Normal range of motion and neck supple.   Cardiovascular:      Rate and Rhythm: Normal rate and regular rhythm.   Pulmonary:      Effort: Pulmonary effort is normal. No respiratory distress.   Abdominal:      General: There is no distension.      Palpations: Abdomen is soft.   Musculoskeletal: Normal range of motion.      Right lower leg: No edema.      Left lower leg: No edema.   Skin:     General: Skin is warm and dry.      Findings: No rash.   Neurological:      General: No focal deficit present.      Mental Status: She is alert and oriented to person, place, and time.   Psychiatric:         Mood and Affect: Mood normal.         Behavior: Behavior normal.             Screening recommendations appropriate to age and health status were reviewed.    Assessment & Plan:    Dysphagia, unspecified type  Comments:  cont ppi and follow-up with gi    Ballard catheter in place  -     Urine culture    Recurrent UTI (urinary tract infection)  Comments:  pt requests urine culture in clinic due to cloudiness  Orders:  -     Urine culture    Immunization due  Comments:  rx given for shingrix  Orders:  -     (In Office Administered) Pneumococcal Polysaccharide Vaccine (23 Valent) (SQ/IM)    Hyperlipidemia, unspecified hyperlipidemia type  Comments:  on crestor per cards/salam    Gout, unspecified cause, unspecified chronicity, unspecified site  Comments:  improved with allopurinol, few days of colchicine (needs refill), discussed sparing use given kidney function    Other orders  -     colchicine (COLCRYS) 0.6 mg tablet; Take 1 tablet (0.6 mg total) by mouth once  daily.  Dispense: 30 tablet; Refill: 0

## 2020-09-02 ENCOUNTER — TELEPHONE (OUTPATIENT)
Dept: FAMILY MEDICINE | Facility: CLINIC | Age: 85
End: 2020-09-02

## 2020-09-02 NOTE — TELEPHONE ENCOUNTER
----- Message from Marcia Crespo MD sent at 8/31/2020 12:01 PM CDT -----  Please include labs with her upcoming draw for Dr Bryant.

## 2020-09-03 LAB — BACTERIA UR CULT: ABNORMAL

## 2020-09-04 ENCOUNTER — TELEPHONE (OUTPATIENT)
Dept: FAMILY MEDICINE | Facility: CLINIC | Age: 85
End: 2020-09-04

## 2020-09-04 DIAGNOSIS — N30.00 ACUTE CYSTITIS WITHOUT HEMATURIA: Primary | ICD-10-CM

## 2020-09-04 PROCEDURE — G0179 MD RECERTIFICATION HHA PT: HCPCS | Mod: ,,, | Performed by: FAMILY MEDICINE

## 2020-09-04 PROCEDURE — G0179 PR HOME HEALTH MD RECERTIFICATION: ICD-10-PCS | Mod: ,,, | Performed by: FAMILY MEDICINE

## 2020-09-04 RX ORDER — CEPHALEXIN 500 MG/1
500 CAPSULE ORAL EVERY 12 HOURS
Qty: 10 CAPSULE | Refills: 0 | Status: SHIPPED | OUTPATIENT
Start: 2020-09-04 | End: 2020-09-09 | Stop reason: SDUPTHER

## 2020-09-04 NOTE — TELEPHONE ENCOUNTER
Spoke with patient about results of urine culture, will treat with keflex. She will contact nurse to replace catheter as it is almost due to be replaced anyway.

## 2020-09-04 NOTE — TELEPHONE ENCOUNTER
----- Message from Milana Gunn sent at 9/4/2020  2:12 PM CDT -----  Regarding: orders  Contact: [facility] -[phone]  [facility] nurse is calling for the office to send in orders to replace the pt catheter. Please contact [facility] nurse .  Send orders to Fax # [phone]

## 2020-09-08 ENCOUNTER — DOCUMENT SCAN (OUTPATIENT)
Dept: HOME HEALTH SERVICES | Facility: HOSPITAL | Age: 85
End: 2020-09-08
Payer: MEDICARE

## 2020-09-08 ENCOUNTER — TELEPHONE (OUTPATIENT)
Dept: OPHTHALMOLOGY | Facility: CLINIC | Age: 85
End: 2020-09-08

## 2020-09-08 NOTE — TELEPHONE ENCOUNTER
Spoke with pt, added appointment to wait list ----- Message from Misael Bocanegra sent at 9/8/2020 10:34 AM CDT -----  Regarding: pt  Type:  Sooner Apoointment Request    Caller is requesting a sooner appointment.  Caller accepted first available appointment listed below.  Caller accepted being placed on the waitlist and is requesting a message be sent to doctor.    Name of Caller:  pt  When is the first available appointment?  Pts appt  Symptoms:  4 month iop med check dfe and oct on  Best Call Back Number:  [phone]   Additional Information:  pt appt was moved to 9/18 by office, but pt cannot make 9/18 b/c of conflicting appt with another

## 2020-09-09 ENCOUNTER — OFFICE VISIT (OUTPATIENT)
Dept: OPHTHALMOLOGY | Facility: CLINIC | Age: 85
End: 2020-09-09
Payer: MEDICARE

## 2020-09-09 ENCOUNTER — PATIENT OUTREACH (OUTPATIENT)
Dept: ADMINISTRATIVE | Facility: OTHER | Age: 85
End: 2020-09-09

## 2020-09-09 DIAGNOSIS — H43.813 POSTERIOR VITREOUS DETACHMENT, BILATERAL: ICD-10-CM

## 2020-09-09 DIAGNOSIS — H40.053 OCULAR HYPERTENSION, BILATERAL: Primary | ICD-10-CM

## 2020-09-09 DIAGNOSIS — Z98.890 S/P YAG CAPSULOTOMY, BILATERAL: ICD-10-CM

## 2020-09-09 DIAGNOSIS — Z96.1 PSEUDOPHAKIA OF BOTH EYES: ICD-10-CM

## 2020-09-09 DIAGNOSIS — N30.00 ACUTE CYSTITIS WITHOUT HEMATURIA: ICD-10-CM

## 2020-09-09 DIAGNOSIS — H26.493 PCO (POSTERIOR CAPSULAR OPACIFICATION), BILATERAL: ICD-10-CM

## 2020-09-09 PROCEDURE — 99999 PR PBB SHADOW E&M-EST. PATIENT-LVL IV: CPT | Mod: PBBFAC,HCNC,, | Performed by: OPHTHALMOLOGY

## 2020-09-09 PROCEDURE — 92014 COMPRE OPH EXAM EST PT 1/>: CPT | Mod: HCNC,S$GLB,, | Performed by: OPHTHALMOLOGY

## 2020-09-09 PROCEDURE — 99999 PR PBB SHADOW E&M-EST. PATIENT-LVL IV: ICD-10-PCS | Mod: PBBFAC,HCNC,, | Performed by: OPHTHALMOLOGY

## 2020-09-09 PROCEDURE — 92014 PR EYE EXAM, EST PATIENT,COMPREHESV: ICD-10-PCS | Mod: HCNC,S$GLB,, | Performed by: OPHTHALMOLOGY

## 2020-09-09 NOTE — TELEPHONE ENCOUNTER
----- Message from Claudia Ellis MA sent at 9/9/2020  9:01 AM CDT -----  Type: Needs Medical Advice  Who Called: Lisa - [facility]  Best Call Back Number:   Additional Information: patient's last day of antibiotics is today, her urine is still cloudy.  Patient is requesting another UA next week when nurse visits.  Please call to discuss

## 2020-09-09 NOTE — PROGRESS NOTES
HPI     DLS: 5/14/2020. Pt here for IOP/ Med ck and DFE   Latanoprost OU QHS dorzolamide OS BID as directed. Denies pain/ FOL/   floaters.   No new complaints.     Last edited by Yumiko Cervanets on 9/9/2020 11:11 AM. (History)        ROS     Negative for: Constitutional, Gastrointestinal, Neurological, Skin,   Genitourinary, Musculoskeletal, HENT, Endocrine, Cardiovascular, Eyes,   Respiratory, Psychiatric, Allergic/Imm, Heme/Lymph    Last edited by Aleksandar Rose Jr., MD on 9/9/2020 11:43 AM. (History)        Assessment /Plan     For exam results, see Encounter Report.    Ocular hypertension, bilateral    Pseudophakia of both eyes    Posterior vitreous detachment, bilateral    PCO (posterior capsular opacification), bilateral    S/P YAG capsulotomy, bilateral        IOP OD 14 OS 13;  stable  Continue Dorzolamide BID OS and Latanoprost QHS OU  RD precautions  Stable, observe  Open capsule OU, PCIOL clear  Follow up in about 4 months (around 1/9/2021) for IOP and Medication check.

## 2020-09-09 NOTE — PROGRESS NOTES
LINKS immunization registry updated  Care Everywhere updated  Health Maintenance updated  Chart reviewed for overdue Proactive [facility] Encounters ([facility]) health maintenance testing (CRS, Breast Ca, Diabetic Eye Exam)   Orders entered:N/A

## 2020-09-10 ENCOUNTER — EXTERNAL HOME HEALTH (OUTPATIENT)
Dept: HOME HEALTH SERVICES | Facility: HOSPITAL | Age: 85
End: 2020-09-10
Payer: MEDICARE

## 2020-09-10 RX ORDER — CEPHALEXIN 500 MG/1
500 CAPSULE ORAL EVERY 12 HOURS
Qty: 20 CAPSULE | Refills: 0 | Status: SHIPPED | OUTPATIENT
Start: 2020-09-10 | End: 2020-09-20

## 2020-09-15 ENCOUNTER — HOSPITAL ENCOUNTER (OUTPATIENT)
Dept: RADIOLOGY | Facility: HOSPITAL | Age: 85
Discharge: HOME | End: 2020-09-15
Attending: INTERNAL MEDICINE
Payer: MEDICARE

## 2020-09-15 DIAGNOSIS — E04.2 MULTINODULAR GOITER: ICD-10-CM

## 2020-09-15 PROCEDURE — 76536 US EXAM OF HEAD AND NECK: CPT | Mod: 26,HCNC,, | Performed by: RADIOLOGY

## 2020-09-15 PROCEDURE — 76536 US SOFT TISSUE HEAD NECK THYROID: ICD-10-PCS | Mod: 26,HCNC,, | Performed by: RADIOLOGY

## 2020-09-15 PROCEDURE — 76536 US EXAM OF HEAD AND NECK: CPT | Mod: TC,HCNC,PO

## 2020-09-17 PROBLEM — E04.9 GOITER: Status: ACTIVE | Noted: 2020-09-17

## 2020-09-17 PROBLEM — K22.4 ESOPHAGEAL DYSMOTILITY: Status: ACTIVE | Noted: 2020-09-17

## 2020-09-23 ENCOUNTER — OFFICE VISIT (OUTPATIENT)
Dept: ENDOCRINOLOGY | Facility: CLINIC | Age: 85
End: 2020-09-23
Payer: MEDICARE

## 2020-09-23 ENCOUNTER — LAB VISIT (OUTPATIENT)
Dept: LAB | Facility: HOSPITAL | Age: 85
End: 2020-09-23
Attending: INTERNAL MEDICINE
Payer: MEDICARE

## 2020-09-23 VITALS
SYSTOLIC BLOOD PRESSURE: 140 MMHG | DIASTOLIC BLOOD PRESSURE: 90 MMHG | OXYGEN SATURATION: 97 % | BODY MASS INDEX: 32.32 KG/M2 | WEIGHT: 194 LBS | HEIGHT: 65 IN | HEART RATE: 61 BPM

## 2020-09-23 DIAGNOSIS — R79.89 ELEVATED PARATHYROID HORMONE: ICD-10-CM

## 2020-09-23 DIAGNOSIS — E04.2 MULTINODULAR GOITER: ICD-10-CM

## 2020-09-23 DIAGNOSIS — E04.2 MULTINODULAR GOITER: Primary | ICD-10-CM

## 2020-09-23 PROCEDURE — 36415 COLL VENOUS BLD VENIPUNCTURE: CPT | Mod: HCNC,PO

## 2020-09-23 PROCEDURE — 1126F PR PAIN SEVERITY QUANTIFIED, NO PAIN PRESENT: ICD-10-PCS | Mod: HCNC,S$GLB,, | Performed by: INTERNAL MEDICINE

## 2020-09-23 PROCEDURE — 99213 OFFICE O/P EST LOW 20 MIN: CPT | Mod: HCNC,S$GLB,, | Performed by: INTERNAL MEDICINE

## 2020-09-23 PROCEDURE — 1159F MED LIST DOCD IN RCRD: CPT | Mod: HCNC,S$GLB,, | Performed by: INTERNAL MEDICINE

## 2020-09-23 PROCEDURE — 99213 PR OFFICE/OUTPT VISIT, EST, LEVL III, 20-29 MIN: ICD-10-PCS | Mod: HCNC,S$GLB,, | Performed by: INTERNAL MEDICINE

## 2020-09-23 PROCEDURE — 99999 PR PBB SHADOW E&M-EST. PATIENT-LVL IV: CPT | Mod: PBBFAC,HCNC,, | Performed by: INTERNAL MEDICINE

## 2020-09-23 PROCEDURE — 1101F PT FALLS ASSESS-DOCD LE1/YR: CPT | Mod: HCNC,CPTII,S$GLB, | Performed by: INTERNAL MEDICINE

## 2020-09-23 PROCEDURE — 84443 ASSAY THYROID STIM HORMONE: CPT | Mod: HCNC

## 2020-09-23 PROCEDURE — 84439 ASSAY OF FREE THYROXINE: CPT | Mod: HCNC

## 2020-09-23 PROCEDURE — 1159F PR MEDICATION LIST DOCUMENTED IN MEDICAL RECORD: ICD-10-PCS | Mod: HCNC,S$GLB,, | Performed by: INTERNAL MEDICINE

## 2020-09-23 PROCEDURE — 1101F PR PT FALLS ASSESS DOC 0-1 FALLS W/OUT INJ PAST YR: ICD-10-PCS | Mod: HCNC,CPTII,S$GLB, | Performed by: INTERNAL MEDICINE

## 2020-09-23 PROCEDURE — 1126F AMNT PAIN NOTED NONE PRSNT: CPT | Mod: HCNC,S$GLB,, | Performed by: INTERNAL MEDICINE

## 2020-09-23 PROCEDURE — 99999 PR PBB SHADOW E&M-EST. PATIENT-LVL IV: ICD-10-PCS | Mod: PBBFAC,HCNC,, | Performed by: INTERNAL MEDICINE

## 2020-09-23 NOTE — PROGRESS NOTES
CHIEF COMPLAINT: Osteoporosis  87 year old being seen as a f/u. Recently had a DEXA showing osteoporosis. Had an episode of hypercalcemia in Oct 2017. States that she had a fractured patella in 1990's after fall on a cement floor. Unsure age of menopause. No HRT. No kidney stones. Does not want to take any medications for her bones.   Last seen in 2018. Had a bilateral FNA with the right being non diagnostic. NO Xrt to head/neck. Has had some difficulty swallowing.   Here with daughter that had several questions regarding testing for thyroid and pituitary          PAST MEDICAL HISTORY/PAST SURGICAL HISTORY:  Reviewed in [facility]     SOCIAL HISTORY: No T/A     FAMILY HISTORY:  No known osteoporosis. Polycythemia vera. + CHF     MEDICATIONS/ALLERGIES: The patient's MedCard has been updated and reviewed.      ROS:   Constitutional: No recent significant weight change  Eyes: No recent visual changes  ENT: No dysphagia  Cardiovascular: Denies current anginal symptoms  Respiratory: Denies current respiratory difficulty  Gastrointestinal: Denies recent bowel disturbances  GenitoUrinary - No dysuria  Skin: No new skin rash  Neurologic: No focal neurologic complaints  Remainder ROS negative           PE:    GENERAL: Well developed, well nourished.  NECK: Supple, trachea midline, left thyroid nodule palpable  CHEST: Resp even and unlabored, CTA bilateral.  CARDIAC: RRR, S1, S2 heard, no murmurs, rubs, S3, or S4      Results for TANNEREDWIN (MRN [id number]) as of 9/23/2020 14:45   Ref. Range 9/15/2020 07:59   Sodium Latest Ref Range: 136 - 145 mmol/L 143   Potassium Latest Ref Range: 3.5 - 5.1 mmol/L 4.2   Chloride Latest Ref Range: 95 - 110 mmol/L 108   CO2 Latest Ref Range: 23 - 29 mmol/L 27   Anion Gap Latest Ref Range: 8 - 16 mmol/L 8   BUN, Bld Latest Ref Range: 8 - 23 mg/dL 23   Creatinine Latest Ref Range: 0.5 - 1.4 mg/dL 1.1   eGFR if non African American Latest Ref Range: >60 mL/min/1.73 m^2 45.3 (A)   eGFR if African  American Latest Ref Range: >60 mL/min/1.73 m^2 52.2 (A)   Glucose Latest Ref Range: 70 - 110 mg/dL 104   Calcium Latest Ref Range: 8.7 - 10.5 mg/dL 10.2   Phosphorus Latest Ref Range: 2.7 - 4.5 mg/dL 3.5   Albumin Latest Ref Range: 3.5 - 5.2 g/dL 4.0   Uric Acid Latest Ref Range: 2.4 - 5.7 mg/dL 6.9 (H)   Vit D, 25-Hydroxy Latest Ref Range: 30 - 96 ng/mL 54   TSH Latest Ref Range: 0.400 - 4.000 uIU/mL 0.946   PTH Latest Ref Range: 9.0 - 77.0 pg/mL 83.0 (H)        CLINICAL HISTORY:  Nontoxic multinodular goiter     TECHNIQUE:  Ultrasound of the thyroid and cervical lymph nodes was performed.     COMPARISON:  Thyroid ultrasound dated 12/11/2019     FINDINGS:  The thyroid gland remains enlarged with a multinodular appearance.  The right lobe measures 5.9 x 3.1 x 3.6 cm with an estimated volume of 34.4 mL (increased compared to 24.6 mL on the prior study).  The thyroid isthmus measures 7 mm in thickness, unchanged.  The left lobe measures 4.7 x 1.6 x 1.4 cm with an estimated volume of 5.5 mL.  Total thyroid volume is 40 mL, increased compared to 29.2 mL on the prior study.     There are multiple nodules in both lobes of the gland.  In the right lobe, there is a dominant complex solid and cystic nodule in the lower pole measuring 3.2 x 2.2 x 2.7 cm.  This is without significant change.  There is a complex solid and cystic nodule in the medial midpole extending to the right lateral aspect of the isthmus.  This nodule measures 2.5 x 1.6 by 2.4 cm without significant change.  There is a tiny subcentimeter cystic nodule in the upper pole as well as a 4 mm calcified nodule.     Redemonstrated is a solid nodule in the isthmus.  This nodule measures 1.8 x 1.2 x 1.8 cm without significant change.     There also several nodules in the left lobe.  There is a 5 x 4 x 5 mm cystic nodule in the lateral mid to upper pole which was present previously and may be slightly smaller.  There is also a 6 x 5 x 6 mm solid and cystic nodule in  the midpole which was not definitely present previously.  There is an adjacent 3 mm cystic nodule as well as several tiny cystic nodules in the lower pole.     There is no pathologic lymphadenopathy.     Impression:     1.  The thyroid gland is enlarged with a multinodular appearance.  The largest solid and/or complex nodules in the right lobe and in the isthmus are without significant change.     2.  There is a mixed solid and cystic nodule in the midpole of the left lobe which is new or more conspicuous compared to the prior study but this does not meet criteria for FNA or biopsy.        ASSESSMENT/PLAN:  1. Osteoporosis- will r/o secondary causes. Discussed using bisphosphonate for treatment of osteoporosis, but patient states does not want to use them due to concern over s/e. Discussed some of her concerns. When labs are back can readdress to see if any improvement.      2. Hypercalcemia- see # 1. Possibly PTH mediated. Will check urine Ca     3. Thyroid Nodule- Ultrasound shows no change     4. Daughter who is an Rn had several questions regarding testing of thyroid and check TSH, Ft4, T3, and reverse T3. Also pituitary. Reviewed various labs that are tested as well as [place] guidelines on testing.      FOLLOWUP  TSH, FT4  24 hr urine Ca/Cr

## 2020-09-24 ENCOUNTER — TELEPHONE (OUTPATIENT)
Dept: ENDOCRINOLOGY | Facility: CLINIC | Age: 85
End: 2020-09-24

## 2020-09-24 ENCOUNTER — PATIENT MESSAGE (OUTPATIENT)
Dept: FAMILY MEDICINE | Facility: CLINIC | Age: 85
End: 2020-09-24

## 2020-09-24 LAB
T4 FREE SERPL-MCNC: 1.04 NG/DL (ref 0.71–1.51)
TSH SERPL DL<=0.005 MIU/L-ACNC: 0.72 UIU/ML (ref 0.4–4)

## 2020-09-28 ENCOUNTER — OFFICE VISIT (OUTPATIENT)
Dept: FAMILY MEDICINE | Facility: CLINIC | Age: 85
End: 2020-09-28
Payer: MEDICARE

## 2020-09-28 VITALS
BODY MASS INDEX: 32.49 KG/M2 | HEART RATE: 68 BPM | SYSTOLIC BLOOD PRESSURE: 122 MMHG | DIASTOLIC BLOOD PRESSURE: 70 MMHG | RESPIRATION RATE: 16 BRPM | WEIGHT: 195 LBS | TEMPERATURE: 97 F | HEIGHT: 65 IN

## 2020-09-28 DIAGNOSIS — R13.10 DYSPHAGIA, UNSPECIFIED TYPE: ICD-10-CM

## 2020-09-28 DIAGNOSIS — R53.83 FATIGUE, UNSPECIFIED TYPE: ICD-10-CM

## 2020-09-28 DIAGNOSIS — N39.0 RECURRENT UTI (URINARY TRACT INFECTION): ICD-10-CM

## 2020-09-28 DIAGNOSIS — E79.0 ELEVATED URIC ACID IN BLOOD: ICD-10-CM

## 2020-09-28 DIAGNOSIS — I42.2 ASYMMETRIC SEPTAL HYPERTROPHY: ICD-10-CM

## 2020-09-28 DIAGNOSIS — K58.0 IRRITABLE BOWEL SYNDROME WITH DIARRHEA: ICD-10-CM

## 2020-09-28 DIAGNOSIS — Z00.00 ROUTINE GENERAL MEDICAL EXAMINATION AT A HEALTH CARE FACILITY: Primary | ICD-10-CM

## 2020-09-28 PROCEDURE — G0008 FLU VACCINE - QUADRIVALENT - ADJUVANTED: ICD-10-PCS | Mod: HCNC,S$GLB,, | Performed by: FAMILY MEDICINE

## 2020-09-28 PROCEDURE — 99999 PR PBB SHADOW E&M-EST. PATIENT-LVL V: ICD-10-PCS | Mod: PBBFAC,HCNC,, | Performed by: FAMILY MEDICINE

## 2020-09-28 PROCEDURE — 99397 PR PREVENTIVE VISIT,EST,65 & OVER: ICD-10-PCS | Mod: 25,HCNC,S$GLB, | Performed by: FAMILY MEDICINE

## 2020-09-28 PROCEDURE — 99999 PR PBB SHADOW E&M-EST. PATIENT-LVL V: CPT | Mod: PBBFAC,HCNC,, | Performed by: FAMILY MEDICINE

## 2020-09-28 PROCEDURE — 99397 PER PM REEVAL EST PAT 65+ YR: CPT | Mod: 25,HCNC,S$GLB, | Performed by: FAMILY MEDICINE

## 2020-09-28 PROCEDURE — 90694 FLU VACCINE - QUADRIVALENT - ADJUVANTED: ICD-10-PCS | Mod: HCNC,S$GLB,, | Performed by: FAMILY MEDICINE

## 2020-09-28 PROCEDURE — 90694 VACC AIIV4 NO PRSRV 0.5ML IM: CPT | Mod: HCNC,S$GLB,, | Performed by: FAMILY MEDICINE

## 2020-09-28 PROCEDURE — G0008 ADMIN INFLUENZA VIRUS VAC: HCPCS | Mod: HCNC,S$GLB,, | Performed by: FAMILY MEDICINE

## 2020-09-28 RX ORDER — ALLOPURINOL 100 MG/1
200 TABLET ORAL DAILY
Qty: 180 TABLET | Refills: 1 | Status: SHIPPED | OUTPATIENT
Start: 2020-09-28 | End: 2021-06-01 | Stop reason: SDUPTHER

## 2020-09-28 RX ORDER — OXYBUTYNIN CHLORIDE 5 MG/1
5 TABLET ORAL 2 TIMES DAILY
Qty: 180 TABLET | Refills: 3 | Status: SHIPPED | OUTPATIENT
Start: 2020-09-28 | End: 2021-07-13

## 2020-09-28 NOTE — PROGRESS NOTES
Subjective:       Patient ID: Holli Landrum is a 87 y.o. female.    Chief Complaint: Annual Exam      Holli Landrum is in the office for annual exam.    HPI  Since LOV, had egd for dysphagia. She still has sx despite dilation. Will discuss with gi/chava later this week.   Pending gi recs, endo may recommend another bx.  Past Medical History:   Diagnosis Date    Anticoagulant long-term use     Arthritis     Asthma     Basal cell carcinoma     Cancer     skin cancer to face    Cataract     OU done//    CHF (congestive heart failure)     COPD (chronic obstructive pulmonary disease)     no oxygen; patient denies    Essential hypertension 5/5/2010    GERD (gastroesophageal reflux disease) 11/20/2012    Glaucoma     Hypertensive heart disease with heart failure 02/05/2013    Paroxysmal ventricular tachycardia     per problem list    Squamous cell carcinoma of skin      Nexium has not helped with any of her sx, so she did not continue. Taking tagamet qhs instead.   Very tired today.    Current Outpatient Medications:     acetaminophen (TYLENOL) 650 MG TbSR, Take 650 mg by mouth as needed. , Disp: , Rfl:     albuterol (PROVENTIL/VENTOLIN HFA) 90 mcg/actuation inhaler, Inhale 1-2 puffs into the lungs every 6 (six) hours as needed for Wheezing. Rescue, Disp: 18 g, Rfl: 5    allopurinoL (ZYLOPRIM) 100 MG tablet, Take 1 tablet (100 mg total) by mouth once daily., Disp: 90 tablet, Rfl: 1    cholecalciferol, vitamin D3, (VITAMIN D3) 5,000 unit Tab, Take 5,000 Units by mouth once daily., Disp: , Rfl:     clopidogrel (PLAVIX) 75 mg tablet, Take 1 tablet (75 mg total) by mouth once daily., Disp: 90 tablet, Rfl: 3    colchicine (COLCRYS) 0.6 mg tablet, Take 1 tablet (0.6 mg total) by mouth once daily., Disp: 30 tablet, Rfl: 0    dorzolamide (TRUSOPT) 2 % ophthalmic solution, Place 1 drop into the left eye 2 (two) times daily., Disp: 10 mL, Rfl: 11    esomeprazole (NEXIUM) 40 MG capsule, Take 1 capsule (40 mg  total) by mouth before breakfast., Disp: 90 capsule, Rfl: 0    fluticasone (FLONASE) 50 mcg/actuation nasal spray, 2 sprays (100 mcg total) by Each Nare route daily as needed for Allergies., Disp: 16 g, Rfl: 11    furosemide (LASIX) 40 MG tablet, Take 1 tablet (40 mg total) by mouth once daily., Disp: 90 tablet, Rfl: 3    GRAPE SEED EXTRACT ORAL, Take 1 tablet by mouth once daily. , Disp: , Rfl:     Lactobacillus rhamnosus GG (CULTURELLE) 10 billion cell capsule, Take 1 capsule by mouth once daily., Disp: , Rfl:     latanoprost 0.005 % ophthalmic solution, Place 1 drop into both eyes every evening., Disp: 5 mL, Rfl: 6    magnesium oxide (MAG-OX) 400 mg tablet, Take 800 mg by mouth once daily. , Disp: , Rfl:     nitroGLYCERIN 0.4 MG/HR TD PT24 (NITRODUR) 0.4 mg/hr, APPLY 1 PATCH ONTO THE SKIN ONCE DAILY. ALLOW PATCH TO REMAIN ON THE AREA FOR 12 TO 14 HOURS IN A 24 HOUR PERIOD AS DIRECTED, Disp: 90 patch, Rfl: 0    OLIVE LEAF EXTRACT ORAL, Take 1 tablet by mouth once daily. , Disp: , Rfl:     omega-3 fatty acids/fish oil (FISH OIL-OMEGA-3 FATTY ACIDS) 300-1,000 mg capsule, Take 2 capsules by mouth once daily., Disp: , Rfl:     oxybutynin (DITROPAN) 5 MG Tab, Take 1 tablet (5 mg total) by mouth 2 (two) times daily., Disp: 60 tablet, Rfl: 3    potassium chloride (KLOR-CON) 10 MEQ TbSR, TAKE 1 TABLET (10 MEQ TOTAL) BY MOUTH 2 (TWO) TIMES DAILY. (Patient taking differently: Take 20 mEq by mouth once daily. ), Disp: 180 tablet, Rfl: 3    rosuvastatin (CRESTOR) 40 MG Tab, Take 1 tablet (40 mg total) by mouth once daily., Disp: 90 tablet, Rfl: 3    sotaloL (BETAPACE) 80 MG tablet, take ONE-HALF tablet BY MOUTH TWICE DAILY, Disp: 30 tablet, Rfl: 11    cimetidine (TAGAMET) 800 MG tablet, Take 1 tablet (800 mg total) by mouth every evening., Disp: 30 tablet, Rfl: 2    D-MANNOSE ORAL, Take 2 tablets by mouth once daily. , Disp: , Rfl:     fluticasone furoate-vilanteroL (BREO ELLIPTA) 100-25 mcg/dose diskus  inhaler, Inhale 1 puff into the lungs once daily., Disp: 90 each, Rfl: 3  No current facility-administered medications for this visit.     Facility-Administered Medications Ordered in Other Visits:     lactated ringers infusion, , Intravenous, Continuous, William Harris MD, Last Rate: 10 mL/hr at 02/12/20 1150    lidocaine (PF) 10 mg/ml (1%) injection 10 mg, 1 mL, Intradermal, Once, William Harris MD    The ASCVD Risk score (Villanovazoran KENNEDY JrHerber, et al., 2013) failed to calculate for the following reasons:    The 2013 ASCVD risk score is only valid for ages 40 to 79     No results found for: HGBA1C  Lab Results   Component Value Date    LDLCALC 45.0 (L) 08/05/2020    CREATININE 1.1 09/15/2020   labs 2020 rev.    Review of Systems   Constitutional: Positive for fatigue. Negative for appetite change, fever and unexpected weight change.   HENT: Positive for trouble swallowing. Negative for congestion and sore throat.         Flonase and claritin nightly   Respiratory: Negative for cough and shortness of breath.    Cardiovascular: Negative for chest pain and leg swelling.        Recent stents in 3/2020   Gastrointestinal: Negative for abdominal pain, blood in stool, constipation, diarrhea and nausea.   Genitourinary: Negative for hematuria and urgency.        Ballard catheter in place; notes cloudy urine. Previous UTI is improved.   Musculoskeletal: Positive for arthralgias and gait problem. Negative for joint swelling.   Skin: Negative for color change and rash.   Neurological: Negative for dizziness and headaches (some L pressure).   Psychiatric/Behavioral: Negative for confusion and sleep disturbance.       Objective:      Physical Exam  Vitals signs and nursing note reviewed.   Constitutional:       General: She is not in acute distress.     Appearance: She is well-developed. She is obese.      Comments: Appears tired   HENT:      Head: Normocephalic and atraumatic.   Eyes:      General: No scleral icterus.         Right eye: No discharge.         Left eye: No discharge.      Conjunctiva/sclera: Conjunctivae normal.   Neck:      Musculoskeletal: Normal range of motion and neck supple.   Cardiovascular:      Rate and Rhythm: Normal rate.   Pulmonary:      Effort: Pulmonary effort is normal. No respiratory distress.   Abdominal:      General: There is no distension.      Palpations: Abdomen is soft.      Comments: Exam limited by habitus.   Genitourinary:     Comments: Indwelling french. Clear urine noted in bag.  Musculoskeletal: Normal range of motion.         General: No signs of injury.   Skin:     General: Skin is warm and dry.      Findings: No rash.   Neurological:      General: No focal deficit present.      Mental Status: She is alert and oriented to person, place, and time.   Psychiatric:         Mood and Affect: Mood normal.         Behavior: Behavior normal.         Screening recommendations appropriate to age and health status were reviewed.    Assessment & Plan:    Routine general medical examination at a health care facility  Comments:  anticipatory guidance reviewed    Asymmetric septal hypertrophy  Comments:  update echo given persistent fatigue reported  Orders:  -     Echo Color Flow Doppler? Yes; Future    Dysphagia, unspecified type  Comments:  per gi    Fatigue, unspecified type  Comments:  likely multifactorial given health issues; of note, tsh and cbc wnl    Irritable bowel syndrome with diarrhea  Comments:  careful dietary monitoring, ensure adequate fiber and fluid intake    Elevated uric acid in blood  Comments:  increase allopurinol to 200mg daily    Recurrent UTI (urinary tract infection)  Comments:  pt recently completed abx, asymp today, clear urine noted; pt continues oxybutynin due to painful bladder spasm  Orders:  -     oxybutynin (DITROPAN) 5 MG Tab; Take 1 tablet (5 mg total) by mouth 2 (two) times daily.  Dispense: 180 tablet; Refill: 3    Other orders  -     allopurinoL (ZYLOPRIM) 100 MG  tablet; Take 2 tablets (200 mg total) by mouth once daily.  Dispense: 180 tablet; Refill: 1  -     Influenza - Quadrivalent (Adjuvanted)

## 2020-09-30 ENCOUNTER — LAB VISIT (OUTPATIENT)
Dept: LAB | Facility: HOSPITAL | Age: 85
End: 2020-09-30
Attending: FAMILY MEDICINE
Payer: MEDICARE

## 2020-09-30 DIAGNOSIS — R79.89 ELEVATED PARATHYROID HORMONE: ICD-10-CM

## 2020-09-30 DIAGNOSIS — E04.2 MULTINODULAR GOITER: ICD-10-CM

## 2020-09-30 LAB
CALCIUM 24H UR-MRATE: 9 MG/HR (ref 4–12)
CALCIUM UR-MCNC: 10.1 MG/DL (ref 0–15)
CALCIUM URINE (MG/SPEC): 227 MG/SPEC
CREAT 24H UR-MRATE: 35.6 MG/HR (ref 40–75)
CREAT UR-MCNC: 38 MG/DL (ref 15–325)
CREATININE, URINE (MG/SPEC): 855 MG/SPEC
URINE COLLECTION DURATION: 24 HR
URINE COLLECTION DURATION: 24 HR
URINE VOLUME: 2250 ML
URINE VOLUME: 2250 ML

## 2020-09-30 PROCEDURE — 82340 ASSAY OF CALCIUM IN URINE: CPT | Mod: HCNC

## 2020-09-30 PROCEDURE — 82570 ASSAY OF URINE CREATININE: CPT | Mod: HCNC

## 2020-10-02 ENCOUNTER — TELEPHONE (OUTPATIENT)
Dept: GASTROENTEROLOGY | Facility: CLINIC | Age: 85
End: 2020-10-02

## 2020-10-02 ENCOUNTER — OFFICE VISIT (OUTPATIENT)
Dept: GASTROENTEROLOGY | Facility: CLINIC | Age: 85
End: 2020-10-02
Payer: MEDICARE

## 2020-10-02 VITALS — HEIGHT: 65 IN | RESPIRATION RATE: 20 BRPM | WEIGHT: 193.13 LBS | BODY MASS INDEX: 32.18 KG/M2

## 2020-10-02 DIAGNOSIS — Z87.440 HISTORY OF UTI: ICD-10-CM

## 2020-10-02 DIAGNOSIS — K22.4 ESOPHAGEAL DYSMOTILITY: ICD-10-CM

## 2020-10-02 DIAGNOSIS — R53.83 FATIGUE, UNSPECIFIED TYPE: ICD-10-CM

## 2020-10-02 DIAGNOSIS — Z01.818 PREOP TESTING: ICD-10-CM

## 2020-10-02 DIAGNOSIS — Z79.01 ANTICOAGULANT LONG-TERM USE: ICD-10-CM

## 2020-10-02 DIAGNOSIS — R13.14 PHARYNGOESOPHAGEAL DYSPHAGIA: Primary | ICD-10-CM

## 2020-10-02 DIAGNOSIS — R30.0 DYSURIA: ICD-10-CM

## 2020-10-02 DIAGNOSIS — K44.9 HIATAL HERNIA: ICD-10-CM

## 2020-10-02 DIAGNOSIS — R39.9 URINARY SYMPTOM OR SIGN: ICD-10-CM

## 2020-10-02 DIAGNOSIS — K22.2 SCHATZKI'S RING: ICD-10-CM

## 2020-10-02 DIAGNOSIS — R12 HEARTBURN: ICD-10-CM

## 2020-10-02 DIAGNOSIS — Z87.898 HISTORY OF SHORTNESS OF BREATH: ICD-10-CM

## 2020-10-02 DIAGNOSIS — R05.9 COUGH: ICD-10-CM

## 2020-10-02 PROCEDURE — 99214 PR OFFICE/OUTPT VISIT, EST, LEVL IV, 30-39 MIN: ICD-10-PCS | Mod: HCNC,S$GLB,, | Performed by: NURSE PRACTITIONER

## 2020-10-02 PROCEDURE — 99214 OFFICE O/P EST MOD 30 MIN: CPT | Mod: HCNC,S$GLB,, | Performed by: NURSE PRACTITIONER

## 2020-10-02 PROCEDURE — 1101F PR PT FALLS ASSESS DOC 0-1 FALLS W/OUT INJ PAST YR: ICD-10-PCS | Mod: HCNC,CPTII,S$GLB, | Performed by: NURSE PRACTITIONER

## 2020-10-02 PROCEDURE — 99999 PR PBB SHADOW E&M-EST. PATIENT-LVL V: ICD-10-PCS | Mod: PBBFAC,HCNC,, | Performed by: NURSE PRACTITIONER

## 2020-10-02 PROCEDURE — 1159F MED LIST DOCD IN RCRD: CPT | Mod: HCNC,S$GLB,, | Performed by: NURSE PRACTITIONER

## 2020-10-02 PROCEDURE — 1126F AMNT PAIN NOTED NONE PRSNT: CPT | Mod: HCNC,S$GLB,, | Performed by: NURSE PRACTITIONER

## 2020-10-02 PROCEDURE — 1101F PT FALLS ASSESS-DOCD LE1/YR: CPT | Mod: HCNC,CPTII,S$GLB, | Performed by: NURSE PRACTITIONER

## 2020-10-02 PROCEDURE — 99999 PR PBB SHADOW E&M-EST. PATIENT-LVL V: CPT | Mod: PBBFAC,HCNC,, | Performed by: NURSE PRACTITIONER

## 2020-10-02 PROCEDURE — 1126F PR PAIN SEVERITY QUANTIFIED, NO PAIN PRESENT: ICD-10-PCS | Mod: HCNC,S$GLB,, | Performed by: NURSE PRACTITIONER

## 2020-10-02 PROCEDURE — 1159F PR MEDICATION LIST DOCUMENTED IN MEDICAL RECORD: ICD-10-PCS | Mod: HCNC,S$GLB,, | Performed by: NURSE PRACTITIONER

## 2020-10-02 RX ORDER — ALUMINUM HYDROXIDE, MAGNESIUM HYDROXIDE, AND SIMETHICONE 2400; 240; 2400 MG/30ML; MG/30ML; MG/30ML
SUSPENSION ORAL EVERY 6 HOURS PRN
COMMUNITY
End: 2023-12-14

## 2020-10-02 RX ORDER — PANTOPRAZOLE SODIUM 40 MG/1
40 TABLET, DELAYED RELEASE ORAL 2 TIMES DAILY
Qty: 60 TABLET | Refills: 1 | Status: SHIPPED | OUTPATIENT
Start: 2020-10-02 | End: 2020-11-01

## 2020-10-02 NOTE — PROGRESS NOTES
Subjective:       Patient ID: Holli Landrum is a 87 y.o. female Body mass index is 32.14 kg/m².    Chief Complaint: Dysphagia (cough)    This patient is established with LELA Colby NP, & myself.    GI Problem  The primary symptoms include fatigue, nausea (occasional, slight, relates to reflux) and dysuria (history of UTI's; seeing urologist for it; has a french in place since 1/2020). Primary symptoms do not include fever, weight loss, abdominal pain, vomiting, diarrhea, melena, hematemesis or hematochezia.   The illness is also significant for dysphagia (recurred ~1-2 weeks ago, occurs with liquids, food & pills; egd with dilation helped in the past) and tenesmus. The illness does not include chills, odynophagia or constipation. Associated symptoms comments: Bowel movements once daily to once every 2-3 days  TREATMENT: probiotic daily; PAST TREATMENT: metamucil. Significant associated medical issues include GERD (frequent; mylanta prn, tagamet 800 mg nightly; PAST TREATMENT: nexium x 3 months no relief, just ran out of it recently) and irritable bowel syndrome. Associated medical issues do not include inflammatory bowel disease.     Review of Systems   Constitutional: Positive for fatigue. Negative for appetite change, chills, fever and weight loss.   HENT: Positive for trouble swallowing. Negative for sore throat.    Respiratory: Positive for cough and shortness of breath (history of COPD). Negative for choking.    Cardiovascular: Negative for chest pain.   Gastrointestinal: Positive for dysphagia (recurred ~1-2 weeks ago, occurs with liquids, food & pills; egd with dilation helped in the past) and nausea (occasional, slight, relates to reflux). Negative for abdominal pain, anal bleeding, blood in stool, constipation, diarrhea, hematemesis, hematochezia, melena, rectal pain and vomiting.   Genitourinary: Positive for difficulty urinating, dysuria (history of UTI's; seeing urologist for it; has a french  in place since 1/2020) and pelvic pain (reports bladder pain). Negative for flank pain.   Neurological: Negative for weakness.       No LMP recorded (lmp unknown). Patient has had a hysterectomy.  Past Medical History:   Diagnosis Date    Anticoagulant long-term use     Arthritis     Asthma     Basal cell carcinoma     Cancer     skin cancer to face    Cataract     OU done//    CHF (congestive heart failure)     COPD (chronic obstructive pulmonary disease)     no oxygen; patient denies    Essential hypertension 5/5/2010    GERD (gastroesophageal reflux disease) 11/20/2012    Glaucoma     Hypertensive heart disease with heart failure 02/05/2013    Paroxysmal ventricular tachycardia     per problem list    Squamous cell carcinoma of skin      Past Surgical History:   Procedure Laterality Date    BREAST BIOPSY Left 1995    neg    BREAST BIOPSY Right 1984    neg    BREAST BIOPSY Right 1992    neg    CARDIAC CATHETERIZATION  2013, 2014,2016    has 7 stents    CARDIAC SURGERY  2012    stents    CATARACT EXTRACTION W/  INTRAOCULAR LENS IMPLANT Left 11/01/2018    Dr Rose    CATARACT EXTRACTION W/  INTRAOCULAR LENS IMPLANT Right 12/13/2018    Dr Rose//    CHOLECYSTECTOMY      COLONOSCOPY  ~2005    Dr. Edward; normal per patient report    CORONARY ANGIOGRAPHY N/A 3/20/2020    Procedure: ANGIOGRAM, CORONARY ARTERY;  Surgeon: Chuy Bryant MD;  Location: [facility] CATH;  Service: Cardiology;  Laterality: N/A;    CYSTOSCOPY W/ RETROGRADES Bilateral 2/12/2020    Procedure: CYSTOSCOPY, WITH RETROGRADE PYELOGRAM;  Surgeon: Gasper Feliz MD;  Location: [facility] OR;  Service: Urology;  Laterality: Bilateral;    ESOPHAGOGASTRODUODENOSCOPY N/A 8/13/2020    Procedure: EGD (ESOPHAGOGASTRODUODENOSCOPY);  Surgeon: Mika Solorzano MD;  Location: [facility] ENDO;  Service: Endoscopy;  Laterality: N/A; Mild Schatzki ring. Biopsied. Dilated. small hiatal hernia, gastritis; biopsy: esophagus- SEVERE REFLUX  ESOPHAGITIS, stomach- chronic gastritis, negative for H pylori    HYSTERECTOMY  1969    LEFT HEART CATHETERIZATION Left 3/3/2020    Procedure: Left heart cath;  Surgeon: Chuy Bryant MD;  Location: [facility] CATH;  Service: Cardiology;  Laterality: Left;    LEFT HEART CATHETERIZATION Left 3/20/2020    Procedure: Left heart cath;  Surgeon: Chuy Bryant MD;  Location: [facility] CATH;  Service: Cardiology;  Laterality: Left;    OVARIAN CYST REMOVAL  teenager    PHACOEMULSIFICATION OF CATARACT Left 11/1/2018    Procedure: PHACOEMULSIFICATION, CATARACT;  Surgeon: Aleksandar Rose Jr., MD;  Location: [facility] OR;  Service: Ophthalmology;  Laterality: Left;    PHACOEMULSIFICATION OF CATARACT Right 12/13/2018    Procedure: PHACOEMULSIFICATION, CATARACT;  Surgeon: Alkesandar Rose Jr., MD;  Location: [facility] OR;  Service: Ophthalmology;  Laterality: Right;    TONSILLECTOMY      aw/denoids    UPPER GASTROINTESTINAL ENDOSCOPY  ~2005    Dr. Solorzano    VASCULAR SURGERY      to remove clot from right leg    Yag Capsulotomy Bilateral 11/05/2019    Dr Rose     Family History   Problem Relation Age of Onset    Diabetes Brother     Heart disease Brother     Diabetes Mother     Cancer Maternal Grandfather     Clotting disorder Son         bleeding after tonsillectomy only    Amblyopia Neg Hx     Blindness Neg Hx     Cataracts Neg Hx     Glaucoma Neg Hx     Hypertension Neg Hx     Macular degeneration Neg Hx     Retinal detachment Neg Hx     Strabismus Neg Hx     Stroke Neg Hx     Thyroid disease Neg Hx     Colon cancer Neg Hx      Wt Readings from Last 10 Encounters:   10/02/20 87.6 kg (193 lb 2 oz)   09/28/20 88.5 kg (195 lb)   09/23/20 88 kg (194 lb)   09/17/20 88.2 kg (194 lb 6.4 oz)   08/31/20 88.2 kg (194 lb 7.1 oz)   08/13/20 86.6 kg (191 lb)   07/21/20 88 kg (194 lb 0.1 oz)   07/15/20 88 kg (194 lb)   06/26/20 86.6 kg (191 lb)   06/11/20 86.8 kg (191 lb 6.4 oz)     Lab Results   Component Value Date    WBC 6.96  09/15/2020    HGB 12.8 09/15/2020    HCT 41.3 09/15/2020    MCV 93 09/15/2020     09/15/2020     CMP  Sodium   Date Value Ref Range Status   09/15/2020 143 136 - 145 mmol/L Final   08/15/2015 135 (L) 137 - 145 MMOL/L Final     Potassium   Date Value Ref Range Status   09/15/2020 4.2 3.5 - 5.1 mmol/L Final   08/15/2015 4.0 3.5 - 5.1 MMOL/L Final     Chloride   Date Value Ref Range Status   09/15/2020 108 95 - 110 mmol/L Final   08/15/2015 99 98 - 107 MMOL/L Final     CO2   Date Value Ref Range Status   09/15/2020 27 23 - 29 mmol/L Final     Glucose   Date Value Ref Range Status   09/15/2020 104 70 - 110 mg/dL Final     BUN, Bld   Date Value Ref Range Status   09/15/2020 23 8 - 23 mg/dL Final     Creatinine   Date Value Ref Range Status   09/15/2020 1.1 0.5 - 1.4 mg/dL Final   08/15/2015 1.05 (H) 0.52 - 1.04 MG/DL Final     Calcium   Date Value Ref Range Status   09/15/2020 10.2 8.7 - 10.5 mg/dL Final     Total Protein   Date Value Ref Range Status   08/05/2020 7.1 6.0 - 8.4 g/dL Final     Albumin   Date Value Ref Range Status   09/15/2020 4.0 3.5 - 5.2 g/dL Final   08/15/2015 4.4 3.5 - 5.0 G/DL Final     Total Bilirubin   Date Value Ref Range Status   08/05/2020 0.4 0.2 - 1.3 mg/dL Final     Alkaline Phosphatase   Date Value Ref Range Status   08/05/2020 99 38 - 145 U/L Final     AST ([place])   Date Value Ref Range Status   04/05/2016 23 14 - 36 U/L Final     AST   Date Value Ref Range Status   08/05/2020 29 14 - 36 U/L Final     ALT   Date Value Ref Range Status   08/05/2020 19 0 - 35 U/L Final     Anion Gap   Date Value Ref Range Status   09/15/2020 8 8 - 16 mmol/L Final     eGFR if    Date Value Ref Range Status   09/15/2020 52.2 (A) >60 mL/min/1.73 m^2 Final     eGFR if non    Date Value Ref Range Status   09/15/2020 45.3 (A) >60 mL/min/1.73 m^2 Final     Comment:     Calculation used to obtain the estimated glomerular filtration  rate (eGFR) is the CKD-EPI equation.         Lab Results   Component Value Date    TSH 0.725 09/23/2020 12/17/2019 stool studies reviewed (acidic)    Reviewed prior medical records including radiology report of 7/10/2020 UGI with esophagram; 12/16/19 ct abdomen pelvis; 12/11/19 pelvic ultrasound; 11/29/19 abdominal ultrasound & endoscopy history (see surgical history).    Objective:      Physical Exam  Vitals signs and nursing note reviewed.   Constitutional:       General: She is not in acute distress.     Appearance: Normal appearance. She is well-developed. She is not diaphoretic.   HENT:      Mouth/Throat:      Comments: Patient is wearing a face mask, which covers patient's mouth and nose, due to COVID 19 concerns.  Eyes:      General: No scleral icterus.     Conjunctiva/sclera: Conjunctivae normal.      Pupils: Pupils are equal, round, and reactive to light.   Pulmonary:      Effort: Pulmonary effort is normal. No respiratory distress.      Breath sounds: Normal breath sounds. No wheezing.   Abdominal:      General: Bowel sounds are normal. There is no distension or abdominal bruit.      Palpations: Abdomen is soft. Abdomen is not rigid. There is no mass.      Tenderness: There is no abdominal tenderness. There is no guarding or rebound. Negative signs include Lozoya's sign and McBurney's sign.   Skin:     General: Skin is warm and dry.      Coloration: Skin is not pale.      Findings: No erythema or rash.      Comments: Non-jaundiced   Neurological:      Mental Status: She is alert and oriented to person, place, and time.   Psychiatric:         Behavior: Behavior normal.         Thought Content: Thought content normal.         Judgment: Judgment normal.         Assessment:       1. Pharyngoesophageal dysphagia    2. Schatzki's ring    3. Heartburn    4. Hiatal hernia    5. Esophageal dysmotility    6. Cough    7. History of shortness of breath    8. Urinary symptom or sign    9. History of UTI    10. Dysuria    11. Anticoagulant long-term use     12. Fatigue, unspecified type        Plan:       Pharyngoesophageal dysphagia, Schatzki's ring, & Esophageal dysmotility  -     Fl Modified Barium Swallow Speech; Future; Expected date: 10/02/2020  - schedule EGD, discussed procedure with patient and possible esophageal dilation may be performed during procedure if indicated, patient verbalized understanding  - educated patient to eat smaller more frequent meals and to eat slowly and advised to eat a soft diet.  - possible esophageal manometry if symptoms persist  -  START   pantoprazole (PROTONIX) 40 MG tablet; Take 1 tablet (40 mg total) by mouth 2 (two) times daily.  Dispense: 60 tablet; Refill: 1  - CONTINUE TAGAMET 800 MG NIGHTLY AS DIRECTED    Heartburn & Hiatal hernia  -   START  pantoprazole (PROTONIX) 40 MG tablet; Take 1 tablet (40 mg total) by mouth 2 (two) times daily.  Dispense: 60 tablet; Refill: 1  - schedule EGD, discussed procedure with patient, including risks and benefits, patient verbalized understanding  - avoid/minimize use of NSAIDs- since they can cause GI upset, bleeding and/or ulcers. If NSAID must be taken, recommend take with food.    Cough  - schedule EGD, discussed procedure with patient, including risks and benefits, patient verbalized understanding  Recommend follow-up with Primary Care Provider for continued evaluation and management.    Urinary symptom or sign, History of UTI, & Dysuria  Recommend follow-up with urology for continued evaluation and management.    Anticoagulant long-term use  - informed patient that the anticoagulant(s) will likely need to be held for endoscopy, nurse will confirm with endoscopist, cardiologist, and/or PCP.    Fatigue, unspecified type  Recommend follow-up with Primary Care Provider for continued evaluation and management.    History of shortness of breath  Recommend follow-up with Primary Care Provider for continued evaluation and management.    Follow up in about 1 month (around 11/2/2020), or  if symptoms worsen or fail to improve.      If no improvement in symptoms or symptoms worsen, call/follow-up at clinic or go to ER.

## 2020-10-02 NOTE — PATIENT INSTRUCTIONS
What Is a Hiatal Hernia?    Hiatal hernia is when the area where the stomach and esophagus meet bulges up through the diaphragm into the chest cavity. In some cases, part of the stomach may bulge above the diaphragm. Stomach acid may move up into the esophagus and cause symptoms. The symptoms are often blamed on gastroesophageal reflux disease (GERD). You may only know about the hernia when it shows up on an X-ray taken for other reasons.   What you may feel  The hiatus is a normal hole in the diaphragm. The esophagus passes through this hole and leads to the stomach. In some cases, part of the stomach may bulge above the diaphragm. This bulge is called a hernia. Stomach acid may move up into the esophagus and cause symptoms.  When you eat, the muscle at the hiatus relaxes to allow food to pass into the stomach. It tightens again to keep food and digestive acids in the stomach.  Many people with hiatal hernias have mild symptoms. You may notice the following GERD symptoms:  · Heartburn or other chest discomfort  · A feeling of chest fullness after a meal  · Frequent burping  · Acid taste in the mouth  · Trouble swallowing  Treating symptoms  If you have been diagnosed with hiatal hernia, these suggestions may help improve symptoms:  · Lose excess weight. Extra weight puts pressure on the stomach and esophagus.  · Dont lie down after eating. Sit up for at least an hour after eating. Lying down after eating can increase symptoms.  · Avoid certain foods and drinks. These include fatty foods, chocolate, coffee, mint, and other foods that cause symptoms for you.  · Dont smoke or drink alcohol. These can worsen symptoms.  · Look at your medicines. Discuss your medicines with your healthcare provider. Many medicines can cause symptoms.  · Consider an antacid medicine. Ask your healthcare provider about over-the-counter and prescription medicines that may help.  · Ask about surgery, if needed. Surgery is a treatment  choice for some people. Your healthcare provider can determine if surgery is an option for you.    Date Last Reviewed: 10/1/2016  © 0315-2119 [employer]. [address] [place] [address], [place] [address]. All rights reserved. This information is not intended as a substitute for professional medical care. Always follow your healthcare professional's instructions.  Discharge Instructions: Eating a Soft Diet  You have been prescribed a soft diet (also called gastrointestinal soft diet or bland diet). This reduces the amount of work your digestive tract has to do. It also reduces the chance that your digestive tract will be irritated by the food you eat. A soft diet is prescribed for people with digestive problems. The diet consists of foods that are tender, mildly seasoned, and easy to digest. While on this diet, you should not eat fried or spicy foods, or raw fruits and vegetables. Also avoid alcoholic beverages.  General guidelines  · Eat in a calm, relaxed atmosphere. How you eat may be as important as what you eat. Dont rush while eating. Chew your food slowly and thoroughly, and swallow slowly.  · Eat small frequent meals throughout the day, but dont eat within 2 hours of bedtime.  · Avoid any foods that cause discomfort.  · Dont use NSAIDs (nonsteroidal anti-inflammatory drugs), such as aspirin, and ibuprofen. Also avoid medicine that contain aspirin. NSAIDs can cause ulcers and delay or prevent ulcer healing.  · Use antacids as needed, but keep in mind that magnesium-containing antacids may cause diarrhea.  Foods to eat  · Cream of wheat and cream of rice  · Cooked white rice  · Mashed potatoes, and boiled potatoes without skin  · Plain pasta and noodles  · Plain white crackers (such as no-salt soda crackers)  · White bread  · Applesauce  · Cooked fruits without skins or seeds  · Mild juices, such as apple and grape  · Bananas  · Cooked or mashed vegetables without stems and seeds  ? Carrots  ? Summer  squash (zucchini, yellow squash)  ? Winter squash (acorn, butternut, spaghetti squash)  · Cottage cheese  · Mild hard or soft cheeses  · Custard  · Yogurt without seeds or nuts  · Milk (you may need lactose-free milk)  · Ice cream without seeds or nuts  · Smooth peanut butter  · Eggs  · Fish, turkey, chicken, or other meat that is not tough or stringy  · Tofu  Foods to avoid  · Nuts and seeds  · Snack foods, such as the following:  ? Chocolate-containing snacks, candy, pastries, or cakes.  ? Potato chips (plain, barbecued, or other flavors)  ? Taco chips or nachos  ? Corn chips  ? Popcorn, popcorn cakes, or rice cakes  ? Crackers with nuts, seeds, or spicy seasonings  ? French fries  · Fried or greasy foods  · Whole-grain breads, rolls, and crackers  · Breads and rolls with nuts, seeds, or bran  · Bran and granola cereals  · Berries with seeds, such as strawberries, raspberries, and blackberries  · Acidic fruits, such as oranges, grapefruits, maxx, limes, and pineapples  · Raw vegetables  · Mild or hot peppers  · Sauerkraut and pickled vegetables  · Tomatoes or tomato products, such as tomato paste, tomato sauce, and tomato juice  · Barbecue sauce  · Spicy or flavored cheeses, such as jalapeño and black pepper cheese  · Crunchy peanut butter  · Dried cooked beans, such as soliman, kidney, or navy beans  · The following meats:  ? Fried or greasy meats  ? Processed, spicy meats, such as sausage, melo, ham, and lunch meats  ? Ribs and other meats with barbecue sauce  ? Tough or stringy meats, such as corned beef or beef jerky  Fluids to avoid  · Alcoholic beverages  · Coffee and regular teas  · Louis and other drinks with caffeine  · Cranberry, orange, pineapple, and grapefruit juice  · Lemonade  · Vegetable juice  · Whole milk, if you are lactose intolerant  Follow-up  Make a follow-up appointment with a dietitian as directed by our staff.  Date Last Reviewed: 6/21/2015  © 4553-7271 [employer]. [address]  [place] [address]. All rights reserved. This information is not intended as a substitute for professional medical care. Always follow your healthcare professional's instructions.

## 2020-10-02 NOTE — TELEPHONE ENCOUNTER
Pt having EGD on 10/22 with Dr. Hendricks. Can pt hold Plavix for 5 days prior? Please advise. Thanks

## 2020-10-09 ENCOUNTER — TELEPHONE (OUTPATIENT)
Dept: GASTROENTEROLOGY | Facility: CLINIC | Age: 85
End: 2020-10-09

## 2020-10-09 NOTE — TELEPHONE ENCOUNTER
----- Message from CHANTE Soares sent at 10/9/2020 11:22 AM CDT -----  Please call to inform & review the results with the patient- The radiology report and speech pathology reports showed unremarkable/normal findings of the swallow study.  Continue with previous recommendations.  Thanks,  Merari SHARIF-C

## 2020-10-15 ENCOUNTER — OFFICE VISIT (OUTPATIENT)
Dept: UROLOGY | Facility: CLINIC | Age: 85
End: 2020-10-15
Payer: MEDICARE

## 2020-10-15 DIAGNOSIS — N39.0 RECURRENT UTI: Primary | ICD-10-CM

## 2020-10-15 LAB
BILIRUB SERPL-MCNC: NORMAL MG/DL
BLOOD URINE, POC: NORMAL
CLARITY, POC UA: NORMAL
COLOR, POC UA: YELLOW
GLUCOSE UR QL STRIP: NORMAL
KETONES UR QL STRIP: NORMAL
LEUKOCYTE ESTERASE URINE, POC: NORMAL
NITRITE, POC UA: NORMAL
PH, POC UA: 5.5
PROTEIN, POC: NORMAL
SPECIFIC GRAVITY, POC UA: 1.01
UROBILINOGEN, POC UA: 0.2

## 2020-10-15 PROCEDURE — 99999 PR PBB SHADOW E&M-EST. PATIENT-LVL III: CPT | Mod: PBBFAC,HCNC,, | Performed by: UROLOGY

## 2020-10-15 PROCEDURE — 1101F PR PT FALLS ASSESS DOC 0-1 FALLS W/OUT INJ PAST YR: ICD-10-PCS | Mod: HCNC,CPTII,S$GLB, | Performed by: UROLOGY

## 2020-10-15 PROCEDURE — 1159F PR MEDICATION LIST DOCUMENTED IN MEDICAL RECORD: ICD-10-PCS | Mod: HCNC,S$GLB,, | Performed by: UROLOGY

## 2020-10-15 PROCEDURE — 1126F PR PAIN SEVERITY QUANTIFIED, NO PAIN PRESENT: ICD-10-PCS | Mod: HCNC,S$GLB,, | Performed by: UROLOGY

## 2020-10-15 PROCEDURE — 1159F MED LIST DOCD IN RCRD: CPT | Mod: HCNC,S$GLB,, | Performed by: UROLOGY

## 2020-10-15 PROCEDURE — 99213 PR OFFICE/OUTPT VISIT, EST, LEVL III, 20-29 MIN: ICD-10-PCS | Mod: HCNC,25,S$GLB, | Performed by: UROLOGY

## 2020-10-15 PROCEDURE — 99213 OFFICE O/P EST LOW 20 MIN: CPT | Mod: HCNC,25,S$GLB, | Performed by: UROLOGY

## 2020-10-15 PROCEDURE — 99999 PR PBB SHADOW E&M-EST. PATIENT-LVL III: ICD-10-PCS | Mod: PBBFAC,HCNC,, | Performed by: UROLOGY

## 2020-10-15 PROCEDURE — 81002 POCT URINE DIPSTICK WITHOUT MICROSCOPE: ICD-10-PCS | Mod: HCNC,S$GLB,, | Performed by: UROLOGY

## 2020-10-15 PROCEDURE — 81002 URINALYSIS NONAUTO W/O SCOPE: CPT | Mod: HCNC,S$GLB,, | Performed by: UROLOGY

## 2020-10-15 PROCEDURE — 1101F PT FALLS ASSESS-DOCD LE1/YR: CPT | Mod: HCNC,CPTII,S$GLB, | Performed by: UROLOGY

## 2020-10-15 PROCEDURE — 1126F AMNT PAIN NOTED NONE PRSNT: CPT | Mod: HCNC,S$GLB,, | Performed by: UROLOGY

## 2020-10-15 NOTE — PROGRESS NOTES
[facility]OLOGY [place]  10 15 20    Cc recurrent uti    Age 87, seen in the past for recurrent uti, and for use of french catheter. Has had french since jan 2020. Doing well with it.     A nurse assisting her observed that he would prefer if the french balloon to be a bit larger, as the 10 cc balloon appeared to be at risk of falling out.     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 We had discussed whether surgical removal of the diverticulum might help her situation, as she had been told that the diverticulum could retain urine. She was referred to dr hakeem herrera in case he could perform a robotic excision of the bladder diverticulum. Dr herrera took her to the OR at [facility] on 2 12 20. He performed cystoscopy and bilateral retrograde pyelography and evaluated the emptying of the kidneys into the bladder and the emptying of the diverticulum into the bladder. Conclusion was that the diverticulum was emptying well, and therefore there was no basis on which to recommend surgical excision in this elderly patient.      Pt has been well since then, and has no complaints. She was having some bladder spasms at around that time, and since then she has been put on ditropan 5 mg bid and she feels much better.      In addition to that, she was placed on keflex 250 mg daily as a uti prevention and seems to be working well.      IMP  Recurrent uti; doing well on prophylactic antibiotics  Neurogenic bladder, needing french drainage  Chronic renal insufficiency, cr 1.75, eGFR 26  Bladder diverticulum: draining well, so no need to do surgical excision  Bladder spasms, can stay on ditropan 5 mg bid  RTC 6 mo

## 2020-10-19 ENCOUNTER — LAB VISIT (OUTPATIENT)
Dept: FAMILY MEDICINE | Facility: CLINIC | Age: 85
End: 2020-10-19
Payer: MEDICARE

## 2020-10-19 DIAGNOSIS — Z01.818 PREOP TESTING: ICD-10-CM

## 2020-10-19 PROCEDURE — U0003 INFECTIOUS AGENT DETECTION BY NUCLEIC ACID (DNA OR RNA); SEVERE ACUTE RESPIRATORY SYNDROME CORONAVIRUS 2 (SARS-COV-2) (CORONAVIRUS DISEASE [COVID-19]), AMPLIFIED PROBE TECHNIQUE, MAKING USE OF HIGH THROUGHPUT TECHNOLOGIES AS DESCRIBED BY CMS-2020-01-R: HCPCS | Mod: HCNC

## 2020-10-20 LAB — SARS-COV-2 RNA RESP QL NAA+PROBE: NOT DETECTED

## 2020-10-21 ENCOUNTER — TELEPHONE (OUTPATIENT)
Dept: GASTROENTEROLOGY | Facility: CLINIC | Age: 85
End: 2020-10-21

## 2020-10-23 NOTE — TELEPHONE ENCOUNTER
Spoke with pt and informed of results and recommendations per Gerri Sal NP. Pt verbalized understanding.

## 2020-10-30 ENCOUNTER — TELEPHONE (OUTPATIENT)
Dept: GASTROENTEROLOGY | Facility: CLINIC | Age: 85
End: 2020-10-30

## 2020-10-30 RX ORDER — BISMUTH SUBCITRATE POTASSIUM, METRONIDAZOLE, TETRACYCLINE HYDROCHLORIDE 140; 125; 125 MG/1; MG/1; MG/1
3 CAPSULE ORAL
Qty: 168 CAPSULE | Refills: 0 | Status: SHIPPED | OUTPATIENT
Start: 2020-10-30 | End: 2020-11-13

## 2020-10-30 RX ORDER — PANTOPRAZOLE SODIUM 20 MG/1
20 TABLET, DELAYED RELEASE ORAL
Qty: 28 TABLET | Refills: 0 | Status: SHIPPED | OUTPATIENT
Start: 2020-10-30 | End: 2020-11-13

## 2020-10-30 NOTE — TELEPHONE ENCOUNTER
----- Message from Catalina Oliveira sent at 10/30/2020  9:51 AM CDT -----  Contact: Gen  Type:  Pharmacy Calling to Clarify an RX    Name of Caller:  Gen   Pharmacy Name:  [facility]  Prescription Name:  bismuth-metronidazole-tetracycline (PYLERA) 140-125-125 mg per capsule  What do they need to clarify?: Insurance is only wanting to pay for 10 day not 14  Best Call Back Number:  [phone]  Additional Information:  Please Advise ---Thank you

## 2020-11-03 ENCOUNTER — CLINICAL SUPPORT (OUTPATIENT)
Dept: CARDIOLOGY | Facility: CLINIC | Age: 85
End: 2020-11-03
Attending: FAMILY MEDICINE
Payer: MEDICARE

## 2020-11-03 VITALS — HEIGHT: 66 IN | WEIGHT: 189 LBS | BODY MASS INDEX: 30.37 KG/M2

## 2020-11-03 DIAGNOSIS — I42.2 ASYMMETRIC SEPTAL HYPERTROPHY: ICD-10-CM

## 2020-11-03 LAB
ASCENDING AORTA: 3.27 CM
AV INDEX (PROSTH): 0.38
AV MEAN GRADIENT: 21 MMHG
AV PEAK GRADIENT: 35 MMHG
AV VALVE AREA: 1.15 CM2
AV VELOCITY RATIO: 0.38
BSA FOR ECHO PROCEDURE: 2 M2
CV ECHO LV RWT: 0.38 CM
DOP CALC AO PEAK VEL: 2.94 M/S
DOP CALC AO VTI: 80.75 CM
DOP CALC LVOT AREA: 3 CM2
DOP CALC LVOT DIAMETER: 1.96 CM
DOP CALC LVOT PEAK VEL: 1.12 M/S
DOP CALC LVOT STROKE VOLUME: 92.88 CM3
DOP CALCLVOT PEAK VEL VTI: 30.8 CM
E WAVE DECELERATION TIME: 259.8 MSEC
E/A RATIO: 1.31
E/E' RATIO: 12.92 M/S
ECHO LV POSTERIOR WALL: 0.99 CM (ref 0.6–1.1)
FRACTIONAL SHORTENING: 37 % (ref 28–44)
INTERVENTRICULAR SEPTUM: 1.04 CM (ref 0.6–1.1)
IVRT: 97.05 MSEC
LA MAJOR: 4.79 CM
LA MINOR: 5.49 CM
LA WIDTH: 3.16 CM
LEFT ATRIUM SIZE: 4.02 CM
LEFT ATRIUM VOLUME INDEX: 28.3 ML/M2
LEFT ATRIUM VOLUME: 55.24 CM3
LEFT INTERNAL DIMENSION IN SYSTOLE: 3.29 CM (ref 2.1–4)
LEFT VENTRICLE DIASTOLIC VOLUME INDEX: 67.89 ML/M2
LEFT VENTRICLE DIASTOLIC VOLUME: 132.56 ML
LEFT VENTRICLE MASS INDEX: 103 G/M2
LEFT VENTRICLE SYSTOLIC VOLUME INDEX: 22.4 ML/M2
LEFT VENTRICLE SYSTOLIC VOLUME: 43.78 ML
LEFT VENTRICULAR INTERNAL DIMENSION IN DIASTOLE: 5.25 CM (ref 3.5–6)
LEFT VENTRICULAR MASS: 201.22 G
LV LATERAL E/E' RATIO: 12 M/S
LV SEPTAL E/E' RATIO: 14 M/S
MV PEAK A VEL: 0.64 M/S
MV PEAK E VEL: 0.84 M/S
PISA MRMAX VEL: 0.04 M/S
PISA TR MAX VEL: 2.81 M/S
PULM VEIN S/D RATIO: 1.18
PV PEAK D VEL: 0.44 M/S
PV PEAK S VEL: 0.52 M/S
RA MAJOR: 4.78 CM
RA PRESSURE: 3 MMHG
RA WIDTH: 3.11 CM
RIGHT VENTRICULAR END-DIASTOLIC DIMENSION: 4.22 CM
SINUS: 2.53 CM
STJ: 2.49 CM
TDI LATERAL: 0.07 M/S
TDI SEPTAL: 0.06 M/S
TDI: 0.07 M/S
TR MAX PG: 32 MMHG
TRICUSPID ANNULAR PLANE SYSTOLIC EXCURSION: 2.3 CM
TV REST PULMONARY ARTERY PRESSURE: 35 MMHG

## 2020-11-03 PROCEDURE — G0179 PR HOME HEALTH MD RECERTIFICATION: ICD-10-PCS | Mod: ,,, | Performed by: FAMILY MEDICINE

## 2020-11-03 PROCEDURE — 99999 PR PBB SHADOW E&M-EST. PATIENT-LVL I: CPT | Mod: PBBFAC,HCNC,,

## 2020-11-03 PROCEDURE — G0179 MD RECERTIFICATION HHA PT: HCPCS | Mod: ,,, | Performed by: FAMILY MEDICINE

## 2020-11-03 PROCEDURE — 93306 ECHO (CUPID ONLY): ICD-10-PCS | Mod: HCNC,S$GLB,, | Performed by: INTERNAL MEDICINE

## 2020-11-03 PROCEDURE — 93306 TTE W/DOPPLER COMPLETE: CPT | Mod: HCNC,S$GLB,, | Performed by: INTERNAL MEDICINE

## 2020-11-03 PROCEDURE — 99999 PR PBB SHADOW E&M-EST. PATIENT-LVL I: ICD-10-PCS | Mod: PBBFAC,HCNC,,

## 2020-11-04 PROBLEM — I35.0 NONRHEUMATIC AORTIC VALVE STENOSIS: Status: ACTIVE | Noted: 2020-11-04

## 2020-11-05 ENCOUNTER — TELEPHONE (OUTPATIENT)
Dept: GASTROENTEROLOGY | Facility: CLINIC | Age: 85
End: 2020-11-05

## 2020-11-05 NOTE — TELEPHONE ENCOUNTER
----- Message from Nell Sanders sent at 11/5/2020  4:39 PM CST -----  Regarding: medication  Contact: patient  Type: Needs Medical Advice  Who Called:  patient  Symptoms (please be specific):  kimberly  How long has patient had these symptoms:  kimberly  Pharmacy name and phone #:  [facility]  Best Call Back Number: [phone]  Additional Information: patient needs to speak to nurse about medication. Thanks!

## 2020-11-06 ENCOUNTER — TELEPHONE (OUTPATIENT)
Dept: GASTROENTEROLOGY | Facility: CLINIC | Age: 85
End: 2020-11-06

## 2020-11-06 NOTE — TELEPHONE ENCOUNTER
----- Message from Patricia Sandhu sent at 11/6/2020  4:00 PM CST -----  Contact: Gen with [facility] pharmacy at [phone]  Type:  Pharmacy Calling to Clarify an RX    Name of Caller:  Gen  Pharmacy Name:  [facility]  Prescription Name:  Protonix  What do they need to clarify?:  sending in [facility]   Best Call Back Number: [phone]

## 2020-11-06 NOTE — TELEPHONE ENCOUNTER
Spoke with patient and let her know that pantoprazole was sent in. She will check with the pharmacy as they did not give it to her when she picked up her pylera.

## 2020-11-09 NOTE — TELEPHONE ENCOUNTER
CASE MANAGEMENT INITIAL ASSESSMENT      Reviewed chart and completed assessment  With:patient   Explained Case Management role/services. Primary contact information:[address]:  Who do you trust or have selected to make healthcare decisions for you? Name:   Norma Ravi  Phone  Number: [phone]  Can this person be reached and be able to respond quickly, such as within a few minutes or hours? Yes      Admit date/status:11/7/20  Diagnosis:flank pain   Is this a Readmission?:  No      Insurance:medicare   Precert required for SNF: No       3 night stay required: Yes    Living arrangements, Adls, care needs, prior to admission:lives in 2 story home with wife. 2 step entry    [address] at home:  Walker__Cane__RTS__ BSC__Shower Chair__  02__ HHN__ CPAP__  BiPap__  Hospital Bed__ W/C___ Other__________    Services in the home and/or outpatient, prior to [address] (if applicable)   · Name:  · Address:  · Dialysis Schedule:  · Phone:  · Fax:    PT/OT recs:none    Hospital Exemption Notification (HEN):needed for SNF    Barriers to discharge:none    Plan/comments:spoke with patient. Plans on returning home at discharge. Reported IPTA and denied any DCP needs.  Virginia Rosa RN       ECOC on chart for MD signature Patient has positive UA, pending culture. Can we send something empirically? Please advise.

## 2020-11-10 ENCOUNTER — EXTERNAL HOME HEALTH (OUTPATIENT)
Dept: HOME HEALTH SERVICES | Facility: HOSPITAL | Age: 85
End: 2020-11-10
Payer: MEDICARE

## 2020-11-12 ENCOUNTER — DOCUMENT SCAN (OUTPATIENT)
Dept: HOME HEALTH SERVICES | Facility: HOSPITAL | Age: 85
End: 2020-11-12
Payer: MEDICARE

## 2020-12-08 ENCOUNTER — OFFICE VISIT (OUTPATIENT)
Dept: GASTROENTEROLOGY | Facility: CLINIC | Age: 85
End: 2020-12-08
Payer: MEDICARE

## 2020-12-08 VITALS — RESPIRATION RATE: 19 BRPM | HEIGHT: 66 IN | WEIGHT: 192.88 LBS | BODY MASS INDEX: 31 KG/M2

## 2020-12-08 DIAGNOSIS — K58.0 IRRITABLE BOWEL SYNDROME WITH DIARRHEA: Primary | ICD-10-CM

## 2020-12-08 DIAGNOSIS — B96.81 HELICOBACTER PYLORI GASTRITIS: ICD-10-CM

## 2020-12-08 DIAGNOSIS — R13.19 ESOPHAGEAL DYSPHAGIA: ICD-10-CM

## 2020-12-08 DIAGNOSIS — K21.9 GASTROESOPHAGEAL REFLUX DISEASE WITHOUT ESOPHAGITIS: Primary | ICD-10-CM

## 2020-12-08 DIAGNOSIS — K29.70 HELICOBACTER PYLORI GASTRITIS: ICD-10-CM

## 2020-12-08 PROCEDURE — 99999 PR PBB SHADOW E&M-EST. PATIENT-LVL IV: ICD-10-PCS | Mod: PBBFAC,HCNC,, | Performed by: INTERNAL MEDICINE

## 2020-12-08 PROCEDURE — 99213 OFFICE O/P EST LOW 20 MIN: CPT | Mod: HCNC,S$GLB,, | Performed by: INTERNAL MEDICINE

## 2020-12-08 PROCEDURE — 3288F FALL RISK ASSESSMENT DOCD: CPT | Mod: HCNC,CPTII,S$GLB, | Performed by: INTERNAL MEDICINE

## 2020-12-08 PROCEDURE — 3288F PR FALLS RISK ASSESSMENT DOCUMENTED: ICD-10-PCS | Mod: HCNC,CPTII,S$GLB, | Performed by: INTERNAL MEDICINE

## 2020-12-08 PROCEDURE — 1159F PR MEDICATION LIST DOCUMENTED IN MEDICAL RECORD: ICD-10-PCS | Mod: HCNC,S$GLB,, | Performed by: INTERNAL MEDICINE

## 2020-12-08 PROCEDURE — 1101F PT FALLS ASSESS-DOCD LE1/YR: CPT | Mod: HCNC,CPTII,S$GLB, | Performed by: INTERNAL MEDICINE

## 2020-12-08 PROCEDURE — 1101F PR PT FALLS ASSESS DOC 0-1 FALLS W/OUT INJ PAST YR: ICD-10-PCS | Mod: HCNC,CPTII,S$GLB, | Performed by: INTERNAL MEDICINE

## 2020-12-08 PROCEDURE — 99213 PR OFFICE/OUTPT VISIT, EST, LEVL III, 20-29 MIN: ICD-10-PCS | Mod: HCNC,S$GLB,, | Performed by: INTERNAL MEDICINE

## 2020-12-08 PROCEDURE — 1157F ADVNC CARE PLAN IN RCRD: CPT | Mod: HCNC,S$GLB,, | Performed by: INTERNAL MEDICINE

## 2020-12-08 PROCEDURE — 99999 PR PBB SHADOW E&M-EST. PATIENT-LVL IV: CPT | Mod: PBBFAC,HCNC,, | Performed by: INTERNAL MEDICINE

## 2020-12-08 PROCEDURE — 1126F AMNT PAIN NOTED NONE PRSNT: CPT | Mod: HCNC,S$GLB,, | Performed by: INTERNAL MEDICINE

## 2020-12-08 PROCEDURE — 1157F PR ADVANCE CARE PLAN OR EQUIV PRESENT IN MEDICAL RECORD: ICD-10-PCS | Mod: HCNC,S$GLB,, | Performed by: INTERNAL MEDICINE

## 2020-12-08 PROCEDURE — 1159F MED LIST DOCD IN RCRD: CPT | Mod: HCNC,S$GLB,, | Performed by: INTERNAL MEDICINE

## 2020-12-08 PROCEDURE — 1126F PR PAIN SEVERITY QUANTIFIED, NO PAIN PRESENT: ICD-10-PCS | Mod: HCNC,S$GLB,, | Performed by: INTERNAL MEDICINE

## 2020-12-08 RX ORDER — PANTOPRAZOLE SODIUM 40 MG/1
40 TABLET, DELAYED RELEASE ORAL DAILY
Qty: 30 TABLET | Refills: 11 | Status: SHIPPED | OUTPATIENT
Start: 2020-12-08 | End: 2022-02-24

## 2020-12-09 NOTE — PROGRESS NOTES
Pt presents for f/u GERD. Recent EGD with dilation of esophagus, dysphagia resolved. Esophageal biopsies c/w reflux. Gastric biopsies positive H pylori. Pt treated. No abd pain. History GERD. No bleeding. No fever or jaundice. No vomiting. Appetite and weight stable.     REVIEW OF SYSTEMS:   Constitutional: Negative for fever, appetite change and unexpected weight change.  HENT: Negative for sore throat and trouble swallowing.  Eyes: Negative for visual disturbance.  Respiratory: Negative for chest tightness, shortness of breath and wheezing.  Cardiovascular: Negative for chest pain.  Gastrointestinal:  as per HPI    PHYSICAL EXAMINATION:                                                        GENERAL:  Comfortable, in no acute distress.      SKIN: Non-jaundiced.                             HEENT EXAM:  Nonicteric.  No adenopathy.  Oropharynx is clear.               NECK:  Supple.                                                               LUNGS:  Clear.                                                               CARDIAC:  Regular rate and rhythm.  S1, S2.  No murmur.                      ABDOMEN:  Soft, positive bowel sounds, nontender.  No hepatosplenomegaly or masses.  No rebound or guarding.                                             EXTREMITIES:  No edema.       IMP: 1. GERD          2. H pylori - s/p therapy          3. Dysphagia - resolved s/p dilation    PLAN: 1. Resume once daily protonix for GERD (with PRN tagamet).             2. Stool H pylori in couple months to document eradication.

## 2020-12-14 ENCOUNTER — PATIENT MESSAGE (OUTPATIENT)
Dept: FAMILY MEDICINE | Facility: CLINIC | Age: 85
End: 2020-12-14

## 2020-12-14 DIAGNOSIS — Z12.31 ENCOUNTER FOR SCREENING MAMMOGRAM FOR BREAST CANCER: Primary | ICD-10-CM

## 2020-12-19 ENCOUNTER — HOSPITAL ENCOUNTER (OUTPATIENT)
Dept: RADIOLOGY | Facility: HOSPITAL | Age: 85
Discharge: HOME | End: 2020-12-19
Attending: FAMILY MEDICINE
Payer: MEDICARE

## 2020-12-19 DIAGNOSIS — Z12.31 SCREENING MAMMOGRAM, ENCOUNTER FOR: ICD-10-CM

## 2020-12-19 DIAGNOSIS — Z12.31 ENCOUNTER FOR SCREENING MAMMOGRAM FOR BREAST CANCER: ICD-10-CM

## 2020-12-19 PROCEDURE — 77067 SCR MAMMO BI INCL CAD: CPT | Mod: TC,HCNC,PO

## 2020-12-19 PROCEDURE — 77067 SCR MAMMO BI INCL CAD: CPT | Mod: 26,HCNC,, | Performed by: RADIOLOGY

## 2020-12-19 PROCEDURE — 77063 MAMMO DIGITAL SCREENING BILAT WITH TOMO: ICD-10-PCS | Mod: 26,HCNC,, | Performed by: RADIOLOGY

## 2020-12-19 PROCEDURE — 77067 MAMMO DIGITAL SCREENING BILAT WITH TOMO: ICD-10-PCS | Mod: 26,HCNC,, | Performed by: RADIOLOGY

## 2020-12-19 PROCEDURE — 77063 BREAST TOMOSYNTHESIS BI: CPT | Mod: 26,HCNC,, | Performed by: RADIOLOGY

## 2021-01-02 PROCEDURE — G0179 PR HOME HEALTH MD RECERTIFICATION: ICD-10-PCS | Mod: ,,, | Performed by: FAMILY MEDICINE

## 2021-01-02 PROCEDURE — G0179 MD RECERTIFICATION HHA PT: HCPCS | Mod: ,,, | Performed by: FAMILY MEDICINE

## 2021-01-10 ENCOUNTER — IMMUNIZATION (OUTPATIENT)
Dept: FAMILY MEDICINE | Facility: CLINIC | Age: 86
End: 2021-01-10
Payer: MEDICARE

## 2021-01-10 DIAGNOSIS — Z23 NEED FOR VACCINATION: ICD-10-CM

## 2021-01-10 PROCEDURE — 91300 COVID-19, MRNA, LNP-S, PF, 30 MCG/0.3 ML DOSE VACCINE: CPT | Mod: HCNC,,, | Performed by: FAMILY MEDICINE

## 2021-01-10 PROCEDURE — 91300 COVID-19, MRNA, LNP-S, PF, 30 MCG/0.3 ML DOSE VACCINE: ICD-10-PCS | Mod: HCNC,,, | Performed by: FAMILY MEDICINE

## 2021-01-10 PROCEDURE — 0001A COVID-19, MRNA, LNP-S, PF, 30 MCG/0.3 ML DOSE VACCINE: ICD-10-PCS | Mod: HCNC,CV19,, | Performed by: FAMILY MEDICINE

## 2021-01-10 PROCEDURE — 0001A COVID-19, MRNA, LNP-S, PF, 30 MCG/0.3 ML DOSE VACCINE: CPT | Mod: HCNC,CV19,, | Performed by: FAMILY MEDICINE

## 2021-01-11 ENCOUNTER — OFFICE VISIT (OUTPATIENT)
Dept: OPHTHALMOLOGY | Facility: CLINIC | Age: 86
End: 2021-01-11
Payer: MEDICARE

## 2021-01-11 DIAGNOSIS — H43.813 POSTERIOR VITREOUS DETACHMENT, BILATERAL: ICD-10-CM

## 2021-01-11 DIAGNOSIS — Z96.1 PSEUDOPHAKIA OF BOTH EYES: ICD-10-CM

## 2021-01-11 DIAGNOSIS — H40.053 OCULAR HYPERTENSION, BILATERAL: Primary | ICD-10-CM

## 2021-01-11 PROCEDURE — 1126F AMNT PAIN NOTED NONE PRSNT: CPT | Mod: HCNC,S$GLB,, | Performed by: OPHTHALMOLOGY

## 2021-01-11 PROCEDURE — 92012 PR EYE EXAM, EST PATIENT,INTERMED: ICD-10-PCS | Mod: HCNC,S$GLB,, | Performed by: OPHTHALMOLOGY

## 2021-01-11 PROCEDURE — 1157F ADVNC CARE PLAN IN RCRD: CPT | Mod: HCNC,S$GLB,, | Performed by: OPHTHALMOLOGY

## 2021-01-11 PROCEDURE — 1157F PR ADVANCE CARE PLAN OR EQUIV PRESENT IN MEDICAL RECORD: ICD-10-PCS | Mod: HCNC,S$GLB,, | Performed by: OPHTHALMOLOGY

## 2021-01-11 PROCEDURE — 99999 PR PBB SHADOW E&M-EST. PATIENT-LVL IV: ICD-10-PCS | Mod: PBBFAC,HCNC,, | Performed by: OPHTHALMOLOGY

## 2021-01-11 PROCEDURE — 92012 INTRM OPH EXAM EST PATIENT: CPT | Mod: HCNC,S$GLB,, | Performed by: OPHTHALMOLOGY

## 2021-01-11 PROCEDURE — 1126F PR PAIN SEVERITY QUANTIFIED, NO PAIN PRESENT: ICD-10-PCS | Mod: HCNC,S$GLB,, | Performed by: OPHTHALMOLOGY

## 2021-01-11 PROCEDURE — 99999 PR PBB SHADOW E&M-EST. PATIENT-LVL IV: CPT | Mod: PBBFAC,HCNC,, | Performed by: OPHTHALMOLOGY

## 2021-01-15 ENCOUNTER — LAB VISIT (OUTPATIENT)
Dept: LAB | Facility: HOSPITAL | Age: 86
End: 2021-01-15
Attending: UROLOGY
Payer: MEDICARE

## 2021-01-15 ENCOUNTER — TELEPHONE (OUTPATIENT)
Dept: UROLOGY | Facility: CLINIC | Age: 86
End: 2021-01-15

## 2021-01-15 DIAGNOSIS — N39.0 RECURRENT UTI: Primary | ICD-10-CM

## 2021-01-15 DIAGNOSIS — N39.0 RECURRENT UTI: ICD-10-CM

## 2021-01-15 LAB
BACTERIA #/AREA URNS HPF: ABNORMAL /HPF
BILIRUB UR QL STRIP: NEGATIVE
CLARITY UR: CLEAR
COLOR UR: YELLOW
GLUCOSE UR QL STRIP: NEGATIVE
HGB UR QL STRIP: ABNORMAL
KETONES UR QL STRIP: NEGATIVE
LEUKOCYTE ESTERASE UR QL STRIP: ABNORMAL
MICROSCOPIC COMMENT: ABNORMAL
NITRITE UR QL STRIP: POSITIVE
PH UR STRIP: 6 [PH] (ref 5–8)
PROT UR QL STRIP: NEGATIVE
RBC #/AREA URNS HPF: 2 /HPF (ref 0–4)
SP GR UR STRIP: 1.01 (ref 1–1.03)
URN SPEC COLLECT METH UR: ABNORMAL
WBC #/AREA URNS HPF: 80 /HPF (ref 0–5)

## 2021-01-15 PROCEDURE — 87088 URINE BACTERIA CULTURE: CPT | Mod: HCNC

## 2021-01-15 PROCEDURE — 81000 URINALYSIS NONAUTO W/SCOPE: CPT | Mod: HCNC,PO

## 2021-01-15 PROCEDURE — 87186 SC STD MICRODIL/AGAR DIL: CPT | Mod: 59,HCNC

## 2021-01-15 PROCEDURE — 87086 URINE CULTURE/COLONY COUNT: CPT | Mod: HCNC

## 2021-01-15 PROCEDURE — 87077 CULTURE AEROBIC IDENTIFY: CPT | Mod: HCNC

## 2021-01-18 LAB
BACTERIA UR CULT: ABNORMAL
BACTERIA UR CULT: ABNORMAL

## 2021-01-19 ENCOUNTER — TELEPHONE (OUTPATIENT)
Dept: UROLOGY | Facility: CLINIC | Age: 86
End: 2021-01-19

## 2021-01-19 ENCOUNTER — EXTERNAL HOME HEALTH (OUTPATIENT)
Dept: HOME HEALTH SERVICES | Facility: HOSPITAL | Age: 86
End: 2021-01-19
Payer: MEDICARE

## 2021-01-19 DIAGNOSIS — N30.00 ACUTE CYSTITIS WITHOUT HEMATURIA: Primary | ICD-10-CM

## 2021-01-19 RX ORDER — NITROFURANTOIN 25; 75 MG/1; MG/1
100 CAPSULE ORAL 2 TIMES DAILY
Qty: 20 CAPSULE | Refills: 1 | Status: SHIPPED | OUTPATIENT
Start: 2021-01-19 | End: 2021-01-29

## 2021-01-20 ENCOUNTER — EXTERNAL HOME HEALTH (OUTPATIENT)
Dept: HOME HEALTH SERVICES | Facility: HOSPITAL | Age: 86
End: 2021-01-20

## 2021-01-31 ENCOUNTER — IMMUNIZATION (OUTPATIENT)
Dept: FAMILY MEDICINE | Facility: CLINIC | Age: 86
End: 2021-01-31
Payer: MEDICARE

## 2021-01-31 DIAGNOSIS — Z23 NEED FOR VACCINATION: Primary | ICD-10-CM

## 2021-01-31 PROCEDURE — 91300 COVID-19, MRNA, LNP-S, PF, 30 MCG/0.3 ML DOSE VACCINE: CPT | Mod: PBBFAC | Performed by: INTERNAL MEDICINE

## 2021-01-31 PROCEDURE — 0002A COVID-19, MRNA, LNP-S, PF, 30 MCG/0.3 ML DOSE VACCINE: CPT | Mod: PBBFAC | Performed by: INTERNAL MEDICINE

## 2021-02-01 ENCOUNTER — PATIENT MESSAGE (OUTPATIENT)
Dept: UROLOGY | Facility: CLINIC | Age: 86
End: 2021-02-01

## 2021-02-02 ENCOUNTER — PATIENT MESSAGE (OUTPATIENT)
Dept: UROLOGY | Facility: CLINIC | Age: 86
End: 2021-02-02

## 2021-02-03 ENCOUNTER — LAB VISIT (OUTPATIENT)
Dept: LAB | Facility: HOSPITAL | Age: 86
End: 2021-02-03
Attending: UROLOGY
Payer: MEDICARE

## 2021-02-03 DIAGNOSIS — R30.0 DYSURIA: Primary | ICD-10-CM

## 2021-02-03 LAB
AMORPH CRY UR QL COMP ASSIST: ABNORMAL
BACTERIA #/AREA URNS AUTO: ABNORMAL /HPF
BILIRUB UR QL STRIP: NEGATIVE
CLARITY UR REFRACT.AUTO: ABNORMAL
COLOR UR AUTO: YELLOW
GLUCOSE UR QL STRIP: NEGATIVE
HGB UR QL STRIP: NEGATIVE
KETONES UR QL STRIP: NEGATIVE
LEUKOCYTE ESTERASE UR QL STRIP: ABNORMAL
MICROSCOPIC COMMENT: ABNORMAL
NITRITE UR QL STRIP: POSITIVE
PH UR STRIP: 7 [PH] (ref 5–8)
PROT UR QL STRIP: NEGATIVE
SP GR UR STRIP: 1 (ref 1–1.03)
URN SPEC COLLECT METH UR: ABNORMAL
WBC #/AREA URNS AUTO: 27 /HPF (ref 0–5)

## 2021-02-03 PROCEDURE — 87077 CULTURE AEROBIC IDENTIFY: CPT

## 2021-02-03 PROCEDURE — 87186 SC STD MICRODIL/AGAR DIL: CPT

## 2021-02-03 PROCEDURE — 81001 URINALYSIS AUTO W/SCOPE: CPT

## 2021-02-03 PROCEDURE — 87088 URINE BACTERIA CULTURE: CPT

## 2021-02-03 PROCEDURE — 87086 URINE CULTURE/COLONY COUNT: CPT

## 2021-02-05 ENCOUNTER — DOCUMENT SCAN (OUTPATIENT)
Dept: HOME HEALTH SERVICES | Facility: HOSPITAL | Age: 86
End: 2021-02-05
Payer: MEDICARE

## 2021-02-06 LAB — BACTERIA UR CULT: ABNORMAL

## 2021-02-09 DIAGNOSIS — N30.00 ACUTE CYSTITIS WITHOUT HEMATURIA: Primary | ICD-10-CM

## 2021-02-09 RX ORDER — AMOXICILLIN AND CLAVULANATE POTASSIUM 500; 125 MG/1; MG/1
1 TABLET, FILM COATED ORAL 2 TIMES DAILY
Qty: 14 TABLET | Refills: 0 | Status: SHIPPED | OUTPATIENT
Start: 2021-02-09 | End: 2021-08-11

## 2021-02-18 ENCOUNTER — TELEPHONE (OUTPATIENT)
Dept: FAMILY MEDICINE | Facility: CLINIC | Age: 86
End: 2021-02-18

## 2021-02-18 ENCOUNTER — DOCUMENT SCAN (OUTPATIENT)
Dept: HOME HEALTH SERVICES | Facility: HOSPITAL | Age: 86
End: 2021-02-18
Payer: MEDICARE

## 2021-02-22 ENCOUNTER — OFFICE VISIT (OUTPATIENT)
Dept: FAMILY MEDICINE | Facility: CLINIC | Age: 86
End: 2021-02-22
Payer: MEDICARE

## 2021-02-22 ENCOUNTER — PATIENT MESSAGE (OUTPATIENT)
Dept: OPHTHALMOLOGY | Facility: CLINIC | Age: 86
End: 2021-02-22

## 2021-02-22 ENCOUNTER — HOSPITAL ENCOUNTER (OUTPATIENT)
Dept: RADIOLOGY | Facility: HOSPITAL | Age: 86
Discharge: HOME | End: 2021-02-22
Attending: FAMILY MEDICINE
Payer: MEDICARE

## 2021-02-22 VITALS
DIASTOLIC BLOOD PRESSURE: 86 MMHG | HEART RATE: 60 BPM | TEMPERATURE: 99 F | HEIGHT: 66 IN | BODY MASS INDEX: 30.63 KG/M2 | OXYGEN SATURATION: 94 % | SYSTOLIC BLOOD PRESSURE: 136 MMHG | WEIGHT: 190.56 LBS

## 2021-02-22 DIAGNOSIS — M25.561 ACUTE PAIN OF RIGHT KNEE: Primary | ICD-10-CM

## 2021-02-22 DIAGNOSIS — M25.561 ACUTE PAIN OF RIGHT KNEE: ICD-10-CM

## 2021-02-22 DIAGNOSIS — L65.9 NONSCARRING HAIR LOSS: ICD-10-CM

## 2021-02-22 DIAGNOSIS — R63.0 ANOREXIA: ICD-10-CM

## 2021-02-22 PROCEDURE — 73562 XR KNEE 3 VIEW RIGHT: ICD-10-PCS | Mod: 26,RT,, | Performed by: RADIOLOGY

## 2021-02-22 PROCEDURE — 73562 X-RAY EXAM OF KNEE 3: CPT | Mod: 26,RT,, | Performed by: RADIOLOGY

## 2021-02-22 PROCEDURE — 99999 PR PBB SHADOW E&M-EST. PATIENT-LVL V: ICD-10-PCS | Mod: PBBFAC,,, | Performed by: FAMILY MEDICINE

## 2021-02-22 PROCEDURE — 1159F PR MEDICATION LIST DOCUMENTED IN MEDICAL RECORD: ICD-10-PCS | Mod: S$GLB,,, | Performed by: FAMILY MEDICINE

## 2021-02-22 PROCEDURE — 1100F PR PT FALLS ASSESS DOC 2+ FALLS/FALL W/INJURY/YR: ICD-10-PCS | Mod: CPTII,S$GLB,, | Performed by: FAMILY MEDICINE

## 2021-02-22 PROCEDURE — 73562 X-RAY EXAM OF KNEE 3: CPT | Mod: TC,PN,RT

## 2021-02-22 PROCEDURE — 3288F FALL RISK ASSESSMENT DOCD: CPT | Mod: CPTII,S$GLB,, | Performed by: FAMILY MEDICINE

## 2021-02-22 PROCEDURE — 1157F ADVNC CARE PLAN IN RCRD: CPT | Mod: S$GLB,,, | Performed by: FAMILY MEDICINE

## 2021-02-22 PROCEDURE — 99999 PR PBB SHADOW E&M-EST. PATIENT-LVL V: CPT | Mod: PBBFAC,,, | Performed by: FAMILY MEDICINE

## 2021-02-22 PROCEDURE — 99214 PR OFFICE/OUTPT VISIT, EST, LEVL IV, 30-39 MIN: ICD-10-PCS | Mod: S$GLB,,, | Performed by: FAMILY MEDICINE

## 2021-02-22 PROCEDURE — 1157F PR ADVANCE CARE PLAN OR EQUIV PRESENT IN MEDICAL RECORD: ICD-10-PCS | Mod: S$GLB,,, | Performed by: FAMILY MEDICINE

## 2021-02-22 PROCEDURE — 73590 X-RAY EXAM OF LOWER LEG: CPT | Mod: TC,PN,RT

## 2021-02-22 PROCEDURE — 3288F PR FALLS RISK ASSESSMENT DOCUMENTED: ICD-10-PCS | Mod: CPTII,S$GLB,, | Performed by: FAMILY MEDICINE

## 2021-02-22 PROCEDURE — 99214 OFFICE O/P EST MOD 30 MIN: CPT | Mod: S$GLB,,, | Performed by: FAMILY MEDICINE

## 2021-02-22 PROCEDURE — 73590 XR TIBIA FIBULA 2 VIEW RIGHT: ICD-10-PCS | Mod: 26,RT,, | Performed by: RADIOLOGY

## 2021-02-22 PROCEDURE — 1100F PTFALLS ASSESS-DOCD GE2>/YR: CPT | Mod: CPTII,S$GLB,, | Performed by: FAMILY MEDICINE

## 2021-02-22 PROCEDURE — 1159F MED LIST DOCD IN RCRD: CPT | Mod: S$GLB,,, | Performed by: FAMILY MEDICINE

## 2021-02-22 PROCEDURE — 73590 X-RAY EXAM OF LOWER LEG: CPT | Mod: 26,RT,, | Performed by: RADIOLOGY

## 2021-02-23 ENCOUNTER — PATIENT MESSAGE (OUTPATIENT)
Dept: FAMILY MEDICINE | Facility: CLINIC | Age: 86
End: 2021-02-23

## 2021-02-26 ENCOUNTER — OFFICE VISIT (OUTPATIENT)
Dept: OPHTHALMOLOGY | Facility: CLINIC | Age: 86
End: 2021-02-26
Payer: MEDICARE

## 2021-02-26 DIAGNOSIS — Z96.1 PSEUDOPHAKIA OF BOTH EYES: Primary | ICD-10-CM

## 2021-02-26 DIAGNOSIS — H52.7 REFRACTIVE ERROR: ICD-10-CM

## 2021-02-26 PROCEDURE — 3288F PR FALLS RISK ASSESSMENT DOCUMENTED: ICD-10-PCS | Mod: CPTII,S$GLB,, | Performed by: OPHTHALMOLOGY

## 2021-02-26 PROCEDURE — 99499 UNLISTED E&M SERVICE: CPT | Mod: S$GLB,,, | Performed by: OPHTHALMOLOGY

## 2021-02-26 PROCEDURE — 1101F PR PT FALLS ASSESS DOC 0-1 FALLS W/OUT INJ PAST YR: ICD-10-PCS | Mod: CPTII,S$GLB,, | Performed by: OPHTHALMOLOGY

## 2021-02-26 PROCEDURE — 3288F FALL RISK ASSESSMENT DOCD: CPT | Mod: CPTII,S$GLB,, | Performed by: OPHTHALMOLOGY

## 2021-02-26 PROCEDURE — 1126F PR PAIN SEVERITY QUANTIFIED, NO PAIN PRESENT: ICD-10-PCS | Mod: S$GLB,,, | Performed by: OPHTHALMOLOGY

## 2021-02-26 PROCEDURE — 99499 NO LOS: ICD-10-PCS | Mod: S$GLB,,, | Performed by: OPHTHALMOLOGY

## 2021-02-26 PROCEDURE — 1101F PT FALLS ASSESS-DOCD LE1/YR: CPT | Mod: CPTII,S$GLB,, | Performed by: OPHTHALMOLOGY

## 2021-02-26 PROCEDURE — 99999 PR PBB SHADOW E&M-EST. PATIENT-LVL III: CPT | Mod: PBBFAC,,, | Performed by: OPHTHALMOLOGY

## 2021-02-26 PROCEDURE — 99999 PR PBB SHADOW E&M-EST. PATIENT-LVL III: ICD-10-PCS | Mod: PBBFAC,,, | Performed by: OPHTHALMOLOGY

## 2021-02-26 PROCEDURE — 1126F AMNT PAIN NOTED NONE PRSNT: CPT | Mod: S$GLB,,, | Performed by: OPHTHALMOLOGY

## 2021-02-26 PROCEDURE — 1157F PR ADVANCE CARE PLAN OR EQUIV PRESENT IN MEDICAL RECORD: ICD-10-PCS | Mod: S$GLB,,, | Performed by: OPHTHALMOLOGY

## 2021-02-26 PROCEDURE — 1157F ADVNC CARE PLAN IN RCRD: CPT | Mod: S$GLB,,, | Performed by: OPHTHALMOLOGY

## 2021-03-02 ENCOUNTER — PATIENT MESSAGE (OUTPATIENT)
Dept: GASTROENTEROLOGY | Facility: CLINIC | Age: 86
End: 2021-03-02

## 2021-03-03 PROCEDURE — G0179 MD RECERTIFICATION HHA PT: HCPCS | Mod: ,,, | Performed by: FAMILY MEDICINE

## 2021-03-03 PROCEDURE — G0179 PR HOME HEALTH MD RECERTIFICATION: ICD-10-PCS | Mod: ,,, | Performed by: FAMILY MEDICINE

## 2021-03-04 ENCOUNTER — TELEPHONE (OUTPATIENT)
Dept: GASTROENTEROLOGY | Facility: CLINIC | Age: 86
End: 2021-03-04

## 2021-03-11 ENCOUNTER — EXTERNAL HOME HEALTH (OUTPATIENT)
Dept: HOME HEALTH SERVICES | Facility: HOSPITAL | Age: 86
End: 2021-03-11
Payer: MEDICARE

## 2021-03-23 ENCOUNTER — OFFICE VISIT (OUTPATIENT)
Dept: DERMATOLOGY | Facility: CLINIC | Age: 86
End: 2021-03-23
Payer: MEDICARE

## 2021-03-23 VITALS — RESPIRATION RATE: 20 BRPM | BODY MASS INDEX: 30.76 KG/M2 | HEIGHT: 66 IN

## 2021-03-23 DIAGNOSIS — D48.5 NEOPLASM OF UNCERTAIN BEHAVIOR OF SKIN: ICD-10-CM

## 2021-03-23 DIAGNOSIS — L82.1 SEBORRHEIC KERATOSES: Primary | ICD-10-CM

## 2021-03-23 PROCEDURE — 88305 TISSUE EXAM BY PATHOLOGIST: CPT | Mod: 59 | Performed by: PATHOLOGY

## 2021-03-23 PROCEDURE — 1100F PR PT FALLS ASSESS DOC 2+ FALLS/FALL W/INJURY/YR: ICD-10-PCS | Mod: CPTII,S$GLB,, | Performed by: DERMATOLOGY

## 2021-03-23 PROCEDURE — 99999 PR PBB SHADOW E&M-EST. PATIENT-LVL IV: CPT | Mod: PBBFAC,,, | Performed by: DERMATOLOGY

## 2021-03-23 PROCEDURE — 1159F PR MEDICATION LIST DOCUMENTED IN MEDICAL RECORD: ICD-10-PCS | Mod: S$GLB,,, | Performed by: DERMATOLOGY

## 2021-03-23 PROCEDURE — 11103 PR TANGENTIAL BIOPSY, SKIN, EA ADDTL LESION: ICD-10-PCS | Mod: S$GLB,,, | Performed by: DERMATOLOGY

## 2021-03-23 PROCEDURE — 1100F PTFALLS ASSESS-DOCD GE2>/YR: CPT | Mod: CPTII,S$GLB,, | Performed by: DERMATOLOGY

## 2021-03-23 PROCEDURE — 99212 PR OFFICE/OUTPT VISIT, EST, LEVL II, 10-19 MIN: ICD-10-PCS | Mod: 25,S$GLB,, | Performed by: DERMATOLOGY

## 2021-03-23 PROCEDURE — 1157F PR ADVANCE CARE PLAN OR EQUIV PRESENT IN MEDICAL RECORD: ICD-10-PCS | Mod: S$GLB,,, | Performed by: DERMATOLOGY

## 2021-03-23 PROCEDURE — 11103 TANGNTL BX SKIN EA SEP/ADDL: CPT | Mod: S$GLB,,, | Performed by: DERMATOLOGY

## 2021-03-23 PROCEDURE — 3288F FALL RISK ASSESSMENT DOCD: CPT | Mod: CPTII,S$GLB,, | Performed by: DERMATOLOGY

## 2021-03-23 PROCEDURE — 88305 TISSUE EXAM BY PATHOLOGIST: CPT | Mod: 26,,, | Performed by: PATHOLOGY

## 2021-03-23 PROCEDURE — 1157F ADVNC CARE PLAN IN RCRD: CPT | Mod: S$GLB,,, | Performed by: DERMATOLOGY

## 2021-03-23 PROCEDURE — 99212 OFFICE O/P EST SF 10 MIN: CPT | Mod: 25,S$GLB,, | Performed by: DERMATOLOGY

## 2021-03-23 PROCEDURE — 88305 TISSUE EXAM BY PATHOLOGIST: ICD-10-PCS | Mod: 26,,, | Performed by: PATHOLOGY

## 2021-03-23 PROCEDURE — 1126F PR PAIN SEVERITY QUANTIFIED, NO PAIN PRESENT: ICD-10-PCS | Mod: S$GLB,,, | Performed by: DERMATOLOGY

## 2021-03-23 PROCEDURE — 11104 PR PUNCH BIOPSY, SKIN, SINGLE LESION: ICD-10-PCS | Mod: S$GLB,,, | Performed by: DERMATOLOGY

## 2021-03-23 PROCEDURE — 11104 PUNCH BX SKIN SINGLE LESION: CPT | Mod: S$GLB,,, | Performed by: DERMATOLOGY

## 2021-03-23 PROCEDURE — 1159F MED LIST DOCD IN RCRD: CPT | Mod: S$GLB,,, | Performed by: DERMATOLOGY

## 2021-03-23 PROCEDURE — 3288F PR FALLS RISK ASSESSMENT DOCUMENTED: ICD-10-PCS | Mod: CPTII,S$GLB,, | Performed by: DERMATOLOGY

## 2021-03-23 PROCEDURE — 99999 PR PBB SHADOW E&M-EST. PATIENT-LVL IV: ICD-10-PCS | Mod: PBBFAC,,, | Performed by: DERMATOLOGY

## 2021-03-23 PROCEDURE — 1126F AMNT PAIN NOTED NONE PRSNT: CPT | Mod: S$GLB,,, | Performed by: DERMATOLOGY

## 2021-03-29 LAB
FINAL PATHOLOGIC DIAGNOSIS: NORMAL
GROSS: NORMAL
MICROSCOPIC EXAM: NORMAL

## 2021-03-30 ENCOUNTER — TELEPHONE (OUTPATIENT)
Dept: DERMATOLOGY | Facility: CLINIC | Age: 86
End: 2021-03-30

## 2021-04-06 ENCOUNTER — CLINICAL SUPPORT (OUTPATIENT)
Dept: DERMATOLOGY | Facility: CLINIC | Age: 86
End: 2021-04-06
Payer: MEDICARE

## 2021-04-06 DIAGNOSIS — Z48.02 VISIT FOR SUTURE REMOVAL: Primary | ICD-10-CM

## 2021-04-06 PROCEDURE — 99024 POSTOP FOLLOW-UP VISIT: CPT | Mod: S$GLB,,, | Performed by: DERMATOLOGY

## 2021-04-06 PROCEDURE — 99024 SUTURE REMOVAL: ICD-10-PCS | Mod: S$GLB,,, | Performed by: DERMATOLOGY

## 2021-04-12 ENCOUNTER — PATIENT MESSAGE (OUTPATIENT)
Dept: UROLOGY | Facility: CLINIC | Age: 86
End: 2021-04-12

## 2021-04-12 ENCOUNTER — TELEPHONE (OUTPATIENT)
Dept: FAMILY MEDICINE | Facility: CLINIC | Age: 86
End: 2021-04-12

## 2021-04-12 ENCOUNTER — TELEPHONE (OUTPATIENT)
Dept: UROLOGY | Facility: CLINIC | Age: 86
End: 2021-04-12

## 2021-04-12 DIAGNOSIS — R31.9 HEMATURIA, UNSPECIFIED TYPE: Primary | ICD-10-CM

## 2021-04-12 PROBLEM — R09.82 POSTNASAL DRIP: Status: ACTIVE | Noted: 2021-04-12

## 2021-04-14 ENCOUNTER — LAB VISIT (OUTPATIENT)
Dept: LAB | Facility: HOSPITAL | Age: 86
End: 2021-04-14
Attending: UROLOGY
Payer: MEDICARE

## 2021-04-14 DIAGNOSIS — R33.9 RETENTION OF URINE, UNSPECIFIED: Primary | ICD-10-CM

## 2021-04-14 DIAGNOSIS — N18.30 CHRONIC KIDNEY DISEASE, STAGE III (MODERATE): ICD-10-CM

## 2021-04-14 DIAGNOSIS — I13.0 HYPERTENSIVE HEART AND RENAL DISEASE WITH CONGESTIVE HEART FAILURE: ICD-10-CM

## 2021-04-14 PROCEDURE — 81001 URINALYSIS AUTO W/SCOPE: CPT | Performed by: UROLOGY

## 2021-04-14 PROCEDURE — 87086 URINE CULTURE/COLONY COUNT: CPT | Performed by: UROLOGY

## 2021-04-14 PROCEDURE — 87088 URINE BACTERIA CULTURE: CPT | Performed by: UROLOGY

## 2021-04-14 PROCEDURE — 87077 CULTURE AEROBIC IDENTIFY: CPT | Performed by: UROLOGY

## 2021-04-14 PROCEDURE — 87186 SC STD MICRODIL/AGAR DIL: CPT | Performed by: UROLOGY

## 2021-04-15 LAB
BACTERIA #/AREA URNS AUTO: ABNORMAL /HPF
BILIRUB UR QL STRIP: NEGATIVE
CLARITY UR REFRACT.AUTO: ABNORMAL
COLOR UR AUTO: YELLOW
GLUCOSE UR QL STRIP: NEGATIVE
HGB UR QL STRIP: ABNORMAL
HYALINE CASTS UR QL AUTO: 0 /LPF
KETONES UR QL STRIP: NEGATIVE
LEUKOCYTE ESTERASE UR QL STRIP: ABNORMAL
MICROSCOPIC COMMENT: ABNORMAL
NITRITE UR QL STRIP: POSITIVE
PH UR STRIP: 5 [PH] (ref 5–8)
PROT UR QL STRIP: ABNORMAL
RBC #/AREA URNS AUTO: 0 /HPF (ref 0–4)
SP GR UR STRIP: 1.02 (ref 1–1.03)
URN SPEC COLLECT METH UR: ABNORMAL
WBC #/AREA URNS AUTO: >100 /HPF (ref 0–5)
WBC CLUMPS UR QL AUTO: ABNORMAL

## 2021-04-16 ENCOUNTER — TELEPHONE (OUTPATIENT)
Dept: UROLOGY | Facility: CLINIC | Age: 86
End: 2021-04-16

## 2021-04-17 LAB — BACTERIA UR CULT: ABNORMAL

## 2021-04-19 ENCOUNTER — PATIENT MESSAGE (OUTPATIENT)
Dept: UROLOGY | Facility: CLINIC | Age: 86
End: 2021-04-19

## 2021-04-19 ENCOUNTER — TELEPHONE (OUTPATIENT)
Dept: UROLOGY | Facility: CLINIC | Age: 86
End: 2021-04-19

## 2021-04-19 DIAGNOSIS — N30.00 ACUTE CYSTITIS WITHOUT HEMATURIA: Primary | ICD-10-CM

## 2021-04-19 RX ORDER — AMOXICILLIN AND CLAVULANATE POTASSIUM 875; 125 MG/1; MG/1
1 TABLET, FILM COATED ORAL EVERY 12 HOURS
Qty: 20 TABLET | Refills: 0 | Status: SHIPPED | OUTPATIENT
Start: 2021-04-19 | End: 2021-04-29

## 2021-04-26 ENCOUNTER — DOCUMENT SCAN (OUTPATIENT)
Dept: HOME HEALTH SERVICES | Facility: HOSPITAL | Age: 86
End: 2021-04-26
Payer: MEDICARE

## 2021-04-29 ENCOUNTER — OFFICE VISIT (OUTPATIENT)
Dept: DERMATOLOGY | Facility: CLINIC | Age: 86
End: 2021-04-29
Payer: MEDICARE

## 2021-04-29 VITALS
HEART RATE: 48 BPM | DIASTOLIC BLOOD PRESSURE: 70 MMHG | HEIGHT: 66 IN | BODY MASS INDEX: 29.41 KG/M2 | SYSTOLIC BLOOD PRESSURE: 114 MMHG | WEIGHT: 183 LBS

## 2021-04-29 DIAGNOSIS — C44.319 BASAL CELL CARCINOMA (BCC) OF FOREHEAD: Primary | ICD-10-CM

## 2021-04-29 PROCEDURE — 99999 PR PBB SHADOW E&M-EST. PATIENT-LVL V: ICD-10-PCS | Mod: PBBFAC,,, | Performed by: DERMATOLOGY

## 2021-04-29 PROCEDURE — 1126F AMNT PAIN NOTED NONE PRSNT: CPT | Mod: S$GLB,,, | Performed by: DERMATOLOGY

## 2021-04-29 PROCEDURE — 1157F PR ADVANCE CARE PLAN OR EQUIV PRESENT IN MEDICAL RECORD: ICD-10-PCS | Mod: S$GLB,,, | Performed by: DERMATOLOGY

## 2021-04-29 PROCEDURE — 1159F PR MEDICATION LIST DOCUMENTED IN MEDICAL RECORD: ICD-10-PCS | Mod: S$GLB,,, | Performed by: DERMATOLOGY

## 2021-04-29 PROCEDURE — 99999 PR PBB SHADOW E&M-EST. PATIENT-LVL V: CPT | Mod: PBBFAC,,, | Performed by: DERMATOLOGY

## 2021-04-29 PROCEDURE — 1157F ADVNC CARE PLAN IN RCRD: CPT | Mod: S$GLB,,, | Performed by: DERMATOLOGY

## 2021-04-29 PROCEDURE — 1101F PR PT FALLS ASSESS DOC 0-1 FALLS W/OUT INJ PAST YR: ICD-10-PCS | Mod: CPTII,S$GLB,, | Performed by: DERMATOLOGY

## 2021-04-29 PROCEDURE — 99499 RISK ADDL DX/OHS AUDIT: ICD-10-PCS | Mod: S$GLB,,, | Performed by: DERMATOLOGY

## 2021-04-29 PROCEDURE — 3288F PR FALLS RISK ASSESSMENT DOCUMENTED: ICD-10-PCS | Mod: CPTII,S$GLB,, | Performed by: DERMATOLOGY

## 2021-04-29 PROCEDURE — 1159F MED LIST DOCD IN RCRD: CPT | Mod: S$GLB,,, | Performed by: DERMATOLOGY

## 2021-04-29 PROCEDURE — 1126F PR PAIN SEVERITY QUANTIFIED, NO PAIN PRESENT: ICD-10-PCS | Mod: S$GLB,,, | Performed by: DERMATOLOGY

## 2021-04-29 PROCEDURE — 1101F PT FALLS ASSESS-DOCD LE1/YR: CPT | Mod: CPTII,S$GLB,, | Performed by: DERMATOLOGY

## 2021-04-29 PROCEDURE — 99499 UNLISTED E&M SERVICE: CPT | Mod: S$GLB,,, | Performed by: DERMATOLOGY

## 2021-04-29 PROCEDURE — 99213 PR OFFICE/OUTPT VISIT, EST, LEVL III, 20-29 MIN: ICD-10-PCS | Mod: S$GLB,,, | Performed by: DERMATOLOGY

## 2021-04-29 PROCEDURE — 99213 OFFICE O/P EST LOW 20 MIN: CPT | Mod: S$GLB,,, | Performed by: DERMATOLOGY

## 2021-04-29 PROCEDURE — 3288F FALL RISK ASSESSMENT DOCD: CPT | Mod: CPTII,S$GLB,, | Performed by: DERMATOLOGY

## 2021-05-02 PROCEDURE — G0179 PR HOME HEALTH MD RECERTIFICATION: ICD-10-PCS | Mod: ,,, | Performed by: FAMILY MEDICINE

## 2021-05-02 PROCEDURE — G0179 MD RECERTIFICATION HHA PT: HCPCS | Mod: ,,, | Performed by: FAMILY MEDICINE

## 2021-05-03 ENCOUNTER — EXTERNAL HOME HEALTH (OUTPATIENT)
Dept: HOME HEALTH SERVICES | Facility: HOSPITAL | Age: 86
End: 2021-05-03
Payer: MEDICARE

## 2021-05-06 NOTE — PROGRESS NOTES
12/03/19-Received referral from Dr. Crespo, PCP on patient. Patient lives on her son in law's property on [place]/2 acres in a 900 square foot house. She moved into the house after her  passed away to be closer to her daughter. Patient has five children. Patient has a cell phone that acts up during the assessment. Patient is going to take it to the store to let them look at it. Patient has a blood pressure cuff and checks her blood pressure as needed Patient is going to start weighing daily and keeping a log for her cardiologist due to her CHF and seven stents. Patient is independent but uses a cane as needed. Patient has a 70 pound husky puppy who stepped and fractured her foot earlier this year. She has not had to wear the boot since August. She does wear easy spirit tennis shoes recommended by the podiatrist. Patient also has a 15 year old blue  dog that lives with her. She has shock collars on the dogs so they can not wander off the property. Patient states the only exercise she gets right now is taking care of the dogs. Patient has not fallen since 2017 but does wear a life alert. Encouraged patient to put scale back in bathroom and weigh herself daily keeping a log. Patient is complaining of being tired. She states that after she has all her test then she will take the results to her cardiologist. Patient understands that with diarrhea she would be tired but she is not having diarrhea or constipation since she started the metamucil. Patient understands that her cholesterol is high and is going to take and over the counter medication. Patient participated in a cholesterol study years ago and she knows what she should be doing for her diet. Patient eats off the kids menu at [employer]. Patient does not use salt. Continue to educate about HF, nutrition, exercise, and UTI. Review signs and symptoms of UIT and HF. Encourage patient to follow medication and treatment regiment. Encourage patient to  Continue CPAP.  Continue follow-up with Dr. Nguyen    maintain follow up with doctors.       PLAN-  Follow up in two weeks per her request , call after 10 am anytime  Refer to [facility] for humana benefits and support around the house ( cleaning)- Lives in [facility]   Med Rec done- Message to PCP - patient also taking two over the counter medications D-Mannose 500 mg daily for UTI and  Metamucil - 1 capsule daily for constipation   Message to Dr. Crespo, PCP per patient's request. Patient finished her antibiotic for her UTI on 11/30/19 but is still having burning with urination and urge to go with no urine output.   Review weighing daily and keeping  Log  Mail pill box , mail UTI and CHF educational information.     Outpatient Care Management  Initial Patient Assessment    Patient: Holli Landrum  MRN: [id number]  Date of Service: 12/3/2019  Completed by: Nancy Vences RN  Referral Date: 11/22/2019  Program: Case Management (High Risk)    Reason for Visit   Patient presents with    OPCM Enrollment Call    Initial Screen    Initial Assessment    Nursing Assessment    PHQ-9    Plan Of Care       Brief Summary: see above note     Assessment Documentation     OPCM Initial Assessment    Involvement of Care  Do I have permission to speak with other family members about your care?:  Yes (Comment: Daughter-Annabel )  Assessment completed by:  Patient  Patient Reported Insurance  Verified current insurance plan:  [employer] Medicare Advantage  [employer] benefits discussed:  OTC prescription discounts, [employer], Transportation, Silver Sneakers, Mail Order Pharmacy, Other (See comments) (Comment: refer to  )  Current Health Status  Patient Health Rating  Compared to other people your age, how would you rate your health?:  Fair  Patient Reported Labs & Vitals  Any patient reported labs and/or vitals?:  No  Social Determinants  Advanced Care Planning  Do you have any of the following?:  Medical power of , Living will  If yes, do we have a copy?:  Yes  If no, would the  patient like Advance Directive resources?:  N/A  Advanced Care Planning resources provided?:  No  Is Advanced Care Planning an area of need?:  No  Support  Caregiver presence?:  No  Present activity level:  not limited in any of these ways  Who takes you to your medical appointments?:  patient, family member  Housing  Living arrangements:  alone  Number of people in home:  1  Type of residence:  single family home (Comment: 900 square feet )  Own or rent?:  own  Permanent residence?:  yes  Does the patient's residence have any of the following?:  grab bars in the bathroom, more than 2 stairs to enter the residence (Comment: ramp)  Is housing an area of need?:  no  Access to Mass Media & Technology  Does the patient have access to any of the following devices or technologies?:  home computer, SmartPhone  Clinical Assessment  Medication Adherence  How does the patient obtain their medications?:  patient drives  How many days a week do you miss medications?:  never  Do you use a pill box or medication chart to help you manage your medications?:  pill box  Do you sometimes have difficulty refilling your medications?:  no  Medication reconciliation completed?:  Yes  Is medication adherence an area of need?:  No  *Active medication list was reviewed and reconciled with patient and/or caregiver:    Nutritional Status  Diet:  Regular, low sodium  Change in appetite?:  no  Dentition:  N/A  Is nutrition an area of need?:  no  Labs  Do you have regular lab work to monitor your medications?:  no  Type of lab work:  n/a  Where do you get your lab work done?:  [facility]  Is lab adherence an area of need?:  no  PHQ Depression Screen  Does patient's PHQ Depression Screening indicate a barrier to meeting self-care needs?:  No  Cognitive/Behavioral Health  Alert and oriented?:  yes  Difficulty thinking?:  no  Requires prompting?:  no  Requires assistance for routine expression?:  no  Is Cognitive/Behavioral health an area of need?:   no  Culture/Nondenominational  Does patient have cultural or Christianity beliefs that may impact ability to access healthcare?:  no  Communication  Language preference:  English   needed?:  no  Hearing problems?:  no  Decreased vision?:  yes  Legally blind?:  no  Vision assistance:  glasses  Is Communication an area of need?:  no  Health Literacy  Preferred learning method:  reading materials  How often do you need to have someone help you read instructions, pamphlets, or other written material from your doctor or pharmacy?:  never  Is there a Health Literacy need?:  no  Activities of Daily Living  Ambulation:  assistance required (Comment: uses cane if needed )  Dressing:  independent  Bathing:  independent  Transfers:  independent  Toileting:  independent  Feeding:  independent  Cleaning home/chores:  assistance required  Telephone use:  independent  Shopping/attending doctors' appointments:  assistance required  Paying bills:  independent  Taking meds:  independent  Climbing stairs:  assistance required  Fall Risk  Patient mobility status:  Ambulatory w/ assistance  Number of falls in the past 12 months:  0  Fall risk?:  No  Equipment/Current Services  Equipment/supplies used in home:  cane, walker, shower chair/rails, bedside commode  Current services:  n/a  Is Equipment/Supplies/Services an area of need?:  no  Community & Government Programs  Support:  none  Government suppot assistance:  N/A  [facility] Office of Aging and Adult Services:  N/A  Community Resource Assessment  Based on the assessment of needs:  Patient is in need of community resources  [facility]  consulted to assist with the following:  wellness programs, other (see comment) (Comment: humana benefits offered )  Completion of Initial Assessment  Is the Initial Assessment Complete at this time?:  yes         Reviewed and provided basic information on available community resources for mental health, transportation, wellness resources, and  palliative care programs with patient and/or caregiver.    Problem List and History     Patient Active Problem List   Diagnosis    Nuclear sclerosis    Ocular hypertension    Hyperopia with astigmatism and presbyopia    Posterior vitreous detachment    Round hole of retina without detachment - Left Eye    COPD (chronic obstructive pulmonary disease)    GERD (gastroesophageal reflux disease)    Paroxysmal atrial fibrillation    Hypertensive heart disease with heart failure    Paroxysmal ventricular tachycardia    Asymmetric septal hypertrophy    Hypercholesteremia    Venous insufficiency    Hypertrophic obstructive cardiomyopathy    Obstructive sleep apnea    Coronary artery disease, h/o multivessel stents.    Chronic diastolic heart failure    Essential hypertension    Palpitations    Atherosclerosis of aorta    Bladder neoplasm of uncertain malignant potential    Urinary retention    Hypokalemia    Age-related nuclear cataract of left eye    Age-related cataract of right eye    Gout       Reviewed Active Problem List with patient and/or Caregiver.    Medical History:  Reviewed medical history with patient and/or caregiver    Social History:  Reviewed social history with patient and/or caregiver    Complex Care Plan     Care plan was discussed and completed today with input from patient and/or caregiver.    Goals      Patient/caregiver will have an action plan in place to manage and prevent complications of HF prior to discharge from OPCM - Priority: medium       Overall Time to Completion  2 months from 12/03/2019     OPCM Identified Patient Barriers:               OPCM Identified Disease Education Barriers:  Heart Failure:  Care Plan Created   Heart Disease:  Care Plan created          Short Term Goals:  Patient/caregiver will measure and record the weight 1 times per day.  Interventions   · Assess for availability of working scale in home setting.12/03/19  · Encourage Dietary  Compliance.12/03/19  · Encourage Exercise.  · Encourage Medication Compliance.12/03/19  · Recognize and provide educational material (KRAMES).12/03/19  · Review eating/nutrition habits.  Patient/Caregiver agrees to weigh daily and keep a log  by 01/30/2020  · Patient/Caregiver agrees to OPCM follow up within 14 days to assess progress to goal.      Status  · Partially met      Patient/caregiver will notify doctor if patient gains more than 3 pounds in one day or 5 pounds in one week.  Interventions .  · Empower patient/caregiver to discuss treatment plan with Physician/care team.  · Encourage Medication Compliance.12/03/19  · Recognize and provide educational material (KRAMES).12/03/19  · Review eating/nutrition habits.  Patient/Caregiver agrees to know when to call her cardiologist about weight gain by 01/30/2020  · Patient/Caregiver agrees to OPCM follow up within 14 days to assess progress to goal.      Status  · Partially met      Patient/caregiver will verbalize 2 signs and symptoms of Heart Failure.   Interventions   · Assess for availability of working scale in home setting.12/03/19  · Encourage Medication Compliance.12/03/19  · Recognize and provide educational material (KRAMES).12/03/19  · Review eating/nutrition habits.  Patient/Caregiver agrees to verbalize two signs and symptoms of heart failure   by  01/30/2020  · Patient/Caregiver agrees to OPCM follow up within 14 days to assess progress to goal.      Status  · Partially met             Clinical Reference Documents Added to Patient Instructions-MAILED 12/03/19       Document    HEART FAILURE, WHAT IS (ENGLISH)    HEART FAILURE: MAKING CHANGES TO YOUR DIET (ENGLISH)    HEART FAILURE: WARNING SIGNS OF A FLARE-UP (ENGLISH)    URINARY TRACT INFECTIONS IN WOMEN (ENGLISH)  FOOD SOURCES OF VITAMINS AND MINERALS FROM NUTRITION CARE MANUAL   CHOLESTEROL LOWERING NUTRITION THERAPY FROM NUTRITION CARE MANUAL              Patient/caregiver will have an action  plan in place to manage and prevent complications of UTI  prior to discharge from OPCM. - Priority: medium       Overall Time to Completion  2 months from 12/03/2019     OPCM Identified Patient Barriers:                  OPCM Identified Disease Education Barriers:  Heart Failure:  Care plan created   Heart Disease:  Care plan created           Short Term Goals  Patient/caregiver will discuss health care needs with Physician and care team during visits or using Patient Portal within 1 month.  Interventions   · Empower patient/caregiver to discuss treatment plan with Physician/care team.12/03/19  · Provide contact information for [facility] contact information.  · Provide contact information for Outpatient Case Management contact information.12/03/19  · Refer to Outpatient Case Management Social Worker.12/03/19  Patient/Caregiver agrees to talk to dr office about her UTI  by 12/24/19  · Patient/Caregiver agrees to OPCM follow up within 14 days to assess progress to goal.      Status  · Partially met      Patient/caregiver will verbalize 2 signs and symptoms of UTI within 2 months.   Interventions   · Empower patient/caregiver to discuss treatment plan with Physician/care team.12/03/19  · Encourage Medication Compliance.  · Recognize and provide educational material (MOHAN).12/03/19  Patient/Caregiver agrees to know 2 signs and symptoms of UTI  by  01/30/2020  · Patient/Caregiver agrees to OPCM follow up within 14 days to assess progress to goal.      Status  · Partially met      Patient/caregiver will verbalize importance of early intervention for symptoms of UTI within 2 months.  Interventions   · Encourage Dietary Compliance.  · Encourage Medication Compliance.  · Recognize and provide educational material (MOHAN).12/03/19  Patient/Caregiver agrees to review educational material on UTI  by  01/20/2020  · Patient/Caregiver agrees to OPCM follow up within 14 days to assess progress to goal.       Status  · Partially met           Clinical Reference Documents Added to Patient Instructions- Mailed 12/03/19       Document    HEART FAILURE, WHAT IS (ENGLISH)    HEART FAILURE: MAKING CHANGES TO YOUR DIET (ENGLISH)    HEART FAILURE: WARNING SIGNS OF A FLARE-UP (ENGLISH)    URINARY TRACT INFECTIONS IN WOMEN (ENGLISH)                  Patient Instructions     Instructions were provided via the [facility] patient resources and are available for the patient to view on the patient portal.    Next steps: See note above     Follow up in about 2 weeks (around 12/17/2019) for RN Follow up call.    Todays OPCM Self-Management Care Plan was developed with the patients/caregivers input and was based on identified barriers from todays assessment.  Goals were written today with the patient/caregiver and the patient has agreed to work towards these goals to improve his/her overall well-being. Patient verbalized understanding of the care plan, goals, and all of today's instructions. Encouraged patient/caregiver to communicate with his/her physician and health care team about health conditions and the treatment plan.  Provided my contact information today and encouraged patient/caregiver to call me with any questions as needed.

## 2021-05-10 ENCOUNTER — TELEPHONE (OUTPATIENT)
Dept: UROLOGY | Facility: CLINIC | Age: 86
End: 2021-05-10

## 2021-05-10 ENCOUNTER — TELEPHONE (OUTPATIENT)
Dept: FAMILY MEDICINE | Facility: CLINIC | Age: 86
End: 2021-05-10

## 2021-05-12 ENCOUNTER — LAB VISIT (OUTPATIENT)
Dept: LAB | Facility: HOSPITAL | Age: 86
End: 2021-05-12
Attending: UROLOGY
Payer: MEDICARE

## 2021-05-12 DIAGNOSIS — N39.0 URINARY TRACT INFECTION, SITE NOT SPECIFIED: Primary | ICD-10-CM

## 2021-05-12 LAB
AMORPH CRY UR QL COMP ASSIST: ABNORMAL
BACTERIA #/AREA URNS AUTO: ABNORMAL /HPF
BILIRUB UR QL STRIP: NEGATIVE
CLARITY UR REFRACT.AUTO: ABNORMAL
COLOR UR AUTO: YELLOW
GLUCOSE UR QL STRIP: NEGATIVE
HGB UR QL STRIP: ABNORMAL
HYALINE CASTS UR QL AUTO: 4 /LPF
KETONES UR QL STRIP: NEGATIVE
LEUKOCYTE ESTERASE UR QL STRIP: ABNORMAL
MICROSCOPIC COMMENT: ABNORMAL
NITRITE UR QL STRIP: POSITIVE
PH UR STRIP: 7 [PH] (ref 5–8)
PROT UR QL STRIP: NEGATIVE
RBC #/AREA URNS AUTO: 5 /HPF (ref 0–4)
SP GR UR STRIP: 1.01 (ref 1–1.03)
SQUAMOUS #/AREA URNS AUTO: 0 /HPF
URN SPEC COLLECT METH UR: ABNORMAL
WBC #/AREA URNS AUTO: 64 /HPF (ref 0–5)
WBC CLUMPS UR QL AUTO: ABNORMAL

## 2021-05-12 PROCEDURE — 87186 SC STD MICRODIL/AGAR DIL: CPT | Performed by: UROLOGY

## 2021-05-12 PROCEDURE — 87077 CULTURE AEROBIC IDENTIFY: CPT | Performed by: UROLOGY

## 2021-05-12 PROCEDURE — 81001 URINALYSIS AUTO W/SCOPE: CPT | Performed by: UROLOGY

## 2021-05-12 PROCEDURE — 87088 URINE BACTERIA CULTURE: CPT | Performed by: UROLOGY

## 2021-05-12 PROCEDURE — 87086 URINE CULTURE/COLONY COUNT: CPT | Performed by: UROLOGY

## 2021-05-17 ENCOUNTER — OFFICE VISIT (OUTPATIENT)
Dept: OPHTHALMOLOGY | Facility: CLINIC | Age: 86
End: 2021-05-17
Payer: MEDICARE

## 2021-05-17 DIAGNOSIS — Z96.1 PSEUDOPHAKIA OF BOTH EYES: ICD-10-CM

## 2021-05-17 DIAGNOSIS — H52.7 REFRACTIVE ERROR: ICD-10-CM

## 2021-05-17 DIAGNOSIS — H40.053 OCULAR HYPERTENSION, BILATERAL: Primary | ICD-10-CM

## 2021-05-17 LAB — BACTERIA UR CULT: ABNORMAL

## 2021-05-17 PROCEDURE — 99999 PR PBB SHADOW E&M-EST. PATIENT-LVL IV: CPT | Mod: PBBFAC,,, | Performed by: OPHTHALMOLOGY

## 2021-05-17 PROCEDURE — 99213 OFFICE O/P EST LOW 20 MIN: CPT | Mod: S$GLB,,, | Performed by: OPHTHALMOLOGY

## 2021-05-17 PROCEDURE — 1126F PR PAIN SEVERITY QUANTIFIED, NO PAIN PRESENT: ICD-10-PCS | Mod: S$GLB,,, | Performed by: OPHTHALMOLOGY

## 2021-05-17 PROCEDURE — 1101F PR PT FALLS ASSESS DOC 0-1 FALLS W/OUT INJ PAST YR: ICD-10-PCS | Mod: CPTII,S$GLB,, | Performed by: OPHTHALMOLOGY

## 2021-05-17 PROCEDURE — 1157F PR ADVANCE CARE PLAN OR EQUIV PRESENT IN MEDICAL RECORD: ICD-10-PCS | Mod: S$GLB,,, | Performed by: OPHTHALMOLOGY

## 2021-05-17 PROCEDURE — 1157F ADVNC CARE PLAN IN RCRD: CPT | Mod: S$GLB,,, | Performed by: OPHTHALMOLOGY

## 2021-05-17 PROCEDURE — 1126F AMNT PAIN NOTED NONE PRSNT: CPT | Mod: S$GLB,,, | Performed by: OPHTHALMOLOGY

## 2021-05-17 PROCEDURE — 1159F MED LIST DOCD IN RCRD: CPT | Mod: S$GLB,,, | Performed by: OPHTHALMOLOGY

## 2021-05-17 PROCEDURE — 99213 PR OFFICE/OUTPT VISIT, EST, LEVL III, 20-29 MIN: ICD-10-PCS | Mod: S$GLB,,, | Performed by: OPHTHALMOLOGY

## 2021-05-17 PROCEDURE — 1101F PT FALLS ASSESS-DOCD LE1/YR: CPT | Mod: CPTII,S$GLB,, | Performed by: OPHTHALMOLOGY

## 2021-05-17 PROCEDURE — 3288F FALL RISK ASSESSMENT DOCD: CPT | Mod: CPTII,S$GLB,, | Performed by: OPHTHALMOLOGY

## 2021-05-17 PROCEDURE — 99999 PR PBB SHADOW E&M-EST. PATIENT-LVL IV: ICD-10-PCS | Mod: PBBFAC,,, | Performed by: OPHTHALMOLOGY

## 2021-05-17 PROCEDURE — 3288F PR FALLS RISK ASSESSMENT DOCUMENTED: ICD-10-PCS | Mod: CPTII,S$GLB,, | Performed by: OPHTHALMOLOGY

## 2021-05-17 PROCEDURE — 1159F PR MEDICATION LIST DOCUMENTED IN MEDICAL RECORD: ICD-10-PCS | Mod: S$GLB,,, | Performed by: OPHTHALMOLOGY

## 2021-05-17 RX ORDER — LATANOPROST 50 UG/ML
1 SOLUTION/ DROPS OPHTHALMIC NIGHTLY
Qty: 5 ML | Refills: 6 | Status: SHIPPED | OUTPATIENT
Start: 2021-05-17 | End: 2022-06-03 | Stop reason: SDUPTHER

## 2021-05-17 RX ORDER — DORZOLAMIDE HCL 20 MG/ML
1 SOLUTION/ DROPS OPHTHALMIC 2 TIMES DAILY
Qty: 10 ML | Refills: 11 | Status: SHIPPED | OUTPATIENT
Start: 2021-05-17 | End: 2021-05-17 | Stop reason: CLARIF

## 2021-05-17 RX ORDER — DORZOLAMIDE HCL 20 MG/ML
1 SOLUTION/ DROPS OPHTHALMIC 2 TIMES DAILY
Qty: 10 ML | Refills: 6 | Status: SHIPPED | OUTPATIENT
Start: 2021-05-17 | End: 2022-01-04

## 2021-05-20 ENCOUNTER — DOCUMENT SCAN (OUTPATIENT)
Dept: HOME HEALTH SERVICES | Facility: HOSPITAL | Age: 86
End: 2021-05-20
Payer: MEDICARE

## 2021-05-20 ENCOUNTER — OFFICE VISIT (OUTPATIENT)
Dept: FAMILY MEDICINE | Facility: CLINIC | Age: 86
End: 2021-05-20
Payer: MEDICARE

## 2021-05-20 ENCOUNTER — PATIENT MESSAGE (OUTPATIENT)
Dept: UROLOGY | Facility: CLINIC | Age: 86
End: 2021-05-20

## 2021-05-20 ENCOUNTER — LAB VISIT (OUTPATIENT)
Dept: LAB | Facility: HOSPITAL | Age: 86
End: 2021-05-20
Attending: FAMILY MEDICINE
Payer: MEDICARE

## 2021-05-20 VITALS
HEIGHT: 65 IN | TEMPERATURE: 98 F | DIASTOLIC BLOOD PRESSURE: 78 MMHG | WEIGHT: 191.5 LBS | BODY MASS INDEX: 31.9 KG/M2 | HEART RATE: 54 BPM | OXYGEN SATURATION: 95 % | SYSTOLIC BLOOD PRESSURE: 134 MMHG

## 2021-05-20 DIAGNOSIS — R00.1 BRADYCARDIA: ICD-10-CM

## 2021-05-20 DIAGNOSIS — R73.9 HYPERGLYCEMIA: ICD-10-CM

## 2021-05-20 DIAGNOSIS — N30.00 ACUTE CYSTITIS WITHOUT HEMATURIA: Primary | ICD-10-CM

## 2021-05-20 DIAGNOSIS — R53.83 FATIGUE, UNSPECIFIED TYPE: ICD-10-CM

## 2021-05-20 LAB
ESTIMATED AVG GLUCOSE: 123 MG/DL (ref 68–131)
HBA1C MFR BLD: 5.9 % (ref 4–5.6)

## 2021-05-20 PROCEDURE — 1159F MED LIST DOCD IN RCRD: CPT | Mod: S$GLB,,, | Performed by: FAMILY MEDICINE

## 2021-05-20 PROCEDURE — 1159F PR MEDICATION LIST DOCUMENTED IN MEDICAL RECORD: ICD-10-PCS | Mod: S$GLB,,, | Performed by: FAMILY MEDICINE

## 2021-05-20 PROCEDURE — 83036 HEMOGLOBIN GLYCOSYLATED A1C: CPT | Performed by: FAMILY MEDICINE

## 2021-05-20 PROCEDURE — 1100F PTFALLS ASSESS-DOCD GE2>/YR: CPT | Mod: CPTII,S$GLB,, | Performed by: FAMILY MEDICINE

## 2021-05-20 PROCEDURE — 1100F PR PT FALLS ASSESS DOC 2+ FALLS/FALL W/INJURY/YR: ICD-10-PCS | Mod: CPTII,S$GLB,, | Performed by: FAMILY MEDICINE

## 2021-05-20 PROCEDURE — 36415 COLL VENOUS BLD VENIPUNCTURE: CPT | Mod: PN | Performed by: FAMILY MEDICINE

## 2021-05-20 PROCEDURE — 99999 PR PBB SHADOW E&M-EST. PATIENT-LVL V: ICD-10-PCS | Mod: PBBFAC,,, | Performed by: FAMILY MEDICINE

## 2021-05-20 PROCEDURE — 1157F PR ADVANCE CARE PLAN OR EQUIV PRESENT IN MEDICAL RECORD: ICD-10-PCS | Mod: S$GLB,,, | Performed by: FAMILY MEDICINE

## 2021-05-20 PROCEDURE — 99214 OFFICE O/P EST MOD 30 MIN: CPT | Mod: S$GLB,,, | Performed by: FAMILY MEDICINE

## 2021-05-20 PROCEDURE — 3288F FALL RISK ASSESSMENT DOCD: CPT | Mod: CPTII,S$GLB,, | Performed by: FAMILY MEDICINE

## 2021-05-20 PROCEDURE — 99999 PR PBB SHADOW E&M-EST. PATIENT-LVL V: CPT | Mod: PBBFAC,,, | Performed by: FAMILY MEDICINE

## 2021-05-20 PROCEDURE — 3288F PR FALLS RISK ASSESSMENT DOCUMENTED: ICD-10-PCS | Mod: CPTII,S$GLB,, | Performed by: FAMILY MEDICINE

## 2021-05-20 PROCEDURE — 99214 PR OFFICE/OUTPT VISIT, EST, LEVL IV, 30-39 MIN: ICD-10-PCS | Mod: S$GLB,,, | Performed by: FAMILY MEDICINE

## 2021-05-20 PROCEDURE — 1157F ADVNC CARE PLAN IN RCRD: CPT | Mod: S$GLB,,, | Performed by: FAMILY MEDICINE

## 2021-05-20 RX ORDER — SULFAMETHOXAZOLE AND TRIMETHOPRIM 800; 160 MG/1; MG/1
1 TABLET ORAL 2 TIMES DAILY
Qty: 10 TABLET | Refills: 0 | Status: SHIPPED | OUTPATIENT
Start: 2021-05-20 | End: 2021-10-01 | Stop reason: ALTCHOICE

## 2021-05-21 ENCOUNTER — DOCUMENTATION ONLY (OUTPATIENT)
Dept: OPHTHALMOLOGY | Facility: CLINIC | Age: 86
End: 2021-05-21

## 2021-05-24 ENCOUNTER — TELEPHONE (OUTPATIENT)
Dept: OPHTHALMOLOGY | Facility: CLINIC | Age: 86
End: 2021-05-24

## 2021-05-31 ENCOUNTER — PATIENT MESSAGE (OUTPATIENT)
Dept: FAMILY MEDICINE | Facility: CLINIC | Age: 86
End: 2021-05-31

## 2021-06-01 RX ORDER — ALLOPURINOL 100 MG/1
200 TABLET ORAL DAILY
Qty: 180 TABLET | Refills: 1 | Status: SHIPPED | OUTPATIENT
Start: 2021-06-01 | End: 2021-11-03 | Stop reason: SDUPTHER

## 2021-06-02 ENCOUNTER — PATIENT MESSAGE (OUTPATIENT)
Dept: FAMILY MEDICINE | Facility: CLINIC | Age: 86
End: 2021-06-02

## 2021-06-02 ENCOUNTER — TELEPHONE (OUTPATIENT)
Dept: FAMILY MEDICINE | Facility: CLINIC | Age: 86
End: 2021-06-02

## 2021-06-08 ENCOUNTER — LAB VISIT (OUTPATIENT)
Dept: LAB | Facility: HOSPITAL | Age: 86
End: 2021-06-08
Attending: FAMILY MEDICINE
Payer: MEDICARE

## 2021-06-08 ENCOUNTER — PATIENT MESSAGE (OUTPATIENT)
Dept: FAMILY MEDICINE | Facility: CLINIC | Age: 86
End: 2021-06-08

## 2021-06-08 DIAGNOSIS — N39.0 URINARY TRACT INFECTION, SITE NOT SPECIFIED: Primary | ICD-10-CM

## 2021-06-08 DIAGNOSIS — N30.00 BLADDER INFECTION, ACUTE: ICD-10-CM

## 2021-06-08 DIAGNOSIS — N30.00 ACUTE CYSTITIS WITHOUT HEMATURIA: ICD-10-CM

## 2021-06-08 LAB
BACTERIA #/AREA URNS AUTO: ABNORMAL /HPF
BILIRUB UR QL STRIP: NEGATIVE
CLARITY UR REFRACT.AUTO: ABNORMAL
COLOR UR AUTO: YELLOW
GLUCOSE UR QL STRIP: NEGATIVE
HGB UR QL STRIP: ABNORMAL
KETONES UR QL STRIP: NEGATIVE
LEUKOCYTE ESTERASE UR QL STRIP: ABNORMAL
MICROSCOPIC COMMENT: ABNORMAL
NITRITE UR QL STRIP: NEGATIVE
PH UR STRIP: 5 [PH] (ref 5–8)
PROT UR QL STRIP: NEGATIVE
RBC #/AREA URNS AUTO: 31 /HPF (ref 0–4)
SP GR UR STRIP: 1.01 (ref 1–1.03)
URN SPEC COLLECT METH UR: ABNORMAL
WBC #/AREA URNS AUTO: 33 /HPF (ref 0–5)
WBC CLUMPS UR QL AUTO: ABNORMAL

## 2021-06-08 PROCEDURE — 81001 URINALYSIS AUTO W/SCOPE: CPT | Performed by: FAMILY MEDICINE

## 2021-06-08 PROCEDURE — 87186 SC STD MICRODIL/AGAR DIL: CPT | Performed by: FAMILY MEDICINE

## 2021-06-08 PROCEDURE — 87077 CULTURE AEROBIC IDENTIFY: CPT | Mod: 59 | Performed by: FAMILY MEDICINE

## 2021-06-08 PROCEDURE — 87088 URINE BACTERIA CULTURE: CPT | Performed by: FAMILY MEDICINE

## 2021-06-08 PROCEDURE — 87086 URINE CULTURE/COLONY COUNT: CPT | Performed by: FAMILY MEDICINE

## 2021-06-09 ENCOUNTER — PATIENT MESSAGE (OUTPATIENT)
Dept: FAMILY MEDICINE | Facility: CLINIC | Age: 86
End: 2021-06-09

## 2021-06-12 LAB
BACTERIA UR CULT: ABNORMAL
BACTERIA UR CULT: ABNORMAL

## 2021-06-14 ENCOUNTER — TELEPHONE (OUTPATIENT)
Dept: FAMILY MEDICINE | Facility: CLINIC | Age: 86
End: 2021-06-14

## 2021-06-14 RX ORDER — AMOXICILLIN AND CLAVULANATE POTASSIUM 875; 125 MG/1; MG/1
1 TABLET, FILM COATED ORAL 2 TIMES DAILY
Qty: 14 TABLET | Refills: 0 | Status: SHIPPED | OUTPATIENT
Start: 2021-06-14 | End: 2021-10-05

## 2021-06-16 PROBLEM — R00.1 SYMPTOMATIC BRADYCARDIA: Status: ACTIVE | Noted: 2021-06-16

## 2021-06-16 PROBLEM — Z95.0 S/P PLACEMENT OF CARDIAC PACEMAKER: Status: ACTIVE | Noted: 2021-06-16

## 2021-06-25 ENCOUNTER — DOCUMENT SCAN (OUTPATIENT)
Dept: HOME HEALTH SERVICES | Facility: HOSPITAL | Age: 86
End: 2021-06-25

## 2021-06-28 ENCOUNTER — TELEPHONE (OUTPATIENT)
Dept: FAMILY MEDICINE | Facility: CLINIC | Age: 86
End: 2021-06-28

## 2021-06-28 ENCOUNTER — DOCUMENT SCAN (OUTPATIENT)
Dept: HOME HEALTH SERVICES | Facility: HOSPITAL | Age: 86
End: 2021-06-28
Payer: MEDICARE

## 2021-06-29 RX ORDER — NYSTATIN AND TRIAMCINOLONE ACETONIDE 100000; 1 [USP'U]/G; MG/G
CREAM TOPICAL 2 TIMES DAILY
Qty: 30 G | Refills: 1 | Status: SHIPPED | OUTPATIENT
Start: 2021-06-29 | End: 2022-01-18

## 2021-07-01 PROCEDURE — G0179 PR HOME HEALTH MD RECERTIFICATION: ICD-10-PCS | Mod: ,,, | Performed by: FAMILY MEDICINE

## 2021-07-01 PROCEDURE — G0179 MD RECERTIFICATION HHA PT: HCPCS | Mod: ,,, | Performed by: FAMILY MEDICINE

## 2021-07-06 ENCOUNTER — DOCUMENT SCAN (OUTPATIENT)
Dept: HOME HEALTH SERVICES | Facility: HOSPITAL | Age: 86
End: 2021-07-06
Payer: MEDICARE

## 2021-07-06 NOTE — PATIENT INSTRUCTIONS
Called [facility] and spoke to staff in [place] regarding patient. Providers note was given to nurse.  Debra Pierson LPN ....................  7/6/2021   7:41 AM       Schedule with urology for evaluation of catheter  Start antibiotic treatment today  Apply ointment up to three times a day  Educated on supportive care and return precautions to the clinic.    Follow up for further evaluation if S/S worsen or fail to improve.  Call for temperature greater than 101, pain uncontrolled by oral medications, Nausea/vomiting, inability to tolerate oral medications, chest pain, shortness of breath, altered mental status, or any acute events

## 2021-07-15 ENCOUNTER — EXTERNAL HOME HEALTH (OUTPATIENT)
Dept: HOME HEALTH SERVICES | Facility: HOSPITAL | Age: 86
End: 2021-07-15
Payer: MEDICARE

## 2021-07-19 ENCOUNTER — PATIENT OUTREACH (OUTPATIENT)
Dept: ADMINISTRATIVE | Facility: OTHER | Age: 86
End: 2021-07-19

## 2021-07-20 ENCOUNTER — PATIENT MESSAGE (OUTPATIENT)
Dept: UROLOGY | Facility: CLINIC | Age: 86
End: 2021-07-20

## 2021-07-20 DIAGNOSIS — N39.0 RECURRENT UTI: Primary | ICD-10-CM

## 2021-07-21 ENCOUNTER — PROCEDURE VISIT (OUTPATIENT)
Dept: DERMATOLOGY | Facility: CLINIC | Age: 86
End: 2021-07-21
Payer: MEDICARE

## 2021-07-21 ENCOUNTER — LAB VISIT (OUTPATIENT)
Dept: LAB | Facility: HOSPITAL | Age: 86
End: 2021-07-21
Attending: UROLOGY
Payer: MEDICARE

## 2021-07-21 VITALS
SYSTOLIC BLOOD PRESSURE: 115 MMHG | BODY MASS INDEX: 31.32 KG/M2 | HEIGHT: 65 IN | WEIGHT: 188 LBS | HEART RATE: 74 BPM | DIASTOLIC BLOOD PRESSURE: 78 MMHG

## 2021-07-21 DIAGNOSIS — N39.0 RECURRENT UTI: ICD-10-CM

## 2021-07-21 DIAGNOSIS — D48.5 NEOPLASM OF UNCERTAIN BEHAVIOR OF SKIN: Primary | ICD-10-CM

## 2021-07-21 LAB
BACTERIA #/AREA URNS HPF: ABNORMAL /HPF
BILIRUB UR QL STRIP: NEGATIVE
CLARITY UR: ABNORMAL
COLOR UR: YELLOW
GLUCOSE UR QL STRIP: NEGATIVE
HGB UR QL STRIP: ABNORMAL
KETONES UR QL STRIP: NEGATIVE
LEUKOCYTE ESTERASE UR QL STRIP: ABNORMAL
MICROSCOPIC COMMENT: ABNORMAL
NITRITE UR QL STRIP: POSITIVE
PH UR STRIP: 6 [PH] (ref 5–8)
PROT UR QL STRIP: NEGATIVE
RBC #/AREA URNS HPF: 2 /HPF (ref 0–4)
SP GR UR STRIP: 1.01 (ref 1–1.03)
SQUAMOUS #/AREA URNS HPF: 1 /HPF
URN SPEC COLLECT METH UR: ABNORMAL
WBC #/AREA URNS HPF: 15 /HPF (ref 0–5)

## 2021-07-21 PROCEDURE — 11103 PR TANGENTIAL BIOPSY, SKIN, EA ADDTL LESION: ICD-10-PCS | Mod: S$GLB,,, | Performed by: DERMATOLOGY

## 2021-07-21 PROCEDURE — 88305 TISSUE EXAM BY PATHOLOGIST: CPT | Mod: 26,,, | Performed by: PATHOLOGY

## 2021-07-21 PROCEDURE — 88305 TISSUE EXAM BY PATHOLOGIST: ICD-10-PCS | Mod: 26,,, | Performed by: PATHOLOGY

## 2021-07-21 PROCEDURE — 11103 TANGNTL BX SKIN EA SEP/ADDL: CPT | Mod: S$GLB,,, | Performed by: DERMATOLOGY

## 2021-07-21 PROCEDURE — 11102 TANGNTL BX SKIN SINGLE LES: CPT | Mod: S$GLB,,, | Performed by: DERMATOLOGY

## 2021-07-21 PROCEDURE — 87086 URINE CULTURE/COLONY COUNT: CPT | Performed by: UROLOGY

## 2021-07-21 PROCEDURE — 11102 PR TANGENTIAL BIOPSY, SKIN, SINGLE LESION: ICD-10-PCS | Mod: S$GLB,,, | Performed by: DERMATOLOGY

## 2021-07-21 PROCEDURE — 81000 URINALYSIS NONAUTO W/SCOPE: CPT | Mod: PO | Performed by: UROLOGY

## 2021-07-21 PROCEDURE — 88305 TISSUE EXAM BY PATHOLOGIST: CPT | Performed by: PATHOLOGY

## 2021-07-22 LAB
BACTERIA UR CULT: NORMAL
BACTERIA UR CULT: NORMAL

## 2021-07-26 ENCOUNTER — TELEPHONE (OUTPATIENT)
Dept: DERMATOLOGY | Facility: CLINIC | Age: 86
End: 2021-07-26

## 2021-07-26 LAB
FINAL PATHOLOGIC DIAGNOSIS: NORMAL
GROSS: NORMAL
Lab: NORMAL
MICROSCOPIC EXAM: NORMAL

## 2021-08-03 ENCOUNTER — PATIENT MESSAGE (OUTPATIENT)
Dept: UROLOGY | Facility: CLINIC | Age: 86
End: 2021-08-03

## 2021-08-03 ENCOUNTER — LAB VISIT (OUTPATIENT)
Dept: LAB | Facility: HOSPITAL | Age: 86
End: 2021-08-03
Attending: UROLOGY
Payer: MEDICARE

## 2021-08-03 ENCOUNTER — TELEPHONE (OUTPATIENT)
Dept: UROLOGY | Facility: CLINIC | Age: 86
End: 2021-08-03

## 2021-08-03 DIAGNOSIS — Z48.812 AFTERCARE FOLLOWING SURGERY OF THE CIRCULATORY SYSTEM, NEC: Primary | ICD-10-CM

## 2021-08-03 LAB
BACTERIA #/AREA URNS AUTO: ABNORMAL /HPF
BILIRUB UR QL STRIP: NEGATIVE
CLARITY UR REFRACT.AUTO: ABNORMAL
COLOR UR AUTO: YELLOW
GLUCOSE UR QL STRIP: NEGATIVE
HGB UR QL STRIP: ABNORMAL
HYALINE CASTS UR QL AUTO: 14 /LPF
KETONES UR QL STRIP: NEGATIVE
LEUKOCYTE ESTERASE UR QL STRIP: ABNORMAL
MICROSCOPIC COMMENT: ABNORMAL
NITRITE UR QL STRIP: POSITIVE
PH UR STRIP: 5 [PH] (ref 5–8)
PROT UR QL STRIP: NEGATIVE
RBC #/AREA URNS AUTO: 8 /HPF (ref 0–4)
SP GR UR STRIP: 1.01 (ref 1–1.03)
URN SPEC COLLECT METH UR: ABNORMAL
WBC #/AREA URNS AUTO: 43 /HPF (ref 0–5)
WBC CLUMPS UR QL AUTO: ABNORMAL

## 2021-08-03 PROCEDURE — 87077 CULTURE AEROBIC IDENTIFY: CPT | Mod: 59 | Performed by: UROLOGY

## 2021-08-03 PROCEDURE — 87186 SC STD MICRODIL/AGAR DIL: CPT | Mod: 59 | Performed by: UROLOGY

## 2021-08-03 PROCEDURE — 81001 URINALYSIS AUTO W/SCOPE: CPT | Performed by: UROLOGY

## 2021-08-03 PROCEDURE — 87086 URINE CULTURE/COLONY COUNT: CPT | Performed by: UROLOGY

## 2021-08-03 PROCEDURE — 87088 URINE BACTERIA CULTURE: CPT | Performed by: UROLOGY

## 2021-08-04 ENCOUNTER — OFFICE VISIT (OUTPATIENT)
Dept: DERMATOLOGY | Facility: CLINIC | Age: 86
End: 2021-08-04
Payer: MEDICARE

## 2021-08-04 DIAGNOSIS — L57.0 ACTINIC KERATOSIS: ICD-10-CM

## 2021-08-04 DIAGNOSIS — C44.319 BASAL CELL CARCINOMA (BCC) OF FOREHEAD: Primary | ICD-10-CM

## 2021-08-04 PROCEDURE — 3288F PR FALLS RISK ASSESSMENT DOCUMENTED: ICD-10-PCS | Mod: CPTII,S$GLB,, | Performed by: DERMATOLOGY

## 2021-08-04 PROCEDURE — 99212 OFFICE O/P EST SF 10 MIN: CPT | Mod: S$GLB,,, | Performed by: DERMATOLOGY

## 2021-08-04 PROCEDURE — 1101F PR PT FALLS ASSESS DOC 0-1 FALLS W/OUT INJ PAST YR: ICD-10-PCS | Mod: CPTII,S$GLB,, | Performed by: DERMATOLOGY

## 2021-08-04 PROCEDURE — 1157F ADVNC CARE PLAN IN RCRD: CPT | Mod: CPTII,S$GLB,, | Performed by: DERMATOLOGY

## 2021-08-04 PROCEDURE — 1157F PR ADVANCE CARE PLAN OR EQUIV PRESENT IN MEDICAL RECORD: ICD-10-PCS | Mod: CPTII,S$GLB,, | Performed by: DERMATOLOGY

## 2021-08-04 PROCEDURE — 3288F FALL RISK ASSESSMENT DOCD: CPT | Mod: CPTII,S$GLB,, | Performed by: DERMATOLOGY

## 2021-08-04 PROCEDURE — 1159F PR MEDICATION LIST DOCUMENTED IN MEDICAL RECORD: ICD-10-PCS | Mod: CPTII,S$GLB,, | Performed by: DERMATOLOGY

## 2021-08-04 PROCEDURE — 99212 PR OFFICE/OUTPT VISIT, EST, LEVL II, 10-19 MIN: ICD-10-PCS | Mod: S$GLB,,, | Performed by: DERMATOLOGY

## 2021-08-04 PROCEDURE — 1160F PR REVIEW ALL MEDS BY PRESCRIBER/CLIN PHARMACIST DOCUMENTED: ICD-10-PCS | Mod: CPTII,S$GLB,, | Performed by: DERMATOLOGY

## 2021-08-04 PROCEDURE — 99999 PR PBB SHADOW E&M-EST. PATIENT-LVL II: CPT | Mod: PBBFAC,,, | Performed by: DERMATOLOGY

## 2021-08-04 PROCEDURE — 1126F AMNT PAIN NOTED NONE PRSNT: CPT | Mod: CPTII,S$GLB,, | Performed by: DERMATOLOGY

## 2021-08-04 PROCEDURE — 99999 PR PBB SHADOW E&M-EST. PATIENT-LVL II: ICD-10-PCS | Mod: PBBFAC,,, | Performed by: DERMATOLOGY

## 2021-08-04 PROCEDURE — 1160F RVW MEDS BY RX/DR IN RCRD: CPT | Mod: CPTII,S$GLB,, | Performed by: DERMATOLOGY

## 2021-08-04 PROCEDURE — 1159F MED LIST DOCD IN RCRD: CPT | Mod: CPTII,S$GLB,, | Performed by: DERMATOLOGY

## 2021-08-04 PROCEDURE — 1101F PT FALLS ASSESS-DOCD LE1/YR: CPT | Mod: CPTII,S$GLB,, | Performed by: DERMATOLOGY

## 2021-08-04 PROCEDURE — 1126F PR PAIN SEVERITY QUANTIFIED, NO PAIN PRESENT: ICD-10-PCS | Mod: CPTII,S$GLB,, | Performed by: DERMATOLOGY

## 2021-08-05 ENCOUNTER — TELEPHONE (OUTPATIENT)
Dept: UROLOGY | Facility: CLINIC | Age: 86
End: 2021-08-05

## 2021-08-06 LAB
BACTERIA UR CULT: ABNORMAL
BACTERIA UR CULT: ABNORMAL

## 2021-08-09 PROBLEM — I25.110 CORONARY ARTERY DISEASE WITH UNSTABLE ANGINA PECTORIS: Status: RESOLVED | Noted: 2020-03-19 | Resolved: 2021-08-09

## 2021-08-10 ENCOUNTER — TELEPHONE (OUTPATIENT)
Dept: UROLOGY | Facility: CLINIC | Age: 86
End: 2021-08-10

## 2021-08-11 ENCOUNTER — PATIENT MESSAGE (OUTPATIENT)
Dept: UROLOGY | Facility: CLINIC | Age: 86
End: 2021-08-11

## 2021-08-11 DIAGNOSIS — N30.00 ACUTE CYSTITIS WITHOUT HEMATURIA: Primary | ICD-10-CM

## 2021-08-13 ENCOUNTER — TELEPHONE (OUTPATIENT)
Dept: UROLOGY | Facility: CLINIC | Age: 86
End: 2021-08-13

## 2021-08-16 ENCOUNTER — DOCUMENT SCAN (OUTPATIENT)
Dept: HOME HEALTH SERVICES | Facility: HOSPITAL | Age: 86
End: 2021-08-16
Payer: MEDICARE

## 2021-08-18 ENCOUNTER — TELEPHONE (OUTPATIENT)
Dept: UROLOGY | Facility: CLINIC | Age: 86
End: 2021-08-18

## 2021-08-28 ENCOUNTER — PATIENT OUTREACH (OUTPATIENT)
Dept: ADMINISTRATIVE | Facility: OTHER | Age: 86
End: 2021-08-28

## 2021-08-30 ENCOUNTER — PATIENT MESSAGE (OUTPATIENT)
Dept: UROLOGY | Facility: CLINIC | Age: 86
End: 2021-08-30

## 2021-08-30 PROCEDURE — G0179 MD RECERTIFICATION HHA PT: HCPCS | Mod: ,,, | Performed by: FAMILY MEDICINE

## 2021-08-30 PROCEDURE — G0179 PR HOME HEALTH MD RECERTIFICATION: ICD-10-PCS | Mod: ,,, | Performed by: FAMILY MEDICINE

## 2021-09-16 ENCOUNTER — EXTERNAL HOME HEALTH (OUTPATIENT)
Dept: HOME HEALTH SERVICES | Facility: HOSPITAL | Age: 86
End: 2021-09-16
Payer: MEDICARE

## 2021-09-22 ENCOUNTER — OFFICE VISIT (OUTPATIENT)
Dept: OPHTHALMOLOGY | Facility: CLINIC | Age: 86
End: 2021-09-22
Payer: MEDICARE

## 2021-09-22 DIAGNOSIS — Z96.1 PSEUDOPHAKIA OF BOTH EYES: ICD-10-CM

## 2021-09-22 DIAGNOSIS — H40.053 OCULAR HYPERTENSION, BILATERAL: Primary | ICD-10-CM

## 2021-09-22 DIAGNOSIS — H52.7 REFRACTIVE ERROR: ICD-10-CM

## 2021-09-22 PROCEDURE — 99214 OFFICE O/P EST MOD 30 MIN: CPT | Mod: HCNC,S$GLB,, | Performed by: OPHTHALMOLOGY

## 2021-09-22 PROCEDURE — 99212 OFFICE O/P EST SF 10 MIN: CPT | Mod: PBBFAC,HCNC,PO | Performed by: OPHTHALMOLOGY

## 2021-09-22 PROCEDURE — 99999 PR PBB SHADOW E&M-EST. PATIENT-LVL II: CPT | Mod: PBBFAC,HCNC,, | Performed by: OPHTHALMOLOGY

## 2021-09-22 PROCEDURE — 99214 PR OFFICE/OUTPT VISIT, EST, LEVL IV, 30-39 MIN: ICD-10-PCS | Mod: HCNC,S$GLB,, | Performed by: OPHTHALMOLOGY

## 2021-09-22 PROCEDURE — 99999 PR PBB SHADOW E&M-EST. PATIENT-LVL II: ICD-10-PCS | Mod: PBBFAC,HCNC,, | Performed by: OPHTHALMOLOGY

## 2021-09-27 ENCOUNTER — PATIENT MESSAGE (OUTPATIENT)
Dept: UROLOGY | Facility: CLINIC | Age: 86
End: 2021-09-27

## 2021-09-27 DIAGNOSIS — N39.0 RECURRENT UTI: Primary | ICD-10-CM

## 2021-09-28 ENCOUNTER — PATIENT MESSAGE (OUTPATIENT)
Dept: FAMILY MEDICINE | Facility: CLINIC | Age: 86
End: 2021-09-28

## 2021-09-28 ENCOUNTER — LAB VISIT (OUTPATIENT)
Dept: LAB | Facility: HOSPITAL | Age: 86
End: 2021-09-28
Attending: UROLOGY
Payer: MEDICARE

## 2021-09-28 DIAGNOSIS — N39.0 URINARY TRACT INFECTION, SITE NOT SPECIFIED: Primary | ICD-10-CM

## 2021-09-28 PROCEDURE — 87077 CULTURE AEROBIC IDENTIFY: CPT | Mod: HCNC | Performed by: UROLOGY

## 2021-09-28 PROCEDURE — 87088 URINE BACTERIA CULTURE: CPT | Mod: HCNC | Performed by: UROLOGY

## 2021-09-28 PROCEDURE — 87086 URINE CULTURE/COLONY COUNT: CPT | Mod: HCNC | Performed by: UROLOGY

## 2021-09-28 PROCEDURE — 87186 SC STD MICRODIL/AGAR DIL: CPT | Mod: HCNC | Performed by: UROLOGY

## 2021-10-01 ENCOUNTER — TELEPHONE (OUTPATIENT)
Dept: UROLOGY | Facility: CLINIC | Age: 86
End: 2021-10-01

## 2021-10-01 LAB — BACTERIA UR CULT: ABNORMAL

## 2021-10-01 RX ORDER — LEVOFLOXACIN 500 MG/1
500 TABLET, FILM COATED ORAL DAILY
Qty: 5 TABLET | Refills: 0 | Status: SHIPPED | OUTPATIENT
Start: 2021-10-01 | End: 2021-10-06

## 2021-10-05 ENCOUNTER — OFFICE VISIT (OUTPATIENT)
Dept: UROLOGY | Facility: CLINIC | Age: 86
End: 2021-10-05
Payer: MEDICARE

## 2021-10-05 VITALS — BODY MASS INDEX: 31.49 KG/M2 | HEIGHT: 65 IN | WEIGHT: 189 LBS

## 2021-10-05 DIAGNOSIS — N39.0 RECURRENT UTI: Primary | ICD-10-CM

## 2021-10-05 DIAGNOSIS — N31.9 NEUROGENIC BLADDER: ICD-10-CM

## 2021-10-05 PROCEDURE — 1159F MED LIST DOCD IN RCRD: CPT | Mod: HCNC,CPTII,S$GLB, | Performed by: UROLOGY

## 2021-10-05 PROCEDURE — 1125F AMNT PAIN NOTED PAIN PRSNT: CPT | Mod: HCNC,CPTII,S$GLB, | Performed by: UROLOGY

## 2021-10-05 PROCEDURE — 1101F PT FALLS ASSESS-DOCD LE1/YR: CPT | Mod: HCNC,CPTII,S$GLB, | Performed by: UROLOGY

## 2021-10-05 PROCEDURE — 99999 PR PBB SHADOW E&M-EST. PATIENT-LVL III: CPT | Mod: PBBFAC,HCNC,, | Performed by: UROLOGY

## 2021-10-05 PROCEDURE — 3288F FALL RISK ASSESSMENT DOCD: CPT | Mod: HCNC,CPTII,S$GLB, | Performed by: UROLOGY

## 2021-10-05 PROCEDURE — 99999 PR PBB SHADOW E&M-EST. PATIENT-LVL III: ICD-10-PCS | Mod: PBBFAC,HCNC,, | Performed by: UROLOGY

## 2021-10-05 PROCEDURE — 1160F PR REVIEW ALL MEDS BY PRESCRIBER/CLIN PHARMACIST DOCUMENTED: ICD-10-PCS | Mod: HCNC,CPTII,S$GLB, | Performed by: UROLOGY

## 2021-10-05 PROCEDURE — 1101F PR PT FALLS ASSESS DOC 0-1 FALLS W/OUT INJ PAST YR: ICD-10-PCS | Mod: HCNC,CPTII,S$GLB, | Performed by: UROLOGY

## 2021-10-05 PROCEDURE — 1160F RVW MEDS BY RX/DR IN RCRD: CPT | Mod: HCNC,CPTII,S$GLB, | Performed by: UROLOGY

## 2021-10-05 PROCEDURE — 1157F PR ADVANCE CARE PLAN OR EQUIV PRESENT IN MEDICAL RECORD: ICD-10-PCS | Mod: HCNC,CPTII,S$GLB, | Performed by: UROLOGY

## 2021-10-05 PROCEDURE — 3288F PR FALLS RISK ASSESSMENT DOCUMENTED: ICD-10-PCS | Mod: HCNC,CPTII,S$GLB, | Performed by: UROLOGY

## 2021-10-05 PROCEDURE — 1159F PR MEDICATION LIST DOCUMENTED IN MEDICAL RECORD: ICD-10-PCS | Mod: HCNC,CPTII,S$GLB, | Performed by: UROLOGY

## 2021-10-05 PROCEDURE — 1157F ADVNC CARE PLAN IN RCRD: CPT | Mod: HCNC,CPTII,S$GLB, | Performed by: UROLOGY

## 2021-10-05 PROCEDURE — 99214 PR OFFICE/OUTPT VISIT, EST, LEVL IV, 30-39 MIN: ICD-10-PCS | Mod: HCNC,S$GLB,, | Performed by: UROLOGY

## 2021-10-05 PROCEDURE — 1125F PR PAIN SEVERITY QUANTIFIED, PAIN PRESENT: ICD-10-PCS | Mod: HCNC,CPTII,S$GLB, | Performed by: UROLOGY

## 2021-10-05 PROCEDURE — 99214 OFFICE O/P EST MOD 30 MIN: CPT | Mod: HCNC,S$GLB,, | Performed by: UROLOGY

## 2021-10-05 RX ORDER — LEVOFLOXACIN 500 MG/1
500 TABLET, FILM COATED ORAL DAILY
Qty: 5 TABLET | Refills: 0 | Status: SHIPPED | OUTPATIENT
Start: 2021-10-05 | End: 2021-10-25

## 2021-10-06 ENCOUNTER — PROCEDURE VISIT (OUTPATIENT)
Dept: DERMATOLOGY | Facility: CLINIC | Age: 86
End: 2021-10-06
Payer: MEDICARE

## 2021-10-06 VITALS
HEIGHT: 65 IN | HEART RATE: 45 BPM | WEIGHT: 183 LBS | BODY MASS INDEX: 30.49 KG/M2 | DIASTOLIC BLOOD PRESSURE: 86 MMHG | SYSTOLIC BLOOD PRESSURE: 127 MMHG

## 2021-10-06 DIAGNOSIS — C44.319 BASAL CELL CARCINOMA (BCC) OF LEFT FOREHEAD: Primary | ICD-10-CM

## 2021-10-06 PROCEDURE — 17312: ICD-10-PCS | Mod: HCNC,S$GLB,, | Performed by: DERMATOLOGY

## 2021-10-06 PROCEDURE — 17311: ICD-10-PCS | Mod: 51,HCNC,S$GLB, | Performed by: DERMATOLOGY

## 2021-10-06 PROCEDURE — 99499 NO LOS: ICD-10-PCS | Mod: HCNC,S$GLB,, | Performed by: DERMATOLOGY

## 2021-10-06 PROCEDURE — 17312 MOHS ADDL STAGE: CPT | Mod: HCNC,S$GLB,, | Performed by: DERMATOLOGY

## 2021-10-06 PROCEDURE — 17311 MOHS 1 STAGE H/N/HF/G: CPT | Mod: 51,HCNC,S$GLB, | Performed by: DERMATOLOGY

## 2021-10-06 PROCEDURE — 99499 UNLISTED E&M SERVICE: CPT | Mod: HCNC,S$GLB,, | Performed by: DERMATOLOGY

## 2021-10-06 PROCEDURE — 14040 PR ADJ TISS XFER HEAD,FAC,HAND <10 SQCM: ICD-10-PCS | Mod: HCNC,S$GLB,, | Performed by: DERMATOLOGY

## 2021-10-06 PROCEDURE — 14040 TIS TRNFR F/C/C/M/N/A/G/H/F: CPT | Mod: HCNC,S$GLB,, | Performed by: DERMATOLOGY

## 2021-10-07 ENCOUNTER — TELEPHONE (OUTPATIENT)
Dept: UROLOGY | Facility: CLINIC | Age: 86
End: 2021-10-07

## 2021-10-07 ENCOUNTER — CLINICAL SUPPORT (OUTPATIENT)
Dept: UROLOGY | Facility: CLINIC | Age: 86
End: 2021-10-07
Payer: MEDICARE

## 2021-10-07 DIAGNOSIS — N31.9 NEUROGENIC BLADDER: Primary | ICD-10-CM

## 2021-10-12 ENCOUNTER — TELEPHONE (OUTPATIENT)
Dept: UROLOGY | Facility: CLINIC | Age: 86
End: 2021-10-12

## 2021-10-12 DIAGNOSIS — R52 PAIN: ICD-10-CM

## 2021-10-12 DIAGNOSIS — R30.0 DYSURIA: Primary | ICD-10-CM

## 2021-10-12 RX ORDER — HYDROCODONE BITARTRATE AND ACETAMINOPHEN 5; 325 MG/1; MG/1
1 TABLET ORAL EVERY 12 HOURS PRN
Qty: 10 TABLET | Refills: 0 | Status: SHIPPED | OUTPATIENT
Start: 2021-10-12 | End: 2021-10-17

## 2021-10-12 RX ORDER — PHENAZOPYRIDINE HYDROCHLORIDE 200 MG/1
200 TABLET, FILM COATED ORAL
Qty: 21 TABLET | Refills: 0 | Status: SHIPPED | OUTPATIENT
Start: 2021-10-12 | End: 2021-10-19

## 2021-10-13 ENCOUNTER — OFFICE VISIT (OUTPATIENT)
Dept: DERMATOLOGY | Facility: CLINIC | Age: 86
End: 2021-10-13
Payer: MEDICARE

## 2021-10-13 DIAGNOSIS — Z48.02 VISIT FOR SUTURE REMOVAL: Primary | ICD-10-CM

## 2021-10-13 PROCEDURE — 1160F RVW MEDS BY RX/DR IN RCRD: CPT | Mod: HCNC,CPTII,S$GLB, | Performed by: DERMATOLOGY

## 2021-10-13 PROCEDURE — 1160F PR REVIEW ALL MEDS BY PRESCRIBER/CLIN PHARMACIST DOCUMENTED: ICD-10-PCS | Mod: HCNC,CPTII,S$GLB, | Performed by: DERMATOLOGY

## 2021-10-13 PROCEDURE — 1159F PR MEDICATION LIST DOCUMENTED IN MEDICAL RECORD: ICD-10-PCS | Mod: HCNC,CPTII,S$GLB, | Performed by: DERMATOLOGY

## 2021-10-13 PROCEDURE — 1159F MED LIST DOCD IN RCRD: CPT | Mod: HCNC,CPTII,S$GLB, | Performed by: DERMATOLOGY

## 2021-10-13 PROCEDURE — 99999 PR PBB SHADOW E&M-EST. PATIENT-LVL I: CPT | Mod: PBBFAC,HCNC,, | Performed by: DERMATOLOGY

## 2021-10-13 PROCEDURE — 1157F ADVNC CARE PLAN IN RCRD: CPT | Mod: HCNC,CPTII,S$GLB, | Performed by: DERMATOLOGY

## 2021-10-13 PROCEDURE — 1157F PR ADVANCE CARE PLAN OR EQUIV PRESENT IN MEDICAL RECORD: ICD-10-PCS | Mod: HCNC,CPTII,S$GLB, | Performed by: DERMATOLOGY

## 2021-10-13 PROCEDURE — 99024 PR POST-OP FOLLOW-UP VISIT: ICD-10-PCS | Mod: HCNC,S$GLB,, | Performed by: DERMATOLOGY

## 2021-10-13 PROCEDURE — 99024 POSTOP FOLLOW-UP VISIT: CPT | Mod: HCNC,S$GLB,, | Performed by: DERMATOLOGY

## 2021-10-13 PROCEDURE — 99999 PR PBB SHADOW E&M-EST. PATIENT-LVL I: ICD-10-PCS | Mod: PBBFAC,HCNC,, | Performed by: DERMATOLOGY

## 2021-10-18 ENCOUNTER — TELEPHONE (OUTPATIENT)
Dept: UROLOGY | Facility: CLINIC | Age: 86
End: 2021-10-18

## 2021-10-18 DIAGNOSIS — R39.89 URETHRAL PAIN: Primary | ICD-10-CM

## 2021-10-18 RX ORDER — KETOROLAC TROMETHAMINE 10 MG/1
10 TABLET, FILM COATED ORAL 2 TIMES DAILY
Qty: 14 TABLET | Refills: 0 | Status: SHIPPED | OUTPATIENT
Start: 2021-10-18 | End: 2021-10-25

## 2021-10-21 ENCOUNTER — PATIENT MESSAGE (OUTPATIENT)
Dept: UROLOGY | Facility: CLINIC | Age: 86
End: 2021-10-21

## 2021-10-21 ENCOUNTER — TELEPHONE (OUTPATIENT)
Dept: UROLOGY | Facility: CLINIC | Age: 86
End: 2021-10-21

## 2021-10-21 DIAGNOSIS — R39.89 URETHRAL PAIN: Primary | ICD-10-CM

## 2021-10-25 ENCOUNTER — OFFICE VISIT (OUTPATIENT)
Dept: FAMILY MEDICINE | Facility: CLINIC | Age: 86
End: 2021-10-25
Payer: MEDICARE

## 2021-10-25 VITALS
DIASTOLIC BLOOD PRESSURE: 80 MMHG | WEIGHT: 194 LBS | OXYGEN SATURATION: 95 % | SYSTOLIC BLOOD PRESSURE: 138 MMHG | BODY MASS INDEX: 32.32 KG/M2 | TEMPERATURE: 98 F | HEIGHT: 65 IN | HEART RATE: 68 BPM

## 2021-10-25 DIAGNOSIS — B02.8 HERPES ZOSTER WITH COMPLICATION: Primary | ICD-10-CM

## 2021-10-25 PROCEDURE — 1160F PR REVIEW ALL MEDS BY PRESCRIBER/CLIN PHARMACIST DOCUMENTED: ICD-10-PCS | Mod: HCNC,CPTII,S$GLB, | Performed by: NURSE PRACTITIONER

## 2021-10-25 PROCEDURE — 1160F RVW MEDS BY RX/DR IN RCRD: CPT | Mod: HCNC,CPTII,S$GLB, | Performed by: NURSE PRACTITIONER

## 2021-10-25 PROCEDURE — 1125F AMNT PAIN NOTED PAIN PRSNT: CPT | Mod: HCNC,CPTII,S$GLB, | Performed by: NURSE PRACTITIONER

## 2021-10-25 PROCEDURE — 1159F PR MEDICATION LIST DOCUMENTED IN MEDICAL RECORD: ICD-10-PCS | Mod: HCNC,CPTII,S$GLB, | Performed by: NURSE PRACTITIONER

## 2021-10-25 PROCEDURE — 1157F PR ADVANCE CARE PLAN OR EQUIV PRESENT IN MEDICAL RECORD: ICD-10-PCS | Mod: HCNC,CPTII,S$GLB, | Performed by: NURSE PRACTITIONER

## 2021-10-25 PROCEDURE — 1159F MED LIST DOCD IN RCRD: CPT | Mod: HCNC,CPTII,S$GLB, | Performed by: NURSE PRACTITIONER

## 2021-10-25 PROCEDURE — 99999 PR PBB SHADOW E&M-EST. PATIENT-LVL III: ICD-10-PCS | Mod: PBBFAC,HCNC,, | Performed by: NURSE PRACTITIONER

## 2021-10-25 PROCEDURE — 1157F ADVNC CARE PLAN IN RCRD: CPT | Mod: HCNC,CPTII,S$GLB, | Performed by: NURSE PRACTITIONER

## 2021-10-25 PROCEDURE — 99999 PR PBB SHADOW E&M-EST. PATIENT-LVL III: CPT | Mod: PBBFAC,HCNC,, | Performed by: NURSE PRACTITIONER

## 2021-10-25 PROCEDURE — 99214 OFFICE O/P EST MOD 30 MIN: CPT | Mod: HCNC,S$GLB,, | Performed by: NURSE PRACTITIONER

## 2021-10-25 PROCEDURE — 99214 PR OFFICE/OUTPT VISIT, EST, LEVL IV, 30-39 MIN: ICD-10-PCS | Mod: HCNC,S$GLB,, | Performed by: NURSE PRACTITIONER

## 2021-10-25 PROCEDURE — 1125F PR PAIN SEVERITY QUANTIFIED, PAIN PRESENT: ICD-10-PCS | Mod: HCNC,CPTII,S$GLB, | Performed by: NURSE PRACTITIONER

## 2021-10-25 RX ORDER — GABAPENTIN 100 MG/1
100 CAPSULE ORAL 3 TIMES DAILY PRN
Qty: 60 CAPSULE | Refills: 0 | Status: SHIPPED | OUTPATIENT
Start: 2021-10-25 | End: 2021-11-12 | Stop reason: DRUGHIGH

## 2021-10-25 RX ORDER — VALACYCLOVIR HYDROCHLORIDE 1 G/1
1000 TABLET, FILM COATED ORAL 3 TIMES DAILY
Qty: 21 TABLET | Refills: 0 | Status: SHIPPED | OUTPATIENT
Start: 2021-10-25 | End: 2021-11-18

## 2021-10-26 ENCOUNTER — LAB VISIT (OUTPATIENT)
Dept: LAB | Facility: HOSPITAL | Age: 86
End: 2021-10-26
Attending: UROLOGY
Payer: MEDICARE

## 2021-10-26 DIAGNOSIS — N39.0 RECURRENT UTI: ICD-10-CM

## 2021-10-26 LAB
BACTERIA #/AREA URNS HPF: ABNORMAL /HPF
BILIRUB UR QL STRIP: NEGATIVE
CLARITY UR: CLEAR
COLOR UR: YELLOW
GLUCOSE UR QL STRIP: NEGATIVE
HGB UR QL STRIP: ABNORMAL
KETONES UR QL STRIP: NEGATIVE
LEUKOCYTE ESTERASE UR QL STRIP: ABNORMAL
MICROSCOPIC COMMENT: ABNORMAL
NITRITE UR QL STRIP: NEGATIVE
PH UR STRIP: 7 [PH] (ref 5–8)
PROT UR QL STRIP: NEGATIVE
RBC #/AREA URNS HPF: 20 /HPF (ref 0–4)
SP GR UR STRIP: 1.01 (ref 1–1.03)
URN SPEC COLLECT METH UR: ABNORMAL
WBC #/AREA URNS HPF: 20 /HPF (ref 0–5)
WBC CLUMPS URNS QL MICRO: ABNORMAL

## 2021-10-26 PROCEDURE — 87086 URINE CULTURE/COLONY COUNT: CPT | Mod: HCNC | Performed by: UROLOGY

## 2021-10-26 PROCEDURE — 81000 URINALYSIS NONAUTO W/SCOPE: CPT | Mod: HCNC,PO | Performed by: UROLOGY

## 2021-10-27 ENCOUNTER — OFFICE VISIT (OUTPATIENT)
Dept: DERMATOLOGY | Facility: CLINIC | Age: 86
End: 2021-10-27
Payer: MEDICARE

## 2021-10-27 DIAGNOSIS — Z98.890 HISTORY OF MOHS MICROGRAPHIC SURGERY FOR SKIN CANCER: Primary | ICD-10-CM

## 2021-10-27 DIAGNOSIS — Z85.828 HISTORY OF MOHS MICROGRAPHIC SURGERY FOR SKIN CANCER: Primary | ICD-10-CM

## 2021-10-27 LAB — BACTERIA UR CULT: NORMAL

## 2021-10-27 PROCEDURE — 99024 PR POST-OP FOLLOW-UP VISIT: ICD-10-PCS | Mod: HCNC,S$GLB,, | Performed by: DERMATOLOGY

## 2021-10-27 PROCEDURE — 1101F PT FALLS ASSESS-DOCD LE1/YR: CPT | Mod: HCNC,CPTII,S$GLB, | Performed by: DERMATOLOGY

## 2021-10-27 PROCEDURE — 99999 PR PBB SHADOW E&M-EST. PATIENT-LVL II: CPT | Mod: PBBFAC,HCNC,, | Performed by: DERMATOLOGY

## 2021-10-27 PROCEDURE — 1157F ADVNC CARE PLAN IN RCRD: CPT | Mod: HCNC,CPTII,S$GLB, | Performed by: DERMATOLOGY

## 2021-10-27 PROCEDURE — 1157F PR ADVANCE CARE PLAN OR EQUIV PRESENT IN MEDICAL RECORD: ICD-10-PCS | Mod: HCNC,CPTII,S$GLB, | Performed by: DERMATOLOGY

## 2021-10-27 PROCEDURE — 99999 PR PBB SHADOW E&M-EST. PATIENT-LVL II: ICD-10-PCS | Mod: PBBFAC,HCNC,, | Performed by: DERMATOLOGY

## 2021-10-27 PROCEDURE — 99024 POSTOP FOLLOW-UP VISIT: CPT | Mod: HCNC,S$GLB,, | Performed by: DERMATOLOGY

## 2021-10-27 PROCEDURE — 3288F FALL RISK ASSESSMENT DOCD: CPT | Mod: HCNC,CPTII,S$GLB, | Performed by: DERMATOLOGY

## 2021-10-27 PROCEDURE — 1159F PR MEDICATION LIST DOCUMENTED IN MEDICAL RECORD: ICD-10-PCS | Mod: HCNC,CPTII,S$GLB, | Performed by: DERMATOLOGY

## 2021-10-27 PROCEDURE — 3288F PR FALLS RISK ASSESSMENT DOCUMENTED: ICD-10-PCS | Mod: HCNC,CPTII,S$GLB, | Performed by: DERMATOLOGY

## 2021-10-27 PROCEDURE — 1160F RVW MEDS BY RX/DR IN RCRD: CPT | Mod: HCNC,CPTII,S$GLB, | Performed by: DERMATOLOGY

## 2021-10-27 PROCEDURE — 1159F MED LIST DOCD IN RCRD: CPT | Mod: HCNC,CPTII,S$GLB, | Performed by: DERMATOLOGY

## 2021-10-27 PROCEDURE — 1160F PR REVIEW ALL MEDS BY PRESCRIBER/CLIN PHARMACIST DOCUMENTED: ICD-10-PCS | Mod: HCNC,CPTII,S$GLB, | Performed by: DERMATOLOGY

## 2021-10-27 PROCEDURE — 1101F PR PT FALLS ASSESS DOC 0-1 FALLS W/OUT INJ PAST YR: ICD-10-PCS | Mod: HCNC,CPTII,S$GLB, | Performed by: DERMATOLOGY

## 2021-10-27 PROCEDURE — 1126F AMNT PAIN NOTED NONE PRSNT: CPT | Mod: HCNC,CPTII,S$GLB, | Performed by: DERMATOLOGY

## 2021-10-27 PROCEDURE — 1126F PR PAIN SEVERITY QUANTIFIED, NO PAIN PRESENT: ICD-10-PCS | Mod: HCNC,CPTII,S$GLB, | Performed by: DERMATOLOGY

## 2021-10-29 PROCEDURE — G0179 PR HOME HEALTH MD RECERTIFICATION: ICD-10-PCS | Mod: ,,, | Performed by: FAMILY MEDICINE

## 2021-10-29 PROCEDURE — G0179 MD RECERTIFICATION HHA PT: HCPCS | Mod: ,,, | Performed by: FAMILY MEDICINE

## 2021-11-02 ENCOUNTER — DOCUMENT SCAN (OUTPATIENT)
Dept: HOME HEALTH SERVICES | Facility: HOSPITAL | Age: 86
End: 2021-11-02
Payer: MEDICARE

## 2021-11-04 ENCOUNTER — EXTERNAL HOME HEALTH (OUTPATIENT)
Dept: HOME HEALTH SERVICES | Facility: HOSPITAL | Age: 86
End: 2021-11-04
Payer: MEDICARE

## 2021-11-04 RX ORDER — ALLOPURINOL 100 MG/1
200 TABLET ORAL DAILY
Qty: 180 TABLET | Refills: 1 | Status: SHIPPED | OUTPATIENT
Start: 2021-11-04 | End: 2022-04-27

## 2021-11-08 ENCOUNTER — TELEPHONE (OUTPATIENT)
Dept: FAMILY MEDICINE | Facility: CLINIC | Age: 86
End: 2021-11-08
Payer: MEDICARE

## 2021-11-08 DIAGNOSIS — N30.00 ACUTE CYSTITIS WITHOUT HEMATURIA: Primary | ICD-10-CM

## 2021-11-12 ENCOUNTER — PATIENT MESSAGE (OUTPATIENT)
Dept: FAMILY MEDICINE | Facility: CLINIC | Age: 86
End: 2021-11-12
Payer: MEDICARE

## 2021-11-12 ENCOUNTER — LAB VISIT (OUTPATIENT)
Dept: LAB | Facility: HOSPITAL | Age: 86
End: 2021-11-12
Attending: FAMILY MEDICINE
Payer: MEDICARE

## 2021-11-12 ENCOUNTER — TELEPHONE (OUTPATIENT)
Dept: FAMILY MEDICINE | Facility: CLINIC | Age: 86
End: 2021-11-12
Payer: MEDICARE

## 2021-11-12 DIAGNOSIS — N39.0 URINARY TRACT INFECTION, SITE NOT SPECIFIED: Primary | ICD-10-CM

## 2021-11-12 PROCEDURE — 87186 SC STD MICRODIL/AGAR DIL: CPT | Mod: HCNC | Performed by: FAMILY MEDICINE

## 2021-11-12 PROCEDURE — 87077 CULTURE AEROBIC IDENTIFY: CPT | Mod: HCNC | Performed by: FAMILY MEDICINE

## 2021-11-12 PROCEDURE — 87088 URINE BACTERIA CULTURE: CPT | Mod: HCNC | Performed by: FAMILY MEDICINE

## 2021-11-12 PROCEDURE — 81001 URINALYSIS AUTO W/SCOPE: CPT | Mod: HCNC | Performed by: FAMILY MEDICINE

## 2021-11-12 PROCEDURE — 87086 URINE CULTURE/COLONY COUNT: CPT | Mod: HCNC | Performed by: FAMILY MEDICINE

## 2021-11-12 RX ORDER — GABAPENTIN 300 MG/1
300 CAPSULE ORAL 3 TIMES DAILY
Qty: 90 CAPSULE | Refills: 1 | Status: SHIPPED | OUTPATIENT
Start: 2021-11-12 | End: 2022-01-18

## 2021-11-13 LAB
BACTERIA #/AREA URNS AUTO: ABNORMAL /HPF
BILIRUB UR QL STRIP: NEGATIVE
CLARITY UR REFRACT.AUTO: ABNORMAL
COLOR UR AUTO: YELLOW
GLUCOSE UR QL STRIP: NEGATIVE
HGB UR QL STRIP: ABNORMAL
KETONES UR QL STRIP: NEGATIVE
LEUKOCYTE ESTERASE UR QL STRIP: ABNORMAL
MICROSCOPIC COMMENT: ABNORMAL
NITRITE UR QL STRIP: NEGATIVE
PH UR STRIP: 6 [PH] (ref 5–8)
PROT UR QL STRIP: NEGATIVE
RBC #/AREA URNS AUTO: 35 /HPF (ref 0–4)
SP GR UR STRIP: 1.01 (ref 1–1.03)
SQUAMOUS #/AREA URNS AUTO: 1 /HPF
URN SPEC COLLECT METH UR: ABNORMAL
WBC #/AREA URNS AUTO: >100 /HPF (ref 0–5)
WBC CLUMPS UR QL AUTO: ABNORMAL
YEAST UR QL AUTO: ABNORMAL

## 2021-11-15 LAB — BACTERIA UR CULT: ABNORMAL

## 2021-11-16 ENCOUNTER — TELEPHONE (OUTPATIENT)
Dept: FAMILY MEDICINE | Facility: CLINIC | Age: 86
End: 2021-11-16
Payer: MEDICARE

## 2021-11-16 DIAGNOSIS — N30.00 ACUTE CYSTITIS WITHOUT HEMATURIA: Primary | ICD-10-CM

## 2021-11-16 RX ORDER — NITROFURANTOIN 25; 75 MG/1; MG/1
100 CAPSULE ORAL 2 TIMES DAILY
Qty: 20 CAPSULE | Refills: 1 | Status: SHIPPED | OUTPATIENT
Start: 2021-11-16 | End: 2021-11-26

## 2021-11-18 ENCOUNTER — PROCEDURE VISIT (OUTPATIENT)
Dept: UROLOGY | Facility: CLINIC | Age: 86
End: 2021-11-18
Payer: MEDICARE

## 2021-11-18 ENCOUNTER — PATIENT OUTREACH (OUTPATIENT)
Dept: ADMINISTRATIVE | Facility: OTHER | Age: 86
End: 2021-11-18
Payer: MEDICARE

## 2021-11-18 VITALS — HEIGHT: 65 IN | WEIGHT: 194 LBS | BODY MASS INDEX: 32.32 KG/M2

## 2021-11-18 DIAGNOSIS — R39.89 URETHRAL PAIN: ICD-10-CM

## 2021-11-18 PROCEDURE — 99214 PR OFFICE/OUTPT VISIT, EST, LEVL IV, 30-39 MIN: ICD-10-PCS | Mod: HCNC,S$GLB,, | Performed by: UROLOGY

## 2021-11-18 PROCEDURE — 99214 OFFICE O/P EST MOD 30 MIN: CPT | Mod: HCNC,S$GLB,, | Performed by: UROLOGY

## 2021-11-19 ENCOUNTER — PATIENT MESSAGE (OUTPATIENT)
Dept: UROLOGY | Facility: CLINIC | Age: 86
End: 2021-11-19
Payer: MEDICARE

## 2021-11-19 DIAGNOSIS — N39.0 RECURRENT UTI (URINARY TRACT INFECTION): ICD-10-CM

## 2021-11-19 RX ORDER — OXYBUTYNIN CHLORIDE 5 MG/1
5 TABLET ORAL 2 TIMES DAILY
Qty: 180 TABLET | Refills: 3 | Status: SHIPPED | OUTPATIENT
Start: 2021-11-19 | End: 2022-08-25

## 2021-11-22 ENCOUNTER — TELEPHONE (OUTPATIENT)
Dept: UROLOGY | Facility: CLINIC | Age: 86
End: 2021-11-22
Payer: MEDICARE

## 2021-11-22 DIAGNOSIS — N31.9 NEUROGENIC BLADDER: Primary | ICD-10-CM

## 2021-11-23 ENCOUNTER — DOCUMENT SCAN (OUTPATIENT)
Dept: HOME HEALTH SERVICES | Facility: HOSPITAL | Age: 86
End: 2021-11-23
Payer: MEDICARE

## 2021-11-23 NOTE — PATIENT INSTRUCTIONS
Corneal Abrasion    You have received a scratch or scrape (abrasion) to your cornea. The cornea is the clear part in the front of the eye. This sensitive area is very painful when injured. You may make tears frequently, and your vision may be blurry until the injury heals. You may be sensitive to light.  This part of the body heals quickly. You can expect the pain to go away within 24 to 48 hours. If the abrasion is large or deep, your doctor may apply an eye patch, although this is not always done. An antibiotic ointment or eye drops may also be used to prevent infection.  Numbing drops may be used to relieve the pain temporarily so that your eyes can be examined. However, these drops cannot be prescribed for home use because that would prevent healing and lead to more serious problems. Also, if you cant feel your eye, there is a chance of accidentally injuring it further without knowing it.  Home care  · A cold pack (ice in a plastic bag, wrapped in a towel) may be applied over the eye (or eye patch) for 20 minutes at a time, to reduce pain.  · You may use acetaminophen or ibuprofen to control pain, unless another pain medicine was prescribed. Note: If you have chronic liver or kidney disease or ever had a stomach ulcer or GI bleeding, talk with your doctor before using these medicines.  · Rest your eyes and do not read until symptoms are gone.  · If you use contact lenses, do not wear them until all symptoms are gone.  · If your vision is affected by the corneal abrasion or if an eye patch was applied, do not drive a motor vehicle or operate machinery until all symptoms are gone. You may have trouble judging distances using only one eye.  · If your eyes are sensitive to light, try wearing sunglasses, or stay indoors until symptoms go away.  Follow-up care  Follow up with your health care provider, or as advised.  · If no patch was put on your eye, and used but the pain continues for more than 48 hours, you  Morris Or has requested a refill on his medication.       Last office visit : 6/8/2021   Next office visit : 6/14/2022   Last medication refill :6/8/2021      Requested Prescriptions     Pending Prescriptions Disp Refills    traZODone (DESYREL) 50 MG tablet [Pharmacy Med Name: TRAZODONE HCL 50 MG TABS 50 Tablet] 30 tablet 5     Sig: TAKE ONE TO TWO TABLETS BY MOUTH EVERY NIGHT AT BEDTIME should have another exam. Return to this facility or contact your health care provider to arrange this.  · If your eye was patched and you were asked to remove the patch yourself, see your health care provider. You may also return to this facility if you still have pain after the patch is removed.  · If you were given a return appointment for patch removal and re-examination, be sure to keep the appointment. Leaving the patch in place longer than advised could be harmful.  When to seek medical advice  Call your health care provider right away if any of these occur.  · Increasing eye pain or pain that does not improve after 24 hours  · Discharge from the eye  · Increasing redness of the eye or swelling of the eyelids  · Worsening vision  · Symptoms that worsen after the abrasion has healed  Date Last Reviewed: 6/14/2015  © 9135-4349 [employer]. [address] [place] [address], [place] [address]. All rights reserved. This information is not intended as a substitute for professional medical care. Always follow your healthcare professional's instructions.

## 2021-12-01 ENCOUNTER — TELEPHONE (OUTPATIENT)
Dept: FAMILY MEDICINE | Facility: CLINIC | Age: 86
End: 2021-12-01
Payer: MEDICARE

## 2021-12-01 ENCOUNTER — DOCUMENT SCAN (OUTPATIENT)
Dept: HOME HEALTH SERVICES | Facility: HOSPITAL | Age: 86
End: 2021-12-01
Payer: MEDICARE

## 2021-12-02 ENCOUNTER — TELEPHONE (OUTPATIENT)
Dept: FAMILY MEDICINE | Facility: CLINIC | Age: 86
End: 2021-12-02
Payer: MEDICARE

## 2021-12-02 NOTE — TELEPHONE ENCOUNTER
----- Message from Luz Mason sent at 12/2/2021  8:49 AM CST -----  Type: Returning call    Who Called:  Lisa Mccray with [facility] Best Call Back Number: [phone]  Additional Information:  Returning nurse (Brittany)call concerning patient/please call back.

## 2021-12-03 ENCOUNTER — OFFICE VISIT (OUTPATIENT)
Dept: FAMILY MEDICINE | Facility: CLINIC | Age: 86
End: 2021-12-03
Payer: MEDICARE

## 2021-12-03 ENCOUNTER — TELEPHONE (OUTPATIENT)
Dept: UROLOGY | Facility: CLINIC | Age: 86
End: 2021-12-03
Payer: MEDICARE

## 2021-12-03 VITALS
WEIGHT: 194 LBS | HEIGHT: 65 IN | SYSTOLIC BLOOD PRESSURE: 124 MMHG | BODY MASS INDEX: 32.32 KG/M2 | TEMPERATURE: 97 F | DIASTOLIC BLOOD PRESSURE: 84 MMHG | HEART RATE: 80 BPM | OXYGEN SATURATION: 95 %

## 2021-12-03 DIAGNOSIS — E79.0 HYPERURICEMIA: ICD-10-CM

## 2021-12-03 DIAGNOSIS — E27.8 ADRENAL NODULE: ICD-10-CM

## 2021-12-03 DIAGNOSIS — N18.31 STAGE 3A CHRONIC KIDNEY DISEASE: ICD-10-CM

## 2021-12-03 DIAGNOSIS — I70.0 ATHEROSCLEROSIS OF AORTA: ICD-10-CM

## 2021-12-03 DIAGNOSIS — N39.0 RECURRENT UTI: Primary | ICD-10-CM

## 2021-12-03 DIAGNOSIS — R73.9 HYPERGLYCEMIA: ICD-10-CM

## 2021-12-03 DIAGNOSIS — I10 ESSENTIAL HYPERTENSION: ICD-10-CM

## 2021-12-03 PROBLEM — D47.3 ESSENTIAL (HEMORRHAGIC) THROMBOCYTHEMIA: Status: ACTIVE | Noted: 2021-12-03

## 2021-12-03 PROCEDURE — 81001 URINALYSIS AUTO W/SCOPE: CPT | Mod: HCNC | Performed by: FAMILY MEDICINE

## 2021-12-03 PROCEDURE — 99214 OFFICE O/P EST MOD 30 MIN: CPT | Mod: HCNC,S$GLB,, | Performed by: FAMILY MEDICINE

## 2021-12-03 PROCEDURE — 1157F PR ADVANCE CARE PLAN OR EQUIV PRESENT IN MEDICAL RECORD: ICD-10-PCS | Mod: HCNC,CPTII,S$GLB, | Performed by: FAMILY MEDICINE

## 2021-12-03 PROCEDURE — 87086 URINE CULTURE/COLONY COUNT: CPT | Mod: HCNC | Performed by: FAMILY MEDICINE

## 2021-12-03 PROCEDURE — 99999 PR PBB SHADOW E&M-EST. PATIENT-LVL V: ICD-10-PCS | Mod: PBBFAC,HCNC,, | Performed by: FAMILY MEDICINE

## 2021-12-03 PROCEDURE — 99499 UNLISTED E&M SERVICE: CPT | Mod: HCNC,S$GLB,, | Performed by: FAMILY MEDICINE

## 2021-12-03 PROCEDURE — 87088 URINE BACTERIA CULTURE: CPT | Mod: HCNC | Performed by: FAMILY MEDICINE

## 2021-12-03 PROCEDURE — 99499 RISK ADDL DX/OHS AUDIT: ICD-10-PCS | Mod: HCNC,S$GLB,, | Performed by: FAMILY MEDICINE

## 2021-12-03 PROCEDURE — 87106 FUNGI IDENTIFICATION YEAST: CPT | Mod: HCNC | Performed by: FAMILY MEDICINE

## 2021-12-03 PROCEDURE — 99999 PR PBB SHADOW E&M-EST. PATIENT-LVL V: CPT | Mod: PBBFAC,HCNC,, | Performed by: FAMILY MEDICINE

## 2021-12-03 PROCEDURE — 99214 PR OFFICE/OUTPT VISIT, EST, LEVL IV, 30-39 MIN: ICD-10-PCS | Mod: HCNC,S$GLB,, | Performed by: FAMILY MEDICINE

## 2021-12-03 PROCEDURE — 1157F ADVNC CARE PLAN IN RCRD: CPT | Mod: HCNC,CPTII,S$GLB, | Performed by: FAMILY MEDICINE

## 2021-12-03 RX ORDER — ALBUTEROL SULFATE 90 UG/1
1-2 AEROSOL, METERED RESPIRATORY (INHALATION) EVERY 6 HOURS PRN
Qty: 18 G | Refills: 5 | Status: SHIPPED | OUTPATIENT
Start: 2021-12-03 | End: 2023-07-12 | Stop reason: SDUPTHER

## 2021-12-04 LAB
BACTERIA #/AREA URNS AUTO: ABNORMAL /HPF
BILIRUB UR QL STRIP: NEGATIVE
CLARITY UR REFRACT.AUTO: ABNORMAL
COLOR UR AUTO: YELLOW
GLUCOSE UR QL STRIP: NEGATIVE
HGB UR QL STRIP: ABNORMAL
HYALINE CASTS UR QL AUTO: 0 /LPF
KETONES UR QL STRIP: NEGATIVE
LEUKOCYTE ESTERASE UR QL STRIP: ABNORMAL
MICROSCOPIC COMMENT: ABNORMAL
NITRITE UR QL STRIP: NEGATIVE
PH UR STRIP: 8 [PH] (ref 5–8)
PROT UR QL STRIP: ABNORMAL
RBC #/AREA URNS AUTO: 67 /HPF (ref 0–4)
SP GR UR STRIP: 1.01 (ref 1–1.03)
URN SPEC COLLECT METH UR: ABNORMAL
WBC #/AREA URNS AUTO: 11 /HPF (ref 0–5)
YEAST UR QL AUTO: ABNORMAL

## 2021-12-05 LAB — BACTERIA UR CULT: ABNORMAL

## 2021-12-06 ENCOUNTER — TELEPHONE (OUTPATIENT)
Dept: FAMILY MEDICINE | Facility: CLINIC | Age: 86
End: 2021-12-06
Payer: MEDICARE

## 2021-12-06 ENCOUNTER — TELEPHONE (OUTPATIENT)
Dept: UROLOGY | Facility: CLINIC | Age: 86
End: 2021-12-06
Payer: MEDICARE

## 2021-12-06 NOTE — TELEPHONE ENCOUNTER
----- Message from Mickie Go sent at 12/6/2021  3:33 PM CST -----  Contact: pt  Type: Needs Medical Advice    Who Called: daniel sanchez /[facility]  Best Call Back Number: [phone]  Inquiry/Question: caller would like to know if provider is going to put pt on antibiotic to treat the yeast infection.  Please advise caller.  Thank you~

## 2021-12-07 ENCOUNTER — LAB VISIT (OUTPATIENT)
Dept: LAB | Facility: HOSPITAL | Age: 86
End: 2021-12-07
Attending: FAMILY MEDICINE
Payer: MEDICARE

## 2021-12-07 ENCOUNTER — PATIENT MESSAGE (OUTPATIENT)
Dept: FAMILY MEDICINE | Facility: CLINIC | Age: 86
End: 2021-12-07
Payer: MEDICARE

## 2021-12-07 DIAGNOSIS — Z12.31 ENCOUNTER FOR SCREENING MAMMOGRAM FOR BREAST CANCER: Primary | ICD-10-CM

## 2021-12-07 DIAGNOSIS — R53.83 FATIGUE: ICD-10-CM

## 2021-12-07 DIAGNOSIS — I10 ESSENTIAL HYPERTENSION, MALIGNANT: Primary | ICD-10-CM

## 2021-12-07 DIAGNOSIS — D47.3 THROMBOCYTHEMIA, ESSENTIAL: ICD-10-CM

## 2021-12-07 LAB
ALBUMIN SERPL BCP-MCNC: 3.9 G/DL (ref 3.5–5.2)
ALP SERPL-CCNC: 122 U/L (ref 55–135)
ALT SERPL W/O P-5'-P-CCNC: 11 U/L (ref 10–44)
ANION GAP SERPL CALC-SCNC: 9 MMOL/L (ref 8–16)
AST SERPL-CCNC: 18 U/L (ref 10–40)
BASOPHILS # BLD AUTO: 0.06 K/UL (ref 0–0.2)
BASOPHILS NFR BLD: 1 % (ref 0–1.9)
BILIRUB SERPL-MCNC: 0.6 MG/DL (ref 0.1–1)
BUN SERPL-MCNC: 16 MG/DL (ref 8–23)
CALCIUM SERPL-MCNC: 9.9 MG/DL (ref 8.7–10.5)
CHLORIDE SERPL-SCNC: 108 MMOL/L (ref 95–110)
CHOLEST SERPL-MCNC: 134 MG/DL (ref 120–199)
CHOLEST/HDLC SERPL: 2.9 {RATIO} (ref 2–5)
CO2 SERPL-SCNC: 22 MMOL/L (ref 23–29)
CREAT SERPL-MCNC: 0.8 MG/DL (ref 0.5–1.4)
DIFFERENTIAL METHOD: ABNORMAL
EOSINOPHIL # BLD AUTO: 0.2 K/UL (ref 0–0.5)
EOSINOPHIL NFR BLD: 3.6 % (ref 0–8)
ERYTHROCYTE [DISTWIDTH] IN BLOOD BY AUTOMATED COUNT: 15.9 % (ref 11.5–14.5)
EST. GFR  (AFRICAN AMERICAN): >60 ML/MIN/1.73 M^2
EST. GFR  (NON AFRICAN AMERICAN): >60 ML/MIN/1.73 M^2
ESTIMATED AVG GLUCOSE: 120 MG/DL (ref 68–131)
GLUCOSE SERPL-MCNC: 86 MG/DL (ref 70–110)
HBA1C MFR BLD: 5.8 % (ref 4–5.6)
HCT VFR BLD AUTO: 41.4 % (ref 37–48.5)
HDLC SERPL-MCNC: 46 MG/DL (ref 40–75)
HDLC SERPL: 34.3 % (ref 20–50)
HGB BLD-MCNC: 13.2 G/DL (ref 12–16)
IMM GRANULOCYTES # BLD AUTO: 0.01 K/UL (ref 0–0.04)
IMM GRANULOCYTES NFR BLD AUTO: 0.2 % (ref 0–0.5)
LDLC SERPL CALC-MCNC: 59 MG/DL (ref 63–159)
LYMPHOCYTES # BLD AUTO: 2.8 K/UL (ref 1–4.8)
LYMPHOCYTES NFR BLD: 46.1 % (ref 18–48)
MCH RBC QN AUTO: 27.9 PG (ref 27–31)
MCHC RBC AUTO-ENTMCNC: 31.9 G/DL (ref 32–36)
MCV RBC AUTO: 88 FL (ref 82–98)
MONOCYTES # BLD AUTO: 0.8 K/UL (ref 0.3–1)
MONOCYTES NFR BLD: 12.6 % (ref 4–15)
NEUTROPHILS # BLD AUTO: 2.2 K/UL (ref 1.8–7.7)
NEUTROPHILS NFR BLD: 36.5 % (ref 38–73)
NONHDLC SERPL-MCNC: 88 MG/DL
NRBC BLD-RTO: 0 /100 WBC
PLATELET # BLD AUTO: 274 K/UL (ref 150–450)
PMV BLD AUTO: 11.3 FL (ref 9.2–12.9)
POTASSIUM SERPL-SCNC: 3.8 MMOL/L (ref 3.5–5.1)
PROT SERPL-MCNC: 6.8 G/DL (ref 6–8.4)
RBC # BLD AUTO: 4.73 M/UL (ref 4–5.4)
SODIUM SERPL-SCNC: 139 MMOL/L (ref 136–145)
TRIGL SERPL-MCNC: 145 MG/DL (ref 30–150)
TSH SERPL DL<=0.005 MIU/L-ACNC: 0.99 UIU/ML (ref 0.4–4)
URATE SERPL-MCNC: 5.4 MG/DL (ref 2.4–5.7)
WBC # BLD AUTO: 6.09 K/UL (ref 3.9–12.7)

## 2021-12-07 PROCEDURE — 85025 COMPLETE CBC W/AUTO DIFF WBC: CPT | Mod: HCNC | Performed by: FAMILY MEDICINE

## 2021-12-07 PROCEDURE — 80061 LIPID PANEL: CPT | Mod: HCNC | Performed by: FAMILY MEDICINE

## 2021-12-07 PROCEDURE — 80053 COMPREHEN METABOLIC PANEL: CPT | Mod: HCNC | Performed by: FAMILY MEDICINE

## 2021-12-07 PROCEDURE — 84443 ASSAY THYROID STIM HORMONE: CPT | Mod: HCNC | Performed by: FAMILY MEDICINE

## 2021-12-07 PROCEDURE — 83036 HEMOGLOBIN GLYCOSYLATED A1C: CPT | Mod: HCNC | Performed by: FAMILY MEDICINE

## 2021-12-07 PROCEDURE — 84550 ASSAY OF BLOOD/URIC ACID: CPT | Mod: HCNC | Performed by: FAMILY MEDICINE

## 2021-12-07 RX ORDER — FLUCONAZOLE 150 MG/1
150 TABLET ORAL DAILY
Qty: 1 TABLET | Refills: 0 | Status: CANCELLED | OUTPATIENT
Start: 2021-12-07 | End: 2021-12-08

## 2021-12-07 NOTE — TELEPHONE ENCOUNTER
Spoke with pt, gave pt the message that Dr. Crespo wrote. Pt verbalized that she will contact urologist if sx persist

## 2021-12-07 NOTE — TELEPHONE ENCOUNTER
For a patient with a cathether, the yeast will be a common finding. The first recommendation is to replace the french if it has not been replaced since 12/3 when the urine was collected.   If the yeast is persistent, then urology will have to recommend tx options. Diflucan has several interactions with her cardiac meds.

## 2021-12-09 ENCOUNTER — PATIENT MESSAGE (OUTPATIENT)
Dept: UROLOGY | Facility: CLINIC | Age: 86
End: 2021-12-09
Payer: MEDICARE

## 2021-12-09 PROBLEM — D47.3 ESSENTIAL (HEMORRHAGIC) THROMBOCYTHEMIA: Status: RESOLVED | Noted: 2021-12-03 | Resolved: 2021-12-09

## 2021-12-14 ENCOUNTER — PES CALL (OUTPATIENT)
Dept: ADMINISTRATIVE | Facility: CLINIC | Age: 86
End: 2021-12-14
Payer: MEDICARE

## 2021-12-28 PROCEDURE — G0179 PR HOME HEALTH MD RECERTIFICATION: ICD-10-PCS | Mod: ,,, | Performed by: FAMILY MEDICINE

## 2021-12-28 PROCEDURE — G0179 MD RECERTIFICATION HHA PT: HCPCS | Mod: ,,, | Performed by: FAMILY MEDICINE

## 2022-01-03 ENCOUNTER — EXTERNAL HOME HEALTH (OUTPATIENT)
Dept: HOME HEALTH SERVICES | Facility: HOSPITAL | Age: 87
End: 2022-01-03
Payer: MEDICARE

## 2022-01-04 ENCOUNTER — PATIENT MESSAGE (OUTPATIENT)
Dept: OPHTHALMOLOGY | Facility: CLINIC | Age: 87
End: 2022-01-04
Payer: MEDICARE

## 2022-01-04 DIAGNOSIS — H40.053 OCULAR HYPERTENSION, BILATERAL: ICD-10-CM

## 2022-01-04 RX ORDER — DORZOLAMIDE HCL 20 MG/ML
1 SOLUTION/ DROPS OPHTHALMIC 2 TIMES DAILY
Qty: 10 ML | Refills: 6 | OUTPATIENT
Start: 2022-01-04 | End: 2023-01-04

## 2022-01-12 ENCOUNTER — PATIENT OUTREACH (OUTPATIENT)
Dept: ADMINISTRATIVE | Facility: OTHER | Age: 87
End: 2022-01-12
Payer: MEDICARE

## 2022-01-18 ENCOUNTER — OFFICE VISIT (OUTPATIENT)
Dept: FAMILY MEDICINE | Facility: CLINIC | Age: 87
End: 2022-01-18
Payer: MEDICARE

## 2022-01-18 ENCOUNTER — OFFICE VISIT (OUTPATIENT)
Dept: OPHTHALMOLOGY | Facility: CLINIC | Age: 87
End: 2022-01-18
Payer: MEDICARE

## 2022-01-18 VITALS
OXYGEN SATURATION: 95 % | WEIGHT: 194.44 LBS | HEIGHT: 65 IN | HEART RATE: 63 BPM | SYSTOLIC BLOOD PRESSURE: 136 MMHG | DIASTOLIC BLOOD PRESSURE: 70 MMHG | BODY MASS INDEX: 32.4 KG/M2

## 2022-01-18 DIAGNOSIS — J44.9 CHRONIC OBSTRUCTIVE PULMONARY DISEASE, UNSPECIFIED COPD TYPE: ICD-10-CM

## 2022-01-18 DIAGNOSIS — Z00.00 ENCOUNTER FOR PREVENTIVE HEALTH EXAMINATION: Primary | ICD-10-CM

## 2022-01-18 DIAGNOSIS — I48.0 PAROXYSMAL ATRIAL FIBRILLATION: ICD-10-CM

## 2022-01-18 DIAGNOSIS — I70.0 ATHEROSCLEROSIS OF AORTA: ICD-10-CM

## 2022-01-18 DIAGNOSIS — H43.813 POSTERIOR VITREOUS DETACHMENT, BILATERAL: ICD-10-CM

## 2022-01-18 DIAGNOSIS — H52.7 REFRACTIVE ERROR: ICD-10-CM

## 2022-01-18 DIAGNOSIS — H40.053 OCULAR HYPERTENSION, BILATERAL: Primary | ICD-10-CM

## 2022-01-18 DIAGNOSIS — Z99.89 DEPENDENCE ON OTHER ENABLING MACHINES AND DEVICES: ICD-10-CM

## 2022-01-18 DIAGNOSIS — I27.20 PULMONARY HYPERTENSION: ICD-10-CM

## 2022-01-18 DIAGNOSIS — Z96.1 PSEUDOPHAKIA OF BOTH EYES: ICD-10-CM

## 2022-01-18 DIAGNOSIS — Z98.890 S/P YAG CAPSULOTOMY, BILATERAL: ICD-10-CM

## 2022-01-18 DIAGNOSIS — I10 ESSENTIAL HYPERTENSION: ICD-10-CM

## 2022-01-18 DIAGNOSIS — R26.9 ABNORMALITY OF GAIT AND MOBILITY: ICD-10-CM

## 2022-01-18 PROCEDURE — 1159F PR MEDICATION LIST DOCUMENTED IN MEDICAL RECORD: ICD-10-PCS | Mod: HCNC,CPTII,S$GLB, | Performed by: OPHTHALMOLOGY

## 2022-01-18 PROCEDURE — 1101F PR PT FALLS ASSESS DOC 0-1 FALLS W/OUT INJ PAST YR: ICD-10-PCS | Mod: HCNC,CPTII,S$GLB, | Performed by: OPHTHALMOLOGY

## 2022-01-18 PROCEDURE — 3288F PR FALLS RISK ASSESSMENT DOCUMENTED: ICD-10-PCS | Mod: HCNC,CPTII,S$GLB, | Performed by: OPHTHALMOLOGY

## 2022-01-18 PROCEDURE — 1157F ADVNC CARE PLAN IN RCRD: CPT | Mod: HCNC,CPTII,S$GLB, | Performed by: OPHTHALMOLOGY

## 2022-01-18 PROCEDURE — 1159F MED LIST DOCD IN RCRD: CPT | Mod: HCNC,CPTII,S$GLB, | Performed by: OPHTHALMOLOGY

## 2022-01-18 PROCEDURE — 1126F PR PAIN SEVERITY QUANTIFIED, NO PAIN PRESENT: ICD-10-PCS | Mod: HCNC,CPTII,S$GLB, | Performed by: NURSE PRACTITIONER

## 2022-01-18 PROCEDURE — 99499 UNLISTED E&M SERVICE: CPT | Mod: S$GLB,,, | Performed by: NURSE PRACTITIONER

## 2022-01-18 PROCEDURE — 1101F PT FALLS ASSESS-DOCD LE1/YR: CPT | Mod: HCNC,CPTII,S$GLB, | Performed by: NURSE PRACTITIONER

## 2022-01-18 PROCEDURE — 3288F PR FALLS RISK ASSESSMENT DOCUMENTED: ICD-10-PCS | Mod: HCNC,CPTII,S$GLB, | Performed by: NURSE PRACTITIONER

## 2022-01-18 PROCEDURE — 99499 RISK ADDL DX/OHS AUDIT: ICD-10-PCS | Mod: S$GLB,,, | Performed by: NURSE PRACTITIONER

## 2022-01-18 PROCEDURE — 1159F PR MEDICATION LIST DOCUMENTED IN MEDICAL RECORD: ICD-10-PCS | Mod: HCNC,CPTII,S$GLB, | Performed by: NURSE PRACTITIONER

## 2022-01-18 PROCEDURE — 99999 PR PBB SHADOW E&M-EST. PATIENT-LVL V: ICD-10-PCS | Mod: PBBFAC,HCNC,, | Performed by: NURSE PRACTITIONER

## 2022-01-18 PROCEDURE — 1157F PR ADVANCE CARE PLAN OR EQUIV PRESENT IN MEDICAL RECORD: ICD-10-PCS | Mod: HCNC,CPTII,S$GLB, | Performed by: OPHTHALMOLOGY

## 2022-01-18 PROCEDURE — 1160F PR REVIEW ALL MEDS BY PRESCRIBER/CLIN PHARMACIST DOCUMENTED: ICD-10-PCS | Mod: HCNC,CPTII,S$GLB, | Performed by: OPHTHALMOLOGY

## 2022-01-18 PROCEDURE — 1126F PR PAIN SEVERITY QUANTIFIED, NO PAIN PRESENT: ICD-10-PCS | Mod: HCNC,CPTII,S$GLB, | Performed by: OPHTHALMOLOGY

## 2022-01-18 PROCEDURE — 1160F RVW MEDS BY RX/DR IN RCRD: CPT | Mod: HCNC,CPTII,S$GLB, | Performed by: NURSE PRACTITIONER

## 2022-01-18 PROCEDURE — 1126F AMNT PAIN NOTED NONE PRSNT: CPT | Mod: HCNC,CPTII,S$GLB, | Performed by: NURSE PRACTITIONER

## 2022-01-18 PROCEDURE — 1101F PT FALLS ASSESS-DOCD LE1/YR: CPT | Mod: HCNC,CPTII,S$GLB, | Performed by: OPHTHALMOLOGY

## 2022-01-18 PROCEDURE — 92012 INTRM OPH EXAM EST PATIENT: CPT | Mod: HCNC,S$GLB,, | Performed by: OPHTHALMOLOGY

## 2022-01-18 PROCEDURE — 3288F FALL RISK ASSESSMENT DOCD: CPT | Mod: HCNC,CPTII,S$GLB, | Performed by: NURSE PRACTITIONER

## 2022-01-18 PROCEDURE — 1160F PR REVIEW ALL MEDS BY PRESCRIBER/CLIN PHARMACIST DOCUMENTED: ICD-10-PCS | Mod: HCNC,CPTII,S$GLB, | Performed by: NURSE PRACTITIONER

## 2022-01-18 PROCEDURE — 1101F PR PT FALLS ASSESS DOC 0-1 FALLS W/OUT INJ PAST YR: ICD-10-PCS | Mod: HCNC,CPTII,S$GLB, | Performed by: NURSE PRACTITIONER

## 2022-01-18 PROCEDURE — G0439 PR MEDICARE ANNUAL WELLNESS SUBSEQUENT VISIT: ICD-10-PCS | Mod: HCNC,S$GLB,, | Performed by: NURSE PRACTITIONER

## 2022-01-18 PROCEDURE — 1160F RVW MEDS BY RX/DR IN RCRD: CPT | Mod: HCNC,CPTII,S$GLB, | Performed by: OPHTHALMOLOGY

## 2022-01-18 PROCEDURE — 1157F PR ADVANCE CARE PLAN OR EQUIV PRESENT IN MEDICAL RECORD: ICD-10-PCS | Mod: HCNC,CPTII,S$GLB, | Performed by: NURSE PRACTITIONER

## 2022-01-18 PROCEDURE — 99999 PR PBB SHADOW E&M-EST. PATIENT-LVL V: CPT | Mod: PBBFAC,HCNC,, | Performed by: NURSE PRACTITIONER

## 2022-01-18 PROCEDURE — 99999 PR PBB SHADOW E&M-EST. PATIENT-LVL II: ICD-10-PCS | Mod: PBBFAC,HCNC,, | Performed by: OPHTHALMOLOGY

## 2022-01-18 PROCEDURE — 3288F FALL RISK ASSESSMENT DOCD: CPT | Mod: HCNC,CPTII,S$GLB, | Performed by: OPHTHALMOLOGY

## 2022-01-18 PROCEDURE — 1170F FXNL STATUS ASSESSED: CPT | Mod: HCNC,CPTII,S$GLB, | Performed by: NURSE PRACTITIONER

## 2022-01-18 PROCEDURE — 99999 PR PBB SHADOW E&M-EST. PATIENT-LVL II: CPT | Mod: PBBFAC,HCNC,, | Performed by: OPHTHALMOLOGY

## 2022-01-18 PROCEDURE — G0439 PPPS, SUBSEQ VISIT: HCPCS | Mod: HCNC,S$GLB,, | Performed by: NURSE PRACTITIONER

## 2022-01-18 PROCEDURE — 1170F PR FUNCTIONAL STATUS ASSESSED: ICD-10-PCS | Mod: HCNC,CPTII,S$GLB, | Performed by: NURSE PRACTITIONER

## 2022-01-18 PROCEDURE — 92012 PR EYE EXAM, EST PATIENT,INTERMED: ICD-10-PCS | Mod: HCNC,S$GLB,, | Performed by: OPHTHALMOLOGY

## 2022-01-18 PROCEDURE — 1159F MED LIST DOCD IN RCRD: CPT | Mod: HCNC,CPTII,S$GLB, | Performed by: NURSE PRACTITIONER

## 2022-01-18 PROCEDURE — 1157F ADVNC CARE PLAN IN RCRD: CPT | Mod: HCNC,CPTII,S$GLB, | Performed by: NURSE PRACTITIONER

## 2022-01-18 PROCEDURE — 1126F AMNT PAIN NOTED NONE PRSNT: CPT | Mod: HCNC,CPTII,S$GLB, | Performed by: OPHTHALMOLOGY

## 2022-01-18 RX ORDER — GABAPENTIN 300 MG/1
CAPSULE ORAL
COMMUNITY
Start: 2021-11-08 | End: 2022-02-24

## 2022-01-18 NOTE — PATIENT INSTRUCTIONS
Counseling and Referral of Other Preventative  (Italic type indicates deductible and co-insurance are waived)    Patient Name: Holli Landrum  Today's Date: 1/18/2022    Health Maintenance       Date Due Completion Date    Shingles Vaccine (1 of 2) Never done ---    COVID-19 Vaccine (3 - Booster for Pfizer series) 07/31/2021 1/31/2021    Lipid Panel 12/07/2022 12/7/2021    TETANUS VACCINE 05/27/2026 5/27/2016        No orders of the defined types were placed in this encounter.    The following information is provided to all patients.  This information is to help you find resources for any of the problems found today that may be affecting your health:                Living healthy guide: www.[facility].[place].gov      Understanding Diabetes: www.diabetes.org      Eating healthy: www.cdc.gov/healthyweight      [facility] home safety checklist: www.cdc.gov/steadi/patient.html      Agency on Aging: www.goea.[place].[facility]      Alcoholics anonymous (AA): www.aa.org      Physical Activity: www.kodi.nih.gov/kc7bgpy      Tobacco use: www.quitwithusla.org      2020  EMPLOYEE INFORMATION: EMPLOYER INFORMATION:   NAME: Josie ROMAN   : 1966 [phone]   DATE OF INJURY/EVENT: 2019           Location: [facility]   Treating Provider: Dragan Bagley MD  Time In:  8:44 AM Time Out:  9:04 AM      DIAGNOSIS:   1. Strain of lumbar region, subsequent encounter      STATUS: This injury is determined to be WORK RELATED.    RETURN TO WORK:   Employee may return to work without restrictions.    Return Date: 2020            RESTRICTIONS:   Restrictions are to be followed at work and at home.  Restrictions are in effect until next follow-up visit.  No Restrictions    TREATMENT PLAN:  Medications for this injury/condition:   Naproxen  Referral/Consult:  Physical Therapy:  Continue       Diagnostic Testing:   None   Drug test not required.  Breath alcohol test not required.      Instructions:   Continue to take Naproxen as needed for pain, continue PT and home exercises.     NEXT RETURN VISIT: 20 @ 830am  Thank you for the privilege of providing medical care for this injury/condition.  If there are any questions, please call the occupational health clinic at Dept: [phone].      Electronically signed on 2020 at 8:58 AM by:   Dragan Bagley MD   [facility]

## 2022-01-18 NOTE — PROGRESS NOTES
"  Holli Landrum presented for a  Medicare AWV and comprehensive Health Risk Assessment today. The following components were reviewed and updated:    · Medical history  · Family History  · Social history  · Allergies and Current Medications  · Health Risk Assessment  · Health Maintenance  · Care Team         ** See Completed Assessments for Annual Wellness Visit within the encounter summary.**         The following assessments were completed:  · Living Situation  · CAGE  · Depression Screening  · Timed Get Up and Go  · Whisper Test  · Cognitive Function Screening          · Nutrition Screening  · ADL Screening  · PAQ Screening        Vitals:    01/18/22 1245   BP: 136/70   BP Location: Left arm   Patient Position: Sitting   BP Method: Medium (Manual)   Pulse: 63   SpO2: 95%   Weight: 88.2 kg (194 lb 7.1 oz)   Height: 5' 5" (1.651 m)     Body mass index is 32.36 kg/m².  Physical Exam  Vitals reviewed.   Constitutional:       General: She is not in acute distress.     Appearance: She is not toxic-appearing.   HENT:      Head: Normocephalic.   Pulmonary:      Effort: Pulmonary effort is normal. No respiratory distress.   Skin:     General: Skin is warm.   Neurological:      General: No focal deficit present.      Mental Status: She is alert.   Psychiatric:         Mood and Affect: Mood normal.         Thought Content: Thought content normal.               Diagnoses and health risks identified today and associated recommendations/orders:    1. Encounter for preventive health examination  Reviewed health maintenance and provided recommendations   Covid booster scheduled for 1/20/22     2. Dependence on other enabling machines and devices  Continue to monitor  Followed by Marcia Crespo MD .      3. Abnormality of gait and mobility  Continue to monitor  Followed by Marcia Crespo MD .      4. Chronic obstructive pulmonary disease, unspecified COPD type  Continue to monitor  Followed by Harley.      5. Pulmonary " hypertension  Continue to monitor  Followed by Tootie.      6. Paroxysmal atrial fibrillation  Continue to monitor  Followed by Tootie  Taking eliquis and b-blocker.      7. Atherosclerosis of aorta  Continue to monitor  Followed by Tootie.      8. Essential hypertension  Continue to monitor  Followed by Marcia Crespo MD .        Provided Holli with a 5-10 year written screening schedule and personal prevention plan. Recommendations were developed using the USPSTF age appropriate recommendations. Education, counseling, and referrals were provided as needed. After Visit Summary printed and given to patient which includes a list of additional screenings\tests needed.    Follow up in one year  Bridgette Pritchard NP  I offered to discuss advanced care planning, including how to pick a person who would make decisions for you if you were unable to make them for yourself, called a health care power of , and what kind of decisions you might make such as use of life sustaining treatments such as ventilators and tube feeding when faced with a life limiting illness recorded on a living will that they will need to know. (How you want to be cared for as you near the end of your natural life)     X Patient is interested in learning more about how to make advanced directives.  I provided them paperwork and offered to discuss this with them.

## 2022-01-18 NOTE — PROGRESS NOTES
"HPI     4 month IOP check    Pt. States "I still cant see" since cataract sx in 2018.  C/o current glasses rx being   "I am not seeing straight out the right eye" c/o of the drop bottle moving   without her hand moving while inserting gtts. That pt. States she never   had that problem before cateract sx.     Denies any flashes or floaters.    Dorzolamide BID OS  Latanoprost QHS OU      Last edited by Candice Moscoso on 1/18/2022  1:53 PM. (History)        ROS     Negative for: Constitutional, Gastrointestinal, Neurological, Skin,   Genitourinary, Musculoskeletal, HENT, Endocrine, Cardiovascular, Eyes,   Respiratory, Psychiatric, Allergic/Imm, Heme/Lymph    Last edited by Aleksandar Rose Jr., MD on 1/18/2022  2:42 PM. (History)        Assessment /Plan     For exam results, see Encounter Report.    Ocular hypertension, bilateral    Pseudophakia of both eyes    Posterior vitreous detachment, bilateral    S/P YAG capsulotomy, bilateral    Refractive error    IOP stable OD 14 OS 13  Switched dorzolamide BID OS to OU; patient was just taking it in left eye  Continue latanoprost QHS OU and dorzolamide BID  OU  Stable, observe  Follow up in about 4 months (around 5/18/2022) for IOP and Medication check.                                               "

## 2022-01-20 ENCOUNTER — IMMUNIZATION (OUTPATIENT)
Dept: FAMILY MEDICINE | Facility: CLINIC | Age: 87
End: 2022-01-20
Payer: MEDICARE

## 2022-01-20 ENCOUNTER — HOSPITAL ENCOUNTER (OUTPATIENT)
Dept: RADIOLOGY | Facility: HOSPITAL | Age: 87
Discharge: HOME | End: 2022-01-20
Attending: FAMILY MEDICINE
Payer: MEDICARE

## 2022-01-20 DIAGNOSIS — Z23 NEED FOR VACCINATION: Primary | ICD-10-CM

## 2022-01-20 DIAGNOSIS — Z12.31 ENCOUNTER FOR SCREENING MAMMOGRAM FOR BREAST CANCER: ICD-10-CM

## 2022-01-20 PROCEDURE — 0004A COVID-19, MRNA, LNP-S, PF, 30 MCG/0.3 ML DOSE VACCINE: CPT | Mod: HCNC,PBBFAC | Performed by: INTERNAL MEDICINE

## 2022-01-20 PROCEDURE — 77063 BREAST TOMOSYNTHESIS BI: CPT | Mod: 26,HCNC,, | Performed by: RADIOLOGY

## 2022-01-20 PROCEDURE — 77063 MAMMO DIGITAL SCREENING BILAT WITH TOMO: ICD-10-PCS | Mod: 26,HCNC,, | Performed by: RADIOLOGY

## 2022-01-20 PROCEDURE — 77063 BREAST TOMOSYNTHESIS BI: CPT | Mod: TC,HCNC,PO

## 2022-01-20 PROCEDURE — 77067 MAMMO DIGITAL SCREENING BILAT WITH TOMO: ICD-10-PCS | Mod: 26,HCNC,, | Performed by: RADIOLOGY

## 2022-01-20 PROCEDURE — 77067 SCR MAMMO BI INCL CAD: CPT | Mod: 26,HCNC,, | Performed by: RADIOLOGY

## 2022-02-07 ENCOUNTER — TELEPHONE (OUTPATIENT)
Dept: FAMILY MEDICINE | Facility: CLINIC | Age: 87
End: 2022-02-07
Payer: MEDICARE

## 2022-02-07 NOTE — TELEPHONE ENCOUNTER
----- Message from Reynaldo Red sent at 2/7/2022  1:07 PM CST -----  Contact: pt  Type: Needs Medical Advice    Who Called: pt    Best Call Back Number: [phone]       Requesting a call back regarding - pt is asking for a call back please she is having an issue wants an order for urine test please send to home health  think she has a UTI. Please call ASAP       Please Advise- Thank you

## 2022-02-07 NOTE — TELEPHONE ENCOUNTER
"Spoke w/ pt. She had an episode yesterday with "terrible terrible pain in my bottom part" and then urinatedd through a pad, panties 2 slips and her skirt. She went to bathroom right away and then went again all over herself. A few times she passed blood. Pt has indwelling vag cath x 1 mo. She has been passing clots since then yest there were 2 clots that looked new and then a couple that "looked like old blood". No other S/S. Home health is coming out tomorrow morning to change her cath. Asked pt to have nurse collect UA sample and call us w/ her eval. Pt didn't call Uro b/c she states Dr Tran is not in on Mondays. Dr Crespo gone for the day. She is going to have  nurse eval and call us tomorrow for recommendation.  "

## 2022-02-08 ENCOUNTER — TELEPHONE (OUTPATIENT)
Dept: FAMILY MEDICINE | Facility: CLINIC | Age: 87
End: 2022-02-08
Payer: MEDICARE

## 2022-02-08 NOTE — TELEPHONE ENCOUNTER
"Pt called yesterday afternoon:  She had an episode Sunday with "terrible terrible pain in my bottom part" and then urinatedd through a pad, her panties, 2 slips and her skirt. She went to bathroom right away and then went again all over herself. A few times she passed blood. Pt has indwelling vag cath x 1 mo. She has been passing clots since the urine episode yest - there were 2 clots that looked new and then a couple that "looked like old blood". No other S/S. Asked pt to have nurse collect UA sample and call us w/ her eval when she sees pt on Tues. Pt didn't call Uro b/c she states Dr Tran is not in on Mondays. Dr Crespo gone for the day. She is going to have HH nurse eval and call us tomorrow for recommendation.    Spoke w/ HH nurse. She states pt may have snagged the cath which would explain the previous blood seen in bag. The balloon did deflate down to 20cc's, which would explain the urine leaking, she changed the cath this morning. She states urine is light yellow, clear and no sediment. She feels the pt's S/S were related to the compromised cath and doesn't feel the pt has an actual UTI. She did collect a specimen, in case MD wants to order any testing. Please review and advise if any need to run urine testing.  "

## 2022-02-08 NOTE — TELEPHONE ENCOUNTER
----- Message from Ambrocio Tarango sent at 2/8/2022  8:54 AM CST -----  Contact: Self  Type:  Patient Returning Call    Who Called:  Patient  Who Left Message for Patient:  Alison  Does the patient know what this is regarding?:  Yes  Best Call Back Number:  [phone] Nurse Sara  Additional Information:   Called while HH nurse was present.

## 2022-02-10 ENCOUNTER — HOSPITAL ENCOUNTER (OUTPATIENT)
Dept: RADIOLOGY | Facility: HOSPITAL | Age: 87
Discharge: HOME | End: 2022-02-10
Attending: NURSE PRACTITIONER
Payer: MEDICARE

## 2022-02-10 ENCOUNTER — OFFICE VISIT (OUTPATIENT)
Dept: ORTHOPEDICS | Facility: CLINIC | Age: 87
End: 2022-02-10
Payer: MEDICARE

## 2022-02-10 ENCOUNTER — TELEPHONE (OUTPATIENT)
Dept: FAMILY MEDICINE | Facility: CLINIC | Age: 87
End: 2022-02-10
Payer: MEDICARE

## 2022-02-10 ENCOUNTER — OFFICE VISIT (OUTPATIENT)
Dept: FAMILY MEDICINE | Facility: CLINIC | Age: 87
End: 2022-02-10
Payer: MEDICARE

## 2022-02-10 VITALS — HEIGHT: 65 IN | WEIGHT: 189 LBS | BODY MASS INDEX: 31.49 KG/M2

## 2022-02-10 VITALS
WEIGHT: 189 LBS | TEMPERATURE: 98 F | DIASTOLIC BLOOD PRESSURE: 88 MMHG | BODY MASS INDEX: 31.49 KG/M2 | HEIGHT: 65 IN | SYSTOLIC BLOOD PRESSURE: 124 MMHG | HEART RATE: 80 BPM | OXYGEN SATURATION: 96 %
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
## 2022-02-10 DIAGNOSIS — M79.671 RIGHT FOOT PAIN: ICD-10-CM

## 2022-02-10 DIAGNOSIS — M54.9 BACK PAIN, UNSPECIFIED BACK LOCATION, UNSPECIFIED BACK PAIN LATERALITY, UNSPECIFIED CHRONICITY: Primary | ICD-10-CM

## 2022-02-10 DIAGNOSIS — M54.9 BACK PAIN, UNSPECIFIED BACK LOCATION, UNSPECIFIED BACK PAIN LATERALITY, UNSPECIFIED CHRONICITY: ICD-10-CM

## 2022-02-10 DIAGNOSIS — M79.645 THUMB PAIN, LEFT: Primary | ICD-10-CM

## 2022-02-10 DIAGNOSIS — M54.50 ACUTE MIDLINE LOW BACK PAIN WITHOUT SCIATICA: ICD-10-CM

## 2022-02-10 DIAGNOSIS — M79.645 PAIN OF LEFT THUMB: ICD-10-CM

## 2022-02-10 DIAGNOSIS — S92.354A CLOSED NONDISPLACED FRACTURE OF FIFTH METATARSAL BONE OF RIGHT FOOT, INITIAL ENCOUNTER: ICD-10-CM

## 2022-02-10 DIAGNOSIS — M79.645 PAIN OF LEFT THUMB: Primary | ICD-10-CM

## 2022-02-10 PROCEDURE — 73630 XR FOOT COMPLETE 3 VIEW RIGHT: ICD-10-PCS | Mod: 26,HCNC,RT, | Performed by: RADIOLOGY

## 2022-02-10 PROCEDURE — 1157F PR ADVANCE CARE PLAN OR EQUIV PRESENT IN MEDICAL RECORD: ICD-10-PCS | Mod: HCNC,CPTII,S$GLB, | Performed by: NURSE PRACTITIONER

## 2022-02-10 PROCEDURE — 1160F PR REVIEW ALL MEDS BY PRESCRIBER/CLIN PHARMACIST DOCUMENTED: ICD-10-PCS | Mod: HCNC,CPTII,S$GLB, | Performed by: NURSE PRACTITIONER

## 2022-02-10 PROCEDURE — 1157F ADVNC CARE PLAN IN RCRD: CPT | Mod: HCNC,CPTII,S$GLB, | Performed by: ORTHOPAEDIC SURGERY

## 2022-02-10 PROCEDURE — 1159F MED LIST DOCD IN RCRD: CPT | Mod: HCNC,CPTII,S$GLB, | Performed by: ORTHOPAEDIC SURGERY

## 2022-02-10 PROCEDURE — 73521 XR HIPS BILATERAL 2 VIEW INCL AP PELVIS: ICD-10-PCS | Mod: 26,HCNC,, | Performed by: RADIOLOGY

## 2022-02-10 PROCEDURE — 99999 PR PBB SHADOW E&M-EST. PATIENT-LVL IV: CPT | Mod: PBBFAC,HCNC,, | Performed by: ORTHOPAEDIC SURGERY

## 2022-02-10 PROCEDURE — 97760 PR ORTHOTIC MGMT&TRAINJ INITIAL ENC EA 15 MINS: ICD-10-PCS | Mod: HCNC,GP,S$GLB, | Performed by: ORTHOPAEDIC SURGERY

## 2022-02-10 PROCEDURE — 28470 CLTX METATARSAL FX WO MNP EA: CPT | Mod: HCNC,RT,S$GLB, | Performed by: ORTHOPAEDIC SURGERY

## 2022-02-10 PROCEDURE — 99214 PR OFFICE/OUTPT VISIT, EST, LEVL IV, 30-39 MIN: ICD-10-PCS | Mod: 25,HCNC,S$GLB, | Performed by: ORTHOPAEDIC SURGERY

## 2022-02-10 PROCEDURE — 1100F PTFALLS ASSESS-DOCD GE2>/YR: CPT | Mod: HCNC,CPTII,S$GLB, | Performed by: ORTHOPAEDIC SURGERY

## 2022-02-10 PROCEDURE — 1100F PR PT FALLS ASSESS DOC 2+ FALLS/FALL W/INJURY/YR: ICD-10-PCS | Mod: HCNC,CPTII,S$GLB, | Performed by: ORTHOPAEDIC SURGERY

## 2022-02-10 PROCEDURE — 73130 XR HAND COMPLETE 3 VIEW LEFT: ICD-10-PCS | Mod: 26,HCNC,LT, | Performed by: RADIOLOGY

## 2022-02-10 PROCEDURE — 1159F MED LIST DOCD IN RCRD: CPT | Mod: HCNC,CPTII,S$GLB, | Performed by: NURSE PRACTITIONER

## 2022-02-10 PROCEDURE — 28470 PR CLOSED RX METATARSAL FX: ICD-10-PCS | Mod: HCNC,RT,S$GLB, | Performed by: ORTHOPAEDIC SURGERY

## 2022-02-10 PROCEDURE — 73521 X-RAY EXAM HIPS BI 2 VIEWS: CPT | Mod: TC,HCNC,FY,PO

## 2022-02-10 PROCEDURE — 1160F PR REVIEW ALL MEDS BY PRESCRIBER/CLIN PHARMACIST DOCUMENTED: ICD-10-PCS | Mod: HCNC,CPTII,S$GLB, | Performed by: ORTHOPAEDIC SURGERY

## 2022-02-10 PROCEDURE — 99213 OFFICE O/P EST LOW 20 MIN: CPT | Mod: HCNC,S$GLB,, | Performed by: NURSE PRACTITIONER

## 2022-02-10 PROCEDURE — 99213 PR OFFICE/OUTPT VISIT, EST, LEVL III, 20-29 MIN: ICD-10-PCS | Mod: HCNC,S$GLB,, | Performed by: NURSE PRACTITIONER

## 2022-02-10 PROCEDURE — 99999 PR PBB SHADOW E&M-EST. PATIENT-LVL IV: ICD-10-PCS | Mod: PBBFAC,HCNC,, | Performed by: ORTHOPAEDIC SURGERY

## 2022-02-10 PROCEDURE — 72100 X-RAY EXAM L-S SPINE 2/3 VWS: CPT | Mod: 26,HCNC,, | Performed by: RADIOLOGY

## 2022-02-10 PROCEDURE — 97760 ORTHOTIC MGMT&TRAING 1ST ENC: CPT | Mod: HCNC,GP,S$GLB, | Performed by: ORTHOPAEDIC SURGERY

## 2022-02-10 PROCEDURE — 1159F PR MEDICATION LIST DOCUMENTED IN MEDICAL RECORD: ICD-10-PCS | Mod: HCNC,CPTII,S$GLB, | Performed by: ORTHOPAEDIC SURGERY

## 2022-02-10 PROCEDURE — 73630 X-RAY EXAM OF FOOT: CPT | Mod: TC,HCNC,FY,PO,RT

## 2022-02-10 PROCEDURE — 73130 X-RAY EXAM OF HAND: CPT | Mod: 26,HCNC,LT, | Performed by: RADIOLOGY

## 2022-02-10 PROCEDURE — 73130 X-RAY EXAM OF HAND: CPT | Mod: TC,HCNC,FY,PO,LT

## 2022-02-10 PROCEDURE — 99999 PR PBB SHADOW E&M-EST. PATIENT-LVL V: ICD-10-PCS | Mod: PBBFAC,HCNC,, | Performed by: NURSE PRACTITIONER

## 2022-02-10 PROCEDURE — 1159F PR MEDICATION LIST DOCUMENTED IN MEDICAL RECORD: ICD-10-PCS | Mod: HCNC,CPTII,S$GLB, | Performed by: NURSE PRACTITIONER

## 2022-02-10 PROCEDURE — 1160F RVW MEDS BY RX/DR IN RCRD: CPT | Mod: HCNC,CPTII,S$GLB, | Performed by: NURSE PRACTITIONER

## 2022-02-10 PROCEDURE — 1125F PR PAIN SEVERITY QUANTIFIED, PAIN PRESENT: ICD-10-PCS | Mod: HCNC,CPTII,S$GLB, | Performed by: ORTHOPAEDIC SURGERY

## 2022-02-10 PROCEDURE — 73521 X-RAY EXAM HIPS BI 2 VIEWS: CPT | Mod: 26,HCNC,, | Performed by: RADIOLOGY

## 2022-02-10 PROCEDURE — 73630 X-RAY EXAM OF FOOT: CPT | Mod: 26,HCNC,RT, | Performed by: RADIOLOGY

## 2022-02-10 PROCEDURE — 99999 PR PBB SHADOW E&M-EST. PATIENT-LVL V: CPT | Mod: PBBFAC,HCNC,, | Performed by: NURSE PRACTITIONER

## 2022-02-10 PROCEDURE — 72100 XR LUMBAR SPINE AP AND LATERAL: ICD-10-PCS | Mod: 26,HCNC,, | Performed by: RADIOLOGY

## 2022-02-10 PROCEDURE — 1125F AMNT PAIN NOTED PAIN PRSNT: CPT | Mod: HCNC,CPTII,S$GLB, | Performed by: ORTHOPAEDIC SURGERY

## 2022-02-10 PROCEDURE — 3288F PR FALLS RISK ASSESSMENT DOCUMENTED: ICD-10-PCS | Mod: HCNC,CPTII,S$GLB, | Performed by: ORTHOPAEDIC SURGERY

## 2022-02-10 PROCEDURE — 1157F PR ADVANCE CARE PLAN OR EQUIV PRESENT IN MEDICAL RECORD: ICD-10-PCS | Mod: HCNC,CPTII,S$GLB, | Performed by: ORTHOPAEDIC SURGERY

## 2022-02-10 PROCEDURE — 99214 OFFICE O/P EST MOD 30 MIN: CPT | Mod: 25,HCNC,S$GLB, | Performed by: ORTHOPAEDIC SURGERY

## 2022-02-10 PROCEDURE — 1157F ADVNC CARE PLAN IN RCRD: CPT | Mod: HCNC,CPTII,S$GLB, | Performed by: NURSE PRACTITIONER

## 2022-02-10 PROCEDURE — 72100 X-RAY EXAM L-S SPINE 2/3 VWS: CPT | Mod: TC,HCNC,FY,PO

## 2022-02-10 PROCEDURE — 1125F AMNT PAIN NOTED PAIN PRSNT: CPT | Mod: HCNC,CPTII,S$GLB, | Performed by: NURSE PRACTITIONER

## 2022-02-10 PROCEDURE — 1160F RVW MEDS BY RX/DR IN RCRD: CPT | Mod: HCNC,CPTII,S$GLB, | Performed by: ORTHOPAEDIC SURGERY

## 2022-02-10 PROCEDURE — 1125F PR PAIN SEVERITY QUANTIFIED, PAIN PRESENT: ICD-10-PCS | Mod: HCNC,CPTII,S$GLB, | Performed by: NURSE PRACTITIONER

## 2022-02-10 PROCEDURE — 3288F FALL RISK ASSESSMENT DOCD: CPT | Mod: HCNC,CPTII,S$GLB, | Performed by: ORTHOPAEDIC SURGERY

## 2022-02-10 NOTE — PROGRESS NOTES
88 years old fell today injured her right foot been painful since then 10 on the pain scale    Exam shows tenderness and swelling at the 5th metatarsal neck and shaft area compartments are soft no signs infection flexors and extensors intact no instability    X-rays of foot show 5th metatarsal fracture acceptable position    Assessment:  Right 5th metatarsal fracture    Plan:  Boot, weight-bearing to tolerance, follow-up in a few weeks time is a postoperative visit with x-rays of her right foot    We performed a custom orthotic/brace fitting, adjusting and training with the patient. The patient demonstrated understanding and proper care. This was performed for 15 minutes.      Imaging studies ordered and reviewed by me    Further History  Aching pain  Worse with activity  Relieved with rest  No other associated symptoms  No other radiation    Further Exam  Alert and oriented  Pleasant  Contralateral limb has appropriate range of motion for age and condition  Contralateral limb has appropriate strength for age and condition  Contralateral limb has appropriate stability  for age and condition  No adenopathy  Pulses are appropriate for current condition  Skin is intact        Chief Complaint    Chief Complaint   Patient presents with    Right Foot - Pain    Left Hand - Pain       HPI  Holli Landrum is a 88 y.o.  female who presents with       Past Medical History  Past Medical History:   Diagnosis Date    Anticoagulant long-term use     Arthritis     Asthma     Basal cell carcinoma     Cancer     skin cancer to face    Cataract     OU done//    CHF (congestive heart failure)     COPD (chronic obstructive pulmonary disease)     no oxygen; patient denies    cpap     Essential hypertension 5/5/2010    GERD (gastroesophageal reflux disease) 11/20/2012    Glaucoma     Hypertensive heart disease with heart failure 02/05/2013    Paroxysmal ventricular tachycardia     per problem list    Renal disorder      manolo-1/3/2020    Squamous cell carcinoma of skin        Past Surgical History  Past Surgical History:   Procedure Laterality Date    A-V CARDIAC PACEMAKER INSERTION  6/16/2021    Procedure: INSERTION, CARDIAC PACEMAKER, DUAL CHAMBER;  Surgeon: Oscar Sommers MD;  Location: [facility] CATH;  Service: Cardiovascular;;    BREAST BIOPSY Left 1995    neg    BREAST BIOPSY Right 1984    neg    BREAST BIOPSY Right 1992    neg    CARDIAC CATHETERIZATION  2013, 2014,2016    has 7 stents    CARDIAC SURGERY  2012    stents    CATARACT EXTRACTION W/  INTRAOCULAR LENS IMPLANT Left 11/01/2018    Dr Rose    CATARACT EXTRACTION W/  INTRAOCULAR LENS IMPLANT Right 12/13/2018    Dr Rose//    CHOLECYSTECTOMY      COLONOSCOPY  ~2005    Dr. Edward; normal per patient report    CORONARY ANGIOGRAPHY N/A 3/20/2020    Procedure: ANGIOGRAM, CORONARY ARTERY;  Surgeon: Chuy Bryant MD;  Location: [facility] CATH;  Service: Cardiology;  Laterality: N/A;    CYSTOSCOPY W/ RETROGRADES Bilateral 2/12/2020    Procedure: CYSTOSCOPY, WITH RETROGRADE PYELOGRAM;  Surgeon: Gasper Feliz MD;  Location[facility] OR;  Service: Urology;  Laterality: Bilateral;    ESOPHAGOGASTRODUODENOSCOPY N/A 8/13/2020    Procedure: EGD (ESOPHAGOGASTRODUODENOSCOPY);  Surgeon: Mika Solorzano MD;  Location[facility];  Service: Endoscopy;  Laterality: N/A; Mild Schatzki ring. Biopsied. Dilated. small hiatal hernia, gastritis; biopsy: esophagus- SEVERE REFLUX ESOPHAGITIS, stomach- chronic gastritis, negative for H pylori    ESOPHAGOGASTRODUODENOSCOPY N/A 10/22/2020    Procedure: EGD (ESOPHAGOGASTRODUODENOSCOPY);  Surgeon: Fred Hendricks MD;  Location: [facility] ENDO;  Service: Endoscopy;  Laterality: N/A;    HYSTERECTOMY  1969    LEFT HEART CATHETERIZATION Left 3/3/2020    Procedure: Left heart cath;  Surgeon: Chuy Bryant MD;  Location: [facility] CATH;  Service: Cardiology;  Laterality: Left;    LEFT HEART CATHETERIZATION Left 3/20/2020    Procedure: Left heart  cath;  Surgeon: Chuy Bryant MD;  Location: [facility] CATH;  Service: Cardiology;  Laterality: Left;    OVARIAN CYST REMOVAL  teenager    PHACOEMULSIFICATION OF CATARACT Left 11/1/2018    Procedure: PHACOEMULSIFICATION, CATARACT;  Surgeon: Aleksandar Rose Jr., MD;  Location: [facility] OR;  Service: Ophthalmology;  Laterality: Left;    PHACOEMULSIFICATION OF CATARACT Right 12/13/2018    Procedure: PHACOEMULSIFICATION, CATARACT;  Surgeon: Aleksandar Rose Jr., MD;  Location: [facility] OR;  Service: Ophthalmology;  Laterality: Right;    TONSILLECTOMY      aw/denoids    UPPER GASTROINTESTINAL ENDOSCOPY  ~2005    Dr. Solorzano    VASCULAR SURGERY      to remove clot from right leg    Yag Capsulotomy Bilateral 11/05/2019    Dr Rose       Medications  Current Outpatient Medications   Medication Sig    acetaminophen (TYLENOL) 650 MG TbSR Take 650 mg by mouth as needed.     albuterol (PROVENTIL/VENTOLIN HFA) 90 mcg/actuation inhaler Inhale 1-2 puffs into the lungs every 6 (six) hours as needed for Wheezing. Rescue    allopurinoL (ZYLOPRIM) 100 MG tablet Take 2 tablets (200 mg total) by mouth once daily.    aluminum & magnesium hydroxide-simethicone (MYLANTA MAX STRENGTH) 400-400-40 mg/5 mL suspension Take by mouth every 6 (six) hours as needed for Indigestion.    amLODIPine (NORVASC) 2.5 MG tablet Take 1 tablet (2.5 mg total) by mouth once daily.    apixaban (ELIQUIS) 2.5 mg Tab Take 1 tablet (2.5 mg total) by mouth 2 (two) times daily.    cholecalciferol, vitamin D3, 125 mcg (5,000 unit) Tab Take 5,000 Units by mouth once daily.    D-MANNOSE ORAL Take 2 tablets by mouth once daily.     dorzolamide (TRUSOPT) 2 % ophthalmic solution INSTILL 1 DROP INTO BOTH EYES TWICE DAILY    fluticasone (FLONASE) 50 mcg/actuation nasal spray 2 sprays (100 mcg total) by Each Nare route daily as needed for Allergies.    furosemide (LASIX) 40 MG tablet Take 1 tablet (40 mg total) by mouth once daily.    gabapentin (NEURONTIN) 300 MG  capsule 1 capsule 3 TIMES DAILY (route: oral)    GRAPE SEED EXTRACT ORAL Take 1 tablet by mouth once daily.     Lactobacillus rhamnosus GG (CULTURELLE) 10 billion cell capsule Take 1 capsule by mouth once daily.    latanoprost 0.005 % ophthalmic solution Place 1 drop into both eyes every evening.    magnesium oxide (MAG-OX) 400 mg tablet Take 800 mg by mouth once daily.     nitroGLYCERIN 0.4 MG/HR TD PT24 (NITRODUR) 0.4 mg/hr Place 1 patch onto the skin daily as needed (chest pain / angina).    OLIVE LEAF EXTRACT ORAL Take 1 tablet by mouth once daily.     omega-3 fatty acids/fish oil (FISH OIL-OMEGA-3 FATTY ACIDS) 300-1,000 mg capsule Take 2 capsules by mouth once daily.    oxybutynin (DITROPAN) 5 MG Tab Take 1 tablet (5 mg total) by mouth 2 (two) times daily.    potassium chloride (KLOR-CON) 10 MEQ TbSR TAKE ONE TABLET BY MOUTH TWICE DAILY    rosuvastatin (CRESTOR) 40 MG Tab Take 1 tablet (40 mg total) by mouth once daily.    sotaloL (BETAPACE) 80 MG tablet Take 0.5 tablets (40 mg total) by mouth 2 (two) times daily.    cimetidine (TAGAMET) 800 MG tablet Take 1 tablet (800 mg total) by mouth every evening.    colchicine (COLCRYS) 0.6 mg tablet Take 1 tablet (0.6 mg total) by mouth once daily. (Patient taking differently: Take 0.6 mg by mouth daily as needed. )    fluticasone furoate-vilanteroL (BREO ELLIPTA) 100-25 mcg/dose diskus inhaler Inhale 1 puff into the lungs once daily.    pantoprazole (PROTONIX) 40 MG tablet Take 1 tablet (40 mg total) by mouth once daily.     No current facility-administered medications for this visit.       Allergies  Review of patient's allergies indicates:   Allergen Reactions    Timolol maleate Shortness Of Breath    Ciprofloxacin Other (See Comments)     Muscle ache    Sulfamethoxazole-trimethoprim        Family History  Family History   Problem Relation Age of Onset    Diabetes Brother     Heart disease Brother     Diabetes Mother     Cancer Maternal  Grandfather     Clotting disorder Son         bleeding after tonsillectomy only    Amblyopia Neg Hx     Blindness Neg Hx     Cataracts Neg Hx     Glaucoma Neg Hx     Hypertension Neg Hx     Macular degeneration Neg Hx     Retinal detachment Neg Hx     Strabismus Neg Hx     Stroke Neg Hx     Thyroid disease Neg Hx     Colon cancer Neg Hx        Social History  Social History     Socioeconomic History    Marital status:    Tobacco Use    Smoking status: Former Smoker     Types: Cigarettes     Quit date:      Years since quittin.1    Smokeless tobacco: Never Used    Tobacco comment: quit smoking  //had smoked for 1 yr//   Substance and Sexual Activity    Alcohol use: No     Alcohol/week: 0.0 standard drinks    Drug use: No    Sexual activity: Never     Partners: Male     Birth control/protection: Abstinence     Social Determinants of Health     Financial Resource Strain: Low Risk     Difficulty of Paying Living Expenses: Not hard at all   Food Insecurity: No Food Insecurity    Worried About Running Out of Food in the Last Year: Never true    Ran Out of Food in the Last Year: Never true   Transportation Needs: No Transportation Needs    Lack of Transportation (Medical): No    Lack of Transportation (Non-Medical): No   Physical Activity: Insufficiently Active    Days of Exercise per Week: 1 day    Minutes of Exercise per Session: 10 min   Stress: No Stress Concern Present    Feeling of Stress : Only a little   Social Connections: Unknown    Frequency of Communication with Friends and Family: More than three times a week    Frequency of Social Gatherings with Friends and Family: Twice a week    Active Member of Clubs or Organizations: Yes    Attends Club or Organization Meetings: More than 4 times per year    Marital Status:    Housing Stability: Low Risk     Unable to Pay for Housing in the Last Year: No    Number of Places Lived in the Last Year: 1     Unstable Housing in the Last Year: No               Review of Systems     Constitutional: Negative    HENT: Negative  Eyes: Negative  Respiratory: Negative  Cardiovascular: Negative  Musculoskeletal: HPI  Skin: Negative  Neurological: Negative  Hematological: Negative  Endocrine: Negative                 Physical Exam    There were no vitals filed for this visit.  Body mass index is 31.45 kg/m².  Physical Examination:     General appearance -  well appearing, and in no distress  Mental status - awake  Neck - supple  Chest -  symmetric air entry  Heart - normal rate   Abdomen - soft      Assessment     1. Thumb pain, left    2. Back pain, unspecified back location, unspecified back pain laterality, unspecified chronicity    3. Right foot pain    4. Closed nondisplaced fracture of fifth metatarsal bone of right foot, initial encounter    5. Pain of left thumb    6. Fall, initial encounter          Plan

## 2022-02-10 NOTE — TELEPHONE ENCOUNTER
This pt is here right now, Mrs Carlin NP wanted to know if there was anyway anyone could fit her in today. She is elderly and it is hard for her to get out    .Mrs Carlin is concerned about her left thumb and baseand also pain in R foot    If not is threre anyway she could be seen tommorow

## 2022-02-15 NOTE — PROGRESS NOTES
Subjective:       Patient ID: Holli Landrum is a 88 y.o. female.    Chief Complaint: No chief complaint on file.    Patient had a fall while getting out of bed this morning. No head or neck injury. Pain in left thumb/hand, right foot, and low back. Bruising, swelling, inability to bear weight on the foot. Pain with movement of thumb.    Past Medical History:  No date: Anticoagulant long-term use  No date: Arthritis  No date: Asthma  No date: Basal cell carcinoma  No date: Cancer      Comment:  skin cancer to face  No date: Cataract      Comment:  OU done//  No date: CHF (congestive heart failure)  No date: COPD (chronic obstructive pulmonary disease)      Comment:  no oxygen; patient denies  No date: cpap  5/5/2010: Essential hypertension  11/20/2012: GERD (gastroesophageal reflux disease)  No date: Glaucoma  02/05/2013: Hypertensive heart disease with heart failure  No date: Paroxysmal ventricular tachycardia      Comment:  per problem list  No date: Renal disorder      Comment:  french-1/3/2020  No date: Squamous cell carcinoma of skin    Past Surgical History:  6/16/2021: A-V CARDIAC PACEMAKER INSERTION      Comment:  Procedure: INSERTION, CARDIAC PACEMAKER, DUAL CHAMBER;                 Surgeon: Oscar Sommers MD;  [facility];                 Service: Cardiovascular;;  1995: BREAST BIOPSY; Left      Comment:  neg  1984: BREAST BIOPSY; Right      Comment:  neg  1992: BREAST BIOPSY; Right      Comment:  neg  2013, 2014,2016: CARDIAC CATHETERIZATION      Comment:  has 7 stents  2012: CARDIAC SURGERY      Comment:  stents  11/01/2018: CATARACT EXTRACTION W/  INTRAOCULAR LENS IMPLANT; Left      Comment:  Dr Rose  12/13/2018: CATARACT EXTRACTION W/  INTRAOCULAR LENS IMPLANT; Right      Comment:  Dr Rose//  No date: CHOLECYSTECTOMY  ~2005: COLONOSCOPY      Comment:  Dr. Edward; normal per patient report  3/20/2020: CORONARY ANGIOGRAPHY; N/A      Comment:  Procedure: ANGIOGRAM, CORONARY ARTERY;   Surgeon: Chuy Bryant MD;  Location: [facility] CATH;  Service: Cardiology;                 Laterality: N/A;  2/12/2020: CYSTOSCOPY W/ RETROGRADES; Bilateral      Comment:  Procedure: CYSTOSCOPY, WITH RETROGRADE PYELOGRAM;                 Surgeon: Gasper Feliz MD;  Location: [facility] OR;                 Service: Urology;  Laterality: Bilateral;  8/13/2020: ESOPHAGOGASTRODUODENOSCOPY; N/A      Comment:  Procedure: EGD (ESOPHAGOGASTRODUODENOSCOPY);  Surgeon:                Mika Solorzano MD;  Location: [facility];  Service:                Endoscopy;  Laterality: N/A; Mild Schatzki ring.                Biopsied. Dilated. small hiatal hernia, gastritis;                biopsy: esophagus- SEVERE REFLUX ESOPHAGITIS, stomach-                chronic gastritis, negative for H pylori  10/22/2020: ESOPHAGOGASTRODUODENOSCOPY; N/A      Comment:  Procedure: EGD (ESOPHAGOGASTRODUODENOSCOPY);  Surgeon:                Fred Hendricks MD;  Location: [facility];  Service:                Endoscopy;  Laterality: N/A;  1969: HYSTERECTOMY  3/3/2020: LEFT HEART CATHETERIZATION; Left      Comment:  Procedure: Left heart cath;  Surgeon: Chuy Bryant MD;                 Location: [facility] CATH;  Service: Cardiology;  Laterality:                Left;  3/20/2020: LEFT HEART CATHETERIZATION; Left      Comment:  Procedure: Left heart cath;  Surgeon: Chuy Bryant MD;                 Location: [facility] CATH;  Service: Cardiology;  Laterality:                Left;  teenager: OVARIAN CYST REMOVAL  11/1/2018: PHACOEMULSIFICATION OF CATARACT; Left      Comment:  Procedure: PHACOEMULSIFICATION, CATARACT;  Surgeon:                Aleksandar Rose Jr., MD;  Location: [facility] OR;  Service:                Ophthalmology;  Laterality: Left;  12/13/2018: PHACOEMULSIFICATION OF CATARACT; Right      Comment:  Procedure: PHACOEMULSIFICATION, CATARACT;  Surgeon:                Aleksandar Rose Jr., MD;  Location: [facility] OR;  Service:                Ophthalmology;   Laterality: Right;  No date: TONSILLECTOMY      Comment:  aw/denoids  ~2005: UPPER GASTROINTESTINAL ENDOSCOPY      Comment:  Dr. Solorzano  No date: VASCULAR SURGERY      Comment:  to remove clot from right leg  2019: Yag Capsulotomy; Bilateral      Comment:  Dr Rose    Review of patient's family history indicates:  Problem: Diabetes      Relation: Brother          Age of Onset: (Not Specified)  Problem: Heart disease      Relation: Brother          Age of Onset: (Not Specified)  Problem: Diabetes      Relation: Mother          Age of Onset: (Not Specified)  Problem: Cancer      Relation: Maternal Grandfather          Age of Onset: (Not Specified)  Problem: Clotting disorder      Relation: Son          Age of Onset: (Not Specified)          Comment: bleeding after tonsillectomy only  Problem: Amblyopia      Relation: Neg Hx          Age of Onset: (Not Specified)  Problem: Blindness      Relation: Neg Hx          Age of Onset: (Not Specified)  Problem: Cataracts      Relation: Neg Hx          Age of Onset: (Not Specified)  Problem: Glaucoma      Relation: Neg Hx          Age of Onset: (Not Specified)  Problem: Hypertension      Relation: Neg Hx          Age of Onset: (Not Specified)  Problem: Macular degeneration      Relation: Neg Hx          Age of Onset: (Not Specified)  Problem: Retinal detachment      Relation: Neg Hx          Age of Onset: (Not Specified)  Problem: Strabismus      Relation: Neg Hx          Age of Onset: (Not Specified)  Problem: Stroke      Relation: Neg Hx          Age of Onset: (Not Specified)  Problem: Thyroid disease      Relation: Neg Hx          Age of Onset: (Not Specified)  Problem: Colon cancer      Relation: Neg Hx          Age of Onset: (Not Specified)      Social History    Socioeconomic History      Marital status:     Tobacco Use      Smoking status: Former Smoker        Types: Cigarettes        Quit date: 4        Years since quittin.1      Smokeless  tobacco: Never Used      Tobacco comment: quit smoking 1954 //had smoked for 1 yr//    Substance and Sexual Activity      Alcohol use: No        Alcohol/week: 0.0 standard drinks      Drug use: No      Sexual activity: Never        Partners: Male        Birth control/protection: Abstinence    Social Determinants of Health  Financial Resource Strain: Low Risk       Difficulty of Paying Living Expenses: Not hard at all  Food Insecurity: No Food Insecurity      Worried About Running Out of Food in the Last Year: Never true      Ran Out of Food in the Last Year: Never true  Transportation Needs: No Transportation Needs      Lack of Transportation (Medical): No      Lack of Transportation (Non-Medical): No  Physical Activity: Insufficiently Active      Days of Exercise per Week: 1 day      Minutes of Exercise per Session: 10 min  Stress: No Stress Concern Present      Feeling of Stress : Only a little  Social Connections: Unknown      Frequency of Communication with Friends and Family: More than three times a week      Frequency of Social Gatherings with Friends and Family: Twice a week      Active Member of Clubs or Organizations: Yes      Attends Club or Organization Meetings: More than 4 times per year      Marital Status:   Housing Stability: Low Risk       Unable to Pay for Housing in the Last Year: No      Number of Places Lived in the Last Year: 1      Unstable Housing in the Last Year: No    Current Outpatient Medications:  acetaminophen (TYLENOL) 650 MG TbSR, Take 650 mg by mouth as needed. , Disp: , Rfl:   albuterol (PROVENTIL/VENTOLIN HFA) 90 mcg/actuation inhaler, Inhale 1-2 puffs into the lungs every 6 (six) hours as needed for Wheezing. Rescue, Disp: 18 g, Rfl: 5  allopurinoL (ZYLOPRIM) 100 MG tablet, Take 2 tablets (200 mg total) by mouth once daily., Disp: 180 tablet, Rfl: 1  aluminum & magnesium hydroxide-simethicone (MYLANTA MAX STRENGTH) 400-400-40 mg/5 mL suspension, Take by mouth every 6 (six)  hours as needed for Indigestion., Disp: , Rfl:   amLODIPine (NORVASC) 2.5 MG tablet, Take 1 tablet (2.5 mg total) by mouth once daily., Disp: 30 tablet, Rfl: 11  apixaban (ELIQUIS) 2.5 mg Tab, Take 1 tablet (2.5 mg total) by mouth 2 (two) times daily., Disp: 180 tablet, Rfl: 3  cholecalciferol, vitamin D3, 125 mcg (5,000 unit) Tab, Take 5,000 Units by mouth once daily., Disp: , Rfl:   D-MANNOSE ORAL, Take 2 tablets by mouth once daily. , Disp: , Rfl:   dorzolamide (TRUSOPT) 2 % ophthalmic solution, INSTILL 1 DROP INTO BOTH EYES TWICE DAILY, Disp: 10 mL, Rfl: 3  fluticasone (FLONASE) 50 mcg/actuation nasal spray, 2 sprays (100 mcg total) by Each Nare route daily as needed for Allergies., Disp: 16 g, Rfl: 11  furosemide (LASIX) 40 MG tablet, Take 1 tablet (40 mg total) by mouth once daily., Disp: 90 tablet, Rfl: 3  gabapentin (NEURONTIN) 300 MG capsule, 1 capsule 3 TIMES DAILY (route: oral), Disp: , Rfl:   GRAPE SEED EXTRACT ORAL, Take 1 tablet by mouth once daily. , Disp: , Rfl:   Lactobacillus rhamnosus GG (CULTURELLE) 10 billion cell capsule, Take 1 capsule by mouth once daily., Disp: , Rfl:   latanoprost 0.005 % ophthalmic solution, Place 1 drop into both eyes every evening., Disp: 5 mL, Rfl: 6  magnesium oxide (MAG-OX) 400 mg tablet, Take 800 mg by mouth once daily. , Disp: , Rfl:   nitroGLYCERIN 0.4 MG/HR TD PT24 (NITRODUR) 0.4 mg/hr, Place 1 patch onto the skin daily as needed (chest pain / angina)., Disp: 30 patch, Rfl: 0  OLIVE LEAF EXTRACT ORAL, Take 1 tablet by mouth once daily. , Disp: , Rfl:   omega-3 fatty acids/fish oil (FISH OIL-OMEGA-3 FATTY ACIDS) 300-1,000 mg capsule, Take 2 capsules by mouth once daily., Disp: , Rfl:   oxybutynin (DITROPAN) 5 MG Tab, Take 1 tablet (5 mg total) by mouth 2 (two) times daily., Disp: 180 tablet, Rfl: 3  potassium chloride (KLOR-CON) 10 MEQ TbSR, TAKE ONE TABLET BY MOUTH TWICE DAILY, Disp: 180 tablet, Rfl: 1  rosuvastatin (CRESTOR) 40 MG Tab, Take 1 tablet (40 mg total)  by mouth once daily., Disp: 90 tablet, Rfl: 3  sotaloL (BETAPACE) 80 MG tablet, Take 0.5 tablets (40 mg total) by mouth 2 (two) times daily., Disp: 90 tablet, Rfl: 3  cimetidine (TAGAMET) 800 MG tablet, Take 1 tablet (800 mg total) by mouth every evening., Disp: 30 tablet, Rfl: 2  colchicine (COLCRYS) 0.6 mg tablet, Take 1 tablet (0.6 mg total) by mouth once daily. (Patient taking differently: Take 0.6 mg by mouth daily as needed. ), Disp: 30 tablet, Rfl: 0  fluticasone furoate-vilanteroL (BREO ELLIPTA) 100-25 mcg/dose diskus inhaler, Inhale 1 puff into the lungs once daily., Disp: 90 each, Rfl: 3  pantoprazole (PROTONIX) 40 MG tablet, Take 1 tablet (40 mg total) by mouth once daily., Disp: 30 tablet, Rfl: 11    No current facility-administered medications for this visit.      Review of patient's allergies indicates:   -- Timolol maleate -- Shortness Of Breath   -- Ciprofloxacin -- Other (See Comments)    --  Muscle ache   -- Sulfamethoxazole-trimethoprim     Review of Systems   Constitutional: Negative.    Respiratory: Negative.    Cardiovascular: Negative.    Gastrointestinal: Negative.    Genitourinary: Negative for pelvic pain.   Musculoskeletal: Positive for arthralgias, back pain, joint swelling and myalgias. Negative for neck pain.   Neurological: Negative for dizziness, light-headedness and numbness.         Objective:      Physical Exam  Constitutional:       General: She is not in acute distress.     Appearance: Normal appearance.   HENT:      Head: Normocephalic and atraumatic.   Cardiovascular:      Rate and Rhythm: Normal rate.   Pulmonary:      Effort: Pulmonary effort is normal. No respiratory distress.   Musculoskeletal:         General: Swelling and tenderness present.      Right hand: Normal.      Left hand: Swelling, tenderness and bony tenderness present. Decreased range of motion. Normal strength. Normal sensation. Normal capillary refill. Normal pulse.      Lumbar back: Tenderness and bony  tenderness present. No swelling. Normal range of motion. Negative right straight leg raise test and negative left straight leg raise test.        Legs:    Skin:     Findings: Bruising present.   Neurological:      Mental Status: She is alert and oriented to person, place, and time.      Cranial Nerves: No cranial nerve deficit.   Psychiatric:         Mood and Affect: Mood normal.         Behavior: Behavior normal.         Assessment:       Problem List Items Addressed This Visit    None     Visit Diagnoses     Pain of left thumb    -  Primary    Relevant Orders    X-Ray Hand Complete Left (Completed)    Ambulatory referral/consult to Orthopedics    Right foot pain        Relevant Orders    X-Ray Foot Complete Right (Completed)    Ambulatory referral/consult to Orthopedics    Acute midline low back pain without sciatica        Relevant Orders    X-Ray Lumbar Spine AP And Lateral (Completed)    X-Ray Hips Bilateral [employer] AP Pelvis (Completed)    Fall, initial encounter        Relevant Orders    X-Ray Foot Complete Right (Completed)    X-Ray Hand Complete Left (Completed)    X-Ray Lumbar Spine AP And Lateral (Completed)    X-Ray Hips Bilateral 2 [employer] AP Pelvis (Completed)    Ambulatory referral/consult to Orthopedics          Plan:     Orthopedics today for evaluation. Fall precautions discussed.   1. Pain of left thumb    - X-Ray Hand Complete Left; Future  - Ambulatory referral/consult to Orthopedics; Future    2. Right foot pain    - X-Ray Foot Complete Right; Future  - Ambulatory referral/consult to Orthopedics; Future    3. Acute midline low back pain without sciatica    - X-Ray Lumbar Spine AP And Lateral; Future  - X-Ray Hips Bilateral [employer] AP Pelvis; Future    4. Fall, initial encounter    - X-Ray Foot Complete Right; Future  - X-Ray Hand Complete Left; Future  - X-Ray Lumbar Spine AP And Lateral; Future  - X-Ray Hips Bilateral [employer] AP Pelvis; Future  - Ambulatory referral/consult to  Orthopedics; Future

## 2022-03-04 PROCEDURE — G0180 PR HOME HEALTH MD CERTIFICATION: ICD-10-PCS | Mod: ,,, | Performed by: FAMILY MEDICINE

## 2022-03-04 PROCEDURE — G0180 MD CERTIFICATION HHA PATIENT: HCPCS | Mod: ,,, | Performed by: FAMILY MEDICINE

## 2022-03-09 ENCOUNTER — DOCUMENT SCAN (OUTPATIENT)
Dept: HOME HEALTH SERVICES | Facility: HOSPITAL | Age: 87
End: 2022-03-09
Payer: MEDICARE

## 2022-03-23 ENCOUNTER — TELEPHONE (OUTPATIENT)
Dept: FAMILY MEDICINE | Facility: CLINIC | Age: 87
End: 2022-03-23
Payer: MEDICARE

## 2022-03-23 NOTE — TELEPHONE ENCOUNTER
----- Message from Joselin Fisher sent at 3/23/2022  2:45 PM CDT -----  Contact: imelda at [phone]  Type: Needs Medical Advice  Who Called: Imelda at [facility]   Best Call Back Number: [phone]  Additional Information: Ref [id number]  Wound supply order needs to be signed and sent back

## 2022-03-31 ENCOUNTER — EXTERNAL HOME HEALTH (OUTPATIENT)
Dept: HOME HEALTH SERVICES | Facility: HOSPITAL | Age: 87
End: 2022-03-31
Payer: MEDICARE

## 2022-04-27 RX ORDER — ALLOPURINOL 100 MG/1
200 TABLET ORAL DAILY
Qty: 180 TABLET | Refills: 2 | Status: SHIPPED | OUTPATIENT
Start: 2022-04-27 | End: 2023-12-14

## 2022-04-27 NOTE — TELEPHONE ENCOUNTER
No new care gaps identified.  Powered by [employer]. Reference number: [id number].   4/27/2022 4:44:18 PM CDT

## 2022-04-28 NOTE — TELEPHONE ENCOUNTER
Refill Authorization Note   Holli Landrum  is requesting a refill authorization.  Brief Assessment and Rationale for Refill:  Approve     Medication Therapy Plan:       Medication Reconciliation Completed: No   Comments:     No Care Gaps recommended.     Note composed:7:06 PM 04/27/2022

## 2022-05-03 ENCOUNTER — EXTERNAL HOME HEALTH (OUTPATIENT)
Dept: HOME HEALTH SERVICES | Facility: HOSPITAL | Age: 87
End: 2022-05-03
Payer: MEDICARE

## 2022-05-03 PROCEDURE — G0179 PR HOME HEALTH MD RECERTIFICATION: ICD-10-PCS | Mod: ,,, | Performed by: FAMILY MEDICINE

## 2022-05-03 PROCEDURE — G0179 MD RECERTIFICATION HHA PT: HCPCS | Mod: ,,, | Performed by: FAMILY MEDICINE

## 2022-05-09 ENCOUNTER — PATIENT MESSAGE (OUTPATIENT)
Dept: SMOKING CESSATION | Facility: CLINIC | Age: 87
End: 2022-05-09
Payer: MEDICARE

## 2022-05-11 DIAGNOSIS — Z78.0 ASYMPTOMATIC MENOPAUSAL STATE: ICD-10-CM

## 2022-05-16 ENCOUNTER — PATIENT MESSAGE (OUTPATIENT)
Dept: ADMINISTRATIVE | Facility: HOSPITAL | Age: 87
End: 2022-05-16
Payer: MEDICARE

## 2022-05-25 ENCOUNTER — PATIENT OUTREACH (OUTPATIENT)
Dept: ADMINISTRATIVE | Facility: HOSPITAL | Age: 87
End: 2022-05-25
Payer: MEDICARE

## 2022-05-25 NOTE — LETTER
"May 25, 2022    Holli Landrum  [address]  [place] [address]             [facility]  [address]  [facility] [address]  Phone: [phone] Dear Holli:      [facility] wants to help patients achieve their health goals. Our records show that you are overdue for your Bone  Density Scan, known as a DEXA scan. This scan is recommended for female patients aged 65 and male patients 70 to 89 yrs of age.   As people age, their bones often become thinner and more fragile. Over time, this could lead to osteoporosis, which makes bones weak and at risk for a bone fracture.   Essentially this scan shows how strong your bones are and if you are a risk for a bone fracture.      What is a bone mineral density test (DEXA scan)?  An x-ray that can show how strong your bones are.  As people age, their bones often become less dense.   This means they are thinner, more fragile and easier to break.    What is bone density testing used for?   A DEXA scan is the best test to diagnose osteoporosis, predict the chances that you will break a bone and check how well treatment is working.      Who should get bone density testing?  All women aged 65 and men 70 and older should have bone density testing every two years.   If had a recent fracture, should have done within six months of the fracture.     What happens during a DEXA scan?     An X-ray machine scans your bones. The test does not hurt or make you uncomfortable. No pill are taken and no shots.  The test takes less than 15 minutes. Even though DXA is a type of X-ray, it gives off very little radiation. During a DEXA scan, you get about the same amount of radiation that an average person gets from the environment in one day. After the test, your bones get a "score.  The provider will use the score to determine if you have any bone issues.  We have ordered the test for you and would like you to come in to one of the radiology departments in your area to have " your DEXA scan done.       If you had your DEXA scan completed at a Non[facility] location, please bring a copy of your reports at your next Primary Care visit.  You can also upload a copy to your [facility] portal. If you have not completed, please schedule your DEXA scan within six month of your fracture date.    If you have questions, please call the clinic or the [facility] Scheduling schedule an appointment: [phone] Monday through Friday 9 am - 5 pm.     Thank you for choosing [facility].    Marcia Crespo MD and your [facility] Primary [facility] Team

## 2022-06-03 ENCOUNTER — OFFICE VISIT (OUTPATIENT)
Dept: OPHTHALMOLOGY | Facility: CLINIC | Age: 87
End: 2022-06-03
Payer: MEDICARE

## 2022-06-03 DIAGNOSIS — H52.7 REFRACTIVE ERROR: ICD-10-CM

## 2022-06-03 DIAGNOSIS — Z98.890 S/P YAG CAPSULOTOMY, BILATERAL: ICD-10-CM

## 2022-06-03 DIAGNOSIS — H40.053 OCULAR HYPERTENSION, BILATERAL: Primary | ICD-10-CM

## 2022-06-03 DIAGNOSIS — H40.053 OCULAR HYPERTENSION, BILATERAL: ICD-10-CM

## 2022-06-03 DIAGNOSIS — H43.813 POSTERIOR VITREOUS DETACHMENT, BILATERAL: ICD-10-CM

## 2022-06-03 DIAGNOSIS — Z96.1 PSEUDOPHAKIA OF BOTH EYES: ICD-10-CM

## 2022-06-03 PROCEDURE — 1126F PR PAIN SEVERITY QUANTIFIED, NO PAIN PRESENT: ICD-10-PCS | Mod: CPTII,S$GLB,, | Performed by: OPHTHALMOLOGY

## 2022-06-03 PROCEDURE — 1157F PR ADVANCE CARE PLAN OR EQUIV PRESENT IN MEDICAL RECORD: ICD-10-PCS | Mod: CPTII,S$GLB,, | Performed by: OPHTHALMOLOGY

## 2022-06-03 PROCEDURE — 1159F MED LIST DOCD IN RCRD: CPT | Mod: CPTII,S$GLB,, | Performed by: OPHTHALMOLOGY

## 2022-06-03 PROCEDURE — 1160F PR REVIEW ALL MEDS BY PRESCRIBER/CLIN PHARMACIST DOCUMENTED: ICD-10-PCS | Mod: CPTII,S$GLB,, | Performed by: OPHTHALMOLOGY

## 2022-06-03 PROCEDURE — 3288F FALL RISK ASSESSMENT DOCD: CPT | Mod: CPTII,S$GLB,, | Performed by: OPHTHALMOLOGY

## 2022-06-03 PROCEDURE — 1101F PT FALLS ASSESS-DOCD LE1/YR: CPT | Mod: CPTII,S$GLB,, | Performed by: OPHTHALMOLOGY

## 2022-06-03 PROCEDURE — 1157F ADVNC CARE PLAN IN RCRD: CPT | Mod: CPTII,S$GLB,, | Performed by: OPHTHALMOLOGY

## 2022-06-03 PROCEDURE — 99999 PR PBB SHADOW E&M-EST. PATIENT-LVL III: ICD-10-PCS | Mod: PBBFAC,,, | Performed by: OPHTHALMOLOGY

## 2022-06-03 PROCEDURE — 1101F PR PT FALLS ASSESS DOC 0-1 FALLS W/OUT INJ PAST YR: ICD-10-PCS | Mod: CPTII,S$GLB,, | Performed by: OPHTHALMOLOGY

## 2022-06-03 PROCEDURE — 1159F PR MEDICATION LIST DOCUMENTED IN MEDICAL RECORD: ICD-10-PCS | Mod: CPTII,S$GLB,, | Performed by: OPHTHALMOLOGY

## 2022-06-03 PROCEDURE — 1160F RVW MEDS BY RX/DR IN RCRD: CPT | Mod: CPTII,S$GLB,, | Performed by: OPHTHALMOLOGY

## 2022-06-03 PROCEDURE — 1126F AMNT PAIN NOTED NONE PRSNT: CPT | Mod: CPTII,S$GLB,, | Performed by: OPHTHALMOLOGY

## 2022-06-03 PROCEDURE — 3288F PR FALLS RISK ASSESSMENT DOCUMENTED: ICD-10-PCS | Mod: CPTII,S$GLB,, | Performed by: OPHTHALMOLOGY

## 2022-06-03 PROCEDURE — 99214 PR OFFICE/OUTPT VISIT, EST, LEVL IV, 30-39 MIN: ICD-10-PCS | Mod: S$GLB,,, | Performed by: OPHTHALMOLOGY

## 2022-06-03 PROCEDURE — 99999 PR PBB SHADOW E&M-EST. PATIENT-LVL III: CPT | Mod: PBBFAC,,, | Performed by: OPHTHALMOLOGY

## 2022-06-03 PROCEDURE — 99214 OFFICE O/P EST MOD 30 MIN: CPT | Mod: S$GLB,,, | Performed by: OPHTHALMOLOGY

## 2022-06-03 RX ORDER — OXYCODONE HYDROCHLORIDE 5 MG/1
5 TABLET ORAL EVERY 6 HOURS PRN
COMMUNITY
Start: 2022-04-05 | End: 2022-11-02

## 2022-06-03 RX ORDER — ONDANSETRON 4 MG/1
4 TABLET, FILM COATED ORAL EVERY 6 HOURS PRN
Status: ON HOLD | COMMUNITY
Start: 2022-04-05 | End: 2023-10-14 | Stop reason: HOSPADM

## 2022-06-05 RX ORDER — LATANOPROST 50 UG/ML
1 SOLUTION/ DROPS OPHTHALMIC NIGHTLY
Qty: 5 ML | Refills: 6 | Status: SHIPPED | OUTPATIENT
Start: 2022-06-05 | End: 2023-05-15 | Stop reason: SDUPTHER

## 2022-06-05 NOTE — PROGRESS NOTES
"HPI     4 month IOP and med check    Pt. States "I don't see well since I had cataract surgery" at all   distances.  C/o [facility] "just being blurry" x's "years"  Denies flashes or floaters  "I want a glasses prescription today"  "When I put gtts in OD the bottle appears to go sideways."      Dorzolamide BID OU  Latanaprost QHS OU  Systane gtts BID OU    Last edited by Candice Moscoso on 6/3/2022  2:59 PM. (History)        ROS     Negative for: Constitutional, Gastrointestinal, Neurological, Skin,   Genitourinary, Musculoskeletal, HENT, Endocrine, Cardiovascular, Eyes,   Respiratory, Psychiatric, Allergic/Imm, Heme/Lymph    Last edited by Aleksandar Rose Jr., MD on 6/5/2022  8:20 AM. (History)        Assessment /Plan     For exam results, see Encounter Report.    Ocular hypertension, bilateral    Pseudophakia of both eyes    Posterior vitreous detachment, bilateral    S/P YAG capsulotomy, bilateral    Refractive error    IOP 13 OU, stable observe  Continue latanoprost QHS OU and dorzolamide BID  OU  Stable, observe  Rx for glasses given to patient  Follow up in about 4 months (around 10/3/2022) for IOP and Medication check.                                                              "

## 2022-06-08 ENCOUNTER — PATIENT OUTREACH (OUTPATIENT)
Dept: ADMINISTRATIVE | Facility: HOSPITAL | Age: 87
End: 2022-06-08
Payer: MEDICARE

## 2022-06-08 NOTE — PROGRESS NOTES
Rt. Foot and Left hand fx due to a fall. She cannot drive. She will be changing providers and go to a provider in [place].      [facility].      Spoke to Mrs Landrum.  She had a fall.  She fractured herRt. Foot and Left hand. She cannot drive.  She moved in with her daughter.  She will be changing providers and go to a provider in [place]. Pt. States that she will get test with new provider. Also, she was instructed to let her Insurance know as soon as she has new provider.     Message sent to Dr. Crespo to notify her of above.

## 2022-06-30 ENCOUNTER — EXTERNAL HOME HEALTH (OUTPATIENT)
Dept: HOME HEALTH SERVICES | Facility: HOSPITAL | Age: 87
End: 2022-06-30
Payer: MEDICARE

## 2022-07-02 PROCEDURE — G0179 PR HOME HEALTH MD RECERTIFICATION: ICD-10-PCS | Mod: ,,, | Performed by: FAMILY MEDICINE

## 2022-07-02 PROCEDURE — G0179 MD RECERTIFICATION HHA PT: HCPCS | Mod: ,,, | Performed by: FAMILY MEDICINE

## 2022-08-08 ENCOUNTER — PATIENT MESSAGE (OUTPATIENT)
Dept: GASTROENTEROLOGY | Facility: CLINIC | Age: 87
End: 2022-08-08
Payer: MEDICARE

## 2022-08-12 ENCOUNTER — TELEPHONE (OUTPATIENT)
Dept: GASTROENTEROLOGY | Facility: CLINIC | Age: 87
End: 2022-08-12
Payer: MEDICARE

## 2022-08-12 NOTE — TELEPHONE ENCOUNTER
Returned call to Gualberto with [facility]. She was checking the status of the referral that was sent to [facility].   Advised her I contacted the pt and she stated she will have to get with her daughter to set up transportation and will call the office back later. No appt scheduled today.     Gualberto verbalized understanding.

## 2022-08-12 NOTE — TELEPHONE ENCOUNTER
----- Message from Marita Montgomery sent at 8/12/2022  8:21 AM CDT -----  Please call [facility] @ [phone] regarding pt referral sent over 8/1, need status and if received.

## 2022-08-12 NOTE — TELEPHONE ENCOUNTER
LM for Gualberto to resend referral, as it was not noted as received.    ----- Message from Vashti Gardner MA sent at 8/12/2022 10:57 AM CDT -----    ----- Message -----  From: Marita Montgomery  Sent: 8/12/2022   8:23 AM CDT  To: [facility] Clinical Staff, Jasiel [facility] Staff    Please call [facility] @ [phone] regarding pt referral sent over 8/1, need status and if received.

## 2022-08-24 ENCOUNTER — TELEPHONE (OUTPATIENT)
Dept: CARDIOLOGY | Facility: CLINIC | Age: 87
End: 2022-08-24
Payer: MEDICARE

## 2022-08-24 NOTE — TELEPHONE ENCOUNTER
Placed call to [facility] and she will refax referral and check if patient is seeing another cardiologist.----- Message from Marita Montgomery sent at 8/24/2022 12:59 PM CDT -----  Please call [facility] @ [phone] regarding pt surgery, have questions.

## 2022-08-25 ENCOUNTER — HOSPITAL ENCOUNTER (OUTPATIENT)
Dept: RADIOLOGY | Facility: HOSPITAL | Age: 87
Discharge: HOME | End: 2022-08-25
Attending: FAMILY MEDICINE
Payer: MEDICARE

## 2022-08-25 DIAGNOSIS — Z78.0 ASYMPTOMATIC MENOPAUSAL STATE: ICD-10-CM

## 2022-08-25 PROCEDURE — 77080 DEXA BONE DENSITY SPINE HIP: ICD-10-PCS | Mod: 26,,, | Performed by: RADIOLOGY

## 2022-08-25 PROCEDURE — 77080 DXA BONE DENSITY AXIAL: CPT | Mod: TC,PO

## 2022-08-25 PROCEDURE — 77080 DXA BONE DENSITY AXIAL: CPT | Mod: 26,,, | Performed by: RADIOLOGY

## 2022-08-31 ENCOUNTER — OFFICE VISIT (OUTPATIENT)
Dept: GASTROENTEROLOGY | Facility: CLINIC | Age: 87
End: 2022-08-31
Payer: MEDICARE

## 2022-08-31 VITALS
HEIGHT: 65 IN | DIASTOLIC BLOOD PRESSURE: 84 MMHG | HEART RATE: 73 BPM | WEIGHT: 196.88 LBS | BODY MASS INDEX: 32.8 KG/M2 | OXYGEN SATURATION: 95 % | SYSTOLIC BLOOD PRESSURE: 124 MMHG

## 2022-08-31 DIAGNOSIS — I42.2 HYPERTROPHIC CARDIOMYOPATHY: ICD-10-CM

## 2022-08-31 DIAGNOSIS — K62.5 RECTAL BLEEDING: Primary | ICD-10-CM

## 2022-08-31 DIAGNOSIS — I27.20 PULMONARY HTN: ICD-10-CM

## 2022-08-31 DIAGNOSIS — I25.10 CORONARY ARTERY DISEASE INVOLVING NATIVE CORONARY ARTERY OF NATIVE HEART WITHOUT ANGINA PECTORIS: ICD-10-CM

## 2022-08-31 DIAGNOSIS — I48.0 PAROXYSMAL ATRIAL FIBRILLATION: ICD-10-CM

## 2022-08-31 DIAGNOSIS — G47.33 OSA (OBSTRUCTIVE SLEEP APNEA): ICD-10-CM

## 2022-08-31 DIAGNOSIS — K21.9 GASTROESOPHAGEAL REFLUX DISEASE, UNSPECIFIED WHETHER ESOPHAGITIS PRESENT: ICD-10-CM

## 2022-08-31 PROCEDURE — 99999 PR PBB SHADOW E&M-EST. PATIENT-LVL V: CPT | Mod: PBBFAC,,, | Performed by: INTERNAL MEDICINE

## 2022-08-31 PROCEDURE — 3288F PR FALLS RISK ASSESSMENT DOCUMENTED: ICD-10-PCS | Mod: CPTII,S$GLB,, | Performed by: INTERNAL MEDICINE

## 2022-08-31 PROCEDURE — 99214 OFFICE O/P EST MOD 30 MIN: CPT | Mod: S$GLB,,, | Performed by: INTERNAL MEDICINE

## 2022-08-31 PROCEDURE — 1126F AMNT PAIN NOTED NONE PRSNT: CPT | Mod: CPTII,S$GLB,, | Performed by: INTERNAL MEDICINE

## 2022-08-31 PROCEDURE — 1157F ADVNC CARE PLAN IN RCRD: CPT | Mod: CPTII,S$GLB,, | Performed by: INTERNAL MEDICINE

## 2022-08-31 PROCEDURE — 1159F MED LIST DOCD IN RCRD: CPT | Mod: CPTII,S$GLB,, | Performed by: INTERNAL MEDICINE

## 2022-08-31 PROCEDURE — 1101F PR PT FALLS ASSESS DOC 0-1 FALLS W/OUT INJ PAST YR: ICD-10-PCS | Mod: CPTII,S$GLB,, | Performed by: INTERNAL MEDICINE

## 2022-08-31 PROCEDURE — 1160F PR REVIEW ALL MEDS BY PRESCRIBER/CLIN PHARMACIST DOCUMENTED: ICD-10-PCS | Mod: CPTII,S$GLB,, | Performed by: INTERNAL MEDICINE

## 2022-08-31 PROCEDURE — 99214 PR OFFICE/OUTPT VISIT, EST, LEVL IV, 30-39 MIN: ICD-10-PCS | Mod: S$GLB,,, | Performed by: INTERNAL MEDICINE

## 2022-08-31 PROCEDURE — 3288F FALL RISK ASSESSMENT DOCD: CPT | Mod: CPTII,S$GLB,, | Performed by: INTERNAL MEDICINE

## 2022-08-31 PROCEDURE — 1160F RVW MEDS BY RX/DR IN RCRD: CPT | Mod: CPTII,S$GLB,, | Performed by: INTERNAL MEDICINE

## 2022-08-31 PROCEDURE — 1157F PR ADVANCE CARE PLAN OR EQUIV PRESENT IN MEDICAL RECORD: ICD-10-PCS | Mod: CPTII,S$GLB,, | Performed by: INTERNAL MEDICINE

## 2022-08-31 PROCEDURE — 1101F PT FALLS ASSESS-DOCD LE1/YR: CPT | Mod: CPTII,S$GLB,, | Performed by: INTERNAL MEDICINE

## 2022-08-31 PROCEDURE — 99999 PR PBB SHADOW E&M-EST. PATIENT-LVL V: ICD-10-PCS | Mod: PBBFAC,,, | Performed by: INTERNAL MEDICINE

## 2022-08-31 PROCEDURE — 1126F PR PAIN SEVERITY QUANTIFIED, NO PAIN PRESENT: ICD-10-PCS | Mod: CPTII,S$GLB,, | Performed by: INTERNAL MEDICINE

## 2022-08-31 PROCEDURE — 1159F PR MEDICATION LIST DOCUMENTED IN MEDICAL RECORD: ICD-10-PCS | Mod: CPTII,S$GLB,, | Performed by: INTERNAL MEDICINE

## 2022-08-31 RX ORDER — PANTOPRAZOLE SODIUM 40 MG/1
40 TABLET, DELAYED RELEASE ORAL DAILY
Qty: 30 TABLET | Refills: 11 | Status: SHIPPED | OUTPATIENT
Start: 2022-08-31 | End: 2023-12-14

## 2022-08-31 NOTE — PROGRESS NOTES
"Subjective:       Patient ID: Holli Landrum is a 89 y.o. female.    Chief Complaint: Rectal Bleeding and Gastroesophageal Reflux (The bleeding started with like pinkish color about a week ago, but for the past 4 days, it has been coming pure red blood mainly during BM)    The patient is here with complaint of rectal bleeding after a hard bowel movement. Since that time she has had blood on her tissue with wiping. She has also had passage of BRBPR only with BMs since then. She has not had a Colonoscopy she says since 2003. She has what sounds like FIT tests since then that were negative.     There is no abdominal pain, and though she has has some nausea there is no vomiting. Her appetite is "fair" but there has been no weight loss. Her bowels work fairly well with occasional constipation and maybe some slight diarrhea. No Melena. There is no aversion to the sight/smell of food. There is no early satiety. She has not had NSAIDs. She has been on Eliquis but it has been on hold since bleeding started 2 weeks ago. Her last CBC on 8/22 was well within normal range, but she continues to have blood with BMs even after blood thinners have been stopped. She has seen her Cardiologist who says he cannot investigate some SOB she has experienced any further at this time until her bleeding isssue has been clarified by colonoscopy. An Echocardiogram just done shows good heart function. I have spoken with him through secure chat today and he agrees she is a viable candidate for endoscopy. Will schedule Colonoscopy.     Review of Systems   Constitutional:  Negative for activity change, appetite change, chills, diaphoresis, fatigue, fever and unexpected weight change.   HENT:  Positive for postnasal drip. Negative for congestion, ear discharge, ear pain, hearing loss, nosebleeds and tinnitus.    Eyes:  Negative for photophobia and visual disturbance.   Respiratory:  Positive for shortness of breath. Negative for apnea, cough, choking, " chest tightness and wheezing.    Cardiovascular:  Negative for chest pain, palpitations and leg swelling.   Gastrointestinal:  Positive for anal bleeding, constipation and nausea. Negative for abdominal distention, abdominal pain, blood in stool, diarrhea, rectal pain and vomiting.        Gas  JAVAD     Genitourinary:  Negative for difficulty urinating, dyspareunia, dysuria, flank pain, frequency, hematuria, menstrual problem, pelvic pain, urgency, vaginal bleeding and vaginal discharge.   Musculoskeletal:  Positive for back pain. Negative for arthralgias, gait problem, joint swelling, myalgias and neck stiffness.   Skin:  Negative for pallor and rash.   Neurological:  Negative for dizziness, tremors, seizures, syncope, speech difficulty, weakness, numbness and headaches.   Hematological:  Negative for adenopathy.   Psychiatric/Behavioral:  Negative for agitation, confusion, hallucinations, sleep disturbance and suicidal ideas.      Objective:      Physical Exam  Vitals reviewed.   Constitutional:       Appearance: She is well-developed.      Comments: Wheel chair; usually ambulates with walker because of SOB.    HENT:      Head: Normocephalic and atraumatic.   Eyes:      General: No scleral icterus.        Right eye: No discharge.         Left eye: No discharge.      Conjunctiva/sclera: Conjunctivae normal.      Pupils: Pupils are equal, round, and reactive to light.   Neck:      Thyroid: No thyromegaly.      Vascular: No JVD.   Cardiovascular:      Rate and Rhythm: Normal rate and regular rhythm.      Heart sounds: Normal heart sounds. No murmur heard.    No friction rub. No gallop.   Pulmonary:      Effort: Pulmonary effort is normal. No respiratory distress.      Breath sounds: Normal breath sounds. No wheezing or rales.   Chest:      Chest wall: No tenderness.   Abdominal:      General: Bowel sounds are normal. There is no distension.      Palpations: Abdomen is soft. There is no mass.      Tenderness: There is  no abdominal tenderness. There is no guarding or rebound.      Comments: Has leg bag with yellow clear urine.    Musculoskeletal:         General: Normal range of motion.      Cervical back: Normal range of motion and neck supple.   Lymphadenopathy:      Cervical: No cervical adenopathy.   Skin:     General: Skin is warm and dry.      Coloration: Skin is not pale.      Findings: No erythema or rash.   Neurological:      Mental Status: She is alert and oriented to person, place, and time.      Motor: No abnormal muscle tone.      Coordination: Coordination normal.      Deep Tendon Reflexes: Reflexes are normal and symmetric.   Psychiatric:         Behavior: Behavior normal.         Thought Content: Thought content normal.         Judgment: Judgment normal.       Assessment:   Rectal bleeding  -     Ambulatory referral/consult to Endo Procedure ; Future; Expected date: 09/01/2022    Coronary artery disease involving native coronary artery of native heart without angina pectoris    Hypertrophic cardiomyopathy    JA (obstructive sleep apnea)    Gastroesophageal reflux disease, unspecified whether esophagitis present  -     pantoprazole (PROTONIX) 40 MG tablet; Take 1 tablet (40 mg total) by mouth once daily.  Dispense: 30 tablet; Refill: 11    Pulmonary HTN    Paroxysmal atrial fibrillation          Plan:   As above.   Cardiology has given okay.

## 2022-09-01 ENCOUNTER — HOSPITAL ENCOUNTER (OUTPATIENT)
Dept: PREADMISSION TESTING | Facility: HOSPITAL | Age: 87
Discharge: HOME | End: 2022-09-01
Attending: INTERNAL MEDICINE
Payer: MEDICARE

## 2022-09-01 DIAGNOSIS — K62.5 RECTAL BLEEDING: Primary | ICD-10-CM

## 2022-09-01 RX ORDER — POLYETHYLENE GLYCOL 3350, SODIUM SULFATE ANHYDROUS, SODIUM BICARBONATE, SODIUM CHLORIDE, POTASSIUM CHLORIDE 236; 22.74; 6.74; 5.86; 2.97 G/4L; G/4L; G/4L; G/4L; G/4L
4 POWDER, FOR SOLUTION ORAL ONCE
Qty: 4000 ML | Refills: 0 | Status: SHIPPED | OUTPATIENT
Start: 2022-09-01 | End: 2022-09-01

## 2022-09-01 NOTE — PROGRESS NOTES
Procedure instructions reviewed. Place, time, clear liquids only on day before procedure no solid food at all, nothing to eat or drink after 6 am dose of prep on am of procedure, no chewing gum or sucking on candy, take BP medication with sip of water on am of procedure, have someone to drive them home and bowel prep instructions reviewed with pt. Pt verbalized understanding.

## 2022-09-06 ENCOUNTER — ANESTHESIA EVENT (OUTPATIENT)
Dept: ENDOSCOPY | Facility: HOSPITAL | Age: 87
End: 2022-09-06
Payer: MEDICARE

## 2022-09-06 ENCOUNTER — ANESTHESIA (OUTPATIENT)
Dept: ENDOSCOPY | Facility: HOSPITAL | Age: 87
End: 2022-09-06
Payer: MEDICARE

## 2022-09-06 ENCOUNTER — HOSPITAL ENCOUNTER (OUTPATIENT)
Facility: HOSPITAL | Age: 87
Discharge: HOME | End: 2022-09-06
Attending: INTERNAL MEDICINE | Admitting: INTERNAL MEDICINE
Payer: MEDICARE

## 2022-09-06 DIAGNOSIS — K62.5 RECTAL BLEEDING: Primary | ICD-10-CM

## 2022-09-06 PROCEDURE — 37000008 HC ANESTHESIA 1ST 15 MINUTES: Performed by: INTERNAL MEDICINE

## 2022-09-06 PROCEDURE — 45378 DIAGNOSTIC COLONOSCOPY: CPT | Performed by: INTERNAL MEDICINE

## 2022-09-06 PROCEDURE — 63600175 PHARM REV CODE 636 W HCPCS: Performed by: NURSE ANESTHETIST, CERTIFIED REGISTERED

## 2022-09-06 PROCEDURE — 25000003 PHARM REV CODE 250: Performed by: NURSE ANESTHETIST, CERTIFIED REGISTERED

## 2022-09-06 PROCEDURE — 45378 DIAGNOSTIC COLONOSCOPY: CPT | Mod: ,,, | Performed by: INTERNAL MEDICINE

## 2022-09-06 PROCEDURE — 45378 PR COLONOSCOPY,DIAGNOSTIC: ICD-10-PCS | Mod: ,,, | Performed by: INTERNAL MEDICINE

## 2022-09-06 PROCEDURE — 37000009 HC ANESTHESIA EA ADD 15 MINS: Performed by: INTERNAL MEDICINE

## 2022-09-06 RX ORDER — PROPOFOL 10 MG/ML
VIAL (ML) INTRAVENOUS
Status: DISCONTINUED | OUTPATIENT
Start: 2022-09-06 | End: 2022-09-06

## 2022-09-06 RX ORDER — LIDOCAINE HYDROCHLORIDE 10 MG/ML
INJECTION, SOLUTION EPIDURAL; INFILTRATION; INTRACAUDAL; PERINEURAL
Status: DISCONTINUED | OUTPATIENT
Start: 2022-09-06 | End: 2022-09-06

## 2022-09-06 RX ORDER — HYDROCORTISONE ACETATE 25 MG/1
25 SUPPOSITORY RECTAL NIGHTLY
Qty: 10 SUPPOSITORY | Refills: 0 | Status: SHIPPED | OUTPATIENT
Start: 2022-09-06 | End: 2022-09-16

## 2022-09-06 RX ADMIN — PROPOFOL 25 MG: 10 INJECTION, EMULSION INTRAVENOUS at 10:09

## 2022-09-06 RX ADMIN — PROPOFOL 50 MG: 10 INJECTION, EMULSION INTRAVENOUS at 10:09

## 2022-09-06 RX ADMIN — LIDOCAINE HYDROCHLORIDE 20 MG: 10 INJECTION, SOLUTION EPIDURAL; INFILTRATION; INTRACAUDAL; PERINEURAL at 10:09

## 2022-09-06 RX ADMIN — SODIUM CHLORIDE, SODIUM LACTATE, POTASSIUM CHLORIDE, AND CALCIUM CHLORIDE: .6; .31; .03; .02 INJECTION, SOLUTION INTRAVENOUS at 10:09

## 2022-09-06 NOTE — ANESTHESIA POSTPROCEDURE EVALUATION
Anesthesia Post Evaluation    Patient: Holli Landrum    Procedure(s) Performed: Procedure(s) (LRB):  COLONOSCOPY 8/31/22-note in Dr Simms's office visit note that he spoke to her cardiologist and she was viable candidate for endoscopy (N/A)    Final Anesthesia Type: MAC      Patient location during evaluation: GI PACU  Patient participation: Yes- Able to Participate  Level of consciousness: awake and alert  Post-procedure vital signs: reviewed and stable  Pain management: adequate  Airway patency: patent    PONV status at discharge: No PONV  Anesthetic complications: no      Cardiovascular status: blood pressure returned to baseline  Respiratory status: unassisted and spontaneous ventilation  Hydration status: euvolemic  Follow-up not needed.          Vitals Value Taken Time   /101 09/06/22 1125   Temp 37.3 °C (99.1 °F) 09/06/22 1105   Pulse 74 09/06/22 1125   Resp 14 09/06/22 1125   SpO2 95 % 09/06/22 1125         Event Time   Out of Recovery 11:40:29         Pain/Anthony Score: Anthony Score: 10 (9/6/2022 11:25 AM)

## 2022-09-06 NOTE — INTERVAL H&P NOTE
The patient has been examined and the H&P has been reviewed:    I concur with the findings and changes have been noted since the H&P was written: bright red blood with large bowel movement a few weeks ago. None since 8/31. She has also been off Eliquis since then. Last colonoscopy in 2003 also for lower GI bleeding. States nothing was found. No Fhx of CRC     The risks, benefits and alternatives of the procedure were discussed with the patient in detail. This discussion was had in the presence of endoscopy staff. The risks include, risks of adverse reaction to sedation requiring the use of reversal agents, bleeding requiring blood transfusion, perforation requiring surgical intervention and technical failure. Other risks include aspiration leading to respiratory distress and respiratory failure resulting in endotracheal intubation and mechanical ventilation including death. If anesthesia is being utilized for this procedure, it is up to the anesthesiologist to determine airway safety including elective endotracheal intubation. Questions were answered, they agree to proceed. There was no language barriers.      Anesthesia/Surgery risks, benefits and alternative options discussed and understood by patient/family.

## 2022-09-06 NOTE — PLAN OF CARE
Dr. Bueno at  to discuss findings. VSS. No  Pain, no GI bleeding. Pt to be discharged from unit.    HPI/Subjective:   Patient ID: Rachel Martinez is a 21year old male. HPI  Feeling of constipation. Also took norco for two days and worse. Recent right hip surgery. History/Other:   Review of Systems   Constitutional: Negative.     Respiratory: Nega

## 2022-09-06 NOTE — ANESTHESIA PREPROCEDURE EVALUATION
09/06/2022  Holli Landrum is a 89 y.o., female.      Pre-op Assessment    I have reviewed the Patient Summary Reports.     I have reviewed the Nursing Notes. I have reviewed the NPO Status.   I have reviewed the Medications.     Review of Systems  Anesthesia Hx:  No problems with previous Anesthesia    Social:  Former Smoker    Hematology/Oncology:         -- Cancer in past history: Oncology Comments: Skin, Bladder     Cardiovascular:   Pacemaker Hypertension, well controlled CAD  CABG/stent  CHF ECG has been reviewed. Atrial-paced rhythm with prolonged AV conduction   Inferior infarct ,age undetermined   Possible Anterolateral infarct ,age undetermined   Abnormal ECG   When compared with ECG of 16-JUN-2021 12:51,   Electronic atrial pacemaker has replaced Sinus rhythm   Borderline criteria for Anterior infarct are now Present   Borderline criteria for Anterolateral infarct are now Present   Inferior infarct is now Present   Confirmed by Fredrick Deras MD ([id number]) on 6/17/2021 11:07:26 AM   Also confirmed by Oliver Moncada MD ([id number])  on 6/17/2021 11:08:53 AM     ECHO  Narrative  · Normal systolic function.  · The estimated ejection fraction is 60%.  · Grade I left ventricular diastolic dysfunction.  · Normal right ventricular size with normal right ventricular systolic   function.  · Moderate left atrial enlargement.  · There is moderate aortic valve stenosis.  · Normal central venous pressure (3 mmHg).  · The estimated PA systolic pressure is 27 mmHg.      Pulmonary:   COPD Asthma mild    Renal/:   Chronic Renal Disease, CRI    Hepatic/GI:   GERD, well controlled        Physical Exam  General: Well nourished    Airway:  Mallampati: II   Mouth Opening: Normal  TM Distance: Normal  Tongue: Normal  Neck ROM: Normal ROM    Dental:  Intact    Chest/Lungs:  Normal Respiratory Rate    Heart:  Rate: Normal  Rhythm:  Regular Rhythm        Anesthesia Plan  Type of Anesthesia, risks & benefits discussed:    Anesthesia Type: MAC  Intra-op Monitoring Plan: Standard ASA Monitors  Post Op Pain Control Plan: multimodal analgesia  Induction:  IV  Informed Consent: Informed consent signed with the Patient and all parties understand the risks and agree with anesthesia plan.  All questions answered.   ASA Score: 3  Day of Surgery Review of History & Physical: H&P Update referred to the surgeon/provider.I have interviewed and examined the patient. I have reviewed the patient's H&P dated: There are no significant changes.     Ready For Surgery From Anesthesia Perspective.     .

## 2022-09-06 NOTE — PR[employer]ATION PATIENT INSTRUCTIONS
Discharge Summary/Instructions after an Endoscopic Procedure  Patient Name: Holli Landrum  Patient MRN: [id number]  Patient YOB: 1933  Tuesday, September 6, 2022 Cordelia Bueno MD  Dear patient,  As a result of recent federal legislation (The Federal Cures Act), you may   receive lab or pathology results from your procedure in your [facility]   account before your physician is able to contact you. Your physician or   their representative will relay the results to you with their   recommendations at their soonest availability.  Thank you,  RESTRICTIONS:  During your procedure today, you received medications for sedation.  These   medications may affect your judgment, balance and coordination.  Therefore,   for 24 hours, you have the following restrictions:   - DO NOT drive a car, operate machinery, make legal/financial decisions,   sign important papers or drink alcohol.    ACTIVITY:  Today: no heavy lifting, straining or running due to procedural   sedation/anesthesia.  The following day: return to full activity including work.  DIET:  Eat and drink normally unless instructed otherwise.     TREATMENT FOR COMMON SIDE EFFECTS:  - Mild abdominal pain, nausea, belching, bloating or excessive gas:  rest,   eat lightly and use a heating pad.  - Sore Throat: treat with throat lozenges and/or gargle with warm salt   water.  - Because air was used during the procedure, expelling large amounts of air   from your rectum or belching is normal.  - If a bowel prep was taken, you may not have a bowel movement for 1-3 days.    This is normal.  SYMPTOMS TO WATCH FOR AND REPORT TO YOUR PHYSICIAN:  1. Abdominal pain or bloating, other than gas cramps.  2. Chest pain.  3. Back pain.  4. Signs of infection such as: chills or fever occurring within 24 hours   after the procedure.  5. Rectal bleeding, which would show as bright red, maroon, or black stools.   (A tablespoon of blood from the rectum is not serious, especially if    hemorrhoids are present.)  6. Vomiting.  7. Weakness or dizziness.  GO DIRECTLY TO THE NEAREST EMERGENCY ROOM IF YOU HAVE ANY OF THE FOLLOWING:      Difficulty breathing              Chills and/or fever over 101 F   Persistent vomiting and/or vomiting blood   Severe abdominal pain   Severe chest pain   Black, tarry stools   Bleeding- more than one tablespoon   Any other symptom or condition that you feel may need urgent attention  Your doctor recommends these additional instructions:  If any biopsies were taken, your doctors clinic will contact you in 1 to 2   weeks with any results.  - Discharge patient to home (ambulatory).   - Patient has a contact number available for emergencies.  The signs and   symptoms of potential delayed complications were discussed with the   patient.  Return to normal activities tomorrow.  Written discharge   instructions were provided to the patient.   - Resume previous diet.   - Continue present medications.   - Repeat colonoscopy is not recommended for surveillance.   - Return to GI clinic PRN.   - Use hydrocortisone suppository 25 mg 1 per rectum QHS for 10 days  - Resume Eliquis (apixaban) at prior dose tomorrow.  For questions, problems or results please call your physician Cordelia Bueno MD at Work:  [phone]  If you have any questions about the above instructions, call the GI   department at [phone] or call the endoscopy unit at [phone]   from 7am until 3 pm.  [facility] [place], EMERGENCY ROOM PHONE NUMBER:   [phone]  IF A COMPLICATION OR EMERGENCY SITUATION ARISES AND YOU ARE UNABLE TO REACH   YOUR PHYSICIAN - GO DIRECTLY TO THE EMERGENCY ROOM.  I have read or have had read to me these discharge instructions for my   procedure and have received a written copy.  I understand these   instructions and will follow-up with my physician if I have any questions.     __________________________________        _____________________________________  Nurse Signature                                          Patient/Designated   Responsible Party Signature  MD Cordelia Monroy MD  9/6/2022 11:10:12 AM  This report has been verified and signed electronically.  Dear patient,  As a result of recent federal legislation (The Federal Cures Act), you may   receive lab or pathology results from your procedure in your [facility]   account before your physician is able to contact you. Your physician or   their representative will relay the results to you with their   recommendations at their soonest availability.  Thank you,  PROVATION

## 2022-09-06 NOTE — TRANSFER OF CARE
"Anesthesia Transfer of Care Note    Patient: Holli Landrum    Procedure(s) Performed: Procedure(s) (LRB):  COLONOSCOPY 8/31/22-note in Dr Simms's office visit note that he spoke to her cardiologist and she was viable candidate for endoscopy (N/A)    Patient location: GI    Anesthesia Type: MAC    Transport from OR: Transported from OR on room air with adequate spontaneous ventilation    Post pain: adequate analgesia    Post assessment: no apparent anesthetic complications    Post vital signs: stable    Level of consciousness: sedated    Nausea/Vomiting: no nausea/vomiting    Complications: none    Transfer of care protocol was followed      Last vitals:   Visit Vitals  BP (!) 181/93   Pulse 66   Temp 36.5 °C (97.7 °F)   Resp 14   Ht 5' 5" (1.651 m)   Wt 88.9 kg (196 lb)   LMP  (LMP Unknown)   SpO2 (!) 94%   Breastfeeding No   BMI 32.62 kg/m²     "

## 2022-09-07 VITALS
HEIGHT: 65 IN | SYSTOLIC BLOOD PRESSURE: 173 MMHG | RESPIRATION RATE: 14 BRPM | WEIGHT: 196 LBS | BODY MASS INDEX: 32.65 KG/M2 | HEART RATE: 74 BPM | DIASTOLIC BLOOD PRESSURE: 101 MMHG | OXYGEN SATURATION: 95 % | TEMPERATURE: 99 F

## 2022-09-21 ENCOUNTER — PATIENT MESSAGE (OUTPATIENT)
Dept: OPHTHALMOLOGY | Facility: CLINIC | Age: 87
End: 2022-09-21
Payer: MEDICARE

## 2022-10-06 ENCOUNTER — OFFICE VISIT (OUTPATIENT)
Dept: UROLOGY | Facility: CLINIC | Age: 87
End: 2022-10-06
Payer: MEDICARE

## 2022-10-06 VITALS — SYSTOLIC BLOOD PRESSURE: 147 MMHG | DIASTOLIC BLOOD PRESSURE: 87 MMHG

## 2022-10-06 DIAGNOSIS — R33.9 URINARY RETENTION: Primary | ICD-10-CM

## 2022-10-06 PROCEDURE — 3288F PR FALLS RISK ASSESSMENT DOCUMENTED: ICD-10-PCS | Mod: CPTII,S$GLB,, | Performed by: UROLOGY

## 2022-10-06 PROCEDURE — 1159F MED LIST DOCD IN RCRD: CPT | Mod: CPTII,S$GLB,, | Performed by: UROLOGY

## 2022-10-06 PROCEDURE — 1101F PT FALLS ASSESS-DOCD LE1/YR: CPT | Mod: CPTII,S$GLB,, | Performed by: UROLOGY

## 2022-10-06 PROCEDURE — 1159F PR MEDICATION LIST DOCUMENTED IN MEDICAL RECORD: ICD-10-PCS | Mod: CPTII,S$GLB,, | Performed by: UROLOGY

## 2022-10-06 PROCEDURE — 3288F FALL RISK ASSESSMENT DOCD: CPT | Mod: CPTII,S$GLB,, | Performed by: UROLOGY

## 2022-10-06 PROCEDURE — 1160F RVW MEDS BY RX/DR IN RCRD: CPT | Mod: CPTII,S$GLB,, | Performed by: UROLOGY

## 2022-10-06 PROCEDURE — 1160F PR REVIEW ALL MEDS BY PRESCRIBER/CLIN PHARMACIST DOCUMENTED: ICD-10-PCS | Mod: CPTII,S$GLB,, | Performed by: UROLOGY

## 2022-10-06 PROCEDURE — 99999 PR PBB SHADOW E&M-EST. PATIENT-LVL III: CPT | Mod: PBBFAC,,, | Performed by: UROLOGY

## 2022-10-06 PROCEDURE — 1157F ADVNC CARE PLAN IN RCRD: CPT | Mod: CPTII,S$GLB,, | Performed by: UROLOGY

## 2022-10-06 PROCEDURE — 99213 PR OFFICE/OUTPT VISIT, EST, LEVL III, 20-29 MIN: ICD-10-PCS | Mod: S$GLB,,, | Performed by: UROLOGY

## 2022-10-06 PROCEDURE — 99213 OFFICE O/P EST LOW 20 MIN: CPT | Mod: S$GLB,,, | Performed by: UROLOGY

## 2022-10-06 PROCEDURE — 99999 PR PBB SHADOW E&M-EST. PATIENT-LVL III: ICD-10-PCS | Mod: PBBFAC,,, | Performed by: UROLOGY

## 2022-10-06 PROCEDURE — 1157F PR ADVANCE CARE PLAN OR EQUIV PRESENT IN MEDICAL RECORD: ICD-10-PCS | Mod: CPTII,S$GLB,, | Performed by: UROLOGY

## 2022-10-06 PROCEDURE — 1101F PR PT FALLS ASSESS DOC 0-1 FALLS W/OUT INJ PAST YR: ICD-10-PCS | Mod: CPTII,S$GLB,, | Performed by: UROLOGY

## 2022-10-06 NOTE — PROGRESS NOTES
Chief Complaint: urinary retention    HPI:   10/06/2022 - presents today to the [facility] to establish care, had some bad falls in February and had to relocate to [place] to live with her daughter, no new issues in the interim, home health comes to change her catheter every month, sometimes she will have UTIs that are symptomatic with foul smell, but overall she has been doing well, does have intermittent leakage around the catheter but this does not occur very frequently, denies any gross hematuria    11/18/2021 - Dr Vieira: 88-year-old with chronic urinary retention.  She has an indwelling French catheter for years.  It is changed regularly at home.  She gets recurrent urinary tract infections.  She complains of urethral pain.  She is leaking around the catheter.  At 1 time she was taking oxybutynin but held this for a voiding trial which she failed.  She was scheduled for cystoscopy today but she is currently taking antibiotics for presumed infection.  She has multiple cultures positive for various organisms.  She denies fever and gross hematuria.  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  She is also currently recovering from shingles.  She seen today in a wheelchair.        PMH:  Past Medical History:   Diagnosis Date    Anticoagulant long-term use     Arthritis     Asthma     Basal cell carcinoma     Cancer     skin cancer to face    Cataract     OU done//    CHF (congestive heart failure)     COPD (chronic obstructive pulmonary disease)     no oxygen; patient denies    cpap     Essential hypertension 05/05/2010    GERD (gastroesophageal reflux disease) 11/20/2012    Glaucoma     Hypertensive heart disease with heart failure 02/05/2013    Paroxysmal atrial fibrillation     Paroxysmal ventricular tachycardia     per problem list    Renal disorder     french-1/3/2020    Squamous cell carcinoma of skin        PSH:  Past Surgical History:   Procedure  Laterality Date    A-V CARDIAC PACEMAKER INSERTION  06/16/2021    Procedure: INSERTION, CARDIAC PACEMAKER, DUAL CHAMBER;  Surgeon: Oscar Sommers MD;  Location: [facility] CATH;  Service: Cardiovascular;;    BREAST BIOPSY Left 1995    neg    BREAST BIOPSY Right 1984    neg    BREAST BIOPSY Right 1992    neg    CARDIAC CATHETERIZATION  2013, 2014,2016, 2020    has 9 stents    CARDIAC SURGERY  2012    stents    CATARACT EXTRACTION W/  INTRAOCULAR LENS IMPLANT Left 11/01/2018    Dr Rose    CATARACT EXTRACTION W/  INTRAOCULAR LENS IMPLANT Right 12/13/2018    Dr Rose//    CHOLECYSTECTOMY      COLONOSCOPY  ~2005    Dr. Edward; normal per patient report    COLONOSCOPY N/A 9/6/2022    Procedure: COLONOSCOPY 8/31/22-note in Dr Simms's office visit note that he spoke to her cardiologist and she was viable candidate for endoscopy;  Surgeon: Cordelia Bueno MD;  Location: [facility];  Service: Endoscopy;  Laterality: N/A;    CORONARY ANGIOGRAPHY N/A 03/20/2020    Procedure: ANGIOGRAM, CORONARY ARTERY;  Surgeon: Chuy Bryant MD;  Location: [facility] CATH;  Service: Cardiology;  Laterality: N/A;    CYSTOSCOPY W/ RETROGRADES Bilateral 02/12/2020    Procedure: CYSTOSCOPY, WITH RETROGRADE PYELOGRAM;  Surgeon: Gasper Feliz MD;  Location: [facility] OR;  Service: Urology;  Laterality: Bilateral;    ESOPHAGOGASTRODUODENOSCOPY N/A 08/13/2020    Procedure: EGD (ESOPHAGOGASTRODUODENOSCOPY);  Surgeon: Mika Solorzano MD;  Location: [facility];  Service: Endoscopy;  Laterality: N/A; Mild Schatzki ring. Biopsied. Dilated. small hiatal hernia, gastritis; biopsy: esophagus- SEVERE REFLUX ESOPHAGITIS, stomach- chronic gastritis, negative for H pylori    ESOPHAGOGASTRODUODENOSCOPY N/A 10/22/2020    Procedure: EGD (ESOPHAGOGASTRODUODENOSCOPY);  Surgeon: Fred Hendricks MD;  Location: [facility];  Service: Endoscopy;  Laterality: N/A;    HYSTERECTOMY  1969    LEFT HEART CATHETERIZATION Left 03/03/2020    Procedure: Left heart cath;  Surgeon:  Chuy Bryant MD;  Location: [facility] CATH;  Service: Cardiology;  Laterality: Left;    LEFT HEART CATHETERIZATION Left 2020    Procedure: Left heart cath;  Surgeon: Chuy Bryant MD;  Location: [facility] CATH;  Service: Cardiology;  Laterality: Left;    OVARIAN CYST REMOVAL  teenager    PHACOEMULSIFICATION OF CATARACT Left 2018    Procedure: PHACOEMULSIFICATION, CATARACT;  Surgeon: Aleksandar Rose Jr., MD;  Location: [facility] OR;  Service: Ophthalmology;  Laterality: Left;    PHACOEMULSIFICATION OF CATARACT Right 2018    Procedure: PHACOEMULSIFICATION, CATARACT;  Surgeon: Aleksandar Rose Jr., MD;  Location: [facility] OR;  Service: Ophthalmology;  Laterality: Right;    TONSILLECTOMY      aw/denoids    UPPER GASTROINTESTINAL ENDOSCOPY  ~    Dr. Solorzano    VASCULAR SURGERY      to remove clot from right leg    Yag Capsulotomy Bilateral 2019    Dr Rose       Family History:  Family History   Problem Relation Age of Onset    Diabetes Brother     Heart disease Brother     Diabetes Mother     Cancer Maternal Grandfather     Clotting disorder Son         bleeding after tonsillectomy only    Amblyopia Neg Hx     Blindness Neg Hx     Cataracts Neg Hx     Glaucoma Neg Hx     Hypertension Neg Hx     Macular degeneration Neg Hx     Retinal detachment Neg Hx     Strabismus Neg Hx     Stroke Neg Hx     Thyroid disease Neg Hx     Colon cancer Neg Hx        Social History:  Social History     Tobacco Use    Smoking status: Former     Types: Cigarettes     Quit date:      Years since quittin.8    Smokeless tobacco: Never    Tobacco comments:     quit smoking  //had smoked for 1 yr//   Substance Use Topics    Alcohol use: No     Alcohol/week: 0.0 standard drinks    Drug use: No        Review of Systems:  General: No fever, chills  Skin: No rashes  Chest:  Denies cough and sputum production  Heart: Denies chest pain  Resp: Denies dyspnea  Abdomen: Denies diarrhea, abdominal pain, hematemesis, or blood in  stool.  Musculoskeletal: No joint stiffness or swelling. Denies back pain.  : see HPI  Neuro: no dizziness or weakness    Allergies:  Timolol maleate, Ciprofloxacin, and Sulfamethoxazole-trimethoprim    Medications:    Current Outpatient Medications:     acetaminophen (TYLENOL) 650 MG TbSR, Take 650 mg by mouth as needed. , Disp: , Rfl:     albuterol (PROVENTIL/VENTOLIN HFA) 90 mcg/actuation inhaler, Inhale 1-2 puffs into the lungs every 6 (six) hours as needed for Wheezing. Rescue, Disp: 18 g, Rfl: 5    allopurinoL (ZYLOPRIM) 100 MG tablet, Take 2 tablets (200 mg total) by mouth once daily., Disp: 180 tablet, Rfl: 2    aluminum & magnesium hydroxide-simethicone (MYLANTA MAX STRENGTH) 400-400-40 mg/5 mL suspension, Take by mouth every 6 (six) hours as needed for Indigestion., Disp: , Rfl:     amLODIPine (NORVASC) 2.5 MG tablet, Take 1 tablet (2.5 mg total) by mouth once daily., Disp: 30 tablet, Rfl: 11    apixaban (ELIQUIS) 2.5 mg Tab, Take by mouth 2 (two) times daily., Disp: , Rfl:     cholecalciferol, vitamin D3, 125 mcg (5,000 unit) Tab, Take 5,000 Units by mouth once daily., Disp: , Rfl:     D-MANNOSE ORAL, Take 2 tablets by mouth once daily. , Disp: , Rfl:     dorzolamide (TRUSOPT) 2 % ophthalmic solution, INSTILL 1 DROP INTO BOTH EYES TWICE DAILY, Disp: 10 mL, Rfl: 3    flu vac 2022 [id number]-[id number],PF, 60 mcg (15 mcg x 4)/0.5 mL Syrg, Inject 0.5 mLs into the muscle once. for 1 dose, Disp: 0.5 mL, Rfl: 0    fluticasone (FLONASE) 50 mcg/actuation nasal spray, 2 sprays (100 mcg total) by Each Nare route daily as needed for Allergies., Disp: 16 g, Rfl: 11    fluticasone furoate-vilanteroL (BREO ELLIPTA) 100-25 mcg/dose diskus inhaler, Inhale 1 puff into the lungs once daily., Disp: 90 each, Rfl: 0    furosemide (LASIX) 40 MG tablet, Take 1 tablet (40 mg total) by mouth once daily., Disp: 90 tablet, Rfl: 3    GRAPE SEED EXTRACT ORAL, Take 1 tablet by mouth once daily. , Disp: , Rfl:     Lactobacillus rhamnosus GG  (CULTURELLE) 10 billion cell capsule, Take 1 capsule by mouth once daily., Disp: , Rfl:     latanoprost 0.005 % ophthalmic solution, Place 1 drop into both eyes every evening., Disp: 5 mL, Rfl: 6    magnesium oxide (MAG-OX) 400 mg tablet, Take 800 mg by mouth once daily. , Disp: , Rfl:     nitroGLYCERIN 0.4 MG/HR TD PT24 (NITRODUR) 0.4 mg/hr, Place 1 patch onto the skin once daily., Disp: 90 patch, Rfl: 3    OLIVE LEAF EXTRACT ORAL, Take 1 tablet by mouth once daily. , Disp: , Rfl:     omega-3 fatty acids/fish oil (FISH OIL-OMEGA-3 FATTY ACIDS) 300-1,000 mg capsule, Take 2 capsules by mouth once daily., Disp: , Rfl:     ondansetron (ZOFRAN) 4 MG tablet, Take 4 mg by mouth every 6 (six) hours as needed., Disp: , Rfl:     oxyCODONE (ROXICODONE) 5 MG immediate release tablet, Take 5 mg by mouth every 6 (six) hours as needed., Disp: , Rfl:     pantoprazole (PROTONIX) 40 MG tablet, Take 1 tablet (40 mg total) by mouth once daily., Disp: 30 tablet, Rfl: 11    potassium chloride (KLOR-CON) 10 MEQ TbSR, TAKE 1 TABLET BY MOUTH TWICE DAILY, Disp: 180 tablet, Rfl: 0    rosuvastatin (CRESTOR) 40 MG Tab, Take 1 tablet (40 mg total) by mouth once daily., Disp: 90 tablet, Rfl: 3    sotaloL (BETAPACE) 80 MG tablet, Take 0.5 tablets (40 mg total) by mouth 2 (two) times daily., Disp: 90 tablet, Rfl: 3    Physical Exam:  Vitals:    10/06/22 1400   BP: (!) 147/87     There is no height or weight on file to calculate BMI.  General: awake, alert, cooperative  Head: NC/AT  Ears: external ears normal  Eyes: sclera normal  Lungs: normal inspiration, NAD  Heart: well-perfused  Skin: The skin is warm and dry  Ext: No c/c/e.  Neuro: grossly intact, AOx3    RADIOLOGY:  No recent relevant imaging available for review.    LABS:  I personally reviewed the following lab values:  Lab Results   Component Value Date    WBC 6.09 12/07/2021    HGB 13.2 12/07/2021    HCT 41.4 12/07/2021     12/07/2021     12/07/2021    K 3.8 12/07/2021    CL  108 12/07/2021    CREATININE 0.8 12/07/2021    BUN 16 12/07/2021    CO2 22 (L) 12/07/2021    TSH 0.993 12/07/2021    INR 1.0 04/15/2019    HGBA1C 5.8 (H) 12/07/2021    CHOL 134 12/07/2021    TRIG 145 12/07/2021    HDL 46 12/07/2021    ALT 11 12/07/2021    AST 18 12/07/2021       Assessment/Plan:   Holli Landrum is a 89 y.o. female with chronic urinary retention of unclear etiology.  Chronic indwelling Ballard catheter working well for her, continue changes every month, treat symptomatic UTIs, follow-up six months    Edy Stewart MD  Urology

## 2022-11-02 ENCOUNTER — HOSPITAL ENCOUNTER (OUTPATIENT)
Dept: RADIOLOGY | Facility: HOSPITAL | Age: 87
Discharge: HOME | End: 2022-11-02
Attending: INTERNAL MEDICINE
Payer: MEDICARE

## 2022-11-02 ENCOUNTER — OFFICE VISIT (OUTPATIENT)
Dept: GASTROENTEROLOGY | Facility: CLINIC | Age: 87
End: 2022-11-02
Payer: MEDICARE

## 2022-11-02 VITALS
OXYGEN SATURATION: 95 % | HEIGHT: 65 IN | SYSTOLIC BLOOD PRESSURE: 130 MMHG | HEART RATE: 76 BPM | BODY MASS INDEX: 32.87 KG/M2 | DIASTOLIC BLOOD PRESSURE: 80 MMHG | WEIGHT: 197.31 LBS

## 2022-11-02 DIAGNOSIS — K59.09 CHRONIC CONSTIPATION: Primary | ICD-10-CM

## 2022-11-02 DIAGNOSIS — K59.04 CHRONIC IDIOPATHIC CONSTIPATION: ICD-10-CM

## 2022-11-02 DIAGNOSIS — K64.8 BLEEDING INTERNAL HEMORRHOIDS: ICD-10-CM

## 2022-11-02 DIAGNOSIS — K64.8 BLEEDING INTERNAL HEMORRHOIDS: Primary | ICD-10-CM

## 2022-11-02 PROCEDURE — 3288F FALL RISK ASSESSMENT DOCD: CPT | Mod: CPTII,S$GLB,, | Performed by: INTERNAL MEDICINE

## 2022-11-02 PROCEDURE — 99999 PR PBB SHADOW E&M-EST. PATIENT-LVL IV: ICD-10-PCS | Mod: PBBFAC,,, | Performed by: INTERNAL MEDICINE

## 2022-11-02 PROCEDURE — 74019 RADEX ABDOMEN 2 VIEWS: CPT | Mod: 26,,, | Performed by: RADIOLOGY

## 2022-11-02 PROCEDURE — 3288F PR FALLS RISK ASSESSMENT DOCUMENTED: ICD-10-PCS | Mod: CPTII,S$GLB,, | Performed by: INTERNAL MEDICINE

## 2022-11-02 PROCEDURE — 1159F MED LIST DOCD IN RCRD: CPT | Mod: CPTII,S$GLB,, | Performed by: INTERNAL MEDICINE

## 2022-11-02 PROCEDURE — 99213 PR OFFICE/OUTPT VISIT, EST, LEVL III, 20-29 MIN: ICD-10-PCS | Mod: S$GLB,,, | Performed by: INTERNAL MEDICINE

## 2022-11-02 PROCEDURE — 1101F PR PT FALLS ASSESS DOC 0-1 FALLS W/OUT INJ PAST YR: ICD-10-PCS | Mod: CPTII,S$GLB,, | Performed by: INTERNAL MEDICINE

## 2022-11-02 PROCEDURE — 99213 OFFICE O/P EST LOW 20 MIN: CPT | Mod: S$GLB,,, | Performed by: INTERNAL MEDICINE

## 2022-11-02 PROCEDURE — 1125F PR PAIN SEVERITY QUANTIFIED, PAIN PRESENT: ICD-10-PCS | Mod: CPTII,S$GLB,, | Performed by: INTERNAL MEDICINE

## 2022-11-02 PROCEDURE — 74019 XR ABDOMEN FLAT AND ERECT: ICD-10-PCS | Mod: 26,,, | Performed by: RADIOLOGY

## 2022-11-02 PROCEDURE — 1125F AMNT PAIN NOTED PAIN PRSNT: CPT | Mod: CPTII,S$GLB,, | Performed by: INTERNAL MEDICINE

## 2022-11-02 PROCEDURE — 1159F PR MEDICATION LIST DOCUMENTED IN MEDICAL RECORD: ICD-10-PCS | Mod: CPTII,S$GLB,, | Performed by: INTERNAL MEDICINE

## 2022-11-02 PROCEDURE — 1101F PT FALLS ASSESS-DOCD LE1/YR: CPT | Mod: CPTII,S$GLB,, | Performed by: INTERNAL MEDICINE

## 2022-11-02 PROCEDURE — 1160F RVW MEDS BY RX/DR IN RCRD: CPT | Mod: CPTII,S$GLB,, | Performed by: INTERNAL MEDICINE

## 2022-11-02 PROCEDURE — 1157F PR ADVANCE CARE PLAN OR EQUIV PRESENT IN MEDICAL RECORD: ICD-10-PCS | Mod: CPTII,S$GLB,, | Performed by: INTERNAL MEDICINE

## 2022-11-02 PROCEDURE — 1160F PR REVIEW ALL MEDS BY PRESCRIBER/CLIN PHARMACIST DOCUMENTED: ICD-10-PCS | Mod: CPTII,S$GLB,, | Performed by: INTERNAL MEDICINE

## 2022-11-02 PROCEDURE — 1157F ADVNC CARE PLAN IN RCRD: CPT | Mod: CPTII,S$GLB,, | Performed by: INTERNAL MEDICINE

## 2022-11-02 PROCEDURE — 99999 PR PBB SHADOW E&M-EST. PATIENT-LVL IV: CPT | Mod: PBBFAC,,, | Performed by: INTERNAL MEDICINE

## 2022-11-02 PROCEDURE — 74019 RADEX ABDOMEN 2 VIEWS: CPT | Mod: TC

## 2022-11-02 RX ORDER — HYDROCORTISONE ACETATE 25 MG/1
25 SUPPOSITORY RECTAL 2 TIMES DAILY
Qty: 20 SUPPOSITORY | Refills: 0 | Status: SHIPPED | OUTPATIENT
Start: 2022-11-02 | End: 2022-11-12

## 2022-11-02 RX ORDER — SODIUM, POTASSIUM,MAG SULFATES 17.5-3.13G
1 SOLUTION, RECONSTITUTED, ORAL ORAL DAILY
Qty: 1 KIT | Refills: 0 | Status: SHIPPED | OUTPATIENT
Start: 2022-11-02 | End: 2022-11-04

## 2022-11-02 NOTE — PROGRESS NOTES
Subjective:       Patient ID: Holli Landrum is a 89 y.o. female.    Chief Complaint: Rectal Bleeding, Constipation, and Hemorrhoids (To discuss surgery)    The patient is known to our service from previous encounters, and was last seen in the office by me on August 31st of this year.  The details of that visit are well outlined in the note from that date and reviewed for the patient's encounter today.  She had rectal bleeding after hard stool, and was scheduled for colonoscopy as she had not had a previous study since 2003.    The patient underwent colonoscopy in September with findings of diverticulosis as well as internal hemorrhoids.  She apparently was placed on a short course of Anusol HC cyst suppository treatment for hemorrhoids, which he has continued to have issues with constipation.  She has had recurrence of bleeding with constipation, although it must also be noted that the patient is on blood thinner Eliquis.  She is on Metamucil and has tried stool softeners over-the-counter.  We will check flat and upright abdominal films today to assess stool burden, and then decide if colon purge is necessary before trial of intervention with MiraLax or Linzess.  Review of Systems   Constitutional:  Positive for fatigue. Negative for activity change, appetite change, chills, diaphoresis, fever and unexpected weight change.   HENT:  Positive for postnasal drip. Negative for congestion, ear discharge, ear pain, hearing loss, nosebleeds and tinnitus.    Eyes:  Negative for photophobia and visual disturbance.   Respiratory:  Positive for cough. Negative for apnea, choking, chest tightness, shortness of breath and wheezing.    Cardiovascular:  Negative for chest pain, palpitations and leg swelling.   Gastrointestinal:  Positive for anal bleeding, constipation, nausea and rectal pain. Negative for abdominal distention, abdominal pain, blood in stool, diarrhea and vomiting.        Gas   Genitourinary:  Negative for  difficulty urinating, dyspareunia, dysuria, flank pain, frequency, hematuria, menstrual problem, pelvic pain, urgency, vaginal bleeding and vaginal discharge.   Musculoskeletal:  Negative for arthralgias, back pain, gait problem, joint swelling, myalgias and neck stiffness.   Skin:  Negative for pallor and rash.   Neurological:  Negative for dizziness, tremors, seizures, syncope, speech difficulty, weakness, numbness and headaches.   Hematological:  Negative for adenopathy.   Psychiatric/Behavioral:  Negative for agitation, confusion, hallucinations, sleep disturbance and suicidal ideas.      Objective:      Physical Exam  Constitutional:       Appearance: She is well-developed.   Neck:      Thyroid: No thyromegaly.      Vascular: No JVD.   Neurological:      Motor: No abnormal muscle tone.      Deep Tendon Reflexes: Reflexes are normal and symmetric.       Assessment:   Bleeding internal hemorrhoids  -     hydrocortisone (ANUSOL-HC) 25 mg suppository; Place 1 suppository (25 mg total) rectally 2 (two) times daily. for 10 days  Dispense: 20 suppository; Refill: 0  -     X-Ray Abdomen Flat And Erect; Future; Expected date: 11/02/2022    Chronic idiopathic constipation  -     X-Ray Abdomen Flat And Erect; Future; Expected date: 11/02/2022           Plan:   As above

## 2022-11-04 ENCOUNTER — PATIENT MESSAGE (OUTPATIENT)
Dept: GASTROENTEROLOGY | Facility: CLINIC | Age: 87
End: 2022-11-04
Payer: MEDICARE

## 2022-11-09 NOTE — TELEPHONE ENCOUNTER
Made attempt to schedule a new patient appointment for Hematology and Surgical Oncology, a voicemail was left  [facility] number provided  Spoke to patient and stated to her what was noted. Urinarlysis, urine culture as well as lab results given. Pt verbalized understanding. Appt scheduled with ortho.

## 2022-11-15 ENCOUNTER — TELEPHONE (OUTPATIENT)
Dept: OPHTHALMOLOGY | Facility: CLINIC | Age: 87
End: 2022-11-15
Payer: MEDICARE

## 2022-11-17 ENCOUNTER — PATIENT MESSAGE (OUTPATIENT)
Dept: GASTROENTEROLOGY | Facility: CLINIC | Age: 87
End: 2022-11-17
Payer: MEDICARE

## 2022-11-18 ENCOUNTER — LAB VISIT (OUTPATIENT)
Dept: LAB | Facility: HOSPITAL | Age: 87
End: 2022-11-18
Attending: FAMILY MEDICINE
Payer: MEDICARE

## 2022-11-18 DIAGNOSIS — Z46.6 FITTING AND ADJUSTMENT OF URINARY DEVICE: Primary | ICD-10-CM

## 2022-11-18 DIAGNOSIS — N39.0 URINARY TRACT INFECTION, SITE NOT SPECIFIED: ICD-10-CM

## 2022-11-18 DIAGNOSIS — N18.30 CHRONIC KIDNEY DISEASE, STAGE III (MODERATE): ICD-10-CM

## 2022-11-18 DIAGNOSIS — I13.0 HYPERTENSIVE HEART AND RENAL DISEASE WITH CONGESTIVE HEART FAILURE: ICD-10-CM

## 2022-11-18 DIAGNOSIS — Z91.81 PERSONAL HISTORY OF FALL: ICD-10-CM

## 2022-11-18 LAB
BACTERIA #/AREA URNS HPF: ABNORMAL /HPF
BILIRUB UR QL STRIP: NEGATIVE
CLARITY UR: ABNORMAL
COLOR UR: YELLOW
GLUCOSE UR QL STRIP: NEGATIVE
HGB UR QL STRIP: ABNORMAL
KETONES UR QL STRIP: NEGATIVE
LEUKOCYTE ESTERASE UR QL STRIP: ABNORMAL
MICROSCOPIC COMMENT: ABNORMAL
NITRITE UR QL STRIP: POSITIVE
PH UR STRIP: 5 [PH] (ref 5–8)
PROT UR QL STRIP: NEGATIVE
RBC #/AREA URNS HPF: 20 /HPF (ref 0–4)
SP GR UR STRIP: 1.01 (ref 1–1.03)
SQUAMOUS #/AREA URNS HPF: ABNORMAL /HPF
URN SPEC COLLECT METH UR: ABNORMAL
WBC #/AREA URNS HPF: >100 /HPF (ref 0–5)

## 2022-11-18 PROCEDURE — 81000 URINALYSIS NONAUTO W/SCOPE: CPT | Mod: PO | Performed by: FAMILY MEDICINE

## 2022-11-25 ENCOUNTER — HOSPITAL ENCOUNTER (EMERGENCY)
Facility: HOSPITAL | Age: 87
Discharge: HOME | End: 2022-11-25
Attending: EMERGENCY MEDICINE
Payer: MEDICARE

## 2022-11-25 ENCOUNTER — OFFICE VISIT (OUTPATIENT)
Dept: INTERNAL MEDICINE | Facility: CLINIC | Age: 87
End: 2022-11-25
Payer: MEDICARE

## 2022-11-25 VITALS
BODY MASS INDEX: 32.91 KG/M2 | DIASTOLIC BLOOD PRESSURE: 74 MMHG | OXYGEN SATURATION: 98 % | SYSTOLIC BLOOD PRESSURE: 136 MMHG | TEMPERATURE: 98 F | WEIGHT: 197.56 LBS | RESPIRATION RATE: 21 BRPM | HEART RATE: 75 BPM | HEIGHT: 65 IN

## 2022-11-25 VITALS
TEMPERATURE: 98 F | HEART RATE: 80 BPM | RESPIRATION RATE: 18 BRPM | SYSTOLIC BLOOD PRESSURE: 176 MMHG | BODY MASS INDEX: 32.87 KG/M2 | DIASTOLIC BLOOD PRESSURE: 89 MMHG | OXYGEN SATURATION: 95 % | HEIGHT: 65 IN

## 2022-11-25 DIAGNOSIS — T83.011A MALFUNCTION OF FOLEY CATHETER, INITIAL ENCOUNTER: Primary | ICD-10-CM

## 2022-11-25 DIAGNOSIS — R10.30 LOWER ABDOMINAL PAIN: ICD-10-CM

## 2022-11-25 DIAGNOSIS — R39.9 URINARY SYMPTOM OR SIGN: Primary | ICD-10-CM

## 2022-11-25 DIAGNOSIS — R30.0 DYSURIA: ICD-10-CM

## 2022-11-25 DIAGNOSIS — R34 DECREASED URINATION: ICD-10-CM

## 2022-11-25 DIAGNOSIS — Z97.8 PRESENCE OF INDWELLING FOLEY CATHETER: ICD-10-CM

## 2022-11-25 LAB
ALBUMIN SERPL BCP-MCNC: 4 G/DL (ref 3.5–5.2)
ALP SERPL-CCNC: 121 U/L (ref 55–135)
ALT SERPL W/O P-5'-P-CCNC: 20 U/L (ref 10–44)
ANION GAP SERPL CALC-SCNC: 15 MMOL/L (ref 8–16)
AST SERPL-CCNC: 24 U/L (ref 10–40)
BACTERIA #/AREA URNS HPF: ABNORMAL /HPF
BASOPHILS # BLD AUTO: 0.07 K/UL (ref 0–0.2)
BASOPHILS NFR BLD: 0.8 % (ref 0–1.9)
BILIRUB SERPL-MCNC: 0.5 MG/DL (ref 0.1–1)
BILIRUB UR QL STRIP: NEGATIVE
BUN SERPL-MCNC: 14 MG/DL (ref 8–23)
CALCIUM SERPL-MCNC: 9.5 MG/DL (ref 8.7–10.5)
CHLORIDE SERPL-SCNC: 108 MMOL/L (ref 95–110)
CLARITY UR: ABNORMAL
CO2 SERPL-SCNC: 20 MMOL/L (ref 23–29)
COLOR UR: YELLOW
CREAT SERPL-MCNC: 0.8 MG/DL (ref 0.5–1.4)
DIFFERENTIAL METHOD: ABNORMAL
EOSINOPHIL # BLD AUTO: 0.4 K/UL (ref 0–0.5)
EOSINOPHIL NFR BLD: 4.6 % (ref 0–8)
ERYTHROCYTE [DISTWIDTH] IN BLOOD BY AUTOMATED COUNT: 15.5 % (ref 11.5–14.5)
EST. GFR  (NO RACE VARIABLE): >60 ML/MIN/1.73 M^2
GLUCOSE SERPL-MCNC: 101 MG/DL (ref 70–110)
GLUCOSE UR QL STRIP: NEGATIVE
HCT VFR BLD AUTO: 43.4 % (ref 37–48.5)
HGB BLD-MCNC: 14.6 G/DL (ref 12–16)
HGB UR QL STRIP: ABNORMAL
HYALINE CASTS #/AREA URNS LPF: 0 /LPF
IMM GRANULOCYTES # BLD AUTO: 0.02 K/UL (ref 0–0.04)
IMM GRANULOCYTES NFR BLD AUTO: 0.2 % (ref 0–0.5)
KETONES UR QL STRIP: NEGATIVE
LEUKOCYTE ESTERASE UR QL STRIP: ABNORMAL
LYMPHOCYTES # BLD AUTO: 3 K/UL (ref 1–4.8)
LYMPHOCYTES NFR BLD: 36.5 % (ref 18–48)
MCH RBC QN AUTO: 28.3 PG (ref 27–31)
MCHC RBC AUTO-ENTMCNC: 33.6 G/DL (ref 32–36)
MCV RBC AUTO: 84 FL (ref 82–98)
MICROSCOPIC COMMENT: ABNORMAL
MONOCYTES # BLD AUTO: 1.1 K/UL (ref 0.3–1)
MONOCYTES NFR BLD: 12.8 % (ref 4–15)
NEUTROPHILS # BLD AUTO: 3.7 K/UL (ref 1.8–7.7)
NEUTROPHILS NFR BLD: 45.1 % (ref 38–73)
NITRITE UR QL STRIP: NEGATIVE
NRBC BLD-RTO: 0 /100 WBC
PH UR STRIP: 7 [PH] (ref 5–8)
PLATELET # BLD AUTO: 346 K/UL (ref 150–450)
PMV BLD AUTO: 10.9 FL (ref 9.2–12.9)
POTASSIUM SERPL-SCNC: 3.6 MMOL/L (ref 3.5–5.1)
PROT SERPL-MCNC: 7.7 G/DL (ref 6–8.4)
PROT UR QL STRIP: ABNORMAL
RBC # BLD AUTO: 5.15 M/UL (ref 4–5.4)
RBC #/AREA URNS HPF: >100 /HPF (ref 0–4)
SODIUM SERPL-SCNC: 143 MMOL/L (ref 136–145)
SP GR UR STRIP: 1 (ref 1–1.03)
URN SPEC COLLECT METH UR: ABNORMAL
UROBILINOGEN UR STRIP-ACNC: NEGATIVE EU/DL
WBC # BLD AUTO: 8.28 K/UL (ref 3.9–12.7)
WBC #/AREA URNS HPF: 57 /HPF (ref 0–5)

## 2022-11-25 PROCEDURE — 87086 URINE CULTURE/COLONY COUNT: CPT | Performed by: PHYSICIAN ASSISTANT

## 2022-11-25 PROCEDURE — 85025 COMPLETE CBC W/AUTO DIFF WBC: CPT | Performed by: PHYSICIAN ASSISTANT

## 2022-11-25 PROCEDURE — 1125F PR PAIN SEVERITY QUANTIFIED, PAIN PRESENT: ICD-10-PCS | Mod: CPTII,S$GLB,, | Performed by: PHYSICIAN ASSISTANT

## 2022-11-25 PROCEDURE — 99213 OFFICE O/P EST LOW 20 MIN: CPT | Mod: S$GLB,,, | Performed by: PHYSICIAN ASSISTANT

## 2022-11-25 PROCEDURE — 1160F PR REVIEW ALL MEDS BY PRESCRIBER/CLIN PHARMACIST DOCUMENTED: ICD-10-PCS | Mod: CPTII,S$GLB,, | Performed by: PHYSICIAN ASSISTANT

## 2022-11-25 PROCEDURE — 80053 COMPREHEN METABOLIC PANEL: CPT | Performed by: PHYSICIAN ASSISTANT

## 2022-11-25 PROCEDURE — 1125F AMNT PAIN NOTED PAIN PRSNT: CPT | Mod: CPTII,S$GLB,, | Performed by: PHYSICIAN ASSISTANT

## 2022-11-25 PROCEDURE — 3288F FALL RISK ASSESSMENT DOCD: CPT | Mod: CPTII,S$GLB,, | Performed by: PHYSICIAN ASSISTANT

## 2022-11-25 PROCEDURE — 1160F RVW MEDS BY RX/DR IN RCRD: CPT | Mod: CPTII,S$GLB,, | Performed by: PHYSICIAN ASSISTANT

## 2022-11-25 PROCEDURE — 99999 PR PBB SHADOW E&M-EST. PATIENT-LVL V: ICD-10-PCS | Mod: PBBFAC,,, | Performed by: PHYSICIAN ASSISTANT

## 2022-11-25 PROCEDURE — 99213 PR OFFICE/OUTPT VISIT, EST, LEVL III, 20-29 MIN: ICD-10-PCS | Mod: S$GLB,,, | Performed by: PHYSICIAN ASSISTANT

## 2022-11-25 PROCEDURE — 1159F PR MEDICATION LIST DOCUMENTED IN MEDICAL RECORD: ICD-10-PCS | Mod: CPTII,S$GLB,, | Performed by: PHYSICIAN ASSISTANT

## 2022-11-25 PROCEDURE — 1100F PR PT FALLS ASSESS DOC 2+ FALLS/FALL W/INJURY/YR: ICD-10-PCS | Mod: CPTII,S$GLB,, | Performed by: PHYSICIAN ASSISTANT

## 2022-11-25 PROCEDURE — 99999 PR PBB SHADOW E&M-EST. PATIENT-LVL V: CPT | Mod: PBBFAC,,, | Performed by: PHYSICIAN ASSISTANT

## 2022-11-25 PROCEDURE — 3288F PR FALLS RISK ASSESSMENT DOCUMENTED: ICD-10-PCS | Mod: CPTII,S$GLB,, | Performed by: PHYSICIAN ASSISTANT

## 2022-11-25 PROCEDURE — 1159F MED LIST DOCD IN RCRD: CPT | Mod: CPTII,S$GLB,, | Performed by: PHYSICIAN ASSISTANT

## 2022-11-25 PROCEDURE — 81000 URINALYSIS NONAUTO W/SCOPE: CPT | Performed by: PHYSICIAN ASSISTANT

## 2022-11-25 PROCEDURE — 1157F ADVNC CARE PLAN IN RCRD: CPT | Mod: CPTII,S$GLB,, | Performed by: PHYSICIAN ASSISTANT

## 2022-11-25 PROCEDURE — 99283 EMERGENCY DEPT VISIT LOW MDM: CPT | Mod: 25

## 2022-11-25 PROCEDURE — 1157F PR ADVANCE CARE PLAN OR EQUIV PRESENT IN MEDICAL RECORD: ICD-10-PCS | Mod: CPTII,S$GLB,, | Performed by: PHYSICIAN ASSISTANT

## 2022-11-25 PROCEDURE — 1100F PTFALLS ASSESS-DOCD GE2>/YR: CPT | Mod: CPTII,S$GLB,, | Performed by: PHYSICIAN ASSISTANT

## 2022-11-25 RX ORDER — CEFDINIR 300 MG/1
300 CAPSULE ORAL 2 TIMES DAILY
COMMUNITY
End: 2023-07-12

## 2022-11-25 RX ORDER — FLUCONAZOLE 150 MG/1
150 TABLET ORAL DAILY
Qty: 1 TABLET | Refills: 0 | Status: SHIPPED | OUTPATIENT
Start: 2022-11-25 | End: 2022-11-26

## 2022-11-25 NOTE — PROGRESS NOTES
Subjective:       Patient ID: Holli Landrum is a 89 y.o. female.    Chief Complaint: Cystitis (Patient stated that she is retaining fluid and she is in intense pain.)      Patient Care Team:  Primary Doctor No as PCP - General  Oj Souza MD as Consulting Physician (Pulmonary Disease)  Gracy James MD (Inactive) as Consulting Physician (Ophthalmology)  Kingsley Alejo MD as Consulting Physician (Internal Medicine)  Gracy James MD (Inactive) as Consulting Physician (Ophthalmology)  Robb Knight MD as Consulting Physician (Urology)  Chel Carmona LPN as Care Coordinator (Family Medicine)    HPI    Holli Landrum is a 89 y.o. female who presents today with complaints of Cystitis (Patient stated that she is retaining fluid and she is in intense pain.)  Patient with indwelling urethral catheter (since January 2020) presents with lower abdominal and lower back pain with decreased urination. She was seen by PCP and treated for UTI, currently on cefdinir. Denies fever, N/V.     Review of Systems   Constitutional:  Negative for chills and fever.   Gastrointestinal:  Positive for abdominal pain. Negative for nausea and vomiting.   Genitourinary:  Positive for decreased urine volume. Negative for hematuria and urgency.     Objective:      Physical Exam  Vitals and nursing note reviewed.   Constitutional:       General: She is not in acute distress.     Appearance: She is well-developed.      Comments: Seated in personal wheelchair   HENT:      Head: Normocephalic and atraumatic.   Eyes:      General: Lids are normal. No scleral icterus.     Extraocular Movements: Extraocular movements intact.      Conjunctiva/sclera: Conjunctivae normal.   Cardiovascular:      Rate and Rhythm: Normal rate and regular rhythm.   Pulmonary:      Effort: Pulmonary effort is normal.      Breath sounds: Normal breath sounds. No decreased breath sounds, wheezing, rhonchi or rales.   Abdominal:      Palpations:  Abdomen is soft. There is no mass.      Tenderness: There is no abdominal tenderness.   Neurological:      Mental Status: She is alert.      Cranial Nerves: No cranial nerve deficit.   Psychiatric:         Mood and Affect: Mood and affect normal.       Assessment:       1. Urinary symptom or sign    2. Decreased urination    3. Lower abdominal pain    4. Presence of indwelling Ballard catheter        Plan:   1. Urinary symptom or sign  -     Ambulatory referral/consult to Urology; Future; Expected date: 12/02/2022    2. Decreased urination  -     Refer to Emergency Dept.    3. Lower abdominal pain    4. Presence of indwelling Ballard catheter  -     Refer to Emergency Dept.      Report called to Karishma @ [phone]  Referred to [facility]  Pt daughter transporting via [place]

## 2022-11-25 NOTE — FIRST PROVIDER EVALUATION
"Medical screening examination initiated.  I have conducted a focused provider triage encounter, findings are as follows:    Brief history of present illness:  Pt had unrinary catheter placed 10 + days ago, has UTI, on Cefdinir, c/o lower abd pain, nausea, lower back pain, urinary frequency feeling    Vitals:    11/25/22 1214   BP: (!) 176/85   BP Location: Right arm   Patient Position: Sitting   Pulse: 64   Resp: 16   Temp: 97.9 °F (36.6 °C)   TempSrc: Oral   SpO2: 96%   Weight: Comment: need bed weight   Height: 5' 5" (1.651 m)       Pertinent physical exam:  nad, alert        Preliminary workup initiated; this workup will be continued and followed by the physician or advanced practice provider that is assigned to the patient when roomed.  "

## 2022-11-25 NOTE — ED PROVIDER NOTES
SCRIBE #1 NOTE: I, Luis Herman, am scribing for, and in the presence of, Moreno Ferrera MD. I have scribed the entire note.       History     Chief Complaint   Patient presents with    Cystitis     Pt had unrinary catheter placed 10 + days ago, has UTI, on Cefdinir, c/o lower abd pain, nausea, lower back pain, urinary frequency feeling     Review of patient's allergies indicates:   Allergen Reactions    Timolol maleate Shortness Of Breath    Ciprofloxacin Other (See Comments)     Muscle ache    Sulfamethoxazole-trimethoprim          History of Present Illness     HPI    11/25/2022, 4:53 PM  History obtained from the patient      History of Present Illness: Holli Landrum is a 89 y.o. female patient with a PMHx of COPD, HTN, CHF, skin cancer, and AFIB who presents to the Emergency Department for evaluation of Dysuria which onset gradually within the past 9 days. Pt received a urinary catheter replacement 11 days ago but pain did not occur until two days after. Pt contacted [facility] and did a urinalysis last Friday but nothing could be done. The pt decided to come in today due to urinary retention; pt reports a pain rating of 13/10. Symptoms are constant and moderate in severity. No mitigating or exacerbating factors reported. Associated sxs include urgency, lower back pain, N, and abd pain. Patient denies any SOB, CP, V/D, weakness, and all other sxs at this time. No Prior Tx. No further complaints or concerns at this time.       Arrival mode: Personal vehicle    PCP: Primary Doctor No        Past Medical History:  Past Medical History:   Diagnosis Date    Anticoagulant long-term use     Arthritis     Asthma     Basal cell carcinoma     Cancer     skin cancer to face    Cataract     OU done//    CHF (congestive heart failure)     COPD (chronic obstructive pulmonary disease)     no oxygen; patient denies    cpap     Essential hypertension 05/05/2010    GERD (gastroesophageal reflux disease) 11/20/2012    Glaucoma      Hypertensive heart disease with heart failure 02/05/2013    Paroxysmal atrial fibrillation     Paroxysmal ventricular tachycardia     per problem list    Renal disorder     french-1/3/2020    Squamous cell carcinoma of skin        Past Surgical History:  Past Surgical History:   Procedure Laterality Date    A-V CARDIAC PACEMAKER INSERTION  06/16/2021    Procedure: INSERTION, CARDIAC PACEMAKER, DUAL CHAMBER;  Surgeon: Oscar Sommers MD;  Location: [facility];  Service: Cardiovascular;;    ADENOIDECTOMY  191944    APPENDECTOMY  1948    Because of Ovarian Cyst surgery    BREAST BIOPSY Left 1995    neg    BREAST BIOPSY Right 1984    neg    BREAST BIOPSY Right 1992    neg    BREAST SURGERY      A number of biopsies    CARDIAC CATHETERIZATION  2013, 2014,2016, 2020    has 9 stents    CARDIAC SURGERY  2012    stents    CATARACT EXTRACTION W/  INTRAOCULAR LENS IMPLANT Left 11/01/2018    Dr Rose    CATARACT EXTRACTION W/  INTRAOCULAR LENS IMPLANT Right 12/13/2018    Dr Rose//    CHOLECYSTECTOMY      COLONOSCOPY  ~2005    Dr. Edward; normal per patient report    COLONOSCOPY N/A 09/06/2022    Procedure: COLONOSCOPY 8/31/22-note in Dr Simms's office visit note that he spoke to her cardiologist and she was viable candidate for endoscopy;  Surgeon: Cordelia Bueno MD;  Location: [facility];  Service: Endoscopy;  Laterality: N/A;    CORONARY ANGIOGRAPHY N/A 03/20/2020    Procedure: ANGIOGRAM, CORONARY ARTERY;  Surgeon: Chuy Bryant MD;  Location: [facility];  Service: Cardiology;  Laterality: N/A;    CYSTOSCOPY W/ RETROGRADES Bilateral 02/12/2020    Procedure: CYSTOSCOPY, WITH RETROGRADE PYELOGRAM;  Surgeon: Gasper Feliz MD;  Location: [facility] OR;  Service: Urology;  Laterality: Bilateral;    ESOPHAGOGASTRODUODENOSCOPY N/A 08/13/2020    Procedure: EGD (ESOPHAGOGASTRODUODENOSCOPY);  Surgeon: Mika Solorzano MD;  Location: [facility];  Service: Endoscopy;  Laterality: N/A; Mild Schatzki ring. Biopsied. Dilated.  small hiatal hernia, gastritis; biopsy: esophagus- SEVERE REFLUX ESOPHAGITIS, stomach- chronic gastritis, negative for H pylori    ESOPHAGOGASTRODUODENOSCOPY N/A 10/22/2020    Procedure: EGD (ESOPHAGOGASTRODUODENOSCOPY);  Surgeon: Fred Hendricks MD;  Location: [facility] ENDO;  Service: Endoscopy;  Laterality: N/A;    EYE SURGERY  2017    Cataracs    HYSTERECTOMY  1969    LEFT HEART CATHETERIZATION Left 2020    Procedure: Left heart cath;  Surgeon: Chuy Bryant MD;  Location: [facility] CATH;  Service: Cardiology;  Laterality: Left;    LEFT HEART CATHETERIZATION Left 2020    Procedure: Left heart cath;  Surgeon: Chuy Bryant MD;  Location: [facility] CATH;  Service: Cardiology;  Laterality: Left;    OVARIAN CYST REMOVAL  teenager    PHACOEMULSIFICATION OF CATARACT Left 2018    Procedure: PHACOEMULSIFICATION, CATARACT;  Surgeon: Aleksandar Rose Jr., MD;  Location: [facility] OR;  Service: Ophthalmology;  Laterality: Left;    PHACOEMULSIFICATION OF CATARACT Right 2018    Procedure: PHACOEMULSIFICATION, CATARACT;  Surgeon: Aleksandar Rose Jr., MD;  Location: [facility] OR;  Service: Ophthalmology;  Laterality: Right;    TONSILLECTOMY      aw/denoids    UPPER GASTROINTESTINAL ENDOSCOPY  ~    Dr. Solorzano    VASCULAR SURGERY      to remove clot from right leg    Yag Capsulotomy Bilateral 2019    Dr Rose         Family History:  Family History   Problem Relation Age of Onset    Diabetes Brother         Type 2    Heart disease Brother          at 65 CHD    Diabetes Mother         Type 1    Alcohol abuse Mother         Dod 10/75    Heart disease Mother         Arteriosclerosis    Hypertension Mother     Stroke Mother         3/15/1975 dod 10/1975    Cancer Maternal Grandfather         Dod     Clotting disorder Son         bleeding after tonsillectomy only    Heart disease Father         Heart attack. Afib, and Polyvcithemavera    Hypertension Father     Amblyopia Neg Hx     Blindness Neg Hx      Cataracts Neg Hx     Glaucoma Neg Hx     Macular degeneration Neg Hx     Retinal detachment Neg Hx     Strabismus Neg Hx     Thyroid disease Neg Hx     Colon cancer Neg Hx        Social History:  Social History     Tobacco Use    Smoking status: Former     Years: 1.00     Types: Cigarettes     Start date: 6/10/1954     Quit date: 9/15/1955     Years since quittin.2    Smokeless tobacco: Never    Tobacco comments:     I onlysmoked one year while [employer] my  was oversees  during [employer]   Substance and Sexual Activity    Alcohol use: Not Currently     Comment: Only drank a little wine and haven't  drunk anything in 15 y    Drug use: Never    Sexual activity: Not Currently     Partners: Male     Birth control/protection: Abstinence     Comment:  and 87 years of age        Review of Systems     Review of Systems   Constitutional:  Negative for chills and fever.   HENT:  Negative for congestion and sore throat.    Respiratory:  Negative for cough and shortness of breath.    Cardiovascular:  Negative for chest pain.   Gastrointestinal:  Positive for abdominal pain and nausea. Negative for diarrhea and vomiting.   Genitourinary:  Positive for difficulty urinating, dysuria (Urethra and bladder) and urgency.   Musculoskeletal:  Positive for back pain (Lower).   Skin:  Negative for rash.   Neurological:  Negative for weakness.   Hematological:  Does not bruise/bleed easily.   All other systems reviewed and are negative.     Physical Exam     Initial Vitals [22 1214]   BP Pulse Resp Temp SpO2   (!) 176/85 64 16 97.9 °F (36.6 °C) 96 %      MAP       --          Physical Exam  Nursing Notes and Vital Signs Reviewed.  Constitutional: Patient is in no acute distress. Well-developed and well-nourished.  Head: Atraumatic. Normocephalic.  Eyes: PERRL. EOM intact. Conjunctivae are not pale. No scleral icterus.  ENT: Mucous membranes are moist. Oropharynx is clear and symmetric.    Neck: Supple. Full ROM. No  "lymphadenopathy.  Cardiovascular: Regular rate. Regular rhythm. Heart murmur 2nd right intercostal space 2/6. Distal pulses are 2+ and symmetric.  Pulmonary/Chest: No respiratory distress. Clear to auscultation bilaterally. No wheezing or rales.  Abdominal: Soft and non-distended.  Suprapubic tenderness.  No rebound, guarding, or rigidity. Good bowel sounds.  Genitourinary: No CVA tenderness  Musculoskeletal: Moves all extremities. No obvious deformities. No edema. No calf tenderness.  Skin: Warm and dry.  Neurological:  Alert, awake, and appropriate.  Normal speech.  No acute focal neurological deficits are appreciated.  Psychiatric: Normal affect. Good eye contact. Appropriate in content.     ED Course   Procedures  ED Vital Signs:  Vitals:    11/25/22 1214 11/25/22 1323 11/25/22 1735   BP: (!) 176/85 (!) 142/96 (!) 176/89   Pulse: 64 85 80   Resp: 16 18 18   Temp: 97.9 °F (36.6 °C)  97.9 °F (36.6 °C)   TempSrc: Oral  Oral   SpO2: 96% (!) 94% 95%   Height: 5' 5" (1.651 m)         Abnormal Lab Results:  Labs Reviewed   CBC W/ AUTO DIFFERENTIAL - Abnormal; Notable for the following components:       Result Value    RDW 15.5 (*)     Mono # 1.1 (*)     All other components within normal limits   COMPREHENSIVE METABOLIC PANEL - Abnormal; Notable for the following components:    CO2 20 (*)     All other components within normal limits   URINALYSIS, REFLEX TO URINE CULTURE - Abnormal; Notable for the following components:    Appearance, UA Hazy (*)     Protein, UA 1+ (*)     Occult Blood UA 3+ (*)     Leukocytes, UA 3+ (*)     All other components within normal limits    Narrative:     Specimen Source->Urine   URINALYSIS MICROSCOPIC - Abnormal; Notable for the following components:    RBC, UA >100 (*)     WBC, UA 57 (*)     All other components within normal limits    Narrative:     Specimen Source->Urine   CULTURE, URINE        All Lab Results:  Results for orders placed or performed during the hospital encounter of " 11/25/22   CBC auto differential   Result Value Ref Range    WBC 8.28 3.90 - 12.70 K/uL    RBC 5.15 4.00 - 5.40 M/uL    Hemoglobin 14.6 12.0 - 16.0 g/dL    Hematocrit 43.4 37.0 - 48.5 %    MCV 84 82 - 98 fL    MCH 28.3 27.0 - 31.0 pg    MCHC 33.6 32.0 - 36.0 g/dL    RDW 15.5 (H) 11.5 - 14.5 %    Platelets 346 150 - 450 K/uL    MPV 10.9 9.2 - 12.9 fL    Immature Granulocytes 0.2 0.0 - 0.5 %    Gran # (ANC) 3.7 1.8 - 7.7 K/uL    Immature Grans (Abs) 0.02 0.00 - 0.04 K/uL    Lymph # 3.0 1.0 - 4.8 K/uL    Mono # 1.1 (H) 0.3 - 1.0 K/uL    Eos # 0.4 0.0 - 0.5 K/uL    Baso # 0.07 0.00 - 0.20 K/uL    nRBC 0 0 /100 WBC    Gran % 45.1 38.0 - 73.0 %    Lymph % 36.5 18.0 - 48.0 %    Mono % 12.8 4.0 - 15.0 %    Eosinophil % 4.6 0.0 - 8.0 %    Basophil % 0.8 0.0 - 1.9 %    Differential Method Automated    Comprehensive metabolic panel   Result Value Ref Range    Sodium 143 136 - 145 mmol/L    Potassium 3.6 3.5 - 5.1 mmol/L    Chloride 108 95 - 110 mmol/L    CO2 20 (L) 23 - 29 mmol/L    Glucose 101 70 - 110 mg/dL    BUN 14 8 - 23 mg/dL    Creatinine 0.8 0.5 - 1.4 mg/dL    Calcium 9.5 8.7 - 10.5 mg/dL    Total Protein 7.7 6.0 - 8.4 g/dL    Albumin 4.0 3.5 - 5.2 g/dL    Total Bilirubin 0.5 0.1 - 1.0 mg/dL    Alkaline Phosphatase 121 55 - 135 U/L    AST 24 10 - 40 U/L    ALT 20 10 - 44 U/L    Anion Gap 15 8 - 16 mmol/L    eGFR >60 >60 mL/min/1.73 m^2   Urinalysis, Reflex to Urine Culture Urine, Catheterized    Specimen: Urine   Result Value Ref Range    Specimen UA Urine, Catheterized     Color, UA Yellow Yellow, Straw, Kailey    Appearance, UA Hazy (A) Clear    pH, UA 7.0 5.0 - 8.0    Specific Gravity, UA 1.005 1.005 - 1.030    Protein, UA 1+ (A) Negative    Glucose, UA Negative Negative    Ketones, UA Negative Negative    Bilirubin (UA) Negative Negative    Occult Blood UA 3+ (A) Negative    Nitrite, UA Negative Negative    Urobilinogen, UA Negative <2.0 EU/dL    Leukocytes, UA 3+ (A) Negative   Urinalysis Microscopic   Result Value  Ref Range    RBC, UA >100 (H) 0 - 4 /hpf    WBC, UA 57 (H) 0 - 5 /hpf    Bacteria Rare None-Occ /hpf    Hyaline Casts, UA 0 0-1/lpf /lpf    Microscopic Comment SEE COMMENT      *Note: Due to a large number of results and/or encounters for the requested time period, some results have not been displayed. A complete set of results can be found in Results Review.        Imaging Results:  Imaging Results    None                 The Emergency Provider reviewed the vital signs and test results, which are outlined above.     ED Discussion     5:05 PM: Reassessed pt at this time.  Discussed with pt all pertinent ED information and results. Discussed pt dx and plan of tx. Gave pt all f/u and return to the ED instructions. All questions and concerns were addressed at this time. Pt expresses understanding of information and instructions, and is comfortable with plan to discharge. Pt is stable for discharge.    I discussed with patient and/or family/caretaker that evaluation in the ED does not suggest any emergent or life threatening medical conditions requiring immediate intervention beyond what was provided in the ED, and I believe patient is safe for discharge.  Regardless, an unremarkable evaluation in the ED does not preclude the development or presence of a serious of life threatening condition. As such, patient was instructed to return immediately for any worsening or change in current symptoms.      Medical Decision Making:   Clinical Tests:   Lab Tests: Ordered and Reviewed         ED Medication(s):  Medications - No data to display    Discharge Medication List as of 11/25/2022  4:58 PM        START taking these medications    Details   fluconazole (DIFLUCAN) 150 MG Tab Take 1 tablet (150 mg total) by mouth once daily. for 1 day, Starting Fri 11/25/2022, Until Sat 11/26/2022, Print              Follow-up Information       [facility] In 3 days.    Specialty: Urology  Contact information:  [id number] [place] [address]  Drive  [facility] [id number] [phone]             [facility] - Emergency Dept..    Specialty: Emergency Medicine  Why: As needed, If symptoms worsen  Contact information:  [address]  [facility] [address] [phone]                               Scribe Attestation:   Scribe #1: I performed the above scribed service and the documentation accurately describes the services I performed. I attest to the accuracy of the note.     Attending:   Physician Attestation Statement for Scribe #1: I, Moreno Ferrera MD, personally performed the services described in this documentation, as scribed by Luis Herman, in my presence, and it is both accurate and complete.           Clinical Impression       ICD-10-CM ICD-9-CM   1. Malfunction of Ballard catheter, initial encounter  T83.011A 996.31   2. Dysuria  R30.0 788.1       Disposition:   Disposition: Discharged  Condition: Stable      Moreno Ferrera MD  11/26/22 7364

## 2022-11-25 NOTE — PATIENT INSTRUCTIONS
Please bring your medication or a written list to your next office visit.    If you check your blood pressure at home, please bring written your blood pressure log and your blood pressure machine to your next office visit.    1. Drink plenty of fluids  2. Get plenty of rest.  3. Take Tylenol as directed on package for pain/fever. Do not take more than package suggests to take.   4. Go to Emergency Department if your symptoms worsen.  5. Follow up with your PCP in 1 week if symptoms persist.

## 2022-11-27 LAB — BACTERIA UR CULT: NO GROWTH

## 2022-11-28 ENCOUNTER — TELEPHONE (OUTPATIENT)
Dept: UROLOGY | Facility: CLINIC | Age: 87
End: 2022-11-28
Payer: MEDICARE

## 2022-11-28 NOTE — TELEPHONE ENCOUNTER
----- Message from Edy Stewart MD sent at 11/28/2022  3:14 PM CST -----  Regarding: RE: Lab result Question  Chago Pinzon,    While it is higher, it is probably just due to recent instrumentation. I would say we'll keep any eye on it and recheck it at her next appt.     - Dr Stewart  ----- Message -----  From: Alberta Winston  Sent: 11/28/2022  11:26 AM CST  To: Edy Stewart MD, Pat Snow  Staff  Subject: Lab result Question                              Good Morning,     I spoke with this patient today due to new referral being placed, patient states that her pain is resolved once the ED removed and replaced her catheter. She was looking at her lab results from 11/25 and wanted to know should she be concerned about the spike in her RBC count in the urinalysis and if she should come in for that or if it is no concern.

## 2022-12-02 ENCOUNTER — PATIENT MESSAGE (OUTPATIENT)
Dept: UROLOGY | Facility: CLINIC | Age: 87
End: 2022-12-02
Payer: MEDICARE

## 2022-12-02 ENCOUNTER — TELEPHONE (OUTPATIENT)
Dept: UROLOGY | Facility: CLINIC | Age: 87
End: 2022-12-02
Payer: MEDICARE

## 2022-12-07 ENCOUNTER — PES CALL (OUTPATIENT)
Dept: ADMINISTRATIVE | Facility: CLINIC | Age: 87
End: 2022-12-07
Payer: MEDICARE

## 2022-12-12 PROBLEM — K62.5 RECTAL BLEEDING: Status: RESOLVED | Noted: 2022-09-06 | Resolved: 2022-12-12

## 2022-12-29 ENCOUNTER — LAB VISIT (OUTPATIENT)
Dept: LAB | Facility: HOSPITAL | Age: 87
End: 2022-12-29
Attending: FAMILY MEDICINE
Payer: MEDICARE

## 2022-12-29 ENCOUNTER — TELEPHONE (OUTPATIENT)
Dept: UROLOGY | Facility: CLINIC | Age: 87
End: 2022-12-29
Payer: MEDICARE

## 2022-12-29 DIAGNOSIS — Z46.6 FITTING AND ADJUSTMENT OF URINARY DEVICE: ICD-10-CM

## 2022-12-29 DIAGNOSIS — N39.0 URINARY TRACT INFECTION, SITE NOT SPECIFIED: Primary | ICD-10-CM

## 2022-12-29 LAB
BACTERIA #/AREA URNS AUTO: ABNORMAL /HPF
BILIRUB UR QL STRIP: NEGATIVE
CLARITY UR REFRACT.AUTO: ABNORMAL
COLOR UR AUTO: YELLOW
GLUCOSE UR QL STRIP: NEGATIVE
HGB UR QL STRIP: ABNORMAL
HYALINE CASTS UR QL AUTO: 0 /LPF
KETONES UR QL STRIP: NEGATIVE
LEUKOCYTE ESTERASE UR QL STRIP: ABNORMAL
MICROSCOPIC COMMENT: ABNORMAL
NITRITE UR QL STRIP: NEGATIVE
PH UR STRIP: 7 [PH] (ref 5–8)
PROT UR QL STRIP: ABNORMAL
RBC #/AREA URNS AUTO: 15 /HPF (ref 0–4)
SP GR UR STRIP: 1 (ref 1–1.03)
URN SPEC COLLECT METH UR: ABNORMAL
WBC #/AREA URNS AUTO: >100 /HPF (ref 0–5)
WBC CLUMPS UR QL AUTO: ABNORMAL

## 2022-12-29 PROCEDURE — 87186 SC STD MICRODIL/AGAR DIL: CPT | Performed by: FAMILY MEDICINE

## 2022-12-29 PROCEDURE — 87088 URINE BACTERIA CULTURE: CPT | Performed by: FAMILY MEDICINE

## 2022-12-29 PROCEDURE — 87077 CULTURE AEROBIC IDENTIFY: CPT | Performed by: FAMILY MEDICINE

## 2022-12-29 PROCEDURE — 87086 URINE CULTURE/COLONY COUNT: CPT | Performed by: FAMILY MEDICINE

## 2022-12-29 PROCEDURE — 81001 URINALYSIS AUTO W/SCOPE: CPT | Performed by: FAMILY MEDICINE

## 2022-12-29 NOTE — TELEPHONE ENCOUNTER
Patient returned my call; states she has been having problems with urine leaking from around her cath for the past few weeks.  [facility] has come out and changed french before and leakage continued. Pt states her grand daughter, who is an RN changed it a few times and now urine is malodorous and she is experiencing some burning.  I called and spoke to Anastacio Frankel RN at [facility].  Asked them to obtain urine and send off for u/a and culture.

## 2023-01-01 LAB — BACTERIA UR CULT: ABNORMAL

## 2023-01-03 ENCOUNTER — PATIENT MESSAGE (OUTPATIENT)
Dept: UROLOGY | Facility: CLINIC | Age: 88
End: 2023-01-03
Payer: MEDICARE

## 2023-01-04 ENCOUNTER — TELEPHONE (OUTPATIENT)
Dept: UROLOGY | Facility: CLINIC | Age: 88
End: 2023-01-04
Payer: MEDICARE

## 2023-01-04 DIAGNOSIS — N39.0 RECURRENT UTI: Primary | ICD-10-CM

## 2023-01-04 DIAGNOSIS — R33.9 URINARY RETENTION: Primary | ICD-10-CM

## 2023-01-04 RX ORDER — NITROFURANTOIN 25; 75 MG/1; MG/1
100 CAPSULE ORAL 2 TIMES DAILY
Qty: 10 CAPSULE | Refills: 0 | Status: SHIPPED | OUTPATIENT
Start: 2023-01-04 | End: 2023-01-09

## 2023-01-04 NOTE — TELEPHONE ENCOUNTER
Returned her call, macrobid sent to her pharmacy    ----- Message from Patricia Martinezjuan sent at 1/3/2023  4:23 PM CST -----  Contact: pt  Pt is calling in regard to get an appt due to her lab(urinalysis  positive and pt states she is in pain.  Please call her back at [phone] katy/mpd

## 2023-01-04 NOTE — TELEPHONE ENCOUNTER
Called the patient, after verification of name and , the patient stated that Dr barnett sent in some abx in for he so she will not need the appt tomorrow. She will take the abx and let us know if she needs an apt. Apt was cancelled pt her request pt voiced understanding     ----- Message from Domenica Elias sent at 2023  2:51 PM CST -----  Pt is requesting a call back concerning the appt made for tomorrow. Call back number [phone] [facility]

## 2023-01-04 NOTE — TELEPHONE ENCOUNTER
----- Message from Domenica Elias sent at 1/4/2023  2:51 PM CST -----  Pt is requesting a call back concerning the appt made for tomorrow. Call back number [phone] thx jm

## 2023-01-12 ENCOUNTER — TELEPHONE (OUTPATIENT)
Dept: CARDIOLOGY | Facility: CLINIC | Age: 88
End: 2023-01-12
Payer: MEDICARE

## 2023-01-12 NOTE — TELEPHONE ENCOUNTER
Spoke with pt and pt stated she does not think her pacemaker is working. Pt was last seen in [place] on 09/22/22. Pt device was placed 06/21. Pt wanted to be see asap. Next available is 01/18 with  at the [facility] location. Pt verbally understood appt time, date, and location. Alaina was messaged about pt device (biotronik).

## 2023-01-12 NOTE — TELEPHONE ENCOUNTER
----- Message from Veda Diop sent at 1/12/2023  3:32 PM CST -----  Contact: [phone]  Patient has a pacemaker ,She went A-fib and the pace maker didn't work,and needs to have it checked,[phone].Thanks

## 2023-01-13 ENCOUNTER — TELEPHONE (OUTPATIENT)
Dept: UROLOGY | Facility: CLINIC | Age: 88
End: 2023-01-13
Payer: MEDICARE

## 2023-01-13 ENCOUNTER — LAB VISIT (OUTPATIENT)
Dept: LAB | Facility: HOSPITAL | Age: 88
End: 2023-01-13
Attending: UROLOGY
Payer: MEDICARE

## 2023-01-13 DIAGNOSIS — I48.0 PAROXYSMAL ATRIAL FIBRILLATION: ICD-10-CM

## 2023-01-13 DIAGNOSIS — N39.0 URINARY TRACT INFECTION, SITE NOT SPECIFIED: Primary | ICD-10-CM

## 2023-01-13 DIAGNOSIS — I48.0 PAROXYSMAL ATRIAL FIBRILLATION: Primary | ICD-10-CM

## 2023-01-13 DIAGNOSIS — Z95.0 CARDIAC PACEMAKER IN SITU: Primary | ICD-10-CM

## 2023-01-13 PROCEDURE — 87077 CULTURE AEROBIC IDENTIFY: CPT | Performed by: UROLOGY

## 2023-01-13 PROCEDURE — 87186 SC STD MICRODIL/AGAR DIL: CPT | Performed by: UROLOGY

## 2023-01-13 PROCEDURE — 81001 URINALYSIS AUTO W/SCOPE: CPT | Performed by: UROLOGY

## 2023-01-13 PROCEDURE — 87086 URINE CULTURE/COLONY COUNT: CPT | Performed by: UROLOGY

## 2023-01-13 PROCEDURE — 87088 URINE BACTERIA CULTURE: CPT | Performed by: UROLOGY

## 2023-01-13 NOTE — TELEPHONE ENCOUNTER
Sent a message to Dr Stewart to advise.     ----- Message from Ladonna Burnett sent at 1/13/2023  1:23 PM CST -----  Alison with [facility] stated there were 4 abscess around the area when she tried collecting a specimen. She would like a call back to advise her on what to do next. Call back number is [phone]. Thx. EL

## 2023-01-13 NOTE — TELEPHONE ENCOUNTER
Spoke to [facility] at [facility]. He stated that he already used a standing order that was in Epic. He said he was already able to do the UA. Everything is taken care of.     ----- Message from Nisha Zamudio sent at 1/12/2023  3:11 PM CST -----  Contact: Anastacio/ [facility]  Anastacio is needing orders to pull urinalysis for the patient tomorrow as she finished the antibiotics. He'd prefer either a call for a verbal order or for it to be faxed to [phone].  Please call him back at [phone]

## 2023-01-14 LAB
BACTERIA #/AREA URNS AUTO: ABNORMAL /HPF
BILIRUB UR QL STRIP: NEGATIVE
CLARITY UR REFRACT.AUTO: ABNORMAL
COLOR UR AUTO: YELLOW
GLUCOSE UR QL STRIP: NEGATIVE
HGB UR QL STRIP: ABNORMAL
KETONES UR QL STRIP: NEGATIVE
LEUKOCYTE ESTERASE UR QL STRIP: ABNORMAL
MICROSCOPIC COMMENT: ABNORMAL
NITRITE UR QL STRIP: POSITIVE
PH UR STRIP: 6 [PH] (ref 5–8)
PROT UR QL STRIP: NEGATIVE
RBC #/AREA URNS AUTO: 4 /HPF (ref 0–4)
SP GR UR STRIP: 1.01 (ref 1–1.03)
URN SPEC COLLECT METH UR: ABNORMAL
WBC #/AREA URNS AUTO: >100 /HPF (ref 0–5)
WBC CLUMPS UR QL AUTO: ABNORMAL

## 2023-01-15 LAB — BACTERIA UR CULT: ABNORMAL

## 2023-01-18 ENCOUNTER — OFFICE VISIT (OUTPATIENT)
Dept: CARDIOLOGY | Facility: CLINIC | Age: 88
End: 2023-01-18
Payer: MEDICARE

## 2023-01-18 ENCOUNTER — HOSPITAL ENCOUNTER (OUTPATIENT)
Dept: CARDIOLOGY | Facility: HOSPITAL | Age: 88
Discharge: HOME | End: 2023-01-18
Attending: INTERNAL MEDICINE
Payer: MEDICARE

## 2023-01-18 ENCOUNTER — TELEPHONE (OUTPATIENT)
Dept: CARDIOLOGY | Facility: CLINIC | Age: 88
End: 2023-01-18

## 2023-01-18 VITALS
HEART RATE: 62 BPM | OXYGEN SATURATION: 95 % | BODY MASS INDEX: 35.95 KG/M2 | SYSTOLIC BLOOD PRESSURE: 140 MMHG | DIASTOLIC BLOOD PRESSURE: 82 MMHG | WEIGHT: 216.06 LBS

## 2023-01-18 DIAGNOSIS — E78.00 HYPERCHOLESTEREMIA: ICD-10-CM

## 2023-01-18 DIAGNOSIS — I35.0 MODERATE AORTIC STENOSIS BY PRIOR ECHOCARDIOGRAM: ICD-10-CM

## 2023-01-18 DIAGNOSIS — I10 ESSENTIAL HYPERTENSION: ICD-10-CM

## 2023-01-18 DIAGNOSIS — I25.10 CORONARY ARTERY DISEASE DUE TO CALCIFIED CORONARY LESION: ICD-10-CM

## 2023-01-18 DIAGNOSIS — I48.0 PAROXYSMAL ATRIAL FIBRILLATION: Primary | ICD-10-CM

## 2023-01-18 DIAGNOSIS — I48.0 PAROXYSMAL ATRIAL FIBRILLATION: ICD-10-CM

## 2023-01-18 DIAGNOSIS — I25.84 CORONARY ARTERY DISEASE DUE TO CALCIFIED CORONARY LESION: ICD-10-CM

## 2023-01-18 DIAGNOSIS — Z95.0 CARDIAC PACEMAKER IN SITU: ICD-10-CM

## 2023-01-18 DIAGNOSIS — I11.0 HYPERTENSIVE HEART DISEASE WITH HEART FAILURE: ICD-10-CM

## 2023-01-18 DIAGNOSIS — Z95.0 S/P PLACEMENT OF CARDIAC PACEMAKER: ICD-10-CM

## 2023-01-18 DIAGNOSIS — I49.5 SSS (SICK SINUS SYNDROME): ICD-10-CM

## 2023-01-18 DIAGNOSIS — I50.32 CHRONIC DIASTOLIC HEART FAILURE: ICD-10-CM

## 2023-01-18 DIAGNOSIS — I50.31 CHF (CONGESTIVE HEART FAILURE), NYHA CLASS I, ACUTE, DIASTOLIC: Primary | ICD-10-CM

## 2023-01-18 PROCEDURE — 1160F RVW MEDS BY RX/DR IN RCRD: CPT | Mod: CPTII,S$GLB,, | Performed by: INTERNAL MEDICINE

## 2023-01-18 PROCEDURE — 99999 PR PBB SHADOW E&M-EST. PATIENT-LVL IV: CPT | Mod: PBBFAC,,, | Performed by: INTERNAL MEDICINE

## 2023-01-18 PROCEDURE — 1160F PR REVIEW ALL MEDS BY PRESCRIBER/CLIN PHARMACIST DOCUMENTED: ICD-10-PCS | Mod: CPTII,S$GLB,, | Performed by: INTERNAL MEDICINE

## 2023-01-18 PROCEDURE — 93010 ELECTROCARDIOGRAM REPORT: CPT | Mod: ,,, | Performed by: INTERNAL MEDICINE

## 2023-01-18 PROCEDURE — 99999 PR PBB SHADOW E&M-EST. PATIENT-LVL IV: ICD-10-PCS | Mod: PBBFAC,,, | Performed by: INTERNAL MEDICINE

## 2023-01-18 PROCEDURE — 99204 PR OFFICE/OUTPT VISIT, NEW, LEVL IV, 45-59 MIN: ICD-10-PCS | Mod: S$GLB,,, | Performed by: INTERNAL MEDICINE

## 2023-01-18 PROCEDURE — 99204 OFFICE O/P NEW MOD 45 MIN: CPT | Mod: S$GLB,,, | Performed by: INTERNAL MEDICINE

## 2023-01-18 PROCEDURE — 1157F PR ADVANCE CARE PLAN OR EQUIV PRESENT IN MEDICAL RECORD: ICD-10-PCS | Mod: CPTII,S$GLB,, | Performed by: INTERNAL MEDICINE

## 2023-01-18 PROCEDURE — 1126F PR PAIN SEVERITY QUANTIFIED, NO PAIN PRESENT: ICD-10-PCS | Mod: CPTII,S$GLB,, | Performed by: INTERNAL MEDICINE

## 2023-01-18 PROCEDURE — 1126F AMNT PAIN NOTED NONE PRSNT: CPT | Mod: CPTII,S$GLB,, | Performed by: INTERNAL MEDICINE

## 2023-01-18 PROCEDURE — 1157F ADVNC CARE PLAN IN RCRD: CPT | Mod: CPTII,S$GLB,, | Performed by: INTERNAL MEDICINE

## 2023-01-18 PROCEDURE — 93005 ELECTROCARDIOGRAM TRACING: CPT

## 2023-01-18 PROCEDURE — 1159F MED LIST DOCD IN RCRD: CPT | Mod: CPTII,S$GLB,, | Performed by: INTERNAL MEDICINE

## 2023-01-18 PROCEDURE — 93010 EKG 12-LEAD: ICD-10-PCS | Mod: ,,, | Performed by: INTERNAL MEDICINE

## 2023-01-18 PROCEDURE — 93280 PM DEVICE PROGR EVAL DUAL: CPT | Mod: 26,,, | Performed by: INTERNAL MEDICINE

## 2023-01-18 PROCEDURE — 93280 PM DEVICE PROGR EVAL DUAL: CPT

## 2023-01-18 PROCEDURE — 1159F PR MEDICATION LIST DOCUMENTED IN MEDICAL RECORD: ICD-10-PCS | Mod: CPTII,S$GLB,, | Performed by: INTERNAL MEDICINE

## 2023-01-18 PROCEDURE — 93280 CARDIAC DEVICE CHECK - IN CLINIC & HOSPITAL: ICD-10-PCS | Mod: 26,,, | Performed by: INTERNAL MEDICINE

## 2023-01-18 RX ORDER — POTASSIUM CHLORIDE 750 MG/1
20 TABLET, EXTENDED RELEASE ORAL 2 TIMES DAILY
Qty: 180 TABLET | Refills: 3 | Status: SHIPPED | OUTPATIENT
Start: 2023-01-18 | End: 2023-03-20 | Stop reason: SDUPTHER

## 2023-01-18 RX ORDER — FUROSEMIDE 40 MG/1
40 TABLET ORAL 2 TIMES DAILY
Qty: 90 TABLET | Refills: 1 | Status: SHIPPED | OUTPATIENT
Start: 2023-01-18 | End: 2023-05-03 | Stop reason: SDUPTHER

## 2023-01-18 NOTE — PROGRESS NOTES
Subjective:   Patient ID:  Holli Landrum is a 89 y.o. female who presents for cardiac consult of No chief complaint on file.      HPI  The patient came in today for cardiac consult of No chief complaint on file.      Patient ID  ---------------  Holli Landrum is a 89 y.o. female pt with  history of persistent atrial fibrillation, coronary artery disease status post PCI, sick sinus syndrome status post [employer] pacemaker implantation Biotronik, hypertrophic obstructive cardiomyopathy, diastolic congestive heart failure, aortic stenosis, history of GI bleeding.        HPI/Subjective: Darshana Velez, NP Visit 9/2022     Holli Landrum is Here For follow up coronary artery disease, persistent atrial fibrillation, Biotronik pacemaker in Situ     She is here for 4 week follow up.  He underwent Cscope with findings of internal hemorrhoids.  She was started back on Eliquis and is tolerating fine.  BP is controlled today and she reports feeling well overall with only complaint being occasional swelling in her L knee and occasional HIGGINS.  She denies any chest pain or syncope.  She is worried about her blood pressure, lipids, and echocardiogram, and I have reviewed it all with her.      Last Echo moderate aortic stenosis 09/2022  Last Cath 03/20/2020, patent stents  Biotronik pacemaker in Situ in 2021  Last LDL 12/2021, 59    1/18/23  Pt last seen by Darshana Velez 9/2022; Pt of Oscar Sommers MD; here to establish care. Last ECHO 8/2022 with normal bi V function, grade 1 DD, mod AS,  FLAVIO is 0.98 cm2; mean gradient is 20 mmHg;  PASP 27 mmHg. She had Cscope few months prior without any major issues/bleeding.     BP and HR stable. BMI 35 - 216 lbs. Weight 197 prior. She had an episode of Afib Jan 3rd HR 50s-160s. Device interrogation today with brief AFib.   ECG - A paced, nonspec ST changes     Patient feels no leg swelling, no PND, no dizziness, no syncope, no CNS symptoms.    Patient has dec exercise  tolerance.    Patient is compliant with medications.    Results for orders placed during the hospital encounter of 08/25/22    Echo    Interpretation Summary  · Normal systolic function.  · The estimated ejection fraction is 60%.  · Grade I left ventricular diastolic dysfunction.  · Normal right ventricular size with normal right ventricular systolic function.  · Moderate left atrial enlargement.  · There is moderate aortic valve stenosis.  FLAVIO is 0.98 cm2; mean gradient is 20 mmHg;   · Normal central venous pressure (3 mmHg).  · The estimated PA systolic pressure is 27 mmHg.      Past Medical History:   Diagnosis Date    Anticoagulant long-term use     Arthritis     Asthma     Basal cell carcinoma     Cancer     skin cancer to face    Cataract     OU done//    CHF (congestive heart failure)     COPD (chronic obstructive pulmonary disease)     no oxygen; patient denies    cpap     Essential hypertension 05/05/2010    GERD (gastroesophageal reflux disease) 11/20/2012    Glaucoma     Hypertensive heart disease with heart failure 02/05/2013    Paroxysmal atrial fibrillation     Paroxysmal ventricular tachycardia     per problem list    Renal disorder     french-1/3/2020    Squamous cell carcinoma of skin        Past Surgical History:   Procedure Laterality Date    A-V CARDIAC PACEMAKER INSERTION  06/16/2021    Procedure: INSERTION, CARDIAC PACEMAKER, DUAL CHAMBER;  Surgeon: Oscar Sommers MD;  Location: [facility];  Service: Cardiovascular;;    ADENOIDECTOMY  191944    APPENDECTOMY  1948    Because of Ovarian Cyst surgery    BREAST BIOPSY Left 1995    neg    BREAST BIOPSY Right 1984    neg    BREAST BIOPSY Right 1992    neg    BREAST SURGERY      A number of biopsies    CARDIAC CATHETERIZATION  2013, 2014,2016, 2020    has 9 stents    CARDIAC SURGERY  2012    stents    CATARACT EXTRACTION W/  INTRAOCULAR LENS IMPLANT Left 11/01/2018    Dr Rose    CATARACT EXTRACTION W/  INTRAOCULAR LENS IMPLANT Right 12/13/2018     Dr Rose//    CHOLECYSTECTOMY      COLONOSCOPY  ~2005    Dr. Edward; normal per patient report    COLONOSCOPY N/A 09/06/2022    Procedure: COLONOSCOPY 8/31/22-note in Dr Simms's office visit note that he spoke to her cardiologist and she was viable candidate for endoscopy;  Surgeon: Cordelia Bueno MD;  Location: [facility];  Service: Endoscopy;  Laterality: N/A;    CORONARY ANGIOGRAPHY N/A 03/20/2020    Procedure: ANGIOGRAM, CORONARY ARTERY;  Surgeon: Chuy Bryant MD;  Location: [facility];  Service: Cardiology;  Laterality: N/A;    CYSTOSCOPY W/ RETROGRADES Bilateral 02/12/2020    Procedure: CYSTOSCOPY, WITH RETROGRADE PYELOGRAM;  Surgeon: Gasper Feliz MD;  Location: [facility];  Service: Urology;  Laterality: Bilateral;    ESOPHAGOGASTRODUODENOSCOPY N/A 08/13/2020    Procedure: EGD (ESOPHAGOGASTRODUODENOSCOPY);  Surgeon: Mika Solorzano MD;  Location: [facility];  Service: Endoscopy;  Laterality: N/A; Mild Schatzki ring. Biopsied. Dilated. small hiatal hernia, gastritis; biopsy: esophagus- SEVERE REFLUX ESOPHAGITIS, stomach- chronic gastritis, negative for H pylori    ESOPHAGOGASTRODUODENOSCOPY N/A 10/22/2020    Procedure: EGD (ESOPHAGOGASTRODUODENOSCOPY);  Surgeon: Fred Hendricks MD;  Location: [facility];  Service: Endoscopy;  Laterality: N/A;    EYE SURGERY  2017    Cataracs    HYSTERECTOMY  1969    LEFT HEART CATHETERIZATION Left 03/03/2020    Procedure: Left heart cath;  Surgeon: Chuy Bryant MD;  Location: [facility] CATH;  Service: Cardiology;  Laterality: Left;    LEFT HEART CATHETERIZATION Left 03/20/2020    Procedure: Left heart cath;  Surgeon: Chuy Bryant MD;  Location: [facility] CATH;  Service: Cardiology;  Laterality: Left;    OVARIAN CYST REMOVAL  teenager    PHACOEMULSIFICATION OF CATARACT Left 11/01/2018    Procedure: PHACOEMULSIFICATION, CATARACT;  Surgeon: Aleksandar Rose Jr., MD;  Location: [facility];  Service: Ophthalmology;  Laterality: Left;    PHACOEMULSIFICATION OF CATARACT Right  2018    Procedure: PHACOEMULSIFICATION, CATARACT;  Surgeon: Aleksandar Rose Jr., MD;  Location: [facility] OR;  Service: Ophthalmology;  Laterality: Right;    TONSILLECTOMY      aw/denoids    UPPER GASTROINTESTINAL ENDOSCOPY  ~    Dr. Solorzano    VASCULAR SURGERY      to remove clot from right leg    Yag Capsulotomy Bilateral 2019    Dr Rose       Social History     Tobacco Use    Smoking status: Former     Years: 1.00     Types: Cigarettes     Start date: 6/10/1954     Quit date: 9/15/1955     Years since quittin.3    Smokeless tobacco: Never    Tobacco comments:     I onlysmoked one year while [place] my  was [place]  during [email or web address]   Substance Use Topics    Alcohol use: Not Currently     Comment: Only drank a little wine and haven't  drunk anything in 15 y    Drug use: Never       Family History   Problem Relation Age of Onset    Diabetes Brother         Type 2    Heart disease Brother          at 65 CHD    Diabetes Mother         Type 1    Alcohol abuse Mother         Dod 10/75    Heart disease Mother         Arteriosclerosis    Hypertension Mother     Stroke Mother         3/15/1975 dod 10/1975    Cancer Maternal Grandfather         Dod     Clotting disorder Son         bleeding after tonsillectomy only    Heart disease Father         Heart attack. Afib, and Polyvcithemavera    Hypertension Father     Amblyopia Neg Hx     Blindness Neg Hx     Cataracts Neg Hx     Glaucoma Neg Hx     Macular degeneration Neg Hx     Retinal detachment Neg Hx     Strabismus Neg Hx     Thyroid disease Neg Hx     Colon cancer Neg Hx        Patient's Medications   New Prescriptions    No medications on file   Previous Medications    ACETAMINOPHEN (TYLENOL) 650 MG TBSR    Take 650 mg by mouth as needed.     ALBUTEROL (PROVENTIL/VENTOLIN HFA) 90 MCG/ACTUATION INHALER    Inhale 1-2 puffs into the lungs every 6 (six) hours as needed for Wheezing. Rescue    ALLOPURINOL (ZYLOPRIM) 100 MG TABLET    Take 2  tablets (200 mg total) by mouth once daily.    ALUMINUM & MAGNESIUM HYDROXIDE-SIMETHICONE (MYLANTA MAX STRENGTH) 400-400-40 MG/5 ML SUSPENSION    Take by mouth every 6 (six) hours as needed for Indigestion.    AMLODIPINE (NORVASC) 2.5 MG TABLET    Take 1 tablet (2.5 mg total) by mouth once daily.    APIXABAN (ELIQUIS) 2.5 MG TAB    Take 1 tablet (2.5 mg total) by mouth 2 (two) times daily.    CEFDINIR (OMNICEF) 300 MG CAPSULE    Take 300 mg by mouth 2 (two) times daily.    CHOLECALCIFEROL, VITAMIN D3, 125 MCG (5,000 UNIT) TAB    Take 5,000 Units by mouth once daily.    D-MANNOSE ORAL    Take 2 tablets by mouth once daily.     DORZOLAMIDE (TRUSOPT) 2 % OPHTHALMIC SOLUTION    INSTILL 1 DROP INTO BOTH EYES TWICE DAILY    FLUTICASONE (FLONASE) 50 MCG/ACTUATION NASAL SPRAY    2 sprays (100 mcg total) by Each Nare route daily as needed for Allergies.    FLUTICASONE FUROATE-VILANTEROL (BREO ELLIPTA) 100-25 MCG/DOSE DISKUS INHALER    Inhale 1 puff into the lungs once daily.    FUROSEMIDE (LASIX) 40 MG TABLET    Take 1 tablet (40 mg total) by mouth once daily.    GRAPE SEED EXTRACT ORAL    Take 1 tablet by mouth once daily.     LACTOBACILLUS RHAMNOSUS GG (CULTURELLE) 10 BILLION CELL CAPSULE    Take 1 capsule by mouth once daily.    LATANOPROST 0.005 % OPHTHALMIC SOLUTION    Place 1 drop into both eyes every evening.    MAGNESIUM OXIDE (MAG-OX) 400 MG TABLET    Take 800 mg by mouth once daily.     NITROGLYCERIN 0.4 MG/HR TD PT24 (NITRODUR) 0.4 MG/HR    Place 1 patch onto the skin once daily.    OLIVE LEAF EXTRACT ORAL    Take 1 tablet by mouth once daily.     OMEGA-3 FATTY ACIDS/FISH OIL (FISH OIL-OMEGA-3 FATTY ACIDS) 300-1,000 MG CAPSULE    Take 2 capsules by mouth once daily.    ONDANSETRON (ZOFRAN) 4 MG TABLET    Take 4 mg by mouth every 6 (six) hours as needed.    PANTOPRAZOLE (PROTONIX) 40 MG TABLET    Take 1 tablet (40 mg total) by mouth once daily.    POTASSIUM CHLORIDE (KLOR-CON) 10 MEQ TBSR    Take 1 tablet (10 mEq  total) by mouth 2 (two) times daily.    ROSUVASTATIN (CRESTOR) 40 MG TAB    Take 1 tablet (40 mg total) by mouth once daily.    SOTALOL (BETAPACE) 80 MG TABLET    Take 0.5 tablets (40 mg total) by mouth 2 (two) times daily.   Modified Medications    No medications on file   Discontinued Medications    No medications on file       Review of Systems   Constitutional: Negative.    HENT: Negative.     Eyes: Negative.    Respiratory:  Positive for shortness of breath.    Cardiovascular:  Positive for palpitations and leg swelling.   Gastrointestinal: Negative.    Genitourinary: Negative.    Musculoskeletal: Negative.    Skin: Negative.    Neurological: Negative.    Endo/Heme/Allergies: Negative.    Psychiatric/Behavioral: Negative.     All 12 systems otherwise negative.    Wt Readings from Last 3 Encounters:   01/18/23 98 kg (216 lb 0.8 oz)   11/25/22 89.6 kg (197 lb 8.5 oz)   11/02/22 89.5 kg (197 lb 5 oz)     Temp Readings from Last 3 Encounters:   11/25/22 97.9 °F (36.6 °C) (Oral)   11/25/22 97.9 °F (36.6 °C) (Temporal)   09/06/22 99.1 °F (37.3 °C) (Temporal)     BP Readings from Last 3 Encounters:   01/18/23 (!) 140/82   11/25/22 (!) 176/89   11/25/22 136/74     Pulse Readings from Last 3 Encounters:   01/18/23 62   11/25/22 80   11/25/22 75       BP (!) 140/82   Pulse 62   Wt 98 kg (216 lb 0.8 oz)   LMP  (LMP Unknown)   SpO2 95%   BMI 35.95 kg/m²     Objective:   Physical Exam  Vitals and nursing note reviewed.   Constitutional:       General: She is not in acute distress.     Appearance: She is well-developed. She is not diaphoretic.   HENT:      Head: Normocephalic and atraumatic.      Nose: Nose normal.   Eyes:      General: No scleral icterus.     Conjunctiva/sclera: Conjunctivae normal.   Neck:      Thyroid: No thyromegaly.      Vascular: No JVD.   Cardiovascular:      Rate and Rhythm: Normal rate and regular rhythm.      Heart sounds: S1 normal and S2 normal. Murmur heard.     No friction rub. No gallop.  No S3 or S4 sounds.   Pulmonary:      Effort: Pulmonary effort is normal. No respiratory distress.      Breath sounds: Normal breath sounds. No stridor. No wheezing or rales.   Chest:      Chest wall: No tenderness.   Abdominal:      General: Bowel sounds are normal. There is no distension.      Palpations: Abdomen is soft. There is no mass.      Tenderness: There is no abdominal tenderness. There is no rebound.   Genitourinary:     Comments: Deferred  Musculoskeletal:         General: No tenderness or deformity. Normal range of motion.      Cervical back: Normal range of motion and neck supple.   Lymphadenopathy:      Cervical: No cervical adenopathy.   Skin:     General: Skin is warm and dry.      Coloration: Skin is not pale.      Findings: No erythema or rash.   Neurological:      Mental Status: She is alert and oriented to person, place, and time.      Motor: No abnormal muscle tone.      Coordination: Coordination normal.   Psychiatric:         Behavior: Behavior normal.         Thought Content: Thought content normal.         Judgment: Judgment normal.       Lab Results   Component Value Date     11/25/2022     (L) 08/15/2015    K 3.6 11/25/2022    K 4.0 08/15/2015     11/25/2022    CL 99 08/15/2015    CO2 20 (L) 11/25/2022    BUN 14 11/25/2022    CREATININE 0.8 11/25/2022    CREATININE 1.05 (H) 08/15/2015     11/25/2022    HGBA1C 5.8 (H) 12/07/2021    MG 2.1 06/18/2021    AST 24 11/25/2022    AST 23 04/05/2016    ALT 20 11/25/2022    ALBUMIN 4.0 11/25/2022    ALBUMIN 4.4 08/15/2015    PROT 7.7 11/25/2022    BILITOT 0.5 11/25/2022    WBC 8.28 11/25/2022    HGB 14.6 11/25/2022    HCT 43.4 11/25/2022    MCV 84 11/25/2022     11/25/2022    INR 1.0 04/15/2019    TSH 0.993 12/07/2021    CHOL 134 12/07/2021    HDL 46 12/07/2021    LDLCALC 59.0 (L) 12/07/2021    LDLCALC 89 08/15/2015    TRIG 145 12/07/2021    BNP 70 01/12/2009     Assessment:      1. Paroxysmal atrial fibrillation     2. Cardiac pacemaker in situ    3. Hypertensive heart disease with heart failure    4. Hypercholesteremia    5. Coronary artery disease, h/o multivessel stents.    6. Essential hypertension    7. Moderate aortic stenosis by prior echocardiogram    8. S/P placement of cardiac pacemaker    9. Chronic diastolic heart failure    10. SSS (sick sinus syndrome)        Plan:     PAF, SSS s/p PPM - 6/2021  - interrogate device and f/u device clinic as sched - working well  - cont Sotalol, Eliquis  - had AF episode in Jan but brief; if recurrent f/u with EP    2. HTN   - titrate meds    3. Mod AS  - cont to monitor - FLAVIO is 0.98 cm2; mean gradient is 20 mmHg;   - TAVR eval if indicated, repeat ECHO 8/2023    4. HFpEF - more overloaded  - cont tx - lasix 40mg daily, extra PRN - double up lasix BID next 3-4 days  - order labs today     5. CAD s/p PCI  - cont Eliquis, BB, statin  - cont nitro patch     6. Obesity, BMI 35 - 216 lbs  - cont weight loss    Pre-OP CV evaluation prior to surgical insertion of suprapubic catheter by Dr. Candido Quinones with [facility]   Elevated periop risk of CV events for moderate risk procedure.  Ok to proceed to the scheduled surgery without further cardiac study.  OK to hold Eliquis 2 days before the procedure and resume ASAP postop.  Continue Sotalol and Statin periop.    Thank you for allowing me to participate in this patient's care. Please do not hesitate to contact me with any questions or concerns. Consult note has been forwarded to the referral physician.     Fletcher Meyer MD, [facility]  Cardiovascular Disease  [facility], [place]  [phone] (P)

## 2023-01-18 NOTE — TELEPHONE ENCOUNTER
Spoke with pt in regards to lab results       Please contact the patient and let them know that their results reveal elevated BNP due to extra fluid, need to continue taking lasix twice a day and double potassium to 20 meq twice a day. Will repeat labs next week.        Pt verbalized understanding with no questions or concerns     Lab sched fot 1/25/23

## 2023-01-20 ENCOUNTER — PATIENT MESSAGE (OUTPATIENT)
Dept: UROLOGY | Facility: CLINIC | Age: 88
End: 2023-01-20
Payer: MEDICARE

## 2023-01-25 ENCOUNTER — LAB VISIT (OUTPATIENT)
Dept: LAB | Facility: HOSPITAL | Age: 88
End: 2023-01-25
Attending: INTERNAL MEDICINE
Payer: MEDICARE

## 2023-01-25 DIAGNOSIS — I50.31 CHF (CONGESTIVE HEART FAILURE), NYHA CLASS I, ACUTE, DIASTOLIC: ICD-10-CM

## 2023-01-25 PROCEDURE — 36415 COLL VENOUS BLD VENIPUNCTURE: CPT | Mod: PO | Performed by: INTERNAL MEDICINE

## 2023-01-25 PROCEDURE — 80048 BASIC METABOLIC PNL TOTAL CA: CPT | Performed by: INTERNAL MEDICINE

## 2023-01-25 PROCEDURE — 83735 ASSAY OF MAGNESIUM: CPT | Performed by: INTERNAL MEDICINE

## 2023-01-25 PROCEDURE — 83880 ASSAY OF NATRIURETIC PEPTIDE: CPT | Performed by: INTERNAL MEDICINE

## 2023-01-26 ENCOUNTER — TELEPHONE (OUTPATIENT)
Dept: CARDIOLOGY | Facility: CLINIC | Age: 88
End: 2023-01-26
Payer: MEDICARE

## 2023-01-26 LAB
ANION GAP SERPL CALC-SCNC: 13 MMOL/L (ref 8–16)
BNP SERPL-MCNC: 126 PG/ML (ref 0–99)
BUN SERPL-MCNC: 21 MG/DL (ref 8–23)
CALCIUM SERPL-MCNC: 10.4 MG/DL (ref 8.7–10.5)
CHLORIDE SERPL-SCNC: 105 MMOL/L (ref 95–110)
CO2 SERPL-SCNC: 23 MMOL/L (ref 23–29)
CREAT SERPL-MCNC: 0.9 MG/DL (ref 0.5–1.4)
EST. GFR  (NO RACE VARIABLE): >60 ML/MIN/1.73 M^2
GLUCOSE SERPL-MCNC: 99 MG/DL (ref 70–110)
MAGNESIUM SERPL-MCNC: 1.6 MG/DL (ref 1.6–2.6)
POTASSIUM SERPL-SCNC: 4.3 MMOL/L (ref 3.5–5.1)
SODIUM SERPL-SCNC: 141 MMOL/L (ref 136–145)

## 2023-01-26 NOTE — TELEPHONE ENCOUNTER
LVM for pt in regards to       Please contact the patient and let them know that their results show improvement in BNP to normal levels of fluid, continue taking lasix with potassium and magnesium as you are now and monitor BP and weights daily, if feeling dry or dizzy stop taking the lasix and then take daily as needed.

## 2023-01-27 ENCOUNTER — TELEPHONE (OUTPATIENT)
Dept: CARDIOLOGY | Facility: CLINIC | Age: 88
End: 2023-01-27
Payer: MEDICARE

## 2023-01-27 NOTE — TELEPHONE ENCOUNTER
Spoke with pt in regards to test results       Please contact the patient and let them know that their results show improvement in BNP to normal levels of fluid, continue taking lasix with potassium and magnesium as you are now and monitor BP and weights daily, if feeling dry or dizzy stop taking the lasix and then take daily as needed. Denver Meyer        Pt verbalized understanding with no questions or concerns

## 2023-02-09 ENCOUNTER — OFFICE VISIT (OUTPATIENT)
Dept: UROLOGY | Facility: CLINIC | Age: 88
End: 2023-02-09
Payer: MEDICARE

## 2023-02-09 ENCOUNTER — PATIENT MESSAGE (OUTPATIENT)
Dept: CARDIOLOGY | Facility: CLINIC | Age: 88
End: 2023-02-09
Payer: MEDICARE

## 2023-02-09 VITALS
BODY MASS INDEX: 32.65 KG/M2 | HEART RATE: 79 BPM | DIASTOLIC BLOOD PRESSURE: 110 MMHG | SYSTOLIC BLOOD PRESSURE: 185 MMHG | WEIGHT: 196.19 LBS | RESPIRATION RATE: 18 BRPM

## 2023-02-09 DIAGNOSIS — N39.0 RECURRENT UTI: Primary | ICD-10-CM

## 2023-02-09 PROCEDURE — 1159F PR MEDICATION LIST DOCUMENTED IN MEDICAL RECORD: ICD-10-PCS | Mod: CPTII,S$GLB,, | Performed by: NURSE PRACTITIONER

## 2023-02-09 PROCEDURE — 1125F PR PAIN SEVERITY QUANTIFIED, PAIN PRESENT: ICD-10-PCS | Mod: CPTII,S$GLB,, | Performed by: NURSE PRACTITIONER

## 2023-02-09 PROCEDURE — 99999 PR PBB SHADOW E&M-EST. PATIENT-LVL V: ICD-10-PCS | Mod: PBBFAC,,, | Performed by: NURSE PRACTITIONER

## 2023-02-09 PROCEDURE — 1157F ADVNC CARE PLAN IN RCRD: CPT | Mod: CPTII,S$GLB,, | Performed by: NURSE PRACTITIONER

## 2023-02-09 PROCEDURE — 1159F MED LIST DOCD IN RCRD: CPT | Mod: CPTII,S$GLB,, | Performed by: NURSE PRACTITIONER

## 2023-02-09 PROCEDURE — 99214 OFFICE O/P EST MOD 30 MIN: CPT | Mod: S$GLB,,, | Performed by: NURSE PRACTITIONER

## 2023-02-09 PROCEDURE — 99214 PR OFFICE/OUTPT VISIT, EST, LEVL IV, 30-39 MIN: ICD-10-PCS | Mod: S$GLB,,, | Performed by: NURSE PRACTITIONER

## 2023-02-09 PROCEDURE — 1157F PR ADVANCE CARE PLAN OR EQUIV PRESENT IN MEDICAL RECORD: ICD-10-PCS | Mod: CPTII,S$GLB,, | Performed by: NURSE PRACTITIONER

## 2023-02-09 PROCEDURE — 1125F AMNT PAIN NOTED PAIN PRSNT: CPT | Mod: CPTII,S$GLB,, | Performed by: NURSE PRACTITIONER

## 2023-02-09 PROCEDURE — 99999 PR PBB SHADOW E&M-EST. PATIENT-LVL V: CPT | Mod: PBBFAC,,, | Performed by: NURSE PRACTITIONER

## 2023-02-09 RX ORDER — GRANULES FOR ORAL 3 G/1
POWDER ORAL
Qty: 3 G | Refills: 0 | Status: SHIPPED | OUTPATIENT
Start: 2023-02-09 | End: 2023-07-12

## 2023-02-09 NOTE — PROGRESS NOTES
Chief Complaint:   Neurogenic bladder  Symptomatic urinary tract infection    HPI:     Patient is an 89-year-old female that has been followed by Dr. Stewart for neurogenic bladder and recurrent urinary tract infections.  Patient states that she was treated for a positive urine culture indicating E coli.  Patient states she continues to have dysuria.  Denies pelvic or flank pain.  No fever or gross hematuria.  Dr. Stewart  10/06/2022 - presents today to the [facility] to establish care, had some bad falls in February and had to relocate to [place] to live with her daughter, no new issues in the interim, home health comes to change her catheter every month, sometimes she will have UTIs that are symptomatic with foul smell, but overall she has been doing well, does have intermittent leakage around the catheter but this does not occur very frequently, denies any gross hematuria     11/18/2021 - Dr Vieira: 88-year-old with chronic urinary retention.  She has an indwelling Ballard catheter for years.  It is changed regularly at home.  She gets recurrent urinary tract infections.  She complains of urethral pain.  She is leaking around the catheter.  At 1 time she was taking oxybutynin but held this for a voiding trial which she failed.  She was scheduled for cystoscopy today but she is currently taking antibiotics for presumed infection.  She has multiple cultures positive for various organisms.  She denies fever and gross hematuria.  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  She is also currently recovering from shingles.  She seen today in a wheelchair.         Allergies:  Timolol maleate, Ciprofloxacin, and Sulfamethoxazole-trimethoprim    Medications:  has a current medication list which includes the following prescription(s): acetaminophen, albuterol, allopurinol, aluminum & magnesium hydroxide-simethicone, amlodipine, apixaban, cefdinir, cholecalciferol (vitamin  d3), d-mannose, dorzolamide, fluticasone propionate, furosemide, grape seed extract, lactobacillus rhamnosus gg, latanoprost, magnesium oxide, nitroglycerin 0.4 mg/hr td pt24, olive leaf extract, fish oil-omega-3 fatty acids, ondansetron, pantoprazole, potassium chloride, rosuvastatin, sotalol, breo ellipta, and fosfomycin.    Review of Systems:  General: No fever, chills, fatigability, or weight loss.  Skin: No rashes, itching, or changes in color or texture of skin.  Chest: Denies HIGGINS, cyanosis, wheezing, cough, and sputum production.  Abdomen: Appetite fine. No weight loss. Denies diarrhea, abdominal pain, hematemesis, or blood in stool.  Musculoskeletal: No joint stiffness or swelling. Denies back pain.  : As above.  All other review of systems negative.    PMH:   has a past medical history of Anticoagulant long-term use, Arthritis, Asthma, Basal cell carcinoma, Cancer, Cataract, CHF (congestive heart failure), COPD (chronic obstructive pulmonary disease), cpap, Essential hypertension (05/05/2010), GERD (gastroesophageal reflux disease) (11/20/2012), Glaucoma, Hypertensive heart disease with heart failure (02/05/2013), Paroxysmal atrial fibrillation, Paroxysmal ventricular tachycardia, Renal disorder, and Squamous cell carcinoma of skin.    PSH:   has a past surgical history that includes Cholecystectomy; Ovarian cyst removal (teenager); Hysterectomy (1969); Tonsillectomy; Phacoemulsification of cataract (Left, 11/01/2018); Phacoemulsification of cataract (Right, 12/13/2018); Cataract extraction w/  intraocular lens implant (Left, 11/01/2018); Cataract extraction w/  intraocular lens implant (Right, 12/13/2018); Yag Capsulotomy (Bilateral, 11/05/2019); Breast biopsy (Left, 1995); Breast biopsy (Right, 1984); Breast biopsy (Right, 1992); Vascular surgery; Cardiac surgery (2012); Cardiac catheterization (2013, 2014,2016, 2020); Cystoscopy w/ retrogrades (Bilateral, 02/12/2020); Left heart catheterization (Left,  03/03/2020); Left heart catheterization (Left, 03/20/2020); Coronary angiography (N/A, 03/20/2020); Upper gastrointestinal endoscopy (~2005); Esophagogastroduodenoscopy (N/A, 08/13/2020); Colonoscopy (~2005); Esophagogastroduodenoscopy (N/A, 10/22/2020); A-V cardiac pacemaker insertion (06/16/2021); Colonoscopy (N/A, 09/06/2022); Adenoidectomy (233932); Appendectomy (1948); Eye surgery (2017); and Breast surgery.    FamHx: family history includes Alcohol abuse in her mother; Cancer in her maternal grandfather; Clotting disorder in her son; Diabetes in her brother and mother; Heart disease in her brother, father, and mother; Hypertension in her father and mother; Stroke in her mother.    SocHx:  reports that she quit smoking about 67 years ago. Her smoking use included cigarettes. She started smoking about 68 years ago. She has never used smokeless tobacco. She reports that she does not currently use alcohol. She reports that she does not use drugs.      Physical Exam:  Vitals:    02/09/23 1605   BP: (!) 185/110   Pulse: 79   Resp: 18     General: A&Ox3, no apparent distress, no deformities  Neck: No masses, normal thyroid  Lungs: normal inspiration, no use of accessory muscles  Heart: normal pulse, no arrhythmias  Abdomen: Soft, NT, ND, no masses, no hernias, no hepatosplenomegaly    Impression/Plan:   Recurrent urinary tract infection  Patient will be prescribed fosfomycin.  A referral for infectious Disease has been placed, per patient and daughter's request.  Ballard catheter changed monthly, by home health nurse.

## 2023-02-11 PROBLEM — J42 CHRONIC BRONCHITIS: Status: RESOLVED | Noted: 2020-01-14 | Resolved: 2023-02-11

## 2023-02-11 PROBLEM — R09.82 POSTNASAL DRIP: Status: RESOLVED | Noted: 2021-04-12 | Resolved: 2023-02-11

## 2023-02-11 PROBLEM — R30.0 DYSURIA: Status: RESOLVED | Noted: 2020-01-03 | Resolved: 2023-02-11

## 2023-02-14 ENCOUNTER — CLINICAL SUPPORT (OUTPATIENT)
Dept: UROLOGY | Facility: CLINIC | Age: 88
End: 2023-02-14
Payer: MEDICARE

## 2023-02-14 DIAGNOSIS — N39.0 RECURRENT UTI: Primary | ICD-10-CM

## 2023-02-14 PROCEDURE — 87077 CULTURE AEROBIC IDENTIFY: CPT | Performed by: UROLOGY

## 2023-02-14 PROCEDURE — 87088 URINE BACTERIA CULTURE: CPT | Performed by: UROLOGY

## 2023-02-14 PROCEDURE — 99999 PR PBB SHADOW E&M-EST. PATIENT-LVL III: ICD-10-PCS | Mod: PBBFAC,,,

## 2023-02-14 PROCEDURE — 87186 SC STD MICRODIL/AGAR DIL: CPT | Performed by: UROLOGY

## 2023-02-14 PROCEDURE — 87086 URINE CULTURE/COLONY COUNT: CPT | Performed by: UROLOGY

## 2023-02-14 PROCEDURE — 99999 PR PBB SHADOW E&M-EST. PATIENT-LVL III: CPT | Mod: PBBFAC,,,

## 2023-02-14 RX ORDER — TRAMADOL HYDROCHLORIDE 50 MG/1
50 TABLET ORAL EVERY 12 HOURS PRN
Qty: 10 TABLET | Refills: 0 | Status: ON HOLD | OUTPATIENT
Start: 2023-02-14 | End: 2023-10-14 | Stop reason: HOSPADM

## 2023-02-14 NOTE — PROGRESS NOTES
Patient came because her catheter was not draining urine and was in a lot of pain, after flushing the catheter yellow urine began to drain but continued with pain. Dr. Stewart was notified and instructed to have her catheter changed. Using a 10 cc syringe, the balloon was deflated. Pt was instructed to take a deep breathe and the french catheter was removed. 200 cc of clear yellow urine noted to  BAG. Pt was draped using a sterile drape. Private area was cleaned with betadine. Using sterile technique, #16 FR french catheter inserted into the urethra and 10 cc balloon inflated. Tubing attached to pt leg. Pt tolerate procedure well. Left clinic ambulatory per self with a pain medicine per Dr Stewart.     Loraine Milanes RN

## 2023-02-15 ENCOUNTER — OFFICE VISIT (OUTPATIENT)
Dept: OPHTHALMOLOGY | Facility: CLINIC | Age: 88
End: 2023-02-15
Payer: MEDICARE

## 2023-02-15 DIAGNOSIS — H04.123 DRY EYES, BILATERAL: Primary | ICD-10-CM

## 2023-02-15 DIAGNOSIS — H43.813 POSTERIOR VITREOUS DETACHMENT, BILATERAL: ICD-10-CM

## 2023-02-15 DIAGNOSIS — H40.053 OCULAR HYPERTENSION, BILATERAL: ICD-10-CM

## 2023-02-15 DIAGNOSIS — H52.7 REFRACTIVE DISORDER: ICD-10-CM

## 2023-02-15 DIAGNOSIS — Z96.1 PSEUDOPHAKIA OF BOTH EYES: ICD-10-CM

## 2023-02-15 PROCEDURE — 99214 PR OFFICE/OUTPT VISIT, EST, LEVL IV, 30-39 MIN: ICD-10-PCS | Mod: S$GLB,,, | Performed by: STUDENT IN AN ORGANIZED HEALTH CARE EDUCATION/TRAINING PROGRAM

## 2023-02-15 PROCEDURE — 1157F PR ADVANCE CARE PLAN OR EQUIV PRESENT IN MEDICAL RECORD: ICD-10-PCS | Mod: CPTII,S$GLB,, | Performed by: STUDENT IN AN ORGANIZED HEALTH CARE EDUCATION/TRAINING PROGRAM

## 2023-02-15 PROCEDURE — 99999 PR PBB SHADOW E&M-EST. PATIENT-LVL III: CPT | Mod: PBBFAC,,, | Performed by: STUDENT IN AN ORGANIZED HEALTH CARE EDUCATION/TRAINING PROGRAM

## 2023-02-15 PROCEDURE — 1160F RVW MEDS BY RX/DR IN RCRD: CPT | Mod: CPTII,S$GLB,, | Performed by: STUDENT IN AN ORGANIZED HEALTH CARE EDUCATION/TRAINING PROGRAM

## 2023-02-15 PROCEDURE — 1157F ADVNC CARE PLAN IN RCRD: CPT | Mod: CPTII,S$GLB,, | Performed by: STUDENT IN AN ORGANIZED HEALTH CARE EDUCATION/TRAINING PROGRAM

## 2023-02-15 PROCEDURE — 99214 OFFICE O/P EST MOD 30 MIN: CPT | Mod: S$GLB,,, | Performed by: STUDENT IN AN ORGANIZED HEALTH CARE EDUCATION/TRAINING PROGRAM

## 2023-02-15 PROCEDURE — 1159F PR MEDICATION LIST DOCUMENTED IN MEDICAL RECORD: ICD-10-PCS | Mod: CPTII,S$GLB,, | Performed by: STUDENT IN AN ORGANIZED HEALTH CARE EDUCATION/TRAINING PROGRAM

## 2023-02-15 PROCEDURE — 1160F PR REVIEW ALL MEDS BY PRESCRIBER/CLIN PHARMACIST DOCUMENTED: ICD-10-PCS | Mod: CPTII,S$GLB,, | Performed by: STUDENT IN AN ORGANIZED HEALTH CARE EDUCATION/TRAINING PROGRAM

## 2023-02-15 PROCEDURE — 92015 DETERMINE REFRACTIVE STATE: CPT | Mod: S$GLB,,, | Performed by: STUDENT IN AN ORGANIZED HEALTH CARE EDUCATION/TRAINING PROGRAM

## 2023-02-15 PROCEDURE — 92015 PR REFRACTION: ICD-10-PCS | Mod: S$GLB,,, | Performed by: STUDENT IN AN ORGANIZED HEALTH CARE EDUCATION/TRAINING PROGRAM

## 2023-02-15 PROCEDURE — 99999 PR PBB SHADOW E&M-EST. PATIENT-LVL III: ICD-10-PCS | Mod: PBBFAC,,, | Performed by: STUDENT IN AN ORGANIZED HEALTH CARE EDUCATION/TRAINING PROGRAM

## 2023-02-15 PROCEDURE — 1159F MED LIST DOCD IN RCRD: CPT | Mod: CPTII,S$GLB,, | Performed by: STUDENT IN AN ORGANIZED HEALTH CARE EDUCATION/TRAINING PROGRAM

## 2023-02-15 NOTE — PROGRESS NOTES
HPI     Glaucoma     Additional comments: Patient states she is using both her eye drops as   directed. Patient states her vision is stable. Patient denies any pain or   irritation.          Last edited by Annabelle Wasserman on 2/15/2023  9:58 AM.            Assessment /Plan     For exam results, see Encounter Report.    Dry eyes, bilateral- - ATs QID and lid hygiene w/ baby shampoo  - [place] 3 Fish Oils    Pseudophakia of both eyes- Follow    Ocular hypertension, bilateral- IOP today Controlled. Goal IOP <23. Will monitor with serial GOCTs.   Continue:  OU- Latanoprost QHS, Dorzolamide BID    Posterior vitreous detachment, bilateral- No tears or breaks were seen after careful retinal evaluation. Present >3 months per patient report.   RT/RD precautions    Discussed retinal detachment signs and symptoms including flashes of lights, floaters, perceived curtains or veils. Advised to patient to monitor visual status including increase in flashes and floaters, or the development of visual field changes including curtain and /or veils. Advised patient to RTC urgently if these symptoms occur. Explained the need for follow up exams to the patient even if there are no changes in the symptoms.      Refractive disorder- Mrx dispensed      Return to clinic in 3-4M IOP and GOCT

## 2023-02-18 LAB — BACTERIA UR CULT: ABNORMAL

## 2023-02-22 ENCOUNTER — PATIENT MESSAGE (OUTPATIENT)
Dept: CARDIOLOGY | Facility: CLINIC | Age: 88
End: 2023-02-22
Payer: MEDICARE

## 2023-02-22 RX ORDER — SOTALOL HYDROCHLORIDE 80 MG/1
40 TABLET ORAL 2 TIMES DAILY
Qty: 90 TABLET | Refills: 3 | Status: ON HOLD | OUTPATIENT
Start: 2023-02-22 | End: 2023-10-05 | Stop reason: HOSPADM

## 2023-03-01 ENCOUNTER — PATIENT MESSAGE (OUTPATIENT)
Dept: CARDIOLOGY | Facility: CLINIC | Age: 88
End: 2023-03-01
Payer: MEDICARE

## 2023-03-02 ENCOUNTER — TELEPHONE (OUTPATIENT)
Dept: CARDIOLOGY | Facility: CLINIC | Age: 88
End: 2023-03-02
Payer: MEDICARE

## 2023-03-02 NOTE — TELEPHONE ENCOUNTER
LVM for pt to inform her that pre-op clearance has been faxed over to Dr. Candido morales already per Dr. Meyer          My urologist, doctor Candido Morales, [facility], Wishes to surgically insert a Suprapubic catheter because I am having so many problems with my vaginal catherer:  blockage,  serious retainage, UTIs,.pain. He needs surgical clearance from you and wants to know if my visit in January  will suffice or do you need to see me again. If you do, please give me the earliest appt available as he wants to move on this quickly. Surgery will be done at [facility]. Thank you

## 2023-03-20 RX ORDER — POTASSIUM CHLORIDE 750 MG/1
20 TABLET, EXTENDED RELEASE ORAL 2 TIMES DAILY
Qty: 180 TABLET | Refills: 3 | Status: SHIPPED | OUTPATIENT
Start: 2023-03-20 | End: 2023-10-18 | Stop reason: SDUPTHER

## 2023-04-10 ENCOUNTER — OFFICE VISIT (OUTPATIENT)
Dept: OPHTHALMOLOGY | Facility: CLINIC | Age: 88
End: 2023-04-10
Payer: MEDICARE

## 2023-04-10 DIAGNOSIS — H40.1131 PRIMARY OPEN ANGLE GLAUCOMA (POAG) OF BOTH EYES, MILD STAGE: Primary | ICD-10-CM

## 2023-04-10 PROCEDURE — 1157F PR ADVANCE CARE PLAN OR EQUIV PRESENT IN MEDICAL RECORD: ICD-10-PCS | Mod: CPTII,S$GLB,, | Performed by: STUDENT IN AN ORGANIZED HEALTH CARE EDUCATION/TRAINING PROGRAM

## 2023-04-10 PROCEDURE — 99214 OFFICE O/P EST MOD 30 MIN: CPT | Mod: S$GLB,,, | Performed by: STUDENT IN AN ORGANIZED HEALTH CARE EDUCATION/TRAINING PROGRAM

## 2023-04-10 PROCEDURE — 1159F MED LIST DOCD IN RCRD: CPT | Mod: CPTII,S$GLB,, | Performed by: STUDENT IN AN ORGANIZED HEALTH CARE EDUCATION/TRAINING PROGRAM

## 2023-04-10 PROCEDURE — 99214 PR OFFICE/OUTPT VISIT, EST, LEVL IV, 30-39 MIN: ICD-10-PCS | Mod: S$GLB,,, | Performed by: STUDENT IN AN ORGANIZED HEALTH CARE EDUCATION/TRAINING PROGRAM

## 2023-04-10 PROCEDURE — 1160F PR REVIEW ALL MEDS BY PRESCRIBER/CLIN PHARMACIST DOCUMENTED: ICD-10-PCS | Mod: CPTII,S$GLB,, | Performed by: STUDENT IN AN ORGANIZED HEALTH CARE EDUCATION/TRAINING PROGRAM

## 2023-04-10 PROCEDURE — 1159F PR MEDICATION LIST DOCUMENTED IN MEDICAL RECORD: ICD-10-PCS | Mod: CPTII,S$GLB,, | Performed by: STUDENT IN AN ORGANIZED HEALTH CARE EDUCATION/TRAINING PROGRAM

## 2023-04-10 PROCEDURE — 1157F ADVNC CARE PLAN IN RCRD: CPT | Mod: CPTII,S$GLB,, | Performed by: STUDENT IN AN ORGANIZED HEALTH CARE EDUCATION/TRAINING PROGRAM

## 2023-04-10 PROCEDURE — 99999 PR PBB SHADOW E&M-EST. PATIENT-LVL III: ICD-10-PCS | Mod: PBBFAC,,, | Performed by: STUDENT IN AN ORGANIZED HEALTH CARE EDUCATION/TRAINING PROGRAM

## 2023-04-10 PROCEDURE — 1160F RVW MEDS BY RX/DR IN RCRD: CPT | Mod: CPTII,S$GLB,, | Performed by: STUDENT IN AN ORGANIZED HEALTH CARE EDUCATION/TRAINING PROGRAM

## 2023-04-10 PROCEDURE — 99999 PR PBB SHADOW E&M-EST. PATIENT-LVL III: CPT | Mod: PBBFAC,,, | Performed by: STUDENT IN AN ORGANIZED HEALTH CARE EDUCATION/TRAINING PROGRAM

## 2023-04-10 NOTE — PROGRESS NOTES
HPI    Pt in today for a IOP check and GOCT. Pt states her vision   Last edited by Sherri Torres on 4/10/2023 11:31 AM.            Assessment /Plan     For exam results, see Encounter Report.    Primary open angle glaucoma (POAG) of both eyes, mild stage- IOP Controlled with no evidence of progression. Continue current treatment. Reviewed importance of continued compliance with treatment and follow up.   Continue:  OU- Latanoprost QHS, Dorzolamide BID      Return to clinic in 6M  IOP

## 2023-05-02 ENCOUNTER — OFFICE VISIT (OUTPATIENT)
Dept: GASTROENTEROLOGY | Facility: CLINIC | Age: 88
End: 2023-05-02
Payer: MEDICARE

## 2023-05-02 VITALS
BODY MASS INDEX: 32.91 KG/M2 | SYSTOLIC BLOOD PRESSURE: 124 MMHG | WEIGHT: 197.56 LBS | HEIGHT: 65 IN | HEART RATE: 66 BPM | DIASTOLIC BLOOD PRESSURE: 76 MMHG

## 2023-05-02 DIAGNOSIS — K64.9 HEMORRHOIDS, UNSPECIFIED HEMORRHOID TYPE: Primary | ICD-10-CM

## 2023-05-02 PROCEDURE — 1159F MED LIST DOCD IN RCRD: CPT | Mod: CPTII,S$GLB,, | Performed by: INTERNAL MEDICINE

## 2023-05-02 PROCEDURE — 1160F RVW MEDS BY RX/DR IN RCRD: CPT | Mod: CPTII,S$GLB,, | Performed by: INTERNAL MEDICINE

## 2023-05-02 PROCEDURE — 99999 PR PBB SHADOW E&M-EST. PATIENT-LVL III: CPT | Mod: PBBFAC,,, | Performed by: INTERNAL MEDICINE

## 2023-05-02 PROCEDURE — 1101F PT FALLS ASSESS-DOCD LE1/YR: CPT | Mod: CPTII,S$GLB,, | Performed by: INTERNAL MEDICINE

## 2023-05-02 PROCEDURE — 99214 PR OFFICE/OUTPT VISIT, EST, LEVL IV, 30-39 MIN: ICD-10-PCS | Mod: S$GLB,,, | Performed by: INTERNAL MEDICINE

## 2023-05-02 PROCEDURE — 1160F PR REVIEW ALL MEDS BY PRESCRIBER/CLIN PHARMACIST DOCUMENTED: ICD-10-PCS | Mod: CPTII,S$GLB,, | Performed by: INTERNAL MEDICINE

## 2023-05-02 PROCEDURE — 3288F PR FALLS RISK ASSESSMENT DOCUMENTED: ICD-10-PCS | Mod: CPTII,S$GLB,, | Performed by: INTERNAL MEDICINE

## 2023-05-02 PROCEDURE — 1159F PR MEDICATION LIST DOCUMENTED IN MEDICAL RECORD: ICD-10-PCS | Mod: CPTII,S$GLB,, | Performed by: INTERNAL MEDICINE

## 2023-05-02 PROCEDURE — 99999 PR PBB SHADOW E&M-EST. PATIENT-LVL III: ICD-10-PCS | Mod: PBBFAC,,, | Performed by: INTERNAL MEDICINE

## 2023-05-02 PROCEDURE — 3288F FALL RISK ASSESSMENT DOCD: CPT | Mod: CPTII,S$GLB,, | Performed by: INTERNAL MEDICINE

## 2023-05-02 PROCEDURE — 1157F ADVNC CARE PLAN IN RCRD: CPT | Mod: CPTII,S$GLB,, | Performed by: INTERNAL MEDICINE

## 2023-05-02 PROCEDURE — 1157F PR ADVANCE CARE PLAN OR EQUIV PRESENT IN MEDICAL RECORD: ICD-10-PCS | Mod: CPTII,S$GLB,, | Performed by: INTERNAL MEDICINE

## 2023-05-02 PROCEDURE — 1125F AMNT PAIN NOTED PAIN PRSNT: CPT | Mod: CPTII,S$GLB,, | Performed by: INTERNAL MEDICINE

## 2023-05-02 PROCEDURE — 99214 OFFICE O/P EST MOD 30 MIN: CPT | Mod: S$GLB,,, | Performed by: INTERNAL MEDICINE

## 2023-05-02 PROCEDURE — 1101F PR PT FALLS ASSESS DOC 0-1 FALLS W/OUT INJ PAST YR: ICD-10-PCS | Mod: CPTII,S$GLB,, | Performed by: INTERNAL MEDICINE

## 2023-05-02 PROCEDURE — 1125F PR PAIN SEVERITY QUANTIFIED, PAIN PRESENT: ICD-10-PCS | Mod: CPTII,S$GLB,, | Performed by: INTERNAL MEDICINE

## 2023-05-02 RX ORDER — HYDROCORTISONE 25 MG/G
CREAM TOPICAL 2 TIMES DAILY
Qty: 1 EACH | Refills: 0 | Status: SHIPPED | OUTPATIENT
Start: 2023-05-02 | End: 2023-07-12

## 2023-05-02 NOTE — ASSESSMENT & PLAN NOTE
Plan:   -Will refer to colorectal surgery at this time   -Anusol-HC cream (suppositories are too expensive).   -CBC ordered

## 2023-05-03 ENCOUNTER — OFFICE VISIT (OUTPATIENT)
Dept: CARDIOLOGY | Facility: CLINIC | Age: 88
End: 2023-05-03
Payer: MEDICARE

## 2023-05-03 ENCOUNTER — LAB VISIT (OUTPATIENT)
Dept: LAB | Facility: HOSPITAL | Age: 88
End: 2023-05-03
Attending: INTERNAL MEDICINE
Payer: MEDICARE

## 2023-05-03 ENCOUNTER — TELEPHONE (OUTPATIENT)
Dept: CARDIOLOGY | Facility: CLINIC | Age: 88
End: 2023-05-03

## 2023-05-03 ENCOUNTER — CLINICAL SUPPORT (OUTPATIENT)
Dept: CARDIOLOGY | Facility: HOSPITAL | Age: 88
End: 2023-05-03
Payer: MEDICARE

## 2023-05-03 VITALS
WEIGHT: 199.06 LBS | HEART RATE: 80 BPM | OXYGEN SATURATION: 95 % | HEIGHT: 65 IN | SYSTOLIC BLOOD PRESSURE: 131 MMHG | BODY MASS INDEX: 33.16 KG/M2 | DIASTOLIC BLOOD PRESSURE: 79 MMHG

## 2023-05-03 DIAGNOSIS — I35.0 MODERATE AORTIC STENOSIS BY PRIOR ECHOCARDIOGRAM: ICD-10-CM

## 2023-05-03 DIAGNOSIS — N18.31 STAGE 3A CHRONIC KIDNEY DISEASE: ICD-10-CM

## 2023-05-03 DIAGNOSIS — K64.9 HEMORRHOIDS, UNSPECIFIED HEMORRHOID TYPE: ICD-10-CM

## 2023-05-03 DIAGNOSIS — I47.29 PAROXYSMAL VENTRICULAR TACHYCARDIA: ICD-10-CM

## 2023-05-03 DIAGNOSIS — I25.84 CORONARY ARTERY DISEASE DUE TO CALCIFIED CORONARY LESION: ICD-10-CM

## 2023-05-03 DIAGNOSIS — I49.5 SSS (SICK SINUS SYNDROME): ICD-10-CM

## 2023-05-03 DIAGNOSIS — Z95.0 CARDIAC PACEMAKER IN SITU: ICD-10-CM

## 2023-05-03 DIAGNOSIS — E78.00 HYPERCHOLESTEREMIA: ICD-10-CM

## 2023-05-03 DIAGNOSIS — I10 ESSENTIAL HYPERTENSION: ICD-10-CM

## 2023-05-03 DIAGNOSIS — Z95.0 PRESENCE OF CARDIAC PACEMAKER: ICD-10-CM

## 2023-05-03 DIAGNOSIS — I50.32 CHRONIC DIASTOLIC HEART FAILURE: ICD-10-CM

## 2023-05-03 DIAGNOSIS — Z95.0 S/P PLACEMENT OF CARDIAC PACEMAKER: ICD-10-CM

## 2023-05-03 DIAGNOSIS — G47.33 OSA (OBSTRUCTIVE SLEEP APNEA): ICD-10-CM

## 2023-05-03 DIAGNOSIS — R06.09 DOE (DYSPNEA ON EXERTION): ICD-10-CM

## 2023-05-03 DIAGNOSIS — I48.0 PAROXYSMAL ATRIAL FIBRILLATION: Primary | ICD-10-CM

## 2023-05-03 DIAGNOSIS — I48.0 PAROXYSMAL ATRIAL FIBRILLATION: ICD-10-CM

## 2023-05-03 DIAGNOSIS — I11.0 HYPERTENSIVE HEART DISEASE WITH HEART FAILURE: ICD-10-CM

## 2023-05-03 DIAGNOSIS — I25.10 CORONARY ARTERY DISEASE DUE TO CALCIFIED CORONARY LESION: ICD-10-CM

## 2023-05-03 LAB
ANION GAP SERPL CALC-SCNC: 10 MMOL/L (ref 8–16)
BASOPHILS # BLD AUTO: 0.05 K/UL (ref 0–0.2)
BASOPHILS NFR BLD: 0.6 % (ref 0–1.9)
BNP SERPL-MCNC: 157 PG/ML (ref 0–99)
BUN SERPL-MCNC: 11 MG/DL (ref 8–23)
CALCIUM SERPL-MCNC: 9.2 MG/DL (ref 8.7–10.5)
CHLORIDE SERPL-SCNC: 108 MMOL/L (ref 95–110)
CO2 SERPL-SCNC: 20 MMOL/L (ref 23–29)
CREAT SERPL-MCNC: 0.8 MG/DL (ref 0.5–1.4)
DIFFERENTIAL METHOD: ABNORMAL
EOSINOPHIL # BLD AUTO: 0.3 K/UL (ref 0–0.5)
EOSINOPHIL NFR BLD: 3.4 % (ref 0–8)
ERYTHROCYTE [DISTWIDTH] IN BLOOD BY AUTOMATED COUNT: 15 % (ref 11.5–14.5)
EST. GFR  (NO RACE VARIABLE): >60 ML/MIN/1.73 M^2
GLUCOSE SERPL-MCNC: 136 MG/DL (ref 70–110)
HCT VFR BLD AUTO: 40.6 % (ref 37–48.5)
HGB BLD-MCNC: 13.1 G/DL (ref 12–16)
IMM GRANULOCYTES # BLD AUTO: 0.02 K/UL (ref 0–0.04)
IMM GRANULOCYTES NFR BLD AUTO: 0.3 % (ref 0–0.5)
LYMPHOCYTES # BLD AUTO: 2.6 K/UL (ref 1–4.8)
LYMPHOCYTES NFR BLD: 32.9 % (ref 18–48)
MAGNESIUM SERPL-MCNC: 1.8 MG/DL (ref 1.6–2.6)
MCH RBC QN AUTO: 28.1 PG (ref 27–31)
MCHC RBC AUTO-ENTMCNC: 32.3 G/DL (ref 32–36)
MCV RBC AUTO: 87 FL (ref 82–98)
MONOCYTES # BLD AUTO: 0.9 K/UL (ref 0.3–1)
MONOCYTES NFR BLD: 11.9 % (ref 4–15)
NEUTROPHILS # BLD AUTO: 4 K/UL (ref 1.8–7.7)
NEUTROPHILS NFR BLD: 50.9 % (ref 38–73)
NRBC BLD-RTO: 0 /100 WBC
PLATELET # BLD AUTO: 300 K/UL (ref 150–450)
PMV BLD AUTO: 10.3 FL (ref 9.2–12.9)
POTASSIUM SERPL-SCNC: 3.6 MMOL/L (ref 3.5–5.1)
RBC # BLD AUTO: 4.66 M/UL (ref 4–5.4)
SODIUM SERPL-SCNC: 138 MMOL/L (ref 136–145)
WBC # BLD AUTO: 7.87 K/UL (ref 3.9–12.7)

## 2023-05-03 PROCEDURE — 1101F PR PT FALLS ASSESS DOC 0-1 FALLS W/OUT INJ PAST YR: ICD-10-PCS | Mod: CPTII,S$GLB,, | Performed by: INTERNAL MEDICINE

## 2023-05-03 PROCEDURE — 1160F PR REVIEW ALL MEDS BY PRESCRIBER/CLIN PHARMACIST DOCUMENTED: ICD-10-PCS | Mod: CPTII,S$GLB,, | Performed by: INTERNAL MEDICINE

## 2023-05-03 PROCEDURE — 83735 ASSAY OF MAGNESIUM: CPT | Performed by: INTERNAL MEDICINE

## 2023-05-03 PROCEDURE — 1126F PR PAIN SEVERITY QUANTIFIED, NO PAIN PRESENT: ICD-10-PCS | Mod: CPTII,S$GLB,, | Performed by: INTERNAL MEDICINE

## 2023-05-03 PROCEDURE — 36415 COLL VENOUS BLD VENIPUNCTURE: CPT | Performed by: INTERNAL MEDICINE

## 2023-05-03 PROCEDURE — 93294 CARDIAC DEVICE CHECK - REMOTE: ICD-10-PCS | Mod: S$GLB,,, | Performed by: INTERNAL MEDICINE

## 2023-05-03 PROCEDURE — 3288F FALL RISK ASSESSMENT DOCD: CPT | Mod: CPTII,S$GLB,, | Performed by: INTERNAL MEDICINE

## 2023-05-03 PROCEDURE — 93294 REM INTERROG EVL PM/LDLS PM: CPT | Mod: S$GLB,,, | Performed by: INTERNAL MEDICINE

## 2023-05-03 PROCEDURE — 99999 PR PBB SHADOW E&M-EST. PATIENT-LVL IV: ICD-10-PCS | Mod: PBBFAC,,, | Performed by: INTERNAL MEDICINE

## 2023-05-03 PROCEDURE — 1157F ADVNC CARE PLAN IN RCRD: CPT | Mod: CPTII,S$GLB,, | Performed by: INTERNAL MEDICINE

## 2023-05-03 PROCEDURE — 83880 ASSAY OF NATRIURETIC PEPTIDE: CPT | Performed by: INTERNAL MEDICINE

## 2023-05-03 PROCEDURE — 85025 COMPLETE CBC W/AUTO DIFF WBC: CPT | Performed by: INTERNAL MEDICINE

## 2023-05-03 PROCEDURE — 1159F PR MEDICATION LIST DOCUMENTED IN MEDICAL RECORD: ICD-10-PCS | Mod: CPTII,S$GLB,, | Performed by: INTERNAL MEDICINE

## 2023-05-03 PROCEDURE — 99214 PR OFFICE/OUTPT VISIT, EST, LEVL IV, 30-39 MIN: ICD-10-PCS | Mod: S$GLB,,, | Performed by: INTERNAL MEDICINE

## 2023-05-03 PROCEDURE — 1126F AMNT PAIN NOTED NONE PRSNT: CPT | Mod: CPTII,S$GLB,, | Performed by: INTERNAL MEDICINE

## 2023-05-03 PROCEDURE — 1157F PR ADVANCE CARE PLAN OR EQUIV PRESENT IN MEDICAL RECORD: ICD-10-PCS | Mod: CPTII,S$GLB,, | Performed by: INTERNAL MEDICINE

## 2023-05-03 PROCEDURE — 1101F PT FALLS ASSESS-DOCD LE1/YR: CPT | Mod: CPTII,S$GLB,, | Performed by: INTERNAL MEDICINE

## 2023-05-03 PROCEDURE — 93296 REM INTERROG EVL PM/IDS: CPT | Performed by: INTERNAL MEDICINE

## 2023-05-03 PROCEDURE — 1160F RVW MEDS BY RX/DR IN RCRD: CPT | Mod: CPTII,S$GLB,, | Performed by: INTERNAL MEDICINE

## 2023-05-03 PROCEDURE — 80048 BASIC METABOLIC PNL TOTAL CA: CPT | Performed by: INTERNAL MEDICINE

## 2023-05-03 PROCEDURE — 3288F PR FALLS RISK ASSESSMENT DOCUMENTED: ICD-10-PCS | Mod: CPTII,S$GLB,, | Performed by: INTERNAL MEDICINE

## 2023-05-03 PROCEDURE — 99214 OFFICE O/P EST MOD 30 MIN: CPT | Mod: S$GLB,,, | Performed by: INTERNAL MEDICINE

## 2023-05-03 PROCEDURE — 1159F MED LIST DOCD IN RCRD: CPT | Mod: CPTII,S$GLB,, | Performed by: INTERNAL MEDICINE

## 2023-05-03 PROCEDURE — 99999 PR PBB SHADOW E&M-EST. PATIENT-LVL IV: CPT | Mod: PBBFAC,,, | Performed by: INTERNAL MEDICINE

## 2023-05-03 RX ORDER — FUROSEMIDE 40 MG/1
40 TABLET ORAL 2 TIMES DAILY
Qty: 90 TABLET | Refills: 1 | Status: SHIPPED | OUTPATIENT
Start: 2023-05-03 | End: 2023-10-18 | Stop reason: SDUPTHER

## 2023-05-03 NOTE — TELEPHONE ENCOUNTER
Pt verbalized understanding with no questions or concerns     Please contact the patient and let them know that their results of labs reveal mildly higher BNP due to extra fluid, can double up lasix dose for 3 -4 days to improve fluid status, needs to monitor BP and weights daily and continue low salt diet. Rest of labs are stable.

## 2023-05-03 NOTE — PROGRESS NOTES
Subjective:   Patient ID:  Holli Landrum is a 89 y.o. female who presents for cardiac consult of Shortness of Breath and Dizziness      HPI  The patient came in today for cardiac consult of Shortness of Breath and Dizziness      Patient ID  ---------------  Holli Landrum is a 89 y.o. female pt with  history of persistent atrial fibrillation, coronary artery disease status post PCI, sick sinus syndrome status post [employer] pacemaker implantation Biotronik, hypertrophic obstructive cardiomyopathy, diastolic congestive heart failure, aortic stenosis, history of GI bleeding.        HPI/Subjective: Darshana Velez, NP Visit 9/2022     Holli Landrum is Here For follow up coronary artery disease, persistent atrial fibrillation, Biotronik pacemaker in Situ     She is here for 4 week follow up.  He underwent Cscope with findings of internal hemorrhoids.  She was started back on Eliquis and is tolerating fine.  BP is controlled today and she reports feeling well overall with only complaint being occasional swelling in her L knee and occasional HIGGINS.  She denies any chest pain or syncope.  She is worried about her blood pressure, lipids, and echocardiogram, and I have reviewed it all with her.      Last Echo moderate aortic stenosis 09/2022  Last Cath 03/20/2020, patent stents  Biotronik pacemaker in Situ in 2021  Last LDL 12/2021, 59    1/18/23  Pt last seen by Darshana Velez 9/2022; Pt of Oscar Sommers MD; here to establish care. Last ECHO 8/2022 with normal bi V function, grade 1 DD, mod AS,  FLAVIO is 0.98 cm2; mean gradient is 20 mmHg;  PASP 27 mmHg. She had Cscope few months prior without any major issues/bleeding.   BP and HR stable. BMI 35 - 216 lbs. Weight 197 prior. She had an episode of Afib Jan 3rd HR 50s-160s. Device interrogation today with brief AFib.   ECG - A paced, nonspec ST changes     5/3/23  BP and HR stable. BMI 33 - 199 lbs.     Ap: 84%   : 3%     Device Defined Counters:   Atrial  Fibrillation/Flutter: up to 2 / up to 5 mode switches per day   EGMs illustrate AF and AF w/RVR, longest available 1min 26sec in duration.   AF burden 0% of day     She has been feeling more SOB, fatigue had more pain after suprapubic catheter, saw gastro recently and colorectal surgery.     Patient feels  no PND, no dizziness, no syncope, no CNS symptoms.    Patient has dec exercise tolerance.    Patient is compliant with medications.    Results for orders placed during the hospital encounter of 08/25/22    Echo    Interpretation Summary  · Normal systolic function.  · The estimated ejection fraction is 60%.  · Grade I left ventricular diastolic dysfunction.  · Normal right ventricular size with normal right ventricular systolic function.  · Moderate left atrial enlargement.  · There is moderate aortic valve stenosis.  FLAVIO is 0.98 cm2; mean gradient is 20 mmHg;   · Normal central venous pressure (3 mmHg).  · The estimated PA systolic pressure is 27 mmHg.      Past Medical History:   Diagnosis Date    Anticoagulant long-term use     Arthritis     Asthma     Basal cell carcinoma     Cancer     skin cancer to face    Cataract     OU done//    CHF (congestive heart failure)     COPD (chronic obstructive pulmonary disease)     no oxygen; patient denies    cpap     Essential hypertension 05/05/2010    GERD (gastroesophageal reflux disease) 11/20/2012    Glaucoma     Hypertensive heart disease with heart failure 02/05/2013    Paroxysmal atrial fibrillation     Paroxysmal ventricular tachycardia     per problem list    Renal disorder     french-1/3/2020    Squamous cell carcinoma of skin        Past Surgical History:   Procedure Laterality Date    A-V CARDIAC PACEMAKER INSERTION  06/16/2021    Procedure: INSERTION, CARDIAC PACEMAKER, DUAL CHAMBER;  Surgeon: Oscar Sommers MD;  [facility];  Service: Cardiovascular;;    ADENOIDECTOMY  191944    APPENDECTOMY  1948    Because of Ovarian Cyst surgery    BREAST BIOPSY  Left 1995    neg    BREAST BIOPSY Right 1984    neg    BREAST BIOPSY Right 1992    neg    BREAST SURGERY      A number of biopsies    CARDIAC CATHETERIZATION  2013, 2014,2016, 2020    has 9 stents    CARDIAC SURGERY  2012    stents    CATARACT EXTRACTION W/  INTRAOCULAR LENS IMPLANT Left 11/01/2018    Dr Rose    CATARACT EXTRACTION W/  INTRAOCULAR LENS IMPLANT Right 12/13/2018    Dr Rose//    CHOLECYSTECTOMY      COLONOSCOPY  ~2005    Dr. Edward; normal per patient report    COLONOSCOPY N/A 09/06/2022    Procedure: COLONOSCOPY 8/31/22-note in Dr Simms's office visit note that he spoke to her cardiologist and she was viable candidate for endoscopy;  Surgeon: Cordelia Bueno MD;  Location: [facility] ENDO;  Service: Endoscopy;  Laterality: N/A;    CORONARY ANGIOGRAPHY N/A 03/20/2020    Procedure: ANGIOGRAM, CORONARY ARTERY;  Surgeon: Chuy Bryant MD;  Location: [facility] CATH;  Service: Cardiology;  Laterality: N/A;    CYSTOSCOPY W/ RETROGRADES Bilateral 02/12/2020    Procedure: CYSTOSCOPY, WITH RETROGRADE PYELOGRAM;  Surgeon: Gasper Feliz MD;  Location: [facility] OR;  Service: Urology;  Laterality: Bilateral;    ESOPHAGOGASTRODUODENOSCOPY N/A 08/13/2020    Procedure: EGD (ESOPHAGOGASTRODUODENOSCOPY);  Surgeon: Mika Solorzano MD;  Location: [facility];  Service: Endoscopy;  Laterality: N/A; Mild Schatzki ring. Biopsied. Dilated. small hiatal hernia, gastritis; biopsy: esophagus- SEVERE REFLUX ESOPHAGITIS, stomach- chronic gastritis, negative for H pylori    ESOPHAGOGASTRODUODENOSCOPY N/A 10/22/2020    Procedure: EGD (ESOPHAGOGASTRODUODENOSCOPY);  Surgeon: Fred Hendricks MD;  Location: [facility] ENDO;  Service: Endoscopy;  Laterality: N/A;    EYE SURGERY  2017    Cataracs    HYSTERECTOMY  1969    LEFT HEART CATHETERIZATION Left 03/03/2020    Procedure: Left heart cath;  Surgeon: Chuy Bryant MD;  Location: [facility] CATH;  Service: Cardiology;  Laterality: Left;    LEFT HEART CATHETERIZATION Left 03/20/2020     Procedure: Left heart cath;  Surgeon: Chuy Bryant MD;  Location: [facility] CATH;  Service: Cardiology;  Laterality: Left;    OVARIAN CYST REMOVAL  teenager    PHACOEMULSIFICATION OF CATARACT Left 2018    Procedure: PHACOEMULSIFICATION, CATARACT;  Surgeon: Aleksandar Rose Jr., MD;  Location: [facility] OR;  Service: Ophthalmology;  Laterality: Left;    PHACOEMULSIFICATION OF CATARACT Right 2018    Procedure: PHACOEMULSIFICATION, CATARACT;  Surgeon: Aleksandar Rose Jr., MD;  Location: [facility] OR;  Service: Ophthalmology;  Laterality: Right;    TONSILLECTOMY      aw/denoids    UPPER GASTROINTESTINAL ENDOSCOPY  ~    Dr. Solorzano    VASCULAR SURGERY      to remove clot from right leg    Yag Capsulotomy Bilateral 2019    Dr Rose       Social History     Tobacco Use    Smoking status: Former     Years: 1.00     Types: Cigarettes     Start date: 6/10/1954     Quit date: 9/15/1955     Years since quittin.6    Smokeless tobacco: Never    Tobacco comments:     I onlysmoked one year while [place] my  was oversees  during [place]   Substance Use Topics    Alcohol use: Not Currently     Comment: Only drank a little wine and haven't  drunk anything in 15 y    Drug use: Never       Family History   Problem Relation Age of Onset    Diabetes Brother         Type 2    Heart disease Brother          at 65 CHD    Diabetes Mother         Type 1    Alcohol abuse Mother         Dod 10/75    Heart disease Mother         Arteriosclerosis    Hypertension Mother     Stroke Mother         3/15/1975 dod 10/1975    Cancer Maternal Grandfather         Dod     Clotting disorder Son         bleeding after tonsillectomy only    Heart disease Father         Heart attack. Afib, and Polyvcithemavera    Hypertension Father     Amblyopia Neg Hx     Blindness Neg Hx     Cataracts Neg Hx     Glaucoma Neg Hx     Macular degeneration Neg Hx     Retinal detachment Neg Hx     Strabismus Neg Hx     Thyroid disease Neg Hx      Colon cancer Neg Hx        Patient's Medications   New Prescriptions    No medications on file   Previous Medications    ACETAMINOPHEN (TYLENOL) 650 MG TBSR    Take 650 mg by mouth as needed.     ALBUTEROL (PROVENTIL/VENTOLIN HFA) 90 MCG/ACTUATION INHALER    Inhale 1-2 puffs into the lungs every 6 (six) hours as needed for Wheezing. Rescue    ALLOPURINOL (ZYLOPRIM) 100 MG TABLET    Take 2 tablets (200 mg total) by mouth once daily.    ALUMINUM & MAGNESIUM HYDROXIDE-SIMETHICONE (MYLANTA MAX STRENGTH) 400-400-40 MG/5 ML SUSPENSION    Take by mouth every 6 (six) hours as needed for Indigestion.    AMLODIPINE (NORVASC) 2.5 MG TABLET    TAKE 1 TABLET BY MOUTH EVERY DAY    APIXABAN (ELIQUIS) 2.5 MG TAB    Take 1 tablet (2.5 mg total) by mouth 2 (two) times daily.    CEFDINIR (OMNICEF) 300 MG CAPSULE    Take 300 mg by mouth 2 (two) times daily.    CETIRIZINE HCL (ZYRTEC ORAL)    Take by mouth.    CHOLECALCIFEROL, VITAMIN D3, 125 MCG (5,000 UNIT) TAB    Take 5,000 Units by mouth once daily.    D-MANNOSE ORAL    Take 2 tablets by mouth once daily.     DORZOLAMIDE (TRUSOPT) 2 % OPHTHALMIC SOLUTION    INSTILL 1 DROP INTO BOTH EYES TWICE DAILY    FLUTICASONE (FLONASE) 50 MCG/ACTUATION NASAL SPRAY    2 sprays (100 mcg total) by Each Nare route daily as needed for Allergies.    FLUTICASONE FUROATE-VILANTEROL (BREO ELLIPTA) 100-25 MCG/DOSE DISKUS INHALER    Inhale 1 puff into the lungs once daily.    FOSFOMYCIN (MONUROL) 3 GRAM PACK    Take as directed    FUROSEMIDE (LASIX) 40 MG TABLET    Take 1 tablet (40 mg total) by mouth 2 (two) times a day. Take twice a day and monitor BP and weights    GRAPE SEED EXTRACT ORAL    Take 1 tablet by mouth once daily.     HYDROCORTISONE 2.5 % CREAM    Apply topically 2 (two) times daily. for 14 days    LACTOBACILLUS RHAMNOSUS GG (CULTURELLE) 10 BILLION CELL CAPSULE    Take 1 capsule by mouth once daily.    LATANOPROST 0.005 % OPHTHALMIC SOLUTION    Place 1 drop into both eyes every evening.     MAGNESIUM OXIDE (MAG-OX) 400 MG TABLET    Take 800 mg by mouth once daily.     NITROGLYCERIN 0.4 MG/HR TD PT24 (NITRODUR) 0.4 MG/HR    Place 1 patch onto the skin once daily.    OLIVE LEAF EXTRACT ORAL    Take 1 tablet by mouth once daily.     OMEGA-3 FATTY ACIDS/FISH OIL (FISH OIL-OMEGA-3 FATTY ACIDS) 300-1,000 MG CAPSULE    Take 2 capsules by mouth once daily.    ONDANSETRON (ZOFRAN) 4 MG TABLET    Take 4 mg by mouth every 6 (six) hours as needed.    PANTOPRAZOLE (PROTONIX) 40 MG TABLET    Take 1 tablet (40 mg total) by mouth once daily.    POTASSIUM CHLORIDE (KLOR-CON) 10 MEQ TBSR    Take 2 tablets (20 mEq total) by mouth 2 (two) times daily.    ROSUVASTATIN (CRESTOR) 40 MG TAB    Take 1 tablet (40 mg total) by mouth once daily.    SOTALOL (BETAPACE) 80 MG TABLET    Take 0.5 tablets (40 mg total) by mouth 2 (two) times daily.    TRAMADOL (ULTRAM) 50 MG TABLET    Take 1 tablet (50 mg total) by mouth every 12 (twelve) hours as needed for Pain.   Modified Medications    No medications on file   Discontinued Medications    No medications on file       Review of Systems   Constitutional: Negative.    HENT: Negative.     Eyes: Negative.    Respiratory:  Positive for shortness of breath.    Cardiovascular:  Positive for palpitations and leg swelling.   Gastrointestinal: Negative.    Genitourinary: Negative.    Musculoskeletal: Negative.    Skin: Negative.    Neurological: Negative.    Endo/Heme/Allergies: Negative.    Psychiatric/Behavioral: Negative.     All 12 systems otherwise negative.    Wt Readings from Last 3 Encounters:   05/03/23 90.3 kg (199 lb 1.2 oz)   05/02/23 89.6 kg (197 lb 8.5 oz)   02/09/23 89 kg (196 lb 3.4 oz)     Temp Readings from Last 3 Encounters:   11/25/22 97.9 °F (36.6 °C) (Oral)   11/25/22 97.9 °F (36.6 °C) (Temporal)   09/06/22 99.1 °F (37.3 °C) (Temporal)     BP Readings from Last 3 Encounters:   05/03/23 131/79   05/02/23 124/76   02/09/23 (!) 185/110     Pulse Readings from Last 3  "Encounters:   05/03/23 80   05/02/23 66   02/09/23 79       /79   Pulse 80   Ht 5' 5" (1.651 m)   Wt 90.3 kg (199 lb 1.2 oz)   LMP  (LMP Unknown)   SpO2 95%   BMI 33.13 kg/m²     Objective:   Physical Exam  Vitals and nursing note reviewed.   Constitutional:       General: She is not in acute distress.     Appearance: She is well-developed. She is not diaphoretic.   HENT:      Head: Normocephalic and atraumatic.      Nose: Nose normal.   Eyes:      General: No scleral icterus.     Conjunctiva/sclera: Conjunctivae normal.   Neck:      Thyroid: No thyromegaly.      Vascular: No JVD.   Cardiovascular:      Rate and Rhythm: Normal rate and regular rhythm.      Heart sounds: S1 normal and S2 normal. Murmur heard.     No friction rub. No gallop. No S3 or S4 sounds.   Pulmonary:      Effort: Pulmonary effort is normal. No respiratory distress.      Breath sounds: Normal breath sounds. No stridor. No wheezing or rales.   Chest:      Chest wall: No tenderness.   Abdominal:      General: Bowel sounds are normal. There is no distension.      Palpations: Abdomen is soft. There is no mass.      Tenderness: There is no abdominal tenderness. There is no rebound.   Genitourinary:     Comments: Deferred  Musculoskeletal:         General: No tenderness or deformity. Normal range of motion.      Cervical back: Normal range of motion and neck supple.   Lymphadenopathy:      Cervical: No cervical adenopathy.   Skin:     General: Skin is warm and dry.      Coloration: Skin is not pale.      Findings: No erythema or rash.   Neurological:      Mental Status: She is alert and oriented to person, place, and time.      Motor: No abnormal muscle tone.      Coordination: Coordination normal.   Psychiatric:         Behavior: Behavior normal.         Thought Content: Thought content normal.         Judgment: Judgment normal.       Lab Results   Component Value Date     01/25/2023     (L) 08/15/2015    K 4.3 01/25/2023    K " 4.0 08/15/2015     01/25/2023    CL 99 08/15/2015    CO2 23 01/25/2023    BUN 21 01/25/2023    CREATININE 0.9 01/25/2023    CREATININE 1.05 (H) 08/15/2015    GLU 99 01/25/2023    HGBA1C 5.8 (H) 12/07/2021    MG 1.6 01/25/2023    AST 24 11/25/2022    AST 23 04/05/2016    ALT 20 11/25/2022    ALBUMIN 4.0 11/25/2022    ALBUMIN 4.4 08/15/2015    PROT 7.7 11/25/2022    BILITOT 0.5 11/25/2022    WBC 8.28 11/25/2022    HGB 14.6 11/25/2022    HCT 43.4 11/25/2022    MCV 84 11/25/2022     11/25/2022    INR 1.0 04/15/2019    TSH 0.993 12/07/2021    CHOL 134 12/07/2021    HDL 46 12/07/2021    LDLCALC 59.0 (L) 12/07/2021    LDLCALC 89 08/15/2015    TRIG 145 12/07/2021     (H) 01/25/2023     Assessment:      1. Paroxysmal atrial fibrillation    2. Cardiac pacemaker in situ    3. Hypertensive heart disease with heart failure    4. Hypercholesteremia    5. Coronary artery disease, h/o multivessel stents.    6. Essential hypertension    7. Moderate aortic stenosis by prior echocardiogram    8. S/P placement of cardiac pacemaker    9. Chronic diastolic heart failure    10. SSS (sick sinus syndrome)    11. Paroxysmal ventricular tachycardia    12. Stage 3a chronic kidney disease    13. HIGGINS (dyspnea on exertion)    14. JA (obstructive sleep apnea)          Plan:     PAF, SSS s/p PPM - 6/2021  - interrogate device and f/u device clinic as sched - working well  - cont Sotalol, Eliquis  - had AF episode in Jan but brief; if recurrent f/u with EP    2. HTN   - titrate meds    3. Mod AS  - cont to monitor - FLAVIO is 0.98 cm2; mean gradient is 20 mmHg;   - TAVR eval if indicated, repeat ECHO     4. HFpEF - more overloaded  - cont tx - lasix 40mg daily, extra PRN - double up lasix BID next 3-4 days  - order labs today     5. CAD s/p PCI  - cont Eliquis, BB, statin  - cont nitro patch     6. Obesity, BMI 35 - 216 lbs  - cont weight loss    7. JA, HIGGINS  - f/u pulm  - needs to use CPAP      Thank you for allowing me to  participate in this patient's care. Please do not hesitate to contact me with any questions or concerns. Consult note has been forwarded to the referral physician.     Fletcher Meyer MD, [facility]  Cardiovascular Disease  [facility], [place]  [phone] (P)

## 2023-05-05 ENCOUNTER — PATIENT MESSAGE (OUTPATIENT)
Dept: PULMONOLOGY | Facility: CLINIC | Age: 88
End: 2023-05-05
Payer: MEDICARE

## 2023-05-15 DIAGNOSIS — H40.053 OCULAR HYPERTENSION, BILATERAL: ICD-10-CM

## 2023-05-16 DIAGNOSIS — H40.053 OCULAR HYPERTENSION, BILATERAL: ICD-10-CM

## 2023-05-16 RX ORDER — LATANOPROST 50 UG/ML
1 SOLUTION/ DROPS OPHTHALMIC NIGHTLY
Qty: 5 ML | Refills: 6 | Status: SHIPPED | OUTPATIENT
Start: 2023-05-16 | End: 2023-05-16 | Stop reason: SDUPTHER

## 2023-05-17 ENCOUNTER — HOSPITAL ENCOUNTER (OUTPATIENT)
Dept: CARDIOLOGY | Facility: HOSPITAL | Age: 88
Discharge: HOME | End: 2023-05-17
Attending: INTERNAL MEDICINE
Payer: MEDICARE

## 2023-05-17 ENCOUNTER — TELEPHONE (OUTPATIENT)
Dept: CARDIOLOGY | Facility: CLINIC | Age: 88
End: 2023-05-17

## 2023-05-17 ENCOUNTER — PATIENT MESSAGE (OUTPATIENT)
Dept: CARDIOLOGY | Facility: CLINIC | Age: 88
End: 2023-05-17

## 2023-05-17 VITALS
WEIGHT: 199 LBS | SYSTOLIC BLOOD PRESSURE: 131 MMHG | HEIGHT: 65 IN | DIASTOLIC BLOOD PRESSURE: 79 MMHG | BODY MASS INDEX: 33.15 KG/M2

## 2023-05-17 DIAGNOSIS — R06.09 DOE (DYSPNEA ON EXERTION): ICD-10-CM

## 2023-05-17 DIAGNOSIS — Z95.0 CARDIAC PACEMAKER IN SITU: ICD-10-CM

## 2023-05-17 DIAGNOSIS — Z95.0 S/P PLACEMENT OF CARDIAC PACEMAKER: ICD-10-CM

## 2023-05-17 DIAGNOSIS — I35.0 NONRHEUMATIC AORTIC VALVE STENOSIS: Primary | ICD-10-CM

## 2023-05-17 DIAGNOSIS — I48.0 PAROXYSMAL ATRIAL FIBRILLATION: ICD-10-CM

## 2023-05-17 DIAGNOSIS — I35.0 MODERATE AORTIC STENOSIS BY PRIOR ECHOCARDIOGRAM: ICD-10-CM

## 2023-05-17 DIAGNOSIS — I50.32 CHRONIC DIASTOLIC HEART FAILURE: ICD-10-CM

## 2023-05-17 DIAGNOSIS — E78.00 HYPERCHOLESTEREMIA: ICD-10-CM

## 2023-05-17 LAB
AORTIC ROOT ANNULUS: 2.92 CM
AV INDEX (PROSTH): 0.32
AV MEAN GRADIENT: 21 MMHG
AV PEAK GRADIENT: 32 MMHG
AV VALVE AREA: 0.8 CM2
AV VELOCITY RATIO: 0.31
BSA FOR ECHO PROCEDURE: 2.03 M2
CV ECHO LV RWT: 0.73 CM
DOP CALC AO PEAK VEL: 2.85 M/S
DOP CALC AO VTI: 59.1 CM
DOP CALC LVOT AREA: 2.5 CM2
DOP CALC LVOT DIAMETER: 1.78 CM
DOP CALC LVOT PEAK VEL: 0.88 M/S
DOP CALC LVOT STROKE VOLUME: 47.01 CM3
DOP CALC RVOT PEAK VEL: 0.66 M/S
DOP CALC RVOT VTI: 13.3 CM
DOP CALCLVOT PEAK VEL VTI: 18.9 CM
E WAVE DECELERATION TIME: 397.6 MSEC
E/A RATIO: 0.94
E/E' RATIO: 9.69 M/S
ECHO LV POSTERIOR WALL: 1.4 CM (ref 0.6–1.1)
EJECTION FRACTION: 65 %
FRACTIONAL SHORTENING: 40 % (ref 28–44)
INTERVENTRICULAR SEPTUM: 1.32 CM (ref 0.6–1.1)
IVC DIAMETER: 1.09 CM
IVRT: 102.76 MSEC
LA MAJOR: 6.43 CM
LA MINOR: 5.7 CM
LA WIDTH: 3.7 CM
LEFT ATRIUM SIZE: 4.43 CM
LEFT ATRIUM VOLUME INDEX MOD: 22.7 ML/M2
LEFT ATRIUM VOLUME INDEX: 42.7 ML/M2
LEFT ATRIUM VOLUME MOD: 44.76 CM3
LEFT ATRIUM VOLUME: 84.19 CM3
LEFT INTERNAL DIMENSION IN SYSTOLE: 2.31 CM (ref 2.1–4)
LEFT VENTRICLE DIASTOLIC VOLUME INDEX: 31.75 ML/M2
LEFT VENTRICLE DIASTOLIC VOLUME: 62.54 ML
LEFT VENTRICLE MASS INDEX: 95 G/M2
LEFT VENTRICLE SYSTOLIC VOLUME INDEX: 9.3 ML/M2
LEFT VENTRICLE SYSTOLIC VOLUME: 18.36 ML
LEFT VENTRICULAR INTERNAL DIMENSION IN DIASTOLE: 3.82 CM (ref 3.5–6)
LEFT VENTRICULAR MASS: 186.95 G
LV LATERAL E/E' RATIO: 9 M/S
LV SEPTAL E/E' RATIO: 10.5 M/S
LVOT MG: 2.06 MMHG
LVOT MV: 0.68 CM/S
MV PEAK A VEL: 0.67 M/S
MV PEAK E VEL: 0.63 M/S
PISA TR MAX VEL: 2.51 M/S
PV MEAN GRADIENT: 0.88 MMHG
PV PEAK VELOCITY: 0.83 CM/S
RA MAJOR: 4.59 CM
RA PRESSURE: 8 MMHG
RA WIDTH: 2.84 CM
RIGHT VENTRICULAR END-DIASTOLIC DIMENSION: 3.21 CM
SINUS: 2.86 CM
STJ: 2.69 CM
TDI LATERAL: 0.07 M/S
TDI SEPTAL: 0.06 M/S
TDI: 0.07 M/S
TR MAX PG: 25 MMHG
TV REST PULMONARY ARTERY PRESSURE: 33 MMHG

## 2023-05-17 PROCEDURE — 93306 TTE W/DOPPLER COMPLETE: CPT

## 2023-05-17 PROCEDURE — 93306 TTE W/DOPPLER COMPLETE: CPT | Mod: 26,,, | Performed by: INTERNAL MEDICINE

## 2023-05-17 PROCEDURE — 93306 ECHO (CUPID ONLY): ICD-10-PCS | Mod: 26,,, | Performed by: INTERNAL MEDICINE

## 2023-05-17 RX ORDER — LATANOPROST 50 UG/ML
1 SOLUTION/ DROPS OPHTHALMIC NIGHTLY
Qty: 5 ML | Refills: 6 | Status: SHIPPED | OUTPATIENT
Start: 2023-05-17

## 2023-05-17 NOTE — TELEPHONE ENCOUNTER
Patient contacted [facility] to return the call to the clinic tomorrow for results.    ----  To my staff - Please contact the patient and let them know that their results of echo reveals moderate to severe aortic valve stenosis, will refer to Dr. Guadarrama to discuss further treatment options in  TAVR clinic and further workup if needed to see if you are a candidate to fix the valve. Continue to monitor BP at home and follow up with me as scheduled.

## 2023-05-18 DIAGNOSIS — I50.32 CHRONIC DIASTOLIC HEART FAILURE: Primary | ICD-10-CM

## 2023-05-18 DIAGNOSIS — I35.0 NONRHEUMATIC AORTIC VALVE STENOSIS: ICD-10-CM

## 2023-05-19 NOTE — TELEPHONE ENCOUNTER
"Spoke with pt and informed her of Dr. Walter's message:  The right nodule was non diagnostic. The other was benign. Lates see her back in 6 months with a THyroid US. If any change can discuss re biopsy.  Recall letter placed so pt can call back Aug 1st to have appt booked in Feb 2019 w/ thyroid us 1 week prior    Verbalized understanding  "thank you, God bless"  " 2

## 2023-05-23 ENCOUNTER — OFFICE VISIT (OUTPATIENT)
Dept: PULMONOLOGY | Facility: CLINIC | Age: 88
End: 2023-05-23
Payer: MEDICARE

## 2023-05-23 VITALS
DIASTOLIC BLOOD PRESSURE: 70 MMHG | WEIGHT: 197 LBS | HEIGHT: 65 IN | HEART RATE: 66 BPM | SYSTOLIC BLOOD PRESSURE: 120 MMHG | OXYGEN SATURATION: 93 % | RESPIRATION RATE: 18 BRPM | BODY MASS INDEX: 32.82 KG/M2

## 2023-05-23 DIAGNOSIS — J45.30 MILD PERSISTENT ASTHMA WITHOUT COMPLICATION: ICD-10-CM

## 2023-05-23 DIAGNOSIS — I27.20 PULMONARY HYPERTENSION: ICD-10-CM

## 2023-05-23 DIAGNOSIS — I70.0 ATHEROSCLEROSIS OF AORTA: ICD-10-CM

## 2023-05-23 DIAGNOSIS — N18.31 STAGE 3A CHRONIC KIDNEY DISEASE: ICD-10-CM

## 2023-05-23 DIAGNOSIS — G47.33 OSA (OBSTRUCTIVE SLEEP APNEA): ICD-10-CM

## 2023-05-23 DIAGNOSIS — G47.33 OBSTRUCTIVE SLEEP APNEA: ICD-10-CM

## 2023-05-23 DIAGNOSIS — I10 ESSENTIAL HYPERTENSION: Primary | ICD-10-CM

## 2023-05-23 PROCEDURE — 1160F PR REVIEW ALL MEDS BY PRESCRIBER/CLIN PHARMACIST DOCUMENTED: ICD-10-PCS | Mod: CPTII,S$GLB,, | Performed by: PHYSICIAN ASSISTANT

## 2023-05-23 PROCEDURE — 1157F PR ADVANCE CARE PLAN OR EQUIV PRESENT IN MEDICAL RECORD: ICD-10-PCS | Mod: CPTII,S$GLB,, | Performed by: PHYSICIAN ASSISTANT

## 2023-05-23 PROCEDURE — 3288F FALL RISK ASSESSMENT DOCD: CPT | Mod: CPTII,S$GLB,, | Performed by: PHYSICIAN ASSISTANT

## 2023-05-23 PROCEDURE — 99214 OFFICE O/P EST MOD 30 MIN: CPT | Mod: S$GLB,,, | Performed by: PHYSICIAN ASSISTANT

## 2023-05-23 PROCEDURE — 1126F AMNT PAIN NOTED NONE PRSNT: CPT | Mod: CPTII,S$GLB,, | Performed by: PHYSICIAN ASSISTANT

## 2023-05-23 PROCEDURE — 1126F PR PAIN SEVERITY QUANTIFIED, NO PAIN PRESENT: ICD-10-PCS | Mod: CPTII,S$GLB,, | Performed by: PHYSICIAN ASSISTANT

## 2023-05-23 PROCEDURE — 99999 PR PBB SHADOW E&M-EST. PATIENT-LVL V: CPT | Mod: PBBFAC,,, | Performed by: PHYSICIAN ASSISTANT

## 2023-05-23 PROCEDURE — 1160F RVW MEDS BY RX/DR IN RCRD: CPT | Mod: CPTII,S$GLB,, | Performed by: PHYSICIAN ASSISTANT

## 2023-05-23 PROCEDURE — 1159F MED LIST DOCD IN RCRD: CPT | Mod: CPTII,S$GLB,, | Performed by: PHYSICIAN ASSISTANT

## 2023-05-23 PROCEDURE — 99999 PR PBB SHADOW E&M-EST. PATIENT-LVL V: ICD-10-PCS | Mod: PBBFAC,,, | Performed by: PHYSICIAN ASSISTANT

## 2023-05-23 PROCEDURE — 1159F PR MEDICATION LIST DOCUMENTED IN MEDICAL RECORD: ICD-10-PCS | Mod: CPTII,S$GLB,, | Performed by: PHYSICIAN ASSISTANT

## 2023-05-23 PROCEDURE — 1101F PT FALLS ASSESS-DOCD LE1/YR: CPT | Mod: CPTII,S$GLB,, | Performed by: PHYSICIAN ASSISTANT

## 2023-05-23 PROCEDURE — 1157F ADVNC CARE PLAN IN RCRD: CPT | Mod: CPTII,S$GLB,, | Performed by: PHYSICIAN ASSISTANT

## 2023-05-23 PROCEDURE — 99214 PR OFFICE/OUTPT VISIT, EST, LEVL IV, 30-39 MIN: ICD-10-PCS | Mod: S$GLB,,, | Performed by: PHYSICIAN ASSISTANT

## 2023-05-23 PROCEDURE — 3288F PR FALLS RISK ASSESSMENT DOCUMENTED: ICD-10-PCS | Mod: CPTII,S$GLB,, | Performed by: PHYSICIAN ASSISTANT

## 2023-05-23 PROCEDURE — 1101F PR PT FALLS ASSESS DOC 0-1 FALLS W/OUT INJ PAST YR: ICD-10-PCS | Mod: CPTII,S$GLB,, | Performed by: PHYSICIAN ASSISTANT

## 2023-05-23 RX ORDER — NITROFURANTOIN 25; 75 MG/1; MG/1
100 CAPSULE ORAL 2 TIMES DAILY
COMMUNITY
End: 2023-06-08

## 2023-05-23 RX ORDER — IPRATROPIUM BROMIDE AND ALBUTEROL SULFATE 2.5; .5 MG/3ML; MG/3ML
SOLUTION RESPIRATORY (INHALATION)
COMMUNITY
Start: 2023-02-13 | End: 2023-07-12 | Stop reason: SDUPTHER

## 2023-05-23 NOTE — PROGRESS NOTES
Subjective:       Patient ID: Holli Landrum is a 89 y.o. female.    Chief Complaint: SOB    89yo female referred by Fletcher Meyer MD for JA/SOB  Previously seen by Dr. Souza pulmonary at [facility], last seen in 2021  Here to establish new pulmonary care  She reports she is doing well today without signs/symptoms of exacerbation  Has chronic stable NYHA class III symptoms of shortness of breath, relieved with rest  Takes Breo in the AM, rescue inhaler as needed  Has hoarse voice in the morning, post nasal drip, this is not new, chronic for a few years  History of JA on Sleep study in 2010 at [facility]  Recently stopped CPAP for a few weeks due to hand pain and shingles pain, resolved  Back using CPAP nightly and benefits from use  [employer] [facility]  AutoPAP 12-20, Jovita view mask  Last Chest X Ray was this year at [facility], she reports lungs were clear; previous imaging reports reviewed: chronic interstitial changes    PmHx including persistent atrial fibrillation, coronary artery disease status post PCI, sick sinus syndrome status post Medtr[employer] pacemaker implantation Biotronik, hypertrophic obstructive cardiomyopathy, diastolic congestive heart failure, aortic stenosis, history of GI bleeding    BP Readings from Last 3 Encounters:   05/23/23 120/70   05/17/23 131/79   05/03/23 131/79       Immunization History   Administered Date(s) Administered    COVID-19, MRNA, LN-S, PF (Pfizer) (Purple Cap) 01/10/2021, 01/31/2021, 01/20/2022    Influenza 11/14/2007, 10/01/2015    Influenza (FLUAD) - Quadrivalent - Adjuvanted - PF *Preferred* (65+) 09/28/2020, 01/17/2022, 10/06/2022    Influenza - High Dose - PF (65 years and older) 11/16/2011, 10/01/2012, 11/18/2015, 10/28/2016, 11/27/2017, 10/16/2018, 09/18/2019    Influenza - Quadrivalent - PF *Preferred* (6 months and older) 11/14/2007, 10/20/2008, 09/28/2009, 09/30/2013, 10/01/2015    Influenza - Trivalent (ADULT) 10/01/2015    Influenza Split 09/30/2013    Pneumococcal Conjugate -  13 Valent 01/11/2016    Pneumococcal Polysaccharide - 23 Valent 08/31/2020    Tdap 05/27/2016      Tobacco Use: Medium Risk    Smoking Tobacco Use: Former    Smokeless Tobacco Use: Never    Passive Exposure: Not on file      Past Medical History:   Diagnosis Date    Anticoagulant long-term use     Arthritis     Asthma     Basal cell carcinoma     Cancer     skin cancer to face    Cataract     OU done//    CHF (congestive heart failure)     COPD (chronic obstructive pulmonary disease)     no oxygen; patient denies    cpap     Essential hypertension 05/05/2010    GERD (gastroesophageal reflux disease) 11/20/2012    Glaucoma     Hypertensive heart disease with heart failure 02/05/2013    Paroxysmal atrial fibrillation     Paroxysmal ventricular tachycardia     per problem list    Renal disorder     french-1/3/2020    Squamous cell carcinoma of skin       Current Outpatient Medications on File Prior to Visit   Medication Sig Dispense Refill    acetaminophen (TYLENOL) 650 MG TbSR Take 650 mg by mouth as needed.       albuterol (PROVENTIL/VENTOLIN HFA) 90 mcg/actuation inhaler Inhale 1-2 puffs into the lungs every 6 (six) hours as needed for Wheezing. Rescue 18 g 5    albuterol-ipratropium (DUO-NEB) 2.5 mg-0.5 mg/3 mL nebulizer solution       allopurinoL (ZYLOPRIM) 100 MG tablet Take 2 tablets (200 mg total) by mouth once daily. (Patient taking differently: Take 200 mg by mouth once daily. 1 tablet daily) 180 tablet 2    aluminum & magnesium hydroxide-simethicone (MYLANTA MAX STRENGTH) 400-400-40 mg/5 mL suspension Take by mouth every 6 (six) hours as needed for Indigestion.      amLODIPine (NORVASC) 2.5 MG tablet TAKE 1 TABLET BY MOUTH EVERY DAY 30 tablet 10    apixaban (ELIQUIS) 2.5 mg Tab Take 1 tablet (2.5 mg total) by mouth 2 (two) times daily. 180 tablet 3    cefdinir (OMNICEF) 300 MG capsule Take 300 mg by mouth 2 (two) times daily.      cetirizine HCl (ZYRTEC ORAL) Take by mouth.      cholecalciferol, vitamin D3,  125 mcg (5,000 unit) Tab Take 5,000 Units by mouth once daily.      D-MANNOSE ORAL Take 2 tablets by mouth once daily.       dorzolamide (TRUSOPT) 2 % ophthalmic solution INSTILL 1 DROP INTO BOTH EYES TWICE DAILY 30 mL 1    fluticasone (FLONASE) 50 mcg/actuation nasal spray 2 sprays (100 mcg total) by Each Nare route daily as needed for Allergies. 16 g 11    fluticasone furoate-vilanteroL (BREO ELLIPTA) 100-25 mcg/dose diskus inhaler Inhale 1 puff into the lungs once daily. 90 each 0    fosfomycin (MONUROL) 3 gram Pack Take as directed 3 g 0    furosemide (LASIX) 40 MG tablet Take 1 tablet (40 mg total) by mouth 2 (two) times a day. Take twice a day and monitor BP and weights 90 tablet 1    GRAPE SEED EXTRACT ORAL Take 1 tablet by mouth once daily.       Lactobacillus rhamnosus GG (CULTURELLE) 10 billion cell capsule Take 1 capsule by mouth once daily.      latanoprost 0.005 % ophthalmic solution Place 1 drop into both eyes every evening. 5 mL 6    magnesium oxide (MAG-OX) 400 mg tablet Take 800 mg by mouth once daily.       nitrofurantoin, macrocrystal-monohydrate, (MACROBID) 100 MG capsule Take 100 mg by mouth 2 (two) times daily.      nitroGLYCERIN 0.4 MG/HR TD PT24 (NITRODUR) 0.4 mg/hr Place 1 patch onto the skin once daily. 90 patch 3    ondansetron (ZOFRAN) 4 MG tablet Take 4 mg by mouth every 6 (six) hours as needed.      pantoprazole (PROTONIX) 40 MG tablet Take 1 tablet (40 mg total) by mouth once daily. 30 tablet 11    potassium chloride (KLOR-CON) 10 MEQ TbSR Take 2 tablets (20 mEq total) by mouth 2 (two) times daily. 180 tablet 3    rosuvastatin (CRESTOR) 40 MG Tab Take 1 tablet (40 mg total) by mouth once daily. 90 tablet 3    sotaloL (BETAPACE) 80 MG tablet Take 0.5 tablets (40 mg total) by mouth 2 (two) times daily. 90 tablet 3    traMADoL (ULTRAM) 50 mg tablet Take 1 tablet (50 mg total) by mouth every 12 (twelve) hours as needed for Pain. 10 tablet 0    hydrocortisone 2.5 % cream Apply topically 2  "(two) times daily. for 14 days 1 each 0    OLIVE LEAF EXTRACT ORAL Take 1 tablet by mouth once daily.       omega-3 fatty acids/fish oil (FISH OIL-OMEGA-3 FATTY ACIDS) 300-1,000 mg capsule Take 2 capsules by mouth once daily.       No current facility-administered medications on file prior to visit.        Review of Systems   Constitutional:  Positive for fatigue. Negative for fever, weight loss, appetite change and weakness.   HENT:  Positive for postnasal drip and rhinorrhea. Negative for sinus pressure, trouble swallowing and congestion.    Respiratory:  Positive for shortness of breath, dyspnea on extertion and somnolence. Negative for cough, sputum production, choking, chest tightness and wheezing.    Cardiovascular:  Negative for chest pain and leg swelling.   Musculoskeletal:  Positive for arthralgias and gait problem. Negative for joint swelling.   Gastrointestinal:  Negative for nausea and vomiting.   Neurological:  Negative for dizziness, weakness and headaches.   All other systems reviewed and are negative.    Objective:       Vitals:    05/23/23 1245   BP: 120/70   Pulse: 66   Resp: 18   SpO2: (!) 93%   Weight: 89.4 kg (196 lb 15.7 oz)   Height: 5' 5" (1.651 m)       Physical Exam   Constitutional: She is oriented to person, place, and time. She appears well-developed and well-nourished. No distress.   HENT:   Head: Normocephalic.   Mouth/Throat: Oropharynx is clear and moist.   Cardiovascular: Normal rate and regular rhythm.   Pulmonary/Chest: Effort normal. No respiratory distress. She has no wheezes. She has no rhonchi. She has no rales.   Musculoskeletal:         General: No edema.      Cervical back: Normal range of motion and neck supple.   Neurological: She is alert and oriented to person, place, and time.   Skin: Skin is warm and dry.   Psychiatric: She has a normal mood and affect.   Vitals reviewed.  Personal Diagnostic Review    Echo  · The left ventricle is normal in size with mild concentric " hypertrophy   and normal systolic function.  · Moderate left atrial enlargement.  · The estimated ejection fraction is 65%.  · Indeterminate left ventricular diastolic function.  · Normal right ventricular size with normal right ventricular systolic   function.  · There is moderate-to-severe aortic valve stenosis.  · Aortic valve area is 0.80 cm2; peak velocity is 2.85 m/s; mean gradient   is 21 mmHg.  · Mild tricuspid regurgitation.  · Intermediate central venous pressure (8 mmHg).  · The estimated PA systolic pressure is 33 mmHg.             No flowsheet data found.      Assessment/Plan:       Problem List Items Addressed This Visit          Pulmonary    Pulmonary hypertension     Echocardiogram 2023 estimated PA systolic pressure is 33 mmHg.     Compliant with CPAP           Asthma     Stable on BREO daily  Albuterol prn, does not use rescue inhaler often  Lungs clear on exam  Last [facility] Ray 2023 at [facility], she reports lungs were clear  Will get updated hira, FeNO, and 6mwd - she is in wheelchair during visit but says she will be able to attempt walk with her walker           Relevant Orders    Spirometry with/without bronchodilator    Fraction of  Nitric Oxide    Stress test, pulmonary       Cardiac/Vascular    Essential hypertension - Primary     Stable, continue current medication management            Atherosclerosis of aorta     Heart healthy diet and exercise encouraged               Renal/    CKD (chronic kidney disease) stage 3, GFR 30-59 ml/min     F/u regularly with PCP                Other    Obstructive sleep apnea     Sleep study in  at [place]  Stopped CPAP for a few weeks due to hand pain and shingles pain, resolved  Back using CPAP nightly and benefits from use  [employer] [facility]  AutoPAP 12-20, Jovita view mask  Patient states improved symptoms with use of CPAP. Sleeping more soundly. Waking up feeling more refreshed. Improved daytime sleepiness.              Other Visit Diagnoses        JA (obstructive sleep apnea)                She requests to see pulmonologist for next visit.    Follow up for hira, walk, feno and f/u pulmonologist.    Discussed diagnosis, its evaluation, treatment and usual course. All questions answered.    Patient verbalized understanding of plan and left in no acute distress    Thank you for the courtesy of participating in the care of this patient    Sasha Monzon PA-C  [facility]

## 2023-05-23 NOTE — ASSESSMENT & PLAN NOTE
Sleep study in 2010 at [place]  Stopped CPAP for a few weeks due to hand pain and shingles pain, resolved  Back using CPAP nightly and benefits from use  [employer] [facility]  AutoPAP 12-20, Jovita view mask  Patient states improved symptoms with use of CPAP. Sleeping more soundly. Waking up feeling more refreshed. Improved daytime sleepiness.

## 2023-05-23 NOTE — ASSESSMENT & PLAN NOTE
Stable on BREO daily  Albuterol prn, does not use rescue inhaler often  Lungs clear on exam  Last Chest X Ray 2/2023 at [facility], she reports lungs were clear  Will get updated hira, FeNO, and 6mwd - she is in wheelchair during visit but says she will be able to attempt walk with her walker

## 2023-06-06 NOTE — ASSESSMENT & PLAN NOTE
History of MV coronary stenting. [facility] in 2020 showed patent LAD and diagonal stents.  Will plan for 2 repeat coronary angiography, I suspect in stent restenosis therefore I will have her see Radiation Oncology for possible brachytherapy at the same time.    1. Cardiac catheterization with probable PCI.   2. Antiplatelets: Stop eliquis, ASA/Plavix  3. Access: Right radial  4. Catheters: Irving  5. Pt is a YVETTE candidate and understands the importance of taking plavix for at least one year in ACS cases and 6 months in stable CAD. The patient understands that in case of receiving a drug coated stent the failure to comply with dual anti-platelet therapy as prescribed is likely to result in stent clothing, heart attack and death.   6. The risks, benefits, and alternatives of coronary vascular angiography and possible intervention were discussed with the patient. All questions were answered and informed consent was obtained. I had a detailed discussion with the patient regarding risk of stroke, MI, bleeding access site complications including limb loss, allergy, kidney failure including dialysis and death.  7. The patient understands the risks and benefits and wishes to go ahead with the procedure.  8. CSHA Clinical Frailty Scale: Vulnerable  9. All patient's questions were answered

## 2023-06-06 NOTE — ASSESSMENT & PLAN NOTE
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  Should we not find anything treatable in her coronary vasculature will consider progress trial

## 2023-06-06 NOTE — PROGRESS NOTES
Subjective:     Referring Physician: Dr Meyer    HPI  Holli Landrum is a 89 y.o. female who presents for evaluation of aortic stenosis.     Holli Landrum is a 89 y.o. female referred by Dr Meyer for evaluation of severe AS (NYHA Class IV sx).    The patient has multiple comorbidities including an indwelling suprapubic catheter.  She has a history of coronary artery disease and has had multiple PCIs performing the past.  Last PCI was in 2020.  She describes for the last 3 months she has had significant dyspnea on exertion to the point that now is at rest.  The symptoms are very similar of those she experienced before PCI in the past.  She was found to have moderate aortic stenosis on echo.  She was referred to valve Clinic for evaluation.    The patient has undergone the following TAVR work-up:   ECHO (Date 5/17/23): FLAVIO= 0.8 cm2, MG= 21 mmHg, Peak Tc= 2.85 m/s, EF= 65%.   LHC (Date ): Last cath 2020 had Patent LAD/D1 bifurcation stents, RCA stent with moderate mid RCA stenosis   STS: 4%   Frailty: 3/4   Iliacs are >7.7 on R and > 6.6 on L   LVOT area by CTA is 3.46 cm2 (23 mm X 20 mm) and Avg Diameter is 21 per Dr Gaudarrama  Incidental findings on CT: None  CT Surgery risk assessment: Pending  Rhythm issues: PPM in situ  PFTs: deferred  KCCQ/5 meter walk: Pending  Comorbidities: COPD, afib    Review of Systems   Constitutional: Negative for fever.   HENT:  Negative for congestion and hoarse voice.    Eyes:  Negative for blurred vision and double vision.   Cardiovascular:  Positive for dyspnea on exertion. Negative for chest pain, claudication, cyanosis, irregular heartbeat, leg swelling, near-syncope, orthopnea, palpitations and paroxysmal nocturnal dyspnea.   Respiratory:  Negative for cough, hemoptysis and shortness of breath.    Endocrine: Negative for cold intolerance and heat intolerance.   Hematologic/Lymphatic: Negative for bleeding problem. Does not bruise/bleed easily.   Skin:  Negative for dry skin and nail  changes.   Musculoskeletal:  Negative for back pain and falls.   Gastrointestinal:  Negative for abdominal pain and anorexia.   Neurological:  Negative for brief paralysis, dizziness and weakness.        Past Medical History:   Diagnosis Date    Anticoagulant long-term use     Arthritis     Asthma     Basal cell carcinoma     Cancer     skin cancer to face    Cataract     OU done//    CHF (congestive heart failure)     COPD (chronic obstructive pulmonary disease)     no oxygen; patient denies    cpap     Essential hypertension 05/05/2010    GERD (gastroesophageal reflux disease) 11/20/2012    Glaucoma     Hypertensive heart disease with heart failure 02/05/2013    Paroxysmal atrial fibrillation     Paroxysmal ventricular tachycardia     per problem list    Renal disorder     french-1/3/2020    Squamous cell carcinoma of skin        Current Outpatient Medications:     acetaminophen (TYLENOL) 650 MG TbSR, Take 650 mg by mouth as needed. , Disp: , Rfl:     albuterol (PROVENTIL/VENTOLIN HFA) 90 mcg/actuation inhaler, Inhale 1-2 puffs into the lungs every 6 (six) hours as needed for Wheezing. Rescue, Disp: 18 g, Rfl: 5    allopurinoL (ZYLOPRIM) 100 MG tablet, Take 2 tablets (200 mg total) by mouth once daily. (Patient taking differently: Take 200 mg by mouth once daily. 1 tablet daily), Disp: 180 tablet, Rfl: 2    aluminum & magnesium hydroxide-simethicone (MYLANTA MAX STRENGTH) 400-400-40 mg/5 mL suspension, Take by mouth every 6 (six) hours as needed for Indigestion., Disp: , Rfl:     amLODIPine (NORVASC) 2.5 MG tablet, TAKE 1 TABLET BY MOUTH EVERY DAY, Disp: 30 tablet, Rfl: 10    apixaban (ELIQUIS) 2.5 mg Tab, Take 1 tablet (2.5 mg total) by mouth 2 (two) times daily., Disp: 180 tablet, Rfl: 3    cefdinir (OMNICEF) 300 MG capsule, Take 300 mg by mouth 2 (two) times daily., Disp: , Rfl:     cetirizine HCl (ZYRTEC ORAL), Take by mouth., Disp: , Rfl:     cholecalciferol, vitamin D3, 125 mcg (5,000 unit) Tab, Take 5,000  Units by mouth once daily., Disp: , Rfl:     D-MANNOSE ORAL, Take 2 tablets by mouth once daily. , Disp: , Rfl:     dorzolamide (TRUSOPT) 2 % ophthalmic solution, INSTILL 1 DROP INTO BOTH EYES TWICE DAILY, Disp: 30 mL, Rfl: 1    fluticasone (FLONASE) 50 mcg/actuation nasal spray, 2 sprays (100 mcg total) by Each Nare route daily as needed for Allergies., Disp: 16 g, Rfl: 11    fluticasone furoate-vilanteroL (BREO ELLIPTA) 100-25 mcg/dose diskus inhaler, Inhale 1 puff into the lungs once daily., Disp: 90 each, Rfl: 0    furosemide (LASIX) 40 MG tablet, Take 1 tablet (40 mg total) by mouth 2 (two) times a day. Take twice a day and monitor BP and weights, Disp: 90 tablet, Rfl: 1    Lactobacillus rhamnosus GG (CULTURELLE) 10 billion cell capsule, Take 1 capsule by mouth once daily., Disp: , Rfl:     latanoprost 0.005 % ophthalmic solution, Place 1 drop into both eyes every evening., Disp: 5 mL, Rfl: 6    magnesium oxide (MAG-OX) 400 mg tablet, Take 800 mg by mouth once daily. , Disp: , Rfl:     nitroGLYCERIN 0.4 MG/HR TD PT24 (NITRODUR) 0.4 mg/hr, Place 1 patch onto the skin once daily., Disp: 90 patch, Rfl: 3    pantoprazole (PROTONIX) 40 MG tablet, Take 1 tablet (40 mg total) by mouth once daily., Disp: 30 tablet, Rfl: 11    potassium chloride (KLOR-CON) 10 MEQ TbSR, Take 2 tablets (20 mEq total) by mouth 2 (two) times daily., Disp: 180 tablet, Rfl: 3    rosuvastatin (CRESTOR) 40 MG Tab, Take 1 tablet (40 mg total) by mouth once daily., Disp: 90 tablet, Rfl: 3    sotaloL (BETAPACE) 80 MG tablet, Take 0.5 tablets (40 mg total) by mouth 2 (two) times daily., Disp: 90 tablet, Rfl: 3    traMADoL (ULTRAM) 50 mg tablet, Take 1 tablet (50 mg total) by mouth every 12 (twelve) hours as needed for Pain., Disp: 10 tablet, Rfl: 0    albuterol-ipratropium (DUO-NEB) 2.5 mg-0.5 mg/3 mL nebulizer solution, , Disp: , Rfl:     ceftAZIDime (FORTAZ) 2 gram injection, Inject 2 g into the vein every 8 (eight) hours., Disp: , Rfl:      "clopidogreL (PLAVIX) 75 mg tablet, Take 4 tabs (300mg) night before procedure then 1 tab (75mg) daily starting day of procedure., Disp: 30 tablet, Rfl: 11    fosfomycin (MONUROL) 3 gram Pack, Take as directed, Disp: 3 g, Rfl: 0    GRAPE SEED EXTRACT ORAL, Take 1 tablet by mouth once daily. , Disp: , Rfl:     hydrocortisone 2.5 % cream, Apply topically 2 (two) times daily. for 14 days, Disp: 1 each, Rfl: 0    nitrofurantoin, macrocrystal-monohydrate, (MACROBID) 100 MG capsule, Take 100 mg by mouth 2 (two) times daily., Disp: , Rfl:     OLIVE LEAF EXTRACT ORAL, Take 1 tablet by mouth once daily. , Disp: , Rfl:     omega-3 fatty acids/fish oil (FISH OIL-OMEGA-3 FATTY ACIDS) 300-1,000 mg capsule, Take 2 capsules by mouth once daily., Disp: , Rfl:     ondansetron (ZOFRAN) 4 MG tablet, Take 4 mg by mouth every 6 (six) hours as needed., Disp: , Rfl:     Current Facility-Administered Medications:     sodium chloride 0.9% flush 10 mL, 10 mL, Intravenous, PRN, Brice Campuzano MD    Objective:    Physical Exam        Vitals:    06/07/23 1420   BP: 114/60   Pulse: 76   SpO2: 96%   Weight: 89 kg (196 lb 3.4 oz)   Height: 5' 5" (1.651 m)     Body mass index is 32.65 kg/m².    Test(s) Reviewed  I have reviewed the following in detail:  [] Stress test   [] Angiography   [x] Echocardiogram   [x] Labs   [] Other       Chemistry        Component Value Date/Time     06/07/2023 1113     (L) 08/15/2015 0954    K 4.9 06/07/2023 1113    K 4.0 08/15/2015 0954     06/07/2023 1113    CL 99 08/15/2015 0954    CO2 22 (L) 06/07/2023 1113    BUN 18 06/07/2023 1113    CREATININE 0.9 06/07/2023 1113    CREATININE 1.05 (H) 08/15/2015 0954    GLU 97 06/07/2023 1113        Component Value Date/Time    CALCIUM 10.0 06/07/2023 1113    ALKPHOS 121 11/25/2022 1426    AST 24 11/25/2022 1426    AST 23 04/05/2016 0817    ALT 20 11/25/2022 1426    BILITOT 0.5 11/25/2022 1426    ESTGFRAFRICA >60.0 12/07/2021 1140    EGFRNONAA >60.0 " 12/07/2021 1140            TTE 5/17/23  The left ventricle is normal in size with mild concentric hypertrophy and normal systolic function.  Moderate left atrial enlargement.  The estimated ejection fraction is 65%.  Indeterminate left ventricular diastolic function.  Normal right ventricular size with normal right ventricular systolic function.  There is moderate-to-severe aortic valve stenosis.  Aortic valve area is 0.80 cm2; peak velocity is 2.85 m/s; mean gradient is 21 mmHg.  Mild tricuspid regurgitation.  Intermediate central venous pressure (8 mmHg).  The estimated PA systolic pressure is 33 mmHg.    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  Should we not find anything treatable in her coronary vasculature will consider progress trial    Paroxysmal atrial fibrillation  On Eliquis.     Symptomatic bradycardia  PPM in situ    Coronary artery disease, h/o multivessel stents.  History of MV coronary stenting. [facility] in 2020 showed patent LAD and diagonal stents.  Will plan for 2 repeat coronary angiography, I suspect in stent restenosis therefore I will have her see Radiation Oncology for possible brachytherapy at the same time.    Cardiac catheterization with probable PCI.   Antiplatelets: Stop eliquis, ASA/Plavix  Access: Right radial  Catheters: Irving Jade is a YVETTE candidate and understands the importance of taking plavix for at least one year in ACS cases and 6 months in stable CAD. The patient understands that in case of receiving a drug coated stent the failure to comply with dual anti-platelet therapy as prescribed is likely to result in stent clothing, heart attack and death.   The risks, benefits, and alternatives of coronary vascular angiography and possible intervention were discussed with the patient. All questions were answered and informed consent was obtained. I had a detailed  discussion with the patient regarding risk of stroke, MI, bleeding access site complications including limb loss, allergy, kidney failure including dialysis and death.  The patient understands the risks and benefits and wishes to go ahead with the procedure.  HA Clinical Frailty Scale: Vulnerable  All patient's questions were answered      Atherosclerosis of aorta  See imaging. Follow up with Cardiology.     COPD (chronic obstructive pulmonary disease)  Chronic inhaler use. Consider PFTs.     Plan:         Symptoms are very similar to her sympotms prior to PCI in the past. AS is in the moderate range. Will proceed with coroanry angiography +/- PCI. Will have her see brachytherapy first as she reports multiple prior stent failures

## 2023-06-06 NOTE — H&P (VIEW-ONLY)
Subjective:     Referring Physician: Dr Meyer    HPI  Holli Landrum is a 89 y.o. female who presents for evaluation of aortic stenosis.     Holli Landrum is a 89 y.o. female referred by Dr Meyer for evaluation of severe AS (NYHA Class IV sx).    The patient has multiple comorbidities including an indwelling suprapubic catheter.  She has a history of coronary artery disease and has had multiple PCIs performing the past.  Last PCI was in 2020.  She describes for the last 3 months she has had significant dyspnea on exertion to the point that now is at rest.  The symptoms are very similar of those she experienced before PCI in the past.  She was found to have moderate aortic stenosis on echo.  She was referred to valve Clinic for evaluation.    The patient has undergone the following TAVR work-up:   ECHO (Date 5/17/23): FLAVIO= 0.8 cm2, MG= 21 mmHg, Peak Tc= 2.85 m/s, EF= 65%.   LHC (Date ): Last cath 2020 had Patent LAD/D1 bifurcation stents, RCA stent with moderate mid RCA stenosis   STS: 4%   Frailty: 3/4   Iliacs are >7.7 on R and > 6.6 on L   LVOT area by CTA is 3.46 cm2 (23 mm X 20 mm) and Avg Diameter is 21 per Dr Guadarrama  Incidental findings on CT: None  CT Surgery risk assessment: Pending  Rhythm issues: PPM in situ  PFTs: deferred  KCCQ/5 meter walk: Pending  Comorbidities: COPD, afib    Review of Systems   Constitutional: Negative for fever.   HENT:  Negative for congestion and hoarse voice.    Eyes:  Negative for blurred vision and double vision.   Cardiovascular:  Positive for dyspnea on exertion. Negative for chest pain, claudication, cyanosis, irregular heartbeat, leg swelling, near-syncope, orthopnea, palpitations and paroxysmal nocturnal dyspnea.   Respiratory:  Negative for cough, hemoptysis and shortness of breath.    Endocrine: Negative for cold intolerance and heat intolerance.   Hematologic/Lymphatic: Negative for bleeding problem. Does not bruise/bleed easily.   Skin:  Negative for dry skin and nail  changes.   Musculoskeletal:  Negative for back pain and falls.   Gastrointestinal:  Negative for abdominal pain and anorexia.   Neurological:  Negative for brief paralysis, dizziness and weakness.        Past Medical History:   Diagnosis Date    Anticoagulant long-term use     Arthritis     Asthma     Basal cell carcinoma     Cancer     skin cancer to face    Cataract     OU done//    CHF (congestive heart failure)     COPD (chronic obstructive pulmonary disease)     no oxygen; patient denies    cpap     Essential hypertension 05/05/2010    GERD (gastroesophageal reflux disease) 11/20/2012    Glaucoma     Hypertensive heart disease with heart failure 02/05/2013    Paroxysmal atrial fibrillation     Paroxysmal ventricular tachycardia     per problem list    Renal disorder     french-1/3/2020    Squamous cell carcinoma of skin        Current Outpatient Medications:     acetaminophen (TYLENOL) 650 MG TbSR, Take 650 mg by mouth as needed. , Disp: , Rfl:     albuterol (PROVENTIL/VENTOLIN HFA) 90 mcg/actuation inhaler, Inhale 1-2 puffs into the lungs every 6 (six) hours as needed for Wheezing. Rescue, Disp: 18 g, Rfl: 5    allopurinoL (ZYLOPRIM) 100 MG tablet, Take 2 tablets (200 mg total) by mouth once daily. (Patient taking differently: Take 200 mg by mouth once daily. 1 tablet daily), Disp: 180 tablet, Rfl: 2    aluminum & magnesium hydroxide-simethicone (MYLANTA MAX STRENGTH) 400-400-40 mg/5 mL suspension, Take by mouth every 6 (six) hours as needed for Indigestion., Disp: , Rfl:     amLODIPine (NORVASC) 2.5 MG tablet, TAKE 1 TABLET BY MOUTH EVERY DAY, Disp: 30 tablet, Rfl: 10    apixaban (ELIQUIS) 2.5 mg Tab, Take 1 tablet (2.5 mg total) by mouth 2 (two) times daily., Disp: 180 tablet, Rfl: 3    cefdinir (OMNICEF) 300 MG capsule, Take 300 mg by mouth 2 (two) times daily., Disp: , Rfl:     cetirizine HCl (ZYRTEC ORAL), Take by mouth., Disp: , Rfl:     cholecalciferol, vitamin D3, 125 mcg (5,000 unit) Tab, Take 5,000  Units by mouth once daily., Disp: , Rfl:     D-MANNOSE ORAL, Take 2 tablets by mouth once daily. , Disp: , Rfl:     dorzolamide (TRUSOPT) 2 % ophthalmic solution, INSTILL 1 DROP INTO BOTH EYES TWICE DAILY, Disp: 30 mL, Rfl: 1    fluticasone (FLONASE) 50 mcg/actuation nasal spray, 2 sprays (100 mcg total) by Each Nare route daily as needed for Allergies., Disp: 16 g, Rfl: 11    fluticasone furoate-vilanteroL (BREO ELLIPTA) 100-25 mcg/dose diskus inhaler, Inhale 1 puff into the lungs once daily., Disp: 90 each, Rfl: 0    furosemide (LASIX) 40 MG tablet, Take 1 tablet (40 mg total) by mouth 2 (two) times a day. Take twice a day and monitor BP and weights, Disp: 90 tablet, Rfl: 1    Lactobacillus rhamnosus GG (CULTURELLE) 10 billion cell capsule, Take 1 capsule by mouth once daily., Disp: , Rfl:     latanoprost 0.005 % ophthalmic solution, Place 1 drop into both eyes every evening., Disp: 5 mL, Rfl: 6    magnesium oxide (MAG-OX) 400 mg tablet, Take 800 mg by mouth once daily. , Disp: , Rfl:     nitroGLYCERIN 0.4 MG/HR TD PT24 (NITRODUR) 0.4 mg/hr, Place 1 patch onto the skin once daily., Disp: 90 patch, Rfl: 3    pantoprazole (PROTONIX) 40 MG tablet, Take 1 tablet (40 mg total) by mouth once daily., Disp: 30 tablet, Rfl: 11    potassium chloride (KLOR-CON) 10 MEQ TbSR, Take 2 tablets (20 mEq total) by mouth 2 (two) times daily., Disp: 180 tablet, Rfl: 3    rosuvastatin (CRESTOR) 40 MG Tab, Take 1 tablet (40 mg total) by mouth once daily., Disp: 90 tablet, Rfl: 3    sotaloL (BETAPACE) 80 MG tablet, Take 0.5 tablets (40 mg total) by mouth 2 (two) times daily., Disp: 90 tablet, Rfl: 3    traMADoL (ULTRAM) 50 mg tablet, Take 1 tablet (50 mg total) by mouth every 12 (twelve) hours as needed for Pain., Disp: 10 tablet, Rfl: 0    albuterol-ipratropium (DUO-NEB) 2.5 mg-0.5 mg/3 mL nebulizer solution, , Disp: , Rfl:     ceftAZIDime (FORTAZ) 2 gram injection, Inject 2 g into the vein every 8 (eight) hours., Disp: , Rfl:      "clopidogreL (PLAVIX) 75 mg tablet, Take 4 tabs (300mg) night before procedure then 1 tab (75mg) daily starting day of procedure., Disp: 30 tablet, Rfl: 11    fosfomycin (MONUROL) 3 gram Pack, Take as directed, Disp: 3 g, Rfl: 0    GRAPE SEED EXTRACT ORAL, Take 1 tablet by mouth once daily. , Disp: , Rfl:     hydrocortisone 2.5 % cream, Apply topically 2 (two) times daily. for 14 days, Disp: 1 each, Rfl: 0    nitrofurantoin, macrocrystal-monohydrate, (MACROBID) 100 MG capsule, Take 100 mg by mouth 2 (two) times daily., Disp: , Rfl:     OLIVE LEAF EXTRACT ORAL, Take 1 tablet by mouth once daily. , Disp: , Rfl:     omega-3 fatty acids/fish oil (FISH OIL-OMEGA-3 FATTY ACIDS) 300-1,000 mg capsule, Take 2 capsules by mouth once daily., Disp: , Rfl:     ondansetron (ZOFRAN) 4 MG tablet, Take 4 mg by mouth every 6 (six) hours as needed., Disp: , Rfl:     Current Facility-Administered Medications:     sodium chloride 0.9% flush 10 mL, 10 mL, Intravenous, PRN, Brice Campuzano MD    Objective:    Physical Exam        Vitals:    06/07/23 1420   BP: 114/60   Pulse: 76   SpO2: 96%   Weight: 89 kg (196 lb 3.4 oz)   Height: 5' 5" (1.651 m)     Body mass index is 32.65 kg/m².    Test(s) Reviewed  I have reviewed the following in detail:  [] Stress test   [] Angiography   [x] Echocardiogram   [x] Labs   [] Other       Chemistry        Component Value Date/Time     06/07/2023 1113     (L) 08/15/2015 0954    K 4.9 06/07/2023 1113    K 4.0 08/15/2015 0954     06/07/2023 1113    CL 99 08/15/2015 0954    CO2 22 (L) 06/07/2023 1113    BUN 18 06/07/2023 1113    CREATININE 0.9 06/07/2023 1113    CREATININE 1.05 (H) 08/15/2015 0954    GLU 97 06/07/2023 1113        Component Value Date/Time    CALCIUM 10.0 06/07/2023 1113    ALKPHOS 121 11/25/2022 1426    AST 24 11/25/2022 1426    AST 23 04/05/2016 0817    ALT 20 11/25/2022 1426    BILITOT 0.5 11/25/2022 1426    ESTGFRAFRICA >60.0 12/07/2021 1140    EGFRNONAA >60.0 " 12/07/2021 1140            TTE 5/17/23  The left ventricle is normal in size with mild concentric hypertrophy and normal systolic function.  Moderate left atrial enlargement.  The estimated ejection fraction is 65%.  Indeterminate left ventricular diastolic function.  Normal right ventricular size with normal right ventricular systolic function.  There is moderate-to-severe aortic valve stenosis.  Aortic valve area is 0.80 cm2; peak velocity is 2.85 m/s; mean gradient is 21 mmHg.  Mild tricuspid regurgitation.  Intermediate central venous pressure (8 mmHg).  The estimated PA systolic pressure is 33 mmHg.    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  Should we not find anything treatable in her coronary vasculature will consider progress trial    Paroxysmal atrial fibrillation  On Eliquis.     Symptomatic bradycardia  PPM in situ    Coronary artery disease, h/o multivessel stents.  History of MV coronary stenting. [facility] in 2020 showed patent LAD and diagonal stents.  Will plan for 2 repeat coronary angiography, I suspect in stent restenosis therefore I will have her see Radiation Oncology for possible brachytherapy at the same time.    Cardiac catheterization with probable PCI.   Antiplatelets: Stop eliquis, ASA/Plavix  Access: Right radial  Catheters: Irving Jade is a YVETTE candidate and understands the importance of taking plavix for at least one year in ACS cases and 6 months in stable CAD. The patient understands that in case of receiving a drug coated stent the failure to comply with dual anti-platelet therapy as prescribed is likely to result in stent clothing, heart attack and death.   The risks, benefits, and alternatives of coronary vascular angiography and possible intervention were discussed with the patient. All questions were answered and informed consent was obtained. I had a detailed  discussion with the patient regarding risk of stroke, MI, bleeding access site complications including limb loss, allergy, kidney failure including dialysis and death.  The patient understands the risks and benefits and wishes to go ahead with the procedure.  HA Clinical Frailty Scale: Vulnerable  All patient's questions were answered      Atherosclerosis of aorta  See imaging. Follow up with Cardiology.     COPD (chronic obstructive pulmonary disease)  Chronic inhaler use. Consider PFTs.     Plan:         Symptoms are very similar to her sympotms prior to PCI in the past. AS is in the moderate range. Will proceed with coroanry angiography +/- PCI. Will have her see brachytherapy first as she reports multiple prior stent failures

## 2023-06-07 ENCOUNTER — HOSPITAL ENCOUNTER (OUTPATIENT)
Dept: RADIOLOGY | Facility: HOSPITAL | Age: 88
Discharge: HOME | End: 2023-06-07
Attending: INTERNAL MEDICINE
Payer: MEDICARE

## 2023-06-07 ENCOUNTER — OFFICE VISIT (OUTPATIENT)
Dept: CARDIOLOGY | Facility: CLINIC | Age: 88
End: 2023-06-07
Payer: MEDICARE

## 2023-06-07 ENCOUNTER — EDUCATION (OUTPATIENT)
Dept: CARDIOLOGY | Facility: CLINIC | Age: 88
End: 2023-06-07
Payer: MEDICARE

## 2023-06-07 VITALS
OXYGEN SATURATION: 96 % | HEART RATE: 76 BPM | HEIGHT: 65 IN | DIASTOLIC BLOOD PRESSURE: 60 MMHG | BODY MASS INDEX: 32.69 KG/M2 | SYSTOLIC BLOOD PRESSURE: 114 MMHG | WEIGHT: 196.19 LBS

## 2023-06-07 DIAGNOSIS — R00.1 SYMPTOMATIC BRADYCARDIA: ICD-10-CM

## 2023-06-07 DIAGNOSIS — T82.855A CORONARY STENT RESTENOSIS, INITIAL ENCOUNTER: ICD-10-CM

## 2023-06-07 DIAGNOSIS — I50.32 CHRONIC DIASTOLIC HEART FAILURE: ICD-10-CM

## 2023-06-07 DIAGNOSIS — I25.10 CORONARY ARTERY DISEASE DUE TO CALCIFIED CORONARY LESION: ICD-10-CM

## 2023-06-07 DIAGNOSIS — I25.84 CORONARY ARTERY DISEASE DUE TO CALCIFIED CORONARY LESION: ICD-10-CM

## 2023-06-07 DIAGNOSIS — J44.9 CHRONIC OBSTRUCTIVE PULMONARY DISEASE, UNSPECIFIED COPD TYPE: ICD-10-CM

## 2023-06-07 DIAGNOSIS — I25.10 CORONARY ARTERY DISEASE, UNSPECIFIED VESSEL OR LESION TYPE, UNSPECIFIED WHETHER ANGINA PRESENT, UNSPECIFIED WHETHER NATIVE OR TRANSPLANTED HEART: Primary | ICD-10-CM

## 2023-06-07 DIAGNOSIS — I48.0 PAROXYSMAL ATRIAL FIBRILLATION: ICD-10-CM

## 2023-06-07 DIAGNOSIS — I70.0 ATHEROSCLEROSIS OF AORTA: ICD-10-CM

## 2023-06-07 DIAGNOSIS — I35.0 NONRHEUMATIC AORTIC VALVE STENOSIS: ICD-10-CM

## 2023-06-07 PROCEDURE — 74174 CTA ABD&PLVS W/CONTRAST: CPT | Mod: TC

## 2023-06-07 PROCEDURE — 1157F ADVNC CARE PLAN IN RCRD: CPT | Mod: CPTII,S$GLB,, | Performed by: INTERNAL MEDICINE

## 2023-06-07 PROCEDURE — 71275 CTA CARDIAC TAVR_PARTNERS (XPD): ICD-10-PCS | Mod: 26,,, | Performed by: RADIOLOGY

## 2023-06-07 PROCEDURE — 1101F PT FALLS ASSESS-DOCD LE1/YR: CPT | Mod: CPTII,S$GLB,, | Performed by: INTERNAL MEDICINE

## 2023-06-07 PROCEDURE — 74174 CTA ABD&PLVS W/CONTRAST: CPT | Mod: 26,,, | Performed by: RADIOLOGY

## 2023-06-07 PROCEDURE — 1101F PR PT FALLS ASSESS DOC 0-1 FALLS W/OUT INJ PAST YR: ICD-10-PCS | Mod: CPTII,S$GLB,, | Performed by: INTERNAL MEDICINE

## 2023-06-07 PROCEDURE — 3288F FALL RISK ASSESSMENT DOCD: CPT | Mod: CPTII,S$GLB,, | Performed by: INTERNAL MEDICINE

## 2023-06-07 PROCEDURE — 1157F PR ADVANCE CARE PLAN OR EQUIV PRESENT IN MEDICAL RECORD: ICD-10-PCS | Mod: CPTII,S$GLB,, | Performed by: INTERNAL MEDICINE

## 2023-06-07 PROCEDURE — 71275 CT ANGIOGRAPHY CHEST: CPT | Mod: 26,,, | Performed by: RADIOLOGY

## 2023-06-07 PROCEDURE — 3288F PR FALLS RISK ASSESSMENT DOCUMENTED: ICD-10-PCS | Mod: CPTII,S$GLB,, | Performed by: INTERNAL MEDICINE

## 2023-06-07 PROCEDURE — 1159F MED LIST DOCD IN RCRD: CPT | Mod: CPTII,S$GLB,, | Performed by: INTERNAL MEDICINE

## 2023-06-07 PROCEDURE — 99215 OFFICE O/P EST HI 40 MIN: CPT | Mod: S$GLB,,, | Performed by: INTERNAL MEDICINE

## 2023-06-07 PROCEDURE — 74174 CTA CARDIAC TAVR_PARTNERS (XPD): ICD-10-PCS | Mod: 26,,, | Performed by: RADIOLOGY

## 2023-06-07 PROCEDURE — 1126F AMNT PAIN NOTED NONE PRSNT: CPT | Mod: CPTII,S$GLB,, | Performed by: INTERNAL MEDICINE

## 2023-06-07 PROCEDURE — 1126F PR PAIN SEVERITY QUANTIFIED, NO PAIN PRESENT: ICD-10-PCS | Mod: CPTII,S$GLB,, | Performed by: INTERNAL MEDICINE

## 2023-06-07 PROCEDURE — 25500020 PHARM REV CODE 255: Performed by: INTERNAL MEDICINE

## 2023-06-07 PROCEDURE — 99215 PR OFFICE/OUTPT VISIT, EST, LEVL V, 40-54 MIN: ICD-10-PCS | Mod: S$GLB,,, | Performed by: INTERNAL MEDICINE

## 2023-06-07 PROCEDURE — 1159F PR MEDICATION LIST DOCUMENTED IN MEDICAL RECORD: ICD-10-PCS | Mod: CPTII,S$GLB,, | Performed by: INTERNAL MEDICINE

## 2023-06-07 PROCEDURE — 99999 PR PBB SHADOW E&M-EST. PATIENT-LVL IV: ICD-10-PCS | Mod: PBBFAC,,, | Performed by: INTERNAL MEDICINE

## 2023-06-07 PROCEDURE — 99999 PR PBB SHADOW E&M-EST. PATIENT-LVL IV: CPT | Mod: PBBFAC,,, | Performed by: INTERNAL MEDICINE

## 2023-06-07 PROCEDURE — 71275 CT ANGIOGRAPHY CHEST: CPT | Mod: TC

## 2023-06-07 RX ORDER — CLOPIDOGREL BISULFATE 75 MG/1
TABLET ORAL
Qty: 30 TABLET | Refills: 11 | Status: ON HOLD | OUTPATIENT
Start: 2023-06-07 | End: 2023-06-20 | Stop reason: HOSPADM

## 2023-06-07 RX ORDER — SODIUM CHLORIDE 0.9 % (FLUSH) 0.9 %
10 SYRINGE (ML) INJECTION
Status: DISCONTINUED | OUTPATIENT
Start: 2023-06-07 | End: 2023-10-14 | Stop reason: HOSPADM

## 2023-06-07 RX ORDER — SODIUM CHLORIDE 9 MG/ML
INJECTION, SOLUTION INTRAVENOUS CONTINUOUS
Status: CANCELLED | OUTPATIENT
Start: 2023-06-07 | End: 2023-06-07

## 2023-06-07 RX ORDER — CEFTAZIDIME 2 G/1
2 INJECTION, POWDER, FOR SOLUTION INTRAVENOUS EVERY 8 HOURS
COMMUNITY
Start: 2023-06-05 | End: 2023-07-12

## 2023-06-07 RX ORDER — DIPHENHYDRAMINE HCL 50 MG
50 CAPSULE ORAL ONCE
Status: CANCELLED | OUTPATIENT
Start: 2023-06-07 | End: 2023-06-07

## 2023-06-07 RX ADMIN — IOHEXOL 100 ML: 350 INJECTION, SOLUTION INTRAVENOUS at 12:06

## 2023-06-07 NOTE — PROGRESS NOTES
OUTPATIENT CATHETERIZATION INSTRUCTIONS    You have been scheduled for a procedure in the catheterization lab on Tuesday, June 20, 2023.     Please report to the Cardiology Waiting Area on the Third floor of the hospital and check in at 11:30 AM.  You will then be taken to the SSCU (Short Stay Cardiac Unit) and prepared for your procedure. Please be aware that this is not the time of your procedure but the time you are to arrive. The procedures are scheduled on an hourly basis; however, emergency cases take precedence over all other cases.       Nothing to eat or drink after 5 AM You may have clear liquids until the time of your admission which should be 2 hours prior to your procedure.          You are encouraged to drink at least 8 ounces of clear liquids prior to your admission to SSCU.          Clear liquids include water, black coffee, clear juices, and performance drinks - no pulp or milk.          Heart failure or dialysis patients please limit to 8 ounces (1 cup) of clear liquids up until 2 hours of the procedure.      2.   You may take your regular morning medications with water. If there are any medications that you should not take you will be instructed to hold them that morning. If you         are diabetic and on Metformin (Glucophage) do not take it the day before, the day of, and for 2 days after your procedure.    3.   Hold Eliquis (apixaban) 3 3 days prior to your procedure.      The procedure will take 1-2 hours to perform. After the procedure, you will return to SSCU on the third floor of the hospital. You will need to lie still (or keep your arm still) for the next 2 to 4 hours to minimize bleeding from the puncture site.  This time is determined by your physician.  Your family may remain in the room with you during this time.       You may be able to be discharged home that same afternoon if there is someone to drive you home and there are no complications.  Your doctor will determine, based on  "your progress, the date and time of your discharge. The results of your procedure will be discussed with you before you are discharged. Any further testing or procedures will be scheduled for you either before you leave or after your discharge..       Special Instructions:  On days off of Eliquis take 81mg aspirin (everyday - including day of procedure)  Plavix 75m tablets night before and 1 tablet morning of procedure.       If you should have any questions, concerns, or need to change the date of your procedure, please call SHOLA Chance @ [phone]",                THE ABOVE INSTRUCTIONS WERE GIVEN TO THE PATIENT VERBALLY AND THEY VERBALIZED UNDERSTANDING.  THEY DO NOT REQUIRE ANY SPECIAL NEEDS AND DO NOT HAVE ANY LEARNING BARRIERS.                          Directions for Reporting to Cardiology Waiting Area in the Hospital  If you park in the Parking Garage:  Take elevators to the1st floor of the parking garage.  Continue past the gift shop, coffee shop, and piano.  Take a right and go to the gold elevators. (Elevator B)  Take the elevator to the 3rd floor.  Follow the arrow on the sign on the wall that says Cath Lab Registration/EP Lab Registration.  Follow the long hallway all the way around until you come to a big open area.  This is the registration area.  Check in at Reception Desk.    OR    If family is dropping you off:  Have them drop you off at the front of the Hospital under the green overhang.  Enter through the doors and take a right.  Take the E elevators to the 3rd floor Cardiology Waiting Area.  Check in at the Reception Desk in the waiting room.             "

## 2023-06-20 ENCOUNTER — OFFICE VISIT (OUTPATIENT)
Dept: RADIATION ONCOLOGY | Facility: CLINIC | Age: 88
End: 2023-06-20
Payer: MEDICARE

## 2023-06-20 ENCOUNTER — HOSPITAL ENCOUNTER (OUTPATIENT)
Facility: HOSPITAL | Age: 88
Discharge: HOME | End: 2023-06-20
Attending: INTERNAL MEDICINE | Admitting: INTERNAL MEDICINE
Payer: MEDICARE

## 2023-06-20 VITALS
HEART RATE: 68 BPM | TEMPERATURE: 98 F | SYSTOLIC BLOOD PRESSURE: 162 MMHG | DIASTOLIC BLOOD PRESSURE: 83 MMHG | OXYGEN SATURATION: 94 %

## 2023-06-20 VITALS
SYSTOLIC BLOOD PRESSURE: 93 MMHG | HEART RATE: 68 BPM | TEMPERATURE: 98 F | DIASTOLIC BLOOD PRESSURE: 57 MMHG | RESPIRATION RATE: 18 BRPM | OXYGEN SATURATION: 96 %

## 2023-06-20 DIAGNOSIS — I25.10 CORONARY ARTERY DISEASE, UNSPECIFIED VESSEL OR LESION TYPE, UNSPECIFIED WHETHER ANGINA PRESENT, UNSPECIFIED WHETHER NATIVE OR TRANSPLANTED HEART: ICD-10-CM

## 2023-06-20 DIAGNOSIS — I25.10 CAD (CORONARY ARTERY DISEASE): ICD-10-CM

## 2023-06-20 DIAGNOSIS — I50.32 CHRONIC DIASTOLIC HEART FAILURE: ICD-10-CM

## 2023-06-20 DIAGNOSIS — T82.855A CORONARY STENT RESTENOSIS, INITIAL ENCOUNTER: ICD-10-CM

## 2023-06-20 DIAGNOSIS — I25.10 CORONARY ARTERY DISEASE DUE TO CALCIFIED CORONARY LESION: Primary | ICD-10-CM

## 2023-06-20 DIAGNOSIS — I25.84 CORONARY ARTERY DISEASE DUE TO CALCIFIED CORONARY LESION: Primary | ICD-10-CM

## 2023-06-20 LAB
ABO + RH BLD: NORMAL
ANION GAP SERPL CALC-SCNC: 9 MMOL/L (ref 8–16)
APTT PPP: 25.2 SEC (ref 21–32)
BLD GP AB SCN CELLS X3 SERPL QL: NORMAL
BUN SERPL-MCNC: 14 MG/DL (ref 8–23)
CALCIUM SERPL-MCNC: 10.1 MG/DL (ref 8.7–10.5)
CHLORIDE SERPL-SCNC: 108 MMOL/L (ref 95–110)
CO2 SERPL-SCNC: 24 MMOL/L (ref 23–29)
CREAT SERPL-MCNC: 0.9 MG/DL (ref 0.5–1.4)
ERYTHROCYTE [DISTWIDTH] IN BLOOD BY AUTOMATED COUNT: 15.6 % (ref 11.5–14.5)
EST. GFR  (NO RACE VARIABLE): >60 ML/MIN/1.73 M^2
GLUCOSE SERPL-MCNC: 97 MG/DL (ref 70–110)
HCT VFR BLD AUTO: 42.7 % (ref 37–48.5)
HGB BLD-MCNC: 13.8 G/DL (ref 12–16)
INR PPP: 1 (ref 0.8–1.2)
MCH RBC QN AUTO: 27.2 PG (ref 27–31)
MCHC RBC AUTO-ENTMCNC: 32.3 G/DL (ref 32–36)
MCV RBC AUTO: 84 FL (ref 82–98)
PLATELET # BLD AUTO: 322 K/UL (ref 150–450)
PMV BLD AUTO: 10.5 FL (ref 9.2–12.9)
POTASSIUM SERPL-SCNC: 3.9 MMOL/L (ref 3.5–5.1)
PROTHROMBIN TIME: 10.4 SEC (ref 9–12.5)
RBC # BLD AUTO: 5.07 M/UL (ref 4–5.4)
SODIUM SERPL-SCNC: 141 MMOL/L (ref 136–145)
SPECIMEN OUTDATE: NORMAL
WBC # BLD AUTO: 7.3 K/UL (ref 3.9–12.7)

## 2023-06-20 PROCEDURE — 1157F PR ADVANCE CARE PLAN OR EQUIV PRESENT IN MEDICAL RECORD: ICD-10-PCS | Mod: CPTII,S$GLB,, | Performed by: RADIOLOGY

## 2023-06-20 PROCEDURE — 1126F AMNT PAIN NOTED NONE PRSNT: CPT | Mod: CPTII,S$GLB,, | Performed by: RADIOLOGY

## 2023-06-20 PROCEDURE — 1157F ADVNC CARE PLAN IN RCRD: CPT | Mod: CPTII,S$GLB,, | Performed by: RADIOLOGY

## 2023-06-20 PROCEDURE — 3288F PR FALLS RISK ASSESSMENT DOCUMENTED: ICD-10-PCS | Mod: CPTII,S$GLB,, | Performed by: RADIOLOGY

## 2023-06-20 PROCEDURE — 99152 PR MOD CONSCIOUS SEDATION, SAME PHYS, 5+ YRS, FIRST 15 MIN: ICD-10-PCS | Mod: GC,,, | Performed by: INTERNAL MEDICINE

## 2023-06-20 PROCEDURE — 3288F FALL RISK ASSESSMENT DOCD: CPT | Mod: CPTII,S$GLB,, | Performed by: RADIOLOGY

## 2023-06-20 PROCEDURE — 1101F PT FALLS ASSESS-DOCD LE1/YR: CPT | Mod: CPTII,S$GLB,, | Performed by: RADIOLOGY

## 2023-06-20 PROCEDURE — C1760 CLOSURE DEV, VASC: HCPCS | Performed by: INTERNAL MEDICINE

## 2023-06-20 PROCEDURE — 99153 MOD SED SAME PHYS/QHP EA: CPT | Performed by: INTERNAL MEDICINE

## 2023-06-20 PROCEDURE — 1159F MED LIST DOCD IN RCRD: CPT | Mod: CPTII,S$GLB,, | Performed by: RADIOLOGY

## 2023-06-20 PROCEDURE — 25000003 PHARM REV CODE 250: Performed by: INTERNAL MEDICINE

## 2023-06-20 PROCEDURE — 80048 BASIC METABOLIC PNL TOTAL CA: CPT | Performed by: INTERNAL MEDICINE

## 2023-06-20 PROCEDURE — 25000003 PHARM REV CODE 250: Performed by: STUDENT IN AN ORGANIZED HEALTH CARE EDUCATION/TRAINING PROGRAM

## 2023-06-20 PROCEDURE — 99203 OFFICE O/P NEW LOW 30 MIN: CPT | Mod: S$GLB,,, | Performed by: RADIOLOGY

## 2023-06-20 PROCEDURE — 93458 PR CATH PLACE/CORON ANGIO, IMG SUPER/INTERP,W LEFT HEART VENTRICULOGRAPHY: ICD-10-PCS | Mod: 26,GC,, | Performed by: INTERNAL MEDICINE

## 2023-06-20 PROCEDURE — 27201423 OPTIME MED/SURG SUP & DEVICES STERILE SUPPLY: Performed by: INTERNAL MEDICINE

## 2023-06-20 PROCEDURE — 93458 L HRT ARTERY/VENTRICLE ANGIO: CPT | Mod: 26,GC,, | Performed by: INTERNAL MEDICINE

## 2023-06-20 PROCEDURE — 93571 IV DOP VEL&/PRESS C FLO 1ST: CPT | Performed by: INTERNAL MEDICINE

## 2023-06-20 PROCEDURE — 93571 PR HEART FLOW RESERV MEASURE,INIT VESSL: ICD-10-PCS | Mod: 26,RC,GC, | Performed by: INTERNAL MEDICINE

## 2023-06-20 PROCEDURE — 93571 IV DOP VEL&/PRESS C FLO 1ST: CPT | Mod: 26,RC,GC, | Performed by: INTERNAL MEDICINE

## 2023-06-20 PROCEDURE — 93005 ELECTROCARDIOGRAM TRACING: CPT | Mod: 59

## 2023-06-20 PROCEDURE — 86900 BLOOD TYPING SEROLOGIC ABO: CPT | Performed by: INTERNAL MEDICINE

## 2023-06-20 PROCEDURE — 85027 COMPLETE CBC AUTOMATED: CPT | Performed by: INTERNAL MEDICINE

## 2023-06-20 PROCEDURE — 25500020 PHARM REV CODE 255: Performed by: INTERNAL MEDICINE

## 2023-06-20 PROCEDURE — C1887 CATHETER, GUIDING: HCPCS | Performed by: INTERNAL MEDICINE

## 2023-06-20 PROCEDURE — 93458 L HRT ARTERY/VENTRICLE ANGIO: CPT | Performed by: INTERNAL MEDICINE

## 2023-06-20 PROCEDURE — 99152 MOD SED SAME PHYS/QHP 5/>YRS: CPT | Performed by: INTERNAL MEDICINE

## 2023-06-20 PROCEDURE — 63600175 PHARM REV CODE 636 W HCPCS: Performed by: INTERNAL MEDICINE

## 2023-06-20 PROCEDURE — 93010 EKG 12-LEAD: ICD-10-PCS | Mod: ,,, | Performed by: INTERNAL MEDICINE

## 2023-06-20 PROCEDURE — 85610 PROTHROMBIN TIME: CPT | Performed by: INTERNAL MEDICINE

## 2023-06-20 PROCEDURE — 1126F PR PAIN SEVERITY QUANTIFIED, NO PAIN PRESENT: ICD-10-PCS | Mod: CPTII,S$GLB,, | Performed by: RADIOLOGY

## 2023-06-20 PROCEDURE — 1159F PR MEDICATION LIST DOCUMENTED IN MEDICAL RECORD: ICD-10-PCS | Mod: CPTII,S$GLB,, | Performed by: RADIOLOGY

## 2023-06-20 PROCEDURE — 93010 ELECTROCARDIOGRAM REPORT: CPT | Mod: ,,, | Performed by: INTERNAL MEDICINE

## 2023-06-20 PROCEDURE — 99203 PR OFFICE/OUTPT VISIT, NEW, LEVL III, 30-44 MIN: ICD-10-PCS | Mod: S$GLB,,, | Performed by: RADIOLOGY

## 2023-06-20 PROCEDURE — 99999 PR PBB SHADOW E&M-EST. PATIENT-LVL V: CPT | Mod: PBBFAC,,, | Performed by: RADIOLOGY

## 2023-06-20 PROCEDURE — 1160F PR REVIEW ALL MEDS BY PRESCRIBER/CLIN PHARMACIST DOCUMENTED: ICD-10-PCS | Mod: CPTII,S$GLB,, | Performed by: RADIOLOGY

## 2023-06-20 PROCEDURE — 1160F RVW MEDS BY RX/DR IN RCRD: CPT | Mod: CPTII,S$GLB,, | Performed by: RADIOLOGY

## 2023-06-20 PROCEDURE — C1894 INTRO/SHEATH, NON-LASER: HCPCS | Performed by: INTERNAL MEDICINE

## 2023-06-20 PROCEDURE — 99152 MOD SED SAME PHYS/QHP 5/>YRS: CPT | Mod: GC,,, | Performed by: INTERNAL MEDICINE

## 2023-06-20 PROCEDURE — 1101F PR PT FALLS ASSESS DOC 0-1 FALLS W/OUT INJ PAST YR: ICD-10-PCS | Mod: CPTII,S$GLB,, | Performed by: RADIOLOGY

## 2023-06-20 PROCEDURE — 36415 COLL VENOUS BLD VENIPUNCTURE: CPT | Performed by: INTERNAL MEDICINE

## 2023-06-20 PROCEDURE — 85730 THROMBOPLASTIN TIME PARTIAL: CPT | Performed by: INTERNAL MEDICINE

## 2023-06-20 PROCEDURE — C1769 GUIDE WIRE: HCPCS | Performed by: INTERNAL MEDICINE

## 2023-06-20 PROCEDURE — 99999 PR PBB SHADOW E&M-EST. PATIENT-LVL V: ICD-10-PCS | Mod: PBBFAC,,, | Performed by: RADIOLOGY

## 2023-06-20 RX ORDER — SODIUM CHLORIDE 9 MG/ML
INJECTION, SOLUTION INTRAVENOUS CONTINUOUS
Status: ACTIVE | OUTPATIENT
Start: 2023-06-20 | End: 2023-06-20

## 2023-06-20 RX ORDER — DIPHENHYDRAMINE HCL 50 MG
50 CAPSULE ORAL ONCE
Status: COMPLETED | OUTPATIENT
Start: 2023-06-20 | End: 2023-06-20

## 2023-06-20 RX ORDER — ONDANSETRON 2 MG/ML
INJECTION INTRAMUSCULAR; INTRAVENOUS
Status: DISCONTINUED | OUTPATIENT
Start: 2023-06-20 | End: 2023-06-20 | Stop reason: HOSPADM

## 2023-06-20 RX ORDER — PROTAMINE SULFATE 10 MG/ML
INJECTION, SOLUTION INTRAVENOUS
Status: DISCONTINUED | OUTPATIENT
Start: 2023-06-20 | End: 2023-06-20 | Stop reason: HOSPADM

## 2023-06-20 RX ORDER — HEPARIN SODIUM 1000 [USP'U]/ML
INJECTION, SOLUTION INTRAVENOUS; SUBCUTANEOUS
Status: DISCONTINUED | OUTPATIENT
Start: 2023-06-20 | End: 2023-06-20 | Stop reason: HOSPADM

## 2023-06-20 RX ORDER — ADENOSINE 3 MG/ML
INJECTION, SOLUTION INTRAVENOUS
Status: DISCONTINUED | OUTPATIENT
Start: 2023-06-20 | End: 2023-06-20 | Stop reason: HOSPADM

## 2023-06-20 RX ORDER — FENTANYL CITRATE 50 UG/ML
INJECTION, SOLUTION INTRAMUSCULAR; INTRAVENOUS
Status: DISCONTINUED | OUTPATIENT
Start: 2023-06-20 | End: 2023-06-20 | Stop reason: HOSPADM

## 2023-06-20 RX ORDER — ACETAMINOPHEN 325 MG/1
650 TABLET ORAL EVERY 6 HOURS PRN
Status: DISCONTINUED | OUTPATIENT
Start: 2023-06-20 | End: 2023-06-20 | Stop reason: HOSPADM

## 2023-06-20 RX ORDER — LIDOCAINE HYDROCHLORIDE 10 MG/ML
INJECTION INFILTRATION; PERINEURAL
Status: DISCONTINUED | OUTPATIENT
Start: 2023-06-20 | End: 2023-06-20 | Stop reason: HOSPADM

## 2023-06-20 RX ORDER — CEFAZOLIN SODIUM 1 G/50ML
SOLUTION INTRAVENOUS
Status: DISCONTINUED | OUTPATIENT
Start: 2023-06-20 | End: 2023-06-20 | Stop reason: HOSPADM

## 2023-06-20 RX ORDER — MIDAZOLAM HYDROCHLORIDE 1 MG/ML
INJECTION INTRAMUSCULAR; INTRAVENOUS
Status: DISCONTINUED | OUTPATIENT
Start: 2023-06-20 | End: 2023-06-20 | Stop reason: HOSPADM

## 2023-06-20 RX ORDER — MUPIROCIN 20 MG/G
OINTMENT TOPICAL
COMMUNITY
Start: 2023-06-02 | End: 2023-10-03

## 2023-06-20 RX ORDER — HEPARIN SOD,PORCINE/0.9 % NACL 1000/500ML
INTRAVENOUS SOLUTION INTRAVENOUS
Status: DISCONTINUED | OUTPATIENT
Start: 2023-06-20 | End: 2023-06-20 | Stop reason: HOSPADM

## 2023-06-20 RX ADMIN — DIPHENHYDRAMINE HYDROCHLORIDE 50 MG: 50 CAPSULE ORAL at 11:06

## 2023-06-20 RX ADMIN — ACETAMINOPHEN 650 MG: 325 TABLET ORAL at 03:06

## 2023-06-20 RX ADMIN — SODIUM CHLORIDE: 9 INJECTION, SOLUTION INTRAVENOUS at 11:06

## 2023-06-20 NOTE — Clinical Note
The wire was inserted into the distal right coronary artery IFR and FFR were performed. IFR= 1.00 FFR= 0.92.

## 2023-06-20 NOTE — NURSING
Emptied 1000 cc clear yellow urine from collection bag from suprapubic catheter.  Right groin dressing remains clean dry and intact.  Family remains at bedside.  VSS.  No c/o pain or SOB at present.

## 2023-06-20 NOTE — PLAN OF CARE
Patient arrived to room. PIVs placed, labs sent. Admit assessment completed. Plan of care discussed with patient. Monitoring per unit protocol.

## 2023-06-20 NOTE — PROGRESS NOTES
Subjective:       Patient ID: Holli Landrum is a 89 y.o. female.    Chief Complaint: Consult    HPI:  This patient is referred for consultation regarding cardiac brachytherapy for prevention of instent restenosis.      Holli Landrum is a 89 y.o. female with a history of CAD and is status post angioplasty with coronary artery stent placement.  The patient has a history of instent restenosis.  We are consulted for consideration of cardiac brachytherapy for prevention of restenosis.          Objective:      Physical Exam:  Patient is alert and is oriented to person, place, and time. She appears well-developed and well-nourished.      Assessment/Plan:       History of CAD with instent restenosis.  Discussed the rational for cardiac brachytherapy in this setting.  Discussed the procedures, risks and benefits of therapy.  Will proceed with treatment if appropriate.

## 2023-06-20 NOTE — BRIEF OP NOTE
Brief Operative Note:    : Brice Campuznao MD     Referring Physician: Brice Carrillo     All Operators: Surgeon(s):  MD Dianna Soria MD Jose D. Tafur Soto, MD     Preoperative Diagnosis: Coronary artery disease, unspecified vessel or lesion type, unspecified whether angina present, unspecified whether native or transplanted heart [I25.10]  Coronary stent restenosis, initial encounter [T82.855A]  Chronic diastolic heart failure [I50.32]     Postop Diagnosis: Coronary artery disease, unspecified vessel or lesion type, unspecified whether angina present, unspecified whether native or transplanted heart [I25.10]  Coronary stent restenosis, initial encounter [T82.855A]  Chronic diastolic heart failure [I50.32]    Treatments/Procedures: Procedure(s) (LRB):  Left heart cath (N/A)  Fractional Flow [place] (FFR), Coronary  Instantaneous Wave-Free Ratio (IFR) (N/A)    Access: Right CFA    Findings: non obstructive coronary artery disease  Moderate aortic stenosis   See catheterization report for full details.    Intervention: none     See catheterization report for full details.    Closure device: Manual pressure       Plan:  - Post cath protocol   - IVF @ 75 cc/kg/hr x 4 hours  - Bed rest x 4 hours   - D/C Plavix   - Can resume Eliquis tomorrow morning   - Continue high intensity statin therapy (LDL goal < 70)  - Risk factor reduction (BP <130/80 mmHg, glycemic control, etc)  - Cardiac rehab referral  - Follow up with outpatient cardiologist    Estimated Blood loss: 20 cc    Specimens removed: No    Dianna Macdonald MD

## 2023-06-20 NOTE — DISCHARGE SUMMARY
Discharge Summary  Interventional Cardiology      Admit Date: 6/20/2023    Discharge Date:  6/20/2023    Attending Physician: Brice Campuzano MD    Discharge Physician: Simona Ann MD    Principal Diagnoses: <principal problem not specified>  Indication for Admission: Left heart cath (N/A), Fractional Flow [place] (FFR), Coronary, Instantaneous Wave-Free Ratio (IFR) (N/A)    Discharged Condition: Good    Hospital Course:   Patient presented for outpatient coronary angiogram which went without complication. Coronary angiogram was nonobstructive. See full cath report in Epic for details. Hemostasis of patient's R CFA access site was achieved with manual pressure. Patient was monitored per post-cath protocol, and her R groin access site was c/d/i with no hematoma. Patient was able to ambulate without difficulty. She was feeling well and anticipating discharge home today.     Outpatient Plan:  - D/C Aspirin   - D/C Plavix   - Resume Eliquis, first dose tomorrow morning     Diet: Cardiac diet    Activity: Ad phillip, wound care instructions provided    Disposition: Home or Self Care    Follow Up: with Dr Guadarrama in 3 months if she continues to be symptomatic       Discharge Medications:      Medication List        CONTINUE taking these medications      acetaminophen 650 MG Tbsr  Commonly known as: TYLENOL     albuterol 90 mcg/actuation inhaler  Commonly known as: PROVENTIL/VENTOLIN HFA  Inhale 1-2 puffs into the lungs every 6 (six) hours as needed for Wheezing. Rescue     albuterol-ipratropium 2.5 mg-0.5 mg/3 mL nebulizer solution  Commonly known as: DUO-NEB     allopurinoL 100 MG tablet  Commonly known as: ZYLOPRIM  Take 2 tablets (200 mg total) by mouth once daily.     aluminum & magnesium hydroxide-simethicone 400-400-40 mg/5 mL suspension  Commonly known as: MYLANTA MAX STRENGTH     amLODIPine 2.5 MG tablet  Commonly known as: NORVASC  TAKE 1 TABLET BY MOUTH EVERY DAY     apixaban 2.5 mg Tab  Commonly known as:  ELIQUIS  Take 1 tablet (2.5 mg total) by mouth 2 (two) times daily.     BREO ELLIPTA 100-25 mcg/dose diskus inhaler  Generic drug: fluticasone furoate-vilanteroL  Inhale 1 puff into the lungs once daily.     cholecalciferol (vitamin D3) 125 mcg (5,000 unit) Tab     dorzolamide 2 % ophthalmic solution  Commonly known as: TRUSOPT  INSTILL 1 DROP INTO BOTH EYES TWICE DAILY     fish oil-omega-3 fatty acids 300-1,000 mg capsule     fluticasone propionate 50 mcg/actuation nasal spray  Commonly known as: FLONASE  2 sprays (100 mcg total) by Each Nare route daily as needed for Allergies.     furosemide 40 MG tablet  Commonly known as: LASIX  Take 1 tablet (40 mg total) by mouth 2 (two) times a day. Take twice a day and monitor BP and weights     Lactobacillus rhamnosus GG 10 billion cell capsule  Commonly known as: CULTURELLE     latanoprost 0.005 % ophthalmic solution  Place 1 drop into both eyes every evening.     magnesium oxide 400 mg (241.3 mg magnesium) tablet  Commonly known as: MAG-OX     mupirocin 2 % ointment  Commonly known as: BACTROBAN     nitroGLYCERIN 0.4 MG/HR TD PT24 0.4 mg/hr  Commonly known as: NITRODUR  Place 1 patch onto the skin once daily.     ondansetron 4 MG tablet  Commonly known as: ZOFRAN     pantoprazole 40 MG tablet  Commonly known as: PROTONIX  Take 1 tablet (40 mg total) by mouth once daily.     potassium chloride 10 MEQ Tbsr  Commonly known as: KLOR-CON  Take 2 tablets (20 mEq total) by mouth 2 (two) times daily.     rosuvastatin 40 MG Tab  Commonly known as: CRESTOR  Take 1 tablet (40 mg total) by mouth once daily.     sotaloL 80 MG tablet  Commonly known as: BETAPACE  Take 0.5 tablets (40 mg total) by mouth 2 (two) times daily.     traMADoL 50 mg tablet  Commonly known as: ULTRAM  Take 1 tablet (50 mg total) by mouth every 12 (twelve) hours as needed for Pain.     ZYRTEC ORAL            STOP taking these medications      clopidogreL 75 mg tablet  Commonly known as: PLAVIX            ASK  your doctor about these medications      cefdinir 300 MG capsule  Commonly known as: OMNICEF     ceftAZIDime 2 gram injection  Commonly known as: FORTAZ     D-MANNOSE ORAL     fosfomycin 3 gram Pack  Commonly known as: MonuroL  Take as directed     GRAPE SEED EXTRACT ORAL     hydrocortisone 2.5 % cream  Apply topically 2 (two) times daily. for 14 days     OLIVE LEAF EXTRACT ORAL

## 2023-06-20 NOTE — NURSING
Report to SHOLA Dumont.  Awake and alert.  No c/o pain or nausea.  Right groin dressing remains clean dry and intact.

## 2023-06-20 NOTE — PROGRESS NOTES
Report received from Gerri DELGADILLO RN. Pt's VS WNL. Right groin site CDI. Gauze and tegaderm intact. 2+ pedal pulses. Call bell in reach. Bed locked and in lowest position. Hourly rounding in place. Fluids to be stopped at 1830. Pt walks at 1845.

## 2023-06-20 NOTE — Clinical Note
Operative Report:     Pre-Operative Diagnosis:    Undesired future fertility  Prior CS x2     Post-Operative Diagnosis: Same      Procedure: laparoscopic bilateral salpingectomy    Attending Obstetrician/Surgeon: Dr. Sho Silverman  Assistant: Nerissa Lewis DO, R3  Anesthesia: General via ETT  Complications: None  Estimated Blood Loss: 5ml  Total IV Fluids: 500ml  Urine Output:  250ml  Specimens removed: bilateral fallopian tubes  Findings: hemorrhagic left ovarian cyst, normal fallopian tubes and uterus, normal right ovary. anteverted uterus on bimanual exam, no palpable adnexal masses or fullness, cervix palpated closed.        INDICATIONS:  33 year old  presenting for scheduled laparoscopic bilateral salpingectomy. Risks, benefits, and alternatives to surgery were discussed with the patient including but not limited to the risk of bleeding, with possible blood transfusion, infection and damage to the surrounding organs including the bowel, bladder, ureters, tubes, ovaries, uterus, nerves, blood vessels, as well as possible laparotomy. Patient verbalized understanding of risks and consented to surgery. All questions answered. Patient expressed understanding. Patient consented for blood transfusion.     PROCEDURE:  After ensuring informed consent, the patient was taken to the operating room with IV fluids running. The patient was placed on the Operating Room table in the supine position. SCDs were placed on bilateral lower extremities for DVT prophylaxis. GETA and ETT were administered by the Anesthesiologist. The patient was then placed in the dorsal lithotomy position using yellow fin stirrups with care taken to avoid neural injury in all four extremities. Exam under anesthesia revealed anteverted uterus without adnexal masses or fullness. A time out was performed to ensure patient safety. The patient was then prepped and draped in the usual sterile fashion.      A weighted speculum was inserted  The sheath was inserted into the right femoral artery. into the vagina along with a right angle retractor in order to visualize the cervix. Single toothed tenaculum was placed on the anterior lip of the cervix. The uterus was sounded to 8cm. A uterine manipulator was placed under sterile conditions to aid in laparoscopic surgery. Attention was then turned to the abdomen. A 5mm incision was made to accommodate the camera. Using Verese needle, the anterior abdominal wall was tented upward as the needle was advanced into the intraperitoneal cavity. Hanging drop saline test and opening pressure of 3 mmHg confirmed entry into the abdominal cavity. Insufflation was completed with carbon dioxide to 15mmHg. Diagnostic laparoscopy revealed omental adhesions to the anterior peritoneal cavity. Assist ports were placed with 5mm trocars over RLQ and LLQ. Peritoneal organs appeared normal as listed in the Findings above.     The omental adhesions were taken down with the Ligasure device and hemostasis noted at both free ends. The right fallopian tube was tented upward with atraumatic graspers. The Ligasure device was used to separate the fallopian tube from the mesosalpinx starting laterally and ending medially with care taken to ensure the entire tube was removed. The ovary was not injured during removal of the tube. The 5mm port site on the LLQ was extended to accommodate a 8mm port.  Specimen was removed under direct visualization and sent for pathology evaluation.The Ligasure device was used for additional hemostasis at the proximal edge of the surgical site. Hemostasis was noted. Attention was then turned to patient left, and in a similar fashion, the left tube was elevated using atraumatic graspers. The Ligasure device was used to separate the fallopian tube from the mesosalpinx starting laterally and ending medially with care taken to ensure the entire tube was removed. The ovary was not injured during removal of the tube. Specimen was removed under direct visualization and  sent for pathology evaluation. Additional hemostasis achieved with the Ligasure device at the left surgical site. Both surgical sites were re-inspected and found to remain hemostatic.      The ports were carefully removed under direct visualization. All three port sites were closed with 4-0 monocryl suture in running fashion and skin glue was applied on the sterile field.      Uterine manipulator was removed without difficulty and hemostasis was noted at the cervix. All instruments were removed from the vagina.      All sponge, needle, and instrument counts were correct x2. Pt tolerated the procedure well and was taken to recovery in stable condition.      Dr. Silverman was present and scrubbed for the entire procedure.        Nerissa Lewis DO, PGY3  Obstetrics & Gynecology  6/23/21

## 2023-06-21 NOTE — PROGRESS NOTES
Pt walked at 1845. Pt used restroom without issues. Pt's right groin site free from bleeding, swelling, or oozing. Gauze and tegaderm are CDI. Pt and daughters given discharge paperwork and education reiterated. All questions and concerns addressed. Pt's Ivs and tele removed. Pt wheeled out by daughters to parking garage.

## 2023-06-22 ENCOUNTER — TELEPHONE (OUTPATIENT)
Dept: CARDIOLOGY | Facility: CLINIC | Age: 88
End: 2023-06-22
Payer: MEDICARE

## 2023-06-22 NOTE — TELEPHONE ENCOUNTER
INSULIN TITRATION TELEPHONE CALL     Noted that patient had not called us yet since Monday visit to update us on her blood sugars and have insulin adjusted. She reports feeling better with pain/soreness improving from surgery and pancreatitis. Diet/PO intake is increasing as well. She is still getting up in middle of night to spend time with her daughter, who works 2nd shift. She is checking blood sugar and having a snack at that time. Noted that I woke her up on my 10:30 AM call-- often goes back to bed after checking sugar around 0800. Blood sugars  23rd  298 fasting AM -- 50 U given -- 344 post cereal/milk   294 in afternoon -- 28 U given -- 298 post dinner -- 175 HS  24th  124 at 0100 (snack with daughter) -- 214 fasting at 0800. ASSESSMENT/PLAN  Uncontrolled Diabetes with insulin management complicated by varying sleep/wake schedule and varying dietary intake (now improving post-op and post-pancreatitis) -- in addition to these factors I believe we are running into issues with her middle of night social time with daughter in which she eats a hefty snack, but we have very little insulin coverage from her 5-6 PM dose of 70/30 insulin-- appears to be setting us up for high blood sugars the next day. Ideally we would have a short-acting (regular is likely all she could afford) correction dose of insulin available, but patient is trying to keep regimen as simple and inexpensive as possible. Increase AM Dose to 55 U (takes in mid-late morning based on current schedule)  PM Dose will continue at 28 U (takes between 5-6 PM currently)  Discussed trying to minimize the size of snack in middle of night-- believe her anxiety surrounding hypoglycemia should decrease over time. As I am heading out of town, would empower Red Team coverage to further insulin titration in 2-5 U increments (2-3 U increases for blood sugar in 150-250 range, 3-5 U increases for blood sugars of 250+).  I would anticipate LVM for home health in regards to    Type:  Patient Returning Call    Who Called: Lilly with [facility]  Does the patient know what this is regarding?: yes  Would the patient rather a call back or a response via [facility]? callback  Best Call Back Number: [phone]  Additional Information:  states that pt has medications that interact and requesting a call back        that her insulin needs with continue to increase as her PO intake improves post-operatively. Patient should be calling us every 1-2 days with her blood sugars. If she does not call us, would like the RN Team to call her.      Rupal Zavala MD

## 2023-06-26 ENCOUNTER — TELEPHONE (OUTPATIENT)
Dept: CARDIOLOGY | Facility: CLINIC | Age: 88
End: 2023-06-26
Payer: MEDICARE

## 2023-06-26 NOTE — TELEPHONE ENCOUNTER
"Returned call.  Patient and daughter on phone.  Patient reports having a low grade fever and "stomach pains".  Post C on 6/20/23.  Reports that when they got home from hospital patient had a syncopal episode after getting up from toilet.  She "lost consciousness" for less than a minute and refused to call EMS or go to ED.  [place] better after that episode.  Patient reports that groin site is clean dry and intact.  With minimal bruising, no swelling, pain or bleeding.  Has  not had BM since day before procedure.  Advised patient to call PCP for evaluation.  Agree with plan and will contact him today.       ----- Message from Francy Caputo sent at 6/26/2023  8:24 AM CDT -----  Type:  Patient Returning Call    Who Called:Claudia (daughter)   Who Left Message for Patient:  Does the patient know what this is regarding?:advice   Would the patient rather a call back or a response via [facility]? Call   Best Call Back Number:[phone]  Additional Information: pt states mother had surgery with provider and now has problems/concerns would like to speak with office asap        "

## 2023-07-07 ENCOUNTER — TELEPHONE (OUTPATIENT)
Dept: PULMONOLOGY | Facility: CLINIC | Age: 88
End: 2023-07-07
Payer: MEDICARE

## 2023-07-07 NOTE — TELEPHONE ENCOUNTER
----- Message from Lizzie Bashir sent at 7/7/2023  2:03 PM CDT -----  Contact: [phone]  Pt is calling regarding appt/ pulmonary lab/test questions . Please call back at [phone]. Thanksdj

## 2023-07-12 ENCOUNTER — CLINICAL SUPPORT (OUTPATIENT)
Dept: PULMONOLOGY | Facility: CLINIC | Age: 88
End: 2023-07-12
Payer: MEDICARE

## 2023-07-12 ENCOUNTER — OFFICE VISIT (OUTPATIENT)
Dept: PULMONOLOGY | Facility: CLINIC | Age: 88
End: 2023-07-12
Payer: MEDICARE

## 2023-07-12 VITALS
SYSTOLIC BLOOD PRESSURE: 142 MMHG | DIASTOLIC BLOOD PRESSURE: 78 MMHG | OXYGEN SATURATION: 95 % | BODY MASS INDEX: 32.58 KG/M2 | HEART RATE: 76 BPM | WEIGHT: 195.56 LBS | RESPIRATION RATE: 16 BRPM | HEIGHT: 65 IN

## 2023-07-12 VITALS — HEIGHT: 65 IN | BODY MASS INDEX: 32.6 KG/M2 | WEIGHT: 195.69 LBS

## 2023-07-12 DIAGNOSIS — J45.30 MILD PERSISTENT ASTHMA WITHOUT COMPLICATION: ICD-10-CM

## 2023-07-12 DIAGNOSIS — J41.0 SIMPLE CHRONIC BRONCHITIS: ICD-10-CM

## 2023-07-12 DIAGNOSIS — J44.9 CHRONIC OBSTRUCTIVE PULMONARY DISEASE, UNSPECIFIED COPD TYPE: ICD-10-CM

## 2023-07-12 LAB
BRPFT: NORMAL
FEF 25 75 CHG: 12.2 %
FEF 25 75 LLN: 0.65
FEF 25 75 POST REF: 52.7 %
FEF 25 75 PRE REF: 47 %
FEF 25 75 REF: 1.57
FET100 CHG: 9.6 %
FEV1 CHG: 8.7 %
FEV1 FVC CHG: 1.7 %
FEV1 FVC LLN: 60
FEV1 FVC POST REF: 92.3 %
FEV1 FVC PRE REF: 90.8 %
FEV1 FVC REF: 76
FEV1 LLN: 1.18
FEV1 POST REF: 77.3 %
FEV1 PRE REF: 71.1 %
FEV1 REF: 1.76
FVC CHG: 6.9 %
FVC LLN: 1.59
FVC POST REF: 82 %
FVC PRE REF: 76.7 %
FVC REF: 2.36
PEF CHG: 29.2 %
PEF LLN: 2.12
PEF POST REF: 91.7 %
PEF PRE REF: 71 %
PEF REF: 3.88
POST FEF 25 75: 0.83 L/S
POST FET 100: 8.71 SEC
POST FEV1 FVC: 70.09 %
POST FEV1: 1.36 L
POST FVC: 1.94 L
POST PEF: 3.56 L/S
PRE FEF 25 75: 0.74 L/S
PRE FET 100: 7.95 SEC
PRE FEV1 FVC: 68.95 %
PRE FEV1: 1.25 L
PRE FVC: 1.81 L
PRE PEF: 2.76 L/S

## 2023-07-12 PROCEDURE — 1160F PR REVIEW ALL MEDS BY PRESCRIBER/CLIN PHARMACIST DOCUMENTED: ICD-10-PCS | Mod: CPTII,S$GLB,, | Performed by: INTERNAL MEDICINE

## 2023-07-12 PROCEDURE — 1159F MED LIST DOCD IN RCRD: CPT | Mod: CPTII,S$GLB,, | Performed by: INTERNAL MEDICINE

## 2023-07-12 PROCEDURE — 1101F PR PT FALLS ASSESS DOC 0-1 FALLS W/OUT INJ PAST YR: ICD-10-PCS | Mod: CPTII,S$GLB,, | Performed by: INTERNAL MEDICINE

## 2023-07-12 PROCEDURE — 1160F RVW MEDS BY RX/DR IN RCRD: CPT | Mod: CPTII,S$GLB,, | Performed by: INTERNAL MEDICINE

## 2023-07-12 PROCEDURE — 99999 PR PBB SHADOW E&M-EST. PATIENT-LVL V: ICD-10-PCS | Mod: PBBFAC,,, | Performed by: INTERNAL MEDICINE

## 2023-07-12 PROCEDURE — 99999 PR PBB SHADOW E&M-EST. PATIENT-LVL V: CPT | Mod: PBBFAC,,, | Performed by: INTERNAL MEDICINE

## 2023-07-12 PROCEDURE — 95012 NITRIC OXIDE EXP GAS DETER: CPT | Mod: S$GLB,,, | Performed by: INTERNAL MEDICINE

## 2023-07-12 PROCEDURE — 94060 EVALUATION OF WHEEZING: CPT | Mod: 59,S$GLB,ICN, | Performed by: INTERNAL MEDICINE

## 2023-07-12 PROCEDURE — 1159F PR MEDICATION LIST DOCUMENTED IN MEDICAL RECORD: ICD-10-PCS | Mod: CPTII,S$GLB,, | Performed by: INTERNAL MEDICINE

## 2023-07-12 PROCEDURE — 94618 PULMONARY STRESS TESTING: ICD-10-PCS | Mod: S$GLB,,, | Performed by: INTERNAL MEDICINE

## 2023-07-12 PROCEDURE — 94060 PR EVAL OF BRONCHOSPASM: ICD-10-PCS | Mod: 59,S$GLB,ICN, | Performed by: INTERNAL MEDICINE

## 2023-07-12 PROCEDURE — 3288F PR FALLS RISK ASSESSMENT DOCUMENTED: ICD-10-PCS | Mod: CPTII,S$GLB,, | Performed by: INTERNAL MEDICINE

## 2023-07-12 PROCEDURE — 99999 PR PBB SHADOW E&M-EST. PATIENT-LVL I: ICD-10-PCS | Mod: PBBFAC,,,

## 2023-07-12 PROCEDURE — 1101F PT FALLS ASSESS-DOCD LE1/YR: CPT | Mod: CPTII,S$GLB,, | Performed by: INTERNAL MEDICINE

## 2023-07-12 PROCEDURE — 99214 PR OFFICE/OUTPT VISIT, EST, LEVL IV, 30-39 MIN: ICD-10-PCS | Mod: 25,S$GLB,, | Performed by: INTERNAL MEDICINE

## 2023-07-12 PROCEDURE — 99214 OFFICE O/P EST MOD 30 MIN: CPT | Mod: 25,S$GLB,, | Performed by: INTERNAL MEDICINE

## 2023-07-12 PROCEDURE — 3288F FALL RISK ASSESSMENT DOCD: CPT | Mod: CPTII,S$GLB,, | Performed by: INTERNAL MEDICINE

## 2023-07-12 PROCEDURE — 1157F ADVNC CARE PLAN IN RCRD: CPT | Mod: CPTII,S$GLB,, | Performed by: INTERNAL MEDICINE

## 2023-07-12 PROCEDURE — 94618 PULMONARY STRESS TESTING: CPT | Mod: S$GLB,,, | Performed by: INTERNAL MEDICINE

## 2023-07-12 PROCEDURE — 1157F PR ADVANCE CARE PLAN OR EQUIV PRESENT IN MEDICAL RECORD: ICD-10-PCS | Mod: CPTII,S$GLB,, | Performed by: INTERNAL MEDICINE

## 2023-07-12 PROCEDURE — 95012 PR NITRIC OXIDE EXPIRED GAS DETERMINATION: ICD-10-PCS | Mod: S$GLB,,, | Performed by: INTERNAL MEDICINE

## 2023-07-12 PROCEDURE — 99999 PR PBB SHADOW E&M-EST. PATIENT-LVL I: CPT | Mod: PBBFAC,,,

## 2023-07-12 RX ORDER — ALBUTEROL SULFATE 90 UG/1
1-2 AEROSOL, METERED RESPIRATORY (INHALATION) EVERY 4 HOURS PRN
Qty: 18 G | Refills: 11 | Status: SHIPPED | OUTPATIENT
Start: 2023-07-12 | End: 2024-01-12 | Stop reason: SDUPTHER

## 2023-07-12 RX ORDER — IPRATROPIUM BROMIDE AND ALBUTEROL SULFATE 2.5; .5 MG/3ML; MG/3ML
3 SOLUTION RESPIRATORY (INHALATION) EVERY 6 HOURS PRN
Qty: 360 ML | Refills: 11 | Status: SHIPPED | OUTPATIENT
Start: 2023-07-12 | End: 2024-01-12 | Stop reason: SDUPTHER

## 2023-07-12 RX ORDER — FLUTICASONE FUROATE AND VILANTEROL 100; 25 UG/1; UG/1
1 POWDER RESPIRATORY (INHALATION) DAILY
Qty: 90 EACH | Refills: 11 | Status: SHIPPED | OUTPATIENT
Start: 2023-07-12 | End: 2024-01-12 | Stop reason: SDUPTHER

## 2023-07-12 RX ORDER — CLOPIDOGREL BISULFATE 75 MG/1
75 TABLET ORAL
COMMUNITY
Start: 2023-07-06 | End: 2023-10-03

## 2023-07-12 RX ORDER — ONDANSETRON 4 MG/1
TABLET, ORALLY DISINTEGRATING ORAL
COMMUNITY
Start: 2023-06-30 | End: 2023-12-14

## 2023-07-12 NOTE — PROCEDURES
Clinical Guide to Interpretation or FeNO Levels :    FeNO  (ppb) LOW INTERMEDIATE HIGH   ADULT VALUES < 25 25-50          > 50   Th2-driven Inflammation Unlikely Likely Significant     Patients FeNO level at this visit : __17__ (ppb)    Interpretation of FeNO measurement in adults:  [x] FENO is less than 25 ppb implies non eosinophilic airway inflammation or the absence of airway inflammation.    Comment: Low FENO (<25 ppb) in adult asthmatics with persistent symptoms suggests other etiologies for these symptoms and a lower likelihood of benefit from adding or increasing inhaled glucocorticoids.    [] FENO between 25 and 50 ppb in adults should be interpreted cautiously with reference to the clinical situation (eg, symptomatic, on or off therapy, current smoking).    [] FENO greater than 50 ppb in adults  suggests eosinophilic airway inflammation   Comment: High FENO (>50 ppb) in adult asthmatics even with atypical symptoms suggests glucocorticoid responsiveness. High FENO (>50 ppb) can help identify poor adherence or uncontrolled inflammation in asthma patients with otherwise seemingly controlled asthma.    Discussion:  A FENO less than 25 ppb in adults and less than 20 ppb in children younger than 12 years of age implies noneosinophilic airway inflammation or the absence of airway inflammation.  A FENO greater than 50 ppb in adults or greater than 35 ppb in children suggests eosinophilic airway inflammation.   Values of FENO between 25 and 50 ppb in adults (20 to 35 ppb in children) should be interpreted cautiously with reference to the clinical situation (eg, symptomatic, on or off therapy, current smoking).  A rising FENO with a greater than 20 percent change and more than 25 ppb (20 ppb in children) from a previously stable level suggests increasing eosinophilic airway inflammation, but there are wide inter-individual differences.  A decrease in FENO greater than 20 percent for values over 50 ppb or more than  10 ppb for values less than 50 ppb may be clinically important.  ?FENO in other respiratory diseases - Several other diseases are associated with altered levels of exhaled NO: low levels of FENO have been noted in cystic fibrosis, current smoking, pulmonary hypertension, hypothermia, primary ciliary dyskinesia, and bronchopulmonary dysplasia. Elevated FENO has been noted in atopy, nonasthmatic eosinophilic bronchitis, COPD exacerbations, noncystic fibrosis bronchiectasis, and viral upper respiratory infections.    REFERENCE:  ATS Board of Directors, December 2004, and by the ERS Executive Committee, June 2004. ATS/ERS Recommendations for Standardized Procedures for the Online and Offline Measurement of Exhaled Lower Respiratory Nitric Oxide and Nasal Nitric Oxide. Guideline 2005

## 2023-07-12 NOTE — PROCEDURES
"[facility] - Pulmonary Function  Six Minute Walk     SUMMARY     Ordering Provider: SENTHIL Monzon   Interpreting Provider: Dr. Waller  Performing nurse/tech/RT: MANSOOR Villalobos RRT  Diagnosis:  (Mild persist asthma)  Height: 5' 5" (165.1 cm)  Weight: 88.7 kg (195 lb 10.5 oz)  BMI (Calculated): 32.6   Patient Race:             Phase Oxygen Assessment Supplemental O2 Heart   Rate Blood Pressure [place] Dyspnea Scale Rating   Resting 93 % Room Air 68 bpm 124/74 2   Exercise        Minute        1 96 % Room Air 75 bpm     2 94 % Room Air 77 bpm     3 95 % Room Air 77 bpm     4 95 % Room Air 79 bpm     5 97 % Room Air 79 bpm     6  95 % Room Air 76 bpm 156/73 3   Recovery        Minute        1 95 % Room Air 81 bpm     2 96 % Room Air 81 bpm     3 96 % Room Air 79 bpm     4 96 % Room Air 73 bpm 150/73 2     Six Minute Walk Summary  6MWT Status: completed with stops  Patient Reported: Dizziness, Dyspnea, Lightheadedness     Interpretation:  Did the patient stop or pause?: Yes  How many times did the patient stop or pause?: 1  Stop Time 1: 180  Restart Time 1: 260  Pause Time 1: 80 seconds                             Total Time Walked (Calculated): 280 seconds  Final Partial Lap Distance (feet): 150 feet  Total Distance Meters (Calculated): 45.72 meters  Predicted Distance Meters (Calculated): 291.92 meters  Percentage of Predicted (Calculated): 15.66  Peak VO2 (Calculated): 5.35  Mets: 1.53  Has The Patient Had a Previous Six Minute Walk Test?: No       Previous 6MWT Results  Has The Patient Had a Previous Six Minute Walk Test?: No    Interpretation:  Total distance walked in six minutes is severely reduced indicating a reduction in overall  functional capacity. The patient did not meet criteria for supplemental oxygen prescription.    Clinical correlation suggested.  [] Mild exercise-induced hypoxemia described as an arterial oxygen saturation of 93-95% (or 3-4% less than at rest),  [] Moderate exercise-induced hypoxemia as " 89-93%  [] Severe exercise induced hypoxemia as < 89% O2 saturation.  Medicare Criteria for oxygen prescription comments:   When arterial oxygen saturation is at or below 88% during exercise on room air (severe exercise induced hypoxemia) then the patient falls under Medicare Group 1 criteria for supplemental oxygen prescription.  Details about Medicare Group Criteria coverage can be found at http://[email or web address]/manuals/[email or web address]     Gabe Waller MD

## 2023-07-12 NOTE — PROGRESS NOTES
Subjective:     Patient ID: Holli Landrum is a 89 y.o. female.    Chief Complaint:      HPI    Complicated history of Atrial Fibrillation Congestive Heart failure, Chronic Obstructive Pulmonary Disease   Recent angiogram performed at [facility], complicated by hematoma and syncope    Asthma Follow-up  The patient has previously been evaluated here for asthma and presents for an asthma follow-up. The patient is currently having symptoms / an exacerbation. Current symptoms include dyspnea, non-productive cough, and wheezing. Symptoms have been present since several weeks ago and have been unchanged. She denies chest pain. Associated symptoms include poor exercise tolerance and shortness of breath.  This episode appears to have been triggered by no identifiable factor. Treatments tried for the current exacerbation include short-acting inhaled beta-adrenergic agonists, which have provided some relief of symptoms. The patient has been having similar episodes for approximately 10 years.    Current Disease Severity  The patient is having daytime symptoms more than 2 days per week but not daily. The patient is having daytime symptoms 3 to 4 times per month. The patient is using short-acting beta agonists for symptom control more than 2 days per week but not more than once a day. She has exacerbations requiring oral systemic corticosteroids 0 times per year. Current limitations in activity from asthma: none. Number of days of school or work missed in the last month: not applicable. Number of urgent/emergent visit in last year: 0.  The patient is not using a spacer with MDIs. Her best peak flow rate is na. She is not monitoring peak flow rates at home.  Hold until needed    Past Medical History:   Diagnosis Date    Anticoagulant long-term use     Arthritis     Asthma     Basal cell carcinoma     Cancer     skin cancer to face    Cataract     OU done//    CHF (congestive heart failure)     COPD (chronic obstructive  pulmonary disease)     no oxygen; patient denies    cpap     Essential hypertension 05/05/2010    GERD (gastroesophageal reflux disease) 11/20/2012    Glaucoma     Hypertensive heart disease with heart failure 02/05/2013    Paroxysmal atrial fibrillation     Paroxysmal ventricular tachycardia     per problem list    Renal disorder     french-1/3/2020    Squamous cell carcinoma of skin      Past Surgical History:   Procedure Laterality Date    A-V CARDIAC PACEMAKER INSERTION  06/16/2021    Procedure: INSERTION, CARDIAC PACEMAKER, DUAL CHAMBER;  Surgeon: Oscar Sommers MD;  Location: [facility];  Service: Cardiovascular;;    ADENOIDECTOMY  191944    APPENDECTOMY  1948    Because of Ovarian Cyst surgery    BREAST BIOPSY Left 1995    neg    BREAST BIOPSY Right 1984    neg    BREAST BIOPSY Right 1992    neg    BREAST SURGERY      A number of biopsies    CARDIAC CATHETERIZATION  2013, 2014,2016, 2020    has 9 stents    CARDIAC SURGERY  2012    stents    CATARACT EXTRACTION W/  INTRAOCULAR LENS IMPLANT Left 11/01/2018    Dr Rose    CATARACT EXTRACTION W/  INTRAOCULAR LENS IMPLANT Right 12/13/2018    Dr Rose//    CHOLECYSTECTOMY      COLONOSCOPY  ~2005    Dr. Edward; normal per patient report    COLONOSCOPY N/A 09/06/2022    Procedure: COLONOSCOPY 8/31/22-note in Dr Simms's office visit note that he spoke to her cardiologist and she was viable candidate for endoscopy;  Surgeon: Cordelia Bueno MD;  Location: [facility] ENDO;  Service: Endoscopy;  Laterality: N/A;    CORONARY ANGIOGRAPHY N/A 03/20/2020    Procedure: ANGIOGRAM, CORONARY ARTERY;  Surgeon: Chuy Bryant MD;  Location: [facility];  Service: Cardiology;  Laterality: N/A;    CYSTOSCOPY W/ RETROGRADES Bilateral 02/12/2020    Procedure: CYSTOSCOPY, WITH RETROGRADE PYELOGRAM;  Surgeon: Gasper Feliz MD;  Location: [facility] OR;  Service: Urology;  Laterality: Bilateral;    ESOPHAGOGASTRODUODENOSCOPY N/A 08/13/2020    Procedure: EGD (ESOPHAGOGASTRODUODENOSCOPY);   Surgeon: Mika Solorzano MD;  Location: [facility] ENDO;  Service: Endoscopy;  Laterality: N/A; Mild Schatzki ring. Biopsied. Dilated. small hiatal hernia, gastritis; biopsy: esophagus- SEVERE REFLUX ESOPHAGITIS, stomach- chronic gastritis, negative for H pylori    ESOPHAGOGASTRODUODENOSCOPY N/A 10/22/2020    Procedure: EGD (ESOPHAGOGASTRODUODENOSCOPY);  Surgeon: Fred Hendricks MD;  Location: [facility] ENDO;  Service: Endoscopy;  Laterality: N/A;    EYE SURGERY  2017    Cataracs    FRACTIONAL FLOW RESERVE (FFR), CORONARY  6/20/2023    Procedure: Fractional Flow [place] (FFR), Coronary;  Surgeon: Brice Campuzano MD;  Location: [facility] CATH LAB;  Service: Cardiology;;    HYSTERECTOMY  1969    INSTANTANEOUS WAVE-FREE RATIO (IFR) N/A 6/20/2023    Procedure: Instantaneous Wave-Free Ratio (IFR);  Surgeon: Brice Campuzano MD;  Location: [facility] CATH LAB;  Service: Cardiology;  Laterality: N/A;    LEFT HEART CATHETERIZATION Left 03/03/2020    Procedure: Left heart cath;  Surgeon: Chuy Bryant MD;  Location: [facility] CATH;  Service: Cardiology;  Laterality: Left;    LEFT HEART CATHETERIZATION Left 03/20/2020    Procedure: Left heart cath;  Surgeon: Chuy Bryant MD;  Location: [facility] CATH;  Service: Cardiology;  Laterality: Left;    LEFT HEART CATHETERIZATION N/A 6/20/2023    Procedure: Left heart cath;  Surgeon: Brice Campuzano MD;  Location: [facility] CATH LAB;  Service: Cardiology;  Laterality: N/A;    OVARIAN CYST REMOVAL  teenager    PHACOEMULSIFICATION OF CATARACT Left 11/01/2018    Procedure: PHACOEMULSIFICATION, CATARACT;  Surgeon: Aleksandar Rose Jr., MD;  Location: [facility] OR;  Service: Ophthalmology;  Laterality: Left;    PHACOEMULSIFICATION OF CATARACT Right 12/13/2018    Procedure: PHACOEMULSIFICATION, CATARACT;  Surgeon: Aleksandar Rose Jr., MD;  Location: [facility] OR;  Service: Ophthalmology;  Laterality: Right;    TONSILLECTOMY      aw/denoids    UPPER GASTROINTESTINAL ENDOSCOPY  ~2005    Dr. Solorzano    VALVE STUDY-AORTIC   6/20/2023    Procedure: Valve study-aortic;  Surgeon: Brice Campuzano MD;  Location: [facility] CATH LAB;  Service: Cardiology;;    VASCULAR SURGERY      to remove clot from right leg    Yag Capsulotomy Bilateral 11/05/2019    Dr Rose     Review of patient's allergies indicates:   Allergen Reactions    Timolol maleate Shortness Of Breath    Ciprofloxacin Other (See Comments)     Muscle ache    Sulfamethoxazole-trimethoprim      Current Outpatient Medications on File Prior to Visit   Medication Sig Dispense Refill    acetaminophen (TYLENOL) 650 MG TbSR Take 650 mg by mouth as needed.       allopurinoL (ZYLOPRIM) 100 MG tablet Take 2 tablets (200 mg total) by mouth once daily. (Patient taking differently: Take 200 mg by mouth once daily. 1 tablet daily) 180 tablet 2    aluminum & magnesium hydroxide-simethicone (MYLANTA MAX STRENGTH) 400-400-40 mg/5 mL suspension Take by mouth every 6 (six) hours as needed for Indigestion.      amLODIPine (NORVASC) 2.5 MG tablet TAKE 1 TABLET BY MOUTH EVERY DAY 30 tablet 10    apixaban (ELIQUIS) 2.5 mg Tab Take 1 tablet (2.5 mg total) by mouth 2 (two) times daily. 180 tablet 3    cetirizine HCl (ZYRTEC ORAL) Take by mouth.      cholecalciferol, vitamin D3, 125 mcg (5,000 unit) Tab Take 5,000 Units by mouth once daily.      clopidogreL (PLAVIX) 75 mg tablet Take 75 mg by mouth.      dorzolamide (TRUSOPT) 2 % ophthalmic solution INSTILL 1 DROP INTO BOTH EYES TWICE DAILY 30 mL 1    fluticasone (FLONASE) 50 mcg/actuation nasal spray 2 sprays (100 mcg total) by Each Nare route daily as needed for Allergies. 16 g 11    furosemide (LASIX) 40 MG tablet Take 1 tablet (40 mg total) by mouth 2 (two) times a day. Take twice a day and monitor BP and weights 90 tablet 1    Lactobacillus rhamnosus GG (CULTURELLE) 10 billion cell capsule Take 1 capsule by mouth once daily.      latanoprost 0.005 % ophthalmic solution Place 1 drop into both eyes every evening. 5 mL 6    magnesium oxide (MAG-OX) 400  mg tablet Take 800 mg by mouth once daily.       mupirocin (BACTROBAN) 2 % ointment SMARTSI Application Topical 2-3 Times Daily      nitroGLYCERIN 0.4 MG/HR TD PT24 (NITRODUR) 0.4 mg/hr Place 1 patch onto the skin once daily. 90 patch 3    omega-3 fatty acids/fish oil (FISH OIL-OMEGA-3 FATTY ACIDS) 300-1,000 mg capsule Take 2 capsules by mouth once daily.      ondansetron (ZOFRAN) 4 MG tablet Take 4 mg by mouth every 6 (six) hours as needed.      ondansetron (ZOFRAN-ODT) 4 MG TbDL Take by mouth.      pantoprazole (PROTONIX) 40 MG tablet Take 1 tablet (40 mg total) by mouth once daily. 30 tablet 11    potassium chloride (KLOR-CON) 10 MEQ TbSR Take 2 tablets (20 mEq total) by mouth 2 (two) times daily. 180 tablet 3    rosuvastatin (CRESTOR) 40 MG Tab TAKE 1 TABLET (40 MG TOTAL) BY MOUTH ONCE DAILY. 90 tablet 3    sotaloL (BETAPACE) 80 MG tablet Take 0.5 tablets (40 mg total) by mouth 2 (two) times daily. 90 tablet 3    traMADoL (ULTRAM) 50 mg tablet Take 1 tablet (50 mg total) by mouth every 12 (twelve) hours as needed for Pain. 10 tablet 0     Current Facility-Administered Medications on File Prior to Visit   Medication Dose Route Frequency Provider Last Rate Last Admin    sodium chloride 0.9% flush 10 mL  10 mL Intravenous PRN Brice Campuzano MD         Social History     Socioeconomic History    Marital status:    Tobacco Use    Smoking status: Former     Years: 1.00     Types: Cigarettes     Start date: 6/10/1954     Quit date: 9/15/1955     Years since quittin.8    Smokeless tobacco: Never    Tobacco comments:     I onlysmoked one year while [place] [place]  was oversees  during [place]   Substance and Sexual Activity    Alcohol use: Not Currently     Comment: Only drank a little wine and haven't  drunk anything in 15 y    Drug use: Never    Sexual activity: Not Currently     Partners: Male     Birth control/protection: Abstinence     Comment:  and 87 years of age     Social  Determinants of Health     Financial Resource Strain: Low Risk     Difficulty of Paying Living Expenses: Not hard at all   Food Insecurity: No Food Insecurity    Worried About Running Out of Food in the Last Year: Never true    Ran Out of Food in the Last Year: Never true   Transportation Needs: No Transportation Needs    Lack of Transportation (Medical): No    Lack of Transportation (Non-Medical): No   Physical Activity: Inactive    Days of Exercise per Week: 0 days    Minutes of Exercise per Session: 0 min   Stress: No Stress Concern Present    Feeling of Stress : Only a little   Social Connections: Unknown    Frequency of Communication with Friends and Family: More than three times a week    Frequency of Social Gatherings with Friends and Family: Twice a week    Active Member of Clubs or Organizations: Yes    Attends Club or Organization Meetings: More than 4 times per year    Marital Status:    Housing Stability: Low Risk     Unable to Pay for Housing in the Last Year: No    Number of Places Lived in the Last Year: 1    Unstable Housing in the Last Year: No     Family History   Problem Relation Age of Onset    Diabetes Brother         Type 2    Heart disease Brother          at 65 CHD    Diabetes Mother         Type 1    Alcohol abuse Mother         Dod 10/75    Heart disease Mother         Arteriosclerosis    Hypertension Mother     Stroke Mother         3/15/1975 dod 10/1975    Cancer Maternal Grandfather         Dod     Clotting disorder Son         bleeding after tonsillectomy only    Heart disease Father         Heart attack. Afib, and Polyvcithemavera    Hypertension Father     Amblyopia Neg Hx     Blindness Neg Hx     Cataracts Neg Hx     Glaucoma Neg Hx     Macular degeneration Neg Hx     Retinal detachment Neg Hx     Strabismus Neg Hx     Thyroid disease Neg Hx     Colon cancer Neg Hx        Review of Systems   Constitutional:  Positive for fatigue. Negative for fever.   HENT:  Positive  "for postnasal drip, rhinorrhea and congestion.    Eyes:  Negative for redness and itching.   Respiratory:  Positive for cough, sputum production, shortness of breath, dyspnea on extertion, use of rescue inhaler and Paroxysmal Nocturnal Dyspnea.    Cardiovascular:  Negative for chest pain, palpitations and leg swelling.   Genitourinary:  Negative for difficulty urinating and hematuria.   Endocrine:  Negative for cold intolerance and heat intolerance.    Skin:  Negative for rash.   Gastrointestinal:  Negative for nausea and abdominal pain.   Neurological:  Negative for dizziness, syncope, weakness and light-headedness.   Hematological:  Negative for adenopathy. Does not bruise/bleed easily.   Psychiatric/Behavioral:  Negative for sleep disturbance. The patient is not nervous/anxious.      Objective:      BP (!) 142/78   Pulse 76   Resp 16   Ht 5' 5" (1.651 m)   Wt 88.7 kg (195 lb 8.8 oz)   LMP  (LMP Unknown)   SpO2 95%   BMI 32.54 kg/m²   Physical Exam  Vitals and nursing note reviewed.   Constitutional:       Appearance: She is well-developed.   HENT:      Head: Normocephalic and atraumatic.      Nose: Congestion present.      Mouth/Throat:      Pharynx: No oropharyngeal exudate.   Eyes:      Conjunctiva/sclera: Conjunctivae normal.      Pupils: Pupils are equal, round, and reactive to light.   Neck:      Thyroid: No thyromegaly.      Vascular: No JVD.      Trachea: No tracheal deviation.   Cardiovascular:      Rate and Rhythm: Normal rate and regular rhythm.      Heart sounds: Normal heart sounds.   Pulmonary:      Effort: Pulmonary effort is normal. No respiratory distress.      Breath sounds: Examination of the right-lower field reveals wheezing. Examination of the left-lower field reveals wheezing. Decreased breath sounds and wheezing present. No rhonchi or rales.   Chest:      Chest wall: No tenderness.   Abdominal:      General: Bowel sounds are normal.      Palpations: Abdomen is soft. "   Musculoskeletal:         General: Normal range of motion.      Cervical back: Neck supple.   Lymphadenopathy:      Cervical: No cervical adenopathy.   Skin:     General: Skin is warm and dry.   Neurological:      Mental Status: She is alert and oriented to person, place, and time.     Personal Diagnostic Review  Chest x-ray: hyperinflation      Pulmonary Studies Review 7/12/2023   SpO2 95   Ordering Provider -   Interpreting Provider -   Performing nurse/tech/RT -   Diagnosis -   Height 65   Weight 3128.77   BMI (Calculated) 32.5   Predicted Distance 150.5   Patient Race -   6MWT Status -   Patient Reported -   Was O2 used? -   6MW Distance walked (feet) -   Distance walked (meters) -   Did patient stop? -   How many times? -   Stop Time 1 -   Restart Time 1 -   Did patient restart? -   Type of assistive device(s) used? -   Is extra documentation required for this patient? -   Oxygen Saturation -   Supplemental Oxygen -   Heart Rate -   Blood Pressure -   Ste[place]ano Dyspnea Rating  -   Oxygen Saturation -   Supplemental Oxygen -   Heart Rate -   Blood Pressure -   [place] Dyspnea Rating  -   Recovery Time (seconds) -   Oxygen Saturation -   Supplemental Oxygen -   Heart Rate -   Blood Pressure -   Ste[place] Dyspnea Rating  -   Is procedure ready for interpretation? -   Did the patient stop or pause? -   How many times did the patient stop or pause? -   Stop Time 1 -   Restart Time 1 -   Pause Time 1 -   Total Time Walked (Calculated) -   Total Laps Walked -   Final Partial Lap Distance (feet) -   Total Distance Feet (Calculated) -   Total Distance Meters (Calculated) -   Predicted Distance Meters (Calculated) 292.04   Percentage of Predicted (Calculated) -   Peak VO2 (Calculated) -   Mets -   Has The Patient Had a Previous Six Minute Walk Test? -   Oxygen Qualification? -       Cardiac device check - Remote  Battery and Leads (BL)  Normal parameters noted on battery and lead(s)    Presenting Rhythm (GA)  Atrial  Pacing-Ventricular Sensing (AP-VS)    Arrhythmic events (AE)  Paroxysmal atrial fibrillation and/or flutter  Device-identified arrhythmic events without corresponding EGMs and/or details noted    Anticoagulation  (AC)  Patient prescribed Apixaban (Eliquis)  Patient on anticoagulant therapy    Cardiovascular Physiologic Parameters (CPP)  No abnormalities in physiologic parameters identified    Transmission Information (TI)  Device Summary Report    Follow Up (FU)  Continue remote monitoring with quarterly reporting    Additional Comments  Pacemaker Report  Monitoring period: 3/8/23-6/7/23    Battery/Lead Status: 80%    Ap: 89%  : 3%    Device Defined Counters:  Atrial Fibrillation/Flutter: up to 14 per day  EGMs illustrate AF/AFL, longest available 19min in duration.  AF burden 0%      Office Spirometry Results:     No flowsheet data found.  Pulmonary Studies Review 7/12/2023   SpO2 95   Ordering Provider -   Interpreting Provider -   Performing nurse/tech/RT -   Diagnosis -   Height 65   Weight 3128.77   BMI (Calculated) 32.5   Predicted Distance 150.5   Patient Race -   6MWT Status -   Patient Reported -   Was O2 used? -   6MW Distance walked (feet) -   Distance walked (meters) -   Did patient stop? -   How many times? -   Stop Time 1 -   Restart Time 1 -   Did patient restart? -   Type of assistive device(s) used? -   Is extra documentation required for this patient? -   Oxygen Saturation -   Supplemental Oxygen -   Heart Rate -   Blood Pressure -   Ste[place]ano Dyspnea Rating  -   Oxygen Saturation -   Supplemental Oxygen -   Heart Rate -   Blood Pressure -   [place]ano Dyspnea Rating  -   Recovery Time (seconds) -   Oxygen Saturation -   Supplemental Oxygen -   Heart Rate -   Blood Pressure -   Stefano Dyspnea Rating  -   Is procedure ready for interpretation? -   Did the patient stop or pause? -   How many times did the patient stop or pause? -   Stop Time 1 -   Restart Time 1 -   Pause Time 1 -   Total Time Walked  (Calculated) -   Total Laps Walked -   Final Partial Lap Distance (feet) -   Total Distance Feet (Calculated) -   Total Distance Meters (Calculated) -   Predicted Distance Meters (Calculated) 292.04   Percentage of Predicted (Calculated) -   Peak VO2 (Calculated) -   Mets -   Has The Patient Had a Previous Six Minute Walk Test? -   Oxygen Qualification? -         Assessment:            Chronic obstructive pulmonary disease, unspecified COPD type  -     fluticasone furoate-vilanteroL (BREO ELLIPTA) 100-25 mcg/dose diskus inhaler; Inhale 1 puff into the lungs once daily.  Dispense: 90 each; Refill: 11  -     albuterol (PROVENTIL/VENTOLIN HFA) 90 mcg/actuation inhaler; Inhale 1-2 puffs into the lungs every 4 (four) hours as needed for Wheezing or Shortness of Breath. Rescue  Dispense: 18 g; Refill: 11  -     X-Ray Chest PA And Lateral; Future; Expected date: 07/12/2023  -     albuterol-ipratropium (DUO-NEB) 2.5 mg-0.5 mg/3 mL nebulizer solution; Take 3 mLs by nebulization every 6 (six) hours as needed for Wheezing or Shortness of Breath.  Dispense: 360 mL; Refill: 11    Simple chronic bronchitis  -     fluticasone furoate-vilanteroL (BREO ELLIPTA) 100-25 mcg/dose diskus inhaler; Inhale 1 puff into the lungs once daily.  Dispense: 90 each; Refill: 11  -     albuterol (PROVENTIL/VENTOLIN HFA) 90 mcg/actuation inhaler; Inhale 1-2 puffs into the lungs every 4 (four) hours as needed for Wheezing or Shortness of Breath. Rescue  Dispense: 18 g; Refill: 11  -     X-Ray Chest PA And Lateral; Future; Expected date: 07/12/2023  -     albuterol-ipratropium (DUO-NEB) 2.5 mg-0.5 mg/3 mL nebulizer solution; Take 3 mLs by nebulization every 6 (six) hours as needed for Wheezing or Shortness of Breath.  Dispense: 360 mL; Refill: 11          Outpatient Encounter Medications as of 7/12/2023   Medication Sig Dispense Refill    acetaminophen (TYLENOL) 650 MG TbSR Take 650 mg by mouth as needed.       allopurinoL (ZYLOPRIM) 100 MG tablet  Take 2 tablets (200 mg total) by mouth once daily. (Patient taking differently: Take 200 mg by mouth once daily. 1 tablet daily) 180 tablet 2    aluminum & magnesium hydroxide-simethicone (MYLANTA MAX STRENGTH) 400-400-40 mg/5 mL suspension Take by mouth every 6 (six) hours as needed for Indigestion.      amLODIPine (NORVASC) 2.5 MG tablet TAKE 1 TABLET BY MOUTH EVERY DAY 30 tablet 10    apixaban (ELIQUIS) 2.5 mg Tab Take 1 tablet (2.5 mg total) by mouth 2 (two) times daily. 180 tablet 3    cetirizine HCl (ZYRTEC ORAL) Take by mouth.      cholecalciferol, vitamin D3, 125 mcg (5,000 unit) Tab Take 5,000 Units by mouth once daily.      clopidogreL (PLAVIX) 75 mg tablet Take 75 mg by mouth.      dorzolamide (TRUSOPT) 2 % ophthalmic solution INSTILL 1 DROP INTO BOTH EYES TWICE DAILY 30 mL 1    fluticasone (FLONASE) 50 mcg/actuation nasal spray 2 sprays (100 mcg total) by Each Nare route daily as needed for Allergies. 16 g 11    furosemide (LASIX) 40 MG tablet Take 1 tablet (40 mg total) by mouth 2 (two) times a day. Take twice a day and monitor BP and weights 90 tablet 1    Lactobacillus rhamnosus GG (CULTURELLE) 10 billion cell capsule Take 1 capsule by mouth once daily.      latanoprost 0.005 % ophthalmic solution Place 1 drop into both eyes every evening. 5 mL 6    magnesium oxide (MAG-OX) 400 mg tablet Take 800 mg by mouth once daily.       mupirocin (BACTROBAN) 2 % ointment SMARTSI Application Topical 2-3 Times Daily      nitroGLYCERIN 0.4 MG/HR TD PT24 (NITRODUR) 0.4 mg/hr Place 1 patch onto the skin once daily. 90 patch 3    omega-3 fatty acids/fish oil (FISH OIL-OMEGA-3 FATTY ACIDS) 300-1,000 mg capsule Take 2 capsules by mouth once daily.      ondansetron (ZOFRAN) 4 MG tablet Take 4 mg by mouth every 6 (six) hours as needed.      ondansetron (ZOFRAN-ODT) 4 MG TbDL Take by mouth.      pantoprazole (PROTONIX) 40 MG tablet Take 1 tablet (40 mg total) by mouth once daily. 30 tablet 11    potassium chloride  (KLOR-CON) 10 MEQ TbSR Take 2 tablets (20 mEq total) by mouth 2 (two) times daily. 180 tablet 3    rosuvastatin (CRESTOR) 40 MG Tab TAKE 1 TABLET (40 MG TOTAL) BY MOUTH ONCE DAILY. 90 tablet 3    sotaloL (BETAPACE) 80 MG tablet Take 0.5 tablets (40 mg total) by mouth 2 (two) times daily. 90 tablet 3    traMADoL (ULTRAM) 50 mg tablet Take 1 tablet (50 mg total) by mouth every 12 (twelve) hours as needed for Pain. 10 tablet 0    [DISCONTINUED] albuterol (PROVENTIL/VENTOLIN HFA) 90 mcg/actuation inhaler Inhale 1-2 puffs into the lungs every 6 (six) hours as needed for Wheezing. Rescue 18 g 5    [DISCONTINUED] albuterol-ipratropium (DUO-NEB) 2.5 mg-0.5 mg/3 mL nebulizer solution       albuterol (PROVENTIL/VENTOLIN HFA) 90 mcg/actuation inhaler Inhale 1-2 puffs into the lungs every 4 (four) hours as needed for Wheezing or Shortness of Breath. Rescue 18 g 11    albuterol-ipratropium (DUO-NEB) 2.5 mg-0.5 mg/3 mL nebulizer solution Take 3 mLs by nebulization every 6 (six) hours as needed for Wheezing or Shortness of Breath. 360 mL 11    fluticasone furoate-vilanteroL (BREO ELLIPTA) 100-25 mcg/dose diskus inhaler Inhale 1 puff into the lungs once daily. 90 each 11    [DISCONTINUED] cefdinir (OMNICEF) 300 MG capsule Take 300 mg by mouth 2 (two) times daily.      [DISCONTINUED] ceftAZIDime (FORTAZ) 2 gram injection Inject 2 g into the vein every 8 (eight) hours.      [DISCONTINUED] clopidogreL (PLAVIX) 75 mg tablet Take 4 tabs (300mg) night before procedure then 1 tab (75mg) daily starting day of procedure. 30 tablet 11    [DISCONTINUED] D-MANNOSE ORAL Take 2 tablets by mouth once daily.       [DISCONTINUED] fluticasone furoate-vilanteroL (BREO ELLIPTA) 100-25 mcg/dose diskus inhaler Inhale 1 puff into the lungs once daily. 90 each 0    [DISCONTINUED] fosfomycin (MONUROL) 3 gram Pack Take as directed 3 g 0    [DISCONTINUED] GRAPE SEED EXTRACT ORAL Take 1 tablet by mouth once daily.       [DISCONTINUED] hydrocortisone 2.5 %  cream Apply topically 2 (two) times daily. for 14 days 1 each 0    [DISCONTINUED] OLIVE LEAF EXTRACT ORAL Take 1 tablet by mouth once daily.       [DISCONTINUED] rosuvastatin (CRESTOR) 40 MG Tab Take 1 tablet (40 mg total) by mouth once daily. 90 tablet 3     Facility-Administered Encounter Medications as of 7/12/2023   Medication Dose Route Frequency Provider Last Rate Last Admin    sodium chloride 0.9% flush 10 mL  10 mL Intravenous PRN Brice Campuzano MD        [DISCONTINUED] acetaminophen tablet 650 mg  650 mg Oral Q6H PRN Imjeaneth Macdonald MD   650 mg at 06/20/23 1559    [DISCONTINUED] adenosine (diagnostic) (ADENOSCAN) 90 mg in sodium chloride 0.9% SolP 90 mL infusion    Continuous PRN Brice Campuzano MD   Stopped at 06/20/23 1420    [DISCONTINUED] ceFAZolin (ANCEF) 1 gram in dextrose 5 % 50 mL IVPB (premix)    Continuous PRN Brice Campuzano MD   2 g at 06/20/23 1425    [DISCONTINUED] fentaNYL 50 mcg/mL injection    PRN Brice Campuzano MD   25 mcg at 06/20/23 1433    [DISCONTINUED] heparin (porcine) injection    PRN Brice Campuzano MD   8,000 Units at 06/20/23 1403    [DISCONTINUED] heparin infusion 1,000 units/500 ml in 0.9% NaCl (on sterile field)    PRNATHANIEL Campuzano MD   10 mL at 06/20/23 1326    [DISCONTINUED] iohexoL (OMNIPAQUE 350) injection    PRN Brice Campuzano MD   70 mL at 06/20/23 1443    [DISCONTINUED] LIDOcaine HCL 10 mg/ml (1%) injection    PRN Brice Campuzano MD   10 mL at 06/20/23 1325    [DISCONTINUED] midazolam (VERSED) 1 mg/mL injection    PRN Brice Campuzano MD   0.5 mg at 06/20/23 1414    [DISCONTINUED] nitroglycerin 200 mcg/mL syringe    PRN Brice Campuzano MD   3 mL at 06/20/23 1325    [DISCONTINUED] ondansetron injection    PRNATHANIEL Campuzano MD   4 mg at 06/20/23 1336    [DISCONTINUED] protamine injection    PRN Brice Campuzano MD   40 mg at 06/20/23 1443    [DISCONTINUED] verapamiL 2.5 mg, nitroglycerin 200 mcg/mL 200 mcg in sodium  chloride 0.9% 8 mL    PRN Brice Campuzano MD   Given at 06/20/23 1326     Plan:       Requested Prescriptions     Signed Prescriptions Disp Refills    fluticasone furoate-vilanteroL (BREO ELLIPTA) 100-25 mcg/dose diskus inhaler 90 each 11     Sig: Inhale 1 puff into the lungs once daily.    albuterol (PROVENTIL/VENTOLIN HFA) 90 mcg/actuation inhaler 18 g 11     Sig: Inhale 1-2 puffs into the lungs every 4 (four) hours as needed for Wheezing or Shortness of Breath. Rescue    albuterol-ipratropium (DUO-NEB) 2.5 mg-0.5 mg/3 mL nebulizer solution 360 mL 11     Sig: Take 3 mLs by nebulization every 6 (six) hours as needed for Wheezing or Shortness of Breath.     Problem List Items Addressed This Visit       COPD (chronic obstructive pulmonary disease)    Relevant Medications    fluticasone furoate-vilanteroL (BREO ELLIPTA) 100-25 mcg/dose diskus inhaler    albuterol (PROVENTIL/VENTOLIN HFA) 90 mcg/actuation inhaler    albuterol-ipratropium (DUO-NEB) 2.5 mg-0.5 mg/3 mL nebulizer solution    Other Relevant Orders    X-Ray Chest PA And Lateral    X-Ray Chest PA And Lateral          Follow up in about 6 months (around 1/12/2024) for Review progress.    MEDICAL DECISION MAKING: Moderate to high complexity.  Overall, the multiple problems listed are of moderate to high severity that may impact quality of life and activities of daily living. Side effects of medications, treatment plan as well as options and alternatives reviewed and discussed with patient. There was counseling of patient concerning these issues.    Total time spent in counseling and coordination of care - 30  minutes of total time spent on the encounter, which includes face to face time and non-face to face time preparing to see the patient (eg, review of tests), Obtaining and/or reviewing separately obtained history, Documenting clinical information in the electronic or other health record, Independently interpreting results (not separately reported) and  communicating results to the patient/family/caregiver, or Care coordination (not separately reported).    Time was used in discussion of prognosis, risks, benefits of treatment, instructions and compliance with regimen . Discussion with other physicians and/or health care providers - home health or for use of durable medical equipment (oxygen, nebulizers, CPAP, BiPAP) occurred.

## 2023-07-24 ENCOUNTER — LAB VISIT (OUTPATIENT)
Dept: LAB | Facility: HOSPITAL | Age: 88
End: 2023-07-24
Attending: INTERNAL MEDICINE
Payer: MEDICARE

## 2023-07-24 ENCOUNTER — OFFICE VISIT (OUTPATIENT)
Dept: SURGERY | Facility: CLINIC | Age: 88
End: 2023-07-24
Payer: MEDICARE

## 2023-07-24 VITALS
BODY MASS INDEX: 32.54 KG/M2 | DIASTOLIC BLOOD PRESSURE: 71 MMHG | HEART RATE: 86 BPM | SYSTOLIC BLOOD PRESSURE: 104 MMHG | WEIGHT: 195.56 LBS

## 2023-07-24 DIAGNOSIS — K92.1 HEMATOCHEZIA: ICD-10-CM

## 2023-07-24 DIAGNOSIS — K64.9 HEMORRHOIDS, UNSPECIFIED HEMORRHOID TYPE: ICD-10-CM

## 2023-07-24 DIAGNOSIS — K64.9 HEMORRHOIDS, UNSPECIFIED HEMORRHOID TYPE: Primary | ICD-10-CM

## 2023-07-24 LAB
BASOPHILS # BLD AUTO: 0.05 K/UL (ref 0–0.2)
BASOPHILS NFR BLD: 0.8 % (ref 0–1.9)
DIFFERENTIAL METHOD: ABNORMAL
EOSINOPHIL # BLD AUTO: 0.2 K/UL (ref 0–0.5)
EOSINOPHIL NFR BLD: 3.6 % (ref 0–8)
ERYTHROCYTE [DISTWIDTH] IN BLOOD BY AUTOMATED COUNT: 16.6 % (ref 11.5–14.5)
HCT VFR BLD AUTO: 39.5 % (ref 37–48.5)
HGB BLD-MCNC: 12.4 G/DL (ref 12–16)
IMM GRANULOCYTES # BLD AUTO: 0.02 K/UL (ref 0–0.04)
IMM GRANULOCYTES NFR BLD AUTO: 0.3 % (ref 0–0.5)
LYMPHOCYTES # BLD AUTO: 2 K/UL (ref 1–4.8)
LYMPHOCYTES NFR BLD: 31.4 % (ref 18–48)
MCH RBC QN AUTO: 26.7 PG (ref 27–31)
MCHC RBC AUTO-ENTMCNC: 31.4 G/DL (ref 32–36)
MCV RBC AUTO: 85 FL (ref 82–98)
MONOCYTES # BLD AUTO: 0.8 K/UL (ref 0.3–1)
MONOCYTES NFR BLD: 11.7 % (ref 4–15)
NEUTROPHILS # BLD AUTO: 3.4 K/UL (ref 1.8–7.7)
NEUTROPHILS NFR BLD: 52.2 % (ref 38–73)
NRBC BLD-RTO: 0 /100 WBC
PLATELET # BLD AUTO: 293 K/UL (ref 150–450)
PMV BLD AUTO: 9.6 FL (ref 9.2–12.9)
RBC # BLD AUTO: 4.64 M/UL (ref 4–5.4)
WBC # BLD AUTO: 6.43 K/UL (ref 3.9–12.7)

## 2023-07-24 PROCEDURE — 1157F ADVNC CARE PLAN IN RCRD: CPT | Mod: CPTII,S$GLB,, | Performed by: COLON & RECTAL SURGERY

## 2023-07-24 PROCEDURE — 1157F PR ADVANCE CARE PLAN OR EQUIV PRESENT IN MEDICAL RECORD: ICD-10-PCS | Mod: CPTII,S$GLB,, | Performed by: COLON & RECTAL SURGERY

## 2023-07-24 PROCEDURE — 99999 PR PBB SHADOW E&M-EST. PATIENT-LVL III: ICD-10-PCS | Mod: PBBFAC,,, | Performed by: COLON & RECTAL SURGERY

## 2023-07-24 PROCEDURE — 1159F MED LIST DOCD IN RCRD: CPT | Mod: CPTII,S$GLB,, | Performed by: COLON & RECTAL SURGERY

## 2023-07-24 PROCEDURE — 1159F PR MEDICATION LIST DOCUMENTED IN MEDICAL RECORD: ICD-10-PCS | Mod: CPTII,S$GLB,, | Performed by: COLON & RECTAL SURGERY

## 2023-07-24 PROCEDURE — 85025 COMPLETE CBC W/AUTO DIFF WBC: CPT

## 2023-07-24 PROCEDURE — 99204 OFFICE O/P NEW MOD 45 MIN: CPT | Mod: S$GLB,,, | Performed by: COLON & RECTAL SURGERY

## 2023-07-24 PROCEDURE — 36415 COLL VENOUS BLD VENIPUNCTURE: CPT

## 2023-07-24 PROCEDURE — 1126F PR PAIN SEVERITY QUANTIFIED, NO PAIN PRESENT: ICD-10-PCS | Mod: CPTII,S$GLB,, | Performed by: COLON & RECTAL SURGERY

## 2023-07-24 PROCEDURE — 99999 PR PBB SHADOW E&M-EST. PATIENT-LVL III: CPT | Mod: PBBFAC,,, | Performed by: COLON & RECTAL SURGERY

## 2023-07-24 PROCEDURE — 99204 PR OFFICE/OUTPT VISIT, NEW, LEVL IV, 45-59 MIN: ICD-10-PCS | Mod: S$GLB,,, | Performed by: COLON & RECTAL SURGERY

## 2023-07-24 PROCEDURE — 1126F AMNT PAIN NOTED NONE PRSNT: CPT | Mod: CPTII,S$GLB,, | Performed by: COLON & RECTAL SURGERY

## 2023-07-24 NOTE — PROGRESS NOTES
History & Physical    SUBJECTIVE:     CC: rectal bleeding   Ref: Dr. Pinto     History of Present Illness:  Patient is a 89 y.o. female presents with rectal bleeding. Onset of rectal bleeding after starting Eliquis in 2022. Has bright red blood with bowel movements. Has some discomfort and pain with sitting long periods of time. States that she has used suppositories and steroid creams without any relief. Blood present with ever BM. Reports bowel movement every other day, uses miralax every other day, does not use any fiber or stool softeners, sits on toilet >5 mins, drinks >64 oz water daily. Colonoscopy 09/2022 with diverticulosis and hemorrhoids, no personal hx of polyps. On eliquis for afib, cardiac stents. Denies famhx of CRC, IBD, polyps. Denies ever needing blood transfusion. Denies n/v, fevers, chills.    Chief Complaint   Patient presents with    Hemorrhoids       Review of patient's allergies indicates:   Allergen Reactions    Timolol maleate Shortness Of Breath    Ciprofloxacin Other (See Comments)     Muscle ache    Sulfamethoxazole-trimethoprim        Current Outpatient Medications   Medication Sig Dispense Refill    acetaminophen (TYLENOL) 650 MG TbSR Take 650 mg by mouth as needed.       albuterol (PROVENTIL/VENTOLIN HFA) 90 mcg/actuation inhaler Inhale 1-2 puffs into the lungs every 4 (four) hours as needed for Wheezing or Shortness of Breath. Rescue 18 g 11    albuterol-ipratropium (DUO-NEB) 2.5 mg-0.5 mg/3 mL nebulizer solution Take 3 mLs by nebulization every 6 (six) hours as needed for Wheezing or Shortness of Breath. 360 mL 11    allopurinoL (ZYLOPRIM) 100 MG tablet Take 2 tablets (200 mg total) by mouth once daily. (Patient taking differently: Take 200 mg by mouth once daily. 1 tablet daily) 180 tablet 2    aluminum & magnesium hydroxide-simethicone (MYLANTA MAX STRENGTH) 400-400-40 mg/5 mL suspension Take by mouth every 6 (six) hours as needed for Indigestion.      amLODIPine (NORVASC) 2.5  MG tablet TAKE 1 TABLET BY MOUTH EVERY DAY 30 tablet 10    apixaban (ELIQUIS) 2.5 mg Tab Take 1 tablet (2.5 mg total) by mouth 2 (two) times daily. 180 tablet 3    cetirizine HCl (ZYRTEC ORAL) Take by mouth.      cholecalciferol, vitamin D3, 125 mcg (5,000 unit) Tab Take 5,000 Units by mouth once daily.      clopidogreL (PLAVIX) 75 mg tablet Take 75 mg by mouth.      dorzolamide (TRUSOPT) 2 % ophthalmic solution INSTILL 1 DROP INTO BOTH EYES TWICE DAILY 30 mL 1    fluticasone (FLONASE) 50 mcg/actuation nasal spray 2 sprays (100 mcg total) by Each Nare route daily as needed for Allergies. 16 g 11    fluticasone furoate-vilanteroL (BREO ELLIPTA) 100-25 mcg/dose diskus inhaler Inhale 1 puff into the lungs once daily. 90 each 11    furosemide (LASIX) 40 MG tablet Take 1 tablet (40 mg total) by mouth 2 (two) times a day. Take twice a day and monitor BP and weights 90 tablet 1    Lactobacillus rhamnosus GG (CULTURELLE) 10 billion cell capsule Take 1 capsule by mouth once daily.      latanoprost 0.005 % ophthalmic solution Place 1 drop into both eyes every evening. 5 mL 6    magnesium oxide (MAG-OX) 400 mg tablet Take 800 mg by mouth once daily.       mupirocin (BACTROBAN) 2 % ointment SMARTSI Application Topical 2-3 Times Daily      nitroGLYCERIN 0.4 MG/HR TD PT24 (NITRODUR) 0.4 mg/hr Place 1 patch onto the skin once daily. 90 patch 3    omega-3 fatty acids/fish oil (FISH OIL-OMEGA-3 FATTY ACIDS) 300-1,000 mg capsule Take 2 capsules by mouth once daily.      ondansetron (ZOFRAN) 4 MG tablet Take 4 mg by mouth every 6 (six) hours as needed.      ondansetron (ZOFRAN-ODT) 4 MG TbDL Take by mouth.      pantoprazole (PROTONIX) 40 MG tablet Take 1 tablet (40 mg total) by mouth once daily. 30 tablet 11    potassium chloride (KLOR-CON) 10 MEQ TbSR Take 2 tablets (20 mEq total) by mouth 2 (two) times daily. 180 tablet 3    rosuvastatin (CRESTOR) 40 MG Tab TAKE 1 TABLET (40 MG TOTAL) BY MOUTH ONCE DAILY. 90 tablet 3    sotaloL  (BETAPACE) 80 MG tablet Take 0.5 tablets (40 mg total) by mouth 2 (two) times daily. 90 tablet 3    traMADoL (ULTRAM) 50 mg tablet Take 1 tablet (50 mg total) by mouth every 12 (twelve) hours as needed for Pain. 10 tablet 0     Current Facility-Administered Medications   Medication Dose Route Frequency Provider Last Rate Last Admin    sodium chloride 0.9% flush 10 mL  10 mL Intravenous PRN Brice Campuzano MD           Past Medical History:   Diagnosis Date    Anticoagulant long-term use     Arthritis     Asthma     Basal cell carcinoma     Cancer     skin cancer to face    Cataract     OU done//    CHF (congestive heart failure)     COPD (chronic obstructive pulmonary disease)     no oxygen; patient denies    cpap     Essential hypertension 05/05/2010    GERD (gastroesophageal reflux disease) 11/20/2012    Glaucoma     Hypertensive heart disease with heart failure 02/05/2013    Paroxysmal atrial fibrillation     Paroxysmal ventricular tachycardia     per problem list    Renal disorder     french-1/3/2020    Squamous cell carcinoma of skin      Past Surgical History:   Procedure Laterality Date    A-V CARDIAC PACEMAKER INSERTION  06/16/2021    Procedure: INSERTION, CARDIAC PACEMAKER, DUAL CHAMBER;  Surgeon: Oscar Sommers MD;  Location: [facility];  Service: Cardiovascular;;    ADENOIDECTOMY  191944    APPENDECTOMY  1948    Because of Ovarian Cyst surgery    BREAST BIOPSY Left 1995    neg    BREAST BIOPSY Right 1984    neg    BREAST BIOPSY Right 1992    neg    BREAST SURGERY      A number of biopsies    CARDIAC CATHETERIZATION  2013, 2014,2016, 2020    has 9 stents    CARDIAC SURGERY  2012    stents    CATARACT EXTRACTION W/  INTRAOCULAR LENS IMPLANT Left 11/01/2018    Dr Rose    CATARACT EXTRACTION W/  INTRAOCULAR LENS IMPLANT Right 12/13/2018    Dr Rose//    CHOLECYSTECTOMY      COLONOSCOPY  ~2005    Dr. Edward; normal per patient report    COLONOSCOPY N/A 09/06/2022    Procedure: COLONOSCOPY  8/31/22-note in Dr Simms's office visit note that he spoke to her cardiologist and she was viable candidate for endoscopy;  Surgeon: Cordelia Bueno MD;  Location: [facility] ENDO;  Service: Endoscopy;  Laterality: N/A;    CORONARY ANGIOGRAPHY N/A 03/20/2020    Procedure: ANGIOGRAM, CORONARY ARTERY;  Surgeon: Chuy Bryant MD;  Location: [facility] CATH;  Service: Cardiology;  Laterality: N/A;    CYSTOSCOPY W/ RETROGRADES Bilateral 02/12/2020    Procedure: CYSTOSCOPY, WITH RETROGRADE PYELOGRAM;  Surgeon: Gasper Feliz MD;  Location: [facility] OR;  Service: Urology;  Laterality: Bilateral;    ESOPHAGOGASTRODUODENOSCOPY N/A 08/13/2020    Procedure: EGD (ESOPHAGOGASTRODUODENOSCOPY);  Surgeon: Mika Solorzano MD;  Location: [facility];  Service: Endoscopy;  Laterality: N/A; Mild Schatzki ring. Biopsied. Dilated. small hiatal hernia, gastritis; biopsy: esophagus- SEVERE REFLUX ESOPHAGITIS, stomach- chronic gastritis, negative for H pylori    ESOPHAGOGASTRODUODENOSCOPY N/A 10/22/2020    Procedure: EGD (ESOPHAGOGASTRODUODENOSCOPY);  Surgeon: Fred Hendricks MD;  Location: [facility];  Service: Endoscopy;  Laterality: N/A;    EYE SURGERY  2017    Cataracs    FRACTIONAL FLOW RESERVE (FFR), CORONARY  6/20/2023    Procedure: Fractional Flow Arabi (FFR), Coronary;  Surgeon: Brice Campuzano MD;  Location: [facility] CATH LAB;  Service: Cardiology;;    HYSTERECTOMY  1969    INSTANTANEOUS WAVE-FREE RATIO (IFR) N/A 6/20/2023    Procedure: Instantaneous Wave-Free Ratio (IFR);  Surgeon: Brice Campuzano MD;  Location: [facility] CATH LAB;  Service: Cardiology;  Laterality: N/A;    LEFT HEART CATHETERIZATION Left 03/03/2020    Procedure: Left heart cath;  Surgeon: Chuy Bryant MD;  Location: [facility] CATH;  Service: Cardiology;  Laterality: Left;    LEFT HEART CATHETERIZATION Left 03/20/2020    Procedure: Left heart cath;  Surgeon: Chuy Bryant MD;  Location: [facility] CATH;  Service: Cardiology;  Laterality: Left;    LEFT HEART CATHETERIZATION N/A  2023    Procedure: Left heart cath;  Surgeon: Brice Campuzano MD;  Location: [facility] CATH LAB;  Service: Cardiology;  Laterality: N/A;    OVARIAN CYST REMOVAL  teenager    PHACOEMULSIFICATION OF CATARACT Left 2018    Procedure: PHACOEMULSIFICATION, CATARACT;  Surgeon: Aleksandar Rose Jr., MD;  Location: [facility] OR;  Service: Ophthalmology;  Laterality: Left;    PHACOEMULSIFICATION OF CATARACT Right 2018    Procedure: PHACOEMULSIFICATION, CATARACT;  Surgeon: Aleksandar Rose Jr., MD;  Location: [facility] OR;  Service: Ophthalmology;  Laterality: Right;    TONSILLECTOMY      aw/denoids    UPPER GASTROINTESTINAL ENDOSCOPY  ~    Dr. Solorzano    VALVE STUDY-AORTIC  2023    Procedure: Valve study-aortic;  Surgeon: Brice Campuzano MD;  Location: [facility] CATH LAB;  Service: Cardiology;;    VASCULAR SURGERY      to remove clot from right leg    Yag Capsulotomy Bilateral 2019    Dr Rose     Family History   Problem Relation Age of Onset    Diabetes Brother         Type 2    Heart disease Brother          at 65 CHD    Diabetes Mother         Type 1    Alcohol abuse Mother         Dod 10/75    Heart disease Mother         Arteriosclerosis    Hypertension Mother     Stroke Mother         3/15/1975 dod 10/1975    Cancer Maternal Grandfather         Dod     Clotting disorder Son         bleeding after tonsillectomy only    Heart disease Father         Heart attack. Afib, and Polyvcithemavera    Hypertension Father     Amblyopia Neg Hx     Blindness Neg Hx     Cataracts Neg Hx     Glaucoma Neg Hx     Macular degeneration Neg Hx     Retinal detachment Neg Hx     Strabismus Neg Hx     Thyroid disease Neg Hx     Colon cancer Neg Hx      Social History     Tobacco Use    Smoking status: Former     Years: 1.00     Types: Cigarettes     Start date: 6/10/1954     Quit date: 9/15/1955     Years since quittin.9    Smokeless tobacco: Never    Tobacco comments:     I onlysmoked one year while [employer] my   was oversees  during [place]   Substance Use Topics    Alcohol use: Not Currently     Comment: Only drank a little wine and haven't  drunk anything in 15 y    Drug use: Never        Review of Systems:  Review of Systems   Constitutional:  Negative for activity change, appetite change, chills, fatigue, fever and unexpected weight change.   HENT:  Negative for congestion, ear pain, sore throat and trouble swallowing.    Eyes:  Negative for pain, redness and itching.   Respiratory:  Negative for cough, shortness of breath and wheezing.    Cardiovascular:  Negative for chest pain, palpitations and leg swelling.   Gastrointestinal:  Positive for anal bleeding. Negative for abdominal distention, abdominal pain, blood in stool, constipation, diarrhea, nausea, rectal pain and vomiting.   Endocrine: Negative for cold intolerance, heat intolerance and polyuria.   Genitourinary:  Negative for dysuria, flank pain, frequency and hematuria.   Musculoskeletal:  Negative for gait problem, joint swelling and neck pain.   Skin:  Negative for color change, rash and wound.   Allergic/Immunologic: Negative for environmental allergies and immunocompromised state.   Neurological:  Negative for dizziness, speech difficulty, weakness and numbness.   Psychiatric/Behavioral:  Negative for agitation, confusion and hallucinations.      OBJECTIVE:     Vital Signs (Most Recent)  Pulse: 86 (07/24/23 1152)  BP: 104/71 (07/24/23 1152)     88.7 kg (195 lb 8.8 oz)     Physical Exam:  Physical Exam  Vitals reviewed. Exam conducted with a chaperone present.   Constitutional:       General: She is not in acute distress.     Appearance: Normal appearance. She is well-developed. She is ill-appearing (chronically). She is not toxic-appearing.   HENT:      Head: Normocephalic and atraumatic.      Right Ear: External ear normal.      Left Ear: External ear normal.      Nose: Nose normal.   Cardiovascular:      Rate and Rhythm: Normal rate.    Pulmonary:      Effort: Pulmonary effort is normal. No respiratory distress.   Abdominal:      General: Abdomen is flat. There is no distension.      Palpations: Abdomen is soft.      Tenderness: There is no abdominal tenderness.   Genitourinary:     Comments: Anorectal: offered but deferred  Musculoskeletal:         General: Normal range of motion.      Cervical back: Normal range of motion and neck supple.   Skin:     General: Skin is warm and dry.   Neurological:      Mental Status: She is alert and oriented to person, place, and time.   Psychiatric:         Mood and Affect: Mood normal.         Behavior: Behavior normal.       Laboratory  CBC: Reviewed    Diagnostic Results:  Colonoscopy 09/2022: reviewed     ASSESSMENT/PLAN:     89 y.o. female with rectal bleeding with hemorrhoids    -Discussed risks vs benefits of hemorrhoid banding vs hemorrhoidectomy as patient would have to hold anticoagulation. Patient has not required blood transfusion & H/H stable.   -Discussed that a minimum I would recommend behavioral, lifestyle and medication modifications to improve bowel habits. Usual bowel management handout given to patient. This includes a stool softener twice per day, fiber powder supplementation daily, drinking at least 64oz of water/day, avoiding straining with bowel movements, spending less than 5 min on toilet per bowel movement, eating a high fiber diet, using miralax as needed to achieve a bowel movement daily and using wet wipes to wipe after bowel movements when irritated.   -Discussed causes and treatment of hemorrhoidal disease including improvement in bowel habits, banding, and excisional hemorrhoidectomy. Patient elected to proceed with improvement of bowel habits at this time.  -Would not advocate for any intervention that requires anesthesia at this time as the patient has had extreme difficulty recovering from a recent anesthetic event from an angiogram.  Discussed that we could always proceed  with hemorrhoid banding although this would require her to hold anticoagulation for significant period of time and the fact that she is not required any transfusions in the past with likely put her at more risk than benefit to do so  -RTC PRN     Nitin Lozano MD  Colon and Rectal Surgery  [facility]

## 2023-07-27 ENCOUNTER — EXTERNAL HOME HEALTH (OUTPATIENT)
Dept: HOME HEALTH SERVICES | Facility: HOSPITAL | Age: 88
End: 2023-07-27
Payer: MEDICARE

## 2023-08-01 ENCOUNTER — CLINICAL SUPPORT (OUTPATIENT)
Dept: CARDIOLOGY | Facility: HOSPITAL | Age: 88
End: 2023-08-01
Payer: MEDICARE

## 2023-08-01 DIAGNOSIS — Z95.0 PRESENCE OF CARDIAC PACEMAKER: ICD-10-CM

## 2023-08-01 DIAGNOSIS — I63.9 CEREBRAL INFARCTION, UNSPECIFIED: ICD-10-CM

## 2023-08-01 PROCEDURE — 93296 REM INTERROG EVL PM/IDS: CPT | Performed by: INTERNAL MEDICINE

## 2023-09-06 ENCOUNTER — OFFICE VISIT (OUTPATIENT)
Dept: CARDIOLOGY | Facility: CLINIC | Age: 88
End: 2023-09-06
Payer: MEDICARE

## 2023-09-06 ENCOUNTER — CLINICAL SUPPORT (OUTPATIENT)
Dept: CARDIOLOGY | Facility: CLINIC | Age: 88
End: 2023-09-06
Payer: MEDICARE

## 2023-09-06 VITALS
SYSTOLIC BLOOD PRESSURE: 110 MMHG | DIASTOLIC BLOOD PRESSURE: 80 MMHG | HEIGHT: 65 IN | OXYGEN SATURATION: 95 % | WEIGHT: 198.44 LBS | HEART RATE: 81 BPM | BODY MASS INDEX: 33.06 KG/M2

## 2023-09-06 DIAGNOSIS — I50.32 CHRONIC DIASTOLIC HEART FAILURE: ICD-10-CM

## 2023-09-06 DIAGNOSIS — J44.9 CHRONIC OBSTRUCTIVE PULMONARY DISEASE, UNSPECIFIED COPD TYPE: ICD-10-CM

## 2023-09-06 DIAGNOSIS — I25.10 CORONARY ARTERY DISEASE DUE TO CALCIFIED CORONARY LESION: ICD-10-CM

## 2023-09-06 DIAGNOSIS — I35.0 NONRHEUMATIC AORTIC VALVE STENOSIS: ICD-10-CM

## 2023-09-06 DIAGNOSIS — I48.0 PAROXYSMAL ATRIAL FIBRILLATION: ICD-10-CM

## 2023-09-06 DIAGNOSIS — I70.0 ATHEROSCLEROSIS OF AORTA: ICD-10-CM

## 2023-09-06 DIAGNOSIS — Z95.0 PRESENCE OF CARDIAC PACEMAKER: Primary | ICD-10-CM

## 2023-09-06 DIAGNOSIS — I25.84 CORONARY ARTERY DISEASE DUE TO CALCIFIED CORONARY LESION: ICD-10-CM

## 2023-09-06 DIAGNOSIS — I25.10 CORONARY ARTERY DISEASE, UNSPECIFIED VESSEL OR LESION TYPE, UNSPECIFIED WHETHER ANGINA PRESENT, UNSPECIFIED WHETHER NATIVE OR TRANSPLANTED HEART: ICD-10-CM

## 2023-09-06 DIAGNOSIS — R00.1 SYMPTOMATIC BRADYCARDIA: ICD-10-CM

## 2023-09-06 DIAGNOSIS — T82.855A CORONARY STENT RESTENOSIS, INITIAL ENCOUNTER: ICD-10-CM

## 2023-09-06 PROCEDURE — 99999 PR PBB SHADOW E&M-EST. PATIENT-LVL II: ICD-10-PCS | Mod: PBBFAC,,, | Performed by: INTERNAL MEDICINE

## 2023-09-06 PROCEDURE — 1157F ADVNC CARE PLAN IN RCRD: CPT | Mod: CPTII,S$GLB,, | Performed by: INTERNAL MEDICINE

## 2023-09-06 PROCEDURE — 99214 OFFICE O/P EST MOD 30 MIN: CPT | Mod: S$GLB,,, | Performed by: INTERNAL MEDICINE

## 2023-09-06 PROCEDURE — 1157F PR ADVANCE CARE PLAN OR EQUIV PRESENT IN MEDICAL RECORD: ICD-10-PCS | Mod: CPTII,S$GLB,, | Performed by: INTERNAL MEDICINE

## 2023-09-06 PROCEDURE — 1101F PT FALLS ASSESS-DOCD LE1/YR: CPT | Mod: CPTII,S$GLB,, | Performed by: INTERNAL MEDICINE

## 2023-09-06 PROCEDURE — 1126F AMNT PAIN NOTED NONE PRSNT: CPT | Mod: CPTII,S$GLB,, | Performed by: INTERNAL MEDICINE

## 2023-09-06 PROCEDURE — 3288F FALL RISK ASSESSMENT DOCD: CPT | Mod: CPTII,S$GLB,, | Performed by: INTERNAL MEDICINE

## 2023-09-06 PROCEDURE — 1101F PR PT FALLS ASSESS DOC 0-1 FALLS W/OUT INJ PAST YR: ICD-10-PCS | Mod: CPTII,S$GLB,, | Performed by: INTERNAL MEDICINE

## 2023-09-06 PROCEDURE — 1126F PR PAIN SEVERITY QUANTIFIED, NO PAIN PRESENT: ICD-10-PCS | Mod: CPTII,S$GLB,, | Performed by: INTERNAL MEDICINE

## 2023-09-06 PROCEDURE — 99203 PR OFFICE/OUTPT VISIT, NEW, LEVL III, 30-44 MIN: ICD-10-PCS | Mod: S$GLB,,, | Performed by: THORACIC SURGERY (CARDIOTHORACIC VASCULAR SURGERY)

## 2023-09-06 PROCEDURE — 1159F PR MEDICATION LIST DOCUMENTED IN MEDICAL RECORD: ICD-10-PCS | Mod: CPTII,S$GLB,, | Performed by: INTERNAL MEDICINE

## 2023-09-06 PROCEDURE — 99214 PR OFFICE/OUTPT VISIT, EST, LEVL IV, 30-39 MIN: ICD-10-PCS | Mod: S$GLB,,, | Performed by: INTERNAL MEDICINE

## 2023-09-06 PROCEDURE — 99999 PR PBB SHADOW E&M-EST. PATIENT-LVL II: CPT | Mod: PBBFAC,,, | Performed by: INTERNAL MEDICINE

## 2023-09-06 PROCEDURE — 99203 OFFICE O/P NEW LOW 30 MIN: CPT | Mod: S$GLB,,, | Performed by: THORACIC SURGERY (CARDIOTHORACIC VASCULAR SURGERY)

## 2023-09-06 PROCEDURE — 1159F MED LIST DOCD IN RCRD: CPT | Mod: CPTII,S$GLB,, | Performed by: INTERNAL MEDICINE

## 2023-09-06 PROCEDURE — 3288F PR FALLS RISK ASSESSMENT DOCUMENTED: ICD-10-PCS | Mod: CPTII,S$GLB,, | Performed by: INTERNAL MEDICINE

## 2023-09-06 NOTE — ASSESSMENT & PLAN NOTE
She is currently on eliquis and aspirin. Her RMWBQ4Yins is 6 (9,7% risk of stroke per year). With her active bleeding issues I think she is a potential watchman candidate. With her indwelling catheter this carries an increased risk of infection, however not as high as valve replacement. Will discuss with the structural team her options. In the meantime she can safely discontinue aspirin. Also, it is ok to hold her Eliquis up to 10 days for elective surgical procedures as her daily risk of stroke is minimal compared to the benefit of controlling the bleeding source.

## 2023-09-06 NOTE — ASSESSMENT & PLAN NOTE
She has low flow low gradient aortic stenosis (SVI 18 ml/m2). Her LVEF is preserved. Given her indwelling catheter I think TAVR would carry an extremely high risk of endocarditis. At this point her symptoms are mild and mostly worsened by anemia. Will focus for now on controlling her bleeding rather than valve replacement. Should she develop heart failure refractory to medical Rx, balloon valvuloplasty can be considered.

## 2023-09-06 NOTE — PROGRESS NOTES
INTERVENTIONAL CARDIOLOGY CLINIC  HEART VALVE CENTER    REFERRING PHYSICIAN: Betsy    CHIEF COMPLIANT:  Bleeding issues    HISTORY OF PRESENT ILLNESS  Holli Landrum is a 89 y.o. female referred by Dr. Meyer for evaluation of aortic stneosis.    The patient has a history of bladder cancer with an indwelling suprapubic catheter. I saw her 3 months ago with dyspnea on exertion. At thiat time she was found to have moderate AS. A coronary angiogram showed patent coronary stents. From that angiogram she had a large retroperitoneal hematoma requiring hospitalization. She is now recovered from that and states that she feels fine regarding her shortness of breath. She reports HIGGINS NYHA class II symptoms. Her main concern is her use of OAC. She is on eliquis for Afib and ASA for CAD. She reports having hemorrhoids that bleeed bright red blood. Her blood counts have been low and this makes her feel tired. She is asking about options for decreasing anticoagulation.    PAST MEDICAL HISTORY  Past Medical History:   Diagnosis Date    Anticoagulant long-term use     Arthritis     Asthma     Basal cell carcinoma     Cancer     skin cancer to face    Cataract     OU done//    CHF (congestive heart failure)     COPD (chronic obstructive pulmonary disease)     no oxygen; patient denies    cpap     Essential hypertension 05/05/2010    GERD (gastroesophageal reflux disease) 11/20/2012    Glaucoma     Hypertensive heart disease with heart failure 02/05/2013    Paroxysmal atrial fibrillation     Paroxysmal ventricular tachycardia     per problem list    Renal disorder     french-1/3/2020    Squamous cell carcinoma of skin         PAST SURGICAL HISTORY  Past Surgical History:   Procedure Laterality Date    A-V CARDIAC PACEMAKER INSERTION  06/16/2021    Procedure: INSERTION, CARDIAC PACEMAKER, DUAL CHAMBER;  Surgeon: Oscar Sommers MD;  Location: [facility];  Service: Cardiovascular;;    ADENOIDECTOMY  191944    APPENDECTOMY  1948    Because  of Ovarian Cyst surgery    BREAST BIOPSY Left 1995    neg    BREAST BIOPSY Right 1984    neg    BREAST BIOPSY Right 1992    neg    BREAST SURGERY      A number of biopsies    CARDIAC CATHETERIZATION  2013, 2014,2016, 2020    has 9 stents    CARDIAC SURGERY  2012    stents    CATARACT EXTRACTION W/  INTRAOCULAR LENS IMPLANT Left 11/01/2018    Dr Rose    CATARACT EXTRACTION W/  INTRAOCULAR LENS IMPLANT Right 12/13/2018    Dr Rose//    CHOLECYSTECTOMY      COLONOSCOPY  ~2005    Dr. Edward; normal per patient report    COLONOSCOPY N/A 09/06/2022    Procedure: COLONOSCOPY 8/31/22-note in Dr Simms's office visit note that he spoke to her cardiologist and she was viable candidate for endoscopy;  Surgeon: Cordelia Bueno MD;  Location: [facility] ENDO;  Service: Endoscopy;  Laterality: N/A;    CORONARY ANGIOGRAPHY N/A 03/20/2020    Procedure: ANGIOGRAM, CORONARY ARTERY;  Surgeon: Chuy Bryant MD;  Location: [facility] CATH;  Service: Cardiology;  Laterality: N/A;    CYSTOSCOPY W/ RETROGRADES Bilateral 02/12/2020    Procedure: CYSTOSCOPY, WITH RETROGRADE PYELOGRAM;  Surgeon: Gasper Feliz MD;  Location: [facility] OR;  Service: Urology;  Laterality: Bilateral;    ESOPHAGOGASTRODUODENOSCOPY N/A 08/13/2020    Procedure: EGD (ESOPHAGOGASTRODUODENOSCOPY);  Surgeon: Mika Solorzano MD;  Location: [facility];  Service: Endoscopy;  Laterality: N/A; Mild Schatzki ring. Biopsied. Dilated. small hiatal hernia, gastritis; biopsy: esophagus- SEVERE REFLUX ESOPHAGITIS, stomach- chronic gastritis, negative for H pylori    ESOPHAGOGASTRODUODENOSCOPY N/A 10/22/2020    Procedure: EGD (ESOPHAGOGASTRODUODENOSCOPY);  Surgeon: Fred Hendricks MD;  Location: [facility] ENDO;  Service: Endoscopy;  Laterality: N/A;    EYE SURGERY  2017    Cataracs    FRACTIONAL FLOW RESERVE (FFR), CORONARY  6/20/2023    Procedure: Fractional Flow [place] (FFR), Coronary;  Surgeon: Brice Campuzano MD;  Location: [facility] CATH LAB;  Service: Cardiology;;     HYSTERECTOMY  1969    INSTANTANEOUS WAVE-FREE RATIO (IFR) N/A 6/20/2023    Procedure: Instantaneous Wave-Free Ratio (IFR);  Surgeon: Brice Campuzano MD;  Location: [facility] CATH LAB;  Service: Cardiology;  Laterality: N/A;    LEFT HEART CATHETERIZATION Left 03/03/2020    Procedure: Left heart cath;  Surgeon: Chuy Bryant MD;  [facility];  Service: Cardiology;  Laterality: Left;    LEFT HEART CATHETERIZATION Left 03/20/2020    Procedure: Left heart cath;  Surgeon: Chuy Bryant MD;  [facility];  Service: Cardiology;  Laterality: Left;    LEFT HEART CATHETERIZATION N/A 6/20/2023    Procedure: Left heart cath;  Surgeon: Brice Campuzano MD;  Location: [facility] CATH LAB;  Service: Cardiology;  Laterality: N/A;    OVARIAN CYST REMOVAL  teenager    PHACOEMULSIFICATION OF CATARACT Left 11/01/2018    Procedure: PHACOEMULSIFICATION, CATARACT;  Surgeon: Aleksandar Rose Jr., MD;  Location: [facility] OR;  Service: Ophthalmology;  Laterality: Left;    PHACOEMULSIFICATION OF CATARACT Right 12/13/2018    Procedure: PHACOEMULSIFICATION, CATARACT;  Surgeon: Aleksandar Rose Jr., MD;  Location: [facility] OR;  Service: Ophthalmology;  Laterality: Right;    TONSILLECTOMY      aw/denoids    UPPER GASTROINTESTINAL ENDOSCOPY  ~2005    Dr. Solorzano    VALVE STUDY-AORTIC  6/20/2023    Procedure: Valve study-aortic;  Surgeon: Brice Campuzano MD;  Location: [facility] CATH LAB;  Service: Cardiology;;    VASCULAR SURGERY      to remove clot from right leg    Yag Capsulotomy Bilateral 11/05/2019    Dr Rose       SOCIAL HISTORY  TOBACCO: Denies  ETOH: Denies  ILLEGAL DRUGS: Denies    REVIEW OF SYSTEMS  Non contributory, relevant information discussed in HPI    STUDIES  I independently reviewed the following studies and my interpretation is reflected in the plan:  Angiogram  Echocardiogram  EKG      PHYSICAL EXAM  Physical Exam  Constitutional:       General: She is not in acute distress.     Appearance: She is obese.   HENT:      Nose:  Nose normal.   Cardiovascular:      Rate and Rhythm: Normal rate and regular rhythm.   Pulmonary:      Effort: Pulmonary effort is normal.   Musculoskeletal:      Cervical back: Neck supple.      Left lower leg: No edema.   Skin:     General: Skin is warm.      Capillary Refill: Capillary refill takes less than 2 seconds.      Coloration: Skin is pale.   Neurological:      Mental Status: She is alert and oriented to person, place, and time.   Psychiatric:         Mood and Affect: Mood normal.         ASSESSMENT AND PLAN  Nonrheumatic aortic valve stenosis  She has low flow low gradient aortic stenosis (SVI 18 ml/m2). Her LVEF is preserved. Given her indwelling catheter I think TAVR would carry an extremely high risk of endocarditis. At this point her symptoms are mild and mostly worsened by anemia. Will focus for now on controlling her bleeding rather than valve replacement. Should she develop heart failure refractory to medical Rx, balloon valvuloplasty can be considered.     Coronary artery disease, h/o multivessel stents.  Patent coronary stents on recent cath.     Paroxysmal atrial fibrillation  She is currently on eliquis and aspirin. Her [id number] is 6 (9,7% risk of stroke per year). With her active bleeding issues I think she is a potential watchman candidate. With her indwelling catheter this carries an increased risk of infection, however not as high as valve replacement. Will discuss with the structural team her options. In the meantime she can safely discontinue aspirin. Also, it is ok to hold her Eliquis up to 10 days for elective surgical procedures as her daily risk of stroke is minimal compared to the benefit of controlling the bleeding source.     Obesity  Body mass index is 33.02 kg/m². Healthy lifestyle was discussed with the patient as well as the importance of physical activity to improve cardiovascular health.    The medication list was reviewed with the patient and inactive medications as well  as duplicates were removed from the medical record.      Brice Guadarrama MD [facility]  Interventional Cardiology  Structural/Valvular heart disease  [phone]

## 2023-09-06 NOTE — PROGRESS NOTES
Subjective:      Patient ID: Holli Landrum is a 89 y.o. female.    Chief Complaint: No chief complaint on file.    HPI:  89-year-old female with a history of moderate aortic stenosis is seen in the structural valve Clinic as a follow-up.  The patient has multiple medical problems including indwelling catheter from the bladder cancer suprapubic, bleeding internal hemorrhoids paroxysmal atrial fibrillation on anticoagulation multiple PCIs to the coronaries.    Review of patient's allergies indicates:   Allergen Reactions    Timolol maleate Shortness Of Breath    Ciprofloxacin Other (See Comments)     Muscle ache    Sulfamethoxazole-trimethoprim        Past Medical History:   Diagnosis Date    Anticoagulant long-term use     Arthritis     Asthma     Basal cell carcinoma     Cancer     skin cancer to face    Cataract     OU done//    CHF (congestive heart failure)     COPD (chronic obstructive pulmonary disease)     no oxygen; patient denies    cpap     Essential hypertension 2010    GERD (gastroesophageal reflux disease) 2012    Glaucoma     Hypertensive heart disease with heart failure 2013    Paroxysmal atrial fibrillation     Paroxysmal ventricular tachycardia     per problem list    Renal disorder     french-1/3/2020    Squamous cell carcinoma of skin        Family History   Problem Relation Age of Onset    Diabetes Brother         Type 2    Heart disease Brother          at 65 CHD    Diabetes Mother         Type 1    Alcohol abuse Mother         Dod 10/75    Heart disease Mother         Arteriosclerosis    Hypertension Mother     Stroke Mother         3/15/1975 dod 10/1975    Cancer Maternal Grandfather         Dod     Clotting disorder Son         bleeding after tonsillectomy only    Heart disease Father         Heart attack. Afib, and Polyvcithemavera    Hypertension Father     Amblyopia Neg Hx     Blindness Neg Hx     Cataracts Neg Hx     Glaucoma Neg Hx     Macular degeneration Neg Hx      Retinal detachment Neg Hx     Strabismus Neg Hx     Thyroid disease Neg Hx     Colon cancer Neg Hx        Social History     Socioeconomic History    Marital status:    Tobacco Use    Smoking status: Former     Current packs/day: 0.00     Types: Cigarettes     Start date: 6/10/1954     Quit date: 9/15/1955     Years since quittin.0    Smokeless tobacco: Never    Tobacco comments:     I onlysmoked one year while [place] my  was oversees  during [place]   Substance and Sexual Activity    Alcohol use: Not Currently     Comment: Only drank a little wine and haven't  drunk anything in 15 y    Drug use: Never    Sexual activity: Not Currently     Partners: Male     Birth control/protection: Abstinence     Comment:  and 87 years of age     Social Determinants of Health     Financial Resource Strain: Low Risk  (2023)    Overall Financial Resource Strain (CARDIA)     Difficulty of Paying Living Expenses: Not hard at all   Food Insecurity: No Food Insecurity (2023)    Hunger Vital Sign     Worried About Running Out of Food in the Last Year: Never true     Ran Out of Food in the Last Year: Never true   Transportation Needs: No Transportation Needs (2023)    PRAPARE - Transportation     Lack of Transportation (Medical): No     Lack of Transportation (Non-Medical): No   Physical Activity: Inactive (2023)    Exercise Vital Sign     Days of Exercise per Week: 0 days     Minutes of Exercise per Session: 0 min   Stress: No Stress Concern Present (2023)    [place] [employer] - Occupational Stress Questionnaire     Feeling of Stress : Only a little   Social Connections: Unknown (2023)    Social Connection and Isolation Panel [NHANES]     Frequency of Communication with Friends and Family: More than three times a week     Frequency of Social Gatherings with Friends and Family: Twice a week     Active Member of Clubs or Organizations: Yes     Attends Club or  Organization Meetings: More than 4 times per year     Marital Status:    Housing Stability: Low Risk  (6/19/2023)    Housing Stability Vital Sign     Unable to Pay for Housing in the Last Year: No     Number of Places Lived in the Last Year: 1     Unstable Housing in the Last Year: No       Past Surgical History:   Procedure Laterality Date    A-V CARDIAC PACEMAKER INSERTION  06/16/2021    Procedure: INSERTION, CARDIAC PACEMAKER, DUAL CHAMBER;  Surgeon: Oscar Sommers MD;  [facility]: [facility];  Service: Cardiovascular;;    ADENOIDECTOMY  191944    APPENDECTOMY  1948    Because of Ovarian Cyst surgery    BREAST BIOPSY Left 1995    neg    BREAST BIOPSY Right 1984    neg    BREAST BIOPSY Right 1992    neg    BREAST SURGERY      A number of biopsies    CARDIAC CATHETERIZATION  2013, 2014,2016, 2020    has 9 stents    CARDIAC SURGERY  2012    stents    CATARACT EXTRACTION W/  INTRAOCULAR LENS IMPLANT Left 11/01/2018    Dr Rose    CATARACT EXTRACTION W/  INTRAOCULAR LENS IMPLANT Right 12/13/2018    Dr Rose//    CHOLECYSTECTOMY      COLONOSCOPY  ~2005    Dr. Edward; normal per patient report    COLONOSCOPY N/A 09/06/2022    Procedure: COLONOSCOPY 8/31/22-note in Dr Simms's office visit note that he spoke to her cardiologist and she was viable candidate for endoscopy;  Surgeon: Cordelia Bueno MD;  Location: [facility];  Service: Endoscopy;  Laterality: N/A;    CORONARY ANGIOGRAPHY N/A 03/20/2020    Procedure: ANGIOGRAM, CORONARY ARTERY;  Surgeon: Chuy Bryant MD;  Location: [facility] CATH;  Service: Cardiology;  Laterality: N/A;    CYSTOSCOPY W/ RETROGRADES Bilateral 02/12/2020    Procedure: CYSTOSCOPY, WITH RETROGRADE PYELOGRAM;  Surgeon: Gasper Feliz MD;  Location: [facility] OR;  Service: Urology;  Laterality: Bilateral;    ESOPHAGOGASTRODUODENOSCOPY N/A 08/13/2020    Procedure: EGD (ESOPHAGOGASTRODUODENOSCOPY);  Surgeon: Mika Solorzano MD;  Location: [facility] ENDO;  Service: Endoscopy;  Laterality: N/A;  Mild Schatzki ring. Biopsied. Dilated. small hiatal hernia, gastritis; biopsy: esophagus- SEVERE REFLUX ESOPHAGITIS, stomach- chronic gastritis, negative for H pylori    ESOPHAGOGASTRODUODENOSCOPY N/A 10/22/2020    Procedure: EGD (ESOPHAGOGASTRODUODENOSCOPY);  Surgeon: Fred Hendricks MD;  Location: [facility];  Service: Endoscopy;  Laterality: N/A;    EYE SURGERY  2017    Cataracs    FRACTIONAL FLOW RESERVE (FFR), CORONARY  6/20/2023    Procedure: Fractional Flow [place] (FFR), Coronary;  Surgeon: Brice Campuzano MD;  Location: [facility] CATH LAB;  Service: Cardiology;;    HYSTERECTOMY  1969    INSTANTANEOUS WAVE-FREE RATIO (IFR) N/A 6/20/2023    Procedure: Instantaneous Wave-Free Ratio (IFR);  Surgeon: Brice Campuzano MD;  Location: [facility] CATH LAB;  Service: Cardiology;  Laterality: N/A;    LEFT HEART CATHETERIZATION Left 03/03/2020    Procedure: Left heart cath;  Surgeon: Chuy Bryant MD;  Location: [facility] CATH;  Service: Cardiology;  Laterality: Left;    LEFT HEART CATHETERIZATION Left 03/20/2020    Procedure: Left heart cath;  Surgeon: Chuy Bryant MD;  Location: [facility] CATH;  Service: Cardiology;  Laterality: Left;    LEFT HEART CATHETERIZATION N/A 6/20/2023    Procedure: Left heart cath;  Surgeon: Brice Campuzano MD;  Location: [facility] CATH LAB;  Service: Cardiology;  Laterality: N/A;    OVARIAN CYST REMOVAL  teenager    PHACOEMULSIFICATION OF CATARACT Left 11/01/2018    Procedure: PHACOEMULSIFICATION, CATARACT;  Surgeon: Aleksandar Rose Jr., MD;  Location: [facility] OR;  Service: Ophthalmology;  Laterality: Left;    PHACOEMULSIFICATION OF CATARACT Right 12/13/2018    Procedure: PHACOEMULSIFICATION, CATARACT;  Surgeon: Aleksandar Rose Jr., MD;  Location: [facility] OR;  Service: Ophthalmology;  Laterality: Right;    TONSILLECTOMY      aw/denoids    UPPER GASTROINTESTINAL ENDOSCOPY  ~2005    Dr. Solorzano    VALVE STUDY-AORTIC  6/20/2023    Procedure: Valve study-aortic;  Surgeon: Brice Campuzano MD;  Location: [facility]  CATH LAB;  Service: Cardiology;;    VASCULAR SURGERY      to remove clot from right leg    Yag Capsulotomy Bilateral 11/05/2019    Dr Rose       Review of Systems   Constitutional:  Positive for activity change and fatigue. Negative for appetite change.   HENT:  Negative for dental problem, nosebleeds and sore throat.    Eyes:  Negative for discharge and visual disturbance.   Respiratory:  Negative for cough, chest tightness and stridor.    Cardiovascular:  Positive for palpitations. Negative for leg swelling.   Gastrointestinal:  Positive for anal bleeding and blood in stool. Negative for abdominal distention and abdominal pain.   Genitourinary:  Positive for difficulty urinating. Negative for dysuria.   Musculoskeletal:  Positive for arthralgias, gait problem and myalgias. Negative for back pain and joint swelling.   Allergic/Immunologic: Negative for environmental allergies.   Neurological:  Negative for dizziness, syncope and headaches.   Hematological:  Does not bruise/bleed easily.   Psychiatric/Behavioral:  Negative for behavioral problems.           Objective:   LMP  (LMP Unknown)     Cardiac device check - Remote  Battery and Leads (BL)  Normal parameters noted on battery and lead(s)    Presenting Rhythm (VA)  Atrial Pacing-Ventricular Sensing (AP-VS)    Arrhythmic events (AE)  Paroxysmal atrial fibrillation and/or flutter  Device-identified arrhythmic events without corresponding EGMs and/or details noted    Anticoagulation  (AC)  Patient prescribed Apixaban (Eliquis)  Patient on anticoagulant therapy    Cardiovascular Physiologic Parameters (CPP)  No abnormalities in physiologic parameters identified    Transmission Information (TI)  Device Summary Report    Follow Up (FU)  Continue remote monitoring with quarterly reporting    Additional Comments  Pacemaker Report  Monitoring period: 3/8/23-6/7/23    Battery/Lead Status: 80%    Ap: 89%  : 3%    Device Defined Counters:  Atrial  Fibrillation/Flutter: up to 14 per day  EGMs illustrate AF/AFL, longest available 19min in duration.  AF burden 0%         Physical Exam  Vitals and nursing note reviewed.   Constitutional:       Appearance: She is obese.   HENT:      Head: Normocephalic.      Mouth/Throat:      Mouth: Mucous membranes are moist.   Eyes:      Extraocular Movements: Extraocular movements intact.      Pupils: Pupils are equal, round, and reactive to light.   Cardiovascular:      Rate and Rhythm: Normal rate and regular rhythm.   Pulmonary:      Effort: Pulmonary effort is normal.      Breath sounds: Normal breath sounds. No rales.   Abdominal:      Palpations: Abdomen is soft.      Comments: Suprapubic catheter   Musculoskeletal:         General: Normal range of motion.      Cervical back: Normal range of motion and neck supple.   Skin:     General: Skin is warm.      Capillary Refill: Capillary refill takes less than 2 seconds.   Neurological:      General: No focal deficit present.      Mental Status: She is alert and oriented to person, place, and time.   Psychiatric:         Mood and Affect: Mood normal.         Behavior: Behavior normal.         Assessment:     1. Presence of cardiac pacemaker    2. Coronary artery disease, unspecified vessel or lesion type, unspecified whether angina present, unspecified whether native or transplanted heart    3. Coronary stent restenosis, initial encounter    4. Chronic diastolic heart failure    5. Nonrheumatic aortic valve stenosis    6. Paroxysmal atrial fibrillation    7. Symptomatic bradycardia    8. Atherosclerosis of aorta    9. Chronic obstructive pulmonary disease, unspecified COPD type        Plan   89-year-old female with a moderate aortic stenosis multiple medical problems including paroxysmal atrial fibrillation bleeding from anticoagulation limited mobility limited lifestyle.  At this point patient can be followed up from the aortic stenosis with a repeat echocardiogram.  She can  be a TAVR candidate if her aortic stenosis becomes symptomatic during follow-up.  She will not be a surgical candidate because of her comorbidities for aortic valve replacement.          Lito Chance MD,   [facility]  [place]

## 2023-09-11 ENCOUNTER — PATIENT MESSAGE (OUTPATIENT)
Dept: CARDIOLOGY | Facility: CLINIC | Age: 88
End: 2023-09-11
Payer: MEDICARE

## 2023-09-11 RX ORDER — NITROGLYCERIN 80 MG/1
1 PATCH TRANSDERMAL DAILY
Qty: 90 PATCH | Refills: 3 | Status: SHIPPED | OUTPATIENT
Start: 2023-09-11

## 2023-10-02 ENCOUNTER — TELEPHONE (OUTPATIENT)
Dept: CARDIOLOGY | Facility: CLINIC | Age: 88
End: 2023-10-02
Payer: MEDICARE

## 2023-10-02 NOTE — TELEPHONE ENCOUNTER
Returned pt call regarding her symptoms. Pt states the pacemaker clinic called her to inform her she has been having a lot of A-fib episodes. She states Friday and Saturday has been bad. Saturday levels went from 114 to 41 then back up. Last night woke her up and was 111, it went back down to 70 something then went back up. She states she it has been increasing for the past few days. Pt is scheduled for tomorrw morning at 10AM.    ----- Message from Ish Kevin sent at 10/2/2023  1:23 PM CDT -----  Contact: Holli Espitia is requesting a call back from nurse to discuss concerns/problem she is having. Please call [phone]            Thanks

## 2023-10-03 ENCOUNTER — HOSPITAL ENCOUNTER (INPATIENT)
Facility: HOSPITAL | Age: 88
LOS: 1 days | Discharge: HOME HEALTH | DRG: 308 | End: 2023-10-05
Attending: EMERGENCY MEDICINE | Admitting: INTERNAL MEDICINE
Payer: MEDICARE

## 2023-10-03 ENCOUNTER — OFFICE VISIT (OUTPATIENT)
Dept: CARDIOLOGY | Facility: CLINIC | Age: 88
DRG: 308 | End: 2023-10-03
Payer: MEDICARE

## 2023-10-03 ENCOUNTER — HOSPITAL ENCOUNTER (OUTPATIENT)
Dept: CARDIOLOGY | Facility: HOSPITAL | Age: 88
Discharge: HOME | DRG: 308 | End: 2023-10-03
Attending: INTERNAL MEDICINE
Payer: MEDICARE

## 2023-10-03 VITALS
HEART RATE: 69 BPM | HEIGHT: 65 IN | WEIGHT: 198 LBS | DIASTOLIC BLOOD PRESSURE: 104 MMHG | BODY MASS INDEX: 32.99 KG/M2 | OXYGEN SATURATION: 96 % | SYSTOLIC BLOOD PRESSURE: 159 MMHG

## 2023-10-03 DIAGNOSIS — I48.0 PAROXYSMAL ATRIAL FIBRILLATION: Primary | ICD-10-CM

## 2023-10-03 DIAGNOSIS — Z95.0 PRESENCE OF CARDIAC PACEMAKER: Primary | ICD-10-CM

## 2023-10-03 DIAGNOSIS — I70.0 ATHEROSCLEROSIS OF AORTA: ICD-10-CM

## 2023-10-03 DIAGNOSIS — I35.0 NONRHEUMATIC AORTIC VALVE STENOSIS: ICD-10-CM

## 2023-10-03 DIAGNOSIS — I48.0 PAF (PAROXYSMAL ATRIAL FIBRILLATION): ICD-10-CM

## 2023-10-03 DIAGNOSIS — T82.855A CORONARY STENT RESTENOSIS, INITIAL ENCOUNTER: Primary | ICD-10-CM

## 2023-10-03 DIAGNOSIS — I50.32 CHRONIC DIASTOLIC HEART FAILURE: ICD-10-CM

## 2023-10-03 DIAGNOSIS — I35.0 MODERATE AORTIC STENOSIS BY PRIOR ECHOCARDIOGRAM: ICD-10-CM

## 2023-10-03 DIAGNOSIS — R00.1 SYMPTOMATIC BRADYCARDIA: ICD-10-CM

## 2023-10-03 DIAGNOSIS — Z95.0 CARDIAC PACEMAKER IN SITU: ICD-10-CM

## 2023-10-03 DIAGNOSIS — J44.9 CHRONIC OBSTRUCTIVE PULMONARY DISEASE, UNSPECIFIED COPD TYPE: ICD-10-CM

## 2023-10-03 DIAGNOSIS — I48.91 A-FIB: ICD-10-CM

## 2023-10-03 DIAGNOSIS — I25.118 CORONARY ARTERY DISEASE OF NATIVE ARTERY OF NATIVE HEART WITH STABLE ANGINA PECTORIS: ICD-10-CM

## 2023-10-03 DIAGNOSIS — T82.855A CORONARY STENT RESTENOSIS, INITIAL ENCOUNTER: ICD-10-CM

## 2023-10-03 DIAGNOSIS — R07.9 CHEST PAIN: ICD-10-CM

## 2023-10-03 LAB
ALBUMIN SERPL BCP-MCNC: 4.3 G/DL (ref 3.5–5.2)
ALP SERPL-CCNC: 105 U/L (ref 55–135)
ALT SERPL W/O P-5'-P-CCNC: 37 U/L (ref 10–44)
ANION GAP SERPL CALC-SCNC: 13 MMOL/L (ref 8–16)
AST SERPL-CCNC: 34 U/L (ref 10–40)
BASOPHILS # BLD AUTO: 0.04 K/UL (ref 0–0.2)
BASOPHILS NFR BLD: 0.4 % (ref 0–1.9)
BILIRUB SERPL-MCNC: 0.5 MG/DL (ref 0.1–1)
BNP SERPL-MCNC: 349 PG/ML (ref 0–99)
BUN SERPL-MCNC: 27 MG/DL (ref 8–23)
CALCIUM SERPL-MCNC: 10 MG/DL (ref 8.7–10.5)
CHLORIDE SERPL-SCNC: 108 MMOL/L (ref 95–110)
CO2 SERPL-SCNC: 19 MMOL/L (ref 23–29)
CREAT SERPL-MCNC: 1.1 MG/DL (ref 0.5–1.4)
DIFFERENTIAL METHOD: ABNORMAL
EOSINOPHIL # BLD AUTO: 0.4 K/UL (ref 0–0.5)
EOSINOPHIL NFR BLD: 3.1 % (ref 0–8)
ERYTHROCYTE [DISTWIDTH] IN BLOOD BY AUTOMATED COUNT: 17.2 % (ref 11.5–14.5)
EST. GFR  (NO RACE VARIABLE): 48 ML/MIN/1.73 M^2
GLUCOSE SERPL-MCNC: 87 MG/DL (ref 70–110)
HCT VFR BLD AUTO: 44.4 % (ref 37–48.5)
HGB BLD-MCNC: 14.2 G/DL (ref 12–16)
IMM GRANULOCYTES # BLD AUTO: 0.04 K/UL (ref 0–0.04)
IMM GRANULOCYTES NFR BLD AUTO: 0.4 % (ref 0–0.5)
INR PPP: 1 (ref 0.8–1.2)
LYMPHOCYTES # BLD AUTO: 2.6 K/UL (ref 1–4.8)
LYMPHOCYTES NFR BLD: 23.5 % (ref 18–48)
MCH RBC QN AUTO: 26.3 PG (ref 27–31)
MCHC RBC AUTO-ENTMCNC: 32 G/DL (ref 32–36)
MCV RBC AUTO: 82 FL (ref 82–98)
MONOCYTES # BLD AUTO: 1.1 K/UL (ref 0.3–1)
MONOCYTES NFR BLD: 10 % (ref 4–15)
NEUTROPHILS # BLD AUTO: 7 K/UL (ref 1.8–7.7)
NEUTROPHILS NFR BLD: 62.6 % (ref 38–73)
NRBC BLD-RTO: 0 /100 WBC
PLATELET # BLD AUTO: 353 K/UL (ref 150–450)
PMV BLD AUTO: 10 FL (ref 9.2–12.9)
POCT GLUCOSE: 76 MG/DL (ref 70–110)
POTASSIUM SERPL-SCNC: 3.9 MMOL/L (ref 3.5–5.1)
PROT SERPL-MCNC: 8 G/DL (ref 6–8.4)
PROTHROMBIN TIME: 10.6 SEC (ref 9–12.5)
RBC # BLD AUTO: 5.4 M/UL (ref 4–5.4)
SODIUM SERPL-SCNC: 140 MMOL/L (ref 136–145)
TROPONIN I SERPL DL<=0.01 NG/ML-MCNC: 0.02 NG/ML (ref 0–0.03)
TROPONIN I SERPL DL<=0.01 NG/ML-MCNC: <0.006 NG/ML (ref 0–0.03)
WBC # BLD AUTO: 11.15 K/UL (ref 3.9–12.7)

## 2023-10-03 PROCEDURE — 63600175 PHARM REV CODE 636 W HCPCS: Performed by: EMERGENCY MEDICINE

## 2023-10-03 PROCEDURE — 94640 AIRWAY INHALATION TREATMENT: CPT

## 2023-10-03 PROCEDURE — 63600175 PHARM REV CODE 636 W HCPCS: Performed by: INTERNAL MEDICINE

## 2023-10-03 PROCEDURE — 99285 EMERGENCY DEPT VISIT HI MDM: CPT | Mod: 25

## 2023-10-03 PROCEDURE — 93010 EKG 12-LEAD: ICD-10-PCS | Mod: 76,,, | Performed by: INTERNAL MEDICINE

## 2023-10-03 PROCEDURE — 1101F PR PT FALLS ASSESS DOC 0-1 FALLS W/OUT INJ PAST YR: ICD-10-PCS | Mod: CPTII,S$GLB,, | Performed by: INTERNAL MEDICINE

## 2023-10-03 PROCEDURE — 93005 ELECTROCARDIOGRAM TRACING: CPT

## 2023-10-03 PROCEDURE — 93010 ELECTROCARDIOGRAM REPORT: CPT | Mod: 76,,, | Performed by: INTERNAL MEDICINE

## 2023-10-03 PROCEDURE — 1160F PR REVIEW ALL MEDS BY PRESCRIBER/CLIN PHARMACIST DOCUMENTED: ICD-10-PCS | Mod: CPTII,S$GLB,, | Performed by: INTERNAL MEDICINE

## 2023-10-03 PROCEDURE — 85025 COMPLETE CBC W/AUTO DIFF WBC: CPT | Performed by: EMERGENCY MEDICINE

## 2023-10-03 PROCEDURE — 1125F PR PAIN SEVERITY QUANTIFIED, PAIN PRESENT: ICD-10-PCS | Mod: CPTII,S$GLB,, | Performed by: INTERNAL MEDICINE

## 2023-10-03 PROCEDURE — 83880 ASSAY OF NATRIURETIC PEPTIDE: CPT | Performed by: EMERGENCY MEDICINE

## 2023-10-03 PROCEDURE — 1101F PT FALLS ASSESS-DOCD LE1/YR: CPT | Mod: CPTII,S$GLB,, | Performed by: INTERNAL MEDICINE

## 2023-10-03 PROCEDURE — 99213 PR OFFICE/OUTPT VISIT, EST, LEVL III, 20-29 MIN: ICD-10-PCS | Mod: ,,, | Performed by: INTERNAL MEDICINE

## 2023-10-03 PROCEDURE — 1160F RVW MEDS BY RX/DR IN RCRD: CPT | Mod: CPTII,S$GLB,, | Performed by: INTERNAL MEDICINE

## 2023-10-03 PROCEDURE — G0378 HOSPITAL OBSERVATION PER HR: HCPCS

## 2023-10-03 PROCEDURE — 99999 PR PBB SHADOW E&M-EST. PATIENT-LVL V: CPT | Mod: PBBFAC,,, | Performed by: INTERNAL MEDICINE

## 2023-10-03 PROCEDURE — 1159F PR MEDICATION LIST DOCUMENTED IN MEDICAL RECORD: ICD-10-PCS | Mod: CPTII,S$GLB,, | Performed by: INTERNAL MEDICINE

## 2023-10-03 PROCEDURE — 99999 PR PBB SHADOW E&M-EST. PATIENT-LVL V: ICD-10-PCS | Mod: PBBFAC,,, | Performed by: INTERNAL MEDICINE

## 2023-10-03 PROCEDURE — 25000003 PHARM REV CODE 250: Performed by: INTERNAL MEDICINE

## 2023-10-03 PROCEDURE — 1125F AMNT PAIN NOTED PAIN PRSNT: CPT | Mod: CPTII,S$GLB,, | Performed by: INTERNAL MEDICINE

## 2023-10-03 PROCEDURE — 82962 GLUCOSE BLOOD TEST: CPT

## 2023-10-03 PROCEDURE — 25000242 PHARM REV CODE 250 ALT 637 W/ HCPCS: Performed by: INTERNAL MEDICINE

## 2023-10-03 PROCEDURE — 36415 COLL VENOUS BLD VENIPUNCTURE: CPT | Performed by: INTERNAL MEDICINE

## 2023-10-03 PROCEDURE — 99215 OFFICE O/P EST HI 40 MIN: CPT | Mod: S$GLB,,, | Performed by: INTERNAL MEDICINE

## 2023-10-03 PROCEDURE — 84484 ASSAY OF TROPONIN QUANT: CPT | Mod: 91 | Performed by: INTERNAL MEDICINE

## 2023-10-03 PROCEDURE — 96375 TX/PRO/DX INJ NEW DRUG ADDON: CPT

## 2023-10-03 PROCEDURE — 85610 PROTHROMBIN TIME: CPT | Performed by: EMERGENCY MEDICINE

## 2023-10-03 PROCEDURE — 84484 ASSAY OF TROPONIN QUANT: CPT | Performed by: EMERGENCY MEDICINE

## 2023-10-03 PROCEDURE — 3288F PR FALLS RISK ASSESSMENT DOCUMENTED: ICD-10-PCS | Mod: CPTII,S$GLB,, | Performed by: INTERNAL MEDICINE

## 2023-10-03 PROCEDURE — 96366 THER/PROPH/DIAG IV INF ADDON: CPT

## 2023-10-03 PROCEDURE — 99215 PR OFFICE/OUTPT VISIT, EST, LEVL V, 40-54 MIN: ICD-10-PCS | Mod: S$GLB,,, | Performed by: INTERNAL MEDICINE

## 2023-10-03 PROCEDURE — 1157F PR ADVANCE CARE PLAN OR EQUIV PRESENT IN MEDICAL RECORD: ICD-10-PCS | Mod: CPTII,S$GLB,, | Performed by: INTERNAL MEDICINE

## 2023-10-03 PROCEDURE — 99223 PR INITIAL HOSPITAL CARE,LEVL III: ICD-10-PCS | Mod: ,,, | Performed by: INTERNAL MEDICINE

## 2023-10-03 PROCEDURE — 99213 OFFICE O/P EST LOW 20 MIN: CPT | Mod: ,,, | Performed by: INTERNAL MEDICINE

## 2023-10-03 PROCEDURE — 1157F ADVNC CARE PLAN IN RCRD: CPT | Mod: CPTII,S$GLB,, | Performed by: INTERNAL MEDICINE

## 2023-10-03 PROCEDURE — 96376 TX/PRO/DX INJ SAME DRUG ADON: CPT

## 2023-10-03 PROCEDURE — 3288F FALL RISK ASSESSMENT DOCD: CPT | Mod: CPTII,S$GLB,, | Performed by: INTERNAL MEDICINE

## 2023-10-03 PROCEDURE — 96365 THER/PROPH/DIAG IV INF INIT: CPT

## 2023-10-03 PROCEDURE — 93010 ELECTROCARDIOGRAM REPORT: CPT | Mod: ,,, | Performed by: INTERNAL MEDICINE

## 2023-10-03 PROCEDURE — 99223 1ST HOSP IP/OBS HIGH 75: CPT | Mod: ,,, | Performed by: INTERNAL MEDICINE

## 2023-10-03 PROCEDURE — 93010 EKG 12-LEAD: ICD-10-PCS | Mod: ,,, | Performed by: INTERNAL MEDICINE

## 2023-10-03 PROCEDURE — 1159F MED LIST DOCD IN RCRD: CPT | Mod: CPTII,S$GLB,, | Performed by: INTERNAL MEDICINE

## 2023-10-03 PROCEDURE — 80053 COMPREHEN METABOLIC PANEL: CPT | Performed by: EMERGENCY MEDICINE

## 2023-10-03 RX ORDER — DORZOLAMIDE HCL 20 MG/ML
1 SOLUTION/ DROPS OPHTHALMIC 2 TIMES DAILY
Status: DISCONTINUED | OUTPATIENT
Start: 2023-10-03 | End: 2023-10-05 | Stop reason: HOSPADM

## 2023-10-03 RX ORDER — PANTOPRAZOLE SODIUM 40 MG/1
40 TABLET, DELAYED RELEASE ORAL DAILY
Status: DISCONTINUED | OUTPATIENT
Start: 2023-10-04 | End: 2023-10-05 | Stop reason: HOSPADM

## 2023-10-03 RX ORDER — NITROGLYCERIN 0.4 MG/1
0.4 TABLET SUBLINGUAL EVERY 5 MIN PRN
Status: DISCONTINUED | OUTPATIENT
Start: 2023-10-03 | End: 2023-10-05 | Stop reason: HOSPADM

## 2023-10-03 RX ORDER — ARFORMOTEROL TARTRATE 15 UG/2ML
15 SOLUTION RESPIRATORY (INHALATION) 2 TIMES DAILY
Status: DISCONTINUED | OUTPATIENT
Start: 2023-10-03 | End: 2023-10-05 | Stop reason: HOSPADM

## 2023-10-03 RX ORDER — TRAMADOL HYDROCHLORIDE 50 MG/1
50 TABLET ORAL EVERY 6 HOURS PRN
Status: DISCONTINUED | OUTPATIENT
Start: 2023-10-03 | End: 2023-10-05 | Stop reason: HOSPADM

## 2023-10-03 RX ORDER — FUROSEMIDE 10 MG/ML
40 INJECTION INTRAMUSCULAR; INTRAVENOUS ONCE
Status: COMPLETED | OUTPATIENT
Start: 2023-10-03 | End: 2023-10-03

## 2023-10-03 RX ORDER — SODIUM CHLORIDE 0.9 % (FLUSH) 0.9 %
10 SYRINGE (ML) INJECTION
Status: DISCONTINUED | OUTPATIENT
Start: 2023-10-03 | End: 2023-10-05 | Stop reason: HOSPADM

## 2023-10-03 RX ORDER — BUDESONIDE 0.5 MG/2ML
0.5 INHALANT ORAL EVERY 12 HOURS
Status: DISCONTINUED | OUTPATIENT
Start: 2023-10-03 | End: 2023-10-05 | Stop reason: HOSPADM

## 2023-10-03 RX ORDER — ONDANSETRON 2 MG/ML
4 INJECTION INTRAMUSCULAR; INTRAVENOUS EVERY 8 HOURS PRN
Status: DISCONTINUED | OUTPATIENT
Start: 2023-10-03 | End: 2023-10-05 | Stop reason: HOSPADM

## 2023-10-03 RX ORDER — ATORVASTATIN CALCIUM 40 MG/1
80 TABLET, FILM COATED ORAL NIGHTLY
Status: DISCONTINUED | OUTPATIENT
Start: 2023-10-03 | End: 2023-10-05 | Stop reason: HOSPADM

## 2023-10-03 RX ORDER — LATANOPROST 50 UG/ML
1 SOLUTION/ DROPS OPHTHALMIC NIGHTLY
Status: DISCONTINUED | OUTPATIENT
Start: 2023-10-03 | End: 2023-10-05 | Stop reason: HOSPADM

## 2023-10-03 RX ADMIN — AMIODARONE HYDROCHLORIDE 0.5 MG/MIN: 1.8 INJECTION, SOLUTION INTRAVENOUS at 07:10

## 2023-10-03 RX ADMIN — APIXABAN 2.5 MG: 2.5 TABLET, FILM COATED ORAL at 09:10

## 2023-10-03 RX ADMIN — ATORVASTATIN CALCIUM 80 MG: 40 TABLET, FILM COATED ORAL at 09:10

## 2023-10-03 RX ADMIN — AMIODARONE HYDROCHLORIDE 0.5 MG/MIN: 1.8 INJECTION, SOLUTION INTRAVENOUS at 05:10

## 2023-10-03 RX ADMIN — ARFORMOTEROL TARTRATE 15 MCG: 15 SOLUTION RESPIRATORY (INHALATION) at 07:10

## 2023-10-03 RX ADMIN — AMIODARONE HYDROCHLORIDE 150 MG: 1.5 INJECTION, SOLUTION INTRAVENOUS at 12:10

## 2023-10-03 RX ADMIN — FUROSEMIDE 40 MG: 10 INJECTION, SOLUTION INTRAMUSCULAR; INTRAVENOUS at 04:10

## 2023-10-03 RX ADMIN — BUDESONIDE INHALATION 0.5 MG: 0.5 SUSPENSION RESPIRATORY (INHALATION) at 07:10

## 2023-10-03 RX ADMIN — AMIODARONE HYDROCHLORIDE 1 MG/MIN: 1.8 INJECTION, SOLUTION INTRAVENOUS at 01:10

## 2023-10-03 NOTE — CONSULTS
[facility] - Tele[facility] (Hospital)  Cardiology  Consult Note    Patient Name: Holli Landrum  MRN: [id number]  Admission Date: 10/3/2023  Hospital Length of Stay: 0 days  Code Status: Prior   Attending Provider: Dominik Jackson,*   Consulting Provider: Oxana Hills NP  Primary Care Physician: Marcia Carlisle MD  Principal Problem:Paroxysmal atrial fibrillation    Patient information was obtained from patient and ER records.     Inpatient consult to Cardiology  Consult performed by: Oxana Hills NP  Consult ordered by: Dominik Jackson MD        Subjective:     Chief Complaint:  CARMEN     HPI:   89 y.o. female patient with a PMHx of  persistent atrial fibrillation, coronary artery disease status post PCI, sick sinus syndrome status post pacemaker implantation Biotronik, hypertrophic obstructive cardiomyopathy, diastolic congestive heart failure, aortic stenosis, history of GI bleeding, asthma, HTN, COPD, PAF (low dose eliquis due to anemia), PVT who presented to [facility] from cards clinic with fatigue, HA dizziness and near-syncope. Cardiology consulted to assist with management pt seen and examined today, reports extreme HA and int palpitations.     Echo in May with mod-sev AS (Per Dr. Guadarrama-  TAVR would carry an extremely high risk of endocarditis. At this point her symptoms are mild and mostly worsened by anemia. Will focus for now on controlling her bleeding rather than valve replacement. Should she develop heart failure refractory to medical Rx, balloon valvuloplasty can be considered). Pt had LHC in June for work up with nonobstructive disease revealed.      Past Medical History:   Diagnosis Date    Anticoagulant long-term use     Arthritis     Asthma     Basal cell carcinoma     Cancer     skin cancer to face    Cataract     OU done//    CHF (congestive heart failure)     COPD (chronic obstructive pulmonary disease)     no oxygen; patient denies    cpap     Essential hypertension  05/05/2010    GERD (gastroesophageal reflux disease) 11/20/2012    Glaucoma     Hypertensive heart disease with heart failure 02/05/2013    Paroxysmal atrial fibrillation     Paroxysmal ventricular tachycardia     per problem list    Renal disorder     french-1/3/2020    Squamous cell carcinoma of skin        Past Surgical History:   Procedure Laterality Date    A-V CARDIAC PACEMAKER INSERTION  06/16/2021    Procedure: INSERTION, CARDIAC PACEMAKER, DUAL CHAMBER;  Surgeon: Oscar Sommers MD;  Location: [facility] CATH;  Service: Cardiovascular;;    ADENOIDECTOMY  191944    APPENDECTOMY  1948    Because of Ovarian Cyst surgery    BREAST BIOPSY Left 1995    neg    BREAST BIOPSY Right 1984    neg    BREAST BIOPSY Right 1992    neg    BREAST SURGERY      A number of biopsies    CARDIAC CATHETERIZATION  2013, 2014,2016, 2020    has 9 stents    CARDIAC SURGERY  2012    stents    CATARACT EXTRACTION W/  INTRAOCULAR LENS IMPLANT Left 11/01/2018    Dr Rose    CATARACT EXTRACTION W/  INTRAOCULAR LENS IMPLANT Right 12/13/2018    Dr Rose//    CHOLECYSTECTOMY      COLONOSCOPY  ~2005    Dr. Edward; normal per patient report    COLONOSCOPY N/A 09/06/2022    Procedure: COLONOSCOPY 8/31/22-note in Dr Simms's office visit note that he spoke to her cardiologist and she was viable candidate for endoscopy;  Surgeon: Cordelia Bueno MD;  Location: [facility];  Service: Endoscopy;  Laterality: N/A;    CORONARY ANGIOGRAPHY N/A 03/20/2020    Procedure: ANGIOGRAM, CORONARY ARTERY;  Surgeon: Chuy Bryant MD;  Location: [facility] CATH;  Service: Cardiology;  Laterality: N/A;    CYSTOSCOPY W/ RETROGRADES Bilateral 02/12/2020    Procedure: CYSTOSCOPY, WITH RETROGRADE PYELOGRAM;  Surgeon: Gasper Feliz MD;  Location: [facility] OR;  Service: Urology;  Laterality: Bilateral;    ESOPHAGOGASTRODUODENOSCOPY N/A 08/13/2020    Procedure: EGD (ESOPHAGOGASTRODUODENOSCOPY);  Surgeon: Mika Solorzano MD;  Location: [facility];   Service: Endoscopy;  Laterality: N/A; Mild Schatzki ring. Biopsied. Dilated. small hiatal hernia, gastritis; biopsy: esophagus- SEVERE REFLUX ESOPHAGITIS, stomach- chronic gastritis, negative for H pylori    ESOPHAGOGASTRODUODENOSCOPY N/A 10/22/2020    Procedure: EGD (ESOPHAGOGASTRODUODENOSCOPY);  Surgeon: Fred Hendricks MD;  Location: [facility];  Service: Endoscopy;  Laterality: N/A;    EYE SURGERY  2017    Cataracs    FRACTIONAL FLOW RESERVE (FFR), CORONARY  6/20/2023    Procedure: Fractional Flow Warren (FFR), Coronary;  Surgeon: Brice Campuzano MD;  Location: [facility] CATH LAB;  Service: Cardiology;;    HYSTERECTOMY  1969    INSTANTANEOUS WAVE-FREE RATIO (IFR) N/A 6/20/2023    Procedure: Instantaneous Wave-Free Ratio (IFR);  Surgeon: Brice Campuzano MD;  Location: [facility] CATH LAB;  Service: Cardiology;  Laterality: N/A;    LEFT HEART CATHETERIZATION Left 03/03/2020    Procedure: Left heart cath;  Surgeon: Chuy Bryant MD;  Location: [facility] CATH;  Service: Cardiology;  Laterality: Left;    LEFT HEART CATHETERIZATION Left 03/20/2020    Procedure: Left heart cath;  Surgeon: Chuy Bryant MD;  Location: [facility] CATH;  Service: Cardiology;  Laterality: Left;    LEFT HEART CATHETERIZATION N/A 6/20/2023    Procedure: Left heart cath;  Surgeon: Brice Campuzano MD;  Location: [facility] CATH LAB;  Service: Cardiology;  Laterality: N/A;    OVARIAN CYST REMOVAL  teenager    PHACOEMULSIFICATION OF CATARACT Left 11/01/2018    Procedure: PHACOEMULSIFICATION, CATARACT;  Surgeon: Aleksandar Rose Jr., MD;  Location: [facility] OR;  Service: Ophthalmology;  Laterality: Left;    PHACOEMULSIFICATION OF CATARACT Right 12/13/2018    Procedure: PHACOEMULSIFICATION, CATARACT;  Surgeon: Aleksandar Rose Jr., MD;  Location: [facility] OR;  Service: Ophthalmology;  Laterality: Right;    TONSILLECTOMY      aw/denoids    UPPER GASTROINTESTINAL ENDOSCOPY  ~2005    Dr. Solorzano    VALVE STUDY-AORTIC  6/20/2023    Procedure: Valve  study-aortic;  Surgeon: Brice Campuzano MD;  Location: [facility] CATH LAB;  Service: Cardiology;;    VASCULAR SURGERY      to remove clot from right leg    Yag Capsulotomy Bilateral 11/05/2019    Dr Rose       Review of patient's allergies indicates:   Allergen Reactions    Timolol maleate Shortness Of Breath    Ciprofloxacin Other (See Comments)     Muscle ache    Sulfamethoxazole-trimethoprim        No current facility-administered medications on file prior to encounter.     Current Outpatient Medications on File Prior to Encounter   Medication Sig    acetaminophen (TYLENOL) 650 MG TbSR Take 650 mg by mouth as needed.     albuterol (PROVENTIL/VENTOLIN HFA) 90 mcg/actuation inhaler Inhale 1-2 puffs into the lungs every 4 (four) hours as needed for Wheezing or Shortness of Breath. Rescue    albuterol-ipratropium (DUO-NEB) 2.5 mg-0.5 mg/3 mL nebulizer solution Take 3 mLs by nebulization every 6 (six) hours as needed for Wheezing or Shortness of Breath.    allopurinoL (ZYLOPRIM) 100 MG tablet Take 2 tablets (200 mg total) by mouth once daily. (Patient taking differently: Take 100 mg by mouth once daily. 1 tablet daily)    aluminum & magnesium hydroxide-simethicone (MYLANTA MAX STRENGTH) 400-400-40 mg/5 mL suspension Take by mouth every 6 (six) hours as needed for Indigestion.    apixaban (ELIQUIS) 2.5 mg Tab Take 1 tablet (2.5 mg total) by mouth 2 (two) times daily.    cetirizine HCl (ZYRTEC ORAL) Take 10 mg by mouth once daily.    cholecalciferol, vitamin D3, 125 mcg (5,000 unit) Tab Take 5,000 Units by mouth once daily.    dorzolamide (TRUSOPT) 2 % ophthalmic solution INSTILL 1 DROP INTO BOTH EYES TWICE DAILY    fluticasone (FLONASE) 50 mcg/actuation nasal spray 2 sprays (100 mcg total) by Each Nare route daily as needed for Allergies.    fluticasone furoate-vilanteroL (BREO ELLIPTA) 100-25 mcg/dose diskus inhaler Inhale 1 puff into the lungs once daily.    furosemide (LASIX) 40 MG tablet Take 1  tablet (40 mg total) by mouth 2 (two) times a day. Take twice a day and monitor BP and weights    Lactobacillus rhamnosus GG (CULTURELLE) 10 billion cell capsule Take 1 capsule by mouth once daily.    latanoprost 0.005 % ophthalmic solution Place 1 drop into both eyes every evening.    magnesium oxide (MAG-OX) 400 mg tablet Take 800 mg by mouth once daily.     ondansetron (ZOFRAN) 4 MG tablet Take 4 mg by mouth every 6 (six) hours as needed.    potassium chloride (KLOR-CON) 10 MEQ TbSR Take 2 tablets (20 mEq total) by mouth 2 (two) times daily. (Patient taking differently: Take 30 mEq by mouth 2 (two) times daily. Two tabs in the morning one in the evening)    sotaloL (BETAPACE) 80 MG tablet Take 0.5 tablets (40 mg total) by mouth 2 (two) times daily. (Patient taking differently: Take 80 mg by mouth 2 (two) times daily.)    traMADoL (ULTRAM) 50 mg tablet Take 1 tablet (50 mg total) by mouth every 12 (twelve) hours as needed for Pain.    amLODIPine (NORVASC) 2.5 MG tablet TAKE 1 TABLET BY MOUTH EVERY DAY (Patient taking differently: Take 2.5 mg by mouth every evening.)    nitroGLYCERIN 0.4 MG/HR TD PT24 (NITRODUR) 0.4 mg/hr Place 1 patch onto the skin once daily.    ondansetron (ZOFRAN-ODT) 4 MG TbDL Take by mouth.    pantoprazole (PROTONIX) 40 MG tablet Take 1 tablet (40 mg total) by mouth once daily. (Patient taking differently: Take 40 mg by mouth once daily. Patient takes as needed)    rosuvastatin (CRESTOR) 40 MG Tab TAKE 1 TABLET (40 MG TOTAL) BY MOUTH ONCE DAILY. (Patient taking differently: Take 40 mg by mouth every evening.)    [DISCONTINUED] clopidogreL (PLAVIX) 75 mg tablet Take 75 mg by mouth.    [DISCONTINUED] mupirocin (BACTROBAN) 2 % ointment SMARTSI Application Topical 2-3 Times Daily    [DISCONTINUED] omega-3 fatty acids/fish oil (FISH OIL-OMEGA-3 FATTY ACIDS) 300-1,000 mg capsule Take 2 capsules by mouth once daily.     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   HENT: Negative.     Eyes: Negative.    Cardiovascular:  Positive for palpitations.   Respiratory: Negative.     Skin: Negative.    Musculoskeletal: Negative.    Gastrointestinal: Negative.    Genitourinary: Negative.    Neurological:  Positive for headaches and weakness.   Psychiatric/Behavioral: Negative.       Objective:     Vital Signs (Most Recent):  Temp: 98.2 °F (36.8 °C) (10/03/23 1435)  Pulse: 78 (10/03/23 143)  Resp: 18 (10/03/23 1435)  BP: 132/78 (10/03/23 1435)  SpO2: (!) 94 % (10/03/23 1435) Vital Signs (24h Range):  Temp:  [97.8 °F (36.6 °C)-98.2 °F (36.8 °C)] 98.2 °F (36.8 °C)  Pulse:  [68-78] 78  Resp:  [16-28] 18  SpO2:  [94 %-96 %] 94 %  BP: (129-159)/() 132/78     Weight: 93.1 kg (205 lb 4 oz)  Body mass index is 34.16 kg/m².    SpO2: (!) 94 %         Intake/Output Summary (Last 24 hours) at 10/3/2023 1501  Last data filed at 10/3/2023 1400  Gross per 24 hour   Intake 90.37 ml   Output 600 ml   Net -509.63 ml       Lines/Drains/Airways       Drain  Duration                  Suprapubic Catheter 23 1126 18 Fr. 105 days               "Peripheral Intravenous Line  Duration                  Peripheral IV - Single Lumen 10/03/23 1202 20 G Anterior;Left;Proximal Forearm <1 day                     Physical Exam  Vitals and nursing note reviewed.   Constitutional:       Appearance: Normal appearance.   HENT:      Head: Normocephalic.   Eyes:      Pupils: Pupils are equal, round, and reactive to light.   Cardiovascular:      Rate and Rhythm: Normal rate. Rhythm irregular.      Heart sounds: Normal heart sounds, S1 normal and S2 normal. No murmur heard.     No S3 or S4 sounds.   Pulmonary:      Effort: Pulmonary effort is normal.      Breath sounds: Normal breath sounds.   Abdominal:      General: Bowel sounds are normal.      Palpations: Abdomen is soft.   Musculoskeletal:         General: Normal range of motion.      Cervical back: Normal range of motion.   Skin:     Capillary Refill: Capillary refill takes less than 2 seconds.   Neurological:      General: No focal deficit present.      Mental Status: She is alert and oriented to person, place, and time.      Motor: Weakness present.   Psychiatric:         Mood and Affect: Mood normal.         Behavior: Behavior normal.         Thought Content: Thought content normal.          Significant Labs: BMP:   Recent Labs   Lab 10/03/23  1127   GLU 87      K 3.9      CO2 19*   BUN 27*   CREATININE 1.1   CALCIUM 10.0   , CMP   Recent Labs   Lab 10/03/23  1127      K 3.9      CO2 19*   GLU 87   BUN 27*   CREATININE 1.1   CALCIUM 10.0   PROT 8.0   ALBUMIN 4.3   BILITOT 0.5   ALKPHOS 105   AST 34   ALT 37   ANIONGAP 13   , CBC   Recent Labs   Lab 10/03/23  1127   WBC 11.15   HGB 14.2   HCT 44.4      , INR   Recent Labs   Lab 10/03/23  1127   INR 1.0   , Lipid Panel No results for input(s): "CHOL", "HDL", "LDLCALC", "TRIG", "CHOLHDL" in the last 48 hours., Troponin   Recent Labs   Lab 10/03/23  1127   TROPONINI <0.006   , and All pertinent lab results from the last 24 hours have been " reviewed.    Significant Imaging: Echocardiogram: Transthoracic echo (TTE) complete ([facility] Only):   Results for orders placed or performed during the hospital encounter of 05/17/23   Echo   Result Value Ref Range    BSA 2.03 m2    TDI SEPTAL 0.06 m/s    LV LATERAL E/E' RATIO 9.00 m/s    LV SEPTAL E/E' RATIO 10.50 m/s    LA WIDTH 3.70 cm    IVC diameter 1.09 cm    Left Ventricular Outflow Tract Mean Velocity 0.68 cm/s    Left Ventricular Outflow Tract Mean Gradient 2.06 mmHg    TDI LATERAL 0.07 m/s    PV PEAK VELOCITY 0.83 cm/s    LVIDd 3.82 3.5 - 6.0 cm    IVS 1.32 (A) 0.6 - 1.1 cm    Posterior Wall 1.40 (A) 0.6 - 1.1 cm    Ao root annulus 2.92 cm    LVIDs 2.31 2.1 - 4.0 cm    FS 40 28 - 44 %    LA volume 84.19 cm3    Sinus 2.86 cm    STJ 2.69 cm    LV mass 186.95 g    LA size 4.43 cm    RVDD 3.21 cm    Left Ventricle Relative Wall Thickness 0.73 cm    AV mean gradient 21 mmHg    AV valve area 0.80 cm2    AV Velocity Ratio 0.31     AV index (prosthetic) 0.32     E/A ratio 0.94     Mean e' 0.07 m/s    E wave deceleration time 397.60 msec    IVRT 102.76 msec    LVOT diameter 1.78 cm    LVOT area 2.5 cm2    LVOT peak tc 0.88 m/s    LVOT peak VTI 18.90 cm    Ao peak tc 2.85 m/s    Ao VTI 59.1 cm    RVOT peak tc 0.66 m/s    RVOT peak VTI 13.3 cm    LVOT stroke volume 47.01 cm3    AV peak gradient 32 mmHg    PV mean gradient 0.88 mmHg    E/E' ratio 9.69 m/s    MV Peak E Tc 0.63 m/s    TR Max Tc 2.51 m/s    MV Peak A Tc 0.67 m/s    LV Systolic Volume 18.36 mL    LV Systolic Volume Index 9.3 mL/m2    LV Diastolic Volume 62.54 mL    LV Diastolic Volume Index 31.75 mL/m2    LA Volume Index 42.7 mL/m2    LV Mass Index 95 g/m2    RA Major Axis 4.59 cm    Left Atrium Minor Axis 5.70 cm    Left Atrium Major Axis 6.43 cm    Triscuspid Valve Regurgitation Peak Gradient 25 mmHg    LA Volume Index (Mod) 22.7 mL/m2    LA volume (mod) 44.76 cm3    RA Width 2.84 cm    Right Atrial Pressure (from IVC) 8 mmHg    EF 65 %    TV  resting pulmonary artery pressure 33 mmHg    Narrative    · The left ventricle is normal in size with mild concentric hypertrophy   and normal systolic function.  · Moderate left atrial enlargement.  · The estimated ejection fraction is 65%.  · Indeterminate left ventricular diastolic function.  · Normal right ventricular size with normal right ventricular systolic   function.  · There is moderate-to-severe aortic valve stenosis.  · Aortic valve area is 0.80 cm2; peak velocity is 2.85 m/s; mean gradient   is 21 mmHg.  · Mild tricuspid regurgitation.  · Intermediate central venous pressure (8 mmHg).  · The estimated PA systolic pressure is 33 mmHg.      , EKG: reviewed, Stress Test: reviewed, and X-Ray: CXR: X-Ray Chest 1 View (CXR): No results found for this visit on 10/03/23.    Assessment and Plan:     * Paroxysmal atrial fibrillation  Tele reviewed, paced rhythm Afib  Cont low dose eliquis (h/o anemia with hemorrhoids)  Cont amio gtt  Hold home sotalol  EP consult pending recs      Chronic diastolic heart failure    IV lasix x1 assess response  Further recs to follow    Hypercholesteremia  statin        VTE Risk Mitigation (From admission, onward)    None          Thank you for your consult. I will follow-up with patient. Please contact us if you have any additional questions.    Oxana Hills, NP  Cardiology   [facility] - Telemetry ([facility])

## 2023-10-03 NOTE — ASSESSMENT & PLAN NOTE
Tele reviewed, paced rhythm Afib  Cont low dose eliquis (h/o anemia with hemorrhoids)  Cont amio gtt  Hold home sotalol  EP consult pending recs

## 2023-10-03 NOTE — ASSESSMENT & PLAN NOTE
Patient with Long standing persistent (>12 months) atrial fibrillation which is controlled currently with Amiodarone. Patient is currently in atrial fibrillation.[id number] Score: 4. HASBLED Score: . Anticoagulation indicated. Anticoagulation done with elequis        Hold Sotalol   Cont amiodarone drip   Cont Eliquis

## 2023-10-03 NOTE — PROGRESS NOTES
Subjective:   Patient ID:  Holli Landrum is a 89 y.o. female who presents for cardiac consult of Atrial Fibrillation (Pt states she feels faint as if she wants to pass out.)      HPI  The patient came in today for cardiac consult of Atrial Fibrillation (Pt states she feels faint as if she wants to pass out.)      Patient ID  ---------------  Holli Landrum is a 89 y.o. female pt with  history of persistent atrial fibrillation, coronary artery disease status post PCI, sick sinus syndrome status post [employer] pacemaker implantation Biotronik, hypertrophic obstructive cardiomyopathy, diastolic congestive heart failure, aortic stenosis, history of GI bleeding here for CV follow up.        HPI/Subjective: Darshana Velez, NP Visit 9/2022     Holli Landrum is Here For follow up coronary artery disease, persistent atrial fibrillation, Biotronik pacemaker in Situ     She is here for 4 week follow up.  He underwent Cscope with findings of internal hemorrhoids.  She was started back on Eliquis and is tolerating fine.  BP is controlled today and she reports feeling well overall with only complaint being occasional swelling in her L knee and occasional HIGGINS.  She denies any chest pain or syncope.  She is worried about her blood pressure, lipids, and echocardiogram, and I have reviewed it all with her.      Last Echo moderate aortic stenosis 09/2022  Last Cath 03/20/2020, patent stents  Biotronik pacemaker in Situ in 2021  Last LDL 12/2021, 59    1/18/23  Pt last seen by Darshana Velez 9/2022; Pt of Oscar Sommers MD; here to establish care. Last ECHO 8/2022 with normal bi V function, grade 1 DD, mod AS,  FLAVIO is 0.98 cm2; mean gradient is 20 mmHg;  PASP 27 mmHg. She had Cscope few months prior without any major issues/bleeding.   BP and HR stable. BMI 35 - 216 lbs. Weight 197 prior. She had an episode of Afib Jan 3rd HR 50s-160s. Device interrogation today with brief AFib.   ECG - A paced, nonspec ST changes      5/3/23  BP and HR stable. BMI 33 - 199 lbs.     Ap: 84%   : 3%     Device Defined Counters:   Atrial Fibrillation/Flutter: up to 2 / up to 5 mode switches per day   EGMs illustrate AF and AF w/RVR, longest available 1min 26sec in duration.   AF burden 0% of day   She has been feeling more SOB, fatigue had more pain after suprapubic catheter, saw gastro recently and colorectal surgery.     10/3/23    ECHO 5/2023 with normal bi V function, mod to severe AS, PASP 33 mmHg. LHC 6/2023 with OM1 70% stenosis, LCX 50% stenosis, moderate AS.     Per Dr. Guadarrama-  TAVR would carry an extremely high risk of endocarditis. At this point her symptoms are mild and mostly worsened by anemia. Will focus for now on controlling her bleeding rather than valve replacement. Should she develop heart failure refractory to medical Rx, balloon valvuloplasty can be considered.       Recent device interrogation report:   Device Defined Counters:   Atrial Fibrillation/Flutter: Up to 34 per day   No EGMs available.   AF burden: up to 18% of day    She has hemorrhoids occ - may be candidate for Watchman. Aspirin stopped, remains on low dose Eliquis BID.     She went BRG after angiogram for hematoma. She had severe RLQ - maybe diverticulitis    Pt has very bad headache, is lightheaded. She feels like she may pass out.       Patient has dec exercise tolerance.    Patient is compliant with medications.    Results for orders placed during the hospital encounter of 06/20/23    Cardiac catheterization    Conclusion    The Ost Cx to Prox Cx lesion was 50% stenosed.    The Ost 1st Mrg to 1st Mrg lesion was 70% stenosed.    The pre-procedure left ventricular end diastolic pressure was 6.    The estimated blood loss was <50 mL.    There was non-obstructive coronary artery disease..    There was moderate aortic valve stenosis.    The procedure log was documented by Documenter: RT Cristopher; Morena Bradford and verified by Brice Guadarrama,  MD.    Date: 6/20/2023  Time: 2:46 PM      Results for orders placed during the hospital encounter of 05/17/23    Echo    Interpretation Summary  · The left ventricle is normal in size with mild concentric hypertrophy and normal systolic function.  · Moderate left atrial enlargement.  · The estimated ejection fraction is 65%.  · Indeterminate left ventricular diastolic function.  · Normal right ventricular size with normal right ventricular systolic function.  · There is moderate-to-severe aortic valve stenosis.  · Aortic valve area is 0.80 cm2; peak velocity is 2.85 m/s; mean gradient is 21 mmHg.  · Mild tricuspid regurgitation.  · Intermediate central venous pressure (8 mmHg).  · The estimated PA systolic pressure is 33 mmHg.          Past Medical History:   Diagnosis Date    Anticoagulant long-term use     Arthritis     Asthma     Basal cell carcinoma     Cancer     skin cancer to face    Cataract     OU done//    CHF (congestive heart failure)     COPD (chronic obstructive pulmonary disease)     no oxygen; patient denies    cpap     Essential hypertension 05/05/2010    GERD (gastroesophageal reflux disease) 11/20/2012    Glaucoma     Hypertensive heart disease with heart failure 02/05/2013    Paroxysmal atrial fibrillation     Paroxysmal ventricular tachycardia     per problem list    Renal disorder     french-1/3/2020    Squamous cell carcinoma of skin        Past Surgical History:   Procedure Laterality Date    A-V CARDIAC PACEMAKER INSERTION  06/16/2021    Procedure: INSERTION, CARDIAC PACEMAKER, DUAL CHAMBER;  Surgeon: Oscar Sommers MD;  Location: [facility];  Service: Cardiovascular;;    ADENOIDECTOMY  191944    APPENDECTOMY  1948    Because of Ovarian Cyst surgery    BREAST BIOPSY Left 1995    neg    BREAST BIOPSY Right 1984    neg    BREAST BIOPSY Right 1992    neg    BREAST SURGERY      A number of biopsies    CARDIAC CATHETERIZATION  2013, 2014,2016, 2020    has 9 stents    CARDIAC SURGERY  2012     stents    CATARACT EXTRACTION W/  INTRAOCULAR LENS IMPLANT Left 11/01/2018    Dr Rose    CATARACT EXTRACTION W/  INTRAOCULAR LENS IMPLANT Right 12/13/2018    Dr Rose//    CHOLECYSTECTOMY      COLONOSCOPY  ~2005    Dr. Edward; normal per patient report    COLONOSCOPY N/A 09/06/2022    Procedure: COLONOSCOPY 8/31/22-note in Dr Simms's office visit note that he spoke to her cardiologist and she was viable candidate for endoscopy;  Surgeon: Cordelia Bueno MD;  Location: [facility] ENDO;  Service: Endoscopy;  Laterality: N/A;    CORONARY ANGIOGRAPHY N/A 03/20/2020    Procedure: ANGIOGRAM, CORONARY ARTERY;  Surgeon: Chuy Bryant MD;  Location: [facility] CATH;  Service: Cardiology;  Laterality: N/A;    CYSTOSCOPY W/ RETROGRADES Bilateral 02/12/2020    Procedure: CYSTOSCOPY, WITH RETROGRADE PYELOGRAM;  Surgeon: Gasper Feliz MD;  Location: [facility] OR;  Service: Urology;  Laterality: Bilateral;    ESOPHAGOGASTRODUODENOSCOPY N/A 08/13/2020    Procedure: EGD (ESOPHAGOGASTRODUODENOSCOPY);  Surgeon: Mika Solorzano MD;  Location: [facility];  Service: Endoscopy;  Laterality: N/A; Mild Schatzki ring. Biopsied. Dilated. small hiatal hernia, gastritis; biopsy: esophagus- SEVERE REFLUX ESOPHAGITIS, stomach- chronic gastritis, negative for H pylori    ESOPHAGOGASTRODUODENOSCOPY N/A 10/22/2020    Procedure: EGD (ESOPHAGOGASTRODUODENOSCOPY);  Surgeon: Fred Hendricks MD;  Location: [facility];  Service: Endoscopy;  Laterality: N/A;    EYE SURGERY  2017    Cataracs    FRACTIONAL FLOW RESERVE (FFR), CORONARY  6/20/2023    Procedure: Fractional Flow [place] (FFR), Coronary;  Surgeon: Brice Campuzano MD;  Location: [facility] CATH LAB;  Service: Cardiology;;    HYSTERECTOMY  1969    INSTANTANEOUS WAVE-FREE RATIO (IFR) N/A 6/20/2023    Procedure: Instantaneous Wave-Free Ratio (IFR);  Surgeon: Brice Campuzano MD;  Location: [facility] CATH LAB;  Service: Cardiology;  Laterality: N/A;    LEFT HEART CATHETERIZATION Left 03/03/2020     Procedure: Left heart cath;  Surgeon: Chuy Bryant MD;  Location: [facility] CATH;  Service: Cardiology;  Laterality: Left;    LEFT HEART CATHETERIZATION Left 2020    Procedure: Left heart cath;  Surgeon: Chuy Bryant MD;  Location: [facility] CATH;  Service: Cardiology;  Laterality: Left;    LEFT HEART CATHETERIZATION N/A 2023    Procedure: Left heart cath;  Surgeon: Brice Campuzano MD;  Location: [facility] CATH LAB;  Service: Cardiology;  Laterality: N/A;    OVARIAN CYST REMOVAL  teenager    PHACOEMULSIFICATION OF CATARACT Left 2018    Procedure: PHACOEMULSIFICATION, CATARACT;  Surgeon: Aleksandar Rose Jr., MD;  Location: [facility] OR;  Service: Ophthalmology;  Laterality: Left;    PHACOEMULSIFICATION OF CATARACT Right 2018    Procedure: PHACOEMULSIFICATION, CATARACT;  Surgeon: Aleksandar Rose Jr., MD;  Location: [facility] OR;  Service: Ophthalmology;  Laterality: Right;    TONSILLECTOMY      aw/denoids    UPPER GASTROINTESTINAL ENDOSCOPY  ~    Dr. 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  during [place]   Substance Use Topics    Alcohol use: Not Currently     Comment: Only drank a little wine and haven't  drunk anything in 15 y    Drug use: Never       Family History   Problem Relation Age of Onset    Diabetes Brother         Type 2    Heart disease Brother          at 65 CHD    Diabetes Mother         Type 1    Alcohol abuse Mother         Dod 10/75    Heart disease Mother          Arteriosclerosis    Hypertension Mother     Stroke Mother         3/15/1975 dod 10/1975    Cancer Maternal Grandfather         Dod 4/50    Clotting disorder Son         bleeding after tonsillectomy only    Heart disease Father         Heart attack. Afib, and Polyvcithemavera    Hypertension Father     Amblyopia Neg Hx     Blindness Neg Hx     Cataracts Neg Hx     Glaucoma Neg Hx     Macular degeneration Neg Hx     Retinal detachment Neg Hx     Strabismus Neg Hx     Thyroid disease Neg Hx     Colon cancer Neg Hx        Patient's Medications   New Prescriptions    No medications on file   Previous Medications    ACETAMINOPHEN (TYLENOL) 650 MG TBSR    Take 650 mg by mouth as needed.     ALBUTEROL (PROVENTIL/VENTOLIN HFA) 90 MCG/ACTUATION INHALER    Inhale 1-2 puffs into the lungs every 4 (four) hours as needed for Wheezing or Shortness of Breath. Rescue    ALBUTEROL-IPRATROPIUM (DUO-NEB) 2.5 MG-0.5 MG/3 ML NEBULIZER SOLUTION    Take 3 mLs by nebulization every 6 (six) hours as needed for Wheezing or Shortness of Breath.    ALLOPURINOL (ZYLOPRIM) 100 MG TABLET    Take 2 tablets (200 mg total) by mouth once daily.    ALUMINUM & MAGNESIUM HYDROXIDE-SIMETHICONE (MYLANTA MAX STRENGTH) 400-400-40 MG/5 ML SUSPENSION    Take by mouth every 6 (six) hours as needed for Indigestion.    AMLODIPINE (NORVASC) 2.5 MG TABLET    TAKE 1 TABLET BY MOUTH EVERY DAY    APIXABAN (ELIQUIS) 2.5 MG TAB    Take 1 tablet (2.5 mg total) by mouth 2 (two) times daily.    CETIRIZINE HCL (ZYRTEC ORAL)    Take by mouth.    CHOLECALCIFEROL, VITAMIN D3, 125 MCG (5,000 UNIT) TAB    Take 5,000 Units by mouth once daily.    CLOPIDOGREL (PLAVIX) 75 MG TABLET    Take 75 mg by mouth.    DORZOLAMIDE (TRUSOPT) 2 % OPHTHALMIC SOLUTION    INSTILL 1 DROP INTO BOTH EYES TWICE DAILY    FLUTICASONE (FLONASE) 50 MCG/ACTUATION NASAL SPRAY    2 sprays (100 mcg total) by Each Nare route daily as needed for Allergies.    FLUTICASONE FUROATE-VILANTEROL (BREO ELLIPTA)  100-25 MCG/DOSE DISKUS INHALER    Inhale 1 puff into the lungs once daily.    FUROSEMIDE (LASIX) 40 MG TABLET    Take 1 tablet (40 mg total) by mouth 2 (two) times a day. Take twice a day and monitor BP and weights    LACTOBACILLUS RHAMNOSUS GG (CULTURELLE) 10 BILLION CELL CAPSULE    Take 1 capsule by mouth once daily.    LATANOPROST 0.005 % OPHTHALMIC SOLUTION    Place 1 drop into both eyes every evening.    MAGNESIUM OXIDE (MAG-OX) 400 MG TABLET    Take 800 mg by mouth once daily.     MUPIROCIN (BACTROBAN) 2 % OINTMENT    SMARTSI Application Topical 2-3 Times Daily    NITROGLYCERIN 0.4 MG/HR TD PT24 (NITRODUR) 0.4 MG/HR    Place 1 patch onto the skin once daily.    OMEGA-3 FATTY ACIDS/FISH OIL (FISH OIL-OMEGA-3 FATTY ACIDS) 300-1,000 MG CAPSULE    Take 2 capsules by mouth once daily.    ONDANSETRON (ZOFRAN) 4 MG TABLET    Take 4 mg by mouth every 6 (six) hours as needed.    ONDANSETRON (ZOFRAN-ODT) 4 MG TBDL    Take by mouth.    PANTOPRAZOLE (PROTONIX) 40 MG TABLET    Take 1 tablet (40 mg total) by mouth once daily.    POTASSIUM CHLORIDE (KLOR-CON) 10 MEQ TBSR    Take 2 tablets (20 mEq total) by mouth 2 (two) times daily.    ROSUVASTATIN (CRESTOR) 40 MG TAB    TAKE 1 TABLET (40 MG TOTAL) BY MOUTH ONCE DAILY.    SOTALOL (BETAPACE) 80 MG TABLET    Take 0.5 tablets (40 mg total) by mouth 2 (two) times daily.    TRAMADOL (ULTRAM) 50 MG TABLET    Take 1 tablet (50 mg total) by mouth every 12 (twelve) hours as needed for Pain.   Modified Medications    No medications on file   Discontinued Medications    No medications on file       Review of Systems   Constitutional: Negative.    HENT: Negative.     Eyes: Negative.    Respiratory:  Positive for shortness of breath.    Cardiovascular:  Positive for palpitations and leg swelling.   Gastrointestinal: Negative.    Genitourinary: Negative.    Musculoskeletal: Negative.    Skin: Negative.    Neurological: Negative.    Endo/Heme/Allergies: Negative.   "  Psychiatric/Behavioral: Negative.     All 12 systems otherwise negative.      Wt Readings from Last 3 Encounters:   10/03/23 89.8 kg (197 lb 15.6 oz)   09/06/23 90 kg (198 lb 6.6 oz)   07/24/23 88.7 kg (195 lb 8.8 oz)     Temp Readings from Last 3 Encounters:   06/20/23 97.6 °F (36.4 °C) (Temporal)   06/20/23 97.7 °F (36.5 °C) (Oral)   11/25/22 97.9 °F (36.6 °C) (Oral)     BP Readings from Last 3 Encounters:   10/03/23 (!) 159/104   09/06/23 110/80   07/24/23 104/71     Pulse Readings from Last 3 Encounters:   10/03/23 69   09/06/23 81   07/24/23 86       BP (!) 159/104 (BP Location: Left arm, Patient Position: Sitting, BP Method: Large (Automatic))   Pulse 69   Ht 5' 5" (1.651 m)   Wt 89.8 kg (197 lb 15.6 oz)   LMP  (LMP Unknown)   SpO2 96%   BMI 32.94 kg/m²     Objective:   Physical Exam  Vitals and nursing note reviewed.   Constitutional:       General: She is not in acute distress.     Appearance: She is well-developed. She is not diaphoretic.   HENT:      Head: Normocephalic and atraumatic.      Nose: Nose normal.   Eyes:      General: No scleral icterus.     Conjunctiva/sclera: Conjunctivae normal.   Neck:      Thyroid: No thyromegaly.      Vascular: No JVD.   Cardiovascular:      Rate and Rhythm: Normal rate and regular rhythm.      Heart sounds: S1 normal and S2 normal. Murmur heard.      No friction rub. No gallop. No S3 or S4 sounds.   Pulmonary:      Effort: Pulmonary effort is normal. No respiratory distress.      Breath sounds: Normal breath sounds. No stridor. No wheezing or rales.   Chest:      Chest wall: No tenderness.   Abdominal:      General: Bowel sounds are normal. There is no distension.      Palpations: Abdomen is soft. There is no mass.      Tenderness: There is no abdominal tenderness. There is no rebound.   Genitourinary:     Comments: Deferred  Musculoskeletal:         General: No tenderness or deformity. Normal range of motion.      Cervical back: Normal range of motion and neck " supple.   Lymphadenopathy:      Cervical: No cervical adenopathy.   Skin:     General: Skin is warm and dry.      Coloration: Skin is not pale.      Findings: No erythema or rash.   Neurological:      Mental Status: She is alert and oriented to person, place, and time.      Motor: No abnormal muscle tone.      Coordination: Coordination normal.   Psychiatric:         Behavior: Behavior normal.         Thought Content: Thought content normal.         Judgment: Judgment normal.         Lab Results   Component Value Date     06/20/2023     (L) 08/15/2015    K 3.9 06/20/2023    K 4.0 08/15/2015     06/20/2023    CL 99 08/15/2015    CO2 24 06/20/2023    BUN 14 06/20/2023    CREATININE 0.9 06/20/2023    CREATININE 1.05 (H) 08/15/2015    GLU 97 06/20/2023    HGBA1C 5.8 (H) 12/07/2021    MG 1.8 05/03/2023    AST 24 11/25/2022    AST 23 04/05/2016    ALT 20 11/25/2022    ALBUMIN 3.6 06/07/2023    ALBUMIN 4.4 08/15/2015    PROT 7.7 11/25/2022    BILITOT 0.5 11/25/2022    WBC 6.43 07/24/2023    HGB 12.4 07/24/2023    HCT 39.5 07/24/2023    MCV 85 07/24/2023     07/24/2023    INR 1.0 06/20/2023    TSH 0.993 12/07/2021    CHOL 134 12/07/2021    HDL 46 12/07/2021    LDLCALC 59.0 (L) 12/07/2021    LDLCALC 89 08/15/2015    TRIG 145 12/07/2021     (H) 05/03/2023     Assessment:      1. Presence of cardiac pacemaker    2. Chronic diastolic heart failure    3. Nonrheumatic aortic valve stenosis    4. Coronary artery disease of native artery of native heart with stable angina pectoris    5. Atherosclerosis of aorta    6. Symptomatic bradycardia    7. Chronic obstructive pulmonary disease, unspecified COPD type    8. Cardiac pacemaker in situ    9. Moderate aortic stenosis by prior echocardiogram            Plan:     PAF, SSS s/p PPM - 6/2021  - interrogate device and f/u device clinic as sched   - cont Sotalol, Eliquis  - recent - AF burden: up to 18% of day  - has hemorrhoids occ - may be candidate for  Watchman     2. HTN   - titrate meds    3. Mod to severe AS  - TAVR high risk for endocarditis, can consider Balloon AV - f/u with Dr. Guadarrama  -ECHO 5/2023 with normal bi V function, mod to severe AS, PASP 33 mmHg.  - [facility] 6/2023 with OM1 70% stenosis, LCX 50% stenosis, moderate AS.     4. HFpEF   - cont tx - lasix 40mg daily, extra PRN    5. CAD s/p PCI  - cont Eliquis, BB, statin  - cont nitro patch   - patent stents [facility] 6/2023    6. Obesity, BMI 35 - 216 lbs--> 197 lbs   - cont weight loss    7. JA, HIGGINS  - f/u pulm  - needs to use CPAP    Sent to ER now due to worsening palpitations/fatigue/lightheadedness and Afib - will need to consult EP - may need IV amio load vs DCCV        Thank you for allowing me to participate in this patient's care. Please do not hesitate to contact me with any questions or concerns. Consult note has been forwarded to the referral physician.     Fletcher Meyer MD, [facility]  Cardiovascular Disease  [facility], [place]  [phone] (P)

## 2023-10-03 NOTE — SUBJECTIVE & OBJECTIVE
Past Medical History:   Diagnosis Date    Anticoagulant long-term use     Arthritis     Asthma     Basal cell carcinoma     Cancer     skin cancer to face    Cataract     OU done//    CHF (congestive heart failure)     COPD (chronic obstructive pulmonary disease)     no oxygen; patient denies    cpap     Essential hypertension 05/05/2010    GERD (gastroesophageal reflux disease) 11/20/2012    Glaucoma     Hypertensive heart disease with heart failure 02/05/2013    Paroxysmal atrial fibrillation     Paroxysmal ventricular tachycardia     per problem list    Renal disorder     french-1/3/2020    Squamous cell carcinoma of skin        Past Surgical History:   Procedure Laterality Date    A-V CARDIAC PACEMAKER INSERTION  06/16/2021    Procedure: INSERTION, CARDIAC PACEMAKER, DUAL CHAMBER;  Surgeon: Oscar Sommers MD;  Location: [facility] CATH;  Service: Cardiovascular;;    ADENOIDECTOMY  191944    APPENDECTOMY  1948    Because of Ovarian Cyst surgery    BREAST BIOPSY Left 1995    neg    BREAST BIOPSY Right 1984    neg    BREAST BIOPSY Right 1992    neg    BREAST SURGERY      A number of biopsies    CARDIAC CATHETERIZATION  2013, 2014,2016, 2020    has 9 stents    CARDIAC SURGERY  2012    stents    CATARACT EXTRACTION W/  INTRAOCULAR LENS IMPLANT Left 11/01/2018    Dr Rose    CATARACT EXTRACTION W/  INTRAOCULAR LENS IMPLANT Right 12/13/2018    Dr Rose//    CHOLECYSTECTOMY      COLONOSCOPY  ~2005    Dr. Edward; normal per patient report    COLONOSCOPY N/A 09/06/2022    Procedure: COLONOSCOPY 8/31/22-note in Dr Simms's office visit note that he spoke to her cardiologist and she was viable candidate for endoscopy;  Surgeon: Cordelia Bueno MD;  Location: [facility];  Service: Endoscopy;  Laterality: N/A;    CORONARY ANGIOGRAPHY N/A 03/20/2020    Procedure: ANGIOGRAM, CORONARY ARTERY;  Surgeon: Chuy Bryant MD;  Location: [facility] CATH;  Service: Cardiology;  Laterality: N/A;    CYSTOSCOPY W/ RETROGRADES Bilateral  02/12/2020    Procedure: CYSTOSCOPY, WITH RETROGRADE PYELOGRAM;  Surgeon: Gasper Feliz MD;  Location: [facility] OR;  Service: Urology;  Laterality: Bilateral;    ESOPHAGOGASTRODUODENOSCOPY N/A 08/13/2020    Procedure: EGD (ESOPHAGOGASTRODUODENOSCOPY);  Surgeon: Mika Solorzano MD;  Location: [facility] ENDO;  Service: Endoscopy;  Laterality: N/A; Mild Schatzki ring. Biopsied. Dilated. small hiatal hernia, gastritis; biopsy: esophagus- SEVERE REFLUX ESOPHAGITIS, stomach- chronic gastritis, negative for H pylori    ESOPHAGOGASTRODUODENOSCOPY N/A 10/22/2020    Procedure: EGD (ESOPHAGOGASTRODUODENOSCOPY);  Surgeon: Fred Hendricks MD;  Location: [facility] ENDO;  Service: Endoscopy;  Laterality: N/A;    EYE SURGERY  2017    Cataracs    FRACTIONAL FLOW RESERVE (FFR), CORONARY  6/20/2023    Procedure: Fractional Flow [place] (FFR), Coronary;  Surgeon: Brice Campuzano MD;  Location: [facility] CATH LAB;  Service: Cardiology;;    HYSTERECTOMY  1969    INSTANTANEOUS WAVE-FREE RATIO (IFR) N/A 6/20/2023    Procedure: Instantaneous Wave-Free Ratio (IFR);  Surgeon: Brice Campuzano MD;  Location: [facility] CATH LAB;  Service: Cardiology;  Laterality: N/A;    LEFT HEART CATHETERIZATION Left 03/03/2020    Procedure: Left heart cath;  Surgeon: Chuy Bryant MD;  Location: [facility] CATH;  Service: Cardiology;  Laterality: Left;    LEFT HEART CATHETERIZATION Left 03/20/2020    Procedure: Left heart cath;  Surgeon: Chuy Bryant MD;  Location: [facility] CATH;  Service: Cardiology;  Laterality: Left;    LEFT HEART CATHETERIZATION N/A 6/20/2023    Procedure: Left heart cath;  Surgeon: Brice Campuzano MD;  Location: [facility] CATH LAB;  Service: Cardiology;  Laterality: N/A;    OVARIAN CYST REMOVAL  teenager    PHACOEMULSIFICATION OF CATARACT Left 11/01/2018    Procedure: PHACOEMULSIFICATION, CATARACT;  Surgeon: Aleksandar Rose Jr., MD;  Location: [facility] OR;  Service: Ophthalmology;  Laterality: Left;    PHACOEMULSIFICATION OF CATARACT Right 12/13/2018     Procedure: PHACOEMULSIFICATION, CATARACT;  Surgeon: Aleksandar Rose Jr., MD;  Location: [facility] OR;  Service: Ophthalmology;  Laterality: Right;    TONSILLECTOMY      aw/denoids    UPPER GASTROINTESTINAL ENDOSCOPY  ~2005    Dr. Solorzano    VALVE STUDY-AORTIC  6/20/2023    Procedure: Valve study-aortic;  Surgeon: Brice Campuzano MD;  Location: [facility] CATH LAB;  Service: Cardiology;;    VASCULAR SURGERY      to remove clot from right leg    Yag Capsulotomy Bilateral 11/05/2019    Dr Rose       Review of patient's allergies indicates:   Allergen Reactions    Timolol maleate Shortness Of Breath    Ciprofloxacin Other (See Comments)     Muscle ache    Sulfamethoxazole-trimethoprim        No current facility-administered medications on file prior to encounter.     Current Outpatient Medications on File Prior to Encounter   Medication Sig    acetaminophen (TYLENOL) 650 MG TbSR Take 650 mg by mouth as needed.     albuterol (PROVENTIL/VENTOLIN HFA) 90 mcg/actuation inhaler Inhale 1-2 puffs into the lungs every 4 (four) hours as needed for Wheezing or Shortness of Breath. Rescue    albuterol-ipratropium (DUO-NEB) 2.5 mg-0.5 mg/3 mL nebulizer solution Take 3 mLs by nebulization every 6 (six) hours as needed for Wheezing or Shortness of Breath.    allopurinoL (ZYLOPRIM) 100 MG tablet Take 2 tablets (200 mg total) by mouth once daily. (Patient taking differently: Take 100 mg by mouth once daily. 1 tablet daily)    aluminum & magnesium hydroxide-simethicone (MYLANTA MAX STRENGTH) 400-400-40 mg/5 mL suspension Take by mouth every 6 (six) hours as needed for Indigestion.    apixaban (ELIQUIS) 2.5 mg Tab Take 1 tablet (2.5 mg total) by mouth 2 (two) times daily.    cetirizine HCl (ZYRTEC ORAL) Take 10 mg by mouth once daily.    cholecalciferol, vitamin D3, 125 mcg (5,000 unit) Tab Take 5,000 Units by mouth once daily.    dorzolamide (TRUSOPT) 2 % ophthalmic solution INSTILL 1 DROP INTO BOTH EYES TWICE DAILY    fluticasone  (FLONASE) 50 mcg/actuation nasal spray 2 sprays (100 mcg total) by Each Nare route daily as needed for Allergies.    fluticasone furoate-vilanteroL (BREO ELLIPTA) 100-25 mcg/dose diskus inhaler Inhale 1 puff into the lungs once daily.    furosemide (LASIX) 40 MG tablet Take 1 tablet (40 mg total) by mouth 2 (two) times a day. Take twice a day and monitor BP and weights    Lactobacillus rhamnosus GG (CULTURELLE) 10 billion cell capsule Take 1 capsule by mouth once daily.    latanoprost 0.005 % ophthalmic solution Place 1 drop into both eyes every evening.    magnesium oxide (MAG-OX) 400 mg tablet Take 800 mg by mouth once daily.     ondansetron (ZOFRAN) 4 MG tablet Take 4 mg by mouth every 6 (six) hours as needed.    potassium chloride (KLOR-CON) 10 MEQ TbSR Take 2 tablets (20 mEq total) by mouth 2 (two) times daily. (Patient taking differently: Take 30 mEq by mouth 2 (two) times daily. Two tabs in the morning one in the evening)    sotaloL (BETAPACE) 80 MG tablet Take 0.5 tablets (40 mg total) by mouth 2 (two) times daily. (Patient taking differently: Take 80 mg by mouth 2 (two) times daily.)    traMADoL (ULTRAM) 50 mg tablet Take 1 tablet (50 mg total) by mouth every 12 (twelve) hours as needed for Pain.    amLODIPine (NORVASC) 2.5 MG tablet TAKE 1 TABLET BY MOUTH EVERY DAY (Patient taking differently: Take 2.5 mg by mouth every evening.)    nitroGLYCERIN 0.4 MG/HR TD PT24 (NITRODUR) 0.4 mg/hr Place 1 patch onto the skin once daily.    ondansetron (ZOFRAN-ODT) 4 MG TbDL Take by mouth.    pantoprazole (PROTONIX) 40 MG tablet Take 1 tablet (40 mg total) by mouth once daily. (Patient taking differently: Take 40 mg by mouth once daily. Patient takes as needed)    rosuvastatin (CRESTOR) 40 MG Tab TAKE 1 TABLET (40 MG TOTAL) BY MOUTH ONCE DAILY. (Patient taking differently: Take 40 mg by mouth every evening.)    [DISCONTINUED] clopidogreL (PLAVIX) 75 mg tablet Take 75 mg by mouth.    [DISCONTINUED] mupirocin (BACTROBAN)  2 % ointment SMARTSI Application Topical 2-3 Times Daily    [DISCONTINUED] omega-3 fatty acids/fish oil (FISH OIL-OMEGA-3 FATTY ACIDS) 300-1,000 mg capsule Take 2 capsules by mouth once daily.     Family History       Problem Relation (Age of Onset)    Alcohol abuse Mother    Cancer Maternal Grandfather    Clotting disorder Son    Diabetes Brother, Mother    Heart disease Brother, Mother, Father    Hypertension Mother, Father    Stroke Mother          Tobacco Use    Smoking status: Former     Current packs/day: 0.00     Types: Cigarettes     Start date: 6/10/1954     Quit date: 9/15/1955     Years since quittin.0    Smokeless tobacco: Never    Tobacco comments:     I onlysmoked one year while [employer] my  was oversees  during [place]   Substance and Sexual Activity    Alcohol use: Not Currently     Comment: Only drank a little wine and haven't  drunk anything in 15 y    Drug use: Never    Sexual activity: Not Currently     Partners: Male     Birth control/protection: Abstinence     Comment:  and 87 years of age     Review of Systems   Constitutional:  Positive for activity change and fatigue.   HENT: Negative.     Eyes: Negative.    Respiratory: Negative.     Cardiovascular: Negative.    Gastrointestinal: Negative.    Endocrine: Negative.    Genitourinary: Negative.    Musculoskeletal: Negative.    Skin: Negative.    Allergic/Immunologic: Negative.    Neurological:  Positive for dizziness, syncope and headaches.   Hematological: Negative.    Psychiatric/Behavioral: Negative.       Objective:     Vital Signs (Most Recent):  Temp: 97.9 °F (36.6 °C) (10/03/23 1653)  Pulse: 70 (10/03/23 1715)  Resp: 18 (10/03/23 1653)  BP: (!) 142/93 (10/03/23 1715)  SpO2: (!) 94 % (10/03/23 1715) Vital Signs (24h Range):  Temp:  [97.8 °F (36.6 °C)-98.2 °F (36.8 °C)] 97.9 °F (36.6 °C)  Pulse:  [68-78] 70  Resp:  [16-28] 18  SpO2:  [93 %-96 %] 94 %  BP: (129-159)/() 142/93     Weight: 93.1 kg (205 lb 4 oz)  Body  mass index is 34.16 kg/m².     Physical Exam  Constitutional:       Appearance: She is ill-appearing.   HENT:      Head: Normocephalic and atraumatic.      Mouth/Throat:      Mouth: Mucous membranes are moist.   Eyes:      Extraocular Movements: Extraocular movements intact.      Conjunctiva/sclera: Conjunctivae normal.      Pupils: Pupils are equal, round, and reactive to light.   Cardiovascular:      Rate and Rhythm: Normal rate. Rhythm irregular.   Pulmonary:      Effort: Pulmonary effort is normal. No respiratory distress.      Breath sounds: No stridor.   Abdominal:      General: Abdomen is flat. There is no distension.      Palpations: There is no mass.      Tenderness: There is no abdominal tenderness. There is no guarding or rebound.      Hernia: No hernia is present.   Musculoskeletal:         General: No swelling or tenderness. Normal range of motion.      Cervical back: Normal range of motion. No rigidity or tenderness.   Skin:     General: Skin is warm.   Neurological:      General: No focal deficit present.      Mental Status: She is alert and oriented to person, place, and time. Mental status is at baseline.      Cranial Nerves: No cranial nerve deficit.      Sensory: No sensory deficit.   Psychiatric:         Mood and Affect: Mood normal.              CRANIAL NERVES     CN III, IV, VI   Pupils are equal, round, and reactive to light.       Significant Labs: All pertinent labs within the past 24 hours have been reviewed.  Recent Lab Results         10/03/23  1756   10/03/23  1127   10/03/23  1104        Albumin   4.3         ALP   105         ALT   37         Anion Gap   13         AST   34         Baso #   0.04         Basophil %   0.4         BILIRUBIN TOTAL   0.5  Comment: For infants and newborns, interpretation of results should be based  on gestational age, weight and in agreement with clinical  observations.    Premature Infant recommended reference ranges:  Up to 24 hours.............<8.0  mg/dL  Up to 48 hours............<12.0 mg/dL  3-5 days..................<15.0 mg/dL  6-29 days.................<15.0 mg/dL           BNP   349  Comment: Values of less than 100 pg/ml are consistent with non-CHF populations.         BUN   27         Calcium   10.0         Chloride   108         CO2   19         Creatinine   1.1         Differential Method   Automated         eGFR   48         Eos #   0.4         Eosinophil %   3.1         Glucose   87         Gran # (ANC)   7.0         Gran %   62.6         Hematocrit   44.4         Hemoglobin   14.2         Immature Grans (Abs)   0.04  Comment: Mild elevation in immature granulocytes is non specific and   can be seen in a variety of conditions including stress response,   acute inflammation, trauma and pregnancy. Correlation with other   laboratory and clinical findings is essential.           Immature Granulocytes   0.4         INR   1.0  Comment: Coumadin Therapy:  2.0 - 3.0 for INR for all indicators except mechanical heart valves  and antiphospholipid syndromes which should use 2.5 - 3.5.           Lymph #   2.6         Lymph %   23.5         MCH   26.3         MCHC   32.0         MCV   82         Mono #   1.1         Mono %   10.0         MPV   10.0         nRBC   0         Platelet Count   353         POCT Glucose     76       Potassium   3.9         PROTEIN TOTAL   8.0         Protime   10.6         RBC   5.40         RDW   17.2         Sodium   140         Troponin I 0.017  Comment: The reference interval for Troponin I represents the 99th percentile   cutoff   for our facility and is consistent with 3rd generation assay   performance.     <0.006  Comment: The reference interval for Troponin I represents the 99th percentile   cutoff   for our facility and is consistent with 3rd generation assay   performance.           WBC   11.15                 Significant Imaging: I have reviewed all pertinent imaging results/findings within the past 24 hours.

## 2023-10-03 NOTE — PHARMACY MED REC
"Admission Medication History     The home medication history was taken by Laila Ramos.    You may go to "Admission" then "Reconcile Home Medications" tabs to review and/or act upon these items.     The home medication list has been updated by the Pharmacy department.   Please read ALL comments highlighted in yellow.   Please address this information as you see fit.    Feel free to contact us if you have any questions or require assistance.      The medications listed below were removed from the home medication list. Please reorder if appropriate:  Patient reports no longer taking the following medication(s):  Bactrim 2%  Fatty acids      Medications listed below were obtained from: Patient/family and [employer]- [employer]  (Not in a hospital admission)      Laila Ramos  [id number]      Current Outpatient Medications on File Prior to Encounter   Medication Sig Dispense Refill Last Dose    acetaminophen (TYLENOL) 650 MG TbSR Take 650 mg by mouth as needed.    Past Week    albuterol (PROVENTIL/VENTOLIN HFA) 90 mcg/actuation inhaler Inhale 1-2 puffs into the lungs every 4 (four) hours as needed for Wheezing or Shortness of Breath. Rescue 18 g 11 Past Week    albuterol-ipratropium (DUO-NEB) 2.5 mg-0.5 mg/3 mL nebulizer solution Take 3 mLs by nebulization every 6 (six) hours as needed for Wheezing or Shortness of Breath. 360 mL 11 Past Week    allopurinoL (ZYLOPRIM) 100 MG tablet Take 2 tablets (200 mg total) by mouth once daily. (Patient taking differently: Take 100 mg by mouth once daily. 1 tablet daily) 180 tablet 2 10/2/2023    aluminum & magnesium hydroxide-simethicone (MYLANTA MAX STRENGTH) 400-400-40 mg/5 mL suspension Take by mouth every 6 (six) hours as needed for Indigestion.   10/2/2023    apixaban (ELIQUIS) 2.5 mg Tab Take 1 tablet (2.5 mg total) by mouth 2 (two) times daily. 180 tablet 3 10/3/2023    cetirizine HCl (ZYRTEC ORAL) Take 10 mg by mouth once daily.   10/3/2023    cholecalciferol, " vitamin D3, 125 mcg (5,000 unit) Tab Take 5,000 Units by mouth once daily.   10/2/2023    dorzolamide (TRUSOPT) 2 % ophthalmic solution INSTILL 1 DROP INTO BOTH EYES TWICE DAILY 30 mL 1 10/2/2023    fluticasone (FLONASE) 50 mcg/actuation nasal spray 2 sprays (100 mcg total) by Each Nare route daily as needed for Allergies. 16 g 11 10/2/2023    fluticasone furoate-vilanteroL (BREO ELLIPTA) 100-25 mcg/dose diskus inhaler Inhale 1 puff into the lungs once daily. 90 each 11 10/2/2023    furosemide (LASIX) 40 MG tablet Take 1 tablet (40 mg total) by mouth 2 (two) times a day. Take twice a day and monitor BP and weights 90 tablet 1 10/3/2023    Lactobacillus rhamnosus GG (CULTURELLE) 10 billion cell capsule Take 1 capsule by mouth once daily.   10/3/2023    latanoprost 0.005 % ophthalmic solution Place 1 drop into both eyes every evening. 5 mL 6 10/2/2023    magnesium oxide (MAG-OX) 400 mg tablet Take 800 mg by mouth once daily.    10/2/2023    ondansetron (ZOFRAN) 4 MG tablet Take 4 mg by mouth every 6 (six) hours as needed.   Past Week    potassium chloride (KLOR-CON) 10 MEQ TbSR Take 2 tablets (20 mEq total) by mouth 2 (two) times daily. (Patient taking differently: Take 30 mEq by mouth 2 (two) times daily. Two tabs in the morning one in the evening) 180 tablet 3 10/2/2023    sotaloL (BETAPACE) 80 MG tablet Take 0.5 tablets (40 mg total) by mouth 2 (two) times daily. 90 tablet 3 10/3/2023    traMADoL (ULTRAM) 50 mg tablet Take 1 tablet (50 mg total) by mouth every 12 (twelve) hours as needed for Pain. 10 tablet 0 Past Week    amLODIPine (NORVASC) 2.5 MG tablet TAKE 1 TABLET BY MOUTH EVERY DAY (Patient taking differently: Take 2.5 mg by mouth every evening.) 30 tablet 10     nitroGLYCERIN 0.4 MG/HR TD PT24 (NITRODUR) 0.4 mg/hr Place 1 patch onto the skin once daily. 90 patch 3 Unknown    ondansetron (ZOFRAN-ODT) 4 MG TbDL Take by mouth.       pantoprazole (PROTONIX) 40 MG tablet Take 1 tablet (40 mg total) by mouth once  daily. (Patient taking differently: Take 40 mg by mouth once daily. Patient takes as needed) 30 tablet 11 Unknown    rosuvastatin (CRESTOR) 40 MG Tab TAKE 1 TABLET (40 MG TOTAL) BY MOUTH ONCE DAILY. (Patient taking differently: Take 40 mg by mouth every evening.) 90 tablet 3                            .

## 2023-10-03 NOTE — ED PROVIDER NOTES
SCRIBE #1 NOTE: I, Dominga Joe, am scribing for, and in the presence of, Waldo Aparicio Jr., MD. I have scribed the entire note.      History      Chief Complaint   Patient presents with    sent by doctor     Sent by Dr. Meyer for persistent A-fib and IV amiodarone. Patient faint and feeling lethargic.       Review of patient's allergies indicates:   Allergen Reactions    Timolol maleate Shortness Of Breath    Ciprofloxacin Other (See Comments)     Muscle ache    Sulfamethoxazole-trimethoprim         HPI   HPI    10/3/2023, 11:32 AM   History obtained from the patient      History of Present Illness: Holli Landrum is a 89 y.o. female patient with a PMHx of asthma, basal cell carcinoma, CHF, COPD, HTN, paroxysmal atrial fibrillation, and paroxysmal ventricular tachycardia who presents to the Emergency Department because she was sent by Dr. Meyer (Cardiology) for admission for IV amiodarone or DCCV for treatment of A-fib. Pt c/o palpitations, generalized weakness, fatigue, light-headedness, syncope, and SOB. Symptoms are constant and moderate in severity. No mitigating or exacerbating factors reported. No other associated sxs reported. Patient denies any CP, fever, chills, diaphoresis, N/V/D, and all other sxs at this time. No prior Tx reported. No further complaints or concerns at this time.         Arrival mode: Personal vehicle      PCP: Marcia Carlisle MD       Past Medical History:  Past Medical History:   Diagnosis Date    Anticoagulant long-term use     Arthritis     Asthma     Basal cell carcinoma     Cancer     skin cancer to face    Cataract     OU done//    CHF (congestive heart failure)     COPD (chronic obstructive pulmonary disease)     no oxygen; patient denies    cpap     Essential hypertension 05/05/2010    GERD (gastroesophageal reflux disease) 11/20/2012    Glaucoma     Hypertensive heart disease with heart failure 02/05/2013    Paroxysmal atrial fibrillation     Paroxysmal ventricular  tachycardia     per problem list    Renal disorder     french-1/3/2020    Squamous cell carcinoma of skin        Past Surgical History:  Past Surgical History:   Procedure Laterality Date    A-V CARDIAC PACEMAKER INSERTION  06/16/2021    Procedure: INSERTION, CARDIAC PACEMAKER, DUAL CHAMBER;  Surgeon: Oscar Sommers MD;  Location: [facility];  Service: Cardiovascular;;    ADENOIDECTOMY  191944    APPENDECTOMY  1948    Because of Ovarian Cyst surgery    BREAST BIOPSY Left 1995    neg    BREAST BIOPSY Right 1984    neg    BREAST BIOPSY Right 1992    neg    BREAST SURGERY      A number of biopsies    CARDIAC CATHETERIZATION  2013, 2014,2016, 2020    has 9 stents    CARDIAC SURGERY  2012    stents    CATARACT EXTRACTION W/  INTRAOCULAR LENS IMPLANT Left 11/01/2018    Dr Rose    CATARACT EXTRACTION W/  INTRAOCULAR LENS IMPLANT Right 12/13/2018    Dr Rose//    CHOLECYSTECTOMY      COLONOSCOPY  ~2005    Dr. Edward; normal per patient report    COLONOSCOPY N/A 09/06/2022    Procedure: COLONOSCOPY 8/31/22-note in Dr Simms's office visit note that he spoke to her cardiologist and she was viable candidate for endoscopy;  Surgeon: Cordelia Bueno MD;  Location: [facility];  Service: Endoscopy;  Laterality: N/A;    CORONARY ANGIOGRAPHY N/A 03/20/2020    Procedure: ANGIOGRAM, CORONARY ARTERY;  Surgeon: Chuy Bryant MD;  Location: [facility];  Service: Cardiology;  Laterality: N/A;    CYSTOSCOPY W/ RETROGRADES Bilateral 02/12/2020    Procedure: CYSTOSCOPY, WITH RETROGRADE PYELOGRAM;  Surgeon: Gasper Feliz MD;  Location: [facility] OR;  Service: Urology;  Laterality: Bilateral;    ESOPHAGOGASTRODUODENOSCOPY N/A 08/13/2020    Procedure: EGD (ESOPHAGOGASTRODUODENOSCOPY);  Surgeon: Mika Solorzano MD;  Location: [facility];  Service: Endoscopy;  Laterality: N/A; Mild Schatzki ring. Biopsied. Dilated. small hiatal hernia, gastritis; biopsy: esophagus- SEVERE REFLUX ESOPHAGITIS, stomach- chronic gastritis, negative for H  pylori    ESOPHAGOGASTRODUODENOSCOPY N/A 10/22/2020    Procedure: EGD (ESOPHAGOGASTRODUODENOSCOPY);  Surgeon: Fred Hendricks MD;  Location: [facility] ENDO;  Service: Endoscopy;  Laterality: N/A;    EYE SURGERY  2017    Cataracs    FRACTIONAL FLOW RESERVE (FFR), CORONARY  6/20/2023    Procedure: Fractional Flow Telephone (FFR), Coronary;  Surgeon: Brice Campuzano MD;  Location: [facility] CATH LAB;  Service: Cardiology;;    HYSTERECTOMY  1969    INSTANTANEOUS WAVE-FREE RATIO (IFR) N/A 6/20/2023    Procedure: Instantaneous Wave-Free Ratio (IFR);  Surgeon: Brice Campuzano MD;  Location: [facility] CATH LAB;  Service: Cardiology;  Laterality: N/A;    LEFT HEART CATHETERIZATION Left 03/03/2020    Procedure: Left heart cath;  Surgeon: Chuy Bryant MD;  Location: [facility] CATH;  Service: Cardiology;  Laterality: Left;    LEFT HEART CATHETERIZATION Left 03/20/2020    Procedure: Left heart cath;  Surgeon: Chyu Bryant MD;  Location: [facility] CATH;  Service: Cardiology;  Laterality: Left;    LEFT HEART CATHETERIZATION N/A 6/20/2023    Procedure: Left heart cath;  Surgeon: Brice Campuzano MD;  Location: [facility] CATH LAB;  Service: Cardiology;  Laterality: N/A;    OVARIAN CYST REMOVAL  teenager    PHACOEMULSIFICATION OF CATARACT Left 11/01/2018    Procedure: PHACOEMULSIFICATION, CATARACT;  Surgeon: Aleksandar Rose Jr., MD;  Location: [facility] OR;  Service: Ophthalmology;  Laterality: Left;    PHACOEMULSIFICATION OF CATARACT Right 12/13/2018    Procedure: PHACOEMULSIFICATION, CATARACT;  Surgeon: Aleksandar Rose Jr., MD;  Location: [facility] OR;  Service: Ophthalmology;  Laterality: Right;    TONSILLECTOMY      aw/denoids    TRANSESOPHAGEAL ECHOCARDIOGRAM WITH POSSIBLE CARDIOVERSION (ALFRED W/ POSS CARDIOVERSION) N/A 10/5/2023    Procedure: Transesophageal echo (ALFRED) intra-procedure log documentation/alfred/cv;  Surgeon: Bao Miguel MD;  Location: [facility] CATH LAB;  Service: Cardiology;  Laterality: N/A;   Alfred/Cv  MRI safe   Pacer & leads  implanted 21, Antoun   Bio Edora 8 MICHELLE, [id number], PID: 64   A lead: Bio Solia S45, [id number]   V lead: Bio Solia S53, [id number]    TREATMENT OF CARDIAC ARRHYTHMIA N/A 10/5/2023    Procedure: Cardioversion or Defibrillation;  Surgeon: Bao Miguel MD;  Location: [facility] CATH LAB;  Service: Cardiology;  Laterality: N/A;    UPPER GASTROINTESTINAL ENDOSCOPY  ~    Dr. Solorzano    VALVE STUDY-AORTIC  2023    Procedure: Valve study-aortic;  Surgeon: Brice Campuzano MD;  Location: [facility] CATH LAB;  Service: Cardiology;;    VASCULAR SURGERY      to remove clot from right leg    Yag Capsulotomy Bilateral 2019    Dr Rose         Family History:  Family History   Problem Relation Age of Onset    Diabetes Brother         Type 2    Heart disease Brother          at 65 CHD    Diabetes Mother         Type 1    Alcohol abuse Mother         Dod 10/75    Heart disease Mother         Arteriosclerosis    Hypertension Mother     Stroke Mother         3/15/1975 dod 10/1975    Cancer Maternal Grandfather         Dod     Clotting disorder Son         bleeding after tonsillectomy only    Heart disease Father         Heart attack. Afib, and Polyvcithemavera    Hypertension Father     Amblyopia Neg Hx     Blindness Neg Hx     Cataracts Neg Hx     Glaucoma Neg Hx     Macular degeneration Neg Hx     Retinal detachment Neg Hx     Strabismus Neg Hx     Thyroid disease Neg Hx     Colon cancer Neg Hx        Social History:  Social History     Tobacco Use    Smoking status: Former     Current packs/day: 0.00     Types: Cigarettes     Start date: 6/10/1954     Quit date: 9/15/1955     Years since quittin.1    Smokeless tobacco: Never    Tobacco comments:     I onlysmoked one year while [employer] my  was oversees  during [place]   Substance and Sexual Activity    Alcohol use: Not Currently     Comment: Only drank a little wine and haven't  drunk anything in 15 y    Drug use: Never    Sexual activity:  Not Currently     Partners: Male     Birth control/protection: Abstinence     Comment:  and 87 years of age       ROS   Review of Systems   Constitutional:  Positive for fatigue. Negative for chills, diaphoresis and fever.   HENT:  Negative for sore throat.    Respiratory:  Positive for shortness of breath.    Cardiovascular:  Positive for palpitations. Negative for chest pain.   Gastrointestinal:  Negative for diarrhea, nausea and vomiting.   Genitourinary:  Negative for dysuria.   Musculoskeletal:  Negative for back pain.   Skin:  Negative for rash.   Neurological:  Positive for syncope, weakness (generalized) and light-headedness.   Hematological:  Does not bruise/bleed easily.   All other systems reviewed and are negative.      Physical Exam      Initial Vitals [10/03/23 1054]   BP Pulse Resp Temp SpO2   (!) 146/91 71 18 97.8 °F (36.6 °C) 96 %      MAP       --          Physical Exam  Nursing Notes and Vital Signs Reviewed.  Constitutional: Patient is in no acute distress. Well-developed and well-nourished.  Head: Atraumatic. Normocephalic.  Eyes:  EOM intact.  No scleral icterus.  ENT: Mucous membranes are moist.  Nares clear   Neck:  Full ROM. No JVD.  Cardiovascular: Regular rate. Regular rhythm No murmurs, rubs, or gallops. Distal pulses are 2+ and symmetric  Pulmonary/Chest: No respiratory distress. Clear to auscultation bilaterally. No wheezing or rales.  Equal chest wall rise bilaterally  Abdominal: Soft and non-distended.  There is no tenderness.  No rebound, guarding, or rigidity. Good bowel sounds.  Genitourinary: No CVA tenderness.  No suprapubic tenderness  Musculoskeletal: Moves all extremities. No obvious deformities.  5 x 5 strength in all extremities   Skin: Warm and dry.  Neurological:  Alert, awake, and appropriate.  Normal speech.  No acute focal neurological deficits are appreciated.  Two through 12 intact bilaterally.  Psychiatric: Normal affect. Good eye contact. Appropriate in  content.      ED Course    Critical Care    Date/Time: 10/24/2023 1:30 AM    Performed by: Waldo Aparicio Jr., MD  Authorized by: Waldo Aparicio Jr., MD  Direct patient critical care time: 12 minutes  Additional history critical care time: 8 minutes  Ordering / reviewing critical care time: 5 minutes  Documentation critical care time: 9 minutes  Consulting other physicians critical care time: 5 minutes  Consult with family critical care time: 5 minutes  Total critical care time (exclusive of procedural time) : 44 minutes  Critical care time was exclusive of separately billable procedures and treating other patients and teaching time.  Critical care was necessary to treat or prevent imminent or life-threatening deterioration of the following conditions: circulatory failure.  Critical care was time spent personally by me on the following activities: development of treatment plan with patient or surrogate, discussions with consultants, interpretation of cardiac output measurements, evaluation of patient's response to treatment, examination of patient, obtaining history from patient or surrogate, ordering and performing treatments and interventions, ordering and review of laboratory studies, ordering and review of radiographic studies, pulse oximetry, re-evaluation of patient's condition and review of old charts.        ED Vital Signs:  Vitals:    10/04/23 0729 10/04/23 0746 10/04/23 1246 10/04/23 1617   BP: (!) 145/84  118/61 135/72   Pulse: 72 63 72 71   Resp: 17 18 16 16   Temp: 97.4 °F (36.3 °C)  97.7 °F (36.5 °C) 97.7 °F (36.5 °C)   TempSrc: Oral  Oral Oral   SpO2: 95% 95% 96% (!) 94%   Weight:       Height:        10/04/23 1930 10/05/23 0131 10/05/23 0511 10/05/23 0801   BP: 119/61 121/78 119/69 134/86   Pulse: 75 82 73 75   Resp: 15 15 14 18   Temp: 98.3 °F (36.8 °C) 97.9 °F (36.6 °C) 97.9 °F (36.6 °C) 97.5 °F (36.4 °C)   TempSrc:  Oral Oral    SpO2: 95% 98% 96% 96%   Weight:       Height:        10/05/23  "1033 10/05/23 1040 10/05/23 1050 10/05/23 1100   BP: (!) 165/86 (!) 141/83 (!) 142/82 (!) 140/81   Pulse: 66 66 73 68   Resp: 18 16 17 18   Temp: 97.7 °F (36.5 °C) 97.5 °F (36.4 °C)     TempSrc:  Temporal     SpO2: 98% 98% 98% 98%   Weight:       Height:        10/05/23 1110 10/05/23 1120 10/05/23 1150   BP: 134/77 134/77 (!) 146/80   Pulse: 62 62 88   Resp: 17 16 18   Temp:   98.1 °F (36.7 °C)   TempSrc:      SpO2: 98% 98% 98%   Weight:  93 kg (205 lb)    Height:  5' 5" (1.651 m)        Abnormal Lab Results:  Labs Reviewed   CBC W/ AUTO DIFFERENTIAL - Abnormal; Notable for the following components:       Result Value    MCH 26.3 (*)     RDW 17.2 (*)     Mono # 1.1 (*)     All other components within normal limits   COMPREHENSIVE METABOLIC PANEL - Abnormal; Notable for the following components:    CO2 19 (*)     BUN 27 (*)     eGFR 48 (*)     All other components within normal limits   B-TYPE NATRIURETIC PEPTIDE - Abnormal; Notable for the following components:     (*)     All other components within normal limits   TROPONIN I   PROTIME-INR   POCT GLUCOSE        All Lab Results:  Results for orders placed or performed during the hospital encounter of 10/03/23   CBC auto differential   Result Value Ref Range    WBC 11.15 3.90 - 12.70 K/uL    RBC 5.40 4.00 - 5.40 M/uL    Hemoglobin 14.2 12.0 - 16.0 g/dL    Hematocrit 44.4 37.0 - 48.5 %    MCV 82 82 - 98 fL    MCH 26.3 (L) 27.0 - 31.0 pg    MCHC 32.0 32.0 - 36.0 g/dL    RDW 17.2 (H) 11.5 - 14.5 %    Platelets 353 150 - 450 K/uL    MPV 10.0 9.2 - 12.9 fL    Immature Granulocytes 0.4 0.0 - 0.5 %    Gran # (ANC) 7.0 1.8 - 7.7 K/uL    Immature Grans (Abs) 0.04 0.00 - 0.04 K/uL    Lymph # 2.6 1.0 - 4.8 K/uL    Mono # 1.1 (H) 0.3 - 1.0 K/uL    Eos # 0.4 0.0 - 0.5 K/uL    Baso # 0.04 0.00 - 0.20 K/uL    nRBC 0 0 /100 WBC    Gran % 62.6 38.0 - 73.0 %    Lymph % 23.5 18.0 - 48.0 %    Mono % 10.0 4.0 - 15.0 %    Eosinophil % 3.1 0.0 - 8.0 %    Basophil % 0.4 0.0 - 1.9 % "    Differential Method Automated    Comprehensive metabolic panel   Result Value Ref Range    Sodium 140 136 - 145 mmol/L    Potassium 3.9 3.5 - 5.1 mmol/L    Chloride 108 95 - 110 mmol/L    CO2 19 (L) 23 - 29 mmol/L    Glucose 87 70 - 110 mg/dL    BUN 27 (H) 8 - 23 mg/dL    Creatinine 1.1 0.5 - 1.4 mg/dL    Calcium 10.0 8.7 - 10.5 mg/dL    Total Protein 8.0 6.0 - 8.4 g/dL    Albumin 4.3 3.5 - 5.2 g/dL    Total Bilirubin 0.5 0.1 - 1.0 mg/dL    Alkaline Phosphatase 105 55 - 135 U/L    AST 34 10 - 40 U/L    ALT 37 10 - 44 U/L    eGFR 48 (A) >60 mL/min/1.73 m^2    Anion Gap 13 8 - 16 mmol/L   Brain natriuretic peptide   Result Value Ref Range     (H) 0 - 99 pg/mL   Troponin I   Result Value Ref Range    Troponin I <0.006 0.000 - 0.026 ng/mL   Protime-INR   Result Value Ref Range    Prothrombin Time 10.6 9.0 - 12.5 sec    INR 1.0 0.8 - 1.2   Troponin I   Result Value Ref Range    Troponin I 0.017 0.000 - 0.026 ng/mL   Basic metabolic panel   Result Value Ref Range    Sodium 140 136 - 145 mmol/L    Potassium 3.4 (L) 3.5 - 5.1 mmol/L    Chloride 108 95 - 110 mmol/L    CO2 19 (L) 23 - 29 mmol/L    Glucose 103 70 - 110 mg/dL    BUN 25 (H) 8 - 23 mg/dL    Creatinine 1.1 0.5 - 1.4 mg/dL    Calcium 9.5 8.7 - 10.5 mg/dL    Anion Gap 13 8 - 16 mmol/L    eGFR 48 (A) >60 mL/min/1.73 m^2   Magnesium   Result Value Ref Range    Magnesium 2.2 1.6 - 2.6 mg/dL   CBC auto differential   Result Value Ref Range    WBC 9.91 3.90 - 12.70 K/uL    RBC 4.80 4.00 - 5.40 M/uL    Hemoglobin 12.6 12.0 - 16.0 g/dL    Hematocrit 38.8 37.0 - 48.5 %    MCV 81 (L) 82 - 98 fL    MCH 26.3 (L) 27.0 - 31.0 pg    MCHC 32.5 32.0 - 36.0 g/dL    RDW 16.7 (H) 11.5 - 14.5 %    Platelets 335 150 - 450 K/uL    MPV 10.3 9.2 - 12.9 fL    Immature Granulocytes 0.2 0.0 - 0.5 %    Gran # (ANC) 5.7 1.8 - 7.7 K/uL    Immature Grans (Abs) 0.02 0.00 - 0.04 K/uL    Lymph # 2.8 1.0 - 4.8 K/uL    Mono # 1.1 (H) 0.3 - 1.0 K/uL    Eos # 0.2 0.0 - 0.5 K/uL    Baso # 0.06  0.00 - 0.20 K/uL    nRBC 0 0 /100 WBC    Gran % 57.2 38.0 - 73.0 %    Lymph % 28.4 18.0 - 48.0 %    Mono % 11.2 4.0 - 15.0 %    Eosinophil % 2.4 0.0 - 8.0 %    Basophil % 0.6 0.0 - 1.9 %    Differential Method Automated    Troponin I   Result Value Ref Range    Troponin I 0.010 0.000 - 0.026 ng/mL   Anti-Xa Heparin Monitoring   Result Value Ref Range    Heparin Anti-Xa 0.98 (H) 0.30 - 0.70 IU/mL   Basic metabolic panel   Result Value Ref Range    Sodium 141 136 - 145 mmol/L    Potassium 3.3 (L) 3.5 - 5.1 mmol/L    Chloride 111 (H) 95 - 110 mmol/L    CO2 19 (L) 23 - 29 mmol/L    Glucose 106 70 - 110 mg/dL    BUN 28 (H) 8 - 23 mg/dL    Creatinine 1.0 0.5 - 1.4 mg/dL    Calcium 9.3 8.7 - 10.5 mg/dL    Anion Gap 11 8 - 16 mmol/L    eGFR 54 (A) >60 mL/min/1.73 m^2   CBC auto differential   Result Value Ref Range    WBC 9.24 3.90 - 12.70 K/uL    RBC 4.96 4.00 - 5.40 M/uL    Hemoglobin 13.2 12.0 - 16.0 g/dL    Hematocrit 40.6 37.0 - 48.5 %    MCV 82 82 - 98 fL    MCH 26.6 (L) 27.0 - 31.0 pg    MCHC 32.5 32.0 - 36.0 g/dL    RDW 17.1 (H) 11.5 - 14.5 %    Platelets 328 150 - 450 K/uL    MPV 10.1 9.2 - 12.9 fL    Immature Granulocytes 0.2 0.0 - 0.5 %    Gran # (ANC) 5.4 1.8 - 7.7 K/uL    Immature Grans (Abs) 0.02 0.00 - 0.04 K/uL    Lymph # 2.5 1.0 - 4.8 K/uL    Mono # 1.0 0.3 - 1.0 K/uL    Eos # 0.2 0.0 - 0.5 K/uL    Baso # 0.05 0.00 - 0.20 K/uL    nRBC 0 0 /100 WBC    Gran % 58.8 38.0 - 73.0 %    Lymph % 27.5 18.0 - 48.0 %    Mono % 10.7 4.0 - 15.0 %    Eosinophil % 2.3 0.0 - 8.0 %    Basophil % 0.5 0.0 - 1.9 %    Differential Method Automated    Transesophageal echo (JÚNIOR)   Result Value Ref Range    BSA 2.06 m2    Est. RA pres 3 mmHg   POCT glucose   Result Value Ref Range    POCT Glucose 76 70 - 110 mg/dL     *Note: Due to a large number of results and/or encounters for the requested time period, some results have not been displayed. A complete set of results can be found in Results Review.         Imaging  Results:  Imaging Results    None        The EKG was ordered, reviewed, and independently interpreted by the ED provider.  Interpretation time: 11:36  Rate: 69 BPM  Rhythm: Ventricular-paced rhythm  Interpretation: No acute ST changes. No STEMI.           The Emergency Provider reviewed the vital signs and test results, which are outlined above.    ED Discussion     12:16 PM: Discussed pt's case with Dr. Meyer (Cardiology) who states that the pt may need diuresis.    12:22 PM: Discussed pt's case with Dr. Turcios (Cardiology) who will see the pt as a consult    12:23 PM: Discussed case with Dr. Acosta ([facility] Medicine). Dr. Acosta agrees with current care and management of pt and accepts admission.   Admitting Service: [facility] Medicine   Admitting Physician: Dr. Acosta   Admit to: Obs    12:32 PM: Re-evaluated pt. I have discussed test results, shared treatment plan, and the need for admission with patient and family at bedside. Pt and family express understanding at this time and agree with all information. All questions answered. Pt and family have no further questions or concerns at this time. Pt is ready for admit.           ED Medication(s):  Medications   amiodarone 360 mg/200 mL (1.8 mg/mL) infusion (0 mg/min Intravenous Stopped 10/3/23 1700)   amiodarone in dextrose 150 mg/100 mL (1.5 mg/mL) loading dose 150 mg (0 mg Intravenous Stopped 10/3/23 1212)   furosemide injection 40 mg (40 mg Intravenous Given 10/3/23 1651)   potassium bicarbonate disintegrating tablet 25 mEq (25 mEq Oral Given 10/4/23 1353)   potassium bicarbonate disintegrating tablet 25 mEq (25 mEq Oral Given 10/5/23 0911)        Follow-up Information       Fletcher Meyer MD Follow up in 2 week(s).    Specialties: Cardiology, Internal Medicine  Contact information:  [address]  [place] [address] [phone]               Marcia Carlisle MD Follow up in 2 week(s).    Specialty: Family Medicine  Contact information:  [id number]  [facility] [address]  [phone]               [facility]. Call in 3 day(s).    Specialties: Home Health Services, Home Therapy Services, Home Living Aide Services  Why: home health  [facility] you upon discharge. If no one has reached out within 3 days of discharge, please give them a call.  Contact information:  [facility] [address] [address] [phone]                           Discharge Medication List as of 10/5/2023  1:54 PM        START taking these medications    Details   amiodarone (PACERONE) 200 MG Tab Multiple Dosages:Starting Thu 10/5/2023, Until Wed 10/18/2023 at 2359, THEN Starting Thu 10/19/2023, Until Fri 11/17/2023 at 2359Take 1 tablet (200 mg total) by mouth 2 (two) times daily for 14 days, THEN 1 tablet (200 mg total) once daily., Normal               Medical Decision Making    Medical Decision Making  Differential diagnosis, AFib, a flutter, paroxysmal AFib, NSTEMI, STEMI    Patient referred for Cardiology for admission on amiodarone.  Patient is having worsening paroxysmal pain workup here is benign.  Started the patient being admitted to Hospital Medicine with Cardiology consult.      Amount and/or Complexity of Data Reviewed  Labs: ordered. Decision-making details documented in ED Course.     Details: BNP is 349 troponin is undetectable, electrolytes are benign.  INR is 1.0.  CBC shows normal hemoglobin hematocrit.  Glucose 76    ECG/medicine tests: ordered and independent interpretation performed. Decision-making details documented in ED Course.     Details: Paste.  No STEMI  Discussion of management or test interpretation with external provider(s): Referred from Cardiology.  Hospital Medicine has graciously accepted    Risk  Prescription drug management.  Drug therapy requiring intensive monitoring for toxicity.  Decision regarding hospitalization.    Critical Care  Total time  providing critical care: 44 minutes                Scribe Attestation:   Scribe #1: I performed the above scribed service and the documentation accurately describes the services I performed. I attest to the accuracy of the note.    Attending:   Physician Attestation Statement for Scribe #1: I, Waldo Aparicio Jr., MD, personally performed the services described in this documentation, as scribed by Dominga Joe, in my presence, and it is both accurate and complete.          Clinical Impression       ICD-10-CM ICD-9-CM   1. Paroxysmal atrial fibrillation  I48.0 427.31   2. Chest pain  R07.9 786.50   3. PAF (paroxysmal atrial fibrillation)  I48.0 427.31   4. A-fib  I48.91 427.31       Disposition:   Disposition: Placed in Observation  Condition: Fair         Waldo Aparicio Jr., MD  10/03/23 1257       Waldo Aparicio Jr., MD  10/24/23 0131

## 2023-10-03 NOTE — HPI
89 y/o. female patient with a PMHx of  persistent atrial fibrillation, coronary artery disease status post PCI, sick sinus syndrome status post pacemaker implantation Biotronik, hypertrophic obstructive cardiomyopathy, diastolic congestive heart failure, aortic stenosis, history of GI bleeding, asthma, HTN, COPD, PAF (low dose eliquis due to anemia), PVT who presented to [facility] from cards clinic with fatigue, HA dizziness and near-syncope.Pt c/o palpitations, generalized weakness, fatigue, light-headedness, syncope, and SOB. Symptoms are constant and moderate in severity. No mitigating or exacerbating factors reported. No other associated sxs reported. Patient denies any CP, fever, chills, diaphoresis, N/V/D, and all other sxs at this time . She report the this symptoms are more frequent over the last weeks .   ER COURSE:  . Started on amiodarone drip . S/P lasix 40 mg IV . Cardiology and EP consulted   Pt will be admitted to observation with a dx of Afib

## 2023-10-03 NOTE — HPI
89 y.o. female patient with a PMHx of  persistent atrial fibrillation, coronary artery disease status post PCI, sick sinus syndrome status post pacemaker implantation Biotronik, hypertrophic obstructive cardiomyopathy, diastolic congestive heart failure, aortic stenosis, history of GI bleeding, asthma, HTN, COPD, PAF (low dose eliquis due to anemia), PVT who presented to [facility]- from cards clinic with fatigue, HA dizziness and near-syncope. Cardiology consulted to assist with management pt seen and examined today, reports extreme HA and int palpitations.     Echo in May with mod-sev AS (Per Dr. Guadarrama-  TAVR would carry an extremely high risk of endocarditis. At this point her symptoms are mild and mostly worsened by anemia. Will focus for now on controlling her bleeding rather than valve replacement. Should she develop heart failure refractory to medical Rx, balloon valvuloplasty can be considered). Pt had C in June for work up with nonobstructive disease revealed.

## 2023-10-03 NOTE — Clinical Note
Diagnosis: Chest pain [429667]   Future Attending Provider: AMAN CORTEZ [[id number]]   Admitting Provider:: AMAN CORTEZ [[id number]]

## 2023-10-03 NOTE — SUBJECTIVE & OBJECTIVE
Past Medical History:   Diagnosis Date    Anticoagulant long-term use     Arthritis     Asthma     Basal cell carcinoma     Cancer     skin cancer to face    Cataract     OU done//    CHF (congestive heart failure)     COPD (chronic obstructive pulmonary disease)     no oxygen; patient denies    cpap     Essential hypertension 05/05/2010    GERD (gastroesophageal reflux disease) 11/20/2012    Glaucoma     Hypertensive heart disease with heart failure 02/05/2013    Paroxysmal atrial fibrillation     Paroxysmal ventricular tachycardia     per problem list    Renal disorder     french-1/3/2020    Squamous cell carcinoma of skin        Past Surgical History:   Procedure Laterality Date    A-V CARDIAC PACEMAKER INSERTION  06/16/2021    Procedure: INSERTION, CARDIAC PACEMAKER, DUAL CHAMBER;  Surgeon: Oscar Sommers MD;  Location: [facility] CATH;  Service: Cardiovascular;;    ADENOIDECTOMY  [id number]    APPENDECTOMY  1948    Because of Ovarian Cyst surgery    BREAST BIOPSY Left 1995    neg    BREAST BIOPSY Right 1984    neg    BREAST BIOPSY Right 1992    neg    BREAST SURGERY      A number of biopsies    CARDIAC CATHETERIZATION  2013, 2014,2016, 2020    has 9 stents    CARDIAC SURGERY  2012    stents    CATARACT EXTRACTION W/  INTRAOCULAR LENS IMPLANT Left 11/01/2018    Dr Rose    CATARACT EXTRACTION W/  INTRAOCULAR LENS IMPLANT Right 12/13/2018    Dr Rose//    CHOLECYSTECTOMY      COLONOSCOPY  ~2005    Dr. Edward; normal per patient report    COLONOSCOPY N/A 09/06/2022    Procedure: COLONOSCOPY 8/31/22-note in Dr Simms's office visit note that he spoke to her cardiologist and she was viable candidate for endoscopy;  Surgeon: Cordelia Bueno MD;  Location: [facility];  Service: Endoscopy;  Laterality: N/A;    CORONARY ANGIOGRAPHY N/A 03/20/2020    Procedure: ANGIOGRAM, CORONARY ARTERY;  Surgeon: Chuy Bryant MD;  Location: [facility] CATH;  Service: Cardiology;  Laterality: N/A;    CYSTOSCOPY W/ RETROGRADES Bilateral  02/12/2020    Procedure: CYSTOSCOPY, WITH RETROGRADE PYELOGRAM;  Surgeon: Gasper Feliz MD;  Location: [facility] OR;  Service: Urology;  Laterality: Bilateral;    ESOPHAGOGASTRODUODENOSCOPY N/A 08/13/2020    Procedure: EGD (ESOPHAGOGASTRODUODENOSCOPY);  Surgeon: Mika Solorzano MD;  Location: [facility] ENDO;  Service: Endoscopy;  Laterality: N/A; Mild Schatzki ring. Biopsied. Dilated. small hiatal hernia, gastritis; biopsy: esophagus- SEVERE REFLUX ESOPHAGITIS, stomach- chronic gastritis, negative for H pylori    ESOPHAGOGASTRODUODENOSCOPY N/A 10/22/2020    Procedure: EGD (ESOPHAGOGASTRODUODENOSCOPY);  Surgeon: Fred Hendricks MD;  Location: [facility] ENDO;  Service: Endoscopy;  Laterality: N/A;    EYE SURGERY  2017    Cataracs    FRACTIONAL FLOW RESERVE (FFR), CORONARY  6/20/2023    Procedure: Fractional Flow [place] (FFR), Coronary;  Surgeon: Brice Campuzano MD;  Location: [facility] CATH LAB;  Service: Cardiology;;    HYSTERECTOMY  1969    INSTANTANEOUS WAVE-FREE RATIO (IFR) N/A 6/20/2023    Procedure: Instantaneous Wave-Free Ratio (IFR);  Surgeon: Brice Campuzano MD;  Location: [facility] CATH LAB;  Service: Cardiology;  Laterality: N/A;    LEFT HEART CATHETERIZATION Left 03/03/2020    Procedure: Left heart cath;  Surgeon: Chuy Bryant MD;  Location: [facility] CATH;  Service: Cardiology;  Laterality: Left;    LEFT HEART CATHETERIZATION Left 03/20/2020    Procedure: Left heart cath;  Surgeon: Chuy Bryant MD;  Location: [facility] CATH;  Service: Cardiology;  Laterality: Left;    LEFT HEART CATHETERIZATION N/A 6/20/2023    Procedure: Left heart cath;  Surgeon: Brice Campuzano MD;  Location: [facility] CATH LAB;  Service: Cardiology;  Laterality: N/A;    OVARIAN CYST REMOVAL  teenager    PHACOEMULSIFICATION OF CATARACT Left 11/01/2018    Procedure: PHACOEMULSIFICATION, CATARACT;  Surgeon: Aleksandar Rose Jr., MD;  Location: [facility] OR;  Service: Ophthalmology;  Laterality: Left;    PHACOEMULSIFICATION OF CATARACT Right 12/13/2018     Procedure: PHACOEMULSIFICATION, CATARACT;  Surgeon: Aleksandar Rose Jr., MD;  Location: [facility] OR;  Service: Ophthalmology;  Laterality: Right;    TONSILLECTOMY      aw/denoids    UPPER GASTROINTESTINAL ENDOSCOPY  ~2005    Dr. Solorzano    VALVE STUDY-AORTIC  6/20/2023    Procedure: Valve study-aortic;  Surgeon: Brice Campuzano MD;  Location: [facility] CATH LAB;  Service: Cardiology;;    VASCULAR SURGERY      to remove clot from right leg    Yag Capsulotomy Bilateral 11/05/2019    Dr Rose       Review of patient's allergies indicates:   Allergen Reactions    Timolol maleate Shortness Of Breath    Ciprofloxacin Other (See Comments)     Muscle ache    Sulfamethoxazole-trimethoprim        No current facility-administered medications on file prior to encounter.     Current Outpatient Medications on File Prior to Encounter   Medication Sig    acetaminophen (TYLENOL) 650 MG TbSR Take 650 mg by mouth as needed.     albuterol (PROVENTIL/VENTOLIN HFA) 90 mcg/actuation inhaler Inhale 1-2 puffs into the lungs every 4 (four) hours as needed for Wheezing or Shortness of Breath. Rescue    albuterol-ipratropium (DUO-NEB) 2.5 mg-0.5 mg/3 mL nebulizer solution Take 3 mLs by nebulization every 6 (six) hours as needed for Wheezing or Shortness of Breath.    allopurinoL (ZYLOPRIM) 100 MG tablet Take 2 tablets (200 mg total) by mouth once daily. (Patient taking differently: Take 100 mg by mouth once daily. 1 tablet daily)    aluminum & magnesium hydroxide-simethicone (MYLANTA MAX STRENGTH) 400-400-40 mg/5 mL suspension Take by mouth every 6 (six) hours as needed for Indigestion.    apixaban (ELIQUIS) 2.5 mg Tab Take 1 tablet (2.5 mg total) by mouth 2 (two) times daily.    cetirizine HCl (ZYRTEC ORAL) Take 10 mg by mouth once daily.    cholecalciferol, vitamin D3, 125 mcg (5,000 unit) Tab Take 5,000 Units by mouth once daily.    dorzolamide (TRUSOPT) 2 % ophthalmic solution INSTILL 1 DROP INTO BOTH EYES TWICE DAILY    fluticasone  (FLONASE) 50 mcg/actuation nasal spray 2 sprays (100 mcg total) by Each Nare route daily as needed for Allergies.    fluticasone furoate-vilanteroL (BREO ELLIPTA) 100-25 mcg/dose diskus inhaler Inhale 1 puff into the lungs once daily.    furosemide (LASIX) 40 MG tablet Take 1 tablet (40 mg total) by mouth 2 (two) times a day. Take twice a day and monitor BP and weights    Lactobacillus rhamnosus GG (CULTURELLE) 10 billion cell capsule Take 1 capsule by mouth once daily.    latanoprost 0.005 % ophthalmic solution Place 1 drop into both eyes every evening.    magnesium oxide (MAG-OX) 400 mg tablet Take 800 mg by mouth once daily.     ondansetron (ZOFRAN) 4 MG tablet Take 4 mg by mouth every 6 (six) hours as needed.    potassium chloride (KLOR-CON) 10 MEQ TbSR Take 2 tablets (20 mEq total) by mouth 2 (two) times daily. (Patient taking differently: Take 30 mEq by mouth 2 (two) times daily. Two tabs in the morning one in the evening)    sotaloL (BETAPACE) 80 MG tablet Take 0.5 tablets (40 mg total) by mouth 2 (two) times daily. (Patient taking differently: Take 80 mg by mouth 2 (two) times daily.)    traMADoL (ULTRAM) 50 mg tablet Take 1 tablet (50 mg total) by mouth every 12 (twelve) hours as needed for Pain.    amLODIPine (NORVASC) 2.5 MG tablet TAKE 1 TABLET BY MOUTH EVERY DAY (Patient taking differently: Take 2.5 mg by mouth every evening.)    nitroGLYCERIN 0.4 MG/HR TD PT24 (NITRODUR) 0.4 mg/hr Place 1 patch onto the skin once daily.    ondansetron (ZOFRAN-ODT) 4 MG TbDL Take by mouth.    pantoprazole (PROTONIX) 40 MG tablet Take 1 tablet (40 mg total) by mouth once daily. (Patient taking differently: Take 40 mg by mouth once daily. Patient takes as needed)    rosuvastatin (CRESTOR) 40 MG Tab TAKE 1 TABLET (40 MG TOTAL) BY MOUTH ONCE DAILY. (Patient taking differently: Take 40 mg by mouth every evening.)    [DISCONTINUED] clopidogreL (PLAVIX) 75 mg tablet Take 75 mg by mouth.    [DISCONTINUED] mupirocin (BACTROBAN)  2 % ointment SMARTSI Application Topical 2-3 Times Daily    [DISCONTINUED] omega-3 fatty acids/fish oil (FISH OIL-OMEGA-3 FATTY ACIDS) 300-1,000 mg capsule Take 2 capsules by mouth once daily.     Family History       Problem Relation (Age of Onset)    Alcohol abuse Mother    Cancer Maternal Grandfather    Clotting disorder Son    Diabetes Brother, Mother    Heart disease Brother, Mother, Father    Hypertension Mother, Father    Stroke Mother          Tobacco Use    Smoking status: Former     Current packs/day: 0.00     Types: Cigarettes     Start date: 6/10/1954     Quit date: 9/15/1955     Years since quittin.0    Smokeless tobacco: Never    Tobacco comments:     I onlysmoked one year while ml my  was oversees  during [place]   Substance and Sexual Activity    Alcohol use: Not Currently     Comment: Only drank a little wine and haven't  drunk anything in 15 y    Drug use: Never    Sexual activity: Not Currently     Partners: Male     Birth control/protection: Abstinence     Comment:  and 87 years of age     Review of Systems   Constitutional: Positive for malaise/fatigue.   HENT: Negative.     Eyes: Negative.    Cardiovascular:  Positive for palpitations.   Respiratory: Negative.     Skin: Negative.    Musculoskeletal: Negative.    Gastrointestinal: Negative.    Genitourinary: Negative.    Neurological:  Positive for headaches and weakness.   Psychiatric/Behavioral: Negative.       Objective:     Vital Signs (Most Recent):  Temp: 98.2 °F (36.8 °C) (10/03/23 1435)  Pulse: 78 (10/03/23 1435)  Resp: 18 (10/03/23 1435)  BP: 132/78 (10/03/23 1435)  SpO2: (!) 94 % (10/03/23 143) Vital Signs (24h Range):  Temp:  [97.8 °F (36.6 °C)-98.2 °F (36.8 °C)] 98.2 °F (36.8 °C)  Pulse:  [68-78] 78  Resp:  [16-28] 18  SpO2:  [94 %-96 %] 94 %  BP: (129-159)/() 132/78     Weight: 93.1 kg (205 lb 4 oz)  Body mass index is 34.16 kg/m².    SpO2: (!) 94 %         Intake/Output Summary (Last 24 hours) at  10/3/2023 1501  Last data filed at 10/3/2023 1400  Gross per 24 hour   Intake 90.37 ml   Output 600 ml   Net -509.63 ml       Lines/Drains/Airways       Drain  Duration                  Suprapubic Catheter 06/20/23 1126 18 Fr. 105 days              Peripheral Intravenous Line  Duration                  Peripheral IV - Single Lumen 10/03/23 1202 20 G Anterior;Left;Proximal Forearm <1 day                     Physical Exam  Vitals and nursing note reviewed.   Constitutional:       Appearance: Normal appearance.   HENT:      Head: Normocephalic.   Eyes:      Pupils: Pupils are equal, round, and reactive to light.   Cardiovascular:      Rate and Rhythm: Normal rate. Rhythm irregular.      Heart sounds: Normal heart sounds, S1 normal and S2 normal. No murmur heard.     No S3 or S4 sounds.   Pulmonary:      Effort: Pulmonary effort is normal.      Breath sounds: Normal breath sounds.   Abdominal:      General: Bowel sounds are normal.      Palpations: Abdomen is soft.   Musculoskeletal:         General: Normal range of motion.      Cervical back: Normal range of motion.   Skin:     Capillary Refill: Capillary refill takes less than 2 seconds.   Neurological:      General: No focal deficit present.      Mental Status: She is alert and oriented to person, place, and time.      Motor: Weakness present.   Psychiatric:         Mood and Affect: Mood normal.         Behavior: Behavior normal.         Thought Content: Thought content normal.          Significant Labs: BMP:   Recent Labs   Lab 10/03/23  1127   GLU 87      K 3.9      CO2 19*   BUN 27*   CREATININE 1.1   CALCIUM 10.0   , CMP   Recent Labs   Lab 10/03/23  1127      K 3.9      CO2 19*   GLU 87   BUN 27*   CREATININE 1.1   CALCIUM 10.0   PROT 8.0   ALBUMIN 4.3   BILITOT 0.5   ALKPHOS 105   AST 34   ALT 37   ANIONGAP 13   , CBC   Recent Labs   Lab 10/03/23  1127   WBC 11.15   HGB 14.2   HCT 44.4      , INR   Recent Labs   Lab 10/03/23  1127  "  INR 1.0   , Lipid Panel No results for input(s): "CHOL", "HDL", "LDLCALC", "TRIG", "CHOLHDL" in the last 48 hours., Troponin   Recent Labs   Lab 10/03/23  1127   TROPONINI <0.006   , and All pertinent lab results from the last 24 hours have been reviewed.    Significant Imaging: Echocardiogram: Transthoracic echo (TTE) complete (Cupid Only):   Results for orders placed or performed during the hospital encounter of 05/17/23   Echo   Result Value Ref Range    BSA 2.03 m2    TDI SEPTAL 0.06 m/s    LV LATERAL E/E' RATIO 9.00 m/s    LV SEPTAL E/E' RATIO 10.50 m/s    LA WIDTH 3.70 cm    IVC diameter 1.09 cm    Left Ventricular Outflow Tract Mean Velocity 0.68 cm/s    Left Ventricular Outflow Tract Mean Gradient 2.06 mmHg    TDI LATERAL 0.07 m/s    PV PEAK VELOCITY 0.83 cm/s    LVIDd 3.82 3.5 - 6.0 cm    IVS 1.32 (A) 0.6 - 1.1 cm    Posterior Wall 1.40 (A) 0.6 - 1.1 cm    Ao root annulus 2.92 cm    LVIDs 2.31 2.1 - 4.0 cm    FS 40 28 - 44 %    LA volume 84.19 cm3    Sinus 2.86 cm    STJ 2.69 cm    LV mass 186.95 g    LA size 4.43 cm    RVDD 3.21 cm    Left Ventricle Relative Wall Thickness 0.73 cm    AV mean gradient 21 mmHg    AV valve area 0.80 cm2    AV Velocity Ratio 0.31     AV index (prosthetic) 0.32     E/A ratio 0.94     Mean e' 0.07 m/s    E wave deceleration time 397.60 msec    IVRT 102.76 msec    LVOT diameter 1.78 cm    LVOT area 2.5 cm2    LVOT peak tc 0.88 m/s    LVOT peak VTI 18.90 cm    Ao peak tc 2.85 m/s    Ao VTI 59.1 cm    RVOT peak tc 0.66 m/s    RVOT peak VTI 13.3 cm    LVOT stroke volume 47.01 cm3    AV peak gradient 32 mmHg    PV mean gradient 0.88 mmHg    E/E' ratio 9.69 m/s    MV Peak E Tc 0.63 m/s    TR Max Tc 2.51 m/s    MV Peak A Tc 0.67 m/s    LV Systolic Volume 18.36 mL    LV Systolic Volume Index 9.3 mL/m2    LV Diastolic Volume 62.54 mL    LV Diastolic Volume Index 31.75 mL/m2    LA Volume Index 42.7 mL/m2    LV Mass Index 95 g/m2    RA Major Axis 4.59 cm    Left Atrium Minor Axis " 5.70 cm    Left Atrium Major Axis 6.43 cm    Triscuspid Valve Regurgitation Peak Gradient 25 mmHg    LA Volume Index (Mod) 22.7 mL/m2    LA volume (mod) 44.76 cm3    RA Width 2.84 cm    Right Atrial Pressure (from IVC) 8 mmHg    EF 65 %    TV resting pulmonary artery pressure 33 mmHg    Narrative    · The left ventricle is normal in size with mild concentric hypertrophy   and normal systolic function.  · Moderate left atrial enlargement.  · The estimated ejection fraction is 65%.  · Indeterminate left ventricular diastolic function.  · Normal right ventricular size with normal right ventricular systolic   function.  · There is moderate-to-severe aortic valve stenosis.  · Aortic valve area is 0.80 cm2; peak velocity is 2.85 m/s; mean gradient   is 21 mmHg.  · Mild tricuspid regurgitation.  · Intermediate central venous pressure (8 mmHg).  · The estimated PA systolic pressure is 33 mmHg.      , EKG: reviewed, Stress Test: reviewed, and X-Ray: CXR: X-Ray Chest 1 View (CXR): No results found for this visit on 10/03/23.

## 2023-10-03 NOTE — H&P
[facility] Medicine  History & Physical    Patient Name: Holli Landrum  MRN: [id number]  Patient Class: OP- Observation  Admission Date: 10/3/2023  Attending Physician: Dominik Montesinos MD  Primary Care Provider: Marcia Carlisle MD         Patient information was obtained from patient and ER records.     Subjective:     Principal Problem:Paroxysmal atrial fibrillation    Chief Complaint:   Chief Complaint   Patient presents with    sent by doctor     Sent by Dr. Meyer for persistent A-fib and IV amiodarone. Patient faint and feeling lethargic.        HPI: 89 y/o. female patient with a PMHx of  persistent atrial fibrillation, coronary artery disease status post PCI, sick sinus syndrome status post pacemaker implantation Biotronik, hypertrophic obstructive cardiomyopathy, diastolic congestive heart failure, aortic stenosis, history of GI bleeding, asthma, HTN, COPD, PAF (low dose eliquis due to anemia), PVT who presented to [facility] from cards clinic with fatigue, HA dizziness and near-syncope.Pt c/o palpitations, generalized weakness, fatigue, light-headedness, syncope, and SOB. Symptoms are constant and moderate in severity. No mitigating or exacerbating factors reported. No other associated sxs reported. Patient denies any CP, fever, chills, diaphoresis, N/V/D, and all other sxs at this time . She report the this symptoms are more frequent over the last weeks .   ER COURSE:  . Started on amiodarone drip . S/P lasix 40 mg IV . Cardiology and EP consulted   Pt will be admitted to observation with a dx of Afib       Past Medical History:   Diagnosis Date    Anticoagulant long-term use     Arthritis     Asthma     Basal cell carcinoma     Cancer     skin cancer to face    Cataract     OU done//    CHF (congestive heart failure)     COPD (chronic obstructive pulmonary disease)     no oxygen; patient denies    cpap     Essential hypertension 05/05/2010    GERD  (gastroesophageal reflux disease) 11/20/2012    Glaucoma     Hypertensive heart disease with heart failure 02/05/2013    Paroxysmal atrial fibrillation     Paroxysmal ventricular tachycardia     per problem list    Renal disorder     french-1/3/2020    Squamous cell carcinoma of skin        Past Surgical History:   Procedure Laterality Date    A-V CARDIAC PACEMAKER INSERTION  06/16/2021    Procedure: INSERTION, CARDIAC PACEMAKER, DUAL CHAMBER;  Surgeon: Oscar Sommers MD;  Location: [facility];  Service: Cardiovascular;;    ADENOIDECTOMY  191944    APPENDECTOMY  1948    Because of Ovarian Cyst surgery    BREAST BIOPSY Left 1995    neg    BREAST BIOPSY Right 1984    neg    BREAST BIOPSY Right 1992    neg    BREAST SURGERY      A number of biopsies    CARDIAC CATHETERIZATION  2013, 2014,2016, 2020    has 9 stents    CARDIAC SURGERY  2012    stents    CATARACT EXTRACTION W/  INTRAOCULAR LENS IMPLANT Left 11/01/2018    Dr Rose    CATARACT EXTRACTION W/  INTRAOCULAR LENS IMPLANT Right 12/13/2018    Dr Rose//    CHOLECYSTECTOMY      COLONOSCOPY  ~2005    Dr. Edward; normal per patient report    COLONOSCOPY N/A 09/06/2022    Procedure: COLONOSCOPY 8/31/22-note in Dr Simms's office visit note that he spoke to her cardiologist and she was viable candidate for endoscopy;  Surgeon: Cordelia Bueno MD;  Location: [facility];  Service: Endoscopy;  Laterality: N/A;    CORONARY ANGIOGRAPHY N/A 03/20/2020    Procedure: ANGIOGRAM, CORONARY ARTERY;  Surgeon: Chuy Bryant MD;  Location: [facility];  Service: Cardiology;  Laterality: N/A;    CYSTOSCOPY W/ RETROGRADES Bilateral 02/12/2020    Procedure: CYSTOSCOPY, WITH RETROGRADE PYELOGRAM;  Surgeon: Gasper Feliz MD;  Location: [facility] OR;  Service: Urology;  Laterality: Bilateral;    ESOPHAGOGASTRODUODENOSCOPY N/A 08/13/2020    Procedure: EGD (ESOPHAGOGASTRODUODENOSCOPY);  Surgeon: Mika Solorzano MD;  Location: [facility];  Service: Endoscopy;   Laterality: N/A; Mild Schatzki ring. Biopsied. Dilated. small hiatal hernia, gastritis; biopsy: esophagus- SEVERE REFLUX ESOPHAGITIS, stomach- chronic gastritis, negative for H pylori    ESOPHAGOGASTRODUODENOSCOPY N/A 10/22/2020    Procedure: EGD (ESOPHAGOGASTRODUODENOSCOPY);  Surgeon: Fred Hendricks MD;  Location: [facility];  Service: Endoscopy;  Laterality: N/A;    EYE SURGERY  2017    Cataracs    FRACTIONAL FLOW RESERVE (FFR), CORONARY  6/20/2023    Procedure: Fractional Flow [place] (FFR), Coronary;  Surgeon: Brice Campuzano MD;  Location: [facility] CATH LAB;  Service: Cardiology;;    HYSTERECTOMY  1969    INSTANTANEOUS WAVE-FREE RATIO (IFR) N/A 6/20/2023    Procedure: Instantaneous Wave-Free Ratio (IFR);  Surgeon: Brice Campuzano MD;  Location: [facility] CATH LAB;  Service: Cardiology;  Laterality: N/A;    LEFT HEART CATHETERIZATION Left 03/03/2020    Procedure: Left heart cath;  Surgeon: Chuy Bryant MD;  Location: [facility] CATH;  Service: Cardiology;  Laterality: Left;    LEFT HEART CATHETERIZATION Left 03/20/2020    Procedure: Left heart cath;  Surgeon: Chuy Bryant MD;  Location: [facility] CATH;  Service: Cardiology;  Laterality: Left;    LEFT HEART CATHETERIZATION N/A 6/20/2023    Procedure: Left heart cath;  Surgeon: Brice Campuzano MD;  Location: [facility] CATH LAB;  Service: Cardiology;  Laterality: N/A;    OVARIAN CYST REMOVAL  teenager    PHACOEMULSIFICATION OF CATARACT Left 11/01/2018    Procedure: PHACOEMULSIFICATION, CATARACT;  Surgeon: Aleksandar Rose Jr., MD;  Location: [facility] OR;  Service: Ophthalmology;  Laterality: Left;    PHACOEMULSIFICATION OF CATARACT Right 12/13/2018    Procedure: PHACOEMULSIFICATION, CATARACT;  Surgeon: Aleksandar Rose Jr., MD;  Location: [facility] OR;  Service: Ophthalmology;  Laterality: Right;    TONSILLECTOMY      aw/denoids    UPPER GASTROINTESTINAL ENDOSCOPY  ~2005    Dr. Solorzano    VALVE STUDY-AORTIC  6/20/2023    Procedure: Valve study-aortic;  Surgeon: Brice JENNINGS  Thierry Campuzano MD;  Location: [facility] CATH LAB;  Service: Cardiology;;    VASCULAR SURGERY      to remove clot from right leg    Yag Capsulotomy Bilateral 11/05/2019    Dr Rose       Review of patient's allergies indicates:   Allergen Reactions    Timolol maleate Shortness Of Breath    Ciprofloxacin Other (See Comments)     Muscle ache    Sulfamethoxazole-trimethoprim        No current facility-administered medications on file prior to encounter.     Current Outpatient Medications on File Prior to Encounter   Medication Sig    acetaminophen (TYLENOL) 650 MG TbSR Take 650 mg by mouth as needed.     albuterol (PROVENTIL/VENTOLIN HFA) 90 mcg/actuation inhaler Inhale 1-2 puffs into the lungs every 4 (four) hours as needed for Wheezing or Shortness of Breath. Rescue    albuterol-ipratropium (DUO-NEB) 2.5 mg-0.5 mg/3 mL nebulizer solution Take 3 mLs by nebulization every 6 (six) hours as needed for Wheezing or Shortness of Breath.    allopurinoL (ZYLOPRIM) 100 MG tablet Take 2 tablets (200 mg total) by mouth once daily. (Patient taking differently: Take 100 mg by mouth once daily. 1 tablet daily)    aluminum & magnesium hydroxide-simethicone (MYLANTA MAX STRENGTH) 400-400-40 mg/5 mL suspension Take by mouth every 6 (six) hours as needed for Indigestion.    apixaban (ELIQUIS) 2.5 mg Tab Take 1 tablet (2.5 mg total) by mouth 2 (two) times daily.    cetirizine HCl (ZYRTEC ORAL) Take 10 mg by mouth once daily.    cholecalciferol, vitamin D3, 125 mcg (5,000 unit) Tab Take 5,000 Units by mouth once daily.    dorzolamide (TRUSOPT) 2 % ophthalmic solution INSTILL 1 DROP INTO BOTH EYES TWICE DAILY    fluticasone (FLONASE) 50 mcg/actuation nasal spray 2 sprays (100 mcg total) by Each Nare route daily as needed for Allergies.    fluticasone furoate-vilanteroL (BREO ELLIPTA) 100-25 mcg/dose diskus inhaler Inhale 1 puff into the lungs once daily.    furosemide (LASIX) 40 MG tablet Take 1 tablet (40 mg total) by mouth 2  (two) times a day. Take twice a day and monitor BP and weights    Lactobacillus rhamnosus GG (CULTURELLE) 10 billion cell capsule Take 1 capsule by mouth once daily.    latanoprost 0.005 % ophthalmic solution Place 1 drop into both eyes every evening.    magnesium oxide (MAG-OX) 400 mg tablet Take 800 mg by mouth once daily.     ondansetron (ZOFRAN) 4 MG tablet Take 4 mg by mouth every 6 (six) hours as needed.    potassium chloride (KLOR-CON) 10 MEQ TbSR Take 2 tablets (20 mEq total) by mouth 2 (two) times daily. (Patient taking differently: Take 30 mEq by mouth 2 (two) times daily. Two tabs in the morning one in the evening)    sotaloL (BETAPACE) 80 MG tablet Take 0.5 tablets (40 mg total) by mouth 2 (two) times daily. (Patient taking differently: Take 80 mg by mouth 2 (two) times daily.)    traMADoL (ULTRAM) 50 mg tablet Take 1 tablet (50 mg total) by mouth every 12 (twelve) hours as needed for Pain.    amLODIPine (NORVASC) 2.5 MG tablet TAKE 1 TABLET BY MOUTH EVERY DAY (Patient taking differently: Take 2.5 mg by mouth every evening.)    nitroGLYCERIN 0.4 MG/HR TD PT24 (NITRODUR) 0.4 mg/hr Place 1 patch onto the skin once daily.    ondansetron (ZOFRAN-ODT) 4 MG TbDL Take by mouth.    pantoprazole (PROTONIX) 40 MG tablet Take 1 tablet (40 mg total) by mouth once daily. (Patient taking differently: Take 40 mg by mouth once daily. Patient takes as needed)    rosuvastatin (CRESTOR) 40 MG Tab TAKE 1 TABLET (40 MG TOTAL) BY MOUTH ONCE DAILY. (Patient taking differently: Take 40 mg by mouth every evening.)    [DISCONTINUED] clopidogreL (PLAVIX) 75 mg tablet Take 75 mg by mouth.    [DISCONTINUED] mupirocin (BACTROBAN) 2 % ointment SMARTSI Application Topical 2-3 Times Daily    [DISCONTINUED] omega-3 fatty acids/fish oil (FISH OIL-OMEGA-3 FATTY ACIDS) 300-1,000 mg capsule Take 2 capsules by mouth once daily.     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   HENT: Negative.     Eyes: Negative.    Respiratory: Negative.     Cardiovascular: Negative.    Gastrointestinal: Negative.    Endocrine: Negative.    Genitourinary: Negative.    Musculoskeletal: Negative.    Skin: Negative.    Allergic/Immunologic: Negative.    Neurological:  Positive for dizziness, syncope and headaches.   Hematological: Negative.    Psychiatric/Behavioral: Negative.       Objective:     Vital Signs (Most Recent):  Temp: 97.9 °F (36.6 °C) (10/03/23 1653)  Pulse: 70 (10/03/23 1715)  Resp: 18 (10/03/23 1653)  BP: (!) 142/93 (10/03/23 1715)  SpO2: (!) 94 % (10/03/23 1715) Vital Signs (24h Range):  Temp:  [97.8 °F (36.6 °C)-98.2 °F (36.8 °C)] 97.9 °F (36.6 °C)  Pulse:  [68-78] 70  Resp:  [16-28] 18  SpO2:  [93 %-96 %] 94 %  BP: (129-159)/() 142/93     Weight: 93.1 kg (205 lb 4 oz)  Body mass index is 34.16 kg/m².     Physical Exam  Constitutional:       Appearance: She is ill-appearing.   HENT:      Head: Normocephalic and atraumatic.      Mouth/Throat:      Mouth: Mucous membranes are moist.   Eyes:      Extraocular Movements: Extraocular movements intact.       Conjunctiva/sclera: Conjunctivae normal.      Pupils: Pupils are equal, round, and reactive to light.   Cardiovascular:      Rate and Rhythm: Normal rate. Rhythm irregular.   Pulmonary:      Effort: Pulmonary effort is normal. No respiratory distress.      Breath sounds: No stridor.   Abdominal:      General: Abdomen is flat. There is no distension.      Palpations: There is no mass.      Tenderness: There is no abdominal tenderness. There is no guarding or rebound.      Hernia: No hernia is present.   Musculoskeletal:         General: No swelling or tenderness. Normal range of motion.      Cervical back: Normal range of motion. No rigidity or tenderness.   Skin:     General: Skin is warm.   Neurological:      General: No focal deficit present.      Mental Status: She is alert and oriented to person, place, and time. Mental status is at baseline.      Cranial Nerves: No cranial nerve deficit.      Sensory: No sensory deficit.   Psychiatric:         Mood and Affect: Mood normal.              CRANIAL NERVES     CN III, IV, VI   Pupils are equal, round, and reactive to light.       Significant Labs: All pertinent labs within the past 24 hours have been reviewed.  Recent Lab Results         10/03/23  1756   10/03/23  1127   10/03/23  1104        Albumin   4.3         ALP   105         ALT   37         Anion Gap   13         AST   34         Baso #   0.04         Basophil %   0.4         BILIRUBIN TOTAL   0.5  Comment: For infants and newborns, interpretation of results should be based  on gestational age, weight and in agreement with clinical  observations.    Premature Infant recommended reference ranges:  Up to 24 hours.............<8.0 mg/dL  Up to 48 hours............<12.0 mg/dL  3-5 days..................<15.0 mg/dL  6-29 days.................<15.0 mg/dL           BNP   349  Comment: Values of less than 100 pg/ml are consistent with non-CHF populations.         BUN   27         Calcium   10.0         Chloride    108         CO2   19         Creatinine   1.1         Differential Method   Automated         eGFR   48         Eos #   0.4         Eosinophil %   3.1         Glucose   87         Gran # (ANC)   7.0         Gran %   62.6         Hematocrit   44.4         Hemoglobin   14.2         Immature Grans (Abs)   0.04  Comment: Mild elevation in immature granulocytes is non specific and   can be seen in a variety of conditions including stress response,   acute inflammation, trauma and pregnancy. Correlation with other   laboratory and clinical findings is essential.           Immature Granulocytes   0.4         INR   1.0  Comment: Coumadin Therapy:  2.0 - 3.0 for INR for all indicators except mechanical heart valves  and antiphospholipid syndromes which should use 2.5 - 3.5.           Lymph #   2.6         Lymph %   23.5         MCH   26.3         MCHC   32.0         MCV   82         Mono #   1.1         Mono %   10.0         MPV   10.0         nRBC   0         Platelet Count   353         POCT Glucose     76       Potassium   3.9         PROTEIN TOTAL   8.0         Protime   10.6         RBC   5.40         RDW   17.2         Sodium   140         Troponin I 0.017  Comment: The reference interval for Troponin I represents the 99th percentile   cutoff   for our facility and is consistent with 3rd generation assay   performance.     <0.006  Comment: The reference interval for Troponin I represents the 99th percentile   cutoff   for our facility and is consistent with 3rd generation assay   performance.           WBC   11.15                 Significant Imaging: I have reviewed all pertinent imaging results/findings within the past 24 hours.    Assessment/Plan:     * Paroxysmal atrial fibrillation  Patient with Long standing persistent (>12 months) atrial fibrillation which is controlled currently with Amiodarone. Patient is currently in atrial fibrillation.[id number] Score: 4. HASBLED Score: . Anticoagulation indicated.  Anticoagulation done with elequis        Hold Sotalol   Cont amiodarone drip   Cont Eliquis     Acute on chronic diastolic heart failure  Cont lasix  IV lasix   Cont CHF core measures        Hypercholesteremia  Cont statin       COPD (chronic obstructive pulmonary disease)  Stable   Cot ARIELLE,LABA and ICS        VTE Risk Mitigation (From admission, onward)         Ordered     apixaban tablet 2.5 mg  2 times daily         10/03/23 1524     IP VTE HIGH RISK PATIENT  Once         10/03/23 1522     Place sequential compression device  Until discontinued         10/03/23 1522                   On 10/03/2023, patient should be placed in hospital observation services under my care.        Dominik Montesinos MD  Department of Hospital Medicine  [facility] - Telemetry ([facility])

## 2023-10-03 NOTE — NURSING
Patient was complaining of chest pain radiating to her jaw. Pain 4/10. MD notified. Stat EKG and troponin ordered. Patient verbalized that her chest pain is subsiding.

## 2023-10-04 ENCOUNTER — TELEPHONE (OUTPATIENT)
Dept: CARDIOLOGY | Facility: HOSPITAL | Age: 88
End: 2023-10-04
Payer: MEDICARE

## 2023-10-04 LAB
ANION GAP SERPL CALC-SCNC: 13 MMOL/L (ref 8–16)
BASOPHILS # BLD AUTO: 0.06 K/UL (ref 0–0.2)
BASOPHILS NFR BLD: 0.6 % (ref 0–1.9)
BUN SERPL-MCNC: 25 MG/DL (ref 8–23)
CALCIUM SERPL-MCNC: 9.5 MG/DL (ref 8.7–10.5)
CHLORIDE SERPL-SCNC: 108 MMOL/L (ref 95–110)
CO2 SERPL-SCNC: 19 MMOL/L (ref 23–29)
CREAT SERPL-MCNC: 1.1 MG/DL (ref 0.5–1.4)
DIFFERENTIAL METHOD: ABNORMAL
EOSINOPHIL # BLD AUTO: 0.2 K/UL (ref 0–0.5)
EOSINOPHIL NFR BLD: 2.4 % (ref 0–8)
ERYTHROCYTE [DISTWIDTH] IN BLOOD BY AUTOMATED COUNT: 16.7 % (ref 11.5–14.5)
EST. GFR  (NO RACE VARIABLE): 48 ML/MIN/1.73 M^2
GLUCOSE SERPL-MCNC: 103 MG/DL (ref 70–110)
HCT VFR BLD AUTO: 38.8 % (ref 37–48.5)
HGB BLD-MCNC: 12.6 G/DL (ref 12–16)
IMM GRANULOCYTES # BLD AUTO: 0.02 K/UL (ref 0–0.04)
IMM GRANULOCYTES NFR BLD AUTO: 0.2 % (ref 0–0.5)
LYMPHOCYTES # BLD AUTO: 2.8 K/UL (ref 1–4.8)
LYMPHOCYTES NFR BLD: 28.4 % (ref 18–48)
MAGNESIUM SERPL-MCNC: 2.2 MG/DL (ref 1.6–2.6)
MCH RBC QN AUTO: 26.3 PG (ref 27–31)
MCHC RBC AUTO-ENTMCNC: 32.5 G/DL (ref 32–36)
MCV RBC AUTO: 81 FL (ref 82–98)
MONOCYTES # BLD AUTO: 1.1 K/UL (ref 0.3–1)
MONOCYTES NFR BLD: 11.2 % (ref 4–15)
NEUTROPHILS # BLD AUTO: 5.7 K/UL (ref 1.8–7.7)
NEUTROPHILS NFR BLD: 57.2 % (ref 38–73)
NRBC BLD-RTO: 0 /100 WBC
PLATELET # BLD AUTO: 335 K/UL (ref 150–450)
PMV BLD AUTO: 10.3 FL (ref 9.2–12.9)
POTASSIUM SERPL-SCNC: 3.4 MMOL/L (ref 3.5–5.1)
RBC # BLD AUTO: 4.8 M/UL (ref 4–5.4)
SODIUM SERPL-SCNC: 140 MMOL/L (ref 136–145)
TROPONIN I SERPL DL<=0.01 NG/ML-MCNC: 0.01 NG/ML (ref 0–0.03)
WBC # BLD AUTO: 9.91 K/UL (ref 3.9–12.7)

## 2023-10-04 PROCEDURE — 85520 HEPARIN ASSAY: CPT | Performed by: INTERNAL MEDICINE

## 2023-10-04 PROCEDURE — 25000003 PHARM REV CODE 250: Performed by: INTERNAL MEDICINE

## 2023-10-04 PROCEDURE — 96366 THER/PROPH/DIAG IV INF ADDON: CPT

## 2023-10-04 PROCEDURE — 84484 ASSAY OF TROPONIN QUANT: CPT | Performed by: INTERNAL MEDICINE

## 2023-10-04 PROCEDURE — 25000003 PHARM REV CODE 250

## 2023-10-04 PROCEDURE — 21400001 HC TELEMETRY ROOM

## 2023-10-04 PROCEDURE — 83735 ASSAY OF MAGNESIUM: CPT | Performed by: INTERNAL MEDICINE

## 2023-10-04 PROCEDURE — 94761 N-INVAS EAR/PLS OXIMETRY MLT: CPT

## 2023-10-04 PROCEDURE — 36415 COLL VENOUS BLD VENIPUNCTURE: CPT | Performed by: INTERNAL MEDICINE

## 2023-10-04 PROCEDURE — 94640 AIRWAY INHALATION TREATMENT: CPT

## 2023-10-04 PROCEDURE — 25000242 PHARM REV CODE 250 ALT 637 W/ HCPCS: Performed by: INTERNAL MEDICINE

## 2023-10-04 PROCEDURE — 85025 COMPLETE CBC W/AUTO DIFF WBC: CPT | Performed by: INTERNAL MEDICINE

## 2023-10-04 PROCEDURE — 99233 PR SUBSEQUENT HOSPITAL CARE,LEVL III: ICD-10-PCS | Mod: ,,, | Performed by: INTERNAL MEDICINE

## 2023-10-04 PROCEDURE — 80048 BASIC METABOLIC PNL TOTAL CA: CPT | Performed by: INTERNAL MEDICINE

## 2023-10-04 PROCEDURE — 63600175 PHARM REV CODE 636 W HCPCS: Performed by: EMERGENCY MEDICINE

## 2023-10-04 PROCEDURE — 25000003 PHARM REV CODE 250: Performed by: NURSE PRACTITIONER

## 2023-10-04 PROCEDURE — 99233 SBSQ HOSP IP/OBS HIGH 50: CPT | Mod: ,,, | Performed by: INTERNAL MEDICINE

## 2023-10-04 RX ORDER — FUROSEMIDE 40 MG/1
40 TABLET ORAL 2 TIMES DAILY
Status: DISCONTINUED | OUTPATIENT
Start: 2023-10-04 | End: 2023-10-05 | Stop reason: HOSPADM

## 2023-10-04 RX ORDER — AMIODARONE HYDROCHLORIDE 200 MG/1
200 TABLET ORAL 2 TIMES DAILY
Status: DISCONTINUED | OUTPATIENT
Start: 2023-10-04 | End: 2023-10-05 | Stop reason: HOSPADM

## 2023-10-04 RX ORDER — NITROGLYCERIN 80 MG/1
1 PATCH TRANSDERMAL DAILY
Status: DISCONTINUED | OUTPATIENT
Start: 2023-10-04 | End: 2023-10-05 | Stop reason: HOSPADM

## 2023-10-04 RX ORDER — ACETAMINOPHEN 325 MG/1
650 TABLET ORAL EVERY 6 HOURS PRN
Status: DISCONTINUED | OUTPATIENT
Start: 2023-10-04 | End: 2023-10-05 | Stop reason: HOSPADM

## 2023-10-04 RX ADMIN — LATANOPROST 1 DROP: 50 SOLUTION OPHTHALMIC at 08:10

## 2023-10-04 RX ADMIN — AMIODARONE HYDROCHLORIDE 200 MG: 200 TABLET ORAL at 10:10

## 2023-10-04 RX ADMIN — ARFORMOTEROL TARTRATE 15 MCG: 15 SOLUTION RESPIRATORY (INHALATION) at 07:10

## 2023-10-04 RX ADMIN — APIXABAN 2.5 MG: 2.5 TABLET, FILM COATED ORAL at 08:10

## 2023-10-04 RX ADMIN — FUROSEMIDE 40 MG: 40 TABLET ORAL at 08:10

## 2023-10-04 RX ADMIN — DORZOLAMIDE 1 DROP: 20 SOLUTION/ DROPS OPHTHALMIC at 08:10

## 2023-10-04 RX ADMIN — POTASSIUM BICARBONATE 25 MEQ: 978 TABLET, EFFERVESCENT ORAL at 01:10

## 2023-10-04 RX ADMIN — AMIODARONE HYDROCHLORIDE 200 MG: 200 TABLET ORAL at 08:10

## 2023-10-04 RX ADMIN — ATORVASTATIN CALCIUM 80 MG: 40 TABLET, FILM COATED ORAL at 08:10

## 2023-10-04 RX ADMIN — ACETAMINOPHEN 650 MG: 325 TABLET ORAL at 11:10

## 2023-10-04 RX ADMIN — DORZOLAMIDE 1 DROP: 20 SOLUTION/ DROPS OPHTHALMIC at 09:10

## 2023-10-04 RX ADMIN — PANTOPRAZOLE SODIUM 40 MG: 40 TABLET, DELAYED RELEASE ORAL at 09:10

## 2023-10-04 RX ADMIN — BUDESONIDE INHALATION 0.5 MG: 0.5 SUSPENSION RESPIRATORY (INHALATION) at 07:10

## 2023-10-04 RX ADMIN — APIXABAN 2.5 MG: 2.5 TABLET, FILM COATED ORAL at 09:10

## 2023-10-04 RX ADMIN — AMIODARONE HYDROCHLORIDE 0.5 MG/MIN: 1.8 INJECTION, SOLUTION INTRAVENOUS at 07:10

## 2023-10-04 RX ADMIN — NITROGLYCERIN 1 PATCH: 0.4 PATCH TRANSDERMAL at 10:10

## 2023-10-04 NOTE — SUBJECTIVE & OBJECTIVE
Review of Systems   Constitutional: Positive for malaise/fatigue.   HENT: Negative.     Eyes: Negative.    Cardiovascular: Negative.    Respiratory: Negative.     Skin: Negative.    Musculoskeletal: Negative.    Gastrointestinal:  Positive for abdominal pain and nausea.   Genitourinary: Negative.    Neurological:  Positive for weakness.   Psychiatric/Behavioral: Negative.       Objective:     Vital Signs (Most Recent):  Temp: 97.4 °F (36.3 °C) (10/04/23 0729)  Pulse: 63 (10/04/23 0746)  Resp: 18 (10/04/23 0746)  BP: (!) 145/84 (10/04/23 0729)  SpO2: 95 % (10/04/23 0746) Vital Signs (24h Range):  Temp:  [97.4 °F (36.3 °C)-98.6 °F (37 °C)] 97.4 °F (36.3 °C)  Pulse:  [63-78] 63  Resp:  [15-28] 18  SpO2:  [93 %-96 %] 95 %  BP: (127-146)/(73-93) 145/84     Weight: 93.1 kg (205 lb 4 oz)  Body mass index is 34.16 kg/m².     SpO2: 95 %         Intake/Output Summary (Last 24 hours) at 10/4/2023 1024  Last data filed at 10/4/2023 0643  Gross per 24 hour   Intake 90.37 ml   Output 2100 ml   Net -2009.63 ml       Lines/Drains/Airways       Drain  Duration                  Suprapubic Catheter 06/20/23 1126 18 Fr. 105 days              Peripheral Intravenous Line  Duration                  Peripheral IV - Single Lumen 10/03/23 1202 20 G Anterior;Left;Proximal Forearm <1 day         Peripheral IV - Single Lumen 10/03/23 1744 20 G Anterior;Right Forearm <1 day                       Physical Exam  Vitals and nursing note reviewed.   Constitutional:       Appearance: Normal appearance.   HENT:      Head: Normocephalic.   Eyes:      Pupils: Pupils are equal, round, and reactive to light.   Cardiovascular:      Rate and Rhythm: Normal rate. Rhythm irregular.      Heart sounds: Normal heart sounds, S1 normal and S2 normal. No murmur heard.     No S3 or S4 sounds.   Pulmonary:      Effort: Pulmonary effort is normal.      Breath sounds: Normal breath sounds.   Abdominal:      General: Bowel sounds are normal.      Palpations: Abdomen  "is soft.   Musculoskeletal:         General: Normal range of motion.      Cervical back: Normal range of motion.   Skin:     Capillary Refill: Capillary refill takes less than 2 seconds.   Neurological:      General: No focal deficit present.      Mental Status: She is alert and oriented to person, place, and time.   Psychiatric:         Mood and Affect: Mood normal.         Behavior: Behavior normal.         Thought Content: Thought content normal.            Significant Labs: BMP:   Recent Labs   Lab 10/03/23  1127 10/04/23  0436   GLU 87 103    140   K 3.9 3.4*    108   CO2 19* 19*   BUN 27* 25*   CREATININE 1.1 1.1   CALCIUM 10.0 9.5   MG  --  2.2   , CMP   Recent Labs   Lab 10/03/23  1127 10/04/23  0436    140   K 3.9 3.4*    108   CO2 19* 19*   GLU 87 103   BUN 27* 25*   CREATININE 1.1 1.1   CALCIUM 10.0 9.5   PROT 8.0  --    ALBUMIN 4.3  --    BILITOT 0.5  --    ALKPHOS 105  --    AST 34  --    ALT 37  --    ANIONGAP 13 13   , CBC   Recent Labs   Lab 10/03/23  1127 10/04/23  0436   WBC 11.15 9.91   HGB 14.2 12.6   HCT 44.4 38.8    335   , INR   Recent Labs   Lab 10/03/23  1127   INR 1.0   , Lipid Panel No results for input(s): "CHOL", "HDL", "LDLCALC", "TRIG", "CHOLHDL" in the last 48 hours., Troponin   Recent Labs   Lab 10/03/23  1127 10/03/23  1756 10/04/23  0436   TROPONINI <0.006 0.017 0.010   , and All pertinent lab results from the last 24 hours have been reviewed.    Significant Imaging: Echocardiogram: Transthoracic echo (TTE) complete ([facility] Only):   Results for orders placed or performed during the hospital encounter of 05/17/23   Echo   Result Value Ref Range    BSA 2.03 m2    TDI SEPTAL 0.06 m/s    LV LATERAL E/E' RATIO 9.00 m/s    LV SEPTAL E/E' RATIO 10.50 m/s    LA WIDTH 3.70 cm    IVC diameter 1.09 cm    Left Ventricular Outflow Tract Mean Velocity 0.68 cm/s    Left Ventricular Outflow Tract Mean Gradient 2.06 mmHg    TDI LATERAL 0.07 m/s    PV PEAK VELOCITY 0.83 " cm/s    LVIDd 3.82 3.5 - 6.0 cm    IVS 1.32 (A) 0.6 - 1.1 cm    Posterior Wall 1.40 (A) 0.6 - 1.1 cm    Ao root annulus 2.92 cm    LVIDs 2.31 2.1 - 4.0 cm    FS 40 28 - 44 %    LA volume 84.19 cm3    Sinus 2.86 cm    STJ 2.69 cm    LV mass 186.95 g    LA size 4.43 cm    RVDD 3.21 cm    Left Ventricle Relative Wall Thickness 0.73 cm    AV mean gradient 21 mmHg    AV valve area 0.80 cm2    AV Velocity Ratio 0.31     AV index (prosthetic) 0.32     E/A ratio 0.94     Mean e' 0.07 m/s    E wave deceleration time 397.60 msec    IVRT 102.76 msec    LVOT diameter 1.78 cm    LVOT area 2.5 cm2    LVOT peak tc 0.88 m/s    LVOT peak VTI 18.90 cm    Ao peak tc 2.85 m/s    Ao VTI 59.1 cm    RVOT peak tc 0.66 m/s    RVOT peak VTI 13.3 cm    LVOT stroke volume 47.01 cm3    AV peak gradient 32 mmHg    PV mean gradient 0.88 mmHg    E/E' ratio 9.69 m/s    MV Peak E Tc 0.63 m/s    TR Max Tc 2.51 m/s    MV Peak A Tc 0.67 m/s    LV Systolic Volume 18.36 mL    LV Systolic Volume Index 9.3 mL/m2    LV Diastolic Volume 62.54 mL    LV Diastolic Volume Index 31.75 mL/m2    LA Volume Index 42.7 mL/m2    LV Mass Index 95 g/m2    RA Major Axis 4.59 cm    Left Atrium Minor Axis 5.70 cm    Left Atrium Major Axis 6.43 cm    Triscuspid Valve Regurgitation Peak Gradient 25 mmHg    LA Volume Index (Mod) 22.7 mL/m2    LA volume (mod) 44.76 cm3    RA Width 2.84 cm    Right Atrial Pressure (from IVC) 8 mmHg    EF 65 %    TV resting pulmonary artery pressure 33 mmHg    Narrative    · The left ventricle is normal in size with mild concentric hypertrophy   and normal systolic function.  · Moderate left atrial enlargement.  · The estimated ejection fraction is 65%.  · Indeterminate left ventricular diastolic function.  · Normal right ventricular size with normal right ventricular systolic   function.  · There is moderate-to-severe aortic valve stenosis.  · Aortic valve area is 0.80 cm2; peak velocity is 2.85 m/s; mean gradient   is 21 mmHg.  · Mild  tricuspid regurgitation.  · Intermediate central venous pressure (8 mmHg).  · The estimated PA systolic pressure is 33 mmHg.      , EKG: reviewed, Stress Test: reviewed, and X-Ray: CXR: X-Ray Chest 1 View (CXR):   Results for orders placed or performed during the hospital encounter of 10/03/23   X-Ray Chest 1 View    Narrative    EXAMINATION:  XR CHEST 1 VIEW    CLINICAL HISTORY:  sob;    TECHNIQUE:  Single frontal view of the chest was performed.    COMPARISON:  Multiple priors.    FINDINGS:  Left chest cardiac pacing device.The lungs are clear, with normal appearance of pulmonary vasculature and no pleural effusion or pneumothorax.    The cardiac silhouette is normal in size. The hilar and mediastinal contours are unremarkable.    Bones are intact.      Impression    No acute abnormality.      Electronically signed by: Stephon Menendez  Date:    10/03/2023  Time:    21:34

## 2023-10-04 NOTE — ASSESSMENT & PLAN NOTE
Tele reviewed, paced rhythm Afib  Cont low dose eliquis (h/o anemia with hemorrhoids)  Cont amio gtt  Hold home sotalol  EP consult pending recs    10/4/23  PO Amio 200mg BID  Hold home Sotalol  Follow up in EP clinic  PPM ordered to switch to DDI

## 2023-10-04 NOTE — HOSPITAL COURSE
89 y/o  admitted with a dx of uncontrolled afib  and acute on chronic  diastolic CHF . Cardiology and  EP consulted . Started on amiodarone and  sotalol d/c  . EP  rec to monitor  for 24 hr and if remain in afib to cardioverted  tomorrow am .  10/5 Pt was seen and examined at bedside . She was determined to be suitable for d/c .  S/P JÚNIOR DCCV  back to sinus rhythm . Cardiology rec to stop sotalol and cont amiodarone 200 mg po bid x 14 days and then 200 mg po daily . There was no acute event since admission . She will f/u with cardiology and EP in 1 to 2 weeks .

## 2023-10-04 NOTE — PROGRESS NOTES
[facility] ([facility])  Cardiology  Progress Note    Patient Name: Holli Landrum  MRN: [id number]  Admission Date: 10/3/2023  Hospital Length of Stay: 0 days  Code Status: Full Code   Attending Physician: Dominik Jackson,*   Primary Care Physician: Marcia Carlisle MD  Expected Discharge Date:   Principal Problem:Paroxysmal atrial fibrillation    Subjective:     Hospital Course:   10/4/23 Pt seen and examined today, sitting up in bedside chair c/o nausea fatigue. Labs reviewed, chart reviewed          Review of Systems   Constitutional: Positive for malaise/fatigue.   HENT: Negative.     Eyes: Negative.    Cardiovascular: Negative.    Respiratory: Negative.     Skin: Negative.    Musculoskeletal: Negative.    Gastrointestinal:  Positive for abdominal pain and nausea.   Genitourinary: Negative.    Neurological:  Positive for weakness.   Psychiatric/Behavioral: Negative.       Objective:     Vital Signs (Most Recent):  Temp: 97.4 °F (36.3 °C) (10/04/23 0729)  Pulse: 63 (10/04/23 0746)  Resp: 18 (10/04/23 0746)  BP: (!) 145/84 (10/04/23 0729)  SpO2: 95 % (10/04/23 0746) Vital Signs (24h Range):  Temp:  [97.4 °F (36.3 °C)-98.6 °F (37 °C)] 97.4 °F (36.3 °C)  Pulse:  [63-78] 63  Resp:  [15-28] 18  SpO2:  [93 %-96 %] 95 %  BP: (127-146)/(73-93) 145/84     Weight: 93.1 kg (205 lb 4 oz)  Body mass index is 34.16 kg/m².     SpO2: 95 %         Intake/Output Summary (Last 24 hours) at 10/4/2023 1024  Last data filed at 10/4/2023 0643  Gross per 24 hour   Intake 90.37 ml   Output 2100 ml   Net -2009.63 ml       Lines/Drains/Airways       Drain  Duration                  Suprapubic Catheter 06/20/23 1126 18 Fr. 105 days              Peripheral Intravenous Line  Duration                  Peripheral IV - Single Lumen 10/03/23 1202 20 G Anterior;Left;Proximal Forearm <1 day         Peripheral IV - Single Lumen 10/03/23 1744 20 G Anterior;Right Forearm <1 day                       Physical Exam  Vitals and nursing note  "reviewed.   Constitutional:       Appearance: Normal appearance.   HENT:      Head: Normocephalic.   Eyes:      Pupils: Pupils are equal, round, and reactive to light.   Cardiovascular:      Rate and Rhythm: Normal rate. Rhythm irregular.      Heart sounds: Normal heart sounds, S1 normal and S2 normal. No murmur heard.     No S3 or S4 sounds.   Pulmonary:      Effort: Pulmonary effort is normal.      Breath sounds: Normal breath sounds.   Abdominal:      General: Bowel sounds are normal.      Palpations: Abdomen is soft.   Musculoskeletal:         General: Normal range of motion.      Cervical back: Normal range of motion.   Skin:     Capillary Refill: Capillary refill takes less than 2 seconds.   Neurological:      General: No focal deficit present.      Mental Status: She is alert and oriented to person, place, and time.   Psychiatric:         Mood and Affect: Mood normal.         Behavior: Behavior normal.         Thought Content: Thought content normal.            Significant Labs: BMP:   Recent Labs   Lab 10/03/23  1127 10/04/23  0436   GLU 87 103    140   K 3.9 3.4*    108   CO2 19* 19*   BUN 27* 25*   CREATININE 1.1 1.1   CALCIUM 10.0 9.5   MG  --  2.2   , CMP   Recent Labs   Lab 10/03/23  1127 10/04/23  0436    140   K 3.9 3.4*    108   CO2 19* 19*   GLU 87 103   BUN 27* 25*   CREATININE 1.1 1.1   CALCIUM 10.0 9.5   PROT 8.0  --    ALBUMIN 4.3  --    BILITOT 0.5  --    ALKPHOS 105  --    AST 34  --    ALT 37  --    ANIONGAP 13 13   , CBC   Recent Labs   Lab 10/03/23  1127 10/04/23  0436   WBC 11.15 9.91   HGB 14.2 12.6   HCT 44.4 38.8    335   , INR   Recent Labs   Lab 10/03/23  1127   INR 1.0   , Lipid Panel No results for input(s): "CHOL", "HDL", "LDLCALC", "TRIG", "CHOLHDL" in the last 48 hours., Troponin   Recent Labs   Lab 10/03/23  1127 10/03/23  1756 10/04/23  0436   TROPONINI <0.006 0.017 0.010   , and All pertinent lab results from the last 24 hours have been " reviewed.    Significant Imaging: Echocardiogram: Transthoracic echo (TTE) complete ([facility] Only):   Results for orders placed or performed during the hospital encounter of 05/17/23   Echo   Result Value Ref Range    BSA 2.03 m2    TDI SEPTAL 0.06 m/s    LV LATERAL E/E' RATIO 9.00 m/s    LV SEPTAL E/E' RATIO 10.50 m/s    LA WIDTH 3.70 cm    IVC diameter 1.09 cm    Left Ventricular Outflow Tract Mean Velocity 0.68 cm/s    Left Ventricular Outflow Tract Mean Gradient 2.06 mmHg    TDI LATERAL 0.07 m/s    PV PEAK VELOCITY 0.83 cm/s    LVIDd 3.82 3.5 - 6.0 cm    IVS 1.32 (A) 0.6 - 1.1 cm    Posterior Wall 1.40 (A) 0.6 - 1.1 cm    Ao root annulus 2.92 cm    LVIDs 2.31 2.1 - 4.0 cm    FS 40 28 - 44 %    LA volume 84.19 cm3    Sinus 2.86 cm    STJ 2.69 cm    LV mass 186.95 g    LA size 4.43 cm    RVDD 3.21 cm    Left Ventricle Relative Wall Thickness 0.73 cm    AV mean gradient 21 mmHg    AV valve area 0.80 cm2    AV Velocity Ratio 0.31     AV index (prosthetic) 0.32     E/A ratio 0.94     Mean e' 0.07 m/s    E wave deceleration time 397.60 msec    IVRT 102.76 msec    LVOT diameter 1.78 cm    LVOT area 2.5 cm2    LVOT peak tc 0.88 m/s    LVOT peak VTI 18.90 cm    Ao peak tc 2.85 m/s    Ao VTI 59.1 cm    RVOT peak tc 0.66 m/s    RVOT peak VTI 13.3 cm    LVOT stroke volume 47.01 cm3    AV peak gradient 32 mmHg    PV mean gradient 0.88 mmHg    E/E' ratio 9.69 m/s    MV Peak E Tc 0.63 m/s    TR Max Tc 2.51 m/s    MV Peak A Tc 0.67 m/s    LV Systolic Volume 18.36 mL    LV Systolic Volume Index 9.3 mL/m2    LV Diastolic Volume 62.54 mL    LV Diastolic Volume Index 31.75 mL/m2    LA Volume Index 42.7 mL/m2    LV Mass Index 95 g/m2    RA Major Axis 4.59 cm    Left Atrium Minor Axis 5.70 cm    Left Atrium Major Axis 6.43 cm    Triscuspid Valve Regurgitation Peak Gradient 25 mmHg    LA Volume Index (Mod) 22.7 mL/m2    LA volume (mod) 44.76 cm3    RA Width 2.84 cm    Right Atrial Pressure (from IVC) 8 mmHg    EF 65 %    TV  resting pulmonary artery pressure 33 mmHg    Narrative    · The left ventricle is normal in size with mild concentric hypertrophy   and normal systolic function.  · Moderate left atrial enlargement.  · The estimated ejection fraction is 65%.  · Indeterminate left ventricular diastolic function.  · Normal right ventricular size with normal right ventricular systolic   function.  · There is moderate-to-severe aortic valve stenosis.  · Aortic valve area is 0.80 cm2; peak velocity is 2.85 m/s; mean gradient   is 21 mmHg.  · Mild tricuspid regurgitation.  · Intermediate central venous pressure (8 mmHg).  · The estimated PA systolic pressure is 33 mmHg.      , EKG: reviewed, Stress Test: reviewed, and X-Ray: CXR: X-Ray Chest 1 View (CXR):   Results for orders placed or performed during the hospital encounter of 10/03/23   X-Ray Chest 1 View    Narrative    EXAMINATION:  XR CHEST 1 VIEW    CLINICAL HISTORY:  sob;    TECHNIQUE:  Single frontal view of the chest was performed.    COMPARISON:  Multiple priors.    FINDINGS:  Left chest cardiac pacing device.The lungs are clear, with normal appearance of pulmonary vasculature and no pleural effusion or pneumothorax.    The cardiac silhouette is normal in size. The hilar and mediastinal contours are unremarkable.    Bones are intact.      Impression    No acute abnormality.      Electronically signed by: Stephon Menendez  Date:    10/03/2023  Time:    21:34     Assessment and Plan:         * Paroxysmal atrial fibrillation  Tele reviewed, paced rhythm Afib  Cont low dose eliquis (h/o anemia with hemorrhoids)  Cont amio gtt  Hold home sotalol  EP consult pending recs    10/4/23  PO Amio 200mg BID for 2 weeks then 100mg daily  Hold home Sotalol  Follow up in EP clinic  PPM ordered to switch to DDI      Acute on chronic diastolic heart failure    IV lasix x1 assess response  Further recs to follow    10/4/23  -2L for admission  euvolemic on exam  Compression socks  Can cont home  PO lasix  Follow up in clinic    Hypercholesteremia  statin        VTE Risk Mitigation (From admission, onward)           Ordered     apixaban tablet 2.5 mg  2 times daily         10/03/23 1524     IP VTE HIGH RISK PATIENT  Once         10/03/23 1522     Place sequential compression device  Until discontinued         10/03/23 1522                    Oxana Hills NP  Cardiology  [facility] - Telemetry ([facility])

## 2023-10-04 NOTE — ASSESSMENT & PLAN NOTE
Patient with Long standing persistent (>12 months) atrial fibrillation which is controlled currently with Amiodarone. Patient is currently in atrial fibrillation.[id number] Score: 4. HASBLED Score: . Anticoagulation indicated. Anticoagulation done with elequis        Hold Sotalol   Cont amiodarone   Cont Eliquis   Plan for possible cardioversion tomorrow am

## 2023-10-04 NOTE — PROGRESS NOTES
[facility] Medicine  Progress Note    Patient Name: Holli Landrum  MRN: [id number]  Patient Class: IP- Inpatient   Admission Date: 10/3/2023  Length of Stay: 0 days  Attending Physician: Dominik Jackson,*  Primary Care Provider: Marcia Carlisle MD        Subjective:     Principal Problem:Paroxysmal atrial fibrillation        HPI:  89 y/o. female patient with a PMHx of  persistent atrial fibrillation, coronary artery disease status post PCI, sick sinus syndrome status post pacemaker implantation Biotronik, hypertrophic obstructive cardiomyopathy, diastolic congestive heart failure, aortic stenosis, history of GI bleeding, asthma, HTN, COPD, PAF (low dose eliquis due to anemia), PVT who presented to [facility] [place]- from cards clinic with fatigue, HA dizziness and near-syncope.Pt c/o palpitations, generalized weakness, fatigue, light-headedness, syncope, and SOB. Symptoms are constant and moderate in severity. No mitigating or exacerbating factors reported. No other associated sxs reported. Patient denies any CP, fever, chills, diaphoresis, N/V/D, and all other sxs at this time . She report the this symptoms are more frequent over the last weeks .   ER COURSE:  . Started on amiodarone drip . S/P lasix 40 mg IV . Cardiology and EP consulted   Pt will be admitted to observation with a dx of Afib       Overview/Hospital Course:  89 y/o  admitted with a dx of uncontrolled afib  and acute on chronic  diastolic CHF . Cardiology and  EP consulted . Started on amiodarone and  sotalol d/c  . EP  rec to monitor  for 24 hr and if remain in afib to cardioverted  tomorrow am .      Interval History:     Review of Systems   Constitutional:  Positive for activity change and fatigue.   HENT: Negative.     Eyes: Negative.    Respiratory: Negative.     Cardiovascular: Negative.    Gastrointestinal: Negative.    Endocrine: Negative.    Genitourinary: Negative.    Musculoskeletal: Negative.    Skin:  Negative.    Allergic/Immunologic: Negative.    Neurological:  Positive for dizziness, syncope and headaches.   Hematological: Negative.    Psychiatric/Behavioral: Negative.       Objective:     Vital Signs (Most Recent):  Temp: 97.7 °F (36.5 °C) (10/04/23 1246)  Pulse: 72 (10/04/23 1246)  Resp: 16 (10/04/23 1246)  BP: 118/61 (10/04/23 1246)  SpO2: 96 % (10/04/23 1246) Vital Signs (24h Range):  Temp:  [97.4 °F (36.3 °C)-98.6 °F (37 °C)] 97.7 °F (36.5 °C)  Pulse:  [63-72] 72  Resp:  [15-18] 16  SpO2:  [93 %-96 %] 96 %  BP: (118-145)/(61-93) 118/61     Weight: 93.1 kg (205 lb 4 oz)  Body mass index is 34.16 kg/m².    Intake/Output Summary (Last 24 hours) at 10/4/2023 1512  Last data filed at 10/4/2023 0643  Gross per 24 hour   Intake --   Output 1500 ml   Net -1500 ml         Physical Exam  Constitutional:       Appearance: She is ill-appearing.   HENT:      Head: Normocephalic and atraumatic.      Mouth/Throat:      Mouth: Mucous membranes are moist.   Eyes:      Extraocular Movements: Extraocular movements intact.      Conjunctiva/sclera: Conjunctivae normal.      Pupils: Pupils are equal, round, and reactive to light.   Cardiovascular:      Rate and Rhythm: Normal rate. Rhythm irregular.   Pulmonary:      Effort: Pulmonary effort is normal. No respiratory distress.      Breath sounds: No stridor.   Abdominal:      General: Abdomen is flat. There is no distension.      Palpations: There is no mass.      Tenderness: There is no abdominal tenderness. There is no guarding or rebound.      Hernia: No hernia is present.   Musculoskeletal:         General: No swelling or tenderness. Normal range of motion.      Cervical back: Normal range of motion. No rigidity or tenderness.   Skin:     General: Skin is warm.   Neurological:      General: No focal deficit present.      Mental Status: She is alert and oriented to person, place, and time. Mental status is at baseline.      Cranial Nerves: No cranial nerve deficit.       Sensory: No sensory deficit.   Psychiatric:         Mood and Affect: Mood normal.             Significant Labs: All pertinent labs within the past 24 hours have been reviewed.  Recent Lab Results         10/04/23  0436   10/03/23  1756        Anion Gap 13         Baso # 0.06         Basophil % 0.6         BUN 25         Calcium 9.5         Chloride 108         CO2 19         Creatinine 1.1         Differential Method Automated         eGFR 48         Eos # 0.2         Eosinophil % 2.4         Glucose 103         Gran # (ANC) 5.7         Gran % 57.2         Hematocrit 38.8         Hemoglobin 12.6         Immature Grans (Abs) 0.02  Comment: Mild elevation in immature granulocytes is non specific and   can be seen in a variety of conditions including stress response,   acute inflammation, trauma and pregnancy. Correlation with other   laboratory and clinical findings is essential.           Immature Granulocytes 0.2         Lymph # 2.8         Lymph % 28.4         Magnesium  2.2         MCH 26.3         MCHC 32.5         MCV 81         Mono # 1.1         Mono % 11.2         MPV 10.3         nRBC 0         Platelet Count 335         Potassium 3.4         RBC 4.80         RDW 16.7         Sodium 140         Troponin I 0.010  Comment: The reference interval for Troponin I represents the 99th percentile   cutoff   for our facility and is consistent with 3rd generation assay   performance.     0.017  Comment: The reference interval for Troponin I represents the 99th percentile   cutoff   for our facility and is consistent with 3rd generation assay   performance.         WBC 9.91                 Significant Imaging: I have reviewed all pertinent imaging results/findings within the past 24 hours.      Assessment/Plan:      * Paroxysmal atrial fibrillation  Patient with Long standing persistent (>12 months) atrial fibrillation which is controlled currently with Amiodarone. Patient is currently in atrial fibrillation.[id number]  Score: 4. HASBLED Score: . Anticoagulation indicated. Anticoagulation done with elequis        Hold Sotalol   Cont amiodarone   Cont Eliquis   Plan for possible cardioversion tomorrow am     Acute on chronic diastolic heart failure  Cont lasix     Cont CHF core measures        Hypercholesteremia  Cont statin       COPD (chronic obstructive pulmonary disease)  Stable   Cot ARIELLE,LABA and ICS        VTE Risk Mitigation (From admission, onward)         Ordered     apixaban tablet 2.5 mg  2 times daily         10/03/23 1524     IP VTE HIGH RISK PATIENT  Once         10/03/23 1522     Place sequential compression device  Until discontinued         10/03/23 1522                Discharge Planning   SCOTT:      Code Status: Full Code   Is the patient medically ready for discharge?:     Reason for patient still in hospital (select all that apply): Treatment                     Dominik Montesinos MD  Department of Hospital Medicine   [facility] - Tele[facility] ([facility])

## 2023-10-04 NOTE — CONSULTS
Asked to see patient for AF by Dr Meyer  I reviewed the chart in detail including all relevant clinical notes, cardiac study reports, lab data and Xrays.  I have reviewed the actual images of all relevant ECG, rhythm, holter and long term monitoring tracings obtained during current hospitalization and in past records.   I have reviewed the actual images of all relevant CXRays.   I have directly evaluated all relevant data pertaining to any CIED.     89 y.o. female   PMHx   PeAF and PAF, on Sotalol and low dose OAC   coronary artery disease status post PCI,   sick sinus syndrome status post pacemaker implantation Biotronik, diastolic congestive heart failure,   PVT  aortic stenosis, mod-sev, considered for TAVR  history of GI bleeding, with Sxc anemia  asthma,   HTN,   Purported COPD but with normal PFTs, ow dose eliquis due to anemia),     Presented to [facility] from cards clinic with fatigue, HA dizziness and near-syncope.     On admit was in AF with V tracking/pacing at 75 bpm    Imp: AF with Sx and PPM tracking : Now on amio - there is no contraindication to continue on that  DC Sotalol  Reprogram PPM to DDI to avoid tracking

## 2023-10-04 NOTE — PLAN OF CARE
[facility] - Tele[facility] (Hospital)  Initial Discharge Assessment       Primary Care Provider: Marcia Carlisle MD    Admission Diagnosis: Paroxysmal atrial fibrillation [I48.0]  Chest pain [R07.9]    Admission Date: 10/3/2023  Expected Discharge Date:     Transition of Care Barriers: None    Payor: [employer] MANAGED MEDICARE / Plan: [employer] MEDICARE HMO / Product Type: Capitation /     Extended Emergency Contact Information  Primary Emergency Contact: Annabel Jolly   [place]  Mobile Phone: [phone]  Relation: Daughter              [facility] [id number]  [address] [address] [facility] [address]  Phone: [phone] Fax: [phone]      Initial Assessment (most recent)       Adult Discharge Assessment - 10/04/23 1340          Discharge Assessment    Assessment Type Discharge Planning Assessment     Confirmed/corrected address, phone number and insurance Yes     Confirmed Demographics Correct on Facesheet     Source of Information patient;family     When was your last doctors appointment? 09/27/23     Communicated SCOTT with patient/caregiver Date not available/Unable to determine     Reason For Admission Afib     People in Home child(yuan), adult     Facility Arrived From: Dr. Meyer's office     Do you expect to return to your current living situation? Yes     Do you have help at home or someone to help you manage your care at home? Yes     Who are your caregiver(s) and their phone number(s)? Annabel pérez     Prior to hospitilization cognitive status: Alert/Oriented     Current cognitive status: Alert/Oriented     Walking or Climbing Stairs ambulation difficulty, requires equipment     Mobility Management uses walker     Dressing/Bathing bathing difficulty, requires equipment     Dressing/Bathing Management use shower chair; occasionally needs assistance putting on her shoes     Home Accessibility stairs to enter home     Number of Stairs, Main Entrance four      Surface of Stairs, Main Entrance hardwood     Stair Railings, Main Entrance railings safe and in good condition     Home Layout Able to live on 1st floor     Equipment Currently Used at Home CPAP;cane, straight;walker, rolling;wheelchair;nebulizer     Readmission within 30 days? No     Patient currently being followed by outpatient case management? No     Do you currently have service(s) that help you manage your care at home? Yes     Name and Contact number of agency [facility]     Is the pt/caregiver preference to resume services with current agency Yes     Do you take prescription medications? Yes     Do you have prescription coverage? Yes     Coverage [employer]     Do you have any problems affording any of your prescribed medications? No     Is the patient taking medications as prescribed? yes     Who is going to help you get home at discharge? daughter nAnabel     How do you get to doctors appointments? family or friend will provide     Are you on dialysis? No     Do you take coumadin? No     DME Needed Upon Discharge  none     Discharge Plan discussed with: Patient;Adult children     Transition of Care Barriers None                   Met with patient and daughter Claudia.  She lives with Claudia who assists her more during the evening.  Her daughter Annabel assists her with transportation.  She is independent with ADL's with assistive equipment.  Patient has a Suprapubic catheter.  [facility] or [facility] has a nurse coming weekly to see her.  They change her suprapubic catheter every other week.  Discharge plan is to resume services with [facility] .

## 2023-10-04 NOTE — ASSESSMENT & PLAN NOTE
IV lasix x1 assess response  Further recs to follow    10/4/23  -2L for admission  euvolemic on exam  Can cont home PO lasix

## 2023-10-04 NOTE — HOSPITAL COURSE
10/4/23 Pt seen and examined today, sitting up in bedside chair c/o nausea fatigue. Labs reviewed, chart reviewed  OCTOBER 5, 2023.  STATUS POST SUCCESSFUL CARDIOVERSION EARLIER TODAY.    DISCUSSED WITH PATIENT AND FAMILY AFTER SHE WOKE UP AS WELL.  ALSO NOTED TO HAVE SEVERE AORTIC STENOSIS.  SHE STATES THAT SHE FOLLOWS WITH DR. BROWN AND A TAVR HAS BEEN NOT PERFORMED DUE TO HISTORY OF SUPRAPUBIC MACDONALD CATHETER.

## 2023-10-04 NOTE — SUBJECTIVE & OBJECTIVE
Interval History:     Review of Systems   Constitutional:  Positive for activity change and fatigue.   HENT: Negative.     Eyes: Negative.    Respiratory: Negative.     Cardiovascular: Negative.    Gastrointestinal: Negative.    Endocrine: Negative.    Genitourinary: Negative.    Musculoskeletal: Negative.    Skin: Negative.    Allergic/Immunologic: Negative.    Neurological:  Positive for dizziness, syncope and headaches.   Hematological: Negative.    Psychiatric/Behavioral: Negative.       Objective:     Vital Signs (Most Recent):  Temp: 97.7 °F (36.5 °C) (10/04/23 1246)  Pulse: 72 (10/04/23 1246)  Resp: 16 (10/04/23 1246)  BP: 118/61 (10/04/23 1246)  SpO2: 96 % (10/04/23 1246) Vital Signs (24h Range):  Temp:  [97.4 °F (36.3 °C)-98.6 °F (37 °C)] 97.7 °F (36.5 °C)  Pulse:  [63-72] 72  Resp:  [15-18] 16  SpO2:  [93 %-96 %] 96 %  BP: (118-145)/(61-93) 118/61     Weight: 93.1 kg (205 lb 4 oz)  Body mass index is 34.16 kg/m².    Intake/Output Summary (Last 24 hours) at 10/4/2023 1512  Last data filed at 10/4/2023 0643  Gross per 24 hour   Intake --   Output 1500 ml   Net -1500 ml         Physical Exam  Constitutional:       Appearance: She is ill-appearing.   HENT:      Head: Normocephalic and atraumatic.      Mouth/Throat:      Mouth: Mucous membranes are moist.   Eyes:      Extraocular Movements: Extraocular movements intact.      Conjunctiva/sclera: Conjunctivae normal.      Pupils: Pupils are equal, round, and reactive to light.   Cardiovascular:      Rate and Rhythm: Normal rate. Rhythm irregular.   Pulmonary:      Effort: Pulmonary effort is normal. No respiratory distress.      Breath sounds: No stridor.   Abdominal:      General: Abdomen is flat. There is no distension.      Palpations: There is no mass.      Tenderness: There is no abdominal tenderness. There is no guarding or rebound.      Hernia: No hernia is present.   Musculoskeletal:         General: No swelling or tenderness. Normal range of motion.       Cervical back: Normal range of motion. No rigidity or tenderness.   Skin:     General: Skin is warm.   Neurological:      General: No focal deficit present.      Mental Status: She is alert and oriented to person, place, and time. Mental status is at baseline.      Cranial Nerves: No cranial nerve deficit.      Sensory: No sensory deficit.   Psychiatric:         Mood and Affect: Mood normal.             Significant Labs: All pertinent labs within the past 24 hours have been reviewed.  Recent Lab Results         10/04/23  0436   10/03/23  1756        Anion Gap 13         Baso # 0.06         Basophil % 0.6         BUN 25         Calcium 9.5         Chloride 108         CO2 19         Creatinine 1.1         Differential Method Automated         eGFR 48         Eos # 0.2         Eosinophil % 2.4         Glucose 103         Gran # (ANC) 5.7         Gran % 57.2         Hematocrit 38.8         Hemoglobin 12.6         Immature Grans (Abs) 0.02  Comment: Mild elevation in immature granulocytes is non specific and   can be seen in a variety of conditions including stress response,   acute inflammation, trauma and pregnancy. Correlation with other   laboratory and clinical findings is essential.           Immature Granulocytes 0.2         Lymph # 2.8         Lymph % 28.4         Magnesium  2.2         MCH 26.3         MCHC 32.5         MCV 81         Mono # 1.1         Mono % 11.2         MPV 10.3         nRBC 0         Platelet Count 335         Potassium 3.4         RBC 4.80         RDW 16.7         Sodium 140         Troponin I 0.010  Comment: The reference interval for Troponin I represents the 99th percentile   cutoff   for our facility and is consistent with 3rd generation assay   performance.     0.017  Comment: The reference interval for Troponin I represents the 99th percentile   cutoff   for our facility and is consistent with 3rd generation assay   performance.         WBC 9.91                 Significant Imaging: I  have reviewed all pertinent imaging results/findings within the past 24 hours.

## 2023-10-05 ENCOUNTER — ANESTHESIA EVENT (OUTPATIENT)
Dept: CARDIOLOGY | Facility: HOSPITAL | Age: 88
DRG: 308 | End: 2023-10-05
Payer: MEDICARE

## 2023-10-05 ENCOUNTER — TELEPHONE (OUTPATIENT)
Dept: CARDIOLOGY | Facility: CLINIC | Age: 88
End: 2023-10-05
Payer: MEDICARE

## 2023-10-05 ENCOUNTER — TELEPHONE (OUTPATIENT)
Dept: CARDIOLOGY | Facility: CLINIC | Age: 88
End: 2023-10-05

## 2023-10-05 ENCOUNTER — ANESTHESIA (OUTPATIENT)
Dept: CARDIOLOGY | Facility: HOSPITAL | Age: 88
DRG: 308 | End: 2023-10-05
Payer: MEDICARE

## 2023-10-05 VITALS
RESPIRATION RATE: 18 BRPM | DIASTOLIC BLOOD PRESSURE: 80 MMHG | BODY MASS INDEX: 34.16 KG/M2 | OXYGEN SATURATION: 98 % | TEMPERATURE: 98 F | SYSTOLIC BLOOD PRESSURE: 146 MMHG | HEIGHT: 65 IN | WEIGHT: 205 LBS | HEART RATE: 88 BPM

## 2023-10-05 LAB
ANION GAP SERPL CALC-SCNC: 11 MMOL/L (ref 8–16)
BASOPHILS # BLD AUTO: 0.05 K/UL (ref 0–0.2)
BASOPHILS NFR BLD: 0.5 % (ref 0–1.9)
BSA FOR ECHO PROCEDURE: 2.06 M2
BUN SERPL-MCNC: 28 MG/DL (ref 8–23)
CALCIUM SERPL-MCNC: 9.3 MG/DL (ref 8.7–10.5)
CHLORIDE SERPL-SCNC: 111 MMOL/L (ref 95–110)
CO2 SERPL-SCNC: 19 MMOL/L (ref 23–29)
CREAT SERPL-MCNC: 1 MG/DL (ref 0.5–1.4)
DIFFERENTIAL METHOD: ABNORMAL
EOSINOPHIL # BLD AUTO: 0.2 K/UL (ref 0–0.5)
EOSINOPHIL NFR BLD: 2.3 % (ref 0–8)
ERYTHROCYTE [DISTWIDTH] IN BLOOD BY AUTOMATED COUNT: 17.1 % (ref 11.5–14.5)
EST. GFR  (NO RACE VARIABLE): 54 ML/MIN/1.73 M^2
FACT X PPP CHRO-ACNC: 0.98 IU/ML (ref 0.3–0.7)
GLUCOSE SERPL-MCNC: 106 MG/DL (ref 70–110)
HCT VFR BLD AUTO: 40.6 % (ref 37–48.5)
HGB BLD-MCNC: 13.2 G/DL (ref 12–16)
IMM GRANULOCYTES # BLD AUTO: 0.02 K/UL (ref 0–0.04)
IMM GRANULOCYTES NFR BLD AUTO: 0.2 % (ref 0–0.5)
LYMPHOCYTES # BLD AUTO: 2.5 K/UL (ref 1–4.8)
LYMPHOCYTES NFR BLD: 27.5 % (ref 18–48)
MCH RBC QN AUTO: 26.6 PG (ref 27–31)
MCHC RBC AUTO-ENTMCNC: 32.5 G/DL (ref 32–36)
MCV RBC AUTO: 82 FL (ref 82–98)
MONOCYTES # BLD AUTO: 1 K/UL (ref 0.3–1)
MONOCYTES NFR BLD: 10.7 % (ref 4–15)
NEUTROPHILS # BLD AUTO: 5.4 K/UL (ref 1.8–7.7)
NEUTROPHILS NFR BLD: 58.8 % (ref 38–73)
NRBC BLD-RTO: 0 /100 WBC
PLATELET # BLD AUTO: 328 K/UL (ref 150–450)
PMV BLD AUTO: 10.1 FL (ref 9.2–12.9)
POTASSIUM SERPL-SCNC: 3.3 MMOL/L (ref 3.5–5.1)
RA PRESSURE ESTIMATED: 3 MMHG
RBC # BLD AUTO: 4.96 M/UL (ref 4–5.4)
SODIUM SERPL-SCNC: 141 MMOL/L (ref 136–145)
WBC # BLD AUTO: 9.24 K/UL (ref 3.9–12.7)

## 2023-10-05 PROCEDURE — 93010 EKG 12-LEAD: ICD-10-PCS | Mod: ,,, | Performed by: INTERNAL MEDICINE

## 2023-10-05 PROCEDURE — 36415 COLL VENOUS BLD VENIPUNCTURE: CPT | Performed by: INTERNAL MEDICINE

## 2023-10-05 PROCEDURE — 25000003 PHARM REV CODE 250: Performed by: INTERNAL MEDICINE

## 2023-10-05 PROCEDURE — 85025 COMPLETE CBC W/AUTO DIFF WBC: CPT | Performed by: INTERNAL MEDICINE

## 2023-10-05 PROCEDURE — 92960 CARDIOVERSION ELECTRIC EXT: CPT | Performed by: INTERNAL MEDICINE

## 2023-10-05 PROCEDURE — 93010 ELECTROCARDIOGRAM REPORT: CPT | Mod: ,,, | Performed by: INTERNAL MEDICINE

## 2023-10-05 PROCEDURE — 93005 ELECTROCARDIOGRAM TRACING: CPT

## 2023-10-05 PROCEDURE — 37000008 HC ANESTHESIA 1ST 15 MINUTES: Performed by: INTERNAL MEDICINE

## 2023-10-05 PROCEDURE — 99900035 HC TECH TIME PER 15 MIN (STAT)

## 2023-10-05 PROCEDURE — 80048 BASIC METABOLIC PNL TOTAL CA: CPT | Performed by: INTERNAL MEDICINE

## 2023-10-05 PROCEDURE — 99233 PR SUBSEQUENT HOSPITAL CARE,LEVL III: ICD-10-PCS | Mod: ,,, | Performed by: INTERNAL MEDICINE

## 2023-10-05 PROCEDURE — 25000003 PHARM REV CODE 250: Performed by: NURSE ANESTHETIST, CERTIFIED REGISTERED

## 2023-10-05 PROCEDURE — 37000009 HC ANESTHESIA EA ADD 15 MINS: Performed by: INTERNAL MEDICINE

## 2023-10-05 PROCEDURE — 92960 PR CARDIOVERSION, ELECTIVE;EXTERN: ICD-10-PCS | Mod: ,,, | Performed by: INTERNAL MEDICINE

## 2023-10-05 PROCEDURE — 92960 CARDIOVERSION ELECTRIC EXT: CPT | Mod: ,,, | Performed by: INTERNAL MEDICINE

## 2023-10-05 PROCEDURE — 99233 SBSQ HOSP IP/OBS HIGH 50: CPT | Mod: ,,, | Performed by: INTERNAL MEDICINE

## 2023-10-05 PROCEDURE — 25000003 PHARM REV CODE 250

## 2023-10-05 PROCEDURE — 63600175 PHARM REV CODE 636 W HCPCS: Performed by: NURSE ANESTHETIST, CERTIFIED REGISTERED

## 2023-10-05 RX ORDER — LIDOCAINE HYDROCHLORIDE 20 MG/ML
INJECTION, SOLUTION EPIDURAL; INFILTRATION; INTRACAUDAL; PERINEURAL
Status: DISCONTINUED | OUTPATIENT
Start: 2023-10-05 | End: 2023-10-05

## 2023-10-05 RX ORDER — PROPOFOL 10 MG/ML
VIAL (ML) INTRAVENOUS
Status: DISCONTINUED | OUTPATIENT
Start: 2023-10-05 | End: 2023-10-05

## 2023-10-05 RX ORDER — AMIODARONE HYDROCHLORIDE 200 MG/1
TABLET ORAL
Qty: 58 TABLET | Refills: 1 | Status: SHIPPED | OUTPATIENT
Start: 2023-10-05 | End: 2023-10-27 | Stop reason: SDUPTHER

## 2023-10-05 RX ORDER — MIDAZOLAM HYDROCHLORIDE 1 MG/ML
INJECTION INTRAMUSCULAR; INTRAVENOUS
Status: DISCONTINUED | OUTPATIENT
Start: 2023-10-05 | End: 2023-10-05

## 2023-10-05 RX ORDER — KETAMINE HCL IN 0.9 % NACL 50 MG/5 ML
SYRINGE (ML) INTRAVENOUS
Status: DISCONTINUED | OUTPATIENT
Start: 2023-10-05 | End: 2023-10-05

## 2023-10-05 RX ORDER — SODIUM CHLORIDE 9 MG/ML
INJECTION, SOLUTION INTRAVENOUS CONTINUOUS PRN
Status: DISCONTINUED | OUTPATIENT
Start: 2023-10-05 | End: 2023-10-05

## 2023-10-05 RX ADMIN — DORZOLAMIDE 1 DROP: 20 SOLUTION/ DROPS OPHTHALMIC at 09:10

## 2023-10-05 RX ADMIN — POTASSIUM BICARBONATE 25 MEQ: 978 TABLET, EFFERVESCENT ORAL at 09:10

## 2023-10-05 RX ADMIN — SODIUM CHLORIDE: 0.9 INJECTION, SOLUTION INTRAVENOUS at 10:10

## 2023-10-05 RX ADMIN — AMIODARONE HYDROCHLORIDE 200 MG: 200 TABLET ORAL at 09:10

## 2023-10-05 RX ADMIN — FUROSEMIDE 40 MG: 40 TABLET ORAL at 09:10

## 2023-10-05 RX ADMIN — PANTOPRAZOLE SODIUM 40 MG: 40 TABLET, DELAYED RELEASE ORAL at 09:10

## 2023-10-05 RX ADMIN — APIXABAN 2.5 MG: 2.5 TABLET, FILM COATED ORAL at 09:10

## 2023-10-05 RX ADMIN — Medication 20 MG: at 10:10

## 2023-10-05 RX ADMIN — LIDOCAINE HYDROCHLORIDE 3 ML: 20 INJECTION, SOLUTION EPIDURAL; INFILTRATION; INTRACAUDAL; PERINEURAL at 10:10

## 2023-10-05 RX ADMIN — PROPOFOL 20 MG: 10 INJECTION, EMULSION INTRAVENOUS at 10:10

## 2023-10-05 RX ADMIN — MIDAZOLAM HYDROCHLORIDE 1 MG: 1 INJECTION, SOLUTION INTRAMUSCULAR; INTRAVENOUS at 10:10

## 2023-10-05 RX ADMIN — NITROGLYCERIN 1 PATCH: 0.4 PATCH TRANSDERMAL at 09:10

## 2023-10-05 NOTE — TELEPHONE ENCOUNTER
Returned juleskenia BOLES for pt to return call to the clinic in regards to scheduling her a 2 week hospital follow up appointment.    ----- Message from Lizzie Bashir sent at 10/5/2023  2:17 PM CDT -----  Contact: [phone]  .Type:  Sooner Apoointment Request    Caller is requesting a sooner appointment.  Caller declined first available appointment listed below.  Caller will not accept being placed on the waitlist and is requesting a message be sent to doctor.  Name of Caller:Holli   When is the first available appointment?11/06/2023  Symptoms:2 weeks f/u  Would the patient rather a call back or a response via [employer]? Call back   Best Call Back Number:[phone]  Additional Information: seen within 2 weeks (hospital f/u)

## 2023-10-05 NOTE — ASSESSMENT & PLAN NOTE
Patient with Long standing persistent (>12 months) atrial fibrillation which is controlled currently with Amiodarone. Patient is currently in atrial fibrillation.[id number] Score: 4. HASBLED Score: . Anticoagulation indicated. Anticoagulation done with elequis        Hold Sotalol   Cont amiodarone   Cont Eliquis   Plan for possible cardioversion tomorrow am

## 2023-10-05 NOTE — PLAN OF CARE
Pt awakened after procedure and remains AAOx3 at time of transfer.  An hour post admin Cetacaine, bedside swallow assessment performed; no s/s of aspiration at this time.   Transferred back to room 212, via hospital bed, in no apparent distress.   Report given to SHOLA Collins; no further questions at this time.

## 2023-10-05 NOTE — ANESTHESIA PREPROCEDURE EVALUATION
10/05/2023  Holli Landrum is a 89 y.o., female.      Pre-op Assessment    I have reviewed the Patient Summary Reports.     I have reviewed the Nursing Notes. I have reviewed the NPO Status.   I have reviewed the Medications.     Review of Systems  Anesthesia Hx:  No problems with previous Anesthesia    Social:  Former Smoker    Hematology/Oncology:         -- Cancer in past history: Oncology Comments: Skin, Bladder     Cardiovascular:   Pacemaker Hypertension, well controlled CAD  CABG/stent  CHF ECG has been reviewed. Atrial-paced rhythm with prolonged AV conduction   Inferior infarct ,age undetermined   Possible Anterolateral infarct ,age undetermined   Abnormal ECG   When compared with ECG of 16-JUN-2021 12:51,   Electronic atrial pacemaker has replaced Sinus rhythm   Borderline criteria for Anterior infarct are now Present   Borderline criteria for Anterolateral infarct are now Present   Inferior infarct is now Present   Confirmed by Fredrick Deras MD ([id number]) on 6/17/2021 11:07:26 AM   Also confirmed by Oliver Moncada MD ([id number])  on 6/17/2021 11:08:53 AM     ECHO  Narrative  · Normal systolic function.  · The estimated ejection fraction is 60%.  · Grade I left ventricular diastolic dysfunction.  · Normal right ventricular size with normal right ventricular systolic   function.  · Moderate left atrial enlargement.  · There is moderate aortic valve stenosis.  · Normal central venous pressure (3 mmHg).  · The estimated PA systolic pressure is 27 mmHg.      Pulmonary:   COPD Asthma mild    Renal/:   Chronic Renal Disease, CRI    Hepatic/GI:   GERD, well controlled        Physical Exam  General: Well nourished    Airway:  Mallampati: II   Mouth Opening: Normal  TM Distance: Normal  Tongue: Normal  Neck ROM: Normal ROM    Dental:  Intact    Chest/Lungs:  Normal Respiratory Rate    Heart:  Rate: Normal  Rhythm:  Regular Rhythm        Anesthesia Plan  Type of Anesthesia, risks & benefits discussed:    Anesthesia Type: MAC  Intra-op Monitoring Plan: Standard ASA Monitors  Post Op Pain Control Plan: multimodal analgesia  Induction:  IV  Informed Consent: Informed consent signed with the Patient and all parties understand the risks and agree with anesthesia plan.  All questions answered.   ASA Score: 3  Day of Surgery Review of History & Physical: H&P Update referred to the surgeon/provider.    Ready For Surgery From Anesthesia Perspective.     .

## 2023-10-05 NOTE — PROGRESS NOTES
[facility] - Tele[facility] ([facility])  Cardiology  Progress Note    Patient Name: Holli Landrum  MRN: [id number]  Admission Date: 10/3/2023  Hospital Length of Stay: 1 days  Code Status: Full Code   Attending Physician: Dominik Jackson,*   Primary Care Physician: Marcia Carlisle MD  Expected Discharge Date: 10/5/2023  Principal Problem:Paroxysmal atrial fibrillation    Subjective:     Hospital Course:   10/4/23 Pt seen and examined today, sitting up in bedside chair c/o nausea fatigue. Labs reviewed, chart reviewed  OCTOBER 5, 2023.  STATUS POST SUCCESSFUL CARDIOVERSION EARLIER TODAY.    DISCUSSED WITH PATIENT AND FAMILY AFTER SHE WOKE UP AS WELL.  ALSO NOTED TO HAVE SEVERE AORTIC STENOSIS.  SHE STATES THAT SHE FOLLOWS WITH DR. BROWN AND A TAVR HAS BEEN NOT PERFORMED DUE TO HISTORY OF SUPRAPUBIC MACDONALD CATHETER.      Interval History:     Review of Systems   Cardiovascular:  Negative for chest pain, dyspnea on exertion, palpitations and syncope.   Genitourinary: Negative.    Neurological: Negative.      Objective:     Vital Signs (Most Recent):  Temp: 98.1 °F (36.7 °C) (10/05/23 1150)  Pulse: 88 (10/05/23 1150)  Resp: 18 (10/05/23 1150)  BP: (!) 146/80 (10/05/23 1150)  SpO2: 98 % (10/05/23 1150) Vital Signs (24h Range):  Temp:  [97.5 °F (36.4 °C)-98.3 °F (36.8 °C)] 98.1 °F (36.7 °C)  Pulse:  [62-88] 88  Resp:  [14-18] 18  SpO2:  [94 %-98 %] 98 %  BP: (118-165)/(61-86) 146/80     Weight: 93 kg (205 lb)  Body mass index is 34.11 kg/m².     SpO2: 98 %         Intake/Output Summary (Last 24 hours) at 10/5/2023 1200  Last data filed at 10/5/2023 1032  Gross per 24 hour   Intake 100 ml   Output 2600 ml   Net -2500 ml       Lines/Drains/Airways       Drain  Duration                  Suprapubic Catheter 06/20/23 1126 18 Fr. 107 days              Peripheral Intravenous Line  Duration                  Peripheral IV - Single Lumen 10/03/23 1744 20 G Anterior;Right Forearm 1 day                       Physical Exam  Vitals  reviewed.   Constitutional:       Appearance: She is well-developed.   Neck:      Vascular: No carotid bruit.   Cardiovascular:      Rate and Rhythm: Normal rate and regular rhythm.      Pulses: Intact distal pulses.      Heart sounds: Normal heart sounds. No murmur heard.  Pulmonary:      Breath sounds: Normal breath sounds.   Neurological:      Mental Status: She is oriented to person, place, and time.            Significant Labs: Troponin   Recent Labs   Lab 10/03/23  1756 10/04/23  0436   TROPONINI 0.017 0.010   , All pertinent lab results from the last 24 hours have been reviewed., and   Recent Lab Results         10/05/23  1125   10/05/23  0534   10/04/23  1701        Anion Gap   11         Baso #   0.05         Basophil %   0.5         BSA 2.06           BUN   28         Calcium   9.3         Chloride   111         CO2   19         Creatinine   1.0         Differential Method   Automated         eGFR   54         Eos #   0.2         Eosinophil %   2.3         Glucose   106         Gran # (ANC)   5.4         Gran %   58.8         Hematocrit   40.6         Hemoglobin   13.2         Heparin Anti-Xa     0.98  Comment: Expected therapeutic range for Unfractionated heparin (UFH)  is 0.3-0.7 IU/mL.  The therapeutic range for low molecular weight heparins   (LMWH) varies with the type and , but is   typically between 0.4 and 1.1 IU/mL.         Immature Grans (Abs)   0.02  Comment: Mild elevation in immature granulocytes is non specific and   can be seen in a variety of conditions including stress response,   acute inflammation, trauma and pregnancy. Correlation with other   laboratory and clinical findings is essential.           Immature Granulocytes   0.2         Lymph #   2.5         Lymph %   27.5         MCH   26.6         MCHC   32.5         MCV   82         Mono #   1.0         Mono %   10.7         MPV   10.1         nRBC   0         Platelet Count   328         Potassium   3.3         Est. RA  pres 3           RBC   4.96         RDW   17.1         Sodium   141         WBC   9.24                 Significant Imaging: Echocardiogram: Transthoracic echo (TTE) complete (Cupid Only):   Results for orders placed or performed during the hospital encounter of 05/17/23   Echo   Result Value Ref Range    BSA 2.03 m2    TDI SEPTAL 0.06 m/s    LV LATERAL E/E' RATIO 9.00 m/s    LV SEPTAL E/E' RATIO 10.50 m/s    LA WIDTH 3.70 cm    IVC diameter 1.09 cm    Left Ventricular Outflow Tract Mean Velocity 0.68 cm/s    Left Ventricular Outflow Tract Mean Gradient 2.06 mmHg    TDI LATERAL 0.07 m/s    PV PEAK VELOCITY 0.83 cm/s    LVIDd 3.82 3.5 - 6.0 cm    IVS 1.32 (A) 0.6 - 1.1 cm    Posterior Wall 1.40 (A) 0.6 - 1.1 cm    Ao root annulus 2.92 cm    LVIDs 2.31 2.1 - 4.0 cm    FS 40 28 - 44 %    LA volume 84.19 cm3    Sinus 2.86 cm    STJ 2.69 cm    LV mass 186.95 g    LA size 4.43 cm    RVDD 3.21 cm    Left Ventricle Relative Wall Thickness 0.73 cm    AV mean gradient 21 mmHg    AV valve area 0.80 cm2    AV Velocity Ratio 0.31     AV index (prosthetic) 0.32     E/A ratio 0.94     Mean e' 0.07 m/s    E wave deceleration time 397.60 msec    IVRT 102.76 msec    LVOT diameter 1.78 cm    LVOT area 2.5 cm2    LVOT peak tc 0.88 m/s    LVOT peak VTI 18.90 cm    Ao peak tc 2.85 m/s    Ao VTI 59.1 cm    RVOT peak tc 0.66 m/s    RVOT peak VTI 13.3 cm    LVOT stroke volume 47.01 cm3    AV peak gradient 32 mmHg    PV mean gradient 0.88 mmHg    E/E' ratio 9.69 m/s    MV Peak E Tc 0.63 m/s    TR Max Tc 2.51 m/s    MV Peak A Tc 0.67 m/s    LV Systolic Volume 18.36 mL    LV Systolic Volume Index 9.3 mL/m2    LV Diastolic Volume 62.54 mL    LV Diastolic Volume Index 31.75 mL/m2    LA Volume Index 42.7 mL/m2    LV Mass Index 95 g/m2    RA Major Axis 4.59 cm    Left Atrium Minor Axis 5.70 cm    Left Atrium Major Axis 6.43 cm    Triscuspid Valve Regurgitation Peak Gradient 25 mmHg    LA Volume Index (Mod) 22.7 mL/m2    LA volume (mod) 44.76  cm3    RA Width 2.84 cm    Right Atrial Pressure (from IVC) 8 mmHg    EF 65 %    TV resting pulmonary artery pressure 33 mmHg    Narrative    · The left ventricle is normal in size with mild concentric hypertrophy   and normal systolic function.  · Moderate left atrial enlargement.  · The estimated ejection fraction is 65%.  · Indeterminate left ventricular diastolic function.  · Normal right ventricular size with normal right ventricular systolic   function.  · There is moderate-to-severe aortic valve stenosis.  · Aortic valve area is 0.80 cm2; peak velocity is 2.85 m/s; mean gradient   is 21 mmHg.  · Mild tricuspid regurgitation.  · Intermediate central venous pressure (8 mmHg).  · The estimated PA systolic pressure is 33 mmHg.        Assessment and Plan:     Brief HPI:     * Paroxysmal atrial fibrillation  Tele reviewed, paced rhythm Afib  Cont low dose eliquis (h/o anemia with hemorrhoids)  Cont amio gtt  Hold home sotalol  EP consult pending recs    10/4/23  PO Amio 200mg BID  Hold home Sotalol  Follow up in EP clinic  PPM ordered to switch to DDI    10.5.2023  Continue amiodarone 200 mg b.i.d. for 2 other weeks and then daily.    Follow in Cardiology Clinic with her cardiologist Dr. Meyer in 1-2 weeks.  Continue to follow with structural cardiology clinic.  Continue Eliquis.  Okay to discharge home from cardiac standpoint.    Acute on chronic diastolic heart failure    IV lasix x1 assess response  Further recs to follow    10/4/23  -2L for admission  euvolemic on exam  Can cont home PO lasix    Hypercholesteremia  statin        VTE Risk Mitigation (From admission, onward)         Ordered     apixaban tablet 2.5 mg  2 times daily         10/03/23 1524     IP VTE HIGH RISK PATIENT  Once         10/03/23 1522     Place sequential compression device  Until discontinued         10/03/23 1522                Bao Miguel MD  Cardiology  [facility] - Telemetry ([facility])

## 2023-10-05 NOTE — PLAN OF CARE
10/05/23 1424   Post-Acute Status   Post-Acute Authorization Home Health   Home Health Status Referrals Sent   Coverage [facility] Managed Medicare   Discharge Delays None known at this time   Discharge Plan   Discharge Plan A Home Health   Discharge Plan B Home with family       KIESHA sent referral to [facility], to resume care for patient upon DC.  KIESHA will add information to AVS and follow up to make sure [facility] can still accept.    KIESHA will continue to follow and assist as needed.

## 2023-10-05 NOTE — PLAN OF CARE
[facility] - Tele[facility] (Hospital)  Discharge Final Note    Primary Care Provider: Marcia Carlisle MD    Expected Discharge Date: 10/5/2023    Final Discharge Note (most recent)       Final Note - 10/05/23 1458          Final Note    Assessment Type Final Discharge Note     Anticipated Discharge Disposition Home-Health Care [facility]     Hospital Resources/Appts/Education Provided Appointments scheduled and added to AVS        Post-Acute Status    Post-Acute Authorization Home Health     Home Health Status Set-up Complete/Auth obtained     Coverage [employer] Managed Medicare     Discharge Delays None known at this time                     Important Message from Medicare             Contact Info       Fletcher Meyer MD   Specialty: Cardiology, Internal Medicine    [address]  [place] [address]   Phone: [phone]       Next Steps: Follow up in 2 week(s)    Marcia Carlisle MD   Specialty: Family Medicine   Relationship: PCP - General    [address] [facility] [address]   Phone: [phone]       Next Steps: Follow up in 2 week(s)    [facility]   Specialty: Home Health Services, Home Therapy Services, Home Living Aide Services    [address] [address]   Phone: [phone]       Next Steps: Call in 3 day(s)    Instructions: home health  [facility] has been set up for you upon discharge. If no one has reached out within 3 days of discharge, please give them a call.          DC Disposition: [facility]  Family Notified: Patient by nurse  Transportation: personal transportation    Patient needed Home Health services resumed upon discharge. Patient has [facility] set up.     Patient has resources to schedule hospital follow up with non[facility] PCP on AVS.

## 2023-10-05 NOTE — ANESTHESIA POSTPROCEDURE EVALUATION
Anesthesia Post Evaluation    Patient: Holli Landrum    Procedure(s) Performed: Procedure(s) (LRB):  Transesophageal echo (LIZ) intra-procedure log documentation/liz/cv (N/A)  Cardioversion or Defibrillation (N/A)    Final Anesthesia Type: MAC      Patient location during evaluation: Cath Lab ([facility])  Patient participation: Yes- Able to Participate  Level of consciousness: awake and alert, awake and oriented  Post-procedure vital signs: reviewed and stable  Pain management: adequate  Airway patency: patent    PONV status at discharge: No PONV  Anesthetic complications: no      Cardiovascular status: blood pressure returned to baseline and hemodynamically stable  Respiratory status: unassisted  Hydration status: euvolemic  Follow-up not needed.          Vitals Value Taken Time   /86 10/05/23 1033   Temp 36.5 °C (97.7 °F) 10/05/23 1033   Pulse 66 10/05/23 1033   Resp 18 10/05/23 1033   SpO2 98 % 10/05/23 1033         No case tracking events are documented in the log.      Pain/Anthony Score: Pain Rating Prior to Med Admin: 4 (10/4/2023 11:18 AM)  Pain Rating Post Med Admin: 0 (10/4/2023 12:18 PM)

## 2023-10-05 NOTE — SUBJECTIVE & OBJECTIVE
Interval History:     Review of Systems   Cardiovascular:  Negative for chest pain, dyspnea on exertion, palpitations and syncope.   Genitourinary: Negative.    Neurological: Negative.      Objective:     Vital Signs (Most Recent):  Temp: 98.1 °F (36.7 °C) (10/05/23 1150)  Pulse: 88 (10/05/23 1150)  Resp: 18 (10/05/23 1150)  BP: (!) 146/80 (10/05/23 1150)  SpO2: 98 % (10/05/23 1150) Vital Signs (24h Range):  Temp:  [97.5 °F (36.4 °C)-98.3 °F (36.8 °C)] 98.1 °F (36.7 °C)  Pulse:  [62-88] 88  Resp:  [14-18] 18  SpO2:  [94 %-98 %] 98 %  BP: (118-165)/(61-86) 146/80     Weight: 93 kg (205 lb)  Body mass index is 34.11 kg/m².     SpO2: 98 %         Intake/Output Summary (Last 24 hours) at 10/5/2023 1200  Last data filed at 10/5/2023 1032  Gross per 24 hour   Intake 100 ml   Output 2600 ml   Net -2500 ml       Lines/Drains/Airways       Drain  Duration                  Suprapubic Catheter 06/20/23 1126 18 Fr. 107 days              Peripheral Intravenous Line  Duration                  Peripheral IV - Single Lumen 10/03/23 1744 20 G Anterior;Right Forearm 1 day                       Physical Exam  Vitals reviewed.   Constitutional:       Appearance: She is well-developed.   Neck:      Vascular: No carotid bruit.   Cardiovascular:      Rate and Rhythm: Normal rate and regular rhythm.      Pulses: Intact distal pulses.      Heart sounds: Normal heart sounds. No murmur heard.  Pulmonary:      Breath sounds: Normal breath sounds.   Neurological:      Mental Status: She is oriented to person, place, and time.            Significant Labs: Troponin   Recent Labs   Lab 10/03/23  1756 10/04/23  0436   TROPONINI 0.017 0.010   , All pertinent lab results from the last 24 hours have been reviewed., and   Recent Lab Results         10/05/23  1125   10/05/23  0534   10/04/23  1701        Anion Gap   11         Baso #   0.05         Basophil %   0.5         BSA 2.06           BUN   28         Calcium   9.3         Chloride   111          CO2   19         Creatinine   1.0         Differential Method   Automated         eGFR   54         Eos #   0.2         Eosinophil %   2.3         Glucose   106         Gran # (ANC)   5.4         Gran %   58.8         Hematocrit   40.6         Hemoglobin   13.2         Heparin Anti-Xa     0.98  Comment: Expected therapeutic range for Unfractionated heparin (UFH)  is 0.3-0.7 IU/mL.  The therapeutic range for low molecular weight heparins   (LMWH) varies with the type and , but is   typically between 0.4 and 1.1 IU/mL.         Immature Grans (Abs)   0.02  Comment: Mild elevation in immature granulocytes is non specific and   can be seen in a variety of conditions including stress response,   acute inflammation, trauma and pregnancy. Correlation with other   laboratory and clinical findings is essential.           Immature Granulocytes   0.2         Lymph #   2.5         Lymph %   27.5         MCH   26.6         MCHC   32.5         MCV   82         Mono #   1.0         Mono %   10.7         MPV   10.1         nRBC   0         Platelet Count   328         Potassium   3.3         Est. RA pres 3           RBC   4.96         RDW   17.1         Sodium   141         WBC   9.24                 Significant Imaging: Echocardiogram: Transthoracic echo (TTE) complete ([facility] Only):   Results for orders placed or performed during the hospital encounter of 05/17/23   Echo   Result Value Ref Range    BSA 2.03 m2    TDI SEPTAL 0.06 m/s    LV LATERAL E/E' RATIO 9.00 m/s    LV SEPTAL E/E' RATIO 10.50 m/s    LA WIDTH 3.70 cm    IVC diameter 1.09 cm    Left Ventricular Outflow Tract Mean Velocity 0.68 cm/s    Left Ventricular Outflow Tract Mean Gradient 2.06 mmHg    TDI LATERAL 0.07 m/s    PV PEAK VELOCITY 0.83 cm/s    LVIDd 3.82 3.5 - 6.0 cm    IVS 1.32 (A) 0.6 - 1.1 cm    Posterior Wall 1.40 (A) 0.6 - 1.1 cm    Ao root annulus 2.92 cm    LVIDs 2.31 2.1 - 4.0 cm    FS 40 28 - 44 %    LA volume 84.19 cm3    Sinus 2.86 cm     STJ 2.69 cm    LV mass 186.95 g    LA size 4.43 cm    RVDD 3.21 cm    Left Ventricle Relative Wall Thickness 0.73 cm    AV mean gradient 21 mmHg    AV valve area 0.80 cm2    AV Velocity Ratio 0.31     AV index (prosthetic) 0.32     E/A ratio 0.94     Mean e' 0.07 m/s    E wave deceleration time 397.60 msec    IVRT 102.76 msec    LVOT diameter 1.78 cm    LVOT area 2.5 cm2    LVOT peak tc 0.88 m/s    LVOT peak VTI 18.90 cm    Ao peak tc 2.85 m/s    Ao VTI 59.1 cm    RVOT peak tc 0.66 m/s    RVOT peak VTI 13.3 cm    LVOT stroke volume 47.01 cm3    AV peak gradient 32 mmHg    PV mean gradient 0.88 mmHg    E/E' ratio 9.69 m/s    MV Peak E Tc 0.63 m/s    TR Max Tc 2.51 m/s    MV Peak A Tc 0.67 m/s    LV Systolic Volume 18.36 mL    LV Systolic Volume Index 9.3 mL/m2    LV Diastolic Volume 62.54 mL    LV Diastolic Volume Index 31.75 mL/m2    LA Volume Index 42.7 mL/m2    LV Mass Index 95 g/m2    RA Major Axis 4.59 cm    Left Atrium Minor Axis 5.70 cm    Left Atrium Major Axis 6.43 cm    Triscuspid Valve Regurgitation Peak Gradient 25 mmHg    LA Volume Index (Mod) 22.7 mL/m2    LA volume (mod) 44.76 cm3    RA Width 2.84 cm    Right Atrial Pressure (from IVC) 8 mmHg    EF 65 %    TV resting pulmonary artery pressure 33 mmHg    Narrative    · The left ventricle is normal in size with mild concentric hypertrophy   and normal systolic function.  · Moderate left atrial enlargement.  · The estimated ejection fraction is 65%.  · Indeterminate left ventricular diastolic function.  · Normal right ventricular size with normal right ventricular systolic   function.  · There is moderate-to-severe aortic valve stenosis.  · Aortic valve area is 0.80 cm2; peak velocity is 2.85 m/s; mean gradient   is 21 mmHg.  · Mild tricuspid regurgitation.  · Intermediate central venous pressure (8 mmHg).  · The estimated PA systolic pressure is 33 mmHg.

## 2023-10-05 NOTE — DISCHARGE INSTRUCTIONS
ACTIVITY LEVEL  You may feel sleepy for several hours.  Rest until you are more awake.  Gradually resume your normal activities over the next 24 hours.   For the next 8 hours, you should be watched by a responsible adult. This person should make sure your condition is not getting worse.   Don't drink any alcohol for the next 24 hours.  Don't drive, operate dangerous machinery, or make important business or personal decisions during the next 24 hours.  Your healthcare provider may tell you not to take any medicine by mouth for pain or sleep in the next 4 hours. These medicines may react with the medicines you were given in the hospital. This could cause a much stronger response than usual.     DIET  At home, begin with liquids and then progress to normal foods if you don't experience nausea.    MEDICATIONS  Continue home medications as before procedure.  You may take Tylenol every four hours as needed for minor discomfort at pad sites or chest soreness.  If you take Coumadin, resume your regimen and continue to have your blood tested weekly as directed by your physician.    FOLLOW UP  You will be scheduled for a follow up appointment and EKG within two weeks of your procedure.            CALL YOUR HEALTHCARE PROVIDER IF YOU START TO HAVE THE FOLLOWING SYMPTOMS:        1.  Drowsiness that doesn't get better       2. Weakness or dizziness that doesn't get better            3. Repeated vomiting       If you notice any redness, like a sunburn spot, on your chest ([place] of redness) and on you back under left shoulder blade, treat this as you would treat a light sunburn. Apply lotion that makes it feel better. This will go away within a day or two.

## 2023-10-05 NOTE — DISCHARGE SUMMARY
[facility] - Telemetry ([facility])  [facility] Medicine  Discharge Summary      Patient Name: Holli Landrum  MRN: [id number]  CANDI: [id number]  Patient Class: IP- Inpatient  Admission Date: 10/3/2023  Hospital Length of Stay: 1 days  Discharge Date and Time:  10/05/2023 2:37 PM  Attending Physician: Dominik Jackson,*   Discharging Provider: Dominik Montesinos MD  Primary Care Provider: Marcia Carlisle MD    Primary Care Team: [facility] MEDICINE D    HPI:   89 y/o. female patient with a PMHx of  persistent atrial fibrillation, coronary artery disease status post PCI, sick sinus syndrome status post pacemaker implantation Biotronik, hypertrophic obstructive cardiomyopathy, diastolic congestive heart failure, aortic stenosis, history of GI bleeding, asthma, HTN, COPD, PAF (low dose eliquis due to anemia), PVT who presented to [facility] from cards clinic with fatigue, HA dizziness and near-syncope.Pt c/o palpitations, generalized weakness, fatigue, light-headedness, syncope, and SOB. Symptoms are constant and moderate in severity. No mitigating or exacerbating factors reported. No other associated sxs reported. Patient denies any CP, fever, chills, diaphoresis, N/V/D, and all other sxs at this time . She report the this symptoms are more frequent over the last weeks .   ER COURSE:  . Started on amiodarone drip . S/P lasix 40 mg IV . Cardiology and EP consulted   Pt will be admitted to observation with a dx of Afib       Procedure(s) (LRB):  Transesophageal echo (LIZ) intra-procedure log documentation/liz/cv (N/A)  Cardioversion or Defibrillation (N/A)      Hospital Course:   89 y/o  admitted with a dx of uncontrolled afib  and acute on chronic  diastolic CHF . Cardiology and  EP consulted . Started on amiodarone and  sotalol d/c  . EP  rec to monitor  for 24 hr and if remain in afib to cardioverted  tomorrow am .  10/5 Pt was seen and examined at bedside . She was determined to be suitable for d/c .  S/P LIZ  DCCV  back to sinus rhythm . Cardiology rec to stop sotalol and cont amiodarone 200 mg po bid x 14 days and then 200 mg po daily . There was no acute event since admission . She will f/u with cardiology and EP in 1 to 2 weeks .          Goals of Care Treatment Preferences:  Code Status: Full Code    Living Will: Yes              Consults:   Consults (From admission, onward)        Status Ordering Provider     Inpatient consult to Electrophysiology  Once        Provider:  Dusty Saleh MD    Completed AMAN CORTEZ     Inpatient consult to Cardiology  Once        Provider:  Bao Miguel MD    Completed AMAN CORTEZ          Pulmonary  COPD (chronic obstructive pulmonary disease)  Stable   Cot ARIELLE,LABA and ICS      Cardiac/Vascular  * Paroxysmal atrial fibrillation  Patient with Long standing persistent (>12 months) atrial fibrillation which is controlled currently with Amiodarone. Patient is currently in atrial fibrillation.[id number] Score: 4. HASBLED Score: . Anticoagulation indicated. Anticoagulation done with elequis        Hold Sotalol   Cont amiodarone   Cont Eliquis   Plan for possible cardioversion tomorrow am     Acute on chronic diastolic heart failure  Cont lasix     Cont CHF core measures        Hypercholesteremia  Cont statin         Final Active Diagnoses:    Diagnosis Date Noted POA    PRINCIPAL PROBLEM:  Paroxysmal atrial fibrillation [I48.0] 11/20/2012 Yes    Hypercholesteremia [E78.00] 01/07/2015 Yes    COPD (chronic obstructive pulmonary disease) [J44.9] 11/20/2012 Yes    Acute on chronic diastolic heart failure [I50.33] 05/05/2010 Yes      Problems Resolved During this Admission:       Discharged Condition: stable    Disposition: Home or Self Care    Follow Up:   Follow-up Information     Fletcher Meyer MD Follow up in 2 week(s).    Specialties: Cardiology, Internal Medicine  Contact information:  [address]  [place] [address] [phone]              Marcia Carlisle MD Follow up in 2 week(s).    Specialty: Family Medicine  Contact information:  [id number] [facility] [id number] [phone]                       Patient Instructions:      ACCEPT - Ambulatory referral/consult to Heart Failure Transitional Care Clinic   Standing Status: Future   Referral Priority: Routine Referral Type: Consultation   Referral Reason: Specialty Services Required   Requested Specialty: Cardiology   Number of Visits Requested: 1     Ambulatory referral/consult to [facility]   Standing Status: Future   Referral Priority: Routine Referral Type: Consultation   Referral Reason: Specialty Services Required   Number of Visits Requested: 1     Ambulatory referral/consult to Home Health   Standing Status: Future   Referral Priority: Routine Referral Type: Home Health   Referral Reason: Specialty Services Required   Requested Specialty: Home Health Services   Number of Visits Requested: 1     Diet Cardiac     Activity as tolerated       Significant Diagnostic Studies: Labs:   BMP:   Recent Labs   Lab 10/04/23  0436 10/05/23  0534    106    141   K 3.4* 3.3*    111*   CO2 19* 19*   BUN 25* 28*   CREATININE 1.1 1.0   CALCIUM 9.5 9.3   MG 2.2  --    , CMP   Recent Labs   Lab 10/04/23  0436 10/05/23  0534    141   K 3.4* 3.3*    111*   CO2 19* 19*    106   BUN 25* 28*   CREATININE 1.1 1.0   CALCIUM 9.5 9.3   ANIONGAP 13 11    and CBC   Recent Labs   Lab 10/04/23  0436 10/05/23  0534   WBC 9.91 9.24   HGB 12.6 13.2   HCT 38.8 40.6    328     Microbiology:   Blood Culture   Lab Results   Component Value Date    LABBLOO Gram stain elmer bottle: Gram positive rods 09/29/2017    LABBLOO  09/29/2017     Results called to and read back by:Luz Davis 09/30/2017  19:05 AEM    LABBLOO  09/29/2017     BACILLUS SPECIES  Organism is a probable contaminant  No further workup      LABBLOO No growth after 5 days. 09/29/2017        Pending Diagnostic Studies:     None         Medications:  Reconciled Home Medications:      Medication List      START taking these medications    amiodarone 200 MG Tab  Commonly known as: PACERONE  Take 1 tablet (200 mg total) by mouth 2 (two) times daily for 14 days, THEN 1 tablet (200 mg total) once daily.  Start taking on: October 5, 2023        CHANGE how you take these medications    amLODIPine 2.5 MG tablet  Commonly known as: NORVASC  TAKE 1 TABLET BY MOUTH EVERY DAY  What changed: when to take this     pantoprazole 40 MG tablet  Commonly known as: PROTONIX  Take 1 tablet (40 mg total) by mouth once daily.  What changed: additional instructions     potassium chloride 10 MEQ Tbsr  Commonly known as: KLOR-CON  Take 2 tablets (20 mEq total) by mouth 2 (two) times daily.  What changed:   · how much to take  · additional instructions     rosuvastatin 40 MG Tab  Commonly known as: CRESTOR  TAKE 1 TABLET (40 MG TOTAL) BY MOUTH ONCE DAILY.  What changed: when to take this        CONTINUE taking these medications    acetaminophen 650 MG Tbsr  Commonly known as: TYLENOL  Take 650 mg by mouth as needed.     albuterol 90 mcg/actuation inhaler  Commonly known as: PROVENTIL/VENTOLIN HFA  Inhale 1-2 puffs into the lungs every 4 (four) hours as needed for Wheezing or Shortness of Breath. Rescue     albuterol-ipratropium 2.5 mg-0.5 mg/3 mL nebulizer solution  Commonly known as: DUO-NEB  Take 3 mLs by nebulization every 6 (six) hours as needed for Wheezing or Shortness of Breath.     allopurinoL 100 MG tablet  Commonly known as: ZYLOPRIM  Take 2 tablets (200 mg total) by mouth once daily.     aluminum & magnesium hydroxide-simethicone 400-400-40 mg/5 mL suspension  Commonly known as: MYLANTA MAX STRENGTH  Take by mouth every 6 (six) hours as needed for Indigestion.     apixaban 2.5 mg Tab  Commonly known as: ELIQUIS  Take 1 tablet (2.5 mg total) by mouth 2 (two) times daily.     cholecalciferol (vitamin D3) 125 mcg  (5,000 unit) Tab  Take 5,000 Units by mouth once daily.     dorzolamide 2 % ophthalmic solution  Commonly known as: TRUSOPT  INSTILL 1 DROP INTO BOTH EYES TWICE DAILY     fluticasone furoate-vilanteroL 100-25 mcg/dose diskus inhaler  Commonly known as: BREO ELLIPTA  Inhale 1 puff into the lungs once daily.     fluticasone propionate 50 mcg/actuation nasal spray  Commonly known as: FLONASE  2 sprays (100 mcg total) by Each Nare route daily as needed for Allergies.     furosemide 40 MG tablet  Commonly known as: LASIX  Take 1 tablet (40 mg total) by mouth 2 (two) times a day. Take twice a day and monitor BP and weights     Lactobacillus rhamnosus GG 10 billion cell capsule  Commonly known as: CULTURELLE  Take 1 capsule by mouth once daily.     latanoprost 0.005 % ophthalmic solution  Place 1 drop into both eyes every evening.     magnesium oxide 400 mg (241.3 mg magnesium) tablet  Commonly known as: MAG-OX  Take 800 mg by mouth once daily.     nitroGLYCERIN 0.4 MG/HR TD PT24 0.4 mg/hr  Commonly known as: NITRODUR  Place 1 patch onto the skin once daily.     ondansetron 4 MG tablet  Commonly known as: ZOFRAN  Take 4 mg by mouth every 6 (six) hours as needed.     ondansetron 4 MG Tbdl  Commonly known as: ZOFRAN-ODT  Take by mouth.     traMADoL 50 mg tablet  Commonly known as: ULTRAM  Take 1 tablet (50 mg total) by mouth every 12 (twelve) hours as needed for Pain.     ZYRTEC ORAL  Take 10 mg by mouth once daily.        STOP taking these medications    sotaloL 80 MG tablet  Commonly known as: BETAPACE            Indwelling Lines/Drains at time of discharge:   Lines/Drains/Airways     Drain  Duration                Suprapubic Catheter 06/20/23 1126 18 Fr. 107 days                Time spent on the discharge of patient: 30 minutes         Dominik Montesinos MD  Department of Hospital Medicine  [facility] - Telemetry ([facility])

## 2023-10-05 NOTE — PLAN OF CARE
A212/A212 JOSE AHerber Landrum is a 89 y.o.female admitted on 10/3/2023 for Paroxysmal atrial fibrillation   Code Status: Full Code MRN: [id number]   Review of patient's allergies indicates:   Allergen Reactions    Timolol maleate Shortness Of Breath    Ciprofloxacin Other (See Comments)     Muscle ache    Sulfamethoxazole-trimethoprim      Past Medical History:   Diagnosis Date    Anticoagulant long-term use     Arthritis     Asthma     Basal cell carcinoma     Cancer     skin cancer to face    Cataract     OU done//    CHF (congestive heart failure)     COPD (chronic obstructive pulmonary disease)     no oxygen; patient denies    cpap     Essential hypertension 05/05/2010    GERD (gastroesophageal reflux disease) 11/20/2012    Glaucoma     Hypertensive heart disease with heart failure 02/05/2013    Paroxysmal atrial fibrillation     Paroxysmal ventricular tachycardia     per problem list    Renal disorder     french-1/3/2020    Squamous cell carcinoma of skin       PRN meds    acetaminophen, 650 mg, Q6H PRN  nitroGLYCERIN, 0.4 mg, Q5 Min PRN  ondansetron, 4 mg, Q8H PRN  sodium chloride 0.9%, 10 mL, PRN  sodium chloride 0.9%, 10 mL, PRN  traMADoL, 50 mg, Q6H PRN      AVS Discharge instructions received and reviewed with pt and family at bedside.  Pt voiced understanding and all questions answered to satisfaction.  Stressed importance to making and keeping all follow up appointments.  Medications at bedside and reviewed with pt.  Tele monitor removed and brought to monitor tech.  IV d/c'd with tip intact, pressure dressing applied.  Pt will call when ready to be transported to front of hospital via w/c to be discharged home.      Orientation: oriented x 4  [place] Coma Scale Score: 15     Lead Monitored: Lead II Rhythm: normal sinus rhythm    Cardiac/Telemetry Box Number: [id number]  VTE Required Core Measure: Pharmacological prophylaxis initiated/maintained Last Bowel Movement: 10/04/23  Diet NPO  Diet Cardiac  Voiding  Characteristics: suprapubic catheter  Cory Score: 20  Fall Risk Score: 16  Accucheck []   Freq?      Lines/Drains/Airways       Drain  Duration                  Suprapubic Catheter 06/20/23 1126 18 Fr. 107 days

## 2023-10-05 NOTE — TRANSFER OF CARE
"Anesthesia Transfer of Care Note    Patient: Holli Landrum    Procedure(s) Performed: Procedure(s) (LRB):  Transesophageal echo (LIZ) intra-procedure log documentation/liz/cv (N/A)  Cardioversion or Defibrillation (N/A)    Patient location: Other: CVRU    Anesthesia Type: MAC    Transport from OR: Transported from OR on room air with adequate spontaneous ventilation    Post pain: adequate analgesia    Post assessment: no apparent anesthetic complications and tolerated procedure well    Post vital signs: stable    Level of consciousness: awake and alert    Nausea/Vomiting: no nausea/vomiting    Complications: none    Transfer of care protocol was followed      Last vitals:   Visit Vitals  BP (!) 165/86   Pulse 66   Temp 36.5 °C (97.7 °F)   Resp 18   Ht 5' 5" (1.651 m)   Wt 93.1 kg (205 lb 4 oz)   LMP  (LMP Unknown)   SpO2 98%   BMI 34.16 kg/m²     "

## 2023-10-05 NOTE — ASSESSMENT & PLAN NOTE
Tele reviewed, paced rhythm Afib  Cont low dose eliquis (h/o anemia with hemorrhoids)  Cont amio gtt  Hold home sotalol  EP consult pending recs    10/4/23  PO Amio 200mg BID  Hold home Sotalol  Follow up in EP clinic  PPM ordered to switch to DDI    10.5.2023  Continue amiodarone 200 mg b.i.d. for 2 other weeks and then daily.    Follow in Cardiology Clinic with her cardiologist Dr. Meyer in 1-2 weeks.  Continue to follow with structural cardiology clinic.  Continue Eliquis.  Okay to discharge home from cardiac standpoint.

## 2023-10-05 NOTE — TELEPHONE ENCOUNTER
Heart Failure Transitional Care Clinic (HFTCC) Team notified of pt referral via Ambulatory Referral to Heart Failure Transitional Care ([id number]).    Patient screened today by HF nurse for enrollment to program.      Pt was deemed not a candidate for enrollment at this time related to  primary diagnosis related to atrial fibrillation.    Pt will require additional follow up planning per EP.     If pt status, diagnosis, or treatment plan changes , please place AMB referral to Heart Failure Clinic ([id number]).

## 2023-10-06 ENCOUNTER — PES CALL (OUTPATIENT)
Dept: HOME HEALTH SERVICES | Facility: CLINIC | Age: 88
End: 2023-10-06
Payer: MEDICARE

## 2023-10-10 ENCOUNTER — OFFICE VISIT (OUTPATIENT)
Dept: HOME HEALTH SERVICES | Facility: CLINIC | Age: 88
End: 2023-10-10
Payer: MEDICARE

## 2023-10-10 VITALS
RESPIRATION RATE: 17 BRPM | DIASTOLIC BLOOD PRESSURE: 72 MMHG | OXYGEN SATURATION: 95 % | HEART RATE: 63 BPM | SYSTOLIC BLOOD PRESSURE: 127 MMHG

## 2023-10-10 DIAGNOSIS — Z09 HOSPITAL DISCHARGE FOLLOW-UP: Primary | ICD-10-CM

## 2023-10-10 DIAGNOSIS — I48.0 PAROXYSMAL ATRIAL FIBRILLATION: ICD-10-CM

## 2023-10-10 DIAGNOSIS — Z93.59 SUPRAPUBIC CATHETER: ICD-10-CM

## 2023-10-10 DIAGNOSIS — R53.83 FATIGUE, UNSPECIFIED TYPE: ICD-10-CM

## 2023-10-10 DIAGNOSIS — E27.8 ADRENAL NODULE: ICD-10-CM

## 2023-10-10 PROCEDURE — 99348 PR HOME VISIT,ESTAB PATIENT,LEVEL II: ICD-10-PCS | Mod: ,,,

## 2023-10-10 PROCEDURE — 1157F PR ADVANCE CARE PLAN OR EQUIV PRESENT IN MEDICAL RECORD: ICD-10-PCS | Mod: CPTII,S$GLB,,

## 2023-10-10 PROCEDURE — 99348 HOME/RES VST EST LOW MDM 30: CPT | Mod: ,,,

## 2023-10-10 PROCEDURE — 1157F ADVNC CARE PLAN IN RCRD: CPT | Mod: CPTII,S$GLB,,

## 2023-10-10 NOTE — ASSESSMENT & PLAN NOTE
History of bladder cancer and neurogenic bladder  HH changing catheter 2 x a month  Follow up with urology

## 2023-10-10 NOTE — ASSESSMENT & PLAN NOTE
JÚNIOR with cardioversion 10/5  Reports episodes of A -fib since discharge  NSR during visit  Recently d/c sotalol and added amiodarone  On Eliquis  Continue current medication  Follow up with cardiology 10/12

## 2023-10-10 NOTE — PROGRESS NOTES
[facility]  Transitional Care Management (TCM) Home Visit    Encounter Provider: Jude Campos   PCP: Marcia Carlisle MD  Consult Requested By: Dr. Dominik Perez*  Admit Date: 10/3/23   IP Discharge Date: 10/5/23  Hospital Length of Stay:RRHLOS@ days  Days since discharge (from IP or SNF): 5   [facility] Contact Note: 10/6  Hospital Diagnosis: Paroxysmal atrial fibrillation [I48.0]     HISTORY OF PRESENT ILLNESS      Patient ID: Holli Landrum is a 89 y.o. female was recently admitted to the hospital, this is their TCM encounter.    Hospital Course Synopsis:  89 y/o. female patient with a PMHx of  persistent atrial fibrillation, coronary artery disease status post PCI, sick sinus syndrome status post pacemaker implantation Biotronik, hypertrophic obstructive cardiomyopathy, diastolic congestive heart failure, aortic stenosis, history of GI bleeding, asthma, HTN, COPD, PAF (low dose eliquis due to anemia), PVT who presented to [facility] from cards clinic with fatigue, HA dizziness and near-syncope. Pt c/o palpitations, generalized weakness, fatigue, light-headedness, syncope, and SOB.   ER COURSE:  . Started on amiodarone drip . S/P lasix 40 mg IV . Pt was admitted to observation with a dx of Afib.      Hospital Course:   89 y/o  admitted with a dx of uncontrolled afib  and acute on chronic diastolic CHF. Started on amiodarone and sotalol d/c. EP rec to monitor for 24 hr and if remain in afib to cardioverted  tomorrow am .  S/P JÚNIOR DCCV  back to sinus rhythm. Cardiology rec to stop sotalol and cont amiodarone 200 mg po bid x 14 days and then 200 mg po daily . She will f/u with cardiology and EP in 1 to 2 weeks .     DECISION MAKING TODAY       Assessment & Plan:  1. Hospital discharge follow-up    2. Paroxysmal atrial fibrillation  Assessment & Plan:  JÚNIOR with cardioversion 10/5  Reports episodes of A -fib since discharge  NSR during visit  Recently d/c sotalol and added amiodarone  On  Eliquis  Continue current medication  Follow up with cardiology 10/12    Orders:  -     Ambulatory referral/consult to [facility]    3. Fatigue, unspecified type    4. Suprapubic catheter  Assessment & Plan:  History of bladder cancer and neurogenic bladder  HH changing catheter 2 x a month  Follow up with urology      5. Adrenal nodule  Overview:  Left side 16 mm.  Appears stable from March 2017 to December 2019    Assessment & Plan:  Stable  F/u with urology             Medication List on Discharge:     Medication List            Accurate as of October 10, 2023  4:05 PM. If you have any questions, ask your nurse or doctor.                CHANGE how you take these medications      allopurinoL 100 MG tablet  Commonly known as: ZYLOPRIM  Take 2 tablets (200 mg total) by mouth once daily.  What changed:   how much to take  additional instructions     amLODIPine 2.5 MG tablet  Commonly known as: NORVASC  TAKE 1 TABLET BY MOUTH EVERY DAY  What changed: when to take this     pantoprazole 40 MG tablet  Commonly known as: PROTONIX  Take 1 tablet (40 mg total) by mouth once daily.  What changed: additional instructions     rosuvastatin 40 MG Tab  Commonly known as: CRESTOR  TAKE 1 TABLET (40 MG TOTAL) BY MOUTH ONCE DAILY.  What changed: when to take this            CONTINUE taking these medications      acetaminophen 650 MG Tbsr  Commonly known as: TYLENOL  Take 650 mg by mouth as needed.     albuterol 90 mcg/actuation inhaler  Commonly known as: PROVENTIL/VENTOLIN HFA  Inhale 1-2 puffs into the lungs every 4 (four) hours as needed for Wheezing or Shortness of Breath. Rescue     albuterol-ipratropium 2.5 mg-0.5 mg/3 mL nebulizer solution  Commonly known as: DUO-NEB  Take 3 mLs by nebulization every 6 (six) hours as needed for Wheezing or Shortness of Breath.     aluminum & magnesium hydroxide-simethicone 400-400-40 mg/5 mL suspension  Commonly known as: MYLANTA MAX STRENGTH  Take by mouth every 6 (six) hours  as needed for Indigestion.     amiodarone 200 MG Tab  Commonly known as: PACERONE  Take 1 tablet (200 mg total) by mouth 2 (two) times daily for 14 days, THEN 1 tablet (200 mg total) once daily.  Start taking on: October 5, 2023     apixaban 2.5 mg Tab  Commonly known as: ELIQUIS  Take 1 tablet (2.5 mg total) by mouth 2 (two) times daily.     cholecalciferol (vitamin D3) 125 mcg (5,000 unit) Tab  Take 5,000 Units by mouth once daily.     dorzolamide 2 % ophthalmic solution  Commonly known as: TRUSOPT  INSTILL 1 DROP INTO BOTH EYES TWICE DAILY     fluticasone furoate-vilanteroL 100-25 mcg/dose diskus inhaler  Commonly known as: BREO ELLIPTA  Inhale 1 puff into the lungs once daily.     fluticasone propionate 50 mcg/actuation nasal spray  Commonly known as: FLONASE  2 sprays (100 mcg total) by Each Nare route daily as needed for Allergies.     furosemide 40 MG tablet  Commonly known as: LASIX  Take 1 tablet (40 mg total) by mouth 2 (two) times a day. Take twice a day and monitor BP and weights     Lactobacillus rhamnosus GG 10 billion cell capsule  Commonly known as: CULTURELLE  Take 1 capsule by mouth once daily.     latanoprost 0.005 % ophthalmic solution  Place 1 drop into both eyes every evening.     magnesium oxide 400 mg (241.3 mg magnesium) tablet  Commonly known as: MAG-OX  Take 800 mg by mouth once daily.     nitroGLYCERIN 0.4 MG/HR TD PT24 0.4 mg/hr  Commonly known as: NITRODUR  Place 1 patch onto the skin once daily.     ondansetron 4 MG tablet  Commonly known as: ZOFRAN  Take 4 mg by mouth every 6 (six) hours as needed.     ondansetron 4 MG Tbdl  Commonly known as: ZOFRAN-ODT  Take by mouth.     potassium chloride 10 MEQ Tbsr  Commonly known as: KLOR-CON  Take 2 tablets (20 mEq total) by mouth 2 (two) times daily.     traMADoL 50 mg tablet  Commonly known as: ULTRAM  Take 1 tablet (50 mg total) by mouth every 12 (twelve) hours as needed for Pain.     ZYRTEC ORAL  Take 10 mg by mouth once daily.               Medication Reconciliation:  Were medications changed on discharge? No  Were medications in the home? Yes  Is the patient taking the medications as directed? Yes  Does the patient understand the medications and changes? Yes  Does updated med list accurately reflects meds patient is currently taking? Yes    ENVIRONMENT OF CARE      Family and/or Caregiver present at visit?  Yes  Name of Caregiver: Merle  History provided by: patient    Advance Care Planning   Advanced Care Planning Status:  Patient has had an ACP conversation  Living Will: Yes  Power of : No  [place]: No    Does Caregiver have HCPoA: No  Changes today: Continue current medication.        Impression upon entering the home:  Physical Dwelling: trailer   Appearance of home environment: cleaniness: clean, walking pathways: clear, lighting: adequate, and home structure: sound structure  Functional Status: minimal assistance  Mobility: ambulatory with device  Nutritional access: adequate intake and access  Home Health: Yes,  Agency [facility].     DME/Supplies: rolling walker and wheelchair     Diagnostic tests reviewed/disposition: No diagnosic tests pending after this hospitalization.  Disease/illness education: Take all medication as prescribed. Activity as tolerated. Keep all upcoming appts.   Establishment or re-establishment of referral orders for community resources: No other necessary community resources.   Discussion with other health care providers: No discussion with other health care providers necessary.   Does patient have a PCP at [facility]? No   Repatriation plan with PCP? follow-up with PCP within 30d   Does patient have an ostomy (ileostomy, colostomy, suprapubic catheter, nephrostomy tube, tracheostomy, PEG tube, pleurex catheter, cholecystostomy, etc)? Yes. Is it on problem list?yes  Were BPAs reviewed? 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  during [place]   Substance and Sexual Activity    Alcohol use: Not Currently     Comment: Only drank a little wine and haven't  drunk anything in 15 y    Drug use: Never    Sexual activity: Not Currently     Partners: Male     Birth control/protection: Abstinence     Comment:  and 87 years of age     Social Determinants of Health     Financial Resource Strain: Low Risk  (2023)    Overall Financial Resource Strain (CARDIA)     Difficulty of Paying Living Expenses: Not hard at all   Food Insecurity: No Food Insecurity (2023)    Hunger Vital Sign     Worried About Running Out of Food in the Last Year: Never true     Ran Out of Food in the Last Year: Never true   Transportation Needs: No Transportation Needs (2023)    PRAPARE - Transportation     Lack of Transportation (Medical): No     Lack of Transportation (Non-Medical): No   Physical Activity: Inactive (2023)    Exercise Vital Sign     Days of Exercise per Week: 0 days     Minutes of Exercise per Session: 0 min   Stress: No Stress Concern Present (2023)    [employer] [place] [employer] - Occupational Stress Questionnaire     Feeling of Stress : Only a little   Social Connections: Unknown (2023)    Social Connection and Isolation Panel [NHANES]     Frequency of Communication with Friends and Family: More than three times a week     Frequency of Social Gatherings with Friends and Family: Twice a week     Active Member of Clubs or Organizations: Yes     Attends Club or Organization Meetings: More than 4 times per year     Marital Status:    Housing Stability: Low Risk  (2023)    Housing Stability Vital Sign     Unable to Pay for Housing in the Last Year: No     Number of Places  Lived in the Last Year: 1     Unstable Housing in the Last Year: No         OBJECTIVE:     Vital Signs:  Vitals:    10/10/23 1151   BP: 127/72   Pulse: 63   Resp: 17       Review of Systems   Constitutional:  Positive for fatigue.   HENT: Negative.     Eyes: Negative.    Respiratory: Negative.  Negative for chest tightness.    Cardiovascular: Negative.  Negative for leg swelling.   Gastrointestinal: Negative.    Endocrine: Negative.    Genitourinary:  Positive for difficulty urinating.        Suprapubic cath in place   Musculoskeletal: Negative.    Skin: Negative.    Allergic/Immunologic: Negative.    Neurological:  Positive for weakness.   Hematological: Negative.    Psychiatric/Behavioral: Negative.  Negative for agitation.    All other systems reviewed and are negative.      Physical Exam:  Physical Exam  Vitals reviewed.   Constitutional:       General: She is not in acute distress.     Appearance: Normal appearance. She is well-developed.   HENT:      Head: Normocephalic and atraumatic.      Nose: Nose normal.      Mouth/Throat:      Mouth: Mucous membranes are dry.      Pharynx: Oropharynx is clear.   Eyes:      Pupils: Pupils are equal, round, and reactive to light.   Cardiovascular:      Rate and Rhythm: Normal rate and regular rhythm.      Pulses: Normal pulses.      Heart sounds: Normal heart sounds.   Pulmonary:      Effort: Pulmonary effort is normal.      Breath sounds: Normal breath sounds.   Abdominal:      General: Bowel sounds are normal.      Palpations: Abdomen is soft.   Musculoskeletal:         General: Normal range of motion.      Cervical back: Normal range of motion and neck supple.   Skin:     General: Skin is warm and dry.   Neurological:      General: No focal deficit present.      Mental Status: She is alert and oriented to person, place, and time. Mental status is at baseline.      Motor: Weakness present.   Psychiatric:         Mood and Affect: Mood normal.         Behavior: Behavior  normal.         Thought Content: Thought content normal.         Judgment: Judgment normal.         INSTRUCTIONS FOR PATIENT:   - Continue all medications, treatments and therapies as ordered.   - Follow all instructions, recommendations as discussed.  - Maintain Safety Precautions at all times.  - Attend all medical appointments as scheduled.  - For worsening symptoms: call Primary Care Physician or Nurse Practitioner.  - For emergencies, call 911 or immediately report to the nearest emergency room.   Scheduled Follow-up, Appts Reviewed with Modifications if Needed: Yes  Future Appointments   Date Time Provider Department Center   10/12/2023 11:30 AM [place] NP [facility] CARDIO [facility]   10/30/2023 10:00 AM HOME MONITOR DEVICE CHECK [facility] ARR PRO [facility]   11/9/2023  2:20 PM Fletcher Meyer MD [facility] CARDIO  Medical C   11/20/2023  3:20 PM Dusty Saleh MD [facility] ARR BR [facility] C   1/12/2024 12:45 PM ONJOHNY JAVED [facility] ASHLEY ABREU'[facility]   1/12/2024  1:00 PM Gabe Waller MD [facility] PULMSVC  Medical C       Signature: Jude Campos NP    Transition of Care Visit:  I have reviewed and updated the history and problem list.  I have reconciled the medication list.  I have discussed the hospitalization and current medical issues, prognosis and plans with the patient/family.

## 2023-10-11 ENCOUNTER — HOSPITAL ENCOUNTER (OUTPATIENT)
Facility: HOSPITAL | Age: 88
Discharge: HOME | End: 2023-10-14
Attending: EMERGENCY MEDICINE | Admitting: HOSPITALIST
Payer: MEDICARE

## 2023-10-11 DIAGNOSIS — R53.83 FATIGUE, UNSPECIFIED TYPE: ICD-10-CM

## 2023-10-11 DIAGNOSIS — R53.83 FATIGUE: ICD-10-CM

## 2023-10-11 DIAGNOSIS — R53.1 GENERALIZED WEAKNESS: ICD-10-CM

## 2023-10-11 DIAGNOSIS — I48.91 ATRIAL FIBRILLATION, UNSPECIFIED TYPE: Primary | ICD-10-CM

## 2023-10-11 DIAGNOSIS — R07.9 CHEST PAIN: ICD-10-CM

## 2023-10-11 DIAGNOSIS — I48.0 PAROXYSMAL ATRIAL FIBRILLATION: ICD-10-CM

## 2023-10-11 DIAGNOSIS — R53.1 WEAKNESS: ICD-10-CM

## 2023-10-11 LAB
ALBUMIN SERPL BCP-MCNC: 3.8 G/DL (ref 3.5–5.2)
ALP SERPL-CCNC: 120 U/L (ref 55–135)
ALT SERPL W/O P-5'-P-CCNC: 20 U/L (ref 10–44)
ANION GAP SERPL CALC-SCNC: 13 MMOL/L (ref 8–16)
AST SERPL-CCNC: 19 U/L (ref 10–40)
BACTERIA #/AREA URNS HPF: NORMAL /HPF
BASOPHILS # BLD AUTO: 0.03 K/UL (ref 0–0.2)
BASOPHILS NFR BLD: 0.4 % (ref 0–1.9)
BILIRUB SERPL-MCNC: 0.4 MG/DL (ref 0.1–1)
BILIRUB UR QL STRIP: NEGATIVE
BNP SERPL-MCNC: 343 PG/ML (ref 0–99)
BUN SERPL-MCNC: 22 MG/DL (ref 8–23)
CALCIUM SERPL-MCNC: 9.8 MG/DL (ref 8.7–10.5)
CHLORIDE SERPL-SCNC: 106 MMOL/L (ref 95–110)
CLARITY UR: CLEAR
CO2 SERPL-SCNC: 23 MMOL/L (ref 23–29)
COLOR UR: COLORLESS
CREAT SERPL-MCNC: 1.1 MG/DL (ref 0.5–1.4)
DIFFERENTIAL METHOD: ABNORMAL
EOSINOPHIL # BLD AUTO: 0.3 K/UL (ref 0–0.5)
EOSINOPHIL NFR BLD: 3.7 % (ref 0–8)
ERYTHROCYTE [DISTWIDTH] IN BLOOD BY AUTOMATED COUNT: 17.1 % (ref 11.5–14.5)
EST. GFR  (NO RACE VARIABLE): 48 ML/MIN/1.73 M^2
GLUCOSE SERPL-MCNC: 93 MG/DL (ref 70–110)
GLUCOSE UR QL STRIP: NEGATIVE
HCT VFR BLD AUTO: 42.8 % (ref 37–48.5)
HGB BLD-MCNC: 13.8 G/DL (ref 12–16)
HGB UR QL STRIP: NEGATIVE
IMM GRANULOCYTES # BLD AUTO: 0.01 K/UL (ref 0–0.04)
IMM GRANULOCYTES NFR BLD AUTO: 0.1 % (ref 0–0.5)
KETONES UR QL STRIP: NEGATIVE
LEUKOCYTE ESTERASE UR QL STRIP: ABNORMAL
LIPASE SERPL-CCNC: 27 U/L (ref 4–60)
LYMPHOCYTES # BLD AUTO: 1.8 K/UL (ref 1–4.8)
LYMPHOCYTES NFR BLD: 23.7 % (ref 18–48)
MAGNESIUM SERPL-MCNC: 2.4 MG/DL (ref 1.6–2.6)
MCH RBC QN AUTO: 26.4 PG (ref 27–31)
MCHC RBC AUTO-ENTMCNC: 32.2 G/DL (ref 32–36)
MCV RBC AUTO: 82 FL (ref 82–98)
MICROSCOPIC COMMENT: NORMAL
MONOCYTES # BLD AUTO: 0.7 K/UL (ref 0.3–1)
MONOCYTES NFR BLD: 9.9 % (ref 4–15)
NEUTROPHILS # BLD AUTO: 4.6 K/UL (ref 1.8–7.7)
NEUTROPHILS NFR BLD: 62.2 % (ref 38–73)
NITRITE UR QL STRIP: NEGATIVE
NRBC BLD-RTO: 0 /100 WBC
PH UR STRIP: 7 [PH] (ref 5–8)
PLATELET # BLD AUTO: 288 K/UL (ref 150–450)
PMV BLD AUTO: 9.9 FL (ref 9.2–12.9)
POTASSIUM SERPL-SCNC: 4.1 MMOL/L (ref 3.5–5.1)
PROT SERPL-MCNC: 7.6 G/DL (ref 6–8.4)
PROT UR QL STRIP: NEGATIVE
RBC # BLD AUTO: 5.23 M/UL (ref 4–5.4)
SODIUM SERPL-SCNC: 142 MMOL/L (ref 136–145)
SP GR UR STRIP: 1 (ref 1–1.03)
TROPONIN I SERPL DL<=0.01 NG/ML-MCNC: 0.01 NG/ML (ref 0–0.03)
URN SPEC COLLECT METH UR: ABNORMAL
UROBILINOGEN UR STRIP-ACNC: NEGATIVE EU/DL
WBC # BLD AUTO: 7.47 K/UL (ref 3.9–12.7)
WBC #/AREA URNS HPF: 0 /HPF (ref 0–5)

## 2023-10-11 PROCEDURE — 93010 EKG 12-LEAD: ICD-10-PCS | Mod: ,,, | Performed by: INTERNAL MEDICINE

## 2023-10-11 PROCEDURE — 85025 COMPLETE CBC W/AUTO DIFF WBC: CPT | Performed by: NURSE PRACTITIONER

## 2023-10-11 PROCEDURE — 84484 ASSAY OF TROPONIN QUANT: CPT | Performed by: NURSE PRACTITIONER

## 2023-10-11 PROCEDURE — 83735 ASSAY OF MAGNESIUM: CPT | Performed by: NURSE PRACTITIONER

## 2023-10-11 PROCEDURE — 96361 HYDRATE IV INFUSION ADD-ON: CPT

## 2023-10-11 PROCEDURE — 63600175 PHARM REV CODE 636 W HCPCS: Performed by: EMERGENCY MEDICINE

## 2023-10-11 PROCEDURE — 83690 ASSAY OF LIPASE: CPT | Performed by: NURSE PRACTITIONER

## 2023-10-11 PROCEDURE — 81000 URINALYSIS NONAUTO W/SCOPE: CPT | Performed by: NURSE PRACTITIONER

## 2023-10-11 PROCEDURE — G0378 HOSPITAL OBSERVATION PER HR: HCPCS

## 2023-10-11 PROCEDURE — 25000003 PHARM REV CODE 250: Performed by: EMERGENCY MEDICINE

## 2023-10-11 PROCEDURE — 93010 ELECTROCARDIOGRAM REPORT: CPT | Mod: ,,, | Performed by: INTERNAL MEDICINE

## 2023-10-11 PROCEDURE — 99285 EMERGENCY DEPT VISIT HI MDM: CPT | Mod: 25

## 2023-10-11 PROCEDURE — 93005 ELECTROCARDIOGRAM TRACING: CPT

## 2023-10-11 PROCEDURE — 80053 COMPREHEN METABOLIC PANEL: CPT | Performed by: NURSE PRACTITIONER

## 2023-10-11 PROCEDURE — 96376 TX/PRO/DX INJ SAME DRUG ADON: CPT

## 2023-10-11 PROCEDURE — 83880 ASSAY OF NATRIURETIC PEPTIDE: CPT | Performed by: EMERGENCY MEDICINE

## 2023-10-11 PROCEDURE — 96374 THER/PROPH/DIAG INJ IV PUSH: CPT

## 2023-10-11 RX ORDER — GLUCAGON 1 MG
1 KIT INJECTION
Status: DISCONTINUED | OUTPATIENT
Start: 2023-10-11 | End: 2023-10-14 | Stop reason: HOSPADM

## 2023-10-11 RX ORDER — POTASSIUM CHLORIDE 20 MEQ/1
20 TABLET, EXTENDED RELEASE ORAL 2 TIMES DAILY
Status: DISCONTINUED | OUTPATIENT
Start: 2023-10-12 | End: 2023-10-14 | Stop reason: HOSPADM

## 2023-10-11 RX ORDER — ACETAMINOPHEN 650 MG/1
650 SUPPOSITORY RECTAL EVERY 4 HOURS PRN
Status: DISCONTINUED | OUTPATIENT
Start: 2023-10-11 | End: 2023-10-14 | Stop reason: HOSPADM

## 2023-10-11 RX ORDER — ATORVASTATIN CALCIUM 40 MG/1
80 TABLET, FILM COATED ORAL NIGHTLY
Status: DISCONTINUED | OUTPATIENT
Start: 2023-10-12 | End: 2023-10-14 | Stop reason: HOSPADM

## 2023-10-11 RX ORDER — CETIRIZINE HYDROCHLORIDE 10 MG/1
10 TABLET ORAL DAILY
Status: DISCONTINUED | OUTPATIENT
Start: 2023-10-12 | End: 2023-10-14 | Stop reason: HOSPADM

## 2023-10-11 RX ORDER — SIMETHICONE 80 MG
1 TABLET,CHEWABLE ORAL 4 TIMES DAILY PRN
Status: DISCONTINUED | OUTPATIENT
Start: 2023-10-11 | End: 2023-10-14 | Stop reason: HOSPADM

## 2023-10-11 RX ORDER — ONDANSETRON 2 MG/ML
4 INJECTION INTRAMUSCULAR; INTRAVENOUS ONCE
Status: COMPLETED | OUTPATIENT
Start: 2023-10-11 | End: 2023-10-11

## 2023-10-11 RX ORDER — AMLODIPINE BESYLATE 2.5 MG/1
2.5 TABLET ORAL NIGHTLY
Status: DISCONTINUED | OUTPATIENT
Start: 2023-10-12 | End: 2023-10-14 | Stop reason: HOSPADM

## 2023-10-11 RX ORDER — PROMETHAZINE HYDROCHLORIDE 25 MG/1
25 TABLET ORAL EVERY 6 HOURS PRN
Status: DISCONTINUED | OUTPATIENT
Start: 2023-10-11 | End: 2023-10-14 | Stop reason: HOSPADM

## 2023-10-11 RX ORDER — IBUPROFEN 200 MG
24 TABLET ORAL
Status: DISCONTINUED | OUTPATIENT
Start: 2023-10-11 | End: 2023-10-14 | Stop reason: HOSPADM

## 2023-10-11 RX ORDER — SODIUM CHLORIDE 0.9 % (FLUSH) 0.9 %
3 SYRINGE (ML) INJECTION EVERY 12 HOURS PRN
Status: DISCONTINUED | OUTPATIENT
Start: 2023-10-11 | End: 2023-10-14 | Stop reason: HOSPADM

## 2023-10-11 RX ORDER — MAG HYDROX/ALUMINUM HYD/SIMETH 200-200-20
30 SUSPENSION, ORAL (FINAL DOSE FORM) ORAL 4 TIMES DAILY PRN
Status: DISCONTINUED | OUTPATIENT
Start: 2023-10-11 | End: 2023-10-14 | Stop reason: HOSPADM

## 2023-10-11 RX ORDER — MECLIZINE HCL 12.5 MG 12.5 MG/1
12.5 TABLET ORAL
Status: COMPLETED | OUTPATIENT
Start: 2023-10-11 | End: 2023-10-11

## 2023-10-11 RX ORDER — NALOXONE HCL 0.4 MG/ML
0.02 VIAL (ML) INJECTION
Status: DISCONTINUED | OUTPATIENT
Start: 2023-10-11 | End: 2023-10-14 | Stop reason: HOSPADM

## 2023-10-11 RX ORDER — ACETAMINOPHEN 500 MG
5000 TABLET ORAL DAILY
Status: DISCONTINUED | OUTPATIENT
Start: 2023-10-12 | End: 2023-10-14 | Stop reason: HOSPADM

## 2023-10-11 RX ORDER — TALC
6 POWDER (GRAM) TOPICAL NIGHTLY PRN
Status: DISCONTINUED | OUTPATIENT
Start: 2023-10-11 | End: 2023-10-14 | Stop reason: HOSPADM

## 2023-10-11 RX ORDER — POLYETHYLENE GLYCOL 3350 17 G/17G
17 POWDER, FOR SOLUTION ORAL DAILY PRN
Status: DISCONTINUED | OUTPATIENT
Start: 2023-10-11 | End: 2023-10-14 | Stop reason: HOSPADM

## 2023-10-11 RX ORDER — ACETAMINOPHEN 325 MG/1
650 TABLET ORAL EVERY 8 HOURS PRN
Status: DISCONTINUED | OUTPATIENT
Start: 2023-10-11 | End: 2023-10-14 | Stop reason: HOSPADM

## 2023-10-11 RX ORDER — FUROSEMIDE 40 MG/1
40 TABLET ORAL 2 TIMES DAILY
Status: DISCONTINUED | OUTPATIENT
Start: 2023-10-12 | End: 2023-10-11

## 2023-10-11 RX ORDER — FLUTICASONE PROPIONATE 50 MCG
2 SPRAY, SUSPENSION (ML) NASAL DAILY PRN
Status: DISCONTINUED | OUTPATIENT
Start: 2023-10-12 | End: 2023-10-14 | Stop reason: HOSPADM

## 2023-10-11 RX ORDER — POTASSIUM CHLORIDE 20 MEQ/1
20 TABLET, EXTENDED RELEASE ORAL 2 TIMES DAILY
Status: DISCONTINUED | OUTPATIENT
Start: 2023-10-12 | End: 2023-10-11

## 2023-10-11 RX ORDER — DORZOLAMIDE HCL 20 MG/ML
1 SOLUTION/ DROPS OPHTHALMIC 2 TIMES DAILY
Status: DISCONTINUED | OUTPATIENT
Start: 2023-10-12 | End: 2023-10-14 | Stop reason: HOSPADM

## 2023-10-11 RX ORDER — FUROSEMIDE 40 MG/1
40 TABLET ORAL 2 TIMES DAILY
Status: DISCONTINUED | OUTPATIENT
Start: 2023-10-12 | End: 2023-10-13

## 2023-10-11 RX ORDER — ONDANSETRON 2 MG/ML
4 INJECTION INTRAMUSCULAR; INTRAVENOUS EVERY 8 HOURS PRN
Status: DISCONTINUED | OUTPATIENT
Start: 2023-10-11 | End: 2023-10-14 | Stop reason: HOSPADM

## 2023-10-11 RX ORDER — IPRATROPIUM BROMIDE AND ALBUTEROL SULFATE 2.5; .5 MG/3ML; MG/3ML
3 SOLUTION RESPIRATORY (INHALATION) EVERY 4 HOURS PRN
Status: DISCONTINUED | OUTPATIENT
Start: 2023-10-11 | End: 2023-10-14 | Stop reason: HOSPADM

## 2023-10-11 RX ORDER — LANOLIN ALCOHOL/MO/W.PET/CERES
800 CREAM (GRAM) TOPICAL DAILY
Status: DISCONTINUED | OUTPATIENT
Start: 2023-10-12 | End: 2023-10-14 | Stop reason: HOSPADM

## 2023-10-11 RX ORDER — AMIODARONE HYDROCHLORIDE 200 MG/1
400 TABLET ORAL 2 TIMES DAILY
Status: DISCONTINUED | OUTPATIENT
Start: 2023-10-11 | End: 2023-10-14

## 2023-10-11 RX ORDER — LATANOPROST 50 UG/ML
1 SOLUTION/ DROPS OPHTHALMIC NIGHTLY
Status: DISCONTINUED | OUTPATIENT
Start: 2023-10-12 | End: 2023-10-14 | Stop reason: HOSPADM

## 2023-10-11 RX ORDER — ALLOPURINOL 100 MG/1
100 TABLET ORAL DAILY
Status: DISCONTINUED | OUTPATIENT
Start: 2023-10-12 | End: 2023-10-14 | Stop reason: HOSPADM

## 2023-10-11 RX ORDER — ONDANSETRON 2 MG/ML
4 INJECTION INTRAMUSCULAR; INTRAVENOUS
Status: COMPLETED | OUTPATIENT
Start: 2023-10-11 | End: 2023-10-11

## 2023-10-11 RX ORDER — IBUPROFEN 200 MG
16 TABLET ORAL
Status: DISCONTINUED | OUTPATIENT
Start: 2023-10-11 | End: 2023-10-14 | Stop reason: HOSPADM

## 2023-10-11 RX ADMIN — ONDANSETRON 4 MG: 2 INJECTION INTRAMUSCULAR; INTRAVENOUS at 07:10

## 2023-10-11 RX ADMIN — MECLIZINE 12.5 MG: 12.5 TABLET ORAL at 07:10

## 2023-10-11 RX ADMIN — SODIUM CHLORIDE 250 ML: 9 INJECTION, SOLUTION INTRAVENOUS at 07:10

## 2023-10-11 RX ADMIN — ONDANSETRON 4 MG: 2 INJECTION INTRAMUSCULAR; INTRAVENOUS at 04:10

## 2023-10-11 RX ADMIN — AMIODARONE HYDROCHLORIDE 400 MG: 200 TABLET ORAL at 09:10

## 2023-10-11 NOTE — FIRST PROVIDER EVALUATION
Medical screening examination initiated.  I have conducted a focused provider triage encounter, findings are as follows:    Brief history of present illness:  PT. C/o days of diarrhea, was recently cardioverted for afib.     Vitals:    10/11/23 1131   BP: (!) 148/96   BP Location: Right arm   Patient Position: Sitting   Pulse: (!) 118   Resp: 18   Temp: 97.4 °F (36.3 °C)   TempSrc: Oral   SpO2: 95%   Weight: (S)   Comment: Needs bed weight       Pertinent physical exam:  appear weak on ems stretcher    Brief workup plan:  labs    Preliminary workup initiated; this workup will be continued and followed by the physician or advanced practice provider that is assigned to the patient when roomed.

## 2023-10-11 NOTE — Clinical Note
Diagnosis: Weakness [[id number]]   Future Attending Provider: INGRIS FRANCO [[id number]]   Admitting Provider:: INGRIS FRANCO. [[id number]]

## 2023-10-11 NOTE — ED PROVIDER NOTES
SCRIBE #1 NOTE: IJaida, am scribing for, and in the presence of, Santi Galloway MD. I have scribed the HPI and ROS.     SCRIBE #2 NOTE: I, Dominga Joe, am scribing for, and in the presence of,  Alannah Sosa DO. I have scribed the remaining portions of the note not scribed by Scribe #1.      History     Chief Complaint   Patient presents with    Fatigue     AASI reports fatigue and Malaise x several weeks. Diarrhea started x 3 days ago. Pt. Lives at home with family.      Review of patient's allergies indicates:   Allergen Reactions    Timolol maleate Shortness Of Breath    Ciprofloxacin Other (See Comments)     Muscle ache    Sulfamethoxazole-trimethoprim          History of Present Illness     HPI    10/11/2023, 11:49 AM  History obtained from the patient      History of Present Illness: Holli Landrum is a 89 y.o. female patient with a PMHx of GERD, CHF, COPD, and cancer who presents to the Emergency Department for evaluation of fatigue which onset gradually several weeks ago. Symptoms are constant and moderate in severity. No mitigating or exacerbating factors reported. Associated sxs include diarrhea and malaise. Patient denies any fever, chills, N/V/D, HA, and all other sxs at this time. No Prior Tx reported. No further complaints or concerns at this time.       Arrival mode: EMS    PCP: Marcia Carlisle MD        Past Medical History:  Past Medical History:   Diagnosis Date    Anticoagulant long-term use     Arthritis     Asthma     Basal cell carcinoma     Cancer     skin cancer to face    Cataract     OU done//    CHF (congestive heart failure)     COPD (chronic obstructive pulmonary disease)     no oxygen; patient denies    cpap     Essential hypertension 05/05/2010    GERD (gastroesophageal reflux disease) 11/20/2012    Glaucoma     Hypertensive heart disease with heart failure 02/05/2013    Paroxysmal atrial fibrillation     Paroxysmal ventricular tachycardia     per problem list     Renal disorder     french-1/3/2020    Squamous cell carcinoma of skin        Past Surgical History:  Past Surgical History:   Procedure Laterality Date    A-V CARDIAC PACEMAKER INSERTION  06/16/2021    Procedure: INSERTION, CARDIAC PACEMAKER, DUAL CHAMBER;  Surgeon: Oscar Sommers MD;  Location: [facility];  Service: Cardiovascular;;    ADENOIDECTOMY  191944    APPENDECTOMY  1948    Because of Ovarian Cyst surgery    BREAST BIOPSY Left 1995    neg    BREAST BIOPSY Right 1984    neg    BREAST BIOPSY Right 1992    neg    BREAST SURGERY      A number of biopsies    CARDIAC CATHETERIZATION  2013, 2014,2016, 2020    has 9 stents    CARDIAC SURGERY  2012    stents    CATARACT EXTRACTION W/  INTRAOCULAR LENS IMPLANT Left 11/01/2018    Dr Rose    CATARACT EXTRACTION W/  INTRAOCULAR LENS IMPLANT Right 12/13/2018    Dr Rose//    CHOLECYSTECTOMY      COLONOSCOPY  ~2005    Dr. Edward; normal per patient report    COLONOSCOPY N/A 09/06/2022    Procedure: COLONOSCOPY 8/31/22-note in Dr Simms's office visit note that he spoke to her cardiologist and she was viable candidate for endoscopy;  Surgeon: Cordelia Bueno MD;  Location: [facility];  Service: Endoscopy;  Laterality: N/A;    CORONARY ANGIOGRAPHY N/A 03/20/2020    Procedure: ANGIOGRAM, CORONARY ARTERY;  Surgeon: Chuy Bryant MD;  Location: [facility];  Service: Cardiology;  Laterality: N/A;    CYSTOSCOPY W/ RETROGRADES Bilateral 02/12/2020    Procedure: CYSTOSCOPY, WITH RETROGRADE PYELOGRAM;  Surgeon: Gasper Feliz MD;  Location: [facility] OR;  Service: Urology;  Laterality: Bilateral;    ESOPHAGOGASTRODUODENOSCOPY N/A 08/13/2020    Procedure: EGD (ESOPHAGOGASTRODUODENOSCOPY);  Surgeon: Mika Solorzano MD;  Location: [facility];  Service: Endoscopy;  Laterality: N/A; Mild Schatzki ring. Biopsied. Dilated. small hiatal hernia, gastritis; biopsy: esophagus- SEVERE REFLUX ESOPHAGITIS, stomach- chronic gastritis, negative for H pylori    ESOPHAGOGASTRODUODENOSCOPY  N/A 10/22/2020    Procedure: EGD (ESOPHAGOGASTRODUODENOSCOPY);  Surgeon: Fred Hendricks MD;  Location: [facility] ENDO;  Service: Endoscopy;  Laterality: N/A;    EYE SURGERY  2017    Cataracs    FRACTIONAL FLOW RESERVE (FFR), CORONARY  6/20/2023    Procedure: Fractional Flow Sibley (FFR), Coronary;  Surgeon: Brice Campuzano MD;  Location: [facility] CATH LAB;  Service: Cardiology;;    HYSTERECTOMY  1969    INSTANTANEOUS WAVE-FREE RATIO (IFR) N/A 6/20/2023    Procedure: Instantaneous Wave-Free Ratio (IFR);  Surgeon: Brice Campuzano MD;  Location: [facility] CATH LAB;  Service: Cardiology;  Laterality: N/A;    LEFT HEART CATHETERIZATION Left 03/03/2020    Procedure: Left heart cath;  Surgeon: Chuy Bryant MD;  Location: [facility] CATH;  Service: Cardiology;  Laterality: Left;    LEFT HEART CATHETERIZATION Left 03/20/2020    Procedure: Left heart cath;  Surgeon: Chuy Bryant MD;  Location: [facility] CATH;  Service: Cardiology;  Laterality: Left;    LEFT HEART CATHETERIZATION N/A 6/20/2023    Procedure: Left heart cath;  Surgeon: Brice Campuzano MD;  Location: [facility] CATH LAB;  Service: Cardiology;  Laterality: N/A;    OVARIAN CYST REMOVAL  teenager    PHACOEMULSIFICATION OF CATARACT Left 11/01/2018    Procedure: PHACOEMULSIFICATION, CATARACT;  Surgeon: Aleksandar Rose Jr., MD;  Location: [facility] OR;  Service: Ophthalmology;  Laterality: Left;    PHACOEMULSIFICATION OF CATARACT Right 12/13/2018    Procedure: PHACOEMULSIFICATION, CATARACT;  Surgeon: Aleksandar Rose Jr., MD;  Location: [facility] OR;  Service: Ophthalmology;  Laterality: Right;    TONSILLECTOMY      aw/denoids    TRANSESOPHAGEAL ECHOCARDIOGRAM WITH POSSIBLE CARDIOVERSION (ALFRED W/ POSS CARDIOVERSION) N/A 10/5/2023    Procedure: Transesophageal echo (ALFRED) intra-procedure log documentation/alfred/cv;  Surgeon: Bao Miguel MD;  Location: [facility] CATH LAB;  Service: Cardiology;  Laterality: N/A;   Alfred/Cv  MRI safe   Pacer & leads implanted 6/16/21, Antoun   Bio Hubert MARTINEZ,  [id number], PID: [id number]   A lead: Bio Solia S45, [id number]   V lead: Bio Solia S53, [id number]    TREATMENT OF CARDIAC ARRHYTHMIA N/A 10/5/2023    Procedure: Cardioversion or Defibrillation;  Surgeon: Bao Miguel MD;  Location: [facility] CATH LAB;  Service: Cardiology;  Laterality: N/A;    UPPER GASTROINTESTINAL ENDOSCOPY  ~    Dr. Solorzano    VALVE STUDY-AORTIC  2023    Procedure: Valve study-aortic;  Surgeon: Brice Campuzano MD;  Location: [facility] CATH LAB;  Service: Cardiology;;    VASCULAR SURGERY      to remove clot from right leg    Yag Capsulotomy Bilateral 2019    Dr Rose         Family History:  Family History   Problem Relation Age of Onset    Diabetes Brother         Type 2    Heart disease Brother          at 65 CHD    Diabetes Mother         Type 1    Alcohol abuse Mother         Dod 10/75    Heart disease Mother         Arteriosclerosis    Hypertension Mother     Stroke Mother         3/15/1975 dod 10/1975    Cancer Maternal Grandfather         Dod     Clotting disorder Son         bleeding after tonsillectomy only    Heart disease Father         Heart attack. Afib, and Polyvcithemavera    Hypertension Father     Amblyopia Neg Hx     Blindness Neg Hx     Cataracts Neg Hx     Glaucoma Neg Hx     Macular degeneration Neg Hx     Retinal detachment Neg Hx     Strabismus Neg Hx     Thyroid disease Neg Hx     Colon cancer Neg Hx        Social History:  Social History     Tobacco Use    Smoking status: Former     Current packs/day: 0.00     Types: Cigarettes     Start date: 6/10/1954     Quit date: 9/15/1955     Years since quittin.1    Smokeless tobacco: Never    Tobacco comments:     I onlysmoked one year while ml my  was oversees  during [place]   Substance and Sexual Activity    Alcohol use: Not Currently     Comment: Only drank a little wine and haven't  drunk anything in 15 y    Drug use: Never    Sexual activity: Not Currently     Partners: Male     Birth  control/protection: Abstinence     Comment:  and 87 years of age        Review of Systems     Review of Systems   Constitutional:  Positive for fatigue. Negative for chills and fever.        (+) malaise   HENT:  Negative for sore throat.    Respiratory:  Negative for shortness of breath.    Cardiovascular:  Negative for chest pain.   Gastrointestinal:  Negative for diarrhea, nausea and vomiting.   Genitourinary:  Negative for dysuria.   Musculoskeletal:  Negative for back pain.   Skin:  Negative for rash.   Neurological:  Negative for weakness and headaches.   Hematological:  Does not bruise/bleed easily.   All other systems reviewed and are negative.       Physical Exam     Initial Vitals [10/11/23 1131]   BP Pulse Resp Temp SpO2   (!) 148/96 (!) 118 18 97.4 °F (36.3 °C) 95 %      MAP       --          Physical Exam  Nursing Notes and Vital Signs Reviewed.  Constitutional: Patient is in no acute distress. Elderly.  Head: Atraumatic. Normocephalic.  Eyes: No nystagmus. PERRL. EOM intact. Conjunctivae are not pale. No scleral icterus.  ENT: Mucous membranes are moist. Oropharynx is clear and symmetric.    Neck: Supple. Full ROM. No lymphadenopathy.  Cardiovascular: Tachycardic rate. Irregularly irregular rhythm. No murmurs, rubs, or gallops. Distal pulses are 2+ and symmetric.  Pulmonary/Chest: No respiratory distress. Clear to auscultation bilaterally. No wheezing or rales.  Abdominal: Soft and non-distended.  There is no tenderness.  No rebound, guarding, or rigidity. Good bowel sounds.  Genitourinary: There is a suprapubic catheter.  Musculoskeletal: Moves all extremities. No obvious deformities. No edema. No calf tenderness.  Skin: Warm and dry.  Neurological:  Alert, awake, and appropriate.  Normal speech. Cranial nerves II-XII intact. No nystagmus. No acute focal neurological deficits are appreciated.  Psychiatric: Normal affect. Good eye contact. Appropriate in content.     ED Course   Procedures  ED  "Vital Signs:  Vitals:    10/11/23 1131 10/11/23 1609 10/11/23 1611 10/11/23 1800   BP: (!) 148/96 (!) 141/75 (!) 140/80 131/82   Pulse: (!) 118 78 76 77   Resp: 18 20 18 18   Temp: 97.4 °F (36.3 °C)      TempSrc: Oral      SpO2: 95% (!) 94% (!) 93% 95%   Weight:       Height:        10/11/23 1802 10/11/23 1804 10/11/23 1900 10/11/23 2000   BP: 137/83 132/82 133/72 131/70   Pulse: 74 72 71 77   Resp: 17 17 18 18   Temp:       TempSrc:       SpO2: (!) 94% (!) 94% (!) 94% (!) 94%   Weight:       Height:        10/11/23 2117 10/11/23 2118 10/11/23 2341   BP: (!) 113/58  119/81   Pulse: 87  73   Resp: 16  18   Temp: 97.7 °F (36.5 °C)  97.2 °F (36.2 °C)   TempSrc: Oral  Oral   SpO2: (!) 92%  (!) 93%   Weight:  90.5 kg (199 lb 8.3 oz)    Height:  5' 5" (1.651 m)        Abnormal Lab Results:  Labs Reviewed   CBC W/ AUTO DIFFERENTIAL - Abnormal; Notable for the following components:       Result Value    MCH 26.4 (*)     RDW 17.1 (*)     All other components within normal limits   COMPREHENSIVE METABOLIC PANEL - Abnormal; Notable for the following components:    eGFR 48 (*)     All other components within normal limits   URINALYSIS, REFLEX TO URINE CULTURE - Abnormal; Notable for the following components:    Color, UA Colorless (*)     Leukocytes, UA Trace (*)     All other components within normal limits    Narrative:     Specimen Source->Urine   B-TYPE NATRIURETIC PEPTIDE - Abnormal; Notable for the following components:     (*)     All other components within normal limits   LIPASE   MAGNESIUM   TROPONIN I   URINALYSIS MICROSCOPIC    Narrative:     Specimen Source->Urine   B-TYPE NATRIURETIC PEPTIDE        All Lab Results:  Results for orders placed or performed during the hospital encounter of 10/11/23   CBC auto differential   Result Value Ref Range    WBC 7.47 3.90 - 12.70 K/uL    RBC 5.23 4.00 - 5.40 M/uL    Hemoglobin 13.8 12.0 - 16.0 g/dL    Hematocrit 42.8 37.0 - 48.5 %    MCV 82 82 - 98 fL    MCH 26.4 (L) " 27.0 - 31.0 pg    MCHC 32.2 32.0 - 36.0 g/dL    RDW 17.1 (H) 11.5 - 14.5 %    Platelets 288 150 - 450 K/uL    MPV 9.9 9.2 - 12.9 fL    Immature Granulocytes 0.1 0.0 - 0.5 %    Gran # (ANC) 4.6 1.8 - 7.7 K/uL    Immature Grans (Abs) 0.01 0.00 - 0.04 K/uL    Lymph # 1.8 1.0 - 4.8 K/uL    Mono # 0.7 0.3 - 1.0 K/uL    Eos # 0.3 0.0 - 0.5 K/uL    Baso # 0.03 0.00 - 0.20 K/uL    nRBC 0 0 /100 WBC    Gran % 62.2 38.0 - 73.0 %    Lymph % 23.7 18.0 - 48.0 %    Mono % 9.9 4.0 - 15.0 %    Eosinophil % 3.7 0.0 - 8.0 %    Basophil % 0.4 0.0 - 1.9 %    Differential Method Automated    Comprehensive metabolic panel   Result Value Ref Range    Sodium 142 136 - 145 mmol/L    Potassium 4.1 3.5 - 5.1 mmol/L    Chloride 106 95 - 110 mmol/L    CO2 23 23 - 29 mmol/L    Glucose 93 70 - 110 mg/dL    BUN 22 8 - 23 mg/dL    Creatinine 1.1 0.5 - 1.4 mg/dL    Calcium 9.8 8.7 - 10.5 mg/dL    Total Protein 7.6 6.0 - 8.4 g/dL    Albumin 3.8 3.5 - 5.2 g/dL    Total Bilirubin 0.4 0.1 - 1.0 mg/dL    Alkaline Phosphatase 120 55 - 135 U/L    AST 19 10 - 40 U/L    ALT 20 10 - 44 U/L    eGFR 48 (A) >60 mL/min/1.73 m^2    Anion Gap 13 8 - 16 mmol/L   Lipase   Result Value Ref Range    Lipase 27 4 - 60 U/L   Magnesium   Result Value Ref Range    Magnesium 2.4 1.6 - 2.6 mg/dL   Troponin I   Result Value Ref Range    Troponin I 0.012 0.000 - 0.026 ng/mL   Urinalysis, Reflex to Urine Culture Urine, Supra Pubic    Specimen: Urine   Result Value Ref Range    Specimen UA Urine, Supra Pubic     Color, UA Colorless (A) Yellow, Straw, Kailey    Appearance, UA Clear Clear    pH, UA 7.0 5.0 - 8.0    Specific Gravity, UA 1.005 1.005 - 1.030    Protein, UA Negative Negative    Glucose, UA Negative Negative    Ketones, UA Negative Negative    Bilirubin (UA) Negative Negative    Occult Blood UA Negative Negative    Nitrite, UA Negative Negative    Urobilinogen, UA Negative <2.0 EU/dL    Leukocytes, UA Trace (A) Negative   Urinalysis Microscopic   Result Value Ref Range     WBC, UA 0 0 - 5 /hpf    Bacteria Rare None-Occ /hpf    Microscopic Comment SEE COMMENT    Brain natriuretic peptide   Result Value Ref Range     (H) 0 - 99 pg/mL     *Note: Due to a large number of results and/or encounters for the requested time period, some results have not been displayed. A complete set of results can be found in Results Review.         Imaging Results:  Imaging Results              CT Head Without Contrast (Final result)  Result time 10/11/23 14:40:19      Final result by Fabio Dickerson MD (Timothy) (10/11/23 14:40:19)                   Impression:      No acute intracranial abnormality.    All CT scans at this facility use dose modulation, iterative reconstructions, and/or weight base dosing when appropriate to reduce radiation dose to as low as reasonably achievable.      Electronically signed by: Fabio Dickerson MD  Date:    10/11/2023  Time:    14:40               Narrative:    EXAMINATION:  CT HEAD WITHOUT CONTRAST    CLINICAL HISTORY:  Syncope, recurrent;    TECHNIQUE:  Noncontrast images were obtained    COMPARISON:  CT head, 01/20/2019.    FINDINGS:  No intracranial acute hemorrhage or acute focal brain parenchymal abnormality is identified.  Atrophy is present.  Patchy periventricular white matter hypodensity secondary to chronic small vessel ischemic changes.  Calvarium is intact.                                       CT Renal Stone Study ABD Pelvis WO (Final result)  Result time 10/11/23 14:50:56      Final result by Fabio Dickerson MD (Timothy) (10/11/23 14:50:56)                   Impression:      No evidence of obstructive uropathy or renal stone.  No acute findings.    All CT scans at this facility use dose modulation, iterative reconstructions, and/or weight base dosing when appropriate to reduce radiation dose to as low as reasonably achievable      Electronically signed by: Fabio Dickerson MD  Date:    10/11/2023  Time:    14:50               Narrative:     EXAMINATION:  CT RENAL STONE STUDY ABD PELVIS WO    CLINICAL HISTORY:  Flank pain, kidney stone suspected;    TECHNIQUE:  Noncontrast images were obtained    COMPARISON:  CT scan abdomen pelvis, 12/16/2019    FINDINGS:  Lung bases are clear    The liver, the spleen, and the pancreas appear normal.    Previous cholecystectomy.  No bile duct dilatation.    Stable left adrenal adenoma measuring 2 cm.  No evidence of hydronephrosis or renal stone.    Atherosclerosis of the abdominal aorta. No evidence of aneurysm.  No retroperitoneal mass or adenopathy.    There are no acute bowel abnormalities.     No evidence of appendicitis.  No evidence of diverticulitis.    Suprapubic Ballard catheter in good position.  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    Skeletal structures are intact.  No acute skeletal findings.                                       X-Ray Chest AP Portable (Final result)  Result time 10/11/23 12:15:10      Final result by Fabio DickersonSavageMD quin (10/11/23 12:15:10)                   Impression:      Stable chest.  No infiltrate.      Electronically signed by: Fabio Dickerson MD  Date:    10/11/2023  Time:    12:15               Narrative:    EXAMINATION:  XR CHEST AP PORTABLE    CLINICAL HISTORY:  , Weakness;    COMPARISON:  Chest, 10/03/2023.    FINDINGS:  Heart size is stable.  Lungs are essentially clear.  Pacemaker leads remain in stable and good position.                                       The EKG was ordered, reviewed, and independently interpreted by the ED provider.  Interpretation time: 11:53  Rate: 76 BPM  Rhythm: atrial fibrillation  Interpretation: Nonspecific T wave abnormality. No STEMI.           The Emergency Provider reviewed the vital signs and test results, which are outlined above.     ED Discussion       3:53 PM: Dr. Galloway transfers care of patient to Dr. Sosa pending lab results.     7:17  PM: Evaluated pt. Her 2 daughters are at bedside. Pt reports that she had a cardioversion 6 days ago and felt good afterwards, but 3 days ago she started experiencing fatigue, generalized weakness, and diarrhea. 3 days ago, she also noted that she was in A-fib and has been intermittently in A-fib since. Today, the pt could not get into her shower and almost had a syncopal episode, so she came to the ER for an evaluation. Pt also c/o light-headedness, nausea, headaches, and dizziness. She denies any abdominal pain or tinnitus.    7:23 PM: Discussed pt's case with Dr. Miguel (Cardiology) who recommends admitting the pt, keeping her NPO after midnight, and 400 mg PO amiodarone this evening and in the morning, hydrating the pt as needed, and continuing Eliquis.    7:42 PM: Discussed case with Blair Hernandez NP ([facility]). Dr. Hernandez agrees with current care and management of pt and accepts admission.   Admitting Service: Hospital Medicine  Admitting Physician: Dr. Hernandez  Admit to: Inpatient Med Tele    7:43 PM: Re-evaluated pt. I have discussed test results, shared treatment plan, and the need for admission with patient and family at bedside. Pt and family express understanding at this time and agree with all information. All questions answered. Pt and family have no further questions or concerns at this time. Pt is ready for admit.    ED Course as of 10/12/23 0318   Wed Oct 11, 2023   1947 Discussed with Cardiology, Dr. Miguel recommended amiodarone 400 mg twice a day.  NPO after midnight.  Continue Eliquis.  May consider cardioversion [LB]      ED Course User Index  [LB] Alannah Sosa, DO     Medical Decision Making  Patient with fatigue.  Recent hospitalization with cardioversion from atrial fibrillation to normal sinus rhythm.  On Eliquis.  Recurrence of AFib noted.  But not in rapid ventricular rate.    Differential diagnosis includes:  Anemia, acute kidney failure, orthostatic hypotension,  urinary tract infection, atrial fibrillation    Patient's EKG was atrial fibrillation rate of 76.  CBC, CMP, magnesium, lipase, troponin normal.  Urine specific gravity 1.005, trace leukocyte esterase.  0 whites.  Orthostatics negative, however patient stated that she felt like she was going to fall when she stood up.  No nystagmus was noted on exam.  Cranial nerves 2-12 intact.  Head CT negative for chronic small vessel citlali white matter disease.  CT abdomen pelvis notable for left adrenal adenoma.  Case was discussed with on-call cardiology, Dr. Miguel.  Recommended patient be placed on amiodarone 400 mg twice a day, NPO after midnight.  May consider cardioversion tomorrow.  Requests Hospital Medicine place in observation.    Amount and/or Complexity of Data Reviewed  External Data Reviewed: notes.     Details: 89 y/o. female patient with a PMHx of  persistent atrial fibrillation, coronary artery disease status post PCI, sick sinus syndrome status post pacemaker implantation Biotronik, hypertrophic obstructive cardiomyopathy, diastolic congestive heart failure, aortic stenosis, history of GI bleeding, asthma, HTN, COPD, PAF (low dose eliquis due to anemia), PVT who presented to [facility] from cards clinic with fatigue, HA dizziness and near-syncope.Pt c/o palpitations, generalized weakness, fatigue, light-headedness, syncope, and SOB. Symptoms are constant and moderate in severity. No mitigating or exacerbating factors reported. No other associated sxs reported. Patient denies any CP, fever, chills, diaphoresis, N/V/D, and all other sxs at this time . She report the this symptoms are more frequent over the last weeks .   ER COURSE:  . Started on amiodarone drip . S/P lasix 40 mg IV . Cardiology and EP consulted   Pt will be admitted to observation with a dx of Afib         Procedure(s) (LRB):  Transesophageal echo (JÚNIOR) intra-procedure log documentation/júnior/cv (N/A)  Cardioversion or Defibrillation (N/A)        Hospital Course:   89 y/o  admitted with a dx of uncontrolled afib  and acute on chronic  diastolic CHF . Cardiology and  EP consulted . Started on amiodarone and  sotalol d/c  . EP  rec to monitor  for 24 hr and if remain in afib to cardioverted  tomorrow am .  10/5 Pt was seen and examined at bedside . She was determined to be suitable for d/c .  S/P JÚNIOR DCCV  back to sinus rhythm . Cardiology rec to stop sotalol and cont amiodarone 200 mg po bid x 14 days and then 200 mg po daily . There was no acute event since admission . She will f/u with cardiology and EP in 1 to 2 weeks .   Labs: ordered. Decision-making details documented in ED Course.  Radiology: ordered. Decision-making details documented in ED Course.     Details: Chest x-ray:  Pacemaker present.  No pneumothorax.  No infiltrate.  ECG/medicine tests: ordered and independent interpretation performed. Decision-making details documented in ED Course.    Risk  Decision regarding hospitalization.  Risk Details: Patient does not meet criteria for critical care.                 ED Medication(s):  Medications   amiodarone tablet 400 mg (400 mg Oral Given 10/11/23 2132)   sodium chloride 0.9% flush 3 mL (has no administration in time range)   albuterol-ipratropium 2.5 mg-0.5 mg/3 mL nebulizer solution 3 mL (has no administration in time range)   melatonin tablet 6 mg (has no administration in time range)   ondansetron injection 4 mg (has no administration in time range)   promethazine tablet 25 mg (has no administration in time range)   polyethylene glycol packet 17 g (has no administration in time range)   acetaminophen tablet 650 mg (has no administration in time range)   simethicone chewable tablet 80 mg (has no administration in time range)   aluminum-magnesium hydroxide-simethicone 200-200-20 mg/5 mL suspension 30 mL (has no administration in time range)   acetaminophen suppository 650 mg (has no administration in time range)   naloxone 0.4 mg/mL injection  0.02 mg (has no administration in time range)   glucose chewable tablet 16 g (has no administration in time range)   glucose chewable tablet 24 g (has no administration in time range)   glucagon (human recombinant) injection 1 mg (has no administration in time range)   dextrose 10% bolus 125 mL 125 mL (has no administration in time range)   dextrose 10% bolus 250 mL 250 mL (has no administration in time range)   allopurinoL tablet 100 mg (has no administration in time range)   amLODIPine tablet 2.5 mg (2.5 mg Oral Given 10/12/23 0040)   cetirizine tablet 10 mg (has no administration in time range)   cholecalciferol (vitamin D3) 125 mcg (5,000 unit) tablet 5,000 Units (has no administration in time range)   dorzolamide 2 % ophthalmic solution 1 drop (1 drop Both Eyes Not Given 10/12/23 0000)   fluticasone propionate 50 mcg/actuation nasal spray 100 mcg (has no administration in time range)   latanoprost 0.005 % ophthalmic solution 1 drop (1 drop Both Eyes Given 10/12/23 0040)   magnesium oxide tablet 800 mg (has no administration in time range)   atorvastatin tablet 80 mg (80 mg Oral Given 10/12/23 0040)   apixaban tablet 2.5 mg (2.5 mg Oral Given 10/12/23 0040)   furosemide tablet 40 mg (has no administration in time range)   potassium chloride SA CR tablet 20 mEq (has no administration in time range)   ondansetron injection 4 mg (4 mg Intravenous Given 10/11/23 1603)   meclizine tablet 12.5 mg (12.5 mg Oral Given 10/11/23 1942)   ondansetron injection 4 mg (4 mg Intravenous Given 10/11/23 1940)   sodium chloride 0.9% bolus 250 mL 250 mL (0 mLs Intravenous Stopped 10/11/23 2044)       Current Discharge Medication List                  Scribe Attestation:   Scribe #1: I performed the above scribed service and the documentation accurately describes the services I performed. I attest to the accuracy of the note.     Attending:   Physician Attestation Statement for Scribe #1: I, Santi Galloway MD, personally  performed the services described in this documentation, as scribed by Jaida Cuellar, in my presence, and it is both accurate and complete.       Scribe Attestation:   Scribe #2: I performed the above scribed service and the documentation accurately describes the services I performed. I attest to the accuracy of the note.    Attending Attestation:           Physician Attestation for Scribe:    Physician Attestation Statement for Scribe #2: I, Alannah Sosa DO, reviewed documentation, as scribed by Dominga Joe in my presence, and it is both accurate and complete. I also acknowledge and confirm the content of the note done by Scribe #1.           Clinical Impression       ICD-10-CM ICD-9-CM   1. Atrial fibrillation, unspecified type  I48.91 427.31   2. Weakness  R53.1 780.79   3. Chest pain  R07.9 786.50       Disposition:   Disposition: Admitted  Condition: Fair        Alannah Sosa DO  10/12/23 0318

## 2023-10-12 PROBLEM — R53.1 GENERALIZED WEAKNESS: Status: ACTIVE | Noted: 2020-01-14

## 2023-10-12 LAB
ANION GAP SERPL CALC-SCNC: 10 MMOL/L (ref 8–16)
BASOPHILS # BLD AUTO: 0.04 K/UL (ref 0–0.2)
BASOPHILS NFR BLD: 0.6 % (ref 0–1.9)
BUN SERPL-MCNC: 20 MG/DL (ref 8–23)
CALCIUM SERPL-MCNC: 9.6 MG/DL (ref 8.7–10.5)
CHLORIDE SERPL-SCNC: 107 MMOL/L (ref 95–110)
CO2 SERPL-SCNC: 23 MMOL/L (ref 23–29)
CREAT SERPL-MCNC: 1.1 MG/DL (ref 0.5–1.4)
DIFFERENTIAL METHOD: ABNORMAL
EOSINOPHIL # BLD AUTO: 0.3 K/UL (ref 0–0.5)
EOSINOPHIL NFR BLD: 4.1 % (ref 0–8)
ERYTHROCYTE [DISTWIDTH] IN BLOOD BY AUTOMATED COUNT: 17.2 % (ref 11.5–14.5)
EST. GFR  (NO RACE VARIABLE): 48 ML/MIN/1.73 M^2
GLUCOSE SERPL-MCNC: 92 MG/DL (ref 70–110)
HCT VFR BLD AUTO: 40.1 % (ref 37–48.5)
HGB BLD-MCNC: 12.9 G/DL (ref 12–16)
IMM GRANULOCYTES # BLD AUTO: 0.02 K/UL (ref 0–0.04)
IMM GRANULOCYTES NFR BLD AUTO: 0.3 % (ref 0–0.5)
LYMPHOCYTES # BLD AUTO: 1.8 K/UL (ref 1–4.8)
LYMPHOCYTES NFR BLD: 26.5 % (ref 18–48)
MAGNESIUM SERPL-MCNC: 2.2 MG/DL (ref 1.6–2.6)
MCH RBC QN AUTO: 26.6 PG (ref 27–31)
MCHC RBC AUTO-ENTMCNC: 32.2 G/DL (ref 32–36)
MCV RBC AUTO: 83 FL (ref 82–98)
MONOCYTES # BLD AUTO: 0.8 K/UL (ref 0.3–1)
MONOCYTES NFR BLD: 11.3 % (ref 4–15)
NEUTROPHILS # BLD AUTO: 3.8 K/UL (ref 1.8–7.7)
NEUTROPHILS NFR BLD: 57.2 % (ref 38–73)
NRBC BLD-RTO: 0 /100 WBC
PLATELET # BLD AUTO: 261 K/UL (ref 150–450)
PMV BLD AUTO: 10 FL (ref 9.2–12.9)
POTASSIUM SERPL-SCNC: 3.9 MMOL/L (ref 3.5–5.1)
RBC # BLD AUTO: 4.85 M/UL (ref 4–5.4)
SODIUM SERPL-SCNC: 140 MMOL/L (ref 136–145)
WBC # BLD AUTO: 6.63 K/UL (ref 3.9–12.7)

## 2023-10-12 PROCEDURE — 93010 EKG 12-LEAD: ICD-10-PCS | Mod: ,,, | Performed by: INTERNAL MEDICINE

## 2023-10-12 PROCEDURE — 99213 OFFICE O/P EST LOW 20 MIN: CPT | Mod: 25,,, | Performed by: INTERNAL MEDICINE

## 2023-10-12 PROCEDURE — 93005 ELECTROCARDIOGRAM TRACING: CPT

## 2023-10-12 PROCEDURE — 99213 PR OFFICE/OUTPT VISIT, EST, LEVL III, 20-29 MIN: ICD-10-PCS | Mod: 25,,, | Performed by: INTERNAL MEDICINE

## 2023-10-12 PROCEDURE — G0378 HOSPITAL OBSERVATION PER HR: HCPCS

## 2023-10-12 PROCEDURE — 36415 COLL VENOUS BLD VENIPUNCTURE: CPT | Performed by: PHYSICIAN ASSISTANT

## 2023-10-12 PROCEDURE — 80048 BASIC METABOLIC PNL TOTAL CA: CPT | Performed by: PHYSICIAN ASSISTANT

## 2023-10-12 PROCEDURE — 93010 ELECTROCARDIOGRAM REPORT: CPT | Mod: ,,, | Performed by: INTERNAL MEDICINE

## 2023-10-12 PROCEDURE — 83735 ASSAY OF MAGNESIUM: CPT | Performed by: PHYSICIAN ASSISTANT

## 2023-10-12 PROCEDURE — 94799 UNLISTED PULMONARY SVC/PX: CPT | Mod: XB

## 2023-10-12 PROCEDURE — 85025 COMPLETE CBC W/AUTO DIFF WBC: CPT | Performed by: PHYSICIAN ASSISTANT

## 2023-10-12 PROCEDURE — 25000003 PHARM REV CODE 250: Performed by: EMERGENCY MEDICINE

## 2023-10-12 PROCEDURE — 99900035 HC TECH TIME PER 15 MIN (STAT)

## 2023-10-12 PROCEDURE — 25000003 PHARM REV CODE 250: Performed by: NURSE PRACTITIONER

## 2023-10-12 RX ADMIN — AMIODARONE HYDROCHLORIDE 400 MG: 200 TABLET ORAL at 09:10

## 2023-10-12 RX ADMIN — DORZOLAMIDE HYDROCHLORIDE 1 DROP: 20 SOLUTION/ DROPS OPHTHALMIC at 09:10

## 2023-10-12 RX ADMIN — CHOLECALCIFEROL TAB 125 MCG (5000 UNIT) 5000 UNITS: 125 TAB at 09:10

## 2023-10-12 RX ADMIN — CETIRIZINE HYDROCHLORIDE 10 MG: 10 TABLET, FILM COATED ORAL at 10:10

## 2023-10-12 RX ADMIN — AMLODIPINE BESYLATE 2.5 MG: 2.5 TABLET ORAL at 09:10

## 2023-10-12 RX ADMIN — LATANOPROST 1 DROP: 50 SOLUTION OPHTHALMIC at 09:10

## 2023-10-12 RX ADMIN — POTASSIUM CHLORIDE 20 MEQ: 1500 TABLET, EXTENDED RELEASE ORAL at 09:10

## 2023-10-12 RX ADMIN — FUROSEMIDE 40 MG: 40 TABLET ORAL at 09:10

## 2023-10-12 RX ADMIN — ATORVASTATIN CALCIUM 80 MG: 40 TABLET, FILM COATED ORAL at 12:10

## 2023-10-12 RX ADMIN — LATANOPROST 1 DROP: 50 SOLUTION OPHTHALMIC at 12:10

## 2023-10-12 RX ADMIN — APIXABAN 2.5 MG: 2.5 TABLET, FILM COATED ORAL at 09:10

## 2023-10-12 RX ADMIN — ALLOPURINOL 100 MG: 100 TABLET ORAL at 09:10

## 2023-10-12 RX ADMIN — APIXABAN 2.5 MG: 2.5 TABLET, FILM COATED ORAL at 12:10

## 2023-10-12 RX ADMIN — AMLODIPINE BESYLATE 2.5 MG: 2.5 TABLET ORAL at 12:10

## 2023-10-12 RX ADMIN — Medication 800 MG: at 09:10

## 2023-10-12 RX ADMIN — ATORVASTATIN CALCIUM 80 MG: 40 TABLET, FILM COATED ORAL at 09:10

## 2023-10-12 NOTE — ASSESSMENT & PLAN NOTE
Patient's COPD is controlled currently.  Patient is currently off COPD Pathway. Continue scheduled inhalers duonebs prn, Steroids, Antibiotics and Supplemental oxygen and monitor respiratory status closely.

## 2023-10-12 NOTE — ASSESSMENT & PLAN NOTE
Likely 2/2 symptomatic A-fib.   Plan:  -tele monitoring  -IVFs prn  -cards consult  -increase dose of amiodarone per cards recs  -npo@[place] per cards rec.

## 2023-10-12 NOTE — HPI
Ms. Bradley is a 89 year old female patient whose current medical conditions include COPD, CPAP, HTN, PPM placement, GERD, severe AS, and PAF (s/p recent JÚNIOR/DCCV on 10/5/23) who presented to [facility] ED yesterday due to increased weakness/fatigue over the past few days. Patient reported an episode of diarrhea on Sunday and Monday AM and felt herself go in and out of afib. Yesterday she became increasingly weak and fatigued, prompting her to go to ED. Initial workup revealed BNP of 343. EKG showed rate-controlled afib and patient was subsequently admitted for further evaluation and treatment. Cardiology consulted to assist with management. Patient seen and examined today, resting in bed. Feels ok. Still weak/tired. Remains in rate-controlled afib. No CP or SOB. She reports compliance with her medications, was recently started on amiodarone during prior admission and has been taking it as prescribed. Chart reviewed. K and Mg WNL. Troponin x 1 negative.

## 2023-10-12 NOTE — SUBJECTIVE & OBJECTIVE
Past Medical History:   Diagnosis Date    Anticoagulant long-term use     Arthritis     Asthma     Basal cell carcinoma     Cancer     skin cancer to face    Cataract     OU done//    CHF (congestive heart failure)     COPD (chronic obstructive pulmonary disease)     no oxygen; patient denies    cpap     Essential hypertension 05/05/2010    GERD (gastroesophageal reflux disease) 11/20/2012    Glaucoma     Hypertensive heart disease with heart failure 02/05/2013    Paroxysmal atrial fibrillation     Paroxysmal ventricular tachycardia     per problem list    Renal disorder     french-1/3/2020    Squamous cell carcinoma of skin        Past Surgical History:   Procedure Laterality Date    A-V CARDIAC PACEMAKER INSERTION  06/16/2021    Procedure: INSERTION, CARDIAC PACEMAKER, DUAL CHAMBER;  Surgeon: Oscar Sommers MD;  Location: [facility] CATH;  Service: Cardiovascular;;    ADENOIDECTOMY  191944    APPENDECTOMY  1948    Because of Ovarian Cyst surgery    BREAST BIOPSY Left 1995    neg    BREAST BIOPSY Right 1984    neg    BREAST BIOPSY Right 1992    neg    BREAST SURGERY      A number of biopsies    CARDIAC CATHETERIZATION  2013, 2014,2016, 2020    has 9 stents    CARDIAC SURGERY  2012    stents    CATARACT EXTRACTION W/  INTRAOCULAR LENS IMPLANT Left 11/01/2018    Dr Rose    CATARACT EXTRACTION W/  INTRAOCULAR LENS IMPLANT Right 12/13/2018    Dr Rose//    CHOLECYSTECTOMY      COLONOSCOPY  ~2005    Dr. Edward; normal per patient report    COLONOSCOPY N/A 09/06/2022    Procedure: COLONOSCOPY 8/31/22-note in Dr Simms's office visit note that he spoke to her cardiologist and she was viable candidate for endoscopy;  Surgeon: Cordelia Bueno MD;  Location: [facility];  Service: Endoscopy;  Laterality: N/A;    CORONARY ANGIOGRAPHY N/A 03/20/2020    Procedure: ANGIOGRAM, CORONARY ARTERY;  Surgeon: Chuy Bryant MD;  Location: [facility] CATH;  Service: Cardiology;  Laterality: N/A;    CYSTOSCOPY W/ RETROGRADES Bilateral  02/12/2020    Procedure: CYSTOSCOPY, WITH RETROGRADE PYELOGRAM;  Surgeon: Gasper Feliz MD;  Location: [facility] OR;  Service: Urology;  Laterality: Bilateral;    ESOPHAGOGASTRODUODENOSCOPY N/A 08/13/2020    Procedure: EGD (ESOPHAGOGASTRODUODENOSCOPY);  Surgeon: Mika Solorzano MD;  Location: [facility] ENDO;  Service: Endoscopy;  Laterality: N/A; Mild Schatzki ring. Biopsied. Dilated. small hiatal hernia, gastritis; biopsy: esophagus- SEVERE REFLUX ESOPHAGITIS, stomach- chronic gastritis, negative for H pylori    ESOPHAGOGASTRODUODENOSCOPY N/A 10/22/2020    Procedure: EGD (ESOPHAGOGASTRODUODENOSCOPY);  Surgeon: Fred Hendricks MD;  Location: [facility] ENDO;  Service: Endoscopy;  Laterality: N/A;    EYE SURGERY  2017    Cataracs    FRACTIONAL FLOW RESERVE (FFR), CORONARY  6/20/2023    Procedure: Fractional Flow [place] (FFR), Coronary;  Surgeon: Brice Campuzano MD;  Location: [facility] CATH LAB;  Service: Cardiology;;    HYSTERECTOMY  1969    INSTANTANEOUS WAVE-FREE RATIO (IFR) N/A 6/20/2023    Procedure: Instantaneous Wave-Free Ratio (IFR);  Surgeon: Brice Campuzano MD;  Location: [facility] CATH LAB;  Service: Cardiology;  Laterality: N/A;    LEFT HEART CATHETERIZATION Left 03/03/2020    Procedure: Left heart cath;  Surgeon: Chuy Bryant MD;  Location: [facility] CATH;  Service: Cardiology;  Laterality: Left;    LEFT HEART CATHETERIZATION Left 03/20/2020    Procedure: Left heart cath;  Surgeon: Chuy Bryant MD;  Location: [facility] CATH;  Service: Cardiology;  Laterality: Left;    LEFT HEART CATHETERIZATION N/A 6/20/2023    Procedure: Left heart cath;  Surgeon: Brice Campuzano MD;  Location: [facility] CATH LAB;  Service: Cardiology;  Laterality: N/A;    OVARIAN CYST REMOVAL  teenager    PHACOEMULSIFICATION OF CATARACT Left 11/01/2018    Procedure: PHACOEMULSIFICATION, CATARACT;  Surgeon: Aleksandar Rose Jr., MD;  Location: [facility] OR;  Service: Ophthalmology;  Laterality: Left;    PHACOEMULSIFICATION OF CATARACT Right 12/13/2018     Procedure: PHACOEMULSIFICATION, CATARACT;  Surgeon: Aleksandar Rose Jr., MD;  Location: [facility] OR;  Service: Ophthalmology;  Laterality: Right;    TONSILLECTOMY      aw/denoids    TRANSESOPHAGEAL ECHOCARDIOGRAM WITH POSSIBLE CARDIOVERSION (ALFRED W/ POSS CARDIOVERSION) N/A 10/5/2023    Procedure: Transesophageal echo (ALFRED) intra-procedure log documentation/alfred/cv;  Surgeon: Bao Miguel MD;  Location: [facility] CATH LAB;  Service: Cardiology;  Laterality: N/A;   Alfred/Cv  MRI safe   Pacer & leads implanted 6/16/21, Antoun   Bio Edora 8 MICHELLE, [id number], PID: [id number]   A lead: Bio Solia S45, [id number]   V lead: Bio Solia S53, [id number]    TREATMENT OF CARDIAC ARRHYTHMIA N/A 10/5/2023    Procedure: Cardioversion or Defibrillation;  Surgeon: Bao Miguel MD;  Location: [facility] [place] CATH LAB;  Service: Cardiology;  Laterality: N/A;    UPPER GASTROINTESTINAL ENDOSCOPY  ~2005    Dr. Solorzano    VALVE STUDY-AORTIC  6/20/2023    Procedure: Valve study-aortic;  Surgeon: Brice Campuzano MD;  Location: [facility] CATH LAB;  Service: Cardiology;;    VASCULAR SURGERY      to remove clot from right leg    Yag Capsulotomy Bilateral 11/05/2019    Dr Rose       Review of patient's allergies indicates:   Allergen Reactions    Timolol maleate Shortness Of Breath    Ciprofloxacin Other (See Comments)     Muscle ache    Sulfamethoxazole-trimethoprim        No current facility-administered medications on file prior to encounter.     Current Outpatient Medications on File Prior to Encounter   Medication Sig    acetaminophen (TYLENOL) 650 MG TbSR Take 650 mg by mouth as needed.     albuterol (PROVENTIL/VENTOLIN HFA) 90 mcg/actuation inhaler Inhale 1-2 puffs into the lungs every 4 (four) hours as needed for Wheezing or Shortness of Breath. Rescue    albuterol-ipratropium (DUO-NEB) 2.5 mg-0.5 mg/3 mL nebulizer solution Take 3 mLs by nebulization every 6 (six) hours as needed for Wheezing or Shortness of Breath.    allopurinoL (ZYLOPRIM) 100 MG  tablet Take 2 tablets (200 mg total) by mouth once daily. (Patient taking differently: Take 100 mg by mouth once daily. 1 tablet daily)    aluminum & magnesium hydroxide-simethicone (MYLANTA MAX STRENGTH) 400-400-40 mg/5 mL suspension Take by mouth every 6 (six) hours as needed for Indigestion.    amiodarone (PACERONE) 200 MG Tab Take 1 tablet (200 mg total) by mouth 2 (two) times daily for 14 days, THEN 1 tablet (200 mg total) once daily.    amLODIPine (NORVASC) 2.5 MG tablet TAKE 1 TABLET BY MOUTH EVERY DAY (Patient taking differently: Take 2.5 mg by mouth every evening.)    apixaban (ELIQUIS) 2.5 mg Tab Take 1 tablet (2.5 mg total) by mouth 2 (two) times daily.    cetirizine HCl (ZYRTEC ORAL) Take 10 mg by mouth once daily.    cholecalciferol, vitamin D3, 125 mcg (5,000 unit) Tab Take 5,000 Units by mouth once daily.    dorzolamide (TRUSOPT) 2 % ophthalmic solution INSTILL 1 DROP INTO BOTH EYES TWICE DAILY    fluticasone (FLONASE) 50 mcg/actuation nasal spray 2 sprays (100 mcg total) by Each Nare route daily as needed for Allergies.    fluticasone furoate-vilanteroL (BREO ELLIPTA) 100-25 mcg/dose diskus inhaler Inhale 1 puff into the lungs once daily.    furosemide (LASIX) 40 MG tablet Take 1 tablet (40 mg total) by mouth 2 (two) times a day. Take twice a day and monitor BP and weights    Lactobacillus rhamnosus GG (CULTURELLE) 10 billion cell capsule Take 1 capsule by mouth once daily.    latanoprost 0.005 % ophthalmic solution Place 1 drop into both eyes every evening.    magnesium oxide (MAG-OX) 400 mg tablet Take 800 mg by mouth once daily.     nitroGLYCERIN 0.4 MG/HR TD PT24 (NITRODUR) 0.4 mg/hr Place 1 patch onto the skin once daily.    ondansetron (ZOFRAN) 4 MG tablet Take 4 mg by mouth every 6 (six) hours as needed.    ondansetron (ZOFRAN-ODT) 4 MG TbDL Take by mouth.    pantoprazole (PROTONIX) 40 MG tablet Take 1 tablet (40 mg total) by mouth once daily. (Patient taking differently: Take 40 mg by mouth  once daily. Patient takes as needed)    potassium chloride (KLOR-CON) 10 MEQ TbSR Take 2 tablets (20 mEq total) by mouth 2 (two) times daily.    rosuvastatin (CRESTOR) 40 MG Tab TAKE 1 TABLET (40 MG TOTAL) BY MOUTH ONCE DAILY. (Patient taking differently: Take 40 mg by mouth every evening.)    traMADoL (ULTRAM) 50 mg tablet Take 1 tablet (50 mg total) by mouth every 12 (twelve) hours as needed for Pain.     Family History       Problem Relation (Age of Onset)    Alcohol abuse Mother    Cancer Maternal Grandfather    Clotting disorder Son    Diabetes Brother, Mother    Heart disease Brother, Mother, Father    Hypertension Mother, Father    Stroke Mother          Tobacco Use    Smoking status: Former     Current packs/day: 0.00     Types: Cigarettes     Start date: 6/10/1954     Quit date: 9/15/1955     Years since quittin.1    Smokeless tobacco: Never    Tobacco comments:     I onlysmoked one year while [place] my  was oversees  during [place]   Substance and Sexual Activity    Alcohol use: Not Currently     Comment: Only drank a little wine and haven't  drunk anything in 15 y    Drug use: Never    Sexual activity: Not Currently     Partners: Male     Birth control/protection: Abstinence     Comment:  and 87 years of age     Review of Systems   Constitutional: Positive for malaise/fatigue.   HENT: Negative.     Eyes: Negative.    Cardiovascular: Negative.    Respiratory: Negative.     Endocrine: Negative.    Hematologic/Lymphatic: Negative.    Skin: Negative.    Musculoskeletal:  Positive for arthritis and joint pain.   Gastrointestinal:  Positive for diarrhea.   Genitourinary: Negative.    Neurological:  Positive for weakness.   Psychiatric/Behavioral: Negative.     Allergic/Immunologic: Negative.      Objective:     Vital Signs (Most Recent):  Temp: 97.5 °F (36.4 °C) (10/12/23 0900)  Pulse: 74 (10/12/23 1128)  Resp: 18 (10/12/23 0745)  BP: 115/81 (10/12/23 0745)  SpO2: (!) 94 % (10/12/23 0745)  Vital Signs (24h Range):  Temp:  [97.2 °F (36.2 °C)-98 °F (36.7 °C)] 97.5 °F (36.4 °C)  Pulse:  [61-87] 74  Resp:  [16-20] 18  SpO2:  [92 %-95 %] 94 %  BP: (113-152)/(58-83) 115/81     Weight: 90.5 kg (199 lb 8.3 oz)  Body mass index is 33.2 kg/m².    SpO2: (!) 94 %         Intake/Output Summary (Last 24 hours) at 10/12/2023 1151  Last data filed at 10/12/2023 0759  Gross per 24 hour   Intake 250 ml   Output 675 ml   Net -425 ml       Lines/Drains/Airways       Drain  Duration                  Suprapubic Catheter 06/20/23 1126 18 Fr. 114 days              Peripheral Intravenous Line  Duration                  Peripheral IV - Single Lumen 10/11/23 1306 20 G Left;Posterior Hand <1 day                     Physical Exam  Vitals and nursing note reviewed.   Constitutional:       General: She is not in acute distress.     Appearance: Normal appearance. She is well-developed. She is not diaphoretic.   HENT:      Head: Normocephalic and atraumatic.   Eyes:      General:         Right eye: No discharge.         Left eye: No discharge.      Pupils: Pupils are equal, round, and reactive to light.   Cardiovascular:      Rate and Rhythm: Normal rate and regular rhythm.      Heart sounds: S1 normal and S2 normal. Murmur heard.      Harsh midsystolic murmur is present at the upper right sternal border radiating to the neck.      Comments: PPM site well-healed  Pulmonary:      Effort: Pulmonary effort is normal. No respiratory distress.      Breath sounds: Normal breath sounds. No wheezing or rales.   Abdominal:      General: There is no distension.      Palpations: Abdomen is soft.      Tenderness: There is no rebound.   Musculoskeletal:      Right lower leg: No edema.      Left lower leg: No edema.   Skin:     General: Skin is warm and dry.      Findings: No erythema.   Neurological:      General: No focal deficit present.      Mental Status: She is alert and oriented to person, place, and time.   Psychiatric:         Mood and  Affect: Mood normal.         Behavior: Behavior normal.         Thought Content: Thought content normal.          Significant Labs: CMP   Recent Labs   Lab 10/11/23  1304 10/12/23  0825    140   K 4.1 3.9    107   CO2 23 23   GLU 93 92   BUN 22 20   CREATININE 1.1 1.1   CALCIUM 9.8 9.6   PROT 7.6  --    ALBUMIN 3.8  --    BILITOT 0.4  --    ALKPHOS 120  --    AST 19  --    ALT 20  --    ANIONGAP 13 10   , CBC   Recent Labs   Lab 10/11/23  1304 10/12/23  0825   WBC 7.47 6.63   HGB 13.8 12.9   HCT 42.8 40.1    261   , Troponin   Recent Labs   Lab 10/11/23  1304   TROPONINI 0.012   , and All pertinent lab results from the last 24 hours have been reviewed.    Significant Imaging: Echocardiogram: Transthoracic echo (TTE) complete (Cupid Only):   Results for orders placed or performed during the hospital encounter of 05/17/23   Echo   Result Value Ref Range    BSA 2.03 m2    TDI SEPTAL 0.06 m/s    LV LATERAL E/E' RATIO 9.00 m/s    LV SEPTAL E/E' RATIO 10.50 m/s    LA WIDTH 3.70 cm    IVC diameter 1.09 cm    Left Ventricular Outflow Tract Mean Velocity 0.68 cm/s    Left Ventricular Outflow Tract Mean Gradient 2.06 mmHg    TDI LATERAL 0.07 m/s    PV PEAK VELOCITY 0.83 cm/s    LVIDd 3.82 3.5 - 6.0 cm    IVS 1.32 (A) 0.6 - 1.1 cm    Posterior Wall 1.40 (A) 0.6 - 1.1 cm    Ao root annulus 2.92 cm    LVIDs 2.31 2.1 - 4.0 cm    FS 40 28 - 44 %    LA volume 84.19 cm3    Sinus 2.86 cm    STJ 2.69 cm    LV mass 186.95 g    LA size 4.43 cm    RVDD 3.21 cm    Left Ventricle Relative Wall Thickness 0.73 cm    AV mean gradient 21 mmHg    AV valve area 0.80 cm2    AV Velocity Ratio 0.31     AV index (prosthetic) 0.32     E/A ratio 0.94     Mean e' 0.07 m/s    E wave deceleration time 397.60 msec    IVRT 102.76 msec    LVOT diameter 1.78 cm    LVOT area 2.5 cm2    LVOT peak moises 0.88 m/s    LVOT peak VTI 18.90 cm    Ao peak moises 2.85 m/s    Ao VTI 59.1 cm    RVOT peak moises 0.66 m/s    RVOT peak VTI 13.3 cm    LVOT stroke  volume 47.01 cm3    AV peak gradient 32 mmHg    PV mean gradient 0.88 mmHg    E/E' ratio 9.69 m/s    MV Peak E Tc 0.63 m/s    TR Max Tc 2.51 m/s    MV Peak A Tc 0.67 m/s    LV Systolic Volume 18.36 mL    LV Systolic Volume Index 9.3 mL/m2    LV Diastolic Volume 62.54 mL    LV Diastolic Volume Index 31.75 mL/m2    LA Volume Index 42.7 mL/m2    LV Mass Index 95 g/m2    RA Major Axis 4.59 cm    Left Atrium Minor Axis 5.70 cm    Left Atrium Major Axis 6.43 cm    Triscuspid Valve Regurgitation Peak Gradient 25 mmHg    LA Volume Index (Mod) 22.7 mL/m2    LA volume (mod) 44.76 cm3    RA Width 2.84 cm    Right Atrial Pressure (from IVC) 8 mmHg    EF 65 %    TV resting pulmonary artery pressure 33 mmHg    Narrative    · The left ventricle is normal in size with mild concentric hypertrophy   and normal systolic function.  · Moderate left atrial enlargement.  · The estimated ejection fraction is 65%.  · Indeterminate left ventricular diastolic function.  · Normal right ventricular size with normal right ventricular systolic   function.  · There is moderate-to-severe aortic valve stenosis.  · Aortic valve area is 0.80 cm2; peak velocity is 2.85 m/s; mean gradient   is 21 mmHg.  · Mild tricuspid regurgitation.  · Intermediate central venous pressure (8 mmHg).  · The estimated PA systolic pressure is 33 mmHg.      , EKG: Reviewed, and X-Ray: CXR: X-Ray Chest 1 View (CXR): No results found for this visit on 10/11/23. and X-Ray Chest PA and Lateral (CXR): No results found for this visit on 10/11/23.

## 2023-10-12 NOTE — SUBJECTIVE & OBJECTIVE
Interval History: pt in bed upon exam and reports symptom improvement with diarrhea resolved. Afib (controlled) on monitor. Amiodarone dose increased per cardiology. PT/OT evaluation pending.     Review of Systems   Constitutional:  Positive for fatigue. Negative for chills and diaphoresis.   Respiratory:  Negative for shortness of breath and wheezing.    Cardiovascular:  Positive for palpitations and leg swelling. Negative for chest pain.   Gastrointestinal:  Positive for diarrhea. Negative for abdominal pain, nausea and vomiting.   Neurological:  Positive for weakness (generalized). Negative for dizziness, light-headedness and headaches.   All other systems reviewed and are negative.    Objective:     Vital Signs (Most Recent):  Temp: 98.9 °F (37.2 °C) (10/12/23 1229)  Pulse: 87 (10/12/23 1229)  Resp: 16 (10/12/23 1229)  BP: (!) 145/73 (10/12/23 1229)  SpO2: 97 % (10/12/23 1229) Vital Signs (24h Range):  Temp:  [97.2 °F (36.2 °C)-98.9 °F (37.2 °C)] 98.9 °F (37.2 °C)  Pulse:  [61-87] 87  Resp:  [16-20] 16  SpO2:  [92 %-97 %] 97 %  BP: (113-152)/(58-83) 145/73     Weight: 90.5 kg (199 lb 8.3 oz)  Body mass index is 33.2 kg/m².    Intake/Output Summary (Last 24 hours) at 10/12/2023 1409  Last data filed at 10/12/2023 0759  Gross per 24 hour   Intake 250 ml   Output 675 ml   Net -425 ml         Physical Exam  Vitals and nursing note reviewed.   Constitutional:       General: She is awake. She is not in acute distress.     Appearance: Normal appearance. She is well-developed and well-groomed. She is not ill-appearing, toxic-appearing or diaphoretic.   HENT:      Head: Normocephalic and atraumatic.      Nose: Nose normal.      Mouth/Throat:      Pharynx: Oropharynx is clear.   Eyes:      Extraocular Movements: Extraocular movements intact.      Conjunctiva/sclera: Conjunctivae normal.   Cardiovascular:      Rate and Rhythm: Normal rate. Rhythm irregular.      Pulses:           Radial pulses are 2+ on the right side and  2+ on the left side.        Dorsalis pedis pulses are 2+ on the right side and 2+ on the left side.      Heart sounds: Normal heart sounds. No murmur heard.  Pulmonary:      Effort: Pulmonary effort is normal.      Breath sounds: Normal breath sounds.   Abdominal:      General: Bowel sounds are normal.      Palpations: Abdomen is soft.      Tenderness: There is no abdominal tenderness.       Genitourinary:     Comments: Suprapubic catheter in place   Musculoskeletal:      Cervical back: Normal range of motion and neck supple.      Right lower leg: No edema.      Left lower leg: No edema.      Comments: 5/5 strength throughout   Skin:     General: Skin is warm and dry.      Capillary Refill: Capillary refill takes less than 2 seconds.   Neurological:      General: No focal deficit present.      Mental Status: She is alert and oriented to person, place, and time. Mental status is at baseline.      GCS: GCS eye subscore is 4. GCS verbal subscore is 5. GCS motor subscore is 6.      Cranial Nerves: Cranial nerves 2-12 are intact.      Sensory: Sensation is intact.      Motor: Motor function is intact.   Psychiatric:         Mood and Affect: Mood normal.         Speech: Speech normal.         Behavior: Behavior normal. Behavior is cooperative.             Significant Labs: All pertinent labs within the past 24 hours have been reviewed.  CBC:   Recent Labs   Lab 10/11/23  1304 10/12/23  0825   WBC 7.47 6.63   HGB 13.8 12.9   HCT 42.8 40.1    261     CMP:   Recent Labs   Lab 10/11/23  1304 10/12/23  0825    140   K 4.1 3.9    107   CO2 23 23   GLU 93 92   BUN 22 20   CREATININE 1.1 1.1   CALCIUM 9.8 9.6   PROT 7.6  --    ALBUMIN 3.8  --    BILITOT 0.4  --    ALKPHOS 120  --    AST 19  --    ALT 20  --    ANIONGAP 13 10     Troponin:   Recent Labs   Lab 10/11/23  1304   TROPONINI 0.012       Significant Imaging: I have reviewed all pertinent imaging results/findings within the past 24 hours.

## 2023-10-12 NOTE — HPI
Holli Landrum is a 89 y.o. female with a PMH  has a past medical history of Anticoagulant long-term use, Arthritis, Asthma, Basal cell carcinoma, Cancer, Cataract, CHF (congestive heart failure), COPD (chronic obstructive pulmonary disease), cpap, Essential hypertension (05/05/2010), GERD (gastroesophageal reflux disease) (11/20/2012), Glaucoma, Hypertensive heart disease with heart failure (02/05/2013), Paroxysmal atrial fibrillation, Paroxysmal ventricular tachycardia, Renal disorder, and Squamous cell carcinoma of skin.  Presented to the ER for evaluation of generalized fatigue which has been persistent over the last couple of weeks.  Patient recently admitted to our facility between 10/03/2023-10/05/2023 for uncontrolled AFib and acute on chronic diastolic CHF.  Cardiology and EP was consulted.  Patient was eventually cardioverted and placed on amiodarone 200 mg p.o. b.i.d. times 14 days followed by 200 mg p.o. daily.  Patient's sotalol was D/C'd at the same time and was scheduled to follow-up with cardiology and EP within 1-2 weeks.  Patient reports she did fine since being discharged, but states that she started feeling bad again 8/8/23 while getting ready for [employer].  Patient denied any episode of syncope, but has reported associated diarrhea and worsening generalized weakness.  Patient reports compliance with home medications.  Denies any other symptoms.    ER workup is unremarkable with slight elevation in BNP of 343.  EKG revealed atrial fibrillation with a rate of 76 beats per minute and QT/QTC of 422/474.  Cardiology was consulted and recommend administration of 400 mg of amiodarone.  Patient also received 12.5 mg of meclizine, 8 mg Zofran, 250 cc bolus of normal saline in ED. patient in agreement with treatment plan.  Patient admitted under observation status.    PCP: Marcia Carlisle

## 2023-10-12 NOTE — ASSESSMENT & PLAN NOTE
Currently normotensive. BP usually well controlled per patient with home medications.  Plan:  -Optimize pain control   -Continue home medications (norvasc, lasix), titrate as needed   -Monitor BP  -Low salt/cardiac diet   -IV hydralazine prn for SBP>160 or DBP>90

## 2023-10-12 NOTE — PROGRESS NOTES
[facility] Medicine  Progress Note    Patient Name: Holli Landrum  MRN: [id number]  Patient Class: OP- Observation   Admission Date: 10/11/2023  Length of Stay: 0 days  Attending Physician: Jed Machado MD  Primary Care Provider: Marcia Carlisle MD        Subjective:     Principal Problem:Generalized weakness        HPI:  Holli Landrum is a 89 y.o. female with a PMH  has a past medical history of Anticoagulant long-term use, Arthritis, Asthma, Basal cell carcinoma, Cancer, Cataract, CHF (congestive heart failure), COPD (chronic obstructive pulmonary disease), cpap, Essential hypertension (05/05/2010), GERD (gastroesophageal reflux disease) (11/20/2012), Glaucoma, Hypertensive heart disease with heart failure (02/05/2013), Paroxysmal atrial fibrillation, Paroxysmal ventricular tachycardia, Renal disorder, and Squamous cell carcinoma of skin.  Presented to the ER for evaluation of generalized fatigue which has been persistent over the last couple of weeks.  Patient recently admitted to our facility between 10/03/2023-10/05/2023 for uncontrolled AFib and acute on chronic diastolic CHF.  Cardiology and EP was consulted.  Patient was eventually cardioverted and placed on amiodarone 200 mg p.o. b.i.d. times 14 days followed by 200 mg p.o. daily.  Patient's sotalol was D/C'd at the same time and was scheduled to follow-up with cardiology and EP within 1-2 weeks.  Patient reports she did fine since being discharged, but states that she started feeling bad again 8/8/23 while getting ready for [employer].  Patient denied any episode of syncope, but has reported associated diarrhea and worsening generalized weakness.  Patient reports compliance with home medications.  Denies any other symptoms.    ER workup is unremarkable with slight elevation in BNP of 343.  EKG revealed atrial fibrillation with a rate of 76 beats per minute and QT/QTC of 422/474.  Cardiology was consulted and recommend administration of  "400 mg of amiodarone.  Patient also received 12.5 mg of meclizine, 8 mg Zofran, 250 cc bolus of normal saline in ED. patient in agreement with treatment plan.  Patient admitted under observation status.    PCP: Marcia Carlisle           Overview/Hospital Course:  Pt admitted to Observation for General Weakness in the setting of uncontrolled Atrial fibrillation. Of note, patient was cardioverted (JÚNIOR/DCCV) on 10/5/23 with Sotalol transitioned to Amiodarone. Pt reports palpitations prior to admission and states she "could feel myself going into afib." BNP elevated and Troponin within normal limits. Cardiology consulted. Amiodarone dose increased per recommendation. PT/OT evaluation ordered with results pending. Social work consulted with home health to be resumed and suprapubic catheter to be changed after discharge. Diarrhea resolved - hx of IBS reported.       Interval History: pt in bed upon exam and reports symptom improvement with diarrhea resolved. Afib (controlled) on monitor. Amiodarone dose increased per cardiology. PT/OT evaluation pending.     Review of Systems   Constitutional:  Positive for fatigue. Negative for chills and diaphoresis.   Respiratory:  Negative for shortness of breath and wheezing.    Cardiovascular:  Positive for palpitations and leg swelling. Negative for chest pain.   Gastrointestinal:  Positive for diarrhea. Negative for abdominal pain, nausea and vomiting.   Neurological:  Positive for weakness (generalized). Negative for dizziness, light-headedness and headaches.   All other systems reviewed and are negative.    Objective:     Vital Signs (Most Recent):  Temp: 98.9 °F (37.2 °C) (10/12/23 1229)  Pulse: 87 (10/12/23 1229)  Resp: 16 (10/12/23 1229)  BP: (!) 145/73 (10/12/23 1229)  SpO2: 97 % (10/12/23 1229) Vital Signs (24h Range):  Temp:  [97.2 °F (36.2 °C)-98.9 °F (37.2 °C)] 98.9 °F (37.2 °C)  Pulse:  [61-87] 87  Resp:  [16-20] 16  SpO2:  [92 %-97 %] 97 %  BP: (113-152)/(58-83) 145/73 "     Weight: 90.5 kg (199 lb 8.3 oz)  Body mass index is 33.2 kg/m².    Intake/Output Summary (Last 24 hours) at 10/12/2023 1409  Last data filed at 10/12/2023 0759  Gross per 24 hour   Intake 250 ml   Output 675 ml   Net -425 ml         Physical Exam  Vitals and nursing note reviewed.   Constitutional:       General: She is awake. She is not in acute distress.     Appearance: Normal appearance. She is well-developed and well-groomed. She is not ill-appearing, toxic-appearing or diaphoretic.   HENT:      Head: Normocephalic and atraumatic.      Nose: Nose normal.      Mouth/Throat:      Pharynx: Oropharynx is clear.   Eyes:      Extraocular Movements: Extraocular movements intact.      Conjunctiva/sclera: Conjunctivae normal.   Cardiovascular:      Rate and Rhythm: Normal rate. Rhythm irregular.      Pulses:           Radial pulses are 2+ on the right side and 2+ on the left side.        Dorsalis pedis pulses are 2+ on the right side and 2+ on the left side.      Heart sounds: Normal heart sounds. No murmur heard.  Pulmonary:      Effort: Pulmonary effort is normal.      Breath sounds: Normal breath sounds.   Abdominal:      General: Bowel sounds are normal.      Palpations: Abdomen is soft.      Tenderness: There is no abdominal tenderness.       Genitourinary:     Comments: Suprapubic catheter in place   Musculoskeletal:      Cervical back: Normal range of motion and neck supple.      Right lower leg: No edema.      Left lower leg: No edema.      Comments: 5/5 strength throughout   Skin:     General: Skin is warm and dry.      Capillary Refill: Capillary refill takes less than 2 seconds.   Neurological:      General: No focal deficit present.      Mental Status: She is alert and oriented to person, place, and time. Mental status is at baseline.      GCS: GCS eye subscore is 4. GCS verbal subscore is 5. GCS motor subscore is 6.      Cranial Nerves: Cranial nerves 2-12 are intact.      Sensory: Sensation is intact.       Motor: Motor function is intact.   Psychiatric:         Mood and Affect: Mood normal.         Speech: Speech normal.         Behavior: Behavior normal. Behavior is cooperative.             Significant Labs: All pertinent labs within the past 24 hours have been reviewed.  CBC:   Recent Labs   Lab 10/11/23  1304 10/12/23  0825   WBC 7.47 6.63   HGB 13.8 12.9   HCT 42.8 40.1    261     CMP:   Recent Labs   Lab 10/11/23  1304 10/12/23  0825    140   K 4.1 3.9    107   CO2 23 23   GLU 93 92   BUN 22 20   CREATININE 1.1 1.1   CALCIUM 9.8 9.6   PROT 7.6  --    ALBUMIN 3.8  --    BILITOT 0.4  --    ALKPHOS 120  --    AST 19  --    ALT 20  --    ANIONGAP 13 10     Troponin:   Recent Labs   Lab 10/11/23  1304   TROPONINI 0.012       Significant Imaging: I have reviewed all pertinent imaging results/findings within the past 24 hours.      Assessment/Plan:      * Generalized weakness  Likely 2/2 symptomatic A-fib. Diarrhea resolved   Plan:  -tele monitoring  -IVFs prn  -cards consult  -increase dose of amiodarone per cards recs  -diet advanced   -PT/OT evaluation       Suprapubic catheter  Plan:  -continue cath care  -catheter to be changed by [facility] to be resumed upon discharge     CKD (chronic kidney disease) stage 3, GFR 30-59 ml/min  Appears near baseline.  Plan:  -Avoid nephrotoxins  -Monitor renal function  -Renally dose medications  -IVFs prn      Essential hypertension  Currently normotensive. BP usually well controlled per patient with home medications.  Plan:  -Optimize pain control   -Continue home medications (norvasc, lasix), titrate as needed   -Monitor BP  -Low salt/cardiac diet   -IV hydralazine prn for SBP>160 or DBP>90           Hypercholesteremia  Patient is chronically on statin.will continue for now. Last Lipid Panel:   Lab Results   Component Value Date    CHOL 134 12/07/2021    HDL 46 12/07/2021    LDLCALC 59.0 (L) 12/07/2021    TRIG 145 12/07/2021    CHOLHDL 34.3  12/07/2021     Plan:  -Continue home medication  -low fat/low calorie diet        Paroxysmal atrial fibrillation  Patient with Paroxysmal (<7 days) atrial fibrillation which is uncontrolled currently with Calcium Channel Blocker and Amiodarone. Patient is currently in atrial fibrillation.[id number] Score: 4. HASBLED Score: . Anticoagulation indicated. Anticoagulation done with eliquis.  -s/p JÚNIOR/DCCV on 10/5/23  -Cardiology following       GERD (gastroesophageal reflux disease)  Chronic. Stable. Currently asymptomatic. Home medications include PPI/Antacids as needed.  Plan:  -Continue PPI/Antacids as needed         COPD (chronic obstructive pulmonary disease)  Patient's COPD is controlled currently.  Patient is currently off COPD Pathway. Continue scheduled inhalers duonebs prn, Steroids, Antibiotics and Supplemental oxygen and monitor respiratory status closely.               VTE Risk Mitigation (From admission, onward)           Ordered     apixaban tablet 2.5 mg  2 times daily         10/11/23 2354     Reason for No Pharmacological VTE Prophylaxis  Once        Question:  Reasons:  Answer:  Already adequately anticoagulated on oral Anticoagulants    10/11/23 2042     IP VTE HIGH RISK PATIENT  Once         10/11/23 2042     Place sequential compression device  Until discontinued         10/11/23 2042                    Discharge Planning   SCOTT:      Code Status: Full Code   Is the patient medically ready for discharge?:     Reason for patient still in hospital (select all that apply): Patient trending condition, Laboratory test, Treatment, Consult recommendations and PT / OT recommendations                     Gianna Flores NP  Department of Hospital Medicine   [facility] - Telemetry ([facility])

## 2023-10-12 NOTE — H&P
[facility] Medicine  History & Physical    Patient Name: Holli Landrum  MRN: [id number]  Patient Class: OP- Observation  Admission Date: 10/11/2023  Attending Physician: Jed Machado MD   Primary Care Provider: Marcia Carlisle MD         Patient information was obtained from patient, relative(s), past medical records and ER records.     Subjective:     Principal Problem:Generalized weakness    Chief Complaint:   Chief Complaint   Patient presents with    Fatigue     AASI reports fatigue and Malaise x several weeks. Diarrhea started x 3 days ago. Pt. Lives at home with family.         HPI: Holli Landrum is a 89 y.o. female with a PMH  has a past medical history of Anticoagulant long-term use, Arthritis, Asthma, Basal cell carcinoma, Cancer, Cataract, CHF (congestive heart failure), COPD (chronic obstructive pulmonary disease), cpap, Essential hypertension (05/05/2010), GERD (gastroesophageal reflux disease) (11/20/2012), Glaucoma, Hypertensive heart disease with heart failure (02/05/2013), Paroxysmal atrial fibrillation, Paroxysmal ventricular tachycardia, Renal disorder, and Squamous cell carcinoma of skin.  Presented to the ER for evaluation of generalized fatigue which has been persistent over the last couple of weeks.  Patient recently admitted to our facility between 10/03/2023-10/05/2023 for uncontrolled AFib and acute on chronic diastolic CHF.  Cardiology and EP was consulted.  Patient was eventually cardioverted and placed on amiodarone 200 mg p.o. b.i.d. times 14 days followed by 200 mg p.o. daily.  Patient's sotalol was D/C'd at the same time and was scheduled to follow-up with cardiology and EP within 1-2 weeks.  Patient reports she did fine since being discharged, but states that she started feeling bad again 8/8/23 while getting ready for [employer].  Patient denied any episode of syncope, but has reported associated diarrhea and worsening generalized weakness.  Patient reports  compliance with home medications.  Denies any other symptoms.    ER workup is unremarkable with slight elevation in BNP of 343.  EKG revealed atrial fibrillation with a rate of 76 beats per minute and QT/QTC of 422/474.  Cardiology was consulted and recommend administration of 400 mg of amiodarone.  Patient also received 12.5 mg of meclizine, 8 mg Zofran, 250 cc bolus of normal saline in ED. patient in agreement with treatment plan.  Patient admitted under observation status.    PCP: Marcia Carlisle           Past Medical History:   Diagnosis Date    Anticoagulant long-term use     Arthritis     Asthma     Basal cell carcinoma     Cancer     skin cancer to face    Cataract     OU done//    CHF (congestive heart failure)     COPD (chronic obstructive pulmonary disease)     no oxygen; patient denies    cpap     Essential hypertension 05/05/2010    GERD (gastroesophageal reflux disease) 11/20/2012    Glaucoma     Hypertensive heart disease with heart failure 02/05/2013    Paroxysmal atrial fibrillation     Paroxysmal ventricular tachycardia     per problem list    Renal disorder     french-1/3/2020    Squamous cell carcinoma of skin        Past Surgical History:   Procedure Laterality Date    A-V CARDIAC PACEMAKER INSERTION  06/16/2021    Procedure: INSERTION, CARDIAC PACEMAKER, DUAL CHAMBER;  Surgeon: Oscar Sommers MD;  Location: [facility];  Service: Cardiovascular;;    ADENOIDECTOMY  191944    APPENDECTOMY  1948    Because of Ovarian Cyst surgery    BREAST BIOPSY Left 1995    neg    BREAST BIOPSY Right 1984    neg    BREAST BIOPSY Right 1992    neg    BREAST SURGERY      A number of biopsies    CARDIAC CATHETERIZATION  2013, 2014,2016, 2020    has 9 stents    CARDIAC SURGERY  2012    stents    CATARACT EXTRACTION W/  INTRAOCULAR LENS IMPLANT Left 11/01/2018    Dr Rose    CATARACT EXTRACTION W/  INTRAOCULAR LENS IMPLANT Right 12/13/2018    Dr Rose//    CHOLECYSTECTOMY       COLONOSCOPY  ~2005    Dr. Edward; normal per patient report    COLONOSCOPY N/A 09/06/2022    Procedure: COLONOSCOPY 8/31/22-note in Dr Simms's office visit note that he spoke to her cardiologist and she was viable candidate for endoscopy;  Surgeon: Cordelia Bueno MD;  Location: [facility] ENDO;  Service: Endoscopy;  Laterality: N/A;    CORONARY ANGIOGRAPHY N/A 03/20/2020    Procedure: ANGIOGRAM, CORONARY ARTERY;  Surgeon: Chuy Bryant MD;  Location: [facility] CATH;  Service: Cardiology;  Laterality: N/A;    CYSTOSCOPY W/ RETROGRADES Bilateral 02/12/2020    Procedure: CYSTOSCOPY, WITH RETROGRADE PYELOGRAM;  Surgeon: Gasper Feliz MD;  Location: [facility] OR;  Service: Urology;  Laterality: Bilateral;    ESOPHAGOGASTRODUODENOSCOPY N/A 08/13/2020    Procedure: EGD (ESOPHAGOGASTRODUODENOSCOPY);  Surgeon: Mika Solorzano MD;  Location: [facility] ENDO;  Service: Endoscopy;  Laterality: N/A; Mild Schatzki ring. Biopsied. Dilated. small hiatal hernia, gastritis; biopsy: esophagus- SEVERE REFLUX ESOPHAGITIS, stomach- chronic gastritis, negative for H pylori    ESOPHAGOGASTRODUODENOSCOPY N/A 10/22/2020    Procedure: EGD (ESOPHAGOGASTRODUODENOSCOPY);  Surgeon: Fred Hendricks MD;  Location: [facility];  Service: Endoscopy;  Laterality: N/A;    EYE SURGERY  2017    Cataracs    FRACTIONAL FLOW RESERVE (FFR), CORONARY  6/20/2023    Procedure: Fractional Flow [place] (FFR), Coronary;  Surgeon: Brice Campuzano MD;  Location: [facility] CATH LAB;  Service: Cardiology;;    HYSTERECTOMY  1969    INSTANTANEOUS WAVE-FREE RATIO (IFR) N/A 6/20/2023    Procedure: Instantaneous Wave-Free Ratio (IFR);  Surgeon: Brice Campuzano MD;  Location: [facility] CATH LAB;  Service: Cardiology;  Laterality: N/A;    LEFT HEART CATHETERIZATION Left 03/03/2020    Procedure: Left heart cath;  Surgeon: Chuy Bryant MD;  Location: [facility] CATH;  Service: Cardiology;  Laterality: Left;    LEFT HEART CATHETERIZATION Left 03/20/2020    Procedure: Left heart cath;   Surgeon: Chuy Bryant MD;  Location: [facility] CATH;  Service: Cardiology;  Laterality: Left;    LEFT HEART CATHETERIZATION N/A 6/20/2023    Procedure: Left heart cath;  Surgeon: Brice Campuzano MD;  Location: [facility] CATH LAB;  Service: Cardiology;  Laterality: N/A;    OVARIAN CYST REMOVAL  teenager    PHACOEMULSIFICATION OF CATARACT Left 11/01/2018    Procedure: PHACOEMULSIFICATION, CATARACT;  Surgeon: Aleksandar Rose Jr., MD;  Location: [facility] OR;  Service: Ophthalmology;  Laterality: Left;    PHACOEMULSIFICATION OF CATARACT Right 12/13/2018    Procedure: PHACOEMULSIFICATION, CATARACT;  Surgeon: Aleksandar Rose Jr., MD;  Location: [facility] OR;  Service: Ophthalmology;  Laterality: Right;    TONSILLECTOMY      aw/denoids    TRANSESOPHAGEAL ECHOCARDIOGRAM WITH POSSIBLE CARDIOVERSION (ALFRED W/ POSS CARDIOVERSION) N/A 10/5/2023    Procedure: Transesophageal echo (ALFRED) intra-procedure log documentation/alfred/cv;  Surgeon: Bao Miguel MD;  Location: [facility] CATH LAB;  Service: Cardiology;  Laterality: N/A;   Alfred/Cv  MRI safe   Pacer & leads implanted 6/16/21, Antoun   Bio Hubert 8 MICHELLE, [id number], PID: [id number]   A lead: Bio Solia S45, [id number]   V lead: Bio Solia S53, [id number]    TREATMENT OF CARDIAC ARRHYTHMIA N/A 10/5/2023    Procedure: Cardioversion or Defibrillation;  Surgeon: Bao Miguel MD;  Location: [facility] CATH LAB;  Service: Cardiology;  Laterality: N/A;    UPPER GASTROINTESTINAL ENDOSCOPY  ~2005    Dr. Solorzano    VALVE STUDY-AORTIC  6/20/2023    Procedure: Valve study-aortic;  Surgeon: Brice Campuzano MD;  Location: [facility] CATH LAB;  Service: Cardiology;;    VASCULAR SURGERY      to remove clot from right leg    Yag Capsulotomy Bilateral 11/05/2019    Dr Rose       Review of patient's allergies indicates:   Allergen Reactions    Timolol maleate Shortness Of Breath    Ciprofloxacin Other (See Comments)     Muscle ache    Sulfamethoxazole-trimethoprim        No current facility-administered  medications on file prior to encounter.     Current Outpatient Medications on File Prior to Encounter   Medication Sig    acetaminophen (TYLENOL) 650 MG TbSR Take 650 mg by mouth as needed.     albuterol (PROVENTIL/VENTOLIN HFA) 90 mcg/actuation inhaler Inhale 1-2 puffs into the lungs every 4 (four) hours as needed for Wheezing or Shortness of Breath. Rescue    albuterol-ipratropium (DUO-NEB) 2.5 mg-0.5 mg/3 mL nebulizer solution Take 3 mLs by nebulization every 6 (six) hours as needed for Wheezing or Shortness of Breath.    allopurinoL (ZYLOPRIM) 100 MG tablet Take 2 tablets (200 mg total) by mouth once daily. (Patient taking differently: Take 100 mg by mouth once daily. 1 tablet daily)    aluminum & magnesium hydroxide-simethicone (MYLANTA MAX STRENGTH) 400-400-40 mg/5 mL suspension Take by mouth every 6 (six) hours as needed for Indigestion.    amiodarone (PACERONE) 200 MG Tab Take 1 tablet (200 mg total) by mouth 2 (two) times daily for 14 days, THEN 1 tablet (200 mg total) once daily.    amLODIPine (NORVASC) 2.5 MG tablet TAKE 1 TABLET BY MOUTH EVERY DAY (Patient taking differently: Take 2.5 mg by mouth every evening.)    apixaban (ELIQUIS) 2.5 mg Tab Take 1 tablet (2.5 mg total) by mouth 2 (two) times daily.    cetirizine HCl (ZYRTEC ORAL) Take 10 mg by mouth once daily.    cholecalciferol, vitamin D3, 125 mcg (5,000 unit) Tab Take 5,000 Units by mouth once daily.    dorzolamide (TRUSOPT) 2 % ophthalmic solution INSTILL 1 DROP INTO BOTH EYES TWICE DAILY    fluticasone (FLONASE) 50 mcg/actuation nasal spray 2 sprays (100 mcg total) by Each Nare route daily as needed for Allergies.    fluticasone furoate-vilanteroL (BREO ELLIPTA) 100-25 mcg/dose diskus inhaler Inhale 1 puff into the lungs once daily.    furosemide (LASIX) 40 MG tablet Take 1 tablet (40 mg total) by mouth 2 (two) times a day. Take twice a day and monitor BP and weights    Lactobacillus rhamnosus GG (CULTURELLE) 10 billion cell  capsule Take 1 capsule by mouth once daily.    latanoprost 0.005 % ophthalmic solution Place 1 drop into both eyes every evening.    magnesium oxide (MAG-OX) 400 mg tablet Take 800 mg by mouth once daily.     nitroGLYCERIN 0.4 MG/HR TD PT24 (NITRODUR) 0.4 mg/hr Place 1 patch onto the skin once daily.    ondansetron (ZOFRAN) 4 MG tablet Take 4 mg by mouth every 6 (six) hours as needed.    ondansetron (ZOFRAN-ODT) 4 MG TbDL Take by mouth.    pantoprazole (PROTONIX) 40 MG tablet Take 1 tablet (40 mg total) by mouth once daily. (Patient taking differently: Take 40 mg by mouth once daily. Patient takes as needed)    potassium chloride (KLOR-CON) 10 MEQ TbSR Take 2 tablets (20 mEq total) by mouth 2 (two) times daily.    rosuvastatin (CRESTOR) 40 MG Tab TAKE 1 TABLET (40 MG TOTAL) BY MOUTH ONCE DAILY. (Patient taking differently: Take 40 mg by mouth every evening.)    traMADoL (ULTRAM) 50 mg tablet Take 1 tablet (50 mg total) by mouth every 12 (twelve) hours as needed for Pain.     Family History       Problem Relation (Age of Onset)    Alcohol abuse Mother    Cancer Maternal Grandfather    Clotting disorder Son    Diabetes Brother, Mother    Heart disease Brother, Mother, Father    Hypertension Mother, Father    Stroke Mother          Tobacco Use    Smoking status: Former     Current packs/day: 0.00     Types: Cigarettes     Start date: 6/10/1954     Quit date: 9/15/1955     Years since quittin.1    Smokeless tobacco: Never    Tobacco comments:     I onlysmoked one year while [employer] my  was oversees  during [employer]   Substance and Sexual Activity    Alcohol use: Not Currently     Comment: Only drank a little wine and haven't  drunk anything in 15 y    Drug use: Never    Sexual activity: Not Currently     Partners: Male     Birth control/protection: Abstinence     Comment:  and 87 years of age     Review of Systems   Constitutional:  Positive for fatigue. Negative for chills and  diaphoresis.   Gastrointestinal:  Positive for diarrhea. Negative for nausea and vomiting.   Neurological:  Positive for weakness (generalized). Negative for dizziness, light-headedness and headaches.   All other systems reviewed and are negative.    Objective:     Vital Signs (Most Recent):  Temp: 98 °F (36.7 °C) (10/12/23 0442)  Pulse: 80 (10/12/23 0442)  Resp: 18 (10/12/23 0442)  BP: (!) 152/83 (10/12/23 0442)  SpO2: (!) 92 % (10/12/23 0442) Vital Signs (24h Range):  Temp:  [97.2 °F (36.2 °C)-98 °F (36.7 °C)] 98 °F (36.7 °C)  Pulse:  [] 80  Resp:  [16-20] 18  SpO2:  [92 %-95 %] 92 %  BP: (113-152)/(58-96) 152/83     Weight: 90.5 kg (199 lb 8.3 oz)  Body mass index is 33.2 kg/m².     Physical Exam  Vitals and nursing note reviewed.   Constitutional:       General: She is awake. She is not in acute distress.     Appearance: Normal appearance. She is well-developed and well-groomed. She is not ill-appearing, toxic-appearing or diaphoretic.   HENT:      Head: Normocephalic and atraumatic.   Eyes:      Extraocular Movements: Extraocular movements intact.      Conjunctiva/sclera: Conjunctivae normal.   Cardiovascular:      Rate and Rhythm: Normal rate. Rhythm irregularly irregular.      Pulses:           Radial pulses are 2+ on the right side and 2+ on the left side.        Dorsalis pedis pulses are 2+ on the right side and 2+ on the left side.      Heart sounds: Normal heart sounds. No murmur heard.  Pulmonary:      Effort: Pulmonary effort is normal.      Breath sounds: Normal breath sounds.   Abdominal:      General: Bowel sounds are normal.      Palpations: Abdomen is soft.      Tenderness: There is no abdominal tenderness.       Musculoskeletal:      Cervical back: Normal range of motion and neck supple.      Right lower leg: No edema.      Left lower leg: No edema.      Comments: 5/5 strength throughout   Skin:     General: Skin is warm and dry.      Capillary Refill: Capillary refill takes less than 2  seconds.   Neurological:      General: No focal deficit present.      Mental Status: She is alert and oriented to person, place, and time. Mental status is at baseline.      GCS: GCS eye subscore is 4. GCS verbal subscore is 5. GCS motor subscore is 6.      Cranial Nerves: Cranial nerves 2-12 are intact.      Sensory: Sensation is intact.      Motor: Motor function is intact.   Psychiatric:         Mood and Affect: Mood normal.         Speech: Speech normal.         Behavior: Behavior normal. Behavior is cooperative.           LABS:  Recent Results (from the past 24 hour(s))   CBC auto differential    Collection Time: 10/11/23  1:04 PM   Result Value Ref Range    WBC 7.47 3.90 - 12.70 K/uL    RBC 5.23 4.00 - 5.40 M/uL    Hemoglobin 13.8 12.0 - 16.0 g/dL    Hematocrit 42.8 37.0 - 48.5 %    MCV 82 82 - 98 fL    MCH 26.4 (L) 27.0 - 31.0 pg    MCHC 32.2 32.0 - 36.0 g/dL    RDW 17.1 (H) 11.5 - 14.5 %    Platelets 288 150 - 450 K/uL    MPV 9.9 9.2 - 12.9 fL    Immature Granulocytes 0.1 0.0 - 0.5 %    Gran # (ANC) 4.6 1.8 - 7.7 K/uL    Immature Grans (Abs) 0.01 0.00 - 0.04 K/uL    Lymph # 1.8 1.0 - 4.8 K/uL    Mono # 0.7 0.3 - 1.0 K/uL    Eos # 0.3 0.0 - 0.5 K/uL    Baso # 0.03 0.00 - 0.20 K/uL    nRBC 0 0 /100 WBC    Gran % 62.2 38.0 - 73.0 %    Lymph % 23.7 18.0 - 48.0 %    Mono % 9.9 4.0 - 15.0 %    Eosinophil % 3.7 0.0 - 8.0 %    Basophil % 0.4 0.0 - 1.9 %    Differential Method Automated    Comprehensive metabolic panel    Collection Time: 10/11/23  1:04 PM   Result Value Ref Range    Sodium 142 136 - 145 mmol/L    Potassium 4.1 3.5 - 5.1 mmol/L    Chloride 106 95 - 110 mmol/L    CO2 23 23 - 29 mmol/L    Glucose 93 70 - 110 mg/dL    BUN 22 8 - 23 mg/dL    Creatinine 1.1 0.5 - 1.4 mg/dL    Calcium 9.8 8.7 - 10.5 mg/dL    Total Protein 7.6 6.0 - 8.4 g/dL    Albumin 3.8 3.5 - 5.2 g/dL    Total Bilirubin 0.4 0.1 - 1.0 mg/dL    Alkaline Phosphatase 120 55 - 135 U/L    AST 19 10 - 40 U/L    ALT 20 10 - 44 U/L    eGFR 48 (A)  >60 mL/min/1.73 m^2    Anion Gap 13 8 - 16 mmol/L   Lipase    Collection Time: 10/11/23  1:04 PM   Result Value Ref Range    Lipase 27 4 - 60 U/L   Magnesium    Collection Time: 10/11/23  1:04 PM   Result Value Ref Range    Magnesium 2.4 1.6 - 2.6 mg/dL   Troponin I    Collection Time: 10/11/23  1:04 PM   Result Value Ref Range    Troponin I 0.012 0.000 - 0.026 ng/mL   Urinalysis, Reflex to Urine Culture Urine, Supra Pubic    Collection Time: 10/11/23  1:07 PM    Specimen: Urine   Result Value Ref Range    Specimen UA Urine, Supra Pubic     Color, UA Colorless (A) Yellow, Straw, Kailey    Appearance, UA Clear Clear    pH, UA 7.0 5.0 - 8.0    Specific Gravity, UA 1.005 1.005 - 1.030    Protein, UA Negative Negative    Glucose, UA Negative Negative    Ketones, UA Negative Negative    Bilirubin (UA) Negative Negative    Occult Blood UA Negative Negative    Nitrite, UA Negative Negative    Urobilinogen, UA Negative <2.0 EU/dL    Leukocytes, UA Trace (A) Negative   Urinalysis Microscopic    Collection Time: 10/11/23  1:07 PM   Result Value Ref Range    WBC, UA 0 0 - 5 /hpf    Bacteria Rare None-Occ /hpf    Microscopic Comment SEE COMMENT    Brain natriuretic peptide    Collection Time: 10/11/23  7:40 PM   Result Value Ref Range     (H) 0 - 99 pg/mL       RADIOLOGY  CT Renal Stone Study ABD Pelvis WO    Result Date: 10/11/2023  EXAMINATION: CT RENAL STONE STUDY ABD PELVIS WO CLINICAL HISTORY: Flank pain, kidney stone suspected; TECHNIQUE: Noncontrast images were obtained COMPARISON: CT scan abdomen pelvis, 12/16/2019 FINDINGS: Lung bases are clear The liver, the spleen, and the pancreas appear normal. Previous cholecystectomy.  No bile duct dilatation. Stable left adrenal adenoma measuring 2 cm.  No evidence of hydronephrosis or renal stone. Atherosclerosis of the abdominal aorta. No evidence of aneurysm.  No retroperitoneal mass or adenopathy. There are no acute bowel abnormalities.     No evidence of appendicitis.   No evidence of diverticulitis. Suprapubic Ballard catheter in good position.  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 Skeletal structures are intact.  No acute skeletal findings.     No evidence of obstructive uropathy or renal stone.  No acute findings. All CT scans at this facility use dose modulation, iterative reconstructions, and/or weight base dosing when appropriate to reduce radiation dose to as low as reasonably achievable Electronically signed by: Fabio Dickerson MD Date:    10/11/2023 Time:    14:50    CT Head Without Contrast    Result Date: 10/11/2023  EXAMINATION: CT HEAD WITHOUT CONTRAST CLINICAL HISTORY: Syncope, recurrent; TECHNIQUE: Noncontrast images were obtained COMPARISON: CT head, 01/20/2019. FINDINGS: No intracranial acute hemorrhage or acute focal brain parenchymal abnormality is identified.  Atrophy is present.  Patchy periventricular white matter hypodensity secondary to chronic small vessel ischemic changes.  Calvarium is intact.     No acute intracranial abnormality. All CT scans at this facility use dose modulation, iterative reconstructions, and/or weight base dosing when appropriate to reduce radiation dose to as low as reasonably achievable. Electronically signed by: Fabio Dickerson MD Date:    10/11/2023 Time:    14:40    X-Ray Chest AP Portable    Result Date: 10/11/2023  EXAMINATION: XR CHEST AP PORTABLE CLINICAL HISTORY: , Weakness; COMPARISON: Chest, 10/03/2023. FINDINGS: Heart size is stable.  Lungs are essentially clear.  Pacemaker leads remain in stable and good position.     Stable chest.  No infiltrate. Electronically signed by: Fabio Dickerson MD Date:    10/11/2023 Time:    12:15    Transesophageal echo (JÚNIOR)    Result Date: 10/5/2023    Left Ventricle: The left ventricle is normal in size. Normal wall thickness. Normal wall motion. There is low normal systolic function with a visually  estimated ejection fraction of 50 - 55%. Unable to assess diastolic function due to atrial fibrillation.   Left Atrium: Left atrium is moderately dilated. The left atrial appendage appears normal. Appendage velocity is reduced at less than 40 cm/sec. There is no thrombus in the cavity.   Right Ventricle: Normal right ventricular cavity size. Wall thickness is normal. Right ventricle wall motion  is normal. Systolic function is normal.   Aortic Valve: There is severe aortic valve sclerosis. Severely calcified cusps. There is severe stenosis.   Mitral Valve: There is no stenosis. There is moderate regurgitation.   Aorta: Atherosclerosis of the descending aorta.   IVC/SVC: Normal venous pressure at 3 mmHg.   Aortic valve area by planimetry was 0.7cm2   Successful cardioversion of AFib into normal sinus rhythm at 66 beats per minute.  No thrombus seen in the left atrium or JESSI.   See separate intra-procedure report     Intra-Procedure Documentation    Result Date: 10/5/2023    The cardioversion was successfully performed with restoration of normal sinus rhythm.     X-Ray Chest 1 View    Result Date: 10/3/2023  EXAMINATION: XR CHEST 1 VIEW CLINICAL HISTORY: sob; TECHNIQUE: Single frontal view of the chest was performed. COMPARISON: Multiple priors. FINDINGS: Left chest cardiac pacing device.The lungs are clear, with normal appearance of pulmonary vasculature and no pleural effusion or pneumothorax. The cardiac silhouette is normal in size. The hilar and mediastinal contours are unremarkable. Bones are intact.     No acute abnormality. Electronically signed by: Stephon Menendez Date:    10/03/2023 Time:    21:34      EKG    MICROBIOLOGY    MDM     Amount and/or Complexity of Data Reviewed  Clinical lab tests: reviewed  Tests in the radiology section of CPT®: reviewed  Tests in the medicine section of CPT®: reviewed  Discussion of test results with the performing providers: yes  Decide to obtain previous medical  records or to obtain history from someone other than the patient: yes  Obtain history from someone other than the patient: yes  Review and summarize past medical records: yes  Discuss the patient with other providers: yes  Independent visualization of images, tracings, or specimens: yes          Assessment/Plan:     * Generalized weakness  Likely 2/2 symptomatic A-fib.   Plan:  -tele monitoring  -IVFs prn  -cards consult  -increase dose of amiodarone per cards recs  -npo@MN per cards rec.       Paroxysmal atrial fibrillation  Patient with Paroxysmal (<7 days) atrial fibrillation which is uncontrolled currently with Calcium Channel Blocker and Amiodarone. Patient is currently in atrial fibrillation.[id number] Score: 4. HASBLED Score: . Anticoagulation indicated. Anticoagulation done with eliquis.    Essential hypertension  Currently normotensive. BP usually well controlled per patient with home medications.  Plan:  -Optimize pain control   -Continue home medications (norvasc, lasix), titrate as needed   -Monitor BP  -Low salt/cardiac diet when not NPO  -IV hydralazine prn for SBP>160 or DBP>90           Hypercholesteremia  Patient is chronically on statin.will continue for now. Last Lipid Panel:   Lab Results   Component Value Date    CHOL 134 12/07/2021    HDL 46 12/07/2021    LDLCALC 59.0 (L) 12/07/2021    TRIG 145 12/07/2021    CHOLHDL 34.3 12/07/2021     Plan:  -Continue home medication  -low fat/low calorie diet        CKD (chronic kidney disease) stage 3, GFR 30-59 ml/min  Appears near baseline.  Plan:  -Avoid nephrotoxins  -Monitor renal function  -Renally dose medications  -IVFs prn        COPD (chronic obstructive pulmonary disease)  Patient's COPD is controlled currently.  Patient is currently off COPD Pathway. Continue scheduled inhalers duonebs prn, Steroids, Antibiotics and Supplemental oxygen and monitor respiratory status closely.             GERD (gastroesophageal reflux disease)  Chronic. Stable.  Currently asymptomatic. Home medications include PPI/Antacids as needed.  Plan:  -Continue PPI/Antacids as needed         Suprapubic catheter  Plan:  -continue cath care      VTE Risk Mitigation (From admission, onward)         Ordered     apixaban tablet 2.5 mg  2 times daily         10/11/23 1955     Reason for No Pharmacological VTE Prophylaxis  Once        Question:  Reasons:  Answer:  Already adequately anticoagulated on oral Anticoagulants    10/11/23 2042     IP VTE HIGH RISK PATIENT  Once         10/11/23 2042     Place sequential compression device  Until discontinued         10/11/23 2042              //Core Measures   -DVT proph: SCDs, eliquis  -Code status Full    -Surrogate:daughter    Components of this note were documented using a voice recognition system and are subject to errors not corrected at the time the document was proof read. Please contact the author for any clarifications.       Blair Hernandez NP  Department of Hospital Medicine  [facility] - Tele[facility] ([facility])

## 2023-10-12 NOTE — CONSULTS
[facility] ([facility])  Cardiology  Consult Note    Patient Name: Holli Landrum  MRN: [id number]  Admission Date: 10/11/2023  Hospital Length of Stay: 0 days  Code Status: Full Code   Attending Provider: Jed Machado MD   Consulting Provider: Rima Flores PA-C  Primary Care Physician: Marcia Carlisle MD  Principal Problem:Generalized weakness    Patient information was obtained from patient, relative(s), past medical records and ER records.     Inpatient consult to Cardiology  Consult performed by: Rima Flores PA-C  Consult ordered by: Blair Hernandez NP        Subjective:     Chief Complaint: Weakness    HPI:   Ms. Bradley is a 89 year old female patient whose current medical conditions include COPD, CPAP, HTN, PPM placement, GERD, severe AS, and PAF (s/p recent JÚNIOR/DCCV on 10/5/23) who presented to [facility] ED yesterday due to increased weakness/fatigue over the past few days. Patient reported an episode of diarrhea on Sunday and Monday AM and felt herself go in and out of afib. Yesterday she became increasingly weak and fatigued, prompting her to go to ED. Initial workup revealed BNP of 343. EKG showed rate-controlled afib and patient was subsequently admitted for further evaluation and treatment. Cardiology consulted to assist with management. Patient seen and examined today, resting in bed. Feels ok. Still weak/tired. Remains in rate-controlled afib. No CP or SOB. She reports compliance with her medications, was recently started on amiodarone during prior admission and has been taking it as prescribed. Chart reviewed. K and Mg WNL. Troponin x 1 negative.      Past Medical History:   Diagnosis Date    Anticoagulant long-term use     Arthritis     Asthma     Basal cell carcinoma     Cancer     skin cancer to face    Cataract     OU done//    CHF (congestive heart failure)     COPD (chronic obstructive pulmonary disease)     no oxygen; patient denies    cpap     Essential hypertension  05/05/2010    GERD (gastroesophageal reflux disease) 11/20/2012    Glaucoma     Hypertensive heart disease with heart failure 02/05/2013    Paroxysmal atrial fibrillation     Paroxysmal ventricular tachycardia     per problem list    Renal disorder     french-1/3/2020    Squamous cell carcinoma of skin        Past Surgical History:   Procedure Laterality Date    A-V CARDIAC PACEMAKER INSERTION  06/16/2021    Procedure: INSERTION, CARDIAC PACEMAKER, DUAL CHAMBER;  Surgeon: Oscar Sommers MD;  Location: [facility] CATH;  Service: Cardiovascular;;    ADENOIDECTOMY  191944    APPENDECTOMY  1948    Because of Ovarian Cyst surgery    BREAST BIOPSY Left 1995    neg    BREAST BIOPSY Right 1984    neg    BREAST BIOPSY Right 1992    neg    BREAST SURGERY      A number of biopsies    CARDIAC CATHETERIZATION  2013, 2014,2016, 2020    has 9 stents    CARDIAC SURGERY  2012    stents    CATARACT EXTRACTION W/  INTRAOCULAR LENS IMPLANT Left 11/01/2018    Dr Rose    CATARACT EXTRACTION W/  INTRAOCULAR LENS IMPLANT Right 12/13/2018    Dr Rose//    CHOLECYSTECTOMY      COLONOSCOPY  ~2005    Dr. Edward; normal per patient report    COLONOSCOPY N/A 09/06/2022    Procedure: COLONOSCOPY 8/31/22-note in Dr Simms's office visit note that he spoke to her cardiologist and she was viable candidate for endoscopy;  Surgeon: Cordelia Bueno MD;  Location: [facility];  Service: Endoscopy;  Laterality: N/A;    CORONARY ANGIOGRAPHY N/A 03/20/2020    Procedure: ANGIOGRAM, CORONARY ARTERY;  Surgeon: Chuy Bryant MD;  Location: [facility] CATH;  Service: Cardiology;  Laterality: N/A;    CYSTOSCOPY W/ RETROGRADES Bilateral 02/12/2020    Procedure: CYSTOSCOPY, WITH RETROGRADE PYELOGRAM;  Surgeon: Gasper Feliz MD;  Location: [facility] OR;  Service: Urology;  Laterality: Bilateral;    ESOPHAGOGASTRODUODENOSCOPY N/A 08/13/2020    Procedure: EGD (ESOPHAGOGASTRODUODENOSCOPY);  Surgeon: Mika Solorzano MD;  Location: [facility];   Service: Endoscopy;  Laterality: N/A; Mild Schatzki ring. Biopsied. Dilated. small hiatal hernia, gastritis; biopsy: esophagus- SEVERE REFLUX ESOPHAGITIS, stomach- chronic gastritis, negative for H pylori    ESOPHAGOGASTRODUODENOSCOPY N/A 10/22/2020    Procedure: EGD (ESOPHAGOGASTRODUODENOSCOPY);  Surgeon: Fred Hendricks MD;  Location: [facility] ENDO;  Service: Endoscopy;  Laterality: N/A;    EYE SURGERY  2017    Cataracs    FRACTIONAL FLOW RESERVE (FFR), CORONARY  6/20/2023    Procedure: Fractional Flow [place] (FFR), Coronary;  Surgeon: Brice Campuzano MD;  Location: [facility] CATH LAB;  Service: Cardiology;;    HYSTERECTOMY  1969    INSTANTANEOUS WAVE-FREE RATIO (IFR) N/A 6/20/2023    Procedure: Instantaneous Wave-Free Ratio (IFR);  Surgeon: Brice Campuzano MD;  Location: [facility] CATH LAB;  Service: Cardiology;  Laterality: N/A;    LEFT HEART CATHETERIZATION Left 03/03/2020    Procedure: Left heart cath;  Surgeon: Chuy Bryant MD;  Location: [facility] CATH;  Service: Cardiology;  Laterality: Left;    LEFT HEART CATHETERIZATION Left 03/20/2020    Procedure: Left heart cath;  Surgeon: Chuy Bryant MD;  Location: [facility] CATH;  Service: Cardiology;  Laterality: Left;    LEFT HEART CATHETERIZATION N/A 6/20/2023    Procedure: Left heart cath;  Surgeon: Brice Campuzano MD;  Location: [facility] CATH LAB;  Service: Cardiology;  Laterality: N/A;    OVARIAN CYST REMOVAL  teenager    PHACOEMULSIFICATION OF CATARACT Left 11/01/2018    Procedure: PHACOEMULSIFICATION, CATARACT;  Surgeon: Aleksandar Rose Jr., MD;  Location: [facility] OR;  Service: Ophthalmology;  Laterality: Left;    PHACOEMULSIFICATION OF CATARACT Right 12/13/2018    Procedure: PHACOEMULSIFICATION, CATARACT;  Surgeon: Aleksandar Rose Jr., MD;  Location: [facility] OR;  Service: Ophthalmology;  Laterality: Right;    TONSILLECTOMY      aw/denoids    TRANSESOPHAGEAL ECHOCARDIOGRAM WITH POSSIBLE CARDIOVERSION (JÚNIOR W/ POSS CARDIOVERSION) N/A 10/5/2023    Procedure:  Transesophageal echo (ALFRED) intra-procedure log documentation/alfred/cv;  Surgeon: Bao Miguel MD;  Location: [facility] CATH LAB;  Service: Cardiology;  Laterality: N/A;   Alfred/Cv  MRI safe   Pacer & leads implanted 6/16/21, Antoun   Bio Edora 8 MICHELLE, [id number], PID: [id number]   A lead: Bio Solia S45, [id number]   V lead: Bio Solia S53, [id number]    TREATMENT OF CARDIAC ARRHYTHMIA N/A 10/5/2023    Procedure: Cardioversion or Defibrillation;  Surgeon: Bao Miguel MD;  Location: [facility] CATH LAB;  Service: Cardiology;  Laterality: N/A;    UPPER GASTROINTESTINAL ENDOSCOPY  ~2005    Dr. Solorzano    VALVE STUDY-AORTIC  6/20/2023    Procedure: Valve study-aortic;  Surgeon: Brice Campuzano MD;  Location: [facility] CATH LAB;  Service: Cardiology;;    VASCULAR SURGERY      to remove clot from right leg    Yag Capsulotomy Bilateral 11/05/2019    Dr Rose       Review of patient's allergies indicates:   Allergen Reactions    Timolol maleate Shortness Of Breath    Ciprofloxacin Other (See Comments)     Muscle ache    Sulfamethoxazole-trimethoprim        No current facility-administered medications on file prior to encounter.     Current Outpatient Medications on File Prior to Encounter   Medication Sig    acetaminophen (TYLENOL) 650 MG TbSR Take 650 mg by mouth as needed.     albuterol (PROVENTIL/VENTOLIN HFA) 90 mcg/actuation inhaler Inhale 1-2 puffs into the lungs every 4 (four) hours as needed for Wheezing or Shortness of Breath. Rescue    albuterol-ipratropium (DUO-NEB) 2.5 mg-0.5 mg/3 mL nebulizer solution Take 3 mLs by nebulization every 6 (six) hours as needed for Wheezing or Shortness of Breath.    allopurinoL (ZYLOPRIM) 100 MG tablet Take 2 tablets (200 mg total) by mouth once daily. (Patient taking differently: Take 100 mg by mouth once daily. 1 tablet daily)    aluminum & magnesium hydroxide-simethicone (MYLANTA MAX STRENGTH) 400-400-40 mg/5 mL suspension Take by mouth every 6 (six) hours as needed  for Indigestion.    amiodarone (PACERONE) 200 MG Tab Take 1 tablet (200 mg total) by mouth 2 (two) times daily for 14 days, THEN 1 tablet (200 mg total) once daily.    amLODIPine (NORVASC) 2.5 MG tablet TAKE 1 TABLET BY MOUTH EVERY DAY (Patient taking differently: Take 2.5 mg by mouth every evening.)    apixaban (ELIQUIS) 2.5 mg Tab Take 1 tablet (2.5 mg total) by mouth 2 (two) times daily.    cetirizine HCl (ZYRTEC ORAL) Take 10 mg by mouth once daily.    cholecalciferol, vitamin D3, 125 mcg (5,000 unit) Tab Take 5,000 Units by mouth once daily.    dorzolamide (TRUSOPT) 2 % ophthalmic solution INSTILL 1 DROP INTO BOTH EYES TWICE DAILY    fluticasone (FLONASE) 50 mcg/actuation nasal spray 2 sprays (100 mcg total) by Each Nare route daily as needed for Allergies.    fluticasone furoate-vilanteroL (BREO ELLIPTA) 100-25 mcg/dose diskus inhaler Inhale 1 puff into the lungs once daily.    furosemide (LASIX) 40 MG tablet Take 1 tablet (40 mg total) by mouth 2 (two) times a day. Take twice a day and monitor BP and weights    Lactobacillus rhamnosus GG (CULTURELLE) 10 billion cell capsule Take 1 capsule by mouth once daily.    latanoprost 0.005 % ophthalmic solution Place 1 drop into both eyes every evening.    magnesium oxide (MAG-OX) 400 mg tablet Take 800 mg by mouth once daily.     nitroGLYCERIN 0.4 MG/HR TD PT24 (NITRODUR) 0.4 mg/hr Place 1 patch onto the skin once daily.    ondansetron (ZOFRAN) 4 MG tablet Take 4 mg by mouth every 6 (six) hours as needed.    ondansetron (ZOFRAN-ODT) 4 MG TbDL Take by mouth.    pantoprazole (PROTONIX) 40 MG tablet Take 1 tablet (40 mg total) by mouth once daily. (Patient taking differently: Take 40 mg by mouth once daily. Patient takes as needed)    potassium chloride (KLOR-CON) 10 MEQ TbSR Take 2 tablets (20 mEq total) by mouth 2 (two) times daily.    rosuvastatin (CRESTOR) 40 MG Tab TAKE 1 TABLET (40 MG TOTAL) BY MOUTH ONCE DAILY. (Patient taking differently: Take  40 mg by mouth every evening.)    traMADoL (ULTRAM) 50 mg tablet Take 1 tablet (50 mg total) by mouth every 12 (twelve) hours as needed for Pain.     Family History       Problem Relation (Age of Onset)    Alcohol abuse Mother    Cancer Maternal Grandfather    Clotting disorder Son    Diabetes Brother, Mother    Heart disease Brother, Mother, Father    Hypertension Mother, Father    Stroke Mother          Tobacco Use    Smoking status: Former     Current packs/day: 0.00     Types: Cigarettes     Start date: 6/10/1954     Quit date: 9/15/1955     Years since quittin.1    Smokeless tobacco: Never    Tobacco comments:     I onlysmoked one year while [employer] my  was oversees  during [place] [employer]   Substance and Sexual Activity    Alcohol use: Not Currently     Comment: Only drank a little wine and haven't  drunk anything in 15 y    Drug use: Never    Sexual activity: Not Currently     Partners: Male     Birth control/protection: Abstinence     Comment:  and 87 years of age     Review of Systems   Constitutional: Positive for malaise/fatigue.   HENT: Negative.     Eyes: Negative.    Cardiovascular: Negative.    Respiratory: Negative.     Endocrine: Negative.    Hematologic/Lymphatic: Negative.    Skin: Negative.    Musculoskeletal:  Positive for arthritis and joint pain.   Gastrointestinal:  Positive for diarrhea.   Genitourinary: Negative.    Neurological:  Positive for weakness.   Psychiatric/Behavioral: Negative.     Allergic/Immunologic: Negative.      Objective:     Vital Signs (Most Recent):  Temp: 97.5 °F (36.4 °C) (10/12/23 0900)  Pulse: 74 (10/12/23 1128)  Resp: 18 (10/12/23 0745)  BP: 115/81 (10/12/23 0745)  SpO2: (!) 94 % (10/12/23 0745) Vital Signs (24h Range):  Temp:  [97.2 °F (36.2 °C)-98 °F (36.7 °C)] 97.5 °F (36.4 °C)  Pulse:  [61-87] 74  Resp:  [16-20] 18  SpO2:  [92 %-95 %] 94 %  BP: (113-152)/(58-83) 115/81     Weight: 90.5 kg (199 lb 8.3 oz)  Body mass index is 33.2 kg/m².    SpO2:  (!) 94 %         Intake/Output Summary (Last 24 hours) at 10/12/2023 1151  Last data filed at 10/12/2023 0759  Gross per 24 hour   Intake 250 ml   Output 675 ml   Net -425 ml       Lines/Drains/Airways       Drain  Duration                  Suprapubic Catheter 06/20/23 1126 18 Fr. 114 days              Peripheral Intravenous Line  Duration                  Peripheral IV - Single Lumen 10/11/23 1306 20 G Left;Posterior Hand <1 day                     Physical Exam  Vitals and nursing note reviewed.   Constitutional:       General: She is not in acute distress.     Appearance: Normal appearance. She is well-developed. She is not diaphoretic.   HENT:      Head: Normocephalic and atraumatic.   Eyes:      General:         Right eye: No discharge.         Left eye: No discharge.      Pupils: Pupils are equal, round, and reactive to light.   Cardiovascular:      Rate and Rhythm: Normal rate and regular rhythm.      Heart sounds: S1 normal and S2 normal. Murmur heard.      Harsh midsystolic murmur is present at the upper right sternal border radiating to the neck.      Comments: PPM site well-healed  Pulmonary:      Effort: Pulmonary effort is normal. No respiratory distress.      Breath sounds: Normal breath sounds. No wheezing or rales.   Abdominal:      General: There is no distension.      Palpations: Abdomen is soft.      Tenderness: There is no rebound.   Musculoskeletal:      Right lower leg: No edema.      Left lower leg: No edema.   Skin:     General: Skin is warm and dry.      Findings: No erythema.   Neurological:      General: No focal deficit present.      Mental Status: She is alert and oriented to person, place, and time.   Psychiatric:         Mood and Affect: Mood normal.         Behavior: Behavior normal.         Thought Content: Thought content normal.          Significant Labs: CMP   Recent Labs   Lab 10/11/23  1304 10/12/23  0825    140   K 4.1 3.9    107   CO2 23 23   GLU 93 92   BUN 22 20    CREATININE 1.1 1.1   CALCIUM 9.8 9.6   PROT 7.6  --    ALBUMIN 3.8  --    BILITOT 0.4  --    ALKPHOS 120  --    AST 19  --    ALT 20  --    ANIONGAP 13 10   , CBC   Recent Labs   Lab 10/11/23  1304 10/12/23  0825   WBC 7.47 6.63   HGB 13.8 12.9   HCT 42.8 40.1    261   , Troponin   Recent Labs   Lab 10/11/23  1304   TROPONINI 0.012   , and All pertinent lab results from the last 24 hours have been reviewed.    Significant Imaging: Echocardiogram: Transthoracic echo (TTE) complete ([facility] Only):   Results for orders placed or performed during the hospital encounter of 05/17/23   Echo   Result Value Ref Range    BSA 2.03 m2    TDI SEPTAL 0.06 m/s    LV LATERAL E/E' RATIO 9.00 m/s    LV SEPTAL E/E' RATIO 10.50 m/s    LA WIDTH 3.70 cm    IVC diameter 1.09 cm    Left Ventricular Outflow Tract Mean Velocity 0.68 cm/s    Left Ventricular Outflow Tract Mean Gradient 2.06 mmHg    TDI LATERAL 0.07 m/s    PV PEAK VELOCITY 0.83 cm/s    LVIDd 3.82 3.5 - 6.0 cm    IVS 1.32 (A) 0.6 - 1.1 cm    Posterior Wall 1.40 (A) 0.6 - 1.1 cm    Ao root annulus 2.92 cm    LVIDs 2.31 2.1 - 4.0 cm    FS 40 28 - 44 %    LA volume 84.19 cm3    Sinus 2.86 cm    STJ 2.69 cm    LV mass 186.95 g    LA size 4.43 cm    RVDD 3.21 cm    Left Ventricle Relative Wall Thickness 0.73 cm    AV mean gradient 21 mmHg    AV valve area 0.80 cm2    AV Velocity Ratio 0.31     AV index (prosthetic) 0.32     E/A ratio 0.94     Mean e' 0.07 m/s    E wave deceleration time 397.60 msec    IVRT 102.76 msec    LVOT diameter 1.78 cm    LVOT area 2.5 cm2    LVOT peak tc 0.88 m/s    LVOT peak VTI 18.90 cm    Ao peak tc 2.85 m/s    Ao VTI 59.1 cm    RVOT peak tc 0.66 m/s    RVOT peak VTI 13.3 cm    LVOT stroke volume 47.01 cm3    AV peak gradient 32 mmHg    PV mean gradient 0.88 mmHg    E/E' ratio 9.69 m/s    MV Peak E Tc 0.63 m/s    TR Max Tc 2.51 m/s    MV Peak A Tc 0.67 m/s    LV Systolic Volume 18.36 mL    LV Systolic Volume Index 9.3 mL/m2    LV Diastolic  Volume 62.54 mL    LV Diastolic Volume Index 31.75 mL/m2    LA Volume Index 42.7 mL/m2    LV Mass Index 95 g/m2    RA Major Axis 4.59 cm    Left Atrium Minor Axis 5.70 cm    Left Atrium Major Axis 6.43 cm    Triscuspid Valve Regurgitation Peak Gradient 25 mmHg    LA Volume Index (Mod) 22.7 mL/m2    LA volume (mod) 44.76 cm3    RA Width 2.84 cm    Right Atrial Pressure (from IVC) 8 mmHg    EF 65 %    TV resting pulmonary artery pressure 33 mmHg    Narrative    · The left ventricle is normal in size with mild concentric hypertrophy   and normal systolic function.  · Moderate left atrial enlargement.  · The estimated ejection fraction is 65%.  · Indeterminate left ventricular diastolic function.  · Normal right ventricular size with normal right ventricular systolic   function.  · There is moderate-to-severe aortic valve stenosis.  · Aortic valve area is 0.80 cm2; peak velocity is 2.85 m/s; mean gradient   is 21 mmHg.  · Mild tricuspid regurgitation.  · Intermediate central venous pressure (8 mmHg).  · The estimated PA systolic pressure is 33 mmHg.      , EKG: Reviewed, and X-Ray: CXR: X-Ray Chest 1 View (CXR): No results found for this visit on 10/11/23. and X-Ray Chest PA and Lateral (CXR): No results found for this visit on 10/11/23.    Assessment and Plan:   Patient who presents with weakness/fatigue in setting of recurrent afib, exacerbated by recent diarrhea. Amiodarone dose increased, assess response. Continue AC with Eliquis.     * Generalized weakness  -In setting of afib/recent diarrhea    CKD (chronic kidney disease) stage 3, GFR 30-59 ml/min  -stable, monitor    Paroxysmal atrial fibrillation  -Presents with recurrent symptomatic afib  -Rate-controlled  -Amiodarone increased to 400 mg BID  -Options discussed with patient, will give time for increased po dosage to load/take effect  -Continue Eliquis for CVA prophylaxis         VTE Risk Mitigation (From admission, onward)         Ordered     apixaban tablet  2.5 mg  2 times daily         10/11/23 5474     Reason for No Pharmacological VTE Prophylaxis  Once        Question:  Reasons:  Answer:  Already adequately anticoagulated on oral Anticoagulants    10/11/23 2042     IP VTE HIGH RISK PATIENT  Once         10/11/23 2042     Place sequential compression device  Until discontinued         10/11/23 2042                Thank you for your consult. I will follow-up with patient. Please contact us if you have any additional questions.    Rima Flores PA-C  Cardiology   [facility] - Telemetry ([facility])

## 2023-10-12 NOTE — ASSESSMENT & PLAN NOTE
Patient is chronically on statin.will continue for now. Last Lipid Panel:   Lab Results   Component Value Date    CHOL 134 12/07/2021    HDL 46 12/07/2021    LDLCALC 59.0 (L) 12/07/2021    TRIG 145 12/07/2021    CHOLHDL 34.3 12/07/2021     Plan:  -Continue home medication  -low fat/low calorie diet

## 2023-10-12 NOTE — SUBJECTIVE & OBJECTIVE
Past Medical History:   Diagnosis Date    Anticoagulant long-term use     Arthritis     Asthma     Basal cell carcinoma     Cancer     skin cancer to face    Cataract     OU done//    CHF (congestive heart failure)     COPD (chronic obstructive pulmonary disease)     no oxygen; patient denies    cpap     Essential hypertension 05/05/2010    GERD (gastroesophageal reflux disease) 11/20/2012    Glaucoma     Hypertensive heart disease with heart failure 02/05/2013    Paroxysmal atrial fibrillation     Paroxysmal ventricular tachycardia     per problem list    Renal disorder     french-1/3/2020    Squamous cell carcinoma of skin        Past Surgical History:   Procedure Laterality Date    A-V CARDIAC PACEMAKER INSERTION  06/16/2021    Procedure: INSERTION, CARDIAC PACEMAKER, DUAL CHAMBER;  Surgeon: Oscar Sommers MD;  Location: [facility] CATH;  Service: Cardiovascular;;    ADENOIDECTOMY  191944    APPENDECTOMY  1948    Because of Ovarian Cyst surgery    BREAST BIOPSY Left 1995    neg    BREAST BIOPSY Right 1984    neg    BREAST BIOPSY Right 1992    neg    BREAST SURGERY      A number of biopsies    CARDIAC CATHETERIZATION  2013, 2014,2016, 2020    has 9 stents    CARDIAC SURGERY  2012    stents    CATARACT EXTRACTION W/  INTRAOCULAR LENS IMPLANT Left 11/01/2018    Dr Rose    CATARACT EXTRACTION W/  INTRAOCULAR LENS IMPLANT Right 12/13/2018    Dr Rose//    CHOLECYSTECTOMY      COLONOSCOPY  ~2005    Dr. Edward; normal per patient report    COLONOSCOPY N/A 09/06/2022    Procedure: COLONOSCOPY 8/31/22-note in Dr Simms's office visit note that he spoke to her cardiologist and she was viable candidate for endoscopy;  Surgeon: Cordelia Bueno MD;  Location: [facility];  Service: Endoscopy;  Laterality: N/A;    CORONARY ANGIOGRAPHY N/A 03/20/2020    Procedure: ANGIOGRAM, CORONARY ARTERY;  Surgeon: Chuy Bryant MD;  Location: [facility] CATH;  Service: Cardiology;  Laterality: N/A;    CYSTOSCOPY W/ RETROGRADES Bilateral  02/12/2020    Procedure: CYSTOSCOPY, WITH RETROGRADE PYELOGRAM;  Surgeon: Gasper Feliz MD;  Location: [facility] OR;  Service: Urology;  Laterality: Bilateral;    ESOPHAGOGASTRODUODENOSCOPY N/A 08/13/2020    Procedure: EGD (ESOPHAGOGASTRODUODENOSCOPY);  Surgeon: Mika Solorzano MD;  Location: [facility] ENDO;  Service: Endoscopy;  Laterality: N/A; Mild Schatzki ring. Biopsied. Dilated. small hiatal hernia, gastritis; biopsy: esophagus- SEVERE REFLUX ESOPHAGITIS, stomach- chronic gastritis, negative for H pylori    ESOPHAGOGASTRODUODENOSCOPY N/A 10/22/2020    Procedure: EGD (ESOPHAGOGASTRODUODENOSCOPY);  Surgeon: Fred Hendricks MD;  Location: [facility] ENDO;  Service: Endoscopy;  Laterality: N/A;    EYE SURGERY  2017    Cataracs    FRACTIONAL FLOW RESERVE (FFR), CORONARY  6/20/2023    Procedure: Fractional Flow [place] (FFR), Coronary;  Surgeon: Brice Campuzano MD;  Location: [facility] CATH LAB;  Service: Cardiology;;    HYSTERECTOMY  1969    INSTANTANEOUS WAVE-FREE RATIO (IFR) N/A 6/20/2023    Procedure: Instantaneous Wave-Free Ratio (IFR);  Surgeon: Brice Campuzano MD;  Location: [facility] CATH LAB;  Service: Cardiology;  Laterality: N/A;    LEFT HEART CATHETERIZATION Left 03/03/2020    Procedure: Left heart cath;  Surgeon: Chuy Bryant MD;  Location: [facility] CATH;  Service: Cardiology;  Laterality: Left;    LEFT HEART CATHETERIZATION Left 03/20/2020    Procedure: Left heart cath;  Surgeon: Chuy Bryant MD;  Location: [facility] CATH;  Service: Cardiology;  Laterality: Left;    LEFT HEART CATHETERIZATION N/A 6/20/2023    Procedure: Left heart cath;  Surgeon: Brice Campuzano MD;  Location: [facility] CATH LAB;  Service: Cardiology;  Laterality: N/A;    OVARIAN CYST REMOVAL  teenager    PHACOEMULSIFICATION OF CATARACT Left 11/01/2018    Procedure: PHACOEMULSIFICATION, CATARACT;  Surgeon: Aleksandar Rose Jr., MD;  Location: [facility] OR;  Service: Ophthalmology;  Laterality: Left;    PHACOEMULSIFICATION OF CATARACT Right 12/13/2018     Procedure: PHACOEMULSIFICATION, CATARACT;  Surgeon: Aleksandar Rose Jr., MD;  Location: [facility] OR;  Service: Ophthalmology;  Laterality: Right;    TONSILLECTOMY      aw/denoids    TRANSESOPHAGEAL ECHOCARDIOGRAM WITH POSSIBLE CARDIOVERSION (ALFRED W/ POSS CARDIOVERSION) N/A 10/5/2023    Procedure: Transesophageal echo (ALFRED) intra-procedure log documentation/alfred/cv;  Surgeon: Bao Miguel MD;  Location: [facility] CATH LAB;  Service: Cardiology;  Laterality: N/A;   Alfred/Cv  MRI safe   Pacer & leads implanted 6/16/21, Antoun   Bio Edora 8 MICHELLE, [id number], PID: [id number]   A lead: Bio Solia S45, [id number]   V lead: Bio Solia S53, [id number]    TREATMENT OF CARDIAC ARRHYTHMIA N/A 10/5/2023    Procedure: Cardioversion or Defibrillation;  Surgeon: Bao Miguel MD;  Location: [facility] CATH LAB;  Service: Cardiology;  Laterality: N/A;    UPPER GASTROINTESTINAL ENDOSCOPY  ~2005    Dr. Solorzano    VALVE STUDY-AORTIC  6/20/2023    Procedure: Valve study-aortic;  Surgeon: Brice Campuzano MD;  Location: [facility] CATH LAB;  Service: Cardiology;;    VASCULAR SURGERY      to remove clot from right leg    Yag Capsulotomy Bilateral 11/05/2019    Dr Rose       Review of patient's allergies indicates:   Allergen Reactions    Timolol maleate Shortness Of Breath    Ciprofloxacin Other (See Comments)     Muscle ache    Sulfamethoxazole-trimethoprim        No current facility-administered medications on file prior to encounter.     Current Outpatient Medications on File Prior to Encounter   Medication Sig    acetaminophen (TYLENOL) 650 MG TbSR Take 650 mg by mouth as needed.     albuterol (PROVENTIL/VENTOLIN HFA) 90 mcg/actuation inhaler Inhale 1-2 puffs into the lungs every 4 (four) hours as needed for Wheezing or Shortness of Breath. Rescue    albuterol-ipratropium (DUO-NEB) 2.5 mg-0.5 mg/3 mL nebulizer solution Take 3 mLs by nebulization every 6 (six) hours as needed for Wheezing or Shortness of Breath.    allopurinoL (ZYLOPRIM) 100 MG  tablet Take 2 tablets (200 mg total) by mouth once daily. (Patient taking differently: Take 100 mg by mouth once daily. 1 tablet daily)    aluminum & magnesium hydroxide-simethicone (MYLANTA MAX STRENGTH) 400-400-40 mg/5 mL suspension Take by mouth every 6 (six) hours as needed for Indigestion.    amiodarone (PACERONE) 200 MG Tab Take 1 tablet (200 mg total) by mouth 2 (two) times daily for 14 days, THEN 1 tablet (200 mg total) once daily.    amLODIPine (NORVASC) 2.5 MG tablet TAKE 1 TABLET BY MOUTH EVERY DAY (Patient taking differently: Take 2.5 mg by mouth every evening.)    apixaban (ELIQUIS) 2.5 mg Tab Take 1 tablet (2.5 mg total) by mouth 2 (two) times daily.    cetirizine HCl (ZYRTEC ORAL) Take 10 mg by mouth once daily.    cholecalciferol, vitamin D3, 125 mcg (5,000 unit) Tab Take 5,000 Units by mouth once daily.    dorzolamide (TRUSOPT) 2 % ophthalmic solution INSTILL 1 DROP INTO BOTH EYES TWICE DAILY    fluticasone (FLONASE) 50 mcg/actuation nasal spray 2 sprays (100 mcg total) by Each Nare route daily as needed for Allergies.    fluticasone furoate-vilanteroL (BREO ELLIPTA) 100-25 mcg/dose diskus inhaler Inhale 1 puff into the lungs once daily.    furosemide (LASIX) 40 MG tablet Take 1 tablet (40 mg total) by mouth 2 (two) times a day. Take twice a day and monitor BP and weights    Lactobacillus rhamnosus GG (CULTURELLE) 10 billion cell capsule Take 1 capsule by mouth once daily.    latanoprost 0.005 % ophthalmic solution Place 1 drop into both eyes every evening.    magnesium oxide (MAG-OX) 400 mg tablet Take 800 mg by mouth once daily.     nitroGLYCERIN 0.4 MG/HR TD PT24 (NITRODUR) 0.4 mg/hr Place 1 patch onto the skin once daily.    ondansetron (ZOFRAN) 4 MG tablet Take 4 mg by mouth every 6 (six) hours as needed.    ondansetron (ZOFRAN-ODT) 4 MG TbDL Take by mouth.    pantoprazole (PROTONIX) 40 MG tablet Take 1 tablet (40 mg total) by mouth once daily. (Patient taking differently: Take 40 mg by mouth  once daily. Patient takes as needed)    potassium chloride (KLOR-CON) 10 MEQ TbSR Take 2 tablets (20 mEq total) by mouth 2 (two) times daily.    rosuvastatin (CRESTOR) 40 MG Tab TAKE 1 TABLET (40 MG TOTAL) BY MOUTH ONCE DAILY. (Patient taking differently: Take 40 mg by mouth every evening.)    traMADoL (ULTRAM) 50 mg tablet Take 1 tablet (50 mg total) by mouth every 12 (twelve) hours as needed for Pain.     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 Negative for chills and diaphoresis.   Gastrointestinal:  Positive for diarrhea. Negative for nausea and vomiting.   Neurological:  Positive for weakness (generalized). Negative for dizziness, light-headedness and headaches.   All other systems reviewed and are negative.    Objective:     Vital Signs (Most Recent):  Temp: 98 °F (36.7 °C) (10/12/23 0442)  Pulse: 80 (10/12/23 0442)  Resp: 18 (10/12/23 0442)  BP: (!) 152/83 (10/12/23 0442)  SpO2: (!) 92 % (10/12/23 0442) Vital Signs (24h Range):  Temp:  [97.2 °F (36.2 °C)-98 °F (36.7 °C)] 98 °F (36.7 °C)  Pulse:  [] 80  Resp:  [16-20] 18  SpO2:  [92 %-95 %] 92 %  BP:  (113-152)/(58-96) 152/83     Weight: 90.5 kg (199 lb 8.3 oz)  Body mass index is 33.2 kg/m².     Physical Exam  Vitals and nursing note reviewed.   Constitutional:       General: She is awake. She is not in acute distress.     Appearance: Normal appearance. She is well-developed and well-groomed. She is not ill-appearing, toxic-appearing or diaphoretic.   HENT:      Head: Normocephalic and atraumatic.   Eyes:      Extraocular Movements: Extraocular movements intact.      Conjunctiva/sclera: Conjunctivae normal.   Cardiovascular:      Rate and Rhythm: Normal rate. Rhythm irregularly irregular.      Pulses:           Radial pulses are 2+ on the right side and 2+ on the left side.        Dorsalis pedis pulses are 2+ on the right side and 2+ on the left side.      Heart sounds: Normal heart sounds. No murmur heard.  Pulmonary:      Effort: Pulmonary effort is normal.      Breath sounds: Normal breath sounds.   Abdominal:      General: Bowel sounds are normal.      Palpations: Abdomen is soft.      Tenderness: There is no abdominal tenderness.       Musculoskeletal:      Cervical back: Normal range of motion and neck supple.      Right lower leg: No edema.      Left lower leg: No edema.      Comments: 5/5 strength throughout   Skin:     General: Skin is warm and dry.      Capillary Refill: Capillary refill takes less than 2 seconds.   Neurological:      General: No focal deficit present.      Mental Status: She is alert and oriented to person, place, and time. Mental status is at baseline.      GCS: GCS eye subscore is 4. GCS verbal subscore is 5. GCS motor subscore is 6.      Cranial Nerves: Cranial nerves 2-12 are intact.      Sensory: Sensation is intact.      Motor: Motor function is intact.   Psychiatric:         Mood and Affect: Mood normal.         Speech: Speech normal.         Behavior: Behavior normal. Behavior is cooperative.           LABS:  Recent Results (from the past 24 hour(s))   CBC auto differential     Collection Time: 10/11/23  1:04 PM   Result Value Ref Range    WBC 7.47 3.90 - 12.70 K/uL    RBC 5.23 4.00 - 5.40 M/uL    Hemoglobin 13.8 12.0 - 16.0 g/dL    Hematocrit 42.8 37.0 - 48.5 %    MCV 82 82 - 98 fL    MCH 26.4 (L) 27.0 - 31.0 pg    MCHC 32.2 32.0 - 36.0 g/dL    RDW 17.1 (H) 11.5 - 14.5 %    Platelets 288 150 - 450 K/uL    MPV 9.9 9.2 - 12.9 fL    Immature Granulocytes 0.1 0.0 - 0.5 %    Gran # (ANC) 4.6 1.8 - 7.7 K/uL    Immature Grans (Abs) 0.01 0.00 - 0.04 K/uL    Lymph # 1.8 1.0 - 4.8 K/uL    Mono # 0.7 0.3 - 1.0 K/uL    Eos # 0.3 0.0 - 0.5 K/uL    Baso # 0.03 0.00 - 0.20 K/uL    nRBC 0 0 /100 WBC    Gran % 62.2 38.0 - 73.0 %    Lymph % 23.7 18.0 - 48.0 %    Mono % 9.9 4.0 - 15.0 %    Eosinophil % 3.7 0.0 - 8.0 %    Basophil % 0.4 0.0 - 1.9 %    Differential Method Automated    Comprehensive metabolic panel    Collection Time: 10/11/23  1:04 PM   Result Value Ref Range    Sodium 142 136 - 145 mmol/L    Potassium 4.1 3.5 - 5.1 mmol/L    Chloride 106 95 - 110 mmol/L    CO2 23 23 - 29 mmol/L    Glucose 93 70 - 110 mg/dL    BUN 22 8 - 23 mg/dL    Creatinine 1.1 0.5 - 1.4 mg/dL    Calcium 9.8 8.7 - 10.5 mg/dL    Total Protein 7.6 6.0 - 8.4 g/dL    Albumin 3.8 3.5 - 5.2 g/dL    Total Bilirubin 0.4 0.1 - 1.0 mg/dL    Alkaline Phosphatase 120 55 - 135 U/L    AST 19 10 - 40 U/L    ALT 20 10 - 44 U/L    eGFR 48 (A) >60 mL/min/1.73 m^2    Anion Gap 13 8 - 16 mmol/L   Lipase    Collection Time: 10/11/23  1:04 PM   Result Value Ref Range    Lipase 27 4 - 60 U/L   Magnesium    Collection Time: 10/11/23  1:04 PM   Result Value Ref Range    Magnesium 2.4 1.6 - 2.6 mg/dL   Troponin I    Collection Time: 10/11/23  1:04 PM   Result Value Ref Range    Troponin I 0.012 0.000 - 0.026 ng/mL   Urinalysis, Reflex to Urine Culture Urine, Supra Pubic    Collection Time: 10/11/23  1:07 PM    Specimen: Urine   Result Value Ref Range    Specimen UA Urine, Supra Pubic     Color, UA Colorless (A) Yellow, Straw, Kailey    Appearance,  UA Clear Clear    pH, UA 7.0 5.0 - 8.0    Specific Gravity, UA 1.005 1.005 - 1.030    Protein, UA Negative Negative    Glucose, UA Negative Negative    Ketones, UA Negative Negative    Bilirubin (UA) Negative Negative    Occult Blood UA Negative Negative    Nitrite, UA Negative Negative    Urobilinogen, UA Negative <2.0 EU/dL    Leukocytes, UA Trace (A) Negative   Urinalysis Microscopic    Collection Time: 10/11/23  1:07 PM   Result Value Ref Range    WBC, UA 0 0 - 5 /hpf    Bacteria Rare None-Occ /hpf    Microscopic Comment SEE COMMENT    Brain natriuretic peptide    Collection Time: 10/11/23  7:40 PM   Result Value Ref Range     (H) 0 - 99 pg/mL       RADIOLOGY  CT Renal Stone Study ABD Pelvis WO    Result Date: 10/11/2023  EXAMINATION: CT RENAL STONE STUDY ABD PELVIS WO CLINICAL HISTORY: Flank pain, kidney stone suspected; TECHNIQUE: Noncontrast images were obtained COMPARISON: CT scan abdomen pelvis, 12/16/2019 FINDINGS: Lung bases are clear The liver, the spleen, and the pancreas appear normal. Previous cholecystectomy.  No bile duct dilatation. Stable left adrenal adenoma measuring 2 cm.  No evidence of hydronephrosis or renal stone. Atherosclerosis of the abdominal aorta. No evidence of aneurysm.  No retroperitoneal mass or adenopathy. There are no acute bowel abnormalities.     No evidence of appendicitis.  No evidence of diverticulitis. Suprapubic Ballard catheter in good position.  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 Skeletal structures are intact.  No acute skeletal findings.     No evidence of obstructive uropathy or renal stone.  No acute findings. All CT scans at this facility use dose modulation, iterative reconstructions, and/or weight base dosing when appropriate to reduce radiation dose to as low as reasonably achievable Electronically signed by: Fabio Dickerson MD Date:    10/11/2023 Time:    14:50    CT Head  Without Contrast    Result Date: 10/11/2023  EXAMINATION: CT HEAD WITHOUT CONTRAST CLINICAL HISTORY: Syncope, recurrent; TECHNIQUE: Noncontrast images were obtained COMPARISON: CT head, 01/20/2019. FINDINGS: No intracranial acute hemorrhage or acute focal brain parenchymal abnormality is identified.  Atrophy is present.  Patchy periventricular white matter hypodensity secondary to chronic small vessel ischemic changes.  Calvarium is intact.     No acute intracranial abnormality. All CT scans at this facility use dose modulation, iterative reconstructions, and/or weight base dosing when appropriate to reduce radiation dose to as low as reasonably achievable. Electronically signed by: Fabio Dickerson MD Date:    10/11/2023 Time:    14:40    X-Ray Chest AP Portable    Result Date: 10/11/2023  EXAMINATION: XR CHEST AP PORTABLE CLINICAL HISTORY: , Weakness; COMPARISON: Chest, 10/03/2023. FINDINGS: Heart size is stable.  Lungs are essentially clear.  Pacemaker leads remain in stable and good position.     Stable chest.  No infiltrate. Electronically signed by: Fabio Dickerson MD Date:    10/11/2023 Time:    12:15    Transesophageal echo (JÚNIOR)    Result Date: 10/5/2023    Left Ventricle: The left ventricle is normal in size. Normal wall thickness. Normal wall motion. There is low normal systolic function with a visually estimated ejection fraction of 50 - 55%. Unable to assess diastolic function due to atrial fibrillation.   Left Atrium: Left atrium is moderately dilated. The left atrial appendage appears normal. Appendage velocity is reduced at less than 40 cm/sec. There is no thrombus in the cavity.   Right Ventricle: Normal right ventricular cavity size. Wall thickness is normal. Right ventricle wall motion  is normal. Systolic function is normal.   Aortic Valve: There is severe aortic valve sclerosis. Severely calcified cusps. There is severe stenosis.   Mitral Valve: There is no stenosis. There is moderate  regurgitation.   Aorta: Atherosclerosis of the descending aorta.   IVC/SVC: Normal venous pressure at 3 mmHg.   Aortic valve area by planimetry was 0.7cm2   Successful cardioversion of AFib into normal sinus rhythm at 66 beats per minute.  No thrombus seen in the left atrium or JESSI.   See separate intra-procedure report     Intra-Procedure Documentation    Result Date: 10/5/2023    The cardioversion was successfully performed with restoration of normal sinus rhythm.     X-Ray Chest 1 View    Result Date: 10/3/2023  EXAMINATION: XR CHEST 1 VIEW CLINICAL HISTORY: sob; TECHNIQUE: Single frontal view of the chest was performed. COMPARISON: Multiple priors. FINDINGS: Left chest cardiac pacing device.The lungs are clear, with normal appearance of pulmonary vasculature and no pleural effusion or pneumothorax. The cardiac silhouette is normal in size. The hilar and mediastinal contours are unremarkable. Bones are intact.     No acute abnormality. Electronically signed by: Stephon Menendez Date:    10/03/2023 Time:    21:34      EKG    MICROBIOLOGY    MDM     Amount and/or Complexity of Data Reviewed  Clinical lab tests: reviewed  Tests in the radiology section of CPT®: reviewed  Tests in the medicine section of CPT®: reviewed  Discussion of test results with the performing providers: yes  Decide to obtain previous medical records or to obtain history from someone other than the patient: yes  Obtain history from someone other than the patient: yes  Review and summarize past medical records: yes  Discuss the patient with other providers: yes  Independent visualization of images, tracings, or specimens: yes

## 2023-10-12 NOTE — PLAN OF CARE
[facility] - Telemetry (Hospital)  Initial Discharge Assessment       Primary Care Provider: Marcia Carlisle MD    Admission Diagnosis: Weakness [R53.1]  Chest pain [R07.9]    Admission Date: 10/11/2023  Expected Discharge Date:     Transition of Care Barriers: None    Payor: HU[employer] MANAGED MEDICARE / Plan: HU[employer] MEDICARE HMO / Product Type: Capitation /     Extended Emergency Contact Information  Primary Emergency Contact: Annabel Jolly   [place]  Mobile Phone: [phone]  Relation: Daughter  Secondary Emergency Contact: Claudia Vasquez  Mobile Phone: [phone]  Relation: Daughter    Discharge Plan A: Home, Home Health         [facility] [address] [address]  [address] [address] [address]  Phone: [phone] Fax: [phone]      Initial Assessment (most recent)       Adult Discharge Assessment - 10/12/23 1434          Discharge Assessment    Assessment Type Discharge Planning Assessment     Confirmed/corrected address, phone number and insurance Yes     Confirmed Demographics Correct on Facesheet     Source of Information patient     Communicated SCOTT with patient/caregiver Date not available/Unable to determine     Reason For Admission Generalized weakness     People in Home child(yuan), adult     Facility Arrived From: Home     Do you expect to return to your current living situation? Yes     Do you have help at home or someone to help you manage your care at home? Yes     Who are your caregiver(s) and their phone number(s)? Pt's daughters, Claudia (lives with) and Annabel     Prior to hospitilization cognitive status: Alert/Oriented     Current cognitive status: Alert/Oriented     Walking or Climbing Stairs ambulation difficulty, requires equipment     Mobility Management w/c and walker     Dressing/Bathing bathing difficulty, requires equipment     Dressing/Bathing Management shower chair and BSC     Equipment Currently Used at Home CPAP;shower  chair;walker, rolling;wheelchair;cane, straight     Readmission within 30 days? No     Patient currently being followed by outpatient case management? No     Do you currently have service(s) that help you manage your care at home? Yes     Name and Contact number of agency [facility]      Do you take prescription medications? Yes     Do you have prescription coverage? Yes     Do you have any problems affording any of your prescribed medications? No     Is the patient taking medications as prescribed? yes     Who is going to help you get home at discharge? Pt's daughters     How do you get to doctors appointments? family or friend will provide     Are you on dialysis? No     Do you take coumadin? No     DME Needed Upon Discharge  none     Discharge Plan discussed with: Patient;Adult children     Transition of Care Barriers None     Discharge Plan A Home;Home Health                   SW met with patient at bedside to complete discharge assessment. Pt reports living at home with daughter, Claudia. Pt reports that she's mostly independent with ADLs. Pt ambulates with a wheelchair and walker. Pt reports having a shower chair and BSC at home. Pt has a ramp to enter her home as well. [facility] with preference to resume at discharge. Pt's family will assist with transportation home.     SW updated pt's whiteboard to reflect CM contact and discharge disposition. SW to remain available for discharge planning needs.

## 2023-10-12 NOTE — ASSESSMENT & PLAN NOTE
Likely 2/2 symptomatic A-fib. Diarrhea resolved   Plan:  -tele monitoring  -IVFs prn  -cards consult  -increase dose of amiodarone per cards recs  -diet advanced   -PT/OT evaluation

## 2023-10-12 NOTE — ASSESSMENT & PLAN NOTE
Currently normotensive. BP usually well controlled per patient with home medications.  Plan:  -Optimize pain control   -Continue home medications (norvasc, lasix), titrate as needed   -Monitor BP  -Low salt/cardiac diet when not NPO  -IV hydralazine prn for SBP>160 or DBP>90

## 2023-10-12 NOTE — HOSPITAL COURSE
"Pt admitted to Observation for General Weakness in the setting of uncontrolled Atrial fibrillation. Of note, patient was cardioverted (JÚNIOR/DCCV) on 10/5/23 with Sotalol transitioned to Amiodarone. Pt reports palpitations prior to admission and states she "could feel myself going into afib." BNP elevated and Troponin within normal limits. Cardiology consulted. Amiodarone dose increased per recommendation. PT/OT evaluation ordered with results pending. Social work consulted with home health to be resumed and suprapubic catheter to be changed after discharge. Diarrhea resolved - hx of IBS reported. On 10/13/23, atrial flutter continued at controlled rate (HR 90's) with Amiodarone continued. Fatigue and weakness reported. Case discussed with Cardiology and Lopressor to be initiated per recommendation. PT/OT evaluation completed with home health to be resumed. GFR declined in the setting of diuretic use and decreased oral intake/fatigue reported- Lasix dose adjusted. On 10/14/23, pt evaluated by Cardiology and deemed stable for discharge. Amiodarone dose decreased per cardiology. Vital signs stable with no signs of acute distress witnessed or reported prior to discharge. Home Health resumed. Pt seen and examined and deemed medically stable for discharge to home. Medications reconciled and Lopressor prescribed and Amiodarone continued daily. Pt/daughter instructed to follow up with PCP and Cardiology upon discharge for further evaluation.   "

## 2023-10-12 NOTE — ASSESSMENT & PLAN NOTE
-Presents with recurrent symptomatic afib  -Rate-controlled  -Amiodarone increased to 400 mg BID  -Options discussed with patient, will give time for increased po dosage to load/take effect  -Continue Eliquis for CVA prophylaxis

## 2023-10-12 NOTE — ASSESSMENT & PLAN NOTE
Patient with Paroxysmal (<7 days) atrial fibrillation which is uncontrolled currently with Calcium Channel Blocker and Amiodarone. Patient is currently in atrial fibrillation.[id number] Score: 4. HASBLED Score: . Anticoagulation indicated. Anticoagulation done with eliquis.

## 2023-10-12 NOTE — ASSESSMENT & PLAN NOTE
Patient with Paroxysmal (<7 days) atrial fibrillation which is uncontrolled currently with Calcium Channel Blocker and Amiodarone. Patient is currently in atrial fibrillation.[id number] Score: 4. HASBLED Score: . Anticoagulation indicated. Anticoagulation done with eliquis.

## 2023-10-13 LAB
ANION GAP SERPL CALC-SCNC: 10 MMOL/L (ref 8–16)
BASOPHILS # BLD AUTO: 0.03 K/UL (ref 0–0.2)
BASOPHILS NFR BLD: 0.5 % (ref 0–1.9)
BUN SERPL-MCNC: 23 MG/DL (ref 8–23)
CALCIUM SERPL-MCNC: 9.7 MG/DL (ref 8.7–10.5)
CHLORIDE SERPL-SCNC: 106 MMOL/L (ref 95–110)
CO2 SERPL-SCNC: 22 MMOL/L (ref 23–29)
CREAT SERPL-MCNC: 1.3 MG/DL (ref 0.5–1.4)
DIFFERENTIAL METHOD: ABNORMAL
EOSINOPHIL # BLD AUTO: 0.3 K/UL (ref 0–0.5)
EOSINOPHIL NFR BLD: 4.4 % (ref 0–8)
ERYTHROCYTE [DISTWIDTH] IN BLOOD BY AUTOMATED COUNT: 16.9 % (ref 11.5–14.5)
EST. GFR  (NO RACE VARIABLE): 39 ML/MIN/1.73 M^2
GLUCOSE SERPL-MCNC: 151 MG/DL (ref 70–110)
HCT VFR BLD AUTO: 42.6 % (ref 37–48.5)
HGB BLD-MCNC: 13.6 G/DL (ref 12–16)
IMM GRANULOCYTES # BLD AUTO: 0.02 K/UL (ref 0–0.04)
IMM GRANULOCYTES NFR BLD AUTO: 0.3 % (ref 0–0.5)
LYMPHOCYTES # BLD AUTO: 1.6 K/UL (ref 1–4.8)
LYMPHOCYTES NFR BLD: 25.7 % (ref 18–48)
MCH RBC QN AUTO: 26.3 PG (ref 27–31)
MCHC RBC AUTO-ENTMCNC: 31.9 G/DL (ref 32–36)
MCV RBC AUTO: 82 FL (ref 82–98)
MONOCYTES # BLD AUTO: 0.6 K/UL (ref 0.3–1)
MONOCYTES NFR BLD: 9.9 % (ref 4–15)
NEUTROPHILS # BLD AUTO: 3.8 K/UL (ref 1.8–7.7)
NEUTROPHILS NFR BLD: 59.2 % (ref 38–73)
NRBC BLD-RTO: 0 /100 WBC
PLATELET # BLD AUTO: 273 K/UL (ref 150–450)
PMV BLD AUTO: 9.9 FL (ref 9.2–12.9)
POTASSIUM SERPL-SCNC: 3.8 MMOL/L (ref 3.5–5.1)
RBC # BLD AUTO: 5.18 M/UL (ref 4–5.4)
SODIUM SERPL-SCNC: 138 MMOL/L (ref 136–145)
WBC # BLD AUTO: 6.39 K/UL (ref 3.9–12.7)

## 2023-10-13 PROCEDURE — 94799 UNLISTED PULMONARY SVC/PX: CPT

## 2023-10-13 PROCEDURE — 85025 COMPLETE CBC W/AUTO DIFF WBC: CPT | Performed by: PHYSICIAN ASSISTANT

## 2023-10-13 PROCEDURE — 80048 BASIC METABOLIC PNL TOTAL CA: CPT | Performed by: PHYSICIAN ASSISTANT

## 2023-10-13 PROCEDURE — 36415 COLL VENOUS BLD VENIPUNCTURE: CPT | Performed by: PHYSICIAN ASSISTANT

## 2023-10-13 PROCEDURE — 97166 OT EVAL MOD COMPLEX 45 MIN: CPT

## 2023-10-13 PROCEDURE — 94761 N-INVAS EAR/PLS OXIMETRY MLT: CPT

## 2023-10-13 PROCEDURE — 25000003 PHARM REV CODE 250: Performed by: PHYSICIAN ASSISTANT

## 2023-10-13 PROCEDURE — 25000003 PHARM REV CODE 250: Performed by: NURSE PRACTITIONER

## 2023-10-13 PROCEDURE — 99900035 HC TECH TIME PER 15 MIN (STAT)

## 2023-10-13 PROCEDURE — 97535 SELF CARE MNGMENT TRAINING: CPT

## 2023-10-13 PROCEDURE — 99214 OFFICE O/P EST MOD 30 MIN: CPT | Mod: ,,, | Performed by: PHYSICIAN ASSISTANT

## 2023-10-13 PROCEDURE — 99214 PR OFFICE/OUTPT VISIT, EST, LEVL IV, 30-39 MIN: ICD-10-PCS | Mod: ,,, | Performed by: PHYSICIAN ASSISTANT

## 2023-10-13 PROCEDURE — 97530 THERAPEUTIC ACTIVITIES: CPT

## 2023-10-13 PROCEDURE — 25000003 PHARM REV CODE 250: Performed by: EMERGENCY MEDICINE

## 2023-10-13 PROCEDURE — 97116 GAIT TRAINING THERAPY: CPT

## 2023-10-13 PROCEDURE — 97162 PT EVAL MOD COMPLEX 30 MIN: CPT

## 2023-10-13 PROCEDURE — G0378 HOSPITAL OBSERVATION PER HR: HCPCS

## 2023-10-13 RX ORDER — METOPROLOL TARTRATE 25 MG/1
25 TABLET, FILM COATED ORAL 2 TIMES DAILY
Status: DISCONTINUED | OUTPATIENT
Start: 2023-10-13 | End: 2023-10-14 | Stop reason: HOSPADM

## 2023-10-13 RX ORDER — FUROSEMIDE 40 MG/1
40 TABLET ORAL DAILY
Status: DISCONTINUED | OUTPATIENT
Start: 2023-10-14 | End: 2023-10-14 | Stop reason: HOSPADM

## 2023-10-13 RX ADMIN — METOPROLOL TARTRATE 25 MG: 25 TABLET, FILM COATED ORAL at 09:10

## 2023-10-13 RX ADMIN — ALLOPURINOL 100 MG: 100 TABLET ORAL at 10:10

## 2023-10-13 RX ADMIN — CETIRIZINE HYDROCHLORIDE 10 MG: 10 TABLET, FILM COATED ORAL at 10:10

## 2023-10-13 RX ADMIN — AMLODIPINE BESYLATE 2.5 MG: 2.5 TABLET ORAL at 09:10

## 2023-10-13 RX ADMIN — Medication 800 MG: at 10:10

## 2023-10-13 RX ADMIN — METOPROLOL TARTRATE 25 MG: 25 TABLET, FILM COATED ORAL at 01:10

## 2023-10-13 RX ADMIN — ATORVASTATIN CALCIUM 80 MG: 40 TABLET, FILM COATED ORAL at 09:10

## 2023-10-13 RX ADMIN — APIXABAN 2.5 MG: 2.5 TABLET, FILM COATED ORAL at 10:10

## 2023-10-13 RX ADMIN — DORZOLAMIDE HYDROCHLORIDE 1 DROP: 20 SOLUTION/ DROPS OPHTHALMIC at 09:10

## 2023-10-13 RX ADMIN — DORZOLAMIDE HYDROCHLORIDE 1 DROP: 20 SOLUTION/ DROPS OPHTHALMIC at 10:10

## 2023-10-13 RX ADMIN — AMIODARONE HYDROCHLORIDE 400 MG: 200 TABLET ORAL at 09:10

## 2023-10-13 RX ADMIN — AMIODARONE HYDROCHLORIDE 400 MG: 200 TABLET ORAL at 10:10

## 2023-10-13 RX ADMIN — Medication 6 MG: at 09:10

## 2023-10-13 RX ADMIN — POTASSIUM CHLORIDE 20 MEQ: 1500 TABLET, EXTENDED RELEASE ORAL at 09:10

## 2023-10-13 RX ADMIN — CHOLECALCIFEROL TAB 125 MCG (5000 UNIT) 5000 UNITS: 125 TAB at 10:10

## 2023-10-13 RX ADMIN — POTASSIUM CHLORIDE 20 MEQ: 1500 TABLET, EXTENDED RELEASE ORAL at 10:10

## 2023-10-13 RX ADMIN — LATANOPROST 1 DROP: 50 SOLUTION OPHTHALMIC at 09:10

## 2023-10-13 RX ADMIN — APIXABAN 2.5 MG: 2.5 TABLET, FILM COATED ORAL at 09:10

## 2023-10-13 NOTE — PROGRESS NOTES
[facility] - Telemetry ([facility])  Cardiology  Progress Note    Patient Name: Holli Landrum  MRN: [id number]  Admission Date: 10/11/2023  Hospital Length of Stay: 0 days  Code Status: Full Code   Attending Physician: Jed Machado MD   Primary Care Physician: Marcia Carlisle MD  Expected Discharge Date:   Principal Problem:Generalized weakness    Subjective:   HPI:  Ms. Bradley is a 89 year old female patient whose current medical conditions include COPD, CPAP, HTN, PPM placement, GERD, severe AS, and PAF (s/p recent JÚNIOR/DCCV on 10/5/23) who presented to [facility] ED yesterday due to increased weakness/fatigue over the past few days. Patient reported an episode of diarrhea on Sunday and Monday AM and felt herself go in and out of afib. Yesterday she became increasingly weak and fatigued, prompting her to go to ED. Initial workup revealed BNP of 343. EKG showed rate-controlled afib and patient was subsequently admitted for further evaluation and treatment. Cardiology consulted to assist with management. Patient seen and examined today, resting in bed. Feels ok. Still weak/tired. Remains in rate-controlled afib. No CP or SOB. She reports compliance with her medications, was recently started on amiodarone during prior admission and has been taking it as prescribed. Chart reviewed. K and Mg WNL. Troponin x 1 negative.    Hospital Course:   10/13/23-Patient seen and examined today, resting in bed. More weak/tired. States her brain feels foggy. No CP/SOB. Remains in aflutter with HR in 90's during exam. Labs reviewed.           Review of Systems   Constitutional: Positive for malaise/fatigue.   HENT: Negative.     Eyes: Negative.    Cardiovascular: Negative.    Respiratory: Negative.     Endocrine: Negative.    Hematologic/Lymphatic: Negative.    Skin: Negative.    Musculoskeletal:  Positive for arthritis and joint pain.   Gastrointestinal: Negative.    Genitourinary: Negative.    Neurological:  Positive for dizziness.    Psychiatric/Behavioral: Negative.     Allergic/Immunologic: Negative.      Objective:     Vital Signs (Most Recent):  Temp: 97.5 °F (36.4 °C) (10/13/23 0801)  Pulse: 76 (10/13/23 0900)  Resp: 18 (10/13/23 0801)  BP: (!) 110/57 (10/13/23 0801)  SpO2: (!) 91 % (10/13/23 0801) Vital Signs (24h Range):  Temp:  [97.5 °F (36.4 °C)-98.9 °F (37.2 °C)] 97.5 °F (36.4 °C)  Pulse:  [53-98] 76  Resp:  [16-18] 18  SpO2:  [91 %-100 %] 91 %  BP: (110-148)/(57-82) 110/57     Weight: 90.5 kg (199 lb 8.3 oz)  Body mass index is 33.2 kg/m².     SpO2: (!) 91 %         Intake/Output Summary (Last 24 hours) at 10/13/2023 1149  Last data filed at 10/13/2023 0759  Gross per 24 hour   Intake --   Output 2200 ml   Net -2200 ml       Lines/Drains/Airways       Drain  Duration                  Suprapubic Catheter 06/20/23 1126 18 Fr. 115 days              Peripheral Intravenous Line  Duration                  Peripheral IV - Single Lumen 10/11/23 1306 20 G Left;Posterior Hand 1 day                       Physical Exam  Vitals and nursing note reviewed.   Constitutional:       General: She is not in acute distress.     Appearance: Normal appearance. She is well-developed. She is not diaphoretic.   HENT:      Head: Normocephalic and atraumatic.   Eyes:      General:         Right eye: No discharge.         Left eye: No discharge.      Pupils: Pupils are equal, round, and reactive to light.   Neck:      Thyroid: No thyromegaly.      Vascular: No JVD.      Trachea: No tracheal deviation.   Cardiovascular:      Rate and Rhythm: Normal rate. Rhythm irregularly irregular.      Heart sounds: S1 normal and S2 normal. Murmur heard.      Harsh midsystolic murmur is present at the upper right sternal border radiating to the neck.      Comments: PPM site well-healed    Remains in aflutter HR in 90's  Pulmonary:      Effort: Pulmonary effort is normal. No respiratory distress.      Breath sounds: Normal breath sounds. No wheezing or rales.   Abdominal:       General: There is no distension.      Tenderness: There is no rebound.   Musculoskeletal:      Cervical back: Neck supple.      Right lower leg: No edema.      Left lower leg: No edema.   Skin:     General: Skin is warm and dry.      Findings: No erythema.   Neurological:      General: No focal deficit present.      Mental Status: She is alert and oriented to person, place, and time.   Psychiatric:         Mood and Affect: Mood normal.         Behavior: Behavior normal.         Thought Content: Thought content normal.            Significant Labs: CMP   Recent Labs   Lab 10/11/23  1304 10/12/23  0825 10/13/23  0831    140 138   K 4.1 3.9 3.8    107 106   CO2 23 23 22*   GLU 93 92 151*   BUN 22 20 23   CREATININE 1.1 1.1 1.3   CALCIUM 9.8 9.6 9.7   PROT 7.6  --   --    ALBUMIN 3.8  --   --    BILITOT 0.4  --   --    ALKPHOS 120  --   --    AST 19  --   --    ALT 20  --   --    ANIONGAP 13 10 10   , CBC   Recent Labs   Lab 10/11/23  1304 10/12/23  0825 10/13/23  0831   WBC 7.47 6.63 6.39   HGB 13.8 12.9 13.6   HCT 42.8 40.1 42.6    261 273   , Troponin   Recent Labs   Lab 10/11/23  1304   TROPONINI 0.012   , and All pertinent lab results from the last 24 hours have been reviewed.    Significant Imaging: Echocardiogram: Transthoracic echo (TTE) complete ([facility] Only):   Results for orders placed or performed during the hospital encounter of 05/17/23   Echo   Result Value Ref Range    BSA 2.03 m2    TDI SEPTAL 0.06 m/s    LV LATERAL E/E' RATIO 9.00 m/s    LV SEPTAL E/E' RATIO 10.50 m/s    LA WIDTH 3.70 cm    IVC diameter 1.09 cm    Left Ventricular Outflow Tract Mean Velocity 0.68 cm/s    Left Ventricular Outflow Tract Mean Gradient 2.06 mmHg    TDI LATERAL 0.07 m/s    PV PEAK VELOCITY 0.83 cm/s    LVIDd 3.82 3.5 - 6.0 cm    IVS 1.32 (A) 0.6 - 1.1 cm    Posterior Wall 1.40 (A) 0.6 - 1.1 cm    Ao root annulus 2.92 cm    LVIDs 2.31 2.1 - 4.0 cm    FS 40 28 - 44 %    LA volume 84.19 cm3    Sinus 2.86 cm     STJ 2.69 cm    LV mass 186.95 g    LA size 4.43 cm    RVDD 3.21 cm    Left Ventricle Relative Wall Thickness 0.73 cm    AV mean gradient 21 mmHg    AV valve area 0.80 cm2    AV Velocity Ratio 0.31     AV index (prosthetic) 0.32     E/A ratio 0.94     Mean e' 0.07 m/s    E wave deceleration time 397.60 msec    IVRT 102.76 msec    LVOT diameter 1.78 cm    LVOT area 2.5 cm2    LVOT peak tc 0.88 m/s    LVOT peak VTI 18.90 cm    Ao peak tc 2.85 m/s    Ao VTI 59.1 cm    RVOT peak tc 0.66 m/s    RVOT peak VTI 13.3 cm    LVOT stroke volume 47.01 cm3    AV peak gradient 32 mmHg    PV mean gradient 0.88 mmHg    E/E' ratio 9.69 m/s    MV Peak E Tc 0.63 m/s    TR Max Tc 2.51 m/s    MV Peak A Tc 0.67 m/s    LV Systolic Volume 18.36 mL    LV Systolic Volume Index 9.3 mL/m2    LV Diastolic Volume 62.54 mL    LV Diastolic Volume Index 31.75 mL/m2    LA Volume Index 42.7 mL/m2    LV Mass Index 95 g/m2    RA Major Axis 4.59 cm    Left Atrium Minor Axis 5.70 cm    Left Atrium Major Axis 6.43 cm    Triscuspid Valve Regurgitation Peak Gradient 25 mmHg    LA Volume Index (Mod) 22.7 mL/m2    LA volume (mod) 44.76 cm3    RA Width 2.84 cm    Right Atrial Pressure (from IVC) 8 mmHg    EF 65 %    TV resting pulmonary artery pressure 33 mmHg    Narrative    · The left ventricle is normal in size with mild concentric hypertrophy   and normal systolic function.  · Moderate left atrial enlargement.  · The estimated ejection fraction is 65%.  · Indeterminate left ventricular diastolic function.  · Normal right ventricular size with normal right ventricular systolic   function.  · There is moderate-to-severe aortic valve stenosis.  · Aortic valve area is 0.80 cm2; peak velocity is 2.85 m/s; mean gradient   is 21 mmHg.  · Mild tricuspid regurgitation.  · Intermediate central venous pressure (8 mmHg).  · The estimated PA systolic pressure is 33 mmHg.      , EKG: Reviewed, and X-Ray: CXR: X-Ray Chest 1 View (CXR): No results found for this visit  on 10/11/23. and X-Ray Chest PA and Lateral (CXR): No results found for this visit on 10/11/23.    Assessment and Plan:   Patient who presents with generalized weakness in setting of PAF. Noted to be in aflutter this AM. BB added. Continue other meds/mgmt.     * Generalized weakness  -In setting of afib/recent diarrhea    CKD (chronic kidney disease) stage 3, GFR 30-59 ml/min  -stable, monitor    Paroxysmal atrial fibrillation  -Presents with recurrent symptomatic afib  -Rate-controlled  -Amiodarone increased to 400 mg BID  -Options discussed with patient, will give time for increased po dosage to load/take effect  -Continue Eliquis for CVA prophylaxis     10/13/23  -Appears to be in aflutter this AM  -Continue amiodarone 400 mg BID  -Add Lopressor 25 mg BID  -Continue Eliquis for CVA prophylaxis  -Observe overnight, patient wishes to avoid repeat DCCV if possible        VTE Risk Mitigation (From admission, onward)         Ordered     apixaban tablet 2.5 mg  2 times daily         10/11/23 2911     Reason for No Pharmacological VTE Prophylaxis  Once        Question:  Reasons:  Answer:  Already adequately anticoagulated on oral Anticoagulants    10/11/23 2042     IP VTE HIGH RISK PATIENT  Once         10/11/23 2042     Place sequential compression device  Until discontinued         10/11/23 2042                Rima Flores PA-C  Cardiology  [facility] - Telemetry ([facility])

## 2023-10-13 NOTE — PT/OT/SLP EVAL
Occupational Therapy Evaluation and Treatment    Name: Holli Landrum  MRN: [id number]  Admitting Diagnosis: Generalized weakness  Recent Surgery: * No surgery found *      Recommendations:     Discharge Recommendations: home health OT  Level of Assistance Recommended: Intermittent assistance  Discharge Equipment Recommendations: none  Barriers to discharge:      Assessment:     Holli Landrum is a 89 y.o. female with a medical diagnosis of Generalized weakness. She presents with performance deficits affecting function including weakness, impaired functional mobility, decreased safety awareness, impaired endurance, gait instability, impaired balance, impaired self care skills, impaired coordination.     Rehab Prognosis: Good; patient would benefit from acute OT services to address these deficits and reach maximum level of function.    Plan:     Patient to be seen   to address the above listed problems via self-care/home management, therapeutic activities, therapeutic exercises  Plan of Care Expires: 10/27/23  Plan of Care Reviewed with: patient    Subjective     Chief Complaint: DEBILITY AND GENERALIZED WEAKNESS  Patient Comments/Goals:   Pain/Comfort:  Pain Rating 1: 0/10    Patients cultural, spiritual, Tenriism conflicts given the current situation:      Social History:  Living Environment: Patient lives with their daughter in a mobile home with ramped  Prior Level of Function: Prior to admission, patient was modified independent  Roles and Routines: Patient was not driving and not working prior to admission.  Equipment Used at Home: walker, rolling  DME owned (not currently used):  HHS, rolling walker, rollator, bath bench, and wheelchair  Assistance Upon Discharge: family    Objective:     Communicated with NURSE AND Epic CHART REVIEW prior to session. Patient found HOB elevated with telemetry, peripheral IV, french catheter upon OT entry to room.    General Precautions: Standard, fall   Orthopedic Precautions: N/A    Braces: N/A      Occupational Performance    Gait belt applied - Yes    Bed Mobility:   Rolling/Turning to Left with minimum assistance  Scooting anteriorly to EOB to have both feet planted on floor: minimum assistance  Supine to sit from left side of bed with minimum assistance    Functional Mobility/Transfers:  Sit <> Stand Transfer with minimum assistance with rolling walker  Functional Mobility: PT AMBULATED A FEW FEET  WITH MIN A AND ROLLING WALKER    Activities of Daily Living:  Upper Body Dressing: minimum assistance  Lower Body Dressing: minimum assistance    Cognitive/Visual Perceptual:  Cognitive/Psychosocial Skills:    -     Oriented to: Person, Place, Time, Situation  -     Follows Commands/attention: Follows two-step commands  -     Communication: clear/fluent  -     Memory: No Deficits noted  -     Safety awareness/insight to disability: impaired      Physical Exam:  Upper Extremity Range of Motion:     -       Right Upper Extremity: WFL  -       Left Upper Extremity: WFL  Upper Extremity Strength:    -       Right Upper Extremity: MMT: 3/5 GROSSLY  -       Left Upper Extremity: MMT: 3/5 GROSSLY   Strength:    -       Right Upper Extremity: MMT: 3/5 GROSSLY  -       Left Upper Extremity: MMT: 3/5 GROSSLY    AMPAC 6 Click ADL:  AMPAC Total Score: 16    Treatment & Education:  Therapist provided facilitation and instruction of proper body mechanics, energy conservation, and fall prevention strategies during tasks listed above  Patient educated on role of OT, POC, and goals for therapy  Patient educated on importance of OOB activities with staff member assistance and sitting OOB majority of the day  Educated patient on importance of increased tolerance to upright position and direct impact on CV endurance and strength. Pt sat EOB x 7-10 minutes focusing on stability and core strength/ endurance.  Patient encouraged to sit up in chair/ EOB, for a minimum of 2 consecutive hours including for all  meals. Pt verbalized understanding and returned demonstration.   Re enforced importance of utilizing call light to meet needs in room and not attempt to get up without staff assistance. Patient verbalized understanding and agreed to comply.       Patient clear to stand pivot transfer with RN/PCT, assist xMIN A WITH ROLLING WALKER .    Patient left up in chair with all lines intact, call button in reach, RN notified, and family present.    GOALS:   Multidisciplinary Problems       Occupational Therapy Goals          Problem: Occupational Therapy    Goal Priority Disciplines Outcome Interventions   Occupational Therapy Goal     OT, PT/OT     Description: O.T. GOALS TO BE MET BY 10-27-23  PT WILL TOLERATE 1 SET X 15 REPS B UE ROM EXERCISE  SBA WITH TOILET TRANSFERS  SBA WITH UE DRESSING  SBA WITH LE DRESSING                       History:     Past Medical History:   Diagnosis Date    Anticoagulant long-term use     Arthritis     Asthma     Basal cell carcinoma     Cancer     skin cancer to face    Cataract     OU done//    CHF (congestive heart failure)     COPD (chronic obstructive pulmonary disease)     no oxygen; patient denies    cpap     Essential hypertension 05/05/2010    GERD (gastroesophageal reflux disease) 11/20/2012    Glaucoma     Hypertensive heart disease with heart failure 02/05/2013    Paroxysmal atrial fibrillation     Paroxysmal ventricular tachycardia     per problem list    Renal disorder     french-1/3/2020    Squamous cell carcinoma of skin          Past Surgical History:   Procedure Laterality Date    A-V CARDIAC PACEMAKER INSERTION  06/16/2021    Procedure: INSERTION, CARDIAC PACEMAKER, DUAL CHAMBER;  Surgeon: Oscar Sommers MD;  Location: [facility];  Service: Cardiovascular;;    ADENOIDECTOMY  191944    APPENDECTOMY  1948    Because of Ovarian Cyst surgery    BREAST BIOPSY Left 1995    neg    BREAST BIOPSY Right 1984    neg    BREAST BIOPSY Right 1992    neg    BREAST SURGERY      A number of  biopsies    CARDIAC CATHETERIZATION  2013, 2014,2016, 2020    has 9 stents    CARDIAC SURGERY  2012    stents    CATARACT EXTRACTION W/  INTRAOCULAR LENS IMPLANT Left 11/01/2018    Dr Rose    CATARACT EXTRACTION W/  INTRAOCULAR LENS IMPLANT Right 12/13/2018    Dr Rose//    CHOLECYSTECTOMY      COLONOSCOPY  ~2005    Dr. Edward; normal per patient report    COLONOSCOPY N/A 09/06/2022    Procedure: COLONOSCOPY 8/31/22-note in Dr Simms's office visit note that he spoke to her cardiologist and she was viable candidate for endoscopy;  Surgeon: Cordelia Bueno MD;  Location: [facility] ENDO;  Service: Endoscopy;  Laterality: N/A;    CORONARY ANGIOGRAPHY N/A 03/20/2020    Procedure: ANGIOGRAM, CORONARY ARTERY;  Surgeon: Chuy Bryant MD;  Location: [facility] CATH;  Service: Cardiology;  Laterality: N/A;    CYSTOSCOPY W/ RETROGRADES Bilateral 02/12/2020    Procedure: CYSTOSCOPY, WITH RETROGRADE PYELOGRAM;  Surgeon: Gasper Feliz MD;  Location: [facility] OR;  Service: Urology;  Laterality: Bilateral;    ESOPHAGOGASTRODUODENOSCOPY N/A 08/13/2020    Procedure: EGD (ESOPHAGOGASTRODUODENOSCOPY);  Surgeon: Mika Solorzano MD;  Location: [facility];  Service: Endoscopy;  Laterality: N/A; Mild Schatzki ring. Biopsied. Dilated. small hiatal hernia, gastritis; biopsy: esophagus- SEVERE REFLUX ESOPHAGITIS, stomach- chronic gastritis, negative for H pylori    ESOPHAGOGASTRODUODENOSCOPY N/A 10/22/2020    Procedure: EGD (ESOPHAGOGASTRODUODENOSCOPY);  Surgeon: Fred Hendricks MD;  Location: Psychiatric;  Service: Endoscopy;  Laterality: N/A;    EYE SURGERY  2017    Cataracs    FRACTIONAL FLOW RESERVE (FFR), CORONARY  6/20/2023    Procedure: Fractional Flow Aldrich (FFR), Coronary;  Surgeon: Brice Campuzano MD;  Location: [facility] CATH LAB;  Service: Cardiology;;    HYSTERECTOMY  1969    INSTANTANEOUS WAVE-FREE RATIO (IFR) N/A 6/20/2023    Procedure: Instantaneous Wave-Free Ratio (IFR);  Surgeon: Brice Campuzano MD;  Location: [facility]  CATH LAB;  Service: Cardiology;  Laterality: N/A;    LEFT HEART CATHETERIZATION Left 03/03/2020    Procedure: Left heart cath;  Surgeon: Chuy Bryant MD;  Location: [facility] CATH;  Service: Cardiology;  Laterality: Left;    LEFT HEART CATHETERIZATION Left 03/20/2020    Procedure: Left heart cath;  Surgeon: Chuy Bryant MD;  Location: [facility] CATH;  Service: Cardiology;  Laterality: Left;    LEFT HEART CATHETERIZATION N/A 6/20/2023    Procedure: Left heart cath;  Surgeon: Brice Campuzano MD;  Location: [facility] CATH LAB;  Service: Cardiology;  Laterality: N/A;    OVARIAN CYST REMOVAL  teenager    PHACOEMULSIFICATION OF CATARACT Left 11/01/2018    Procedure: PHACOEMULSIFICATION, CATARACT;  Surgeon: Aleksandar Rose Jr., MD;  Location[facility] OR;  Service: Ophthalmology;  Laterality: Left;    PHACOEMULSIFICATION OF CATARACT Right 12/13/2018    Procedure: PHACOEMULSIFICATION, CATARACT;  Surgeon: Aleksandar Rose Jr., MD;  Location: [facility] OR;  Service: Ophthalmology;  Laterality: Right;    TONSILLECTOMY      aw/denoids    TRANSESOPHAGEAL ECHOCARDIOGRAM WITH POSSIBLE CARDIOVERSION (ALFRED W/ POSS CARDIOVERSION) N/A 10/5/2023    Procedure: Transesophageal echo (ALFRED) intra-procedure log documentation/alfred/cv;  Surgeon: Bao Miguel MD;  Location: [facility] CATH LAB;  Service: Cardiology;  Laterality: N/A;   Alfred/Cv  MRI safe   Pacer & leads implanted 6/16/21, Antoun   Bio Edora 8 MICHELLE, [id number], PID: [id number]   A lead: Bio Solia S45, [id number]   V lead: Bio Solia S53, [id number]    TREATMENT OF CARDIAC ARRHYTHMIA N/A 10/5/2023    Procedure: Cardioversion or Defibrillation;  Surgeon: Bao Miguel MD;  Location: [facility] CATH LAB;  Service: Cardiology;  Laterality: N/A;    UPPER GASTROINTESTINAL ENDOSCOPY  ~2005    Dr. Solorzano    VALVE STUDY-AORTIC  6/20/2023    Procedure: Valve study-aortic;  Surgeon: Brice Campuzano MD;  Location: [facility] CATH LAB;  Service: Cardiology;;    VASCULAR SURGERY      to remove clot from right leg    Yag  Capsulotomy Bilateral 11/05/2019    Dr Rose       Time Tracking:     OT Date of Treatment: 10/13/23  OT Start Time: 1047  OT Stop Time: 1127  OT Total Time (min): 40 min    Billable Minutes: Evaluation 15 MINUTES, Self Care/Home Management 10, and Therapeutic Activity 15 MINUTES    10/13/2023

## 2023-10-13 NOTE — SUBJECTIVE & OBJECTIVE
Interval History: pt in chair upon exam and reports fatigue/malaise and weakness. Atrial flutter on monitor (HR 90's). Amiodarone continued with Lopressor initiated. Cardiology following.     Review of Systems   Constitutional:  Positive for fatigue. Negative for chills and diaphoresis.   Respiratory:  Negative for shortness of breath and wheezing.    Cardiovascular:  Positive for palpitations and leg swelling. Negative for chest pain.   Gastrointestinal:  Positive for diarrhea (resolved). Negative for abdominal pain, nausea and vomiting.   Musculoskeletal:  Negative for arthralgias and back pain.   Skin:  Negative for color change and wound.   Neurological:  Positive for weakness (generalized). Negative for dizziness, light-headedness and headaches.   Psychiatric/Behavioral:  Negative for confusion. The patient is not nervous/anxious.    All other systems reviewed and are negative.    Objective:     Vital Signs (Most Recent):  Temp: 97.9 °F (36.6 °C) (10/13/23 1217)  Pulse: 72 (10/13/23 1217)  Resp: 16 (10/13/23 1217)  BP: 122/72 (10/13/23 1217)  SpO2: (!) 93 % (10/13/23 1217) Vital Signs (24h Range):  Temp:  [97.5 °F (36.4 °C)-98.8 °F (37.1 °C)] 97.9 °F (36.6 °C)  Pulse:  [53-98] 72  Resp:  [16-18] 16  SpO2:  [91 %-100 %] 93 %  BP: (110-148)/(57-82) 122/72     Weight: 90.5 kg (199 lb 8.3 oz)  Body mass index is 33.2 kg/m².    Intake/Output Summary (Last 24 hours) at 10/13/2023 1451  Last data filed at 10/13/2023 0759  Gross per 24 hour   Intake --   Output 2200 ml   Net -2200 ml         Physical Exam  Vitals and nursing note reviewed.   Constitutional:       General: She is awake. She is not in acute distress.     Appearance: Normal appearance. She is well-developed and well-groomed. She is not ill-appearing, toxic-appearing or diaphoretic.   HENT:      Head: Normocephalic and atraumatic.      Nose: Nose normal.      Mouth/Throat:      Pharynx: Oropharynx is clear.   Eyes:      Extraocular Movements: Extraocular  movements intact.      Conjunctiva/sclera: Conjunctivae normal.   Cardiovascular:      Rate and Rhythm: Normal rate. Rhythm irregular.      Pulses:           Radial pulses are 2+ on the right side and 2+ on the left side.        Dorsalis pedis pulses are 2+ on the right side and 2+ on the left side.      Heart sounds: Normal heart sounds. No murmur heard.  Pulmonary:      Effort: Pulmonary effort is normal.      Breath sounds: Normal breath sounds.   Abdominal:      General: Bowel sounds are normal.      Palpations: Abdomen is soft.      Tenderness: There is no abdominal tenderness.       Genitourinary:     Comments: Suprapubic catheter in place   Musculoskeletal:      Cervical back: Normal range of motion and neck supple.      Right lower leg: No edema.      Left lower leg: No edema.      Comments: 5/5 strength throughout- generalized weakness noted    Skin:     General: Skin is warm and dry.      Capillary Refill: Capillary refill takes less than 2 seconds.   Neurological:      General: No focal deficit present.      Mental Status: She is alert and oriented to person, place, and time. Mental status is at baseline.      GCS: GCS eye subscore is 4. GCS verbal subscore is 5. GCS motor subscore is 6.      Cranial Nerves: Cranial nerves 2-12 are intact.      Sensory: Sensation is intact.      Motor: Motor function is intact.   Psychiatric:         Mood and Affect: Mood normal.         Speech: Speech normal.         Behavior: Behavior normal. Behavior is cooperative.             Significant Labs: All pertinent labs within the past 24 hours have been reviewed.  CBC:   Recent Labs   Lab 10/12/23  0825 10/13/23  0831   WBC 6.63 6.39   HGB 12.9 13.6   HCT 40.1 42.6    273     CMP:   Recent Labs   Lab 10/12/23  0825 10/13/23  0831    138   K 3.9 3.8    106   CO2 23 22*   GLU 92 151*   BUN 20 23   CREATININE 1.1 1.3   CALCIUM 9.6 9.7   ANIONGAP 10 10       Significant Imaging: I have reviewed all pertinent  imaging results/findings within the past 24 hours.

## 2023-10-13 NOTE — ASSESSMENT & PLAN NOTE
Likely 2/2 symptomatic A-fib/A flutter. Diarrhea due to IBS resolved   Plan:  -tele monitoring  -IVFs prn  -cards consult  -Amiodarone continued at 400 mg BI and Lopressor initiated per cards recs  -diet advanced   -PT/OT evaluation with home health recommended

## 2023-10-13 NOTE — PLAN OF CARE
See eval for details. Pt displayed deficits with functional mobility/ transfers, deficits with adl's skills also decrease b ue strength/endurance. Recommendation: HOME HEALTH O.T.

## 2023-10-13 NOTE — HOSPITAL COURSE
10/13/23-Patient seen and examined today, resting in bed. More weak/tired. States her brain feels foggy. No CP/SOB. Remains in aflutter with HR in 90's during exam. Labs reviewed.     10/14/23 Tele afib with occasional pacing , pt to be dc today, follow up with Dr. Meyer, decrease amiodarone to 200 mg q day

## 2023-10-13 NOTE — PT/OT/SLP EVAL
Physical Therapy Evaluation    Patient Name:  Holli Landrum   MRN:  [id number]    Recommendations:     Discharge Recommendations: home health PT   Discharge Equipment Recommendations: none   Barriers to discharge: None    Assessment:     Holli Landrum is a 89 y.o. female admitted with a medical diagnosis of Generalized weakness.  She presents with the following impairments/functional limitations: weakness, impaired endurance, impaired functional mobility, gait instability, impaired balance, decreased coordination, decreased safety awareness which limits mobility and increases risk of falls. Pt with complaints of being lightheaded but able to tolerate transfers and gait with no LOB. Pt will benefit from continued PT services, recommend HHPT.    Rehab Prognosis: Good; patient would benefit from acute skilled PT services to address these deficits and reach maximum level of function.    Recent Surgery: * No surgery found *      Plan:     During this hospitalization, patient to be seen 3 x/week to address the identified rehab impairments via gait training, therapeutic activities, therapeutic exercises, neuromuscular re-education and progress toward the following goals:    Plan of Care Expires:  10/27/23    Subjective     Chief Complaint: generalized weakness  Patient/Family Comments/goals: to go home/get better  Pain/Comfort:  Pain Rating 1: 0/10  Pain Rating Post-Intervention 1: 0/10    Patients cultural, spiritual, Hoahaoism conflicts given the current situation: no    Living Environment:  Pt lives with daughter in mobile home, ramp in place  Prior to admission, patients level of function was independent with ADLs, primarily ambulates in household.  Equipment used at home: walker, rolling, grab bar, bedside commode, rollator.  DME owned (not currently used): none.  Upon discharge, patient will have assistance from daughters.    Objective:     Communicated with nursing (Dwayne) prior to session.  Patient found supine with  peripheral IV, telemetry, french catheter  upon PT entry to room.    General Precautions: Standard, fall  Orthopedic Precautions:N/A   Braces: N/A  Respiratory Status: Room air    Exams:  Cognitive Exam:  Patient is oriented to Person, Place, Time, and Situation  Gross Motor Coordination:  WFL  Postural Exam:  Patient presented with the following abnormalities:    -       Rounded shoulders  -       Forward head  RLE ROM: WFL  RLE Strength: WFL  LLE ROM: WFL  LLE Strength: WFL    Functional Mobility:  Bed Mobility:     Scooting: contact guard assistance  Supine to Sit: contact guard assistance  Transfers:     Sit to Stand:  contact guard assistance with rolling walker  Bed to Chair: contact guard assistance with  rolling walker  using  Stand Pivot  Gait: 5ft x 2 CGA with RW, demonstrates slow pace, flexed posture, wide MIKALA, decreased step length and foot clearance  Balance: fair dynamic standing balance      AM-PAC 6 CLICK MOBILITY  Total Score:16       Treatment & Education:  Educated pt on benefits of consistent participation in PT services to meet functional goals. Educated pt on importance of sitting OOB to promote endurance and overall activity tolerance. Educated pt on call don't fall policy and use of call button to alert nursing staff of needs (including to assist with returning back to bed). Pt expressed understanding.      Patient left up in chair with all lines intact, call button in reach, nursing notified, and daughter present.    GOALS:   Multidisciplinary Problems       Physical Therapy Goals          Problem: Physical Therapy    Goal Priority Disciplines Outcome Goal Variances Interventions   Physical Therapy Goal     PT, PT/OT      Description: Pt will perform bed mobility independently in order to participate in EOB activity.  Pt will perform transfers independently in order to participate in OOB activity.   Pt will ambulate 75 ft mod I with LRAD in order to participate in daily tasks.                          History:     Past Medical History:   Diagnosis Date    Anticoagulant long-term use     Arthritis     Asthma     Basal cell carcinoma     Cancer     skin cancer to face    Cataract     OU done//    CHF (congestive heart failure)     COPD (chronic obstructive pulmonary disease)     no oxygen; patient denies    cpap     Essential hypertension 05/05/2010    GERD (gastroesophageal reflux disease) 11/20/2012    Glaucoma     Hypertensive heart disease with heart failure 02/05/2013    Paroxysmal atrial fibrillation     Paroxysmal ventricular tachycardia     per problem list    Renal disorder     french-1/3/2020    Squamous cell carcinoma of skin        Past Surgical History:   Procedure Laterality Date    A-V CARDIAC PACEMAKER INSERTION  06/16/2021    Procedure: INSERTION, CARDIAC PACEMAKER, DUAL CHAMBER;  Surgeon: Oscar Sommers MD;  Location: [facility] CATH;  Service: Cardiovascular;;    ADENOIDECTOMY  191944    APPENDECTOMY  1948    Because of Ovarian Cyst surgery    BREAST BIOPSY Left 1995    neg    BREAST BIOPSY Right 1984    neg    BREAST BIOPSY Right 1992    neg    BREAST SURGERY      A number of biopsies    CARDIAC CATHETERIZATION  2013, 2014,2016, 2020    has 9 stents    CARDIAC SURGERY  2012    stents    CATARACT EXTRACTION W/  INTRAOCULAR LENS IMPLANT Left 11/01/2018    Dr Rose    CATARACT EXTRACTION W/  INTRAOCULAR LENS IMPLANT Right 12/13/2018    Dr Rose//    CHOLECYSTECTOMY      COLONOSCOPY  ~2005    Dr. Edward; normal per patient report    COLONOSCOPY N/A 09/06/2022    Procedure: COLONOSCOPY 8/31/22-note in Dr Simms's office visit note that he spoke to her cardiologist and she was viable candidate for endoscopy;  Surgeon: Cordelia Bueno MD;  Location: [facility] ENDO;  Service: Endoscopy;  Laterality: N/A;    CORONARY ANGIOGRAPHY N/A 03/20/2020    Procedure: ANGIOGRAM, CORONARY ARTERY;  Surgeon: Chuy Bryant MD;  Location: [facility] CATH;  Service: Cardiology;  Laterality: N/A;    CYSTOSCOPY W/  RETROGRADES Bilateral 02/12/2020    Procedure: CYSTOSCOPY, WITH RETROGRADE PYELOGRAM;  Surgeon: Gasper Feliz MD;  Location: [facility] OR;  Service: Urology;  Laterality: Bilateral;    ESOPHAGOGASTRODUODENOSCOPY N/A 08/13/2020    Procedure: EGD (ESOPHAGOGASTRODUODENOSCOPY);  Surgeon: Mika Solorzano MD;  Location: [facility] ENDO;  Service: Endoscopy;  Laterality: N/A; Mild Schatzki ring. Biopsied. Dilated. small hiatal hernia, gastritis; biopsy: esophagus- SEVERE REFLUX ESOPHAGITIS, stomach- chronic gastritis, negative for H pylori    ESOPHAGOGASTRODUODENOSCOPY N/A 10/22/2020    Procedure: EGD (ESOPHAGOGASTRODUODENOSCOPY);  Surgeon: Fred Hendricks MD;  Location: [facility];  Service: Endoscopy;  Laterality: N/A;    EYE SURGERY  2017    Cataracs    FRACTIONAL FLOW RESERVE (FFR), CORONARY  6/20/2023    Procedure: Fractional Flow [place] (FFR), Coronary;  Surgeon: Brice Campuzano MD;  Location: [facility] CATH LAB;  Service: Cardiology;;    HYSTERECTOMY  1969    INSTANTANEOUS WAVE-FREE RATIO (IFR) N/A 6/20/2023    Procedure: Instantaneous Wave-Free Ratio (IFR);  Surgeon: Brice Campuzano MD;  Location: [facility] CATH LAB;  Service: Cardiology;  Laterality: N/A;    LEFT HEART CATHETERIZATION Left 03/03/2020    Procedure: Left heart cath;  Surgeon: Chuy Bryant MD;  Location: [facility] CATH;  Service: Cardiology;  Laterality: Left;    LEFT HEART CATHETERIZATION Left 03/20/2020    Procedure: Left heart cath;  Surgeon: Chuy Bryant MD;  Location: [facility] CATH;  Service: Cardiology;  Laterality: Left;    LEFT HEART CATHETERIZATION N/A 6/20/2023    Procedure: Left heart cath;  Surgeon: Brice Campuzano MD;  Location: [facility] CATH LAB;  Service: Cardiology;  Laterality: N/A;    OVARIAN CYST REMOVAL  teenager    PHACOEMULSIFICATION OF CATARACT Left 11/01/2018    Procedure: PHACOEMULSIFICATION, CATARACT;  Surgeon: Aleksandar Rose Jr., MD;  Location: [facility] OR;  Service: Ophthalmology;  Laterality: Left;    PHACOEMULSIFICATION OF CATARACT  Right 12/13/2018    Procedure: PHACOEMULSIFICATION, CATARACT;  Surgeon: Aleksandar Rose Jr., MD;  Location: [facility] OR;  Service: Ophthalmology;  Laterality: Right;    TONSILLECTOMY      aw/denoids    TRANSESOPHAGEAL ECHOCARDIOGRAM WITH POSSIBLE CARDIOVERSION (ALFRED W/ POSS CARDIOVERSION) N/A 10/5/2023    Procedure: Transesophageal echo (ALFRED) intra-procedure log documentation/alfred/cv;  Surgeon: Bao Miguel MD;  Location: [facility] CATH LAB;  Service: Cardiology;  Laterality: N/A;   Alfred/Cv  MRI safe   Pacer & leads implanted 6/16/21, Antoun   Bio Edora 8 MICHELLE, [id number], PID: [id number]   A lead: Bio Solia S45, [id number]   V lead: Bio Solia S53, [id number]    TREATMENT OF CARDIAC ARRHYTHMIA N/A 10/5/2023    Procedure: Cardioversion or Defibrillation;  Surgeon: Bao Miguel MD;  Location: [facility] CATH LAB;  Service: Cardiology;  Laterality: N/A;    UPPER GASTROINTESTINAL ENDOSCOPY  ~2005    Dr. Solorzano    VALVE STUDY-AORTIC  6/20/2023    Procedure: Valve study-aortic;  Surgeon: Brice Campuzano MD;  Location: [facility] CATH LAB;  Service: Cardiology;;    VASCULAR SURGERY      to remove clot from right leg    Yag Capsulotomy Bilateral 11/05/2019    Dr Rose       Time Tracking:     PT Received On: 10/13/23  PT Start Time: 1055     PT Stop Time: 1120  PT Total Time (min): 25 min     Billable Minutes: Evaluation 10 and Gait Training 15      10/13/2023

## 2023-10-13 NOTE — SUBJECTIVE & OBJECTIVE
Review of Systems   Constitutional: Positive for malaise/fatigue.   HENT: Negative.     Eyes: Negative.    Cardiovascular: Negative.    Respiratory: Negative.     Endocrine: Negative.    Hematologic/Lymphatic: Negative.    Skin: Negative.    Musculoskeletal:  Positive for arthritis and joint pain.   Gastrointestinal: Negative.    Genitourinary: Negative.    Neurological:  Positive for dizziness.   Psychiatric/Behavioral: Negative.     Allergic/Immunologic: Negative.      Objective:     Vital Signs (Most Recent):  Temp: 97.5 °F (36.4 °C) (10/13/23 0801)  Pulse: 76 (10/13/23 0900)  Resp: 18 (10/13/23 0801)  BP: (!) 110/57 (10/13/23 0801)  SpO2: (!) 91 % (10/13/23 0801) Vital Signs (24h Range):  Temp:  [97.5 °F (36.4 °C)-98.9 °F (37.2 °C)] 97.5 °F (36.4 °C)  Pulse:  [53-98] 76  Resp:  [16-18] 18  SpO2:  [91 %-100 %] 91 %  BP: (110-148)/(57-82) 110/57     Weight: 90.5 kg (199 lb 8.3 oz)  Body mass index is 33.2 kg/m².     SpO2: (!) 91 %         Intake/Output Summary (Last 24 hours) at 10/13/2023 1149  Last data filed at 10/13/2023 0759  Gross per 24 hour   Intake --   Output 2200 ml   Net -2200 ml       Lines/Drains/Airways       Drain  Duration                  Suprapubic Catheter 06/20/23 1126 18 Fr. 115 days              Peripheral Intravenous Line  Duration                  Peripheral IV - Single Lumen 10/11/23 1306 20 G Left;Posterior Hand 1 day                       Physical Exam  Vitals and nursing note reviewed.   Constitutional:       General: She is not in acute distress.     Appearance: Normal appearance. She is well-developed. She is not diaphoretic.   HENT:      Head: Normocephalic and atraumatic.   Eyes:      General:         Right eye: No discharge.         Left eye: No discharge.      Pupils: Pupils are equal, round, and reactive to light.   Neck:      Thyroid: No thyromegaly.      Vascular: No JVD.      Trachea: No tracheal deviation.   Cardiovascular:      Rate and Rhythm: Normal rate. Rhythm  irregularly irregular.      Heart sounds: S1 normal and S2 normal. Murmur heard.      Harsh midsystolic murmur is present at the upper right sternal border radiating to the neck.      Comments: PPM site well-healed    Remains in aflutter HR in 90's  Pulmonary:      Effort: Pulmonary effort is normal. No respiratory distress.      Breath sounds: Normal breath sounds. No wheezing or rales.   Abdominal:      General: There is no distension.      Tenderness: There is no rebound.   Musculoskeletal:      Cervical back: Neck supple.      Right lower leg: No edema.      Left lower leg: No edema.   Skin:     General: Skin is warm and dry.      Findings: No erythema.   Neurological:      General: No focal deficit present.      Mental Status: She is alert and oriented to person, place, and time.   Psychiatric:         Mood and Affect: Mood normal.         Behavior: Behavior normal.         Thought Content: Thought content normal.            Significant Labs: CMP   Recent Labs   Lab 10/11/23  1304 10/12/23  0825 10/13/23  0831    140 138   K 4.1 3.9 3.8    107 106   CO2 23 23 22*   GLU 93 92 151*   BUN 22 20 23   CREATININE 1.1 1.1 1.3   CALCIUM 9.8 9.6 9.7   PROT 7.6  --   --    ALBUMIN 3.8  --   --    BILITOT 0.4  --   --    ALKPHOS 120  --   --    AST 19  --   --    ALT 20  --   --    ANIONGAP 13 10 10   , CBC   Recent Labs   Lab 10/11/23  1304 10/12/23  0825 10/13/23  0831   WBC 7.47 6.63 6.39   HGB 13.8 12.9 13.6   HCT 42.8 40.1 42.6    261 273   , Troponin   Recent Labs   Lab 10/11/23  1304   TROPONINI 0.012   , and All pertinent lab results from the last 24 hours have been reviewed.    Significant Imaging: Echocardiogram: Transthoracic echo (TTE) complete (Cupid Only):   Results for orders placed or performed during the hospital encounter of 05/17/23   Echo   Result Value Ref Range    BSA 2.03 m2    TDI SEPTAL 0.06 m/s    LV LATERAL E/E' RATIO 9.00 m/s    LV SEPTAL E/E' RATIO 10.50 m/s    LA WIDTH 3.70  cm    IVC diameter 1.09 cm    Left Ventricular Outflow Tract Mean Velocity 0.68 cm/s    Left Ventricular Outflow Tract Mean Gradient 2.06 mmHg    TDI LATERAL 0.07 m/s    PV PEAK VELOCITY 0.83 cm/s    LVIDd 3.82 3.5 - 6.0 cm    IVS 1.32 (A) 0.6 - 1.1 cm    Posterior Wall 1.40 (A) 0.6 - 1.1 cm    Ao root annulus 2.92 cm    LVIDs 2.31 2.1 - 4.0 cm    FS 40 28 - 44 %    LA volume 84.19 cm3    Sinus 2.86 cm    STJ 2.69 cm    LV mass 186.95 g    LA size 4.43 cm    RVDD 3.21 cm    Left Ventricle Relative Wall Thickness 0.73 cm    AV mean gradient 21 mmHg    AV valve area 0.80 cm2    AV Velocity Ratio 0.31     AV index (prosthetic) 0.32     E/A ratio 0.94     Mean e' 0.07 m/s    E wave deceleration time 397.60 msec    IVRT 102.76 msec    LVOT diameter 1.78 cm    LVOT area 2.5 cm2    LVOT peak tc 0.88 m/s    LVOT peak VTI 18.90 cm    Ao peak tc 2.85 m/s    Ao VTI 59.1 cm    RVOT peak tc 0.66 m/s    RVOT peak VTI 13.3 cm    LVOT stroke volume 47.01 cm3    AV peak gradient 32 mmHg    PV mean gradient 0.88 mmHg    E/E' ratio 9.69 m/s    MV Peak E Tc 0.63 m/s    TR Max Tc 2.51 m/s    MV Peak A Tc 0.67 m/s    LV Systolic Volume 18.36 mL    LV Systolic Volume Index 9.3 mL/m2    LV Diastolic Volume 62.54 mL    LV Diastolic Volume Index 31.75 mL/m2    LA Volume Index 42.7 mL/m2    LV Mass Index 95 g/m2    RA Major Axis 4.59 cm    Left Atrium Minor Axis 5.70 cm    Left Atrium Major Axis 6.43 cm    Triscuspid Valve Regurgitation Peak Gradient 25 mmHg    LA Volume Index (Mod) 22.7 mL/m2    LA volume (mod) 44.76 cm3    RA Width 2.84 cm    Right Atrial Pressure (from IVC) 8 mmHg    EF 65 %    TV resting pulmonary artery pressure 33 mmHg    Narrative    · The left ventricle is normal in size with mild concentric hypertrophy   and normal systolic function.  · Moderate left atrial enlargement.  · The estimated ejection fraction is 65%.  · Indeterminate left ventricular diastolic function.  · Normal right ventricular size with normal  right ventricular systolic   function.  · There is moderate-to-severe aortic valve stenosis.  · Aortic valve area is 0.80 cm2; peak velocity is 2.85 m/s; mean gradient   is 21 mmHg.  · Mild tricuspid regurgitation.  · Intermediate central venous pressure (8 mmHg).  · The estimated PA systolic pressure is 33 mmHg.      , EKG: Reviewed, and X-Ray: CXR: X-Ray Chest 1 View (CXR): No results found for this visit on 10/11/23. and X-Ray Chest PA and Lateral (CXR): No results found for this visit on 10/11/23.

## 2023-10-13 NOTE — ASSESSMENT & PLAN NOTE
Patient with Paroxysmal (<7 days) atrial fibrillation which is uncontrolled currently with Calcium Channel Blocker and Amiodarone. Patient is currently in atrial fibrillation.[id number] Score: 4. HASBLED Score: . Anticoagulation indicated. Anticoagulation done with eliquis.  -10/13/23- Atrial Flutter- HR 90's on monitor -Lopressor initiated -JÚNIOR/DCCV on 10/5/23 noted-cardiology following

## 2023-10-13 NOTE — PLAN OF CARE
EVAL AND TX COMPLETED: facilitated bed mobility with CGA, transfers with CGA. Ambulated 5 ft x 2 with CGA and RW. Recommend HHPT

## 2023-10-13 NOTE — ASSESSMENT & PLAN NOTE
-Presents with recurrent symptomatic afib  -Rate-controlled  -Amiodarone increased to 400 mg BID  -Options discussed with patient, will give time for increased po dosage to load/take effect  -Continue Eliquis for CVA prophylaxis     10/13/23  -Appears to be in aflutter this AM  -Continue amiodarone 400 mg BID  -Add Lopressor 25 mg BID  -Continue Eliquis for CVA prophylaxis  -Observe overnight, patient wishes to avoid repeat DCCV if possible

## 2023-10-13 NOTE — PROGRESS NOTES
[facility] ER Medicine  Progress Note    Patient Name: Holli Landrum  MRN: [id number]  Patient Class: OP- Observation   Admission Date: 10/11/2023  Length of Stay: 0 days  Attending Physician: Jed Machado MD  Primary Care Provider: Marcia Carlisle MD        Subjective:     Principal Problem:Generalized weakness        HPI:  Holli Landrum is a 89 y.o. female with a PMH  has a past medical history of Anticoagulant long-term use, Arthritis, Asthma, Basal cell carcinoma, Cancer, Cataract, CHF (congestive heart failure), COPD (chronic obstructive pulmonary disease), cpap, Essential hypertension (05/05/2010), GERD (gastroesophageal reflux disease) (11/20/2012), Glaucoma, Hypertensive heart disease with heart failure (02/05/2013), Paroxysmal atrial fibrillation, Paroxysmal ventricular tachycardia, Renal disorder, and Squamous cell carcinoma of skin.  Presented to the ER for evaluation of generalized fatigue which has been persistent over the last couple of weeks.  Patient recently admitted to our facility between 10/03/2023-10/05/2023 for uncontrolled AFib and acute on chronic diastolic CHF.  Cardiology and EP was consulted.  Patient was eventually cardioverted and placed on amiodarone 200 mg p.o. b.i.d. times 14 days followed by 200 mg p.o. daily.  Patient's sotalol was D/C'd at the same time and was scheduled to follow-up with cardiology and EP within 1-2 weeks.  Patient reports she did fine since being discharged, but states that she started feeling bad again 8/8/23 while getting ready for Spiritism.  Patient denied any episode of syncope, but has reported associated diarrhea and worsening generalized weakness.  Patient reports compliance with home medications.  Denies any other symptoms.    ER workup is unremarkable with slight elevation in BNP of 343.  EKG revealed atrial fibrillation with a rate of 76 beats per minute and QT/QTC of 422/474.  Cardiology was consulted and recommend administration of  "400 mg of amiodarone.  Patient also received 12.5 mg of meclizine, 8 mg Zofran, 250 cc bolus of normal saline in ED. patient in agreement with treatment plan.  Patient admitted under observation status.    PCP: Marcia Carlisle           Overview/Hospital Course:  Pt admitted to Observation for General Weakness in the setting of uncontrolled Atrial fibrillation. Of note, patient was cardioverted (JÚNIOR/DCCV) on 10/5/23 with Sotalol transitioned to Amiodarone. Pt reports palpitations prior to admission and states she "could feel myself going into afib." BNP elevated and Troponin within normal limits. Cardiology consulted. Amiodarone dose increased per recommendation. PT/OT evaluation ordered with results pending. Social work consulted with home health to be resumed and suprapubic catheter to be changed after discharge. Diarrhea resolved - hx of IBS reported. On 10/13/23, atrial flutter continued at controlled rate (HR 90's) with Amiodarone continued. Fatigue and weakness reported. Case discussed with Cardiology and Lopressor to be initiated per recommendation. PT/OT evaluation completed with home health to be resumed. GFR declined in the setting of diuretic use and decreased oral intake/fatigue reported- Lasix dose adjusted.       Interval History: pt in chair upon exam and reports fatigue/malaise and weakness. Atrial flutter on monitor (HR 90's). Amiodarone continued with Lopressor initiated. Cardiology following.     Review of Systems   Constitutional:  Positive for fatigue. Negative for chills and diaphoresis.   Respiratory:  Negative for shortness of breath and wheezing.    Cardiovascular:  Positive for palpitations and leg swelling. Negative for chest pain.   Gastrointestinal:  Positive for diarrhea (resolved). Negative for abdominal pain, nausea and vomiting.   Musculoskeletal:  Negative for arthralgias and back pain.   Skin:  Negative for color change and wound.   Neurological:  Positive for weakness " (generalized). Negative for dizziness, light-headedness and headaches.   Psychiatric/Behavioral:  Negative for confusion. The patient is not nervous/anxious.    All other systems reviewed and are negative.    Objective:     Vital Signs (Most Recent):  Temp: 97.9 °F (36.6 °C) (10/13/23 1217)  Pulse: 72 (10/13/23 1217)  Resp: 16 (10/13/23 1217)  BP: 122/72 (10/13/23 1217)  SpO2: (!) 93 % (10/13/23 1217) Vital Signs (24h Range):  Temp:  [97.5 °F (36.4 °C)-98.8 °F (37.1 °C)] 97.9 °F (36.6 °C)  Pulse:  [53-98] 72  Resp:  [16-18] 16  SpO2:  [91 %-100 %] 93 %  BP: (110-148)/(57-82) 122/72     Weight: 90.5 kg (199 lb 8.3 oz)  Body mass index is 33.2 kg/m².    Intake/Output Summary (Last 24 hours) at 10/13/2023 1451  Last data filed at 10/13/2023 0759  Gross per 24 hour   Intake --   Output 2200 ml   Net -2200 ml         Physical Exam  Vitals and nursing note reviewed.   Constitutional:       General: She is awake. She is not in acute distress.     Appearance: Normal appearance. She is well-developed and well-groomed. She is not ill-appearing, toxic-appearing or diaphoretic.   HENT:      Head: Normocephalic and atraumatic.      Nose: Nose normal.      Mouth/Throat:      Pharynx: Oropharynx is clear.   Eyes:      Extraocular Movements: Extraocular movements intact.      Conjunctiva/sclera: Conjunctivae normal.   Cardiovascular:      Rate and Rhythm: Normal rate. Rhythm irregular.      Pulses:           Radial pulses are 2+ on the right side and 2+ on the left side.        Dorsalis pedis pulses are 2+ on the right side and 2+ on the left side.      Heart sounds: Normal heart sounds. No murmur heard.  Pulmonary:      Effort: Pulmonary effort is normal.      Breath sounds: Normal breath sounds.   Abdominal:      General: Bowel sounds are normal.      Palpations: Abdomen is soft.      Tenderness: There is no abdominal tenderness.       Genitourinary:     Comments: Suprapubic catheter in place   Musculoskeletal:      Cervical  back: Normal range of motion and neck supple.      Right lower leg: No edema.      Left lower leg: No edema.      Comments: 5/5 strength throughout- generalized weakness noted    Skin:     General: Skin is warm and dry.      Capillary Refill: Capillary refill takes less than 2 seconds.   Neurological:      General: No focal deficit present.      Mental Status: She is alert and oriented to person, place, and time. Mental status is at baseline.      GCS: GCS eye subscore is 4. GCS verbal subscore is 5. GCS motor subscore is 6.      Cranial Nerves: Cranial nerves 2-12 are intact.      Sensory: Sensation is intact.      Motor: Motor function is intact.   Psychiatric:         Mood and Affect: Mood normal.         Speech: Speech normal.         Behavior: Behavior normal. Behavior is cooperative.             Significant Labs: All pertinent labs within the past 24 hours have been reviewed.  CBC:   Recent Labs   Lab 10/12/23  0825 10/13/23  0831   WBC 6.63 6.39   HGB 12.9 13.6   HCT 40.1 42.6    273     CMP:   Recent Labs   Lab 10/12/23  0825 10/13/23  0831    138   K 3.9 3.8    106   CO2 23 22*   GLU 92 151*   BUN 20 23   CREATININE 1.1 1.3   CALCIUM 9.6 9.7   ANIONGAP 10 10       Significant Imaging: I have reviewed all pertinent imaging results/findings within the past 24 hours.      Assessment/Plan:      * Generalized weakness  Likely 2/2 symptomatic A-fib/A flutter. Diarrhea due to IBS resolved   Plan:  -tele monitoring  -IVFs prn  -cards consult  -Amiodarone continued at 400 mg BI and Lopressor initiated per cards recs  -diet advanced   -PT/OT evaluation with home health recommended      Suprapubic catheter  Plan:  -continue cath care  -catheter to be changed by home health upon discharge     CKD (chronic kidney disease) stage 3, GFR 30-59 ml/min  Appears near baseline.  Plan:  -Avoid nephrotoxins  -Monitor renal function  -Renally dose medications  -IVFs prn        Essential hypertension  Currently  normotensive. BP usually well controlled per patient with home medications.  Plan:  -Optimize pain control   -Continue home medications (norvasc, lasix), titrate as needed   -Monitor BP  -Low salt/cardiac diet   -IV hydralazine prn for SBP>160 or DBP>90           Hypercholesteremia  Patient is chronically on statin.will continue for now. Last Lipid Panel:   Lab Results   Component Value Date    CHOL 134 12/07/2021    HDL 46 12/07/2021    LDLCALC 59.0 (L) 12/07/2021    TRIG 145 12/07/2021    CHOLHDL 34.3 12/07/2021     Plan:  -Continue home medication  -low fat/low calorie diet        Paroxysmal atrial fibrillation  Patient with Paroxysmal (<7 days) atrial fibrillation which is uncontrolled currently with Calcium Channel Blocker and Amiodarone. Patient is currently in atrial fibrillation.[id number] Score: 4. HASBLED Score: . Anticoagulation indicated. Anticoagulation done with eliquis.  -10/13/23- Atrial Flutter- HR 90's on monitor -Lopressor initiated -JÚNIOR/DCCV on 10/5/23 noted-cardiology following     GERD (gastroesophageal reflux disease)  Chronic. Stable. Currently asymptomatic. Home medications include PPI/Antacids as needed.  Plan:  -Continue PPI/Antacids as needed         COPD (chronic obstructive pulmonary disease)  Patient's COPD is controlled currently.  Patient is currently off COPD Pathway. Continue scheduled inhalers duonebs prn, Steroids, Antibiotics and Supplemental oxygen and monitor respiratory status closely.               VTE Risk Mitigation (From admission, onward)           Ordered     apixaban tablet 2.5 mg  2 times daily         10/11/23 2068     Reason for No Pharmacological VTE Prophylaxis  Once        Question:  Reasons:  Answer:  Already adequately anticoagulated on oral Anticoagulants    10/11/23 2042     IP VTE HIGH RISK PATIENT  Once         10/11/23 2042     Place sequential compression device  Until discontinued         10/11/23 2042                    Discharge Planning   SCOTT:       Code Status: Full Code   Is the patient medically ready for discharge?:     Reason for patient still in hospital (select all that apply): Patient trending condition, Laboratory test, Treatment and Consult recommendations  Discharge Plan A: Home, Home Health                  Gianna Flores NP  Department of Hospital Medicine   [facility] Tele[facility] ([facility])

## 2023-10-14 VITALS
WEIGHT: 199.5 LBS | RESPIRATION RATE: 18 BRPM | TEMPERATURE: 99 F | BODY MASS INDEX: 33.24 KG/M2 | HEART RATE: 66 BPM | DIASTOLIC BLOOD PRESSURE: 77 MMHG | SYSTOLIC BLOOD PRESSURE: 117 MMHG | OXYGEN SATURATION: 94 % | HEIGHT: 65 IN

## 2023-10-14 LAB
ANION GAP SERPL CALC-SCNC: 8 MMOL/L (ref 8–16)
BUN SERPL-MCNC: 17 MG/DL (ref 8–23)
CALCIUM SERPL-MCNC: 9.5 MG/DL (ref 8.7–10.5)
CHLORIDE SERPL-SCNC: 107 MMOL/L (ref 95–110)
CO2 SERPL-SCNC: 22 MMOL/L (ref 23–29)
CREAT SERPL-MCNC: 0.9 MG/DL (ref 0.5–1.4)
EST. GFR  (NO RACE VARIABLE): >60 ML/MIN/1.73 M^2
GLUCOSE SERPL-MCNC: 106 MG/DL (ref 70–110)
POTASSIUM SERPL-SCNC: 4.2 MMOL/L (ref 3.5–5.1)
SODIUM SERPL-SCNC: 137 MMOL/L (ref 136–145)

## 2023-10-14 PROCEDURE — 25000003 PHARM REV CODE 250: Performed by: PHYSICIAN ASSISTANT

## 2023-10-14 PROCEDURE — 36415 COLL VENOUS BLD VENIPUNCTURE: CPT | Performed by: NURSE PRACTITIONER

## 2023-10-14 PROCEDURE — G0378 HOSPITAL OBSERVATION PER HR: HCPCS

## 2023-10-14 PROCEDURE — 25000003 PHARM REV CODE 250: Performed by: NURSE PRACTITIONER

## 2023-10-14 PROCEDURE — 94799 UNLISTED PULMONARY SVC/PX: CPT

## 2023-10-14 PROCEDURE — 99213 PR OFFICE/OUTPT VISIT, EST, LEVL III, 20-29 MIN: ICD-10-PCS | Mod: ,,, | Performed by: INTERNAL MEDICINE

## 2023-10-14 PROCEDURE — 99213 OFFICE O/P EST LOW 20 MIN: CPT | Mod: ,,, | Performed by: INTERNAL MEDICINE

## 2023-10-14 PROCEDURE — 80048 BASIC METABOLIC PNL TOTAL CA: CPT | Performed by: NURSE PRACTITIONER

## 2023-10-14 PROCEDURE — 25000003 PHARM REV CODE 250: Performed by: EMERGENCY MEDICINE

## 2023-10-14 RX ORDER — AMIODARONE HYDROCHLORIDE 200 MG/1
200 TABLET ORAL DAILY
Status: DISCONTINUED | OUTPATIENT
Start: 2023-10-15 | End: 2023-10-14 | Stop reason: HOSPADM

## 2023-10-14 RX ORDER — METOPROLOL TARTRATE 25 MG/1
25 TABLET, FILM COATED ORAL 2 TIMES DAILY
Qty: 60 TABLET | Refills: 0 | Status: SHIPPED | OUTPATIENT
Start: 2023-10-14 | End: 2023-10-18 | Stop reason: SDUPTHER

## 2023-10-14 RX ADMIN — METOPROLOL TARTRATE 25 MG: 25 TABLET, FILM COATED ORAL at 09:10

## 2023-10-14 RX ADMIN — AMIODARONE HYDROCHLORIDE 400 MG: 200 TABLET ORAL at 09:10

## 2023-10-14 RX ADMIN — CETIRIZINE HYDROCHLORIDE 10 MG: 10 TABLET, FILM COATED ORAL at 09:10

## 2023-10-14 RX ADMIN — Medication 800 MG: at 09:10

## 2023-10-14 RX ADMIN — APIXABAN 2.5 MG: 2.5 TABLET, FILM COATED ORAL at 09:10

## 2023-10-14 RX ADMIN — CHOLECALCIFEROL TAB 125 MCG (5000 UNIT) 5000 UNITS: 125 TAB at 09:10

## 2023-10-14 RX ADMIN — ALLOPURINOL 100 MG: 100 TABLET ORAL at 09:10

## 2023-10-14 RX ADMIN — DORZOLAMIDE HYDROCHLORIDE 1 DROP: 20 SOLUTION/ DROPS OPHTHALMIC at 09:10

## 2023-10-14 RX ADMIN — POTASSIUM CHLORIDE 20 MEQ: 1500 TABLET, EXTENDED RELEASE ORAL at 09:10

## 2023-10-14 RX ADMIN — FUROSEMIDE 40 MG: 40 TABLET ORAL at 09:10

## 2023-10-14 NOTE — SUBJECTIVE & OBJECTIVE
nterval History:     Review of Systems   Constitutional: Negative. Negative for weight gain.   HENT: Negative.     Eyes: Negative.    Cardiovascular: Negative.  Negative for chest pain, leg swelling and palpitations.   Respiratory: Negative.  Negative for shortness of breath.    Endocrine: Negative.    Hematologic/Lymphatic: Negative.    Skin: Negative.    Musculoskeletal:  Negative for muscle weakness.   Gastrointestinal: Negative.    Genitourinary: Negative.    Neurological: Negative.  Negative for dizziness.   Psychiatric/Behavioral: Negative.     Allergic/Immunologic: Negative.    All other systems reviewed and are negative.    Objective:     Vital Signs (Most Recent):  Temp: 97.5 °F (36.4 °C) (10/14/23 0721)  Pulse: (Abnormal) 56 (10/14/23 0721)  Resp: 18 (10/14/23 0721)  BP: 129/69 (10/14/23 0721)  SpO2: (Abnormal) 92 % (10/14/23 0721) Vital Signs (24h Range):  Temp:  [97.5 °F (36.4 °C)-98.5 °F (36.9 °C)] 97.5 °F (36.4 °C)  Pulse:  [54-74] 56  Resp:  [16-18] 18  SpO2:  [91 %-100 %] 92 %  BP: (107-144)/(61-83) 129/69     Weight: 90.5 kg (199 lb 8.3 oz)  Body mass index is 33.2 kg/m².     SpO2: (Abnormal) 92 %         Intake/Output Summary (Last 24 hours) at 10/14/2023 1002  Last data filed at 10/14/2023 0531  Gross per 24 hour   Intake no documentation   Output 1750 ml   Net -1750 ml       Lines/Drains/Airways       Drain       Name Duration         Suprapubic Catheter 06/20/23 1126 18 Fr. 115 days              Peripheral Intravenous Line       Name Duration         Peripheral IV - Single Lumen 10/11/23 1306 20 G Left;Posterior Hand 2 days                       Physical Exam  Vitals and nursing note reviewed.   Constitutional:       Appearance: She is well-developed.   HENT:      Head: Normocephalic and atraumatic.   Eyes:      Conjunctiva/sclera: Conjunctivae normal.      Pupils: Pupils are equal, round, and reactive to light.   Cardiovascular:      Rate and Rhythm: Normal rate and regular rhythm.      Pulses:  Intact distal pulses.      Heart sounds: Normal heart sounds.   Pulmonary:      Effort: Pulmonary effort is normal.      Breath sounds: Normal breath sounds.   Abdominal:      General: Bowel sounds are normal.      Palpations: Abdomen is soft.   Musculoskeletal:         General: Normal range of motion.      Cervical back: Normal range of motion and neck supple.   Skin:     General: Skin is warm and dry.   Neurological:      Mental Status: She is alert and oriented to person, place, and time.            Significant Labs: All pertinent lab results from the last 24 hours have been reviewed.    Significant Imaging: X-Ray: CXR: X-Ray Chest 1 View (CXR): No results found for this visit on 10/11/23.

## 2023-10-14 NOTE — PLAN OF CARE
[facility] (Hospital)  Discharge Final Note    Primary Care Provider: Marcia Carlisle MD    Expected Discharge Date: 10/14/2023    Final Discharge Note (most recent)       Final Note - 10/14/23 1022          Final Note    Assessment Type Final Discharge Note     Anticipated Discharge Disposition Home or Self Care        Post-Acute Status    Discharge Delays None known at this time                     Important Message from Medicare      Pt has no discharge needs at the time of chart review.

## 2023-10-14 NOTE — PLAN OF CARE
A236/A236 JOSE AHerber Landrum is a 89 y.o.female admitted on 10/11/2023 for Generalized weakness   Code Status: Full Code MRN: [id number]   Review of patient's allergies indicates:   Allergen Reactions    Timolol maleate Shortness Of Breath    Ciprofloxacin Other (See Comments)     Muscle ache    Sulfamethoxazole-trimethoprim      Past Medical History:   Diagnosis Date    Anticoagulant long-term use     Arthritis     Asthma     Basal cell carcinoma     Cancer     skin cancer to face    Cataract     OU done//    CHF (congestive heart failure)     COPD (chronic obstructive pulmonary disease)     no oxygen; patient denies    cpap     Essential hypertension 05/05/2010    GERD (gastroesophageal reflux disease) 11/20/2012    Glaucoma     Hypertensive heart disease with heart failure 02/05/2013    Paroxysmal atrial fibrillation     Paroxysmal ventricular tachycardia     per problem list    Renal disorder     french-1/3/2020    Squamous cell carcinoma of skin       PRN meds    acetaminophen, 650 mg, Q4H PRN  acetaminophen, 650 mg, Q8H PRN  albuterol-ipratropium, 3 mL, Q4H PRN  aluminum-magnesium hydroxide-simethicone, 30 mL, QID PRN  dextrose 10%, 12.5 g, PRN  dextrose 10%, 25 g, PRN  fluticasone propionate, 2 spray, Daily PRN  glucagon (human recombinant), 1 mg, PRN  glucose, 16 g, PRN  glucose, 24 g, PRN  melatonin, 6 mg, Nightly PRN  naloxone, 0.02 mg, PRN  ondansetron, 4 mg, Q8H PRN  polyethylene glycol, 17 g, Daily PRN  promethazine, 25 mg, Q6H PRN  simethicone, 1 tablet, QID PRN  sodium chloride 0.9%, 3 mL, Q12H PRN      AVS Discharge instructions received and reviewed with pt and family at bedside.  Pt voiced understanding and all questions answered to satisfaction.  Stressed importance to making and keeping all follow up appointments.  Tele monitor removed and brought to monitor tech.  IV d/c'd with tip intact, pressure dressing applied.  Pt will call when ready to be transported to front [facility] via w/c to be  discharged home.      Orientation: oriented x 4  [place] Coma Scale Score: 15     Lead Monitored: Lead II Rhythm: paced rhythm    Cardiac/Telemetry Box Number: [id number]  VTE Required Core Measure: Pharmacological prophylaxis initiated/maintained Last Bowel Movement: 10/10/23  Diet Low Sodium, 2gm  Voiding Characteristics: suprapubic catheter  Cory Score: 18  Fall Risk Score: 12  Accucheck []   Freq?      Lines/Drains/Airways       Drain  Duration                  Suprapubic Catheter 06/20/23 1126 18 Fr. 116 days

## 2023-10-14 NOTE — PROGRESS NOTES
[facility] ([facility])  Cardiology  Progress Note    Patient Name: Holli Landrum  MRN: [id number]  Admission Date: 10/11/2023  Hospital Length of Stay: 0 days  Code Status: Full Code   Attending Physician: Jed Machado MD   Primary Care Physician: Marcia Carlisle MD  Expected Discharge Date: 10/14/2023  Principal Problem:Generalized weakness    Subjective:     Hospital Course:   10/13/23-Patient seen and examined today, resting in bed. More weak/tired. States her brain feels foggy. No CP/SOB. Remains in aflutter with HR in 90's during exam. Labs reviewed.     10/14/23 Tele afib with occasional pacing , pt to be dc today, follow up with Dr. Meyer, decrease amiodarone to 200 mg q day      nterval History:     Review of Systems   Constitutional: Negative. Negative for weight gain.   HENT: Negative.     Eyes: Negative.    Cardiovascular: Negative.  Negative for chest pain, leg swelling and palpitations.   Respiratory: Negative.  Negative for shortness of breath.    Endocrine: Negative.    Hematologic/Lymphatic: Negative.    Skin: Negative.    Musculoskeletal:  Negative for muscle weakness.   Gastrointestinal: Negative.    Genitourinary: Negative.    Neurological: Negative.  Negative for dizziness.   Psychiatric/Behavioral: Negative.     Allergic/Immunologic: Negative.    All other systems reviewed and are negative.    Objective:     Vital Signs (Most Recent):  Temp: 97.5 °F (36.4 °C) (10/14/23 0721)  Pulse: (Abnormal) 56 (10/14/23 0721)  Resp: 18 (10/14/23 0721)  BP: 129/69 (10/14/23 0721)  SpO2: (Abnormal) 92 % (10/14/23 0721) Vital Signs (24h Range):  Temp:  [97.5 °F (36.4 °C)-98.5 °F (36.9 °C)] 97.5 °F (36.4 °C)  Pulse:  [54-74] 56  Resp:  [16-18] 18  SpO2:  [91 %-100 %] 92 %  BP: (107-144)/(61-83) 129/69     Weight: 90.5 kg (199 lb 8.3 oz)  Body mass index is 33.2 kg/m².     SpO2: (Abnormal) 92 %         Intake/Output Summary (Last 24 hours) at 10/14/2023 1002  Last data filed at 10/14/2023 0531  Gross per 24 hour    Intake no documentation   Output 1750 ml   Net -1750 ml       Lines/Drains/Airways       Drain       Name Duration         Suprapubic Catheter 06/20/23 1126 18 Fr. 115 days              Peripheral Intravenous Line       Name Duration         Peripheral IV - Single Lumen 10/11/23 1306 20 G Left;Posterior Hand 2 days                       Physical Exam  Vitals and nursing note reviewed.   Constitutional:       Appearance: She is well-developed.   HENT:      Head: Normocephalic and atraumatic.   Eyes:      Conjunctiva/sclera: Conjunctivae normal.      Pupils: Pupils are equal, round, and reactive to light.   Cardiovascular:      Rate and Rhythm: Normal rate and regular rhythm.      Pulses: Intact distal pulses.      Heart sounds: Normal heart sounds.   Pulmonary:      Effort: Pulmonary effort is normal.      Breath sounds: Normal breath sounds.   Abdominal:      General: Bowel sounds are normal.      Palpations: Abdomen is soft.   Musculoskeletal:         General: Normal range of motion.      Cervical back: Normal range of motion and neck supple.   Skin:     General: Skin is warm and dry.   Neurological:      Mental Status: She is alert and oriented to person, place, and time.            Significant Labs: All pertinent lab results from the last 24 hours have been reviewed.    Significant Imaging: X-Ray: CXR: X-Ray Chest 1 View (CXR): No results found for this visit on 10/11/23.    Assessment and Plan:     Brief HPI:     * Generalized weakness  -In setting of afib/recent diarrhea    CKD (chronic kidney disease) stage 3, GFR 30-59 ml/min  -stable, monitor    Paroxysmal atrial fibrillation  -Presents with recurrent symptomatic afib  -Rate-controlled  -Amiodarone increased to 400 mg BID  -Options discussed with patient, will give time for increased po dosage to load/take effect  -Continue Eliquis for CVA prophylaxis     10/13/23  -Appears to be in aflutter this AM  -Continue amiodarone 400 mg BID  -Add Lopressor 25 mg  BID  -Continue Eliquis for CVA prophylaxis  -Observe overnight, patient wishes to avoid repeat DCCV if possible        VTE Risk Mitigation (From admission, onward)         Ordered     apixaban tablet 2.5 mg  2 times daily         10/11/23 7521     Reason for No Pharmacological VTE Prophylaxis  Once        Question:  Reasons:  Answer:  Already adequately anticoagulated on oral Anticoagulants    10/11/23 2042     IP VTE HIGH RISK PATIENT  Once         10/11/23 2042     Place sequential compression device  Until discontinued         10/11/23 2042                Antonio Burch MD  Cardiology  [facility] ([facility])

## 2023-10-14 NOTE — DISCHARGE SUMMARY
[facility] Medicine  Discharge Summary      Patient Name: Holli Landrum  MRN: [id number]  CANDI: [id number]  Patient Class: OP- Observation  Admission Date: 10/11/2023  Hospital Length of Stay: 0 days  Discharge Date and Time: 10/14/2023  6:09 PM  Attending Physician: Dr. Jed Machado    Discharging Provider: Gianna Flores NP  Primary Care Provider: Marcia Carlisle MD    Primary Care Team: Networked reference to record PCT     HPI:   Holli Landrum is a 89 y.o. female with a PMH  has a past medical history of Anticoagulant long-term use, Arthritis, Asthma, Basal cell carcinoma, Cancer, Cataract, CHF (congestive heart failure), COPD (chronic obstructive pulmonary disease), cpap, Essential hypertension (05/05/2010), GERD (gastroesophageal reflux disease) (11/20/2012), Glaucoma, Hypertensive heart disease with heart failure (02/05/2013), Paroxysmal atrial fibrillation, Paroxysmal ventricular tachycardia, Renal disorder, and Squamous cell carcinoma of skin.  Presented to the ER for evaluation of generalized fatigue which has been persistent over the last couple of weeks.  Patient recently admitted to our facility between 10/03/2023-10/05/2023 for uncontrolled AFib and acute on chronic diastolic CHF.  Cardiology and EP was consulted.  Patient was eventually cardioverted and placed on amiodarone 200 mg p.o. b.i.d. times 14 days followed by 200 mg p.o. daily.  Patient's sotalol was D/C'd at the same time and was scheduled to follow-up with cardiology and EP within 1-2 weeks.  Patient reports she did fine since being discharged, but states that she started feeling bad again 8/8/23 while getting ready for [employer].  Patient denied any episode of syncope, but has reported associated diarrhea and worsening generalized weakness.  Patient reports compliance with home medications.  Denies any other symptoms.    ER workup is unremarkable with slight elevation in BNP of 343.  EKG revealed atrial fibrillation with a  "rate of 76 beats per minute and QT/QTC of 422/474.  Cardiology was consulted and recommend administration of 400 mg of amiodarone.  Patient also received 12.5 mg of meclizine, 8 mg Zofran, 250 cc bolus of normal saline in ED. patient in agreement with treatment plan.  Patient admitted under observation status.    PCP: Marcia Carlisle           * No surgery found *      Hospital Course:   Pt admitted to Observation for General Weakness in the setting of uncontrolled Atrial fibrillation. Of note, patient was cardioverted (JÚNIOR/DCCV) on 10/5/23 with Sotalol transitioned to Amiodarone. Pt reports palpitations prior to admission and states she "could feel myself going into afib." BNP elevated and Troponin within normal limits. Cardiology consulted. Amiodarone dose increased per recommendation. PT/OT evaluation ordered with results pending. Social work consulted with home health to be resumed and suprapubic catheter to be changed after discharge. Diarrhea resolved - hx of IBS reported. On 10/13/23, atrial flutter continued at controlled rate (HR 90's) with Amiodarone continued. Fatigue and weakness reported. Case discussed with Cardiology and Lopressor to be initiated per recommendation. PT/OT evaluation completed with home health to be resumed. GFR declined in the setting of diuretic use and decreased oral intake/fatigue reported- Lasix dose adjusted. On 10/14/23, pt evaluated by Cardiology and deemed stable for discharge. Amiodarone dose decreased per cardiology. Vital signs stable with no signs of acute distress witnessed or reported prior to discharge. Home Health resumed. Pt seen and examined and deemed medically stable for discharge to home. Medications reconciled and Lopressor prescribed and Amiodarone continued daily. Pt/daughter instructed to follow up with PCP and Cardiology upon discharge for further evaluation with ambulatory referrals in place.        Goals of Care Treatment Preferences:  Code Status: Full " Code    Living Will: Yes              Consults:   Consults (From admission, onward)        Status Ordering Provider     Inpatient consult to Social Work/Case Management  Once        Provider:  (Not yet assigned)    Completed DONALD CARSON.     Inpatient consult to Cardiology  Once        Provider:  Jai Arita MD    Completed VERONICA FRANCO          Final Active Diagnoses:    Diagnosis Date Noted POA    PRINCIPAL PROBLEM:  Generalized weakness [R53.1] 01/14/2020 Unknown    Suprapubic catheter [Z93.59] 10/10/2023 Not Applicable    CKD (chronic kidney disease) stage 3, GFR 30-59 ml/min [N18.30] 01/03/2020 Yes    Hypercholesteremia [E78.00] 01/07/2015 Yes    Paroxysmal atrial fibrillation [I48.0] 11/20/2012 Yes    COPD (chronic obstructive pulmonary disease) [J44.9] 11/20/2012 Yes    GERD (gastroesophageal reflux disease) [K21.9] 11/20/2012 Yes    Essential hypertension [I10] 05/05/2010 Yes      Problems Resolved During this Admission:       Discharged Condition: stable    Disposition: Home or Self Care    Follow Up:   Follow-up Information     Marcia Carlisle MD Follow up in 3 day(s).    Specialty: Family Medicine  Why: -hospital follow up-, As needed, If symptoms worsen  Contact information:  [address] [facility] [address]  [phone]             Fletcher Meyer MD Follow up in 1 week(s).    Specialties: Cardiology, Internal Medicine  Why: -hospital follow up for Atrial fibrillation-, As needed, If symptoms worsen  Contact information:  [address]  [place] [address]  [phone]                       Patient Instructions:      Ambulatory referral/consult to Cardiology   Standing Status: Future   Referral Priority: Routine Referral Type: Consultation   Referral Reason: Specialty Services Required   Requested Specialty: Cardiology   Number of Visits Requested: 1     Ambulatory referral/consult to Home Health   Standing Status: Future   Referral Priority: Routine Referral Type:  Home Health   Referral Reason: Specialty Services Required   Requested Specialty: Home Health Services   Number of Visits Requested: 1       Significant Diagnostic Studies: Labs: All labs within the past 24 hours have been reviewed    Pending Diagnostic Studies:     None         Medications:  Reconciled Home Medications:      Medication List      START taking these medications    metoprolol tartrate 25 MG tablet  Commonly known as: LOPRESSOR  Take 1 tablet (25 mg total) by mouth 2 (two) times daily.        CHANGE how you take these medications    amLODIPine 2.5 MG tablet  Commonly known as: NORVASC  TAKE 1 TABLET BY MOUTH EVERY DAY  What changed: when to take this     pantoprazole 40 MG tablet  Commonly known as: PROTONIX  Take 1 tablet (40 mg total) by mouth once daily.  What changed: additional instructions     rosuvastatin 40 MG Tab  Commonly known as: CRESTOR  TAKE 1 TABLET (40 MG TOTAL) BY MOUTH ONCE DAILY.  What changed: when to take this        CONTINUE taking these medications    acetaminophen 650 MG Tbsr  Commonly known as: TYLENOL  Take 650 mg by mouth as needed.     albuterol 90 mcg/actuation inhaler  Commonly known as: PROVENTIL/VENTOLIN HFA  Inhale 1-2 puffs into the lungs every 4 (four) hours as needed for Wheezing or Shortness of Breath. Rescue     albuterol-ipratropium 2.5 mg-0.5 mg/3 mL nebulizer solution  Commonly known as: DUO-NEB  Take 3 mLs by nebulization every 6 (six) hours as needed for Wheezing or Shortness of Breath.     allopurinoL 100 MG tablet  Commonly known as: ZYLOPRIM  Take 2 tablets (200 mg total) by mouth once daily.     aluminum & magnesium hydroxide-simethicone 400-400-40 mg/5 mL suspension  Commonly known as: MYLANTA MAX STRENGTH  Take by mouth every 6 (six) hours as needed for Indigestion.     amiodarone 200 MG Tab  Commonly known as: PACERONE  Take 1 tablet (200 mg total) by mouth 2 (two) times daily for 14 days, THEN 1 tablet (200 mg total) once daily.  Start taking on:  October 5, 2023     apixaban 2.5 mg Tab  Commonly known as: ELIQUIS  Take 1 tablet (2.5 mg total) by mouth 2 (two) times daily.     cholecalciferol (vitamin D3) 125 mcg (5,000 unit) Tab  Take 5,000 Units by mouth once daily.     dorzolamide 2 % ophthalmic solution  Commonly known as: TRUSOPT  INSTILL 1 DROP INTO BOTH EYES TWICE DAILY     fluticasone furoate-vilanteroL 100-25 mcg/dose diskus inhaler  Commonly known as: BREO ELLIPTA  Inhale 1 puff into the lungs once daily.     fluticasone propionate 50 mcg/actuation nasal spray  Commonly known as: FLONASE  2 sprays (100 mcg total) by Each Nare route daily as needed for Allergies.     furosemide 40 MG tablet  Commonly known as: LASIX  Take 1 tablet (40 mg total) by mouth 2 (two) times a day. Take twice a day and monitor BP and weights     Lactobacillus rhamnosus GG 10 billion cell capsule  Commonly known as: CULTURELLE  Take 1 capsule by mouth once daily.     latanoprost 0.005 % ophthalmic solution  Place 1 drop into both eyes every evening.     magnesium oxide 400 mg (241.3 mg magnesium) tablet  Commonly known as: MAG-OX  Take 800 mg by mouth once daily.     nitroGLYCERIN 0.4 MG/HR TD PT24 0.4 mg/hr  Commonly known as: NITRODUR  Place 1 patch onto the skin once daily.     ondansetron 4 MG Tbdl  Commonly known as: ZOFRAN-ODT  Take by mouth.     potassium chloride 10 MEQ Tbsr  Commonly known as: KLOR-CON  Take 2 tablets (20 mEq total) by mouth 2 (two) times daily.     ZYRTEC ORAL  Take 10 mg by mouth once daily.        STOP taking these medications    ondansetron 4 MG tablet  Commonly known as: ZOFRAN     traMADoL 50 mg tablet  Commonly known as: ULTRAM            Indwelling Lines/Drains at time of discharge:   Lines/Drains/Airways     Drain  Duration                Suprapubic Catheter 06/20/23 1126 18 Fr. 116 days                Time spent on the discharge of patient: > 36 minutes         Gianna Flores NP  Department of Hospital Medicine  [facility] - Telemetry ([facility])

## 2023-10-16 ENCOUNTER — TELEPHONE (OUTPATIENT)
Dept: CARDIOLOGY | Facility: CLINIC | Age: 88
End: 2023-10-16
Payer: MEDICARE

## 2023-10-16 NOTE — TELEPHONE ENCOUNTER
Pt states she was in hospital for 3 days. She came home Thursday. She went back into A-fib until following Sunday night. She went back into A-Fib on Monday and Wednesday night and had to be transported by ambulance. Pt went back to the ER Wednesday and left that night. Pt advised to see Dr. Meyer and also have an EKG completed. Pt scheduled for Wednesday 10/18/23 at 2:20PM.    ----- Message from Ladonna Burnett sent at 10/16/2023  8:21 AM CDT -----  Patient is requesting a call back regarding her hospital stay. Call back number is [phone]. Thx.EL

## 2023-10-18 ENCOUNTER — OFFICE VISIT (OUTPATIENT)
Dept: CARDIOLOGY | Facility: CLINIC | Age: 88
End: 2023-10-18
Payer: MEDICARE

## 2023-10-18 ENCOUNTER — HOSPITAL ENCOUNTER (OUTPATIENT)
Dept: CARDIOLOGY | Facility: HOSPITAL | Age: 88
Discharge: HOME | End: 2023-10-18
Attending: INTERNAL MEDICINE
Payer: MEDICARE

## 2023-10-18 VITALS
SYSTOLIC BLOOD PRESSURE: 118 MMHG | HEIGHT: 65 IN | BODY MASS INDEX: 33.02 KG/M2 | WEIGHT: 198.19 LBS | OXYGEN SATURATION: 95 % | DIASTOLIC BLOOD PRESSURE: 78 MMHG | HEART RATE: 49 BPM

## 2023-10-18 DIAGNOSIS — I35.0 MODERATE AORTIC STENOSIS BY PRIOR ECHOCARDIOGRAM: ICD-10-CM

## 2023-10-18 DIAGNOSIS — J44.9 CHRONIC OBSTRUCTIVE PULMONARY DISEASE, UNSPECIFIED COPD TYPE: ICD-10-CM

## 2023-10-18 DIAGNOSIS — I50.32 CHRONIC DIASTOLIC HEART FAILURE: ICD-10-CM

## 2023-10-18 DIAGNOSIS — I25.84 CORONARY ARTERY DISEASE DUE TO CALCIFIED CORONARY LESION: ICD-10-CM

## 2023-10-18 DIAGNOSIS — I48.0 PAROXYSMAL ATRIAL FIBRILLATION: Primary | ICD-10-CM

## 2023-10-18 DIAGNOSIS — G47.33 OSA (OBSTRUCTIVE SLEEP APNEA): ICD-10-CM

## 2023-10-18 DIAGNOSIS — I48.91 ATRIAL FIBRILLATION, UNSPECIFIED TYPE: Primary | ICD-10-CM

## 2023-10-18 DIAGNOSIS — I49.5 SSS (SICK SINUS SYNDROME): ICD-10-CM

## 2023-10-18 DIAGNOSIS — I10 ESSENTIAL HYPERTENSION: ICD-10-CM

## 2023-10-18 DIAGNOSIS — I25.10 CORONARY ARTERY DISEASE DUE TO CALCIFIED CORONARY LESION: ICD-10-CM

## 2023-10-18 DIAGNOSIS — I35.0 NONRHEUMATIC AORTIC VALVE STENOSIS: ICD-10-CM

## 2023-10-18 DIAGNOSIS — I70.0 ATHEROSCLEROSIS OF AORTA: ICD-10-CM

## 2023-10-18 DIAGNOSIS — I48.0 PAF (PAROXYSMAL ATRIAL FIBRILLATION): ICD-10-CM

## 2023-10-18 DIAGNOSIS — I48.91 ATRIAL FIBRILLATION, UNSPECIFIED TYPE: ICD-10-CM

## 2023-10-18 DIAGNOSIS — Z95.0 PRESENCE OF CARDIAC PACEMAKER: ICD-10-CM

## 2023-10-18 DIAGNOSIS — R00.1 SYMPTOMATIC BRADYCARDIA: ICD-10-CM

## 2023-10-18 DIAGNOSIS — I47.29 PAROXYSMAL VENTRICULAR TACHYCARDIA: ICD-10-CM

## 2023-10-18 DIAGNOSIS — Z95.0 S/P PLACEMENT OF CARDIAC PACEMAKER: ICD-10-CM

## 2023-10-18 DIAGNOSIS — I25.10 CORONARY ARTERY DISEASE, UNSPECIFIED VESSEL OR LESION TYPE, UNSPECIFIED WHETHER ANGINA PRESENT, UNSPECIFIED WHETHER NATIVE OR TRANSPLANTED HEART: ICD-10-CM

## 2023-10-18 DIAGNOSIS — R06.09 DOE (DYSPNEA ON EXERTION): ICD-10-CM

## 2023-10-18 DIAGNOSIS — N18.31 STAGE 3A CHRONIC KIDNEY DISEASE: ICD-10-CM

## 2023-10-18 PROCEDURE — 99214 PR OFFICE/OUTPT VISIT, EST, LEVL IV, 30-39 MIN: ICD-10-PCS | Mod: S$GLB,,, | Performed by: INTERNAL MEDICINE

## 2023-10-18 PROCEDURE — 93005 ELECTROCARDIOGRAM TRACING: CPT

## 2023-10-18 PROCEDURE — 1101F PR PT FALLS ASSESS DOC 0-1 FALLS W/OUT INJ PAST YR: ICD-10-PCS | Mod: CPTII,S$GLB,, | Performed by: INTERNAL MEDICINE

## 2023-10-18 PROCEDURE — 99999 PR PBB SHADOW E&M-EST. PATIENT-LVL IV: CPT | Mod: PBBFAC,,, | Performed by: INTERNAL MEDICINE

## 2023-10-18 PROCEDURE — 1160F PR REVIEW ALL MEDS BY PRESCRIBER/CLIN PHARMACIST DOCUMENTED: ICD-10-PCS | Mod: CPTII,S$GLB,, | Performed by: INTERNAL MEDICINE

## 2023-10-18 PROCEDURE — 3288F PR FALLS RISK ASSESSMENT DOCUMENTED: ICD-10-PCS | Mod: CPTII,S$GLB,, | Performed by: INTERNAL MEDICINE

## 2023-10-18 PROCEDURE — 93010 ELECTROCARDIOGRAM REPORT: CPT | Mod: ,,, | Performed by: INTERNAL MEDICINE

## 2023-10-18 PROCEDURE — 1126F AMNT PAIN NOTED NONE PRSNT: CPT | Mod: CPTII,S$GLB,, | Performed by: INTERNAL MEDICINE

## 2023-10-18 PROCEDURE — 1157F ADVNC CARE PLAN IN RCRD: CPT | Mod: CPTII,S$GLB,, | Performed by: INTERNAL MEDICINE

## 2023-10-18 PROCEDURE — 99999 PR PBB SHADOW E&M-EST. PATIENT-LVL IV: ICD-10-PCS | Mod: PBBFAC,,, | Performed by: INTERNAL MEDICINE

## 2023-10-18 PROCEDURE — 1159F MED LIST DOCD IN RCRD: CPT | Mod: CPTII,S$GLB,, | Performed by: INTERNAL MEDICINE

## 2023-10-18 PROCEDURE — 1111F PR DISCHARGE MEDS RECONCILED W/ CURRENT OUTPATIENT MED LIST: ICD-10-PCS | Mod: CPTII,S$GLB,, | Performed by: INTERNAL MEDICINE

## 2023-10-18 PROCEDURE — 1157F PR ADVANCE CARE PLAN OR EQUIV PRESENT IN MEDICAL RECORD: ICD-10-PCS | Mod: CPTII,S$GLB,, | Performed by: INTERNAL MEDICINE

## 2023-10-18 PROCEDURE — 1111F DSCHRG MED/CURRENT MED MERGE: CPT | Mod: CPTII,S$GLB,, | Performed by: INTERNAL MEDICINE

## 2023-10-18 PROCEDURE — 93010 EKG 12-LEAD: ICD-10-PCS | Mod: ,,, | Performed by: INTERNAL MEDICINE

## 2023-10-18 PROCEDURE — 99214 OFFICE O/P EST MOD 30 MIN: CPT | Mod: S$GLB,,, | Performed by: INTERNAL MEDICINE

## 2023-10-18 PROCEDURE — 3288F FALL RISK ASSESSMENT DOCD: CPT | Mod: CPTII,S$GLB,, | Performed by: INTERNAL MEDICINE

## 2023-10-18 PROCEDURE — 1159F PR MEDICATION LIST DOCUMENTED IN MEDICAL RECORD: ICD-10-PCS | Mod: CPTII,S$GLB,, | Performed by: INTERNAL MEDICINE

## 2023-10-18 PROCEDURE — 1101F PT FALLS ASSESS-DOCD LE1/YR: CPT | Mod: CPTII,S$GLB,, | Performed by: INTERNAL MEDICINE

## 2023-10-18 PROCEDURE — 1160F RVW MEDS BY RX/DR IN RCRD: CPT | Mod: CPTII,S$GLB,, | Performed by: INTERNAL MEDICINE

## 2023-10-18 PROCEDURE — 1126F PR PAIN SEVERITY QUANTIFIED, NO PAIN PRESENT: ICD-10-PCS | Mod: CPTII,S$GLB,, | Performed by: INTERNAL MEDICINE

## 2023-10-18 RX ORDER — FUROSEMIDE 40 MG/1
40 TABLET ORAL 2 TIMES DAILY
Qty: 90 TABLET | Refills: 1 | Status: SHIPPED | OUTPATIENT
Start: 2023-10-18 | End: 2024-01-12 | Stop reason: SDUPTHER

## 2023-10-18 RX ORDER — METOPROLOL TARTRATE 25 MG/1
25 TABLET, FILM COATED ORAL 2 TIMES DAILY
Qty: 180 TABLET | Refills: 1 | Status: SHIPPED | OUTPATIENT
Start: 2023-10-18 | End: 2024-04-15

## 2023-10-18 RX ORDER — POTASSIUM CHLORIDE 750 MG/1
20 TABLET, EXTENDED RELEASE ORAL 2 TIMES DAILY
Qty: 180 TABLET | Refills: 3 | Status: SHIPPED | OUTPATIENT
Start: 2023-10-18 | End: 2023-11-09

## 2023-10-18 NOTE — PROGRESS NOTES
Subjective:   Patient ID:  Holli Landrum is a 89 y.o. female who presents for cardiac consult of Hospital Follow Up (Pt states she was admitted by ambulance to the ER on Wednesday morning (10/11/23). Pt was discharged on Saturday (10/14/23).)      HPI  The patient came in today for cardiac consult of Hospital Follow Up (Pt states she was admitted by ambulance to the ER on Wednesday morning (10/11/23). Pt was discharged on Saturday (10/14/23).)      Patient ID  ---------------  Holli Landrum is a 89 y.o. female pt with  history of persistent atrial fibrillation, coronary artery disease status post PCI, sick sinus syndrome status post [employer] pacemaker implantation Biotronik, hypertrophic obstructive cardiomyopathy, diastolic congestive heart failure, aortic stenosis, history of GI bleeding here for CV follow up.        HPI/Subjective: Darshana Velez, NP Visit 9/2022     Holli Landrum is Here For follow up coronary artery disease, persistent atrial fibrillation, Biotronik pacemaker in Situ     She is here for 4 week follow up.  He underwent Cscope with findings of internal hemorrhoids.  She was started back on Eliquis and is tolerating fine.  BP is controlled today and she reports feeling well overall with only complaint being occasional swelling in her L knee and occasional HIGGINS.  She denies any chest pain or syncope.  She is worried about her blood pressure, lipids, and echocardiogram, and I have reviewed it all with her.      Last Echo moderate aortic stenosis 09/2022  Last Cath 03/20/2020, patent stents  Biotronik pacemaker in Situ in 2021  Last LDL 12/2021, 59    1/18/23  Pt last seen by Darshana Velez 9/2022; Pt of Oscar Sommers MD; here to establish care. Last ECHO 8/2022 with normal bi V function, grade 1 DD, mod AS,  FLAVIO is 0.98 cm2; mean gradient is 20 mmHg;  PASP 27 mmHg. She had Cscope few months prior without any major issues/bleeding.   BP and HR stable. BMI 35 - 216 lbs. Weight 197  "prior. She had an episode of Afib Jan 3rd HR 50s-160s. Device interrogation today with brief AFib.   ECG - A paced, nonspec ST changes     5/3/23  BP and HR stable. BMI 33 - 199 lbs.     Ap: 84%   : 3%     Device Defined Counters:   Atrial Fibrillation/Flutter: up to 2 / up to 5 mode switches per day   EGMs illustrate AF and AF w/RVR, longest available 1min 26sec in duration.   AF burden 0% of day   She has been feeling more SOB, fatigue had more pain after suprapubic catheter, saw gastro recently and colorectal surgery.     10/3/23    ECHO 5/2023 with normal bi V function, mod to severe AS, PASP 33 mmHg. LHC 6/2023 with OM1 70% stenosis, LCX 50% stenosis, moderate AS.     Per Dr. Guadarrama-  TAVR would carry an extremely high risk of endocarditis. At this point her symptoms are mild and mostly worsened by anemia. Will focus for now on controlling her bleeding rather than valve replacement. Should she develop heart failure refractory to medical Rx, balloon valvuloplasty can be considered.     Recent device interrogation report:   Device Defined Counters:   Atrial Fibrillation/Flutter: Up to 34 per day   No EGMs available.   AF burden: up to 18% of day  She has hemorrhoids occ - may be candidate for Watchman. Aspirin stopped, remains on low dose Eliquis BID.     She went BRG after angiogram for hematoma. She had severe RLQ - maybe diverticulitis    Pt has very bad headache, is lightheaded. She feels like she may pass out.       Oct 2023 Hospital Course:   Pt admitted to Observation for General Weakness in the setting of uncontrolled Atrial fibrillation. Of note, patient was cardioverted (JÚNIOR/DCCV) on 10/5/23 with Sotalol transitioned to Amiodarone. Pt reports palpitations prior to admission and states she "could feel myself going into afib." BNP elevated and Troponin within normal limits. Cardiology consulted. Amiodarone dose increased per recommendation. PT/OT evaluation ordered with results pending. Social work " consulted with home health to be resumed and suprapubic catheter to be changed after discharge. Diarrhea resolved - hx of IBS reported. On 10/13/23, atrial flutter continued at controlled rate (HR 90's) with Amiodarone continued. Fatigue and weakness reported. Case discussed with Cardiology and Lopressor to be initiated per recommendation. PT/OT evaluation completed with home health to be resumed. GFR declined in the setting of diuretic use and decreased oral intake/fatigue reported- Lasix dose adjusted. On 10/14/23, pt evaluated by Cardiology and deemed stable for discharge. Amiodarone dose decreased per cardiology. Vital signs stable with no signs of acute distress witnessed or reported prior to discharge. Home Health resumed. Pt seen and examined and deemed medically stable for discharge to home. Medications reconciled and Lopressor prescribed and Amiodarone continued daily. Pt/daughter instructed to follow up with PCP and Cardiology upon discharge for further evaluation with ambulatory referrals in place.     10/18/23  Pt here post hospital stay x 2 - had to be reloaded with Amio last week.     BP and HR stable today. BMI 32 - 198 lbs  Now feeling better - ECG - NSR, V rate 63, TWI - stable       Patient has dec exercise tolerance.    Patient is compliant with medications.    Results for orders placed during the hospital encounter of 06/20/23    Cardiac catheterization    Conclusion    The Ost Cx to Prox Cx lesion was 50% stenosed.    The Ost 1st Mrg to 1st Mrg lesion was 70% stenosed.    The pre-procedure left ventricular end diastolic pressure was 6.    The estimated blood loss was <50 mL.    There was non-obstructive coronary artery disease..    There was moderate aortic valve stenosis.    The procedure log was documented by Documenter: RT Cristopher; Morena Bradford and verified by Brice Guadarrama MD.    Date: 6/20/2023  Time: 2:46 PM      Results for orders placed during the hospital encounter of  05/17/23    Echo    Interpretation Summary  · The left ventricle is normal in size with mild concentric hypertrophy and normal systolic function.  · Moderate left atrial enlargement.  · The estimated ejection fraction is 65%.  · Indeterminate left ventricular diastolic function.  · Normal right ventricular size with normal right ventricular systolic function.  · There is moderate-to-severe aortic valve stenosis.  · Aortic valve area is 0.80 cm2; peak velocity is 2.85 m/s; mean gradient is 21 mmHg.  · Mild tricuspid regurgitation.  · Intermediate central venous pressure (8 mmHg).  · The estimated PA systolic pressure is 33 mmHg.          Past Medical History:   Diagnosis Date    Anticoagulant long-term use     Arthritis     Asthma     Basal cell carcinoma     Cancer     skin cancer to face    Cataract     OU done//    CHF (congestive heart failure)     COPD (chronic obstructive pulmonary disease)     no oxygen; patient denies    cpap     Essential hypertension 05/05/2010    GERD (gastroesophageal reflux disease) 11/20/2012    Glaucoma     Hypertensive heart disease with heart failure 02/05/2013    Paroxysmal atrial fibrillation     Paroxysmal ventricular tachycardia     per problem list    Renal disorder     french-1/3/2020    Squamous cell carcinoma of skin        Past Surgical History:   Procedure Laterality Date    A-V CARDIAC PACEMAKER INSERTION  06/16/2021    Procedure: INSERTION, CARDIAC PACEMAKER, DUAL CHAMBER;  Surgeon: Oscar Sommers MD;  Location: [facility] CATH;  Service: Cardiovascular;;    ADENOIDECTOMY  191944    APPENDECTOMY  1948    Because of Ovarian Cyst surgery    BREAST BIOPSY Left 1995    neg    BREAST BIOPSY Right 1984    neg    BREAST BIOPSY Right 1992    neg    BREAST SURGERY      A number of biopsies    CARDIAC CATHETERIZATION  2013, 2014,2016, 2020    has 9 stents    CARDIAC SURGERY  2012    stents    CATARACT EXTRACTION W/  INTRAOCULAR LENS IMPLANT Left 11/01/2018    Dr Rose    CATARACT  EXTRACTION W/  INTRAOCULAR LENS IMPLANT Right 12/13/2018    Dr Rose//    CHOLECYSTECTOMY      COLONOSCOPY  ~2005    Dr. Edward; normal per patient report    COLONOSCOPY N/A 09/06/2022    Procedure: COLONOSCOPY 8/31/22-note in Dr Simms's office visit note that he spoke to her cardiologist and she was viable candidate for endoscopy;  Surgeon: Cordelia Bueno MD;  Location: [facility] ENDO;  Service: Endoscopy;  Laterality: N/A;    CORONARY ANGIOGRAPHY N/A 03/20/2020    Procedure: ANGIOGRAM, CORONARY ARTERY;  Surgeon: Chuy Bryant MD;  Location: [facility] CATH;  Service: Cardiology;  Laterality: N/A;    CYSTOSCOPY W/ RETROGRADES Bilateral 02/12/2020    Procedure: CYSTOSCOPY, WITH RETROGRADE PYELOGRAM;  Surgeon: Gasper Feliz MD;  Location: [facility] OR;  Service: Urology;  Laterality: Bilateral;    ESOPHAGOGASTRODUODENOSCOPY N/A 08/13/2020    Procedure: EGD (ESOPHAGOGASTRODUODENOSCOPY);  Surgeon: Mika Solorzano MD;  Location: [facility];  Service: Endoscopy;  Laterality: N/A; Mild Schatzki ring. Biopsied. Dilated. small hiatal hernia, gastritis; biopsy: esophagus- SEVERE REFLUX ESOPHAGITIS, stomach- chronic gastritis, negative for H pylori    ESOPHAGOGASTRODUODENOSCOPY N/A 10/22/2020    Procedure: EGD (ESOPHAGOGASTRODUODENOSCOPY);  Surgeon: Fred Hendricks MD;  Location: [facility] ENDO;  Service: Endoscopy;  Laterality: N/A;    EYE SURGERY  2017    Cataracs    FRACTIONAL FLOW RESERVE (FFR), CORONARY  6/20/2023    Procedure: Fractional Flow Dickinson (FFR), Coronary;  Surgeon: Brice Campuzano MD;  Location: [facility] CATH LAB;  Service: Cardiology;;    HYSTERECTOMY  1969    INSTANTANEOUS WAVE-FREE RATIO (IFR) N/A 6/20/2023    Procedure: Instantaneous Wave-Free Ratio (IFR);  Surgeon: Brice Campuzano MD;  Location: [facility] CATH LAB;  Service: Cardiology;  Laterality: N/A;    LEFT HEART CATHETERIZATION Left 03/03/2020    Procedure: Left heart cath;  Surgeon: Chuy Bryant MD;  Location: [facility] CATH;  Service: Cardiology;   Laterality: Left;    LEFT HEART CATHETERIZATION Left 03/20/2020    Procedure: Left heart cath;  Surgeon: Chuy Bryant MD;  Location: [facility] CATH;  Service: Cardiology;  Laterality: Left;    LEFT HEART CATHETERIZATION N/A 6/20/2023    Procedure: Left heart cath;  Surgeon: Brice Campuzano MD;  Location: [facility] CATH LAB;  Service: Cardiology;  Laterality: N/A;    OVARIAN CYST REMOVAL  teenager    PHACOEMULSIFICATION OF CATARACT Left 11/01/2018    Procedure: PHACOEMULSIFICATION, CATARACT;  Surgeon: Aleksandar Rose Jr., MD;  Location: [facility] OR;  Service: Ophthalmology;  Laterality: Left;    PHACOEMULSIFICATION OF CATARACT Right 12/13/2018    Procedure: PHACOEMULSIFICATION, CATARACT;  Surgeon: Aleksandar Rose Jr., MD;  Location: [facility] OR;  Service: Ophthalmology;  Laterality: Right;    TONSILLECTOMY      aw/denoids    TRANSESOPHAGEAL ECHOCARDIOGRAM WITH POSSIBLE CARDIOVERSION (ALFRED W/ POSS CARDIOVERSION) N/A 10/5/2023    Procedure: Transesophageal echo (ALFRED) intra-procedure log documentation/alfred/cv;  Surgeon: Bao Miguel MD;  Location: [facility] CATH LAB;  Service: Cardiology;  Laterality: N/A;   Alfred/Cv  MRI safe   Pacer & leads implanted 6/16/21, Antoun   Bio Edora 8 MICHELLE, [id number], PID: [id number]   A lead: Bio Solia S45, [id number]   V lead: Bio Solia S53, [id number]    TREATMENT OF CARDIAC ARRHYTHMIA N/A 10/5/2023    Procedure: Cardioversion or Defibrillation;  Surgeon: Bao Miguel MD;  Location: [facility] CATH LAB;  Service: Cardiology;  Laterality: N/A;    UPPER GASTROINTESTINAL ENDOSCOPY  ~2005    Dr. Solorzano    VALVE STUDY-AORTIC  6/20/2023    Procedure: Valve study-aortic;  Surgeon: Brice Campuzano MD;  Location: [facility] CATH LAB;  Service: Cardiology;;    VASCULAR SURGERY      to remove clot from right leg    Yag Capsulotomy Bilateral 11/05/2019    Dr Rose       Social History     Tobacco Use    Smoking status: Former     Current packs/day: 0.00     Types: Cigarettes     Start date: 6/10/1954     Quit  date: 9/15/1955     Years since quittin.1    Smokeless tobacco: Never    Tobacco comments:     I onlysmoked one year while [place] my  was oversees  during [place]   Substance Use Topics    Alcohol use: Not Currently     Comment: Only drank a little wine and haven't  drunk anything in 15 y    Drug use: Never       Family History   Problem Relation Age of Onset    Diabetes Brother         Type 2    Heart disease Brother          at 65 CHD    Diabetes Mother         Type 1    Alcohol abuse Mother         Dod 10/75    Heart disease Mother         Arteriosclerosis    Hypertension Mother     Stroke Mother         3/15/1975 dod 10/1975    Cancer Maternal Grandfather         Dod     Clotting disorder Son         bleeding after tonsillectomy only    Heart disease Father         Heart attack. Afib, and Polyvcithemavera    Hypertension Father     Amblyopia Neg Hx     Blindness Neg Hx     Cataracts Neg Hx     Glaucoma Neg Hx     Macular degeneration Neg Hx     Retinal detachment Neg Hx     Strabismus Neg Hx     Thyroid disease Neg Hx     Colon cancer Neg Hx        Patient's Medications   New Prescriptions    No medications on file   Previous Medications    ACETAMINOPHEN (TYLENOL) 650 MG TBSR    Take 650 mg by mouth as needed.     ALBUTEROL (PROVENTIL/VENTOLIN HFA) 90 MCG/ACTUATION INHALER    Inhale 1-2 puffs into the lungs every 4 (four) hours as needed for Wheezing or Shortness of Breath. Rescue    ALBUTEROL-IPRATROPIUM (DUO-NEB) 2.5 MG-0.5 MG/3 ML NEBULIZER SOLUTION    Take 3 mLs by nebulization every 6 (six) hours as needed for Wheezing or Shortness of Breath.    ALLOPURINOL (ZYLOPRIM) 100 MG TABLET    Take 2 tablets (200 mg total) by mouth once daily.    ALUMINUM & MAGNESIUM HYDROXIDE-SIMETHICONE (MYLANTA MAX STRENGTH) 400-400-40 MG/5 ML SUSPENSION    Take by mouth every 6 (six) hours as needed for Indigestion.    AMIODARONE (PACERONE) 200 MG TAB    Take 1 tablet (200 mg total) by mouth 2 (two) times  daily for 14 days, THEN 1 tablet (200 mg total) once daily.    AMLODIPINE (NORVASC) 2.5 MG TABLET    TAKE 1 TABLET BY MOUTH EVERY DAY    APIXABAN (ELIQUIS) 2.5 MG TAB    Take 1 tablet (2.5 mg total) by mouth 2 (two) times daily.    CETIRIZINE HCL (ZYRTEC ORAL)    Take 10 mg by mouth once daily.    CHOLECALCIFEROL, VITAMIN D3, 125 MCG (5,000 UNIT) TAB    Take 5,000 Units by mouth once daily.    DORZOLAMIDE (TRUSOPT) 2 % OPHTHALMIC SOLUTION    INSTILL 1 DROP INTO BOTH EYES TWICE DAILY    FLUTICASONE (FLONASE) 50 MCG/ACTUATION NASAL SPRAY    2 sprays (100 mcg total) by Each Nare route daily as needed for Allergies.    FLUTICASONE FUROATE-VILANTEROL (BREO ELLIPTA) 100-25 MCG/DOSE DISKUS INHALER    Inhale 1 puff into the lungs once daily.    FUROSEMIDE (LASIX) 40 MG TABLET    Take 1 tablet (40 mg total) by mouth 2 (two) times a day. Take twice a day and monitor BP and weights    LACTOBACILLUS RHAMNOSUS GG (CULTURELLE) 10 BILLION CELL CAPSULE    Take 1 capsule by mouth once daily.    LATANOPROST 0.005 % OPHTHALMIC SOLUTION    Place 1 drop into both eyes every evening.    MAGNESIUM OXIDE (MAG-OX) 400 MG TABLET    Take 800 mg by mouth once daily.     METOPROLOL TARTRATE (LOPRESSOR) 25 MG TABLET    Take 1 tablet (25 mg total) by mouth 2 (two) times daily.    NITROGLYCERIN 0.4 MG/HR TD PT24 (NITRODUR) 0.4 MG/HR    Place 1 patch onto the skin once daily.    ONDANSETRON (ZOFRAN-ODT) 4 MG TBDL    Take by mouth.    PANTOPRAZOLE (PROTONIX) 40 MG TABLET    Take 1 tablet (40 mg total) by mouth once daily.    POTASSIUM CHLORIDE (KLOR-CON) 10 MEQ TBSR    Take 2 tablets (20 mEq total) by mouth 2 (two) times daily.    ROSUVASTATIN (CRESTOR) 40 MG TAB    TAKE 1 TABLET (40 MG TOTAL) BY MOUTH ONCE DAILY.   Modified Medications    No medications on file   Discontinued Medications    No medications on file       Review of Systems   Constitutional: Negative.    HENT: Negative.     Eyes: Negative.    Respiratory:  Positive for shortness of  "breath.    Cardiovascular:  Positive for palpitations and leg swelling.   Gastrointestinal: Negative.    Genitourinary: Negative.    Musculoskeletal: Negative.    Skin: Negative.    Neurological: Negative.    Endo/Heme/Allergies: Negative.    Psychiatric/Behavioral: Negative.     All 12 systems otherwise negative.      Wt Readings from Last 3 Encounters:   10/18/23 89.9 kg (198 lb 3.1 oz)   10/11/23 90.5 kg (199 lb 8.3 oz)   10/05/23 93 kg (205 lb)     Temp Readings from Last 3 Encounters:   10/14/23 98.7 °F (37.1 °C)   10/05/23 98.1 °F (36.7 °C)   06/20/23 97.6 °F (36.4 °C) (Temporal)     BP Readings from Last 3 Encounters:   10/18/23 118/78   10/14/23 117/77   10/10/23 127/72     Pulse Readings from Last 3 Encounters:   10/18/23 (!) 49   10/14/23 66   10/10/23 63       /78 (BP Location: Left arm, Patient Position: Sitting, BP Method: Large (Manual))   Pulse (!) 49   Ht 5' 5" (1.651 m)   Wt 89.9 kg (198 lb 3.1 oz)   LMP  (LMP Unknown)   SpO2 95%   BMI 32.98 kg/m²     Objective:   Physical Exam  Vitals and nursing note reviewed.   Constitutional:       General: She is not in acute distress.     Appearance: She is well-developed. She is not diaphoretic.   HENT:      Head: Normocephalic and atraumatic.      Nose: Nose normal.   Eyes:      General: No scleral icterus.     Conjunctiva/sclera: Conjunctivae normal.   Neck:      Thyroid: No thyromegaly.      Vascular: No JVD.   Cardiovascular:      Rate and Rhythm: Normal rate and regular rhythm.      Heart sounds: S1 normal and S2 normal. Murmur heard.      No friction rub. No gallop. No S3 or S4 sounds.   Pulmonary:      Effort: Pulmonary effort is normal. No respiratory distress.      Breath sounds: Normal breath sounds. No stridor. No wheezing or rales.   Chest:      Chest wall: No tenderness.   Abdominal:      General: Bowel sounds are normal. There is no distension.      Palpations: Abdomen is soft. There is no mass.      Tenderness: There is no abdominal " tenderness. There is no rebound.   Genitourinary:     Comments: Deferred  Musculoskeletal:         General: No tenderness or deformity. Normal range of motion.      Cervical back: Normal range of motion and neck supple.   Lymphadenopathy:      Cervical: No cervical adenopathy.   Skin:     General: Skin is warm and dry.      Coloration: Skin is not pale.      Findings: No erythema or rash.   Neurological:      Mental Status: She is alert and oriented to person, place, and time.      Motor: No abnormal muscle tone.      Coordination: Coordination normal.   Psychiatric:         Behavior: Behavior normal.         Thought Content: Thought content normal.         Judgment: Judgment normal.         Lab Results   Component Value Date     10/14/2023     (L) 08/15/2015    K 4.2 10/14/2023    K 4.0 08/15/2015     10/14/2023    CL 99 08/15/2015    CO2 22 (L) 10/14/2023    BUN 17 10/14/2023    CREATININE 0.9 10/14/2023    CREATININE 1.05 (H) 08/15/2015     10/14/2023    HGBA1C 5.8 (H) 12/07/2021    MG 2.2 10/12/2023    AST 19 10/11/2023    AST 23 04/05/2016    ALT 20 10/11/2023    ALBUMIN 3.8 10/11/2023    ALBUMIN 4.4 08/15/2015    PROT 7.6 10/11/2023    BILITOT 0.4 10/11/2023    WBC 6.39 10/13/2023    HGB 13.6 10/13/2023    HCT 42.6 10/13/2023    MCV 82 10/13/2023     10/13/2023    INR 1.0 10/03/2023    TSH 0.993 12/07/2021    CHOL 134 12/07/2021    HDL 46 12/07/2021    LDLCALC 59.0 (L) 12/07/2021    LDLCALC 89 08/15/2015    TRIG 145 12/07/2021     (H) 10/11/2023     Assessment:      1. Paroxysmal atrial fibrillation    2. PAF (paroxysmal atrial fibrillation)    3. Presence of cardiac pacemaker    4. Chronic diastolic heart failure    5. Nonrheumatic aortic valve stenosis    6. Atherosclerosis of aorta    7. Symptomatic bradycardia    8. Coronary artery disease, h/o multivessel stents.    9. Moderate aortic stenosis by prior echocardiogram    10. Coronary artery disease, unspecified vessel  or lesion type, unspecified whether angina present, unspecified whether native or transplanted heart    11. Chronic obstructive pulmonary disease, unspecified COPD type    12. HIGGINS (dyspnea on exertion)    13. SSS (sick sinus syndrome)    14. S/P placement of cardiac pacemaker    15. Essential hypertension    16. Paroxysmal ventricular tachycardia    17. JA (obstructive sleep apnea)    18. Stage 3a chronic kidney disease              Plan:     PAF, SSS s/p PPM - 6/2021 with recurrent Afib - s/p JÚNIOR/DCCV and reload with Amio 10/2023  - interrogate device and f/u device clinic as sched   - cont Amio 200mg BID x 14 days, Eliquis  - has hemorrhoids occ - may be candidate for Watchman   - f/u EP     2. HTN   - titrate meds    3. Mod to severe AS  - TAVR high risk for endocarditis, can consider Balloon AV - f/u with Dr. Guadarrama  -ECHO 5/2023 with normal bi V function, mod to severe AS, PASP 33 mmHg.  - [facility] 6/2023 with OM1 70% stenosis, LCX 50% stenosis, moderate AS.     4. HFpEF   - cont tx - lasix 40mg BID and K 20 meq BID,    5. CAD s/p PCI  - cont Eliquis, BB, statin  - cont nitro patch   - patent stents [facility] 6/2023    6. Obesity, BMI 35 - 216 lbs--> 197 lbs - BMI 32 - 198 lbs  - cont weight loss    7. JA, HIGGINS  - f/u pulm  - needs to use CPAP      Thank you for allowing me to participate in this patient's care. Please do not hesitate to contact me with any questions or concerns. Consult note has been forwarded to the referral physician.     Fletcher Meyer MD, [facility]  Cardiovascular Disease  [facility], [place]  [phone] (P)

## 2023-10-19 NOTE — PHYSICIAN QUERY
PT Name: Holli Landrum  MR #: [id number]    DOCUMENTATION CLARIFICATION     CDS/: Raegan NicolasRN               Contact information: [email or web address]     This form is a permanent document in the medical record.     Query Date: October 19, 2023    By submitting this query, we are merely seeking further clarification of documentation. Please utilize your independent clinical judgment when addressing the question(s) below.    The Medical Record contains the following:   Indicators Supporting Clinical Findings Location in Medical Record   x Atrial Fibrillation Holli Landrum is a 89 y.o. female patient with a PMHx of asthma, basal cell carcinoma, CHF, COPD, HTN, paroxysmal atrial fibrillation, and paroxysmal ventricular tachycardia who presents to the Emergency Department because  she was sent by Dr. Meyer (Cardiology) for admission for IV amiodarone or DCCV for treatment of A-fib.   Sent by Dr. Meyer for persistent A-fib and IV amiodarone.    89 y/o. female patient with a PMHx of  persistent atrial fibrillation, coronary artery disease  status post PCI, sick sinus syndrome status post pacemaker implantation [place]otron[place], hypertrophic obstructive cardiomyopathy, diastolic congestive heart failure, aortic stenosis, history of GI bleeding, asthma, HTN, COPD, PAF (low dose eliquis due to anemia),  PVT who presented to [facility] from cards clinic with fatigue, HA dizziness and near-syncope.  Paroxysmal atrial fibrillation  Patient with Long standing persistent (>12 months) atrial fibrillation which is controlled currently with Amiodarone. Patient is currently in atrial fibrillation.[id number] Score: 4. HASBLED  Score: . Anticoagulation indicated. Anticoagulation done with elequis   Hold Sotalol  Cont amiodarone drip  Cont Eliquis     Paroxysmal atrial fibrillation  Tele reviewed, paced rhythm Afib  Cont low dose eliquis (h/o anemia with hemorrhoids)  Cont amio gtt  Hold home sotalol  EP consult pending recs   ED Prov  Note 10/3              H&P 10/3                              Cards Consult 10/3   x EKG Results EKG:  Ventricular-paced rhythm   Abnormal ECG     Ventricular-paced rhythm   Abnormal ECG     Atrial fibrillation   Dual-chamber pacemaker (DDD), normally functioning   Left axis deviation   LVH with QRS widening   Abnormal ECG     Sinus rhythm with 1st degree A-V block   ST and T wave abnormality, consider inferior ischemia   ST and T wave abnormality, consider anterolateral ischemia   Abnormal ECG     Sinus rhythm with 1st degree A-V block   T wave abnormality, consider anterolateral ischemia   Abnormal ECG   EKG 10/3        EKG 10/3      EKG 10/3            EKG 10/5          EKG 10/5    Medication     x Treatment S/P JÚNIOR DCCV  back to sinus rhythm DCS 10/5      Other       The clinical guidelines noted are only a system guideline. It does not replace the provider's clinical judgment.    Provider, please clarify the type of Atrial Fibrillation (AF):    [  ] Paroxysmal - defined as AF that terminates spontaneously or with intervention within < 7 days of onset, episodes may recur with variable frequency     [  x] Long-standing persistent - AF that has lasted for > 12 months      [  ] Other Persistent - defined as AF that sustained for ?7 days; Episodes often require pharmacologic or electrical cardioversion to restore sinus rhythm     [  ] Other cardiac diagnosis (please specify): ____________               Please document in your progress notes daily for the duration of treatment until resolved, and include in your discharge summary.    Reference:  YOUSUF Whiting MD. (2020, September 14). Overview of atrial fibrillation (AMELIE Brantley MD & ALFREDO Mckeon MD, Eds.). Retrieved October 22, 2020, from [email or web address]&source=search_result&selectedTitle=1~150&usage_type=default&display_rank=1    Form No. [id number]

## 2023-10-27 RX ORDER — AMIODARONE HYDROCHLORIDE 200 MG/1
200 TABLET ORAL DAILY
Qty: 60 TABLET | Refills: 3 | Status: SHIPPED | OUTPATIENT
Start: 2023-10-27

## 2023-10-30 ENCOUNTER — CLINICAL SUPPORT (OUTPATIENT)
Dept: CARDIOLOGY | Facility: HOSPITAL | Age: 88
End: 2023-10-30
Payer: MEDICARE

## 2023-10-30 DIAGNOSIS — Z95.0 PRESENCE OF CARDIAC PACEMAKER: ICD-10-CM

## 2023-10-30 PROCEDURE — 93296 REM INTERROG EVL PM/IDS: CPT | Performed by: INTERNAL MEDICINE

## 2023-10-30 PROCEDURE — 93294 CARDIAC DEVICE CHECK - REMOTE: ICD-10-PCS | Mod: S$GLB,,, | Performed by: INTERNAL MEDICINE

## 2023-10-30 PROCEDURE — 93294 REM INTERROG EVL PM/LDLS PM: CPT | Mod: S$GLB,,, | Performed by: INTERNAL MEDICINE

## 2023-11-07 ENCOUNTER — PATIENT MESSAGE (OUTPATIENT)
Dept: PULMONOLOGY | Facility: CLINIC | Age: 88
End: 2023-11-07
Payer: MEDICARE

## 2023-11-07 DIAGNOSIS — G47.33 OSA ON CPAP: Primary | ICD-10-CM

## 2023-11-07 DIAGNOSIS — G47.33 OSA (OBSTRUCTIVE SLEEP APNEA): ICD-10-CM

## 2023-11-09 ENCOUNTER — OFFICE VISIT (OUTPATIENT)
Dept: CARDIOLOGY | Facility: CLINIC | Age: 88
End: 2023-11-09
Payer: MEDICARE

## 2023-11-09 ENCOUNTER — LAB VISIT (OUTPATIENT)
Dept: LAB | Facility: HOSPITAL | Age: 88
End: 2023-11-09
Attending: INTERNAL MEDICINE
Payer: MEDICARE

## 2023-11-09 ENCOUNTER — TELEPHONE (OUTPATIENT)
Dept: CARDIOLOGY | Facility: CLINIC | Age: 88
End: 2023-11-09

## 2023-11-09 VITALS
OXYGEN SATURATION: 91 % | HEART RATE: 58 BPM | DIASTOLIC BLOOD PRESSURE: 82 MMHG | WEIGHT: 196 LBS | HEIGHT: 65 IN | BODY MASS INDEX: 32.65 KG/M2 | SYSTOLIC BLOOD PRESSURE: 116 MMHG

## 2023-11-09 DIAGNOSIS — I49.5 SSS (SICK SINUS SYNDROME): ICD-10-CM

## 2023-11-09 DIAGNOSIS — I10 ESSENTIAL HYPERTENSION: ICD-10-CM

## 2023-11-09 DIAGNOSIS — I48.0 PAF (PAROXYSMAL ATRIAL FIBRILLATION): ICD-10-CM

## 2023-11-09 DIAGNOSIS — Z95.0 PRESENCE OF CARDIAC PACEMAKER: Primary | ICD-10-CM

## 2023-11-09 DIAGNOSIS — I47.29 PAROXYSMAL VENTRICULAR TACHYCARDIA: ICD-10-CM

## 2023-11-09 DIAGNOSIS — J44.9 CHRONIC OBSTRUCTIVE PULMONARY DISEASE, UNSPECIFIED COPD TYPE: ICD-10-CM

## 2023-11-09 DIAGNOSIS — I35.0 MODERATE AORTIC STENOSIS BY PRIOR ECHOCARDIOGRAM: ICD-10-CM

## 2023-11-09 DIAGNOSIS — I25.10 CORONARY ARTERY DISEASE DUE TO CALCIFIED CORONARY LESION: ICD-10-CM

## 2023-11-09 DIAGNOSIS — R00.1 SYMPTOMATIC BRADYCARDIA: ICD-10-CM

## 2023-11-09 DIAGNOSIS — I50.32 CHRONIC DIASTOLIC HEART FAILURE: ICD-10-CM

## 2023-11-09 DIAGNOSIS — Z95.0 S/P PLACEMENT OF CARDIAC PACEMAKER: ICD-10-CM

## 2023-11-09 DIAGNOSIS — R06.09 DOE (DYSPNEA ON EXERTION): ICD-10-CM

## 2023-11-09 DIAGNOSIS — I48.0 PAROXYSMAL ATRIAL FIBRILLATION: ICD-10-CM

## 2023-11-09 DIAGNOSIS — I25.84 CORONARY ARTERY DISEASE DUE TO CALCIFIED CORONARY LESION: ICD-10-CM

## 2023-11-09 DIAGNOSIS — I70.0 ATHEROSCLEROSIS OF AORTA: ICD-10-CM

## 2023-11-09 DIAGNOSIS — I25.10 CORONARY ARTERY DISEASE, UNSPECIFIED VESSEL OR LESION TYPE, UNSPECIFIED WHETHER ANGINA PRESENT, UNSPECIFIED WHETHER NATIVE OR TRANSPLANTED HEART: ICD-10-CM

## 2023-11-09 DIAGNOSIS — I35.0 NONRHEUMATIC AORTIC VALVE STENOSIS: ICD-10-CM

## 2023-11-09 LAB
ANION GAP SERPL CALC-SCNC: 11 MMOL/L (ref 8–16)
BNP SERPL-MCNC: 369 PG/ML (ref 0–99)
BUN SERPL-MCNC: 20 MG/DL (ref 8–23)
CALCIUM SERPL-MCNC: 10.2 MG/DL (ref 8.7–10.5)
CHLORIDE SERPL-SCNC: 106 MMOL/L (ref 95–110)
CO2 SERPL-SCNC: 20 MMOL/L (ref 23–29)
CREAT SERPL-MCNC: 1.2 MG/DL (ref 0.5–1.4)
EST. GFR  (NO RACE VARIABLE): 43 ML/MIN/1.73 M^2
FERRITIN SERPL-MCNC: 43 NG/ML (ref 20–300)
GLUCOSE SERPL-MCNC: 111 MG/DL (ref 70–110)
IRON SERPL-MCNC: 67 UG/DL (ref 30–160)
MAGNESIUM SERPL-MCNC: 2.5 MG/DL (ref 1.6–2.6)
POTASSIUM SERPL-SCNC: 5.2 MMOL/L (ref 3.5–5.1)
SATURATED IRON: 14 % (ref 20–50)
SODIUM SERPL-SCNC: 137 MMOL/L (ref 136–145)
TOTAL IRON BINDING CAPACITY: 478 UG/DL (ref 250–450)
TRANSFERRIN SERPL-MCNC: 323 MG/DL (ref 200–375)

## 2023-11-09 PROCEDURE — 1160F RVW MEDS BY RX/DR IN RCRD: CPT | Mod: CPTII,S$GLB,, | Performed by: INTERNAL MEDICINE

## 2023-11-09 PROCEDURE — 1160F PR REVIEW ALL MEDS BY PRESCRIBER/CLIN PHARMACIST DOCUMENTED: ICD-10-PCS | Mod: CPTII,S$GLB,, | Performed by: INTERNAL MEDICINE

## 2023-11-09 PROCEDURE — 3288F FALL RISK ASSESSMENT DOCD: CPT | Mod: CPTII,S$GLB,, | Performed by: INTERNAL MEDICINE

## 2023-11-09 PROCEDURE — 3288F PR FALLS RISK ASSESSMENT DOCUMENTED: ICD-10-PCS | Mod: CPTII,S$GLB,, | Performed by: INTERNAL MEDICINE

## 2023-11-09 PROCEDURE — 1159F MED LIST DOCD IN RCRD: CPT | Mod: CPTII,S$GLB,, | Performed by: INTERNAL MEDICINE

## 2023-11-09 PROCEDURE — 1157F ADVNC CARE PLAN IN RCRD: CPT | Mod: CPTII,S$GLB,, | Performed by: INTERNAL MEDICINE

## 2023-11-09 PROCEDURE — 36415 COLL VENOUS BLD VENIPUNCTURE: CPT | Performed by: INTERNAL MEDICINE

## 2023-11-09 PROCEDURE — 99214 OFFICE O/P EST MOD 30 MIN: CPT | Mod: S$GLB,,, | Performed by: INTERNAL MEDICINE

## 2023-11-09 PROCEDURE — 80048 BASIC METABOLIC PNL TOTAL CA: CPT | Performed by: INTERNAL MEDICINE

## 2023-11-09 PROCEDURE — 99999 PR PBB SHADOW E&M-EST. PATIENT-LVL V: CPT | Mod: PBBFAC,,, | Performed by: INTERNAL MEDICINE

## 2023-11-09 PROCEDURE — 99214 PR OFFICE/OUTPT VISIT, EST, LEVL IV, 30-39 MIN: ICD-10-PCS | Mod: S$GLB,,, | Performed by: INTERNAL MEDICINE

## 2023-11-09 PROCEDURE — 1157F PR ADVANCE CARE PLAN OR EQUIV PRESENT IN MEDICAL RECORD: ICD-10-PCS | Mod: CPTII,S$GLB,, | Performed by: INTERNAL MEDICINE

## 2023-11-09 PROCEDURE — 84466 ASSAY OF TRANSFERRIN: CPT | Performed by: INTERNAL MEDICINE

## 2023-11-09 PROCEDURE — 83540 ASSAY OF IRON: CPT | Performed by: INTERNAL MEDICINE

## 2023-11-09 PROCEDURE — 1159F PR MEDICATION LIST DOCUMENTED IN MEDICAL RECORD: ICD-10-PCS | Mod: CPTII,S$GLB,, | Performed by: INTERNAL MEDICINE

## 2023-11-09 PROCEDURE — 99999 PR PBB SHADOW E&M-EST. PATIENT-LVL V: ICD-10-PCS | Mod: PBBFAC,,, | Performed by: INTERNAL MEDICINE

## 2023-11-09 PROCEDURE — 1126F PR PAIN SEVERITY QUANTIFIED, NO PAIN PRESENT: ICD-10-PCS | Mod: CPTII,S$GLB,, | Performed by: INTERNAL MEDICINE

## 2023-11-09 PROCEDURE — 1101F PT FALLS ASSESS-DOCD LE1/YR: CPT | Mod: CPTII,S$GLB,, | Performed by: INTERNAL MEDICINE

## 2023-11-09 PROCEDURE — 82728 ASSAY OF FERRITIN: CPT | Performed by: INTERNAL MEDICINE

## 2023-11-09 PROCEDURE — 83735 ASSAY OF MAGNESIUM: CPT | Performed by: INTERNAL MEDICINE

## 2023-11-09 PROCEDURE — 1101F PR PT FALLS ASSESS DOC 0-1 FALLS W/OUT INJ PAST YR: ICD-10-PCS | Mod: CPTII,S$GLB,, | Performed by: INTERNAL MEDICINE

## 2023-11-09 PROCEDURE — 1126F AMNT PAIN NOTED NONE PRSNT: CPT | Mod: CPTII,S$GLB,, | Performed by: INTERNAL MEDICINE

## 2023-11-09 PROCEDURE — 83880 ASSAY OF NATRIURETIC PEPTIDE: CPT | Performed by: INTERNAL MEDICINE

## 2023-11-09 RX ORDER — POTASSIUM CHLORIDE 750 MG/1
20 TABLET, EXTENDED RELEASE ORAL DAILY
Qty: 180 TABLET | Refills: 1 | Status: SHIPPED | OUTPATIENT
Start: 2023-11-09 | End: 2024-01-12 | Stop reason: SDUPTHER

## 2023-11-09 NOTE — PROGRESS NOTES
Subjective:   Patient ID:  Holli Landrum is a 89 y.o. female who presents for cardiac consult of Shortness of Breath (SOB present with prolonged talking.)      HPI  The patient came in today for cardiac consult of Shortness of Breath (SOB present with prolonged talking.)      Patient ID  ---------------  Holli Landrum is a 89 y.o. female pt with  history of persistent atrial fibrillation, coronary artery disease status post PCI, sick sinus syndrome status post [employer] pacemaker implantation Biotronik, hypertrophic obstructive cardiomyopathy, diastolic congestive heart failure, aortic stenosis, history of GI bleeding here for CV follow up.        HPI/Subjective: Darshana Velez, NP Visit 9/2022     Holli Landrum is Here For follow up coronary artery disease, persistent atrial fibrillation, Biotronik pacemaker in Situ     She is here for 4 week follow up.  He underwent Cscope with findings of internal hemorrhoids.  She was started back on Eliquis and is tolerating fine.  BP is controlled today and she reports feeling well overall with only complaint being occasional swelling in her L knee and occasional HIGGINS.  She denies any chest pain or syncope.  She is worried about her blood pressure, lipids, and echocardiogram, and I have reviewed it all with her.      Last Echo moderate aortic stenosis 09/2022  Last Cath 03/20/2020, patent stents  Biotronik pacemaker in Situ in 2021  Last LDL 12/2021, 59    1/18/23  Pt last seen by Darshana Velez 9/2022; Pt of Oscar Sommers MD; here to establish care. Last ECHO 8/2022 with normal bi V function, grade 1 DD, mod AS,  FLAVIO is 0.98 cm2; mean gradient is 20 mmHg;  PASP 27 mmHg. She had Cscope few months prior without any major issues/bleeding.   BP and HR stable. BMI 35 - 216 lbs. Weight 197 prior. She had an episode of Afib Jan 3rd HR 50s-160s. Device interrogation today with brief AFib.   ECG - A paced, nonspec ST changes     5/3/23  BP and HR stable. BMI 33 - 199  "lbs.     Ap: 84%   : 3%     Device Defined Counters:   Atrial Fibrillation/Flutter: up to 2 / up to 5 mode switches per day   EGMs illustrate AF and AF w/RVR, longest available 1min 26sec in duration.   AF burden 0% of day   She has been feeling more SOB, fatigue had more pain after suprapubic catheter, saw gastro recently and colorectal surgery.     10/3/23  ECHO 5/2023 with normal bi V function, mod to severe AS, PASP 33 mmHg. LHC 6/2023 with OM1 70% stenosis, LCX 50% stenosis, moderate AS.     Per Dr. Guadarrama-  TAVR would carry an extremely high risk of endocarditis. At this point her symptoms are mild and mostly worsened by anemia. Will focus for now on controlling her bleeding rather than valve replacement. Should she develop heart failure refractory to medical Rx, balloon valvuloplasty can be considered.     Recent device interrogation report:   Device Defined Counters:   Atrial Fibrillation/Flutter: Up to 34 per day   No EGMs available.   AF burden: up to 18% of day  She has hemorrhoids occ - may be candidate for Watchman. Aspirin stopped, remains on low dose Eliquis BID.     She went [facility] after angiogram for hematoma. She had severe RLQ - maybe diverticulitis    Pt has very bad headache, is lightheaded. She feels like she may pass out.       Oct 2023 Hospital Course:   Pt admitted to Observation for General Weakness in the setting of uncontrolled Atrial fibrillation. Of note, patient was cardioverted (JÚNIOR/DCCV) on 10/5/23 with Sotalol transitioned to Amiodarone. Pt reports palpitations prior to admission and states she "could feel myself going into afib." BNP elevated and Troponin within normal limits. Cardiology consulted. Amiodarone dose increased per recommendation. PT/OT evaluation ordered with results pending. Social work consulted with home health to be resumed and suprapubic catheter to be changed after discharge. Diarrhea resolved - hx of IBS reported. On 10/13/23, atrial flutter continued at " controlled rate (HR 90's) with Amiodarone continued. Fatigue and weakness reported. Case discussed with Cardiology and Lopressor to be initiated per recommendation. PT/OT evaluation completed with home health to be resumed. GFR declined in the setting of diuretic use and decreased oral intake/fatigue reported- Lasix dose adjusted. On 10/14/23, pt evaluated by Cardiology and deemed stable for discharge. Amiodarone dose decreased per cardiology. Vital signs stable with no signs of acute distress witnessed or reported prior to discharge. Home Health resumed. Pt seen and examined and deemed medically stable for discharge to home. Medications reconciled and Lopressor prescribed and Amiodarone continued daily. Pt/daughter instructed to follow up with PCP and Cardiology upon discharge for further evaluation with ambulatory referrals in place.     10/18/23  Pt here post hospital stay x 2 - had to be reloaded with Amio last week.   BP and HR stable today. BMI 32 - 198 lbs  Now feeling better - ECG - NSR, V rate 63, TWI - stable    11/9/23  BP and HR well controlled. Pt had flare up last night thought she may pass out.   She has no appetite. Weight is 195 lbs - lost 3 lbs since last vsiit - at home 189- 194 lbs     Patient has dec exercise tolerance.    Patient is compliant with medications.    Results for orders placed during the hospital encounter of 06/20/23    Cardiac catheterization    Conclusion    The Ost Cx to Prox Cx lesion was 50% stenosed.    The Ost 1st Mrg to 1st Mrg lesion was 70% stenosed.    The pre-procedure left ventricular end diastolic pressure was 6.    The estimated blood loss was <50 mL.    There was non-obstructive coronary artery disease..    There was moderate aortic valve stenosis.    The procedure log was documented by Documenter: RT Cristopher; Morena Bradford and verified by Brice Guadarrama MD.    Date: 6/20/2023  Time: 2:46 PM      Results for orders placed during the hospital encounter  of 05/17/23    Echo    Interpretation Summary  · The left ventricle is normal in size with mild concentric hypertrophy and normal systolic function.  · Moderate left atrial enlargement.  · The estimated ejection fraction is 65%.  · Indeterminate left ventricular diastolic function.  · Normal right ventricular size with normal right ventricular systolic function.  · There is moderate-to-severe aortic valve stenosis.  · Aortic valve area is 0.80 cm2; peak velocity is 2.85 m/s; mean gradient is 21 mmHg.  · Mild tricuspid regurgitation.  · Intermediate central venous pressure (8 mmHg).  · The estimated PA systolic pressure is 33 mmHg.          Past Medical History:   Diagnosis Date    Anticoagulant long-term use     Arthritis     Asthma     Basal cell carcinoma     Cancer     skin cancer to face    Cataract     OU done//    CHF (congestive heart failure)     COPD (chronic obstructive pulmonary disease)     no oxygen; patient denies    cpap     Essential hypertension 05/05/2010    GERD (gastroesophageal reflux disease) 11/20/2012    Glaucoma     Hypertensive heart disease with heart failure 02/05/2013    Paroxysmal atrial fibrillation     Paroxysmal ventricular tachycardia     per problem list    Renal disorder     french-1/3/2020    Squamous cell carcinoma of skin        Past Surgical History:   Procedure Laterality Date    A-V CARDIAC PACEMAKER INSERTION  06/16/2021    Procedure: INSERTION, CARDIAC PACEMAKER, DUAL CHAMBER;  Surgeon: Oscar Sommers MD;  Location: [facility] CATH;  Service: Cardiovascular;;    ADENOIDECTOMY  191944    APPENDECTOMY  1948    Because of Ovarian Cyst surgery    BREAST BIOPSY Left 1995    neg    BREAST BIOPSY Right 1984    neg    BREAST BIOPSY Right 1992    neg    BREAST SURGERY      A number of biopsies    CARDIAC CATHETERIZATION  2013, 2014,2016, 2020    has 9 stents    CARDIAC SURGERY  2012    stents    CATARACT EXTRACTION W/  INTRAOCULAR LENS IMPLANT Left 11/01/2018    Dr Rose    CATARACT  EXTRACTION W/  INTRAOCULAR LENS IMPLANT Right 12/13/2018    Dr Rose//    CHOLECYSTECTOMY      COLONOSCOPY  ~2005    Dr. Edward; normal per patient report    COLONOSCOPY N/A 09/06/2022    Procedure: COLONOSCOPY 8/31/22-note in Dr Simms's office visit note that he spoke to her cardiologist and she was viable candidate for endoscopy;  Surgeon: Cordelia Bueno MD;  Location: [facility] ENDO;  Service: Endoscopy;  Laterality: N/A;    CORONARY ANGIOGRAPHY N/A 03/20/2020    Procedure: ANGIOGRAM, CORONARY ARTERY;  Surgeon: Chuy Bryant MD;  Location: [facility] CATH;  Service: Cardiology;  Laterality: N/A;    CYSTOSCOPY W/ RETROGRADES Bilateral 02/12/2020    Procedure: CYSTOSCOPY, WITH RETROGRADE PYELOGRAM;  Surgeon: Gasper Feliz MD;  Location: [facility] OR;  Service: Urology;  Laterality: Bilateral;    ESOPHAGOGASTRODUODENOSCOPY N/A 08/13/2020    Procedure: EGD (ESOPHAGOGASTRODUODENOSCOPY);  Surgeon: Mika Solorzano MD;  Location: [facility];  Service: Endoscopy;  Laterality: N/A; Mild Schatzki ring. Biopsied. Dilated. small hiatal hernia, gastritis; biopsy: esophagus- SEVERE REFLUX ESOPHAGITIS, stomach- chronic gastritis, negative for H pylori    ESOPHAGOGASTRODUODENOSCOPY N/A 10/22/2020    Procedure: EGD (ESOPHAGOGASTRODUODENOSCOPY);  Surgeon: Fred Hendricks MD;  Location: [facility] ENDO;  Service: Endoscopy;  Laterality: N/A;    EYE SURGERY  2017    Cataracs    FRACTIONAL FLOW RESERVE (FFR), CORONARY  6/20/2023    Procedure: Fractional Flow [place] (FFR), Coronary;  Surgeon: Brice Campuzano MD;  Location: [facility] CATH LAB;  Service: Cardiology;;    HYSTERECTOMY  1969    INSTANTANEOUS WAVE-FREE RATIO (IFR) N/A 6/20/2023    Procedure: Instantaneous Wave-Free Ratio (IFR);  Surgeon: Brice Campuzano MD;  Location: [facility] CATH LAB;  Service: Cardiology;  Laterality: N/A;    LEFT HEART CATHETERIZATION Left 03/03/2020    Procedure: Left heart cath;  Surgeon: Chuy Bryant MD;  Location: [facility] CATH;  Service: Cardiology;   Laterality: Left;    LEFT HEART CATHETERIZATION Left 03/20/2020    Procedure: Left heart cath;  Surgeon: Chuy Bryant MD;  Location: [facility] CATH;  Service: Cardiology;  Laterality: Left;    LEFT HEART CATHETERIZATION N/A 6/20/2023    Procedure: Left heart cath;  Surgeon: Brice Campuzano MD;  Location: [facility] CATH LAB;  Service: Cardiology;  Laterality: N/A;    OVARIAN CYST REMOVAL  teenager    PHACOEMULSIFICATION OF CATARACT Left 11/01/2018    Procedure: PHACOEMULSIFICATION, CATARACT;  Surgeon: Aleksandar Rose Jr., MD;  Location: [facility] OR;  Service: Ophthalmology;  Laterality: Left;    PHACOEMULSIFICATION OF CATARACT Right 12/13/2018    Procedure: PHACOEMULSIFICATION, CATARACT;  Surgeon: Aleksandar Rose Jr., MD;  Location: [facility] OR;  Service: Ophthalmology;  Laterality: Right;    TONSILLECTOMY      aw/denoids    TRANSESOPHAGEAL ECHOCARDIOGRAM WITH POSSIBLE CARDIOVERSION (ALFRED W/ POSS CARDIOVERSION) N/A 10/5/2023    Procedure: Transesophageal echo (ALFRED) intra-procedure log documentation/alfred/cv;  Surgeon: Bao Miguel MD;  Location: [facility] CATH LAB;  Service: Cardiology;  Laterality: N/A;   Alfred/Cv  MRI safe   Pacer & leads implanted 6/16/21, Antoun   Bio Edora 8 MICHELLE, [id number], PID: [id number]   A lead: Bio Solia S45, [id number]   V lead: Bio Solia S53, [id number]    TREATMENT OF CARDIAC ARRHYTHMIA N/A 10/5/2023    Procedure: Cardioversion or Defibrillation;  Surgeon: Bao Miguel MD;  Location: [facility] CATH LAB;  Service: Cardiology;  Laterality: N/A;    UPPER GASTROINTESTINAL ENDOSCOPY  ~2005    Dr. Solorzano    VALVE STUDY-AORTIC  6/20/2023    Procedure: Valve study-aortic;  Surgeon: Brice Campuzano MD;  Location: [facility] CATH LAB;  Service: Cardiology;;    VASCULAR SURGERY      to remove clot from right leg    Yag Capsulotomy Bilateral 11/05/2019    Dr Rose       Social History     Tobacco Use    Smoking status: Former     Current packs/day: 0.00     Types: Cigarettes     Start date: 6/10/1954     Quit  date: 9/15/1955     Years since quittin.1    Smokeless tobacco: Never    Tobacco comments:     I onlysmoked one year while [place] my  was oversees  during [place]   Substance Use Topics    Alcohol use: Not Currently     Comment: Only drank a little wine and haven't  drunk anything in 15 y    Drug use: Never       Family History   Problem Relation Age of Onset    Diabetes Brother         Type 2    Heart disease Brother          at 65 CHD    Diabetes Mother         Type 1    Alcohol abuse Mother         Dod 10/75    Heart disease Mother         Arteriosclerosis    Hypertension Mother     Stroke Mother         3/15/1975 dod 10/1975    Cancer Maternal Grandfather         Dod     Clotting disorder Son         bleeding after tonsillectomy only    Heart disease Father         Heart attack. Afib, and Polyvcithemavera    Hypertension Father     Amblyopia Neg Hx     Blindness Neg Hx     Cataracts Neg Hx     Glaucoma Neg Hx     Macular degeneration Neg Hx     Retinal detachment Neg Hx     Strabismus Neg Hx     Thyroid disease Neg Hx     Colon cancer Neg Hx        Patient's Medications   New Prescriptions    No medications on file   Previous Medications    ACETAMINOPHEN (TYLENOL) 650 MG TBSR    Take 650 mg by mouth as needed.     ALBUTEROL (PROVENTIL/VENTOLIN HFA) 90 MCG/ACTUATION INHALER    Inhale 1-2 puffs into the lungs every 4 (four) hours as needed for Wheezing or Shortness of Breath. Rescue    ALBUTEROL-IPRATROPIUM (DUO-NEB) 2.5 MG-0.5 MG/3 ML NEBULIZER SOLUTION    Take 3 mLs by nebulization every 6 (six) hours as needed for Wheezing or Shortness of Breath.    ALLOPURINOL (ZYLOPRIM) 100 MG TABLET    Take 2 tablets (200 mg total) by mouth once daily.    ALUMINUM & MAGNESIUM HYDROXIDE-SIMETHICONE (MYLANTA MAX STRENGTH) 400-400-40 MG/5 ML SUSPENSION    Take by mouth every 6 (six) hours as needed for Indigestion.    AMIODARONE (PACERONE) 200 MG TAB    Take 1 tablet (200 mg total) by mouth once daily.     AMLODIPINE (NORVASC) 2.5 MG TABLET    TAKE 1 TABLET BY MOUTH EVERY DAY    CETIRIZINE HCL (ZYRTEC ORAL)    Take 10 mg by mouth once daily.    CHOLECALCIFEROL, VITAMIN D3, 125 MCG (5,000 UNIT) TAB    Take 5,000 Units by mouth once daily.    DORZOLAMIDE (TRUSOPT) 2 % OPHTHALMIC SOLUTION    INSTILL 1 DROP INTO BOTH EYES TWICE DAILY    FLUTICASONE (FLONASE) 50 MCG/ACTUATION NASAL SPRAY    2 sprays (100 mcg total) by Each Nare route daily as needed for Allergies.    FLUTICASONE FUROATE-VILANTEROL (BREO ELLIPTA) 100-25 MCG/DOSE DISKUS INHALER    Inhale 1 puff into the lungs once daily.    FUROSEMIDE (LASIX) 40 MG TABLET    Take 1 tablet (40 mg total) by mouth 2 (two) times a day. Take twice a day and monitor BP and weights    LACTOBACILLUS RHAMNOSUS GG (CULTURELLE) 10 BILLION CELL CAPSULE    Take 1 capsule by mouth once daily.    LATANOPROST 0.005 % OPHTHALMIC SOLUTION    Place 1 drop into both eyes every evening.    MAGNESIUM OXIDE (MAG-OX) 400 MG TABLET    Take 800 mg by mouth once daily.     METOPROLOL TARTRATE (LOPRESSOR) 25 MG TABLET    Take 1 tablet (25 mg total) by mouth 2 (two) times daily.    NITROGLYCERIN 0.4 MG/HR TD PT24 (NITRODUR) 0.4 MG/HR    Place 1 patch onto the skin once daily.    ONDANSETRON (ZOFRAN-ODT) 4 MG TBDL    Take by mouth.    PANTOPRAZOLE (PROTONIX) 40 MG TABLET    Take 1 tablet (40 mg total) by mouth once daily.    POTASSIUM CHLORIDE (KLOR-CON) 10 MEQ TBSR    Take 2 tablets (20 mEq total) by mouth 2 (two) times daily.    ROSUVASTATIN (CRESTOR) 40 MG TAB    TAKE 1 TABLET (40 MG TOTAL) BY MOUTH ONCE DAILY.   Modified Medications    Modified Medication Previous Medication    APIXABAN (ELIQUIS) 2.5 MG TAB apixaban (ELIQUIS) 2.5 mg Tab       Take 1 tablet (2.5 mg total) by mouth 2 (two) times daily.    Take 1 tablet (2.5 mg total) by mouth 2 (two) times daily.   Discontinued Medications    No medications on file       Review of Systems   Constitutional: Negative.    HENT: Negative.     Eyes:  "Negative.    Respiratory:  Positive for shortness of breath.    Cardiovascular:  Positive for palpitations and leg swelling.   Gastrointestinal: Negative.    Genitourinary: Negative.    Musculoskeletal: Negative.    Skin: Negative.    Neurological: Negative.    Endo/Heme/Allergies: Negative.    Psychiatric/Behavioral: Negative.     All 12 systems otherwise negative.      Wt Readings from Last 3 Encounters:   11/09/23 88.9 kg (195 lb 15.8 oz)   10/18/23 89.9 kg (198 lb 3.1 oz)   10/11/23 90.5 kg (199 lb 8.3 oz)     Temp Readings from Last 3 Encounters:   10/14/23 98.7 °F (37.1 °C)   10/05/23 98.1 °F (36.7 °C)   06/20/23 97.6 °F (36.4 °C) (Temporal)     BP Readings from Last 3 Encounters:   11/09/23 116/82   10/18/23 118/78   10/14/23 117/77     Pulse Readings from Last 3 Encounters:   11/09/23 (!) 58   10/18/23 (!) 49   10/14/23 66       /82 (BP Location: Left arm, Patient Position: Sitting, BP Method: Medium (Manual))   Pulse (!) 58   Ht 5' 5" (1.651 m)   Wt 88.9 kg (195 lb 15.8 oz)   LMP  (LMP Unknown)   SpO2 (!) 91%   BMI 32.61 kg/m²     Objective:   Physical Exam  Vitals and nursing note reviewed.   Constitutional:       General: She is not in acute distress.     Appearance: She is well-developed. She is not diaphoretic.   HENT:      Head: Normocephalic and atraumatic.      Nose: Nose normal.   Eyes:      General: No scleral icterus.     Conjunctiva/sclera: Conjunctivae normal.   Neck:      Thyroid: No thyromegaly.      Vascular: No JVD.   Cardiovascular:      Rate and Rhythm: Normal rate and regular rhythm.      Heart sounds: S1 normal and S2 normal. Murmur heard.      No friction rub. No gallop. No S3 or S4 sounds.   Pulmonary:      Effort: Pulmonary effort is normal. No respiratory distress.      Breath sounds: Normal breath sounds. No stridor. No wheezing or rales.   Chest:      Chest wall: No tenderness.   Abdominal:      General: Bowel sounds are normal. There is no distension.      Palpations: " Abdomen is soft. There is no mass.      Tenderness: There is no abdominal tenderness. There is no rebound.   Genitourinary:     Comments: Deferred  Musculoskeletal:         General: No tenderness or deformity. Normal range of motion.      Cervical back: Normal range of motion and neck supple.   Lymphadenopathy:      Cervical: No cervical adenopathy.   Skin:     General: Skin is warm and dry.      Coloration: Skin is not pale.      Findings: No erythema or rash.   Neurological:      Mental Status: She is alert and oriented to person, place, and time.      Motor: No abnormal muscle tone.      Coordination: Coordination normal.   Psychiatric:         Behavior: Behavior normal.         Thought Content: Thought content normal.         Judgment: Judgment normal.         Lab Results   Component Value Date     10/18/2023     (L) 08/15/2015    K 4.6 10/18/2023    K 4.0 08/15/2015     10/18/2023    CL 99 08/15/2015    CO2 20 (L) 10/18/2023    BUN 17 10/18/2023    CREATININE 1.1 10/18/2023    CREATININE 1.05 (H) 08/15/2015    GLU 72 10/18/2023    HGBA1C 5.8 (H) 12/07/2021    MG 2.2 10/18/2023    AST 21 10/18/2023    AST 23 04/05/2016    ALT 18 10/18/2023    ALBUMIN 3.5 10/18/2023    ALBUMIN 4.4 08/15/2015    PROT 7.4 10/18/2023    BILITOT 0.4 10/18/2023    WBC 6.39 10/13/2023    HGB 13.6 10/13/2023    HCT 42.6 10/13/2023    MCV 82 10/13/2023     10/13/2023    INR 1.0 10/03/2023    TSH 0.993 12/07/2021    CHOL 134 12/07/2021    HDL 46 12/07/2021    LDLCALC 59.0 (L) 12/07/2021    LDLCALC 89 08/15/2015    TRIG 145 12/07/2021     (H) 10/18/2023     Assessment:      1. Presence of cardiac pacemaker    2. Paroxysmal atrial fibrillation    3. Chronic diastolic heart failure    4. Nonrheumatic aortic valve stenosis    5. PAF (paroxysmal atrial fibrillation)    6. Atherosclerosis of aorta    7. Symptomatic bradycardia    8. Coronary artery disease, h/o multivessel stents.    9. Moderate aortic stenosis  by prior echocardiogram    10. Coronary artery disease, unspecified vessel or lesion type, unspecified whether angina present, unspecified whether native or transplanted heart    11. SSS (sick sinus syndrome)    12. HIGGINS (dyspnea on exertion)    13. Chronic obstructive pulmonary disease, unspecified COPD type    14. S/P placement of cardiac pacemaker    15. Paroxysmal ventricular tachycardia    16. Essential hypertension                Plan:     PAF, SSS s/p PPM - 6/2021 with recurrent Afib - s/p JÚNIOR/DCCV and reload with Amio 10/2023  - interrogate device and f/u device clinic as sched   - cont Amio 200mg daily and Eliquis  - has hemorrhoids occ - may be candidate for Watchman   - f/u EP - may discuss ablation     2. HTN   - titrate meds    3. Mod to severe AS  - TAVR high risk for endocarditis, can consider Balloon AV - f/u with Dr. Guadarrama  - ECHO 5/2023 with normal bi V function, mod to severe AS, PASP 33 mmHg.  - [facility] 6/2023 with OM1 70% stenosis, LCX 50% stenosis, moderate AS.     4. HFpEF   - cont tx - lasix 40mg BID and K 20 meq BID,  - order labs     5. CAD s/p PCI  - cont Eliquis, BB, statin  - cont nitro patch   - patent stents [facility] 6/2023    6. Obesity, BMI 35 - 216 lbs--> 197 lbs - BMI 32 - 198 lbs --> 195   - cont weight loss    7. JA, HIGGINS  - f/u pulm  - needs to use CPAP      Thank you for allowing me to participate in this patient's care. Please do not hesitate to contact me with any questions or concerns. Consult note has been forwarded to the referral physician.     Fletcher Meyer MD, [facility]  Cardiovascular Disease  [facility], [place]  [phone] (P)

## 2023-11-09 NOTE — TELEPHONE ENCOUNTER
Called and spoke to pt and her daughter regarding her lab results. Results given and understood by pt and her daughter.    ----- Message from Fletcher Meyer MD sent at 11/9/2023  3:53 PM CST -----  Please contact the patient and let them know that their results reveal mildly elevated potassium, only take potassium once/day.

## 2023-11-10 DIAGNOSIS — D50.9 IRON DEFICIENCY ANEMIA, UNSPECIFIED IRON DEFICIENCY ANEMIA TYPE: Primary | ICD-10-CM

## 2023-11-17 ENCOUNTER — PATIENT MESSAGE (OUTPATIENT)
Dept: CARDIOLOGY | Facility: CLINIC | Age: 88
End: 2023-11-17
Payer: MEDICARE

## 2023-11-17 ENCOUNTER — LAB VISIT (OUTPATIENT)
Dept: LAB | Facility: HOSPITAL | Age: 88
End: 2023-11-17
Attending: NURSE PRACTITIONER
Payer: MEDICARE

## 2023-11-17 ENCOUNTER — TELEPHONE (OUTPATIENT)
Dept: CARDIOLOGY | Facility: CLINIC | Age: 88
End: 2023-11-17
Payer: MEDICARE

## 2023-11-17 DIAGNOSIS — K62.5 RECTAL BLEEDING: ICD-10-CM

## 2023-11-17 DIAGNOSIS — K62.5 RECTAL BLEEDING: Primary | ICD-10-CM

## 2023-11-17 LAB
BASOPHILS # BLD AUTO: 0.07 K/UL (ref 0–0.2)
BASOPHILS NFR BLD: 1 % (ref 0–1.9)
DIFFERENTIAL METHOD: ABNORMAL
EOSINOPHIL # BLD AUTO: 0.2 K/UL (ref 0–0.5)
EOSINOPHIL NFR BLD: 2.9 % (ref 0–8)
ERYTHROCYTE [DISTWIDTH] IN BLOOD BY AUTOMATED COUNT: 18.4 % (ref 11.5–14.5)
HCT VFR BLD AUTO: 40.8 % (ref 37–48.5)
HGB BLD-MCNC: 12.7 G/DL (ref 12–16)
IMM GRANULOCYTES # BLD AUTO: 0.02 K/UL (ref 0–0.04)
IMM GRANULOCYTES NFR BLD AUTO: 0.3 % (ref 0–0.5)
LYMPHOCYTES # BLD AUTO: 2.6 K/UL (ref 1–4.8)
LYMPHOCYTES NFR BLD: 36.7 % (ref 18–48)
MCH RBC QN AUTO: 26 PG (ref 27–31)
MCHC RBC AUTO-ENTMCNC: 31.1 G/DL (ref 32–36)
MCV RBC AUTO: 84 FL (ref 82–98)
MONOCYTES # BLD AUTO: 1.1 K/UL (ref 0.3–1)
MONOCYTES NFR BLD: 15 % (ref 4–15)
NEUTROPHILS # BLD AUTO: 3.1 K/UL (ref 1.8–7.7)
NEUTROPHILS NFR BLD: 44.1 % (ref 38–73)
NRBC BLD-RTO: 0 /100 WBC
PLATELET # BLD AUTO: 344 K/UL (ref 150–450)
PMV BLD AUTO: 11.7 FL (ref 9.2–12.9)
RBC # BLD AUTO: 4.88 M/UL (ref 4–5.4)
WBC # BLD AUTO: 7.13 K/UL (ref 3.9–12.7)

## 2023-11-17 PROCEDURE — 85025 COMPLETE CBC W/AUTO DIFF WBC: CPT | Performed by: NURSE PRACTITIONER

## 2023-11-17 PROCEDURE — 36415 COLL VENOUS BLD VENIPUNCTURE: CPT | Performed by: NURSE PRACTITIONER

## 2023-11-17 NOTE — TELEPHONE ENCOUNTER
Spoke to pt and she agreed to have a cbc done today        Pt is having rectal bleeding and she has taken herself off the blood thinners. She sees [facility] and was told to go to the er pt has refused. Just julien. Thanks pb      ----- Message from Sahra Abel sent at 11/17/2023  1:10 PM CST -----  Regarding: Medical Advice  Contact: Irene  .Type:  Needs Medical Advice    Who Called: Irene   Symptoms (please be specific):    How long has patient had these symptoms:    Pharmacy name and phone #:    Would the patient rather a call back or a response via [facility]? call  Best Call Back Number: Irene [phone]   Additional Information: Dawna, [facility]  is requesting a callback from the nurse today in regard to the patient's appt on Monday. However, on Thursday the patient passed a lot of dark bleed from the rectum and blood clots. She is on a blood thinner for afib. The patient has put her blood thinner on hold herself due to the bleeding.   Irene instructed the patient to go to the ER but she is refusing.

## 2023-11-20 ENCOUNTER — TELEPHONE (OUTPATIENT)
Dept: CARDIOLOGY | Facility: CLINIC | Age: 88
End: 2023-11-20
Payer: MEDICARE

## 2023-11-20 ENCOUNTER — OFFICE VISIT (OUTPATIENT)
Dept: CARDIOLOGY | Facility: CLINIC | Age: 88
End: 2023-11-20
Payer: MEDICARE

## 2023-11-20 ENCOUNTER — HOSPITAL ENCOUNTER (OUTPATIENT)
Dept: CARDIOLOGY | Facility: HOSPITAL | Age: 88
Discharge: HOME | End: 2023-11-20
Attending: INTERNAL MEDICINE
Payer: MEDICARE

## 2023-11-20 VITALS
WEIGHT: 196.19 LBS | HEART RATE: 82 BPM | BODY MASS INDEX: 32.65 KG/M2 | SYSTOLIC BLOOD PRESSURE: 112 MMHG | OXYGEN SATURATION: 94 % | DIASTOLIC BLOOD PRESSURE: 78 MMHG

## 2023-11-20 DIAGNOSIS — Z95.0 CARDIAC PACEMAKER IN SITU: ICD-10-CM

## 2023-11-20 DIAGNOSIS — R00.1 SYMPTOMATIC BRADYCARDIA: ICD-10-CM

## 2023-11-20 DIAGNOSIS — I48.0 PAROXYSMAL ATRIAL FIBRILLATION: ICD-10-CM

## 2023-11-20 DIAGNOSIS — I25.84 CORONARY ARTERY DISEASE DUE TO CALCIFIED CORONARY LESION: ICD-10-CM

## 2023-11-20 DIAGNOSIS — E78.00 HYPERCHOLESTEREMIA: ICD-10-CM

## 2023-11-20 DIAGNOSIS — N39.0 RECURRENT UTI: ICD-10-CM

## 2023-11-20 DIAGNOSIS — I42.2 ASYMMETRIC SEPTAL HYPERTROPHY: ICD-10-CM

## 2023-11-20 DIAGNOSIS — I10 ESSENTIAL HYPERTENSION: ICD-10-CM

## 2023-11-20 DIAGNOSIS — I47.29 PAROXYSMAL VENTRICULAR TACHYCARDIA: ICD-10-CM

## 2023-11-20 DIAGNOSIS — I70.0 ATHEROSCLEROSIS OF AORTA: ICD-10-CM

## 2023-11-20 DIAGNOSIS — I35.0 NONRHEUMATIC AORTIC VALVE STENOSIS: ICD-10-CM

## 2023-11-20 DIAGNOSIS — I25.10 CORONARY ARTERY DISEASE DUE TO CALCIFIED CORONARY LESION: ICD-10-CM

## 2023-11-20 DIAGNOSIS — I48.0 PAROXYSMAL ATRIAL FIBRILLATION: Primary | ICD-10-CM

## 2023-11-20 DIAGNOSIS — J44.9 CHRONIC OBSTRUCTIVE PULMONARY DISEASE, UNSPECIFIED COPD TYPE: ICD-10-CM

## 2023-11-20 DIAGNOSIS — N18.31 STAGE 3A CHRONIC KIDNEY DISEASE: ICD-10-CM

## 2023-11-20 DIAGNOSIS — I42.1 HYPERTROPHIC OBSTRUCTIVE CARDIOMYOPATHY: ICD-10-CM

## 2023-11-20 DIAGNOSIS — Z93.59 SUPRAPUBIC CATHETER: ICD-10-CM

## 2023-11-20 PROCEDURE — 1159F PR MEDICATION LIST DOCUMENTED IN MEDICAL RECORD: ICD-10-PCS | Mod: CPTII,S$GLB,, | Performed by: INTERNAL MEDICINE

## 2023-11-20 PROCEDURE — 3288F PR FALLS RISK ASSESSMENT DOCUMENTED: ICD-10-PCS | Mod: CPTII,S$GLB,, | Performed by: INTERNAL MEDICINE

## 2023-11-20 PROCEDURE — 99214 PR OFFICE/OUTPT VISIT, EST, LEVL IV, 30-39 MIN: ICD-10-PCS | Mod: S$GLB,,, | Performed by: INTERNAL MEDICINE

## 2023-11-20 PROCEDURE — 1101F PR PT FALLS ASSESS DOC 0-1 FALLS W/OUT INJ PAST YR: ICD-10-PCS | Mod: CPTII,S$GLB,, | Performed by: INTERNAL MEDICINE

## 2023-11-20 PROCEDURE — 99999 PR PBB SHADOW E&M-EST. PATIENT-LVL IV: CPT | Mod: PBBFAC,,, | Performed by: INTERNAL MEDICINE

## 2023-11-20 PROCEDURE — 93280 PM DEVICE PROGR EVAL DUAL: CPT

## 2023-11-20 PROCEDURE — 1101F PT FALLS ASSESS-DOCD LE1/YR: CPT | Mod: CPTII,S$GLB,, | Performed by: INTERNAL MEDICINE

## 2023-11-20 PROCEDURE — 3288F FALL RISK ASSESSMENT DOCD: CPT | Mod: CPTII,S$GLB,, | Performed by: INTERNAL MEDICINE

## 2023-11-20 PROCEDURE — 1157F PR ADVANCE CARE PLAN OR EQUIV PRESENT IN MEDICAL RECORD: ICD-10-PCS | Mod: CPTII,S$GLB,, | Performed by: INTERNAL MEDICINE

## 2023-11-20 PROCEDURE — 1160F PR REVIEW ALL MEDS BY PRESCRIBER/CLIN PHARMACIST DOCUMENTED: ICD-10-PCS | Mod: CPTII,S$GLB,, | Performed by: INTERNAL MEDICINE

## 2023-11-20 PROCEDURE — 99214 OFFICE O/P EST MOD 30 MIN: CPT | Mod: S$GLB,,, | Performed by: INTERNAL MEDICINE

## 2023-11-20 PROCEDURE — 1126F PR PAIN SEVERITY QUANTIFIED, NO PAIN PRESENT: ICD-10-PCS | Mod: CPTII,S$GLB,, | Performed by: INTERNAL MEDICINE

## 2023-11-20 PROCEDURE — 1157F ADVNC CARE PLAN IN RCRD: CPT | Mod: CPTII,S$GLB,, | Performed by: INTERNAL MEDICINE

## 2023-11-20 PROCEDURE — 1159F MED LIST DOCD IN RCRD: CPT | Mod: CPTII,S$GLB,, | Performed by: INTERNAL MEDICINE

## 2023-11-20 PROCEDURE — 99999 PR PBB SHADOW E&M-EST. PATIENT-LVL IV: ICD-10-PCS | Mod: PBBFAC,,, | Performed by: INTERNAL MEDICINE

## 2023-11-20 PROCEDURE — 1160F RVW MEDS BY RX/DR IN RCRD: CPT | Mod: CPTII,S$GLB,, | Performed by: INTERNAL MEDICINE

## 2023-11-20 PROCEDURE — 1126F AMNT PAIN NOTED NONE PRSNT: CPT | Mod: CPTII,S$GLB,, | Performed by: INTERNAL MEDICINE

## 2023-11-20 NOTE — TELEPHONE ENCOUNTER
----- Message from LINO Joaquin sent at 11/20/2023  5:19 AM CST -----  Regarding: FW:  Please notify patient     Labs revealed stable h/h  Please find out if she is having bleeding still?     Thanks  ----- Message -----  From: Tani, [employer]  Sent: 11/17/2023   9:39 PM CST  To: LINO Joaquin

## 2023-11-20 NOTE — TELEPHONE ENCOUNTER
Notified the patient of the results. The patient stated that she is bleeding some but its much better.          Labs revealed stable h/h   Please find out if she is having bleeding still?

## 2023-11-20 NOTE — PROGRESS NOTES
Subjective:   Patient ID:  Holli Landrum is a 89 y.o. female     Chief complaint:    HPI  New patient to me. (11/20/2023 )  Referred by Dr eMyer for evaluation and management of AF/p.m. etc.   --   Background as gleaned from patient's records and today's interview :  89 y.o. female pt with  history of persistent atrial fibrillation, coronary artery disease status post PCI, sick sinus syndrome status post  pacemaker implantation Biotronik, hypertrophic obstructive cardiomyopathy, diastolic congestive heart failure, aortic stenosis, history of GI bleeding   was cardioverted (JÚNIOR/DCCV) on 10/5/23 with Sotalol transitioned to Amiodarone   June 2023:  Conclusion    The Ost Cx to Prox Cx lesion was 50% stenosed.    The Ost 1st Mrg to 1st Mrg lesion was 70% stenosed.    The pre-procedure left ventricular end diastolic pressure was 6.    The estimated blood loss was <50 mL.    There was non-obstructive coronary artery disease..    There was moderate aortic valve stenosis.    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  She is worried about her atrial fibrillation and was wondering about an ablation.  Friday and Saturday issue was weak.  She is on amlodipine.  Patient's device (DDD CLS)was fully evaluated today under my direct supervision and real-time feedback.  Summary of findings are as listed below:  Device is in good repair.   The battery is not near SHANE.  The sensing and pacing thresholds are favorable with well maintained safety margins.   After the cardioversion on 10/13/2023 she remained in sinus rhythm until 11/17/2023 and she is now back in AFib still persistently.  Mean ventricular rate is around 80 beats per minute.  There were no device data indicating possible ongoing fluid retention.    Recommendation:  Device follow up as per clinic routine with remote and in house checks      Current Outpatient Medications   Medication Sig    acetaminophen (TYLENOL) 650  MG TbSR Take 650 mg by mouth as needed.     albuterol (PROVENTIL/VENTOLIN HFA) 90 mcg/actuation inhaler Inhale 1-2 puffs into the lungs every 4 (four) hours as needed for Wheezing or Shortness of Breath. Rescue    albuterol-ipratropium (DUO-NEB) 2.5 mg-0.5 mg/3 mL nebulizer solution Take 3 mLs by nebulization every 6 (six) hours as needed for Wheezing or Shortness of Breath.    aluminum & magnesium hydroxide-simethicone (MYLANTA MAX STRENGTH) 400-400-40 mg/5 mL suspension Take by mouth every 6 (six) hours as needed for Indigestion.    amiodarone (PACERONE) 200 MG Tab Take 1 tablet (200 mg total) by mouth once daily.    amLODIPine (NORVASC) 2.5 MG tablet TAKE 1 TABLET BY MOUTH EVERY DAY (Patient taking differently: Take 2.5 mg by mouth every evening.)    apixaban (ELIQUIS) 2.5 mg Tab Take 1 tablet (2.5 mg total) by mouth 2 (two) times daily.    cetirizine HCl (ZYRTEC ORAL) Take 10 mg by mouth once daily.    cholecalciferol, vitamin D3, 125 mcg (5,000 unit) Tab Take 5,000 Units by mouth once daily.    dorzolamide (TRUSOPT) 2 % ophthalmic solution INSTILL 1 DROP INTO BOTH EYES TWICE DAILY    fluticasone furoate-vilanteroL (BREO ELLIPTA) 100-25 mcg/dose diskus inhaler Inhale 1 puff into the lungs once daily.    furosemide (LASIX) 40 MG tablet Take 1 tablet (40 mg total) by mouth 2 (two) times a day. Take twice a day and monitor BP and weights    Lactobacillus rhamnosus GG (CULTURELLE) 10 billion cell capsule Take 1 capsule by mouth once daily.    latanoprost 0.005 % ophthalmic solution Place 1 drop into both eyes every evening.    magnesium oxide (MAG-OX) 400 mg tablet Take 800 mg by mouth once daily.     metoprolol tartrate (LOPRESSOR) 25 MG tablet Take 1 tablet (25 mg total) by mouth 2 (two) times daily.    nitroGLYCERIN 0.4 MG/HR TD PT24 (NITRODUR) 0.4 mg/hr Place 1 patch onto the skin once daily.    potassium chloride (KLOR-CON) 10 MEQ TbSR Take 2 tablets (20 mEq total) by mouth once daily.    rosuvastatin (CRESTOR)  40 MG Tab TAKE 1 TABLET (40 MG TOTAL) BY MOUTH ONCE DAILY. (Patient taking differently: Take 40 mg by mouth every evening.)    allopurinoL (ZYLOPRIM) 100 MG tablet Take 2 tablets (200 mg total) by mouth once daily. (Patient taking differently: Take 100 mg by mouth once daily. 1 tablet daily)    fluticasone (FLONASE) 50 mcg/actuation nasal spray 2 sprays (100 mcg total) by Each Nare route daily as needed for Allergies. (Patient not taking: Reported on 11/20/2023)    ondansetron (ZOFRAN-ODT) 4 MG TbDL Take by mouth.    pantoprazole (PROTONIX) 40 MG tablet Take 1 tablet (40 mg total) by mouth once daily. (Patient not taking: Reported on 11/20/2023)     No current facility-administered medications for this visit.       Review of Systems     Constitutional: Reviewed  for decreased appetite, weight gain and weight loss.   HENT: Reviewed for nosebleeds.    Eyes:  Reviewed for blurred vision and visual disturbance.   Cardiovascular: Reviewed for chest pain, claudication, cyanosis,dyspnea on exertion, leg swelling, orthopnea,paroxysmal nocturnal dyspnearregular heartbeats, palpitations, near-syncope, and syncope.   Respiratory: Reviewed for cough, shortness of breath, wheezing, sleep disturbances due to breathing and snoring, .    Endocrine: Reviewed for heat intolerance.   Hematologic/Lymphatic: Reviewed for easy bruisability/bleeding.   Skin: Reviewed for rash.   Musculoskeletal: Reviewed for muscle weakness and myalgias.   Gastrointestinal: Reviewed for abdominal pain, anorexia, melena, nausea and vomiting.   Genitourinary: Reviewed for menorrhagia, frequency, nocturia and incontinence.   Neurological: Reviewed for excessive daytime sleepiness, dizziness, vertigo, weakness, headaches, loss of balance and seizures,   Psychiatric/Behavioral:  Reviewed for insomnia, altered mental status, depression, anxiety and nervousness.       All symptoms reviewed above were negative except for dyspnea on exertion, palpitations, leg  swelling, dysphagia, hematochezia, daytime sleepiness, generalized weakness, arthritic complaints and depression.     Social History     Tobacco Use   Smoking Status Former    Current packs/day: 0.00    Types: Cigarettes    Start date: 6/10/1954    Quit date: 9/15/1955    Years since quittin.2   Smokeless Tobacco Never   Tobacco Comments    I onlysmoked one year while [place]  was oversees  during [place] w[place]       reports that she does not currently use alcohol.   Past Medical History:   Diagnosis Date    Anticoagulant long-term use     Arthritis     Asthma     Basal cell carcinoma     Cancer     skin cancer to face    Cataract     OU done//    CHF (congestive heart failure)     COPD (chronic obstructive pulmonary disease)     no oxygen; patient denies    cpap     Essential hypertension 2010    GERD (gastroesophageal reflux disease) 2012    Glaucoma     Hypertensive heart disease with heart failure 2013    Paroxysmal atrial fibrillation     Paroxysmal ventricular tachycardia     per problem list    Renal disorder     french-1/3/2020    Squamous cell carcinoma of skin      Family History   Problem Relation Age of Onset    Diabetes Brother         Type 2    Heart disease Brother          at 65 CHD    Diabetes Mother         Type 1    Alcohol abuse Mother         Dod 10/75    Heart disease Mother         Arteriosclerosis    Hypertension Mother     Stroke Mother         3/15/1975 dod 10/1975    Cancer Maternal Grandfather         Dod     Clotting disorder Son         bleeding after tonsillectomy only    Heart disease Father         Heart attack. Afib, and Polyvcithemavera    Hypertension Father     Amblyopia Neg Hx     Blindness Neg Hx     Cataracts Neg Hx     Glaucoma Neg Hx     Macular degeneration Neg Hx     Retinal detachment Neg Hx     Strabismus Neg Hx     Thyroid disease Neg Hx     Colon cancer Neg Hx      Social History     Socioeconomic History    Marital status:     Tobacco Use    Smoking status: Former     Current packs/day: 0.00     Types: Cigarettes     Start date: 6/10/1954     Quit date: 9/15/1955     Years since quittin.2    Smokeless tobacco: Never    Tobacco comments:     I onlysmoked one year while [place]  was oversees  during [place] wsr   Substance and Sexual Activity    Alcohol use: Not Currently     Comment: Only drank a little wine and haven't  drunk anything in 15 y    Drug use: Never    Sexual activity: Not Currently     Partners: Male     Birth control/protection: Abstinence     Comment:  and 87 years of age     Social Determinants of Health     Financial Resource Strain: Low Risk  (2023)    Overall Financial Resource Strain (CARDIA)     Difficulty of Paying Living Expenses: Not hard at all   Food Insecurity: No Food Insecurity (2023)    Hunger Vital Sign     Worried About Running Out of Food in the Last Year: Never true     Ran Out of Food in the Last Year: Never true   Transportation Needs: No Transportation Needs (2023)    PRAPARE - Transportation     Lack of Transportation (Medical): No     Lack of Transportation (Non-Medical): No   Physical Activity: Insufficiently Active (2023)    Exercise Vital Sign     Days of Exercise per Week: 1 day     Minutes of Exercise per Session: 10 min   Stress: No Stress Concern Present (2023)    [place] [employer] - Occupational Stress Questionnaire     Feeling of Stress : Not at all   Social Connections: Unknown (2023)    Social Connection and Isolation Panel [NHANES]     Frequency of Communication with Friends and Family: Twice a week     Frequency of Social Gatherings with Friends and Family: Once a week     Active Member of Clubs or Organizations: Yes     Attends Club or Organization Meetings: More than 4 times per year     Marital Status:    Housing Stability: Low Risk  (2023)    Housing Stability Vital Sign     Unable to Pay for Housing  in the Last Year: No     Number of Places Lived in the Last Year: 1     Unstable Housing in the Last Year: No     Past Surgical History:   Procedure Laterality Date    A-V CARDIAC PACEMAKER INSERTION  06/16/2021    Procedure: INSERTION, CARDIAC PACEMAKER, DUAL CHAMBER;  Surgeon: Oscar Sommers MD;  Location: [facility];  Service: Cardiovascular;;    ADENOIDECTOMY  [id number]    APPENDECTOMY  1948    Because of Ovarian Cyst surgery    BREAST BIOPSY Left 1995    neg    BREAST BIOPSY Right 1984    neg    BREAST BIOPSY Right 1992    neg    BREAST SURGERY      A number of biopsies    CARDIAC CATHETERIZATION  2013, 2014,2016, 2020    has 9 stents    CARDIAC SURGERY  2012    stents    CATARACT EXTRACTION W/  INTRAOCULAR LENS IMPLANT Left 11/01/2018    Dr Rose    CATARACT EXTRACTION W/  INTRAOCULAR LENS IMPLANT Right 12/13/2018    Dr Rose//    CHOLECYSTECTOMY      COLONOSCOPY  ~2005    Dr. Edward; normal per patient report    COLONOSCOPY N/A 09/06/2022    Procedure: COLONOSCOPY 8/31/22-note in Dr Simms's office visit note that he spoke to her cardiologist and she was viable candidate for endoscopy;  Surgeon: Cordelia Bueno MD;  Location: [facility];  Service: Endoscopy;  Laterality: N/A;    CORONARY ANGIOGRAPHY N/A 03/20/2020    Procedure: ANGIOGRAM, CORONARY ARTERY;  Surgeon: Chuy Bryant MD;  Location: [facility];  Service: Cardiology;  Laterality: N/A;    CYSTOSCOPY W/ RETROGRADES Bilateral 02/12/2020    Procedure: CYSTOSCOPY, WITH RETROGRADE PYELOGRAM;  Surgeon: Gasper Feliz MD;  Location: [facility] OR;  Service: Urology;  Laterality: Bilateral;    ESOPHAGOGASTRODUODENOSCOPY N/A 08/13/2020    Procedure: EGD (ESOPHAGOGASTRODUODENOSCOPY);  Surgeon: Mika Solorzano MD;  Location: [facility];  Service: Endoscopy;  Laterality: N/A; Mild Schatzki ring. Biopsied. Dilated. small hiatal hernia, gastritis; biopsy: esophagus- SEVERE REFLUX ESOPHAGITIS, stomach- chronic gastritis, negative for H pylori     ESOPHAGOGASTRODUODENOSCOPY N/A 10/22/2020    Procedure: EGD (ESOPHAGOGASTRODUODENOSCOPY);  Surgeon: Fred Hendricks MD;  Location: [facility] ENDO;  Service: Endoscopy;  Laterality: N/A;    EYE SURGERY  2017    Cataracs    FRACTIONAL FLOW RESERVE (FFR), CORONARY  6/20/2023    Procedure: Fractional Flow [place]ife (FFR), Coronary;  Surgeon: Brice Campuzano MD;  Location: [facility] CATH LAB;  Service: Cardiology;;    HYSTERECTOMY  1969    INSTANTANEOUS WAVE-FREE RATIO (IFR) N/A 6/20/2023    Procedure: Instantaneous Wave-Free Ratio (IFR);  Surgeon: Brice Campuzano MD;  Location: [facility] CATH LAB;  Service: Cardiology;  Laterality: N/A;    LEFT HEART CATHETERIZATION Left 03/03/2020    Procedure: Left heart cath;  Surgeon: Chuy Bryant MD;  Location: [facility] CATH;  Service: Cardiology;  Laterality: Left;    LEFT HEART CATHETERIZATION Left 03/20/2020    Procedure: Left heart cath;  Surgeon: Chuy Bryant MD;  Location: [facility] CATH;  Service: Cardiology;  Laterality: Left;    LEFT HEART CATHETERIZATION N/A 6/20/2023    Procedure: Left heart cath;  Surgeon: Brice Campuzano MD;  Location: [facility] CATH LAB;  Service: Cardiology;  Laterality: N/A;    OVARIAN CYST REMOVAL  teenager    PHACOEMULSIFICATION OF CATARACT Left 11/01/2018    Procedure: PHACOEMULSIFICATION, CATARACT;  Surgeon: Aleksandar Rose Jr., MD;  Location: [facility] OR;  Service: Ophthalmology;  Laterality: Left;    PHACOEMULSIFICATION OF CATARACT Right 12/13/2018    Procedure: PHACOEMULSIFICATION, CATARACT;  Surgeon: Aleksandar Rose Jr., MD;  Location: [facility] OR;  Service: Ophthalmology;  Laterality: Right;    TONSILLECTOMY      aw/denoids    TRANSESOPHAGEAL ECHOCARDIOGRAM WITH POSSIBLE CARDIOVERSION (ALFRED W/ POSS CARDIOVERSION) N/A 10/5/2023    Procedure: Transesophageal echo (ALFRED) intra-procedure log documentation/alfred/cv;  Surgeon: Bao Miguel MD;  Location: [facility] CATH LAB;  Service: Cardiology;  Laterality: N/A;   Alfred/Cv  MRI safe   Pacer & leads implanted 6/16/21,  Antoun   Bio Edora 8 MICHELLE, [id number], PID: [id number]   A lead: Bio Solia S45, [id number]   V lead: Bio Solia S53, [id number]    TREATMENT OF CARDIAC ARRHYTHMIA N/A 10/5/2023    Procedure: Cardioversion or Defibrillation;  Surgeon: Bao Miguel MD;  Location: [facility] CATH LAB;  Service: Cardiology;  Laterality: N/A;    UPPER GASTROINTESTINAL ENDOSCOPY  ~2005    Dr. Solorzano    VALVE STUDY-AORTIC  6/20/2023    Procedure: Valve study-aortic;  Surgeon: Brice Campuzano MD;  Location: [facility] CATH LAB;  Service: Cardiology;;    VASCULAR SURGERY      to remove clot from right leg    Yag Capsulotomy Bilateral 11/05/2019    Dr Rose       Objective:   Physical Exam  Vitals and nursing note reviewed.   Constitutional:       Appearance: She is well-developed.   HENT:      Head: Normocephalic and atraumatic.      Right Ear: External ear normal.      Left Ear: External ear normal.   Eyes:      General: No scleral icterus.        Left eye: No discharge.      Conjunctiva/sclera: Conjunctivae normal.      Pupils: Pupils are equal, round, and reactive to light.   Neck:      Thyroid: No thyromegaly.   Cardiovascular:      Rate and Rhythm: Normal rate and regular rhythm.      Pulses: Intact distal pulses.           Carotid pulses are 2+ on the right side and 2+ on the left side.       Radial pulses are 2+ on the right side and 2+ on the left side.        Dorsalis pedis pulses are 2+ on the right side and 2+ on the left side.        Posterior tibial pulses are 2+ on the right side and 2+ on the left side.      Heart sounds: No midsystolic click and no opening snap. Murmur heard.      Harsh midsystolic murmur is present with a grade of 2/6 at the upper right sternal border radiating to the neck.      No friction rub. No gallop. No S3 or S4 sounds.   Pulmonary:      Effort: Pulmonary effort is normal.      Breath sounds: Normal breath sounds.   Chest:      Comments: Device pocket is in good repair.  Abdominal:      General: There is no  distension.      Palpations: Abdomen is soft. There is no hepatomegaly.      Tenderness: There is no abdominal tenderness. There is no guarding.      Comments: She has a suprapubic catheter.   Musculoskeletal:      Cervical back: Normal range of motion and neck supple.      Right lower leg: No swelling.      Left lower leg: No swelling.      Right ankle: No swelling.      Left ankle: No swelling.   Skin:     General: Skin is warm and dry.      Findings: No rash.      Nails: There is no clubbing.   Neurological:      Mental Status: She is alert and oriented to person, place, and time.      Cranial Nerves: No cranial nerve deficit.      Gait: Gait normal.   Psychiatric:         Speech: Speech normal.         Behavior: Behavior normal.         Thought Content: Thought content normal.       /78   Pulse 82   Wt 89 kg (196 lb 3.4 oz)   LMP  (LMP Unknown)   SpO2 (!) 94%   BMI 32.65 kg/m²          Results for orders placed during the hospital encounter of 05/17/23    Echo    Interpretation Summary  · The left ventricle is normal in size with mild concentric hypertrophy and normal systolic function.  · Moderate left atrial enlargement.  · The estimated ejection fraction is 65%.  · Indeterminate left ventricular diastolic function.  · Normal right ventricular size with normal right ventricular systolic function.  · There is moderate-to-severe aortic valve stenosis.  · Aortic valve area is 0.80 cm2; peak velocity is 2.85 m/s; mean gradient is 21 mmHg.  · Mild tricuspid regurgitation.  · Intermediate central venous pressure (8 mmHg).  · The estimated PA systolic pressure is 33 mmHg.    WBC   Date Value Ref Range Status   11/17/2023 7.13 3.90 - 12.70 K/uL Final     Hematocrit   Date Value Ref Range Status   11/17/2023 40.8 37.0 - 48.5 % Final     Hemoglobin   Date Value Ref Range Status   11/17/2023 12.7 12.0 - 16.0 g/dL Final     Lab Results   Component Value Date     11/17/2023     Lab Results   Component  Value Date    CREATININE 1.2 11/09/2023    EGFRNORACEVR 43 (A) 11/09/2023    K 5.2 (H) 11/09/2023     Lab Results   Component Value Date     (H) 11/20/2023         Assessment:    Now persistent atrial fibrillation  1. Paroxysmal atrial fibrillation    2. Paroxysmal ventricular tachycardia    3. Symptomatic bradycardia    4. Nonrheumatic aortic valve stenosis    5. Hypertrophic obstructive cardiomyopathy    6. Asymmetric septal hypertrophy    7. Coronary artery disease, h/o multivessel stents.    8. Chronic obstructive pulmonary disease, unspecified COPD type    9. Essential hypertension    10. Hypercholesteremia    11. Atherosclerosis of aorta    12. Stage 3a chronic kidney disease    13. Recurrent UTI    14. Suprapubic catheter        Plan:      Orders Placed This Encounter   Procedures    Amiodarone Level     Standing Status:   Future     Number of Occurrences:   1     Standing Expiration Date:   1/18/2025    BNP     Standing Status:   Future     Number of Occurrences:   1     Standing Expiration Date:   1/18/2025    ANTI-XA HEPARIN MONITORING     Standing Status:   Future     Number of Occurrences:   1     Standing Expiration Date:   1/18/2025     Follow up if symptoms worsen or fail to improve, for post work up, n may.  There are no discontinued medications.  Outpatient Encounter Medications as of 11/20/2023   Medication Sig Dispense Refill    acetaminophen (TYLENOL) 650 MG TbSR Take 650 mg by mouth as needed.       albuterol (PROVENTIL/VENTOLIN HFA) 90 mcg/actuation inhaler Inhale 1-2 puffs into the lungs every 4 (four) hours as needed for Wheezing or Shortness of Breath. Rescue 18 g 11    albuterol-ipratropium (DUO-NEB) 2.5 mg-0.5 mg/3 mL nebulizer solution Take 3 mLs by nebulization every 6 (six) hours as needed for Wheezing or Shortness of Breath. 360 mL 11    aluminum & magnesium hydroxide-simethicone (MYLANTA MAX STRENGTH) 400-400-40 mg/5 mL suspension Take by mouth every 6 (six) hours as needed for  Indigestion.      amiodarone (PACERONE) 200 MG Tab Take 1 tablet (200 mg total) by mouth once daily. 60 tablet 3    amLODIPine (NORVASC) 2.5 MG tablet TAKE 1 TABLET BY MOUTH EVERY DAY (Patient taking differently: Take 2.5 mg by mouth every evening.) 30 tablet 10    apixaban (ELIQUIS) 2.5 mg Tab Take 1 tablet (2.5 mg total) by mouth 2 (two) times daily. 180 tablet 3    cetirizine HCl (ZYRTEC ORAL) Take 10 mg by mouth once daily.      cholecalciferol, vitamin D3, 125 mcg (5,000 unit) Tab Take 5,000 Units by mouth once daily.      dorzolamide (TRUSOPT) 2 % ophthalmic solution INSTILL 1 DROP INTO BOTH EYES TWICE DAILY 30 mL 1    fluticasone furoate-vilanteroL (BREO ELLIPTA) 100-25 mcg/dose diskus inhaler Inhale 1 puff into the lungs once daily. 90 each 11    furosemide (LASIX) 40 MG tablet Take 1 tablet (40 mg total) by mouth 2 (two) times a day. Take twice a day and monitor BP and weights 90 tablet 1    Lactobacillus rhamnosus GG (CULTURELLE) 10 billion cell capsule Take 1 capsule by mouth once daily.      latanoprost 0.005 % ophthalmic solution Place 1 drop into both eyes every evening. 5 mL 6    magnesium oxide (MAG-OX) 400 mg tablet Take 800 mg by mouth once daily.       metoprolol tartrate (LOPRESSOR) 25 MG tablet Take 1 tablet (25 mg total) by mouth 2 (two) times daily. 180 tablet 1    nitroGLYCERIN 0.4 MG/HR TD PT24 (NITRODUR) 0.4 mg/hr Place 1 patch onto the skin once daily. 90 patch 3    potassium chloride (KLOR-CON) 10 MEQ TbSR Take 2 tablets (20 mEq total) by mouth once daily. 180 tablet 1    rosuvastatin (CRESTOR) 40 MG Tab TAKE 1 TABLET (40 MG TOTAL) BY MOUTH ONCE DAILY. (Patient taking differently: Take 40 mg by mouth every evening.) 90 tablet 3    allopurinoL (ZYLOPRIM) 100 MG tablet Take 2 tablets (200 mg total) by mouth once daily. (Patient taking differently: Take 100 mg by mouth once daily. 1 tablet daily) 180 tablet 2    fluticasone (FLONASE) 50 mcg/actuation nasal spray 2 sprays (100 mcg total) by  Each Nare route daily as needed for Allergies. (Patient not taking: Reported on 11/20/2023) 16 g 11    ondansetron (ZOFRAN-ODT) 4 MG TbDL Take by mouth.      pantoprazole (PROTONIX) 40 MG tablet Take 1 tablet (40 mg total) by mouth once daily. (Patient not taking: Reported on 11/20/2023) 30 tablet 11    [DISCONTINUED] amiodarone (PACERONE) 200 MG Tab Take 1 tablet (200 mg total) by mouth 2 (two) times daily for 14 days, THEN 1 tablet (200 mg total) once daily. 58 tablet 1    [DISCONTINUED] apixaban (ELIQUIS) 2.5 mg Tab Take 1 tablet (2.5 mg total) by mouth 2 (two) times daily. 180 tablet 3    [DISCONTINUED] potassium chloride (KLOR-CON) 10 MEQ TbSR Take 2 tablets (20 mEq total) by mouth 2 (two) times daily. 180 tablet 3     No facility-administered encounter medications on file as of 11/20/2023.     Medication List with Changes/Refills   Current Medications    ACETAMINOPHEN (TYLENOL) 650 MG TBSR    Take 650 mg by mouth as needed.     ALBUTEROL (PROVENTIL/VENTOLIN HFA) 90 MCG/ACTUATION INHALER    Inhale 1-2 puffs into the lungs every 4 (four) hours as needed for Wheezing or Shortness of Breath. Rescue    ALBUTEROL-IPRATROPIUM (DUO-NEB) 2.5 MG-0.5 MG/3 ML NEBULIZER SOLUTION    Take 3 mLs by nebulization every 6 (six) hours as needed for Wheezing or Shortness of Breath.    ALLOPURINOL (ZYLOPRIM) 100 MG TABLET    Take 2 tablets (200 mg total) by mouth once daily.    ALUMINUM & MAGNESIUM HYDROXIDE-SIMETHICONE (MYLANTA MAX STRENGTH) 400-400-40 MG/5 ML SUSPENSION    Take by mouth every 6 (six) hours as needed for Indigestion.    AMIODARONE (PACERONE) 200 MG TAB    Take 1 tablet (200 mg total) by mouth once daily.    AMLODIPINE (NORVASC) 2.5 MG TABLET    TAKE 1 TABLET BY MOUTH EVERY DAY    APIXABAN (ELIQUIS) 2.5 MG TAB    Take 1 tablet (2.5 mg total) by mouth 2 (two) times daily.    CETIRIZINE HCL (ZYRTEC ORAL)    Take 10 mg by mouth once daily.    CHOLECALCIFEROL, VITAMIN D3, 125 MCG (5,000 UNIT) TAB    Take 5,000 Units by  mouth once daily.    DORZOLAMIDE (TRUSOPT) 2 % OPHTHALMIC SOLUTION    INSTILL 1 DROP INTO BOTH EYES TWICE DAILY    FLUTICASONE (FLONASE) 50 MCG/ACTUATION NASAL SPRAY    2 sprays (100 mcg total) by Each Nare route daily as needed for Allergies.    FLUTICASONE FUROATE-VILANTEROL (BREO ELLIPTA) 100-25 MCG/DOSE DISKUS INHALER    Inhale 1 puff into the lungs once daily.    FUROSEMIDE (LASIX) 40 MG TABLET    Take 1 tablet (40 mg total) by mouth 2 (two) times a day. Take twice a day and monitor BP and weights    LACTOBACILLUS RHAMNOSUS GG (CULTURELLE) 10 BILLION CELL CAPSULE    Take 1 capsule by mouth once daily.    LATANOPROST 0.005 % OPHTHALMIC SOLUTION    Place 1 drop into both eyes every evening.    MAGNESIUM OXIDE (MAG-OX) 400 MG TABLET    Take 800 mg by mouth once daily.     METOPROLOL TARTRATE (LOPRESSOR) 25 MG TABLET    Take 1 tablet (25 mg total) by mouth 2 (two) times daily.    NITROGLYCERIN 0.4 MG/HR TD PT24 (NITRODUR) 0.4 MG/HR    Place 1 patch onto the skin once daily.    ONDANSETRON (ZOFRAN-ODT) 4 MG TBDL    Take by mouth.    PANTOPRAZOLE (PROTONIX) 40 MG TABLET    Take 1 tablet (40 mg total) by mouth once daily.    POTASSIUM CHLORIDE (KLOR-CON) 10 MEQ TBSR    Take 2 tablets (20 mEq total) by mouth once daily.    ROSUVASTATIN (CRESTOR) 40 MG TAB    TAKE 1 TABLET (40 MG TOTAL) BY MOUTH ONCE DAILY.        This note is at least partially dictated using the M*Modal Fluency Direct word recognition program. There are word recognition mistakes that are occasionally missed on review.

## 2023-11-27 ENCOUNTER — TELEPHONE (OUTPATIENT)
Dept: HEMATOLOGY/ONCOLOGY | Facility: CLINIC | Age: 88
End: 2023-11-27
Payer: MEDICARE

## 2023-11-27 DIAGNOSIS — D64.9 ANEMIA: Primary | ICD-10-CM

## 2023-11-27 DIAGNOSIS — E53.8 B12 DEFICIENCY: ICD-10-CM

## 2023-11-27 NOTE — TELEPHONE ENCOUNTER
Spoke to patient in reference to Hematology referral from Dr. Meyer.  Appointment scheduled per patient's request next available in [place], or [facility].  Appointment notice via pt portal.

## 2023-12-12 ENCOUNTER — TELEPHONE (OUTPATIENT)
Dept: INFUSION THERAPY | Facility: HOSPITAL | Age: 88
End: 2023-12-12
Payer: MEDICARE

## 2023-12-12 NOTE — TELEPHONE ENCOUNTER
----- Message from Radha Sheriff sent at 12/12/2023 10:48 AM CST -----  Contact: Holli Jade states she has covid and called to r,s her lab and 12/20 appt. Please call her back at [phone].    Thanks  TS

## 2023-12-14 ENCOUNTER — HOSPITAL ENCOUNTER (OUTPATIENT)
Facility: HOSPITAL | Age: 88
Discharge: HOME | End: 2023-12-16
Attending: EMERGENCY MEDICINE | Admitting: INTERNAL MEDICINE
Payer: MEDICARE

## 2023-12-14 ENCOUNTER — TELEPHONE (OUTPATIENT)
Dept: PULMONOLOGY | Facility: CLINIC | Age: 88
End: 2023-12-14
Payer: MEDICARE

## 2023-12-14 DIAGNOSIS — U07.1 COVID-19: ICD-10-CM

## 2023-12-14 DIAGNOSIS — J96.01 ACUTE HYPOXIC RESPIRATORY FAILURE: ICD-10-CM

## 2023-12-14 DIAGNOSIS — E87.6 HYPOKALEMIA: ICD-10-CM

## 2023-12-14 DIAGNOSIS — R06.02 SHORTNESS OF BREATH: ICD-10-CM

## 2023-12-14 DIAGNOSIS — D64.9 ANEMIA, UNSPECIFIED TYPE: ICD-10-CM

## 2023-12-14 DIAGNOSIS — I50.9 ACUTE ON CHRONIC CONGESTIVE HEART FAILURE, UNSPECIFIED HEART FAILURE TYPE: ICD-10-CM

## 2023-12-14 DIAGNOSIS — R31.9 URINARY TRACT INFECTION WITH HEMATURIA, SITE UNSPECIFIED: ICD-10-CM

## 2023-12-14 DIAGNOSIS — J44.1 COPD WITH ACUTE EXACERBATION: ICD-10-CM

## 2023-12-14 DIAGNOSIS — J96.01 ACUTE RESPIRATORY FAILURE WITH HYPOXIA: Primary | ICD-10-CM

## 2023-12-14 DIAGNOSIS — R07.9 CHEST PAIN: ICD-10-CM

## 2023-12-14 DIAGNOSIS — N39.0 URINARY TRACT INFECTION WITH HEMATURIA, SITE UNSPECIFIED: ICD-10-CM

## 2023-12-14 LAB
ALBUMIN SERPL BCP-MCNC: 3.3 G/DL (ref 3.5–5.2)
ALLENS TEST: ABNORMAL
ALLENS TEST: ABNORMAL
ALP SERPL-CCNC: 109 U/L (ref 55–135)
ALT SERPL W/O P-5'-P-CCNC: 14 U/L (ref 10–44)
ANION GAP SERPL CALC-SCNC: 12 MMOL/L (ref 8–16)
AST SERPL-CCNC: 16 U/L (ref 10–40)
BACTERIA #/AREA URNS HPF: ABNORMAL /HPF
BASOPHILS # BLD AUTO: 0.03 K/UL (ref 0–0.2)
BASOPHILS NFR BLD: 0.3 % (ref 0–1.9)
BILIRUB SERPL-MCNC: 0.6 MG/DL (ref 0.1–1)
BILIRUB UR QL STRIP: NEGATIVE
BNP SERPL-MCNC: 392 PG/ML (ref 0–99)
BUN SERPL-MCNC: 17 MG/DL (ref 8–23)
CALCIUM SERPL-MCNC: 9.5 MG/DL (ref 8.7–10.5)
CHLORIDE SERPL-SCNC: 107 MMOL/L (ref 95–110)
CLARITY UR: ABNORMAL
CO2 SERPL-SCNC: 20 MMOL/L (ref 23–29)
COLOR UR: YELLOW
CREAT SERPL-MCNC: 0.9 MG/DL (ref 0.5–1.4)
DELSYS: ABNORMAL
DELSYS: ABNORMAL
DIFFERENTIAL METHOD: ABNORMAL
EOSINOPHIL # BLD AUTO: 0 K/UL (ref 0–0.5)
EOSINOPHIL NFR BLD: 0 % (ref 0–8)
ERYTHROCYTE [DISTWIDTH] IN BLOOD BY AUTOMATED COUNT: 18 % (ref 11.5–14.5)
EST. GFR  (NO RACE VARIABLE): >60 ML/MIN/1.73 M^2
GLUCOSE SERPL-MCNC: 116 MG/DL (ref 70–110)
GLUCOSE SERPL-MCNC: 119 MG/DL (ref 70–110)
GLUCOSE UR QL STRIP: NEGATIVE
HCO3 UR-SCNC: 19.4 MMOL/L (ref 24–28)
HCO3 UR-SCNC: 19.5 MMOL/L (ref 24–28)
HCT VFR BLD AUTO: 36 % (ref 37–48.5)
HCT VFR BLD CALC: 30 %PCV (ref 36–54)
HGB BLD-MCNC: 11.8 G/DL (ref 12–16)
HGB UR QL STRIP: ABNORMAL
HYALINE CASTS #/AREA URNS LPF: 0 /LPF
IMM GRANULOCYTES # BLD AUTO: 0.03 K/UL (ref 0–0.04)
IMM GRANULOCYTES NFR BLD AUTO: 0.3 % (ref 0–0.5)
INFLUENZA A, MOLECULAR: NEGATIVE
INFLUENZA B, MOLECULAR: NEGATIVE
KETONES UR QL STRIP: NEGATIVE
LACTATE SERPL-SCNC: 1.8 MMOL/L (ref 0.5–2.2)
LACTATE SERPL-SCNC: 2 MMOL/L (ref 0.5–2.2)
LACTATE SERPL-SCNC: 2 MMOL/L (ref 0.5–2.2)
LEUKOCYTE ESTERASE UR QL STRIP: ABNORMAL
LYMPHOCYTES # BLD AUTO: 1.1 K/UL (ref 1–4.8)
LYMPHOCYTES NFR BLD: 11.9 % (ref 18–48)
MAGNESIUM SERPL-MCNC: 1.8 MG/DL (ref 1.6–2.6)
MCH RBC QN AUTO: 26.3 PG (ref 27–31)
MCHC RBC AUTO-ENTMCNC: 32.8 G/DL (ref 32–36)
MCV RBC AUTO: 80 FL (ref 82–98)
MICROSCOPIC COMMENT: ABNORMAL
MODE: ABNORMAL
MODE: ABNORMAL
MONOCYTES # BLD AUTO: 1 K/UL (ref 0.3–1)
MONOCYTES NFR BLD: 10.3 % (ref 4–15)
NEUTROPHILS # BLD AUTO: 7.1 K/UL (ref 1.8–7.7)
NEUTROPHILS NFR BLD: 77.2 % (ref 38–73)
NITRITE UR QL STRIP: NEGATIVE
NRBC BLD-RTO: 0 /100 WBC
PCO2 BLDA: 23.7 MMHG (ref 35–45)
PCO2 BLDA: 30.1 MMHG (ref 35–45)
PH SMN: 7.42 [PH] (ref 7.35–7.45)
PH SMN: 7.52 [PH] (ref 7.35–7.45)
PH UR STRIP: 6 [PH] (ref 5–8)
PLATELET # BLD AUTO: 364 K/UL (ref 150–450)
PMV BLD AUTO: 9.7 FL (ref 9.2–12.9)
PO2 BLDA: 64 MMHG (ref 80–100)
PO2 BLDA: 69 MMHG (ref 80–100)
POC BE: -3 MMOL/L
POC BE: -5 MMOL/L
POC IONIZED CALCIUM: 1.09 MMOL/L (ref 1.06–1.42)
POC SATURATED O2: 93 % (ref 95–100)
POC SATURATED O2: 96 % (ref 95–100)
POTASSIUM BLD-SCNC: 3.4 MMOL/L (ref 3.5–5.1)
POTASSIUM SERPL-SCNC: 3.4 MMOL/L (ref 3.5–5.1)
PROCALCITONIN SERPL IA-MCNC: 0.04 NG/ML
PROT SERPL-MCNC: 6.6 G/DL (ref 6–8.4)
PROT UR QL STRIP: ABNORMAL
RBC # BLD AUTO: 4.49 M/UL (ref 4–5.4)
RBC #/AREA URNS HPF: 41 /HPF (ref 0–4)
SAMPLE: ABNORMAL
SAMPLE: ABNORMAL
SARS-COV-2 RDRP RESP QL NAA+PROBE: POSITIVE
SITE: ABNORMAL
SITE: ABNORMAL
SODIUM BLD-SCNC: 131 MMOL/L (ref 136–145)
SODIUM SERPL-SCNC: 139 MMOL/L (ref 136–145)
SP GR UR STRIP: 1.02 (ref 1–1.03)
SP02: 90
SP02: 90
SPECIMEN SOURCE: NORMAL
TROPONIN I SERPL DL<=0.01 NG/ML-MCNC: 0.03 NG/ML (ref 0–0.03)
TROPONIN I SERPL DL<=0.01 NG/ML-MCNC: 0.03 NG/ML (ref 0–0.03)
TROPONIN I SERPL DL<=0.01 NG/ML-MCNC: 0.04 NG/ML (ref 0–0.03)
UNIDENT CRYS URNS QL MICRO: ABNORMAL
URN SPEC COLLECT METH UR: ABNORMAL
UROBILINOGEN UR STRIP-ACNC: NEGATIVE EU/DL
WBC # BLD AUTO: 9.21 K/UL (ref 3.9–12.7)
WBC #/AREA URNS HPF: 11 /HPF (ref 0–5)

## 2023-12-14 PROCEDURE — 96375 TX/PRO/DX INJ NEW DRUG ADDON: CPT

## 2023-12-14 PROCEDURE — 36600 WITHDRAWAL OF ARTERIAL BLOOD: CPT

## 2023-12-14 PROCEDURE — 93010 EKG 12-LEAD: ICD-10-PCS | Mod: ,,, | Performed by: INTERNAL MEDICINE

## 2023-12-14 PROCEDURE — 25000003 PHARM REV CODE 250: Performed by: EMERGENCY MEDICINE

## 2023-12-14 PROCEDURE — P9612 CATHETERIZE FOR URINE SPEC: HCPCS

## 2023-12-14 PROCEDURE — 87040 BLOOD CULTURE FOR BACTERIA: CPT | Mod: 59 | Performed by: EMERGENCY MEDICINE

## 2023-12-14 PROCEDURE — U0002 COVID-19 LAB TEST NON-CDC: HCPCS | Performed by: NURSE PRACTITIONER

## 2023-12-14 PROCEDURE — 96374 THER/PROPH/DIAG INJ IV PUSH: CPT | Mod: 59

## 2023-12-14 PROCEDURE — G0378 HOSPITAL OBSERVATION PER HR: HCPCS

## 2023-12-14 PROCEDURE — 99900035 HC TECH TIME PER 15 MIN (STAT)

## 2023-12-14 PROCEDURE — 96376 TX/PRO/DX INJ SAME DRUG ADON: CPT

## 2023-12-14 PROCEDURE — 63600175 PHARM REV CODE 636 W HCPCS: Performed by: NURSE PRACTITIONER

## 2023-12-14 PROCEDURE — 63600175 PHARM REV CODE 636 W HCPCS: Performed by: EMERGENCY MEDICINE

## 2023-12-14 PROCEDURE — 99291 CRITICAL CARE FIRST HOUR: CPT

## 2023-12-14 PROCEDURE — 96365 THER/PROPH/DIAG IV INF INIT: CPT | Mod: 59

## 2023-12-14 PROCEDURE — 83735 ASSAY OF MAGNESIUM: CPT | Performed by: EMERGENCY MEDICINE

## 2023-12-14 PROCEDURE — 83605 ASSAY OF LACTIC ACID: CPT | Mod: 91 | Performed by: EMERGENCY MEDICINE

## 2023-12-14 PROCEDURE — 93010 ELECTROCARDIOGRAM REPORT: CPT | Mod: ,,, | Performed by: INTERNAL MEDICINE

## 2023-12-14 PROCEDURE — 85025 COMPLETE CBC W/AUTO DIFF WBC: CPT | Performed by: REGISTERED NURSE

## 2023-12-14 PROCEDURE — 84132 ASSAY OF SERUM POTASSIUM: CPT | Mod: 91

## 2023-12-14 PROCEDURE — 82330 ASSAY OF CALCIUM: CPT

## 2023-12-14 PROCEDURE — 82803 BLOOD GASES ANY COMBINATION: CPT | Mod: 91

## 2023-12-14 PROCEDURE — 84145 PROCALCITONIN (PCT): CPT | Performed by: EMERGENCY MEDICINE

## 2023-12-14 PROCEDURE — 83605 ASSAY OF LACTIC ACID: CPT | Performed by: EMERGENCY MEDICINE

## 2023-12-14 PROCEDURE — 84484 ASSAY OF TROPONIN QUANT: CPT | Performed by: REGISTERED NURSE

## 2023-12-14 PROCEDURE — 25000242 PHARM REV CODE 250 ALT 637 W/ HCPCS: Performed by: EMERGENCY MEDICINE

## 2023-12-14 PROCEDURE — 84484 ASSAY OF TROPONIN QUANT: CPT | Mod: 91 | Performed by: EMERGENCY MEDICINE

## 2023-12-14 PROCEDURE — 25000003 PHARM REV CODE 250: Performed by: NURSE PRACTITIONER

## 2023-12-14 PROCEDURE — 85014 HEMATOCRIT: CPT | Mod: 91

## 2023-12-14 PROCEDURE — 80053 COMPREHEN METABOLIC PANEL: CPT | Performed by: REGISTERED NURSE

## 2023-12-14 PROCEDURE — 36415 COLL VENOUS BLD VENIPUNCTURE: CPT | Performed by: NURSE PRACTITIONER

## 2023-12-14 PROCEDURE — 82803 BLOOD GASES ANY COMBINATION: CPT

## 2023-12-14 PROCEDURE — 83880 ASSAY OF NATRIURETIC PEPTIDE: CPT | Performed by: REGISTERED NURSE

## 2023-12-14 PROCEDURE — 87086 URINE CULTURE/COLONY COUNT: CPT | Performed by: EMERGENCY MEDICINE

## 2023-12-14 PROCEDURE — 84484 ASSAY OF TROPONIN QUANT: CPT | Mod: 91 | Performed by: NURSE PRACTITIONER

## 2023-12-14 PROCEDURE — 96365 THER/PROPH/DIAG IV INF INIT: CPT

## 2023-12-14 PROCEDURE — 81000 URINALYSIS NONAUTO W/SCOPE: CPT | Performed by: EMERGENCY MEDICINE

## 2023-12-14 PROCEDURE — 94640 AIRWAY INHALATION TREATMENT: CPT

## 2023-12-14 PROCEDURE — 93005 ELECTROCARDIOGRAM TRACING: CPT

## 2023-12-14 PROCEDURE — 87502 INFLUENZA DNA AMP PROBE: CPT | Performed by: REGISTERED NURSE

## 2023-12-14 PROCEDURE — 84295 ASSAY OF SERUM SODIUM: CPT | Mod: 91

## 2023-12-14 RX ORDER — PROCHLORPERAZINE EDISYLATE 5 MG/ML
2.5 INJECTION INTRAMUSCULAR; INTRAVENOUS EVERY 6 HOURS PRN
Status: DISCONTINUED | OUTPATIENT
Start: 2023-12-14 | End: 2023-12-16 | Stop reason: HOSPADM

## 2023-12-14 RX ORDER — IPRATROPIUM BROMIDE AND ALBUTEROL SULFATE 2.5; .5 MG/3ML; MG/3ML
3 SOLUTION RESPIRATORY (INHALATION)
Status: COMPLETED | OUTPATIENT
Start: 2023-12-14 | End: 2023-12-14

## 2023-12-14 RX ORDER — ONDANSETRON 2 MG/ML
4 INJECTION INTRAMUSCULAR; INTRAVENOUS EVERY 8 HOURS PRN
Status: DISCONTINUED | OUTPATIENT
Start: 2023-12-14 | End: 2023-12-16 | Stop reason: HOSPADM

## 2023-12-14 RX ORDER — ALBUTEROL SULFATE 90 UG/1
2 AEROSOL, METERED RESPIRATORY (INHALATION) EVERY 6 HOURS PRN
Status: DISCONTINUED | OUTPATIENT
Start: 2023-12-14 | End: 2023-12-16 | Stop reason: HOSPADM

## 2023-12-14 RX ORDER — ONDANSETRON 2 MG/ML
4 INJECTION INTRAMUSCULAR; INTRAVENOUS
Status: COMPLETED | OUTPATIENT
Start: 2023-12-14 | End: 2023-12-14

## 2023-12-14 RX ORDER — METOPROLOL TARTRATE 25 MG/1
25 TABLET, FILM COATED ORAL 2 TIMES DAILY
Status: DISCONTINUED | OUTPATIENT
Start: 2023-12-14 | End: 2023-12-16 | Stop reason: HOSPADM

## 2023-12-14 RX ORDER — BENZONATATE 100 MG/1
100 CAPSULE ORAL 3 TIMES DAILY PRN
Status: DISCONTINUED | OUTPATIENT
Start: 2023-12-14 | End: 2023-12-16 | Stop reason: HOSPADM

## 2023-12-14 RX ORDER — LATANOPROST 50 UG/ML
1 SOLUTION/ DROPS OPHTHALMIC NIGHTLY
Status: DISCONTINUED | OUTPATIENT
Start: 2023-12-14 | End: 2023-12-16 | Stop reason: HOSPADM

## 2023-12-14 RX ORDER — ACETAMINOPHEN 325 MG/1
650 TABLET ORAL EVERY 4 HOURS PRN
Status: DISCONTINUED | OUTPATIENT
Start: 2023-12-14 | End: 2023-12-16 | Stop reason: HOSPADM

## 2023-12-14 RX ORDER — GLUCAGON 1 MG
1 KIT INJECTION
Status: DISCONTINUED | OUTPATIENT
Start: 2023-12-14 | End: 2023-12-16 | Stop reason: HOSPADM

## 2023-12-14 RX ORDER — TALC
6 POWDER (GRAM) TOPICAL NIGHTLY PRN
Status: DISCONTINUED | OUTPATIENT
Start: 2023-12-14 | End: 2023-12-16 | Stop reason: HOSPADM

## 2023-12-14 RX ORDER — IBUPROFEN 200 MG
16 TABLET ORAL
Status: DISCONTINUED | OUTPATIENT
Start: 2023-12-14 | End: 2023-12-16 | Stop reason: HOSPADM

## 2023-12-14 RX ORDER — POTASSIUM CHLORIDE 20 MEQ/1
20 TABLET, EXTENDED RELEASE ORAL DAILY
Status: DISCONTINUED | OUTPATIENT
Start: 2023-12-15 | End: 2023-12-16 | Stop reason: HOSPADM

## 2023-12-14 RX ORDER — FUROSEMIDE 10 MG/ML
60 INJECTION INTRAMUSCULAR; INTRAVENOUS
Status: COMPLETED | OUTPATIENT
Start: 2023-12-14 | End: 2023-12-14

## 2023-12-14 RX ORDER — AMOXICILLIN 250 MG
1 CAPSULE ORAL 2 TIMES DAILY
Status: DISCONTINUED | OUTPATIENT
Start: 2023-12-14 | End: 2023-12-16 | Stop reason: HOSPADM

## 2023-12-14 RX ORDER — AMIODARONE HYDROCHLORIDE 200 MG/1
200 TABLET ORAL DAILY
Status: DISCONTINUED | OUTPATIENT
Start: 2023-12-15 | End: 2023-12-16 | Stop reason: HOSPADM

## 2023-12-14 RX ORDER — DORZOLAMIDE HCL 20 MG/ML
1 SOLUTION/ DROPS OPHTHALMIC 2 TIMES DAILY
Status: DISCONTINUED | OUTPATIENT
Start: 2023-12-14 | End: 2023-12-16 | Stop reason: HOSPADM

## 2023-12-14 RX ORDER — SODIUM CHLORIDE 0.9 % (FLUSH) 0.9 %
10 SYRINGE (ML) INJECTION EVERY 12 HOURS PRN
Status: DISCONTINUED | OUTPATIENT
Start: 2023-12-14 | End: 2023-12-16 | Stop reason: HOSPADM

## 2023-12-14 RX ORDER — IBUPROFEN 200 MG
24 TABLET ORAL
Status: DISCONTINUED | OUTPATIENT
Start: 2023-12-14 | End: 2023-12-16 | Stop reason: HOSPADM

## 2023-12-14 RX ORDER — GUAIFENESIN 100 MG/5ML
200 SOLUTION ORAL EVERY 4 HOURS PRN
Status: DISCONTINUED | OUTPATIENT
Start: 2023-12-14 | End: 2023-12-16 | Stop reason: HOSPADM

## 2023-12-14 RX ORDER — ATORVASTATIN CALCIUM 10 MG/1
20 TABLET, FILM COATED ORAL NIGHTLY
Status: DISCONTINUED | OUTPATIENT
Start: 2023-12-14 | End: 2023-12-16 | Stop reason: HOSPADM

## 2023-12-14 RX ORDER — ALBUTEROL SULFATE 90 UG/1
2 AEROSOL, METERED RESPIRATORY (INHALATION) EVERY 6 HOURS PRN
Status: DISCONTINUED | OUTPATIENT
Start: 2023-12-14 | End: 2023-12-14 | Stop reason: CLARIF

## 2023-12-14 RX ORDER — NALOXONE HCL 0.4 MG/ML
0.02 VIAL (ML) INJECTION
Status: DISCONTINUED | OUTPATIENT
Start: 2023-12-14 | End: 2023-12-16 | Stop reason: HOSPADM

## 2023-12-14 RX ADMIN — ATORVASTATIN CALCIUM 20 MG: 10 TABLET, FILM COATED ORAL at 10:12

## 2023-12-14 RX ADMIN — IPRATROPIUM BROMIDE AND ALBUTEROL SULFATE 3 ML: 2.5; .5 SOLUTION RESPIRATORY (INHALATION) at 01:12

## 2023-12-14 RX ADMIN — METHYLPREDNISOLONE SODIUM SUCCINATE 60 MG: 40 INJECTION, POWDER, FOR SOLUTION INTRAMUSCULAR; INTRAVENOUS at 10:12

## 2023-12-14 RX ADMIN — METOPROLOL TARTRATE 25 MG: 25 TABLET, FILM COATED ORAL at 10:12

## 2023-12-14 RX ADMIN — ONDANSETRON 4 MG: 2 INJECTION INTRAMUSCULAR; INTRAVENOUS at 01:12

## 2023-12-14 RX ADMIN — DORZOLAMIDE 1 DROP: 20 SOLUTION/ DROPS OPHTHALMIC at 10:12

## 2023-12-14 RX ADMIN — CEFTRIAXONE 1 G: 1 INJECTION, POWDER, FOR SOLUTION INTRAMUSCULAR; INTRAVENOUS at 04:12

## 2023-12-14 RX ADMIN — AZITHROMYCIN MONOHYDRATE 500 MG: 500 INJECTION, POWDER, LYOPHILIZED, FOR SOLUTION INTRAVENOUS at 10:12

## 2023-12-14 RX ADMIN — LATANOPROST 1 DROP: 50 SOLUTION OPHTHALMIC at 10:12

## 2023-12-14 RX ADMIN — POTASSIUM BICARBONATE 25 MEQ: 978 TABLET, EFFERVESCENT ORAL at 04:12

## 2023-12-14 RX ADMIN — FUROSEMIDE 60 MG: 10 INJECTION, SOLUTION INTRAMUSCULAR; INTRAVENOUS at 02:12

## 2023-12-14 RX ADMIN — APIXABAN 2.5 MG: 2.5 TABLET, FILM COATED ORAL at 10:12

## 2023-12-14 RX ADMIN — METHYLPREDNISOLONE SODIUM SUCCINATE 60 MG: 40 INJECTION, POWDER, FOR SOLUTION INTRAMUSCULAR; INTRAVENOUS at 05:12

## 2023-12-14 NOTE — TELEPHONE ENCOUNTER
Spoke to pt daughter.  She stated that pt O2 sat is 85-90% with sob and pain under her breast when she breathes.  Advised that pt should be seen in the ER for eval and tx.  She stated understanding.

## 2023-12-14 NOTE — TELEPHONE ENCOUNTER
----- Message from Bren Red sent at 12/14/2023  9:45 AM CST -----  Who Called: Pt    What is the request in detail: Requesting call back to discuss scheduling urgent appt today, pt oxygen is low. Please advise    Can the clinic reply by [facility]? No    Best Call Back Number: [phone]      Additional Information:

## 2023-12-14 NOTE — ASSESSMENT & PLAN NOTE
Patient with Hypoxic Respiratory failure which is Acute.  she is not on home oxygen. Supplemental oxygen was provided and noted-      .   Signs/symptoms of respiratory failure include- tachypnea, increased work of breathing, and wheezing. Contributing diagnoses includes - COPD and COVID  Labs and images were reviewed. Patient Has recent ABG, which has been reviewed. Will treat underlying causes and adjust management of respiratory failure as follows    - continue albuterol inhaler/steroids  - diuresis with iv lasix   - wean o2 as patient tolerates

## 2023-12-14 NOTE — HPI
Ms. Landrum is a 89 y.o. female pt with  history of persistent atrial fibrillation, CAD s/p PCI, SSS s/p  pacemaker, diastolic congestive heart failure, aortic stenosis, history of GI bleed, COPD, JA on cpap who presents to the ED with dyspnea that worsened last night.  Per patient she tested positive for COVID at [facility] on Dec 6th.  She has had intermittent fever and clear productive cough since that time.  She reports that her dyspnea worsened last night and per her home pulse ox o2 sats were 85%.  In the ED, patient patient hypoxic and wheezing, placed on 3 liters and given multiple breathing treatments with improvement.  Labs with K 3.4, CO2 20.  WBC normal.  , Trop 0.033.  Flu negative.  Chest xray with ILD and pulmonary edema.  Patient was given lasix IV with 400ml urine output currently.  Patient will be admitted to observation for acute hypoxic resp failure d/t covid/copd exacerbation.     Code Status Full  Surrogate Decision Maker daughter

## 2023-12-14 NOTE — FIRST PROVIDER EVALUATION
Medical screening examination initiated.  I have conducted a focused provider triage encounter, findings are as follows:    Brief history of present illness:  Post covid x 1 month, low sats at home    There were no vitals filed for this visit.    Pertinent physical exam:  92% RA, no acute distress    Brief workup plan:  Workup    Preliminary workup initiated; this workup will be continued and followed by the physician or advanced practice provider that is assigned to the patient when roomed.

## 2023-12-14 NOTE — ADMISSIONCARE
AdmissionCare    Guideline: COVID-19: Observation Care, Observation    Based on the indications selected for the patient, the bed status of Admit to Observation was determined to be MET    The following indications were selected as present at the time of evaluation of the patient:      Tachypnea, as indicated by respiratory rate of 1 or more of the following:   -     - Greater than 18 breaths per minute in adult or child 13 years of age or older   Need for continued clinical monitoring due to high risk for clinical deterioration, as indicated by 1 or more of the following:   -     - Patient is frail.    - Clinically significant comorbid illness (eg, COPD, CHF, malignancy, cirrhosis, chronic kidney disease, sickle cell disease, child with chronic illness or technology dependence)    AdmissionCare documentation entered by: Rocio Garcia    [facility], 27th edition, Copyright © 2023 [facility] [employer], [facility] All Rights Reserved.  [id number]:03:43-06:00

## 2023-12-14 NOTE — H&P
Formerly [facility], later [facility] - Emergency Dept.  [facility]  History & Physical    Patient Name: Holli Landrum  MRN: [id number]  Patient Class: OP- Observation  Admission Date: 12/14/2023  Attending Physician: Luis Joe MD   Primary Care Provider: Marcia Carlisle MD         Patient information was obtained from patient, relative(s), past medical records, and ER records.     Subjective:     Principal Problem:Acute hypoxic respiratory failure    Chief Complaint:   Chief Complaint   Patient presents with    low oxygen     Pt states she is having low oxygen that they noticed this morning.         HPI: Ms. Landrum is a 89 y.o. female pt with  history of persistent atrial fibrillation, CAD s/p PCI, SSS s/p  pacemaker, diastolic congestive heart failure, aortic stenosis, history of GI bleed, COPD, JA on cpap who presents to the ED with dyspnea that worsened last night.  Per patient she tested positive for COVID at [facility] on Dec 6th.  She has had intermittent fever and clear productive cough since that time.  She reports that her dyspnea worsened last night and per her home pulse ox o2 sats were 85%.  In the ED, patient patient hypoxic and wheezing, placed on 3 liters and given multiple breathing treatments with improvement.  Labs with K 3.4, CO2 20.  WBC normal.  , Trop 0.033.  Flu negative.  Chest xray with ILD and pulmonary edema.  Patient was given lasix IV with 400ml urine output currently.  Patient will be admitted to observation for acute hypoxic resp failure d/t covid/copd exacerbation.     Code Status Full  Surrogate Decision Maker daughter       Past Medical History:   Diagnosis Date    Anticoagulant long-term use     Arthritis     Asthma     Basal cell carcinoma     Cancer     skin cancer to face    Cataract     OU done//    CHF (congestive heart failure)     COPD (chronic obstructive pulmonary disease)     no oxygen; patient denies    cpap     Essential hypertension 05/05/2010    GERD (gastroesophageal reflux  disease) 11/20/2012    Glaucoma     Hypertensive heart disease with heart failure 02/05/2013    Paroxysmal atrial fibrillation     Paroxysmal ventricular tachycardia     per problem list    Renal disorder     french-1/3/2020    Squamous cell carcinoma of skin        Past Surgical History:   Procedure Laterality Date    A-V CARDIAC PACEMAKER INSERTION  06/16/2021    Procedure: INSERTION, CARDIAC PACEMAKER, DUAL CHAMBER;  Surgeon: Oscar Sommers MD;  Location: [facility];  Service: Cardiovascular;;    ADENOIDECTOMY  191944    APPENDECTOMY  1948    Because of Ovarian Cyst surgery    BREAST BIOPSY Left 1995    neg    BREAST BIOPSY Right 1984    neg    BREAST BIOPSY Right 1992    neg    BREAST SURGERY      A number of biopsies    CARDIAC CATHETERIZATION  2013, 2014,2016, 2020    has 9 stents    CARDIAC SURGERY  2012    stents    CATARACT EXTRACTION W/  INTRAOCULAR LENS IMPLANT Left 11/01/2018    Dr Rose    CATARACT EXTRACTION W/  INTRAOCULAR LENS IMPLANT Right 12/13/2018    Dr Rose//    CHOLECYSTECTOMY      COLONOSCOPY  ~2005    Dr. Edward; normal per patient report    COLONOSCOPY N/A 09/06/2022    Procedure: COLONOSCOPY 8/31/22-note in Dr Simms's office visit note that he spoke to her cardiologist and she was viable candidate for endoscopy;  Surgeon: Cordelia Bueno MD;  Location: [facility];  Service: Endoscopy;  Laterality: N/A;    CORONARY ANGIOGRAPHY N/A 03/20/2020    Procedure: ANGIOGRAM, CORONARY ARTERY;  Surgeon: Chuy Bryant MD;  Location: [facility] CATH;  Service: Cardiology;  Laterality: N/A;    CYSTOSCOPY W/ RETROGRADES Bilateral 02/12/2020    Procedure: CYSTOSCOPY, WITH RETROGRADE PYELOGRAM;  Surgeon: Gasper Feliz MD;  Location: [facility] OR;  Service: Urology;  Laterality: Bilateral;    ESOPHAGOGASTRODUODENOSCOPY N/A 08/13/2020    Procedure: EGD (ESOPHAGOGASTRODUODENOSCOPY);  Surgeon: Mika Solorzano MD;  Location: [facility] ENDO;  Service: Endoscopy;  Laterality: N/A; Mild Schatzki ring. Biopsied.  Dilated. small hiatal hernia, gastritis; biopsy: esophagus- SEVERE REFLUX ESOPHAGITIS, stomach- chronic gastritis, negative for H pylori    ESOPHAGOGASTRODUODENOSCOPY N/A 10/22/2020    Procedure: EGD (ESOPHAGOGASTRODUODENOSCOPY);  Surgeon: Fred Hendricks MD;  Location: [facility] ENDO;  Service: Endoscopy;  Laterality: N/A;    EYE SURGERY  2017    Cataracs    FRACTIONAL FLOW RESERVE (FFR), CORONARY  6/20/2023    Procedure: Fractional Flow [place] (FFR), Coronary;  Surgeon: Brice Campuzano MD;  Location: [facility] CATH LAB;  Service: Cardiology;;    HYSTERECTOMY  1969    INSTANTANEOUS WAVE-FREE RATIO (IFR) N/A 6/20/2023    Procedure: Instantaneous Wave-Free Ratio (IFR);  Surgeon: Brice Campuzano MD;  Location: [facility] CATH LAB;  Service: Cardiology;  Laterality: N/A;    LEFT HEART CATHETERIZATION Left 03/03/2020    Procedure: Left heart cath;  Surgeon: Chuy Bryant MD;  Location: [facility] CATH;  Service: Cardiology;  Laterality: Left;    LEFT HEART CATHETERIZATION Left 03/20/2020    Procedure: Left heart cath;  Surgeon: Chuy Bryant MD;  Location: [facility] CATH;  Service: Cardiology;  Laterality: Left;    LEFT HEART CATHETERIZATION N/A 6/20/2023    Procedure: Left heart cath;  Surgeon: Brice Campuzano MD;  Location: [facility] CATH LAB;  Service: Cardiology;  Laterality: N/A;    OVARIAN CYST REMOVAL  teenager    PHACOEMULSIFICATION OF CATARACT Left 11/01/2018    Procedure: PHACOEMULSIFICATION, CATARACT;  Surgeon: Aleksandar Rose Jr., MD;  Location: [facility] OR;  Service: Ophthalmology;  Laterality: Left;    PHACOEMULSIFICATION OF CATARACT Right 12/13/2018    Procedure: PHACOEMULSIFICATION, CATARACT;  Surgeon: Aleksandar Rose Jr., MD;  Location: [facility] OR;  Service: Ophthalmology;  Laterality: Right;    TONSILLECTOMY      aw/denoids    TRANSESOPHAGEAL ECHOCARDIOGRAM WITH POSSIBLE CARDIOVERSION (LIZ W/ POSS CARDIOVERSION) N/A 10/5/2023    Procedure: Transesophageal echo (LIZ) intra-procedure log documentation/liz/cv;  Surgeon: Myriam  MD Bao;  Location: [facility] CATH LAB;  Service: Cardiology;  Laterality: N/A;   Alfred/Cv  MRI safe   Pacer & leads implanted 6/16/21, [place]   Bio Hubert 8 MICHELLE, [id number], PID: [id number]   A lead: Bio Solia S45, [id number]   V lead: Bio Solia S53, [id number]    TREATMENT OF CARDIAC ARRHYTHMIA N/A 10/5/2023    Procedure: Cardioversion or Defibrillation;  Surgeon: Bao Miguel MD;  Location: [facility] CATH LAB;  Service: Cardiology;  Laterality: N/A;    UPPER GASTROINTESTINAL ENDOSCOPY  ~2005    Dr. Solorzano    VALVE STUDY-AORTIC  6/20/2023    Procedure: Valve study-aortic;  Surgeon: Brice Campuzano MD;  Location: [facility] CATH LAB;  Service: Cardiology;;    VASCULAR SURGERY      to remove clot from right leg    Yag Capsulotomy Bilateral 11/05/2019    Dr Rose       Review of patient's allergies indicates:   Allergen Reactions    Timolol maleate Shortness Of Breath    Ciprofloxacin Other (See Comments)     Muscle ache    Sulfamethoxazole-trimethoprim        No current facility-administered medications on file prior to encounter.     Current Outpatient Medications on File Prior to Encounter   Medication Sig    albuterol (PROVENTIL/VENTOLIN HFA) 90 mcg/actuation inhaler Inhale 1-2 puffs into the lungs every 4 (four) hours as needed for Wheezing or Shortness of Breath. Rescue    albuterol-ipratropium (DUO-NEB) 2.5 mg-0.5 mg/3 mL nebulizer solution Take 3 mLs by nebulization every 6 (six) hours as needed for Wheezing or Shortness of Breath.    amiodarone (PACERONE) 200 MG Tab Take 1 tablet (200 mg total) by mouth once daily.    amLODIPine (NORVASC) 2.5 MG tablet TAKE 1 TABLET BY MOUTH EVERY DAY (Patient taking differently: Take 2.5 mg by mouth every evening.)    apixaban (ELIQUIS) 2.5 mg Tab Take 1 tablet (2.5 mg total) by mouth 2 (two) times daily.    cetirizine HCl (ZYRTEC ORAL) Take 10 mg by mouth once daily.    cholecalciferol, vitamin D3, 125 mcg (5,000 unit) Tab Take 5,000 Units by mouth once daily.     dorzolamide (TRUSOPT) 2 % ophthalmic solution INSTILL 1 DROP INTO BOTH EYES TWICE DAILY    fluticasone (FLONASE) 50 mcg/actuation nasal spray 2 sprays (100 mcg total) by Each Nare route daily as needed for Allergies.    fluticasone furoate-vilanteroL (BREO ELLIPTA) 100-25 mcg/dose diskus inhaler Inhale 1 puff into the lungs once daily.    furosemide (LASIX) 40 MG tablet Take 1 tablet (40 mg total) by mouth 2 (two) times a day. Take twice a day and monitor BP and weights    Lactobacillus rhamnosus GG (CULTURELLE) 10 billion cell capsule Take 1 capsule by mouth once daily.    latanoprost 0.005 % ophthalmic solution Place 1 drop into both eyes every evening.    metoprolol tartrate (LOPRESSOR) 25 MG tablet Take 1 tablet (25 mg total) by mouth 2 (two) times daily.    nitroGLYCERIN 0.4 MG/HR TD PT24 (NITRODUR) 0.4 mg/hr Place 1 patch onto the skin once daily.    potassium chloride (KLOR-CON) 10 MEQ TbSR Take 2 tablets (20 mEq total) by mouth once daily.    rosuvastatin (CRESTOR) 40 MG Tab TAKE 1 TABLET (40 MG TOTAL) BY MOUTH ONCE DAILY. (Patient taking differently: Take 40 mg by mouth every evening.)    [DISCONTINUED] acetaminophen (TYLENOL) 650 MG TbSR Take 650 mg by mouth as needed.     [DISCONTINUED] allopurinoL (ZYLOPRIM) 100 MG tablet Take 2 tablets (200 mg total) by mouth once daily. (Patient taking differently: Take 100 mg by mouth once daily. 1 tablet daily)    [DISCONTINUED] aluminum & magnesium hydroxide-simethicone (MYLANTA MAX STRENGTH) 400-400-40 mg/5 mL suspension Take by mouth every 6 (six) hours as needed for Indigestion.    [DISCONTINUED] magnesium oxide (MAG-OX) 400 mg tablet Take 800 mg by mouth once daily.     [DISCONTINUED] ondansetron (ZOFRAN-ODT) 4 MG TbDL Take by mouth.    [DISCONTINUED] pantoprazole (PROTONIX) 40 MG tablet Take 1 tablet (40 mg total) by mouth once daily. 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   Respiratory:  Positive for cough, shortness of breath and wheezing.      Objective:     Vital Signs (Most Recent):  Temp: 98.1 °F (36.7 °C) (23 1622)  Pulse: 71 (23 1624)  Resp: (!) 26 (23 1624)  BP: 124/62 (23 1601)  SpO2: 97 % (23 1624) Vital Signs (24h Range):  Temp:  [98.1 °F (36.7 °C)-99.2 °F (37.3 °C)] 98.1 °F (36.7 °C)  Pulse:  [66-73] 71  Resp:  [16-26] 26  SpO2:  [83 %-98 %] 97 %  BP: (124-169)/(62-80) 124/62     Weight: 84.8 kg (187 lb)  Body mass index is 31.12 kg/m².     Physical Exam  Vitals and nursing note reviewed.   Constitutional:       Comments: Ill appearing    Cardiovascular:      Rate and Rhythm: Normal rate and regular rhythm.      Pulses: Normal pulses.      Heart sounds: Normal heart sounds.   Pulmonary:      Comments: Tachypnea, expiratory wheezing, on 3 liters nasal cannula   Abdominal:      General: Bowel sounds are normal. There is no distension.      Palpations: Abdomen is soft.      Tenderness: There is no abdominal tenderness.   Musculoskeletal:         General: No swelling. Normal range of motion.   Skin:     General: Skin is  warm and dry.   :     Ballard in place with yellow, clear urine   Neurological:      Mental Status: She is alert and oriented to person, place, and time.        Significant Labs: All pertinent labs within the past 24 hours have been reviewed.    Significant Imaging: I have reviewed all pertinent imaging results/findings within the past 24 hours.  Assessment/Plan:     * Acute hypoxic respiratory failure  Patient with Hypoxic Respiratory failure which is Acute.  she is not on home oxygen. Supplemental oxygen was provided and noted-      .   Signs/symptoms of respiratory failure include- tachypnea, increased work of breathing, and wheezing. Contributing diagnoses includes - COPD and COVID  Labs and images were reviewed. Patient Has recent ABG, which has been reviewed. Will treat underlying causes and adjust management of respiratory failure as follows    - continue albuterol inhaler/steroids  - diuresis with iv lasix   - wean o2 as patient tolerates      COVID  Patient is identified as Severe COVID-19 based on hypoxemia with O2 saturations <94% on room air or on ambulation   Initiate standard COVID protocols; COVID-19 testing ,Infection Control notification  and isolation- respiratory, contact and droplet per protocol    Diagnostics: CBC, CMP, and Portable CXR    Management: Maintain oxygen saturations 92-96% via Nasal Cannula 2 LPM and monitor with continuous/intermittent pulse oximetry. , Inhaled bronchodilators as needed for shortness of breath., and Continuous cardiac monitoring.    Advance Care Planning Current advance care plan has been discussed with patient/family/POA and patient currently wishes Full Code. <10 mins    - originally tested positive for COVID at outside facility on 12/6  - continue supportive care    CKD (chronic kidney disease) stage 3, GFR 30-59 ml/min  Creatine stable for now. BMP reviewed- noted Estimated Creatinine Clearance: 44.7 mL/min (based on SCr of 0.9 mg/dL). according to latest data.  Based on current GFR, CKD stage is stage 3 - GFR 30-59.  Monitor UOP and serial BMP and adjust therapy as needed. Renally dose meds. Avoid nephrotoxic medications and procedures.    - baseline around 1.1, monitor closely will on iv diuretics     Essential hypertension  - continue home lasix and BB     Acute on chronic diastolic heart failure  - follows with Dr. Mane  - continue home BB and IV diuresis  - monitor I/O, daily weights      Coronary artery disease, h/o multivessel stents.  Patient with known CAD s/p stent placement, which is controlled Will continue  eliquis and Statin and monitor for S/Sx of angina/ACS. Continue to monitor on telemetry.     Paroxysmal atrial fibrillation  Patient with Paroxysmal (<7 days) atrial fibrillation which is controlled currently with Beta Blocker and Amiodarone. Patient is currently in sinus rhythm.[id number] Score: 4. HASBLED Anticoagulation indicated. Anticoagulation done with eliquis .    - follows with Dr. Seth cardioverted (JÚNIOR/DCCV) on 10/5/23 with Sotalol transitioned to Amiodarone      COPD (chronic obstructive pulmonary disease)  Patient's COPD is with exacerbation noted by continued dyspnea and worsening of baseline hypoxia currently.  Patient is currently off COPD Pathway. Continue scheduled inhalers Steroids, Antibiotics, and Supplemental oxygen and monitor respiratory status closely.       VTE Risk Mitigation (From admission, onward)           Ordered     apixaban tablet 2.5 mg  2 times daily         12/14/23 1630     Reason for No Pharmacological VTE Prophylaxis  Once        Question:  Reasons:  Answer:  Already adequately anticoagulated on oral Anticoagulants    12/14/23 1630     IP VTE HIGH RISK PATIENT  Once         12/14/23 1630     Place sequential compression device  Until discontinued         12/14/23 1630                         On 12/14/2023, patient should be placed in hospital observation services under my care in collaboration with   [facility].      AdmissionCare    Guideline: COVID-19: Observation Care, Observation    Based on the indications selected for the patient, the bed status of Admit to Observation was determined to be MET    The following indications were selected as present at the time of evaluation of the patient:      Tachypnea, as indicated by respiratory rate of 1 or more of the following:   -     - Greater than 18 breaths per minute in adult or child 13 years of age or older   Need for continued clinical monitoring due to high risk for clinical deterioration, as indicated by 1 or more of the following:   -     - Patient is frail.    - Clinically significant comorbid illness (eg, COPD, CHF, malignancy, cirrhosis, chronic kidney disease, sickle cell disease, child with chronic illness or technology dependence)    AdmissionCare documentation entered by: Rocio Garcia    [facility] [employer], 27th edition, Copyright © 2023 [facility] [employer], [facility] All Rights Reserved.  [id number]:03:43-06:00    Rocio Garcia NP  Department of Hospital Medicine  [facility] - Emergency Dept.

## 2023-12-14 NOTE — ASSESSMENT & PLAN NOTE
Patient's COPD is with exacerbation noted by continued dyspnea and worsening of baseline hypoxia currently.  Patient is currently off COPD Pathway. Continue scheduled inhalers Steroids, Antibiotics, and Supplemental oxygen and monitor respiratory status closely.

## 2023-12-14 NOTE — SUBJECTIVE & OBJECTIVE
Past Medical History:   Diagnosis Date    Anticoagulant long-term use     Arthritis     Asthma     Basal cell carcinoma     Cancer     skin cancer to face    Cataract     OU done//    CHF (congestive heart failure)     COPD (chronic obstructive pulmonary disease)     no oxygen; patient denies    cpap     Essential hypertension 05/05/2010    GERD (gastroesophageal reflux disease) 11/20/2012    Glaucoma     Hypertensive heart disease with heart failure 02/05/2013    Paroxysmal atrial fibrillation     Paroxysmal ventricular tachycardia     per problem list    Renal disorder     french-1/3/2020    Squamous cell carcinoma of skin        Past Surgical History:   Procedure Laterality Date    A-V CARDIAC PACEMAKER INSERTION  06/16/2021    Procedure: INSERTION, CARDIAC PACEMAKER, DUAL CHAMBER;  Surgeon: Oscar Sommers MD;  Location: [facility] CATH;  Service: Cardiovascular;;    ADENOIDECTOMY  191944    APPENDECTOMY  1948    Because of Ovarian Cyst surgery    BREAST BIOPSY Left 1995    neg    BREAST BIOPSY Right 1984    neg    BREAST BIOPSY Right 1992    neg    BREAST SURGERY      A number of biopsies    CARDIAC CATHETERIZATION  2013, 2014,2016, 2020    has 9 stents    CARDIAC SURGERY  2012    stents    CATARACT EXTRACTION W/  INTRAOCULAR LENS IMPLANT Left 11/01/2018    Dr Rose    CATARACT EXTRACTION W/  INTRAOCULAR LENS IMPLANT Right 12/13/2018    Dr Rose//    CHOLECYSTECTOMY      COLONOSCOPY  ~2005    Dr. Edward; normal per patient report    COLONOSCOPY N/A 09/06/2022    Procedure: COLONOSCOPY 8/31/22-note in Dr Simms's office visit note that he spoke to her cardiologist and she was viable candidate for endoscopy;  Surgeon: Cordelia Bueno MD;  Location: [facility];  Service: Endoscopy;  Laterality: N/A;    CORONARY ANGIOGRAPHY N/A 03/20/2020    Procedure: ANGIOGRAM, CORONARY ARTERY;  Surgeon: Chuy Bryant MD;  Location: [facility] CATH;  Service: Cardiology;  Laterality: N/A;    CYSTOSCOPY W/ RETROGRADES Bilateral  02/12/2020    Procedure: CYSTOSCOPY, WITH RETROGRADE PYELOGRAM;  Surgeon: Gasper Feliz MD;  Location: [facility] OR;  Service: Urology;  Laterality: Bilateral;    ESOPHAGOGASTRODUODENOSCOPY N/A 08/13/2020    Procedure: EGD (ESOPHAGOGASTRODUODENOSCOPY);  Surgeon: Mika Solorzano MD;  Location: [facility] ENDO;  Service: Endoscopy;  Laterality: N/A; Mild Schatzki ring. Biopsied. Dilated. small hiatal hernia, gastritis; biopsy: esophagus- SEVERE REFLUX ESOPHAGITIS, stomach- chronic gastritis, negative for H pylori    ESOPHAGOGASTRODUODENOSCOPY N/A 10/22/2020    Procedure: EGD (ESOPHAGOGASTRODUODENOSCOPY);  Surgeon: Fred Hendricks MD;  Location: [facility] ENDO;  Service: Endoscopy;  Laterality: N/A;    EYE SURGERY  2017    Cataracs    FRACTIONAL FLOW RESERVE (FFR), CORONARY  6/20/2023    Procedure: Fractional Flow Nevis (FFR), Coronary;  Surgeon: Brice Campuzano MD;  Location: [facility] CATH LAB;  Service: Cardiology;;    HYSTERECTOMY  1969    INSTANTANEOUS WAVE-FREE RATIO (IFR) N/A 6/20/2023    Procedure: Instantaneous Wave-Free Ratio (IFR);  Surgeon: Brice Campuzano MD;  Location: [facility] CATH LAB;  Service: Cardiology;  Laterality: N/A;    LEFT HEART CATHETERIZATION Left 03/03/2020    Procedure: Left heart cath;  Surgeon: Chuy Bryant MD;  Location: [facility] CATH;  Service: Cardiology;  Laterality: Left;    LEFT HEART CATHETERIZATION Left 03/20/2020    Procedure: Left heart cath;  Surgeon: Chuy Bryant MD;  Location: [facility] CATH;  Service: Cardiology;  Laterality: Left;    LEFT HEART CATHETERIZATION N/A 6/20/2023    Procedure: Left heart cath;  Surgeon: Brice Campuzano MD;  Location: [facility] CATH LAB;  Service: Cardiology;  Laterality: N/A;    OVARIAN CYST REMOVAL  teenager    PHACOEMULSIFICATION OF CATARACT Left 11/01/2018    Procedure: PHACOEMULSIFICATION, CATARACT;  Surgeon: Aleksandar Rose Jr., MD;  Location: [facility] OR;  Service: Ophthalmology;  Laterality: Left;    PHACOEMULSIFICATION OF CATARACT Right 12/13/2018     Procedure: PHACOEMULSIFICATION, CATARACT;  Surgeon: Aleksandar Rose Jr., MD;  Location: [facility] OR;  Service: Ophthalmology;  Laterality: Right;    TONSILLECTOMY      aw/denoids    TRANSESOPHAGEAL ECHOCARDIOGRAM WITH POSSIBLE CARDIOVERSION (ALFRED W/ POSS CARDIOVERSION) N/A 10/5/2023    Procedure: Transesophageal echo (ALFRED) intra-procedure log documentation/alfred/cv;  Surgeon: Bao Miguel MD;  Location: [facility] CATH LAB;  Service: Cardiology;  Laterality: N/A;   Alfred/Cv  MRI safe   Pacer & leads implanted 6/16/21, Antoun   Bio Edora 8 MICHELLE, [id number], PID: [id number]   A lead: Bio Solia S45, [id number]   V lead: Bio Solia S53, [id number]    TREATMENT OF CARDIAC ARRHYTHMIA N/A 10/5/2023    Procedure: Cardioversion or Defibrillation;  Surgeon: Bao Miguel MD;  Location: [facility] CATH LAB;  Service: Cardiology;  Laterality: N/A;    UPPER GASTROINTESTINAL ENDOSCOPY  ~2005    Dr. Solorzano    VALVE STUDY-AORTIC  6/20/2023    Procedure: Valve study-aortic;  Surgeon: Brice Campuzano MD;  Location: [facility] CATH LAB;  Service: Cardiology;;    VASCULAR SURGERY      to remove clot from right leg    Yag Capsulotomy Bilateral 11/05/2019    Dr Rose       Review of patient's allergies indicates:   Allergen Reactions    Timolol maleate Shortness Of Breath    Ciprofloxacin Other (See Comments)     Muscle ache    Sulfamethoxazole-trimethoprim        No current facility-administered medications on file prior to encounter.     Current Outpatient Medications on File Prior to Encounter   Medication Sig    albuterol (PROVENTIL/VENTOLIN HFA) 90 mcg/actuation inhaler Inhale 1-2 puffs into the lungs every 4 (four) hours as needed for Wheezing or Shortness of Breath. Rescue    albuterol-ipratropium (DUO-NEB) 2.5 mg-0.5 mg/3 mL nebulizer solution Take 3 mLs by nebulization every 6 (six) hours as needed for Wheezing or Shortness of Breath.    amiodarone (PACERONE) 200 MG Tab Take 1 tablet (200 mg total) by mouth once daily.    amLODIPine  (NORVASC) 2.5 MG tablet TAKE 1 TABLET BY MOUTH EVERY DAY (Patient taking differently: Take 2.5 mg by mouth every evening.)    apixaban (ELIQUIS) 2.5 mg Tab Take 1 tablet (2.5 mg total) by mouth 2 (two) times daily.    cetirizine HCl (ZYRTEC ORAL) Take 10 mg by mouth once daily.    cholecalciferol, vitamin D3, 125 mcg (5,000 unit) Tab Take 5,000 Units by mouth once daily.    dorzolamide (TRUSOPT) 2 % ophthalmic solution INSTILL 1 DROP INTO BOTH EYES TWICE DAILY    fluticasone (FLONASE) 50 mcg/actuation nasal spray 2 sprays (100 mcg total) by Each Nare route daily as needed for Allergies.    fluticasone furoate-vilanteroL (BREO ELLIPTA) 100-25 mcg/dose diskus inhaler Inhale 1 puff into the lungs once daily.    furosemide (LASIX) 40 MG tablet Take 1 tablet (40 mg total) by mouth 2 (two) times a day. Take twice a day and monitor BP and weights    Lactobacillus rhamnosus GG (CULTURELLE) 10 billion cell capsule Take 1 capsule by mouth once daily.    latanoprost 0.005 % ophthalmic solution Place 1 drop into both eyes every evening.    metoprolol tartrate (LOPRESSOR) 25 MG tablet Take 1 tablet (25 mg total) by mouth 2 (two) times daily.    nitroGLYCERIN 0.4 MG/HR TD PT24 (NITRODUR) 0.4 mg/hr Place 1 patch onto the skin once daily.    potassium chloride (KLOR-CON) 10 MEQ TbSR Take 2 tablets (20 mEq total) by mouth once daily.    rosuvastatin (CRESTOR) 40 MG Tab TAKE 1 TABLET (40 MG TOTAL) BY MOUTH ONCE DAILY. (Patient taking differently: Take 40 mg by mouth every evening.)    [DISCONTINUED] acetaminophen (TYLENOL) 650 MG TbSR Take 650 mg by mouth as needed.     [DISCONTINUED] allopurinoL (ZYLOPRIM) 100 MG tablet Take 2 tablets (200 mg total) by mouth once daily. (Patient taking differently: Take 100 mg by mouth once daily. 1 tablet daily)    [DISCONTINUED] aluminum & magnesium hydroxide-simethicone (MYLANTA MAX STRENGTH) 400-400-40 mg/5 mL suspension Take by mouth every 6 (six) hours as needed for Indigestion.     [DISCONTINUED] magnesium oxide (MAG-OX) 400 mg tablet Take 800 mg by mouth once daily.     [DISCONTINUED] ondansetron (ZOFRAN-ODT) 4 MG TbDL Take by mouth.    [DISCONTINUED] pantoprazole (PROTONIX) 40 MG tablet Take 1 tablet (40 mg total) by mouth once daily. (Patient not taking: Reported on 2023)     Family History       Problem Relation (Age of Onset)    Alcohol abuse Mother    Cancer Maternal Grandfather    Clotting disorder Son    Diabetes Brother, Mother    Heart disease Brother, Mother, Father    Hypertension Mother, Father    Stroke Mother          Tobacco Use    Smoking status: Former     Current packs/day: 0.00     Types: Cigarettes     Start date: 6/10/1954     Quit date: 9/15/1955     Years since quittin.2    Smokeless tobacco: Never    Tobacco comments:     I onlysmoked one year while [place] my  was oversees  during [place]   Substance and Sexual Activity    Alcohol use: Not Currently     Comment: Only drank a little wine and haven't  drunk anything in 15 y    Drug use: Never    Sexual activity: Not Currently     Partners: Male     Birth control/protection: Abstinence     Comment:  and 87 years of age     Review of Systems   Constitutional:  Positive for fatigue and fever.   Respiratory:  Positive for cough, shortness of breath and wheezing.      Objective:     Vital Signs (Most Recent):  Temp: 98.1 °F (36.7 °C) (23 1622)  Pulse: 71 (23 1624)  Resp: (!) 26 (23 1624)  BP: 124/62 (23 1601)  SpO2: 97 % (23 1624) Vital Signs (24h Range):  Temp:  [98.1 °F (36.7 °C)-99.2 °F (37.3 °C)] 98.1 °F (36.7 °C)  Pulse:  [66-73] 71  Resp:  [16-26] 26  SpO2:  [83 %-98 %] 97 %  BP: (124-169)/(62-80) 124/62     Weight: 84.8 kg (187 lb)  Body mass index is 31.12 kg/m².     Physical Exam  Vitals and nursing note reviewed.   Constitutional:       Comments: Ill appearing    Cardiovascular:      Rate and Rhythm: Normal rate and regular rhythm.      Pulses: Normal pulses.       Heart sounds: Normal heart sounds.   Pulmonary:      Comments: Tachypnea, expiratory wheezing, on 3 liters nasal cannula   Abdominal:      General: Bowel sounds are normal. There is no distension.      Palpations: Abdomen is soft.      Tenderness: There is no abdominal tenderness.   Musculoskeletal:         General: No swelling. Normal range of motion.   Skin:     General: Skin is warm and dry.   Neurological:      Mental Status: She is alert and oriented to person, place, and time.                Significant Labs: All pertinent labs within the past 24 hours have been reviewed.    Significant Imaging: I have reviewed all pertinent imaging results/findings within the past 24 hours.

## 2023-12-14 NOTE — ASSESSMENT & PLAN NOTE
Patient with Paroxysmal (<7 days) atrial fibrillation which is controlled currently with Beta Blocker and Amiodarone. Patient is currently in sinus rhythm.[id number] Score: 4. HASBLED Anticoagulation indicated. Anticoagulation done with eliquis .    - follows with Dr. Seth cardioverted (JÚNIOR/DCCV) on 10/5/23 with Sotalol transitioned to Amiodarone

## 2023-12-14 NOTE — ED PROVIDER NOTES
SCRIBE #1 NOTE: I, Elías Hickman, am scribing for, and in the presence of, Keith Sanchez MD. I have scribed the HPI, ROS, and PEx.     SCRIBE #2 NOTE: I, Luis Herman, am scribing for, and in the presence of,  Raegan Mary DO. I have scribed the remaining portions of the note not scribed by Scribe #1.     History      Chief Complaint   Patient presents with    low oxygen     Pt states she is having low oxygen that they noticed this morning.        Review of patient's allergies indicates:   Allergen Reactions    Timolol maleate Shortness Of Breath    Ciprofloxacin Other (See Comments)     Muscle ache    Sulfamethoxazole-trimethoprim         HPI   HPI    12/14/2023, 1:11 PM   History obtained from the patient      History of Present Illness: Holli Landrum is a 89 y.o. female patient with a PMHx of CHF, COPD, HTN, Afib who presents to the Emergency Department for SOB, onset last night. Pt is not on home O2, but states that her SpO2 at home was between 84-86% last night. Pt was dx with COVID-19 2 weeks ago. Symptoms are constant and moderate in severity. No mitigating or exacerbating factors reported. Associated sxs include congestion and nausea. Patient denies any fever, chills, vomiting, CP, weakness, numbness, dizziness, headache, and all other sxs at this time. Pt is currently on Eliquis, and is scheduled to follow with Dr. Waller (Pulmonology) tomorrow morning.. No further complaints or concerns at this time.     Arrival mode: Personal vehicle    PCP: Marcia Carlisle MD       Past Medical History:  Past Medical History:   Diagnosis Date    Anticoagulant long-term use     Arthritis     Asthma     Basal cell carcinoma     Cancer     skin cancer to face    Cataract     OU done//    CHF (congestive heart failure)     COPD (chronic obstructive pulmonary disease)     no oxygen; patient denies    cpap     Essential hypertension 05/05/2010    GERD (gastroesophageal reflux disease) 11/20/2012    Glaucoma     Hypertensive  heart disease with heart failure 02/05/2013    Paroxysmal atrial fibrillation     Paroxysmal ventricular tachycardia     per problem list    Renal disorder     french-1/3/2020    Squamous cell carcinoma of skin        Past Surgical History:  Past Surgical History:   Procedure Laterality Date    A-V CARDIAC PACEMAKER INSERTION  06/16/2021    Procedure: INSERTION, CARDIAC PACEMAKER, DUAL CHAMBER;  Surgeon: Oscar Sommers MD;  Location: On license of [facility];  Service: Cardiovascular;;    ADENOIDECTOMY  191944    APPENDECTOMY  1948    Because of Ovarian Cyst surgery    BREAST BIOPSY Left 1995    neg    BREAST BIOPSY Right 1984    neg    BREAST BIOPSY Right 1992    neg    BREAST SURGERY      A number of biopsies    CARDIAC CATHETERIZATION  2013, 2014,2016, 2020    has 9 stents    CARDIAC SURGERY  2012    stents    CATARACT EXTRACTION W/  INTRAOCULAR LENS IMPLANT Left 11/01/2018    Dr Rose    CATARACT EXTRACTION W/  INTRAOCULAR LENS IMPLANT Right 12/13/2018    Dr Rose//    CHOLECYSTECTOMY      COLONOSCOPY  ~2005    Dr. Edward; normal per patient report    COLONOSCOPY N/A 09/06/2022    Procedure: COLONOSCOPY 8/31/22-note in Dr Smims's office visit note that he spoke to her cardiologist and she was viable candidate for endoscopy;  Surgeon: Cordelia Bueno MD;  Location: [facility];  Service: Endoscopy;  Laterality: N/A;    CORONARY ANGIOGRAPHY N/A 03/20/2020    Procedure: ANGIOGRAM, CORONARY ARTERY;  Surgeon: Chuy Bryant MD;  Location: On license of [facility];  Service: Cardiology;  Laterality: N/A;    CYSTOSCOPY W/ RETROGRADES Bilateral 02/12/2020    Procedure: CYSTOSCOPY, WITH RETROGRADE PYELOGRAM;  Surgeon: Gasper Feliz MD;  Location: [facility] OR;  Service: Urology;  Laterality: Bilateral;    ESOPHAGOGASTRODUODENOSCOPY N/A 08/13/2020    Procedure: EGD (ESOPHAGOGASTRODUODENOSCOPY);  Surgeon: Mika Solorzano MD;  Location: [facility];  Service: Endoscopy;  Laterality: N/A; Mild Schatzki ring. Biopsied. Dilated. small hiatal  hernia, gastritis; biopsy: esophagus- SEVERE REFLUX ESOPHAGITIS, stomach- chronic gastritis, negative for H pylori    ESOPHAGOGASTRODUODENOSCOPY N/A 10/22/2020    Procedure: EGD (ESOPHAGOGASTRODUODENOSCOPY);  Surgeon: Fred Hendricks MD;  Location: [facility];  Service: Endoscopy;  Laterality: N/A;    EYE SURGERY  2017    Cataracs    FRACTIONAL FLOW RESERVE (FFR), CORONARY  6/20/2023    Procedure: Fractional Flow [place] (FFR), Coronary;  Surgeon: Brice Campuzano MD;  Location: [facility] CATH LAB;  Service: Cardiology;;    HYSTERECTOMY  1969    INSTANTANEOUS WAVE-FREE RATIO (IFR) N/A 6/20/2023    Procedure: Instantaneous Wave-Free Ratio (IFR);  Surgeon: Brice Campuzano MD;  Location: [facility] CATH LAB;  Service: Cardiology;  Laterality: N/A;    LEFT HEART CATHETERIZATION Left 03/03/2020    Procedure: Left heart cath;  Surgeon: Chuy Bryant MD;  Location: [facility] CATH;  Service: Cardiology;  Laterality: Left;    LEFT HEART CATHETERIZATION Left 03/20/2020    Procedure: Left heart cath;  Surgeon: Chuy Bryant MD;  Location: [facility] CATH;  Service: Cardiology;  Laterality: Left;    LEFT HEART CATHETERIZATION N/A 6/20/2023    Procedure: Left heart cath;  Surgeon: Brice Campuzano MD;  Location: [facility] CATH LAB;  Service: Cardiology;  Laterality: N/A;    OVARIAN CYST REMOVAL  teenager    PHACOEMULSIFICATION OF CATARACT Left 11/01/2018    Procedure: PHACOEMULSIFICATION, CATARACT;  Surgeon: Aleksandar Rose Jr., MD;  Location: [facility] OR;  Service: Ophthalmology;  Laterality: Left;    PHACOEMULSIFICATION OF CATARACT Right 12/13/2018    Procedure: PHACOEMULSIFICATION, CATARACT;  Surgeon: Aleksandar Rose Jr., MD;  Location: [facility] OR;  Service: Ophthalmology;  Laterality: Right;    TONSILLECTOMY      aw/denoids    TRANSESOPHAGEAL ECHOCARDIOGRAM WITH POSSIBLE CARDIOVERSION (LIZ W/ POSS CARDIOVERSION) N/A 10/5/2023    Procedure: Transesophageal echo (LIZ) intra-procedure log documentation/liz/cv;  Surgeon: Bao Miguel MD;   Location: [facility] CATH LAB;  Service: Cardiology;  Laterality: N/A;   Alfred/Cv  MRI safe   Pacer & leads implanted 21, Antoun   Bio Edora 8 MICHELLE, [id number], PID: [id number]   A lead: Bio Solia S45, [id number]   V lead: Bio Solia S53, [id number]    TREATMENT OF CARDIAC ARRHYTHMIA N/A 10/5/2023    Procedure: Cardioversion or Defibrillation;  Surgeon: Bao Miguel MD;  Location: [facility] CATH LAB;  Service: Cardiology;  Laterality: N/A;    UPPER GASTROINTESTINAL ENDOSCOPY  ~    Dr. Solorzano    VALVE STUDY-AORTIC  2023    Procedure: Valve study-aortic;  Surgeon: Brice Campuzano MD;  Location: [facility] CATH LAB;  Service: Cardiology;;    VASCULAR SURGERY      to remove clot from right leg    Yag Capsulotomy Bilateral 2019    Dr Rose         Family History:  Family History   Problem Relation Age of Onset    Diabetes Brother         Type 2    Heart disease Brother          at 65 CHD    Diabetes Mother         Type 1    Alcohol abuse Mother         Dod 10/75    Heart disease Mother         Arteriosclerosis    Hypertension Mother     Stroke Mother         3/15/1975 dod 10/1975    Cancer Maternal Grandfather         Dod     Clotting disorder Son         bleeding after tonsillectomy only    Heart disease Father         Heart attack. Afib, and Polyvcithemavera    Hypertension Father     Amblyopia Neg Hx     Blindness Neg Hx     Cataracts Neg Hx     Glaucoma Neg Hx     Macular degeneration Neg Hx     Retinal detachment Neg Hx     Strabismus Neg Hx     Thyroid disease Neg Hx     Colon cancer Neg Hx        Social History:  Social History     Tobacco Use    Smoking status: Former     Current packs/day: 0.00     Types: Cigarettes     Start date: 6/10/1954     Quit date: 9/15/1955     Years since quittin.2    Smokeless tobacco: Never    Tobacco comments:     I onlysmoked one year while [employer] my  was oversees  during [employer]   Substance and Sexual Activity    Alcohol use: Not Currently      Comment: Only drank a little wine and haven't  drunk anything in 15 y    Drug use: Never    Sexual activity: Not Currently     Partners: Male     Birth control/protection: Abstinence     Comment:  and 87 years of age       ROS   Review of Systems   Constitutional:  Negative for chills and fever.   HENT:  Positive for congestion. Negative for sore throat.    Respiratory:  Positive for shortness of breath.    Cardiovascular:  Negative for chest pain.   Gastrointestinal:  Positive for nausea. Negative for diarrhea and vomiting.   Genitourinary:  Negative for dysuria.   Musculoskeletal:  Negative for back pain.   Skin:  Negative for rash.   Neurological:  Negative for dizziness, weakness, numbness and headaches.   Hematological:  Does not bruise/bleed easily.   All other systems reviewed and are negative.    Physical Exam      Initial Vitals [12/14/23 1201]   BP Pulse Resp Temp SpO2   126/70 70 16 99.2 °F (37.3 °C) (!) 93 %      MAP       --          Physical Exam  Nursing Notes and Vital Signs Reviewed.  Constitutional: Patient is in no acute distress. Well-developed and well-nourished.  Head: Atraumatic. Normocephalic.  Eyes: PERRL. EOM intact. Conjunctivae are not pale. No scleral icterus.  ENT: Mucous membranes are moist. Oropharynx is clear and symmetric.    Neck: Supple. Full ROM. No lymphadenopathy.  Cardiovascular: Regular rate. 2/6 systolic ejection murmur. Distal pulses are 2+ and symmetric.  Pulmonary/Chest: No respiratory distress. Clear to auscultation bilaterally. No wheezing or rales.  Abdominal: Soft and non-distended.  There is no tenderness.  No rebound, guarding, or rigidity.   : Suprapubic catheter in place.  Musculoskeletal: Moves all extremities. No obvious deformities. No edema.  Skin: Warm and dry.  Neurological:  Alert, awake, and appropriate.  Normal speech.  No acute focal neurological deficits are appreciated.  Psychiatric: Normal affect. Good eye contact. Appropriate in  content.    ED Course    Critical Care    Date/Time: 12/14/2023 4:59 PM    Performed by: Raegan Mary DO  Authorized by: Raegan Mary,   Direct patient critical care time: 10 minutes  Additional history critical care time: 5 minutes  Ordering / reviewing critical care time: 5 minutes  Documentation critical care time: 10 minutes  Consulting other physicians critical care time: 5 minutes  Total critical care time (exclusive of procedural time) : 35 minutes  Critical care time was exclusive of separately billable procedures and treating other patients and teaching time.  Critical care was necessary to treat or prevent imminent or life-threatening deterioration of the following conditions: respiratory failure.  Critical care was time spent personally by me on the following activities: development of treatment plan with patient or surrogate, discussions with consultants, evaluation of patient's response to treatment, examination of patient, obtaining history from patient or surrogate, ordering and performing treatments and interventions, ordering and review of laboratory studies, ordering and review of radiographic studies, pulse oximetry and re-evaluation of patient's condition.        ED Vital Signs:  Vitals:    12/14/23 1201 12/14/23 1258 12/14/23 1302 12/14/23 1330   BP: 126/70  (!) 169/78 (!) 145/70   Pulse: 70 66 70 66   Resp: 16  (!) 23 (!) 22   Temp: 99.2 °F (37.3 °C)      TempSrc: Oral      SpO2: (!) 93%  (!) 94% (!) 92%   Weight: 84.8 kg (187 lb)       12/14/23 1350 12/14/23 1400 12/14/23 1406 12/14/23 1408   BP:  (!) 142/80     Pulse: 68 69 67 73   Resp: 18 (!) 21 20 20   Temp:       TempSrc:       SpO2: 95% (!) 89% 96% 98%   Weight:        12/14/23 1423 12/14/23 1542 12/14/23 1601 12/14/23 1622   BP:  132/70 124/62    Pulse: 70 72 70    Resp: 20 20 19    Temp:    98.1 °F (36.7 °C)   TempSrc:    Oral   SpO2: (!) 91% (!) 86% (!) 83%    Weight:        12/14/23 1624 12/14/23 1800   BP:  (!) 155/75    Pulse: 71 67   Resp: (!) 26 19   Temp:     TempSrc:     SpO2: 97% (!) 91%   Weight:         Abnormal Lab Results:  Labs Reviewed   CBC W/ AUTO DIFFERENTIAL - Abnormal; Notable for the following components:       Result Value    Hemoglobin 11.8 (*)     Hematocrit 36.0 (*)     MCV 80 (*)     MCH 26.3 (*)     RDW 18.0 (*)     Gran % 77.2 (*)     Lymph % 11.9 (*)     All other components within normal limits   COMPREHENSIVE METABOLIC PANEL - Abnormal; Notable for the following components:    Potassium 3.4 (*)     CO2 20 (*)     Glucose 119 (*)     Albumin 3.3 (*)     All other components within normal limits   B-TYPE NATRIURETIC PEPTIDE - Abnormal; Notable for the following components:     (*)     All other components within normal limits   URINALYSIS, REFLEX TO URINE CULTURE - Abnormal; Notable for the following components:    Appearance, UA Hazy (*)     Protein, UA 1+ (*)     Occult Blood UA 2+ (*)     Leukocytes, UA 2+ (*)     All other components within normal limits    Narrative:     Specimen Source->Urine   URINALYSIS MICROSCOPIC - Abnormal; Notable for the following components:    RBC, UA 41 (*)     WBC, UA 11 (*)     Unclass Shakila UA Many (*)     All other components within normal limits    Narrative:     Specimen Source->Urine   TROPONIN I - Abnormal; Notable for the following components:    Troponin I 0.033 (*)     All other components within normal limits   SARS-COV-2 RNA AMPLIFICATION, QUAL - Abnormal; Notable for the following components:    SARS-CoV-2 RNA, Amplification, Qual Positive (*)     All other components within normal limits   ISTAT PROCEDURE - Abnormal; Notable for the following components:    POC PH 7.523 (*)     POC PCO2 23.7 (*)     POC PO2 69 (*)     POC HCO3 19.5 (*)     POC BE -3 (*)     POC Glucose 116 (*)     POC Sodium 131 (*)     POC Potassium 3.4 (*)     POC Hematocrit 30 (*)     All other components within normal limits   ISTAT PROCEDURE - Abnormal; Notable for the following  components:    POC PCO2 30.1 (*)     POC PO2 64 (*)     POC HCO3 19.4 (*)     POC BE -5 (*)     All other components within normal limits   INFLUENZA A & B BY MOLECULAR   CULTURE, BLOOD   CULTURE, BLOOD   CULTURE, URINE   TROPONIN I   LACTIC ACID, PLASMA   LACTIC ACID, PLASMA   PROCALCITONIN   MAGNESIUM   LACTIC ACID, PLASMA   MAGNESIUM   TROPONIN I        All Lab Results:  Results for orders placed or performed during the hospital encounter of 12/14/23   Influenza A & B by Molecular    Specimen: Nasopharyngeal Swab   Result Value Ref Range    Influenza A, Molecular Negative Negative    Influenza B, Molecular Negative Negative    Flu A & B Source Nasal swab    CBC Auto Differential   Result Value Ref Range    WBC 9.21 3.90 - 12.70 K/uL    RBC 4.49 4.00 - 5.40 M/uL    Hemoglobin 11.8 (L) 12.0 - 16.0 g/dL    Hematocrit 36.0 (L) 37.0 - 48.5 %    MCV 80 (L) 82 - 98 fL    MCH 26.3 (L) 27.0 - 31.0 pg    MCHC 32.8 32.0 - 36.0 g/dL    RDW 18.0 (H) 11.5 - 14.5 %    Platelets 364 150 - 450 K/uL    MPV 9.7 9.2 - 12.9 fL    Immature Granulocytes 0.3 0.0 - 0.5 %    Gran # (ANC) 7.1 1.8 - 7.7 K/uL    Immature Grans (Abs) 0.03 0.00 - 0.04 K/uL    Lymph # 1.1 1.0 - 4.8 K/uL    Mono # 1.0 0.3 - 1.0 K/uL    Eos # 0.0 0.0 - 0.5 K/uL    Baso # 0.03 0.00 - 0.20 K/uL    nRBC 0 0 /100 WBC    Gran % 77.2 (H) 38.0 - 73.0 %    Lymph % 11.9 (L) 18.0 - 48.0 %    Mono % 10.3 4.0 - 15.0 %    Eosinophil % 0.0 0.0 - 8.0 %    Basophil % 0.3 0.0 - 1.9 %    Differential Method Automated    Comprehensive Metabolic Panel   Result Value Ref Range    Sodium 139 136 - 145 mmol/L    Potassium 3.4 (L) 3.5 - 5.1 mmol/L    Chloride 107 95 - 110 mmol/L    CO2 20 (L) 23 - 29 mmol/L    Glucose 119 (H) 70 - 110 mg/dL    BUN 17 8 - 23 mg/dL    Creatinine 0.9 0.5 - 1.4 mg/dL    Calcium 9.5 8.7 - 10.5 mg/dL    Total Protein 6.6 6.0 - 8.4 g/dL    Albumin 3.3 (L) 3.5 - 5.2 g/dL    Total Bilirubin 0.6 0.1 - 1.0 mg/dL    Alkaline Phosphatase 109 55 - 135 U/L    AST 16  10 - 40 U/L    ALT 14 10 - 44 U/L    eGFR >60 >60 mL/min/1.73 m^2    Anion Gap 12 8 - 16 mmol/L   BNP   Result Value Ref Range     (H) 0 - 99 pg/mL   Troponin I   Result Value Ref Range    Troponin I 0.025 0.000 - 0.026 ng/mL   Lactic acid, plasma   Result Value Ref Range    Lactate (Lactic Acid) 2.0 0.5 - 2.2 mmol/L   Lactic acid, plasma #1   Result Value Ref Range    Lactate (Lactic Acid) 2.0 0.5 - 2.2 mmol/L   Urinalysis, Reflex to Urine Culture Urine, Catheterized    Specimen: Urine   Result Value Ref Range    Specimen UA Urine, Catheterized     Color, UA Yellow Yellow, Straw, Kailey    Appearance, UA Hazy (A) Clear    pH, UA 6.0 5.0 - 8.0    Specific Gravity, UA 1.020 1.005 - 1.030    Protein, UA 1+ (A) Negative    Glucose, UA Negative Negative    Ketones, UA Negative Negative    Bilirubin (UA) Negative Negative    Occult Blood UA 2+ (A) Negative    Nitrite, UA Negative Negative    Urobilinogen, UA Negative <2.0 EU/dL    Leukocytes, UA 2+ (A) Negative   Procalcitonin   Result Value Ref Range    Procalcitonin 0.04 <0.25 ng/mL   Urinalysis Microscopic   Result Value Ref Range    RBC, UA 41 (H) 0 - 4 /hpf    WBC, UA 11 (H) 0 - 5 /hpf    Bacteria None None-Occ /hpf    Hyaline Casts, UA 0 0-1/lpf /lpf    Unclass Shakila UA Many (A) None-Moderate    Microscopic Comment SEE COMMENT    Troponin I   Result Value Ref Range    Troponin I 0.033 (H) 0.000 - 0.026 ng/mL   Lactic acid, plasma #2   Result Value Ref Range    Lactate (Lactic Acid) 1.8 0.5 - 2.2 mmol/L   Magnesium   Result Value Ref Range    Magnesium 1.8 1.6 - 2.6 mg/dL   COVID-19 Rapid Screening   Result Value Ref Range    SARS-CoV-2 RNA, Amplification, Qual Positive (A) Negative   ISTAT PROCEDURE   Result Value Ref Range    POC PH 7.523 (H) 7.35 - 7.45    POC PCO2 23.7 (LL) 35 - 45 mmHg    POC PO2 69 (L) 80 - 100 mmHg    POC HCO3 19.5 (L) 24 - 28 mmol/L    POC BE -3 (L) -2 to 2 mmol/L    POC SATURATED O2 96 95 - 100 %    POC Glucose 116 (H) 70 - 110 mg/dL     POC Sodium 131 (L) 136 - 145 mmol/L    POC Potassium 3.4 (L) 3.5 - 5.1 mmol/L    POC Ionized Calcium 1.09 1.06 - 1.42 mmol/L    POC Hematocrit 30 (L) 36 - 54 %PCV    Sample ARTERIAL     Site LR     Allens Test Pass     DelSys Room Air     Mode SPONT     Sp02 90    ISTAT PROCEDURE   Result Value Ref Range    POC PH 7.417 7.35 - 7.45    POC PCO2 30.1 (L) 35 - 45 mmHg    POC PO2 64 (L) 80 - 100 mmHg    POC HCO3 19.4 (L) 24 - 28 mmol/L    POC BE -5 (L) -2 to 2 mmol/L    POC SATURATED O2 93 95 - 100 %    Sample ARTERIAL     Site RB     Allens Test Pass     DelSys Room Air     Mode SPONT     Sp02 90      *Note: Due to a large number of results and/or encounters for the requested time period, some results have not been displayed. A complete set of results can be found in Results Review.     Imaging Results:  Imaging Results              X-Ray Chest 1 View (Final result)  Result time 12/14/23 12:59:04      Final result by Sebastian Leon III, MD (12/14/23 12:59:04)                   Impression:      Cardiomegaly    Hyperinflation.    Interstitial opacities suggesting chronic interstitial lung disease or pulmonary venous hypertension.    No pleural effusion or soft tissue attenuation      Electronically signed by: Dontae Leon  Date:    12/14/2023  Time:    12:59               Narrative:    EXAMINATION:  XR CHEST 1 VIEW    CLINICAL HISTORY:  Shortness of breath    TECHNIQUE:  Single frontal view of the chest was performed.    COMPARISON:  10/11/2023    FINDINGS:  Cardiac assist device and lead wires appear unchanged.  The cardiac silhouette is enlarged, but unchanged.  The aorta is tortuous.  The lungs are hyperinflated.    Blunting of the left costophrenic angle which may be secondary to overlying soft tissue attenuation or pleural effusion.    The mediastinum is unremarkable.  Interstitial opacities with a basilar distribution suggesting chronic interstitial lung disease or pulmonary venous hypertension.   No pneumothorax or airspace consolidation.                                     The EKG was ordered, reviewed, and independently interpreted by the ED provider.  Interpretation time: 13:09  Rate: 66 BPM  Rhythm: Atrial fibrillation.  Interpretation: Baseline artifact. Nonspecific ST and T wave abnormalities. No STEMI.           The Emergency Provider reviewed the vital signs and test results, which are outlined above.    ED Discussion     3:28 PM: Re-evaluated pt. Pt is resting comfortably and is in no acute distress.  Pt states that she would like to see how she does on room air.  D/w pt all pertinent results. D/w pt any concerns expressed at this time. Answered all questions. Pt expresses understanding at this time.    4:00 PM: Dr. Sanchez transfers care of patient to Dr. Mary pending lab results.    5:00 PM: Discussed case with Dr. Joe ([facility]). Dr. Joe agrees with current care and management of pt and accepts admission.   Admitting Service:   Admitting Physician: Dr. Joe  Admit to: OBS Tele     5:00 PM: Re-evaluated pt. I have discussed test results, shared treatment plan, and the need for admission with patient and family at bedside. Pt and family express understanding at this time and agree with all information. All questions answered. Pt and family have no further questions or concerns at this time. Pt is ready for admit.     ED Course as of 12/14/23 1831   Thu Dec 14, 2023   1355 BNP(!): 392 [RASHAWN]   1355 WBC: 9.21 [RASHAWN]   1355 Hemoglobin(!): 11.8 [RASHAWN]   1355 Hematocrit(!): 36.0 [RASHAWN]   1356 Influenza A, Molecular: Negative [RASHAWN]   1356 Influenza B, Molecular: Negative [RASHAWN]   1425 POC PH(!): 7.523 [RASHAWN]   1425 POC PCO2(!!): 23.7 [RASHAWN]   1425 POC PCO2(!!): 23.7 [RASHAWN]   1425 POC PO2(!): 69 [RASHAWN]   1425 POC HCO3(!): 19.5 [RASHAWN]   1426 CO2(!): 20 [RASHAWN]   1426 BUN: 17 [RASHAWN]   1426 Creatinine: 0.9 [RASHAWN]   1426 Lactate, Ke: 2.0 [RASHAWN]   1426 Procalcitonin: 0.04 [RASHAWN]   1614 89 yo f presents with acute  respiratory failure with hypoxia likely due to a combo of COPD and CHF exacerbation. She has had multiple bronchodilator treatments and lasix IV but remains hypoxic on RA. No home O2. Cardiac workup shows no acute ischemic changes. BNP elevated with pulmonary edema on CXR. ? UTI but no PNA or Flu. Urine and blood cultures pending. Lactic acid and procal normal, no sepsis, severe sepsis, or septic shock. Mild hypokalemia replaced due to diuresis. No leukocytosis and mild microcytic anemia.  She is positive for COVID-19.  Her repeat troponin has trended upward and her lactic acid has trended downward.     [NF]   1829 SARS-CoV-2 RNA, Amplification, Qual(!): Positive [NF]   1829 Troponin I(!): 0.033 [NF]   1829 Lactate, Ke: 1.8 [NF]      ED Course User Index  [RASHAWN] Keith Sanchez MD  [NF] Raegan Mary, DO       ED Medication(s):  Medications   amiodarone tablet 200 mg (has no administration in time range)   apixaban tablet 2.5 mg (has no administration in time range)   dorzolamide 2 % ophthalmic solution 1 drop (has no administration in time range)   latanoprost 0.005 % ophthalmic solution 1 drop (has no administration in time range)   metoprolol tartrate (LOPRESSOR) tablet 25 mg (has no administration in time range)   potassium chloride SA CR tablet 20 mEq (has no administration in time range)   atorvastatin tablet 20 mg (has no administration in time range)   sodium chloride 0.9% flush 10 mL (has no administration in time range)   naloxone 0.4 mg/mL injection 0.02 mg (has no administration in time range)   glucose chewable tablet 16 g (has no administration in time range)   glucose chewable tablet 24 g (has no administration in time range)   glucagon (human recombinant) injection 1 mg (has no administration in time range)   acetaminophen tablet 650 mg (has no administration in time range)   senna-docusate 8.6-50 mg per tablet 1 tablet (has no administration in time range)   ondansetron injection 4 mg (has no  administration in time range)   melatonin tablet 6 mg (has no administration in time range)   methylPREDNISolone sodium succinate injection 60 mg (60 mg Intravenous Given 12/14/23 1701)   dextrose 10% bolus 125 mL 125 mL (has no administration in time range)   dextrose 10% bolus 250 mL 250 mL (has no administration in time range)   albuterol inhaler 2 puff (has no administration in time range)   azithromycin (ZITHROMAX) 500 mg in dextrose 5 % (D5W) 250 mL IVPB (Vial-Mate) (has no administration in time range)   benzonatate capsule 100 mg (has no administration in time range)   guaiFENesin 100 mg/5 ml syrup 200 mg (has no administration in time range)   prochlorperazine injection Soln 2.5 mg (has no administration in time range)   ondansetron injection 4 mg (4 mg Intravenous Given 12/14/23 1343)   albuterol-ipratropium 2.5 mg-0.5 mg/3 mL nebulizer solution 3 mL (3 mLs Nebulization Given 12/14/23 1350)   furosemide injection 60 mg (60 mg Intravenous Given 12/14/23 1442)   potassium bicarbonate disintegrating tablet 25 mEq (25 mEq Oral Given 12/14/23 1610)   cefTRIAXone (ROCEPHIN) 1 g in dextrose 5 % in water (D5W) 100 mL IVPB (MB+) (0 g Intravenous Stopped 12/14/23 1659)     New Prescriptions    No medications on file     Medical Decision Making    Medical Decision Making  Amount and/or Complexity of Data Reviewed  Labs: ordered. Decision-making details documented in ED Course.  Radiology: ordered and independent interpretation performed. Decision-making details documented in ED Course.  ECG/medicine tests: ordered and independent interpretation performed. Decision-making details documented in ED Course.    Risk  Prescription drug management.  Decision regarding hospitalization.                Scribe Attestation:   Scribe #1: I performed the above scribed service and the documentation accurately describes the services I performed. I attest to the accuracy of the note.    Attending:   Physician Attestation Statement  for Scribe #1: I, Keith Sanchez MD, personally performed the services described in this documentation, as scribed by Elías Hickman, in my presence, and it is both accurate and complete.       Scribe Attestation:   Scribe #2: I performed the above scribed service and the documentation accurately describes the services I performed. I attest to the accuracy of the note.    Attending Attestation:           Physician Attestation for Scribe:    Physician Attestation Statement for Scribe #2: I, Raegan Mary DO, reviewed documentation, as scribed by Luis Hemran in my presence, and it is both accurate and complete. I also acknowledge and confirm the content of the note done by Scribe #1.          Clinical Impression       ICD-10-CM ICD-9-CM   1. Acute respiratory failure with hypoxia  J96.01 518.81   2. Shortness of breath  R06.02 786.05   3. COPD with acute exacerbation  J44.1 491.21   4. Acute on chronic congestive heart failure, unspecified heart failure type  I50.9 428.0   5. Hypokalemia  E87.6 276.8   6. Anemia, unspecified type  D64.9 285.9   7. Urinary tract infection with hematuria, site unspecified  N39.0 599.0    R31.9 599.70   8. Chest pain  R07.9 786.50   9. COVID-19  U07.1 079.89       Disposition:   Disposition: Placed in Observation  Condition: Fair         Raegan Mary DO  12/14/23 1830       Raegan Mary DO  12/14/23 1831

## 2023-12-14 NOTE — PHARMACY MED REC
"  Admission Medication History     The home medication history was taken by Laila Ramos.    You may go to "Admission" then "Reconcile Home Medications" tabs to review and/or act upon these items.     The home medication list has been updated by the Pharmacy department.   Please read ALL comments highlighted in yellow.   Please address this information as you see fit.    Feel free to contact us if you have any questions or require assistance.      The medications listed below were removed from the home medication list. Please reorder if appropriate:  Patient reports no longer taking the following medication(s):  Tylenol 650mg  Zyloprim 100mg  Mylanta Max strength 786-700-95rs/5ml   Mag-Ox 400mg  Zofran 4mg    Medications listed below were obtained from: Patient/family, Medications brought from home, and Analytic software- Krista,family members at bedside  (Not in a hospital admission)      LAST MED REC COMPLETED:         Lailawill Floresson  [id number]      Current Outpatient Medications on File Prior to Encounter   Medication Sig Dispense Refill Last Dose    albuterol (PROVENTIL/VENTOLIN HFA) 90 mcg/actuation inhaler Inhale 1-2 puffs into the lungs every 4 (four) hours as needed for Wheezing or Shortness of Breath. Rescue 18 g 11 Past Week    albuterol-ipratropium (DUO-NEB) 2.5 mg-0.5 mg/3 mL nebulizer solution Take 3 mLs by nebulization every 6 (six) hours as needed for Wheezing or Shortness of Breath. 360 mL 11 Past Week    amiodarone (PACERONE) 200 MG Tab Take 1 tablet (200 mg total) by mouth once daily. 60 tablet 3 12/14/2023    amLODIPine (NORVASC) 2.5 MG tablet TAKE 1 TABLET BY MOUTH EVERY DAY (Patient taking differently: Take 2.5 mg by mouth every evening.) 30 tablet 10 12/13/2023    apixaban (ELIQUIS) 2.5 mg Tab Take 1 tablet (2.5 mg total) by mouth 2 (two) times daily. 180 tablet 3 12/14/2023    cetirizine HCl (ZYRTEC ORAL) Take 10 mg by mouth once daily.   12/14/2023    cholecalciferol, vitamin D3, 125 " mcg (5,000 unit) Tab Take 5,000 Units by mouth once daily.   12/13/2023    dorzolamide (TRUSOPT) 2 % ophthalmic solution INSTILL 1 DROP INTO BOTH EYES TWICE DAILY 30 mL 1 12/13/2023    fluticasone (FLONASE) 50 mcg/actuation nasal spray 2 sprays (100 mcg total) by Each Nare route daily as needed for Allergies. 16 g 11 Past Week    fluticasone furoate-vilanteroL (BREO ELLIPTA) 100-25 mcg/dose diskus inhaler Inhale 1 puff into the lungs once daily. 90 each 11 Past Week    furosemide (LASIX) 40 MG tablet Take 1 tablet (40 mg total) by mouth 2 (two) times a day. Take twice a day and monitor BP and weights 90 tablet 1 12/14/2023    Lactobacillus rhamnosus GG (CULTURELLE) 10 billion cell capsule Take 1 capsule by mouth once daily.   12/13/2023    latanoprost 0.005 % ophthalmic solution Place 1 drop into both eyes every evening. 5 mL 6 12/13/2023    metoprolol tartrate (LOPRESSOR) 25 MG tablet Take 1 tablet (25 mg total) by mouth 2 (two) times daily. 180 tablet 1 12/13/2023    nitroGLYCERIN 0.4 MG/HR TD PT24 (NITRODUR) 0.4 mg/hr Place 1 patch onto the skin once daily. 90 patch 3 12/13/2023    potassium chloride (KLOR-CON) 10 MEQ TbSR Take 2 tablets (20 mEq total) by mouth once daily. 180 tablet 1 12/13/2023    rosuvastatin (CRESTOR) 40 MG Tab TAKE 1 TABLET (40 MG TOTAL) BY MOUTH ONCE DAILY. (Patient taking differently: Take 40 mg by mouth every evening.) 90 tablet 3 12/13/2023                         .

## 2023-12-14 NOTE — ASSESSMENT & PLAN NOTE
Patient is identified as Severe COVID-19 based on hypoxemia with O2 saturations <94% on room air or on ambulation   Initiate standard COVID protocols; COVID-19 testing ,Infection Control notification  and isolation- respiratory, contact and droplet per protocol    Diagnostics: CBC, CMP, and Portable CXR    Management: Maintain oxygen saturations 92-96% via Nasal Cannula 2 LPM and monitor with continuous/intermittent pulse oximetry. , Inhaled bronchodilators as needed for shortness of breath., and Continuous cardiac monitoring.    Advance Care Planning  Current advance care plan has been discussed with patient/family/POA and patient currently wishes Full Code.     - originally tested positive for COVID at outside facility on 12/6  - continue supportive care

## 2023-12-14 NOTE — ASSESSMENT & PLAN NOTE
Creatine stable for now. BMP reviewed- noted Estimated Creatinine Clearance: 44.7 mL/min (based on SCr of 0.9 mg/dL). according to latest data. Based on current GFR, CKD stage is stage 3 - GFR 30-59.  Monitor UOP and serial BMP and adjust therapy as needed. Renally dose meds. Avoid nephrotoxic medications and procedures.    - baseline around 1.1, monitor closely will on iv diuretics

## 2023-12-14 NOTE — ASSESSMENT & PLAN NOTE
Patient with known CAD s/p stent placement, which is controlled Will continue  eliquis and Statin and monitor for S/Sx of angina/ACS. Continue to monitor on telemetry.

## 2023-12-15 ENCOUNTER — TELEPHONE (OUTPATIENT)
Dept: CARDIOLOGY | Facility: HOSPITAL | Age: 88
End: 2023-12-15
Payer: MEDICARE

## 2023-12-15 LAB
ANION GAP SERPL CALC-SCNC: 12 MMOL/L (ref 8–16)
BASOPHILS # BLD AUTO: 0.01 K/UL (ref 0–0.2)
BASOPHILS NFR BLD: 0.2 % (ref 0–1.9)
BUN SERPL-MCNC: 18 MG/DL (ref 8–23)
CALCIUM SERPL-MCNC: 8.9 MG/DL (ref 8.7–10.5)
CHLORIDE SERPL-SCNC: 106 MMOL/L (ref 95–110)
CO2 SERPL-SCNC: 21 MMOL/L (ref 23–29)
CREAT SERPL-MCNC: 1 MG/DL (ref 0.5–1.4)
DIFFERENTIAL METHOD: ABNORMAL
EOSINOPHIL # BLD AUTO: 0 K/UL (ref 0–0.5)
EOSINOPHIL NFR BLD: 0 % (ref 0–8)
ERYTHROCYTE [DISTWIDTH] IN BLOOD BY AUTOMATED COUNT: 18.1 % (ref 11.5–14.5)
EST. GFR  (NO RACE VARIABLE): 54 ML/MIN/1.73 M^2
GLUCOSE SERPL-MCNC: 141 MG/DL (ref 70–110)
HCT VFR BLD AUTO: 34 % (ref 37–48.5)
HGB BLD-MCNC: 10.9 G/DL (ref 12–16)
IMM GRANULOCYTES # BLD AUTO: 0.02 K/UL (ref 0–0.04)
IMM GRANULOCYTES NFR BLD AUTO: 0.5 % (ref 0–0.5)
LYMPHOCYTES # BLD AUTO: 0.6 K/UL (ref 1–4.8)
LYMPHOCYTES NFR BLD: 14.6 % (ref 18–48)
MCH RBC QN AUTO: 26 PG (ref 27–31)
MCHC RBC AUTO-ENTMCNC: 32.1 G/DL (ref 32–36)
MCV RBC AUTO: 81 FL (ref 82–98)
MONOCYTES # BLD AUTO: 0.2 K/UL (ref 0.3–1)
MONOCYTES NFR BLD: 4.2 % (ref 4–15)
NEUTROPHILS # BLD AUTO: 3.4 K/UL (ref 1.8–7.7)
NEUTROPHILS NFR BLD: 80.5 % (ref 38–73)
NRBC BLD-RTO: 0 /100 WBC
PLATELET # BLD AUTO: 361 K/UL (ref 150–450)
PMV BLD AUTO: 10.7 FL (ref 9.2–12.9)
POTASSIUM SERPL-SCNC: 3.6 MMOL/L (ref 3.5–5.1)
RBC # BLD AUTO: 4.19 M/UL (ref 4–5.4)
SODIUM SERPL-SCNC: 139 MMOL/L (ref 136–145)
WBC # BLD AUTO: 4.24 K/UL (ref 3.9–12.7)

## 2023-12-15 PROCEDURE — 94660 CPAP INITIATION&MGMT: CPT

## 2023-12-15 PROCEDURE — 97166 OT EVAL MOD COMPLEX 45 MIN: CPT

## 2023-12-15 PROCEDURE — 85025 COMPLETE CBC W/AUTO DIFF WBC: CPT | Performed by: NURSE PRACTITIONER

## 2023-12-15 PROCEDURE — 97162 PT EVAL MOD COMPLEX 30 MIN: CPT

## 2023-12-15 PROCEDURE — 25000003 PHARM REV CODE 250: Performed by: NURSE PRACTITIONER

## 2023-12-15 PROCEDURE — 63600175 PHARM REV CODE 636 W HCPCS: Performed by: NURSE PRACTITIONER

## 2023-12-15 PROCEDURE — 27000190 HC CPAP FULL FACE MASK W/VALVE

## 2023-12-15 PROCEDURE — 36415 COLL VENOUS BLD VENIPUNCTURE: CPT | Performed by: NURSE PRACTITIONER

## 2023-12-15 PROCEDURE — 99900035 HC TECH TIME PER 15 MIN (STAT)

## 2023-12-15 PROCEDURE — 80048 BASIC METABOLIC PNL TOTAL CA: CPT | Performed by: NURSE PRACTITIONER

## 2023-12-15 PROCEDURE — 97530 THERAPEUTIC ACTIVITIES: CPT

## 2023-12-15 PROCEDURE — 97110 THERAPEUTIC EXERCISES: CPT

## 2023-12-15 PROCEDURE — G0378 HOSPITAL OBSERVATION PER HR: HCPCS

## 2023-12-15 PROCEDURE — 96366 THER/PROPH/DIAG IV INF ADDON: CPT

## 2023-12-15 PROCEDURE — 96376 TX/PRO/DX INJ SAME DRUG ADON: CPT

## 2023-12-15 RX ORDER — FUROSEMIDE 10 MG/ML
40 INJECTION INTRAMUSCULAR; INTRAVENOUS
Status: DISCONTINUED | OUTPATIENT
Start: 2023-12-15 | End: 2023-12-16 | Stop reason: HOSPADM

## 2023-12-15 RX ADMIN — ATORVASTATIN CALCIUM 20 MG: 10 TABLET, FILM COATED ORAL at 08:12

## 2023-12-15 RX ADMIN — SENNOSIDES AND DOCUSATE SODIUM 1 TABLET: 8.6; 5 TABLET ORAL at 08:12

## 2023-12-15 RX ADMIN — APIXABAN 2.5 MG: 2.5 TABLET, FILM COATED ORAL at 09:12

## 2023-12-15 RX ADMIN — METHYLPREDNISOLONE SODIUM SUCCINATE 40 MG: 40 INJECTION, POWDER, FOR SOLUTION INTRAMUSCULAR; INTRAVENOUS at 06:12

## 2023-12-15 RX ADMIN — APIXABAN 2.5 MG: 2.5 TABLET, FILM COATED ORAL at 08:12

## 2023-12-15 RX ADMIN — AMIODARONE HYDROCHLORIDE 200 MG: 200 TABLET ORAL at 09:12

## 2023-12-15 RX ADMIN — METOPROLOL TARTRATE 25 MG: 25 TABLET, FILM COATED ORAL at 08:12

## 2023-12-15 RX ADMIN — FUROSEMIDE 40 MG: 10 INJECTION, SOLUTION INTRAMUSCULAR; INTRAVENOUS at 11:12

## 2023-12-15 RX ADMIN — SENNOSIDES AND DOCUSATE SODIUM 1 TABLET: 8.6; 5 TABLET ORAL at 09:12

## 2023-12-15 RX ADMIN — LATANOPROST 1 DROP: 50 SOLUTION OPHTHALMIC at 08:12

## 2023-12-15 RX ADMIN — METHYLPREDNISOLONE SODIUM SUCCINATE 40 MG: 40 INJECTION, POWDER, FOR SOLUTION INTRAMUSCULAR; INTRAVENOUS at 12:12

## 2023-12-15 RX ADMIN — AZITHROMYCIN MONOHYDRATE 500 MG: 500 INJECTION, POWDER, LYOPHILIZED, FOR SOLUTION INTRAVENOUS at 08:12

## 2023-12-15 RX ADMIN — POTASSIUM CHLORIDE 20 MEQ: 1500 TABLET, EXTENDED RELEASE ORAL at 09:12

## 2023-12-15 RX ADMIN — FUROSEMIDE 40 MG: 10 INJECTION, SOLUTION INTRAMUSCULAR; INTRAVENOUS at 12:12

## 2023-12-15 RX ADMIN — METOPROLOL TARTRATE 25 MG: 25 TABLET, FILM COATED ORAL at 09:12

## 2023-12-15 RX ADMIN — DORZOLAMIDE 1 DROP: 20 SOLUTION/ DROPS OPHTHALMIC at 08:12

## 2023-12-15 RX ADMIN — METHYLPREDNISOLONE SODIUM SUCCINATE 40 MG: 40 INJECTION, POWDER, FOR SOLUTION INTRAMUSCULAR; INTRAVENOUS at 11:12

## 2023-12-15 RX ADMIN — DORZOLAMIDE 1 DROP: 20 SOLUTION/ DROPS OPHTHALMIC at 09:12

## 2023-12-15 RX ADMIN — METHYLPREDNISOLONE SODIUM SUCCINATE 60 MG: 40 INJECTION, POWDER, FOR SOLUTION INTRAMUSCULAR; INTRAVENOUS at 05:12

## 2023-12-15 NOTE — PLAN OF CARE
[facility] - Telemetry (Hospital)  Initial Discharge Assessment       Primary Care Provider: Marcia Carlisle MD    Admission Diagnosis: Shortness of breath [R06.02]  Hypokalemia [E87.6]  Acute respiratory failure with hypoxia [J96.01]  Chest pain [R07.9]  COPD with acute exacerbation [J44.1]  Urinary tract infection with hematuria, site unspecified [N39.0, R31.9]  Anemia, unspecified type [D64.9]  Acute on chronic congestive heart failure, unspecified heart failure type [I50.9]  COVID-19 [U07.1]    Admission Date: 12/14/2023  Expected Discharge Date:     Transition of Care Barriers: (P) None    Payor: [employer] MEDICARE / Plan: [employer] MEDICARE HMO / Product Type: Capitation /     Extended Emergency Contact Information  Primary Emergency Contact: Annabel Jolly   [place]  Mobile Phone: [phone]  Relation: Daughter  Secondary Emergency Contact: Claudia Vasquez  Mobile Phone: [phone]  Relation: Daughter    Discharge Plan A: (P) Home Health, Home with family         [facility] [id number]  [address] [address] [address]  Phone: [phone] Fax: [phone]      Initial Assessment (most recent)       Adult Discharge Assessment - 12/15/23 1602          Discharge Assessment    Assessment Type Discharge Planning Assessment (P)      Confirmed/corrected address, phone number and insurance Yes (P)      Confirmed Demographics Correct on Facesheet (P)      Source of Information family (P)      Communicated SCOTT with patient/caregiver Date not available/Unable to determine (P)      Reason For Admission Covid (P)      People in Home child(yuan), adult (P)      Facility Arrived From: home (P)      Do you expect to return to your current living situation? Yes (P)      Do you have help at home or someone to help you manage your care at home? Yes (P)      Who are your caregiver(s) and their phone number(s)? daughter (P)      Prior to hospitilization cognitive  status: Alert/Oriented (P)      Current cognitive status: Alert/Oriented (P)      Walking or Climbing Stairs Difficulty yes (P)      Walking or Climbing Stairs ambulation difficulty, requires equipment (P)      Mobility Management uses walker/ wheelchair (P)      Dressing/Bathing Difficulty yes (P)      Dressing/Bathing bathing difficulty, assistance 1 person (P)      Dressing/Bathing Management stand by assistance of daughter (P)      Equipment Currently Used at Home cane, straight;bedside commode;CPAP;walker, rolling;shower chair;grab bar (P)      Readmission within 30 days? No (P)      Patient currently being followed by outpatient case management? No (P)      Do you currently have service(s) that help you manage your care at home? Yes (P)      Name and Contact number of agency [facility] for suprapubic french cath changes (P)      Is the pt/caregiver preference to resume services with current agency Yes (P)      Do you take prescription medications? Yes (P)      Do you have prescription coverage? Yes (P)      Coverage [employer] (P)      Do you have any problems affording any of your prescribed medications? No (P)      Who is going to help you get home at discharge? daughter (P)      How do you get to doctors appointments? family or friend will provide (P)      Are you on dialysis? No (P)      Do you take coumadin? No (P)      Discharge Plan A Home Health;Home with family (P)      DME Needed Upon Discharge  oxygen (P)      Discharge Plan discussed with: Adult children (P)      Transition of Care Barriers None (P)                    Spoke with patient  daughter, Claudia.  Patient live with Claudia who can be her help at home.  Her other daughter, Annabel assists with care and help transport her to the doctor.  Patient is moderately independent with ADL's with assistive equipment.  She has a suprapubic catheter that is [facility] x per month.  Discharge plan is to resume [facility].  May need home 02.

## 2023-12-15 NOTE — ASSESSMENT & PLAN NOTE
Patient with Hypoxic Respiratory failure which is Acute.  she is not on home oxygen. Supplemental oxygen was provided and noted- Oxygen Concentration (%):  [36] 36    .   Signs/symptoms of respiratory failure include- tachypnea, increased work of breathing, and wheezing. Contributing diagnoses includes - COPD and COVID  Labs and images were reviewed. Patient Has recent ABG, which has been reviewed. Will treat underlying causes and adjust management of respiratory failure as follows    - continue albuterol inhaler/steroids  - diuresis with iv lasix   - wean o2 as patient tolerates

## 2023-12-15 NOTE — ASSESSMENT & PLAN NOTE
Patient with Paroxysmal (<7 days) atrial fibrillation which is controlled currently with Beta Blocker and Amiodarone. Patient is currently in sinus rhythm.[id number] Score: 4. HASBLED Anticoagulation indicated. Anticoagulation done with eliquis .    - follows with Dr. Seth cardioverted (JÚNIOR/DCCV) on 10/5/23 with Sotalol transitioned to Amiodarone

## 2023-12-15 NOTE — PT/OT/SLP EVAL
Physical Therapy Evaluation    Patient Name:  Holli Landrum   MRN:  [id number]    Recommendations:     Discharge Recommendations: Low Intensity Therapy   Discharge Equipment Recommendations: none   Barriers to discharge: None    Assessment:     Holli Landrum is a 89 y.o. female admitted with a medical diagnosis of Acute hypoxic respiratory failure.  She presents with the following impairments/functional limitations: weakness, impaired endurance, impaired self care skills, impaired functional mobility, gait instability, impaired balance, decreased lower extremity function, impaired cardiopulmonary response to activity, decreased ROM .    Rehab Prognosis: Fair; patient would benefit from acute skilled PT services to address these deficits and reach maximum level of function.    Recent Surgery: * No surgery found *      Plan:     During this hospitalization, patient to be seen 3 x/week to address the identified rehab impairments via gait training, therapeutic activities, therapeutic exercises and progress toward the following goals:    Plan of Care Expires:  12/29/23    Subjective     Chief Complaint: WEAKNESS   Patient/Family Comments/goals: GO HOME   Pain/Comfort:  Pain Rating 1: 0/10  Pain Rating Post-Intervention 1: 0/10    Patients cultural, spiritual, Gnosticism conflicts given the current situation:      Living Environment:   PT LIVES AT HOME WITH DAUGHTER IN A ONE STORY HOME WITH RAMP TO ENTER HOME   Prior to admission, patients level of function was ANNABEL>TO ASSIST AT TIMES WITH GROSS FUNC MOBILITY .  Equipment used at home: wheelchair, walker, rolling, shower chair, grab bar.  DME owned (not currently used): none.  Upon discharge, patient will have assistance from FAMILY .    Objective:     Communicated with NURSE LOZANO AND EPIC CHART REVIEW  prior to session.  Patient found supine with peripheral IV, telemetry, french catheter, oxygen  upon PT entry to room.    General Precautions: Standard, fall, airborne,  "contact, droplet  Orthopedic Precautions:N/A   Braces: N/A  Respiratory Status: Nasal cannula, flow 3 L/min    Exams:  Cognitive Exam:  Patient is oriented to Person, Place, Time, and Situation  RLE ROM: WFL  RLE Strength: WFL  LLE ROM: WFL  LLE Strength: WFL    Functional Mobility:  Bed Mobility:     Rolling Left:  modified independence  Supine to Sit: modified independence  Transfers:     Sit to Stand:  contact guard assistance with rolling walker  Bed to Chair: contact guard assistance with  rolling walker  using  Stand Pivot  Gait: PT WITH PRE-GT MIP X 20 REPS WITH CGA       AM-PAC 6 CLICK MOBILITY  Total Score:14       Treatment & Education:  PT WITH FF POSTURE AND INC KYPHOSIS NOTED . PT WITH HOB ELEVATED WITH ALL BED MOBILITY HOWEVER PT HAS ADJUSTABLE BED AT HOME. PT SEATED IN CHAIR AND EDUCATED ON ROLE OF P.T. AND PT EDUCATED ON "CALL DON'T FALL", ENCOURAGED TO CALL FOR ASSISTANCE WITH ALL NEEDS FOR OOB MOBILITY. PT COMPLETED B LE TE X 10 REPS OF AP, TKE, AND MIP. PT LEFT SEATED WITH FAMILY PRESENT BREAKFAST SET UP, AND ALL NEEDS MET.       Patient left up in chair with call button in reach.    GOALS:   Multidisciplinary Problems       Physical Therapy Goals          Problem: Physical Therapy    Goal Priority Disciplines Outcome Goal Variances Interventions   Physical Therapy Goal     PT, PT/OT      Description: LT23  1. PT WILL COMPLETE BED MOBILITY MOD I  2. PT WILL STAND T/F TO CHAIR WITH RW AND S FOR OOB TOLERANCE.  3. PT WILL GT TRAIN X 20' WITH RW AND CGA TO PROGRESS GT.  4. PT WILL INC AMPAC SCORE BY 2 POINTS TO PROGRESS GROSS FUNC MOBILITY.                          History:     Past Medical History:   Diagnosis Date    Anticoagulant long-term use     Arthritis     Asthma     Basal cell carcinoma     Cancer     skin cancer to face    Cataract     OU done//    CHF (congestive heart failure)     COPD (chronic obstructive pulmonary disease)     no oxygen; patient denies    cpap     Essential " hypertension 05/05/2010    GERD (gastroesophageal reflux disease) 11/20/2012    Glaucoma     Hypertensive heart disease with heart failure 02/05/2013    Paroxysmal atrial fibrillation     Paroxysmal ventricular tachycardia     per problem list    Renal disorder     french-1/3/2020    Squamous cell carcinoma of skin        Past Surgical History:   Procedure Laterality Date    A-V CARDIAC PACEMAKER INSERTION  06/16/2021    Procedure: INSERTION, CARDIAC PACEMAKER, DUAL CHAMBER;  Surgeon: Oscar Sommesr MD;  Location: [facility] CATH;  Service: Cardiovascular;;    ADENOIDECTOMY  191944    APPENDECTOMY  1948    Because of Ovarian Cyst surgery    BREAST BIOPSY Left 1995    neg    BREAST BIOPSY Right 1984    neg    BREAST BIOPSY Right 1992    neg    BREAST SURGERY      A number of biopsies    CARDIAC CATHETERIZATION  2013, 2014,2016, 2020    has 9 stents    CARDIAC SURGERY  2012    stents    CATARACT EXTRACTION W/  INTRAOCULAR LENS IMPLANT Left 11/01/2018    Dr Rose    CATARACT EXTRACTION W/  INTRAOCULAR LENS IMPLANT Right 12/13/2018    Dr Rose//    CHOLECYSTECTOMY      COLONOSCOPY  ~2005    Dr. Edward; normal per patient report    COLONOSCOPY N/A 09/06/2022    Procedure: COLONOSCOPY 8/31/22-note in Dr Simms's office visit note that he spoke to her cardiologist and she was viable candidate for endoscopy;  Surgeon: Cordelia Bueno MD;  Location: [facility];  Service: Endoscopy;  Laterality: N/A;    CORONARY ANGIOGRAPHY N/A 03/20/2020    Procedure: ANGIOGRAM, CORONARY ARTERY;  Surgeon: Chuy Bryant MD;  Location: [facility] CATH;  Service: Cardiology;  Laterality: N/A;    CYSTOSCOPY W/ RETROGRADES Bilateral 02/12/2020    Procedure: CYSTOSCOPY, WITH RETROGRADE PYELOGRAM;  Surgeon: Gasper Feliz MD;  Location: [facility] OR;  Service: Urology;  Laterality: Bilateral;    ESOPHAGOGASTRODUODENOSCOPY N/A 08/13/2020    Procedure: EGD (ESOPHAGOGASTRODUODENOSCOPY);  Surgeon: Mika Solorzano MD;  Location: [facility];  Service:  Endoscopy;  Laterality: N/A; Mild Schatzki ring. Biopsied. Dilated. small hiatal hernia, gastritis; biopsy: esophagus- SEVERE REFLUX ESOPHAGITIS, stomach- chronic gastritis, negative for H pylori    ESOPHAGOGASTRODUODENOSCOPY N/A 10/22/2020    Procedure: EGD (ESOPHAGOGASTRODUODENOSCOPY);  Surgeon: Fred Hendricks MD;  Location: [facility];  Service: Endoscopy;  Laterality: N/A;    EYE SURGERY  2017    Cataracs    FRACTIONAL FLOW RESERVE (FFR), CORONARY  6/20/2023    Procedure: Fractional Flow Thayer (FFR), Coronary;  Surgeon: Brice Campuzano MD;  Location: [facility] CATH LAB;  Service: Cardiology;;    HYSTERECTOMY  1969    INSTANTANEOUS WAVE-FREE RATIO (IFR) N/A 6/20/2023    Procedure: Instantaneous Wave-Free Ratio (IFR);  Surgeon: Brice Campuzano MD;  Location: [facility] CATH LAB;  Service: Cardiology;  Laterality: N/A;    LEFT HEART CATHETERIZATION Left 03/03/2020    Procedure: Left heart cath;  Surgeon: Chuy Bryant MD;  Location: [facility] CATH;  Service: Cardiology;  Laterality: Left;    LEFT HEART CATHETERIZATION Left 03/20/2020    Procedure: Left heart cath;  Surgeon: Chuy Bryant MD;  Location: [facility] CATH;  Service: Cardiology;  Laterality: Left;    LEFT HEART CATHETERIZATION N/A 6/20/2023    Procedure: Left heart cath;  Surgeon: Brice Campuzano MD;  Location: [facility] CATH LAB;  Service: Cardiology;  Laterality: N/A;    OVARIAN CYST REMOVAL  teenager    PHACOEMULSIFICATION OF CATARACT Left 11/01/2018    Procedure: PHACOEMULSIFICATION, CATARACT;  Surgeon: Aleksandar Rose Jr., MD;  Location: [facility] OR;  Service: Ophthalmology;  Laterality: Left;    PHACOEMULSIFICATION OF CATARACT Right 12/13/2018    Procedure: PHACOEMULSIFICATION, CATARACT;  Surgeon: Aleksandar Rose Jr., MD;  Location: [facility] OR;  Service: Ophthalmology;  Laterality: Right;    TONSILLECTOMY      aw/denoids    TRANSESOPHAGEAL ECHOCARDIOGRAM WITH POSSIBLE CARDIOVERSION (JÚNIOR W/ POSS CARDIOVERSION) N/A 10/5/2023    Procedure: Transesophageal echo (JÚNIOR)  intra-procedure log documentation/alfred/cv;  Surgeon: Bao Miguel MD;  Location: [facility] CATH LAB;  Service: Cardiology;  Laterality: N/A;   Alfred/Cv  MRI safe   Pacer & leads implanted 6/16/21, Antoun   Bio Edora 8 MICHELLE, [id number], PID: [id number]   A lead: Bio Solia S45, [id number]   V lead: Bio Solia S53, [id number]    TREATMENT OF CARDIAC ARRHYTHMIA N/A 10/5/2023    Procedure: Cardioversion or Defibrillation;  Surgeon: Bao Miguel MD;  Location: [facility] CATH LAB;  Service: Cardiology;  Laterality: N/A;    UPPER GASTROINTESTINAL ENDOSCOPY  ~2005    Dr. Solorzano    VALVE STUDY-AORTIC  6/20/2023    Procedure: Valve study-aortic;  Surgeon: Brice Campuzano MD;  Location: [facility] CATH LAB;  Service: Cardiology;;    VASCULAR SURGERY      to remove clot from right leg    Yag Capsulotomy Bilateral 11/05/2019    Dr Rose       Time Tracking:     PT Received On: 12/15/23  PT Start Time: 0755     PT Stop Time: 0820  PT Total Time (min): 25 min     Billable Minutes: Evaluation 15 and Therapeutic Exercise 10      12/15/2023

## 2023-12-15 NOTE — SUBJECTIVE & OBJECTIVE
Interval History: feels much better this am per patient, no more wheezing, o2 2 liters, good urine output     Review of Systems  Objective:     Vital Signs (Most Recent):  Temp: 97.7 °F (36.5 °C) (12/15/23 0749)  Pulse: 63 (12/15/23 0801)  Resp: 18 (12/15/23 0749)  BP: 121/68 (12/15/23 0749)  SpO2: 95 % (12/15/23 0749) Vital Signs (24h Range):  Temp:  [97.7 °F (36.5 °C)-99.2 °F (37.3 °C)] 97.7 °F (36.5 °C)  Pulse:  [60-73] 63  Resp:  [16-26] 18  SpO2:  [83 %-99 %] 95 %  BP: (109-169)/(60-85) 121/68     Weight: 95.4 kg (210 lb 5.1 oz)  Body mass index is 35 kg/m².    Intake/Output Summary (Last 24 hours) at 12/15/2023 1111  Last data filed at 12/15/2023 0606  Gross per 24 hour   Intake 347.1 ml   Output 1000 ml   Net -652.9 ml         Physical Exam  Vitals and nursing note reviewed.   Cardiovascular:      Rate and Rhythm: Normal rate and regular rhythm.      Pulses: Normal pulses.      Heart sounds: Normal heart sounds.   Pulmonary:      Effort: Pulmonary effort is normal.      Comments: Breath sounds diminished, 2liters nasal cannula   Abdominal:      General: Bowel sounds are normal. There is no distension.      Palpations: Abdomen is soft.      Tenderness: There is no abdominal tenderness.   Genitourinary:     Comments: Ballard with yellow urine   Musculoskeletal:         General: Normal range of motion.   Skin:     General: Skin is warm and dry.   Neurological:      Mental Status: She is alert and oriented to person, place, and time.             Significant Labs: All pertinent labs within the past 24 hours have been reviewed.    Significant Imaging: I have reviewed all pertinent imaging results/findings within the past 24 hours.

## 2023-12-15 NOTE — PROGRESS NOTES
[facility] Medicine  Progress Note    Patient Name: Holli Landrum  MRN: [id number]  Patient Class: OP- Observation   Admission Date: 12/14/2023  Length of Stay: 0 days  Attending Physician: Luis Joe MD  Primary Care Provider: Marcia Carlisle MD        Subjective:     Principal Problem:Acute hypoxic respiratory failure        HPI:  Ms. Landrum is a 89 y.o. female pt with  history of persistent atrial fibrillation, CAD s/p PCI, SSS s/p  pacemaker, diastolic congestive heart failure, aortic stenosis, history of GI bleed, COPD, JA on cpap who presents to the ED with dyspnea that worsened last night.  Per patient she tested positive for COVID at [facility] on Dec 6th.  She has had intermittent fever and clear productive cough since that time.  She reports that her dyspnea worsened last night and per her home pulse ox o2 sats were 85%.  In the ED, patient patient hypoxic and wheezing, placed on 3 liters and given multiple breathing treatments with improvement.  Labs with K 3.4, CO2 20.  WBC normal.  , Trop 0.033.  Flu negative.  Chest xray with ILD and pulmonary edema.  Patient was given lasix IV with 400ml urine output currently.  Patient will be admitted to observation for acute hypoxic resp failure d/t covid/copd exacerbation.     Code Status Full  Surrogate Decision Maker daughter       Overview/Hospital Course:  Ms. Landrum is a 89 y.o. female pt with  history of persistent atrial fibrillation, CAD s/p PCI, SSS s/p  pacemaker, diastolic congestive heart failure, aortic stenosis, history of GI bleed, COPD, JA on cpap who was admitted to [facility] with acute hypoxic resp failure d/t covid/copd exacerbation. She was started on IV abx, IV steroids and albuterol inhaler with noted improvement in dyspnea.  O2 weaned from 4 liters to 2 liters today.      Interval History: feels much better this am per patient, no more wheezing, o2 2 liters, good urine output     Review of  Systems  Objective:     Vital Signs (Most Recent):  Temp: 97.7 °F (36.5 °C) (12/15/23 0749)  Pulse: 63 (12/15/23 0801)  Resp: 18 (12/15/23 0749)  BP: 121/68 (12/15/23 0749)  SpO2: 95 % (12/15/23 0749) Vital Signs (24h Range):  Temp:  [97.7 °F (36.5 °C)-99.2 °F (37.3 °C)] 97.7 °F (36.5 °C)  Pulse:  [60-73] 63  Resp:  [16-26] 18  SpO2:  [83 %-99 %] 95 %  BP: (109-169)/(60-85) 121/68     Weight: 95.4 kg (210 lb 5.1 oz)  Body mass index is 35 kg/m².    Intake/Output Summary (Last 24 hours) at 12/15/2023 1111  Last data filed at 12/15/2023 0606  Gross per 24 hour   Intake 347.1 ml   Output 1000 ml   Net -652.9 ml         Physical Exam  Vitals and nursing note reviewed.   Cardiovascular:      Rate and Rhythm: Normal rate and regular rhythm.      Pulses: Normal pulses.      Heart sounds: Normal heart sounds.   Pulmonary:      Effort: Pulmonary effort is normal.      Comments: Breath sounds diminished, 2liters nasal cannula   Abdominal:      General: Bowel sounds are normal. There is no distension.      Palpations: Abdomen is soft.      Tenderness: There is no abdominal tenderness.   Genitourinary:     Comments: Ballard with yellow urine   Musculoskeletal:         General: Normal range of motion.   Skin:     General: Skin is warm and dry.   Neurological:      Mental Status: She is alert and oriented to person, place, and time.             Significant Labs: All pertinent labs within the past 24 hours have been reviewed.    Significant Imaging: I have reviewed all pertinent imaging results/findings within the past 24 hours.    Assessment/Plan:      * Acute hypoxic respiratory failure  Patient with Hypoxic Respiratory failure which is Acute.  she is not on home oxygen. Supplemental oxygen was provided and noted- Oxygen Concentration (%):  [36] 36    .   Signs/symptoms of respiratory failure include- tachypnea, increased work of breathing, and wheezing. Contributing diagnoses includes - COPD and COVID  Labs and images were  reviewed. Patient Has recent ABG, which has been reviewed. Will treat underlying causes and adjust management of respiratory failure as follows    - continue albuterol inhaler/steroids  - diuresis with iv lasix   - wean o2 as patient tolerates      COVID  Patient is identified as Severe COVID-19 based on hypoxemia with O2 saturations <94% on room air or on ambulation   Initiate standard COVID protocols; COVID-19 testing ,Infection Control notification  and isolation- respiratory, contact and droplet per protocol    Diagnostics: CBC, CMP, and Portable CXR    Management: Maintain oxygen saturations 92-96% via Nasal Cannula 4 LPM and monitor with continuous/intermittent pulse oximetry. , Inhaled bronchodilators as needed for shortness of breath., and Continuous cardiac monitoring.      - originally tested positive for COVID at outside facility on 12/6  - continue supportive care    CKD (chronic kidney disease) stage 3, GFR 30-59 ml/min  Creatine stable for now. BMP reviewed- noted Estimated Creatinine Clearance: 42.7 mL/min (based on SCr of 1 mg/dL). according to latest data. Based on current GFR, CKD stage is stage 3 - GFR 30-59.  Monitor UOP and serial BMP and adjust therapy as needed. Renally dose meds. Avoid nephrotoxic medications and procedures.    - baseline around 1.1, monitor closely will on iv diuretics     Essential hypertension  - continue home lasix and BB     Acute on chronic diastolic heart failure  - follows with Dr. Mane  - continue home BB and IV diuresis  - monitor I/O, daily weights      Coronary artery disease, h/o multivessel stents.  Patient with known CAD s/p stent placement, which is controlled Will continue  eliquis and Statin and monitor for S/Sx of angina/ACS. Continue to monitor on telemetry.     Obstructive sleep apnea  - continue home cpap q hs        Paroxysmal atrial fibrillation  Patient with Paroxysmal (<7 days) atrial fibrillation which is controlled currently with Beta Blocker and  Amiodarone. Patient is currently in sinus rhythm.[id number] Score: 4. HASBLED Anticoagulation indicated. Anticoagulation done with eliquis .    - follows with Dr. Seth cardioverted (JÚNIOR/DCCV) on 10/5/23 with Sotalol transitioned to Amiodarone      COPD (chronic obstructive pulmonary disease)  Patient's COPD is with exacerbation noted by continued dyspnea and worsening of baseline hypoxia currently.  Patient is currently off COPD Pathway. Continue scheduled inhalers Steroids, Antibiotics, and Supplemental oxygen and monitor respiratory status closely.       VTE Risk Mitigation (From admission, onward)           Ordered     apixaban tablet 2.5 mg  2 times daily         12/14/23 1630     Reason for No Pharmacological VTE Prophylaxis  Once        Question:  Reasons:  Answer:  Already adequately anticoagulated on oral Anticoagulants    12/14/23 1630     IP VTE HIGH RISK PATIENT  Once         12/14/23 1630     Place sequential compression device  Until discontinued         12/14/23 1630                    Discharge Planning   SCOTT:      Code Status: Full Code   Is the patient medically ready for discharge?:     Reason for patient still in hospital (select all that apply): Patient trending condition and PT / OT recommendations                     Rocio Garcia NP  Department of Hospital Medicine   [facility] - Telemetry ([facility])

## 2023-12-15 NOTE — HOSPITAL COURSE
Ms. Landrum is a 89 y.o. female pt with  history of persistent atrial fibrillation, CAD s/p PCI, SSS s/p  pacemaker, diastolic congestive heart failure, aortic stenosis, history of GI bleed, COPD, JA on cpap who was admitted to [facility] with acute hypoxic resp failure d/t covid/copd exacerbation. She was started on IV abx, IV steroids and albuterol inhaler with noted improvement in dyspnea.  O2 weaned from 4 liters to 2 liters.  She improved over the hospitalization and remained afebrile.  She worked with PT/OT who recommended home with HH.  O2 eval done on days of discharge, patient meet for home o2 which was arranged per case mgt.  Patient seen and examined on day of discharge and deemed suitable to d/c to home today, she will resume HH and complete a course of PO steroids and abx.  Discussed plan with patients daughter Annabel via phone who verbalized understanding and was in agreement.  Patient will follow-up with PCP in 3-5 days post discharge.

## 2023-12-15 NOTE — PT/OT/SLP EVAL
Occupational Therapy   Evaluation    Name: Holli Landrum  MRN: [id number]  Admitting Diagnosis: Acute hypoxic respiratory failure  Recent Surgery: * No surgery found *      Recommendations:     Discharge Recommendations: Low Intensity Therapy  Discharge Equipment Recommendations:  none  Barriers to discharge:  None    Assessment:     Holli Landrum is a 89 y.o. female with a medical diagnosis of Acute hypoxic respiratory failure.  She presents with the following performance deficits affecting function: weakness, impaired endurance, impaired self care skills, impaired functional mobility, gait instability, impaired balance, decreased upper extremity function, decreased lower extremity function, impaired cardiopulmonary response to activity, decreased safety awareness.      Rehab Prognosis: Good; patient would benefit from acute skilled OT services to address these deficits and reach maximum level of function.       Plan:     Patient to be seen 2 x/week to address the above listed problems via self-care/home management, therapeutic activities, therapeutic exercises  Plan of Care Expires: 12/29/23  Plan of Care Reviewed with: patient    Subjective     Chief Complaint: weakness, SOB  Patient/Family Comments/goals: get better, return home    Occupational Profile:  Living Environment: lives with daughter in a trailer home with ramp to enter. Pt has a walk-in shower.   Previous level of function: Pt Mod (I) with ADLs and functional mobility using RW limited household distances (~10 feet), occasionally with assist as needed.   Roles and Routines: does not drive  Equipment Used at Home: walker, rolling, shower chair, grab bar, wheelchair, other (see comments) (adjustable bed)  Assistance upon Discharge: daughter    Pain/Comfort:  Pain Rating 1: 0/10    Objective:     Communicated with: Nurse Neff and epic chart review prior to session.  Patient found supine with peripheral IV, telemetry, french catheter, oxygen upon OT entry  to room.    General Precautions: Standard, fall, respiratory, airborne, contact, droplet  Orthopedic Precautions: N/A  Braces: N/A  Respiratory Status: Nasal cannula, flow 3 L/min    Occupational Performance:    Bed Mobility:    Patient completed Rolling/Turning to Left with  modified independence  Patient completed Scooting/Bridging with modified independence  Patient completed Supine to Sit with modified independence    Functional Mobility/Transfers:  Patient completed Sit <> Stand Transfer with contact guard assistance  with  rolling walker   Patient completed Bed <> Chair Transfer using Stand Pivot technique with contact guard assistance with rolling walker  Functional Mobility: Pt completed multiple reps MIP with CGA and RW.     Activities of Daily Living:  Feeding:  setup A. Eating breakfast at beside table.  Upper Body Dressing: contact guard assistance bernadette gown around back    Cognitive/Visual Perceptual:  Cognitive/Psychosocial Skills:     -       Oriented to: Person, Place, Time, and Situation   -       Follows Commands/attention:Follows multistep  commands  -       Communication: clear/fluent  -       Memory: No Deficits noted  -       Safety awareness/insight to disability: impaired     Physical Exam:  Sensation:    -       Intact  Upper Extremity Range of Motion:     -       Right Upper Extremity: WNL  -       Left Upper Extremity: WNL  Upper Extremity Strength:    -       Right Upper Extremity: 4/5 grossly  -       Left Upper Extremity: 4/5 grossly   Strength:    -       Right Upper Extremity: WFL  -       Left Upper Extremity: WFL    AMPAC 6 Click ADL:  AMPAC Total Score: 17    Treatment & Education:  Patient educated on role of OT in acute setting and benefits of participation. Educated on techniques to use to increase independence and decrease fall risk with functional transfers. Educated on importance of OOB activity and calling for A to transfer back to bed. Encouraged completion of B UE AROM  therex throughout the day to tolerance to increase functional strength and activity tolerance. Pt performed x10 reps BUE AROM shoulder flexion, elbow flexion/ext, and digit flexion/ext while sitting in chair. Educated patient on importance of increased tolerance to upright position and direct impact on CV endurance and strength. Patient encouraged to sit up in chair for a minimum of 2 consecutive hours per day. Patient stated understanding and in agreement with POC.     Patient left up in chair with all lines intact, call button in reach, and family member present    GOALS:   Multidisciplinary Problems       Occupational Therapy Goals          Problem: Occupational Therapy    Goal Priority Disciplines Outcome Interventions   Occupational Therapy Goal     OT, PT/OT     Description: Goals to be met by: 12/29/23     Patient will increase functional independence with ADLs by performing:    LE Dressing with Modified [place].  Grooming while standing at sink with Modified [place].  Toileting from toilet with Modified [place] for hygiene and clothing management.   Toilet transfer to toilet with Modified [place] with RW.  Upper extremity exercise program x15 reps per handout, with independence.                         History:     Past Medical History:   Diagnosis Date    Anticoagulant long-term use     Arthritis     Asthma     Basal cell carcinoma     Cancer     skin cancer to face    Cataract     OU done//    CHF (congestive heart failure)     COPD (chronic obstructive pulmonary disease)     no oxygen; patient denies    cpap     Essential hypertension 05/05/2010    GERD (gastroesophageal reflux disease) 11/20/2012    Glaucoma     Hypertensive heart disease with heart failure 02/05/2013    Paroxysmal atrial fibrillation     Paroxysmal ventricular tachycardia     per problem list    Renal disorder     french-1/3/2020    Squamous cell carcinoma of skin          Past Surgical History:   Procedure Laterality  Date    A-V CARDIAC PACEMAKER INSERTION  06/16/2021    Procedure: INSERTION, CARDIAC PACEMAKER, DUAL CHAMBER;  Surgeon: Oscar Sommers MD;  Location: [facility] CATH;  Service: Cardiovascular;;    ADENOIDECTOMY  191944    APPENDECTOMY  1948    Because of Ovarian Cyst surgery    BREAST BIOPSY Left 1995    neg    BREAST BIOPSY Right 1984    neg    BREAST BIOPSY Right 1992    neg    BREAST SURGERY      A number of biopsies    CARDIAC CATHETERIZATION  2013, 2014,2016, 2020    has 9 stents    CARDIAC SURGERY  2012    stents    CATARACT EXTRACTION W/  INTRAOCULAR LENS IMPLANT Left 11/01/2018    Dr Rose    CATARACT EXTRACTION W/  INTRAOCULAR LENS IMPLANT Right 12/13/2018    Dr Rose//    CHOLECYSTECTOMY      COLONOSCOPY  ~2005    Dr. Edward; normal per patient report    COLONOSCOPY N/A 09/06/2022    Procedure: COLONOSCOPY 8/31/22-note in Dr Simms's office visit note that he spoke to her cardiologist and she was viable candidate for endoscopy;  Surgeon: oCrdelia Bueno MD;  Location: [facility];  Service: Endoscopy;  Laterality: N/A;    CORONARY ANGIOGRAPHY N/A 03/20/2020    Procedure: ANGIOGRAM, CORONARY ARTERY;  Surgeon: Chuy Bryant MD;  [facility];  Service: Cardiology;  Laterality: N/A;    CYSTOSCOPY W/ RETROGRADES Bilateral 02/12/2020    Procedure: CYSTOSCOPY, WITH RETROGRADE PYELOGRAM;  Surgeon: Gasper Feliz MD;  Location: [facility] OR;  Service: Urology;  Laterality: Bilateral;    ESOPHAGOGASTRODUODENOSCOPY N/A 08/13/2020    Procedure: EGD (ESOPHAGOGASTRODUODENOSCOPY);  Surgeon: Mika Solorzano MD;  Location: [facility];  Service: Endoscopy;  Laterality: N/A; Mild Schatzki ring. Biopsied. Dilated. small hiatal hernia, gastritis; biopsy: esophagus- SEVERE REFLUX ESOPHAGITIS, stomach- chronic gastritis, negative for H pylori    ESOPHAGOGASTRODUODENOSCOPY N/A 10/22/2020    Procedure: EGD (ESOPHAGOGASTRODUODENOSCOPY);  Surgeon: Fred Hendricks MD;  Location: [facility];  Service: Endoscopy;   Laterality: N/A;    EYE SURGERY  2017    Cataracs    FRACTIONAL FLOW RESERVE (FFR), CORONARY  6/20/2023    Procedure: Fractional Flow Rainier (FFR), Coronary;  Surgeon: Brice Campuzano MD;  Location: [facility] CATH LAB;  Service: Cardiology;;    HYSTERECTOMY  1969    INSTANTANEOUS WAVE-FREE RATIO (IFR) N/A 6/20/2023    Procedure: Instantaneous Wave-Free Ratio (IFR);  Surgeon: Brice Campuzano MD;  Location: [facility] CATH LAB;  Service: Cardiology;  Laterality: N/A;    LEFT HEART CATHETERIZATION Left 03/03/2020    Procedure: Left heart cath;  Surgeon: Chuy Bryant MD;  Location: [facility] CATH;  Service: Cardiology;  Laterality: Left;    LEFT HEART CATHETERIZATION Left 03/20/2020    Procedure: Left heart cath;  Surgeon: Chuy Byrant MD;  Location: [facility] CATH;  Service: Cardiology;  Laterality: Left;    LEFT HEART CATHETERIZATION N/A 6/20/2023    Procedure: Left heart cath;  Surgeon: Brice Campuzano MD;  Location: [facility] CATH LAB;  Service: Cardiology;  Laterality: N/A;    OVARIAN CYST REMOVAL  teenager    PHACOEMULSIFICATION OF CATARACT Left 11/01/2018    Procedure: PHACOEMULSIFICATION, CATARACT;  Surgeon: Aleksandar Rose Jr., MD;  Location: [facility] OR;  Service: Ophthalmology;  Laterality: Left;    PHACOEMULSIFICATION OF CATARACT Right 12/13/2018    Procedure: PHACOEMULSIFICATION, CATARACT;  Surgeon: Aleksandar Rose Jr., MD;  Location: [facility] OR;  Service: Ophthalmology;  Laterality: Right;    TONSILLECTOMY      aw/denoids    TRANSESOPHAGEAL ECHOCARDIOGRAM WITH POSSIBLE CARDIOVERSION (ALFRED W/ POSS CARDIOVERSION) N/A 10/5/2023    Procedure: Transesophageal echo (ALFRED) intra-procedure log documentation/alfred/cv;  Surgeon: Bao Miguel MD;  Location: [facility] CATH LAB;  Service: Cardiology;  Laterality: N/A;   Alfred/Cv  MRI safe   Pacer & leads implanted 6/16/21, Antoun   Bio Edora 8 DR-T, [id number], PID: [id number]   A lead: Bio Solia S45, [id number]   V lead: Bio Solia S53, [id number]    TREATMENT OF CARDIAC ARRHYTHMIA N/A 10/5/2023     Procedure: Cardioversion or Defibrillation;  Surgeon: Bao Miguel MD;  Location: [facility] CATH LAB;  Service: Cardiology;  Laterality: N/A;    UPPER GASTROINTESTINAL ENDOSCOPY  ~2005    Dr. Solorzano    VALVE STUDY-AORTIC  6/20/2023    Procedure: Valve study-aortic;  Surgeon: Brice Campuzano MD;  Location: [facility] CATH LAB;  Service: Cardiology;;    VASCULAR SURGERY      to remove clot from right leg    Yag Capsulotomy Bilateral 11/05/2019    Dr Rose       Time Tracking:     OT Date of Treatment: 12/15/23  OT Start Time: 0800  OT Stop Time: 0825  OT Total Time (min): 25 min    Billable Minutes:Evaluation 15  Therapeutic Activity 10    12/15/2023  Ranjana Tinoco OT

## 2023-12-15 NOTE — ASSESSMENT & PLAN NOTE
Creatine stable for now. BMP reviewed- noted Estimated Creatinine Clearance: 42.7 mL/min (based on SCr of 1 mg/dL). according to latest data. Based on current GFR, CKD stage is stage 3 - GFR 30-59.  Monitor UOP and serial BMP and adjust therapy as needed. Renally dose meds. Avoid nephrotoxic medications and procedures.    - baseline around 1.1, monitor closely will on iv diuretics

## 2023-12-15 NOTE — PLAN OF CARE
OT eval completed. Recommends low intensity therapy.  Mod (I) for bed mobility. CGA for t/fs with RW.

## 2023-12-15 NOTE — ASSESSMENT & PLAN NOTE
Patient is identified as Severe COVID-19 based on hypoxemia with O2 saturations <94% on room air or on ambulation   Initiate standard COVID protocols; COVID-19 testing ,Infection Control notification  and isolation- respiratory, contact and droplet per protocol    Diagnostics: CBC, CMP, and Portable CXR    Management: Maintain oxygen saturations 92-96% via Nasal Cannula 4 LPM and monitor with continuous/intermittent pulse oximetry. , Inhaled bronchodilators as needed for shortness of breath., and Continuous cardiac monitoring.    Advance Care Planning  Current advance care plan has been discussed with patient/family/POA and patient currently wishes Full Code.     - originally tested positive for COVID at outside facility on 12/6  - continue supportive care

## 2023-12-16 VITALS
HEIGHT: 65 IN | TEMPERATURE: 98 F | SYSTOLIC BLOOD PRESSURE: 135 MMHG | BODY MASS INDEX: 35.33 KG/M2 | HEART RATE: 61 BPM | WEIGHT: 212.06 LBS | OXYGEN SATURATION: 93 % | RESPIRATION RATE: 18 BRPM | DIASTOLIC BLOOD PRESSURE: 69 MMHG

## 2023-12-16 DIAGNOSIS — U07.1 COVID-19 VIRUS DETECTED: ICD-10-CM

## 2023-12-16 LAB
ANION GAP SERPL CALC-SCNC: 12 MMOL/L (ref 8–16)
BACTERIA UR CULT: NO GROWTH
BASOPHILS # BLD AUTO: 0 K/UL (ref 0–0.2)
BASOPHILS NFR BLD: 0 % (ref 0–1.9)
BUN SERPL-MCNC: 22 MG/DL (ref 8–23)
CALCIUM SERPL-MCNC: 9.4 MG/DL (ref 8.7–10.5)
CHLORIDE SERPL-SCNC: 106 MMOL/L (ref 95–110)
CO2 SERPL-SCNC: 22 MMOL/L (ref 23–29)
CREAT SERPL-MCNC: 0.9 MG/DL (ref 0.5–1.4)
DIFFERENTIAL METHOD: ABNORMAL
EOSINOPHIL # BLD AUTO: 0 K/UL (ref 0–0.5)
EOSINOPHIL NFR BLD: 0 % (ref 0–8)
ERYTHROCYTE [DISTWIDTH] IN BLOOD BY AUTOMATED COUNT: 18.3 % (ref 11.5–14.5)
EST. GFR  (NO RACE VARIABLE): >60 ML/MIN/1.73 M^2
GLUCOSE SERPL-MCNC: 149 MG/DL (ref 70–110)
HCT VFR BLD AUTO: 34.6 % (ref 37–48.5)
HGB BLD-MCNC: 11.1 G/DL (ref 12–16)
IMM GRANULOCYTES # BLD AUTO: 0.02 K/UL (ref 0–0.04)
IMM GRANULOCYTES NFR BLD AUTO: 0.2 % (ref 0–0.5)
LYMPHOCYTES # BLD AUTO: 0.9 K/UL (ref 1–4.8)
LYMPHOCYTES NFR BLD: 10.3 % (ref 18–48)
MCH RBC QN AUTO: 25.9 PG (ref 27–31)
MCHC RBC AUTO-ENTMCNC: 32.1 G/DL (ref 32–36)
MCV RBC AUTO: 81 FL (ref 82–98)
MONOCYTES # BLD AUTO: 0.4 K/UL (ref 0.3–1)
MONOCYTES NFR BLD: 5.2 % (ref 4–15)
NEUTROPHILS # BLD AUTO: 7 K/UL (ref 1.8–7.7)
NEUTROPHILS NFR BLD: 84.3 % (ref 38–73)
NRBC BLD-RTO: 0 /100 WBC
PLATELET # BLD AUTO: 350 K/UL (ref 150–450)
PMV BLD AUTO: 10.1 FL (ref 9.2–12.9)
POTASSIUM SERPL-SCNC: 3.5 MMOL/L (ref 3.5–5.1)
RBC # BLD AUTO: 4.29 M/UL (ref 4–5.4)
SODIUM SERPL-SCNC: 140 MMOL/L (ref 136–145)
WBC # BLD AUTO: 8.31 K/UL (ref 3.9–12.7)

## 2023-12-16 PROCEDURE — 94618 PULMONARY STRESS TESTING: CPT

## 2023-12-16 PROCEDURE — 99900035 HC TECH TIME PER 15 MIN (STAT)

## 2023-12-16 PROCEDURE — 80048 BASIC METABOLIC PNL TOTAL CA: CPT | Performed by: NURSE PRACTITIONER

## 2023-12-16 PROCEDURE — 96376 TX/PRO/DX INJ SAME DRUG ADON: CPT

## 2023-12-16 PROCEDURE — G0378 HOSPITAL OBSERVATION PER HR: HCPCS

## 2023-12-16 PROCEDURE — 94660 CPAP INITIATION&MGMT: CPT

## 2023-12-16 PROCEDURE — 63600175 PHARM REV CODE 636 W HCPCS: Performed by: NURSE PRACTITIONER

## 2023-12-16 PROCEDURE — 27000221 HC OXYGEN, UP TO 24 HOURS

## 2023-12-16 PROCEDURE — 36415 COLL VENOUS BLD VENIPUNCTURE: CPT | Performed by: NURSE PRACTITIONER

## 2023-12-16 PROCEDURE — 25000003 PHARM REV CODE 250: Performed by: NURSE PRACTITIONER

## 2023-12-16 PROCEDURE — 85025 COMPLETE CBC W/AUTO DIFF WBC: CPT | Performed by: NURSE PRACTITIONER

## 2023-12-16 RX ORDER — AZITHROMYCIN 250 MG/1
250 TABLET, FILM COATED ORAL DAILY
Qty: 3 TABLET | Refills: 0 | Status: SHIPPED | OUTPATIENT
Start: 2023-12-16 | End: 2023-12-19

## 2023-12-16 RX ORDER — PREDNISONE 20 MG/1
20 TABLET ORAL DAILY
Qty: 5 TABLET | Refills: 0 | Status: SHIPPED | OUTPATIENT
Start: 2023-12-16 | End: 2023-12-21

## 2023-12-16 RX ADMIN — APIXABAN 2.5 MG: 2.5 TABLET, FILM COATED ORAL at 08:12

## 2023-12-16 RX ADMIN — METOPROLOL TARTRATE 25 MG: 25 TABLET, FILM COATED ORAL at 08:12

## 2023-12-16 RX ADMIN — POTASSIUM CHLORIDE 20 MEQ: 1500 TABLET, EXTENDED RELEASE ORAL at 08:12

## 2023-12-16 RX ADMIN — SENNOSIDES AND DOCUSATE SODIUM 1 TABLET: 8.6; 5 TABLET ORAL at 08:12

## 2023-12-16 RX ADMIN — DORZOLAMIDE 1 DROP: 20 SOLUTION/ DROPS OPHTHALMIC at 08:12

## 2023-12-16 RX ADMIN — AMIODARONE HYDROCHLORIDE 200 MG: 200 TABLET ORAL at 08:12

## 2023-12-16 RX ADMIN — METHYLPREDNISOLONE SODIUM SUCCINATE 40 MG: 40 INJECTION, POWDER, FOR SOLUTION INTRAMUSCULAR; INTRAVENOUS at 04:12

## 2023-12-16 NOTE — RESPIRATORY THERAPY
Home Oxygen Evaluation - [facility] - Cardiopulmonary Department      Date Performed: 2023      1) Patient's Home O2 Sat on room air, while at rest: Room Air SpO2 At Rest: (!) 88 %        If O2 sats on room air at rest are 88% or below, patient qualifies.  Document O2 liter flow needed in Step 2.  If O2 sats are 89% or above, complete Step 3.        2)  If patient is not ambulated and O2 sats are <88%, what is the O2 liter flow required to meet ordered saturation?      If O2 sats on room air while exercising remain 89% or above patient does not qualify, no further testing needed Document N/A in step 3. If O2 sats on room air while exercising are 88% or below, continue to Step 4.    3) Patient's O2 Sat on room air while exercisin) Patient's O2 Sat while exercising on O2: SpO2 During Ambulation on O2: 94 % at Ambulation O2 LPM: 2 LPM         (Must show improvement from #4 for patients to qualify)

## 2023-12-16 NOTE — PLAN OF CARE
12/16/23 1042   Post-Acute Status   Post-Acute Authorization E   [facility] Status Referrals Sent     Referral for home oxygen sent to [facility].

## 2023-12-16 NOTE — PLAN OF CARE
12/16/23 1150   Post-Acute Status   Post-Acute Authorization [facility] Status Set-up Complete/Auth obtained   Discharge Plan   Discharge Plan A Home Health     Home oxygen given to patient's nurse for delivery.    Plan is to resume with [facility].

## 2023-12-16 NOTE — DISCHARGE SUMMARY
[facility] - Telemetry ([facility])  [facility] Medicine  Discharge Summary      Patient Name: Holli Landrum  MRN: [id number]  CANDI: [id number]  Patient Class: OP- Observation  Admission Date: 12/14/2023  Hospital Length of Stay: 0 days  Discharge Date and Time:  12/16/2023 2:18 PM  Attending Physician: Miley att. providers found   Discharging Provider: Rocio Garcia NP  Primary Care Provider: Marcia Carlisle MD    Primary Care Team: Networked reference to record PCT     HPI:   Ms. Landrum is a 89 y.o. female pt with  history of persistent atrial fibrillation, CAD s/p PCI, SSS s/p  pacemaker, diastolic congestive heart failure, aortic stenosis, history of GI bleed, COPD, JA on cpap who presents to the ED with dyspnea that worsened last night.  Per patient she tested positive for COVID at [facility] on Dec 6th.  She has had intermittent fever and clear productive cough since that time.  She reports that her dyspnea worsened last night and per her home pulse ox o2 sats were 85%.  In the ED, patient patient hypoxic and wheezing, placed on 3 liters and given multiple breathing treatments with improvement.  Labs with K 3.4, CO2 20.  WBC normal.  , Trop 0.033.  Flu negative.  Chest xray with ILD and pulmonary edema.  Patient was given lasix IV with 400ml urine output currently.  Patient will be admitted to observation for acute hypoxic resp failure d/t covid/copd exacerbation.     Code Status Full  Surrogate Decision Maker daughter       * No surgery found *      Hospital Course:   Ms. Landurm is a 89 y.o. female pt with  history of persistent atrial fibrillation, CAD s/p PCI, SSS s/p  pacemaker, diastolic congestive heart failure, aortic stenosis, history of GI bleed, COPD, JA on cpap who was admitted to [facility] with acute hypoxic resp failure d/t covid/copd exacerbation. She was started on IV abx, IV steroids and albuterol inhaler with noted improvement in dyspnea.  O2 weaned from 4 liters to 2 liters.  She improved over the  hospitalization and remained afebrile.  She worked with PT/OT who recommended home with HH.  O2 eval done on days of discharge, patient meet for home o2 which was arranged per case mgt.  Patient seen and examined on day of discharge and deemed suitable to d/c to home today, she will resume HH and complete a course of PO steroids and abx.  Discussed plan with patients daughter Annabel via phone who verbalized understanding and was in agreement.  Patient will follow-up with PCP in 3-5 days post discharge.       Goals of Care Treatment Preferences:  Code Status: Full Code    Living Will: Yes              Consults:   Consults (From admission, onward)          Status Ordering Provider     Inpatient consult to Social Work  Once        Provider:  (Not yet assigned)    Completed MIKIE ROSADO            No new Assessment & Plan notes have been filed under this hospital service since the last note was generated.  Service: Hospital Medicine    Final Active Diagnoses:    Diagnosis Date Noted POA    PRINCIPAL PROBLEM:  Acute hypoxic respiratory failure [J96.01] 12/14/2023 Yes    COVID [U07.1] 12/14/2023 Yes    CKD (chronic kidney disease) stage 3, GFR 30-59 ml/min [N18.30] 01/03/2020 Yes    Paroxysmal atrial fibrillation [I48.0] 11/20/2012 Yes    COPD (chronic obstructive pulmonary disease) [J44.9] 11/20/2012 Yes    Obstructive sleep apnea [G47.33] 11/11/2010 Yes    Coronary artery disease, h/o multivessel stents. [I25.10, I25.84] 05/05/2010 Yes    Essential hypertension [I10] 05/05/2010 Yes    Acute on chronic diastolic heart failure [I50.33] 05/05/2010 Yes      Problems Resolved During this Admission:       Discharged Condition: good    Disposition: Home or Self Care    Follow Up:   Follow-up Information       Marcia Carlisle MD Follow up in 3 day(s).    Specialty: Family Medicine  Contact information:  [address] [facility] [address] [phone]               [facility]. Call.   "  Specialties: Home Health Services, Home Therapy Services, Home Living Aide Services  Why: notify home health that you have returned home.  Contact information:  [address], [place] [address]  [phone]                         Patient Instructions:      OXYGEN FOR HOME USE     Order Specific Question Answer Comments   Liter Flow 2    Duration Continuous    Qualifying Test Performed at: [facility]    Oxygen saturation: 88    Portable mode: continuous    Route nasal cannula    Device: home concentrator with portable tanks    Length of need (in months): 12 mos    Patient condition with qualifying saturation COPD covid   Height: 5' 5" (1.651 m)    Weight: 96.2 kg (212 lb 1.3 oz)    Alternative treatment measures have been tried or considered and deemed clinically ineffective. Yes        Significant Diagnostic Studies: Labs: CMP   Recent Labs   Lab 12/15/23  0320 12/16/23  0501    140   K 3.6 3.5    106   CO2 21* 22*   * 149*   BUN 18 22   CREATININE 1.0 0.9   CALCIUM 8.9 9.4   ANIONGAP 12 12    and CBC   Recent Labs   Lab 12/15/23  0320 12/16/23  0501   WBC 4.24 8.31   HGB 10.9* 11.1*   HCT 34.0* 34.6*    350       Pending Diagnostic Studies:       None           Medications:  Reconciled Home Medications:      Medication List        START taking these medications      azithromycin 250 MG tablet  Commonly known as: Z-YAAKOV  Take 1 tablet (250 mg total) by mouth once daily. for 3 days     predniSONE 20 MG tablet  Commonly known as: DELTASONE  Take 1 tablet (20 mg total) by mouth once daily. for 5 days            CHANGE how you take these medications      amLODIPine 2.5 MG tablet  Commonly known as: NORVASC  TAKE 1 TABLET BY MOUTH EVERY DAY  What changed: when to take this     rosuvastatin 40 MG Tab  Commonly known as: CRESTOR  TAKE 1 TABLET (40 MG TOTAL) BY MOUTH ONCE DAILY.  What changed: when to take this            CONTINUE taking these medications      albuterol 90 " mcg/actuation inhaler  Commonly known as: PROVENTIL/VENTOLIN HFA  Inhale 1-2 puffs into the lungs every 4 (four) hours as needed for Wheezing or Shortness of Breath. Rescue     albuterol-ipratropium 2.5 mg-0.5 mg/3 mL nebulizer solution  Commonly known as: DUO-NEB  Take 3 mLs by nebulization every 6 (six) hours as needed for Wheezing or Shortness of Breath.     amiodarone 200 MG Tab  Commonly known as: PACERONE  Take 1 tablet (200 mg total) by mouth once daily.     apixaban 2.5 mg Tab  Commonly known as: ELIQUIS  Take 1 tablet (2.5 mg total) by mouth 2 (two) times daily.     cholecalciferol (vitamin D3) 125 mcg (5,000 unit) Tab  Take 5,000 Units by mouth once daily.     dorzolamide 2 % ophthalmic solution  Commonly known as: TRUSOPT  INSTILL 1 DROP INTO BOTH EYES TWICE DAILY     fluticasone furoate-vilanteroL 100-25 mcg/dose diskus inhaler  Commonly known as: BREO ELLIPTA  Inhale 1 puff into the lungs once daily.     fluticasone propionate 50 mcg/actuation nasal spray  Commonly known as: FLONASE  2 sprays (100 mcg total) by Each Nare route daily as needed for Allergies.     furosemide 40 MG tablet  Commonly known as: LASIX  Take 1 tablet (40 mg total) by mouth 2 (two) times a day. Take twice a day and monitor BP and weights     Lactobacillus rhamnosus GG 10 billion cell capsule  Commonly known as: CULTURELLE  Take 1 capsule by mouth once daily.     latanoprost 0.005 % ophthalmic solution  Place 1 drop into both eyes every evening.     metoprolol tartrate 25 MG tablet  Commonly known as: LOPRESSOR  Take 1 tablet (25 mg total) by mouth 2 (two) times daily.     nitroGLYCERIN 0.4 MG/HR TD PT24 0.4 mg/hr  Commonly known as: NITRODUR  Place 1 patch onto the skin once daily.     potassium chloride 10 MEQ Tbsr  Commonly known as: KLOR-CON  Take 2 tablets (20 mEq total) by mouth once daily.     ZYRTEC ORAL  Take 10 mg by mouth once daily.              Indwelling Lines/Drains at time of discharge:   Lines/Drains/Airways        Drain  Duration                  Suprapubic Catheter 06/20/23 1126 18 Fr. 179 days                    Time spent on the discharge of patient: >30 minutes         Rocio Garcia NP  Department of Hospital Medicine  Formerly [facility] - Telemetry ([facility])

## 2023-12-16 NOTE — PLAN OF CARE
[facility] - Telemetry (Hospital)  Discharge Final Note    Primary Care Provider: Marcia Carlisle MD    Expected Discharge Date: 12/16/2023    Final Discharge Note (most recent)       Final Note - 12/16/23 1152          Final Note    Assessment Type Final Discharge Note     Anticipated Discharge Disposition Home-Health Care Svc        Post-Acute Status    Discharge Delays None known at this time                     Important Message from Medicare             Contact Info       Marcia Carlisle MD   Specialty: Family Medicine   Relationship: PCP - General    [address] [place] [id number]   Phone: [phone]       Next Steps: Follow up in 3 day(s)          Discharge home, current with [facility].  Home oxygen per [facility].

## 2023-12-19 ENCOUNTER — TELEPHONE (OUTPATIENT)
Dept: CARDIOLOGY | Facility: CLINIC | Age: 88
End: 2023-12-19
Payer: MEDICARE

## 2023-12-19 LAB
BACTERIA BLD CULT: NORMAL
BACTERIA BLD CULT: NORMAL

## 2023-12-19 NOTE — TELEPHONE ENCOUNTER
Pt notified and verbalized understanding pb      ----- Message from Dusty Saleh MD sent at 12/19/2023 11:12 AM CST -----  See comments below and call patient to discuss.   Please close encounter when done -- no need to route back to me.  Thanks  Amnio levels are Ok - will keep same - she has a visit in Jan - will discsuu further then

## 2023-12-26 ENCOUNTER — LAB VISIT (OUTPATIENT)
Dept: LAB | Facility: HOSPITAL | Age: 88
End: 2023-12-26
Attending: GENERAL PRACTICE
Payer: MEDICARE

## 2023-12-26 DIAGNOSIS — B95.2 URINARY TRACT INFECTION DUE TO ENTEROCOCCUS: ICD-10-CM

## 2023-12-26 DIAGNOSIS — N30.00 ACUTE CYSTITIS: Primary | ICD-10-CM

## 2023-12-26 DIAGNOSIS — N30.00 ACUTE CYSTITIS: ICD-10-CM

## 2023-12-26 DIAGNOSIS — N31.8 HYPERTONICITY OF BLADDER: ICD-10-CM

## 2023-12-26 DIAGNOSIS — N39.0 URINARY TRACT INFECTION DUE TO ENTEROCOCCUS: ICD-10-CM

## 2023-12-26 DIAGNOSIS — I50.33 ACUTE ON CHRONIC DIASTOLIC HEART FAILURE: ICD-10-CM

## 2023-12-26 LAB
BACTERIA #/AREA URNS AUTO: ABNORMAL /HPF
BILIRUB UR QL STRIP: NEGATIVE
CLARITY UR REFRACT.AUTO: ABNORMAL
COLOR UR AUTO: YELLOW
GLUCOSE UR QL STRIP: NEGATIVE
HGB UR QL STRIP: ABNORMAL
HYALINE CASTS UR QL AUTO: 9 /LPF
KETONES UR QL STRIP: NEGATIVE
LEUKOCYTE ESTERASE UR QL STRIP: ABNORMAL
MICROSCOPIC COMMENT: ABNORMAL
NITRITE UR QL STRIP: NEGATIVE
PH UR STRIP: 7 [PH] (ref 5–8)
PROT UR QL STRIP: ABNORMAL
RBC #/AREA URNS AUTO: 22 /HPF (ref 0–4)
SP GR UR STRIP: 1.02 (ref 1–1.03)
URN SPEC COLLECT METH UR: ABNORMAL
WBC #/AREA URNS AUTO: >100 /HPF (ref 0–5)
WBC CLUMPS UR QL AUTO: ABNORMAL
YEAST UR QL AUTO: ABNORMAL

## 2023-12-26 PROCEDURE — 87088 URINE BACTERIA CULTURE: CPT | Performed by: GENERAL PRACTICE

## 2023-12-26 PROCEDURE — 81001 URINALYSIS AUTO W/SCOPE: CPT | Performed by: GENERAL PRACTICE

## 2023-12-26 PROCEDURE — 87086 URINE CULTURE/COLONY COUNT: CPT | Performed by: GENERAL PRACTICE

## 2023-12-28 LAB — BACTERIA UR CULT: ABNORMAL

## 2024-01-12 ENCOUNTER — OFFICE VISIT (OUTPATIENT)
Dept: PULMONOLOGY | Facility: CLINIC | Age: 89
End: 2024-01-12
Payer: MEDICARE

## 2024-01-12 ENCOUNTER — PATIENT MESSAGE (OUTPATIENT)
Dept: PULMONOLOGY | Facility: CLINIC | Age: 89
End: 2024-01-12

## 2024-01-12 ENCOUNTER — CLINICAL SUPPORT (OUTPATIENT)
Dept: PULMONOLOGY | Facility: CLINIC | Age: 89
End: 2024-01-12
Payer: MEDICARE

## 2024-01-12 ENCOUNTER — HOSPITAL ENCOUNTER (OUTPATIENT)
Dept: RADIOLOGY | Facility: HOSPITAL | Age: 89
Discharge: HOME | End: 2024-01-12
Attending: INTERNAL MEDICINE
Payer: MEDICARE

## 2024-01-12 VITALS
DIASTOLIC BLOOD PRESSURE: 80 MMHG | RESPIRATION RATE: 18 BRPM | WEIGHT: 199.44 LBS | SYSTOLIC BLOOD PRESSURE: 118 MMHG | HEART RATE: 64 BPM | HEIGHT: 65 IN | BODY MASS INDEX: 33.23 KG/M2 | OXYGEN SATURATION: 95 %

## 2024-01-12 VITALS — BODY MASS INDEX: 33.23 KG/M2 | HEIGHT: 65 IN | WEIGHT: 199.44 LBS

## 2024-01-12 DIAGNOSIS — I48.91 UNSPECIFIED ATRIAL FIBRILLATION: ICD-10-CM

## 2024-01-12 DIAGNOSIS — I50.9 CHRONIC CONGESTIVE HEART FAILURE, UNSPECIFIED HEART FAILURE TYPE: ICD-10-CM

## 2024-01-12 DIAGNOSIS — J44.9 CHRONIC OBSTRUCTIVE PULMONARY DISEASE, UNSPECIFIED COPD TYPE: ICD-10-CM

## 2024-01-12 DIAGNOSIS — J41.0 SIMPLE CHRONIC BRONCHITIS: ICD-10-CM

## 2024-01-12 DIAGNOSIS — I50.9 CONGESTIVE HEART FAILURE, UNSPECIFIED HF CHRONICITY, UNSPECIFIED HEART FAILURE TYPE: ICD-10-CM

## 2024-01-12 DIAGNOSIS — Z95.0 PRESENCE OF CARDIAC PACEMAKER: ICD-10-CM

## 2024-01-12 DIAGNOSIS — I50.9 CHRONIC CONGESTIVE HEART FAILURE, UNSPECIFIED HEART FAILURE TYPE: Primary | ICD-10-CM

## 2024-01-12 DIAGNOSIS — R09.02 EXERCISE HYPOXEMIA: ICD-10-CM

## 2024-01-12 DIAGNOSIS — Z93.59 SUPRAPUBIC CATHETER: ICD-10-CM

## 2024-01-12 DIAGNOSIS — G47.33 OSA ON CPAP: ICD-10-CM

## 2024-01-12 PROCEDURE — 71046 X-RAY EXAM CHEST 2 VIEWS: CPT | Mod: TC

## 2024-01-12 PROCEDURE — 99999 PR PBB SHADOW E&M-EST. PATIENT-LVL IV: CPT | Mod: PBBFAC,,, | Performed by: INTERNAL MEDICINE

## 2024-01-12 PROCEDURE — 1159F MED LIST DOCD IN RCRD: CPT | Mod: CPTII,S$GLB,, | Performed by: INTERNAL MEDICINE

## 2024-01-12 PROCEDURE — 1100F PTFALLS ASSESS-DOCD GE2>/YR: CPT | Mod: CPTII,S$GLB,, | Performed by: INTERNAL MEDICINE

## 2024-01-12 PROCEDURE — 1160F RVW MEDS BY RX/DR IN RCRD: CPT | Mod: CPTII,S$GLB,, | Performed by: INTERNAL MEDICINE

## 2024-01-12 PROCEDURE — 3288F FALL RISK ASSESSMENT DOCD: CPT | Mod: CPTII,S$GLB,, | Performed by: INTERNAL MEDICINE

## 2024-01-12 PROCEDURE — 71046 X-RAY EXAM CHEST 2 VIEWS: CPT | Mod: 26,,, | Performed by: RADIOLOGY

## 2024-01-12 PROCEDURE — 99999 PR PBB SHADOW E&M-EST. PATIENT-LVL I: CPT | Mod: PBBFAC,,,

## 2024-01-12 PROCEDURE — 1157F ADVNC CARE PLAN IN RCRD: CPT | Mod: CPTII,S$GLB,, | Performed by: INTERNAL MEDICINE

## 2024-01-12 PROCEDURE — 99215 OFFICE O/P EST HI 40 MIN: CPT | Mod: 25,S$GLB,, | Performed by: INTERNAL MEDICINE

## 2024-01-12 PROCEDURE — 1126F AMNT PAIN NOTED NONE PRSNT: CPT | Mod: CPTII,S$GLB,, | Performed by: INTERNAL MEDICINE

## 2024-01-12 PROCEDURE — 94618 PULMONARY STRESS TESTING: CPT | Mod: S$GLB,,, | Performed by: INTERNAL MEDICINE

## 2024-01-12 RX ORDER — IPRATROPIUM BROMIDE AND ALBUTEROL SULFATE 2.5; .5 MG/3ML; MG/3ML
3 SOLUTION RESPIRATORY (INHALATION) EVERY 6 HOURS PRN
Qty: 360 ML | Refills: 11 | Status: SHIPPED | OUTPATIENT
Start: 2024-01-12 | End: 2024-01-12 | Stop reason: SDUPTHER

## 2024-01-12 RX ORDER — ALBUTEROL SULFATE 90 UG/1
1-2 AEROSOL, METERED RESPIRATORY (INHALATION) EVERY 4 HOURS PRN
Qty: 18 G | Refills: 11 | Status: SHIPPED | OUTPATIENT
Start: 2024-01-12

## 2024-01-12 RX ORDER — FUROSEMIDE 40 MG/1
40 TABLET ORAL 2 TIMES DAILY
Qty: 90 TABLET | Refills: 3 | Status: SHIPPED | OUTPATIENT
Start: 2024-01-12

## 2024-01-12 RX ORDER — POTASSIUM CHLORIDE 750 MG/1
20 TABLET, EXTENDED RELEASE ORAL 2 TIMES DAILY
Qty: 180 TABLET | Refills: 1 | Status: SHIPPED | OUTPATIENT
Start: 2024-01-12

## 2024-01-12 RX ORDER — FLUTICASONE FUROATE AND VILANTEROL 100; 25 UG/1; UG/1
1 POWDER RESPIRATORY (INHALATION) DAILY
Qty: 60 EACH | Refills: 11 | Status: SHIPPED | OUTPATIENT
Start: 2024-01-12

## 2024-01-12 RX ORDER — IPRATROPIUM BROMIDE AND ALBUTEROL SULFATE 2.5; .5 MG/3ML; MG/3ML
3 SOLUTION RESPIRATORY (INHALATION) EVERY 6 HOURS PRN
Qty: 360 ML | Refills: 11 | Status: SHIPPED | OUTPATIENT
Start: 2024-01-12

## 2024-01-12 NOTE — PROGRESS NOTES
Subjective:     Patient ID: Holli Landrum is a 89 y.o. female.    Chief Complaint:      HPI  Complicated history of Atrial Fibrillation Congestive Heart failure, Chronic Obstructive Pulmonary Disease   Recent hospitalization for respiratroy failure and CHF     She   presents for evaluation and treatment of pneumonia and resp failure after being discharged from the hospital  1  month ago. Since discharge symptoms have gradually worsening course since that time. Patient denies fever or chills. Symptoms are aggravated by any activity. Symptoms improve with rest.  Respiratory: positive for dyspnea on exertion and wheezing; Cardiovascular: positive for dyspnea, fatigue, palpitations, and lower extremity edema.  Patient currently is on oxygen at 2- 3 L/min per nasal cannula..    Restarted using walker\  Started fluid pill    Obstructive Sleep Apnea:  Reports her CPAP machine is broken at present she has Continuous Positive Airway Pressure supplies. Sleep study and CPAP titration in 2010 corrected her obstructive sleep apnea to an RDI of 5 on 16 cm of water.  Patient uses CPAP 6-7 hours per night but still requires to sleep another 45 minutes in a nap every day.  History of good compliance    MEDICAL RECORDS FROM THE HOSPITAL REVIEWED:    [facility] - Telemetry ([facility])  [facility] Medicine  Discharge Summary        Patient Name: Holli Landrum  MRN: [id number]  CANDI: [id number]  Patient Class: OP- Observation  Admission Date: 12/14/2023  Hospital Length of Stay: 0 days  Discharge Date and Time:  12/16/2023 2:18 PM  Attending Physician: No att. providers found   Discharging Provider: Rocio Garcia NP  Primary Care Provider: Marcia Carlisle MD     Primary Care Team: Networked reference to record PCT      HPI:   Ms. Landrum is a 89 y.o. female pt with  history of persistent atrial fibrillation, CAD s/p PCI, SSS s/p  pacemaker, diastolic congestive heart failure, aortic stenosis, history of GI bleed, COPD, JA on cpap who  presents to the ED with dyspnea that worsened last night.  Per patient she tested positive for COVID at [facility] on Dec 6th.  She has had intermittent fever and clear productive cough since that time.  She reports that her dyspnea worsened last night and per her home pulse ox o2 sats were 85%.  In the ED, patient patient hypoxic and wheezing, placed on 3 liters and given multiple breathing treatments with improvement.  Labs with K 3.4, CO2 20.  WBC normal.  , Trop 0.033.  Flu negative.  Chest xray with ILD and pulmonary edema.  Patient was given lasix IV with 400ml urine output currently.  Patient will be admitted to observation for acute hypoxic resp failure d/t covid/copd exacerbation.      Code Status Full  Surrogate Decision Maker daughter         * No surgery found *       Hospital Course:   Ms. Landrum is a 89 y.o. female pt with  history of persistent atrial fibrillation, CAD s/p PCI, SSS s/p  pacemaker, diastolic congestive heart failure, aortic stenosis, history of GI bleed, COPD, JA on cpap who was admitted to [facility] with acute hypoxic resp failure d/t covid/copd exacerbation. She was started on IV abx, IV steroids and albuterol inhaler with noted improvement in dyspnea.  O2 weaned from 4 liters to 2 liters.  She improved over the hospitalization and remained afebrile.  She worked with PT/OT who recommended home with HH.  O2 eval done on days of discharge, patient meet for home o2 which was arranged per case mgt.  Patient seen and examined on day of discharge and deemed suitable to d/c to home today, she will resume HH and complete a course of PO steroids and abx.  Discussed plan with patients daughter Annabel via phone who verbalized understanding and was in agreement.  Patient will follow-up with PCP in 3-5 days post discharge.        Goals of Care Treatment Preferences:  Code Status: Full Code     Living Will: Yes             Consults:   Consults (From admission, onward)            Status Ordering  Provider       Inpatient consult to Social Work  Once        Provider:  (Not yet assigned)    Completed MIKIE ROSADO                No new Assessment & Plan notes have been filed under this hospital service since the last note was generated.  Service: Hospital Medicine           Final Active Diagnoses:     Diagnosis Date Noted POA    PRINCIPAL PROBLEM:  Acute hypoxic respiratory failure [J96.01] 12/14/2023 Yes    COVID [U07.1] 12/14/2023 Yes    CKD (chronic kidney disease) stage 3, GFR 30-59 ml/min [N18.30] 01/03/2020 Yes    Paroxysmal atrial fibrillation [I48.0] 11/20/2012 Yes    COPD (chronic obstructive pulmonary disease) [J44.9] 11/20/2012 Yes    Obstructive sleep apnea [G47.33] 11/11/2010 Yes    Coronary artery disease, h/o multivessel stents. [I25.10, I25.84] 05/05/2010 Yes    Essential hypertension [I10] 05/05/2010 Yes    Acute on chronic diastolic heart failure [I50.33] 05/05/2010 Yes       Problems Resolved During this Admission:         Discharged Condition: good     Disposition: Home or Self Care     Follow Up:    Follow-up Information         Marcia Carlisle MD Follow up in 3 day(s).    Specialty: Family Medicine  Contact information:  [address] [facility] [address] [phone]                    [facility]. Call.    Specialties: Home Health Services, Home Therapy Services, Home Living Aide Services  Why: notify home health that you have returned home.  Contact information:  [address] [facility], [address] [address] [phone]                        Past Medical History:   Diagnosis Date    Anticoagulant long-term use     Arthritis     Asthma     Basal cell carcinoma     Cancer     skin cancer to face    Cataract     OU done//    CHF (congestive heart failure)     COPD (chronic obstructive pulmonary disease)     no oxygen; patient denies    cpap     Essential hypertension 05/05/2010    GERD (gastroesophageal reflux disease) 11/20/2012    Glaucoma      Hypertensive heart disease with heart failure 02/05/2013    Paroxysmal atrial fibrillation     Paroxysmal ventricular tachycardia     per problem list    Renal disorder     french-1/3/2020    Squamous cell carcinoma of skin      Past Surgical History:   Procedure Laterality Date    A-V CARDIAC PACEMAKER INSERTION  06/16/2021    Procedure: INSERTION, CARDIAC PACEMAKER, DUAL CHAMBER;  Surgeon: Oscar Sommers MD;  Location: [facility] CATH;  Service: Cardiovascular;;    ADENOIDECTOMY  191944    APPENDECTOMY  1948    Because of Ovarian Cyst surgery    BREAST BIOPSY Left 1995    neg    BREAST BIOPSY Right 1984    neg    BREAST BIOPSY Right 1992    neg    BREAST SURGERY      A number of biopsies    CARDIAC CATHETERIZATION  2013, 2014,2016, 2020    has 9 stents    CARDIAC SURGERY  2012    stents    CATARACT EXTRACTION W/  INTRAOCULAR LENS IMPLANT Left 11/01/2018    Dr Rose    CATARACT EXTRACTION W/  INTRAOCULAR LENS IMPLANT Right 12/13/2018    Dr Rose//    CHOLECYSTECTOMY      COLONOSCOPY  ~2005    Dr. Edward; normal per patient report    COLONOSCOPY N/A 09/06/2022    Procedure: COLONOSCOPY 8/31/22-note in Dr Simms's office visit note that he spoke to her cardiologist and she was viable candidate for endoscopy;  Surgeon: Cordelia Bueno MD;  Location: [facility];  Service: Endoscopy;  Laterality: N/A;    CORONARY ANGIOGRAPHY N/A 03/20/2020    Procedure: ANGIOGRAM, CORONARY ARTERY;  Surgeon: Chuy Bryant MD;  Location: [facility] CATH;  Service: Cardiology;  Laterality: N/A;    CYSTOSCOPY W/ RETROGRADES Bilateral 02/12/2020    Procedure: CYSTOSCOPY, WITH RETROGRADE PYELOGRAM;  Surgeon: Gasper Feliz MD;  Location: [facility] OR;  Service: Urology;  Laterality: Bilateral;    ESOPHAGOGASTRODUODENOSCOPY N/A 08/13/2020    Procedure: EGD (ESOPHAGOGASTRODUODENOSCOPY);  Surgeon: Mika Solorzano MD;  Location: [facility];  Service: Endoscopy;  Laterality: N/A; Mild Schatzki ring. Biopsied. Dilated. small hiatal hernia,  gastritis; biopsy: esophagus- SEVERE REFLUX ESOPHAGITIS, stomach- chronic gastritis, negative for H pylori    ESOPHAGOGASTRODUODENOSCOPY N/A 10/22/2020    Procedure: EGD (ESOPHAGOGASTRODUODENOSCOPY);  Surgeon: Fred Hendricks MD;  [facility];  Service: Endoscopy;  Laterality: N/A;    EYE SURGERY  2017    Cataracs    FRACTIONAL FLOW RESERVE (FFR), CORONARY  6/20/2023    Procedure: Fractional Flow Antonito (FFR), Coronary;  Surgeon: Brice Campuzano MD;  Location: [facility] CATH LAB;  Service: Cardiology;;    HYSTERECTOMY  1969    INSTANTANEOUS WAVE-FREE RATIO (IFR) N/A 6/20/2023    Procedure: Instantaneous Wave-Free Ratio (IFR);  Surgeon: Brice Campuzano MD;  Location: [facility] CATH LAB;  Service: Cardiology;  Laterality: N/A;    LEFT HEART CATHETERIZATION Left 03/03/2020    Procedure: Left heart cath;  Surgeon: Chuy Bryant MD;  Location: [facility] CATH;  Service: Cardiology;  Laterality: Left;    LEFT HEART CATHETERIZATION Left 03/20/2020    Procedure: Left heart cath;  Surgeon: Chuy Bryant MD;  Location: [facility] CATH;  Service: Cardiology;  Laterality: Left;    LEFT HEART CATHETERIZATION N/A 6/20/2023    Procedure: Left heart cath;  Surgeon: Brice Campuzano MD;  Location: [facility] CATH LAB;  Service: Cardiology;  Laterality: N/A;    OVARIAN CYST REMOVAL  teenager    PHACOEMULSIFICATION OF CATARACT Left 11/01/2018    Procedure: PHACOEMULSIFICATION, CATARACT;  Surgeon: Aleksandar Rose Jr., MD;  Location: [facility] OR;  Service: Ophthalmology;  Laterality: Left;    PHACOEMULSIFICATION OF CATARACT Right 12/13/2018    Procedure: PHACOEMULSIFICATION, CATARACT;  Surgeon: Aleksandar Rose Jr., MD;  Location: [facility] OR;  Service: Ophthalmology;  Laterality: Right;    TONSILLECTOMY      aw/denoids    TRANSESOPHAGEAL ECHOCARDIOGRAM WITH POSSIBLE CARDIOVERSION (LIZ W/ POSS CARDIOVERSION) N/A 10/5/2023    Procedure: Transesophageal echo (LIZ) intra-procedure log documentation/liz/cv;  Surgeon: Bao Miguel MD;  Location: [facility]  CATH LAB;  Service: Cardiology;  Laterality: N/A;   Alfred/Cv  MRI safe   Pacer & leads implanted 6/16/21, Antoun   Bio Edora 8 MICHELLE, [id number], PID: [id number]   A lead: Bio Solia S45, [id number]   V lead: Bio Solia S53, [id number]    TREATMENT OF CARDIAC ARRHYTHMIA N/A 10/5/2023    Procedure: Cardioversion or Defibrillation;  Surgeon: Bao Miguel MD;  Location: [facility] CATH LAB;  Service: Cardiology;  Laterality: N/A;    UPPER GASTROINTESTINAL ENDOSCOPY  ~2005    Dr. Solorzano    VALVE STUDY-AORTIC  6/20/2023    Procedure: Valve study-aortic;  Surgeon: Brice Campuzano MD;  Location: [facility] CATH LAB;  Service: Cardiology;;    VASCULAR SURGERY      to remove clot from right leg    Yag Capsulotomy Bilateral 11/05/2019    Dr Rose     Review of patient's allergies indicates:   Allergen Reactions    Timolol maleate Shortness Of Breath    Ciprofloxacin Other (See Comments)     Muscle ache    Sulfamethoxazole-trimethoprim      Current Outpatient Medications on File Prior to Visit   Medication Sig Dispense Refill    amiodarone (PACERONE) 200 MG Tab Take 1 tablet (200 mg total) by mouth once daily. 60 tablet 3    amLODIPine (NORVASC) 2.5 MG tablet TAKE 1 TABLET BY MOUTH EVERY DAY (Patient taking differently: Take 2.5 mg by mouth every evening.) 30 tablet 10    apixaban (ELIQUIS) 2.5 mg Tab Take 1 tablet (2.5 mg total) by mouth 2 (two) times daily. 180 tablet 3    cetirizine HCl (ZYRTEC ORAL) Take 10 mg by mouth once daily.      cholecalciferol, vitamin D3, 125 mcg (5,000 unit) Tab Take 5,000 Units by mouth once daily.      dorzolamide (TRUSOPT) 2 % ophthalmic solution INSTILL 1 DROP INTO BOTH EYES TWICE DAILY 30 mL 1    Lactobacillus rhamnosus GG (CULTURELLE) 10 billion cell capsule Take 1 capsule by mouth once daily.      latanoprost 0.005 % ophthalmic solution Place 1 drop into both eyes every evening. 5 mL 6    metoprolol tartrate (LOPRESSOR) 25 MG tablet Take 1 tablet (25 mg total) by mouth 2 (two) times daily. 180  tablet 1    nitroGLYCERIN 0.4 MG/HR TD PT24 (NITRODUR) 0.4 mg/hr Place 1 patch onto the skin once daily. 90 patch 3    rosuvastatin (CRESTOR) 40 MG Tab TAKE 1 TABLET (40 MG TOTAL) BY MOUTH ONCE DAILY. (Patient taking differently: Take 40 mg by mouth every evening.) 90 tablet 3    [DISCONTINUED] albuterol (PROVENTIL/VENTOLIN HFA) 90 mcg/actuation inhaler Inhale 1-2 puffs into the lungs every 4 (four) hours as needed for Wheezing or Shortness of Breath. Rescue 18 g 11    [DISCONTINUED] albuterol-ipratropium (DUO-NEB) 2.5 mg-0.5 mg/3 mL nebulizer solution Take 3 mLs by nebulization every 6 (six) hours as needed for Wheezing or Shortness of Breath. 360 mL 11    [DISCONTINUED] fluticasone furoate-vilanteroL (BREO ELLIPTA) 100-25 mcg/dose diskus inhaler Inhale 1 puff into the lungs once daily. 90 each 11    [DISCONTINUED] furosemide (LASIX) 40 MG tablet Take 1 tablet (40 mg total) by mouth 2 (two) times a day. Take twice a day and monitor BP and weights 90 tablet 1    [DISCONTINUED] potassium chloride (KLOR-CON) 10 MEQ TbSR Take 2 tablets (20 mEq total) by mouth once daily. 180 tablet 1    fluticasone (FLONASE) 50 mcg/actuation nasal spray 2 sprays (100 mcg total) by Each Nare route daily as needed for Allergies. (Patient not taking: Reported on 2024) 16 g 11     No current facility-administered medications on file prior to visit.     Social History     Socioeconomic History    Marital status:    Tobacco Use    Smoking status: Former     Current packs/day: 0.00     Types: Cigarettes     Start date: 6/10/1954     Quit date: 9/15/1955     Years since quittin.3    Smokeless tobacco: Never    Tobacco comments:     I onlysmoked one year while [facility] my  was oversees  during [place]   Substance and Sexual Activity    Alcohol use: Not Currently     Comment: Only drank a little wine and haven't  drunk anything in 15 y    Drug use: Never    Sexual activity: Not Currently     Partners: Male     Birth  control/protection: Abstinence     Comment:  and 87 years of age     Social Determinants of Health     Financial Resource Strain: Low Risk  (2023)    Overall Financial Resource Strain (CARDIA)     Difficulty of Paying Living Expenses: Not hard at all   Food Insecurity: No Food Insecurity (2023)    Hunger Vital Sign     Worried About Running Out of Food in the Last Year: Never true     Ran Out of Food in the Last Year: Never true   Transportation Needs: No Transportation Needs (2023)    PRAPARE - Transportation     Lack of Transportation (Medical): No     Lack of Transportation (Non-Medical): No   Physical Activity: Insufficiently Active (2023)    Exercise Vital Sign     Days of Exercise per Week: 1 day     Minutes of Exercise per Session: 10 min   Stress: No Stress Concern Present (2023)    [employer] - Occupational Stress Questionnaire     Feeling of Stress : Not at all   Social Connections: Unknown (2023)    Social Connection and Isolation Panel [NHANES]     Frequency of Communication with Friends and Family: Twice a week     Frequency of Social Gatherings with Friends and Family: Once a week     Active Member of Clubs or Organizations: Yes     Attends Club or Organization Meetings: More than 4 times per year     Marital Status:    Housing Stability: Low Risk  (2023)    Housing Stability Vital Sign     Unable to Pay for Housing in the Last Year: No     Number of Places Lived in the Last Year: 1     Unstable Housing in the Last Year: No     Family History   Problem Relation Age of Onset    Diabetes Brother         Type 2    Heart disease Brother          at 65 CHD    Diabetes Mother         Type 1    Alcohol abuse Mother         Dod 10/75    Heart disease Mother         Arteriosclerosis    Hypertension Mother     Stroke Mother         3/15/1975 dod 10/1975    Cancer Maternal Grandfather         Dod     Clotting disorder Son        "  bleeding after tonsillectomy only    Heart disease Father         Heart attack. Afib, and Polyvcithemavera    Hypertension Father     Amblyopia Neg Hx     Blindness Neg Hx     Cataracts Neg Hx     Glaucoma Neg Hx     Macular degeneration Neg Hx     Retinal detachment Neg Hx     Strabismus Neg Hx     Thyroid disease Neg Hx     Colon cancer Neg Hx        Review of Systems   Constitutional:  Positive for fatigue and weakness.   Respiratory:  Positive for shortness of breath, dyspnea on extertion and Paroxysmal Nocturnal Dyspnea.    Cardiovascular:  Positive for leg swelling.   Musculoskeletal:  Positive for arthralgias.       Objective:      /80   Pulse 64   Resp 18   Ht 5' 5" (1.651 m)   Wt 90.5 kg (199 lb 6.5 oz)   LMP  (LMP Unknown)   SpO2 95%   BMI 33.18 kg/m²   Physical Exam  Vitals and nursing note reviewed.   Constitutional:       Appearance: She is well-developed. She is ill-appearing.   HENT:      Head: Normocephalic.      Nose: Nose normal.   Eyes:      Pupils: Pupils are equal, round, and reactive to light.   Neck:      Vascular: JVD present.   Cardiovascular:      Rate and Rhythm: Normal rate. Rhythm irregular.      Chest Wall: PMI is displaced.      Heart sounds: Murmur heard.      Systolic murmur is present with a grade of 2/6.   Pulmonary:      Breath sounds: Examination of the right-lower field reveals decreased breath sounds and rales. Examination of the left-lower field reveals decreased breath sounds and rales. Decreased breath sounds and rales present.   Abdominal:      General: Bowel sounds are normal.      Palpations: Abdomen is soft.   Musculoskeletal:         General: Normal range of motion.      Cervical back: Normal range of motion.      Right lower leg: Edema present.      Left lower leg: Edema present.   Skin:     General: Skin is warm.   Neurological:      Mental Status: She is alert and oriented to person, place, and time.       Personal Diagnostic Review  Chest x-ray: " "bilateral pleural effusion         1/12/2024     1:15 PM   Pulmonary Studies Review   SpO2 95 %   Height 5' 5" (1.651 m)   Weight 90.5 kg (199 lb 6.5 oz)   BMI (Calculated) 33.2   Predicted Distance 146.13   Predicted Distance Meters (Calculated) 288.03 meters       X-Ray Chest PA And Lateral  Narrative: EXAMINATION:  XR CHEST PA AND LATERAL    CLINICAL HISTORY:  SOB; Chronic obstructive pulmonary disease, unspecified    TECHNIQUE:  PA and lateral views of the chest were performed.    COMPARISON:  12/14/2023    FINDINGS:  Cardiac silhouette remains borderline enlarged.  Dual lead cardiac pacing device remains in place.  There are small bilateral pleural effusions.  Hazy parenchymal opacities in the lung bases may represent subsegmental atelectasis with early pulmonary edema not excluded.  No acute osseous findings demonstrated.  Impression: As above    This report was flagged in Epic as abnormal.    Electronically signed by: Nitin Garcia MD  Date:    01/12/2024  Time:    12:38      Office Spirometry Results:no recent         1/12/2024     1:15 PM 12/16/2023    11:40 AM 12/16/2023    10:00 AM 12/16/2023     7:39 AM 12/16/2023     4:27 AM 12/16/2023    12:33 AM 12/15/2023     8:58 PM   Pulmonary Function Tests   SpO2 95 % 93 % 94 % 95 % 96 % 97 % 93 %   Height 5' 5" (1.651 m)         Weight 90.5 kg (199 lb 6.5 oz)     96.2 kg (212 lb 1.3 oz)    BMI (Calculated) 33.2     35.3          1/12/2024     1:15 PM   Pulmonary Studies Review   SpO2 95 %   Height 5' 5" (1.651 m)   Weight 90.5 kg (199 lb 6.5 oz)   BMI (Calculated) 33.2   Predicted Distance 146.13   Predicted Distance Meters (Calculated) 288.03 meters   [facility] - Pulmonary Function  Six Minute Walk      SUMMARY      Ordering Provider: Dr Waller   Interpreting Provider: Dr Waller  Performing nurse/tech/RT: L Miriam RRT  Diagnosis: Congestive Heart Failure (exercise hypoxemia)  Height: 5' 5" (165.1 cm)  Weight: 90.5 kg (199 lb 6.5 oz)  BMI (Calculated): 33.2        "       Patient Race:                                                                Phase Oxygen Assessment Supplemental O2 Heart   Rate Blood Pressure Stefano Dyspnea Scale Rating   Resting 92 % Room Air 76 bpm 129/75 3   Exercise             Minute             1 88 % (pt placed on NC 2lpm) Room Air 73 bpm       2 94 % 2 L/M 75 bpm       3 96 % 2 L/M 74 bpm       4 97 % 2 L/M 71 bpm       5 94 % 2 L/M 72 bpm       6  92 % 2 L/M 75 bpm 148/76 7-8   Recovery             Minute             1 96 % 2 L/M 76 bpm       2 95 % 2 L/M 66 bpm       3 96 % 2 L/M 76 bpm       4 96 % 2 L/M 74 bpm 148/79 5-6      Six Minute Walk Summary  6MWT Status: completed with stops  Patient Reported: Dyspnea, Dizziness, Lightheadedness          Interpretation:  Did the patient stop or pause?: Yes  How many times did the patient stop or pause?: 1  Stop Time 1: 90  Restart Time 1: 300  Pause Time 1: 210 seconds  Total Time Walked (Calculated): 150 seconds  Final Partial Lap Distance (feet): 125 feet  Total Distance Meters (Calculated): 38.1 meters  Predicted Distance Meters (Calculated): 288.03 meters  Percentage of Predicted (Calculated): 13.23  Peak VO2 (Calculated): 5.12  Mets: 1.46  Has The Patient Had a Previous Six Minute Walk Test?: Yes     Previous 6MWT Results  Has The Patient Had a Previous Six Minute Walk Test?: Yes  Date of Previous Test: 07/12/23  Total Time Walked: 280 seconds  Total Distance (meters): 45  Predicted Distance (meters): 291 meters  Percentage of Predicted: 15  Percent Change (Calculated): 0.15        Interpretation:  Total distance walked in six minutes is severely reduced indicating an severe reduction in functional capacity.  There was significant severe oxygen desaturation to 88 % with exercise on room air (oxygen saturation less than 89%). Patient was exercised with supplemental oxygen as noted above. Clinical correlation suggested.     Patient met criteria for oxygen prescription.  [] Mild  exercise-induced hypoxemia described as an arterial oxygen saturation of 93-95% (or 3-4% less than at rest),  [] Moderate exercise-induced hypoxemia as 89-93%  [x] Severe exercise induced hypoxemia as < 89% O2 saturation.  Medicare Criteria for oxygen prescription comments:   When arterial oxygen saturation is at or below 88% during exercise on room air (severe exercise induced hypoxemia) then the patient falls under Medicare Group 1 criteria for supplemental oxygen prescription.  Details about Medicare Group Criteria coverage can be found at http://www.cms.[facility].gov/manuals/downloads/      Gabe Waller MD         Assessment:            Chronic congestive heart failure, unspecified heart failure type  -     furosemide (LASIX) 40 MG tablet; Take 1 tablet (40 mg total) by mouth 2 (two) times a day. Take twice a day and monitor BP and weights  Dispense: 90 tablet; Refill: 3  -     potassium chloride (KLOR-CON) 10 MEQ TbSR; Take 2 tablets (20 mEq total) by mouth 2 (two) times daily. Take with fluid pill  Dispense: 180 tablet; Refill: 1  -     Six Minute Walk Test to qualify for Home Oxygen; Future    Chronic obstructive pulmonary disease, unspecified COPD type  -     albuterol (PROVENTIL/VENTOLIN HFA) 90 mcg/actuation inhaler; Inhale 1-2 puffs into the lungs every 4 (four) hours as needed for Wheezing or Shortness of Breath. Rescue  Dispense: 18 g; Refill: 11  -     Discontinue: albuterol-ipratropium (DUO-NEB) 2.5 mg-0.5 mg/3 mL nebulizer solution; Take 3 mLs by nebulization every 6 (six) hours as needed for Wheezing or Shortness of Breath.  Dispense: 360 mL; Refill: 11  -     fluticasone furoate-vilanteroL (BREO ELLIPTA) 100-25 mcg/dose diskus inhaler; Inhale 1 puff into the lungs once daily.  Dispense: 60 each; Refill: 11  -     albuterol-ipratropium (DUO-NEB) 2.5 mg-0.5 mg/3 mL nebulizer solution; Take 3 mLs by nebulization every 6 (six) hours as needed for Wheezing or Shortness of Breath.  Dispense: 360 mL; Refill:  11    Simple chronic bronchitis  -     albuterol (PROVENTIL/VENTOLIN HFA) 90 mcg/actuation inhaler; Inhale 1-2 puffs into the lungs every 4 (four) hours as needed for Wheezing or Shortness of Breath. Rescue  Dispense: 18 g; Refill: 11  -     Discontinue: albuterol-ipratropium (DUO-NEB) 2.5 mg-0.5 mg/3 mL nebulizer solution; Take 3 mLs by nebulization every 6 (six) hours as needed for Wheezing or Shortness of Breath.  Dispense: 360 mL; Refill: 11  -     fluticasone furoate-vilanteroL (BREO ELLIPTA) 100-25 mcg/dose diskus inhaler; Inhale 1 puff into the lungs once daily.  Dispense: 60 each; Refill: 11  -     albuterol-ipratropium (DUO-NEB) 2.5 mg-0.5 mg/3 mL nebulizer solution; Take 3 mLs by nebulization every 6 (six) hours as needed for Wheezing or Shortness of Breath.  Dispense: 360 mL; Refill: 11    JA on CPAP  -     CPAP/BIPAP SUPPLIES  -     CPAP FOR HOME USE    Congestive heart failure, unspecified HF chronicity, unspecified heart failure type  -     Six Minute Walk Test to qualify for Home Oxygen; Future    Exercise hypoxemia  -     Six Minute Walk Test to qualify for Home Oxygen; Future    Suprapubic catheter          Outpatient Encounter Medications as of 1/12/2024   Medication Sig Dispense Refill    amiodarone (PACERONE) 200 MG Tab Take 1 tablet (200 mg total) by mouth once daily. 60 tablet 3    amLODIPine (NORVASC) 2.5 MG tablet TAKE 1 TABLET BY MOUTH EVERY DAY (Patient taking differently: Take 2.5 mg by mouth every evening.) 30 tablet 10    apixaban (ELIQUIS) 2.5 mg Tab Take 1 tablet (2.5 mg total) by mouth 2 (two) times daily. 180 tablet 3    cetirizine HCl (ZYRTEC ORAL) Take 10 mg by mouth once daily.      cholecalciferol, vitamin D3, 125 mcg (5,000 unit) Tab Take 5,000 Units by mouth once daily.      dorzolamide (TRUSOPT) 2 % ophthalmic solution INSTILL 1 DROP INTO BOTH EYES TWICE DAILY 30 mL 1    Lactobacillus rhamnosus GG (CULTURELLE) 10 billion cell capsule Take 1 capsule by mouth once daily.       latanoprost 0.005 % ophthalmic solution Place 1 drop into both eyes every evening. 5 mL 6    metoprolol tartrate (LOPRESSOR) 25 MG tablet Take 1 tablet (25 mg total) by mouth 2 (two) times daily. 180 tablet 1    nitroGLYCERIN 0.4 MG/HR TD PT24 (NITRODUR) 0.4 mg/hr Place 1 patch onto the skin once daily. 90 patch 3    rosuvastatin (CRESTOR) 40 MG Tab TAKE 1 TABLET (40 MG TOTAL) BY MOUTH ONCE DAILY. (Patient taking differently: Take 40 mg by mouth every evening.) 90 tablet 3    [DISCONTINUED] albuterol (PROVENTIL/VENTOLIN HFA) 90 mcg/actuation inhaler Inhale 1-2 puffs into the lungs every 4 (four) hours as needed for Wheezing or Shortness of Breath. Rescue 18 g 11    [DISCONTINUED] albuterol-ipratropium (DUO-NEB) 2.5 mg-0.5 mg/3 mL nebulizer solution Take 3 mLs by nebulization every 6 (six) hours as needed for Wheezing or Shortness of Breath. 360 mL 11    [DISCONTINUED] fluticasone furoate-vilanteroL (BREO ELLIPTA) 100-25 mcg/dose diskus inhaler Inhale 1 puff into the lungs once daily. 90 each 11    [DISCONTINUED] furosemide (LASIX) 40 MG tablet Take 1 tablet (40 mg total) by mouth 2 (two) times a day. Take twice a day and monitor BP and weights 90 tablet 1    [DISCONTINUED] potassium chloride (KLOR-CON) 10 MEQ TbSR Take 2 tablets (20 mEq total) by mouth once daily. 180 tablet 1    albuterol (PROVENTIL/VENTOLIN HFA) 90 mcg/actuation inhaler Inhale 1-2 puffs into the lungs every 4 (four) hours as needed for Wheezing or Shortness of Breath. Rescue 18 g 11    albuterol-ipratropium (DUO-NEB) 2.5 mg-0.5 mg/3 mL nebulizer solution Take 3 mLs by nebulization every 6 (six) hours as needed for Wheezing or Shortness of Breath. 360 mL 11    [] azithromycin (Z-YAAKOV) 250 MG tablet Take 1 tablet (250 mg total) by mouth once daily. for 3 days 3 tablet 0    fluticasone (FLONASE) 50 mcg/actuation nasal spray 2 sprays (100 mcg total) by Each Nare route daily as needed for Allergies. (Patient not taking: Reported on 2024) 16  g 11    fluticasone furoate-vilanteroL (BREO ELLIPTA) 100-25 mcg/dose diskus inhaler Inhale 1 puff into the lungs once daily. 60 each 11    furosemide (LASIX) 40 MG tablet Take 1 tablet (40 mg total) by mouth 2 (two) times a day. Take twice a day and monitor BP and weights 90 tablet 3    potassium chloride (KLOR-CON) 10 MEQ TbSR Take 2 tablets (20 mEq total) by mouth 2 (two) times daily. Take with fluid pill 180 tablet 1    [] predniSONE (DELTASONE) 20 MG tablet Take 1 tablet (20 mg total) by mouth once daily. for 5 days 5 tablet 0    [DISCONTINUED] albuterol-ipratropium (DUO-NEB) 2.5 mg-0.5 mg/3 mL nebulizer solution Take 3 mLs by nebulization every 6 (six) hours as needed for Wheezing or Shortness of Breath. 360 mL 11    [DISCONTINUED] acetaminophen tablet 650 mg       [DISCONTINUED] albuterol inhaler 2 puff       [DISCONTINUED] amiodarone tablet 200 mg       [DISCONTINUED] apixaban tablet 2.5 mg       [DISCONTINUED] atorvastatin tablet 20 mg       [DISCONTINUED] azithromycin (ZITHROMAX) 500 mg in dextrose 5 % (D5W) 250 mL IVPB (Vial-Mate)       [DISCONTINUED] benzonatate capsule 100 mg       [DISCONTINUED] dextrose 10% bolus 125 mL 125 mL       [DISCONTINUED] dextrose 10% bolus 250 mL 250 mL       [DISCONTINUED] dorzolamide 2 % ophthalmic solution 1 drop       [DISCONTINUED] furosemide injection 40 mg       [DISCONTINUED] glucagon (human recombinant) injection 1 mg       [DISCONTINUED] glucose chewable tablet 16 g       [DISCONTINUED] glucose chewable tablet 24 g       [DISCONTINUED] guaiFENesin 100 mg/5 ml syrup 200 mg       [DISCONTINUED] latanoprost 0.005 % ophthalmic solution 1 drop       [DISCONTINUED] melatonin tablet 6 mg       [DISCONTINUED] methylPREDNISolone sodium succinate injection 40 mg       [DISCONTINUED] methylPREDNISolone sodium succinate injection 60 mg       [DISCONTINUED] metoprolol tartrate (LOPRESSOR) tablet 25 mg       [DISCONTINUED] naloxone 0.4 mg/mL injection 0.02 mg        [DISCONTINUED] ondansetron injection 4 mg       [DISCONTINUED] potassium chloride SA CR tablet 20 mEq       [DISCONTINUED] prochlorperazine injection Soln 2.5 mg       [DISCONTINUED] senna-docusate 8.6-50 mg per tablet 1 tablet       [DISCONTINUED] sodium chloride 0.9% flush 10 mL        No facility-administered encounter medications on file as of 1/12/2024.     Plan:       Requested Prescriptions     Signed Prescriptions Disp Refills    albuterol (PROVENTIL/VENTOLIN HFA) 90 mcg/actuation inhaler 18 g 11     Sig: Inhale 1-2 puffs into the lungs every 4 (four) hours as needed for Wheezing or Shortness of Breath. Rescue    furosemide (LASIX) 40 MG tablet 90 tablet 3     Sig: Take 1 tablet (40 mg total) by mouth 2 (two) times a day. Take twice a day and monitor BP and weights    potassium chloride (KLOR-CON) 10 MEQ TbSR 180 tablet 1     Sig: Take 2 tablets (20 mEq total) by mouth 2 (two) times daily. Take with fluid pill    fluticasone furoate-vilanteroL (BREO ELLIPTA) 100-25 mcg/dose diskus inhaler 60 each 11     Sig: Inhale 1 puff into the lungs once daily.    albuterol-ipratropium (DUO-NEB) 2.5 mg-0.5 mg/3 mL nebulizer solution 360 mL 11     Sig: Take 3 mLs by nebulization every 6 (six) hours as needed for Wheezing or Shortness of Breath.     Problem List Items Addressed This Visit       Chronic congestive heart failure - Primary    Relevant Medications    furosemide (LASIX) 40 MG tablet    potassium chloride (KLOR-CON) 10 MEQ TbSR    Other Relevant Orders    Six Minute Walk Test to qualify for Home Oxygen    COPD (chronic obstructive pulmonary disease)    Relevant Medications    albuterol (PROVENTIL/VENTOLIN HFA) 90 mcg/actuation inhaler    fluticasone furoate-vilanteroL (BREO ELLIPTA) 100-25 mcg/dose diskus inhaler    albuterol-ipratropium (DUO-NEB) 2.5 mg-0.5 mg/3 mL nebulizer solution    Suprapubic catheter     Other Visit Diagnoses       JA on CPAP        Relevant Orders    CPAP/BIPAP SUPPLIES    CPAP FOR HOME  USE    Congestive heart failure, unspecified HF chronicity, unspecified heart failure type        Relevant Orders    Six Minute Walk Test to qualify for Home Oxygen    Exercise hypoxemia        Relevant Orders    Six Minute Walk Test to qualify for Home Oxygen               Follow up in about 3 months (around 4/12/2024) for 6 min walk - ASAP, Review progress.    MEDICAL DECISION MAKING: Moderate to high complexity.  Overall, the multiple problems listed are of moderate to high severity that may impact quality of life and activities of daily living. Side effects of medications, treatment plan as well as options and alternatives reviewed and discussed with patient. There was counseling of patient concerning these issues.    Total time spent in counseling and coordination of care - 45  minutes of total time spent on the encounter, which includes face to face time and non-face to face time preparing to see the patient (eg, review of tests), Obtaining and/or reviewing separately obtained history, Documenting clinical information in the electronic or other health record, Independently interpreting results (not separately reported) and communicating results to the patient/family/caregiver, or Care coordination (not separately reported).    Time was used in discussion of prognosis, risks, benefits of treatment, instructions and compliance with regimen . Discussion with other physicians and/or health care providers - home health or for use of durable medical equipment (oxygen, nebulizers, CPAP, BiPAP) occurred.

## 2024-01-12 NOTE — PROCEDURES
"[facility] - Pulmonary Function  Six Minute Walk     SUMMARY     Ordering Provider: Dr Waller   Interpreting Provider: Dr Waller  Performing nurse/tech/RT: WILY Miriam RRT  Diagnosis: Congestive Heart Failure (exercise hypoxemia)  Height: 5' 5" (165.1 cm)  Weight: 90.5 kg (199 lb 6.5 oz)  BMI (Calculated): 33.2   Patient Race:             Phase Oxygen Assessment Supplemental O2 Heart   Rate Blood Pressure Stefano Dyspnea Scale Rating   Resting 92 % Room Air 76 bpm 129/75 3   Exercise        Minute        1 88 % (pt placed on NC 2lpm) Room Air 73 bpm     2 94 % 2 L/M 75 bpm     3 96 % 2 L/M 74 bpm     4 97 % 2 L/M 71 bpm     5 94 % 2 L/M 72 bpm     6  92 % 2 L/M 75 bpm 148/76 7-8   Recovery        Minute        1 96 % 2 L/M 76 bpm     2 95 % 2 L/M 66 bpm     3 96 % 2 L/M 76 bpm     4 96 % 2 L/M 74 bpm 148/79 5-6     Six Minute Walk Summary  6MWT Status: completed with stops  Patient Reported: Dyspnea, Dizziness, Lightheadedness     Interpretation:  Did the patient stop or pause?: Yes  How many times did the patient stop or pause?: 1  Stop Time 1: 90  Restart Time 1: 300  Pause Time 1: 210 seconds                             Total Time Walked (Calculated): 150 seconds  Final Partial Lap Distance (feet): 125 feet  Total Distance Meters (Calculated): 38.1 meters  Predicted Distance Meters (Calculated): 288.03 meters  Percentage of Predicted (Calculated): 13.23  Peak VO2 (Calculated): 5.12  Mets: 1.46  Has The Patient Had a Previous Six Minute Walk Test?: Yes       Previous 6MWT Results  Has The Patient Had a Previous Six Minute Walk Test?: Yes  Date of Previous Test: 07/12/23  Total Time Walked: 280 seconds  Total Distance (meters): 45  Predicted Distance (meters): 291 meters  Percentage of Predicted: 15  Percent Change (Calculated): 0.15      Interpretation:  Total distance walked in six minutes is severely reduced indicating an severe reduction in functional capacity.  There was significant severe oxygen desaturation to " 88 % with exercise on room air (oxygen saturation less than 89%). Patient was exercised with supplemental oxygen as noted above. Clinical correlation suggested.    Patient met criteria for oxygen prescription.  [] Mild exercise-induced hypoxemia described as an arterial oxygen saturation of 93-95% (or 3-4% less than at rest),  [] Moderate exercise-induced hypoxemia as 89-93%  [x] Severe exercise induced hypoxemia as < 89% O2 saturation.  Medicare Criteria for oxygen prescription comments:   When arterial oxygen saturation is at or below 88% during exercise on room air (severe exercise induced hypoxemia) then the patient falls under Medicare Group 1 criteria for supplemental oxygen prescription.  Details about Medicare Group Criteria coverage can be found at http://[email or web address].[facility].gov/manuals/downloads/     Gabe Waller MD

## 2024-01-12 NOTE — TELEPHONE ENCOUNTER
A prescription for portable battery operated oxygen concentrator has been sent to   [facility] for CPAP/Oxygen/Nebulizer supplies.

## 2024-01-18 ENCOUNTER — PATIENT MESSAGE (OUTPATIENT)
Dept: CARDIOLOGY | Facility: CLINIC | Age: 89
End: 2024-01-18
Payer: MEDICARE

## 2024-01-18 ENCOUNTER — TELEPHONE (OUTPATIENT)
Dept: HEMATOLOGY/ONCOLOGY | Facility: CLINIC | Age: 89
End: 2024-01-18
Payer: MEDICARE

## 2024-01-18 NOTE — TELEPHONE ENCOUNTER
----- Message from Alvin Bush sent at 1/18/2024  1:58 PM CST -----  Contact: Holli Jade is requesting labs be transferred for [facility] to [facility] due to having procedure at [facility] tomorrow around 2 pm.  Pt can be reached at [phone].      Thanks  JAGDEEP

## 2024-01-19 ENCOUNTER — LAB VISIT (OUTPATIENT)
Dept: LAB | Facility: HOSPITAL | Age: 89
End: 2024-01-19
Attending: INTERNAL MEDICINE
Payer: MEDICARE

## 2024-01-19 DIAGNOSIS — E53.8 B12 DEFICIENCY: ICD-10-CM

## 2024-01-19 DIAGNOSIS — D64.9 ANEMIA: ICD-10-CM

## 2024-01-19 LAB
BASOPHILS # BLD AUTO: 0.08 K/UL (ref 0–0.2)
BASOPHILS NFR BLD: 1 % (ref 0–1.9)
DIFFERENTIAL METHOD BLD: ABNORMAL
EOSINOPHIL # BLD AUTO: 0.1 K/UL (ref 0–0.5)
EOSINOPHIL NFR BLD: 1.3 % (ref 0–8)
ERYTHROCYTE [DISTWIDTH] IN BLOOD BY AUTOMATED COUNT: 18 % (ref 11.5–14.5)
FERRITIN SERPL-MCNC: 60 NG/ML (ref 20–300)
HCT VFR BLD AUTO: 39.1 % (ref 37–48.5)
HGB BLD-MCNC: 12.1 G/DL (ref 12–16)
IMM GRANULOCYTES # BLD AUTO: 0.02 K/UL (ref 0–0.04)
IMM GRANULOCYTES NFR BLD AUTO: 0.3 % (ref 0–0.5)
IRON SERPL-MCNC: 28 UG/DL (ref 30–160)
LYMPHOCYTES # BLD AUTO: 2 K/UL (ref 1–4.8)
LYMPHOCYTES NFR BLD: 25.2 % (ref 18–48)
MCH RBC QN AUTO: 25.7 PG (ref 27–31)
MCHC RBC AUTO-ENTMCNC: 30.9 G/DL (ref 32–36)
MCV RBC AUTO: 83 FL (ref 82–98)
MONOCYTES # BLD AUTO: 1.1 K/UL (ref 0.3–1)
MONOCYTES NFR BLD: 13.4 % (ref 4–15)
NEUTROPHILS # BLD AUTO: 4.7 K/UL (ref 1.8–7.7)
NEUTROPHILS NFR BLD: 58.8 % (ref 38–73)
NRBC BLD-RTO: 0 /100 WBC
PATH REV BLD -IMP: NORMAL
PLATELET # BLD AUTO: 359 K/UL (ref 150–450)
PMV BLD AUTO: 10.2 FL (ref 9.2–12.9)
RBC # BLD AUTO: 4.71 M/UL (ref 4–5.4)
SATURATED IRON: 6 % (ref 20–50)
TOTAL IRON BINDING CAPACITY: 490 UG/DL (ref 250–450)
TRANSFERRIN SERPL-MCNC: 331 MG/DL (ref 200–375)
WBC # BLD AUTO: 7.99 K/UL (ref 3.9–12.7)

## 2024-01-19 PROCEDURE — 85045 AUTOMATED RETICULOCYTE COUNT: CPT | Performed by: INTERNAL MEDICINE

## 2024-01-19 PROCEDURE — 85060 BLOOD SMEAR INTERPRETATION: CPT | Mod: ,,, | Performed by: PATHOLOGY

## 2024-01-19 PROCEDURE — 83615 LACTATE (LD) (LDH) ENZYME: CPT | Performed by: INTERNAL MEDICINE

## 2024-01-19 PROCEDURE — 82746 ASSAY OF FOLIC ACID SERUM: CPT | Performed by: INTERNAL MEDICINE

## 2024-01-19 PROCEDURE — 82728 ASSAY OF FERRITIN: CPT | Performed by: INTERNAL MEDICINE

## 2024-01-19 PROCEDURE — 83540 ASSAY OF IRON: CPT | Performed by: INTERNAL MEDICINE

## 2024-01-19 PROCEDURE — 83010 ASSAY OF HAPTOGLOBIN QUANT: CPT | Performed by: INTERNAL MEDICINE

## 2024-01-19 PROCEDURE — 85025 COMPLETE CBC W/AUTO DIFF WBC: CPT | Performed by: INTERNAL MEDICINE

## 2024-01-19 PROCEDURE — 82525 ASSAY OF COPPER: CPT | Performed by: INTERNAL MEDICINE

## 2024-01-19 PROCEDURE — 82607 VITAMIN B-12: CPT | Performed by: INTERNAL MEDICINE

## 2024-01-20 LAB
FOLATE SERPL-MCNC: 11.5 NG/ML (ref 4–24)
HAPTOGLOB SERPL-MCNC: 121 MG/DL (ref 30–250)
LDH SERPL L TO P-CCNC: 283 U/L (ref 110–260)
RETICS/RBC NFR AUTO: 1.5 % (ref 0.5–2.5)
VIT B12 SERPL-MCNC: 461 PG/ML (ref 210–950)

## 2024-01-22 ENCOUNTER — HOSPITAL ENCOUNTER (OUTPATIENT)
Dept: CARDIOLOGY | Facility: HOSPITAL | Age: 89
Discharge: HOME | End: 2024-01-22
Attending: INTERNAL MEDICINE
Payer: MEDICARE

## 2024-01-22 ENCOUNTER — HOSPITAL ENCOUNTER (OUTPATIENT)
Dept: CARDIOLOGY | Facility: HOSPITAL | Age: 89
Discharge: HOME | End: 2024-01-22
Attending: GENERAL PRACTICE
Payer: MEDICARE

## 2024-01-22 ENCOUNTER — OFFICE VISIT (OUTPATIENT)
Dept: CARDIOLOGY | Facility: CLINIC | Age: 89
End: 2024-01-22
Payer: MEDICARE

## 2024-01-22 VITALS
HEIGHT: 65 IN | DIASTOLIC BLOOD PRESSURE: 86 MMHG | RESPIRATION RATE: 18 BRPM | SYSTOLIC BLOOD PRESSURE: 138 MMHG | BODY MASS INDEX: 33.24 KG/M2 | HEART RATE: 62 BPM | WEIGHT: 199.5 LBS

## 2024-01-22 VITALS
HEIGHT: 65 IN | SYSTOLIC BLOOD PRESSURE: 138 MMHG | WEIGHT: 199 LBS | DIASTOLIC BLOOD PRESSURE: 86 MMHG | BODY MASS INDEX: 33.15 KG/M2

## 2024-01-22 DIAGNOSIS — I48.0 PAROXYSMAL ATRIAL FIBRILLATION: ICD-10-CM

## 2024-01-22 DIAGNOSIS — Z95.0 CARDIAC PACEMAKER IN SITU: ICD-10-CM

## 2024-01-22 DIAGNOSIS — I50.33 ACUTE ON CHRONIC DIASTOLIC HEART FAILURE: ICD-10-CM

## 2024-01-22 DIAGNOSIS — Z95.0 S/P PLACEMENT OF CARDIAC PACEMAKER: ICD-10-CM

## 2024-01-22 DIAGNOSIS — I25.118 CORONARY ARTERY DISEASE OF NATIVE ARTERY OF NATIVE HEART WITH STABLE ANGINA PECTORIS: ICD-10-CM

## 2024-01-22 DIAGNOSIS — I10 ESSENTIAL HYPERTENSION: ICD-10-CM

## 2024-01-22 DIAGNOSIS — R00.1 SYMPTOMATIC BRADYCARDIA: ICD-10-CM

## 2024-01-22 DIAGNOSIS — I50.9 CONGESTIVE HEART FAILURE, UNSPECIFIED HF CHRONICITY, UNSPECIFIED HEART FAILURE TYPE: ICD-10-CM

## 2024-01-22 DIAGNOSIS — R55 SYNCOPE, UNSPECIFIED SYNCOPE TYPE: Primary | ICD-10-CM

## 2024-01-22 DIAGNOSIS — N18.31 STAGE 3A CHRONIC KIDNEY DISEASE: ICD-10-CM

## 2024-01-22 DIAGNOSIS — Z93.59 SUPRAPUBIC CATHETER: ICD-10-CM

## 2024-01-22 DIAGNOSIS — J96.01 ACUTE HYPOXIC RESPIRATORY FAILURE: ICD-10-CM

## 2024-01-22 DIAGNOSIS — I42.1 HYPERTROPHIC OBSTRUCTIVE CARDIOMYOPATHY: ICD-10-CM

## 2024-01-22 LAB
AV INDEX (PROSTH): 0.3
AV MEAN GRADIENT: 30 MMHG
AV PEAK GRADIENT: 48 MMHG
AV REGURGITATION PRESSURE HALF TIME: 1058.22 MS
AV VALVE AREA BY VELOCITY RATIO: 0.83 CM²
AV VALVE AREA: 0.96 CM²
AV VELOCITY RATIO: 0.26
BSA FOR ECHO PROCEDURE: 2.04 M2
CV ECHO LV RWT: 0.46 CM
DOP CALC AO PEAK VEL: 3.46 M/S
DOP CALC AO VTI: 79.1 CM
DOP CALC LVOT AREA: 3.1 CM2
DOP CALC LVOT DIAMETER: 2 CM
DOP CALC LVOT PEAK VEL: 0.91 M/S
DOP CALC LVOT STROKE VOLUME: 75.67 CM3
DOP CALC RVOT PEAK VEL: 0.65 M/S
DOP CALC RVOT VTI: 13.6 CM
DOP CALCLVOT PEAK VEL VTI: 24.1 CM
E WAVE DECELERATION TIME: 296.12 MSEC
E/A RATIO: 1.92
E/E' RATIO: 12.53 M/S
ECHO LV POSTERIOR WALL: 1.13 CM (ref 0.6–1.1)
FRACTIONAL SHORTENING: 38 % (ref 28–44)
INTERVENTRICULAR SEPTUM: 1.13 CM (ref 0.6–1.1)
IVC DIAMETER: 1.21 CM
IVRT: 88.49 MSEC
LA MAJOR: 6.76 CM
LA MINOR: 6.9 CM
LA WIDTH: 3.3 CM
LEFT ATRIUM SIZE: 3.98 CM
LEFT ATRIUM VOLUME INDEX MOD: 24.3 ML/M2
LEFT ATRIUM VOLUME INDEX: 38.5 ML/M2
LEFT ATRIUM VOLUME MOD: 48.21 CM3
LEFT ATRIUM VOLUME: 76.24 CM3
LEFT INTERNAL DIMENSION IN SYSTOLE: 3.04 CM (ref 2.1–4)
LEFT VENTRICLE DIASTOLIC VOLUME INDEX: 58.06 ML/M2
LEFT VENTRICLE DIASTOLIC VOLUME: 114.95 ML
LEFT VENTRICLE MASS INDEX: 106 G/M2
LEFT VENTRICLE SYSTOLIC VOLUME INDEX: 18.2 ML/M2
LEFT VENTRICLE SYSTOLIC VOLUME: 36.06 ML
LEFT VENTRICULAR INTERNAL DIMENSION IN DIASTOLE: 4.94 CM (ref 3.5–6)
LEFT VENTRICULAR MASS: 210.84 G
LV LATERAL E/E' RATIO: 10.44 M/S
LV SEPTAL E/E' RATIO: 15.67 M/S
LVOT MG: 2.31 MMHG
LVOT MV: 0.73 CM/S
MV PEAK A VEL: 0.49 M/S
MV PEAK E VEL: 0.94 M/S
MV STENOSIS PRESSURE HALF TIME: 85.88 MS
MV VALVE AREA P 1/2 METHOD: 2.56 CM2
PATH REV BLD -IMP: NORMAL
PISA AR MAX VEL: 2.58 M/S
PISA TR MAX VEL: 2.73 M/S
PV MEAN GRADIENT: 1 MMHG
PV MV: 0.64 M/S
PV PEAK GRADIENT: 3 MMHG
PV PEAK VELOCITY: 0.85 M/S
RA MAJOR: 5.39 CM
RA PRESSURE ESTIMATED: 3 MMHG
RA WIDTH: 3.59 CM
RIGHT VENTRICULAR END-DIASTOLIC DIMENSION: 4.01 CM
RV TB RVSP: 6 MMHG
SINUS: 2.85 CM
STJ: 2.8 CM
TDI LATERAL: 0.09 M/S
TDI SEPTAL: 0.06 M/S
TDI: 0.08 M/S
TR MAX PG: 30 MMHG
TRICUSPID ANNULAR PLANE SYSTOLIC EXCURSION: 1.88 CM
TV REST PULMONARY ARTERY PRESSURE: 33 MMHG
Z-SCORE OF LEFT VENTRICULAR DIMENSION IN END DIASTOLE: -1.46
Z-SCORE OF LEFT VENTRICULAR DIMENSION IN END SYSTOLE: -1.14

## 2024-01-22 PROCEDURE — 99215 OFFICE O/P EST HI 40 MIN: CPT | Mod: S$GLB,,, | Performed by: INTERNAL MEDICINE

## 2024-01-22 PROCEDURE — 93280 PM DEVICE PROGR EVAL DUAL: CPT | Mod: 26,,, | Performed by: INTERNAL MEDICINE

## 2024-01-22 PROCEDURE — 1157F ADVNC CARE PLAN IN RCRD: CPT | Mod: CPTII,S$GLB,, | Performed by: INTERNAL MEDICINE

## 2024-01-22 PROCEDURE — 93280 PM DEVICE PROGR EVAL DUAL: CPT

## 2024-01-22 PROCEDURE — 1125F AMNT PAIN NOTED PAIN PRSNT: CPT | Mod: CPTII,S$GLB,, | Performed by: INTERNAL MEDICINE

## 2024-01-22 PROCEDURE — 93306 TTE W/DOPPLER COMPLETE: CPT | Mod: 26,,, | Performed by: INTERNAL MEDICINE

## 2024-01-22 PROCEDURE — 99999 PR PBB SHADOW E&M-EST. PATIENT-LVL IV: CPT | Mod: PBBFAC,,, | Performed by: INTERNAL MEDICINE

## 2024-01-22 PROCEDURE — 93306 TTE W/DOPPLER COMPLETE: CPT

## 2024-01-22 PROCEDURE — 3288F FALL RISK ASSESSMENT DOCD: CPT | Mod: CPTII,S$GLB,, | Performed by: INTERNAL MEDICINE

## 2024-01-22 PROCEDURE — 1100F PTFALLS ASSESS-DOCD GE2>/YR: CPT | Mod: CPTII,S$GLB,, | Performed by: INTERNAL MEDICINE

## 2024-01-22 RX ORDER — CEFDINIR 300 MG/1
300 CAPSULE ORAL EVERY 12 HOURS
COMMUNITY
Start: 2024-01-19 | End: 2024-02-01

## 2024-01-22 NOTE — PROGRESS NOTES
Subjective:   Patient ID:  Holli Landrum is a 89 y.o. female     Chief complaint:Atrial Fibrillation, Cardiomyopathy, and Congestive Heart Failure      HPI  New patient to me. (11/20/2023 )  Referred by Dr Meyer for evaluation and management of AF/p.m. etc.   --   Background as gleaned from patient's records and today's interview :  89 y.o. female pt with  history of persistent atrial fibrillation, coronary artery disease status post PCI, sick sinus syndrome status post  pacemaker implantation Biotronik, hypertrophic obstructive cardiomyopathy, diastolic congestive heart failure, aortic stenosis, history of GI bleeding   was cardioverted (JÚNIOR/DCCV) on 10/5/23 with Sotalol transitioned to Amiodarone   June 2023:  Conclusion    The Ost Cx to Prox Cx lesion was 50% stenosed.    The Ost 1st Mrg to 1st Mrg lesion was 70% stenosed.    The pre-procedure left ventricular end diastolic pressure was 6.    The estimated blood loss was <50 mL.    There was non-obstructive coronary artery disease..    There was moderate aortic valve stenosis.     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  She is worried about her atrial fibrillation and was wondering about an ablation.  Friday and Saturday issue was weak.  She is on amlodipine.  Patient's device (DDD CLS)was fully evaluated today under my direct supervision and real-time feedback.  Summary of findings are as listed below:  Device is in good repair.   The battery is not near SHANE.  The sensing and pacing thresholds are favorable with well maintained safety margins.   After the cardioversion on 10/13/2023 she remained in sinus rhythm until 11/17/2023 and she is now back in AFib still persistently.  Mean ventricular rate is around 80 beats per minute.  There were no device data indicating possible ongoing fluid retention.    Recommendation:  Device follow up as per clinic routine with remote and in house checks       >>  In  persistent AF  >>  Amio level etc     Update 01/22/2024 :  On 12/15/23, she was admitted to [facility] with acute hypoxic resp failure d/t covid/copd exacerbation.   She was started on IV abx, IV steroids and albuterol inhaler with noted improvement in dyspnea.  O2 weaned from 4 liters to 2 liters.    She improved over the hospitalization and remained afebrile.    Was sent home with O2 supplement and home health care support.    She feels better now with regard to dyspnea.  However, she has had 2 falls both of which seemed to be syncopal 1 occurred while on the toilet seat.  Patient's device (DDD CLS)was fully evaluated today under my direct supervision and real-time feedback.  Summary of findings are as listed below:  Device is in good repair.   The battery is not near SHANE.  The sensing and pacing thresholds are favorable with well maintained safety margins.   There is a significant  documentation of atrial fibrillation to include 75 mode switches, almost 74 days in mode switching with an estimated AF burden of 69% and the longest episode of 35 days and 11 hours..  However there has been no atrial fibrillation noted since January 7, 2024.  There were no device data indicating possible ongoing fluid retention.  Thoracic impedance today is 118 (her regular range is 96 to 128).  Recommendation:  Device follow up as per clinic routine with remote and in house checks    Current Outpatient Medications   Medication Sig    albuterol (PROVENTIL/VENTOLIN HFA) 90 mcg/actuation inhaler Inhale 1-2 puffs into the lungs every 4 (four) hours as needed for Wheezing or Shortness of Breath. Rescue    albuterol-ipratropium (DUO-NEB) 2.5 mg-0.5 mg/3 mL nebulizer solution Take 3 mLs by nebulization every 6 (six) hours as needed for Wheezing or Shortness of Breath.    amiodarone (PACERONE) 200 MG Tab Take 1 tablet (200 mg total) by mouth once daily.    apixaban (ELIQUIS) 2.5 mg Tab Take 1 tablet (2.5 mg total) by mouth 2 (two) times daily.     cefdinir (OMNICEF) 300 MG capsule Take 300 mg by mouth every 12 (twelve) hours.    cetirizine HCl (ZYRTEC ORAL) Take 10 mg by mouth once daily.    cholecalciferol, vitamin D3, 125 mcg (5,000 unit) Tab Take 5,000 Units by mouth once daily.    dorzolamide (TRUSOPT) 2 % ophthalmic solution INSTILL 1 DROP INTO BOTH EYES TWICE DAILY    fluticasone (FLONASE) 50 mcg/actuation nasal spray 2 sprays (100 mcg total) by Each Nare route daily as needed for Allergies.    fluticasone furoate-vilanteroL (BREO ELLIPTA) 100-25 mcg/dose diskus inhaler Inhale 1 puff into the lungs once daily.    furosemide (LASIX) 40 MG tablet Take 1 tablet (40 mg total) by mouth 2 (two) times a day. Take twice a day and monitor BP and weights    Lactobacillus rhamnosus GG (CULTURELLE) 10 billion cell capsule Take 1 capsule by mouth once daily.    latanoprost 0.005 % ophthalmic solution Place 1 drop into both eyes every evening.    metoprolol tartrate (LOPRESSOR) 25 MG tablet Take 1 tablet (25 mg total) by mouth 2 (two) times daily.    nitroGLYCERIN 0.4 MG/HR TD PT24 (NITRODUR) 0.4 mg/hr Place 1 patch onto the skin once daily.    potassium chloride (KLOR-CON) 10 MEQ TbSR Take 2 tablets (20 mEq total) by mouth 2 (two) times daily. Take with fluid pill    rosuvastatin (CRESTOR) 40 MG Tab TAKE 1 TABLET (40 MG TOTAL) BY MOUTH ONCE DAILY. (Patient taking differently: Take 40 mg by mouth every evening.)    amoxicillin-clavulanate 500-125mg (AUGMENTIN) 500-125 mg Tab Take by mouth.    doxycycline (VIBRA-TABS) 100 MG tablet Take 100 mg by mouth 2 (two) times daily.     No current facility-administered medications for this visit.     Review of Systems     Constitutional: Reviewed  for decreased appetite, weight gain and weight loss.   HENT: Reviewed for nosebleeds.    Eyes:  Reviewed for blurred vision and visual disturbance.   Cardiovascular: Reviewed for chest pain, claudication, cyanosis,dyspnea on exertion, leg swelling, orthopnea,paroxysmal nocturnal  dyspnearregular heartbeats, palpitations, near-syncope, and syncope.   Respiratory: Reviewed for cough, shortness of breath, wheezing, sleep disturbances due to breathing and snoring, .    Endocrine: Reviewed for heat intolerance.   Hematologic/Lymphatic: Reviewed for easy bruisability/bleeding.   Skin: Reviewed for rash.   Musculoskeletal: Reviewed for muscle weakness and myalgias.   Gastrointestinal: Reviewed for abdominal pain, anorexia, melena, nausea and vomiting.   Genitourinary: Reviewed for menorrhagia, frequency, nocturia and incontinence.   Neurological: Reviewed for excessive daytime sleepiness, dizziness, vertigo, weakness, headaches, loss of balance and seizures,   Psychiatric/Behavioral:  Reviewed for insomnia, altered mental status, depression, anxiety and nervousness.       All symptoms reviewed above were negative except for dyspnea on exertion, leg edema, fainting, abdominal pain, hematochezia, heartburn, hematuria (she has a catheter), daytime sleepiness, headaches, loss of balance, muscle weakness.     Social History     Tobacco Use   Smoking Status Former    Current packs/day: 0.00    Types: Cigarettes    Start date: 6/10/1954    Quit date: 9/15/1955    Years since quittin.4   Smokeless Tobacco Never   Tobacco Comments    I onlysmoked one year while [employer] my  was oversees  during [place] [employer]        Objective:     Physical Exam  Vitals and nursing note reviewed.   Constitutional:       Appearance: She is well-developed. She is obese.      Comments: Overweight   HENT:      Head: Normocephalic and atraumatic.      Right Ear: External ear normal.      Left Ear: External ear normal.   Eyes:      General: No scleral icterus.        Left eye: No discharge.      Conjunctiva/sclera: Conjunctivae normal.      Left eye: Left conjunctiva is not injected. No hemorrhage.     Pupils: Pupils are equal, round, and reactive to light.   Neck:      Thyroid: No thyromegaly.   Cardiovascular:      Rate and  "Rhythm: Normal rate and regular rhythm.      Pulses: Intact distal pulses.           Carotid pulses are 2+ on the right side and 2+ on the left side.       Radial pulses are 2+ on the right side and 2+ on the left side.        Dorsalis pedis pulses are 2+ on the right side and 2+ on the left side.        Posterior tibial pulses are 2+ on the right side and 2+ on the left side.      Heart sounds: Normal heart sounds. No midsystolic click and no opening snap. No murmur heard.     No friction rub. No gallop. No S3 or S4 sounds.   Pulmonary:      Effort: Pulmonary effort is normal.      Breath sounds: Normal breath sounds.   Chest:      Comments: Device pocket is in great repair.  Abdominal:      General: There is no distension.      Palpations: Abdomen is soft. Abdomen is not rigid. There is no hepatomegaly.      Tenderness: There is no abdominal tenderness. There is no guarding.      Comments: Obese abdomen   Genitourinary:     Comments: Has a suprapubic catheter  Musculoskeletal:      Cervical back: Normal range of motion and neck supple.      Right lower leg: No swelling.      Left lower leg: No swelling.      Right ankle: No swelling.      Left ankle: No swelling.   Skin:     General: Skin is warm and dry.      Findings: No rash.      Nails: There is no clubbing.   Neurological:      Mental Status: She is alert and oriented to person, place, and time.      Cranial Nerves: No cranial nerve deficit.      Gait: Gait normal.   Psychiatric:         Speech: Speech normal.         Behavior: Behavior normal.         Thought Content: Thought content normal.       /86   Pulse 62   Resp 18   Ht 5' 5" (1.651 m)   Wt 90.5 kg (199 lb 8.3 oz)   LMP  (LMP Unknown)   BMI 33.20 kg/m²       Results for orders placed during the hospital encounter of 05/17/23    Echo    Interpretation Summary  · The left ventricle is normal in size with mild concentric hypertrophy and normal systolic function.  · Moderate left atrial " enlargement.  · The estimated ejection fraction is 65%.  · Indeterminate left ventricular diastolic function.  · Normal right ventricular size with normal right ventricular systolic function.  · There is moderate-to-severe aortic valve stenosis.  · Aortic valve area is 0.80 cm2; peak velocity is 2.85 m/s; mean gradient is 21 mmHg.  · Mild tricuspid regurgitation.  · Intermediate central venous pressure (8 mmHg).  · The estimated PA systolic pressure is 33 mmHg.    WBC   Date Value Ref Range Status   01/19/2024 7.99 3.90 - 12.70 K/uL Final     POC Hematocrit   Date Value Ref Range Status   12/14/2023 30 (L) 36 - 54 %PCV Final     Hematocrit   Date Value Ref Range Status   01/19/2024 39.1 37.0 - 48.5 % Final     Hemoglobin   Date Value Ref Range Status   01/19/2024 12.1 12.0 - 16.0 g/dL Final     Lab Results   Component Value Date     01/19/2024     Lab Results   Component Value Date    CREATININE 1.1 01/22/2024    EGFRNORACEVR 47.7 (A) 01/22/2024    K 4.5 01/22/2024     Lab Results   Component Value Date     (H) 01/22/2024            reports that she does not currently use alcohol.  Past Medical History:   Diagnosis Date    Anticoagulant long-term use     Arthritis     Asthma     Basal cell carcinoma     Cancer     skin cancer to face    Cataract     OU done//    CHF (congestive heart failure)     COPD (chronic obstructive pulmonary disease)     no oxygen; patient denies    cpap     Essential hypertension 05/05/2010    GERD (gastroesophageal reflux disease) 11/20/2012    Glaucoma     Hypertensive heart disease with heart failure 02/05/2013    Paroxysmal atrial fibrillation     Paroxysmal ventricular tachycardia     per problem list    Preop cardiovascular exam 1/26/2024    Renal disorder     french-1/3/2020    Squamous cell carcinoma of skin      Past Surgical History:   Procedure Laterality Date    A-V CARDIAC PACEMAKER INSERTION  06/16/2021    Procedure: INSERTION, CARDIAC PACEMAKER, DUAL CHAMBER;   Surgeon: Oscar Sommers MD;  Location: [facility];  Service: Cardiovascular;;    ADENOIDECTOMY  [id number]    APPENDECTOMY  1948    Because of Ovarian Cyst surgery    BREAST BIOPSY Left 1995    neg    BREAST BIOPSY Right 1984    neg    BREAST BIOPSY Right 1992    neg    BREAST SURGERY      A number of biopsies    CARDIAC CATHETERIZATION  2013, 2014,2016, 2020    has 9 stents    CARDIAC SURGERY  2012    stents    CATARACT EXTRACTION W/  INTRAOCULAR LENS IMPLANT Left 11/01/2018    Dr Rose    CATARACT EXTRACTION W/  INTRAOCULAR LENS IMPLANT Right 12/13/2018    Dr Rose//    CHOLECYSTECTOMY      COLONOSCOPY  ~2005    Dr. Edward; normal per patient report    COLONOSCOPY N/A 09/06/2022    Procedure: COLONOSCOPY 8/31/22-note in Dr Simms's office visit note that he spoke to her cardiologist and she was viable candidate for endoscopy;  Surgeon: Cordelia Bueno MD;  Location: [facility];  Service: Endoscopy;  Laterality: N/A;    CORONARY ANGIOGRAPHY N/A 03/20/2020    Procedure: ANGIOGRAM, CORONARY ARTERY;  Surgeon: Chuy Bryant MD;  Location: [facility];  Service: Cardiology;  Laterality: N/A;    CYSTOSCOPY W/ RETROGRADES Bilateral 02/12/2020    Procedure: CYSTOSCOPY, WITH RETROGRADE PYELOGRAM;  Surgeon: Gasper Feliz MD;  Location: [facility] OR;  Service: Urology;  Laterality: Bilateral;    ESOPHAGOGASTRODUODENOSCOPY N/A 08/13/2020    Procedure: EGD (ESOPHAGOGASTRODUODENOSCOPY);  Surgeon: Mika Solorzano MD;  Location: [facility];  Service: Endoscopy;  Laterality: N/A; Mild Schatzki ring. Biopsied. Dilated. small hiatal hernia, gastritis; biopsy: esophagus- SEVERE REFLUX ESOPHAGITIS, stomach- chronic gastritis, negative for H pylori    ESOPHAGOGASTRODUODENOSCOPY N/A 10/22/2020    Procedure: EGD (ESOPHAGOGASTRODUODENOSCOPY);  Surgeon: Fred Henrdicks MD;  Location: [facility];  Service: Endoscopy;  Laterality: N/A;    EYE SURGERY  2017    Cataracs    FRACTIONAL FLOW RESERVE (FFR), CORONARY  6/20/2023    Procedure:  Fractional Flow [place] (FFR), Coronary;  Surgeon: Brice Campuzano MD;  Location: [facility] CATH LAB;  Service: Cardiology;;    HYSTERECTOMY  1969    INSTANTANEOUS WAVE-FREE RATIO (IFR) N/A 6/20/2023    Procedure: Instantaneous Wave-Free Ratio (IFR);  Surgeon: Brice Campuzano MD;  Location: [facility] CATH LAB;  Service: Cardiology;  Laterality: N/A;    LEFT HEART CATHETERIZATION Left 03/03/2020    Procedure: Left heart cath;  Surgeon: Chuy Bryant MD;  Location: [facility] CATH;  Service: Cardiology;  Laterality: Left;    LEFT HEART CATHETERIZATION Left 03/20/2020    Procedure: Left heart cath;  Surgeon: Chuy Bryant MD;  [facility];  Service: Cardiology;  Laterality: Left;    LEFT HEART CATHETERIZATION N/A 6/20/2023    Procedure: Left heart cath;  Surgeon: Brice Campuzano MD;  Location: [facility] CATH LAB;  Service: Cardiology;  Laterality: N/A;    OVARIAN CYST REMOVAL  teenager    PHACOEMULSIFICATION OF CATARACT Left 11/01/2018    Procedure: PHACOEMULSIFICATION, CATARACT;  Surgeon: Aleksandar Rose Jr., MD;  Location: [facility] OR;  Service: Ophthalmology;  Laterality: Left;    PHACOEMULSIFICATION OF CATARACT Right 12/13/2018    Procedure: PHACOEMULSIFICATION, CATARACT;  Surgeon: Aleksandar Rose Jr., MD;  Location: [facility] OR;  Service: Ophthalmology;  Laterality: Right;    TONSILLECTOMY      aw/denoids    TRANSESOPHAGEAL ECHOCARDIOGRAM WITH POSSIBLE CARDIOVERSION (ALFRED W/ POSS CARDIOVERSION) N/A 10/5/2023    Procedure: Transesophageal echo (ALFRED) intra-procedure log documentation/alfred/cv;  Surgeon: Bao Miguel MD;  Location: [facility] CATH LAB;  Service: Cardiology;  Laterality: N/A;  101030 Alfred/Cv  MRI safe   Pacer & leads implanted 6/16/21, Antoun   Bio Edora 8 DR-T, [id number], PID: [id number]   A lead: Bio Solia S45, [id number]   V lead: Bio Solia S53, [id number]    TREATMENT OF CARDIAC ARRHYTHMIA N/A 10/5/2023    Procedure: Cardioversion or Defibrillation;  Surgeon: Bao Miguel MD;  Location: [facility] CATH LAB;  Service:  Cardiology;  Laterality: N/A;    UPPER GASTROINTESTINAL ENDOSCOPY  ~    Dr. Solorzano    VALVE STUDY-AORTIC  2023    Procedure: Valve study-aortic;  Surgeon: Brice Campuzano MD;  Location: [facility] CATH LAB;  Service: Cardiology;;    VASCULAR SURGERY      to remove clot from right leg    Yag Capsulotomy Bilateral 2019    Dr Rose     Family History   Problem Relation Age of Onset    Diabetes Brother         Type 2    Heart disease Brother          at 65 CHD    Diabetes Mother         Type 1    Alcohol abuse Mother         Dod 10/75    Heart disease Mother         Arteriosclerosis    Hypertension Mother     Stroke Mother         3/15/1975 dod 10/1975    Cancer Maternal Grandfather         Dod     Clotting disorder Son         bleeding after tonsillectomy only    Heart disease Father         Heart attack. Afib, and Polyvcithemavera    Hypertension Father     Amblyopia Neg Hx     Blindness Neg Hx     Cataracts Neg Hx     Glaucoma Neg Hx     Macular degeneration Neg Hx     Retinal detachment Neg Hx     Strabismus Neg Hx     Thyroid disease Neg Hx     Colon cancer Neg Hx        Assessment:   Convalescing.  Currently in sinus rhythm.  1. Syncope, unspecified syncope type    2. Hypertrophic obstructive cardiomyopathy    3. Congestive heart failure, unspecified HF chronicity, unspecified heart failure type    4. Paroxysmal atrial fibrillation    5. Coronary artery disease of native artery of native heart with stable angina pectoris    6. Acute on chronic diastolic heart failure    7. Acute hypoxic respiratory failure    8. Suprapubic catheter    9. Stage 3a chronic kidney disease    10. Symptomatic bradycardia    11. Essential hypertension    12. S/P placement of cardiac pacemaker        Plan:      Continue amiodarone.  DC amlodipine.  Follow-up with Dr. Guadarrama and Dr. Hensley.  I discussed routine device follow up including quarterly to bi-annual device checks for device function as well as yearly  follow up in the EP clinic. The patient  was advised to call with any concerns regarding their device. Device clinic follow up as scheduled. RTC 1y       Orders Placed This Encounter   Procedures    BNP     Standing Status:   Future     Number of Occurrences:   1     Standing Expiration Date:   3/22/2025    NT-Pro Natriuretic Peptide     Standing Status:   Future     Number of Occurrences:   1     Standing Expiration Date:   3/22/2025    COMPREHENSIVE METABOLIC PANEL     Standing Status:   Future     Number of Occurrences:   1     Standing Expiration Date:   3/22/2025       No follow-ups on file.    Medications Discontinued During This Encounter   Medication Reason    amLODIPine (NORVASC) 2.5 MG tablet Side effects            Medication List with Changes/Refills   Current Medications    ALBUTEROL (PROVENTIL/VENTOLIN HFA) 90 MCG/ACTUATION INHALER    Inhale 1-2 puffs into the lungs every 4 (four) hours as needed for Wheezing or Shortness of Breath. Rescue    ALBUTEROL-IPRATROPIUM (DUO-NEB) 2.5 MG-0.5 MG/3 ML NEBULIZER SOLUTION    Take 3 mLs by nebulization every 6 (six) hours as needed for Wheezing or Shortness of Breath.    AMIODARONE (PACERONE) 200 MG TAB    Take 1 tablet (200 mg total) by mouth once daily.    AMOXICILLIN-CLAVULANATE 500-125MG (AUGMENTIN) 500-125 MG TAB    Take by mouth.    APIXABAN (ELIQUIS) 2.5 MG TAB    Take 1 tablet (2.5 mg total) by mouth 2 (two) times daily.    CEFDINIR (OMNICEF) 300 MG CAPSULE    Take 300 mg by mouth every 12 (twelve) hours.    CETIRIZINE HCL (ZYRTEC ORAL)    Take 10 mg by mouth once daily.    CHOLECALCIFEROL, VITAMIN D3, 125 MCG (5,000 UNIT) TAB    Take 5,000 Units by mouth once daily.    DORZOLAMIDE (TRUSOPT) 2 % OPHTHALMIC SOLUTION    INSTILL 1 DROP INTO BOTH EYES TWICE DAILY    DOXYCYCLINE (VIBRA-TABS) 100 MG TABLET    Take 100 mg by mouth 2 (two) times daily.    FLUTICASONE (FLONASE) 50 MCG/ACTUATION NASAL SPRAY    2 sprays (100 mcg total) by Each Nare route daily as  needed for Allergies.    FLUTICASONE FUROATE-VILANTEROL (BREO ELLIPTA) 100-25 MCG/DOSE DISKUS INHALER    Inhale 1 puff into the lungs once daily.    FUROSEMIDE (LASIX) 40 MG TABLET    Take 1 tablet (40 mg total) by mouth 2 (two) times a day. Take twice a day and monitor BP and weights    LACTOBACILLUS RHAMNOSUS GG (CULTURELLE) 10 BILLION CELL CAPSULE    Take 1 capsule by mouth once daily.    LATANOPROST 0.005 % OPHTHALMIC SOLUTION    Place 1 drop into both eyes every evening.    METOPROLOL TARTRATE (LOPRESSOR) 25 MG TABLET    Take 1 tablet (25 mg total) by mouth 2 (two) times daily.    NITROGLYCERIN 0.4 MG/HR TD PT24 (NITRODUR) 0.4 MG/HR    Place 1 patch onto the skin once daily.    POTASSIUM CHLORIDE (KLOR-CON) 10 MEQ TBSR    Take 2 tablets (20 mEq total) by mouth 2 (two) times daily. Take with fluid pill    ROSUVASTATIN (CRESTOR) 40 MG TAB    TAKE 1 TABLET (40 MG TOTAL) BY MOUTH ONCE DAILY.   Discontinued Medications    AMLODIPINE (NORVASC) 2.5 MG TABLET    Take 1 tablet (2.5 mg total) by mouth every evening.        This note is at least partially dictated using the M*Modal Fluency Direct word recognition program. There are word recognition mistakes that are occasionally missed on review.

## 2024-01-23 ENCOUNTER — OFFICE VISIT (OUTPATIENT)
Dept: HEMATOLOGY/ONCOLOGY | Facility: CLINIC | Age: 89
End: 2024-01-23
Payer: MEDICARE

## 2024-01-23 VITALS
RESPIRATION RATE: 18 BRPM | BODY MASS INDEX: 33.28 KG/M2 | HEART RATE: 60 BPM | TEMPERATURE: 98 F | OXYGEN SATURATION: 94 % | SYSTOLIC BLOOD PRESSURE: 108 MMHG | HEIGHT: 65 IN | DIASTOLIC BLOOD PRESSURE: 74 MMHG | WEIGHT: 199.75 LBS

## 2024-01-23 DIAGNOSIS — I70.0 ATHEROSCLEROSIS OF AORTA: ICD-10-CM

## 2024-01-23 DIAGNOSIS — Z93.59 SUPRAPUBIC CATHETER: ICD-10-CM

## 2024-01-23 DIAGNOSIS — D50.9 IRON DEFICIENCY ANEMIA, UNSPECIFIED IRON DEFICIENCY ANEMIA TYPE: Primary | ICD-10-CM

## 2024-01-23 DIAGNOSIS — N18.31 STAGE 3A CHRONIC KIDNEY DISEASE: ICD-10-CM

## 2024-01-23 PROCEDURE — 99999 PR PBB SHADOW E&M-EST. PATIENT-LVL V: CPT | Mod: PBBFAC,,, | Performed by: INTERNAL MEDICINE

## 2024-01-23 PROCEDURE — 1125F AMNT PAIN NOTED PAIN PRSNT: CPT | Mod: CPTII,S$GLB,, | Performed by: INTERNAL MEDICINE

## 2024-01-23 PROCEDURE — 1100F PTFALLS ASSESS-DOCD GE2>/YR: CPT | Mod: CPTII,S$GLB,, | Performed by: INTERNAL MEDICINE

## 2024-01-23 PROCEDURE — 99205 OFFICE O/P NEW HI 60 MIN: CPT | Mod: S$GLB,,, | Performed by: INTERNAL MEDICINE

## 2024-01-23 PROCEDURE — 3288F FALL RISK ASSESSMENT DOCD: CPT | Mod: CPTII,S$GLB,, | Performed by: INTERNAL MEDICINE

## 2024-01-23 PROCEDURE — 1160F RVW MEDS BY RX/DR IN RCRD: CPT | Mod: CPTII,S$GLB,, | Performed by: INTERNAL MEDICINE

## 2024-01-23 PROCEDURE — 1157F ADVNC CARE PLAN IN RCRD: CPT | Mod: CPTII,S$GLB,, | Performed by: INTERNAL MEDICINE

## 2024-01-23 PROCEDURE — 1159F MED LIST DOCD IN RCRD: CPT | Mod: CPTII,S$GLB,, | Performed by: INTERNAL MEDICINE

## 2024-01-23 RX ORDER — AMOXICILLIN AND CLAVULANATE POTASSIUM 500; 125 MG/1; MG/1
TABLET, FILM COATED ORAL
COMMUNITY
Start: 2024-01-22 | End: 2024-02-01

## 2024-01-23 RX ORDER — DOXYCYCLINE HYCLATE 100 MG
100 TABLET ORAL 2 TIMES DAILY
COMMUNITY
Start: 2024-01-22

## 2024-01-23 NOTE — PROGRESS NOTES
Subjective:       Patient ID: Holli Landrum is a 89 y.o. female.    Chief Complaint: Results    HPI:  A 89-year-old female with documented iron deficiency.  Patient's last colonoscopy was in  patient upper endoscopies in  which revealed severe gastritis.  Patient laboratory studies demonstrates severe iron deficiency with anemia at present time accompanied by daughter patient reports upper abdominal discomfort    Past Medical History:   Diagnosis Date    Anticoagulant long-term use     Arthritis     Asthma     Basal cell carcinoma     Cancer     skin cancer to face    Cataract     OU done//    CHF (congestive heart failure)     COPD (chronic obstructive pulmonary disease)     no oxygen; patient denies    cpap     Essential hypertension 2010    GERD (gastroesophageal reflux disease) 2012    Glaucoma     Hypertensive heart disease with heart failure 2013    Paroxysmal atrial fibrillation     Paroxysmal ventricular tachycardia     per problem list    Renal disorder     french-1/3/2020    Squamous cell carcinoma of skin      Family History   Problem Relation Age of Onset    Diabetes Brother         Type 2    Heart disease Brother          at 65 CHD    Diabetes Mother         Type 1    Alcohol abuse Mother         Dod 10/75    Heart disease Mother         Arteriosclerosis    Hypertension Mother     Stroke Mother         3/15/1975 dod 10/1975    Cancer Maternal Grandfather         Dod     Clotting disorder Son         bleeding after tonsillectomy only    Heart disease Father         Heart attack. Afib, and Polyvcithemavera    Hypertension Father     Amblyopia Neg Hx     Blindness Neg Hx     Cataracts Neg Hx     Glaucoma Neg Hx     Macular degeneration Neg Hx     Retinal detachment Neg Hx     Strabismus Neg Hx     Thyroid disease Neg Hx     Colon cancer Neg Hx      Social History     Socioeconomic History    Marital status:    Tobacco Use    Smoking status: Former     Current  packs/day: 0.00     Types: Cigarettes     Start date: 6/10/1954     Quit date: 9/15/1955     Years since quittin.4    Smokeless tobacco: Never    Tobacco comments:     I onlysmoked one year while [place] my  was oversees  during [place]   Substance and Sexual Activity    Alcohol use: Not Currently     Comment: Only drank a little wine and haven't  drunk anything in 15 y    Drug use: Never    Sexual activity: Not Currently     Partners: Male     Birth control/protection: Abstinence     Comment:  and 87 years of age     Social Determinants of Health     Financial Resource Strain: Low Risk  (2023)    Overall Financial Resource Strain (CARDIA)     Difficulty of Paying Living Expenses: Not hard at all   Food Insecurity: No Food Insecurity (2023)    Hunger Vital Sign     Worried About Running Out of Food in the Last Year: Never true     Ran Out of Food in the Last Year: Never true   Transportation Needs: No Transportation Needs (2023)    PRAPARE - Transportation     Lack of Transportation (Medical): No     Lack of Transportation (Non-Medical): No   Physical Activity: Insufficiently Active (2023)    Exercise Vital Sign     Days of Exercise per Week: 1 day     Minutes of Exercise per Session: 10 min   Stress: No Stress Concern Present (2023)    [place] [employer] [place] [employer] - Occupational Stress Questionnaire     Feeling of Stress : Not at all   Social Connections: Unknown (2023)    Social Connection and Isolation Panel [NHANES]     Frequency of Communication with Friends and Family: Twice a week     Frequency of Social Gatherings with Friends and Family: Once a week     Active Member of Clubs or Organizations: Yes     Attends Club or Organization Meetings: More than 4 times per year     Marital Status:    Housing Stability: Low Risk  (2023)    Housing Stability Vital Sign     Unable to Pay for Housing in the Last Year: No     Number of Places Lived in  the Last Year: 1     Unstable Housing in the Last Year: No     Past Surgical History:   Procedure Laterality Date    A-V CARDIAC PACEMAKER INSERTION  06/16/2021    Procedure: INSERTION, CARDIAC PACEMAKER, DUAL CHAMBER;  Surgeon: Oscar Sommers MD;  Location: [facility] CATH;  Service: Cardiovascular;;    ADENOIDECTOMY  191944    APPENDECTOMY  1948    Because of Ovarian Cyst surgery    BREAST BIOPSY Left 1995    neg    BREAST BIOPSY Right 1984    neg    BREAST BIOPSY Right 1992    neg    BREAST SURGERY      A number of biopsies    CARDIAC CATHETERIZATION  2013, 2014,2016, 2020    has 9 stents    CARDIAC SURGERY  2012    stents    CATARACT EXTRACTION W/  INTRAOCULAR LENS IMPLANT Left 11/01/2018    Dr Rose    CATARACT EXTRACTION W/  INTRAOCULAR LENS IMPLANT Right 12/13/2018    Dr Rose//    CHOLECYSTECTOMY      COLONOSCOPY  ~2005    Dr. Edward; normal per patient report    COLONOSCOPY N/A 09/06/2022    Procedure: COLONOSCOPY 8/31/22-note in Dr Simms's office visit note that he spoke to her cardiologist and she was viable candidate for endoscopy;  Surgeon: Cordelia Bueno MD;  Location: [facility];  Service: Endoscopy;  Laterality: N/A;    CORONARY ANGIOGRAPHY N/A 03/20/2020    Procedure: ANGIOGRAM, CORONARY ARTERY;  Surgeon: Chuy Bryant MD;  [facility];  Service: Cardiology;  Laterality: N/A;    CYSTOSCOPY W/ RETROGRADES Bilateral 02/12/2020    Procedure: CYSTOSCOPY, WITH RETROGRADE PYELOGRAM;  Surgeon: Gasper Feliz MD;  Location: [facility] OR;  Service: Urology;  Laterality: Bilateral;    ESOPHAGOGASTRODUODENOSCOPY N/A 08/13/2020    Procedure: EGD (ESOPHAGOGASTRODUODENOSCOPY);  Surgeon: Mika Solorzano MD;  Location: [facility];  Service: Endoscopy;  Laterality: N/A; Mild Schatzki ring. Biopsied. Dilated. small hiatal hernia, gastritis; biopsy: esophagus- SEVERE REFLUX ESOPHAGITIS, stomach- chronic gastritis, negative for H pylori    ESOPHAGOGASTRODUODENOSCOPY N/A 10/22/2020    Procedure: EGD  (ESOPHAGOGASTRODUODENOSCOPY);  Surgeon: Fred Hendricks MD;  Location: [facility] ENDO;  Service: Endoscopy;  Laterality: N/A;    EYE SURGERY  2017    Cataracs    FRACTIONAL FLOW RESERVE (FFR), CORONARY  6/20/2023    Procedure: Fractional Flow [place] (FFR), Coronary;  Surgeon: Brice Campuzano MD;  Location: [facility] CATH LAB;  Service: Cardiology;;    HYSTERECTOMY  1969    INSTANTANEOUS WAVE-FREE RATIO (IFR) N/A 6/20/2023    Procedure: Instantaneous Wave-Free Ratio (IFR);  Surgeon: Brice Campuzano MD;  Location: [facility] CATH LAB;  Service: Cardiology;  Laterality: N/A;    LEFT HEART CATHETERIZATION Left 03/03/2020    Procedure: Left heart cath;  Surgeon: Chuy Bryant MD;  Location: [facility] CATH;  Service: Cardiology;  Laterality: Left;    LEFT HEART CATHETERIZATION Left 03/20/2020    Procedure: Left heart cath;  Surgeon: Chuy Bryant MD;  Location: [facility] CATH;  Service: Cardiology;  Laterality: Left;    LEFT HEART CATHETERIZATION N/A 6/20/2023    Procedure: Left heart cath;  Surgeon: Brice Campuzano MD;  Location: [facility] CATH LAB;  Service: Cardiology;  Laterality: N/A;    OVARIAN CYST REMOVAL  teenager    PHACOEMULSIFICATION OF CATARACT Left 11/01/2018    Procedure: PHACOEMULSIFICATION, CATARACT;  Surgeon: Aleksandar Rose Jr., MD;  Location: [facility] OR;  Service: Ophthalmology;  Laterality: Left;    PHACOEMULSIFICATION OF CATARACT Right 12/13/2018    Procedure: PHACOEMULSIFICATION, CATARACT;  Surgeon: Aleksandar Rose Jr., MD;  Location: [facility] OR;  Service: Ophthalmology;  Laterality: Right;    TONSILLECTOMY      aw/denoids    TRANSESOPHAGEAL ECHOCARDIOGRAM WITH POSSIBLE CARDIOVERSION (ALFRED W/ POSS CARDIOVERSION) N/A 10/5/2023    Procedure: Transesophageal echo (ALFRED) intra-procedure log documentation/alfred/cv;  Surgeon: Bao Miguel MD;  Location: [facility] CATH LAB;  Service: Cardiology;  Laterality: N/A;   Alfred/Cv  MRI safe   Pacer & leads implanted 6/16/21, Antoun   Bio Edora 8 MICHELLE, [id number], PID: [id number]   A lead: Bio  Solia S45, [id number]   V lead: Bio Solia S53, [id number]    TREATMENT OF CARDIAC ARRHYTHMIA N/A 10/5/2023    Procedure: Cardioversion or Defibrillation;  Surgeon: Bao Miguel MD;  Location: [facility] CATH LAB;  Service: Cardiology;  Laterality: N/A;    UPPER GASTROINTESTINAL ENDOSCOPY  ~2005    Dr. Solorzano    VALVE STUDY-AORTIC  6/20/2023    Procedure: Valve study-aortic;  Surgeon: Brice Campuzano MD;  Location: [facility] CATH LAB;  Service: Cardiology;;    VASCULAR SURGERY      to remove clot from right leg    Yag Capsulotomy Bilateral 11/05/2019    Dr Rose       Labs:  Lab Results   Component Value Date    WBC 7.99 01/19/2024    HGB 12.1 01/19/2024    HCT 39.1 01/19/2024    MCV 83 01/19/2024     01/19/2024     BMP  Lab Results   Component Value Date     01/22/2024    K 4.5 01/22/2024     01/22/2024    CO2 24 01/22/2024    BUN 19 01/22/2024    CREATININE 1.1 01/22/2024    CALCIUM 10.0 01/22/2024    ANIONGAP 10 01/22/2024    ESTGFRAFRICA >60.0 12/07/2021    EGFRNONAA >60.0 12/07/2021     Lab Results   Component Value Date    ALT 11 01/22/2024    AST 19 01/22/2024    ALKPHOS 96 01/22/2024    BILITOT 0.3 01/22/2024       Lab Results   Component Value Date    IRON 28 (L) 01/19/2024    TIBC 490 (H) 01/19/2024    FERRITIN 60 01/19/2024     Lab Results   Component Value Date    XFUAWVSV61 461 01/19/2024     Lab Results   Component Value Date    FOLATE 11.5 01/19/2024     Lab Results   Component Value Date    TSH 0.993 12/07/2021         Review of Systems   Constitutional:  Positive for fatigue. Negative for activity change, appetite change, chills, diaphoresis, fever and unexpected weight change.   HENT:  Negative for congestion, dental problem, drooling, ear discharge, ear pain, facial swelling, hearing loss, mouth sores, nosebleeds, postnasal drip, rhinorrhea, sinus pressure, sneezing, sore throat, tinnitus, trouble swallowing and voice change.    Eyes:  Negative for photophobia, pain,  discharge, redness, itching and visual disturbance.   Respiratory:  Negative for cough, choking, chest tightness, shortness of breath, wheezing and stridor.    Cardiovascular:  Negative for chest pain, palpitations and leg swelling.   Gastrointestinal:  Positive for abdominal pain. Negative for abdominal distention, anal bleeding, blood in stool, constipation, diarrhea, nausea, rectal pain and vomiting.   Endocrine: Negative for cold intolerance, heat intolerance, polydipsia, polyphagia and polyuria.   Genitourinary:  Positive for difficulty urinating. Negative for decreased urine volume, dyspareunia, dysuria, enuresis, flank pain, frequency, genital sores, hematuria, menstrual problem, pelvic pain, urgency, vaginal bleeding, vaginal discharge and vaginal pain.   Musculoskeletal:  Positive for gait problem. Negative for arthralgias, back pain, joint swelling, myalgias, neck pain and neck stiffness.   Skin:  Negative for color change, pallor and rash.   Allergic/Immunologic: Negative for environmental allergies, food allergies and immunocompromised state.   Neurological:  Positive for weakness. Negative for dizziness, tremors, seizures, syncope, facial asymmetry, speech difficulty, light-headedness, numbness and headaches.   Hematological:  Negative for adenopathy. Does not bruise/bleed easily.   Psychiatric/Behavioral:  Negative for agitation, behavioral problems, confusion, decreased concentration, dysphoric mood, hallucinations, self-injury, sleep disturbance and suicidal ideas. The patient is not nervous/anxious and is not hyperactive.        Objective:      Physical Exam  Vitals reviewed.   Constitutional:       General: She is not in acute distress.     Appearance: She is well-developed. She is obese. She is ill-appearing. She is not diaphoretic.   HENT:      Head: Normocephalic and atraumatic.      Right Ear: External ear normal.      Left Ear: External ear normal.      Nose: Nose normal.      Right Sinus: No  maxillary sinus tenderness or frontal sinus tenderness.      Left Sinus: No maxillary sinus tenderness or frontal sinus tenderness.      Mouth/Throat:      Pharynx: No oropharyngeal exudate.   Eyes:      General: Lids are normal. No scleral icterus.        Right eye: No discharge.         Left eye: No discharge.      Conjunctiva/sclera: Conjunctivae normal.      Right eye: Right conjunctiva is not injected. No hemorrhage.     Left eye: Left conjunctiva is not injected. No hemorrhage.     Pupils: Pupils are equal, round, and reactive to light.   Neck:      Thyroid: No thyromegaly.      Vascular: No JVD.      Trachea: No tracheal deviation.   Cardiovascular:      Rate and Rhythm: Normal rate.   Pulmonary:      Effort: Pulmonary effort is normal. No respiratory distress.      Breath sounds: Normal breath sounds. No stridor.   Chest:      Chest wall: No tenderness.   Abdominal:      General: Bowel sounds are normal. There is no distension.      Palpations: Abdomen is soft. There is no hepatomegaly, splenomegaly or mass.      Tenderness: There is no abdominal tenderness. There is no rebound.   Musculoskeletal:         General: No tenderness. Normal range of motion.      Cervical back: Normal range of motion and neck supple.   Lymphadenopathy:      Cervical: No cervical adenopathy.      Upper Body:      Right upper body: No supraclavicular adenopathy.      Left upper body: No supraclavicular adenopathy.   Skin:     General: Skin is dry.      Findings: No erythema or rash.   Neurological:      Mental Status: She is alert and oriented to person, place, and time.      Cranial Nerves: No cranial nerve deficit.      Coordination: Coordination normal.      Gait: Gait abnormal.   Psychiatric:         Behavior: Behavior normal.         Thought Content: Thought content normal.         Judgment: Judgment normal.             Assessment:      1. Iron deficiency anemia, unspecified iron deficiency anemia type    2. Atherosclerosis of  aorta    3. Suprapubic catheter    4. Stage 3a chronic kidney disease           Med Onc Chart Routing      Follow up with physician . Return to clinic in either myself or APAP 3 months CBC CMP and iron status prior   Follow up with LISA    Infusion scheduling note    Injection scheduling note    Labs    Imaging    Pharmacy appointment    Other referrals         Needs referral for upper endoscopies              Plan:     Upper abdominal discomfort with severe iron deficiency.  Would recommend repeat upper endoscopies feel colonoscopies needs since no evidence of abnormalities noted in 2022.  At this time article from up-to-date given to her on iron deficiency in adults in recommendations iron rich foods patient unable to take oral iron supplementation do not feel patient needs to be given intravenous iron present time discussed implications answered questions with her reviewed results of CT stone study demonstrated atherosclerosis.  No clear evidence of any intra-abdominal pathology        Danish Corona Jr, MD FACP

## 2024-01-24 ENCOUNTER — HOSPITAL ENCOUNTER (OUTPATIENT)
Dept: PREADMISSION TESTING | Facility: HOSPITAL | Age: 89
Discharge: HOME | End: 2024-01-24
Attending: INTERNAL MEDICINE
Payer: MEDICARE

## 2024-01-24 DIAGNOSIS — D50.9 IRON DEFICIENCY ANEMIA, UNSPECIFIED IRON DEFICIENCY ANEMIA TYPE: ICD-10-CM

## 2024-01-24 LAB — COPPER SERPL-MCNC: 1255 UG/L (ref 810–1990)

## 2024-01-26 ENCOUNTER — E-CONSULT (OUTPATIENT)
Dept: CARDIOLOGY | Facility: CLINIC | Age: 89
End: 2024-01-26
Payer: MEDICARE

## 2024-01-26 DIAGNOSIS — Z01.810 PREOP CARDIOVASCULAR EXAM: Primary | ICD-10-CM

## 2024-01-26 PROCEDURE — 99451 NTRPROF PH1/NTRNET/EHR 5/>: CPT | Mod: S$GLB,,, | Performed by: INTERNAL MEDICINE

## 2024-01-26 NOTE — CONSULTS
[facility]  Response for E-Consult     Patient Name: Holli Landrum  MRN: [id number]  Primary Care Provider: Marcia Carlisle MD   Requesting Provider: Rema Sr NP  E-Consult to Cardiology  Consult performed by: Viraj Hensley MD  Consult ordered by: Rema Sr NP            89 yo F, E consult for preop clearance of colonoscopy/EGD  The chart reviewed.  PMH PAF mod to severe AS,  LVH CAD HTN CHF  GFR 48    Plan  High periop risk of CV events for non-high risk procedure.  Ok to proceed the scheduled procedure without further cardiac study.  OK to hold Eliquis 3 days before the procedure and resume postop once hemodynamically stable      Total time of Consultation: 10 minute    I did not speak to the requesting provider verbally about this.     *This eConsult is based on the clinical data available to me and is furnished without benefit of a physical examination. The eConsult will need to be interpreted in light of any clinical issues or changes in patient status not available to me at the time of filing this eConsults. Significant changes in patient condition or level of acuity should result in immediate formal consultation and reevaluation. Please alert me if you have further questions.    Thank you for this eConsult referral.     Viraj Hensley MD  [facility]

## 2024-01-28 PROBLEM — R55 SYNCOPE: Status: ACTIVE | Noted: 2024-01-28

## 2024-01-29 ENCOUNTER — PATIENT MESSAGE (OUTPATIENT)
Dept: CARDIOLOGY | Facility: CLINIC | Age: 89
End: 2024-01-29
Payer: MEDICARE

## 2024-01-29 ENCOUNTER — CLINICAL SUPPORT (OUTPATIENT)
Dept: CARDIOLOGY | Facility: HOSPITAL | Age: 89
End: 2024-01-29
Attending: INTERNAL MEDICINE
Payer: MEDICARE

## 2024-01-29 DIAGNOSIS — I48.91 UNSPECIFIED ATRIAL FIBRILLATION: ICD-10-CM

## 2024-01-29 DIAGNOSIS — Z95.0 PRESENCE OF CARDIAC PACEMAKER: ICD-10-CM

## 2024-01-29 PROCEDURE — 93296 REM INTERROG EVL PM/IDS: CPT | Performed by: INTERNAL MEDICINE

## 2024-01-29 PROCEDURE — 93294 REM INTERROG EVL PM/LDLS PM: CPT | Mod: S$GLB,,, | Performed by: INTERNAL MEDICINE

## 2024-02-01 ENCOUNTER — OFFICE VISIT (OUTPATIENT)
Dept: GASTROENTEROLOGY | Facility: CLINIC | Age: 89
End: 2024-02-01
Payer: MEDICARE

## 2024-02-01 VITALS — HEIGHT: 65 IN | WEIGHT: 198.63 LBS | BODY MASS INDEX: 33.09 KG/M2

## 2024-02-01 DIAGNOSIS — Z86.2 HISTORY OF ANEMIA: Primary | ICD-10-CM

## 2024-02-01 DIAGNOSIS — Z87.898 HISTORY OF URINARY RETENTION: ICD-10-CM

## 2024-02-01 DIAGNOSIS — R10.13 EPIGASTRIC PAIN: ICD-10-CM

## 2024-02-01 DIAGNOSIS — Z86.19 HISTORY OF HELICOBACTER PYLORI INFECTION: ICD-10-CM

## 2024-02-01 DIAGNOSIS — Z79.01 ANTICOAGULANT LONG-TERM USE: ICD-10-CM

## 2024-02-01 DIAGNOSIS — Z87.09 HISTORY OF COPD: ICD-10-CM

## 2024-02-01 DIAGNOSIS — E61.1 IRON DEFICIENCY: ICD-10-CM

## 2024-02-01 DIAGNOSIS — R12 HEARTBURN: ICD-10-CM

## 2024-02-01 DIAGNOSIS — R53.83 FATIGUE, UNSPECIFIED TYPE: ICD-10-CM

## 2024-02-01 PROCEDURE — 99999 PR PBB SHADOW E&M-EST. PATIENT-LVL III: CPT | Mod: PBBFAC,,, | Performed by: NURSE PRACTITIONER

## 2024-02-01 PROCEDURE — 1126F AMNT PAIN NOTED NONE PRSNT: CPT | Mod: CPTII,S$GLB,, | Performed by: NURSE PRACTITIONER

## 2024-02-01 PROCEDURE — 99214 OFFICE O/P EST MOD 30 MIN: CPT | Mod: S$GLB,,, | Performed by: NURSE PRACTITIONER

## 2024-02-01 PROCEDURE — 1101F PT FALLS ASSESS-DOCD LE1/YR: CPT | Mod: CPTII,S$GLB,, | Performed by: NURSE PRACTITIONER

## 2024-02-01 PROCEDURE — 1160F RVW MEDS BY RX/DR IN RCRD: CPT | Mod: CPTII,S$GLB,, | Performed by: NURSE PRACTITIONER

## 2024-02-01 PROCEDURE — 3288F FALL RISK ASSESSMENT DOCD: CPT | Mod: CPTII,S$GLB,, | Performed by: NURSE PRACTITIONER

## 2024-02-01 PROCEDURE — 1159F MED LIST DOCD IN RCRD: CPT | Mod: CPTII,S$GLB,, | Performed by: NURSE PRACTITIONER

## 2024-02-01 PROCEDURE — 1157F ADVNC CARE PLAN IN RCRD: CPT | Mod: CPTII,S$GLB,, | Performed by: NURSE PRACTITIONER

## 2024-02-01 RX ORDER — FAMOTIDINE 40 MG/1
40 TABLET, FILM COATED ORAL NIGHTLY
Qty: 30 TABLET | Refills: 3 | Status: SHIPPED | OUTPATIENT
Start: 2024-02-01 | End: 2024-05-10

## 2024-02-01 NOTE — PROGRESS NOTES
Subjective:       Patient ID: Holli Landrum is a 89 y.o. female Body mass index is 33.05 kg/m².    Chief Complaint: Anemia    This patient is established with Dr. Simms, Dr. Solorzano, LELA Chand NP, & myself.    Patient is here with her daughter, whom assisted with history. Patient reports her cardiologist sent her to hematology due to iron deficiency anemia. Her hematologist recommended seeing GI for repeat EGD.    GI Problem  The primary symptoms include fatigue and abdominal pain. Primary symptoms do not include fever, weight loss, nausea, vomiting, diarrhea, melena, hematemesis or hematochezia.   The abdominal pain began more than 2 days ago (intermittent over the past few months). The abdominal pain is located in the epigastric region (described as acid pain). The severity of the abdominal pain is 0/10 (currently).   The illness does not include chills, dysphagia (egd with dilation helped in the past), odynophagia, constipation or tenesmus. Associated symptoms comments: Bowel movements once daily to once every 2-3 days  TREATMENT: probiotic daily; PAST TREATMENT: metamucil. Significant associated medical issues include GERD (occasional; not weekly, keeps head of bed; mylanta PRN; PAST TREATMENT: nexium x 3 months no relief, protonix-helped but told not to stay on long term by another provider, tagamet) and irritable bowel syndrome. Associated medical issues do not include inflammatory bowel disease.     Review of Systems   Constitutional:  Positive for fatigue. Negative for appetite change, chills, fever and weight loss.   HENT:  Negative for sore throat and trouble swallowing.    Respiratory:  Positive for shortness of breath (history of COPD; uses oxygen therapy PRN, not in use during visit (reports can go 30 minutes without it so she left it in the car)). Negative for cough and choking.    Cardiovascular:  Negative for chest pain.   Gastrointestinal:  Positive for abdominal pain. Negative for anal bleeding,  blood in stool, constipation, diarrhea, dysphagia (egd with dilation helped in the past), hematemesis, hematochezia, melena, nausea, rectal pain and vomiting.   Genitourinary:  Positive for difficulty urinating (has suprapubic catheter due to urinary retention).   Neurological:  Negative for weakness.       No LMP recorded (lmp unknown). Patient has had a hysterectomy.  Past Medical History:   Diagnosis Date    Anticoagulant long-term use     Arthritis     Asthma     Basal cell carcinoma     Cancer     skin cancer to face    Cataract     OU done//    CHF (congestive heart failure)     COPD (chronic obstructive pulmonary disease)     no oxygen; patient denies    cpap     Essential hypertension 05/05/2010    GERD (gastroesophageal reflux disease) 11/20/2012    Glaucoma     Hypertensive heart disease with heart failure 02/05/2013    Paroxysmal atrial fibrillation     Paroxysmal ventricular tachycardia     per problem list    Preop cardiovascular exam 1/26/2024    Renal disorder     french-1/3/2020    Squamous cell carcinoma of skin      Past Surgical History:   Procedure Laterality Date    A-V CARDIAC PACEMAKER INSERTION  06/16/2021    Procedure: INSERTION, CARDIAC PACEMAKER, DUAL CHAMBER;  Surgeon: Oscar Sommers MD;  Location: [facility];  Service: Cardiovascular;;    ADENOIDECTOMY  191944    APPENDECTOMY  1948    Because of Ovarian Cyst surgery    BREAST BIOPSY Left 1995    neg    BREAST BIOPSY Right 1984    neg    BREAST BIOPSY Right 1992    neg    BREAST SURGERY      A number of biopsies    CARDIAC CATHETERIZATION  2013, 2014,2016, 2020    has 9 stents    CARDIAC SURGERY  2012    stents    CATARACT EXTRACTION W/  INTRAOCULAR LENS IMPLANT Left 11/01/2018    Dr Rose    CATARACT EXTRACTION W/  INTRAOCULAR LENS IMPLANT Right 12/13/2018    Dr Rose//    CHOLECYSTECTOMY      COLONOSCOPY  ~2005    Dr. Edward; normal per patient report    COLONOSCOPY N/A 09/06/2022    Procedure: COLONOSCOPY 8/31/22-note in   Mendez's office visit note that he spoke to her cardiologist and she was viable candidate for endoscopy;  Surgeon: Cordelia Bueno MD;  Location: [facility] ENDO;  Service: Endoscopy;  Laterality: N/A;    CORONARY ANGIOGRAPHY N/A 03/20/2020    Procedure: ANGIOGRAM, CORONARY ARTERY;  Surgeon: Chuy Bryant MD;  Location: [facility] CATH;  Service: Cardiology;  Laterality: N/A;    CYSTOSCOPY W/ RETROGRADES Bilateral 02/12/2020    Procedure: CYSTOSCOPY, WITH RETROGRADE PYELOGRAM;  Surgeon: Gasper Feliz MD;  Location[facility];  Service: Urology;  Laterality: Bilateral;    ESOPHAGOGASTRODUODENOSCOPY N/A 08/13/2020    Procedure: EGD (ESOPHAGOGASTRODUODENOSCOPY);  Surgeon: Mika Solorzano MD;  Location: [facility];  Service: Endoscopy;  Laterality: N/A; Mild Schatzki ring. Biopsied. Dilated. small hiatal hernia, gastritis; biopsy: esophagus- SEVERE REFLUX ESOPHAGITIS, stomach- chronic gastritis, negative for H pylori    ESOPHAGOGASTRODUODENOSCOPY N/A 10/22/2020    Procedure: EGD (ESOPHAGOGASTRODUODENOSCOPY);  Surgeon: Fred Hendricks MD;  Location: [facility] ENDO;  Service: Endoscopy;  Laterality: N/A;    EYE SURGERY  2017    Cataracs    FRACTIONAL FLOW RESERVE (FFR), CORONARY  6/20/2023    Procedure: Fractional Flow Cardwell (FFR), Coronary;  Surgeon: Brice Campuzano MD;  Location: [facility] CATH LAB;  Service: Cardiology;;    HYSTERECTOMY  1969    INSTANTANEOUS WAVE-FREE RATIO (IFR) N/A 6/20/2023    Procedure: Instantaneous Wave-Free Ratio (IFR);  Surgeon: Brice Campuzano MD;  Location: [facility] CATH LAB;  Service: Cardiology;  Laterality: N/A;    LEFT HEART CATHETERIZATION Left 03/03/2020    Procedure: Left heart cath;  Surgeon: Chuy Bryant MD;  Location: [facility] CATH;  Service: Cardiology;  Laterality: Left;    LEFT HEART CATHETERIZATION Left 03/20/2020    Procedure: Left heart cath;  Surgeon: Chuy Bryant MD;  Location: [facility] CATH;  Service: Cardiology;  Laterality: Left;    LEFT HEART CATHETERIZATION N/A 6/20/2023     Procedure: Left heart cath;  Surgeon: Brice Campuzano MD;  Location: [facility] CATH LAB;  Service: Cardiology;  Laterality: N/A;    OVARIAN CYST REMOVAL  teenager    PHACOEMULSIFICATION OF CATARACT Left 2018    Procedure: PHACOEMULSIFICATION, CATARACT;  Surgeon: Aleksandar Rose Jr., MD;  Location: [facility] OR;  Service: Ophthalmology;  Laterality: Left;    PHACOEMULSIFICATION OF CATARACT Right 2018    Procedure: PHACOEMULSIFICATION, CATARACT;  Surgeon: Aleksandar Rose Jr., MD;  Location: [facility] OR;  Service: Ophthalmology;  Laterality: Right;    TONSILLECTOMY      aw/denoids    TRANSESOPHAGEAL ECHOCARDIOGRAM WITH POSSIBLE CARDIOVERSION (ALFRED W/ POSS CARDIOVERSION) N/A 10/5/2023    Procedure: Transesophageal echo (ALFRED) intra-procedure log documentation/alfred/cv;  Surgeon: Bao Miguel MD;  Location: [facility] CATH LAB;  Service: Cardiology;  Laterality: N/A;   Alfred/Cv  MRI safe   Pacer & leads implanted 21, Tootie   Bio Hubert 8 MICHELLE, [id number], PID: [id number]   A lead: Bio Solia S45, [id number]   V lead: Bio Solia S53, [id number]    TREATMENT OF CARDIAC ARRHYTHMIA N/A 10/5/2023    Procedure: Cardioversion or Defibrillation;  Surgeon: Bao Miguel MD;  Location: [facility] CATH LAB;  Service: Cardiology;  Laterality: N/A;    UPPER GASTROINTESTINAL ENDOSCOPY  ~    Dr. Solorzano    VALVE STUDY-AORTIC  2023    Procedure: Valve study-aortic;  Surgeon: Brice Campuzano MD;  Location: [facility] CATH LAB;  Service: Cardiology;;    VASCULAR SURGERY      to remove clot from right leg    Yag Capsulotomy Bilateral 2019    Dr Rose     Family History   Problem Relation Age of Onset    Diabetes Brother         Type 2    Heart disease Brother          at 65 CHD    Diabetes Mother         Type 1    Alcohol abuse Mother         Dod 10/75    Heart disease Mother         Arteriosclerosis    Hypertension Mother     Stroke Mother         3/15/1975 dod 10/1975    Cancer Maternal Grandfather         Dod      Clotting disorder Son         bleeding after tonsillectomy only    Heart disease Father         Heart attack. Afib, and Polyvcithemavera    Hypertension Father     Amblyopia Neg Hx     Blindness Neg Hx     Cataracts Neg Hx     Glaucoma Neg Hx     Macular degeneration Neg Hx     Retinal detachment Neg Hx     Strabismus Neg Hx     Thyroid disease Neg Hx     Colon cancer Neg Hx      Social History     Tobacco Use    Smoking status: Former     Current packs/day: 0.00     Types: Cigarettes     Start date: 6/10/1954     Quit date: 9/15/1955     Years since quittin.4    Smokeless tobacco: Never    Tobacco comments:     I onlysmoked one year while [employer] my  was oversees  during [place]   Substance Use Topics    Alcohol use: Not Currently     Comment: Only drank a little wine and haven't  drunk anything in 15 y    Drug use: Never     Wt Readings from Last 10 Encounters:   24 90.1 kg (198 lb 10.2 oz)   24 90.6 kg (199 lb 11.8 oz)   24 90.3 kg (199 lb)   24 90.5 kg (199 lb 8.3 oz)   24 90.5 kg (199 lb 6.5 oz)   24 90.5 kg (199 lb 6.5 oz)   23 96.2 kg (212 lb 1.3 oz)   23 89 kg (196 lb 3.4 oz)   23 88.9 kg (195 lb 15.8 oz)   10/18/23 89.9 kg (198 lb 3.1 oz)     Lab Results   Component Value Date    WBC 7.99 2024    HGB 12.1 2024    HCT 39.1 2024    MCV 83 2024     2024     Lab Results   Component Value Date    IRON 28 (L) 2024    TRANSFERRIN 331 2024    TIBC 490 (H) 2024    FESATURATED 6 (L) 2024      Lab Results   Component Value Date    FERRITIN 60 2024     CMP  Sodium   Date Value Ref Range Status   2024 139 136 - 145 mmol/L Final   08/15/2015 135 (L) 137 - 145 MMOL/L Final     Potassium   Date Value Ref Range Status   2024 4.5 3.5 - 5.1 mmol/L Final   08/15/2015 4.0 3.5 - 5.1 MMOL/L Final     Chloride   Date Value Ref Range Status   2024 105 95 - 110 mmol/L Final    08/15/2015 99 98 - 107 MMOL/L Final     CO2   Date Value Ref Range Status   01/22/2024 24 23 - 29 mmol/L Final     Glucose   Date Value Ref Range Status   01/22/2024 97 70 - 110 mg/dL Final     BUN   Date Value Ref Range Status   01/22/2024 19 8 - 23 mg/dL Final     Creatinine   Date Value Ref Range Status   01/22/2024 1.1 0.5 - 1.4 mg/dL Final   08/15/2015 1.05 (H) 0.52 - 1.04 MG/DL Final     Calcium   Date Value Ref Range Status   01/22/2024 10.0 8.7 - 10.5 mg/dL Final     Total Protein   Date Value Ref Range Status   01/22/2024 7.1 6.0 - 8.4 g/dL Final     Albumin   Date Value Ref Range Status   01/22/2024 3.6 3.5 - 5.2 g/dL Final   08/15/2015 4.4 3.5 - 5.0 G/DL Final     Total Bilirubin   Date Value Ref Range Status   01/22/2024 0.3 0.1 - 1.0 mg/dL Final     Comment:     For infants and newborns, interpretation of results should be based  on gestational age, weight and in agreement with clinical  observations.    Premature Infant recommended reference ranges:  Up to 24 hours.............<8.0 mg/dL  Up to 48 hours............<12.0 mg/dL  3-5 days..................<15.0 mg/dL  6-29 days.................<15.0 mg/dL       Alkaline Phosphatase   Date Value Ref Range Status   01/22/2024 96 55 - 135 U/L Final     AST ([place])   Date Value Ref Range Status   04/05/2016 23 14 - 36 U/L Final     AST   Date Value Ref Range Status   01/22/2024 19 10 - 40 U/L Final     ALT   Date Value Ref Range Status   01/22/2024 11 10 - 44 U/L Final     Anion Gap   Date Value Ref Range Status   01/22/2024 10 8 - 16 mmol/L Final     eGFR if    Date Value Ref Range Status   12/07/2021 >60.0 >60 mL/min/1.73 m^2 Final     eGFR if non    Date Value Ref Range Status   12/07/2021 >60.0 >60 mL/min/1.73 m^2 Final     Comment:     Calculation used to obtain the estimated glomerular filtration  rate (eGFR) is the CKD-EPI equation.        Lab Results   Component Value Date    TSH 0.993 12/07/2021      12/17/2019 stool studies reviewed (acidic)    Reviewed prior medical records including radiology report of 10/11/2023 CT renal stone abdomen pelvis; 10/8/2020 modified barium swallow study; 7/10/2020 UGI with esophagram; 12/16/19 ct abdomen pelvis; 12/11/19 pelvic ultrasound; 11/29/19 abdominal ultrasound & endoscopy history (see surgical history).    Objective:      Physical Exam  Vitals and nursing note reviewed.   Constitutional:       General: She is not in acute distress.     Appearance: Normal appearance. She is well-developed. She is not diaphoretic.      Comments: Patient is in a wheelchair.   HENT:      Mouth/Throat:      Lips: Pink. No lesions.      Mouth: Mucous membranes are moist. No oral lesions.      Tongue: No lesions.      Pharynx: Oropharynx is clear. No pharyngeal swelling or posterior oropharyngeal erythema.   Eyes:      General: No scleral icterus.     Conjunctiva/sclera: Conjunctivae normal.   Pulmonary:      Effort: Pulmonary effort is normal. No respiratory distress.   Abdominal:      General: Bowel sounds are normal. There is no distension.      Palpations: Abdomen is soft. Abdomen is not rigid. There is no mass.      Tenderness: There is abdominal tenderness (mild) in the epigastric area. There is no guarding or rebound.   Skin:     General: Skin is warm and dry.      Coloration: Skin is not jaundiced or pale.      Findings: No erythema or rash.   Neurological:      Mental Status: She is alert and oriented to person, place, and time.   Psychiatric:         Behavior: Behavior normal.         Thought Content: Thought content normal.         Judgment: Judgment normal.         Assessment:       1. History of anemia    2. Iron deficiency    3. Heartburn    4. History of Helicobacter pylori infection    5. Epigastric pain    6. History of urinary retention          Plan:     Recommend EGD to be done at [facility]    History of anemia & Iron deficiency  - Recommend follow-up with  hematology for continued evaluation and management.  - schedule EGD, discussed procedure with patient, including risks and benefits, patient verbalized understanding    Heartburn  -   START  famotidine (PEPCID) 40 MG tablet; Take 1 tablet (40 mg total) by mouth every evening.  Dispense: 30 tablet; Refill: 3  - schedule EGD, discussed procedure with patient, including risks and benefits, patient verbalized understanding    History of Helicobacter pylori infection  - schedule EGD, discussed procedure with patient, including risks and benefits, patient verbalized understanding    Epigastric pain  -  START   famotidine (PEPCID) 40 MG tablet; Take 1 tablet (40 mg total) by mouth every evening.  Dispense: 30 tablet; Refill: 3  - schedule EGD, discussed procedure with patient, including risks and benefits, patient verbalized understanding    History of urinary retention  Recommend follow-up with urology for continued evaluation and management.    History of COPD  - follow-up with PCP/pulmonology for continued evaluation and management  - if experiencing symptoms of headache, chest pain, severe/persistent shortness of breath, dizziness, and/or blurred vision, recommend going to ER for further evaluation and management    Anticoagulant long-term use  - informed patient that the anticoagulant(s) will likely need to be held for endoscopy, nurse will confirm with endoscopist, cardiologist, and/or PCP.    Fatigue, unspecified type  Recommend follow-up with Primary Care Provider for continued evaluation and management.    Follow up in about 1 month (around 3/1/2024), or if symptoms worsen or fail to improve.      If no improvement in symptoms or symptoms worsen, call/follow-up at clinic or go to ER.      33 minutes of total time spent on the encounter, which includes face to face time and non-face to face time preparing to see the patient (e.g., review of tests), Obtaining and/or reviewing separately obtained history, Documenting  clinical information in the electronic or other health record, Independently interpreting results (not separately reported) and communicating results to the patient/family/caregiver, or Care coordination (not separately reported).

## 2024-02-01 NOTE — HIM RECORD RETIREMENT NOTE
FCI of Incomplete Medical Record    2/1/24    Patient Name: Holli Landrum  Contact Serial # (CSN): [id number]  Patient Medical Record # (MRN): [id number]  Date of Service: Office Visit on 1/22/2020  Physician Name: Gasper Feliz MD     This record has been reviewed and is being retired as incomplete by the approval of the  Medical Staff Operating Committee (MSOC)     On 9/7/2022., due to:  Unavailability of Provider     Missing Information/Comments:  []    Discharge Summary   []    DC Note/Short Stay Summary   []    ED Provider Note   []    Delivery Note   []    History & Physical   []   Operative Note   []     Procedure Note   []     Physician Order   [x]     Verbal Order   []       Other, specify:

## 2024-02-12 LAB
OHS CV AF BURDEN PERCENT: 69
OHS CV AF BURDEN PERCENT: < 1
OHS CV DC REMOTE DEVICE TYPE: NORMAL
OHS CV DC REMOTE DEVICE TYPE: NORMAL
OHS CV RV PACING PERCENT: 0 %
OHS CV RV PACING PERCENT: 20 %

## 2024-03-06 ENCOUNTER — OFFICE VISIT (OUTPATIENT)
Dept: CARDIOLOGY | Facility: CLINIC | Age: 89
End: 2024-03-06
Payer: MEDICARE

## 2024-03-06 VITALS
DIASTOLIC BLOOD PRESSURE: 68 MMHG | HEART RATE: 62 BPM | BODY MASS INDEX: 32.95 KG/M2 | SYSTOLIC BLOOD PRESSURE: 118 MMHG | WEIGHT: 198 LBS | OXYGEN SATURATION: 95 %

## 2024-03-06 DIAGNOSIS — I50.33 ACUTE ON CHRONIC DIASTOLIC HEART FAILURE: ICD-10-CM

## 2024-03-06 DIAGNOSIS — I35.0 NONRHEUMATIC AORTIC VALVE STENOSIS: Primary | ICD-10-CM

## 2024-03-06 DIAGNOSIS — I48.0 PAROXYSMAL ATRIAL FIBRILLATION: ICD-10-CM

## 2024-03-06 PROCEDURE — 1157F ADVNC CARE PLAN IN RCRD: CPT | Mod: CPTII,S$GLB,, | Performed by: INTERNAL MEDICINE

## 2024-03-06 PROCEDURE — 3288F FALL RISK ASSESSMENT DOCD: CPT | Mod: CPTII,S$GLB,, | Performed by: INTERNAL MEDICINE

## 2024-03-06 PROCEDURE — 1101F PT FALLS ASSESS-DOCD LE1/YR: CPT | Mod: CPTII,S$GLB,, | Performed by: INTERNAL MEDICINE

## 2024-03-06 PROCEDURE — 1126F AMNT PAIN NOTED NONE PRSNT: CPT | Mod: CPTII,S$GLB,, | Performed by: INTERNAL MEDICINE

## 2024-03-06 PROCEDURE — 99214 OFFICE O/P EST MOD 30 MIN: CPT | Mod: S$GLB,,, | Performed by: INTERNAL MEDICINE

## 2024-03-06 PROCEDURE — 99999 PR PBB SHADOW E&M-EST. PATIENT-LVL III: CPT | Mod: PBBFAC,,, | Performed by: INTERNAL MEDICINE

## 2024-03-06 PROCEDURE — 1159F MED LIST DOCD IN RCRD: CPT | Mod: CPTII,S$GLB,, | Performed by: INTERNAL MEDICINE

## 2024-03-06 NOTE — ASSESSMENT & PLAN NOTE
Given indwelling catheter she is not a candidate for valve replacement. I discussed the option of palliative BAV should her HF symptoms worsen. She is not interested at this time but says she will keep it in mind if her shortness of breath worsens. She seems to be slowly getting better since the COVID.

## 2024-03-06 NOTE — PROGRESS NOTES
INTERVENTIONAL CARDIOLOGY CLINIC  [facility]    REFERRING PHYSICIAN: Betsy    CHIEF COMPLIANT:  Bleeding issues    HISTORY OF PRESENT ILLNESS  Holli Landrum is a 89 y.o. female referred by Dr. Meyer for evaluation of aortic stneosis.    The patient has a history of bladder cancer with an indwelling suprapubic catheter. She has moderate to severe AS and is here for follow up. She was diagnosed with COVID a couple of months ago and ever since then she's been on supplemental oxygen. She reports her symptoms have improved slowly. She is not interested in any procedures. She gets around on a wheelchair.    PAST MEDICAL HISTORY  Past Medical History:   Diagnosis Date    Anticoagulant long-term use     Arthritis     Asthma     Basal cell carcinoma     Cancer     skin cancer to face    Cataract     OU done//    CHF (congestive heart failure)     COPD (chronic obstructive pulmonary disease)     no oxygen; patient denies    cpap     Essential hypertension 05/05/2010    GERD (gastroesophageal reflux disease) 11/20/2012    Glaucoma     Hypertensive heart disease with heart failure 02/05/2013    Paroxysmal atrial fibrillation     Paroxysmal ventricular tachycardia     per problem list    Preop cardiovascular exam 1/26/2024    Renal disorder     french-1/3/2020    Squamous cell carcinoma of skin         PAST SURGICAL HISTORY  Past Surgical History:   Procedure Laterality Date    A-V CARDIAC PACEMAKER INSERTION  06/16/2021    Procedure: INSERTION, CARDIAC PACEMAKER, DUAL CHAMBER;  Surgeon: Oscar Sommers MD;  Location: [facility];  Service: Cardiovascular;;    ADENOIDECTOMY  191944    APPENDECTOMY  1948    Because of Ovarian Cyst surgery    BREAST BIOPSY Left 1995    neg    BREAST BIOPSY Right 1984    neg    BREAST BIOPSY Right 1992    neg    BREAST SURGERY      A number of biopsies    CARDIAC CATHETERIZATION  2013, 2014,2016, 2020    has 9 stents    CARDIAC SURGERY  2012    stents    CATARACT EXTRACTION W/  INTRAOCULAR  LENS IMPLANT Left 11/01/2018    Dr Rose    CATARACT EXTRACTION W/  INTRAOCULAR LENS IMPLANT Right 12/13/2018    Dr Rose//    CHOLECYSTECTOMY      COLONOSCOPY  ~2005    Dr. Edward; normal per patient report    COLONOSCOPY N/A 09/06/2022    Procedure: COLONOSCOPY 8/31/22-note in Dr Simms's office visit note that he spoke to her cardiologist and she was viable candidate for endoscopy;  Surgeon: Cordelia Bueno MD;  Location: [facility] ENDO;  Service: Endoscopy;  Laterality: N/A;    CORONARY ANGIOGRAPHY N/A 03/20/2020    Procedure: ANGIOGRAM, CORONARY ARTERY;  Surgeon: Chuy Bryant MD;  Location: [facility] CATH;  Service: Cardiology;  Laterality: N/A;    CYSTOSCOPY W/ RETROGRADES Bilateral 02/12/2020    Procedure: CYSTOSCOPY, WITH RETROGRADE PYELOGRAM;  Surgeon: Gasper Feliz MD;  Location: [facility] OR;  Service: Urology;  Laterality: Bilateral;    ESOPHAGOGASTRODUODENOSCOPY N/A 08/13/2020    Procedure: EGD (ESOPHAGOGASTRODUODENOSCOPY);  Surgeon: Mika Solorzano MD;  Location: [facility];  Service: Endoscopy;  Laterality: N/A; Mild Schatzki ring. Biopsied. Dilated. small hiatal hernia, gastritis; biopsy: esophagus- SEVERE REFLUX ESOPHAGITIS, stomach- chronic gastritis, negative for H pylori    ESOPHAGOGASTRODUODENOSCOPY N/A 10/22/2020    Procedure: EGD (ESOPHAGOGASTRODUODENOSCOPY);  Surgeon: Fred Hendricks MD;  Location: [facility];  Service: Endoscopy;  Laterality: N/A;    EYE SURGERY  2017    Cataracs    FRACTIONAL FLOW RESERVE (FFR), CORONARY  6/20/2023    Procedure: Fractional Flow [place] (FFR), Coronary;  Surgeon: Brice Campuzano MD;  Location: [facility] CATH LAB;  Service: Cardiology;;    HYSTERECTOMY  1969    INSTANTANEOUS WAVE-FREE RATIO (IFR) N/A 6/20/2023    Procedure: Instantaneous Wave-Free Ratio (IFR);  Surgeon: Brice Campuzano MD;  Location: [facility] CATH LAB;  Service: Cardiology;  Laterality: N/A;    LEFT HEART CATHETERIZATION Left 03/03/2020    Procedure: Left heart cath;  Surgeon: Chuy Bryant  MD;  Location: [facility] CATH;  Service: Cardiology;  Laterality: Left;    LEFT HEART CATHETERIZATION Left 03/20/2020    Procedure: Left heart cath;  Surgeon: Chuy Bryant MD;  Location: [facility] CATH;  Service: Cardiology;  Laterality: Left;    LEFT HEART CATHETERIZATION N/A 6/20/2023    Procedure: Left heart cath;  Surgeon: Brice Campuzano MD;  Location: [facility] CATH LAB;  Service: Cardiology;  Laterality: N/A;    OVARIAN CYST REMOVAL  teenager    PHACOEMULSIFICATION OF CATARACT Left 11/01/2018    Procedure: PHACOEMULSIFICATION, CATARACT;  Surgeon: Aleksandar Rose Jr., MD;  Location: [facility] OR;  Service: Ophthalmology;  Laterality: Left;    PHACOEMULSIFICATION OF CATARACT Right 12/13/2018    Procedure: PHACOEMULSIFICATION, CATARACT;  Surgeon: Aleksandar Rose Jr., MD;  Location: [facility] OR;  Service: Ophthalmology;  Laterality: Right;    TONSILLECTOMY      aw/denoids    TRANSESOPHAGEAL ECHOCARDIOGRAM WITH POSSIBLE CARDIOVERSION (ALFRED W/ POSS CARDIOVERSION) N/A 10/5/2023    Procedure: Transesophageal echo (ALFRED) intra-procedure log documentation/alfred/cv;  Surgeon: Bao Miguel MD;  Location: [facility] CATH LAB;  Service: Cardiology;  Laterality: N/A;   Alfred/Cv  MRI safe   Pacer & leads implanted 6/16/21, Antoun   Bio Edora 8 MICHELLE, [id number], PID: [id number]   A lead: Bio Solia S45, [id number]   V lead: Bio Solia S53, [id number]    TREATMENT OF CARDIAC ARRHYTHMIA N/A 10/5/2023    Procedure: Cardioversion or Defibrillation;  Surgeon: Bao Miguel MD;  Location: [facility] CATH LAB;  Service: Cardiology;  Laterality: N/A;    UPPER GASTROINTESTINAL ENDOSCOPY  ~2005    Dr. Solorzano    VALVE STUDY-AORTIC  6/20/2023    Procedure: Valve study-aortic;  Surgeon: Brice Campuzano MD;  Location: [facility] CATH LAB;  Service: Cardiology;;    VASCULAR SURGERY      to remove clot from right leg    Yag Capsulotomy Bilateral 11/05/2019    Dr Rose       SOCIAL HISTORY  TOBACCO: Denies  ETOH: Denies  ILLEGAL DRUGS: Denies    REVIEW OF SYSTEMS  Non  contributory, relevant information discussed in HPI    STUDIES  I independently reviewed the following studies and my interpretation is reflected in the plan:  Angiogram  Echocardiogram  EKG      PHYSICAL EXAM  Physical Exam  Constitutional:       General: She is not in acute distress.     Appearance: She is obese.   HENT:      Nose: Nose normal.   Cardiovascular:      Rate and Rhythm: Normal rate and regular rhythm.   Pulmonary:      Effort: Pulmonary effort is normal.   Musculoskeletal:      Cervical back: Neck supple.      Left lower leg: No edema.   Skin:     General: Skin is warm.      Capillary Refill: Capillary refill takes less than 2 seconds.   Neurological:      Mental Status: She is alert and oriented to person, place, and time.   Psychiatric:         Mood and Affect: Mood normal.         ASSESSMENT AND PLAN  Nonrheumatic aortic valve stenosis  Given indwelling catheter she is not a candidate for valve replacement. I discussed the option of palliative BAV should her HF symptoms worsen. She is not interested at this time but says she will keep it in mind if her shortness of breath worsens. She seems to be slowly getting better since the COVID.     Acute on chronic diastolic heart failure  Continue follow up with Dr Meyer    Paroxysmal atrial fibrillation  Bleeding issues stabilized. Not interested in LAAO.    Obesity  Body mass index is 32.95 kg/m². Healthy lifestyle was discussed with the patient as well as the importance of physical activity to improve cardiovascular health.    The medication list was reviewed with the patient and inactive medications as well as duplicates were removed from the medical record.      Brice Guadarrama MD [facility]  Interventional Cardiology  Structural/Valvular heart disease  [phone]

## 2024-03-14 ENCOUNTER — NUTRITION (OUTPATIENT)
Dept: NUTRITION | Facility: CLINIC | Age: 89
End: 2024-03-14
Payer: MEDICARE

## 2024-03-14 ENCOUNTER — OFFICE VISIT (OUTPATIENT)
Dept: CARDIOLOGY | Facility: CLINIC | Age: 89
End: 2024-03-14
Payer: MEDICARE

## 2024-03-14 VITALS
HEIGHT: 65 IN | DIASTOLIC BLOOD PRESSURE: 82 MMHG | SYSTOLIC BLOOD PRESSURE: 130 MMHG | HEART RATE: 64 BPM | BODY MASS INDEX: 33.02 KG/M2 | WEIGHT: 198.19 LBS | OXYGEN SATURATION: 96 %

## 2024-03-14 VITALS — BODY MASS INDEX: 32.98 KG/M2 | WEIGHT: 198.19 LBS

## 2024-03-14 DIAGNOSIS — Z86.16 HISTORY OF COVID-19: ICD-10-CM

## 2024-03-14 DIAGNOSIS — I35.0 NONRHEUMATIC AORTIC VALVE STENOSIS: Primary | ICD-10-CM

## 2024-03-14 DIAGNOSIS — Z95.0 S/P PLACEMENT OF CARDIAC PACEMAKER: ICD-10-CM

## 2024-03-14 DIAGNOSIS — R06.02 SHORTNESS OF BREATH: ICD-10-CM

## 2024-03-14 DIAGNOSIS — I50.33 ACUTE ON CHRONIC DIASTOLIC HEART FAILURE: ICD-10-CM

## 2024-03-14 DIAGNOSIS — D50.9 IRON DEFICIENCY ANEMIA, UNSPECIFIED IRON DEFICIENCY ANEMIA TYPE: ICD-10-CM

## 2024-03-14 DIAGNOSIS — D64.9 ANEMIA, UNSPECIFIED TYPE: ICD-10-CM

## 2024-03-14 DIAGNOSIS — R00.1 SYMPTOMATIC BRADYCARDIA: ICD-10-CM

## 2024-03-14 DIAGNOSIS — I10 ESSENTIAL HYPERTENSION: ICD-10-CM

## 2024-03-14 DIAGNOSIS — I48.0 PAROXYSMAL ATRIAL FIBRILLATION: ICD-10-CM

## 2024-03-14 DIAGNOSIS — I42.1 HYPERTROPHIC OBSTRUCTIVE CARDIOMYOPATHY: ICD-10-CM

## 2024-03-14 DIAGNOSIS — E87.6 HYPOKALEMIA: ICD-10-CM

## 2024-03-14 DIAGNOSIS — I25.118 CORONARY ARTERY DISEASE OF NATIVE ARTERY OF NATIVE HEART WITH STABLE ANGINA PECTORIS: ICD-10-CM

## 2024-03-14 DIAGNOSIS — Z95.0 CARDIAC PACEMAKER IN SITU: ICD-10-CM

## 2024-03-14 DIAGNOSIS — Z95.0 PRESENCE OF CARDIAC PACEMAKER: ICD-10-CM

## 2024-03-14 PROCEDURE — 99999 PR PBB SHADOW E&M-EST. PATIENT-LVL II: CPT | Mod: PBBFAC,,,

## 2024-03-14 PROCEDURE — 1157F ADVNC CARE PLAN IN RCRD: CPT | Mod: CPTII,S$GLB,, | Performed by: INTERNAL MEDICINE

## 2024-03-14 PROCEDURE — 1159F MED LIST DOCD IN RCRD: CPT | Mod: CPTII,S$GLB,, | Performed by: INTERNAL MEDICINE

## 2024-03-14 PROCEDURE — 1101F PT FALLS ASSESS-DOCD LE1/YR: CPT | Mod: CPTII,S$GLB,, | Performed by: INTERNAL MEDICINE

## 2024-03-14 PROCEDURE — G2211 COMPLEX E/M VISIT ADD ON: HCPCS | Mod: S$GLB,,, | Performed by: INTERNAL MEDICINE

## 2024-03-14 PROCEDURE — 97802 MEDICAL NUTRITION INDIV IN: CPT | Mod: S$GLB,,,

## 2024-03-14 PROCEDURE — 3288F FALL RISK ASSESSMENT DOCD: CPT | Mod: CPTII,S$GLB,, | Performed by: INTERNAL MEDICINE

## 2024-03-14 PROCEDURE — 1126F AMNT PAIN NOTED NONE PRSNT: CPT | Mod: CPTII,S$GLB,, | Performed by: INTERNAL MEDICINE

## 2024-03-14 PROCEDURE — 1160F RVW MEDS BY RX/DR IN RCRD: CPT | Mod: CPTII,S$GLB,, | Performed by: INTERNAL MEDICINE

## 2024-03-14 PROCEDURE — 99999 PR PBB SHADOW E&M-EST. PATIENT-LVL V: CPT | Mod: PBBFAC,,, | Performed by: INTERNAL MEDICINE

## 2024-03-14 PROCEDURE — 99214 OFFICE O/P EST MOD 30 MIN: CPT | Mod: S$GLB,,, | Performed by: INTERNAL MEDICINE

## 2024-03-14 RX ORDER — CEFDINIR 300 MG/1
300 CAPSULE ORAL 2 TIMES DAILY
COMMUNITY
Start: 2024-03-11

## 2024-03-14 NOTE — HIM RECORD RETIREMENT NOTE
[facility] of Incomplete Medical Record    3/14/24    Patient Name: Holli Landrum  Contact Serial # (CSN): [id number]  Patient Medical Record # (MRN): [id number]  Date of Service: Orders Only on 1/22/2020  Physician Name: Gasper Feliz,    This record has been reviewed and is being retired as incomplete by the approval of the  Medical Staff Operating Committee (MSOC)     On 9/7/2022., due to:  Unavailability of Provider     Missing Information/Comments:  []    Discharge Summary   []    DC Note/Short Stay Summary   []    ED Provider Note   []    Delivery Note   []    History & Physical   []   Operative Note   []     Procedure Note   []     Physician Order   [x]     Verbal Order   []       Other, specify:

## 2024-03-14 NOTE — PROGRESS NOTES
"Nutrition Assessment for Medical Nutrition Therapy Initial Visit  Consultation Time: 30 Minutes  Referring Provider: Danish Corona MD  Reason for Nutrition Consult: New Patient - Nutrition Counseling and Education  and Nutrition related questions    Nutrition Assessment      Patient Information:    Holli Landrum  : 6/15/1933   89 y.o. female    Allergies/Intolerances: No known food allergies  Social Data: lives with  daughter and son-in-law .   Anthropometrics:     Weight: 89.9 kg (198 lb 3.1 oz)                                 Height:   5'5" (1.65 m)    BMI: Body mass index is 32.98 kg/m².             Usual BW: 200 lb  Weight Change: none     Supplements/Vitamins:    MVI/Supp: No  Drug/Nutrient interactions: No Activity Level:     Sedentary     Form of Activity: activities of daily living  and wheelchair based      Malnutrition Assessment:   Nutrition Risk: Patient does not meet at least 2 characteristics of the ASPEN/AND criteria at this time.     Food/Nutrition-related history:    Diet/PO Recall:   Appetite: Good  Fluid Intake: Adequate  Diet Recall:  Breakfast: toast with peanut butter and blackberry jelly or instant oatmeal with raisins and walnuts  Lunch: jalapeno chicken salad with crackers   Dinner: scrambled egg sandwich or roast beef sandwich, or venison steak with potatoes, or meal kit with meat, vegetable and rice   Snacks: 1-2x/day  Drinks: tea with sugar, water   ONS: Boost Plus 1x/day  Servings of F/V per day: 2-3x/day  Eating out: 2-3x/week.   Cultural/Spiritual/Personal Preferences: No Preferences     GI Symptoms: No GI symptoms            Difficulty chewing or swallowing?  No    Patient Notes/Reports: Pt referred for a Nutrition Consultation related to New Patient - Nutrition Counseling and Education  and Nutrition related questions. Patient has a medical diagnosis of iron deficiency anemia. Her colonoscopy was fine, but other tests are needed to see about why her iron levels are low. She does " have a history of stomach ulcer, so Ms. Landrum will test for that. She loves iron rich foods like chicken liver, beans, and venison, but she does not eat them and the other vegetables she loves as much anymore due to depending on her daughter and son in law's cooking. Ms. Landrum will mention the iron rich foods to her daughter in hopes that more can be included in her future diet.    Medical Tests and Procedures:  Patient Active Problem List   Diagnosis    Nuclear sclerosis    Ocular hypertension    Hyperopia with astigmatism and presbyopia    Posterior vitreous detachment    Round hole of retina without detachment - Left Eye    COPD (chronic obstructive pulmonary disease)    GERD (gastroesophageal reflux disease)    Paroxysmal atrial fibrillation    Hypertensive heart disease with heart failure    Paroxysmal ventricular tachycardia    Asymmetric septal hypertrophy    Hypercholesteremia    Venous insufficiency    Hypertrophic obstructive cardiomyopathy    Obstructive sleep apnea    Coronary artery disease of native artery of native heart with stable angina pectoris    Acute on chronic diastolic heart failure    Essential hypertension    Atherosclerosis of aorta    Bladder neoplasm of uncertain malignant potential    Urinary retention    Hypokalemia    Age-related nuclear cataract of left eye    Age-related cataract of right eye    Gout    CKD (chronic kidney disease) stage 3, GFR 30-59 ml/min    Recurrent UTI    Acquired bladder diverticulum    Irritable bowel syndrome with diarrhea    Pulmonary nodules    Generalized weakness    Pulmonary hypertension    Adrenal nodule    Dysphagia    Goiter    Nonrheumatic aortic valve stenosis    Symptomatic bradycardia    S/P placement of cardiac pacemaker    Hemorrhoids    Asthma    Suprapubic catheter    Acute hypoxic respiratory failure    COVID    Chronic congestive heart failure    Preop cardiovascular exam    Syncope due to blood pressure medications      Past Medical  History:   Diagnosis Date    Anticoagulant long-term use     Arthritis     Asthma     Basal cell carcinoma     Cancer     skin cancer to face    Cataract     OU done//    CHF (congestive heart failure)     COPD (chronic obstructive pulmonary disease)     no oxygen; patient denies    cpap     Essential hypertension 05/05/2010    GERD (gastroesophageal reflux disease) 11/20/2012    Glaucoma     Hypertensive heart disease with heart failure 02/05/2013    Paroxysmal atrial fibrillation     Paroxysmal ventricular tachycardia     per problem list    Preop cardiovascular exam 1/26/2024    Renal disorder     french-1/3/2020    Squamous cell carcinoma of skin      Past Surgical History:   Procedure Laterality Date    A-V CARDIAC PACEMAKER INSERTION  06/16/2021    Procedure: INSERTION, CARDIAC PACEMAKER, DUAL CHAMBER;  Surgeon: Oscar Sommers MD;  Location: [facility] CATH;  Service: Cardiovascular;;    ADENOIDECTOMY  191944    APPENDECTOMY  1948    Because of Ovarian Cyst surgery    BREAST BIOPSY Left 1995    neg    BREAST BIOPSY Right 1984    neg    BREAST BIOPSY Right 1992    neg    BREAST SURGERY      A number of biopsies    CARDIAC CATHETERIZATION  2013, 2014,2016, 2020    has 9 stents    CARDIAC SURGERY  2012    stents    CATARACT EXTRACTION W/  INTRAOCULAR LENS IMPLANT Left 11/01/2018    Dr Rose    CATARACT EXTRACTION W/  INTRAOCULAR LENS IMPLANT Right 12/13/2018    Dr Rose//    CHOLECYSTECTOMY      COLONOSCOPY  ~2005    Dr. Edward; normal per patient report    COLONOSCOPY N/A 09/06/2022    Procedure: COLONOSCOPY 8/31/22-note in Dr Simms's office visit note that he spoke to her cardiologist and she was viable candidate for endoscopy;  Surgeon: Cordelia Bueno MD;  Location: [facility] ENDO;  Service: Endoscopy;  Laterality: N/A;    CORONARY ANGIOGRAPHY N/A 03/20/2020    Procedure: ANGIOGRAM, CORONARY ARTERY;  Surgeon: Chuy Bryant MD;  Location: [facility] CATH;  Service: Cardiology;  Laterality: N/A;    CYSTOSCOPY W/  RETROGRADES Bilateral 02/12/2020    Procedure: CYSTOSCOPY, WITH RETROGRADE PYELOGRAM;  Surgeon: Gasper Feliz MD;  Location: [facility] OR;  Service: Urology;  Laterality: Bilateral;    ESOPHAGOGASTRODUODENOSCOPY N/A 08/13/2020    Procedure: EGD (ESOPHAGOGASTRODUODENOSCOPY);  Surgeon: Mika Solorzano MD;  Location: [facility] ENDO;  Service: Endoscopy;  Laterality: N/A; Mild Schatzki ring. Biopsied. Dilated. small hiatal hernia, gastritis; biopsy: esophagus- SEVERE REFLUX ESOPHAGITIS, stomach- chronic gastritis, negative for H pylori    ESOPHAGOGASTRODUODENOSCOPY N/A 10/22/2020    Procedure: EGD (ESOPHAGOGASTRODUODENOSCOPY);  Surgeon: Fred Hendricks MD;  Location: [facility];  Service: Endoscopy;  Laterality: N/A;    EYE SURGERY  2017    Cataracs    FRACTIONAL FLOW RESERVE (FFR), CORONARY  6/20/2023    Procedure: Fractional Flow [place] (FFR), Coronary;  Surgeon: Brice Campuzano MD;  Location: [facility] CATH LAB;  Service: Cardiology;;    HYSTERECTOMY  1969    INSTANTANEOUS WAVE-FREE RATIO (IFR) N/A 6/20/2023    Procedure: Instantaneous Wave-Free Ratio (IFR);  Surgeon: Brice Campuzano MD;  Location: [facility] CATH LAB;  Service: Cardiology;  Laterality: N/A;    LEFT HEART CATHETERIZATION Left 03/03/2020    Procedure: Left heart cath;  Surgeon: Chuy Bryant MD;  Location: [facility] CATH;  Service: Cardiology;  Laterality: Left;    LEFT HEART CATHETERIZATION Left 03/20/2020    Procedure: Left heart cath;  Surgeon: Chuy Bryant MD;  Location: [facility] CATH;  Service: Cardiology;  Laterality: Left;    LEFT HEART CATHETERIZATION N/A 6/20/2023    Procedure: Left heart cath;  Surgeon: Brice Campuzano MD;  Location: [facility] CATH LAB;  Service: Cardiology;  Laterality: N/A;    OVARIAN CYST REMOVAL  teenager    PHACOEMULSIFICATION OF CATARACT Left 11/01/2018    Procedure: PHACOEMULSIFICATION, CATARACT;  Surgeon: Aleksandar Rose Jr., MD;  Location: [facility] OR;  Service: Ophthalmology;  Laterality: Left;    PHACOEMULSIFICATION OF CATARACT  Right 12/13/2018    Procedure: PHACOEMULSIFICATION, CATARACT;  Surgeon: Aleksandar Rose Jr., MD;  Location: [facility] OR;  Service: Ophthalmology;  Laterality: Right;    TONSILLECTOMY      aw/denoids    TRANSESOPHAGEAL ECHOCARDIOGRAM WITH POSSIBLE CARDIOVERSION (ALFRED W/ POSS CARDIOVERSION) N/A 10/5/2023    Procedure: Transesophageal echo (ALFRED) intra-procedure log documentation/alfred/cv;  Surgeon: Bao Miguel MD;  Location: [facility] CATH LAB;  Service: Cardiology;  Laterality: N/A;   Alfred/Cv  MRI safe   Pacer & leads implanted 6/16/21, Antoun   Bio Edora 8 MICHELLE, [id number], PID: [id number]   A lead: Bio Solia S45, [id number]   V lead: Bio Solia S53, [id number]    TREATMENT OF CARDIAC ARRHYTHMIA N/A 10/5/2023    Procedure: Cardioversion or Defibrillation;  Surgeon: Bao Miguel MD;  Location: [facility] CATH LAB;  Service: Cardiology;  Laterality: N/A;    UPPER GASTROINTESTINAL ENDOSCOPY  ~2005    Dr. Solorzano    VALVE STUDY-AORTIC  6/20/2023    Procedure: Valve study-aortic;  Surgeon: Brice Campuzano MD;  Location: [facility] CATH LAB;  Service: Cardiology;;    VASCULAR SURGERY      to remove clot from right leg    Yag Capsulotomy Bilateral 11/05/2019    Dr Rose       Current Outpatient Medications   Medication Instructions    albuterol (PROVENTIL/VENTOLIN HFA) 90 mcg/actuation inhaler 1-2 puffs, Inhalation, Every 4 hours PRN, Rescue    albuterol-ipratropium (DUO-NEB) 2.5 mg-0.5 mg/3 mL nebulizer solution 3 mLs, Nebulization, Every 6 hours PRN    amiodarone (PACERONE) 200 mg, Oral, Daily    apixaban (ELIQUIS) 2.5 mg, Oral, 2 times daily    cefdinir (OMNICEF) 300 mg, Oral, 2 times daily    cetirizine HCl (ZYRTEC ORAL) 10 mg, Oral, Daily    cholecalciferol (vitamin D3) 5,000 Units, Oral, Daily    dorzolamide (TRUSOPT) 2 % ophthalmic solution INSTILL 1 DROP INTO BOTH EYES TWICE DAILY    doxycycline (VIBRA-TABS) 100 mg, Oral, 2 times daily    famotidine (PEPCID) 40 mg, Oral, Nightly    fluticasone furoate-vilanteroL (BREO  ELLIPTA) 100-25 mcg/dose diskus inhaler 1 puff, Inhalation, Daily    fluticasone propionate (FLONASE) 100 mcg, Each Nostril, Daily PRN    furosemide (LASIX) 40 mg, Oral, 2 times daily, Take twice a day and monitor BP and weights    Lactobacillus rhamnosus GG (CULTURELLE) 10 billion cell capsule 1 capsule, Oral, Daily    latanoprost 0.005 % ophthalmic solution 1 drop, Both Eyes, Nightly    metoprolol tartrate (LOPRESSOR) 25 mg, Oral, 2 times daily    nitroGLYCERIN 0.4 MG/HR TD PT24 (NITRODUR) 0.4 mg/hr 1 patch, Transdermal, Daily    potassium chloride (KLOR-CON) 10 MEQ TbSR 20 mEq, Oral, 2 times daily, Take with fluid pill    rosuvastatin (CRESTOR) 40 mg, Oral, Daily      Labs:  Reviewed - followed by MD deutsch       Nutrition Diagnosis    Nutrition Problem: altered nutrition related lab value -Hgb  Etiology (related to): nutrition  Signs/Symptoms (as evidenced by): self reported diet questions/concerns     Nutrition Intervention      Estimated Energy/Fluid Requirements:   Weight used: CBW 90 kg  Calories: 0841-0328 kcal/day (20-22 kcal/kg)  Protein: 90 g/day (1.0 g/kg)  Fluid: 0726-9773 mL/day (1 mL/kcal)   Recommendations:   Focus on iron rich foods at all meals. Examples provided. Also include vitamin C rich fruits and vegetables with meals.  Focus on protein at all meals and snacks. Examples provided.    Continue Boost Plus 1x/day     Education Needs Satisfied: yes   Education Materials Provided / Reviewed:   Iron   Healthy Plate Method    Barriers to Learning: none identified  Patient and/ or Family Verbalizes understanding: yes      Nutrition Monitoring and Evaluation    Monitor: energy intake and diet tolerance     Goals:     Adherence to nutrition recommendations for improved symptom management  Indicator: Diet Recall     Follow up Patient provided with dietitian contact number and advised to call with questions or make future appointment if further intervention is needed.    Communication to referring  provider/care team: note available in chart  Signature: Fara Miller, MPH, RD, LDN

## 2024-03-14 NOTE — PROGRESS NOTES
Subjective:   Patient ID:  Holli Landrum is a 89 y.o. female who presents for cardiac consult of Pre-op Exam (Upper GI procedure scheduled for 03/21/24. )      HPI  The patient came in today for cardiac consult of Pre-op Exam (Upper GI procedure scheduled for 03/21/24. )      Patient ID  ---------------  Holli Landrum is a 89 y.o. female pt with  history of persistent atrial fibrillation, coronary artery disease status post PCI, sick sinus syndrome status post [employer] pacemaker implantation Biotronik, hypertrophic obstructive cardiomyopathy, diastolic congestive heart failure, severe aortic stenosis, history of GI bleeding here for CV follow up.         11/9/23  BP and HR well controlled. Pt had flare up last night thought she may pass out.   She has no appetite. Weight is 195 lbs - lost 3 lbs since last vsiit - at home 189- 194 lbs       3/14/24  Recent eval with Dr. Guadarrama - Given indwelling catheter she is not a candidate for valve replacement. I discussed the option of palliative BAV should her HF symptoms worsen. She is not interested at this time but says she will keep it in mind if her shortness of breath worsens. She seems to be slowly getting better since the COVID.      She is on 24/7 oxygen now since COVID 19. Was in hosp earlier. She is doing PT/OT now. She is using a walker now.     Patient has dec exercise tolerance.    Patient is compliant with medications.    Results for orders placed during the hospital encounter of 06/20/23    Cardiac catheterization    Conclusion    The Ost Cx to Prox Cx lesion was 50% stenosed.    The Ost 1st Mrg to 1st Mrg lesion was 70% stenosed.    The pre-procedure left ventricular end diastolic pressure was 6.    The estimated blood loss was <50 mL.    There was non-obstructive coronary artery disease..    There was moderate aortic valve stenosis.    The procedure log was documented by Documenter: Dominga Vargas RT; Morena Bradford and verified by Brice Guadarrama,  MD.    Date: 6/20/2023  Time: 2:46 PM      Results for orders placed during the hospital encounter of 05/17/23    Echo    Interpretation Summary  · The left ventricle is normal in size with mild concentric hypertrophy and normal systolic function.  · Moderate left atrial enlargement.  · The estimated ejection fraction is 65%.  · Indeterminate left ventricular diastolic function.  · Normal right ventricular size with normal right ventricular systolic function.  · There is moderate-to-severe aortic valve stenosis.  · Aortic valve area is 0.80 cm2; peak velocity is 2.85 m/s; mean gradient is 21 mmHg.  · Mild tricuspid regurgitation.  · Intermediate central venous pressure (8 mmHg).  · The estimated PA systolic pressure is 33 mmHg.          Past Medical History:   Diagnosis Date    Anticoagulant long-term use     Arthritis     Asthma     Basal cell carcinoma     Cancer     skin cancer to face    Cataract     OU done//    CHF (congestive heart failure)     COPD (chronic obstructive pulmonary disease)     no oxygen; patient denies    cpap     Essential hypertension 05/05/2010    GERD (gastroesophageal reflux disease) 11/20/2012    Glaucoma     Hypertensive heart disease with heart failure 02/05/2013    Paroxysmal atrial fibrillation     Paroxysmal ventricular tachycardia     per problem list    Preop cardiovascular exam 1/26/2024    Renal disorder     french-1/3/2020    Squamous cell carcinoma of skin        Past Surgical History:   Procedure Laterality Date    A-V CARDIAC PACEMAKER INSERTION  06/16/2021    Procedure: INSERTION, CARDIAC PACEMAKER, DUAL CHAMBER;  Surgeon: Oscar Sommers MD;  Location: [facility];  Service: Cardiovascular;;    ADENOIDECTOMY  191944    APPENDECTOMY  1948    Because of Ovarian Cyst surgery    BREAST BIOPSY Left 1995    neg    BREAST BIOPSY Right 1984    neg    BREAST BIOPSY Right 1992    neg    BREAST SURGERY      A number of biopsies    CARDIAC CATHETERIZATION  2013, 2014,2016, 2020    has 9  stents    CARDIAC SURGERY  2012    stents    CATARACT EXTRACTION W/  INTRAOCULAR LENS IMPLANT Left 11/01/2018    Dr Rose    CATARACT EXTRACTION W/  INTRAOCULAR LENS IMPLANT Right 12/13/2018    Dr Rose//    CHOLECYSTECTOMY      COLONOSCOPY  ~2005    Dr. Edward; normal per patient report    COLONOSCOPY N/A 09/06/2022    Procedure: COLONOSCOPY 8/31/22-note in Dr Simms's office visit note that he spoke to her cardiologist and she was viable candidate for endoscopy;  Surgeon: Cordelia Bueno MD;  Location: [facility] ENDO;  Service: Endoscopy;  Laterality: N/A;    CORONARY ANGIOGRAPHY N/A 03/20/2020    Procedure: ANGIOGRAM, CORONARY ARTERY;  Surgeon: Chuy Bryant MD;  Location: [facility] CATH;  Service: Cardiology;  Laterality: N/A;    CYSTOSCOPY W/ RETROGRADES Bilateral 02/12/2020    Procedure: CYSTOSCOPY, WITH RETROGRADE PYELOGRAM;  Surgeon: Gasper Feliz MD;  [facility] OR;  Service: Urology;  Laterality: Bilateral;    ESOPHAGOGASTRODUODENOSCOPY N/A 08/13/2020    Procedure: EGD (ESOPHAGOGASTRODUODENOSCOPY);  Surgeon: Mika Solorzano MD;  Location: [facility];  Service: Endoscopy;  Laterality: N/A; Mild Schatzki ring. Biopsied. Dilated. small hiatal hernia, gastritis; biopsy: esophagus- SEVERE REFLUX ESOPHAGITIS, stomach- chronic gastritis, negative for H pylori    ESOPHAGOGASTRODUODENOSCOPY N/A 10/22/2020    Procedure: EGD (ESOPHAGOGASTRODUODENOSCOPY);  Surgeon: Fred Hendricks MD;  Location: [facility];  Service: Endoscopy;  Laterality: N/A;    EYE SURGERY  2017    Cataracs    FRACTIONAL FLOW RESERVE (FFR), CORONARY  6/20/2023    Procedure: Fractional Flow Sheridan (FFR), Coronary;  Surgeon: Brice Campuzano MD;  Location: [facility] CATH LAB;  Service: Cardiology;;    HYSTERECTOMY  1969    INSTANTANEOUS WAVE-FREE RATIO (IFR) N/A 6/20/2023    Procedure: Instantaneous Wave-Free Ratio (IFR);  Surgeon: Brice Campuzano MD;  Location: [facility] CATH LAB;  Service: Cardiology;  Laterality: N/A;    LEFT HEART  CATHETERIZATION Left 03/03/2020    Procedure: Left heart cath;  Surgeon: Chuy Bryant MD;  Location: [facility] CATH;  Service: Cardiology;  Laterality: Left;    LEFT HEART CATHETERIZATION Left 03/20/2020    Procedure: Left heart cath;  Surgeon: Chuy Bryant MD;  Location: [facility] CATH;  Service: Cardiology;  Laterality: Left;    LEFT HEART CATHETERIZATION N/A 6/20/2023    Procedure: Left heart cath;  Surgeon: Brice Campuzano MD;  Location: [facility] CATH LAB;  Service: Cardiology;  Laterality: N/A;    OVARIAN CYST REMOVAL  teenager    PHACOEMULSIFICATION OF CATARACT Left 11/01/2018    Procedure: PHACOEMULSIFICATION, CATARACT;  Surgeon: Aleksandar Rose Jr., MD;  Location: [facility] OR;  Service: Ophthalmology;  Laterality: Left;    PHACOEMULSIFICATION OF CATARACT Right 12/13/2018    Procedure: PHACOEMULSIFICATION, CATARACT;  Surgeon: Aleksandar Rose Jr., MD;  Location: [facility] OR;  Service: Ophthalmology;  Laterality: Right;    TONSILLECTOMY      aw/denoids    TRANSESOPHAGEAL ECHOCARDIOGRAM WITH POSSIBLE CARDIOVERSION (ALFRED W/ POSS CARDIOVERSION) N/A 10/5/2023    Procedure: Transesophageal echo (ALFRED) intra-procedure log documentation/alfred/cv;  Surgeon: Bao Miguel MD;  Location: [facility] CATH LAB;  Service: Cardiology;  Laterality: N/A;   Alfred/Cv  MRI safe   Pacer & leads implanted 6/16/21, Antoun   Bio Facundoora 8 MICHELLE, [id number], PID: [id number]   A lead: Bio Solia S45, [id number]   V lead: Bio Solia S53, [id number]    TREATMENT OF CARDIAC ARRHYTHMIA N/A 10/5/2023    Procedure: Cardioversion or Defibrillation;  Surgeon: Bao Miguel MD;  Location: [facility] CATH LAB;  Service: Cardiology;  Laterality: N/A;    UPPER GASTROINTESTINAL ENDOSCOPY  ~2005    Dr. Solorzano    VALVE STUDY-AORTIC  6/20/2023    Procedure: Valve study-aortic;  Surgeon: Brice Campuzano MD;  Location: [facility] CATH LAB;  Service: Cardiology;;    VASCULAR SURGERY      to remove clot from right leg    Yag Capsulotomy Bilateral 11/05/2019    Dr Rose       Social  History     Tobacco Use    Smoking status: Former     Current packs/day: 0.00     Types: Cigarettes     Start date: 6/10/1954     Quit date: 9/15/1955     Years since quittin.5    Smokeless tobacco: Never    Tobacco comments:     I onlysmoked one year while [employer] my  was oversees  during [place]   Substance Use Topics    Alcohol use: Not Currently     Comment: Only drank a little wine and haven't  drunk anything in 15 y    Drug use: Never       Family History   Problem Relation Age of Onset    Diabetes Brother         Type 2    Heart disease Brother          at 65 CHD    Diabetes Mother         Type 1    Alcohol abuse Mother         Dod 10/75    Heart disease Mother         Arteriosclerosis    Hypertension Mother     Stroke Mother         3/15/1975 dod 10/1975    Cancer Maternal Grandfather         Dod     Clotting disorder Son         bleeding after tonsillectomy only    Heart disease Father         Heart attack. Afib, and Polyvcithemavera    Hypertension Father     Amblyopia Neg Hx     Blindness Neg Hx     Cataracts Neg Hx     Glaucoma Neg Hx     Macular degeneration Neg Hx     Retinal detachment Neg Hx     Strabismus Neg Hx     Thyroid disease Neg Hx     Colon cancer Neg Hx        Patient's Medications   New Prescriptions    No medications on file   Previous Medications    ALBUTEROL (PROVENTIL/VENTOLIN HFA) 90 MCG/ACTUATION INHALER    Inhale 1-2 puffs into the lungs every 4 (four) hours as needed for Wheezing or Shortness of Breath. Rescue    ALBUTEROL-IPRATROPIUM (DUO-NEB) 2.5 MG-0.5 MG/3 ML NEBULIZER SOLUTION    Take 3 mLs by nebulization every 6 (six) hours as needed for Wheezing or Shortness of Breath.    AMIODARONE (PACERONE) 200 MG TAB    Take 1 tablet (200 mg total) by mouth once daily.    APIXABAN (ELIQUIS) 2.5 MG TAB    Take 1 tablet (2.5 mg total) by mouth 2 (two) times daily.    CEFDINIR (OMNICEF) 300 MG CAPSULE    Take 300 mg by mouth 2 (two) times daily.    CETIRIZINE HCL (ZYRTEC  ORAL)    Take 10 mg by mouth once daily.    CHOLECALCIFEROL, VITAMIN D3, 125 MCG (5,000 UNIT) TAB    Take 5,000 Units by mouth once daily.    DORZOLAMIDE (TRUSOPT) 2 % OPHTHALMIC SOLUTION    INSTILL 1 DROP INTO BOTH EYES TWICE DAILY    DOXYCYCLINE (VIBRA-TABS) 100 MG TABLET    Take 100 mg by mouth 2 (two) times daily.    FAMOTIDINE (PEPCID) 40 MG TABLET    Take 1 tablet (40 mg total) by mouth every evening.    FLUTICASONE (FLONASE) 50 MCG/ACTUATION NASAL SPRAY    2 sprays (100 mcg total) by Each Nare route daily as needed for Allergies.    FLUTICASONE FUROATE-VILANTEROL (BREO ELLIPTA) 100-25 MCG/DOSE DISKUS INHALER    Inhale 1 puff into the lungs once daily.    FUROSEMIDE (LASIX) 40 MG TABLET    Take 1 tablet (40 mg total) by mouth 2 (two) times a day. Take twice a day and monitor BP and weights    LACTOBACILLUS RHAMNOSUS GG (CULTURELLE) 10 BILLION CELL CAPSULE    Take 1 capsule by mouth once daily.    LATANOPROST 0.005 % OPHTHALMIC SOLUTION    Place 1 drop into both eyes every evening.    METOPROLOL TARTRATE (LOPRESSOR) 25 MG TABLET    Take 1 tablet (25 mg total) by mouth 2 (two) times daily.    NITROGLYCERIN 0.4 MG/HR TD PT24 (NITRODUR) 0.4 MG/HR    Place 1 patch onto the skin once daily.    POTASSIUM CHLORIDE (KLOR-CON) 10 MEQ TBSR    Take 2 tablets (20 mEq total) by mouth 2 (two) times daily. Take with fluid pill    ROSUVASTATIN (CRESTOR) 40 MG TAB    TAKE 1 TABLET (40 MG TOTAL) BY MOUTH ONCE DAILY.   Modified Medications    No medications on file   Discontinued Medications    No medications on file       Review of Systems   Constitutional: Negative.    HENT: Negative.     Eyes: Negative.    Respiratory:  Positive for shortness of breath.    Cardiovascular:  Positive for palpitations and leg swelling.   Gastrointestinal: Negative.    Genitourinary: Negative.    Musculoskeletal: Negative.    Skin: Negative.    Neurological: Negative.    Endo/Heme/Allergies: Negative.    Psychiatric/Behavioral: Negative.     All  "12 systems otherwise negative.      Wt Readings from Last 3 Encounters:   03/14/24 89.9 kg (198 lb 3.1 oz)   03/06/24 89.8 kg (198 lb)   02/01/24 90.1 kg (198 lb 10.2 oz)     Temp Readings from Last 3 Encounters:   01/23/24 97.9 °F (36.6 °C) (Oral)   12/16/23 97.8 °F (36.6 °C) (Oral)   10/14/23 98.7 °F (37.1 °C)     BP Readings from Last 3 Encounters:   03/14/24 130/82   03/06/24 118/68   01/23/24 108/74     Pulse Readings from Last 3 Encounters:   03/14/24 64   03/06/24 62   01/23/24 60       /82 (BP Location: Right arm, Patient Position: Sitting, BP Method: Medium (Manual))   Pulse 64   Ht 5' 5" (1.651 m)   Wt 89.9 kg (198 lb 3.1 oz)   LMP  (LMP Unknown)   SpO2 96%   BMI 32.98 kg/m²     Objective:   Physical Exam  Vitals and nursing note reviewed.   Constitutional:       General: She is not in acute distress.     Appearance: She is well-developed. She is not diaphoretic.   HENT:      Head: Normocephalic and atraumatic.      Nose: Nose normal.   Eyes:      General: No scleral icterus.     Conjunctiva/sclera: Conjunctivae normal.   Neck:      Thyroid: No thyromegaly.      Vascular: No JVD.   Cardiovascular:      Rate and Rhythm: Normal rate and regular rhythm.      Heart sounds: S1 normal and S2 normal. Murmur heard.      No friction rub. No gallop. No S3 or S4 sounds.   Pulmonary:      Effort: Pulmonary effort is normal. No respiratory distress.      Breath sounds: Normal breath sounds. No stridor. No wheezing or rales.   Chest:      Chest wall: No tenderness.   Abdominal:      General: Bowel sounds are normal. There is no distension.      Palpations: Abdomen is soft. There is no mass.      Tenderness: There is no abdominal tenderness. There is no rebound.   Genitourinary:     Comments: Deferred  Musculoskeletal:         General: No tenderness or deformity. Normal range of motion.      Cervical back: Normal range of motion and neck supple.   Lymphadenopathy:      Cervical: No cervical adenopathy. "   Skin:     General: Skin is warm and dry.      Coloration: Skin is not pale.      Findings: No erythema or rash.   Neurological:      Mental Status: She is alert and oriented to person, place, and time.      Motor: No abnormal muscle tone.      Coordination: Coordination normal.   Psychiatric:         Behavior: Behavior normal.         Thought Content: Thought content normal.         Judgment: Judgment normal.         Lab Results   Component Value Date     01/22/2024     (L) 08/15/2015    K 4.5 01/22/2024    K 4.0 08/15/2015     01/22/2024    CL 99 08/15/2015    CO2 24 01/22/2024    BUN 19 01/22/2024    CREATININE 1.1 01/22/2024    CREATININE 1.05 (H) 08/15/2015    GLU 97 01/22/2024    HGBA1C 5.8 (H) 12/07/2021    MG 1.8 12/14/2023    AST 19 01/22/2024    AST 23 04/05/2016    ALT 11 01/22/2024    ALBUMIN 3.6 01/22/2024    ALBUMIN 4.4 08/15/2015    PROT 7.1 01/22/2024    BILITOT 0.3 01/22/2024    WBC 7.99 01/19/2024    HGB 12.1 01/19/2024    HCT 39.1 01/19/2024    HCT 30 (L) 12/14/2023    MCV 83 01/19/2024     01/19/2024    INR 1.0 10/03/2023    TSH 0.993 12/07/2021    CHOL 134 12/07/2021    HDL 46 12/07/2021    LDLCALC 59.0 (L) 12/07/2021    LDLCALC 89 08/15/2015    TRIG 145 12/07/2021     (H) 01/22/2024     Assessment:      1. Nonrheumatic aortic valve stenosis    2. Acute on chronic diastolic heart failure    3. Paroxysmal atrial fibrillation    4. Presence of cardiac pacemaker    5. Hypertrophic obstructive cardiomyopathy    6. Cardiac pacemaker in situ    7. Symptomatic bradycardia    8. Coronary artery disease of native artery of native heart with stable angina pectoris    9. S/P placement of cardiac pacemaker    10. Shortness of breath    11. Essential hypertension    12. Hypokalemia    13. History of COVID-19    14. Anemia, unspecified type                  Plan:     PAF, SSS s/p PPM - 6/2021 with recurrent Afib - s/p JÚNIOR/DCCV and reload with Amio 10/2023  - interrogate device  and f/u device clinic as sched   - cont Amio 200mg daily and Eliquis  - has hemorrhoids occ - may be candidate for Watchman   - f/u EP - may discuss ablation     2. HTN   - titrate meds    3. Mod to severe AS - not candidate for TAVR now   - TAVR high risk for endocarditis, can consider Balloon AV - f/u with Dr. Guadarrama  - ECHO 5/2023 with normal bi V function, mod to severe AS, PASP 33 mmHg.  - C 6/2023 with OM1 70% stenosis, LCX 50% stenosis, moderate AS.     4. HFpEF , last BNP - 209  - cont tx - lasix 40mg BID and K 20 meq BID - hold If BP is low     5. CAD s/p PCI  - cont Eliquis, BB, statin  - cont nitro patch   - patent stents [facility] 6/2023    6. Obesity, BMI 35 - 216 lbs--> 197 lbs - BMI 32 - 198 lbs --> 195  --> 198 lbs   - cont weight loss    7. JA, HIGGINS, h/o COVID 19  - f/u pulm  - needs to use CPAP    8. Anemia - iron def anemia   - cont iron and f/u GI/heme onc  - will have EGD - high risk     9. GERD  - cont PPI     Visit today included increased complexity associated with the care of the episodic problem dyspnea addressed and managing the longitudinal care of the patient due to the serious and/or complex managed problem(s) .      Thank you for allowing me to participate in this patient's care. Please do not hesitate to contact me with any questions or concerns. Consult note has been forwarded to the referral physician.     Fletcher Meyer MD, [facility]  Cardiovascular Disease  [facility], [place]  [phone] (P)

## 2024-03-15 NOTE — PATIENT INSTRUCTIONS
Recommendations:   Focus on iron rich foods at all meals. Examples provided. Also include vitamin C rich fruits and vegetables with meals.  Focus on protein at all meals and snacks. Examples provided.    Continue Boost Plus 1x/day

## 2024-03-18 PROBLEM — J96.01 ACUTE HYPOXIC RESPIRATORY FAILURE: Status: RESOLVED | Noted: 2023-12-14 | Resolved: 2024-03-18

## 2024-04-10 RX ORDER — METOPROLOL TARTRATE 25 MG/1
25 TABLET, FILM COATED ORAL 2 TIMES DAILY
Qty: 180 TABLET | Refills: 1 | Status: SHIPPED | OUTPATIENT
Start: 2024-04-10 | End: 2024-10-07

## 2024-04-12 ENCOUNTER — OFFICE VISIT (OUTPATIENT)
Dept: PULMONOLOGY | Facility: CLINIC | Age: 89
End: 2024-04-12
Payer: MEDICARE

## 2024-04-12 ENCOUNTER — HOSPITAL ENCOUNTER (OUTPATIENT)
Dept: RADIOLOGY | Facility: HOSPITAL | Age: 89
Discharge: HOME | End: 2024-04-12
Attending: INTERNAL MEDICINE
Payer: MEDICARE

## 2024-04-12 VITALS
HEIGHT: 65 IN | WEIGHT: 199.06 LBS | HEART RATE: 71 BPM | SYSTOLIC BLOOD PRESSURE: 120 MMHG | DIASTOLIC BLOOD PRESSURE: 82 MMHG | RESPIRATION RATE: 18 BRPM | OXYGEN SATURATION: 96 % | BODY MASS INDEX: 33.16 KG/M2

## 2024-04-12 DIAGNOSIS — R09.02 EXERCISE HYPOXEMIA: ICD-10-CM

## 2024-04-12 DIAGNOSIS — J41.0 SIMPLE CHRONIC BRONCHITIS: ICD-10-CM

## 2024-04-12 DIAGNOSIS — R91.8 PULMONARY NODULES: ICD-10-CM

## 2024-04-12 DIAGNOSIS — J44.9 CHRONIC OBSTRUCTIVE PULMONARY DISEASE, UNSPECIFIED COPD TYPE: ICD-10-CM

## 2024-04-12 DIAGNOSIS — J44.9 CHRONIC OBSTRUCTIVE PULMONARY DISEASE, UNSPECIFIED COPD TYPE: Primary | ICD-10-CM

## 2024-04-12 DIAGNOSIS — J30.89 SEASONAL ALLERGIC RHINITIS DUE TO OTHER ALLERGIC TRIGGER: ICD-10-CM

## 2024-04-12 DIAGNOSIS — G47.33 OSA ON CPAP: ICD-10-CM

## 2024-04-12 PROCEDURE — 1160F RVW MEDS BY RX/DR IN RCRD: CPT | Mod: CPTII,S$GLB,, | Performed by: INTERNAL MEDICINE

## 2024-04-12 PROCEDURE — 1157F ADVNC CARE PLAN IN RCRD: CPT | Mod: CPTII,S$GLB,, | Performed by: INTERNAL MEDICINE

## 2024-04-12 PROCEDURE — 3288F FALL RISK ASSESSMENT DOCD: CPT | Mod: CPTII,S$GLB,, | Performed by: INTERNAL MEDICINE

## 2024-04-12 PROCEDURE — 99214 OFFICE O/P EST MOD 30 MIN: CPT | Mod: S$GLB,,, | Performed by: INTERNAL MEDICINE

## 2024-04-12 PROCEDURE — 1101F PT FALLS ASSESS-DOCD LE1/YR: CPT | Mod: CPTII,S$GLB,, | Performed by: INTERNAL MEDICINE

## 2024-04-12 PROCEDURE — 99999 PR PBB SHADOW E&M-EST. PATIENT-LVL IV: CPT | Mod: PBBFAC,,, | Performed by: INTERNAL MEDICINE

## 2024-04-12 PROCEDURE — 1159F MED LIST DOCD IN RCRD: CPT | Mod: CPTII,S$GLB,, | Performed by: INTERNAL MEDICINE

## 2024-04-12 PROCEDURE — 71046 X-RAY EXAM CHEST 2 VIEWS: CPT | Mod: TC

## 2024-04-12 PROCEDURE — 71046 X-RAY EXAM CHEST 2 VIEWS: CPT | Mod: 26,,, | Performed by: RADIOLOGY

## 2024-04-12 RX ORDER — ALBUTEROL SULFATE 90 UG/1
1-2 AEROSOL, METERED RESPIRATORY (INHALATION) EVERY 4 HOURS PRN
Qty: 18 G | Refills: 11 | Status: SHIPPED | OUTPATIENT
Start: 2024-04-12

## 2024-04-12 RX ORDER — MUPIROCIN 20 MG/G
OINTMENT TOPICAL
COMMUNITY
Start: 2024-03-14

## 2024-04-12 RX ORDER — LORATADINE 10 MG/1
10 TABLET ORAL DAILY
Qty: 30 TABLET | Refills: 11 | Status: SHIPPED | OUTPATIENT
Start: 2024-04-12 | End: 2025-04-12

## 2024-04-12 RX ORDER — IPRATROPIUM BROMIDE AND ALBUTEROL SULFATE 2.5; .5 MG/3ML; MG/3ML
3 SOLUTION RESPIRATORY (INHALATION) EVERY 6 HOURS PRN
Qty: 360 ML | Refills: 11 | Status: SHIPPED | OUTPATIENT
Start: 2024-04-12

## 2024-04-12 RX ORDER — FLUTICASONE FUROATE AND VILANTEROL 100; 25 UG/1; UG/1
1 POWDER RESPIRATORY (INHALATION) DAILY
Qty: 60 EACH | Refills: 11 | Status: SHIPPED | OUTPATIENT
Start: 2024-04-12

## 2024-04-12 NOTE — PROGRESS NOTES
Subjective:     Patient ID: Holli Landrum is a 89 y.o. female.    Chief Complaint:      HPI 89 y/o feeling better Complicated history of Atrial Fibrillation Congestive Heart failure, Chronic Obstructive Pulmonary Disease and iron deficiency anemia  Dyspnea  Patient complains of shortness of breath. Symptoms occur with one block walking. Symptoms began 3 years ago, unchanged since. Associated symptoms include  difficulty breathing, drainage from nose, dyspnea on exertion, productive cough, shortness of breath, and clear phlegm . She denies chest pain, located left chest. She does not have had recent travel. Weight has been stable. Symptoms are exacerbated by moderate activity. Symptoms are alleviated by rest.     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    History of nodules on lungs    Past Medical History:   Diagnosis Date    Anticoagulant long-term use     Arthritis     Asthma     Basal cell carcinoma     Cancer     skin cancer to face    Cataract     OU done//    CHF (congestive heart failure)     COPD (chronic obstructive pulmonary disease)     no oxygen; patient denies    cpap     Essential hypertension 05/05/2010    GERD (gastroesophageal reflux disease) 11/20/2012    Glaucoma     Hypertensive heart disease with heart failure 02/05/2013    Paroxysmal atrial fibrillation     Paroxysmal ventricular tachycardia     per problem list    Preop cardiovascular exam 1/26/2024    Renal disorder     french-1/3/2020    Squamous cell carcinoma of skin      Past Surgical History:   Procedure Laterality Date    A-V CARDIAC PACEMAKER INSERTION  06/16/2021    Procedure: INSERTION, CARDIAC PACEMAKER, DUAL CHAMBER;  Surgeon: Oscar Sommers MD;  Location: [facility];  Service: Cardiovascular;;    ADENOIDECTOMY  191944    APPENDECTOMY  1948    Because of Ovarian Cyst surgery    BREAST BIOPSY Left 1995    neg    BREAST BIOPSY Right 1984    neg    BREAST BIOPSY Right  1992    neg    BREAST SURGERY      A number of biopsies    CARDIAC CATHETERIZATION  2013, 2014,2016, 2020    has 9 stents    CARDIAC SURGERY  2012    stents    CATARACT EXTRACTION W/  INTRAOCULAR LENS IMPLANT Left 11/01/2018    Dr Rose    CATARACT EXTRACTION W/  INTRAOCULAR LENS IMPLANT Right 12/13/2018    Dr Rose//    CHOLECYSTECTOMY      COLONOSCOPY  ~2005    Dr. Edward; normal per patient report    COLONOSCOPY N/A 09/06/2022    Procedure: COLONOSCOPY 8/31/22-note in Dr Simms's office visit note that he spoke to her cardiologist and she was viable candidate for endoscopy;  Surgeon: Cordelia Bueno MD;  Location: [facility] ENDO;  Service: Endoscopy;  Laterality: N/A;    CORONARY ANGIOGRAPHY N/A 03/20/2020    Procedure: ANGIOGRAM, CORONARY ARTERY;  Surgeon: Chuy Bryant MD;  Location: [facility] CATH;  Service: Cardiology;  Laterality: N/A;    CYSTOSCOPY W/ RETROGRADES Bilateral 02/12/2020    Procedure: CYSTOSCOPY, WITH RETROGRADE PYELOGRAM;  Surgeon: Gasper Feliz MD;  Location: [facility] OR;  Service: Urology;  Laterality: Bilateral;    ESOPHAGOGASTRODUODENOSCOPY N/A 08/13/2020    Procedure: EGD (ESOPHAGOGASTRODUODENOSCOPY);  Surgeon: Mika Solorzano MD;  Location: [facility];  Service: Endoscopy;  Laterality: N/A; Mild Schatzki ring. Biopsied. Dilated. small hiatal hernia, gastritis; biopsy: esophagus- SEVERE REFLUX ESOPHAGITIS, stomach- chronic gastritis, negative for H pylori    ESOPHAGOGASTRODUODENOSCOPY N/A 10/22/2020    Procedure: EGD (ESOPHAGOGASTRODUODENOSCOPY);  Surgeon: Fred Hendricks MD;  Location: [facility];  Service: Endoscopy;  Laterality: N/A;    ESOPHAGOGASTRODUODENOSCOPY N/A 3/21/2024    Procedure: EGD (ESOPHAGOGASTRODUODENOSCOPY);  Surgeon: Mika Solorzano MD;  Location: [facility];  Service: Gastroenterology;  Laterality: N/A;    EYE SURGERY  2017    Cataracs    FRACTIONAL FLOW RESERVE (FFR), CORONARY  6/20/2023    Procedure: Fractional Flow Woody (FFR), Coronary;  Surgeon: Brice JENNINGS  Thierry Campuzano MD;  Location: [facility] CATH LAB;  Service: Cardiology;;    HYSTERECTOMY  1969    INSTANTANEOUS WAVE-FREE RATIO (IFR) N/A 6/20/2023    Procedure: Instantaneous Wave-Free Ratio (IFR);  Surgeon: Brice Campuzano MD;  Location: [facility] CATH LAB;  Service: Cardiology;  Laterality: N/A;    LEFT HEART CATHETERIZATION Left 03/03/2020    Procedure: Left heart cath;  Surgeon: Chuy Bryant MD;  [facility];  Service: Cardiology;  Laterality: Left;    LEFT HEART CATHETERIZATION Left 03/20/2020    Procedure: Left heart cath;  Surgeon: Chuy Bryant MD;  [facility];  Service: Cardiology;  Laterality: Left;    LEFT HEART CATHETERIZATION N/A 6/20/2023    Procedure: Left heart cath;  Surgeon: Brice Campuzano MD;  Location: [facility] CATH LAB;  Service: Cardiology;  Laterality: N/A;    OVARIAN CYST REMOVAL  teenager    PHACOEMULSIFICATION OF CATARACT Left 11/01/2018    Procedure: PHACOEMULSIFICATION, CATARACT;  Surgeon: Aleksandar Rose Jr., MD;  Location: [facility] OR;  Service: Ophthalmology;  Laterality: Left;    PHACOEMULSIFICATION OF CATARACT Right 12/13/2018    Procedure: PHACOEMULSIFICATION, CATARACT;  Surgeon: Aleksandar Rose Jr., MD;  Location: [facility] OR;  Service: Ophthalmology;  Laterality: Right;    TONSILLECTOMY      aw/denoids    TRANSESOPHAGEAL ECHOCARDIOGRAM WITH POSSIBLE CARDIOVERSION (ALFRED W/ POSS CARDIOVERSION) N/A 10/5/2023    Procedure: Transesophageal echo (ALFRED) intra-procedure log documentation/alfred/cv;  Surgeon: Bao Miguel MD;  Location: [facility] CATH LAB;  Service: Cardiology;  Laterality: N/A;   Alfred/Cv  MRI safe   Pacer & leads implanted 6/16/21, Antoun   Bio Edora 8 DR-T, [id number], PID: [id number]   A lead: Bio Solia S45, [id number]   V lead: Bio Solia S53, [id number]    TREATMENT OF CARDIAC ARRHYTHMIA N/A 10/5/2023    Procedure: Cardioversion or Defibrillation;  Surgeon: Bao Miguel MD;  Location: [facility] CATH LAB;  Service: Cardiology;  Laterality: N/A;    UPPER GASTROINTESTINAL  ENDOSCOPY  ~    Dr. Solorzano    VALVE STUDY-AORTIC  2023    Procedure: Valve study-aortic;  Surgeon: Brice Campuzano MD;  Location: [facility] CATH LAB;  Service: Cardiology;;    VASCULAR SURGERY      to remove clot from right leg    Yag Capsulotomy Bilateral 2019    Dr Rose     Review of patient's allergies indicates:   Allergen Reactions    Timolol maleate Shortness Of Breath    Ciprofloxacin Other (See Comments)     Muscle ache    Sulfamethoxazole-trimethoprim      Current Outpatient Medications on File Prior to Visit   Medication Sig Dispense Refill    amiodarone (PACERONE) 200 MG Tab Take 1 tablet (200 mg total) by mouth once daily. 60 tablet 3    apixaban (ELIQUIS) 2.5 mg Tab Take 1 tablet (2.5 mg total) by mouth 2 (two) times daily. 180 tablet 3    cefdinir (OMNICEF) 300 MG capsule Take 300 mg by mouth 2 (two) times daily.      cholecalciferol, vitamin D3, 125 mcg (5,000 unit) Tab Take 5,000 Units by mouth once daily.      dorzolamide (TRUSOPT) 2 % ophthalmic solution INSTILL 1 DROP INTO BOTH EYES TWICE DAILY 30 mL 1    doxycycline (VIBRA-TABS) 100 MG tablet Take 100 mg by mouth 2 (two) times daily.      famotidine (PEPCID) 40 MG tablet Take 1 tablet (40 mg total) by mouth every evening. 30 tablet 3    fluticasone (FLONASE) 50 mcg/actuation nasal spray 2 sprays (100 mcg total) by Each Nare route daily as needed for Allergies. 16 g 11    furosemide (LASIX) 40 MG tablet Take 1 tablet (40 mg total) by mouth 2 (two) times a day. Take twice a day and monitor BP and weights 90 tablet 3    Lactobacillus rhamnosus GG (CULTURELLE) 10 billion cell capsule Take 1 capsule by mouth once daily.      latanoprost 0.005 % ophthalmic solution Place 1 drop into both eyes every evening. 5 mL 6    metoprolol tartrate (LOPRESSOR) 25 MG tablet Take 1 tablet (25 mg total) by mouth 2 (two) times daily. 180 tablet 1    mupirocin (BACTROBAN) 2 % ointment SMARTSI Application Topical 2-3 Times Daily       nitroGLYCERIN 0.4 MG/HR TD PT24 (NITRODUR) 0.4 mg/hr Place 1 patch onto the skin once daily. 90 patch 3    pantoprazole (PROTONIX) 40 MG tablet Take 1 tablet (40 mg total) by mouth once daily. 30 tablet 2    potassium chloride (KLOR-CON) 10 MEQ TbSR Take 2 tablets (20 mEq total) by mouth 2 (two) times daily. Take with fluid pill 180 tablet 1    rosuvastatin (CRESTOR) 40 MG Tab TAKE 1 TABLET (40 MG TOTAL) BY MOUTH ONCE DAILY. (Patient taking differently: Take 40 mg by mouth every evening.) 90 tablet 3    [DISCONTINUED] albuterol (PROVENTIL/VENTOLIN HFA) 90 mcg/actuation inhaler Inhale 1-2 puffs into the lungs every 4 (four) hours as needed for Wheezing or Shortness of Breath. Rescue 18 g 11    [DISCONTINUED] albuterol-ipratropium (DUO-NEB) 2.5 mg-0.5 mg/3 mL nebulizer solution Take 3 mLs by nebulization every 6 (six) hours as needed for Wheezing or Shortness of Breath. 360 mL 11    [DISCONTINUED] cetirizine HCl (ZYRTEC ORAL) Take 10 mg by mouth once daily.      [DISCONTINUED] fluticasone furoate-vilanteroL (BREO ELLIPTA) 100-25 mcg/dose diskus inhaler Inhale 1 puff into the lungs once daily. 60 each 11     Current Facility-Administered Medications on File Prior to Visit   Medication Dose Route Frequency Provider Last Rate Last Admin    lactated ringers infusion   Intravenous Continuous Mika Solorzano MD   New Bag at 24 1300    sodium chloride 0.9% flush 10 mL  10 mL Intravenous PRN Mika Solorzano MD         Social History     Socioeconomic History    Marital status:    Tobacco Use    Smoking status: Former     Current packs/day: 0.00     Types: Cigarettes     Start date: 6/10/1954     Quit date: 9/15/1955     Years since quittin.6    Smokeless tobacco: Never    Tobacco comments:     I onlysmoked one year while ml my  was oversees  during [place]   Substance and Sexual Activity    Alcohol use: Not Currently     Comment: Only drank a little wine and haven't  drunk anything in 15  y    Drug use: Never    Sexual activity: Not Currently     Partners: Male     Birth control/protection: Abstinence     Comment:  and 87 years of age     Social Determinants of Health     Financial Resource Strain: Low Risk  (2023)    Overall Financial Resource Strain (CARDIA)     Difficulty of Paying Living Expenses: Not hard at all   Food Insecurity: No Food Insecurity (2023)    Hunger Vital Sign     Worried About Running Out of Food in the Last Year: Never true     Ran Out of Food in the Last Year: Never true   Transportation Needs: No Transportation Needs (2023)    PRAPARE - Transportation     Lack of Transportation (Medical): No     Lack of Transportation (Non-Medical): No   Physical Activity: Insufficiently Active (2023)    Exercise Vital Sign     Days of Exercise per Week: 1 day     Minutes of Exercise per Session: 10 min   Stress: No Stress Concern Present (2023)    [place] [place] [employer] - Occupational Stress Questionnaire     Feeling of Stress : Not at all   Social Connections: Unknown (2023)    Social Connection and Isolation Panel [NHANES]     Frequency of Communication with Friends and Family: Twice a week     Frequency of Social Gatherings with Friends and Family: Once a week     Active Member of Clubs or Organizations: Yes     Attends Club or Organization Meetings: More than 4 times per year     Marital Status:    Housing Stability: Low Risk  (2023)    Housing Stability Vital Sign     Unable to Pay for Housing in the Last Year: No     Number of Places Lived in the Last Year: 1     Unstable Housing in the Last Year: No     Family History   Problem Relation Age of Onset    Diabetes Brother         Type 2    Heart disease Brother          at 65 CHD    Diabetes Mother         Type 1    Alcohol abuse Mother         Dod 10/75    Heart disease Mother         Arteriosclerosis    Hypertension Mother     Stroke Mother         3/15/1975 dod  "10/1975    Cancer Maternal Grandfather         Dod 4/50    Clotting disorder Son         bleeding after tonsillectomy only    Heart disease Father         Heart attack. Afib, and Polyvcithemavera    Hypertension Father     Amblyopia Neg Hx     Blindness Neg Hx     Cataracts Neg Hx     Glaucoma Neg Hx     Macular degeneration Neg Hx     Retinal detachment Neg Hx     Strabismus Neg Hx     Thyroid disease Neg Hx     Colon cancer Neg Hx        Review of Systems    Objective:      /82   Pulse 71   Resp 18   Ht 5' 5" (1.651 m)   Wt 90.3 kg (199 lb 1.2 oz)   LMP  (LMP Unknown)   SpO2 96%   BMI 33.13 kg/m²   Physical Exam  Vitals and nursing note reviewed.   Constitutional:       Appearance: She is well-developed. She is obese. She is ill-appearing.   HENT:      Head: Normocephalic.      Nose: Nose normal.   Eyes:      Pupils: Pupils are equal, round, and reactive to light.   Neck:      Vascular: JVD present.   Cardiovascular:      Rate and Rhythm: Normal rate and regular rhythm.      Chest Wall: PMI is displaced.      Heart sounds: Murmur heard.      Systolic murmur is present with a grade of 2/6.   Pulmonary:      Breath sounds: Examination of the right-lower field reveals decreased breath sounds and rales. Examination of the left-lower field reveals decreased breath sounds and rales. Decreased breath sounds and rales present.   Abdominal:      General: Bowel sounds are normal.      Palpations: Abdomen is soft.   Musculoskeletal:         General: Normal range of motion.      Cervical back: Normal range of motion.      Right lower leg: Edema present.      Left lower leg: Edema present.   Skin:     General: Skin is warm.   Neurological:      Mental Status: She is alert and oriented to person, place, and time.       Personal Diagnostic Review  none pertinent        4/12/2024    10:42 AM   Pulmonary Studies Review   SpO2 96 %   Height 5' 5" (1.651 m)   Weight 90.3 kg (199 lb 1.2 oz)   BMI (Calculated) 33.1 " "  Predicted Distance 146.76   Predicted Distance Meters (Calculated) 288.37 meters       Echo    Left Ventricle: The left ventricle is normal in size. Mildly increased   ventricular mass. Mildly increased wall thickness. There is concentric   hypertrophy. Normal wall motion. There is normal systolic function with a   visually estimated ejection fraction of 60 - 65%. Grade II diastolic   dysfunction. Elevated left ventricular filling pressure.    Right Ventricle: Normal right ventricular cavity size. Wall thickness   is normal. Right ventricle wall motion  is normal. Systolic function is   normal. Pacemaker lead present in the ventricle.    Left Atrium: Left atrium is mildly dilated.    Aortic Valve: There is moderate aortic valve sclerosis. There is   moderate to severe stenosis. Aortic valve area by VTI is 0.96 cm². Aortic   valve peak velocity is 3.46 m/s. Mean gradient is 30 mmHg. The   dimensionless index is 0.30.    Mitral Valve: There is mild regurgitation.    Tricuspid Valve: There is moderate regurgitation.    Pulmonary Artery: The estimated pulmonary artery systolic pressure is   33 mmHg.    IVC/SVC: Normal venous pressure at 3 mmHg.      Office Spirometry Results:         4/12/2024    10:42 AM 3/21/2024     2:05 PM 3/21/2024     1:53 PM 3/21/2024     1:40 PM 3/21/2024    12:55 PM 3/14/2024     2:26 PM 3/14/2024     1:35 PM   Pulmonary Function Tests   SpO2 96 % 95 % 94 % 95 % 97 %  96 %   Height 5' 5" (1.651 m)    5' 5" (1.651 m)  5' 5" (1.651 m)   Weight 90.3 kg (199 lb 1.2 oz)    88.5 kg (195 lb 1.7 oz) 89.9 kg (198 lb 3.1 oz) 89.9 kg (198 lb 3.1 oz)   BMI (Calculated) 33.1    32.5 33 33         4/12/2024    10:42 AM   Pulmonary Studies Review   SpO2 96 %   Height 5' 5" (1.651 m)   Weight 90.3 kg (199 lb 1.2 oz)   BMI (Calculated) 33.1   Predicted Distance 146.76   Predicted Distance Meters (Calculated) 288.37 meters           No results found for this or any previous visit (from the past 336 " hour(s)).    Assessment:            Chronic obstructive pulmonary disease, unspecified COPD type  -     albuterol (PROVENTIL/VENTOLIN HFA) 90 mcg/actuation inhaler; Inhale 1-2 puffs into the lungs every 4 (four) hours as needed for Wheezing or Shortness of Breath. Rescue  Dispense: 18 g; Refill: 11  -     albuterol-ipratropium (DUO-NEB) 2.5 mg-0.5 mg/3 mL nebulizer solution; Take 3 mLs by nebulization every 6 (six) hours as needed for Wheezing or Shortness of Breath.  Dispense: 360 mL; Refill: 11  -     fluticasone furoate-vilanteroL (BREO ELLIPTA) 100-25 mcg/dose diskus inhaler; Inhale 1 puff into the lungs once daily.  Dispense: 60 each; Refill: 11  -     Spirometry with/without bronchodilator; Future; Expected date: 10/13/2024  -     Six Minute Walk Test to qualify for Home Oxygen; Future  -     X-Ray Chest 4 Or More View; Future; Expected date: 04/12/2024    Simple chronic bronchitis  -     albuterol (PROVENTIL/VENTOLIN HFA) 90 mcg/actuation inhaler; Inhale 1-2 puffs into the lungs every 4 (four) hours as needed for Wheezing or Shortness of Breath. Rescue  Dispense: 18 g; Refill: 11  -     albuterol-ipratropium (DUO-NEB) 2.5 mg-0.5 mg/3 mL nebulizer solution; Take 3 mLs by nebulization every 6 (six) hours as needed for Wheezing or Shortness of Breath.  Dispense: 360 mL; Refill: 11  -     fluticasone furoate-vilanteroL (BREO ELLIPTA) 100-25 mcg/dose diskus inhaler; Inhale 1 puff into the lungs once daily.  Dispense: 60 each; Refill: 11    Pulmonary nodules    Seasonal allergic rhinitis due to other allergic trigger  -     loratadine (CLARITIN) 10 mg tablet; Take 1 tablet (10 mg total) by mouth once daily.  Dispense: 30 tablet; Refill: 11    JA on CPAP  -     CPAP/BIPAP SUPPLIES    Exercise hypoxemia  -     Six Minute Walk Test to qualify for Home Oxygen; Future          Outpatient Encounter Medications as of 4/12/2024   Medication Sig Dispense Refill    amiodarone (PACERONE) 200 MG Tab Take 1 tablet (200 mg  total) by mouth once daily. 60 tablet 3    apixaban (ELIQUIS) 2.5 mg Tab Take 1 tablet (2.5 mg total) by mouth 2 (two) times daily. 180 tablet 3    cefdinir (OMNICEF) 300 MG capsule Take 300 mg by mouth 2 (two) times daily.      cholecalciferol, vitamin D3, 125 mcg (5,000 unit) Tab Take 5,000 Units by mouth once daily.      dorzolamide (TRUSOPT) 2 % ophthalmic solution INSTILL 1 DROP INTO BOTH EYES TWICE DAILY 30 mL 1    doxycycline (VIBRA-TABS) 100 MG tablet Take 100 mg by mouth 2 (two) times daily.      famotidine (PEPCID) 40 MG tablet Take 1 tablet (40 mg total) by mouth every evening. 30 tablet 3    fluticasone (FLONASE) 50 mcg/actuation nasal spray 2 sprays (100 mcg total) by Each Nare route daily as needed for Allergies. 16 g 11    furosemide (LASIX) 40 MG tablet Take 1 tablet (40 mg total) by mouth 2 (two) times a day. Take twice a day and monitor BP and weights 90 tablet 3    Lactobacillus rhamnosus GG (CULTURELLE) 10 billion cell capsule Take 1 capsule by mouth once daily.      latanoprost 0.005 % ophthalmic solution Place 1 drop into both eyes every evening. 5 mL 6    metoprolol tartrate (LOPRESSOR) 25 MG tablet Take 1 tablet (25 mg total) by mouth 2 (two) times daily. 180 tablet 1    mupirocin (BACTROBAN) 2 % ointment SMARTSI Application Topical 2-3 Times Daily      nitroGLYCERIN 0.4 MG/HR TD PT24 (NITRODUR) 0.4 mg/hr Place 1 patch onto the skin once daily. 90 patch 3    pantoprazole (PROTONIX) 40 MG tablet Take 1 tablet (40 mg total) by mouth once daily. 30 tablet 2    potassium chloride (KLOR-CON) 10 MEQ TbSR Take 2 tablets (20 mEq total) by mouth 2 (two) times daily. Take with fluid pill 180 tablet 1    rosuvastatin (CRESTOR) 40 MG Tab TAKE 1 TABLET (40 MG TOTAL) BY MOUTH ONCE DAILY. (Patient taking differently: Take 40 mg by mouth every evening.) 90 tablet 3    [DISCONTINUED] albuterol (PROVENTIL/VENTOLIN HFA) 90 mcg/actuation inhaler Inhale 1-2 puffs into the lungs every 4 (four) hours as needed  for Wheezing or Shortness of Breath. Rescue 18 g 11    [DISCONTINUED] albuterol-ipratropium (DUO-NEB) 2.5 mg-0.5 mg/3 mL nebulizer solution Take 3 mLs by nebulization every 6 (six) hours as needed for Wheezing or Shortness of Breath. 360 mL 11    [DISCONTINUED] cetirizine HCl (ZYRTEC ORAL) Take 10 mg by mouth once daily.      [DISCONTINUED] fluticasone furoate-vilanteroL (BREO ELLIPTA) 100-25 mcg/dose diskus inhaler Inhale 1 puff into the lungs once daily. 60 each 11    albuterol (PROVENTIL/VENTOLIN HFA) 90 mcg/actuation inhaler Inhale 1-2 puffs into the lungs every 4 (four) hours as needed for Wheezing or Shortness of Breath. Rescue 18 g 11    albuterol-ipratropium (DUO-NEB) 2.5 mg-0.5 mg/3 mL nebulizer solution Take 3 mLs by nebulization every 6 (six) hours as needed for Wheezing or Shortness of Breath. 360 mL 11    fluticasone furoate-vilanteroL (BREO ELLIPTA) 100-25 mcg/dose diskus inhaler Inhale 1 puff into the lungs once daily. 60 each 11    loratadine (CLARITIN) 10 mg tablet Take 1 tablet (10 mg total) by mouth once daily. 30 tablet 11    [DISCONTINUED] metoprolol tartrate (LOPRESSOR) 25 MG tablet Take 1 tablet (25 mg total) by mouth 2 (two) times daily. 180 tablet 1     Facility-Administered Encounter Medications as of 4/12/2024   Medication Dose Route Frequency Provider Last Rate Last Admin    lactated ringers infusion   Intravenous Continuous Mika Solorzano MD   New Bag at 03/21/24 1300    sodium chloride 0.9% flush 10 mL  10 mL Intravenous PRN Mika Solorzano MD         Plan:       Requested Prescriptions     Signed Prescriptions Disp Refills    loratadine (CLARITIN) 10 mg tablet 30 tablet 11     Sig: Take 1 tablet (10 mg total) by mouth once daily.    albuterol (PROVENTIL/VENTOLIN HFA) 90 mcg/actuation inhaler 18 g 11     Sig: Inhale 1-2 puffs into the lungs every 4 (four) hours as needed for Wheezing or Shortness of Breath. Rescue    albuterol-ipratropium (DUO-NEB) 2.5 mg-0.5 mg/3 mL  nebulizer solution 360 mL 11     Sig: Take 3 mLs by nebulization every 6 (six) hours as needed for Wheezing or Shortness of Breath.    fluticasone furoate-vilanteroL (BREO ELLIPTA) 100-25 mcg/dose diskus inhaler 60 each 11     Sig: Inhale 1 puff into the lungs once daily.     Problem List Items Addressed This Visit       COPD (chronic obstructive pulmonary disease) - Primary    Relevant Medications    albuterol (PROVENTIL/VENTOLIN HFA) 90 mcg/actuation inhaler    albuterol-ipratropium (DUO-NEB) 2.5 mg-0.5 mg/3 mL nebulizer solution    fluticasone furoate-vilanteroL (BREO ELLIPTA) 100-25 mcg/dose diskus inhaler    Other Relevant Orders    Spirometry with/without bronchodilator    Six Minute Walk Test to qualify for Home Oxygen    X-Ray Chest 4 Or More View    Pulmonary nodules     Other Visit Diagnoses       Seasonal allergic rhinitis due to other allergic trigger        Relevant Medications    loratadine (CLARITIN) 10 mg tablet    JA on CPAP        Relevant Orders    CPAP/BIPAP SUPPLIES    Exercise hypoxemia        Relevant Orders    Six Minute Walk Test to qualify for Home Oxygen               Follow up in about 6 months (around 10/12/2024) for hira - on return, 6 min walk - on return, Review CXR - perform today.    MEDICAL DECISION MAKING: Moderate to high complexity.  Overall, the multiple problems listed are of moderate to high severity that may impact quality of life and activities of daily living. Side effects of medications, treatment plan as well as options and alternatives reviewed and discussed with patient. There was counseling of patient concerning these issues.    Total time spent in counseling and coordination of care - 35  minutes of total time spent on the encounter, which includes face to face time and non-face to face time preparing to see the patient (eg, review of tests), Obtaining and/or reviewing separately obtained history, Documenting clinical information in the electronic or other health  record, Independently interpreting results (not separately reported) and communicating results to the patient/family/caregiver, or Care coordination (not separately reported).    Time was used in discussion of prognosis, risks, benefits of treatment, instructions and compliance with regimen . Discussion with other physicians and/or health care providers - home health or for use of durable medical equipment (oxygen, nebulizers, CPAP, BiPAP) occurred.

## 2024-04-23 ENCOUNTER — LAB VISIT (OUTPATIENT)
Dept: LAB | Facility: HOSPITAL | Age: 89
End: 2024-04-23
Attending: INTERNAL MEDICINE
Payer: MEDICARE

## 2024-04-23 DIAGNOSIS — D50.9 IRON DEFICIENCY ANEMIA, UNSPECIFIED IRON DEFICIENCY ANEMIA TYPE: ICD-10-CM

## 2024-04-23 LAB
ALBUMIN SERPL BCP-MCNC: 3.6 G/DL (ref 3.5–5.2)
ALP SERPL-CCNC: 94 U/L (ref 55–135)
ALT SERPL W/O P-5'-P-CCNC: 17 U/L (ref 10–44)
ANION GAP SERPL CALC-SCNC: 9 MMOL/L (ref 8–16)
AST SERPL-CCNC: 19 U/L (ref 10–40)
BASOPHILS # BLD AUTO: 0.03 K/UL (ref 0–0.2)
BASOPHILS NFR BLD: 0.5 % (ref 0–1.9)
BILIRUB SERPL-MCNC: 0.3 MG/DL (ref 0.1–1)
BUN SERPL-MCNC: 26 MG/DL (ref 8–23)
CALCIUM SERPL-MCNC: 9.7 MG/DL (ref 8.7–10.5)
CHLORIDE SERPL-SCNC: 109 MMOL/L (ref 95–110)
CO2 SERPL-SCNC: 23 MMOL/L (ref 23–29)
CREAT SERPL-MCNC: 1.5 MG/DL (ref 0.5–1.4)
DIFFERENTIAL METHOD BLD: ABNORMAL
EOSINOPHIL # BLD AUTO: 0.2 K/UL (ref 0–0.5)
EOSINOPHIL NFR BLD: 2.7 % (ref 0–8)
ERYTHROCYTE [DISTWIDTH] IN BLOOD BY AUTOMATED COUNT: 19.1 % (ref 11.5–14.5)
EST. GFR  (NO RACE VARIABLE): 32.9 ML/MIN/1.73 M^2
FERRITIN SERPL-MCNC: 26 NG/ML (ref 20–300)
GLUCOSE SERPL-MCNC: 103 MG/DL (ref 70–110)
HCT VFR BLD AUTO: 37.7 % (ref 37–48.5)
HGB BLD-MCNC: 11.8 G/DL (ref 12–16)
IMM GRANULOCYTES # BLD AUTO: 0 K/UL (ref 0–0.04)
IMM GRANULOCYTES NFR BLD AUTO: 0 % (ref 0–0.5)
IRON SERPL-MCNC: 35 UG/DL (ref 30–160)
LYMPHOCYTES # BLD AUTO: 1.9 K/UL (ref 1–4.8)
LYMPHOCYTES NFR BLD: 32.1 % (ref 18–48)
MCH RBC QN AUTO: 25.2 PG (ref 27–31)
MCHC RBC AUTO-ENTMCNC: 31.3 G/DL (ref 32–36)
MCV RBC AUTO: 81 FL (ref 82–98)
MONOCYTES # BLD AUTO: 0.7 K/UL (ref 0.3–1)
MONOCYTES NFR BLD: 10.9 % (ref 4–15)
NEUTROPHILS # BLD AUTO: 3.2 K/UL (ref 1.8–7.7)
NEUTROPHILS NFR BLD: 53.8 % (ref 38–73)
NRBC BLD-RTO: 0 /100 WBC
PLATELET # BLD AUTO: 309 K/UL (ref 150–450)
PMV BLD AUTO: 10.6 FL (ref 9.2–12.9)
POTASSIUM SERPL-SCNC: 3.7 MMOL/L (ref 3.5–5.1)
PROT SERPL-MCNC: 6.8 G/DL (ref 6–8.4)
RBC # BLD AUTO: 4.68 M/UL (ref 4–5.4)
SATURATED IRON: 8 % (ref 20–50)
SODIUM SERPL-SCNC: 141 MMOL/L (ref 136–145)
TOTAL IRON BINDING CAPACITY: 454 UG/DL (ref 250–450)
TRANSFERRIN SERPL-MCNC: 307 MG/DL (ref 200–375)
WBC # BLD AUTO: 5.95 K/UL (ref 3.9–12.7)

## 2024-04-23 PROCEDURE — 83540 ASSAY OF IRON: CPT | Performed by: INTERNAL MEDICINE

## 2024-04-23 PROCEDURE — 85025 COMPLETE CBC W/AUTO DIFF WBC: CPT | Mod: PO | Performed by: INTERNAL MEDICINE

## 2024-04-23 PROCEDURE — 80053 COMPREHEN METABOLIC PANEL: CPT | Performed by: INTERNAL MEDICINE

## 2024-04-23 PROCEDURE — 36415 COLL VENOUS BLD VENIPUNCTURE: CPT | Mod: PO | Performed by: INTERNAL MEDICINE

## 2024-04-23 PROCEDURE — 82728 ASSAY OF FERRITIN: CPT | Performed by: INTERNAL MEDICINE

## 2024-04-25 ENCOUNTER — OFFICE VISIT (OUTPATIENT)
Dept: HEMATOLOGY/ONCOLOGY | Facility: CLINIC | Age: 89
End: 2024-04-25
Payer: MEDICARE

## 2024-04-25 VITALS
TEMPERATURE: 98 F | HEART RATE: 96 BPM | HEIGHT: 65 IN | OXYGEN SATURATION: 96 % | DIASTOLIC BLOOD PRESSURE: 84 MMHG | SYSTOLIC BLOOD PRESSURE: 122 MMHG | BODY MASS INDEX: 33.21 KG/M2 | WEIGHT: 199.31 LBS

## 2024-04-25 DIAGNOSIS — D41.4 BLADDER NEOPLASM OF UNCERTAIN MALIGNANT POTENTIAL: ICD-10-CM

## 2024-04-25 DIAGNOSIS — D50.9 IRON DEFICIENCY ANEMIA, UNSPECIFIED IRON DEFICIENCY ANEMIA TYPE: Primary | ICD-10-CM

## 2024-04-25 DIAGNOSIS — R79.89 ELEVATED SERUM CREATININE: ICD-10-CM

## 2024-04-25 PROCEDURE — 99214 OFFICE O/P EST MOD 30 MIN: CPT | Mod: S$GLB,,, | Performed by: NURSE PRACTITIONER

## 2024-04-25 PROCEDURE — 1159F MED LIST DOCD IN RCRD: CPT | Mod: CPTII,S$GLB,, | Performed by: NURSE PRACTITIONER

## 2024-04-25 PROCEDURE — 1126F AMNT PAIN NOTED NONE PRSNT: CPT | Mod: CPTII,S$GLB,, | Performed by: NURSE PRACTITIONER

## 2024-04-25 PROCEDURE — 1160F RVW MEDS BY RX/DR IN RCRD: CPT | Mod: CPTII,S$GLB,, | Performed by: NURSE PRACTITIONER

## 2024-04-25 PROCEDURE — 99999 PR PBB SHADOW E&M-EST. PATIENT-LVL IV: CPT | Mod: PBBFAC,,, | Performed by: NURSE PRACTITIONER

## 2024-04-25 PROCEDURE — 3288F FALL RISK ASSESSMENT DOCD: CPT | Mod: CPTII,S$GLB,, | Performed by: NURSE PRACTITIONER

## 2024-04-25 PROCEDURE — 1101F PT FALLS ASSESS-DOCD LE1/YR: CPT | Mod: CPTII,S$GLB,, | Performed by: NURSE PRACTITIONER

## 2024-04-25 PROCEDURE — 1157F ADVNC CARE PLAN IN RCRD: CPT | Mod: CPTII,S$GLB,, | Performed by: NURSE PRACTITIONER

## 2024-04-25 RX ORDER — HEPARIN 100 UNIT/ML
500 SYRINGE INTRAVENOUS
OUTPATIENT
Start: 2024-06-14

## 2024-04-25 RX ORDER — EPINEPHRINE 0.3 MG/.3ML
0.3 INJECTION SUBCUTANEOUS ONCE AS NEEDED
Status: CANCELLED | OUTPATIENT
Start: 2024-05-24

## 2024-04-25 RX ORDER — DIPHENHYDRAMINE HYDROCHLORIDE 50 MG/ML
50 INJECTION INTRAMUSCULAR; INTRAVENOUS ONCE AS NEEDED
OUTPATIENT
Start: 2024-06-14

## 2024-04-25 RX ORDER — SODIUM CHLORIDE 0.9 % (FLUSH) 0.9 %
10 SYRINGE (ML) INJECTION
Status: CANCELLED | OUTPATIENT
Start: 2024-05-24

## 2024-04-25 RX ORDER — EPINEPHRINE 0.3 MG/.3ML
0.3 INJECTION SUBCUTANEOUS ONCE AS NEEDED
OUTPATIENT
Start: 2024-06-07

## 2024-04-25 RX ORDER — EPINEPHRINE 0.3 MG/.3ML
0.3 INJECTION SUBCUTANEOUS ONCE AS NEEDED
Status: CANCELLED | OUTPATIENT
Start: 2024-05-31

## 2024-04-25 RX ORDER — DIPHENHYDRAMINE HYDROCHLORIDE 50 MG/ML
50 INJECTION INTRAMUSCULAR; INTRAVENOUS ONCE AS NEEDED
OUTPATIENT
Start: 2024-06-07

## 2024-04-25 RX ORDER — HEPARIN 100 UNIT/ML
500 SYRINGE INTRAVENOUS
Status: CANCELLED | OUTPATIENT
Start: 2024-04-25

## 2024-04-25 RX ORDER — DIPHENHYDRAMINE HYDROCHLORIDE 50 MG/ML
50 INJECTION INTRAMUSCULAR; INTRAVENOUS ONCE AS NEEDED
Status: CANCELLED | OUTPATIENT
Start: 2024-05-10

## 2024-04-25 RX ORDER — DIPHENHYDRAMINE HYDROCHLORIDE 50 MG/ML
50 INJECTION INTRAMUSCULAR; INTRAVENOUS ONCE AS NEEDED
Status: CANCELLED | OUTPATIENT
Start: 2024-05-24

## 2024-04-25 RX ORDER — SODIUM CHLORIDE 0.9 % (FLUSH) 0.9 %
10 SYRINGE (ML) INJECTION
OUTPATIENT
Start: 2024-06-07

## 2024-04-25 RX ORDER — SODIUM CHLORIDE 0.9 % (FLUSH) 0.9 %
10 SYRINGE (ML) INJECTION
Status: CANCELLED | OUTPATIENT
Start: 2024-04-25

## 2024-04-25 RX ORDER — EPINEPHRINE 0.3 MG/.3ML
0.3 INJECTION SUBCUTANEOUS ONCE AS NEEDED
OUTPATIENT
Start: 2024-06-21

## 2024-04-25 RX ORDER — HEPARIN 100 UNIT/ML
500 SYRINGE INTRAVENOUS
Status: CANCELLED | OUTPATIENT
Start: 2024-05-24

## 2024-04-25 RX ORDER — DIPHENHYDRAMINE HYDROCHLORIDE 50 MG/ML
50 INJECTION INTRAMUSCULAR; INTRAVENOUS ONCE AS NEEDED
Status: CANCELLED | OUTPATIENT
Start: 2024-05-17

## 2024-04-25 RX ORDER — SODIUM CHLORIDE 0.9 % (FLUSH) 0.9 %
10 SYRINGE (ML) INJECTION
Status: CANCELLED | OUTPATIENT
Start: 2024-05-17

## 2024-04-25 RX ORDER — EPINEPHRINE 0.3 MG/.3ML
0.3 INJECTION SUBCUTANEOUS ONCE AS NEEDED
Status: CANCELLED | OUTPATIENT
Start: 2024-05-17

## 2024-04-25 RX ORDER — HEPARIN 100 UNIT/ML
500 SYRINGE INTRAVENOUS
Status: CANCELLED | OUTPATIENT
Start: 2024-05-10

## 2024-04-25 RX ORDER — EPINEPHRINE 0.3 MG/.3ML
0.3 INJECTION SUBCUTANEOUS ONCE AS NEEDED
OUTPATIENT
Start: 2024-06-14

## 2024-04-25 RX ORDER — SODIUM CHLORIDE 0.9 % (FLUSH) 0.9 %
10 SYRINGE (ML) INJECTION
OUTPATIENT
Start: 2024-06-21

## 2024-04-25 RX ORDER — SODIUM CHLORIDE 0.9 % (FLUSH) 0.9 %
10 SYRINGE (ML) INJECTION
Status: CANCELLED | OUTPATIENT
Start: 2024-05-10

## 2024-04-25 RX ORDER — DIPHENHYDRAMINE HYDROCHLORIDE 50 MG/ML
50 INJECTION INTRAMUSCULAR; INTRAVENOUS ONCE AS NEEDED
Status: CANCELLED | OUTPATIENT
Start: 2024-05-31

## 2024-04-25 RX ORDER — DIPHENHYDRAMINE HYDROCHLORIDE 50 MG/ML
50 INJECTION INTRAMUSCULAR; INTRAVENOUS ONCE AS NEEDED
Status: CANCELLED | OUTPATIENT
Start: 2024-04-25

## 2024-04-25 RX ORDER — DIPHENHYDRAMINE HYDROCHLORIDE 50 MG/ML
50 INJECTION INTRAMUSCULAR; INTRAVENOUS ONCE AS NEEDED
OUTPATIENT
Start: 2024-06-21

## 2024-04-25 RX ORDER — HEPARIN 100 UNIT/ML
500 SYRINGE INTRAVENOUS
Status: CANCELLED | OUTPATIENT
Start: 2024-05-17

## 2024-04-25 RX ORDER — EPINEPHRINE 0.3 MG/.3ML
0.3 INJECTION SUBCUTANEOUS ONCE AS NEEDED
Status: CANCELLED | OUTPATIENT
Start: 2024-05-10

## 2024-04-25 RX ORDER — HEPARIN 100 UNIT/ML
500 SYRINGE INTRAVENOUS
OUTPATIENT
Start: 2024-06-21

## 2024-04-25 RX ORDER — HEPARIN 100 UNIT/ML
500 SYRINGE INTRAVENOUS
Status: CANCELLED | OUTPATIENT
Start: 2024-05-31

## 2024-04-25 RX ORDER — SODIUM CHLORIDE 0.9 % (FLUSH) 0.9 %
10 SYRINGE (ML) INJECTION
Status: CANCELLED | OUTPATIENT
Start: 2024-05-31

## 2024-04-25 RX ORDER — HEPARIN 100 UNIT/ML
500 SYRINGE INTRAVENOUS
OUTPATIENT
Start: 2024-06-07

## 2024-04-25 RX ORDER — EPINEPHRINE 0.3 MG/.3ML
0.3 INJECTION SUBCUTANEOUS ONCE AS NEEDED
Status: CANCELLED | OUTPATIENT
Start: 2024-04-25

## 2024-04-25 RX ORDER — SODIUM CHLORIDE 0.9 % (FLUSH) 0.9 %
10 SYRINGE (ML) INJECTION
OUTPATIENT
Start: 2024-06-14

## 2024-04-25 NOTE — PROGRESS NOTES
Subjective:       Patient ID: Holli Landrum is a 89 y.o. female.    Chief Complaint: ARLETTE. Fatigue      HPI: 89 y.o female with medical history as stated below presenting today for follow up of her iron deficiency anemia. She notes increasing fatigue. Denies noting abnormal bleeding. EGD 3/2024 unrevealing. Colonoscopy 2022 unrevealing. No prior VCE.      Social History     Socioeconomic History    Marital status:    Tobacco Use    Smoking status: Former     Current packs/day: 0.00     Types: Cigarettes     Start date: 6/10/1954     Quit date: 9/15/1955     Years since quittin.6    Smokeless tobacco: Never    Tobacco comments:     I onlysmoked one year while [place] my  was oversees  during [place]   Substance and Sexual Activity    Alcohol use: Not Currently     Comment: Only drank a little wine and haven't  drunk anything in 15 y    Drug use: Never    Sexual activity: Not Currently     Partners: Male     Birth control/protection: Abstinence     Comment:  and 87 years of age     Social Determinants of Health     Financial Resource Strain: Low Risk  (2023)    Overall Financial Resource Strain (CARDIA)     Difficulty of Paying Living Expenses: Not hard at all   Food Insecurity: No Food Insecurity (2023)    Hunger Vital Sign     Worried About Running Out of Food in the Last Year: Never true     Ran Out of Food in the Last Year: Never true   Transportation Needs: No Transportation Needs (2023)    PRAPARE - Transportation     Lack of Transportation (Medical): No     Lack of Transportation (Non-Medical): No   Physical Activity: Insufficiently Active (2023)    Exercise Vital Sign     Days of Exercise per Week: 1 day     Minutes of Exercise per Session: 10 min   Stress: No Stress Concern Present (2023)    [employer] - Occupational Stress Questionnaire     Feeling of Stress : Not at all   Social Connections: Unknown (2023)    Social  Connection and Isolation Panel [NHANES]     Frequency of Communication with Friends and Family: Twice a week     Frequency of Social Gatherings with Friends and Family: Once a week     Active Member of Clubs or Organizations: Yes     Attends Club or Organization Meetings: More than 4 times per year     Marital Status:    Housing Stability: Low Risk  (2023)    Housing Stability Vital Sign     Unable to Pay for Housing in the Last Year: No     Number of Places Lived in the Last Year: 1     Unstable Housing in the Last Year: No       Past Medical History:   Diagnosis Date    Anticoagulant long-term use     Arthritis     Asthma     Basal cell carcinoma     Cancer     skin cancer to face    Cataract     OU done//    CHF (congestive heart failure)     COPD (chronic obstructive pulmonary disease)     no oxygen; patient denies    cpap     Essential hypertension 2010    GERD (gastroesophageal reflux disease) 2012    Glaucoma     Hypertensive heart disease with heart failure 2013    Paroxysmal atrial fibrillation     Paroxysmal ventricular tachycardia     per problem list    Preop cardiovascular exam 2024    Renal disorder     french-1/3/2020    Squamous cell carcinoma of skin        Family History   Problem Relation Name Age of Onset    Diabetes Brother Wili         Type 2    Heart disease Brother Wili          at 65 CHD    Diabetes Mother Holli S         Type 1    Alcohol abuse Mother Holli S         Dod 10/75    Heart disease Mother Holli S         Arteriosclerosis    Hypertension Mother Holli S     Stroke Mother Holli S         3/15/1975 dod 10/1975    Cancer Maternal Grandfather [place]         Dod     Clotting disorder Son          bleeding after tonsillectomy only    Heart disease Father Ronaldo Sr.         Heart attack. Afib, and Polyvcithemavera    Hypertension Father Ronaldo Sr.     Amblyopia Neg Hx      Blindness Neg Hx      Cataracts Neg Hx      Glaucoma Neg Hx      Macular  degeneration Neg Hx      Retinal detachment Neg Hx      Strabismus Neg Hx      Thyroid disease Neg Hx      Colon cancer Neg Hx         Past Surgical History:   Procedure Laterality Date    A-V CARDIAC PACEMAKER INSERTION  06/16/2021    Procedure: INSERTION, CARDIAC PACEMAKER, DUAL CHAMBER;  Surgeon: Oscar Sommers MD;  Location: [facility];  Service: Cardiovascular;;    ADENOIDECTOMY  191944    APPENDECTOMY  1948    Because of Ovarian Cyst surgery    BREAST BIOPSY Left 1995    neg    BREAST BIOPSY Right 1984    neg    BREAST BIOPSY Right 1992    neg    BREAST SURGERY      A number of biopsies    CARDIAC CATHETERIZATION  2013, 2014,2016, 2020    has 9 stents    CARDIAC SURGERY  2012    stents    CATARACT EXTRACTION W/  INTRAOCULAR LENS IMPLANT Left 11/01/2018    Dr Rose    CATARACT EXTRACTION W/  INTRAOCULAR LENS IMPLANT Right 12/13/2018    Dr Rose//    CHOLECYSTECTOMY      COLONOSCOPY  ~2005    Dr. Edward; normal per patient report    COLONOSCOPY N/A 09/06/2022    Procedure: COLONOSCOPY 8/31/22-note in Dr Simms's office visit note that he spoke to her cardiologist and she was viable candidate for endoscopy;  Surgeon: Cordelia Bueno MD;  Location: [facility];  Service: Endoscopy;  Laterality: N/A;    CORONARY ANGIOGRAPHY N/A 03/20/2020    Procedure: ANGIOGRAM, CORONARY ARTERY;  Surgeon: Chuy Bryant MD;  Location: [facility];  Service: Cardiology;  Laterality: N/A;    CYSTOSCOPY W/ RETROGRADES Bilateral 02/12/2020    Procedure: CYSTOSCOPY, WITH RETROGRADE PYELOGRAM;  Surgeon: Gasper Feliz MD;  Location: [facility] OR;  Service: Urology;  Laterality: Bilateral;    ESOPHAGOGASTRODUODENOSCOPY N/A 08/13/2020    Procedure: EGD (ESOPHAGOGASTRODUODENOSCOPY);  Surgeon: Mika Solorzano MD;  Location: [facility];  Service: Endoscopy;  Laterality: N/A; Mild Schatzki ring. Biopsied. Dilated. small hiatal hernia, gastritis; biopsy: esophagus- SEVERE REFLUX ESOPHAGITIS, stomach- chronic gastritis, negative for H  pylori    ESOPHAGOGASTRODUODENOSCOPY N/A 10/22/2020    Procedure: EGD (ESOPHAGOGASTRODUODENOSCOPY);  Surgeon: Fred Hendricks MD;  Location: [facility] ENDO;  Service: Endoscopy;  Laterality: N/A;    ESOPHAGOGASTRODUODENOSCOPY N/A 3/21/2024    Procedure: EGD (ESOPHAGOGASTRODUODENOSCOPY);  Surgeon: Mika Solorzano MD;  Location: [facility] ENDO;  Service: Gastroenterology;  Laterality: N/A;    EYE SURGERY  2017    Cataracs    FRACTIONAL FLOW RESERVE (FFR), CORONARY  6/20/2023    Procedure: Fractional Flow [place] (FFR), Coronary;  Surgeon: Brice Campuzano MD;  Location: [facility] CATH LAB;  Service: Cardiology;;    HYSTERECTOMY  1969    INSTANTANEOUS WAVE-FREE RATIO (IFR) N/A 6/20/2023    Procedure: Instantaneous Wave-Free Ratio (IFR);  Surgeon: Brice Campuzano MD;  Location: [facility] CATH LAB;  Service: Cardiology;  Laterality: N/A;    LEFT HEART CATHETERIZATION Left 03/03/2020    Procedure: Left heart cath;  Surgeon: Chuy Bryant MD;  Location: [facility] CATH;  Service: Cardiology;  Laterality: Left;    LEFT HEART CATHETERIZATION Left 03/20/2020    Procedure: Left heart cath;  Surgeon: Chuy Bryant MD;  Location: [facility] CATH;  Service: Cardiology;  Laterality: Left;    LEFT HEART CATHETERIZATION N/A 6/20/2023    Procedure: Left heart cath;  Surgeon: Brice Campuzano MD;  Location: [facility] CATH LAB;  Service: Cardiology;  Laterality: N/A;    OVARIAN CYST REMOVAL  teenager    PHACOEMULSIFICATION OF CATARACT Left 11/01/2018    Procedure: PHACOEMULSIFICATION, CATARACT;  Surgeon: Aleksandar Rose Jr., MD;  Location: [facility] OR;  Service: Ophthalmology;  Laterality: Left;    PHACOEMULSIFICATION OF CATARACT Right 12/13/2018    Procedure: PHACOEMULSIFICATION, CATARACT;  Surgeon: Aleksandar Rose Jr., MD;  Location: [facility] OR;  Service: Ophthalmology;  Laterality: Right;    TONSILLECTOMY      aw/denoids    TRANSESOPHAGEAL ECHOCARDIOGRAM WITH POSSIBLE CARDIOVERSION (JÚNIOR W/ POSS CARDIOVERSION) N/A 10/5/2023    Procedure: Transesophageal echo  (ALFRED) intra-procedure log documentation/alfred/cv;  Surgeon: Bao Miguel MD;  Location: [facility] CATH LAB;  Service: Cardiology;  Laterality: N/A;   Alfred/Cv  MRI safe   Pacer & leads implanted 6/16/21, Antoun   Bio Edora 8 MICHELLE, [id number], PID: [id number]   A lead: Bio Solia S45, [id number]   V lead: Bio Solia S53, [id number]    TREATMENT OF CARDIAC ARRHYTHMIA N/A 10/5/2023    Procedure: Cardioversion or Defibrillation;  Surgeon: Bao Miguel MD;  Location: [facility] CATH LAB;  Service: Cardiology;  Laterality: N/A;    UPPER GASTROINTESTINAL ENDOSCOPY  ~2005    Dr. Solorzano    VALVE STUDY-AORTIC  6/20/2023    Procedure: Valve study-aortic;  Surgeon: Brice Campuzano MD;  Location: [facility] CATH LAB;  Service: Cardiology;;    VASCULAR SURGERY      to remove clot from right leg    Yag Capsulotomy Bilateral 11/05/2019    Dr Rose       Review of Systems   Constitutional:  Positive for fatigue. Negative for appetite change, chills, fever and unexpected weight change.   HENT:  Negative for congestion, mouth sores, nosebleeds, sore throat, trouble swallowing and voice change.    Respiratory:  Negative for cough, chest tightness, shortness of breath and wheezing.    Cardiovascular:  Negative for chest pain and leg swelling.   Gastrointestinal:  Negative for abdominal distention, abdominal pain, blood in stool, constipation, diarrhea, nausea and vomiting.   Genitourinary:  Negative for difficulty urinating, dysuria and hematuria.   Musculoskeletal:  Negative for arthralgias, back pain and myalgias.   Skin:  Negative for pallor, rash and wound.   Neurological:  Negative for dizziness, syncope, weakness and headaches.   Hematological:  Negative for adenopathy. Does not bruise/bleed easily.   Psychiatric/Behavioral:  The patient is nervous/anxious.          Medication List with Changes/Refills   Current Medications    ALBUTEROL (PROVENTIL/VENTOLIN HFA) 90 MCG/ACTUATION INHALER    Inhale 1-2 puffs into the lungs every 4 (four)  hours as needed for Wheezing or Shortness of Breath. Rescue    ALBUTEROL-IPRATROPIUM (DUO-NEB) 2.5 MG-0.5 MG/3 ML NEBULIZER SOLUTION    Take 3 mLs by nebulization every 6 (six) hours as needed for Wheezing or Shortness of Breath.    AMIODARONE (PACERONE) 200 MG TAB    Take 1 tablet (200 mg total) by mouth once daily.    APIXABAN (ELIQUIS) 2.5 MG TAB    Take 1 tablet (2.5 mg total) by mouth 2 (two) times daily.    CEFDINIR (OMNICEF) 300 MG CAPSULE    Take 300 mg by mouth 2 (two) times daily.    CHOLECALCIFEROL, VITAMIN D3, 125 MCG (5,000 UNIT) TAB    Take 5,000 Units by mouth once daily.    DORZOLAMIDE (TRUSOPT) 2 % OPHTHALMIC SOLUTION    INSTILL 1 DROP INTO BOTH EYES TWICE DAILY    DOXYCYCLINE (VIBRA-TABS) 100 MG TABLET    Take 100 mg by mouth 2 (two) times daily.    FAMOTIDINE (PEPCID) 40 MG TABLET    Take 1 tablet (40 mg total) by mouth every evening.    FLUTICASONE (FLONASE) 50 MCG/ACTUATION NASAL SPRAY    2 sprays (100 mcg total) by Each Nare route daily as needed for Allergies.    FLUTICASONE FUROATE-VILANTEROL (BREO ELLIPTA) 100-25 MCG/DOSE DISKUS INHALER    Inhale 1 puff into the lungs once daily.    FUROSEMIDE (LASIX) 40 MG TABLET    Take 1 tablet (40 mg total) by mouth 2 (two) times a day. Take twice a day and monitor BP and weights    LACTOBACILLUS RHAMNOSUS GG (CULTURELLE) 10 BILLION CELL CAPSULE    Take 1 capsule by mouth once daily.    LATANOPROST 0.005 % OPHTHALMIC SOLUTION    Place 1 drop into both eyes every evening.    LORATADINE (CLARITIN) 10 MG TABLET    Take 1 tablet (10 mg total) by mouth once daily.    METOPROLOL TARTRATE (LOPRESSOR) 25 MG TABLET    Take 1 tablet (25 mg total) by mouth 2 (two) times daily.    MUPIROCIN (BACTROBAN) 2 % OINTMENT    SMARTSI Application Topical 2-3 Times Daily    NITROGLYCERIN 0.4 MG/HR TD PT24 (NITRODUR) 0.4 MG/HR    Place 1 patch onto the skin once daily.    PANTOPRAZOLE (PROTONIX) 40 MG TABLET    Take 1 tablet (40 mg total) by mouth once daily.    POTASSIUM  CHLORIDE (KLOR-CON) 10 MEQ TBSR    Take 2 tablets (20 mEq total) by mouth 2 (two) times daily. Take with fluid pill    ROSUVASTATIN (CRESTOR) 40 MG TAB    TAKE 1 TABLET (40 MG TOTAL) BY MOUTH ONCE DAILY.     Objective:     Vitals:    04/25/24 1355   BP: 122/84   Pulse: 96   Temp: 97.9 °F (36.6 °C)     Lab Results   Component Value Date    WBC 5.95 04/23/2024    HGB 11.8 (L) 04/23/2024    HCT 37.7 04/23/2024    MCV 81 (L) 04/23/2024     04/23/2024       Lab Results   Component Value Date    IRON 35 04/23/2024    TRANSFERRIN 307 04/23/2024    TIBC 454 (H) 04/23/2024    FESATURATED 8 (L) 04/23/2024      BMP  Lab Results   Component Value Date     04/23/2024    K 3.7 04/23/2024     04/23/2024    CO2 23 04/23/2024    BUN 26 (H) 04/23/2024    CREATININE 1.5 (H) 04/23/2024    CALCIUM 9.7 04/23/2024    ANIONGAP 9 04/23/2024    EGFRNORACEVR 32.9 (A) 04/23/2024         Physical Exam  Vitals reviewed.   Constitutional:       Appearance: She is well-developed.   HENT:      Head: Normocephalic.      Right Ear: External ear normal.      Left Ear: External ear normal.      Nose: Nose normal.   Eyes:      General: Lids are normal. No scleral icterus.        Right eye: No discharge.         Left eye: No discharge.      Conjunctiva/sclera: Conjunctivae normal.   Neck:      Thyroid: No thyroid mass.   Cardiovascular:      Rate and Rhythm: Normal rate and regular rhythm.      Heart sounds: Normal heart sounds.   Pulmonary:      Effort: Pulmonary effort is normal. No respiratory distress.      Breath sounds: Normal breath sounds. No wheezing or rales.   Abdominal:      General: There is no distension.   Genitourinary:     Comments: deferred  Musculoskeletal:         General: Normal range of motion.      Cervical back: Normal range of motion.   Skin:     General: Skin is warm and dry.   Neurological:      Mental Status: She is alert and oriented to person, place, and time.   Psychiatric:         Speech: Speech normal.          Behavior: Behavior normal. Behavior is cooperative.         Thought Content: Thought content normal.          Assessment:     Problem List Items Addressed This Visit          Renal/    Elevated serum creatinine     Daily lasix increased to BID 1/2024. ? Etiology. Pt with h/o HF limited fluid intake    Advised f/u with Cardiology/prescribing MD. Refer to Nephrology per patient request         Relevant Orders    Basic Metabolic Panel       Oncology    Bladder neoplasm of uncertain malignant potential     Sees outside Urology         Iron deficiency anemia - Primary     Persistent iron deficiency. Patient c/o increasing fatigue    Arrange weekly Venofer x 5. F/u 4 weeks after final dose with cbc iron ferritin bmp      --discussed with patient's GI MD if VCE warranted. Advised hold off on additional GI evaluation, replete iron storage. Consider endoscopies if recurrent anemia         Relevant Orders    CBC Auto Differential    Iron and TIBC    Ferritin    Basic Metabolic Panel         Plan:     Iron deficiency anemia, unspecified iron deficiency anemia type  -     CBC Auto Differential; Future; Expected date: 04/25/2024  -     Iron and TIBC; Future; Expected date: 04/25/2024  -     Ferritin; Future; Expected date: 04/25/2024  -     Basic Metabolic Panel; Future; Expected date: 04/25/2024    Elevated serum creatinine  -     Basic Metabolic Panel; Future; Expected date: 04/25/2024    Bladder neoplasm of uncertain malignant potential    Other orders  -     iron sucrose injection 200 mg  -     sodium chloride 0.9% 250 mL flush bag  -     EPINEPHrine (EPIPEN) 0.3 mg/0.3 mL pen injection 0.3 mg  -     diphenhydrAMINE injection 50 mg  -     hydrocortisone sodium succinate injection 100 mg  -     sodium chloride 0.9% flush 10 mL  -     heparin, porcine (PF) 100 unit/mL injection flush 500 Units  -     alteplase injection 2 mg  -     iron sucrose injection 200 mg  -     sodium chloride 0.9% 250 mL flush bag  -      EPINEPHrine (EPIPEN) 0.3 mg/0.3 mL pen injection 0.3 mg  -     diphenhydrAMINE injection 50 mg  -     hydrocortisone sodium succinate injection 100 mg  -     sodium chloride 0.9% flush 10 mL  -     heparin, porcine (PF) 100 unit/mL injection flush 500 Units  -     alteplase injection 2 mg  -     iron sucrose injection 200 mg  -     sodium chloride 0.9% 250 mL flush bag  -     EPINEPHrine (EPIPEN) 0.3 mg/0.3 mL pen injection 0.3 mg  -     diphenhydrAMINE injection 50 mg  -     hydrocortisone sodium succinate injection 100 mg  -     sodium chloride 0.9% flush 10 mL  -     heparin, porcine (PF) 100 unit/mL injection flush 500 Units  -     alteplase injection 2 mg  -     iron sucrose injection 200 mg  -     sodium chloride 0.9% 250 mL flush bag  -     EPINEPHrine (EPIPEN) 0.3 mg/0.3 mL pen injection 0.3 mg  -     diphenhydrAMINE injection 50 mg  -     hydrocortisone sodium succinate injection 100 mg  -     sodium chloride 0.9% flush 10 mL  -     heparin, porcine (PF) 100 unit/mL injection flush 500 Units  -     alteplase injection 2 mg  -     iron sucrose injection 200 mg  -     sodium chloride 0.9% 250 mL flush bag  -     EPINEPHrine (EPIPEN) 0.3 mg/0.3 mL pen injection 0.3 mg  -     diphenhydrAMINE injection 50 mg  -     hydrocortisone sodium succinate injection 100 mg  -     sodium chloride 0.9% flush 10 mL  -     heparin, porcine (PF) 100 unit/mL injection flush 500 Units  -     alteplase injection 2 mg  -     iron sucrose injection 200 mg  -     sodium chloride 0.9% 250 mL flush bag  -     EPINEPHrine (EPIPEN) 0.3 mg/0.3 mL pen injection 0.3 mg  -     diphenhydrAMINE injection 50 mg  -     hydrocortisone sodium succinate injection 100 mg  -     sodium chloride 0.9% flush 10 mL  -     heparin, porcine (PF) 100 unit/mL injection flush 500 Units  -     alteplase injection 2 mg  -     iron sucrose injection 200 mg  -     sodium chloride 0.9% 250 mL flush bag  -     EPINEPHrine (EPIPEN) 0.3 mg/0.3 mL pen injection 0.3  mg  -     diphenhydrAMINE injection 50 mg  -     hydrocortisone sodium succinate injection 100 mg  -     sodium chloride 0.9% flush 10 mL  -     heparin, porcine (PF) 100 unit/mL injection flush 500 Units  -     alteplase injection 2 mg  -     iron sucrose injection 200 mg  -     sodium chloride 0.9% 250 mL flush bag  -     EPINEPHrine (EPIPEN) 0.3 mg/0.3 mL pen injection 0.3 mg  -     diphenhydrAMINE injection 50 mg  -     hydrocortisone sodium succinate injection 100 mg  -     sodium chloride 0.9% flush 10 mL  -     heparin, porcine (PF) 100 unit/mL injection flush 500 Units  -     alteplase injection 2 mg          Med Onc Chart Routing      Follow up with physician    Follow up with LISA Other. 4 weeks after final IV iron   Infusion scheduling note   weekly venofer x 5   Injection scheduling note    Labs CBC, ferritin, iron and TIBC and other   Scheduling:  Preferred lab:  Lab interval:  +BMP 1-2 days prior   Imaging None      Pharmacy appointment No pharmacy appointment needed      Other referrals       No additional referrals needed               CHANTE James-KALLI

## 2024-04-25 NOTE — ASSESSMENT & PLAN NOTE
Daily lasix increased to BID 1/2024. ? Etiology. Pt with h/o HF limited fluid intake    Advised f/u with Cardiology/prescribing MD. Refer to Nephrology per patient request

## 2024-04-25 NOTE — ASSESSMENT & PLAN NOTE
Persistent iron deficiency. Patient c/o increasing fatigue    Arrange weekly Venofer x 5. F/u 4 weeks after final dose with cbc iron ferritin bmp      --discussed with patient's GI MD if VCE warranted. Advised hold off on additional GI evaluation, replete iron storage. Consider endoscopies if recurrent anemia

## 2024-04-26 ENCOUNTER — TELEPHONE (OUTPATIENT)
Dept: PULMONOLOGY | Facility: CLINIC | Age: 89
End: 2024-04-26
Payer: MEDICARE

## 2024-04-26 DIAGNOSIS — I50.9 CHRONIC CONGESTIVE HEART FAILURE, UNSPECIFIED HEART FAILURE TYPE: ICD-10-CM

## 2024-04-26 RX ORDER — FUROSEMIDE 40 MG/1
40 TABLET ORAL DAILY
Start: 2024-04-26

## 2024-04-26 RX ORDER — POTASSIUM CHLORIDE 750 MG/1
20 TABLET, EXTENDED RELEASE ORAL DAILY
Start: 2024-04-26

## 2024-04-29 ENCOUNTER — TELEPHONE (OUTPATIENT)
Dept: PULMONOLOGY | Facility: CLINIC | Age: 89
End: 2024-04-29
Payer: MEDICARE

## 2024-04-29 ENCOUNTER — CLINICAL SUPPORT (OUTPATIENT)
Dept: CARDIOLOGY | Facility: HOSPITAL | Age: 89
End: 2024-04-29
Attending: INTERNAL MEDICINE
Payer: MEDICARE

## 2024-04-29 ENCOUNTER — CLINICAL SUPPORT (OUTPATIENT)
Dept: CARDIOLOGY | Facility: HOSPITAL | Age: 89
End: 2024-04-29
Payer: MEDICARE

## 2024-04-29 DIAGNOSIS — Z95.0 PRESENCE OF CARDIAC PACEMAKER: ICD-10-CM

## 2024-04-29 DIAGNOSIS — I48.91 UNSPECIFIED ATRIAL FIBRILLATION: ICD-10-CM

## 2024-04-29 DIAGNOSIS — J30.89 SEASONAL ALLERGIC RHINITIS DUE TO OTHER ALLERGIC TRIGGER: Primary | ICD-10-CM

## 2024-04-29 PROCEDURE — 93296 REM INTERROG EVL PM/IDS: CPT | Performed by: INTERNAL MEDICINE

## 2024-04-29 PROCEDURE — 93294 REM INTERROG EVL PM/LDLS PM: CPT | Mod: S$GLB,,, | Performed by: INTERNAL MEDICINE

## 2024-04-29 NOTE — TELEPHONE ENCOUNTER
----- Message from Dustin Bradford sent at 4/26/2024  4:07 PM CDT -----  Contact: Holli Jade is calling in regards to the Claritin is not working as well as Zyrtec and would like to get something else call in to the pharmacy. If any question call back at .[phone]       [facility] - [facility] [id number] [facility] [address]  Phone: [phone] Fax: [phone]  Hours: Not open 24 hours      Thanks

## 2024-04-29 NOTE — TELEPHONE ENCOUNTER
Spoke to pt.  She is requesting a rx for seasonal allergies.  She states Zyrtec and Claritin are not working for her.  Can you order her Singulair maybe?

## 2024-04-30 RX ORDER — MONTELUKAST SODIUM 10 MG/1
10 TABLET ORAL NIGHTLY
Qty: 30 TABLET | Refills: 11 | Status: SHIPPED | OUTPATIENT
Start: 2024-04-30

## 2024-05-08 DIAGNOSIS — R10.13 EPIGASTRIC PAIN: ICD-10-CM

## 2024-05-08 DIAGNOSIS — R12 HEARTBURN: ICD-10-CM

## 2024-05-08 NOTE — TELEPHONE ENCOUNTER
Healthy Active Living  An initiative of the American Academy of Pediatrics    Fact Sheet: Healthy Active Living for Families    Healthy nutrition starts as early as infancy with breastfeeding.  Once your baby begins eating solid foods, introduce nutritiou Last visit 2/2024   At the 6-month checkup, the healthcare provider will [address] Felix baby and ask how things are going at home. This sheet describes some of what you can expect. Once your baby is used to eating solids, introduce a new food every few days.    Development a · When offering single-ingredient foods such as homemade or store-bought baby food, introduce one new flavor of food every 3 to 5 days before trying a new or different flavor.  Following each new food, be aware of possible allergic reactions such as diarrhe · Keep putting your baby down to sleep on his or her back. If the baby rolls over while sleeping, that’s okay. You do not need to return the baby to his or her back. · Do not put your child in the crib with a bottle.   · At this age, some parents let their Based on recommendations from the CDC, at this visit your baby may receive the following vaccinations:  · Diphtheria, tetanus, and pertussis  · Haemophilus influenzae type b  · Hepatitis B  · Influenza (flu)  · Pneumococcus  · Polio  · Rotavirus  Setting a

## 2024-05-09 ENCOUNTER — INFUSION (OUTPATIENT)
Dept: INFUSION THERAPY | Facility: HOSPITAL | Age: 89
End: 2024-05-09
Attending: NURSE PRACTITIONER
Payer: MEDICARE

## 2024-05-09 VITALS
OXYGEN SATURATION: 96 % | TEMPERATURE: 97 F | HEIGHT: 65 IN | BODY MASS INDEX: 33.21 KG/M2 | DIASTOLIC BLOOD PRESSURE: 60 MMHG | SYSTOLIC BLOOD PRESSURE: 124 MMHG | RESPIRATION RATE: 18 BRPM | WEIGHT: 199.31 LBS | HEART RATE: 70 BPM

## 2024-05-09 DIAGNOSIS — D50.9 IRON DEFICIENCY ANEMIA, UNSPECIFIED IRON DEFICIENCY ANEMIA TYPE: Primary | ICD-10-CM

## 2024-05-09 PROCEDURE — 96374 THER/PROPH/DIAG INJ IV PUSH: CPT

## 2024-05-09 PROCEDURE — A4216 STERILE WATER/SALINE, 10 ML: HCPCS | Performed by: NURSE PRACTITIONER

## 2024-05-09 PROCEDURE — 63600175 PHARM REV CODE 636 W HCPCS: Performed by: NURSE PRACTITIONER

## 2024-05-09 PROCEDURE — 25000003 PHARM REV CODE 250: Performed by: NURSE PRACTITIONER

## 2024-05-09 RX ORDER — SODIUM CHLORIDE 0.9 % (FLUSH) 0.9 %
10 SYRINGE (ML) INJECTION
Status: DISCONTINUED | OUTPATIENT
Start: 2024-05-09 | End: 2024-05-09 | Stop reason: HOSPADM

## 2024-05-09 RX ADMIN — IRON SUCROSE 200 MG: 20 INJECTION, SOLUTION INTRAVENOUS at 03:05

## 2024-05-09 RX ADMIN — Medication 10 ML: at 03:05

## 2024-05-10 DIAGNOSIS — R10.13 EPIGASTRIC PAIN: ICD-10-CM

## 2024-05-10 DIAGNOSIS — R12 HEARTBURN: ICD-10-CM

## 2024-05-10 RX ORDER — FAMOTIDINE 40 MG/1
40 TABLET, FILM COATED ORAL NIGHTLY
Qty: 30 TABLET | Refills: 3 | Status: SHIPPED | OUTPATIENT
Start: 2024-05-10

## 2024-05-10 RX ORDER — FAMOTIDINE 40 MG/1
40 TABLET, FILM COATED ORAL NIGHTLY
Qty: 30 TABLET | Refills: 3 | OUTPATIENT
Start: 2024-05-10

## 2024-05-10 NOTE — TELEPHONE ENCOUNTER
----- Message from Lexa Darby sent at 5/10/2024 10:23 AM CDT -----  Type:  RX Refill Request    Who Called:  Valencia from [facility]   Refill or New Rx:  refill  RX Name and Strength:  famotidine (PEPCID) 40 MG tablet  How is the patient currently taking it? (ex. 1XDay):  as directed  Is this a 30 day or 90 day RX:  30  Preferred Pharmacy with phone number:    [facility] - [facility] [address] [address]  [address] [facility] [address]  Phone: [phone] Fax: [phone]  Local or Mail Order:  local  Ordering Provider:  chava Vaz Call Back Number:  [phone]  Additional Information:  Pharm states that they had called in for refill but no one has gotten back to them yet. Wants verbal approval if possible for famotidine (PEPCID) 40 MG tablet. Please call back to advise, thanks!

## 2024-05-16 ENCOUNTER — TELEPHONE (OUTPATIENT)
Dept: PULMONOLOGY | Facility: CLINIC | Age: 89
End: 2024-05-16
Payer: MEDICARE

## 2024-05-16 ENCOUNTER — INFUSION (OUTPATIENT)
Dept: INFUSION THERAPY | Facility: HOSPITAL | Age: 89
End: 2024-05-16
Attending: INTERNAL MEDICINE
Payer: MEDICARE

## 2024-05-16 VITALS
DIASTOLIC BLOOD PRESSURE: 65 MMHG | OXYGEN SATURATION: 95 % | TEMPERATURE: 98 F | RESPIRATION RATE: 18 BRPM | HEART RATE: 62 BPM | SYSTOLIC BLOOD PRESSURE: 140 MMHG

## 2024-05-16 DIAGNOSIS — D50.9 IRON DEFICIENCY ANEMIA, UNSPECIFIED IRON DEFICIENCY ANEMIA TYPE: Primary | ICD-10-CM

## 2024-05-16 PROCEDURE — 63600175 PHARM REV CODE 636 W HCPCS: Performed by: NURSE PRACTITIONER

## 2024-05-16 PROCEDURE — 96374 THER/PROPH/DIAG INJ IV PUSH: CPT

## 2024-05-16 RX ORDER — SODIUM CHLORIDE 0.9 % (FLUSH) 0.9 %
10 SYRINGE (ML) INJECTION
Status: DISCONTINUED | OUTPATIENT
Start: 2024-05-16 | End: 2024-05-16 | Stop reason: HOSPADM

## 2024-05-16 RX ADMIN — IRON SUCROSE 200 MG: 20 INJECTION, SOLUTION INTRAVENOUS at 12:05

## 2024-05-16 NOTE — TELEPHONE ENCOUNTER
----- Message from Delaney Sierra MA sent at 5/16/2024  2:19 PM CDT -----    ----- Message -----  From: Karely Cardona  Sent: 5/16/2024  12:40 PM CDT  To: [facility] Staff    Type: General Call Back         Name of Caller:[facility] Nurse  Would the patient rather a call back or a response via [facility]? Call  Best Call Back Number:[phone]  Additional Information: Nurse would like to verify that pt is to only take Lasix medication orders changed to once a day as needed instead of twice a day. Please call with further info and assistance. Thank you.

## 2024-05-16 NOTE — DISCHARGE INSTRUCTIONS
.[facility]  [address] [address]  [address] [address]  [phone] phone     [phone] fax  Hours of Operation: Monday- Friday 8:00am- 5:00pm  After hours phone  [phone]  Hematology / Oncology Physicians on call    Dr. Cj Barker        Nurse Practitioners:    Alba Boyd, RAYRAY Jerez, RAYRAY Nicole, RAYRAY Krsue, RAYRAY Miranda, PA      Please don't hesitate to call if you have any concerns.

## 2024-05-16 NOTE — PLAN OF CARE
Patient reports feeling tired today but slight improvement in nausea. Tolerated iron well with no adverse reactions. Patient to return in one week for third dose.

## 2024-05-23 ENCOUNTER — INFUSION (OUTPATIENT)
Dept: INFUSION THERAPY | Facility: HOSPITAL | Age: 89
End: 2024-05-23
Attending: INTERNAL MEDICINE
Payer: MEDICARE

## 2024-05-23 VITALS
OXYGEN SATURATION: 95 % | RESPIRATION RATE: 18 BRPM | HEART RATE: 70 BPM | SYSTOLIC BLOOD PRESSURE: 157 MMHG | TEMPERATURE: 98 F | DIASTOLIC BLOOD PRESSURE: 76 MMHG

## 2024-05-23 DIAGNOSIS — D50.9 IRON DEFICIENCY ANEMIA, UNSPECIFIED IRON DEFICIENCY ANEMIA TYPE: Primary | ICD-10-CM

## 2024-05-23 PROCEDURE — 63600175 PHARM REV CODE 636 W HCPCS: Performed by: NURSE PRACTITIONER

## 2024-05-23 PROCEDURE — 96374 THER/PROPH/DIAG INJ IV PUSH: CPT

## 2024-05-23 RX ADMIN — IRON SUCROSE 200 MG: 20 INJECTION, SOLUTION INTRAVENOUS at 01:05

## 2024-05-28 LAB
OHS CV AF BURDEN PERCENT: < 1
OHS CV DC REMOTE DEVICE TYPE: NORMAL
OHS CV RV PACING PERCENT: 8 %

## 2024-05-30 ENCOUNTER — INFUSION (OUTPATIENT)
Dept: INFUSION THERAPY | Facility: HOSPITAL | Age: 89
End: 2024-05-30
Attending: NURSE PRACTITIONER
Payer: MEDICARE

## 2024-05-30 VITALS
HEART RATE: 61 BPM | OXYGEN SATURATION: 96 % | RESPIRATION RATE: 18 BRPM | DIASTOLIC BLOOD PRESSURE: 67 MMHG | TEMPERATURE: 97 F | SYSTOLIC BLOOD PRESSURE: 137 MMHG

## 2024-05-30 DIAGNOSIS — D50.9 IRON DEFICIENCY ANEMIA, UNSPECIFIED IRON DEFICIENCY ANEMIA TYPE: Primary | ICD-10-CM

## 2024-05-30 PROCEDURE — 96374 THER/PROPH/DIAG INJ IV PUSH: CPT

## 2024-05-30 PROCEDURE — 63600175 PHARM REV CODE 636 W HCPCS: Performed by: NURSE PRACTITIONER

## 2024-05-30 RX ADMIN — IRON SUCROSE 200 MG: 20 INJECTION, SOLUTION INTRAVENOUS at 01:05

## 2024-05-30 NOTE — NURSING
Tolerated IVP venofer dose 4 of 5 without signs and symptoms of reaction, assessment unchanged per flow , follow up appointments remain as scheduled.  Voices no concerns or prn needs upon discharge.

## 2024-05-30 NOTE — DISCHARGE INSTRUCTIONS
[facility]  [address] [facility]  [address] [address]  [phone] phone     [phone] fax  Hours of Operation: Monday- Friday 8:00am- 5:00pm  After hours phone  [phone]  Hematology / Oncology Physicians on call      SENTHIL Arango Dr., NP Phaon Dunbar, NP Khelsea Conley, FNP    Please call with any concerns regarding your appointment today.

## 2024-06-06 ENCOUNTER — INFUSION (OUTPATIENT)
Dept: INFUSION THERAPY | Facility: HOSPITAL | Age: 89
End: 2024-06-06
Attending: INTERNAL MEDICINE
Payer: MEDICARE

## 2024-06-06 VITALS
OXYGEN SATURATION: 95 % | RESPIRATION RATE: 20 BRPM | HEART RATE: 64 BPM | SYSTOLIC BLOOD PRESSURE: 138 MMHG | DIASTOLIC BLOOD PRESSURE: 77 MMHG | TEMPERATURE: 97 F

## 2024-06-06 DIAGNOSIS — D50.9 IRON DEFICIENCY ANEMIA, UNSPECIFIED IRON DEFICIENCY ANEMIA TYPE: Primary | ICD-10-CM

## 2024-06-06 PROCEDURE — 96374 THER/PROPH/DIAG INJ IV PUSH: CPT

## 2024-06-06 PROCEDURE — 63600175 PHARM REV CODE 636 W HCPCS: Performed by: NURSE PRACTITIONER

## 2024-06-06 RX ORDER — EPINEPHRINE 0.3 MG/.3ML
0.3 INJECTION SUBCUTANEOUS ONCE AS NEEDED
Status: DISCONTINUED | OUTPATIENT
Start: 2024-06-06 | End: 2024-06-06 | Stop reason: HOSPADM

## 2024-06-06 RX ORDER — DIPHENHYDRAMINE HYDROCHLORIDE 50 MG/ML
50 INJECTION INTRAMUSCULAR; INTRAVENOUS ONCE AS NEEDED
Status: DISCONTINUED | OUTPATIENT
Start: 2024-06-06 | End: 2024-06-06 | Stop reason: HOSPADM

## 2024-06-06 RX ADMIN — IRON SUCROSE 200 MG: 20 INJECTION, SOLUTION INTRAVENOUS at 01:06

## 2024-06-06 NOTE — PLAN OF CARE
Problem: Adult Inpatient Plan of Care  Goal: Plan of Care Review  Outcome: Progressing  Flowsheets (Taken 6/6/2024 1340)  Plan of Care Reviewed With: patient  Goal: Optimal Comfort and Wellbeing  Outcome: Progressing  Intervention: Provide Person-Centered Care  Flowsheets (Taken 6/6/2024 1340)  Trust Relationship/Rapport:   care explained   questions encouraged   choices provided   reassurance provided   emotional support provided   thoughts/feelings acknowledged   empathic listening provided   questions answered

## 2024-06-10 RX ORDER — AMIODARONE HYDROCHLORIDE 200 MG/1
200 TABLET ORAL DAILY
Qty: 60 TABLET | Refills: 3 | Status: SHIPPED | OUTPATIENT
Start: 2024-06-10

## 2024-07-01 ENCOUNTER — LAB VISIT (OUTPATIENT)
Dept: LAB | Facility: HOSPITAL | Age: 89
End: 2024-07-01
Attending: NURSE PRACTITIONER
Payer: MEDICARE

## 2024-07-01 DIAGNOSIS — R79.89 ELEVATED SERUM CREATININE: ICD-10-CM

## 2024-07-01 DIAGNOSIS — D50.9 IRON DEFICIENCY ANEMIA, UNSPECIFIED IRON DEFICIENCY ANEMIA TYPE: ICD-10-CM

## 2024-07-01 LAB
ANION GAP SERPL CALC-SCNC: 11 MMOL/L (ref 8–16)
BASOPHILS # BLD AUTO: 0.05 K/UL (ref 0–0.2)
BASOPHILS NFR BLD: 0.7 % (ref 0–1.9)
BUN SERPL-MCNC: 27 MG/DL (ref 10–30)
CALCIUM SERPL-MCNC: 10.1 MG/DL (ref 8.7–10.5)
CHLORIDE SERPL-SCNC: 103 MMOL/L (ref 95–110)
CO2 SERPL-SCNC: 22 MMOL/L (ref 23–29)
CREAT SERPL-MCNC: 1.9 MG/DL (ref 0.5–1.4)
DIFFERENTIAL METHOD BLD: ABNORMAL
EOSINOPHIL # BLD AUTO: 0.2 K/UL (ref 0–0.5)
EOSINOPHIL NFR BLD: 2.2 % (ref 0–8)
ERYTHROCYTE [DISTWIDTH] IN BLOOD BY AUTOMATED COUNT: 20.2 % (ref 11.5–14.5)
EST. GFR  (NO RACE VARIABLE): 25 ML/MIN/1.73 M^2
FERRITIN SERPL-MCNC: 384 NG/ML (ref 20–300)
GLUCOSE SERPL-MCNC: 133 MG/DL (ref 70–110)
HCT VFR BLD AUTO: 42.2 % (ref 37–48.5)
HGB BLD-MCNC: 13.7 G/DL (ref 12–16)
IMM GRANULOCYTES # BLD AUTO: 0.03 K/UL (ref 0–0.04)
IMM GRANULOCYTES NFR BLD AUTO: 0.4 % (ref 0–0.5)
IRON SERPL-MCNC: 44 UG/DL (ref 30–160)
LYMPHOCYTES # BLD AUTO: 2.4 K/UL (ref 1–4.8)
LYMPHOCYTES NFR BLD: 32.7 % (ref 18–48)
MCH RBC QN AUTO: 27.7 PG (ref 27–31)
MCHC RBC AUTO-ENTMCNC: 32.5 G/DL (ref 32–36)
MCV RBC AUTO: 85 FL (ref 82–98)
MONOCYTES # BLD AUTO: 0.9 K/UL (ref 0.3–1)
MONOCYTES NFR BLD: 11.7 % (ref 4–15)
NEUTROPHILS # BLD AUTO: 3.9 K/UL (ref 1.8–7.7)
NEUTROPHILS NFR BLD: 52.3 % (ref 38–73)
NRBC BLD-RTO: 0 /100 WBC
PLATELET # BLD AUTO: 265 K/UL (ref 150–450)
PMV BLD AUTO: 10.8 FL (ref 9.2–12.9)
POTASSIUM SERPL-SCNC: 4.6 MMOL/L (ref 3.5–5.1)
RBC # BLD AUTO: 4.94 M/UL (ref 4–5.4)
SATURATED IRON: 14 % (ref 20–50)
SODIUM SERPL-SCNC: 136 MMOL/L (ref 136–145)
TOTAL IRON BINDING CAPACITY: 321 UG/DL (ref 250–450)
TRANSFERRIN SERPL-MCNC: 217 MG/DL (ref 200–375)
WBC # BLD AUTO: 7.42 K/UL (ref 3.9–12.7)

## 2024-07-01 PROCEDURE — 83540 ASSAY OF IRON: CPT | Performed by: NURSE PRACTITIONER

## 2024-07-01 PROCEDURE — 82728 ASSAY OF FERRITIN: CPT | Performed by: NURSE PRACTITIONER

## 2024-07-01 PROCEDURE — 80048 BASIC METABOLIC PNL TOTAL CA: CPT | Performed by: NURSE PRACTITIONER

## 2024-07-01 PROCEDURE — 85025 COMPLETE CBC W/AUTO DIFF WBC: CPT | Performed by: NURSE PRACTITIONER

## 2024-07-01 PROCEDURE — 36415 COLL VENOUS BLD VENIPUNCTURE: CPT | Performed by: NURSE PRACTITIONER

## 2024-07-02 DIAGNOSIS — R79.89 ELEVATED SERUM CREATININE: Primary | ICD-10-CM

## 2024-07-03 ENCOUNTER — TELEPHONE (OUTPATIENT)
Dept: CARDIOLOGY | Facility: CLINIC | Age: 89
End: 2024-07-03
Payer: MEDICARE

## 2024-07-03 DIAGNOSIS — Z95.0 PRESENCE OF CARDIAC PACEMAKER: ICD-10-CM

## 2024-07-03 DIAGNOSIS — I35.0 NONRHEUMATIC AORTIC VALVE STENOSIS: Primary | ICD-10-CM

## 2024-07-03 NOTE — TELEPHONE ENCOUNTER
Called and spoke to pt. Pt scheduled for Friday 07/05/24 with Dr. Meyer.     ----- Message from Fletcher Meyer MD sent at 7/2/2024  5:06 PM CDT -----  HI    OK I will need to discuss with her further, we can lower the lasix dose but she will have worsening HIGGINS/edema ETC due to her CHF/Valve disease.   If my staff can schedule an appt with me soon will review and get labs/workup needed. Thanks  ----- Message -----  From: Kimberli Jerez NP  Sent: 7/2/2024   4:17 PM CDT  To: Gabe Waller MD; Fletcher Meyer MD    Good afternoon,    Patient currently taking Lasix BID. Her kidney function has declined the recent couple months. I spoke with her and she said you all have been dosing her lasix. Will you please follow up with her? I've also placed a referral to Nephrology.

## 2024-07-05 ENCOUNTER — OFFICE VISIT (OUTPATIENT)
Dept: CARDIOLOGY | Facility: CLINIC | Age: 89
End: 2024-07-05
Payer: MEDICARE

## 2024-07-05 ENCOUNTER — HOSPITAL ENCOUNTER (OUTPATIENT)
Dept: CARDIOLOGY | Facility: HOSPITAL | Age: 89
Discharge: HOME | End: 2024-07-05
Attending: INTERNAL MEDICINE
Payer: MEDICARE

## 2024-07-05 VITALS
HEART RATE: 65 BPM | BODY MASS INDEX: 33.61 KG/M2 | WEIGHT: 201.75 LBS | OXYGEN SATURATION: 95 % | DIASTOLIC BLOOD PRESSURE: 84 MMHG | HEIGHT: 65 IN | SYSTOLIC BLOOD PRESSURE: 138 MMHG

## 2024-07-05 DIAGNOSIS — I10 ESSENTIAL HYPERTENSION: ICD-10-CM

## 2024-07-05 DIAGNOSIS — Z95.0 PRESENCE OF CARDIAC PACEMAKER: ICD-10-CM

## 2024-07-05 DIAGNOSIS — N18.31 STAGE 3A CHRONIC KIDNEY DISEASE: ICD-10-CM

## 2024-07-05 DIAGNOSIS — Z95.0 S/P PLACEMENT OF CARDIAC PACEMAKER: ICD-10-CM

## 2024-07-05 DIAGNOSIS — Z95.0 CARDIAC PACEMAKER IN SITU: ICD-10-CM

## 2024-07-05 DIAGNOSIS — I50.33 ACUTE ON CHRONIC DIASTOLIC HEART FAILURE: Primary | ICD-10-CM

## 2024-07-05 DIAGNOSIS — I35.0 NONRHEUMATIC AORTIC VALVE STENOSIS: ICD-10-CM

## 2024-07-05 DIAGNOSIS — D64.9 ANEMIA, UNSPECIFIED TYPE: ICD-10-CM

## 2024-07-05 DIAGNOSIS — I50.9 CHRONIC CONGESTIVE HEART FAILURE, UNSPECIFIED HEART FAILURE TYPE: ICD-10-CM

## 2024-07-05 DIAGNOSIS — J44.9 CHRONIC OBSTRUCTIVE PULMONARY DISEASE, UNSPECIFIED COPD TYPE: ICD-10-CM

## 2024-07-05 DIAGNOSIS — R00.1 SYMPTOMATIC BRADYCARDIA: ICD-10-CM

## 2024-07-05 DIAGNOSIS — Z86.16 HISTORY OF COVID-19: ICD-10-CM

## 2024-07-05 DIAGNOSIS — I25.118 CORONARY ARTERY DISEASE OF NATIVE ARTERY OF NATIVE HEART WITH STABLE ANGINA PECTORIS: ICD-10-CM

## 2024-07-05 DIAGNOSIS — I48.0 PAROXYSMAL ATRIAL FIBRILLATION: ICD-10-CM

## 2024-07-05 DIAGNOSIS — R06.02 SHORTNESS OF BREATH: ICD-10-CM

## 2024-07-05 LAB
OHS QRS DURATION: 94 MS
OHS QTC CALCULATION: 462 MS

## 2024-07-05 PROCEDURE — 99999 PR PBB SHADOW E&M-EST. PATIENT-LVL IV: CPT | Mod: PBBFAC,,, | Performed by: INTERNAL MEDICINE

## 2024-07-05 PROCEDURE — 93010 ELECTROCARDIOGRAM REPORT: CPT | Mod: ,,, | Performed by: INTERNAL MEDICINE

## 2024-07-05 PROCEDURE — 93005 ELECTROCARDIOGRAM TRACING: CPT

## 2024-07-05 RX ORDER — POTASSIUM CHLORIDE 750 MG/1
20 TABLET, EXTENDED RELEASE ORAL DAILY
Qty: 180 TABLET | Refills: 1 | Status: SHIPPED | OUTPATIENT
Start: 2024-07-05

## 2024-07-05 RX ORDER — FUROSEMIDE 40 MG/1
40 TABLET ORAL DAILY
Qty: 90 TABLET | Refills: 1 | Status: SHIPPED | OUTPATIENT
Start: 2024-07-05

## 2024-07-05 NOTE — PROGRESS NOTES
Subjective:   Patient ID:  Holli Landrum is a 89 y.o. female who presents for cardiac consult of No chief complaint on file.      HPI  The patient came in today for cardiac consult of No chief complaint on file.      Holli Landrum is a 89 y.o. female pt with  history of persistent atrial fibrillation, coronary artery disease status post PCI, sick sinus syndrome status post [employer] pacemaker implantation Biotronik, hypertrophic obstructive cardiomyopathy, diastolic congestive heart failure, severe aortic stenosis, history of GI bleeding here for CV follow up.      3/14/24  Recent eval with Dr. Guadarrama - Given indwelling catheter she is not a candidate for valve replacement. I discussed the option of palliative BAV should her HF symptoms worsen. She is not interested at this time but says she will keep it in mind if her shortness of breath worsens. She seems to be slowly getting better since the COVID.      She is on 24/7 oxygen now since COVID 19. Was in hosp earlier. She is doing PT/OT now. She is using a walker now.     7/5/24    BNP (pg/mL)   Date Value   01/22/2024 209 (H)   12/14/2023 392 (H)   11/20/2023 278 (H)   11/09/2023 369 (H)   10/18/2023 329 (H)        She had recent hemeonc eval and concerned about kidney function declining so here to discuss diuretic regimen.   Recent Cr 1.9;   Last  - will repeat.   She is s/p EGD - neg, iron levels improved.     BP and Hr stable. BMI 33 - 201 lbs   She went to ER last Friday - passing red blood - had new catheter inserted by ED physician and went to urologist - had CT scan with contrast   - still having blood in urine.     Results for orders placed during the hospital encounter of 06/20/23    Cardiac catheterization    Conclusion    The Ost Cx to Prox Cx lesion was 50% stenosed.    The Ost 1st Mrg to 1st Mrg lesion was 70% stenosed.    The pre-procedure left ventricular end diastolic pressure was 6.    The estimated blood loss was <50 mL.    There was  non-obstructive coronary artery disease..    There was moderate aortic valve stenosis.    The procedure log was documented by Documenter: RT Cristopher; Morena Bradford and verified by Brice Guadarrama MD.    Date: 6/20/2023  Time: 2:46 PM      Results for orders placed during the hospital encounter of 05/17/23    Echo    Interpretation Summary  · The left ventricle is normal in size with mild concentric hypertrophy and normal systolic function.  · Moderate left atrial enlargement.  · The estimated ejection fraction is 65%.  · Indeterminate left ventricular diastolic function.  · Normal right ventricular size with normal right ventricular systolic function.  · There is moderate-to-severe aortic valve stenosis.  · Aortic valve area is 0.80 cm2; peak velocity is 2.85 m/s; mean gradient is 21 mmHg.  · Mild tricuspid regurgitation.  · Intermediate central venous pressure (8 mmHg).  · The estimated PA systolic pressure is 33 mmHg.          Past Medical History:   Diagnosis Date    Anticoagulant long-term use     Arthritis     Asthma     Basal cell carcinoma     Cancer     skin cancer to face    Cataract     OU done//    CHF (congestive heart failure)     COPD (chronic obstructive pulmonary disease)     no oxygen; patient denies    cpap     Essential hypertension 05/05/2010    GERD (gastroesophageal reflux disease) 11/20/2012    Glaucoma     Hypertensive heart disease with heart failure 02/05/2013    Paroxysmal atrial fibrillation     Paroxysmal ventricular tachycardia     per problem list    Preop cardiovascular exam 1/26/2024    Renal disorder     french-1/3/2020    Squamous cell carcinoma of skin        Past Surgical History:   Procedure Laterality Date    A-V CARDIAC PACEMAKER INSERTION  06/16/2021    Procedure: INSERTION, CARDIAC PACEMAKER, DUAL CHAMBER;  Surgeon: Oscar Sommers MD;  Location: [facility];  Service: Cardiovascular;;    ADENOIDECTOMY  191944    APPENDECTOMY  1948    Because of Ovarian Cyst surgery     BREAST BIOPSY Left 1995    neg    BREAST BIOPSY Right 1984    neg    BREAST BIOPSY Right 1992    neg    BREAST SURGERY      A number of biopsies    CARDIAC CATHETERIZATION  2013, 2014,2016, 2020    has 9 stents    CARDIAC SURGERY  2012    stents    CATARACT EXTRACTION W/  INTRAOCULAR LENS IMPLANT Left 11/01/2018    Dr Rose    CATARACT EXTRACTION W/  INTRAOCULAR LENS IMPLANT Right 12/13/2018    Dr Rose//    CHOLECYSTECTOMY      COLONOSCOPY  ~2005    Dr. Edward; normal per patient report    COLONOSCOPY N/A 09/06/2022    Procedure: COLONOSCOPY 8/31/22-note in Dr Simms's office visit note that he spoke to her cardiologist and she was viable candidate for endoscopy;  Surgeon: Cordelia Bueno MD;  Location: [facility] ENDO;  Service: Endoscopy;  Laterality: N/A;    CORONARY ANGIOGRAPHY N/A 03/20/2020    Procedure: ANGIOGRAM, CORONARY ARTERY;  Surgeon: Chuy Bryant MD;  Location: [facility] CATH;  Service: Cardiology;  Laterality: N/A;    CYSTOSCOPY W/ RETROGRADES Bilateral 02/12/2020    Procedure: CYSTOSCOPY, WITH RETROGRADE PYELOGRAM;  Surgeon: Gasper Feliz MD;  Location: [facility] OR;  Service: Urology;  Laterality: Bilateral;    ESOPHAGOGASTRODUODENOSCOPY N/A 08/13/2020    Procedure: EGD (ESOPHAGOGASTRODUODENOSCOPY);  Surgeon: Mika Solorzano MD;  Location: [facility];  Service: Endoscopy;  Laterality: N/A; Mild Schatzki ring. Biopsied. Dilated. small hiatal hernia, gastritis; biopsy: esophagus- SEVERE REFLUX ESOPHAGITIS, stomach- chronic gastritis, negative for H pylori    ESOPHAGOGASTRODUODENOSCOPY N/A 10/22/2020    Procedure: EGD (ESOPHAGOGASTRODUODENOSCOPY);  Surgeon: Fred Hendricks MD;  Location: [facility];  Service: Endoscopy;  Laterality: N/A;    ESOPHAGOGASTRODUODENOSCOPY N/A 3/21/2024    Procedure: EGD (ESOPHAGOGASTRODUODENOSCOPY);  Surgeon: Mika Solorzano MD;  Location: [facility];  Service: Gastroenterology;  Laterality: N/A;    EYE SURGERY  2017    Cataracs    FRACTIONAL FLOW RESERVE (FFR),  CORONARY  6/20/2023    Procedure: Fractional Flow [place] (FFR), Coronary;  Surgeon: Brice Campuzano MD;  Location: [facility] CATH LAB;  Service: Cardiology;;    HYSTERECTOMY  1969    INSTANTANEOUS WAVE-FREE RATIO (IFR) N/A 6/20/2023    Procedure: Instantaneous Wave-Free Ratio (IFR);  Surgeon: Brice Campuzano MD;  Location: [facility] CATH LAB;  Service: Cardiology;  Laterality: N/A;    LEFT HEART CATHETERIZATION Left 03/03/2020    Procedure: Left heart cath;  Surgeon: Chuy Bryant MD;  Location: [facility] CATH;  Service: Cardiology;  Laterality: Left;    LEFT HEART CATHETERIZATION Left 03/20/2020    Procedure: Left heart cath;  Surgeon: Chuy Bryant MD;  Location: [facility] CATH;  Service: Cardiology;  Laterality: Left;    LEFT HEART CATHETERIZATION N/A 6/20/2023    Procedure: Left heart cath;  Surgeon: Brice Campuzano MD;  Location: [facility] CATH LAB;  Service: Cardiology;  Laterality: N/A;    OVARIAN CYST REMOVAL  teenager    PHACOEMULSIFICATION OF CATARACT Left 11/01/2018    Procedure: PHACOEMULSIFICATION, CATARACT;  Surgeon: Aleksandar Rose Jr., MD;  Location: [facility] OR;  Service: Ophthalmology;  Laterality: Left;    PHACOEMULSIFICATION OF CATARACT Right 12/13/2018    Procedure: PHACOEMULSIFICATION, CATARACT;  Surgeon: Aleksandar Rose Jr., MD;  Location: [facility] OR;  Service: Ophthalmology;  Laterality: Right;    TONSILLECTOMY      aw/denoids    TRANSESOPHAGEAL ECHOCARDIOGRAM WITH POSSIBLE CARDIOVERSION (ALFRED W/ POSS CARDIOVERSION) N/A 10/5/2023    Procedure: Transesophageal echo (ALFRED) intra-procedure log documentation/alfred/cv;  Surgeon: Bao Miguel MD;  Location: [facility] CATH LAB;  Service: Cardiology;  Laterality: N/A;   Alfred/Cv  MRI safe   Pacer & leads implanted 6/16/21, Antoun   Bio Edora 8 DR-T, [id number], PID: [id number]   A lead: Bio Solia S45, [id number]   V lead: Bio Solia S53, [id number]    TREATMENT OF CARDIAC ARRHYTHMIA N/A 10/5/2023    Procedure: Cardioversion or Defibrillation;  Surgeon: Bao Miguel MD;   Location: [facility] CATH LAB;  Service: Cardiology;  Laterality: N/A;    UPPER GASTROINTESTINAL ENDOSCOPY  ~    Dr. Solorzano    VALVE STUDY-AORTIC  2023    Procedure: Valve study-aortic;  Surgeon: Brice Campuzano MD;  Location: [facility] CATH LAB;  Service: Cardiology;;    VASCULAR SURGERY      to remove clot from right leg    Yag Capsulotomy Bilateral 2019    Dr Rose       Social History     Tobacco Use    Smoking status: Former     Current packs/day: 0.00     Types: Cigarettes     Start date: 6/10/1954     Quit date: 9/15/1955     Years since quittin.8    Smokeless tobacco: Never    Tobacco comments:     I onlysmoked one year while [place] my  was oversees  during [place]   Substance Use Topics    Alcohol use: Not Currently     Comment: Only drank a little wine and haven't  drunk anything in 15 y    Drug use: Never       Family History   Problem Relation Name Age of Onset    Diabetes Brother Wili         Type 2    Heart disease Brother Wili          at 65 CHD    Diabetes Mother Holli COX         Type 1    Alcohol abuse Mother Holli COX         Dod 10/75    Heart disease Mother Holli COX         Arteriosclerosis    Hypertension Mother Holli COX     Stroke Mother Holli S         3/15/1975 dod 10/1975    Cancer Maternal Grandfather Pappaw         Dod     Clotting disorder Son          bleeding after tonsillectomy only    Heart disease Father Ronaldo Lopes.         Heart attack. Afib, and Polyvcithemavera    Hypertension Father Ronaldo Lopes.     Amblyopia Neg Hx      Blindness Neg Hx      Cataracts Neg Hx      Glaucoma Neg Hx      Macular degeneration Neg Hx      Retinal detachment Neg Hx      Strabismus Neg Hx      Thyroid disease Neg Hx      Colon cancer Neg Hx         Patient's Medications   New Prescriptions    No medications on file   Previous Medications    ALBUTEROL (PROVENTIL/VENTOLIN HFA) 90 MCG/ACTUATION INHALER    Inhale 1-2 puffs into the lungs every 4 (four) hours as needed for Wheezing  or Shortness of Breath. Rescue    ALBUTEROL-IPRATROPIUM (DUO-NEB) 2.5 MG-0.5 MG/3 ML NEBULIZER SOLUTION    Take 3 mLs by nebulization every 6 (six) hours as needed for Wheezing or Shortness of Breath.    AMIODARONE (PACERONE) 200 MG TAB    Take 1 tablet (200 mg total) by mouth once daily.    APIXABAN (ELIQUIS) 2.5 MG TAB    Take 1 tablet (2.5 mg total) by mouth 2 (two) times daily.    CHOLECALCIFEROL, VITAMIN D3, 125 MCG (5,000 UNIT) TAB    Take 5,000 Units by mouth once daily.    DORZOLAMIDE (TRUSOPT) 2 % OPHTHALMIC SOLUTION    INSTILL 1 DROP INTO BOTH EYES TWICE DAILY    FAMOTIDINE (PEPCID) 40 MG TABLET    TAKE 1 TABLET (40 MG TOTAL) BY MOUTH EVERY EVENING.    FLUTICASONE (FLONASE) 50 MCG/ACTUATION NASAL SPRAY    2 sprays (100 mcg total) by Each Nare route daily as needed for Allergies.    FLUTICASONE FUROATE-VILANTEROL (BREO ELLIPTA) 100-25 MCG/DOSE DISKUS INHALER    Inhale 1 puff into the lungs once daily.    FUROSEMIDE (LASIX) 40 MG TABLET    Take 1 tablet (40 mg total) by mouth once daily. Take twice a day and monitor BP and weights    LACTOBACILLUS RHAMNOSUS GG (CULTURELLE) 10 BILLION CELL CAPSULE    Take 1 capsule by mouth once daily.    LATANOPROST 0.005 % OPHTHALMIC SOLUTION    Place 1 drop into both eyes every evening.    LORATADINE (CLARITIN) 10 MG TABLET    Take 1 tablet (10 mg total) by mouth once daily.    METOPROLOL TARTRATE (LOPRESSOR) 25 MG TABLET    Take 1 tablet (25 mg total) by mouth 2 (two) times daily.    MONTELUKAST (SINGULAIR) 10 MG TABLET    Take 1 tablet (10 mg total) by mouth every evening.    MUPIROCIN (BACTROBAN) 2 % OINTMENT    SMARTSI Application Topical 2-3 Times Daily    NITROGLYCERIN 0.4 MG/HR TD PT24 (NITRODUR) 0.4 MG/HR    Place 1 patch onto the skin once daily.    POTASSIUM CHLORIDE (KLOR-CON) 10 MEQ TBSR    Take 2 tablets (20 mEq total) by mouth once daily. Take with fluid pill    ROSUVASTATIN (CRESTOR) 40 MG TAB    TAKE 1 TABLET BY MOUTH EVERY DAY   Modified Medications     "No medications on file   Discontinued Medications    CEFDINIR (OMNICEF) 300 MG CAPSULE    Take 300 mg by mouth 2 (two) times daily.    DOXYCYCLINE (VIBRA-TABS) 100 MG TABLET    Take 100 mg by mouth 2 (two) times daily.    PANTOPRAZOLE (PROTONIX) 40 MG TABLET    Take 1 tablet (40 mg total) by mouth once daily.       Review of Systems   Constitutional: Negative.    HENT: Negative.     Eyes: Negative.    Respiratory:  Positive for shortness of breath.    Cardiovascular:  Positive for palpitations and leg swelling.   Gastrointestinal: Negative.    Genitourinary: Negative.    Musculoskeletal: Negative.    Skin: Negative.    Neurological: Negative.    Endo/Heme/Allergies: Negative.    Psychiatric/Behavioral: Negative.     All 12 systems otherwise negative.      Wt Readings from Last 3 Encounters:   07/05/24 91.5 kg (201 lb 11.5 oz)   05/09/24 90.4 kg (199 lb 4.7 oz)   04/25/24 90.4 kg (199 lb 4.7 oz)     Temp Readings from Last 3 Encounters:   06/06/24 97.4 °F (36.3 °C)   05/30/24 97.4 °F (36.3 °C)   05/23/24 97.5 °F (36.4 °C)     BP Readings from Last 3 Encounters:   07/05/24 138/84   06/06/24 138/77   05/30/24 137/67     Pulse Readings from Last 3 Encounters:   07/05/24 65   06/06/24 64   05/30/24 61       /84 (BP Location: Left arm, Patient Position: Sitting, BP Method: Large (Manual))   Pulse 65   Ht 5' 5" (1.651 m)   Wt 91.5 kg (201 lb 11.5 oz)   LMP  (LMP Unknown)   SpO2 95%   BMI 33.57 kg/m²     Objective:   Physical Exam  Vitals and nursing note reviewed.   Constitutional:       General: She is not in acute distress.     Appearance: She is well-developed. She is not diaphoretic.   HENT:      Head: Normocephalic and atraumatic.      Nose: Nose normal.   Eyes:      General: No scleral icterus.     Conjunctiva/sclera: Conjunctivae normal.   Neck:      Thyroid: No thyromegaly.      Vascular: No JVD.   Cardiovascular:      Rate and Rhythm: Normal rate and regular rhythm.      Heart sounds: S1 normal and S2 " normal. Murmur heard.      No friction rub. No gallop. No S3 or S4 sounds.   Pulmonary:      Effort: Pulmonary effort is normal. No respiratory distress.      Breath sounds: Normal breath sounds. No stridor. No wheezing or rales.   Chest:      Chest wall: No tenderness.   Abdominal:      General: Bowel sounds are normal. There is no distension.      Palpations: Abdomen is soft. There is no mass.      Tenderness: There is no abdominal tenderness. There is no rebound.   Genitourinary:     Comments: Deferred  Musculoskeletal:         General: No tenderness or deformity. Normal range of motion.      Cervical back: Normal range of motion and neck supple.   Lymphadenopathy:      Cervical: No cervical adenopathy.   Skin:     General: Skin is warm and dry.      Coloration: Skin is not pale.      Findings: No erythema or rash.   Neurological:      Mental Status: She is alert and oriented to person, place, and time.      Motor: No abnormal muscle tone.      Coordination: Coordination normal.   Psychiatric:         Behavior: Behavior normal.         Thought Content: Thought content normal.         Judgment: Judgment normal.         Lab Results   Component Value Date     07/01/2024     (L) 08/15/2015    K 4.6 07/01/2024    K 4.0 08/15/2015     07/01/2024    CL 99 08/15/2015    CO2 22 (L) 07/01/2024    BUN 27 07/01/2024    CREATININE 1.9 (H) 07/01/2024    CREATININE 1.05 (H) 08/15/2015     (H) 07/01/2024    HGBA1C 5.8 (H) 12/07/2021    MG 1.8 12/14/2023    AST 19 04/23/2024    AST 23 04/05/2016    ALT 17 04/23/2024    ALBUMIN 3.6 04/23/2024    ALBUMIN 4.4 08/15/2015    PROT 6.8 04/23/2024    BILITOT 0.3 04/23/2024    WBC 7.42 07/01/2024    HGB 13.7 07/01/2024    HCT 42.2 07/01/2024    HCT 30 (L) 12/14/2023    MCV 85 07/01/2024     07/01/2024    INR 1.0 10/03/2023    TSH 0.993 12/07/2021    CHOL 134 12/07/2021    HDL 46 12/07/2021    LDLCALC 59.0 (L) 12/07/2021    LDLCALC 89 08/15/2015    TRIG 145  12/07/2021     (H) 01/22/2024     Assessment:      1. Acute on chronic diastolic heart failure    2. Presence of cardiac pacemaker    3. Paroxysmal atrial fibrillation    4. Nonrheumatic aortic valve stenosis    5. Symptomatic bradycardia    6. Cardiac pacemaker in situ    7. S/P placement of cardiac pacemaker    8. Shortness of breath    9. History of COVID-19    10. Anemia, unspecified type    11. Essential hypertension    12. Coronary artery disease of native artery of native heart with stable angina pectoris    13. Stage 3a chronic kidney disease    14. Chronic obstructive pulmonary disease, unspecified COPD type          Plan:     PAF, SSS s/p PPM - 6/2021 with recurrent Afib - s/p JÚNIOR/DCCV and reload with Amio 10/2023  - interrogate device and f/u device clinic as sched   - cont Amio 200mg daily and Eliquis  - has hemorrhoids occ - may be candidate for Watchman   - f/u EP - may discuss ablation     2. HTN   - titrate meds    3. Mod to severe AS - not candidate for TAVR now   - TAVR high risk for endocarditis, can consider Balloon AV - f/u with Dr. Guadarrama  - ECHO 5/2023 with normal bi V function, mod to severe AS, PASP 33 mmHg.  - [facility] 6/2023 with OM1 70% stenosis, LCX 50% stenosis, moderate AS.     4. HFpEF , last BNP - 209  - cont tx - lasix 40mg BID and K 20 meq BID - hold If BP is low --changed to Lasix 40mg daily  - Last  - will repeat.     5. CAD s/p PCI  - cont Eliquis, BB, statin  - cont nitro patch   - patent stents [facility] 6/2023    6. Obesity, BMI 35 - 216 lbs--> 197 lbs - BMI 32 - 198 lbs --> 195  --> 198 lbs --> BMI 33 - 201 lbs   - cont weight loss    7. JA, HIGGINS, h/o COVID 19, COPD  - f/u pulm  - needs to use CPAP    8. Anemia - iron def anemia   - cont iron and f/u GI/heme onc   - s/p EGD 3/2024    9. GERD  - cont PPI     Visit today included increased complexity associated with the care of the episodic problem dyspnea addressed and managing the longitudinal care of the patient due to  the serious and/or complex managed problem(s) .      Thank you for allowing me to participate in this patient's care. Please do not hesitate to contact me with any questions or concerns. Consult note has been forwarded to the referral physician.     Fletcher Meyer MD, [facility]  Cardiovascular Disease  [facility], [place]  [phone] (P)

## 2024-07-07 LAB
OHS QRS DURATION: 94 MS
OHS QTC CALCULATION: 462 MS

## 2024-07-09 ENCOUNTER — OFFICE VISIT (OUTPATIENT)
Dept: HEMATOLOGY/ONCOLOGY | Facility: CLINIC | Age: 89
End: 2024-07-09
Payer: MEDICARE

## 2024-07-09 DIAGNOSIS — D50.0 IRON DEFICIENCY ANEMIA DUE TO CHRONIC BLOOD LOSS: Primary | ICD-10-CM

## 2024-07-09 PROCEDURE — 1160F RVW MEDS BY RX/DR IN RCRD: CPT | Mod: CPTII,95,, | Performed by: NURSE PRACTITIONER

## 2024-07-09 PROCEDURE — 1157F ADVNC CARE PLAN IN RCRD: CPT | Mod: CPTII,95,, | Performed by: NURSE PRACTITIONER

## 2024-07-09 PROCEDURE — 99214 OFFICE O/P EST MOD 30 MIN: CPT | Mod: 95,,, | Performed by: NURSE PRACTITIONER

## 2024-07-09 PROCEDURE — 1159F MED LIST DOCD IN RCRD: CPT | Mod: CPTII,95,, | Performed by: NURSE PRACTITIONER

## 2024-07-09 NOTE — PROGRESS NOTES
Subjective:       Patient ID: Holli Landrum is a 89 y.o. female.    Chief Complaint: ARLETTE. Fatigue     The patient location is: home  The chief complaint leading to consultation is: anemia    Visit type: audiovisual    Face to Face time with patient: 13 minutes  30 minutes of total time spent on the encounter, which includes face to face time and non-face to face time preparing to see the patient (eg, review of tests), Obtaining and/or reviewing separately obtained history, Documenting clinical information in the electronic or other health record, Independently interpreting results (not separately reported) and communicating results to the patient/family/caregiver, or Care coordination (not separately reported).         Each patient to whom he or she provides medical services by telemedicine is:  (1) informed of the relationship between the physician and patient and the respective role of any other health care provider with respect to management of the patient; and (2) notified that he or she may decline to receive medical services by telemedicine and may withdraw from such care at any time.    Notes:        HPI: 89 y.o female with medical history as stated below presenting today for follow up of her iron deficiency anemia following weekly Venofer infusions x 5 completed 2024. She ongoing fatigue but has been able to increase activity. EGD 3/2024 unrevealing. Colonoscopy 2022 unrevealing. No prior VCE. Recently seen at [facility] for hematuria. She follows with outside Urology      Social History     Socioeconomic History    Marital status:    Tobacco Use    Smoking status: Former     Current packs/day: 0.00     Types: Cigarettes     Start date: 6/10/1954     Quit date: 9/15/1955     Years since quittin.8    Smokeless tobacco: Never    Tobacco comments:     I onlysmoked one year while mlm my  was oversees  during [place] w[place]   Substance and Sexual Activity    Alcohol use: Not Currently      Comment: Only drank a little wine and haven't  drunk anything in 15 y    Drug use: Never    Sexual activity: Not Currently     Partners: Male     Birth control/protection: Abstinence     Comment:  and 87 years of age     Social Determinants of Health     Financial Resource Strain: Low Risk  (7/9/2024)    Overall Financial Resource Strain (CARDIA)     Difficulty of Paying Living Expenses: Not hard at all   Food Insecurity: No Food Insecurity (7/9/2024)    Hunger Vital Sign     Worried About Running Out of Food in the Last Year: Never true     Ran Out of Food in the Last Year: Never true   Transportation Needs: No Transportation Needs (11/7/2023)    PRAPARE - Transportation     Lack of Transportation (Medical): No     Lack of Transportation (Non-Medical): No   Physical Activity: Insufficiently Active (7/9/2024)    Exercise Vital Sign     Days of Exercise per Week: 1 day     Minutes of Exercise per Session: 10 min   Stress: No Stress Concern Present (7/9/2024)    [employer] - Occupational Stress Questionnaire     Feeling of Stress : Not at all   Housing Stability: Low Risk  (11/7/2023)    Housing Stability Vital Sign     Unable to Pay for Housing in the Last Year: No     Number of Places Lived in the Last Year: 1     Unstable Housing in the Last Year: No       Past Medical History:   Diagnosis Date    Anticoagulant long-term use     Arthritis     Asthma     Basal cell carcinoma     Cancer     skin cancer to face    Cataract     OU done//    CHF (congestive heart failure)     COPD (chronic obstructive pulmonary disease)     no oxygen; patient denies    cpap     Essential hypertension 05/05/2010    GERD (gastroesophageal reflux disease) 11/20/2012    Glaucoma     Hypertensive heart disease with heart failure 02/05/2013    Paroxysmal atrial fibrillation     Paroxysmal ventricular tachycardia     per problem list    Preop cardiovascular exam 1/26/2024    Renal disorder      manolo-1/3/2020    Squamous cell carcinoma of skin        Family History   Problem Relation Name Age of Onset    Diabetes Brother Wili         Type 2    Heart disease Brother Wili          at 65 CHD    Diabetes Mother Holli COX         Type 1    Alcohol abuse Mother Holli COX         Dod 10/75    Heart disease Mother Holli COX         Arteriosclerosis    Hypertension Mother Holli S     Stroke Mother Holli S         3/15/1975 dod 10/1975    Cancer Maternal Grandfather Pappaw         Dod     Clotting disorder Son          bleeding after tonsillectomy only    Heart disease Father Ronaldo Lopes.         Heart attack. Afib, and Polyvcithemavera    Hypertension Father Ronaldo Sr.     Amblyopia Neg Hx      Blindness Neg Hx      Cataracts Neg Hx      Glaucoma Neg Hx      Macular degeneration Neg Hx      Retinal detachment Neg Hx      Strabismus Neg Hx      Thyroid disease Neg Hx      Colon cancer Neg Hx         Past Surgical History:   Procedure Laterality Date    A-V CARDIAC PACEMAKER INSERTION  2021    Procedure: INSERTION, CARDIAC PACEMAKER, DUAL CHAMBER;  Surgeon: Oscar Sommers MD;  [facility];  Service: Cardiovascular;;    ADENOIDECTOMY  1919    APPENDECTOMY      Because of Ovarian Cyst surgery    BREAST BIOPSY Left     neg    BREAST BIOPSY Right     neg    BREAST BIOPSY Right     neg    BREAST SURGERY      A number of biopsies    CARDIAC CATHETERIZATION  , ,,     has 9 stents    CARDIAC SURGERY  2012    stents    CATARACT EXTRACTION W/  INTRAOCULAR LENS IMPLANT Left 2018    Dr Rose    CATARACT EXTRACTION W/  INTRAOCULAR LENS IMPLANT Right 2018    Dr Rose//    CHOLECYSTECTOMY      COLONOSCOPY  ~    Dr. Edward; normal per patient report    COLONOSCOPY N/A 2022    Procedure: COLONOSCOPY 22-note in Dr Simms's office visit note that he spoke to her cardiologist and she was viable candidate for endoscopy;  Surgeon: Cordelia Bueno MD;   Location: [facility] ENDO;  Service: Endoscopy;  Laterality: N/A;    CORONARY ANGIOGRAPHY N/A 03/20/2020    Procedure: ANGIOGRAM, CORONARY ARTERY;  Surgeon: Chuy Bryant MD;  Location: [facility] CATH;  Service: Cardiology;  Laterality: N/A;    CYSTOSCOPY W/ RETROGRADES Bilateral 02/12/2020    Procedure: CYSTOSCOPY, WITH RETROGRADE PYELOGRAM;  Surgeon: Gasper Feliz MD;  Location: [facility] OR;  Service: Urology;  Laterality: Bilateral;    ESOPHAGOGASTRODUODENOSCOPY N/A 08/13/2020    Procedure: EGD (ESOPHAGOGASTRODUODENOSCOPY);  Surgeon: Mika Solorzano MD;  Location: [facility];  Service: Endoscopy;  Laterality: N/A; Mild Schatzki ring. Biopsied. Dilated. small hiatal hernia, gastritis; biopsy: esophagus- SEVERE REFLUX ESOPHAGITIS, stomach- chronic gastritis, negative for H pylori    ESOPHAGOGASTRODUODENOSCOPY N/A 10/22/2020    Procedure: EGD (ESOPHAGOGASTRODUODENOSCOPY);  Surgeon: Fred Hendricks MD;  Location: [facility];  Service: Endoscopy;  Laterality: N/A;    ESOPHAGOGASTRODUODENOSCOPY N/A 3/21/2024    Procedure: EGD (ESOPHAGOGASTRODUODENOSCOPY);  Surgeon: Mika Solorzano MD;  Location: [facility];  Service: Gastroenterology;  Laterality: N/A;    EYE SURGERY  2017    Cataracs    FRACTIONAL FLOW RESERVE (FFR), CORONARY  6/20/2023    Procedure: Fractional Flow [place] (FFR), Coronary;  Surgeon: Brice Campuzano MD;  Location: [facility] CATH LAB;  Service: Cardiology;;    HYSTERECTOMY  1969    INSTANTANEOUS WAVE-FREE RATIO (IFR) N/A 6/20/2023    Procedure: Instantaneous Wave-Free Ratio (IFR);  Surgeon: Brice Campuzano MD;  Location: [facility] CATH LAB;  Service: Cardiology;  Laterality: N/A;    LEFT HEART CATHETERIZATION Left 03/03/2020    Procedure: Left heart cath;  Surgeon: Chuy Bryant MD;  Location: [facility];  Service: Cardiology;  Laterality: Left;    LEFT HEART CATHETERIZATION Left 03/20/2020    Procedure: Left heart cath;  Surgeon: Chuy Bryant MD;  Location: [facility] CATH;  Service: Cardiology;  Laterality: Left;     LEFT HEART CATHETERIZATION N/A 6/20/2023    Procedure: Left heart cath;  Surgeon: Brice Campuzano MD;  Location: [facility] CATH LAB;  Service: Cardiology;  Laterality: N/A;    OVARIAN CYST REMOVAL  teenager    PHACOEMULSIFICATION OF CATARACT Left 11/01/2018    Procedure: PHACOEMULSIFICATION, CATARACT;  Surgeon: Aleksandar Rose Jr., MD;  Location: [facility] OR;  Service: Ophthalmology;  Laterality: Left;    PHACOEMULSIFICATION OF CATARACT Right 12/13/2018    Procedure: PHACOEMULSIFICATION, CATARACT;  Surgeon: Aleksandar Rose Jr., MD;  Location: [facility] OR;  Service: Ophthalmology;  Laterality: Right;    TONSILLECTOMY      aw/denoids    TRANSESOPHAGEAL ECHOCARDIOGRAM WITH POSSIBLE CARDIOVERSION (ALFRED W/ POSS CARDIOVERSION) N/A 10/5/2023    Procedure: Transesophageal echo (ALFRED) intra-procedure log documentation/alfred/cv;  Surgeon: Bao Miguel MD;  Location: [facility] CATH LAB;  Service: Cardiology;  Laterality: N/A;   Alfred/Cv  MRI safe   Pacer & leads implanted 6/16/21, Antoun   Bio Edora 8 MICHELLE, [id number], PID: [id number]   A lead: Bio Solia S45, [id number]   V lead: Bio Solia S53, [id number]    TREATMENT OF CARDIAC ARRHYTHMIA N/A 10/5/2023    Procedure: Cardioversion or Defibrillation;  Surgeon: Bao Miguel MD;  Location: [facility] CATH LAB;  Service: Cardiology;  Laterality: N/A;    UPPER GASTROINTESTINAL ENDOSCOPY  ~2005    Dr. Solorzano    VALVE STUDY-AORTIC  6/20/2023    Procedure: Valve study-aortic;  Surgeon: Brice Campuzano MD;  Location: [facility] CATH LAB;  Service: Cardiology;;    VASCULAR SURGERY      to remove clot from right leg    Yag Capsulotomy Bilateral 11/05/2019    Dr Rose       Review of Systems   Constitutional:  Positive for fatigue. Negative for appetite change, chills, fever and unexpected weight change.   HENT:  Negative for congestion, mouth sores, nosebleeds, sore throat, trouble swallowing and voice change.    Respiratory:  Negative for cough, chest tightness, shortness of breath and wheezing.     Cardiovascular:  Negative for chest pain and leg swelling.   Gastrointestinal:  Negative for abdominal distention, abdominal pain, blood in stool, constipation, diarrhea, nausea and vomiting.   Genitourinary:  Negative for difficulty urinating, dysuria and hematuria.   Musculoskeletal:  Negative for arthralgias, back pain and myalgias.   Skin:  Negative for pallor, rash and wound.   Neurological:  Negative for dizziness, syncope, weakness and headaches.   Hematological:  Negative for adenopathy. Does not bruise/bleed easily.   Psychiatric/Behavioral:  The patient is nervous/anxious.          Medication List with Changes/Refills   Current Medications    ALBUTEROL (PROVENTIL/VENTOLIN HFA) 90 MCG/ACTUATION INHALER    Inhale 1-2 puffs into the lungs every 4 (four) hours as needed for Wheezing or Shortness of Breath. Rescue    ALBUTEROL-IPRATROPIUM (DUO-NEB) 2.5 MG-0.5 MG/3 ML NEBULIZER SOLUTION    Take 3 mLs by nebulization every 6 (six) hours as needed for Wheezing or Shortness of Breath.    AMIODARONE (PACERONE) 200 MG TAB    Take 1 tablet (200 mg total) by mouth once daily.    APIXABAN (ELIQUIS) 2.5 MG TAB    Take 1 tablet (2.5 mg total) by mouth 2 (two) times daily.    CHOLECALCIFEROL, VITAMIN D3, 125 MCG (5,000 UNIT) TAB    Take 5,000 Units by mouth once daily.    DORZOLAMIDE (TRUSOPT) 2 % OPHTHALMIC SOLUTION    INSTILL 1 DROP INTO BOTH EYES TWICE DAILY    FAMOTIDINE (PEPCID) 40 MG TABLET    TAKE 1 TABLET (40 MG TOTAL) BY MOUTH EVERY EVENING.    FLUTICASONE (FLONASE) 50 MCG/ACTUATION NASAL SPRAY    2 sprays (100 mcg total) by Each Nare route daily as needed for Allergies.    FLUTICASONE FUROATE-VILANTEROL (BREO ELLIPTA) 100-25 MCG/DOSE DISKUS INHALER    Inhale 1 puff into the lungs once daily.    FUROSEMIDE (LASIX) 40 MG TABLET    Take 1 tablet (40 mg total) by mouth once daily. Do not take if BP is below 110/70    LACTOBACILLUS RHAMNOSUS GG (CULTURELLE) 10 BILLION CELL CAPSULE    Take 1 capsule by mouth once  daily.    LATANOPROST 0.005 % OPHTHALMIC SOLUTION    Place 1 drop into both eyes every evening.    LORATADINE (CLARITIN) 10 MG TABLET    Take 1 tablet (10 mg total) by mouth once daily.    METOPROLOL TARTRATE (LOPRESSOR) 25 MG TABLET    Take 1 tablet (25 mg total) by mouth 2 (two) times daily.    MONTELUKAST (SINGULAIR) 10 MG TABLET    Take 1 tablet (10 mg total) by mouth every evening.    MUPIROCIN (BACTROBAN) 2 % OINTMENT    SMARTSI Application Topical 2-3 Times Daily    NITROGLYCERIN 0.4 MG/HR TD PT24 (NITRODUR) 0.4 MG/HR    Place 1 patch onto the skin once daily.    POTASSIUM CHLORIDE (KLOR-CON) 10 MEQ TBSR    Take 2 tablets (20 mEq total) by mouth once daily. Take with Lasix    ROSUVASTATIN (CRESTOR) 40 MG TAB    TAKE 1 TABLET BY MOUTH EVERY DAY     Objective:     There were no vitals filed for this visit.    Lab Results   Component Value Date    WBC 5.94 2024    HGB 13.1 2024    HCT 40.5 2024    MCV 86 2024     2024       Lab Results   Component Value Date    IRON 44 2024    TRANSFERRIN 217 2024    TIBC 321 2024    FESATURATED 14 (L) 2024      BMP  Lab Results   Component Value Date     2024    K 4.6 2024     2024    CO2 25 2024    BUN 20 2024    CREATININE 1.5 (H) 2024    CALCIUM 10.5 2024    ANIONGAP 9 2024    EGFRNORACEVR 33 (A) 2024         Physical Exam  Pulmonary:      Effort: Pulmonary effort is normal. No respiratory distress.      Comments: Supplemental O2 via NC  Neurological:      Mental Status: She is alert and oriented to person, place, and time.        Assessment:     Problem List Items Addressed This Visit          Oncology    Iron deficiency anemia - Primary     Patient s/p weekly Venofer x 5 completed 2024. Interval resolution of anemia. Persistent mild iron deficiency. She does note interval hematuria seen at [facility], now resolved    Previously discussed  with patient's GI MD if VCE warranted. Advised hold off on additional GI evaluation, replete iron storage. Consider endoscopies if recurrent anemia    Will hold off on additional iron supplement at present time. Given recent hematuria will arrange close f/u 2 months with repeat labs. Discussed S&S to report sooner. Encourage iron rich foods.          Relevant Orders    CBC Auto Differential    Basic Metabolic Panel    Iron and TIBC    Ferritin         Plan:     Iron deficiency anemia due to chronic blood loss  -     CBC Auto Differential; Future; Expected date: 07/09/2024  -     Basic Metabolic Panel; Future; Expected date: 07/09/2024  -     Iron and TIBC; Future; Expected date: 07/09/2024  -     Ferritin; Future; Expected date: 07/09/2024          Med Onc Chart Routing      Follow up with physician    Follow up with LISA 2 months. patient prefers in person visits   Infusion scheduling note    Injection scheduling note    Labs CBC, ferritin and iron and TIBC   Scheduling:  Preferred lab:  Lab interval:  +BMP 1-2 days prior   Imaging None      Pharmacy appointment No pharmacy appointment needed      Other referrals       No additional referrals needed         LINO James

## 2024-07-09 NOTE — ASSESSMENT & PLAN NOTE
Patient s/p weekly Venofer x 5 completed 6/2024. Interval resolution of anemia. Persistent mild iron deficiency. She does note interval hematuria seen at [facility], now resolved    Previously discussed with patient's GI MD if VCE warranted. Advised hold off on additional GI evaluation, replete iron storage. Consider endoscopies if recurrent anemia    Will hold off on additional iron supplement at present time. Given recent hematuria will arrange close f/u 2 months with repeat labs. Discussed S&S to report sooner. Encourage iron rich foods.

## 2024-07-23 ENCOUNTER — TELEPHONE (OUTPATIENT)
Dept: NEPHROLOGY | Facility: CLINIC | Age: 89
End: 2024-07-23
Payer: MEDICARE

## 2024-07-23 DIAGNOSIS — N28.9 RENAL INSUFFICIENCY: Primary | ICD-10-CM

## 2024-07-23 NOTE — TELEPHONE ENCOUNTER
Spoke with pt. Advised pt of scheduled labs prior to appt. Pt wanted virtual changed to in office. Pt rescheduled to in office visit, pt verbalized understanding.

## 2024-07-29 ENCOUNTER — CLINICAL SUPPORT (OUTPATIENT)
Dept: CARDIOLOGY | Facility: HOSPITAL | Age: 89
End: 2024-07-29
Payer: MEDICARE

## 2024-07-29 ENCOUNTER — CLINICAL SUPPORT (OUTPATIENT)
Dept: CARDIOLOGY | Facility: HOSPITAL | Age: 89
End: 2024-07-29
Attending: INTERNAL MEDICINE
Payer: MEDICARE

## 2024-07-29 DIAGNOSIS — I48.91 UNSPECIFIED ATRIAL FIBRILLATION: ICD-10-CM

## 2024-07-29 DIAGNOSIS — Z95.0 PRESENCE OF CARDIAC PACEMAKER: ICD-10-CM

## 2024-07-29 PROCEDURE — 93296 REM INTERROG EVL PM/IDS: CPT | Performed by: INTERNAL MEDICINE

## 2024-07-29 PROCEDURE — 93294 REM INTERROG EVL PM/LDLS PM: CPT | Mod: S$GLB,,, | Performed by: INTERNAL MEDICINE

## 2024-07-29 NOTE — PLAN OF CARE
Discussed plan of care with pt. Addressed any and ongoing concerns. Pt denies   Problem: Adult Inpatient Plan of Care  Goal: Plan of Care Review  Outcome: Progressing  Goal: Patient-Specific Goal (Individualized)  Outcome: Progressing  Flowsheets (Taken 5/9/2024 1550)  Individualized Care Needs: Reclined position warm blanket and pillow  Anxieties, Fears or Concerns: none  Goal: Absence of Hospital-Acquired Illness or Injury  Outcome: Progressing  Intervention: Identify and Manage Fall Risk  Flowsheets (Taken 5/9/2024 1550)  Safety Promotion/Fall Prevention: in recliner, wheels locked  Intervention: Prevent Infection  Flowsheets (Taken 5/9/2024 1550)  Infection Prevention:   equipment surfaces disinfected   hand hygiene promoted   personal protective equipment utilized  Goal: Optimal Comfort and Wellbeing  Outcome: Progressing  Intervention: Provide Person-Centered Care  Flowsheets (Taken 5/9/2024 1550)  Trust Relationship/Rapport:   care explained   questions encouraged   choices provided   reassurance provided   emotional support provided   thoughts/feelings acknowledged   empathic listening provided   questions answered      Patient

## 2024-07-30 ENCOUNTER — LAB VISIT (OUTPATIENT)
Dept: LAB | Facility: HOSPITAL | Age: 89
End: 2024-07-30
Attending: INTERNAL MEDICINE
Payer: MEDICARE

## 2024-07-30 ENCOUNTER — TELEPHONE (OUTPATIENT)
Dept: CARDIOLOGY | Facility: CLINIC | Age: 89
End: 2024-07-30
Payer: MEDICARE

## 2024-07-30 DIAGNOSIS — I50.33 ACUTE ON CHRONIC DIASTOLIC HEART FAILURE: Primary | ICD-10-CM

## 2024-07-30 DIAGNOSIS — N28.9 RENAL INSUFFICIENCY: ICD-10-CM

## 2024-07-30 LAB
ALBUMIN SERPL BCP-MCNC: 3.5 G/DL (ref 3.5–5.2)
ANION GAP SERPL CALC-SCNC: 10 MMOL/L (ref 8–16)
BUN SERPL-MCNC: 19 MG/DL (ref 10–30)
CALCIUM SERPL-MCNC: 10.8 MG/DL (ref 8.7–10.5)
CHLORIDE SERPL-SCNC: 105 MMOL/L (ref 95–110)
CO2 SERPL-SCNC: 24 MMOL/L (ref 23–29)
CREAT SERPL-MCNC: 1.3 MG/DL (ref 0.5–1.4)
EST. GFR  (NO RACE VARIABLE): 38.8 ML/MIN/1.73 M^2
GLUCOSE SERPL-MCNC: 111 MG/DL (ref 70–110)
PHOSPHATE SERPL-MCNC: 3.3 MG/DL (ref 2.7–4.5)
POTASSIUM SERPL-SCNC: 3.8 MMOL/L (ref 3.5–5.1)
SODIUM SERPL-SCNC: 139 MMOL/L (ref 136–145)

## 2024-07-30 PROCEDURE — 36415 COLL VENOUS BLD VENIPUNCTURE: CPT | Performed by: INTERNAL MEDICINE

## 2024-07-30 PROCEDURE — 80069 RENAL FUNCTION PANEL: CPT | Performed by: INTERNAL MEDICINE

## 2024-07-30 NOTE — TELEPHONE ENCOUNTER
"LVM for pt to call back in regards to labs. Pt was scheduled for lab work this morning but did not complete.         ----- Message from Alannah Guerin MD sent at 7/30/2024  1:01 PM CDT -----  Regarding: RE: TI shows possible fluid overload. Pt SOB at rest  Will add a BNP and BMP to labs today. Please add on Kristy.  ----- Message -----  From: Wale Evans RN  Sent: 7/29/2024   4:47 PM CDT  To: Dusty Saleh MD; Fletcher Meyer MD  Subject: TI shows possible fluid overload. Pt SOB at #    Hi Dr. Seth and Dr. Meyer,    I sent the following [employer] alert: "The thoracic impedance is 98 Ohms and baseline impedance is 116 Ohms. Available HF diagnostics and trends indicate possible fluid accumulation/HF decompensation."     Called pt for symptom/med check on 7/29/24. Pt states, "I'm having to use my emergency inhaler more because I'm having trouble breathing even at rest. I think I'm holding onto fluid just recently. I haven't been able to weigh daily because I hurt my back 1.5 weeks ago taking my sheet off the bed."  Denies orthopnea and swelling in legs, feet, or belly. Pt reports taking 40mg lasix daily and eating more salt due to family member's cooking this past week. Pt states, "I took 1.5 tablets lasix twice last week because my urine output was low. I'm not really sure if it helped my breathing." Pt has labs tomorrow morning if you wanted to add anything. Pt last saw Dr. Meyer on 7/5 and has f/u on 9/19. Pt last saw Dr. Seth on 1/22/24.     Thanks,  Wale Evans  " Hypovolemia dehydration Hypovolemia dehydration Hypovolemia dehydration Hypovolemia dehydration Hypovolemia dehydration

## 2024-08-01 ENCOUNTER — TELEPHONE (OUTPATIENT)
Dept: CARDIOLOGY | Facility: CLINIC | Age: 89
End: 2024-08-01
Payer: MEDICARE

## 2024-08-01 DIAGNOSIS — I50.33 ACUTE ON CHRONIC DIASTOLIC HEART FAILURE: ICD-10-CM

## 2024-08-01 DIAGNOSIS — I48.0 PAROXYSMAL ATRIAL FIBRILLATION: Primary | ICD-10-CM

## 2024-08-01 NOTE — TELEPHONE ENCOUNTER
"Pt notified and set up for labs tomorrow. Pt and daughter verbalized understanding pb          ----- Message from Dusty Saleh MD sent at 8/1/2024  4:11 PM CDT -----  Regarding: RE: TI shows possible fluid overload. Pt SOB at rest  BNP and proBNP have not been obtained.  Please make sure they are.  Also tell patient to take an extra dose of Lasix.  She may need to come and be seen in clinic.  Does recall have availability? .  ----- Message -----  From: Alannah Guerin MD  Sent: 7/30/2024   1:01 PM CDT  To: Dusty Saleh MD; Wale Evans RN; #  Subject: RE: TI shows possible fluid overload. Pt SOB#    Will add a BNP and BMP to labs today. Please add on Kristy.  ----- Message -----  From: Wale Evans RN  Sent: 7/29/2024   4:47 PM CDT  To: Dusty Saleh MD; Fletcher Meyer MD  Subject: TI shows possible fluid overload. Pt SOB at #    Hi Dr. Seth and Dr. Meyer,    [facility] sent the following [employer] alert: "The thoracic impedance is 98 Ohms and baseline impedance is 116 Ohms. Available HF diagnostics and trends indicate possible fluid accumulation/HF decompensation."     Called pt for symptom/med check on 7/29/24. Pt states, "I'm having to use my emergency inhaler more because I'm having trouble breathing even at rest. I think I'm holding onto fluid just recently. I haven't been able to weigh daily because I hurt my back 1.5 weeks ago taking my sheet off the bed."  Denies orthopnea and swelling in legs, feet, or belly. Pt reports taking 40mg lasix daily and eating more salt due to family member's cooking this past week. Pt states, "I took 1.5 tablets lasix twice last week because my urine output was low. I'm not really sure if it helped my breathing." Pt has labs tomorrow morning if you wanted to add anything. Pt last saw Dr. Meyer on 7/5 and has f/u on 9/19. Pt last saw Dr. Seth on 1/22/24.     Thanks,  Wale Evans"

## 2024-08-02 ENCOUNTER — LAB VISIT (OUTPATIENT)
Dept: LAB | Facility: HOSPITAL | Age: 89
End: 2024-08-02
Attending: INTERNAL MEDICINE
Payer: MEDICARE

## 2024-08-02 DIAGNOSIS — I48.0 PAROXYSMAL ATRIAL FIBRILLATION: ICD-10-CM

## 2024-08-02 DIAGNOSIS — I50.33 ACUTE ON CHRONIC DIASTOLIC HEART FAILURE: ICD-10-CM

## 2024-08-02 LAB — BNP SERPL-MCNC: 522 PG/ML (ref 0–99)

## 2024-08-02 PROCEDURE — 83880 ASSAY OF NATRIURETIC PEPTIDE: CPT | Mod: 91 | Performed by: INTERNAL MEDICINE

## 2024-08-02 PROCEDURE — 83880 ASSAY OF NATRIURETIC PEPTIDE: CPT | Performed by: INTERNAL MEDICINE

## 2024-08-02 PROCEDURE — 36415 COLL VENOUS BLD VENIPUNCTURE: CPT | Mod: PO | Performed by: INTERNAL MEDICINE

## 2024-08-04 LAB
OHS CV AF BURDEN PERCENT: < 1
OHS CV DC REMOTE DEVICE TYPE: NORMAL
OHS CV ICD SHOCK: NO
OHS CV RV PACING PERCENT: 7 %

## 2024-08-05 ENCOUNTER — OFFICE VISIT (OUTPATIENT)
Dept: NEPHROLOGY | Facility: CLINIC | Age: 89
End: 2024-08-05
Payer: MEDICARE

## 2024-08-05 VITALS
BODY MASS INDEX: 33.57 KG/M2 | HEART RATE: 70 BPM | DIASTOLIC BLOOD PRESSURE: 70 MMHG | SYSTOLIC BLOOD PRESSURE: 120 MMHG | HEIGHT: 65 IN

## 2024-08-05 DIAGNOSIS — N18.32 STAGE 3B CHRONIC KIDNEY DISEASE: ICD-10-CM

## 2024-08-05 DIAGNOSIS — R79.89 ELEVATED SERUM CREATININE: ICD-10-CM

## 2024-08-05 DIAGNOSIS — I10 PRIMARY HYPERTENSION: ICD-10-CM

## 2024-08-05 DIAGNOSIS — N17.9 AKI (ACUTE KIDNEY INJURY): Primary | ICD-10-CM

## 2024-08-05 DIAGNOSIS — I50.30 DIASTOLIC CONGESTIVE HEART FAILURE, UNSPECIFIED HF CHRONICITY: ICD-10-CM

## 2024-08-05 PROCEDURE — 99204 OFFICE O/P NEW MOD 45 MIN: CPT | Mod: S$GLB,,, | Performed by: INTERNAL MEDICINE

## 2024-08-05 PROCEDURE — 3288F FALL RISK ASSESSMENT DOCD: CPT | Mod: CPTII,S$GLB,, | Performed by: INTERNAL MEDICINE

## 2024-08-05 PROCEDURE — 1157F ADVNC CARE PLAN IN RCRD: CPT | Mod: CPTII,S$GLB,, | Performed by: INTERNAL MEDICINE

## 2024-08-05 PROCEDURE — 1160F RVW MEDS BY RX/DR IN RCRD: CPT | Mod: CPTII,S$GLB,, | Performed by: INTERNAL MEDICINE

## 2024-08-05 PROCEDURE — 1100F PTFALLS ASSESS-DOCD GE2>/YR: CPT | Mod: CPTII,S$GLB,, | Performed by: INTERNAL MEDICINE

## 2024-08-05 PROCEDURE — 1125F AMNT PAIN NOTED PAIN PRSNT: CPT | Mod: CPTII,S$GLB,, | Performed by: INTERNAL MEDICINE

## 2024-08-05 PROCEDURE — 99999 PR PBB SHADOW E&M-EST. PATIENT-LVL IV: CPT | Mod: PBBFAC,,, | Performed by: INTERNAL MEDICINE

## 2024-08-05 PROCEDURE — 1159F MED LIST DOCD IN RCRD: CPT | Mod: CPTII,S$GLB,, | Performed by: INTERNAL MEDICINE

## 2024-08-08 LAB — NT-PROBNP SERPL IA-MCNC: 1969 PG/ML

## 2024-08-14 ENCOUNTER — OFFICE VISIT (OUTPATIENT)
Dept: FAMILY MEDICINE | Facility: CLINIC | Age: 89
End: 2024-08-14
Payer: MEDICARE

## 2024-08-14 ENCOUNTER — HOSPITAL ENCOUNTER (OUTPATIENT)
Dept: RADIOLOGY | Facility: HOSPITAL | Age: 89
Discharge: HOME | End: 2024-08-14
Attending: FAMILY MEDICINE
Payer: MEDICARE

## 2024-08-14 VITALS
DIASTOLIC BLOOD PRESSURE: 86 MMHG | HEIGHT: 65 IN | BODY MASS INDEX: 33.15 KG/M2 | SYSTOLIC BLOOD PRESSURE: 136 MMHG | HEART RATE: 67 BPM | WEIGHT: 199 LBS | OXYGEN SATURATION: 95 %

## 2024-08-14 DIAGNOSIS — D68.69 OTHER THROMBOPHILIA: ICD-10-CM

## 2024-08-14 DIAGNOSIS — Z93.59 SUPRAPUBIC CATHETER: ICD-10-CM

## 2024-08-14 DIAGNOSIS — G47.33 OBSTRUCTIVE SLEEP APNEA: ICD-10-CM

## 2024-08-14 DIAGNOSIS — Z00.00 ENCOUNTER FOR MEDICAL EXAMINATION TO ESTABLISH CARE: ICD-10-CM

## 2024-08-14 DIAGNOSIS — E27.8 ADRENAL NODULE: ICD-10-CM
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
## 2024-08-14 DIAGNOSIS — J44.9 CHRONIC OBSTRUCTIVE PULMONARY DISEASE, UNSPECIFIED COPD TYPE: ICD-10-CM

## 2024-08-14 DIAGNOSIS — Z13.220 LIPID SCREENING: ICD-10-CM

## 2024-08-14 DIAGNOSIS — Z79.899 ENCOUNTER FOR LONG-TERM (CURRENT) USE OF OTHER MEDICATIONS: ICD-10-CM

## 2024-08-14 DIAGNOSIS — Z95.0 S/P PLACEMENT OF CARDIAC PACEMAKER: ICD-10-CM

## 2024-08-14 DIAGNOSIS — M25.551 RIGHT HIP PAIN: Primary | ICD-10-CM

## 2024-08-14 DIAGNOSIS — M25.551 RIGHT HIP PAIN: ICD-10-CM

## 2024-08-14 DIAGNOSIS — I48.0 PAROXYSMAL ATRIAL FIBRILLATION: ICD-10-CM

## 2024-08-14 DIAGNOSIS — I50.9 CHRONIC CONGESTIVE HEART FAILURE, UNSPECIFIED HEART FAILURE TYPE: ICD-10-CM

## 2024-08-14 PROBLEM — M51.369 LUMBAR DEGENERATIVE DISC DISEASE: Status: ACTIVE | Noted: 2024-08-01

## 2024-08-14 PROBLEM — N39.0 RECURRENT UTI: Status: ACTIVE | Noted: 2024-08-01

## 2024-08-14 PROBLEM — K83.8 COMMON BILE DUCT DILATATION: Status: ACTIVE | Noted: 2024-08-01

## 2024-08-14 PROBLEM — M47.816 FACET ARTHROPATHY, LUMBAR: Status: ACTIVE | Noted: 2024-08-01

## 2024-08-14 PROBLEM — M51.36 LUMBAR DEGENERATIVE DISC DISEASE: Status: ACTIVE | Noted: 2024-08-01

## 2024-08-14 PROBLEM — Z91.81 HISTORY OF FALLING: Status: ACTIVE | Noted: 2024-08-14

## 2024-08-14 PROBLEM — J45.909 ASTHMA: Status: ACTIVE | Noted: 2024-08-01

## 2024-08-14 PROBLEM — M43.16 ANTEROLISTHESIS OF LUMBAR SPINE: Status: ACTIVE | Noted: 2024-08-01

## 2024-08-14 PROBLEM — Z78.9 SELF-CATHETERIZES URINARY BLADDER: Status: ACTIVE | Noted: 2024-08-14

## 2024-08-14 PROCEDURE — 73502 X-RAY EXAM HIP UNI 2-3 VIEWS: CPT | Mod: 26,RT,, | Performed by: RADIOLOGY

## 2024-08-14 PROCEDURE — 99999 PR PBB SHADOW E&M-EST. PATIENT-LVL V: CPT | Mod: PBBFAC,,, | Performed by: FAMILY MEDICINE

## 2024-08-14 PROCEDURE — 73502 X-RAY EXAM HIP UNI 2-3 VIEWS: CPT | Mod: TC,PO,RT

## 2024-08-14 RX ORDER — VIBEGRON 75 MG/1
1 TABLET, FILM COATED ORAL EVERY MORNING
COMMUNITY
Start: 2024-08-01

## 2024-08-14 NOTE — PATIENT INSTRUCTIONS
Follow up in about 6 months (around 2/14/2025), or if symptoms worsen or fail to improve, for Med refills, LAB RESULTS.     Dear patient,   As a result of recent federal legislation (The Federal Cures Act), you may receive lab or pathology results from your visit in your [facility] account before your physician is able to contact you. Your physician or their representative will relay the results to you with their recommendations at their soonest availability.     If no improvement in symptoms or symptoms worsen, please be advised to call MD, follow-up at clinic and/or go to ER if becomes severe.    Rolando Cannon M.D.        We Offer TELEHEALTH & Same Day Appointments!   Book your Telehealth appointment with me through my nurse or   Clinic appointments on [employer]!    [address] [place] [address]    Office: [phone]   FAX: [phone]    Check out my Facebook Page and Follow Me at: [email or web address]    Check out my website at [employer] by clicking on: [email or web address]    To Schedule appointments online, go to SingleFeed: [email or web address]

## 2024-08-14 NOTE — PROGRESS NOTES
1st check to see if patient has seen the results.  If not then  CALL patient with results and Document verification.  Schedule follow-up if needed.  [phone]  X-ray of the right hip reviewed by radiology.  There is no fracture noted.  I recommend physical therapy and follow-up sooner if no improvement.

## 2024-08-15 PROBLEM — Z00.00 ENCOUNTER FOR MEDICAL EXAMINATION TO ESTABLISH CARE: Status: ACTIVE | Noted: 2024-08-15

## 2024-08-15 PROBLEM — Z79.899 ENCOUNTER FOR LONG-TERM (CURRENT) USE OF OTHER MEDICATIONS: Chronic | Status: ACTIVE | Noted: 2024-01-26

## 2024-08-15 PROBLEM — M25.551 RIGHT HIP PAIN: Chronic | Status: ACTIVE | Noted: 2024-08-14

## 2024-08-15 NOTE — PROGRESS NOTES
PLAN:    Assessment & Plan  1. Establishment of care.  A hip x-ray will be ordered to rule out any fractures. Fasting blood work will be ordered, which can be conducted by home health. She is encouraged to maintain adequate hydration.    2. Recent fall.  She experienced a fall on the 3rd after taking gabapentin, which caused side effects. She reports pain in her right hip and swelling in both feet. An x-ray of the right hip will be performed to check for fractures. She is advised to continue her therapy sessions.    3. Chronic Obstructive Pulmonary Disease (COPD).  She follows with pulmonology for COPD management. No changes to her current treatment plan are indicated at this time.    4. Sleep Apnea.  She follows with pulmonology for sleep apnea management. No changes to her current treatment plan are indicated at this time.    5. Atrial Fibrillation.  She follows with cardiology for atrial fibrillation and has a pacemaker. Her condition is controlled with medication. No changes to her current treatment plan are indicated at this time.    6. Anemia, Iron Deficiency.  She follows with hematology oncology for anemia and iron deficiency and has received infusions. No changes to her current treatment plan are indicated at this time.    7. Congestive Heart Failure.  She takes Lasix as needed for edema and follows with cardiology. No changes to her current treatment plan are indicated at this time.    8. Suprapubic Catheter.  She follows with urology for her suprapubic catheter. She experienced bleeding last month, which was resolved after visiting urology. No current issues reported.        Problem List Items Addressed This Visit       Encounter for long-term (current) use of other medications (Chronic)     Complete history and physical was completed today.  Complete and thorough medication reconciliation was performed.  Discussed risks and benefits of medications.  Advised patient on orders and health maintenance.  We  discussed old records and old labs if available.  Will request any records not available through epic.  Continue current medications listed on your summary sheet.           Relevant Orders    Lipid Panel    Hemoglobin A1C    CBC Without Differential    Comprehensive Metabolic Panel    TSH    Ambulatory referral/consult to Home Health    S/P placement of cardiac pacemaker    Suprapubic catheter    COPD (chronic obstructive pulmonary disease)    Chronic congestive heart failure    Paroxysmal atrial fibrillation    Obstructive sleep apnea    Adrenal nodule     Chronic.  Stable  F/u with urology           Other thrombophilia     Patient has some easy bruising but no evidence of bleeding.  Check blood work.         Right hip pain - Primary     X-ray shows no fracture.  Start physical therapy through home health.  Consider orthopedic consult if no improvement.         Relevant Orders    Ambulatory referral/consult to Home Health    X-Ray Hip 2 or 3 views Right with Pelvis when performed (Completed)    Fall     Stop gabapentin.  Fall precautions.         Relevant Orders    Ambulatory referral/consult to Home Health    Encounter for medical examination to establish care     Complete history and physical was completed today.  Complete and thorough medication reconciliation was performed.  Discussed risks and benefits of medications.  Advised patient on orders and health maintenance.  We discussed old records and old labs if available.  Will request any records not available through epic.  Continue current medications listed on your summary sheet.           Relevant Orders    Lipid Panel    Hemoglobin A1C    CBC Without Differential    Comprehensive Metabolic Panel    TSH     Other Visit Diagnoses       Lipid screening        Relevant Orders    Lipid Panel          Future Appointments       Date Provider Specialty Appt Notes    9/6/2024  Lab mali liu    9/10/2024 Kimberli Jerez NP Hematology and Oncology 2 mo f/u with  lab prior/ngwv    9/19/2024 Fletcher Meyer MD Cardiology 6 month    10/1/2024 Fletcher Meyer MD Cardiology     11/6/2024  Pulmonology Follow up in about 6 months (around 10/12/2024) for hira - on return, CXR on return, 6 min walk - on return.    11/6/2024  Pulmonology Follow up in about 6 months (around 10/12/2024) for hira - on return, CXR on return, 6 min walk - on return.    11/6/2024 Gabe Waller MD Pulmonology Follow up in about 6 months (around 10/12/2024) for hira - on return, CXR on return, 6 min walk - on return.           Medication Management for assessment above:   Medication List with Changes/Refills   Current Medications    ALBUTEROL (PROVENTIL/VENTOLIN HFA) 90 MCG/ACTUATION INHALER    Inhale 1-2 puffs into the lungs every 4 (four) hours as needed for Wheezing or Shortness of Breath. Rescue    ALBUTEROL-IPRATROPIUM (DUO-NEB) 2.5 MG-0.5 MG/3 ML NEBULIZER SOLUTION    Take 3 mLs by nebulization every 6 (six) hours as needed for Wheezing or Shortness of Breath.    AMIODARONE (PACERONE) 200 MG TAB    Take 1 tablet (200 mg total) by mouth once daily.    APIXABAN (ELIQUIS) 2.5 MG TAB    Take 1 tablet (2.5 mg total) by mouth 2 (two) times daily.    CHOLECALCIFEROL, VITAMIN D3, 125 MCG (5,000 UNIT) TAB    Take 5,000 Units by mouth once daily.    DORZOLAMIDE (TRUSOPT) 2 % OPHTHALMIC SOLUTION    INSTILL 1 DROP INTO BOTH EYES TWICE DAILY    FAMOTIDINE (PEPCID) 40 MG TABLET    TAKE 1 TABLET (40 MG TOTAL) BY MOUTH EVERY EVENING.    FLUTICASONE (FLONASE) 50 MCG/ACTUATION NASAL SPRAY    2 sprays (100 mcg total) by Each Nare route daily as needed for Allergies.    FLUTICASONE FUROATE-VILANTEROL (BREO ELLIPTA) 100-25 MCG/DOSE DISKUS INHALER    Inhale 1 puff into the lungs once daily.    FUROSEMIDE (LASIX) 40 MG TABLET    Take 1 tablet (40 mg total) by mouth once daily. Do not take if BP is below 110/70    GEMTESA 75 MG TAB    Take 1 tablet by mouth every morning.    LACTOBACILLUS RHAMNOSUS GG (CULTURELLE) 10  BILLION CELL CAPSULE    Take 1 capsule by mouth once daily.    LATANOPROST 0.005 % OPHTHALMIC SOLUTION    Place 1 drop into both eyes every evening.    LORATADINE (CLARITIN) 10 MG TABLET    Take 1 tablet (10 mg total) by mouth once daily.    METOPROLOL TARTRATE (LOPRESSOR) 25 MG TABLET    Take 1 tablet (25 mg total) by mouth 2 (two) times daily.    MONTELUKAST (SINGULAIR) 10 MG TABLET    Take 1 tablet (10 mg total) by mouth every evening.    MUPIROCIN (BACTROBAN) 2 % OINTMENT    SMARTSI Application Topical 2-3 Times Daily    NITROGLYCERIN 0.4 MG/HR TD PT24 (NITRODUR) 0.4 MG/HR    Place 1 patch onto the skin once daily.    POTASSIUM CHLORIDE (KLOR-CON) 10 MEQ TBSR    Take 2 tablets (20 mEq total) by mouth once daily. Take with Lasix    ROSUVASTATIN (CRESTOR) 40 MG TAB    TAKE 1 TABLET BY MOUTH EVERY DAY       Rolando Cannon M.D.  ==========================================================================  Subjective:   Patient ID: Holli Landrum is a 89 y.o. female.  has a past medical history of Anticoagulant long-term use, Arthritis, Asthma, Basal cell carcinoma, Cancer, Cataract, CHF (congestive heart failure), COPD (chronic obstructive pulmonary disease), cpap, Essential hypertension (2010), GERD (gastroesophageal reflux disease) (2012), Glaucoma, Hypertensive heart disease with heart failure (2013), Paroxysmal atrial fibrillation, Paroxysmal ventricular tachycardia, Preop cardiovascular exam (2024), Renal disorder, and Squamous cell carcinoma of skin.   Chief Complaint: Establish Care and Annual Exam      History of Present Illness  The patient is a 89-year-old female who presents to [facility], transitioning from her previous primary care provider (PCP). She is accompanied by her daughter.    She reports a recent fall due to dizziness after taking gabapentin, which resulted in a hospital visit. During this visit, her suprapubic catheter, which had a lot of blood in it, was  addressed by urology. She is not currently experiencing any issues with the catheter, and it was confirmed to be non-infected.    She has a history of chronic obstructive pulmonary disease (COPD) and sleep apnea, for which she follows up with pulmonology. She also has atrial fibrillation (AFib) and a pacemaker, managed by cardiology, and is controlled on medication. Additionally, she sees hematology oncology for anemia and iron deficiency, requiring infusions. She takes Lasix as needed for edema, congestive heart failure, or when she does not have enough urine output.    She relocated to her youngest daughter's residence on 02/10/2022 after a fall that resulted in fractures in two bones of her right foot and required surgery on her hand, which still contains two screws. This incident left her unable to live independently. She experienced another fall after straining her back while assisting her daughter in changing bed sheets. Despite the pain, she did not seek immediate medical attention.  Patient was having some issue chronic pain she was prescribed gabapentin. After taking two doses of gabapentin on Thursday and one on Friday morning, she felt dizzy and fell while attempting to use the bathroom, resulting in a broken toe. She also sustained a cut on her hand from hitting the air conditioner vent. She reports severe pain in her back and hip, and notes swelling in both feet. Her mobility is limited, and she experiences pain when attempting to lift her leg. She has been receiving therapy at home three times a week, but was recently discharged from this service. She does not need any medication refills at this time.    Problem List Items Addressed This Visit       Encounter for long-term (current) use of other medications (Chronic)    Overview     CHRONIC. Stable. Compliant with medications for managed conditions. See medication list. No SE reported.   Routine lab analysis is being monitored. Refills were  addressed.  Lab Results   Component Value Date    WBC 5.94 07/05/2024    HGB 13.1 07/05/2024    HCT 40.5 07/05/2024    MCV 86 07/05/2024     07/05/2024         Chemistry        Component Value Date/Time     07/30/2024 1610     (L) 08/15/2015 0954    K 3.8 07/30/2024 1610    K 4.0 08/15/2015 0954     07/30/2024 1610    CL 99 08/15/2015 0954    CO2 24 07/30/2024 1610    BUN 19 07/30/2024 1610    CREATININE 1.3 07/30/2024 1610    CREATININE 1.05 (H) 08/15/2015 0954     (H) 07/30/2024 1610        Component Value Date/Time    CALCIUM 10.8 (H) 07/30/2024 1610    ALKPHOS 94 04/23/2024 1257    AST 19 04/23/2024 1257    AST 23 04/05/2016 0817    ALT 17 04/23/2024 1257    BILITOT 0.3 04/23/2024 1257    ESTGFRAFRICA >60.0 12/07/2021 1140    EGFRNONAA >60.0 12/07/2021 1140          Lab Results   Component Value Date    TSH 0.993 12/07/2021    FREET4 1.03 02/22/2021    T3FREE 2.0 (L) 02/22/2021              Current Assessment & Plan     Complete history and physical was completed today.  Complete and thorough medication reconciliation was performed.  Discussed risks and benefits of medications.  Advised patient on orders and health maintenance.  We discussed old records and old labs if available.  Will request any records not available through epic.  Continue current medications listed on your summary sheet.           S/P placement of cardiac pacemaker    Suprapubic catheter    COPD (chronic obstructive pulmonary disease)    Chronic congestive heart failure    Paroxysmal atrial fibrillation    Overview     Formatting of this note might be different from the original.   Last Assessment & Plan:     Formatting of this note might be different from the original.    Bleeding issues stabilized. Not interested in LAAO.         Obstructive sleep apnea    Overview     Formatting of this note might be different from the original.   Last Assessment & Plan:     Formatting of this note might be different from  the original.    - continue home cpap q hs         Adrenal nodule    Overview     Left side 16 mm.  Appears stable from March 2017 to December 2019         Current Assessment & Plan     Chronic.  Stable  F/u with urology           Other thrombophilia    Overview     Noted by Klique  last documented on [id number]  Lab Results   Component Value Date    IRON 44 07/01/2024    TRANSFERRIN 217 07/01/2024    TIBC 321 07/01/2024    FESATURATED 14 (L) 07/01/2024      Lab Results   Component Value Date    GPGXRWVP44 461 01/19/2024     Lab Results   Component Value Date    FOLATE 11.5 01/19/2024     Lab Results   Component Value Date    WBC 5.94 07/05/2024    HGB 13.1 07/05/2024    HCT 40.5 07/05/2024    MCV 86 07/05/2024     07/05/2024                Current Assessment & Plan     Patient has some easy bruising but no evidence of bleeding.  Check blood work.         Right hip pain - Primary    Overview     New problem.  Patient had a recent fall.  She is having some right hip pain.  See HPI         Current Assessment & Plan     X-ray shows no fracture.  Start physical therapy through home health.  Consider orthopedic consult if no improvement.         Fall    Overview     See HPI.  Patient with right hip pain.  Patient states that she attributes to fall to the gabapentin that was recently started by previous PCP.         Current Assessment & Plan     Stop gabapentin.  Fall precautions.         Encounter for medical examination to establish care    Overview     New patient. patient here to establish care transitioning care from [facility] PCP- Marcia Carlisle in [place]  Pulmonology- Dr. Kathleen  Cardiology- Rima-Jessie and Malur  Urology- Dr. Jude GUPTA urology  Nephrology- Dr. Levi  Hem/oc- Dr. Jerez  GI- Dr. Solorzano          Current Assessment & Plan     Complete history and physical was completed today.  Complete and thorough medication reconciliation was performed.  Discussed risks and benefits of  medications.  Advised patient on orders and health maintenance.  We discussed old records and old labs if available.  Will request any records not available through epic.  Continue current medications listed on your summary sheet.            Other Visit Diagnoses       Lipid screening                 Review of patient's allergies indicates:   Allergen Reactions    Timolol maleate Shortness Of Breath     Other Reaction(s): Shortness Of Breath    Ciprofloxacin Other (See Comments)     Muscle ache    Sulfamethoxazole-trimethoprim      Current Outpatient Medications   Medication Instructions    albuterol (PROVENTIL/VENTOLIN HFA) 90 mcg/actuation inhaler 1-2 puffs, Inhalation, Every 4 hours PRN, Rescue    albuterol-ipratropium (DUO-NEB) 2.5 mg-0.5 mg/3 mL nebulizer solution 3 mLs, Nebulization, Every 6 hours PRN    amiodarone (PACERONE) 200 mg, Oral, Daily    apixaban (ELIQUIS) 2.5 mg, Oral, 2 times daily    cholecalciferol (vitamin D3) 5,000 Units, Oral, Daily    dorzolamide (TRUSOPT) 2 % ophthalmic solution INSTILL 1 DROP INTO BOTH EYES TWICE DAILY    famotidine (PEPCID) 40 mg, Oral, Nightly    fluticasone furoate-vilanteroL (BREO ELLIPTA) 100-25 mcg/dose diskus inhaler 1 puff, Inhalation, Daily    fluticasone propionate (FLONASE) 100 mcg, Each Nostril, Daily PRN    furosemide (LASIX) 40 mg, Oral, Daily, Do not take if BP is below 110/70    GEMTESA 75 mg Tab 1 tablet, Oral, Every morning    Lactobacillus rhamnosus GG (CULTURELLE) 10 billion cell capsule 1 capsule, Oral, Daily    latanoprost 0.005 % ophthalmic solution 1 drop, Both Eyes, Nightly    loratadine (CLARITIN) 10 mg, Oral, Daily    metoprolol tartrate (LOPRESSOR) 25 mg, Oral, 2 times daily    montelukast (SINGULAIR) 10 mg, Oral, Nightly    mupirocin (BACTROBAN) 2 % ointment SMARTSI Application Topical 2-3 Times Daily    nitroGLYCERIN 0.4 MG/HR TD PT24 (NITRODUR) 0.4 mg/hr 1 patch, Transdermal, Daily    potassium chloride (KLOR-CON) 10 MEQ TbSR 20 mEq, Oral,  "Daily, Take with Lasix    rosuvastatin (CRESTOR) 40 mg, Oral      I have reviewed the PMH, social history, FamilyHx, surgical history, allergies and medications documented / confirmed by the patient at the time of this visit.  Review of Systems   Constitutional:  Negative for chills, fatigue, fever and unexpected weight change.   HENT:  Negative for ear pain and sore throat.    Eyes:  Negative for redness and visual disturbance.   Respiratory:  Negative for cough and shortness of breath.    Cardiovascular:  Negative for chest pain and palpitations.   Gastrointestinal:  Negative for nausea and vomiting.   Genitourinary:  Negative for difficulty urinating and hematuria.   Musculoskeletal:  Positive for arthralgias. Negative for back pain and myalgias.   Skin:  Negative for rash and wound.   Neurological:  Positive for weakness. Negative for headaches.   Psychiatric/Behavioral:  Negative for sleep disturbance. The patient is not nervous/anxious.      Objective:   /86   Pulse 67   Ht 5' 5" (1.651 m)   Wt 90.3 kg (199 lb)   LMP  (LMP Unknown)   SpO2 95%   BMI 33.12 kg/m²   Physical Exam  Vitals and nursing note reviewed.   Constitutional:       General: She is not in acute distress.     Appearance: She is well-developed. She is not ill-appearing, toxic-appearing or diaphoretic.      Comments: Here with daughter   HENT:      Head: Normocephalic and atraumatic.      Right Ear: Hearing and external ear normal.      Left Ear: Hearing and external ear normal.      Nose: Nose normal. No rhinorrhea.   Eyes:      General: Lids are normal.      Extraocular Movements: Extraocular movements intact.      Conjunctiva/sclera: Conjunctivae normal.      Pupils: Pupils are equal, round, and reactive to light.   Cardiovascular:      Rate and Rhythm: Normal rate.      Pulses: Normal pulses.   Pulmonary:      Effort: Pulmonary effort is normal. No respiratory distress.      Breath sounds: Normal breath sounds.   Abdominal:      " General: Bowel sounds are normal.      Palpations: Abdomen is soft.   Musculoskeletal:         General: Tenderness present. No deformity.      Cervical back: Normal range of motion and neck supple.      Thoracic back: Spasms present. No tenderness or bony tenderness.      Lumbar back: Spasms present. No tenderness. Negative right straight leg raise test and negative left straight leg raise test.      Right hip: Tenderness present. No deformity, lacerations, bony tenderness or crepitus. Decreased range of motion (Due to pain). Normal strength.      Left hip: No deformity, lacerations, tenderness, bony tenderness or crepitus. Normal range of motion. Normal strength.   Skin:     General: Skin is warm and dry.      Capillary Refill: Capillary refill takes less than 2 seconds.      Coloration: Skin is not pale.   Neurological:      General: No focal deficit present.      Mental Status: She is alert and oriented to person, place, and time. Mental status is at baseline. She is not disoriented.      Cranial Nerves: No cranial nerve deficit.      Motor: No weakness.      Gait: Gait normal.   Psychiatric:         Attention and Perception: She is attentive.         Mood and Affect: Mood normal. Mood is not anxious or depressed.         Speech: Speech is not rapid and pressured or slurred.         Behavior: Behavior normal. Behavior is not agitated, aggressive or hyperactive. Behavior is cooperative.         Thought Content: Thought content normal. Thought content is not paranoid or delusional. Thought content does not include homicidal or suicidal ideation. Thought content does not include homicidal or suicidal plan.         Cognition and Memory: Memory is not impaired.         Judgment: Judgment normal.       Physical Exam  Clear lungs.  Heart murmur present.    Results    X-Ray Hip 2 or 3 views Right with Pelvis when performed  Narrative: EXAMINATION:  XR HIP WITH PELVIS WHEN PERFORMED 2 OR 3 VIEWS RIGHT    CLINICAL  HISTORY:  Pain in right hip    TECHNIQUE:  AP view of the pelvis and frog leg lateral view of the right hip were performed.    COMPARISON:  02/10/2022    FINDINGS:  Prominent generalized osteopenia is noted.  The right hip joint space appears relatively well preserved.  No acute fracture or dislocation.  Single surgical clip seen projecting over the the right hemipelvis.  Impression: 1.  As above    Electronically signed by: Cruz New DO  Date:    08/14/2024  Time:    14:32    Assessment:     1. Right hip pain    2. Fall, initial encounter    3. Encounter for long-term (current) use of other medications    4. Encounter for medical examination to establish care    5. Lipid screening    6. Adrenal nodule    7. Other thrombophilia    8. S/P placement of cardiac pacemaker    9. Suprapubic catheter    10. Chronic obstructive pulmonary disease, unspecified COPD type    11. Chronic congestive heart failure, unspecified heart failure type    12. Paroxysmal atrial fibrillation    13. Obstructive sleep apnea      MDM:   Moderate to high medical complexity.  Moderate risk.  Total time:  45 minutes.  This includes total time spent on the encounter, which includes face to face time and non-face to face time preparing to see the patient (eg, review of previous medical records, tests), Obtaining and/or reviewing separately obtained history, documenting clinical information in the electronic or other health record, independently interpreting results (not separately reported)/communicating results to the patient/family/caregiver, and/or care coordination (not separately reported).    I have Reviewed and summarized old records.  I have performed thorough medication reconciliation today and discussed risk and benefits of medications.  I have reviewed x-ray, labs and discussed with patient.  All questions were answered.  I am requesting old records and will review them once they are available.Prev PCP- Marcia Carlisle in  [place]  Pulmonology- Dr. Kathleen  Cardiology- Rima-Jessie and Malur  Urology- Dr. Jude GUPTA urology  Nephrology- Dr. Levi  Hem/oc- Dr. Jerez  GI- Dr. Solorzano   Visit today included increased complexity associated with the care of the episodic problem see above assessment addressed and managing the longitudinal care of the patient due to the serious and/or complex managed problem(s) see above.    I have signed for the following orders AND/OR meds.  Orders Placed This Encounter   Procedures    X-Ray Hip 2 or 3 views Right with Pelvis when performed     Standing Status:   Future     Number of Occurrences:   1     Standing Expiration Date:   8/14/2025     Order Specific Question:   May the Radiologist modify the order per protocol to meet the clinical needs of the patient?     Answer:   Yes     Order Specific Question:   Release to patient     Answer:   Immediate    Lipid Panel     Standing Status:   Future     Standing Expiration Date:   10/13/2025    Hemoglobin A1C     Standing Status:   Future     Standing Expiration Date:   10/13/2025    CBC Without Differential     Standing Status:   Future     Standing Expiration Date:   10/13/2025    Comprehensive Metabolic Panel     Standing Status:   Future     Standing Expiration Date:   10/13/2025    TSH     Standing Status:   Future     Standing Expiration Date:   10/13/2025    Ambulatory referral/consult to Home Health     Standing Status:   Future     Standing Expiration Date:   9/14/2025     Referral Priority:   Routine     Referral Type:   Home Health     Referral Reason:   Specialty Services Required     Requested Specialty:   Home Health Services     Number of Visits Requested:   1           Follow up in about 6 months (around 2/14/2025), or if symptoms worsen or fail to improve, for Med refills, LAB RESULTS.  Future Appointments       Date Provider Specialty Appt Notes    9/6/2024  Lab mali np    9/10/2024 Kimberli Jerez NP Hematology and Oncology 2  mo f/u with lab prior/ngwv    9/19/2024 Fletcher Meyer MD Cardiology 6 month    10/1/2024 Fletcher Meyer MD Cardiology     11/6/2024  Pulmonology Follow up in about 6 months (around 10/12/2024) for hira - on return, CXR on return, 6 min walk - on return.    11/6/2024  Pulmonology Follow up in about 6 months (around 10/12/2024) for hira - on return, CXR on return, 6 min walk - on return.    11/6/2024 Gabe Waller MD Pulmonology Follow up in about 6 months (around 10/12/2024) for hira - on return, CXR on return, 6 min walk - on return.          If no improvement in symptoms or symptoms worsen, advised to call/follow-up at clinic or go to ER. Patient voiced understanding and all questions/concerns were addressed.   DISCLAIMER: This note was compiled by using a speech recognition dictation system and therefore please be aware that typographical / speech recognition errors can and do occur.  Please contact me if you see any errors specifically.  Consent was obtained for GRACE recording system prior to the visit.    Rolando Cannon M.D.       Office: [phone]   [address] [place] [address]  FAX: [phone]

## 2024-08-15 NOTE — ASSESSMENT & PLAN NOTE
X-ray shows no fracture.  Start physical therapy through home health.  Consider orthopedic consult if no improvement.

## 2024-08-19 DIAGNOSIS — R10.13 EPIGASTRIC PAIN: ICD-10-CM

## 2024-08-19 DIAGNOSIS — R12 HEARTBURN: ICD-10-CM

## 2024-08-19 RX ORDER — FAMOTIDINE 40 MG/1
40 TABLET, FILM COATED ORAL NIGHTLY
Qty: 30 TABLET | Refills: 3 | Status: SHIPPED | OUTPATIENT
Start: 2024-08-19

## 2024-08-23 PROCEDURE — G0179 MD RECERTIFICATION HHA PT: HCPCS | Mod: ,,, | Performed by: FAMILY MEDICINE

## 2024-08-30 ENCOUNTER — TELEPHONE (OUTPATIENT)
Dept: CARDIOLOGY | Facility: CLINIC | Age: 89
End: 2024-08-30
Payer: MEDICARE

## 2024-08-30 NOTE — TELEPHONE ENCOUNTER
Returned call and LVM for richelle advising her as per Betsy Ramirez.     ----- Message from Fletcher Meyer MD sent at 8/29/2024  4:49 PM CDT -----  Contact: Richelle/PT w/ith home health  OK she can double up the metoprolol for today but if BP remains elevated recommend ER eval or follow up with me asap to adjust medications. If she has any severe headache/blurry vision/chest pain recommend ER eval thanks  ----- Message -----  From: Delaney Sierra MA  Sent: 8/29/2024   2:20 PM CDT  To: Fletcher Meyer MD    [facility]  ----- Message -----  From: Patricia Sorensen  Sent: 8/29/2024  11:53 AM CDT  To: Betsy Bynum Staff    Richelle is calling in rgd to advise that she took the pt's b/p 226/118 (10am) and pt retook it on her own at 11:30 am 176/99.  If any questions call Richelle [phone]  thanks/mpd

## 2024-08-30 NOTE — TELEPHONE ENCOUNTER
Made appt for Tuesday 9/3    ----- Message from Boydtracey Awais sent at 8/30/2024 10:50 AM CDT -----  Type:  Same Day Appointment Request    Caller is requesting a same day appointment.  Caller declined first available appointment listed below.    Name of Caller:Claudia  When is the first available appointment?  Symptoms:  Best Call Back Number: [phone]  Additional Information:       Patient Home health nurse called Dr Meyer due to patient blood pressure was 218 over 110  and home health wanted patient to go to er yesterday . patient took a lasix and pressure went down after a while. This morning pressure was 205 over 105 and she had taken her lasix .

## 2024-09-03 ENCOUNTER — OFFICE VISIT (OUTPATIENT)
Dept: CARDIOLOGY | Facility: CLINIC | Age: 89
End: 2024-09-03
Payer: MEDICARE

## 2024-09-03 VITALS
BODY MASS INDEX: 32.61 KG/M2 | SYSTOLIC BLOOD PRESSURE: 152 MMHG | DIASTOLIC BLOOD PRESSURE: 82 MMHG | OXYGEN SATURATION: 94 % | HEART RATE: 72 BPM | HEIGHT: 65 IN | WEIGHT: 195.75 LBS

## 2024-09-03 DIAGNOSIS — I10 ESSENTIAL HYPERTENSION: ICD-10-CM

## 2024-09-03 DIAGNOSIS — N18.31 STAGE 3A CHRONIC KIDNEY DISEASE: ICD-10-CM

## 2024-09-03 DIAGNOSIS — Z86.16 HISTORY OF COVID-19: ICD-10-CM

## 2024-09-03 DIAGNOSIS — I48.0 PAROXYSMAL ATRIAL FIBRILLATION: ICD-10-CM

## 2024-09-03 DIAGNOSIS — J44.9 CHRONIC OBSTRUCTIVE PULMONARY DISEASE, UNSPECIFIED COPD TYPE: ICD-10-CM

## 2024-09-03 DIAGNOSIS — R00.1 SYMPTOMATIC BRADYCARDIA: ICD-10-CM

## 2024-09-03 DIAGNOSIS — Z95.0 PRESENCE OF CARDIAC PACEMAKER: Primary | ICD-10-CM

## 2024-09-03 DIAGNOSIS — I50.9 CHRONIC CONGESTIVE HEART FAILURE, UNSPECIFIED HEART FAILURE TYPE: ICD-10-CM

## 2024-09-03 DIAGNOSIS — I25.118 CORONARY ARTERY DISEASE OF NATIVE ARTERY OF NATIVE HEART WITH STABLE ANGINA PECTORIS: ICD-10-CM

## 2024-09-03 DIAGNOSIS — I50.33 ACUTE ON CHRONIC DIASTOLIC HEART FAILURE: ICD-10-CM

## 2024-09-03 DIAGNOSIS — I35.0 NONRHEUMATIC AORTIC VALVE STENOSIS: ICD-10-CM

## 2024-09-03 DIAGNOSIS — Z95.0 S/P PLACEMENT OF CARDIAC PACEMAKER: ICD-10-CM

## 2024-09-03 DIAGNOSIS — R06.02 SHORTNESS OF BREATH: ICD-10-CM

## 2024-09-03 DIAGNOSIS — Z95.0 CARDIAC PACEMAKER IN SITU: ICD-10-CM

## 2024-09-03 PROCEDURE — 1157F ADVNC CARE PLAN IN RCRD: CPT | Mod: CPTII,S$GLB,, | Performed by: INTERNAL MEDICINE

## 2024-09-03 PROCEDURE — 1101F PT FALLS ASSESS-DOCD LE1/YR: CPT | Mod: CPTII,S$GLB,, | Performed by: INTERNAL MEDICINE

## 2024-09-03 PROCEDURE — G2211 COMPLEX E/M VISIT ADD ON: HCPCS | Mod: S$GLB,,, | Performed by: INTERNAL MEDICINE

## 2024-09-03 PROCEDURE — 99214 OFFICE O/P EST MOD 30 MIN: CPT | Mod: S$GLB,,, | Performed by: INTERNAL MEDICINE

## 2024-09-03 PROCEDURE — 3288F FALL RISK ASSESSMENT DOCD: CPT | Mod: CPTII,S$GLB,, | Performed by: INTERNAL MEDICINE

## 2024-09-03 PROCEDURE — 1160F RVW MEDS BY RX/DR IN RCRD: CPT | Mod: CPTII,S$GLB,, | Performed by: INTERNAL MEDICINE

## 2024-09-03 PROCEDURE — 1159F MED LIST DOCD IN RCRD: CPT | Mod: CPTII,S$GLB,, | Performed by: INTERNAL MEDICINE

## 2024-09-03 PROCEDURE — 99999 PR PBB SHADOW E&M-EST. PATIENT-LVL IV: CPT | Mod: PBBFAC,,, | Performed by: INTERNAL MEDICINE

## 2024-09-03 PROCEDURE — 1126F AMNT PAIN NOTED NONE PRSNT: CPT | Mod: CPTII,S$GLB,, | Performed by: INTERNAL MEDICINE

## 2024-09-03 RX ORDER — METOPROLOL TARTRATE 50 MG/1
50 TABLET ORAL 2 TIMES DAILY
Qty: 180 TABLET | Refills: 1 | Status: SHIPPED | OUTPATIENT
Start: 2024-09-03 | End: 2024-09-03

## 2024-09-03 RX ORDER — FUROSEMIDE 40 MG/1
40 TABLET ORAL DAILY
Qty: 90 TABLET | Refills: 1 | Status: SHIPPED | OUTPATIENT
Start: 2024-09-03 | End: 2024-09-03

## 2024-09-03 RX ORDER — METOPROLOL TARTRATE 50 MG/1
50 TABLET ORAL 2 TIMES DAILY
Qty: 180 TABLET | Refills: 1 | Status: SHIPPED | OUTPATIENT
Start: 2024-09-03 | End: 2025-03-02

## 2024-09-03 RX ORDER — FUROSEMIDE 40 MG/1
40 TABLET ORAL DAILY
Qty: 90 TABLET | Refills: 1 | Status: SHIPPED | OUTPATIENT
Start: 2024-09-03

## 2024-09-03 NOTE — PROGRESS NOTES
Subjective:   Patient ID:  Holli Landrum is a 89 y.o. female who presents for cardiac consult of No chief complaint on file.      HPI  The patient came in today for cardiac consult of No chief complaint on file.      Holli Landrum is a 89 y.o. female pt with  history of persistent atrial fibrillation, coronary artery disease status post PCI, sick sinus syndrome status post [employer] pacemaker implantation Biotronik, hypertrophic obstructive cardiomyopathy, diastolic congestive heart failure, severe aortic stenosis, history of GI bleeding here for CV follow up.      3/14/24  Recent eval with Dr. Guadarrama - Given indwelling catheter she is not a candidate for valve replacement. I discussed the option of palliative BAV should her HF symptoms worsen. She is not interested at this time but says she will keep it in mind if her shortness of breath worsens. She seems to be slowly getting better since the COVID.      She is on 24/7 oxygen now since COVID 19. Was in hosp earlier. She is doing PT/OT now. She is using a walker now.     7/5/24    BNP (pg/mL)   Date Value   08/02/2024 522 (H)   07/05/2024 254 (H)   01/22/2024 209 (H)   12/14/2023 392 (H)   11/20/2023 278 (H)        She had recent hemeonc eval and concerned about kidney function declining so here to discuss diuretic regimen.   Recent Cr 1.9;   Last  - will repeat.   She is s/p EGD - neg, iron levels improved.     BP and Hr stable. BMI 33 - 201 lbs   She went to ER last Friday - passing red blood - had new catheter inserted by ED physician and went to urologist - had CT scan with contrast   - still having blood in urine.     9/3/24  From notes -   Patient Home health nurse called Dr Meyer due to patient blood pressure was 218 over 110 and home health wanted patient to go to er yesterday . patient took a lasix and pressure went down after a while. This morning pressure was 205 over 105 and she had taken her lasix .     BP today is 152/82. HR 72. BMI 32 - 195 lbs    She had already added lasix and took extra BB        Results for orders placed during the hospital encounter of 06/20/23    Cardiac catheterization    Conclusion    The Ost Cx to Prox Cx lesion was 50% stenosed.    The Ost 1st Mrg to 1st Mrg lesion was 70% stenosed.    The pre-procedure left ventricular end diastolic pressure was 6.    The estimated blood loss was <50 mL.    There was non-obstructive coronary artery disease..    There was moderate aortic valve stenosis.    The procedure log was documented by Documenter: RT Cristopher; Morena Bradford and verified by Brice Guadarrama MD.    Date: 6/20/2023  Time: 2:46 PM      Results for orders placed during the hospital encounter of 05/17/23    Echo    Interpretation Summary  · The left ventricle is normal in size with mild concentric hypertrophy and normal systolic function.  · Moderate left atrial enlargement.  · The estimated ejection fraction is 65%.  · Indeterminate left ventricular diastolic function.  · Normal right ventricular size with normal right ventricular systolic function.  · There is moderate-to-severe aortic valve stenosis.  · Aortic valve area is 0.80 cm2; peak velocity is 2.85 m/s; mean gradient is 21 mmHg.  · Mild tricuspid regurgitation.  · Intermediate central venous pressure (8 mmHg).  · The estimated PA systolic pressure is 33 mmHg.          Past Medical History:   Diagnosis Date    Anticoagulant long-term use     Arthritis     Asthma     Basal cell carcinoma     Cancer     skin cancer to face    Cataract     OU done//    CHF (congestive heart failure)     COPD (chronic obstructive pulmonary disease)     no oxygen; patient denies    cpap     Essential hypertension 05/05/2010    GERD (gastroesophageal reflux disease) 11/20/2012    Glaucoma     Hypertensive heart disease with heart failure 02/05/2013    Paroxysmal atrial fibrillation     Paroxysmal ventricular tachycardia     per problem list    Preop cardiovascular exam 1/26/2024     Renal disorder     french-1/3/2020    Squamous cell carcinoma of skin        Past Surgical History:   Procedure Laterality Date    A-V CARDIAC PACEMAKER INSERTION  06/16/2021    Procedure: INSERTION, CARDIAC PACEMAKER, DUAL CHAMBER;  Surgeon: Oscar Sommers MD;  Location: [facility];  Service: Cardiovascular;;    ADENOIDECTOMY  191944    APPENDECTOMY  1948    Because of Ovarian Cyst surgery    BREAST BIOPSY Left 1995    neg    BREAST BIOPSY Right 1984    neg    BREAST BIOPSY Right 1992    neg    BREAST SURGERY      A number of biopsies    CARDIAC CATHETERIZATION  2013, 2014,2016, 2020    has 9 stents    CARDIAC SURGERY  2012    stents    CATARACT EXTRACTION W/  INTRAOCULAR LENS IMPLANT Left 11/01/2018    Dr Rose    CATARACT EXTRACTION W/  INTRAOCULAR LENS IMPLANT Right 12/13/2018    Dr Rose//    CHOLECYSTECTOMY      COLONOSCOPY  ~2005    Dr. Edward; normal per patient report    COLONOSCOPY N/A 09/06/2022    Procedure: COLONOSCOPY 8/31/22-note in Dr Simms's office visit note that he spoke to her cardiologist and she was viable candidate for endoscopy;  Surgeon: Cordelia Bueno MD;  Location: [facility];  Service: Endoscopy;  Laterality: N/A;    CORONARY ANGIOGRAPHY N/A 03/20/2020    Procedure: ANGIOGRAM, CORONARY ARTERY;  Surgeon: Chuy Bryant MD;  Location: [facility];  Service: Cardiology;  Laterality: N/A;    CYSTOSCOPY W/ RETROGRADES Bilateral 02/12/2020    Procedure: CYSTOSCOPY, WITH RETROGRADE PYELOGRAM;  Surgeon: Gasper Feliz MD;  Location: [facility] OR;  Service: Urology;  Laterality: Bilateral;    ESOPHAGOGASTRODUODENOSCOPY N/A 08/13/2020    Procedure: EGD (ESOPHAGOGASTRODUODENOSCOPY);  Surgeon: Mika Solorzano MD;  Location: [facility];  Service: Endoscopy;  Laterality: N/A; Mild Schatzki ring. Biopsied. Dilated. small hiatal hernia, gastritis; biopsy: esophagus- SEVERE REFLUX ESOPHAGITIS, stomach- chronic gastritis, negative for H pylori    ESOPHAGOGASTRODUODENOSCOPY N/A 10/22/2020     Procedure: EGD (ESOPHAGOGASTRODUODENOSCOPY);  Surgeon: Fred Hendricks MD;  Location: [facility] ENDO;  Service: Endoscopy;  Laterality: N/A;    ESOPHAGOGASTRODUODENOSCOPY N/A 3/21/2024    Procedure: EGD (ESOPHAGOGASTRODUODENOSCOPY);  Surgeon: Mika Solorzano MD;  Location: [facility] ENDO;  Service: Gastroenterology;  Laterality: N/A;    EYE SURGERY  2017    Cataracs    FRACTIONAL FLOW RESERVE (FFR), CORONARY  6/20/2023    Procedure: Fractional Flow [place] (FFR), Coronary;  Surgeon: Brice Campuzano MD;  Location: [facility] CATH LAB;  Service: Cardiology;;    HYSTERECTOMY  1969    INSTANTANEOUS WAVE-FREE RATIO (IFR) N/A 6/20/2023    Procedure: Instantaneous Wave-Free Ratio (IFR);  Surgeon: Brice Campuzano MD;  Location: [facility] CATH LAB;  Service: Cardiology;  Laterality: N/A;    LEFT HEART CATHETERIZATION Left 03/03/2020    Procedure: Left heart cath;  Surgeon: Chuy Bryant MD;  Location: [facility] CATH;  Service: Cardiology;  Laterality: Left;    LEFT HEART CATHETERIZATION Left 03/20/2020    Procedure: Left heart cath;  Surgeon: Chuy Bryant MD;  Location: [facility] CATH;  Service: Cardiology;  Laterality: Left;    LEFT HEART CATHETERIZATION N/A 6/20/2023    Procedure: Left heart cath;  Surgeon: Brice Campuzano MD;  Location: [facility] CATH LAB;  Service: Cardiology;  Laterality: N/A;    OVARIAN CYST REMOVAL  teenager    PHACOEMULSIFICATION OF CATARACT Left 11/01/2018    Procedure: PHACOEMULSIFICATION, CATARACT;  Surgeon: Aleksandar Rose Jr., MD;  Location: [facility] OR;  Service: Ophthalmology;  Laterality: Left;    PHACOEMULSIFICATION OF CATARACT Right 12/13/2018    Procedure: PHACOEMULSIFICATION, CATARACT;  Surgeon: Aleksandar Rose Jr., MD;  Location: [facility] OR;  Service: Ophthalmology;  Laterality: Right;    TONSILLECTOMY      aw/denoids    TRANSESOPHAGEAL ECHOCARDIOGRAM WITH POSSIBLE CARDIOVERSION (LIZ W/ POSS CARDIOVERSION) N/A 10/5/2023    Procedure: Transesophageal echo (LIZ) intra-procedure log documentation/liz/cv;  Surgeon:  Bao Miguel MD;  Location: [facility] CATH LAB;  Service: Cardiology;  Laterality: N/A;  10 Alfred/Cv  MRI safe   Pacer & leads implanted 21, Antoun   Bio Edora 8 MICHELLE, [id number], PID: [id number]   A lead: Bio Solia S45, [id number]   V lead: Bio Solia S53, [id number]    TREATMENT OF CARDIAC ARRHYTHMIA N/A 10/5/2023    Procedure: Cardioversion or Defibrillation;  Surgeon: Bao Miguel MD;  Location: [facility] CATH LAB;  Service: Cardiology;  Laterality: N/A;    UPPER GASTROINTESTINAL ENDOSCOPY  ~    Dr. Solorzano    VALVE STUDY-AORTIC  2023    Procedure: Valve study-aortic;  Surgeon: Brice Campuzano MD;  Location: [facility] CATH LAB;  Service: Cardiology;;    VASCULAR SURGERY      to remove clot from right leg    Yag Capsulotomy Bilateral 2019    Dr Rose       Social History     Tobacco Use    Smoking status: Former     Current packs/day: 0.00     Types: Cigarettes     Start date: 6/10/1954     Quit date: 9/15/1955     Years since quittin.0    Smokeless tobacco: Never    Tobacco comments:     I onlysmoked one year while [place]  was oversees  during [place]   Substance Use Topics    Alcohol use: Not Currently     Comment: Only drank a little wine and haven't  drunk anything in 15 y    Drug use: Never       Family History   Problem Relation Name Age of Onset    Diabetes Brother Wili         Type 2    Heart disease Brother Wili          at 65 CHD    Diabetes Mother Holli COX         Type 1    Alcohol abuse Mother Holli COX         Dod 10/75    Heart disease Mother Holli S         Arteriosclerosis    Hypertension Mother Holli S     Stroke Mother Holli S         3/15/1975 dod 10/1975    Cancer Maternal Grandfather Pappaw         Dod     Clotting disorder Son          bleeding after tonsillectomy only    Heart disease Father Ronaldo Sr.         Heart attack. Afib, and Polyvcithemavera    Hypertension Father Ronaldo Sr.     Amblyopia Neg Hx      Blindness Neg Hx      Cataracts Neg Hx       Glaucoma Neg Hx      Macular degeneration Neg Hx      Retinal detachment Neg Hx      Strabismus Neg Hx      Thyroid disease Neg Hx      Colon cancer Neg Hx         Patient's Medications   New Prescriptions    No medications on file   Previous Medications    ALBUTEROL (PROVENTIL/VENTOLIN HFA) 90 MCG/ACTUATION INHALER    Inhale 1-2 puffs into the lungs every 4 (four) hours as needed for Wheezing or Shortness of Breath. Rescue    ALBUTEROL-IPRATROPIUM (DUO-NEB) 2.5 MG-0.5 MG/3 ML NEBULIZER SOLUTION    Take 3 mLs by nebulization every 6 (six) hours as needed for Wheezing or Shortness of Breath.    AMIODARONE (PACERONE) 200 MG TAB    Take 1 tablet (200 mg total) by mouth once daily.    APIXABAN (ELIQUIS) 2.5 MG TAB    Take 1 tablet (2.5 mg total) by mouth 2 (two) times daily.    CHOLECALCIFEROL, VITAMIN D3, 125 MCG (5,000 UNIT) TAB    Take 5,000 Units by mouth once daily.    DORZOLAMIDE (TRUSOPT) 2 % OPHTHALMIC SOLUTION    INSTILL 1 DROP INTO BOTH EYES TWICE DAILY    FAMOTIDINE (PEPCID) 40 MG TABLET    TAKE ONE TABLET BY MOUTH EVERY NIGHT AT BEDTIME    FLUTICASONE (FLONASE) 50 MCG/ACTUATION NASAL SPRAY    2 sprays (100 mcg total) by Each Nare route daily as needed for Allergies.    FLUTICASONE FUROATE-VILANTEROL (BREO ELLIPTA) 100-25 MCG/DOSE DISKUS INHALER    Inhale 1 puff into the lungs once daily.    FUROSEMIDE (LASIX) 40 MG TABLET    Take 1 tablet (40 mg total) by mouth once daily. Do not take if BP is below 110/70    GEMTESA 75 MG TAB    Take 1 tablet by mouth every morning.    LACTOBACILLUS RHAMNOSUS GG (CULTURELLE) 10 BILLION CELL CAPSULE    Take 1 capsule by mouth once daily.    LATANOPROST 0.005 % OPHTHALMIC SOLUTION    Place 1 drop into both eyes every evening.    LORATADINE (CLARITIN) 10 MG TABLET    Take 1 tablet (10 mg total) by mouth once daily.    METOPROLOL TARTRATE (LOPRESSOR) 25 MG TABLET    Take 1 tablet (25 mg total) by mouth 2 (two) times daily.    MONTELUKAST (SINGULAIR) 10 MG TABLET    Take 1  "tablet (10 mg total) by mouth every evening.    MUPIROCIN (BACTROBAN) 2 % OINTMENT    SMARTSI Application Topical 2-3 Times Daily    NITROGLYCERIN 0.4 MG/HR TD PT24 (NITRODUR) 0.4 MG/HR    Place 1 patch onto the skin once daily.    POTASSIUM CHLORIDE (KLOR-CON) 10 MEQ TBSR    Take 2 tablets (20 mEq total) by mouth once daily. Take with Lasix    ROSUVASTATIN (CRESTOR) 40 MG TAB    TAKE 1 TABLET BY MOUTH EVERY DAY   Modified Medications    No medications on file   Discontinued Medications    No medications on file       Review of Systems   Constitutional: Negative.    HENT: Negative.     Eyes: Negative.    Respiratory:  Positive for shortness of breath.    Cardiovascular:  Positive for palpitations and leg swelling.   Gastrointestinal: Negative.    Genitourinary: Negative.    Musculoskeletal: Negative.    Skin: Negative.    Neurological: Negative.    Endo/Heme/Allergies: Negative.    Psychiatric/Behavioral: Negative.     All 12 systems otherwise negative.      Wt Readings from Last 3 Encounters:   24 88.8 kg (195 lb 12.3 oz)   24 90.3 kg (199 lb)   24 91.5 kg (201 lb 11.5 oz)     Temp Readings from Last 3 Encounters:   24 97.4 °F (36.3 °C)   24 97.4 °F (36.3 °C)   24 97.5 °F (36.4 °C)     BP Readings from Last 3 Encounters:   24 (!) 152/82   24 136/86   24 120/70     Pulse Readings from Last 3 Encounters:   24 72   24 67   24 70       BP (!) 152/82   Pulse 72   Ht 5' 5" (1.651 m)   Wt 88.8 kg (195 lb 12.3 oz)   LMP  (LMP Unknown)   SpO2 (!) 94% Comment: 2L/Min via NC  BMI 32.58 kg/m²     Objective:   Physical Exam  Vitals and nursing note reviewed.   Constitutional:       General: She is not in acute distress.     Appearance: She is well-developed. She is not diaphoretic.   HENT:      Head: Normocephalic and atraumatic.      Nose: Nose normal.   Eyes:      General: No scleral icterus.     Conjunctiva/sclera: Conjunctivae normal.   Neck: "      Thyroid: No thyromegaly.      Vascular: No JVD.   Cardiovascular:      Rate and Rhythm: Normal rate and regular rhythm.      Heart sounds: S1 normal and S2 normal. Murmur heard.      No friction rub. No gallop. No S3 or S4 sounds.   Pulmonary:      Effort: Pulmonary effort is normal. No respiratory distress.      Breath sounds: Normal breath sounds. No stridor. No wheezing or rales.   Chest:      Chest wall: No tenderness.   Abdominal:      General: Bowel sounds are normal. There is no distension.      Palpations: Abdomen is soft. There is no mass.      Tenderness: There is no abdominal tenderness. There is no rebound.   Genitourinary:     Comments: Deferred  Musculoskeletal:         General: No tenderness or deformity. Normal range of motion.      Cervical back: Normal range of motion and neck supple.   Lymphadenopathy:      Cervical: No cervical adenopathy.   Skin:     General: Skin is warm and dry.      Coloration: Skin is not pale.      Findings: No erythema or rash.   Neurological:      Mental Status: She is alert and oriented to person, place, and time.      Motor: No abnormal muscle tone.      Coordination: Coordination normal.   Psychiatric:         Behavior: Behavior normal.         Thought Content: Thought content normal.         Judgment: Judgment normal.         Lab Results   Component Value Date     07/30/2024     (L) 08/15/2015    K 3.8 07/30/2024    K 4.0 08/15/2015     07/30/2024    CL 99 08/15/2015    CO2 24 07/30/2024    BUN 19 07/30/2024    CREATININE 1.3 07/30/2024    CREATININE 1.05 (H) 08/15/2015     (H) 07/30/2024    HGBA1C 5.8 (H) 12/07/2021    MG 2.2 07/05/2024    AST 19 04/23/2024    AST 23 04/05/2016    ALT 17 04/23/2024    ALBUMIN 3.5 07/30/2024    ALBUMIN 4.4 08/15/2015    PROT 6.8 04/23/2024    BILITOT 0.3 04/23/2024    WBC 5.94 07/05/2024    HGB 13.1 07/05/2024    HCT 40.5 07/05/2024    HCT 30 (L) 12/14/2023    MCV 86 07/05/2024     07/05/2024     INR 1.0 10/03/2023    TSH 0.993 12/07/2021    CHOL 134 12/07/2021    HDL 46 12/07/2021    LDLCALC 59.0 (L) 12/07/2021    LDLCALC 89 08/15/2015    TRIG 145 12/07/2021     (H) 08/02/2024     Assessment:      1. Presence of cardiac pacemaker    2. Acute on chronic diastolic heart failure    3. Nonrheumatic aortic valve stenosis    4. Paroxysmal atrial fibrillation    5. Cardiac pacemaker in situ    6. S/P placement of cardiac pacemaker    7. History of COVID-19    8. Shortness of breath    9. Symptomatic bradycardia    10. Essential hypertension    11. Chronic obstructive pulmonary disease, unspecified COPD type    12. Stage 3a chronic kidney disease    13. Coronary artery disease of native artery of native heart with stable angina pectoris            Plan:     PAF, SSS s/p PPM - 6/2021 with recurrent Afib - s/p JÚNIOR/DCCV and reload with Amio 10/2023  - interrogate device and f/u device clinic as sched   - cont Amio 200mg daily and Eliquis  - has hemorrhoids occ - may be candidate for Watchman   - f/u EP - may discuss ablation     2. HTN - elevated lately  - titrate meds - improving now with more lasix   - double BB to 50 mg BID, if rates drop below 55 will decrease back to 25 mg BID     3. Mod to severe AS - not candidate for TAVR now   - TAVR high risk for endocarditis, can consider Balloon AV - f/u with Dr. Guadarrama  - ECHO 5/2023 with normal bi V function, mod to severe AS, PASP 33 mmHg.  - [facility] 6/2023 with OM1 70% stenosis, LCX 50% stenosis, moderate AS.     4. HFpEF , last BNP - 209 ---> 522    - cont tx - lasix 40mg BID and K 20 meq BID - hold If BP is low --changed to Lasix 40mg daily and extra PRN      5. CAD s/p PCI  - cont Eliquis, BB, statin  - cont nitro patch   - patent stents [facility] 6/2023    6. Obesity, BMI 35 - 216 lbs--> 197 lbs - BMI 32 - 198 lbs --> 195  --> 198 lbs --> BMI 33 - 201 lbs  BMI 32 - 195 lbs   - cont weight loss    7. JA, HIGGINS, h/o COVID 19, COPD  - f/u pulm  - needs to use CPAP    8.  Anemia - iron def anemia   - cont iron and f/u GI/heme onc   - s/p EGD 3/2024    9. GERD  - cont PPI     Visit today included increased complexity associated with the care of the episodic problem dyspnea addressed and managing the longitudinal care of the patient due to the serious and/or complex managed problem(s) .      Thank you for allowing me to participate in this patient's care. Please do not hesitate to contact me with any questions or concerns. Consult note has been forwarded to the referral physician.     Fletcher Meyer MD, [facility]  Cardiovascular Disease  [facility], [place]  [phone] (P)

## 2024-09-04 DIAGNOSIS — I25.118 CORONARY ARTERY DISEASE OF NATIVE ARTERY OF NATIVE HEART WITH STABLE ANGINA PECTORIS: Primary | ICD-10-CM

## 2024-09-04 RX ORDER — NITROGLYCERIN 80 MG/1
1 PATCH TRANSDERMAL DAILY
Qty: 90 PATCH | Refills: 3 | Status: SHIPPED | OUTPATIENT
Start: 2024-09-04

## 2024-09-06 ENCOUNTER — LAB VISIT (OUTPATIENT)
Dept: LAB | Facility: HOSPITAL | Age: 89
End: 2024-09-06
Attending: NURSE PRACTITIONER
Payer: MEDICARE

## 2024-09-06 DIAGNOSIS — D50.0 IRON DEFICIENCY ANEMIA DUE TO CHRONIC BLOOD LOSS: ICD-10-CM

## 2024-09-06 DIAGNOSIS — I50.33 ACUTE ON CHRONIC DIASTOLIC HEART FAILURE: ICD-10-CM

## 2024-09-06 LAB
ANION GAP SERPL CALC-SCNC: 10 MMOL/L (ref 8–16)
BASOPHILS # BLD AUTO: 0.04 K/UL (ref 0–0.2)
BASOPHILS NFR BLD: 0.7 % (ref 0–1.9)
BNP SERPL-MCNC: 503 PG/ML (ref 0–99)
BUN SERPL-MCNC: 17 MG/DL (ref 10–30)
CALCIUM SERPL-MCNC: 10.3 MG/DL (ref 8.7–10.5)
CHLORIDE SERPL-SCNC: 105 MMOL/L (ref 95–110)
CO2 SERPL-SCNC: 29 MMOL/L (ref 23–29)
CREAT SERPL-MCNC: 1.4 MG/DL (ref 0.5–1.4)
DIFFERENTIAL METHOD BLD: ABNORMAL
EOSINOPHIL # BLD AUTO: 0.1 K/UL (ref 0–0.5)
EOSINOPHIL NFR BLD: 1.8 % (ref 0–8)
ERYTHROCYTE [DISTWIDTH] IN BLOOD BY AUTOMATED COUNT: 15.5 % (ref 11.5–14.5)
EST. GFR  (NO RACE VARIABLE): 36 ML/MIN/1.73 M^2
FERRITIN SERPL-MCNC: 314 NG/ML (ref 20–300)
GLUCOSE SERPL-MCNC: 85 MG/DL (ref 70–110)
HCT VFR BLD AUTO: 39.1 % (ref 37–48.5)
HGB BLD-MCNC: 12.8 G/DL (ref 12–16)
IMM GRANULOCYTES # BLD AUTO: 0.01 K/UL (ref 0–0.04)
IMM GRANULOCYTES NFR BLD AUTO: 0.2 % (ref 0–0.5)
IRON SERPL-MCNC: 58 UG/DL (ref 30–160)
LYMPHOCYTES # BLD AUTO: 1.8 K/UL (ref 1–4.8)
LYMPHOCYTES NFR BLD: 29.3 % (ref 18–48)
MCH RBC QN AUTO: 28.1 PG (ref 27–31)
MCHC RBC AUTO-ENTMCNC: 32.7 G/DL (ref 32–36)
MCV RBC AUTO: 86 FL (ref 82–98)
MONOCYTES # BLD AUTO: 0.6 K/UL (ref 0.3–1)
MONOCYTES NFR BLD: 10.7 % (ref 4–15)
NEUTROPHILS # BLD AUTO: 3.4 K/UL (ref 1.8–7.7)
NEUTROPHILS NFR BLD: 57.5 % (ref 38–73)
NRBC BLD-RTO: 0 /100 WBC
PLATELET # BLD AUTO: 281 K/UL (ref 150–450)
PMV BLD AUTO: 10.2 FL (ref 9.2–12.9)
POTASSIUM SERPL-SCNC: 3.3 MMOL/L (ref 3.5–5.1)
RBC # BLD AUTO: 4.55 M/UL (ref 4–5.4)
SATURATED IRON: 15 % (ref 20–50)
SODIUM SERPL-SCNC: 144 MMOL/L (ref 136–145)
TOTAL IRON BINDING CAPACITY: 398 UG/DL (ref 250–450)
TRANSFERRIN SERPL-MCNC: 269 MG/DL (ref 200–375)
WBC # BLD AUTO: 5.98 K/UL (ref 3.9–12.7)

## 2024-09-06 PROCEDURE — 83880 ASSAY OF NATRIURETIC PEPTIDE: CPT | Performed by: INTERNAL MEDICINE

## 2024-09-06 PROCEDURE — 85025 COMPLETE CBC W/AUTO DIFF WBC: CPT | Mod: PO | Performed by: NURSE PRACTITIONER

## 2024-09-06 PROCEDURE — 80048 BASIC METABOLIC PNL TOTAL CA: CPT | Mod: XB | Performed by: NURSE PRACTITIONER

## 2024-09-06 PROCEDURE — 83540 ASSAY OF IRON: CPT | Performed by: NURSE PRACTITIONER

## 2024-09-06 PROCEDURE — 82728 ASSAY OF FERRITIN: CPT | Performed by: NURSE PRACTITIONER

## 2024-09-06 PROCEDURE — 83880 ASSAY OF NATRIURETIC PEPTIDE: CPT | Mod: 91 | Performed by: INTERNAL MEDICINE

## 2024-09-07 DIAGNOSIS — I50.9 CHRONIC CONGESTIVE HEART FAILURE, UNSPECIFIED HEART FAILURE TYPE: ICD-10-CM

## 2024-09-07 DIAGNOSIS — E87.6 HYPOKALEMIA: Primary | ICD-10-CM

## 2024-09-07 RX ORDER — POTASSIUM CHLORIDE 750 MG/1
10 CAPSULE, EXTENDED RELEASE ORAL DAILY
Qty: 30 CAPSULE | Refills: 12 | Status: SHIPPED | OUTPATIENT
Start: 2024-09-07 | End: 2025-09-07

## 2024-09-07 RX ORDER — POTASSIUM CHLORIDE 750 MG/1
20 TABLET, EXTENDED RELEASE ORAL DAILY
Qty: 180 TABLET | Refills: 1 | Status: SHIPPED | OUTPATIENT
Start: 2024-09-07

## 2024-09-09 ENCOUNTER — TELEPHONE (OUTPATIENT)
Dept: CARDIOLOGY | Facility: CLINIC | Age: 89
End: 2024-09-09
Payer: MEDICARE

## 2024-09-09 ENCOUNTER — PATIENT MESSAGE (OUTPATIENT)
Dept: CARDIOLOGY | Facility: CLINIC | Age: 89
End: 2024-09-09
Payer: MEDICARE

## 2024-09-09 LAB — NT-PROBNP SERPL IA-MCNC: 1382 PG/ML

## 2024-09-09 NOTE — TELEPHONE ENCOUNTER
Pt notified verbalized understanding. Pt is weighing herself daily and she verbalize understanding pb       ----- Message from Fletcher Meyer MD sent at 9/9/2024  4:42 PM CDT -----  Please contact the patient and let them know that their results of labs reveal improving fluid level - BNP - continue lasix as discussed, if having worsening breathing issues/fluid can double up pills for 3-4 days and monitor BP and continue low salt diet.

## 2024-09-11 ENCOUNTER — PATIENT MESSAGE (OUTPATIENT)
Dept: FAMILY MEDICINE | Facility: CLINIC | Age: 89
End: 2024-09-11
Payer: MEDICARE

## 2024-09-12 ENCOUNTER — TELEPHONE (OUTPATIENT)
Dept: FAMILY MEDICINE | Facility: CLINIC | Age: 89
End: 2024-09-12
Payer: MEDICARE

## 2024-09-12 NOTE — TELEPHONE ENCOUNTER
----- Message from Radha Chaparro sent at 9/12/2024 12:53 PM CDT -----  Contact: self   .Type:  Patient Returning Call    Who Called:Patient   Who Left Message for Patient:Sandy   Does the patient know what this is regarding?:an appt for 9/19 . Patient states she is fine with the appt   Would the patient rather a call back or a response via [facility]? Call   Best Call Back Number:.[phone]    Additional Information:

## 2024-09-13 ENCOUNTER — OFFICE VISIT (OUTPATIENT)
Dept: HEMATOLOGY/ONCOLOGY | Facility: CLINIC | Age: 89
End: 2024-09-13
Payer: MEDICARE

## 2024-09-13 VITALS
OXYGEN SATURATION: 93 % | HEART RATE: 72 BPM | SYSTOLIC BLOOD PRESSURE: 134 MMHG | TEMPERATURE: 97 F | DIASTOLIC BLOOD PRESSURE: 73 MMHG | WEIGHT: 199.94 LBS | HEIGHT: 65 IN | BODY MASS INDEX: 33.31 KG/M2

## 2024-09-13 DIAGNOSIS — Z78.9 IN DISTRESS: ICD-10-CM

## 2024-09-13 DIAGNOSIS — D50.8 OTHER IRON DEFICIENCY ANEMIA: Primary | ICD-10-CM

## 2024-09-13 DIAGNOSIS — D41.4 BLADDER NEOPLASM OF UNCERTAIN MALIGNANT POTENTIAL: ICD-10-CM

## 2024-09-13 PROCEDURE — 99999 PR PBB SHADOW E&M-EST. PATIENT-LVL V: CPT | Mod: PBBFAC,,, | Performed by: NURSE PRACTITIONER

## 2024-09-13 RX ORDER — METHYLPREDNISOLONE SOD SUCC 125 MG
40 VIAL (EA) INJECTION
Start: 2024-09-13

## 2024-09-13 RX ORDER — EPINEPHRINE 0.3 MG/.3ML
0.3 INJECTION SUBCUTANEOUS ONCE AS NEEDED
OUTPATIENT
Start: 2024-09-20

## 2024-09-13 RX ORDER — HEPARIN 100 UNIT/ML
500 SYRINGE INTRAVENOUS
OUTPATIENT
Start: 2024-09-13

## 2024-09-13 RX ORDER — SODIUM CHLORIDE 0.9 % (FLUSH) 0.9 %
10 SYRINGE (ML) INJECTION
OUTPATIENT
Start: 2024-09-20

## 2024-09-13 RX ORDER — METHYLPREDNISOLONE SOD SUCC 125 MG
40 VIAL (EA) INJECTION
Start: 2024-09-20

## 2024-09-13 RX ORDER — DIPHENHYDRAMINE HYDROCHLORIDE 50 MG/ML
50 INJECTION INTRAMUSCULAR; INTRAVENOUS ONCE AS NEEDED
OUTPATIENT
Start: 2024-09-13

## 2024-09-13 RX ORDER — SODIUM CHLORIDE 0.9 % (FLUSH) 0.9 %
10 SYRINGE (ML) INJECTION
OUTPATIENT
Start: 2024-09-13

## 2024-09-13 RX ORDER — DIPHENHYDRAMINE HYDROCHLORIDE 50 MG/ML
50 INJECTION INTRAMUSCULAR; INTRAVENOUS ONCE AS NEEDED
OUTPATIENT
Start: 2024-09-20

## 2024-09-13 RX ORDER — EPINEPHRINE 0.3 MG/.3ML
0.3 INJECTION SUBCUTANEOUS ONCE AS NEEDED
OUTPATIENT
Start: 2024-09-13

## 2024-09-13 RX ORDER — HEPARIN 100 UNIT/ML
500 SYRINGE INTRAVENOUS
OUTPATIENT
Start: 2024-09-20

## 2024-09-13 NOTE — ASSESSMENT & PLAN NOTE
Persistent mild iron deficiency. No anemia. She notes fatigue. Would like to trial iron correction.     Trial Injectafer x 1. F/u 3 months with cbc iron ferritin

## 2024-09-13 NOTE — PROGRESS NOTES
Subjective:       Patient ID: Holli Landrum is a 89 y.o. female.    Chief Complaint: ARLETTE. Fatigue      HPI: 89 y.o female with medical history as stated below presenting today for follow up of her iron deficiency anemia treated with IV iron PRN. Intolerant of oral iron due to constipation. Previuosly s/p weekly Venofer infusions x 5 completed 2024. Felt to have bowel issues following IV iron. She ongoing fatigue but has been able to increase activity. EGD 3/2024 unrevealing. Colonoscopy 2022 unrevealing. No prior VCE. She follows with outside Urology. Chronic indwelling french catheter in place with intermittent hematuria.       Social History     Socioeconomic History    Marital status:    Tobacco Use    Smoking status: Former     Current packs/day: 0.00     Types: Cigarettes     Start date: 6/10/1954     Quit date: 9/15/1955     Years since quittin.0    Smokeless tobacco: Never    Tobacco comments:     I onlysmoked one year while [place]   Substance and Sexual Activity    Alcohol use: Not Currently     Comment: Only drank a little wine and haven't  drunk anything in 15 y    Drug use: Never    Sexual activity: Not Currently     Partners: Male     Birth control/protection: Abstinence     Comment:  and 87 years of age     Social Determinants of Health     Financial Resource Strain: Low Risk  (2024)    Overall Financial Resource Strain (CARDIA)     Difficulty of Paying Living Expenses: Not hard at all   Food Insecurity: No Food Insecurity (2024)    Hunger Vital Sign     Worried About Running Out of Food in the Last Year: Never true     Ran Out of Food in the Last Year: Never true   Transportation Needs: No Transportation Needs (2023)    PRAPARE - Transportation     Lack of Transportation (Medical): No     Lack of Transportation (Non-Medical): No   Physical Activity: Insufficiently Active (2024)    Exercise Vital Sign     Days of Exercise per  Week: 1 day     Minutes of Exercise per Session: 10 min   Stress: No Stress Concern Present (2024)    [employer] - Occupational Stress Questionnaire     Feeling of Stress : Not at all   Housing Stability: Low Risk  (2023)    Housing Stability Vital Sign     Unable to Pay for Housing in the Last Year: No     Number of Places Lived in the Last Year: 1     Unstable Housing in the Last Year: No       Past Medical History:   Diagnosis Date    Anticoagulant long-term use     Arthritis     Asthma     Basal cell carcinoma     Cancer     skin cancer to face    Cataract     OU done//    CHF (congestive heart failure)     COPD (chronic obstructive pulmonary disease)     no oxygen; patient denies    cpap     Essential hypertension 2010    GERD (gastroesophageal reflux disease) 2012    Glaucoma     Hypertensive heart disease with heart failure 2013    Paroxysmal atrial fibrillation     Paroxysmal ventricular tachycardia     per problem list    Preop cardiovascular exam 2024    Renal disorder     french-1/3/2020    Squamous cell carcinoma of skin        Family History   Problem Relation Name Age of Onset    Diabetes Brother Wili         Type 2    Heart disease Brother Wili          at 65 CHD    Diabetes Mother Holli COX         Type 1    Alcohol abuse Mother Holli COX         Dod 10/75    Heart disease Mother Holli COX         Arteriosclerosis    Hypertension Mother Holli S     Stroke Mother Holli S         3/15/1975 dod 10/1975    Cancer Maternal Grandfather [place]         Dod     Clotting disorder Son          bleeding after tonsillectomy only    Heart disease Father Ronaldo Lopes.         Heart attack. Afib, and Polyvcithemavera    Hypertension Father Ronaldo Lopes.     Amblyopia Neg Hx      Blindness Neg Hx      Cataracts Neg Hx      Glaucoma Neg Hx      Macular degeneration Neg Hx      Retinal detachment Neg Hx      Strabismus Neg Hx      Thyroid disease Neg Hx      Colon  cancer Neg Hx         Past Surgical History:   Procedure Laterality Date    A-V CARDIAC PACEMAKER INSERTION  06/16/2021    Procedure: INSERTION, CARDIAC PACEMAKER, DUAL CHAMBER;  Surgeon: Oscar Sommers MD;  Location: [facility] CATH;  Service: Cardiovascular;;    ADENOIDECTOMY  191944    APPENDECTOMY  1948    Because of Ovarian Cyst surgery    BREAST BIOPSY Left 1995    neg    BREAST BIOPSY Right 1984    neg    BREAST BIOPSY Right 1992    neg    BREAST SURGERY      A number of biopsies    CARDIAC CATHETERIZATION  2013, 2014,2016, 2020    has 9 stents    CARDIAC SURGERY  2012    stents    CATARACT EXTRACTION W/  INTRAOCULAR LENS IMPLANT Left 11/01/2018    Dr Rose    CATARACT EXTRACTION W/  INTRAOCULAR LENS IMPLANT Right 12/13/2018    Dr Rose//    CHOLECYSTECTOMY      COLONOSCOPY  ~2005    Dr. Edward; normal per patient report    COLONOSCOPY N/A 09/06/2022    Procedure: COLONOSCOPY 8/31/22-note in Dr Simms's office visit note that he spoke to her cardiologist and she was viable candidate for endoscopy;  Surgeon: Cordelia Bueno MD;  Location: [facility];  Service: Endoscopy;  Laterality: N/A;    CORONARY ANGIOGRAPHY N/A 03/20/2020    Procedure: ANGIOGRAM, CORONARY ARTERY;  Surgeon: Chuy Bryant MD;  Location: [facility];  Service: Cardiology;  Laterality: N/A;    CYSTOSCOPY W/ RETROGRADES Bilateral 02/12/2020    Procedure: CYSTOSCOPY, WITH RETROGRADE PYELOGRAM;  Surgeon: Gasper Feliz MD;  Location: [facility] OR;  Service: Urology;  Laterality: Bilateral;    ESOPHAGOGASTRODUODENOSCOPY N/A 08/13/2020    Procedure: EGD (ESOPHAGOGASTRODUODENOSCOPY);  Surgeon: Mika Solorzano MD;  Location: [facility];  Service: Endoscopy;  Laterality: N/A; Mild Schatzki ring. Biopsied. Dilated. small hiatal hernia, gastritis; biopsy: esophagus- SEVERE REFLUX ESOPHAGITIS, stomach- chronic gastritis, negative for H pylori    ESOPHAGOGASTRODUODENOSCOPY N/A 10/22/2020    Procedure: EGD (ESOPHAGOGASTRODUODENOSCOPY);  Surgeon: Fred MON  MD Eladio;  Location: [facility];  Service: Endoscopy;  Laterality: N/A;    ESOPHAGOGASTRODUODENOSCOPY N/A 3/21/2024    Procedure: EGD (ESOPHAGOGASTRODUODENOSCOPY);  Surgeon: Mika Solorzano MD;  Location: [facility] ENDO;  Service: Gastroenterology;  Laterality: N/A;    EYE SURGERY  2017    Cataracs    FRACTIONAL FLOW RESERVE (FFR), CORONARY  6/20/2023    Procedure: Fractional Flow [place] (FFR), Coronary;  Surgeon: Brice Campuzano MD;  Location: [facility] CATH LAB;  Service: Cardiology;;    HYSTERECTOMY  1969    INSTANTANEOUS WAVE-FREE RATIO (IFR) N/A 6/20/2023    Procedure: Instantaneous Wave-Free Ratio (IFR);  Surgeon: Brice Campuzano MD;  Location: [facility] CATH LAB;  Service: Cardiology;  Laterality: N/A;    LEFT HEART CATHETERIZATION Left 03/03/2020    Procedure: Left heart cath;  Surgeon: Chuy Bryant MD;  Location: [facility] CATH;  Service: Cardiology;  Laterality: Left;    LEFT HEART CATHETERIZATION Left 03/20/2020    Procedure: Left heart cath;  Surgeon: Chuy Bryant MD;  Location: [facility] CATH;  Service: Cardiology;  Laterality: Left;    LEFT HEART CATHETERIZATION N/A 6/20/2023    Procedure: Left heart cath;  Surgeon: Brice Campuzano MD;  Location: [facility] CATH LAB;  Service: Cardiology;  Laterality: N/A;    OVARIAN CYST REMOVAL  teenager    PHACOEMULSIFICATION OF CATARACT Left 11/01/2018    Procedure: PHACOEMULSIFICATION, CATARACT;  Surgeon: Aleksandar Rose Jr., MD;  Location: [facility] OR;  Service: Ophthalmology;  Laterality: Left;    PHACOEMULSIFICATION OF CATARACT Right 12/13/2018    Procedure: PHACOEMULSIFICATION, CATARACT;  Surgeon: Aleksandar Rose Jr., MD;  Location: [facility] OR;  Service: Ophthalmology;  Laterality: Right;    TONSILLECTOMY      aw/denoids    TRANSESOPHAGEAL ECHOCARDIOGRAM WITH POSSIBLE CARDIOVERSION (LIZ W/ POSS CARDIOVERSION) N/A 10/5/2023    Procedure: Transesophageal echo (LIZ) intra-procedure log documentation/liz/cv;  Surgeon: Bao Miguel MD;  Location: [facility] CATH LAB;  Service:  Cardiology;  Laterality: N/A;   Alfred/Cv  MRI safe   Pacer & leads implanted 6/16/21, [employer]   Bio Edora 8 MICHELLE, [id number], PID: [id number]   A lead: Bio Solia S45, [id number]   V lead: Bio Solia S53, [id number]    TREATMENT OF CARDIAC ARRHYTHMIA N/A 10/5/2023    Procedure: Cardioversion or Defibrillation;  Surgeon: Bao Miguel MD;  Location: [facility] CATH LAB;  Service: Cardiology;  Laterality: N/A;    UPPER GASTROINTESTINAL ENDOSCOPY  ~2005    Dr. Solorzano    VALVE STUDY-AORTIC  6/20/2023    Procedure: Valve study-aortic;  Surgeon: Brice Campuzano MD;  Location: [facility] CATH LAB;  Service: Cardiology;;    VASCULAR SURGERY      to remove clot from right leg    Yag Capsulotomy Bilateral 11/05/2019    Dr Rose       Review of Systems   Constitutional:  Positive for fatigue. Negative for appetite change, chills, fever and unexpected weight change.   HENT:  Negative for congestion, mouth sores, nosebleeds, sore throat, trouble swallowing and voice change.    Respiratory:  Negative for cough, chest tightness, shortness of breath and wheezing.    Cardiovascular:  Negative for chest pain and leg swelling.   Gastrointestinal:  Negative for abdominal distention, abdominal pain, blood in stool, constipation, diarrhea, nausea and vomiting.   Genitourinary:  Negative for difficulty urinating, dysuria and hematuria.   Musculoskeletal:  Negative for arthralgias, back pain and myalgias.   Skin:  Negative for pallor, rash and wound.   Neurological:  Negative for dizziness, syncope, weakness and headaches.   Hematological:  Negative for adenopathy. Does not bruise/bleed easily.   Psychiatric/Behavioral:  The patient is nervous/anxious.          Medication List with Changes/Refills   Current Medications    ALBUTEROL (PROVENTIL/VENTOLIN HFA) 90 MCG/ACTUATION INHALER    Inhale 1-2 puffs into the lungs every 4 (four) hours as needed for Wheezing or Shortness of Breath. Rescue    ALBUTEROL-IPRATROPIUM (DUO-NEB) 2.5 MG-0.5 MG/3 ML  NEBULIZER SOLUTION    Take 3 mLs by nebulization every 6 (six) hours as needed for Wheezing or Shortness of Breath.    AMIODARONE (PACERONE) 200 MG TAB    Take 1 tablet (200 mg total) by mouth once daily.    APIXABAN (ELIQUIS) 2.5 MG TAB    Take 1 tablet (2.5 mg total) by mouth 2 (two) times daily.    CHOLECALCIFEROL, VITAMIN D3, 125 MCG (5,000 UNIT) TAB    Take 5,000 Units by mouth once daily.    DORZOLAMIDE (TRUSOPT) 2 % OPHTHALMIC SOLUTION    INSTILL 1 DROP INTO BOTH EYES TWICE DAILY    FAMOTIDINE (PEPCID) 40 MG TABLET    TAKE ONE TABLET BY MOUTH EVERY NIGHT AT BEDTIME    FLUTICASONE (FLONASE) 50 MCG/ACTUATION NASAL SPRAY    2 sprays (100 mcg total) by Each Nare route daily as needed for Allergies.    FLUTICASONE FUROATE-VILANTEROL (BREO ELLIPTA) 100-25 MCG/DOSE DISKUS INHALER    Inhale 1 puff into the lungs once daily.    FUROSEMIDE (LASIX) 40 MG TABLET    Take 1 tablet (40 mg total) by mouth once daily. Do not take if BP is below 110/70; recommend doubling up dose if BP is over 140/90    GEMTESA 75 MG TAB    Take 1 tablet by mouth every morning.    LACTOBACILLUS RHAMNOSUS GG (CULTURELLE) 10 BILLION CELL CAPSULE    Take 1 capsule by mouth once daily.    LATANOPROST 0.005 % OPHTHALMIC SOLUTION    Place 1 drop into both eyes every evening.    LORATADINE (CLARITIN) 10 MG TABLET    Take 1 tablet (10 mg total) by mouth once daily.    METOPROLOL TARTRATE (LOPRESSOR) 50 MG TABLET    Take 1 tablet (50 mg total) by mouth 2 (two) times daily.    MONTELUKAST (SINGULAIR) 10 MG TABLET    Take 1 tablet (10 mg total) by mouth every evening.    MUPIROCIN (BACTROBAN) 2 % OINTMENT    SMARTSI Application Topical 2-3 Times Daily    NITROGLYCERIN 0.4 MG/HR TD PT24 (NITRODUR) 0.4 MG/HR    Place 1 patch onto the skin once daily.    POTASSIUM CHLORIDE (KLOR-CON) 10 MEQ TBSR    Take 2 tablets (20 mEq total) by mouth once daily. Take with Lasix    POTASSIUM CHLORIDE (MICRO-K) 10 MEQ CPSR    Take 1 capsule (10 mEq total) by mouth once  daily.    ROSUVASTATIN (CRESTOR) 40 MG TAB    TAKE 1 TABLET BY MOUTH EVERY DAY     Objective:     Vitals:    09/13/24 1506   BP: 134/73   Pulse: 72   Temp: 97.2 °F (36.2 °C)       Lab Results   Component Value Date    WBC 5.98 09/06/2024    HGB 12.8 09/06/2024    HCT 39.1 09/06/2024    MCV 86 09/06/2024     09/06/2024       Lab Results   Component Value Date    IRON 58 09/06/2024    TRANSFERRIN 269 09/06/2024    TIBC 398 09/06/2024    FESATURATED 15 (L) 09/06/2024      BMP  Lab Results   Component Value Date     09/06/2024     09/06/2024    K 3.3 (L) 09/06/2024    K 3.0 (L) 09/06/2024     09/06/2024     09/06/2024    CO2 29 09/06/2024    CO2 28 09/06/2024    BUN 17 09/06/2024    BUN 16 09/06/2024    CREATININE 1.4 09/06/2024    CREATININE 1.3 09/06/2024    CALCIUM 10.3 09/06/2024    CALCIUM 10.5 09/06/2024    ANIONGAP 10 09/06/2024    ANIONGAP 9 09/06/2024    EGFRNORACEVR 36 (A) 09/06/2024    EGFRNORACEVR 38.8 (A) 09/06/2024         Physical Exam  Pulmonary:      Effort: Pulmonary effort is normal. No respiratory distress.   Neurological:      Mental Status: She is alert and oriented to person, place, and time.          Assessment:     Problem List Items Addressed This Visit          Oncology    Bladder neoplasm of uncertain malignant potential     Sees outside Urology         Iron deficiency anemia - Primary     Persistent mild iron deficiency. No anemia. She notes fatigue. Would like to trial iron correction.     Trial Injectafer x 1. F/u 3 months with cbc iron ferritin         Relevant Orders    CBC Auto Differential    Iron and TIBC    Ferritin    Basic Metabolic Panel     Other Visit Diagnoses       In distress        Relevant Orders    Ambulatory Referral/Consult to Oncology Social Work              Plan:     Other iron deficiency anemia  -     CBC Auto Differential; Future; Expected date: 09/13/2024  -     Iron and TIBC; Future; Expected date: 09/13/2024  -     Ferritin; Future;  Expected date: 09/13/2024  -     Basic Metabolic Panel; Future; Expected date: 09/13/2024    In distress  -     Ambulatory Referral/Consult to Oncology Social Work; Future; Expected date: 09/20/2024    Bladder neoplasm of uncertain malignant potential    Other orders  -     methylPREDNISolone sodium succinate injection 40 mg  -     ferric carboxymaltose (INJECTAFER) 750 mg in 0.9% NaCl 265 mL infusion  -     0.9% NaCl 100 mL flush bag  -     EPINEPHrine (EPIPEN) 0.3 mg/0.3 mL pen injection 0.3 mg  -     diphenhydrAMINE injection 50 mg  -     hydrocortisone sodium succinate injection 100 mg  -     sodium chloride 0.9% flush 10 mL  -     heparin, porcine (PF) 100 unit/mL injection flush 500 Units  -     alteplase injection 2 mg  -     methylPREDNISolone sodium succinate injection 40 mg  -     ferric carboxymaltose (INJECTAFER) 750 mg in 0.9% NaCl 265 mL infusion  -     0.9% NaCl 100 mL flush bag  -     EPINEPHrine (EPIPEN) 0.3 mg/0.3 mL pen injection 0.3 mg  -     diphenhydrAMINE injection 50 mg  -     hydrocortisone sodium succinate injection 100 mg  -     sodium chloride 0.9% flush 10 mL  -     heparin, porcine (PF) 100 unit/mL injection flush 500 Units  -     alteplase injection 2 mg          Med Onc Chart Routing      Follow up with physician    Follow up with LISA 3 months.   Infusion scheduling note   Injectafer x 1   Injection scheduling note    Labs CBC, ferritin, iron and TIBC and other   Scheduling:  Preferred lab:  Lab interval:  +BMP 1-2 days prior   Imaging None      Pharmacy appointment No pharmacy appointment needed      Other referrals       No additional referrals needed         LINO James

## 2024-09-16 ENCOUNTER — TELEPHONE (OUTPATIENT)
Dept: HEMATOLOGY/ONCOLOGY | Facility: CLINIC | Age: 89
End: 2024-09-16
Payer: MEDICARE

## 2024-09-16 NOTE — TELEPHONE ENCOUNTER
9/13/2024     3:04 PM 7/9/2024    10:19 AM 5/23/2024     1:42 PM 5/16/2024    12:40 PM 4/25/2024     1:54 PM 1/23/2024     2:27 PM 3/29/2017     2:53 PM   DISTRESS SCREENING   Distress Score 8 3 0 - No Distress 0 - No Distress 9 0 - No Distress 0   Practical Concerns  None of these  None of these  None of these    Social Concerns  None of these  None of these  None of these    Emotional Concerns  Feelings of worthlessness or being a burden  None of these  None of these    Retire Spiritual or Restorationist Concerns       No   Spiritual or Restorationist Concerns  Changes in nahid or beliefs;Ritual or dietary needs;None of these  None of these  None of these    Physical Concerns Fatigue;Memory or concentration;Loss or change of physical abilities Pain;Fatigue;Memory or concentration  None of these Fatigue;Sleep None of these    Other Problems  None except trying todo this questionaire. I answe questions but you won't accept my answers and keep sending me back to the beginning.          SS was consulted to discuss distress score of 8 and assess needs. KIESHA spoke with pt over the phone who reported she discussed her physical concerns with provider. SW informed pt of  services and pt expressed no needs at this time. Pt reported she lives with her dtr, has good family support, and requires intermittent assistance with ADLs that her dtr provides. Pt reported she has a wheelchair and walker and is current with  PT due to a fall that resulted in hospitalization a few months ago. Pt reported her dtr is able to transport her to appts. KIESHA informed pt of upcoming appts and times. KIESHA encouraged pt to contact  as needed and SW will remain available.

## 2024-09-17 ENCOUNTER — TELEPHONE (OUTPATIENT)
Dept: CARDIOLOGY | Facility: CLINIC | Age: 89
End: 2024-09-17
Payer: MEDICARE

## 2024-09-17 NOTE — TELEPHONE ENCOUNTER
Called and spoke to pt. Pt scheduled for tomorrow 09/18/24 with Dr. Meyer.     ----- Message from Fletcher Meyer MD sent at 9/17/2024  3:15 PM CDT -----  Contact: Richelle  OK need to adjust BP meds please schedule f/u asap or virtual visit will review BP log and discuss. Thanks  ----- Message -----  From: Delaney Sierra MA  Sent: 9/17/2024   2:34 PM CDT  To: MD CAMERON Conley with Home Health is calling to report pt BP reading 194/100.  ----- Message -----  From: Elizabeth Cantu  Sent: 9/17/2024   1:27 PM CDT  To: B[facility] Staff    Richelle with Home Health is calling to speak with the nurse regarding bp. Reports bp is 194/100 and wanting to report the patients bp. Please give Richelle a call back at [phone]

## 2024-09-18 ENCOUNTER — LAB VISIT (OUTPATIENT)
Dept: LAB | Facility: HOSPITAL | Age: 89
End: 2024-09-18
Attending: FAMILY MEDICINE
Payer: MEDICARE

## 2024-09-18 ENCOUNTER — OFFICE VISIT (OUTPATIENT)
Dept: CARDIOLOGY | Facility: CLINIC | Age: 89
End: 2024-09-18
Payer: MEDICARE

## 2024-09-18 VITALS
WEIGHT: 198 LBS | DIASTOLIC BLOOD PRESSURE: 98 MMHG | HEART RATE: 92 BPM | SYSTOLIC BLOOD PRESSURE: 154 MMHG | OXYGEN SATURATION: 96 % | BODY MASS INDEX: 32.95 KG/M2

## 2024-09-18 DIAGNOSIS — I10 ESSENTIAL HYPERTENSION: ICD-10-CM

## 2024-09-18 DIAGNOSIS — I50.33 ACUTE ON CHRONIC DIASTOLIC HEART FAILURE: ICD-10-CM

## 2024-09-18 DIAGNOSIS — Z86.16 HISTORY OF COVID-19: ICD-10-CM

## 2024-09-18 DIAGNOSIS — I25.118 CORONARY ARTERY DISEASE OF NATIVE ARTERY OF NATIVE HEART WITH STABLE ANGINA PECTORIS: Primary | ICD-10-CM

## 2024-09-18 DIAGNOSIS — E87.6 HYPOKALEMIA: ICD-10-CM

## 2024-09-18 DIAGNOSIS — Z95.0 PRESENCE OF CARDIAC PACEMAKER: ICD-10-CM

## 2024-09-18 DIAGNOSIS — J44.9 CHRONIC OBSTRUCTIVE PULMONARY DISEASE, UNSPECIFIED COPD TYPE: ICD-10-CM

## 2024-09-18 DIAGNOSIS — Z95.0 S/P PLACEMENT OF CARDIAC PACEMAKER: ICD-10-CM

## 2024-09-18 DIAGNOSIS — I48.0 PAROXYSMAL ATRIAL FIBRILLATION: ICD-10-CM

## 2024-09-18 DIAGNOSIS — I35.0 NONRHEUMATIC AORTIC VALVE STENOSIS: ICD-10-CM

## 2024-09-18 DIAGNOSIS — R06.02 SHORTNESS OF BREATH: ICD-10-CM

## 2024-09-18 DIAGNOSIS — I50.9 CHRONIC CONGESTIVE HEART FAILURE, UNSPECIFIED HEART FAILURE TYPE: ICD-10-CM

## 2024-09-18 DIAGNOSIS — R00.1 SYMPTOMATIC BRADYCARDIA: ICD-10-CM

## 2024-09-18 LAB
ANION GAP SERPL CALC-SCNC: 9 MMOL/L (ref 8–16)
BNP SERPL-MCNC: 262 PG/ML (ref 0–99)
BUN SERPL-MCNC: 22 MG/DL (ref 10–30)
CALCIUM SERPL-MCNC: 10.4 MG/DL (ref 8.7–10.5)
CHLORIDE SERPL-SCNC: 106 MMOL/L (ref 95–110)
CO2 SERPL-SCNC: 22 MMOL/L (ref 23–29)
CREAT SERPL-MCNC: 1.5 MG/DL (ref 0.5–1.4)
EST. GFR  (NO RACE VARIABLE): 32.7 ML/MIN/1.73 M^2
GLUCOSE SERPL-MCNC: 84 MG/DL (ref 70–110)
MAGNESIUM SERPL-MCNC: 2.4 MG/DL (ref 1.6–2.6)
POTASSIUM SERPL-SCNC: 4.1 MMOL/L (ref 3.5–5.1)
SODIUM SERPL-SCNC: 137 MMOL/L (ref 136–145)

## 2024-09-18 PROCEDURE — 1159F MED LIST DOCD IN RCRD: CPT | Mod: CPTII,S$GLB,, | Performed by: INTERNAL MEDICINE

## 2024-09-18 PROCEDURE — 3288F FALL RISK ASSESSMENT DOCD: CPT | Mod: CPTII,S$GLB,, | Performed by: INTERNAL MEDICINE

## 2024-09-18 PROCEDURE — 80048 BASIC METABOLIC PNL TOTAL CA: CPT | Performed by: INTERNAL MEDICINE

## 2024-09-18 PROCEDURE — 1101F PT FALLS ASSESS-DOCD LE1/YR: CPT | Mod: CPTII,S$GLB,, | Performed by: INTERNAL MEDICINE

## 2024-09-18 PROCEDURE — 83735 ASSAY OF MAGNESIUM: CPT | Performed by: INTERNAL MEDICINE

## 2024-09-18 PROCEDURE — 99214 OFFICE O/P EST MOD 30 MIN: CPT | Mod: S$GLB,,, | Performed by: INTERNAL MEDICINE

## 2024-09-18 PROCEDURE — G2211 COMPLEX E/M VISIT ADD ON: HCPCS | Mod: S$GLB,,, | Performed by: INTERNAL MEDICINE

## 2024-09-18 PROCEDURE — 1160F RVW MEDS BY RX/DR IN RCRD: CPT | Mod: CPTII,S$GLB,, | Performed by: INTERNAL MEDICINE

## 2024-09-18 PROCEDURE — 1157F ADVNC CARE PLAN IN RCRD: CPT | Mod: CPTII,S$GLB,, | Performed by: INTERNAL MEDICINE

## 2024-09-18 PROCEDURE — 99999 PR PBB SHADOW E&M-EST. PATIENT-LVL III: CPT | Mod: PBBFAC,,, | Performed by: INTERNAL MEDICINE

## 2024-09-18 PROCEDURE — 83880 ASSAY OF NATRIURETIC PEPTIDE: CPT | Performed by: INTERNAL MEDICINE

## 2024-09-18 PROCEDURE — 1125F AMNT PAIN NOTED PAIN PRSNT: CPT | Mod: CPTII,S$GLB,, | Performed by: INTERNAL MEDICINE

## 2024-09-18 PROCEDURE — 36415 COLL VENOUS BLD VENIPUNCTURE: CPT | Performed by: INTERNAL MEDICINE

## 2024-09-18 RX ORDER — CLONIDINE HYDROCHLORIDE 0.1 MG/1
0.1 TABLET ORAL EVERY 6 HOURS PRN
Qty: 90 TABLET | Refills: 1 | Status: SHIPPED | OUTPATIENT
Start: 2024-09-18 | End: 2025-09-18

## 2024-09-18 RX ORDER — METOPROLOL TARTRATE 100 MG/1
100 TABLET ORAL 2 TIMES DAILY
Qty: 180 TABLET | Refills: 1 | Status: SHIPPED | OUTPATIENT
Start: 2024-09-18 | End: 2025-03-17

## 2024-09-18 NOTE — PROGRESS NOTES
Subjective:   Patient ID:  Holli Landrum is a 89 y.o. female who presents for cardiac consult of Hypertension      HPI  The patient came in today for cardiac consult of Hypertension      Holli Landrum is a 89 y.o. female pt with  history of persistent atrial fibrillation, coronary artery disease status post PCI, sick sinus syndrome status post [employer] pacemaker implantation Biotronik, hypertrophic obstructive cardiomyopathy, diastolic congestive heart failure, severe aortic stenosis, history of GI bleeding here for CV follow up.    9/3/24  From notes -   Patient Home health nurse called Dr Meyer due to patient blood pressure was 218 over 110 and home health wanted patient to go to er yesterday . patient took a lasix and pressure went down after a while. This morning pressure was 205 over 105 and she had taken her lasix .   BP today is 152/82. HR 72. BMI 32 - 195 lbs   She had already added lasix and took extra BB    BNP (pg/mL)   Date Value   09/06/2024 503 (H)   08/02/2024 522 (H)   07/05/2024 254 (H)   01/22/2024 209 (H)   12/14/2023 392 (H)        9/18/24  Richelle with Home Health is calling to speak with the nurse regarding bp. Reports bp is 194/100 and wanting to report the patients bp. Please give Richelle a call back at [phone]     NT-proBNP <=540 pg/mL 1382 High  1969       BNP last visit 503 told pt - abs reveal improving fluid level - BNP - continue lasix as discussed, if having worsening breathing issues/fluid can double up pills for 3-4 days and monitor BP   Iron levels were low, K was low.     /98. HR 92. BMI 32 - 198 lbs     Results for orders placed during the hospital encounter of 06/20/23    Cardiac catheterization    Conclusion    The Ost Cx to Prox Cx lesion was 50% stenosed.    The Ost 1st Mrg to 1st Mrg lesion was 70% stenosed.    The pre-procedure left ventricular end diastolic pressure was 6.    The estimated blood loss was <50 mL.    There was non-obstructive coronary artery  disease..    There was moderate aortic valve stenosis.    The procedure log was documented by Documenter: RT Cristopher; Morena Bradford and verified by Brice Guadarrama MD.    Date: 6/20/2023  Time: 2:46 PM      Results for orders placed during the hospital encounter of 05/17/23    Echo    Interpretation Summary  · The left ventricle is normal in size with mild concentric hypertrophy and normal systolic function.  · Moderate left atrial enlargement.  · The estimated ejection fraction is 65%.  · Indeterminate left ventricular diastolic function.  · Normal right ventricular size with normal right ventricular systolic function.  · There is moderate-to-severe aortic valve stenosis.  · Aortic valve area is 0.80 cm2; peak velocity is 2.85 m/s; mean gradient is 21 mmHg.  · Mild tricuspid regurgitation.  · Intermediate central venous pressure (8 mmHg).  · The estimated PA systolic pressure is 33 mmHg.          Past Medical History:   Diagnosis Date    Anticoagulant long-term use     Arthritis     Asthma     Basal cell carcinoma     Cancer     skin cancer to face    Cataract     OU done//    CHF (congestive heart failure)     COPD (chronic obstructive pulmonary disease)     no oxygen; patient denies    cpap     Essential hypertension 05/05/2010    GERD (gastroesophageal reflux disease) 11/20/2012    Glaucoma     Hypertensive heart disease with heart failure 02/05/2013    Paroxysmal atrial fibrillation     Paroxysmal ventricular tachycardia     per problem list    Preop cardiovascular exam 1/26/2024    Renal disorder     french-1/3/2020    Squamous cell carcinoma of skin        Past Surgical History:   Procedure Laterality Date    A-V CARDIAC PACEMAKER INSERTION  06/16/2021    Procedure: INSERTION, CARDIAC PACEMAKER, DUAL CHAMBER;  Surgeon: Oscar Sommers MD;  Location: [facility];  Service: Cardiovascular;;    ADENOIDECTOMY  191944    APPENDECTOMY  1948    Because of Ovarian Cyst surgery    BREAST BIOPSY Left 1995     neg    BREAST BIOPSY Right 1984    neg    BREAST BIOPSY Right 1992    neg    BREAST SURGERY      A number of biopsies    CARDIAC CATHETERIZATION  2013, 2014,2016, 2020    has 9 stents    CARDIAC SURGERY  2012    stents    CATARACT EXTRACTION W/  INTRAOCULAR LENS IMPLANT Left 11/01/2018    Dr Rose    CATARACT EXTRACTION W/  INTRAOCULAR LENS IMPLANT Right 12/13/2018    Dr Rose//    CHOLECYSTECTOMY      COLONOSCOPY  ~2005    Dr. Edward; normal per patient report    COLONOSCOPY N/A 09/06/2022    Procedure: COLONOSCOPY 8/31/22-note in Dr Simms's office visit note that he spoke to her cardiologist and she was viable candidate for endoscopy;  Surgeon: Cordelia Bueno MD;  Location: [facility] ENDO;  Service: Endoscopy;  Laterality: N/A;    CORONARY ANGIOGRAPHY N/A 03/20/2020    Procedure: ANGIOGRAM, CORONARY ARTERY;  Surgeon: Chuy Bryant MD;  Location: [facility] CATH;  Service: Cardiology;  Laterality: N/A;    CYSTOSCOPY W/ RETROGRADES Bilateral 02/12/2020    Procedure: CYSTOSCOPY, WITH RETROGRADE PYELOGRAM;  Surgeon: Gasper Feliz MD;  Location: [facility] OR;  Service: Urology;  Laterality: Bilateral;    ESOPHAGOGASTRODUODENOSCOPY N/A 08/13/2020    Procedure: EGD (ESOPHAGOGASTRODUODENOSCOPY);  Surgeon: Mika Solorzano MD;  Location: [facility];  Service: Endoscopy;  Laterality: N/A; Mild Schatzki ring. Biopsied. Dilated. small hiatal hernia, gastritis; biopsy: esophagus- SEVERE REFLUX ESOPHAGITIS, stomach- chronic gastritis, negative for H pylori    ESOPHAGOGASTRODUODENOSCOPY N/A 10/22/2020    Procedure: EGD (ESOPHAGOGASTRODUODENOSCOPY);  Surgeon: Fred Hendricks MD;  Location: [facility];  Service: Endoscopy;  Laterality: N/A;    ESOPHAGOGASTRODUODENOSCOPY N/A 3/21/2024    Procedure: EGD (ESOPHAGOGASTRODUODENOSCOPY);  Surgeon: Mika Solorzano MD;  Location: [facility];  Service: Gastroenterology;  Laterality: N/A;    EYE SURGERY  2017    Cataracs    FRACTIONAL FLOW RESERVE (FFR), CORONARY  6/20/2023     Procedure: Fractional Flow [place] (FFR), Coronary;  Surgeon: Brice Campuzano MD;  Location: [facility] CATH LAB;  Service: Cardiology;;    HYSTERECTOMY  1969    INSTANTANEOUS WAVE-FREE RATIO (IFR) N/A 6/20/2023    Procedure: Instantaneous Wave-Free Ratio (IFR);  Surgeon: Brice Campuzano MD;  Location: [facility] CATH LAB;  Service: Cardiology;  Laterality: N/A;    LEFT HEART CATHETERIZATION Left 03/03/2020    Procedure: Left heart cath;  Surgeon: Chuy Bryant MD;  Location: [facility] CATH;  Service: Cardiology;  Laterality: Left;    LEFT HEART CATHETERIZATION Left 03/20/2020    Procedure: Left heart cath;  Surgeon: Chyu Bryant MD;  Location: [facility] CATH;  Service: Cardiology;  Laterality: Left;    LEFT HEART CATHETERIZATION N/A 6/20/2023    Procedure: Left heart cath;  Surgeon: Brice Campuzano MD;  Location: [facility] CATH LAB;  Service: Cardiology;  Laterality: N/A;    OVARIAN CYST REMOVAL  teenager    PHACOEMULSIFICATION OF CATARACT Left 11/01/2018    Procedure: PHACOEMULSIFICATION, CATARACT;  Surgeon: Aleksandar Rose Jr., MD;  Location: [facility] OR;  Service: Ophthalmology;  Laterality: Left;    PHACOEMULSIFICATION OF CATARACT Right 12/13/2018    Procedure: PHACOEMULSIFICATION, CATARACT;  Surgeon: Aleksandar Rose Jr., MD;  Location: [facility] OR;  Service: Ophthalmology;  Laterality: Right;    TONSILLECTOMY      aw/denoids    TRANSESOPHAGEAL ECHOCARDIOGRAM WITH POSSIBLE CARDIOVERSION (ALFRED W/ POSS CARDIOVERSION) N/A 10/5/2023    Procedure: Transesophageal echo (ALFRED) intra-procedure log documentation/alfred/cv;  Surgeon: Bao Miguel MD;  Location: [facility] CATH LAB;  Service: Cardiology;  Laterality: N/A;   Alfred/Cv  MRI safe   Pacer & leads implanted 6/16/21, Antoun   Bio Edora 8 DR-T, [id number], PID: [id number]   A lead: Bio Solia S45, [id number]   V lead: Bio Solia S53, [id number]    TREATMENT OF CARDIAC ARRHYTHMIA N/A 10/5/2023    Procedure: Cardioversion or Defibrillation;  Surgeon: Bao Miguel MD;  Location: [facility] CATH LAB;   Service: Cardiology;  Laterality: N/A;    UPPER GASTROINTESTINAL ENDOSCOPY  ~    Dr. Solorzano    VALVE STUDY-AORTIC  2023    Procedure: Valve study-aortic;  Surgeon: Brice Campuzano MD;  Location: [facility] CATH LAB;  Service: Cardiology;;    VASCULAR SURGERY      to remove clot from right leg    Yag Capsulotomy Bilateral 2019    Dr Rose       Social History     Tobacco Use    Smoking status: Former     Current packs/day: 0.00     Types: Cigarettes     Start date: 6/10/1954     Quit date: 9/15/1955     Years since quittin.0    Smokeless tobacco: Never    Tobacco comments:     I onlysmoked one year while [employer] my  was oversees  during [place]   Substance Use Topics    Alcohol use: Not Currently     Comment: Only drank a little wine and haven't  drunk anything in 15 y    Drug use: Never       Family History   Problem Relation Name Age of Onset    Diabetes Brother Wili         Type 2    Heart disease Brother Wili          at 65 CHD    Diabetes Mother Holli COX         Type 1    Alcohol abuse Mother Holli COX         Dod 10/75    Heart disease Mother Holli COX         Arteriosclerosis    Hypertension Mother Holli COX     Stroke Mother Holli COX         3/15/1975 dod 10/1975    Cancer Maternal Grandfather Pappaw         Dod     Clotting disorder Son          bleeding after tonsillectomy only    Heart disease Father Ronadlo Lopes.         Heart attack. Afib, and Polyvcithemavera    Hypertension Father Ronaldo Lopes.     Amblyopia Neg Hx      Blindness Neg Hx      Cataracts Neg Hx      Glaucoma Neg Hx      Macular degeneration Neg Hx      Retinal detachment Neg Hx      Strabismus Neg Hx      Thyroid disease Neg Hx      Colon cancer Neg Hx         Patient's Medications   New Prescriptions    No medications on file   Previous Medications    ALBUTEROL (PROVENTIL/VENTOLIN HFA) 90 MCG/ACTUATION INHALER    Inhale 1-2 puffs into the lungs every 4 (four) hours as needed for Wheezing or Shortness of Breath.  Rescue    ALBUTEROL-IPRATROPIUM (DUO-NEB) 2.5 MG-0.5 MG/3 ML NEBULIZER SOLUTION    Take 3 mLs by nebulization every 6 (six) hours as needed for Wheezing or Shortness of Breath.    AMIODARONE (PACERONE) 200 MG TAB    Take 1 tablet (200 mg total) by mouth once daily.    APIXABAN (ELIQUIS) 2.5 MG TAB    Take 1 tablet (2.5 mg total) by mouth 2 (two) times daily.    CHOLECALCIFEROL, VITAMIN D3, 125 MCG (5,000 UNIT) TAB    Take 5,000 Units by mouth once daily.    DORZOLAMIDE (TRUSOPT) 2 % OPHTHALMIC SOLUTION    INSTILL 1 DROP INTO BOTH EYES TWICE DAILY    FAMOTIDINE (PEPCID) 40 MG TABLET    TAKE ONE TABLET BY MOUTH EVERY NIGHT AT BEDTIME    FLUTICASONE (FLONASE) 50 MCG/ACTUATION NASAL SPRAY    2 sprays (100 mcg total) by Each Nare route daily as needed for Allergies.    FLUTICASONE FUROATE-VILANTEROL (BREO ELLIPTA) 100-25 MCG/DOSE DISKUS INHALER    Inhale 1 puff into the lungs once daily.    FUROSEMIDE (LASIX) 40 MG TABLET    Take 1 tablet (40 mg total) by mouth once daily. Do not take if BP is below 110/70; recommend doubling up dose if BP is over 140/90    GEMTESA 75 MG TAB    Take 1 tablet by mouth every morning.    LACTOBACILLUS RHAMNOSUS GG (CULTURELLE) 10 BILLION CELL CAPSULE    Take 1 capsule by mouth once daily.    LATANOPROST 0.005 % OPHTHALMIC SOLUTION    Place 1 drop into both eyes every evening.    LORATADINE (CLARITIN) 10 MG TABLET    Take 1 tablet (10 mg total) by mouth once daily.    MONTELUKAST (SINGULAIR) 10 MG TABLET    Take 1 tablet (10 mg total) by mouth every evening.    MUPIROCIN (BACTROBAN) 2 % OINTMENT    SMARTSI Application Topical 2-3 Times Daily    NITROGLYCERIN 0.4 MG/HR TD PT24 (NITRODUR) 0.4 MG/HR    Place 1 patch onto the skin once daily.    POTASSIUM CHLORIDE (KLOR-CON) 10 MEQ TBSR    Take 2 tablets (20 mEq total) by mouth once daily. Take with Lasix    POTASSIUM CHLORIDE (MICRO-K) 10 MEQ CPSR    Take 1 capsule (10 mEq total) by mouth once daily.    ROSUVASTATIN (CRESTOR) 40 MG TAB     TAKE 1 TABLET BY MOUTH EVERY DAY   Modified Medications    Modified Medication Previous Medication    METOPROLOL TARTRATE (LOPRESSOR) 100 MG TABLET metoprolol tartrate (LOPRESSOR) 50 MG tablet       Take 1 tablet (100 mg total) by mouth 2 (two) times daily.    Take 1 tablet (50 mg total) by mouth 2 (two) times daily.   Discontinued Medications    No medications on file       Review of Systems   Constitutional: Negative.    HENT: Negative.     Eyes: Negative.    Respiratory:  Positive for shortness of breath.    Cardiovascular:  Positive for palpitations and leg swelling.   Gastrointestinal: Negative.    Genitourinary: Negative.    Musculoskeletal: Negative.    Skin: Negative.    Neurological: Negative.    Endo/Heme/Allergies: Negative.    Psychiatric/Behavioral: Negative.     All 12 systems otherwise negative.      Wt Readings from Last 3 Encounters:   09/18/24 89.8 kg (198 lb)   09/13/24 90.7 kg (199 lb 15.3 oz)   09/03/24 88.8 kg (195 lb 12.3 oz)     Temp Readings from Last 3 Encounters:   09/13/24 97.2 °F (36.2 °C) (Temporal)   06/06/24 97.4 °F (36.3 °C)   05/30/24 97.4 °F (36.3 °C)     BP Readings from Last 3 Encounters:   09/18/24 (!) 154/98   09/13/24 134/73   09/03/24 (!) 152/82     Pulse Readings from Last 3 Encounters:   09/18/24 92   09/13/24 72   09/03/24 72       BP (!) 154/98 (BP Location: Left arm, Patient Position: Sitting, BP Method: Large (Manual))   Pulse 92   Wt 89.8 kg (198 lb)   LMP  (LMP Unknown)   SpO2 96%   BMI 32.95 kg/m²     Objective:   Physical Exam  Vitals and nursing note reviewed.   Constitutional:       General: She is not in acute distress.     Appearance: She is well-developed. She is not diaphoretic.   HENT:      Head: Normocephalic and atraumatic.      Nose: Nose normal.   Eyes:      General: No scleral icterus.     Conjunctiva/sclera: Conjunctivae normal.   Neck:      Thyroid: No thyromegaly.      Vascular: No JVD.   Cardiovascular:      Rate and Rhythm: Normal rate and  regular rhythm.      Heart sounds: S1 normal and S2 normal. Murmur heard.      No friction rub. No gallop. No S3 or S4 sounds.   Pulmonary:      Effort: Pulmonary effort is normal. No respiratory distress.      Breath sounds: Normal breath sounds. No stridor. No wheezing or rales.   Chest:      Chest wall: No tenderness.   Abdominal:      General: Bowel sounds are normal. There is no distension.      Palpations: Abdomen is soft. There is no mass.      Tenderness: There is no abdominal tenderness. There is no rebound.   Genitourinary:     Comments: Deferred  Musculoskeletal:         General: No tenderness or deformity. Normal range of motion.      Cervical back: Normal range of motion and neck supple.   Lymphadenopathy:      Cervical: No cervical adenopathy.   Skin:     General: Skin is warm and dry.      Coloration: Skin is not pale.      Findings: No erythema or rash.   Neurological:      Mental Status: She is alert and oriented to person, place, and time.      Motor: No abnormal muscle tone.      Coordination: Coordination normal.   Psychiatric:         Behavior: Behavior normal.         Thought Content: Thought content normal.         Judgment: Judgment normal.         Lab Results   Component Value Date     09/06/2024     09/06/2024     (L) 08/15/2015    K 3.3 (L) 09/06/2024    K 3.0 (L) 09/06/2024    K 4.0 08/15/2015     09/06/2024     09/06/2024    CL 99 08/15/2015    CO2 29 09/06/2024    CO2 28 09/06/2024    BUN 17 09/06/2024    BUN 16 09/06/2024    CREATININE 1.4 09/06/2024    CREATININE 1.3 09/06/2024    CREATININE 1.05 (H) 08/15/2015    GLU 85 09/06/2024    GLU 85 09/06/2024    HGBA1C 5.5 09/06/2024    MG 2.2 07/05/2024    AST 21 09/06/2024    AST 23 04/05/2016    ALT 17 09/06/2024    ALBUMIN 3.7 09/06/2024    ALBUMIN 4.4 08/15/2015    PROT 6.8 09/06/2024    BILITOT 0.5 09/06/2024    WBC 5.98 09/06/2024    HGB 12.8 09/06/2024    HCT 39.1 09/06/2024    HCT 30 (L) 12/14/2023     MCV 86 09/06/2024     09/06/2024    INR 1.0 10/03/2023    TSH 0.887 09/06/2024    CHOL 126 09/06/2024    HDL 45 09/06/2024    LDLCALC 53.6 (L) 09/06/2024    LDLCALC 89 08/15/2015    TRIG 137 09/06/2024     (H) 09/06/2024     Assessment:      1. Coronary artery disease of native artery of native heart with stable angina pectoris    2. Presence of cardiac pacemaker    3. Acute on chronic diastolic heart failure    4. Paroxysmal atrial fibrillation    5. S/P placement of cardiac pacemaker    6. History of COVID-19    7. Shortness of breath    8. Essential hypertension    9. Symptomatic bradycardia    10. Nonrheumatic aortic valve stenosis    11. Chronic obstructive pulmonary disease, unspecified COPD type            Plan:     PAF, SSS s/p PPM - 6/2021 with recurrent Afib - s/p JÚNIOR/DCCV and reload with Amio 10/2023  - interrogate device and f/u device clinic as sched   - cont Amio 200mg daily and Eliquis  - has hemorrhoids occ - may be candidate for Watchman   - f/u EP - may discuss ablation     2. HTN - elevated lately; occ urgency  - titrate meds  - cont BB to 50 mg BID --> increase to 100mg BID  - add Clonidine 0.1 mg PRN    3. Mod to severe AS - not candidate for TAVR now   - TAVR high risk for endocarditis, can consider Balloon AV - f/u with Dr. Guadarrama  - ECHO 5/2023 with normal bi V function, mod to severe AS, PASP 33 mmHg.  - [facility] 6/2023 with OM1 70% stenosis, LCX 50% stenosis, moderate AS.     4. HFpEF , last BNP - 209 ---> 522  --> 503  - cont tx - lasix 40mg BID and K 20 meq BID - hold If BP is low --changed to Lasix 40mg daily and extra PRN    5. CAD s/p PCI  - cont Eliquis, BB, statin  - cont nitro patch   - patent stents [facility] 6/2023    6. Obesity, BMI 35 - 216 lbs--> 197 lbs - BMI 32 - 198 lbs --> 195  --> 198 lbs --> BMI 33 - 201 lbs  BMI 32 - 195 lbs   - cont weight loss    7. JA, HIGGINS, h/o COVID 19, COPD  - f/u pulm  - needs to use CPAP    8. Anemia - iron def anemia   - cont iron and f/u  GI/heme onc   - s/p EGD 3/2024  - recent iron levels low 9/2024    9. GERD  - cont PPI     Visit today included increased complexity associated with the care of the episodic problem dyspnea addressed and managing the longitudinal care of the patient due to the serious and/or complex managed problem(s) .      Thank you for allowing me to participate in this patient's care. Please do not hesitate to contact me with any questions or concerns. Consult note has been forwarded to the referral physician.     Fletcher Meyer MD, [facility]  Cardiovascular Disease  [facility], [place]  [phone] (P)

## 2024-09-19 ENCOUNTER — TELEPHONE (OUTPATIENT)
Dept: CARDIOLOGY | Facility: CLINIC | Age: 89
End: 2024-09-19
Payer: MEDICARE

## 2024-09-19 NOTE — TELEPHONE ENCOUNTER
Spoke with pt in regards to test results. PT verbalized understanding information without any concerns.                   ----- Message from Fletcher Meyer MD sent at 9/19/2024  7:37 AM CDT -----  Please contact the patient and let them know that their results reveal improving fluid level - BNP,  kidney function is stable but borderline dry - can back off fluid pills if feeling more dry/BP drops. Potassium and magnesium are normal. Continue to monitor BP and weights and low salt diet.

## 2024-09-23 ENCOUNTER — INFUSION (OUTPATIENT)
Dept: INFUSION THERAPY | Facility: HOSPITAL | Age: 89
End: 2024-09-23
Attending: INTERNAL MEDICINE
Payer: MEDICARE

## 2024-09-23 ENCOUNTER — EXTERNAL HOME HEALTH (OUTPATIENT)
Dept: HOME HEALTH SERVICES | Facility: HOSPITAL | Age: 89
End: 2024-09-23
Payer: MEDICARE

## 2024-09-23 VITALS
SYSTOLIC BLOOD PRESSURE: 100 MMHG | OXYGEN SATURATION: 93 % | TEMPERATURE: 98 F | DIASTOLIC BLOOD PRESSURE: 58 MMHG | RESPIRATION RATE: 16 BRPM | HEART RATE: 58 BPM

## 2024-09-23 DIAGNOSIS — D50.9 IRON DEFICIENCY ANEMIA, UNSPECIFIED IRON DEFICIENCY ANEMIA TYPE: Primary | ICD-10-CM

## 2024-09-23 PROCEDURE — 63600175 PHARM REV CODE 636 W HCPCS: Mod: JZ,JG | Performed by: NURSE PRACTITIONER

## 2024-09-23 PROCEDURE — 96375 TX/PRO/DX INJ NEW DRUG ADDON: CPT

## 2024-09-23 PROCEDURE — 96365 THER/PROPH/DIAG IV INF INIT: CPT

## 2024-09-23 RX ORDER — METHYLPREDNISOLONE SOD SUCC 125 MG
40 VIAL (EA) INJECTION
Status: COMPLETED | OUTPATIENT
Start: 2024-09-23 | End: 2024-09-23

## 2024-09-23 RX ADMIN — FERRIC CARBOXYMALTOSE INJECTION 750 MG: 50 INJECTION, SOLUTION INTRAVENOUS at 02:09

## 2024-09-23 RX ADMIN — METHYLPREDNISOLONE SODIUM SUCCINATE 40 MG: 125 INJECTION, POWDER, FOR SOLUTION INTRAMUSCULAR; INTRAVENOUS at 02:09

## 2024-09-23 NOTE — DISCHARGE INSTRUCTIONS
.[facility]  [address]  [address]  [phone] phone     [phone] fax  Hours of Operation: Monday- Friday 8:00am- 5:00pm  After hours phone  [phone]  Hematology / Oncology Physicians on call    Dr. Cj Barker        Nurse Practitioners:    Alba Boyd, RAYRAY Jerez, RAYRAY Nicole, RAYRAY Kruse, RAYRAY Miranda, PA      Please don't hesitate to call if you have any concerns.

## 2024-09-23 NOTE — PLAN OF CARE
Problem: Adult Inpatient Plan of Care  Goal: Plan of Care Review  Outcome: Progressing  Flowsheets (Taken 9/23/2024 1448)  Plan of Care Reviewed With: patient  Goal: Patient-Specific Goal (Individualized)  Outcome: Progressing  Flowsheets (Taken 9/23/2024 1448)  Individualized Care Needs: reclined position with blanket and pillow  Anxieties, Fears or Concerns: none today  Goal: Absence of Hospital-Acquired Illness or Injury  Outcome: Progressing  Intervention: Prevent Infection  Flowsheets (Taken 9/23/2024 1448)  Infection Prevention:   equipment surfaces disinfected   hand hygiene promoted   personal protective equipment utilized  Goal: Optimal Comfort and Wellbeing  Outcome: Progressing  Intervention: Provide Person-Centered Care  Flowsheets (Taken 9/23/2024 1448)  Trust Relationship/Rapport:   care explained   thoughts/feelings acknowledged   choices provided   emotional support provided   empathic listening provided   questions answered   questions encouraged   reassurance provided     Problem: Anemia  Goal: Anemia Symptom Improvement  Outcome: Progressing  Intervention: Monitor and Manage Anemia  Flowsheets (Taken 9/23/2024 1448)  Safety Promotion/Fall Prevention:   assistive device/personal item within reach   nonskid shoes/socks when out of bed   in recliner, wheels locked   instructed to call staff for mobility   patient expresses understanding of fall risk and prevention  Fatigue Management:   fatigue-related activity identified   paced activity encouraged   frequent rest breaks encouraged     Problem: Fall Injury Risk  Goal: Absence of Fall and Fall-Related Injury  Outcome: Progressing  Intervention: Promote Injury-Free Environment  Flowsheets (Taken 9/23/2024 1448)  Safety Promotion/Fall Prevention:   assistive device/personal item within reach   nonskid shoes/socks when out of bed   in recliner, wheels locked   instructed to call staff for mobility   patient expresses understanding of fall risk and  prevention

## 2024-09-25 ENCOUNTER — TELEPHONE (OUTPATIENT)
Dept: FAMILY MEDICINE | Facility: CLINIC | Age: 89
End: 2024-09-25
Payer: MEDICARE

## 2024-09-25 NOTE — TELEPHONE ENCOUNTER
"----- Message from Kristina Bustos MA sent at 9/24/2024  5:00 PM CDT -----  José Miguel patient with an appointment  ----- Message -----  From: Jose Ernandez  Sent: 9/24/2024   4:46 PM CDT  To: Davis Marshall Staff    Type:  Needs Medical Advice    Who Called: Lilly "D[facility] "  Symptoms (please be specific):    How long has patient had these symptoms:    Pharmacy name and phone #:    Would the patient rather a call back or a response via [facility]?   Best Call Back Number: [phone]  Additional Information: Patient has a rash in hu and insertion site Lilly will like a call  "

## 2024-09-27 ENCOUNTER — TELEPHONE (OUTPATIENT)
Dept: FAMILY MEDICINE | Facility: CLINIC | Age: 89
End: 2024-09-27

## 2024-09-27 ENCOUNTER — OFFICE VISIT (OUTPATIENT)
Dept: FAMILY MEDICINE | Facility: CLINIC | Age: 89
End: 2024-09-27
Payer: MEDICARE

## 2024-09-27 VITALS
OXYGEN SATURATION: 96 % | BODY MASS INDEX: 32.37 KG/M2 | HEIGHT: 65 IN | WEIGHT: 194.31 LBS | TEMPERATURE: 97 F | HEART RATE: 60 BPM | SYSTOLIC BLOOD PRESSURE: 118 MMHG | DIASTOLIC BLOOD PRESSURE: 71 MMHG

## 2024-09-27 DIAGNOSIS — Z12.31 SCREENING MAMMOGRAM, ENCOUNTER FOR: ICD-10-CM

## 2024-09-27 DIAGNOSIS — J32.9 SINUSITIS, UNSPECIFIED CHRONICITY, UNSPECIFIED LOCATION: ICD-10-CM

## 2024-09-27 DIAGNOSIS — R21 RASH: Primary | ICD-10-CM

## 2024-09-27 LAB
CTP QC/QA: YES
CTP QC/QA: YES
POC MOLECULAR INFLUENZA A AGN: NEGATIVE
POC MOLECULAR INFLUENZA B AGN: NEGATIVE
SARS-COV-2 RDRP RESP QL NAA+PROBE: NEGATIVE

## 2024-09-27 PROCEDURE — 99999 PR PBB SHADOW E&M-EST. PATIENT-LVL V: CPT | Mod: PBBFAC,,, | Performed by: NURSE PRACTITIONER

## 2024-09-27 RX ORDER — NYSTATIN 100000 U/G
CREAM TOPICAL 2 TIMES DAILY
Qty: 30 G | Refills: 0 | Status: SHIPPED | OUTPATIENT
Start: 2024-09-27 | End: 2024-10-04

## 2024-09-27 RX ORDER — AMOXICILLIN 875 MG/1
875 TABLET, FILM COATED ORAL EVERY 12 HOURS
Qty: 10 TABLET | Refills: 0 | Status: SHIPPED | OUTPATIENT
Start: 2024-09-27 | End: 2024-10-02

## 2024-09-27 NOTE — PATIENT INSTRUCTIONS
Hydrate well  Rest  Keep affected area clean and dry  Cotton undergarments recommended  Imodium OTC as directed PRN  Report to ER immediately if symptoms worsen or persist    Donald De La Garza,     If you are due for any health screening(s) below please notify me so we can arrange them to be ordered and scheduled. Most healthy patients at your age complete them, but you are free to accept or refuse.     If you can't do it, I'll definitely understand. If you can, I'd certainly appreciate it!    All of your core healthy metrics are met.

## 2024-09-27 NOTE — PROGRESS NOTES
Subjective     Patient ID: Holli Landrum is a 89 y.o. female.    Chief Complaint: Rash    Rash  This is a new problem. The current episode started in the past 7 days. The problem is unchanged. The affected locations include the groin. The rash is characterized by itchiness and redness. Associated with: Moisture; daughter states uses heating blanket during the day and wears nylon underwear. Also has indwelling catheter. Associated symptoms include congestion, coughing (declines medication for cough) and diarrhea. Pertinent negatives include no anorexia, eye pain, facial edema, fatigue, fever, joint pain, nail changes, rhinorrhea, shortness of breath, sore throat or vomiting. Past treatments include nothing. The treatment provided no relief. There is no history of allergies, asthma, eczema or varicella.   Sinus Problem  This is a new problem. The current episode started in the past 7 days. Associated symptoms include congestion, coughing (declines medication for cough) and sinus pressure. Pertinent negatives include no chills, diaphoresis, ear pain, headaches, hoarse voice, neck pain, shortness of breath, sneezing, sore throat or swollen glands. (Diarrhea on yesterday) Past treatments include nothing. The treatment provided no relief.   Pt also requesting mammogram.  Past Medical History:   Diagnosis Date    Anticoagulant long-term use     Arthritis     Asthma     Basal cell carcinoma     Cancer     skin cancer to face    Cataract     OU done//    CHF (congestive heart failure)     COPD (chronic obstructive pulmonary disease)     no oxygen; patient denies    cpap     Essential hypertension 05/05/2010    GERD (gastroesophageal reflux disease) 11/20/2012    Glaucoma     Hypertensive heart disease with heart failure 02/05/2013    Paroxysmal atrial fibrillation     Paroxysmal ventricular tachycardia     per problem list    Preop cardiovascular exam 1/26/2024    Renal disorder     french-1/3/2020    Squamous cell carcinoma  of skin      Social History     Socioeconomic History    Marital status:    Tobacco Use    Smoking status: Former     Current packs/day: 0.00     Types: Cigarettes     Start date: 6/10/1954     Quit date: 9/15/1955     Years since quittin.0    Smokeless tobacco: Never    Tobacco comments:     I onlysmoked one year while [place]   Substance and Sexual Activity    Alcohol use: Not Currently     Comment: Only drank a little wine and haven't  drunk anything in 15 y    Drug use: Never    Sexual activity: Not Currently     Partners: Male     Birth control/protection: Abstinence     Comment:  and 87 years of age     Social Determinants of Health     Financial Resource Strain: Low Risk  (2024)    Overall Financial Resource Strain (CARDIA)     Difficulty of Paying Living Expenses: Not hard at all   Food Insecurity: No Food Insecurity (2024)    Hunger Vital Sign     Worried About Running Out of Food in the Last Year: Never true     Ran Out of Food in the Last Year: Never true   Transportation Needs: No Transportation Needs (2023)    PRAPARE - Transportation     Lack of Transportation (Medical): No     Lack of Transportation (Non-Medical): No   Physical Activity: Insufficiently Active (2024)    Exercise Vital Sign     Days of Exercise per Week: 1 day     Minutes of Exercise per Session: 10 min   Stress: No Stress Concern Present (2024)    [employer] [place] [employer] - Occupational Stress Questionnaire     Feeling of Stress : Not at all   Housing Stability: Low Risk  (2023)    Housing Stability Vital Sign     Unable to Pay for Housing in the Last Year: No     Number of Places Lived in the Last Year: 1     Unstable Housing in the Last Year: No     Past Surgical History:   Procedure Laterality Date    A-V CARDIAC PACEMAKER INSERTION  2021    Procedure: INSERTION, CARDIAC PACEMAKER, DUAL CHAMBER;  Surgeon: Oscar Sommers MD;   Location: [facility] CATH;  Service: Cardiovascular;;    ADENOIDECTOMY  [id number]    APPENDECTOMY  1948    Because of Ovarian Cyst surgery    BREAST BIOPSY Left 1995    neg    BREAST BIOPSY Right 1984    neg    BREAST BIOPSY Right 1992    neg    BREAST SURGERY      A number of biopsies    CARDIAC CATHETERIZATION  2013, 2014,2016, 2020    has 9 stents    CARDIAC SURGERY  2012    stents    CATARACT EXTRACTION W/  INTRAOCULAR LENS IMPLANT Left 11/01/2018    Dr Rose    CATARACT EXTRACTION W/  INTRAOCULAR LENS IMPLANT Right 12/13/2018    Dr Rose//    CHOLECYSTECTOMY      COLONOSCOPY  ~2005    Dr. Edward; normal per patient report    COLONOSCOPY N/A 09/06/2022    Procedure: COLONOSCOPY 8/31/22-note in Dr Simms's office visit note that he spoke to her cardiologist and she was viable candidate for endoscopy;  Surgeon: Cordelia Bueno MD;  Location: [facility];  Service: Endoscopy;  Laterality: N/A;    CORONARY ANGIOGRAPHY N/A 03/20/2020    Procedure: ANGIOGRAM, CORONARY ARTERY;  Surgeon: Chuy Bryant MD;  Location: [facility];  Service: Cardiology;  Laterality: N/A;    CYSTOSCOPY W/ RETROGRADES Bilateral 02/12/2020    Procedure: CYSTOSCOPY, WITH RETROGRADE PYELOGRAM;  Surgeon: Gasper Feliz MD;  Location: [facility] OR;  Service: Urology;  Laterality: Bilateral;    ESOPHAGOGASTRODUODENOSCOPY N/A 08/13/2020    Procedure: EGD (ESOPHAGOGASTRODUODENOSCOPY);  Surgeon: Mika Solorzano MD;  Location: [facility];  Service: Endoscopy;  Laterality: N/A; Mild Schatzki ring. Biopsied. Dilated. small hiatal hernia, gastritis; biopsy: esophagus- SEVERE REFLUX ESOPHAGITIS, stomach- chronic gastritis, negative for H pylori    ESOPHAGOGASTRODUODENOSCOPY N/A 10/22/2020    Procedure: EGD (ESOPHAGOGASTRODUODENOSCOPY);  Surgeon: Fred Hendricks MD;  Location: [facility];  Service: Endoscopy;  Laterality: N/A;    ESOPHAGOGASTRODUODENOSCOPY N/A 3/21/2024    Procedure: EGD (ESOPHAGOGASTRODUODENOSCOPY);  Surgeon: Mika Solorzano MD;   Location: [facility] ENDO;  Service: Gastroenterology;  Laterality: N/A;    EYE SURGERY  2017    Cataracs    FRACTIONAL FLOW RESERVE (FFR), CORONARY  6/20/2023    Procedure: Fractional Flow [place] (FFR), Coronary;  Surgeon: Brice Campuzano MD;  Location: [facility] CATH LAB;  Service: Cardiology;;    HYSTERECTOMY  1969    INSTANTANEOUS WAVE-FREE RATIO (IFR) N/A 6/20/2023    Procedure: Instantaneous Wave-Free Ratio (IFR);  Surgeon: Brice Campuzano MD;  Location: [facility] CATH LAB;  Service: Cardiology;  Laterality: N/A;    LEFT HEART CATHETERIZATION Left 03/03/2020    Procedure: Left heart cath;  Surgeon: Chuy Bryant MD;  Location: [facility] CATH;  Service: Cardiology;  Laterality: Left;    LEFT HEART CATHETERIZATION Left 03/20/2020    Procedure: Left heart cath;  Surgeon: Chuy Bryant MD;  Location: [facility] CATH;  Service: Cardiology;  Laterality: Left;    LEFT HEART CATHETERIZATION N/A 6/20/2023    Procedure: Left heart cath;  Surgeon: Brice Campuzano MD;  Location: [facility] CATH LAB;  Service: Cardiology;  Laterality: N/A;    OVARIAN CYST REMOVAL  teenager    PHACOEMULSIFICATION OF CATARACT Left 11/01/2018    Procedure: PHACOEMULSIFICATION, CATARACT;  Surgeon: Aleksandar Rose Jr., MD;  Location: [facility] OR;  Service: Ophthalmology;  Laterality: Left;    PHACOEMULSIFICATION OF CATARACT Right 12/13/2018    Procedure: PHACOEMULSIFICATION, CATARACT;  Surgeon: Aleksandar Rose Jr., MD;  Location: [facility] OR;  Service: Ophthalmology;  Laterality: Right;    TONSILLECTOMY      aw/denoids    TRANSESOPHAGEAL ECHOCARDIOGRAM WITH POSSIBLE CARDIOVERSION (ALFRED W/ POSS CARDIOVERSION) N/A 10/5/2023    Procedure: Transesophageal echo (ALFRED) intra-procedure log documentation/alfred/cv;  Surgeon: Bao Miguel MD;  Location: [facility] CATH LAB;  Service: Cardiology;  Laterality: N/A;   Alfred/Cv  MRI safe   Pacer & leads implanted 6/16/21, Antoun   Bio Edora 8 MICHELLE, [id number], PID: [id number]   A lead: Bio Solia S45, [id number]   V lead: Bio Solia S53, [id number]     TREATMENT OF CARDIAC ARRHYTHMIA N/A 10/5/2023    Procedure: Cardioversion or Defibrillation;  Surgeon: Bao Miguel MD;  Location: [facility] CATH LAB;  Service: Cardiology;  Laterality: N/A;    UPPER GASTROINTESTINAL ENDOSCOPY  ~2005    Dr. Solorzano    VALVE STUDY-AORTIC  6/20/2023    Procedure: Valve study-aortic;  Surgeon: Brice Campuzano MD;  Location: [facility] CATH LAB;  Service: Cardiology;;    VASCULAR SURGERY      to remove clot from right leg    Yag Capsulotomy Bilateral 11/05/2019    Dr Rose       Review of Systems   Constitutional: Negative.  Negative for chills, diaphoresis, fatigue and fever.   HENT:  Positive for nasal congestion and sinus pressure/congestion. Negative for ear pain, hoarse voice, rhinorrhea, sneezing and sore throat.    Eyes: Negative.  Negative for pain.   Respiratory:  Positive for cough (declines medication for cough). Negative for shortness of breath.    Cardiovascular: Negative.    Gastrointestinal:  Positive for diarrhea. Negative for anorexia and vomiting.   Endocrine: Negative.    Genitourinary: Negative.    Musculoskeletal: Negative.  Negative for joint pain and neck pain.   Integumentary:  Positive for rash. Negative for nail changes.   Allergic/Immunologic: Negative.    Neurological: Negative.  Negative for headaches.   Psychiatric/Behavioral: Negative.            Objective     Physical Exam  Vitals and nursing note reviewed.   Constitutional:       Appearance: Normal appearance.   HENT:      Head: Normocephalic.      Right Ear: Hearing, tympanic membrane, ear canal and external ear normal.      Left Ear: Hearing, tympanic membrane, ear canal and external ear normal.      Nose: Congestion present.      Mouth/Throat:      Mouth: Mucous membranes are moist.      Pharynx: Oropharynx is clear.   Eyes:      Conjunctiva/sclera: Conjunctivae normal.      Pupils: Pupils are equal, round, and reactive to light.   Cardiovascular:      Rate and Rhythm: Normal rate and regular  rhythm.      Pulses: Normal pulses.      Heart sounds: Normal heart sounds.   Pulmonary:      Effort: Pulmonary effort is normal.      Breath sounds: Normal breath sounds.   Abdominal:      General: Bowel sounds are normal.      Palpations: Abdomen is soft.   Musculoskeletal:         General: Normal range of motion.      Cervical back: Normal range of motion and neck supple.   Skin:     General: Skin is warm and dry.      Capillary Refill: Capillary refill takes 2 to 3 seconds.      Findings: Rash (groin; pruritic) present.   Neurological:      Mental Status: She is alert and oriented to person, place, and time.   Psychiatric:         Mood and Affect: Mood normal.         Behavior: Behavior normal.         Thought Content: Thought content normal.         Judgment: Judgment normal.            Assessment and Plan     Holli was seen today for rash.    Diagnoses and all orders for this visit:    Rash  -     nystatin (MYCOSTATIN) cream; Apply topically 2 (two) times daily. for 7 days    Sinusitis, unspecified chronicity, unspecified location  -     POCT COVID-19 Rapid Screening  -     POCT Influenza A/B Molecular  -     amoxicillin (AMOXIL) 875 MG tablet; Take 1 tablet (875 mg total) by mouth every 12 (twelve) hours. for 5 days    Screening mammogram, encounter for  -     Mammo Digital Screening Bilat; Future    Hydrate well  Rest  Keep affected area clean and dry  Cotton undergarments recommended  Imodium OTC as directed PRN  Report to ER immediately if symptoms worsen or persist                   No follow-ups on file.

## 2024-09-27 NOTE — TELEPHONE ENCOUNTER
----- Message from Ioana Anders sent at 9/27/2024  1:06 PM CDT -----  Contact: Holli  Patient is calling in regards to she says that she just missed a call from the clinic and she is returning the call to whomever. Call Back is [phone]

## 2024-09-27 NOTE — TELEPHONE ENCOUNTER
----- Message from Bridgette Dimas sent at 9/27/2024  9:18 AM CDT -----  Contact: [facility]   Pharmacy is calling to clarify an RX.    RX name:  Potassium     What do they need to clarify:  the dosage     Comments:

## 2024-10-02 ENCOUNTER — HOSPITAL ENCOUNTER (OUTPATIENT)
Dept: RADIOLOGY | Facility: HOSPITAL | Age: 89
Discharge: HOME | End: 2024-10-02
Attending: NURSE PRACTITIONER
Payer: MEDICARE

## 2024-10-02 ENCOUNTER — TELEPHONE (OUTPATIENT)
Dept: FAMILY MEDICINE | Facility: CLINIC | Age: 89
End: 2024-10-02
Payer: MEDICARE

## 2024-10-02 DIAGNOSIS — Z12.31 SCREENING MAMMOGRAM, ENCOUNTER FOR: ICD-10-CM

## 2024-10-02 PROCEDURE — 77063 BREAST TOMOSYNTHESIS BI: CPT | Mod: 26,,, | Performed by: RADIOLOGY

## 2024-10-02 PROCEDURE — 77067 SCR MAMMO BI INCL CAD: CPT | Mod: TC,PO

## 2024-10-02 PROCEDURE — 77067 SCR MAMMO BI INCL CAD: CPT | Mod: 26,,, | Performed by: RADIOLOGY

## 2024-10-02 NOTE — TELEPHONE ENCOUNTER
----- Message from Carmel sent at 10/2/2024  4:12 PM CDT -----  Pt came in for some blood work and mentioned that her rash that has cleared up call the pt at [phone]. Please advise thank you

## 2024-10-22 PROCEDURE — G0179 MD RECERTIFICATION HHA PT: HCPCS | Mod: ,,, | Performed by: FAMILY MEDICINE

## 2024-10-28 ENCOUNTER — CLINICAL SUPPORT (OUTPATIENT)
Dept: CARDIOLOGY | Facility: HOSPITAL | Age: 89
End: 2024-10-28
Attending: INTERNAL MEDICINE
Payer: MEDICARE

## 2024-10-28 ENCOUNTER — CLINICAL SUPPORT (OUTPATIENT)
Dept: CARDIOLOGY | Facility: HOSPITAL | Age: 89
End: 2024-10-28
Payer: MEDICARE

## 2024-10-28 DIAGNOSIS — I48.91 UNSPECIFIED ATRIAL FIBRILLATION: ICD-10-CM

## 2024-10-28 DIAGNOSIS — Z95.0 PRESENCE OF CARDIAC PACEMAKER: ICD-10-CM

## 2024-10-28 LAB
OHS CV AF BURDEN PERCENT: < 1
OHS CV DC REMOTE DEVICE TYPE: NORMAL
OHS CV RV PACING PERCENT: 15 %

## 2024-10-28 PROCEDURE — 93294 REM INTERROG EVL PM/LDLS PM: CPT | Mod: S$GLB,,, | Performed by: INTERNAL MEDICINE

## 2024-10-28 PROCEDURE — 93296 REM INTERROG EVL PM/IDS: CPT | Performed by: INTERNAL MEDICINE

## 2024-10-29 ENCOUNTER — OFFICE VISIT (OUTPATIENT)
Dept: CARDIOLOGY | Facility: CLINIC | Age: 89
End: 2024-10-29
Payer: MEDICARE

## 2024-10-29 VITALS
BODY MASS INDEX: 32.75 KG/M2 | HEIGHT: 65 IN | SYSTOLIC BLOOD PRESSURE: 123 MMHG | HEART RATE: 77 BPM | DIASTOLIC BLOOD PRESSURE: 89 MMHG | OXYGEN SATURATION: 95 % | RESPIRATION RATE: 16 BRPM | WEIGHT: 196.56 LBS

## 2024-10-29 DIAGNOSIS — I50.33 ACUTE ON CHRONIC DIASTOLIC HEART FAILURE: Primary | ICD-10-CM

## 2024-10-29 DIAGNOSIS — J44.9 CHRONIC OBSTRUCTIVE PULMONARY DISEASE, UNSPECIFIED COPD TYPE: ICD-10-CM

## 2024-10-29 DIAGNOSIS — R55 SYNCOPE, UNSPECIFIED SYNCOPE TYPE: ICD-10-CM

## 2024-10-29 DIAGNOSIS — I48.0 PAROXYSMAL ATRIAL FIBRILLATION: ICD-10-CM

## 2024-10-29 DIAGNOSIS — I50.9 CHRONIC CONGESTIVE HEART FAILURE, UNSPECIFIED HEART FAILURE TYPE: ICD-10-CM

## 2024-10-29 DIAGNOSIS — N18.31 STAGE 3A CHRONIC KIDNEY DISEASE: ICD-10-CM

## 2024-10-29 DIAGNOSIS — I35.0 NONRHEUMATIC AORTIC VALVE STENOSIS: ICD-10-CM

## 2024-10-29 DIAGNOSIS — I47.29 PAROXYSMAL VENTRICULAR TACHYCARDIA: ICD-10-CM

## 2024-10-29 DIAGNOSIS — R06.02 SHORTNESS OF BREATH: ICD-10-CM

## 2024-10-29 DIAGNOSIS — Z86.16 HISTORY OF COVID-19: ICD-10-CM

## 2024-10-29 DIAGNOSIS — I10 ESSENTIAL HYPERTENSION: ICD-10-CM

## 2024-10-29 DIAGNOSIS — Z95.0 CARDIAC PACEMAKER IN SITU: ICD-10-CM

## 2024-10-29 DIAGNOSIS — R00.1 SYMPTOMATIC BRADYCARDIA: ICD-10-CM

## 2024-10-29 DIAGNOSIS — Z95.0 S/P PLACEMENT OF CARDIAC PACEMAKER: ICD-10-CM

## 2024-10-29 PROCEDURE — 3288F FALL RISK ASSESSMENT DOCD: CPT | Mod: CPTII,S$GLB,, | Performed by: INTERNAL MEDICINE

## 2024-10-29 PROCEDURE — 1159F MED LIST DOCD IN RCRD: CPT | Mod: CPTII,S$GLB,, | Performed by: INTERNAL MEDICINE

## 2024-10-29 PROCEDURE — 99214 OFFICE O/P EST MOD 30 MIN: CPT | Mod: S$GLB,,, | Performed by: INTERNAL MEDICINE

## 2024-10-29 PROCEDURE — 1100F PTFALLS ASSESS-DOCD GE2>/YR: CPT | Mod: CPTII,S$GLB,, | Performed by: INTERNAL MEDICINE

## 2024-10-29 PROCEDURE — G2211 COMPLEX E/M VISIT ADD ON: HCPCS | Mod: S$GLB,,, | Performed by: INTERNAL MEDICINE

## 2024-10-29 PROCEDURE — 1160F RVW MEDS BY RX/DR IN RCRD: CPT | Mod: CPTII,S$GLB,, | Performed by: INTERNAL MEDICINE

## 2024-10-29 PROCEDURE — 1157F ADVNC CARE PLAN IN RCRD: CPT | Mod: CPTII,S$GLB,, | Performed by: INTERNAL MEDICINE

## 2024-10-29 PROCEDURE — 99999 PR PBB SHADOW E&M-EST. PATIENT-LVL IV: CPT | Mod: PBBFAC,,, | Performed by: INTERNAL MEDICINE

## 2024-10-29 RX ORDER — CLONIDINE HYDROCHLORIDE 0.1 MG/1
0.1 TABLET ORAL EVERY 6 HOURS PRN
Qty: 90 TABLET | Refills: 1 | Status: SHIPPED | OUTPATIENT
Start: 2024-10-29 | End: 2025-10-29

## 2024-10-29 RX ORDER — AMIODARONE HYDROCHLORIDE 200 MG/1
200 TABLET ORAL DAILY
Qty: 90 TABLET | Refills: 1 | Status: SHIPPED | OUTPATIENT
Start: 2024-10-29

## 2024-11-01 ENCOUNTER — TELEPHONE (OUTPATIENT)
Dept: FAMILY MEDICINE | Facility: CLINIC | Age: 89
End: 2024-11-01
Payer: MEDICARE

## 2024-11-06 ENCOUNTER — OFFICE VISIT (OUTPATIENT)
Dept: PULMONOLOGY | Facility: CLINIC | Age: 89
End: 2024-11-06
Attending: INTERNAL MEDICINE
Payer: MEDICARE

## 2024-11-06 ENCOUNTER — CLINICAL SUPPORT (OUTPATIENT)
Dept: PULMONOLOGY | Facility: CLINIC | Age: 89
End: 2024-11-06
Payer: MEDICARE

## 2024-11-06 VITALS
WEIGHT: 196 LBS | SYSTOLIC BLOOD PRESSURE: 142 MMHG | HEART RATE: 72 BPM | HEIGHT: 65 IN | BODY MASS INDEX: 32.65 KG/M2 | HEIGHT: 65 IN | WEIGHT: 196 LBS | DIASTOLIC BLOOD PRESSURE: 76 MMHG | RESPIRATION RATE: 20 BRPM | OXYGEN SATURATION: 97 % | BODY MASS INDEX: 32.65 KG/M2

## 2024-11-06 DIAGNOSIS — R91.8 PULMONARY NODULES: Primary | ICD-10-CM

## 2024-11-06 DIAGNOSIS — R26.81 UNSTABLE GAIT: ICD-10-CM

## 2024-11-06 DIAGNOSIS — J44.9 CHRONIC OBSTRUCTIVE PULMONARY DISEASE, UNSPECIFIED COPD TYPE: ICD-10-CM

## 2024-11-06 DIAGNOSIS — R29.6 FREQUENT FALLS: ICD-10-CM

## 2024-11-06 DIAGNOSIS — J30.89 SEASONAL ALLERGIC RHINITIS DUE TO OTHER ALLERGIC TRIGGER: ICD-10-CM

## 2024-11-06 DIAGNOSIS — J41.0 SIMPLE CHRONIC BRONCHITIS: ICD-10-CM

## 2024-11-06 DIAGNOSIS — J45.30 MILD PERSISTENT ASTHMA WITHOUT COMPLICATION: ICD-10-CM

## 2024-11-06 DIAGNOSIS — R09.02 EXERCISE HYPOXEMIA: ICD-10-CM

## 2024-11-06 DIAGNOSIS — I50.42 CHRONIC COMBINED SYSTOLIC AND DIASTOLIC HEART FAILURE: ICD-10-CM

## 2024-11-06 LAB
BRPFT: NORMAL
FEF 25 75 LLN: 0.49
FEF 25 75 PRE REF: 54.4 %
FEF 25 75 REF: 1.89
FEV1 FVC LLN: 62
FEV1 FVC PRE REF: 103.3 %
FEV1 FVC REF: 71
FEV1 LLN: 1.16
FEV1 PRE REF: 82.3 %
FEV1 REF: 1.73
FVC LLN: 1.56
FVC PRE REF: 82.7 %
FVC REF: 2.33
PEF LLN: 2
PEF PRE REF: 128.4 %
PEF REF: 3.76
PRE FEF 25 75: 1.03 L/S (ref 0.49–3.29)
PRE FET 100: 5.76 SEC
PRE FEV1 FVC: 73.83 % (ref 61.68–81.27)
PRE FEV1: 1.43 L (ref 1.16–2.31)
PRE FVC: 1.93 L (ref 1.56–3.11)
PRE PEF: 4.83 L/S (ref 2–5.53)

## 2024-11-06 PROCEDURE — 99214 OFFICE O/P EST MOD 30 MIN: CPT | Mod: 25,S$GLB,, | Performed by: INTERNAL MEDICINE

## 2024-11-06 PROCEDURE — 3288F FALL RISK ASSESSMENT DOCD: CPT | Mod: CPTII,S$GLB,, | Performed by: INTERNAL MEDICINE

## 2024-11-06 PROCEDURE — 1159F MED LIST DOCD IN RCRD: CPT | Mod: CPTII,S$GLB,, | Performed by: INTERNAL MEDICINE

## 2024-11-06 PROCEDURE — 1100F PTFALLS ASSESS-DOCD GE2>/YR: CPT | Mod: CPTII,S$GLB,, | Performed by: INTERNAL MEDICINE

## 2024-11-06 PROCEDURE — 94010 BREATHING CAPACITY TEST: CPT | Mod: 59,S$GLB,, | Performed by: INTERNAL MEDICINE

## 2024-11-06 PROCEDURE — 1157F ADVNC CARE PLAN IN RCRD: CPT | Mod: CPTII,S$GLB,, | Performed by: INTERNAL MEDICINE

## 2024-11-06 PROCEDURE — 1126F AMNT PAIN NOTED NONE PRSNT: CPT | Mod: CPTII,S$GLB,, | Performed by: INTERNAL MEDICINE

## 2024-11-06 PROCEDURE — 99999 PR PBB SHADOW E&M-EST. PATIENT-LVL V: CPT | Mod: PBBFAC,,, | Performed by: INTERNAL MEDICINE

## 2024-11-06 PROCEDURE — 94618 PULMONARY STRESS TESTING: CPT | Mod: S$GLB,,, | Performed by: INTERNAL MEDICINE

## 2024-11-06 RX ORDER — B-COMPLEX WITH VITAMIN C
1 CAPSULE ORAL EVERY MORNING
COMMUNITY
Start: 2024-10-24

## 2024-11-06 RX ORDER — IPRATROPIUM BROMIDE AND ALBUTEROL SULFATE 2.5; .5 MG/3ML; MG/3ML
3 SOLUTION RESPIRATORY (INHALATION) EVERY 6 HOURS PRN
Qty: 360 ML | Refills: 11 | Status: SHIPPED | OUTPATIENT
Start: 2024-11-06

## 2024-11-06 RX ORDER — ACETAMINOPHEN 500 MG
1 TABLET ORAL EVERY MORNING
COMMUNITY
Start: 2024-10-24

## 2024-11-06 RX ORDER — FLUTICASONE FUROATE AND VILANTEROL 100; 25 UG/1; UG/1
1 POWDER RESPIRATORY (INHALATION) DAILY
Qty: 60 EACH | Refills: 11 | Status: SHIPPED | OUTPATIENT
Start: 2024-11-06

## 2024-11-06 RX ORDER — ALBUTEROL SULFATE 90 UG/1
1-2 INHALANT RESPIRATORY (INHALATION) EVERY 4 HOURS PRN
Qty: 18 G | Refills: 11 | Status: SHIPPED | OUTPATIENT
Start: 2024-11-06

## 2024-11-06 RX ORDER — MONTELUKAST SODIUM 10 MG/1
10 TABLET ORAL NIGHTLY
Qty: 30 TABLET | Refills: 11 | Status: SHIPPED | OUTPATIENT
Start: 2024-11-06

## 2024-11-06 NOTE — PROGRESS NOTES
Subjective:     Patient ID: Holli Landrum is a 89 y.o. female.    Chief Complaint:      HPI 89 y.o. with multiple falls since last seen. Unsteady gait. Dizzy and weak before falling. Legs give out. Unable to get wheelchair into bathroom. Legs give out - finished physical therapy       She is feeling better from a pulmonary standpoint. On oxygen at night with Continuous Positive Airway Pressure . Complicated history of Atrial Fibrillation Congestive Heart failure, Chronic Obstructive Pulmonary Disease and iron deficiency anemia  Dyspnea  Patient complains of shortness of breath. Symptoms occur with one block walking. Symptoms began many years ago, worsening since. Associated symptoms include  difficulty breathing, drainage from nose, dyspnea on exertion, productive cough, shortness of breath, and clear phlegm . She denies chest pain, located left chest. She does not have had recent travel. Weight has been stable. Symptoms are exacerbated by moderate activity. Symptoms are alleviated by rest.     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    History of nodules on lungs  Frog in throat      Past Medical History:   Diagnosis Date    Anticoagulant long-term use     Arthritis     Asthma     Basal cell carcinoma     Cancer     skin cancer to face    Cataract     OU done//    CHF (congestive heart failure)     COPD (chronic obstructive pulmonary disease)     no oxygen; patient denies    cpap     Essential hypertension 05/05/2010    GERD (gastroesophageal reflux disease) 11/20/2012    Glaucoma     Hypertensive heart disease with heart failure 02/05/2013    Paroxysmal atrial fibrillation     Paroxysmal ventricular tachycardia     per problem list    Preop cardiovascular exam 1/26/2024    Renal disorder     french-1/3/2020    Squamous cell carcinoma of skin      Past Surgical History:   Procedure Laterality Date    A-V CARDIAC PACEMAKER INSERTION  06/16/2021     Procedure: INSERTION, CARDIAC PACEMAKER, DUAL CHAMBER;  Surgeon: Oscar Sommers MD;  Location: [facility];  Service: Cardiovascular;;    ADENOIDECTOMY  [id number]    APPENDECTOMY  1948    Because of Ovarian Cyst surgery    BREAST BIOPSY Left 1995    neg    BREAST BIOPSY Right 1984    neg    BREAST BIOPSY Right 1992    neg    BREAST SURGERY      A number of biopsies    CARDIAC CATHETERIZATION  2013, 2014,2016, 2020    has 9 stents    CARDIAC SURGERY  2012    stents    CATARACT EXTRACTION W/  INTRAOCULAR LENS IMPLANT Left 11/01/2018    Dr Rose    CATARACT EXTRACTION W/  INTRAOCULAR LENS IMPLANT Right 12/13/2018    Dr Rose//    CHOLECYSTECTOMY      COLONOSCOPY  ~2005    Dr. Edward; normal per patient report    COLONOSCOPY N/A 09/06/2022    Procedure: COLONOSCOPY 8/31/22-note in Dr Simms's office visit note that he spoke to her cardiologist and she was viable candidate for endoscopy;  Surgeon: Cordelia Bueno MD;  Location: [facility];  Service: Endoscopy;  Laterality: N/A;    CORONARY ANGIOGRAPHY N/A 03/20/2020    Procedure: ANGIOGRAM, CORONARY ARTERY;  Surgeon: Chuy Bryant MD;  Location: [facility];  Service: Cardiology;  Laterality: N/A;    CYSTOSCOPY W/ RETROGRADES Bilateral 02/12/2020    Procedure: CYSTOSCOPY, WITH RETROGRADE PYELOGRAM;  Surgeon: Gasper Feliz MD;  Location: [facility] OR;  Service: Urology;  Laterality: Bilateral;    ESOPHAGOGASTRODUODENOSCOPY N/A 08/13/2020    Procedure: EGD (ESOPHAGOGASTRODUODENOSCOPY);  Surgeon: Mika Solorzano MD;  Location: [facility];  Service: Endoscopy;  Laterality: N/A; Mild Schatzki ring. Biopsied. Dilated. small hiatal hernia, gastritis; biopsy: esophagus- SEVERE REFLUX ESOPHAGITIS, stomach- chronic gastritis, negative for H pylori    ESOPHAGOGASTRODUODENOSCOPY N/A 10/22/2020    Procedure: EGD (ESOPHAGOGASTRODUODENOSCOPY);  Surgeon: Fred Hendricks MD;  Location: [facility];  Service: Endoscopy;  Laterality: N/A;    ESOPHAGOGASTRODUODENOSCOPY N/A 3/21/2024     Procedure: EGD (ESOPHAGOGASTRODUODENOSCOPY);  Surgeon: Mika Solorzano MD;  Location: [facility] ENDO;  Service: Gastroenterology;  Laterality: N/A;    EYE SURGERY  2017    Cataracs    FRACTIONAL FLOW RESERVE (FFR), CORONARY  6/20/2023    Procedure: Fractional Flow [place] (FFR), Coronary;  Surgeon: Brice Campuzano MD;  Location: [facility] CATH LAB;  Service: Cardiology;;    HYSTERECTOMY  1969    INSTANTANEOUS WAVE-FREE RATIO (IFR) N/A 6/20/2023    Procedure: Instantaneous Wave-Free Ratio (IFR);  Surgeon: Brice Campuzano MD;  Location: [facility] CATH LAB;  Service: Cardiology;  Laterality: N/A;    LEFT HEART CATHETERIZATION Left 03/03/2020    Procedure: Left heart cath;  Surgeon: Chuy Bryant MD;  Location: [facility] CATH;  Service: Cardiology;  Laterality: Left;    LEFT HEART CATHETERIZATION Left 03/20/2020    Procedure: Left heart cath;  Surgeon: Chuy Bryant MD;  Location: [facility] CATH;  Service: Cardiology;  Laterality: Left;    LEFT HEART CATHETERIZATION N/A 6/20/2023    Procedure: Left heart cath;  Surgeon: Brice Campuzano MD;  Location: [facility] CATH LAB;  Service: Cardiology;  Laterality: N/A;    OVARIAN CYST REMOVAL  teenager    PHACOEMULSIFICATION OF CATARACT Left 11/01/2018    Procedure: PHACOEMULSIFICATION, CATARACT;  Surgeon: Aleksandar Rose Jr., MD;  Location: [facility] OR;  Service: Ophthalmology;  Laterality: Left;    PHACOEMULSIFICATION OF CATARACT Right 12/13/2018    Procedure: PHACOEMULSIFICATION, CATARACT;  Surgeon: Aleksandar Rose Jr., MD;  Location: [facility] OR;  Service: Ophthalmology;  Laterality: Right;    TONSILLECTOMY      aw/denoids    TRANSESOPHAGEAL ECHOCARDIOGRAM WITH POSSIBLE CARDIOVERSION (ALFRED W/ POSS CARDIOVERSION) N/A 10/5/2023    Procedure: Transesophageal echo (ALFRED) intra-procedure log documentation/alfred/cv;  Surgeon: Bao Miguel MD;  Location: [facility] CATH LAB;  Service: Cardiology;  Laterality: N/A;   Alfred/Cv  MRI safe   Pacer & leads implanted 6/16/21, Antoun   Bio Hubert MARTINEZ,  [id number], PID: [id number]   A lead: Bio Solia S45, [id number]   V lead: Bio Solia S53, [id number]    TREATMENT OF CARDIAC ARRHYTHMIA N/A 10/5/2023    Procedure: Cardioversion or Defibrillation;  Surgeon: Bao Miguel MD;  Location: [facility] LAB;  Service: Cardiology;  Laterality: N/A;    UPPER GASTROINTESTINAL ENDOSCOPY  ~2005    Dr. Solorzano    VALVE STUDY-AORTIC  6/20/2023    Procedure: Valve study-aortic;  Surgeon: Brice Campuzano MD;  Location: [facility] CATH LAB;  Service: Cardiology;;    VASCULAR SURGERY      to remove clot from right leg    Yag Capsulotomy Bilateral 11/05/2019    Dr Rose     Review of patient's allergies indicates:   Allergen Reactions    Timolol maleate Shortness Of Breath     Other Reaction(s): Shortness Of Breath    Ciprofloxacin Other (See Comments)     Muscle ache    Sulfamethoxazole-trimethoprim      Current Outpatient Medications on File Prior to Visit   Medication Sig Dispense Refill    ACIDOPHILUS PROBIOTIC BLEND 175 mg Cap Take 1 capsule by mouth every morning.      amiodarone (PACERONE) 200 MG Tab Take 1 tablet (200 mg total) by mouth once daily. 90 tablet 1    apixaban (ELIQUIS) 2.5 mg Tab Take 1 tablet (2.5 mg total) by mouth 2 (two) times daily. 180 tablet 3    cholecalciferol, vitamin D3, 125 mcg (5,000 unit) Tab Take 5,000 Units by mouth once daily.      cloNIDine (CATAPRES) 0.1 MG tablet Take 1 tablet (0.1 mg total) by mouth every 6 (six) hours as needed (take if BP is over 180/100). 90 tablet 1    dorzolamide (TRUSOPT) 2 % ophthalmic solution INSTILL 1 DROP INTO BOTH EYES TWICE DAILY 30 mL 1    famotidine (PEPCID) 40 MG tablet TAKE ONE TABLET BY MOUTH EVERY NIGHT AT BEDTIME 30 tablet 3    fluticasone (FLONASE) 50 mcg/actuation nasal spray 2 sprays (100 mcg total) by Each Nare route daily as needed for Allergies. 16 g 11    furosemide (LASIX) 40 MG tablet Take 1 tablet (40 mg total) by mouth once daily. Do not take if BP is below 110/70; recommend doubling up dose if BP is  over 140/90 (Patient taking differently: Take 80 mg by mouth once daily. Do not take if BP is below 110/70; recommend doubling up dose if BP is over 140/90) 90 tablet 1    GEMTESA 75 mg Tab Take 1 tablet by mouth every morning.      Lactobacillus rhamnosus GG (CULTURELLE) 10 billion cell capsule Take 1 capsule by mouth once daily.      latanoprost 0.005 % ophthalmic solution Place 1 drop into both eyes every evening. 5 mL 6    loratadine (CLARITIN) 10 mg tablet Take 1 tablet (10 mg total) by mouth once daily. 30 tablet 11    lutein-zeaxanthin extract 15-0.7 mg Cap Take 1 capsule by mouth every morning.      metoprolol tartrate (LOPRESSOR) 100 MG tablet Take 1 tablet (100 mg total) by mouth 2 (two) times daily. 180 tablet 1    mupirocin (BACTROBAN) 2 % ointment SMARTSI Application Topical 2-3 Times Daily      nitroGLYCERIN 0.4 MG/HR TD PT24 (NITRODUR) 0.4 mg/hr Place 1 patch onto the skin once daily. 90 patch 3    potassium chloride (KLOR-CON) 10 MEQ TbSR Take 2 tablets (20 mEq total) by mouth once daily. Take with Lasix 180 tablet 1    potassium chloride (MICRO-K) 10 MEQ CpSR Take 1 capsule (10 mEq total) by mouth once daily. 30 capsule 12    rosuvastatin (CRESTOR) 40 MG Tab TAKE 1 TABLET BY MOUTH EVERY DAY 90 tablet 3    [DISCONTINUED] albuterol (PROVENTIL/VENTOLIN HFA) 90 mcg/actuation inhaler Inhale 1-2 puffs into the lungs every 4 (four) hours as needed for Wheezing or Shortness of Breath. Rescue 18 g 11    [DISCONTINUED] albuterol-ipratropium (DUO-NEB) 2.5 mg-0.5 mg/3 mL nebulizer solution Take 3 mLs by nebulization every 6 (six) hours as needed for Wheezing or Shortness of Breath. 360 mL 11    [DISCONTINUED] fluticasone furoate-vilanteroL (BREO ELLIPTA) 100-25 mcg/dose diskus inhaler Inhale 1 puff into the lungs once daily. 60 each 11    [DISCONTINUED] montelukast (SINGULAIR) 10 mg tablet Take 1 tablet (10 mg total) by mouth every evening. 30 tablet 11    nystatin (MYCOSTATIN) cream Apply topically 2 (two)  times daily. for 7 days 30 g 0     Current Facility-Administered Medications on File Prior to Visit   Medication Dose Route Frequency Provider Last Rate Last Admin    lactated ringers infusion   Intravenous Continuous Mika Solorzano MD   New Bag at 24 1300    sodium chloride 0.9% flush 10 mL  10 mL Intravenous PRN Mika Solorzano MD         Social History     Socioeconomic History    Marital status:    Tobacco Use    Smoking status: Former     Current packs/day: 0.00     Types: Cigarettes     Start date: 6/10/1954     Quit date: 9/15/1955     Years since quittin.1    Smokeless tobacco: Never    Tobacco comments:     I onlysmoked one year while [place]  was [place]  during [place]   Substance and Sexual Activity    Alcohol use: Not Currently     Comment: Only drank a little wine and haven't  drunk anything in 15 y    Drug use: Never    Sexual activity: Not Currently     Partners: Male     Birth control/protection: Abstinence     Comment:  and 87 years of age     Social Drivers of Health     Financial Resource Strain: Low Risk  (2024)    Overall Financial Resource Strain (CARDIA)     Difficulty of Paying Living Expenses: Not hard at all   Food Insecurity: No Food Insecurity (2024)    Hunger Vital Sign     Worried About Running Out of Food in the Last Year: Never true     Ran Out of Food in the Last Year: Never true   Transportation Needs: No Transportation Needs (2023)    PRAPARE - Transportation     Lack of Transportation (Medical): No     Lack of Transportation (Non-Medical): No   Physical Activity: Insufficiently Active (2024)    Exercise Vital Sign     Days of Exercise per Week: 1 day     Minutes of Exercise per Session: 10 min   Stress: No Stress Concern Present (2024)    [place] [employer] - Occupational Stress Questionnaire     Feeling of Stress : Not at all   Housing Stability: Low Risk  (2023)    Housing Stability  "Vital Sign     Unable to Pay for Housing in the Last Year: No     Number of Places Lived in the Last Year: 1     Unstable Housing in the Last Year: No     Family History   Problem Relation Name Age of Onset    Diabetes Brother Wili         Type 2    Heart disease Brother Wili          at 65 CHD    Diabetes Mother Holli COX         Type 1    Alcohol abuse Mother Holli COX         Dod 10/75    Heart disease Mother Holli COX         Arteriosclerosis    Hypertension Mother Holli COX     Stroke Mother Holli COX         3/15/1975 dod 10/1975    Cancer Maternal Grandfather Pamela         Dod     Clotting disorder Son          bleeding after tonsillectomy only    Heart disease Father Ronaldo Chaves         Heart attack. Afib, and Polyvcithemavera    Hypertension Father Ronaldo Chaves     Amblyopia Neg Hx      Blindness Neg Hx      Cataracts Neg Hx      Glaucoma Neg Hx      Macular degeneration Neg Hx      Retinal detachment Neg Hx      Strabismus Neg Hx      Thyroid disease Neg Hx      Colon cancer Neg Hx         Review of Systems    Objective:      BP (!) 142/76 (Patient Position: Sitting)   Pulse 72   Resp 20   Ht 5' 5" (1.651 m)   Wt 88.9 kg (196 lb)   LMP  (LMP Unknown)   SpO2 97% Comment: 2 liters at night  BMI 32.62 kg/m²   Physical Exam  Vitals and nursing note reviewed.   Constitutional:       Appearance: She is well-developed. She is obese. She is ill-appearing.   HENT:      Head: Normocephalic.      Nose: Nose normal.   Eyes:      Pupils: Pupils are equal, round, and reactive to light.   Neck:      Vascular: JVD present.   Cardiovascular:      Rate and Rhythm: Normal rate and regular rhythm.      Chest Wall: PMI is displaced.      Heart sounds: Murmur heard.      Systolic murmur is present with a grade of 2/6.   Pulmonary:      Breath sounds: Examination of the right-lower field reveals decreased breath sounds and rales. Examination of the left-lower field reveals decreased breath sounds and rales. Decreased breath " "sounds and rales present.   Abdominal:      General: Bowel sounds are normal.      Palpations: Abdomen is soft.   Musculoskeletal:         General: Normal range of motion.      Cervical back: Normal range of motion.      Right lower leg: Edema present.      Left lower leg: Edema present.   Skin:     General: Skin is warm.   Neurological:      Mental Status: She is alert and oriented to person, place, and time.       Personal Diagnostic Review  none pertinent        11/6/2024     3:19 PM   Pulmonary Studies Review   SpO2 97 %   Height 5' 5" (1.651 m)   Weight 88.9 kg (196 lb)   BMI (Calculated) 32.6   Predicted Distance 144.05   Predicted Distance Meters (Calculated) 285.79 meters       Mammo Digital Screening Bilat w/ Scotty  Narrative: Facility:  [facility] [address] [place] [id number] [phone]    Name: Holli Landrum    MRN: [id number]    Result:  Mammo Digital Screening Bilat w/ Scotty    History:  Patient is 89 y.o. and is seen for a screening mammogram.    Films Compared:  Prior images (if available) were compared.     Findings:  This procedure was performed using tomosynthesis.   Computer-aided detection was utilized in the interpretation of this   examination.    The breasts are heterogeneously dense, which may obscure small masses.   There is no evidence of suspicious masses, microcalcifications or   architectural distortion.       Impression:    No mammographic evidence of malignancy.    BI-RADS Category 1: Negative    Recommendation:  Routine screening mammogram in 1 year, or as clinically indicated.    Ritchie Bazan MD      Office Spirometry Results:         11/6/2024     3:19 PM 11/6/2024     2:51 PM 10/29/2024     2:35 PM 9/27/2024    10:42 AM 9/23/2024     3:05 PM 9/23/2024     1:56 PM 9/18/2024    11:43 AM   Pulmonary Function Tests   SpO2 97 %  95 % 96 % 93 % 94 % 96 %   Ordering Provider  Dr grigsby        Performing nurse/tech/RT  NL CRT        Diagnosis  " "COPD        Height 5' 5" (1.651 m) 5' 5" (1.651 m) 5' 5" (1.651 m) 5' 5" (1.651 m)      Weight 88.9 kg (196 lb) 88.9 kg (196 lb) 89.1 kg (196 lb 8.6 oz) 88.1 kg (194 lb 4.8 oz)   89.8 kg (198 lb)   BMI (Calculated) 32.6 32.6 32.7 32.3   32.9   6MWT Status  not completed        Patient Reported  Dyspnea;Lightheadedness        Was O2 used?  No        Did patient stop?  Yes        How many times?  1        Stop Time 1  150 seconds        Restart Time 1  360 seconds        Did patient restart?  No        Type of assistive device(s) used?  a walker        Oxygen Saturation  93 %        Supplemental Oxygen  Room Air        Heart Rate  61 bpm        Blood Pressure  142/76        Stefano Dyspnea Rating   heavy        Oxygen Saturation  95 %        Supplemental Oxygen  Room Air        Heart Rate  62 bpm        Blood Pressure  174/79        Stefano Dyspnea Rating   very heavy        Recovery Time (seconds)  240 seconds        Oxygen Saturation  95 %        Supplemental Oxygen  Room Air        Heart Rate  67 bpm        Blood Pressure  157/79        Stefano Dyspnea Rating   heavy        Is procedure ready for interpretation?  Yes        Oxygen Qualification?  No              11/6/2024     3:19 PM   Pulmonary Studies Review   SpO2 97 %   Height 5' 5" (1.651 m)   Weight 88.9 kg (196 lb)   BMI (Calculated) 32.6   Predicted Distance 144.05   Predicted Distance Meters (Calculated) 285.79 meters           No results found for this or any previous visit (from the past 2 weeks).    Assessment:            Pulmonary nodules  -     X-Ray Chest PA And Lateral; Future; Expected date: 11/06/2024    Chronic obstructive pulmonary disease, unspecified COPD type  -     X-Ray Chest 4 Or More View  -     albuterol (PROVENTIL/VENTOLIN HFA) 90 mcg/actuation inhaler; Inhale 1-2 puffs into the lungs every 4 (four) hours as needed for Wheezing or Shortness of Breath. Rescue  Dispense: 18 g; Refill: 11  -     albuterol-ipratropium (DUO-NEB) 2.5 mg-0.5 mg/3 mL " nebulizer solution; Take 3 mLs by nebulization every 6 (six) hours as needed for Wheezing or Shortness of Breath.  Dispense: 360 mL; Refill: 11  -     fluticasone furoate-vilanteroL (BREO ELLIPTA) 100-25 mcg/dose diskus inhaler; Inhale 1 puff into the lungs once daily.  Dispense: 60 each; Refill: 11  -     X-Ray Chest PA And Lateral; Future; Expected date: 11/06/2024    Mild persistent asthma without complication    Chronic combined systolic and diastolic heart failure    Simple chronic bronchitis  -     albuterol (PROVENTIL/VENTOLIN HFA) 90 mcg/actuation inhaler; Inhale 1-2 puffs into the lungs every 4 (four) hours as needed for Wheezing or Shortness of Breath. Rescue  Dispense: 18 g; Refill: 11  -     albuterol-ipratropium (DUO-NEB) 2.5 mg-0.5 mg/3 mL nebulizer solution; Take 3 mLs by nebulization every 6 (six) hours as needed for Wheezing or Shortness of Breath.  Dispense: 360 mL; Refill: 11  -     fluticasone furoate-vilanteroL (BREO ELLIPTA) 100-25 mcg/dose diskus inhaler; Inhale 1 puff into the lungs once daily.  Dispense: 60 each; Refill: 11    Seasonal allergic rhinitis due to other allergic trigger  -     montelukast (SINGULAIR) 10 mg tablet; Take 1 tablet (10 mg total) by mouth every evening.  Dispense: 30 tablet; Refill: 11    Unstable gait  -     Ambulatory referral/consult to Home Health; Future; Expected date: 11/07/2024    Frequent falls  -     Ambulatory referral/consult to Home Health; Future; Expected date: 11/07/2024          Outpatient Encounter Medications as of 11/6/2024   Medication Sig Dispense Refill    ACIDOPHILUS PROBIOTIC BLEND 175 mg Cap Take 1 capsule by mouth every morning.      amiodarone (PACERONE) 200 MG Tab Take 1 tablet (200 mg total) by mouth once daily. 90 tablet 1    apixaban (ELIQUIS) 2.5 mg Tab Take 1 tablet (2.5 mg total) by mouth 2 (two) times daily. 180 tablet 3    cholecalciferol, vitamin D3, 125 mcg (5,000 unit) Tab Take 5,000 Units by mouth once daily.      cloNIDine  (CATAPRES) 0.1 MG tablet Take 1 tablet (0.1 mg total) by mouth every 6 (six) hours as needed (take if BP is over 180/100). 90 tablet 1    dorzolamide (TRUSOPT) 2 % ophthalmic solution INSTILL 1 DROP INTO BOTH EYES TWICE DAILY 30 mL 1    famotidine (PEPCID) 40 MG tablet TAKE ONE TABLET BY MOUTH EVERY NIGHT AT BEDTIME 30 tablet 3    fluticasone (FLONASE) 50 mcg/actuation nasal spray 2 sprays (100 mcg total) by Each Nare route daily as needed for Allergies. 16 g 11    furosemide (LASIX) 40 MG tablet Take 1 tablet (40 mg total) by mouth once daily. Do not take if BP is below 110/70; recommend doubling up dose if BP is over 140/90 (Patient taking differently: Take 80 mg by mouth once daily. Do not take if BP is below 110/70; recommend doubling up dose if BP is over 140/90) 90 tablet 1    GEMTESA 75 mg Tab Take 1 tablet by mouth every morning.      Lactobacillus rhamnosus GG (CULTURELLE) 10 billion cell capsule Take 1 capsule by mouth once daily.      latanoprost 0.005 % ophthalmic solution Place 1 drop into both eyes every evening. 5 mL 6    loratadine (CLARITIN) 10 mg tablet Take 1 tablet (10 mg total) by mouth once daily. 30 tablet 11    lutein-zeaxanthin extract 15-0.7 mg Cap Take 1 capsule by mouth every morning.      metoprolol tartrate (LOPRESSOR) 100 MG tablet Take 1 tablet (100 mg total) by mouth 2 (two) times daily. 180 tablet 1    mupirocin (BACTROBAN) 2 % ointment SMARTSI Application Topical 2-3 Times Daily      nitroGLYCERIN 0.4 MG/HR TD PT24 (NITRODUR) 0.4 mg/hr Place 1 patch onto the skin once daily. 90 patch 3    potassium chloride (KLOR-CON) 10 MEQ TbSR Take 2 tablets (20 mEq total) by mouth once daily. Take with Lasix 180 tablet 1    potassium chloride (MICRO-K) 10 MEQ CpSR Take 1 capsule (10 mEq total) by mouth once daily. 30 capsule 12    rosuvastatin (CRESTOR) 40 MG Tab TAKE 1 TABLET BY MOUTH EVERY DAY 90 tablet 3    [DISCONTINUED] albuterol (PROVENTIL/VENTOLIN HFA) 90 mcg/actuation inhaler Inhale  1-2 puffs into the lungs every 4 (four) hours as needed for Wheezing or Shortness of Breath. Rescue 18 g 11    [DISCONTINUED] albuterol-ipratropium (DUO-NEB) 2.5 mg-0.5 mg/3 mL nebulizer solution Take 3 mLs by nebulization every 6 (six) hours as needed for Wheezing or Shortness of Breath. 360 mL 11    [DISCONTINUED] fluticasone furoate-vilanteroL (BREO ELLIPTA) 100-25 mcg/dose diskus inhaler Inhale 1 puff into the lungs once daily. 60 each 11    [DISCONTINUED] montelukast (SINGULAIR) 10 mg tablet Take 1 tablet (10 mg total) by mouth every evening. 30 tablet 11    albuterol (PROVENTIL/VENTOLIN HFA) 90 mcg/actuation inhaler Inhale 1-2 puffs into the lungs every 4 (four) hours as needed for Wheezing or Shortness of Breath. Rescue 18 g 11    albuterol-ipratropium (DUO-NEB) 2.5 mg-0.5 mg/3 mL nebulizer solution Take 3 mLs by nebulization every 6 (six) hours as needed for Wheezing or Shortness of Breath. 360 mL 11    fluticasone furoate-vilanteroL (BREO ELLIPTA) 100-25 mcg/dose diskus inhaler Inhale 1 puff into the lungs once daily. 60 each 11    [] influenza, adjuvanted, (FLUAD TRIV ,65Y UP,,PF,) 45 mcg (15 mcg x 3)/0.5 mL IM vaccine (> or = 66 yo) Inject 0.5 mLs into the muscle once. for 1 dose 0.5 mL 0    montelukast (SINGULAIR) 10 mg tablet Take 1 tablet (10 mg total) by mouth every evening. 30 tablet 11    nystatin (MYCOSTATIN) cream Apply topically 2 (two) times daily. for 7 days 30 g 0    [DISCONTINUED] amiodarone (PACERONE) 200 MG Tab Take 1 tablet (200 mg total) by mouth once daily. 60 tablet 3    [DISCONTINUED] apixaban (ELIQUIS) 2.5 mg Tab Take 1 tablet (2.5 mg total) by mouth 2 (two) times daily. 180 tablet 3    [DISCONTINUED] cloNIDine (CATAPRES) 0.1 MG tablet Take 1 tablet (0.1 mg total) by mouth every 6 (six) hours as needed (take if BP is over 180/100). 90 tablet 1     Facility-Administered Encounter Medications as of 2024   Medication Dose Route Frequency Provider Last Rate Last Admin     lactated ringers infusion   Intravenous Continuous Mika Solorzano MD   New Bag at 03/21/24 1300    sodium chloride 0.9% flush 10 mL  10 mL Intravenous PRN Mika Solorzano MD         Plan:       Requested Prescriptions     Signed Prescriptions Disp Refills    albuterol (PROVENTIL/VENTOLIN HFA) 90 mcg/actuation inhaler 18 g 11     Sig: Inhale 1-2 puffs into the lungs every 4 (four) hours as needed for Wheezing or Shortness of Breath. Rescue    albuterol-ipratropium (DUO-NEB) 2.5 mg-0.5 mg/3 mL nebulizer solution 360 mL 11     Sig: Take 3 mLs by nebulization every 6 (six) hours as needed for Wheezing or Shortness of Breath.    fluticasone furoate-vilanteroL (BREO ELLIPTA) 100-25 mcg/dose diskus inhaler 60 each 11     Sig: Inhale 1 puff into the lungs once daily.    montelukast (SINGULAIR) 10 mg tablet 30 tablet 11     Sig: Take 1 tablet (10 mg total) by mouth every evening.     Problem List Items Addressed This Visit       Asthma    Overview     Formatting of this note might be different from the original.   Last Assessment & Plan:     Formatting of this note might be different from the original.    Stable on BREO daily    Albuterol prn, does not use rescue inhaler often    Lungs clear on exam    Last Chest X Ray 2/2023 at [facility], she reports lungs were clear    Will get updated hira, FeNO, and 6mwd - she is in wheelchair during visit but says she will be able to attempt walk with her walker         Chronic combined systolic and diastolic heart failure    COPD (chronic obstructive pulmonary disease)    Relevant Medications    albuterol (PROVENTIL/VENTOLIN HFA) 90 mcg/actuation inhaler    albuterol-ipratropium (DUO-NEB) 2.5 mg-0.5 mg/3 mL nebulizer solution    fluticasone furoate-vilanteroL (BREO ELLIPTA) 100-25 mcg/dose diskus inhaler    Other Relevant Orders    X-Ray Chest PA And Lateral    Pulmonary nodules - Primary    Relevant Orders    X-Ray Chest PA And Lateral     Other Visit Diagnoses       Seasonal  allergic rhinitis due to other allergic trigger        Relevant Medications    montelukast (SINGULAIR) 10 mg tablet    Unstable gait        Relevant Orders    Ambulatory referral/consult to Home Health    Frequent falls        Relevant Orders    Ambulatory referral/consult to Home Health               Follow up in about 1 year (around 11/6/2025) for Follow up with NP, CXR on return.    MEDICAL DECISION MAKING: Moderate to high complexity.  Overall, the multiple problems listed are of moderate to high severity that may impact quality of life and activities of daily living. Side effects of medications, treatment plan as well as options and alternatives reviewed and discussed with patient. There was counseling of patient concerning these issues.    Total time spent in counseling and coordination of care - 35  minutes of total time spent on the encounter, which includes face to face time and non-face to face time preparing to see the patient (eg, review of tests), Obtaining and/or reviewing separately obtained history, Documenting clinical information in the electronic or other health record, Independently interpreting results (not separately reported) and communicating results to the patient/family/caregiver, or Care coordination (not separately reported).    Time was used in discussion of prognosis, risks, benefits of treatment, instructions and compliance with regimen . Discussion with other physicians and/or health care providers - home health or for use of durable medical equipment (oxygen, nebulizers, CPAP, BiPAP) occurred.

## 2024-11-06 NOTE — PROCEDURES
"[facility] - Pulmonary Function  Six Minute Walk     SUMMARY     Ordering Provider: Dr grigsby   Interpreting Provider: Dr Grigsby  Performing nurse/tech/RT: NL CRT  Diagnosis: COPD  Height: 5' 5" (165.1 cm)  Weight: 88.9 kg (196 lb)  BMI (Calculated): 32.6                Phase Oxygen Assessment Supplemental O2 Heart   Rate Blood Pressure Stefano Dyspnea Scale Rating   Resting 93 % Room Air 61 bpm 142/76 5-6   Exercise        Minute        1 95 % Room Air 76 bpm     2 97 % (pt stoppped and did not restart walk) Room Air 76 bpm     3 98 % Room Air 75 bpm     4 97 % Room Air 72 bpm     5 97 % Room Air 66 bpm     6  95 % Room Air 62 bpm 174/79 7-8   Recovery        Minute        1 95 % Room Air 61 bpm     2 95 % Room Air 59 bpm     3 96 % Room Air 68 bpm     4 95 % Room Air 67 bpm 157/79 5-6     Six Minute Walk Summary  6MWT Status: not completed  Patient Reported: Dyspnea, Lightheadedness     Interpretation:  Did the patient stop or pause?: Yes  How many times did the patient stop or pause?: 1  Stop Time 1: 150  Restart Time 1: 360  Pause Time 1: 210 seconds                             Total Time Walked (Calculated): 150 seconds  Final Partial Lap Distance (feet): 50 feet  Total Distance Meters (Calculated): 15.24 meters  Predicted Distance Meters (Calculated): 285.79 meters  Percentage of Predicted (Calculated): 5.33  Peak VO2 (Calculated): 4.44  Mets: 1.27  Has The Patient Had a Previous Six Minute Walk Test?: Yes       Previous 6MWT Results  Has The Patient Had a Previous Six Minute Walk Test?: Yes  Date of Previous Test: 01/12/24  Total Time Walked: 150 seconds  Total Distance (meters): 38.1  Predicted Distance (meters): 288.03 meters  Percentage of Predicted: 13.23  Percent Change (Calculated): 0.6    Interpretation:  Total distance walked in six minutes is severely reduced indicating a reduction in overall  functional capacity. The patient did not meet criteria for supplemental oxygen prescription.    Clinical correlation " suggested.  [] Mild exercise-induced hypoxemia described as an arterial oxygen saturation of 93-95% (or 3-4% less than at rest),  [] Moderate exercise-induced hypoxemia as 89-93%  [] Severe exercise induced hypoxemia as < 89% O2 saturation.  Medicare Criteria for oxygen prescription comments:   When arterial oxygen saturation is at or below 88% during exercise on room air (severe exercise induced hypoxemia) then the patient falls under Medicare Group 1 criteria for supplemental oxygen prescription.  Details about Medicare Group Criteria coverage can be found at http://www.cms.hhs.gov/manuals/downloads/     Gabe Waller MD

## 2024-11-19 ENCOUNTER — EXTERNAL HOME HEALTH (OUTPATIENT)
Dept: HOME HEALTH SERVICES | Facility: HOSPITAL | Age: 89
End: 2024-11-19
Payer: MEDICARE

## 2024-11-26 DIAGNOSIS — J44.9 CHRONIC OBSTRUCTIVE PULMONARY DISEASE, UNSPECIFIED COPD TYPE: ICD-10-CM

## 2024-11-26 DIAGNOSIS — J41.0 SIMPLE CHRONIC BRONCHITIS: ICD-10-CM

## 2024-11-26 RX ORDER — FLUTICASONE FUROATE AND VILANTEROL 100; 25 UG/1; UG/1
1 POWDER RESPIRATORY (INHALATION) DAILY
Qty: 60 EACH | Refills: 11 | Status: SHIPPED | OUTPATIENT
Start: 2024-11-26

## 2024-12-02 ENCOUNTER — DOCUMENT SCAN (OUTPATIENT)
Dept: HOME HEALTH SERVICES | Facility: HOSPITAL | Age: 89
End: 2024-12-02
Payer: MEDICARE

## 2024-12-16 ENCOUNTER — LAB VISIT (OUTPATIENT)
Dept: LAB | Facility: HOSPITAL | Age: 89
End: 2024-12-16
Attending: NURSE PRACTITIONER
Payer: MEDICARE

## 2024-12-16 DIAGNOSIS — D50.8 OTHER IRON DEFICIENCY ANEMIA: ICD-10-CM

## 2024-12-16 LAB
ANION GAP SERPL CALC-SCNC: 12 MMOL/L (ref 8–16)
BASOPHILS # BLD AUTO: 0.06 K/UL (ref 0–0.2)
BASOPHILS NFR BLD: 0.8 % (ref 0–1.9)
BUN SERPL-MCNC: 20 MG/DL (ref 10–30)
CALCIUM SERPL-MCNC: 10.4 MG/DL (ref 8.7–10.5)
CHLORIDE SERPL-SCNC: 107 MMOL/L (ref 95–110)
CO2 SERPL-SCNC: 23 MMOL/L (ref 23–29)
CREAT SERPL-MCNC: 1.3 MG/DL (ref 0.5–1.4)
DIFFERENTIAL METHOD BLD: ABNORMAL
EOSINOPHIL # BLD AUTO: 0.1 K/UL (ref 0–0.5)
EOSINOPHIL NFR BLD: 1.5 % (ref 0–8)
ERYTHROCYTE [DISTWIDTH] IN BLOOD BY AUTOMATED COUNT: 15.9 % (ref 11.5–14.5)
EST. GFR  (NO RACE VARIABLE): 39 ML/MIN/1.73 M^2
GLUCOSE SERPL-MCNC: 99 MG/DL (ref 70–110)
HCT VFR BLD AUTO: 44.1 % (ref 37–48.5)
HGB BLD-MCNC: 14.4 G/DL (ref 12–16)
IMM GRANULOCYTES # BLD AUTO: 0.02 K/UL (ref 0–0.04)
IMM GRANULOCYTES NFR BLD AUTO: 0.3 % (ref 0–0.5)
IRON SERPL-MCNC: 61 UG/DL (ref 30–160)
LYMPHOCYTES # BLD AUTO: 2.5 K/UL (ref 1–4.8)
LYMPHOCYTES NFR BLD: 33.2 % (ref 18–48)
MCH RBC QN AUTO: 28.3 PG (ref 27–31)
MCHC RBC AUTO-ENTMCNC: 32.7 G/DL (ref 32–36)
MCV RBC AUTO: 87 FL (ref 82–98)
MONOCYTES # BLD AUTO: 0.9 K/UL (ref 0.3–1)
MONOCYTES NFR BLD: 12 % (ref 4–15)
NEUTROPHILS # BLD AUTO: 3.9 K/UL (ref 1.8–7.7)
NEUTROPHILS NFR BLD: 52.2 % (ref 38–73)
NRBC BLD-RTO: 0 /100 WBC
PLATELET # BLD AUTO: 258 K/UL (ref 150–450)
PMV BLD AUTO: 9.9 FL (ref 9.2–12.9)
POTASSIUM SERPL-SCNC: 3.7 MMOL/L (ref 3.5–5.1)
RBC # BLD AUTO: 5.08 M/UL (ref 4–5.4)
SATURATED IRON: 16 % (ref 20–50)
SODIUM SERPL-SCNC: 142 MMOL/L (ref 136–145)
TOTAL IRON BINDING CAPACITY: 373 UG/DL (ref 250–450)
TRANSFERRIN SERPL-MCNC: 252 MG/DL (ref 200–375)
WBC # BLD AUTO: 7.53 K/UL (ref 3.9–12.7)

## 2024-12-16 PROCEDURE — 84466 ASSAY OF TRANSFERRIN: CPT | Mod: HCNC | Performed by: NURSE PRACTITIONER

## 2024-12-16 PROCEDURE — 36415 COLL VENOUS BLD VENIPUNCTURE: CPT | Mod: HCNC | Performed by: NURSE PRACTITIONER

## 2024-12-16 PROCEDURE — 80048 BASIC METABOLIC PNL TOTAL CA: CPT | Mod: HCNC | Performed by: NURSE PRACTITIONER

## 2024-12-16 PROCEDURE — 85025 COMPLETE CBC W/AUTO DIFF WBC: CPT | Mod: HCNC | Performed by: NURSE PRACTITIONER

## 2024-12-16 PROCEDURE — 82728 ASSAY OF FERRITIN: CPT | Mod: HCNC | Performed by: NURSE PRACTITIONER

## 2024-12-17 LAB — FERRITIN SERPL-MCNC: 699 NG/ML (ref 20–300)

## 2024-12-18 DIAGNOSIS — R10.13 EPIGASTRIC PAIN: ICD-10-CM

## 2024-12-18 DIAGNOSIS — R12 HEARTBURN: ICD-10-CM

## 2024-12-19 ENCOUNTER — OFFICE VISIT (OUTPATIENT)
Dept: HEMATOLOGY/ONCOLOGY | Facility: CLINIC | Age: 89
End: 2024-12-19
Payer: MEDICARE

## 2024-12-19 VITALS
DIASTOLIC BLOOD PRESSURE: 76 MMHG | BODY MASS INDEX: 32.97 KG/M2 | HEART RATE: 73 BPM | TEMPERATURE: 98 F | SYSTOLIC BLOOD PRESSURE: 140 MMHG | OXYGEN SATURATION: 95 % | WEIGHT: 197.88 LBS | HEIGHT: 65 IN

## 2024-12-19 DIAGNOSIS — D50.8 OTHER IRON DEFICIENCY ANEMIA: Primary | ICD-10-CM

## 2024-12-19 PROCEDURE — 3288F FALL RISK ASSESSMENT DOCD: CPT | Mod: HCNC,CPTII,S$GLB, | Performed by: NURSE PRACTITIONER

## 2024-12-19 PROCEDURE — 1126F AMNT PAIN NOTED NONE PRSNT: CPT | Mod: HCNC,CPTII,S$GLB, | Performed by: NURSE PRACTITIONER

## 2024-12-19 PROCEDURE — 1157F ADVNC CARE PLAN IN RCRD: CPT | Mod: HCNC,CPTII,S$GLB, | Performed by: NURSE PRACTITIONER

## 2024-12-19 PROCEDURE — 1160F RVW MEDS BY RX/DR IN RCRD: CPT | Mod: HCNC,CPTII,S$GLB, | Performed by: NURSE PRACTITIONER

## 2024-12-19 PROCEDURE — 99214 OFFICE O/P EST MOD 30 MIN: CPT | Mod: HCNC,S$GLB,, | Performed by: NURSE PRACTITIONER

## 2024-12-19 PROCEDURE — 99999 PR PBB SHADOW E&M-EST. PATIENT-LVL IV: CPT | Mod: PBBFAC,HCNC,, | Performed by: NURSE PRACTITIONER

## 2024-12-19 PROCEDURE — 1100F PTFALLS ASSESS-DOCD GE2>/YR: CPT | Mod: HCNC,CPTII,S$GLB, | Performed by: NURSE PRACTITIONER

## 2024-12-19 PROCEDURE — 1159F MED LIST DOCD IN RCRD: CPT | Mod: HCNC,CPTII,S$GLB, | Performed by: NURSE PRACTITIONER

## 2024-12-19 RX ORDER — FAMOTIDINE 40 MG/1
40 TABLET, FILM COATED ORAL NIGHTLY
Qty: 30 TABLET | Refills: 1 | Status: SHIPPED | OUTPATIENT
Start: 2024-12-19

## 2024-12-19 NOTE — ASSESSMENT & PLAN NOTE
Persistent mild iron deficiency s/p Injectafer x 1. No anemia. She notes fatigue.     For now will hold off on additional iron supplementation. F/u 3 months with cbc iron ferritin

## 2024-12-19 NOTE — PROGRESS NOTES
Subjective:       Patient ID: Holli Landrum is a 89 y.o. female.    Chief Complaint: ARLETTE. Fatigue      HPI: 89 y.o female with medical history as stated below presenting today for follow up of her iron deficiency anemia treated with IV iron PRN. Intolerant of oral iron due to constipation. Previuosly s/p weekly Venofer infusions x 5 completed 2024. Felt to have bowel issues following IV iron. She ongoing fatigue but has been able to increase activity. EGD 3/2024 unrevealing. Colonoscopy 2022 unrevealing. No prior VCE. She follows with outside Urology. Chronic indwelling french catheter in place with intermittent hematuria.     Today she presents for follow up lab review s/p Injectafer x 1 2024. She notes ongoing chronic fatigue.       Social History     Socioeconomic History    Marital status:    Tobacco Use    Smoking status: Former     Current packs/day: 0.00     Types: Cigarettes     Start date: 6/10/1954     Quit date: 9/15/1955     Years since quittin.3    Smokeless tobacco: Never    Tobacco comments:     I onlysmoked one year while [place]  during [place]   Substance and Sexual Activity    Alcohol use: Not Currently     Comment: Only drank a little wine and haven't  drunk anything in 15 y    Drug use: Never    Sexual activity: Not Currently     Partners: Male     Birth control/protection: Abstinence     Comment:  and 87 years of age     Social Drivers of Health     Financial Resource Strain: Low Risk  (2024)    Overall Financial Resource Strain (CARDIA)     Difficulty of Paying Living Expenses: Not hard at all   Food Insecurity: No Food Insecurity (2024)    Hunger Vital Sign     Worried About Running Out of Food in the Last Year: Never true     Ran Out of Food in the Last Year: Never true   Transportation Needs: No Transportation Needs (2023)    PRAPARE - Transportation     Lack of Transportation (Medical): No     Lack of Transportation (Non-Medical):  No   Physical Activity: Insufficiently Active (2024)    Exercise Vital Sign     Days of Exercise per Week: 1 day     Minutes of Exercise per Session: 10 min   Stress: No Stress Concern Present (2024)    [employer] [place] [employer] - Occupational Stress Questionnaire     Feeling of Stress : Not at all   Housing Stability: Low Risk  (2023)    Housing Stability Vital Sign     Unable to Pay for Housing in the Last Year: No     Number of Places Lived in the Last Year: 1     Unstable Housing in the Last Year: No       Past Medical History:   Diagnosis Date    Anticoagulant long-term use     Arthritis     Asthma     Basal cell carcinoma     Cancer     skin cancer to face    Cataract     OU done//    CHF (congestive heart failure)     COPD (chronic obstructive pulmonary disease)     no oxygen; patient denies    cpap     Essential hypertension 2010    GERD (gastroesophageal reflux disease) 2012    Glaucoma     Hypertensive heart disease with heart failure 2013    Paroxysmal atrial fibrillation     Paroxysmal ventricular tachycardia     per problem list    Preop cardiovascular exam 2024    Renal disorder     french-1/3/2020    Squamous cell carcinoma of skin        Family History   Problem Relation Name Age of Onset    Diabetes Brother Wili         Type 2    Heart disease Brother Wili          at 65 CHD    Diabetes Mother Holli S         Type 1    Alcohol abuse Mother Holli COX         Dod 10/75    Heart disease Mother Holli COX         Arteriosclerosis    Hypertension Mother Holli S     Stroke Mother Holli S         3/15/1975 dod 10/1975    Cancer Maternal Grandfather [place]         Dod     Clotting disorder Son          bleeding after tonsillectomy only    Heart disease Father Ronaldo Sr.         Heart attack. Afib, and Polyvcithemavera    Hypertension Father Ronaldo Sr.     Amblyopia Neg Hx      Blindness Neg Hx      Cataracts Neg Hx      Glaucoma Neg Hx      Macular  degeneration Neg Hx      Retinal detachment Neg Hx      Strabismus Neg Hx      Thyroid disease Neg Hx      Colon cancer Neg Hx         Past Surgical History:   Procedure Laterality Date    A-V CARDIAC PACEMAKER INSERTION  06/16/2021    Procedure: INSERTION, CARDIAC PACEMAKER, DUAL CHAMBER;  Surgeon: Oscar Sommers MD;  [facility];  Service: Cardiovascular;;    ADENOIDECTOMY  191944    APPENDECTOMY  1948    Because of Ovarian Cyst surgery    BREAST BIOPSY Left 1995    neg    BREAST BIOPSY Right 1984    neg    BREAST BIOPSY Right 1992    neg    BREAST SURGERY      A number of biopsies    CARDIAC CATHETERIZATION  2013, 2014,2016, 2020    has 9 stents    CARDIAC SURGERY  2012    stents    CATARACT EXTRACTION W/  INTRAOCULAR LENS IMPLANT Left 11/01/2018    Dr Rose    CATARACT EXTRACTION W/  INTRAOCULAR LENS IMPLANT Right 12/13/2018    Dr Rose//    CHOLECYSTECTOMY      COLONOSCOPY  ~2005    Dr. Edward; normal per patient report    COLONOSCOPY N/A 09/06/2022    Procedure: COLONOSCOPY 8/31/22-note in Dr Simms's office visit note that he spoke to her cardiologist and she was viable candidate for endoscopy;  Surgeon: Cordelia Bueno MD;  Location: [facility];  Service: Endoscopy;  Laterality: N/A;    CORONARY ANGIOGRAPHY N/A 03/20/2020    Procedure: ANGIOGRAM, CORONARY ARTERY;  Surgeon: Chuy Bryant MD;  [facility];  Service: Cardiology;  Laterality: N/A;    CYSTOSCOPY W/ RETROGRADES Bilateral 02/12/2020    Procedure: CYSTOSCOPY, WITH RETROGRADE PYELOGRAM;  Surgeon: Gasper Feliz MD;  Location: [facility] OR;  Service: Urology;  Laterality: Bilateral;    ESOPHAGOGASTRODUODENOSCOPY N/A 08/13/2020    Procedure: EGD (ESOPHAGOGASTRODUODENOSCOPY);  Surgeon: Mika Solorzano MD;  Location: [facility];  Service: Endoscopy;  Laterality: N/A; Mild Schatzki ring. Biopsied. Dilated. small hiatal hernia, gastritis; biopsy: esophagus- SEVERE REFLUX ESOPHAGITIS, stomach- chronic gastritis, negative for H  pylori    ESOPHAGOGASTRODUODENOSCOPY N/A 10/22/2020    Procedure: EGD (ESOPHAGOGASTRODUODENOSCOPY);  Surgeon: Fred Hendricks MD;  Location: [facility] ENDO;  Service: Endoscopy;  Laterality: N/A;    ESOPHAGOGASTRODUODENOSCOPY N/A 3/21/2024    Procedure: EGD (ESOPHAGOGASTRODUODENOSCOPY);  Surgeon: Mika Solorzano MD;  Location: [facility] ENDO;  Service: Gastroenterology;  Laterality: N/A;    EYE SURGERY  2017    Cataracs    FRACTIONAL FLOW RESERVE (FFR), CORONARY  6/20/2023    Procedure: Fractional Flow [place] (FFR), Coronary;  Surgeon: Brice Campuzano MD;  Location: [facility] CATH LAB;  Service: Cardiology;;    HYSTERECTOMY  1969    INSTANTANEOUS WAVE-FREE RATIO (IFR) N/A 6/20/2023    Procedure: Instantaneous Wave-Free Ratio (IFR);  Surgeon: Brice Campuzano MD;  Location: [facility] CATH LAB;  Service: Cardiology;  Laterality: N/A;    LEFT HEART CATHETERIZATION Left 03/03/2020    Procedure: Left heart cath;  Surgeon: Chuy Bryant MD;  Location: [facility] CATH;  Service: Cardiology;  Laterality: Left;    LEFT HEART CATHETERIZATION Left 03/20/2020    Procedure: Left heart cath;  Surgeon: Chuy Bryant MD;  Location: [facility] CATH;  Service: Cardiology;  Laterality: Left;    LEFT HEART CATHETERIZATION N/A 6/20/2023    Procedure: Left heart cath;  Surgeon: Birce Campuzano MD;  Location: [facility] CATH LAB;  Service: Cardiology;  Laterality: N/A;    OVARIAN CYST REMOVAL  teenager    PHACOEMULSIFICATION OF CATARACT Left 11/01/2018    Procedure: PHACOEMULSIFICATION, CATARACT;  Surgeon: Aleksandar Rose Jr., MD;  Location: [facility] OR;  Service: Ophthalmology;  Laterality: Left;    PHACOEMULSIFICATION OF CATARACT Right 12/13/2018    Procedure: PHACOEMULSIFICATION, CATARACT;  Surgeon: Aleksandar Rose Jr., MD;  Location: [facility] OR;  Service: Ophthalmology;  Laterality: Right;    TONSILLECTOMY      aw/denoids    TRANSESOPHAGEAL ECHOCARDIOGRAM WITH POSSIBLE CARDIOVERSION (JÚNIOR W/ POSS CARDIOVERSION) N/A 10/5/2023    Procedure: Transesophageal echo  (ALFRED) intra-procedure log documentation/alfred/cv;  Surgeon: Bao Miguel MD;  Location: [facility] CATH LAB;  Service: Cardiology;  Laterality: N/A;   Alfred/Cv  MRI safe   Pacer & leads implanted 6/16/21, Antoun   Bio Edora 8 MICHELLE, [id number], PID: [id number]   A lead: Bio Solia S45, [id number]   V lead: Bio Solia S53, [id number]    TREATMENT OF CARDIAC ARRHYTHMIA N/A 10/5/2023    Procedure: Cardioversion or Defibrillation;  Surgeon: Bao Miguel MD;  Location: [facility] CATH LAB;  Service: Cardiology;  Laterality: N/A;    UPPER GASTROINTESTINAL ENDOSCOPY  ~2005    Dr. Solorzano    VALVE STUDY-AORTIC  6/20/2023    Procedure: Valve study-aortic;  Surgeon: Brice Campuzano MD;  Location: [facility] CATH LAB;  Service: Cardiology;;    VASCULAR SURGERY      to remove clot from right leg    Yag Capsulotomy Bilateral 11/05/2019    Dr Rose       Review of Systems   Constitutional:  Positive for fatigue. Negative for appetite change, chills, fever and unexpected weight change.   HENT:  Negative for congestion, mouth sores, nosebleeds, sore throat, trouble swallowing and voice change.    Respiratory:  Negative for cough, chest tightness, shortness of breath and wheezing.    Cardiovascular:  Negative for chest pain and leg swelling.   Gastrointestinal:  Negative for abdominal distention, abdominal pain, blood in stool, constipation, diarrhea, nausea and vomiting.   Genitourinary:  Negative for difficulty urinating, dysuria and hematuria.   Musculoskeletal:  Negative for arthralgias, back pain and myalgias.   Skin:  Negative for pallor, rash and wound.   Neurological:  Negative for dizziness, syncope, weakness and headaches.   Hematological:  Negative for adenopathy. Does not bruise/bleed easily.   Psychiatric/Behavioral:  The patient is not nervous/anxious.          Medication List with Changes/Refills   Current Medications    ACIDOPHILUS PROBIOTIC BLEND 175 MG CAP    Take 1 capsule by mouth every morning.    ALBUTEROL  (PROVENTIL/VENTOLIN HFA) 90 MCG/ACTUATION INHALER    Inhale 1-2 puffs into the lungs every 4 (four) hours as needed for Wheezing or Shortness of Breath. Rescue    ALBUTEROL-IPRATROPIUM (DUO-NEB) 2.5 MG-0.5 MG/3 ML NEBULIZER SOLUTION    Take 3 mLs by nebulization every 6 (six) hours as needed for Wheezing or Shortness of Breath.    AMIODARONE (PACERONE) 200 MG TAB    Take 1 tablet (200 mg total) by mouth once daily.    APIXABAN (ELIQUIS) 2.5 MG TAB    Take 1 tablet (2.5 mg total) by mouth 2 (two) times daily.    CHOLECALCIFEROL, VITAMIN D3, 125 MCG (5,000 UNIT) TAB    Take 5,000 Units by mouth once daily.    CLONIDINE (CATAPRES) 0.1 MG TABLET    Take 1 tablet (0.1 mg total) by mouth every 6 (six) hours as needed (take if BP is over 180/100).    DORZOLAMIDE (TRUSOPT) 2 % OPHTHALMIC SOLUTION    INSTILL 1 DROP INTO BOTH EYES TWICE DAILY    FAMOTIDINE (PEPCID) 40 MG TABLET    TAKE ONE TABLET BY MOUTH EVERY NIGHT AT BEDTIME    FLUTICASONE (FLONASE) 50 MCG/ACTUATION NASAL SPRAY    2 sprays (100 mcg total) by Each Nare route daily as needed for Allergies.    FLUTICASONE FUROATE-VILANTEROL (BREO ELLIPTA) 100-25 MCG/DOSE DISKUS INHALER    Inhale 1 puff into the lungs once daily.    FUROSEMIDE (LASIX) 40 MG TABLET    Take 1 tablet (40 mg total) by mouth once daily. Do not take if BP is below 110/70; recommend doubling up dose if BP is over 140/90    GEMTESA 75 MG TAB    Take 1 tablet by mouth every morning.    LACTOBACILLUS RHAMNOSUS GG (CULTURELLE) 10 BILLION CELL CAPSULE    Take 1 capsule by mouth once daily.    LATANOPROST 0.005 % OPHTHALMIC SOLUTION    Place 1 drop into both eyes every evening.    LORATADINE (CLARITIN) 10 MG TABLET    Take 1 tablet (10 mg total) by mouth once daily.    LUTEIN-ZEAXANTHIN EXTRACT 15-0.7 MG CAP    Take 1 capsule by mouth every morning.    METOPROLOL TARTRATE (LOPRESSOR) 100 MG TABLET    Take 1 tablet (100 mg total) by mouth 2 (two) times daily.    MONTELUKAST (SINGULAIR) 10 MG TABLET    Take  1 tablet (10 mg total) by mouth every evening.    MUPIROCIN (BACTROBAN) 2 % OINTMENT    SMARTSI Application Topical 2-3 Times Daily    NITROGLYCERIN 0.4 MG/HR TD PT24 (NITRODUR) 0.4 MG/HR    Place 1 patch onto the skin once daily.    NYSTATIN (MYCOSTATIN) CREAM    Apply topically 2 (two) times daily. for 7 days    POTASSIUM CHLORIDE (KLOR-CON) 10 MEQ TBSR    Take 2 tablets (20 mEq total) by mouth once daily. Take with Lasix    POTASSIUM CHLORIDE (MICRO-K) 10 MEQ CPSR    Take 1 capsule (10 mEq total) by mouth once daily.    ROSUVASTATIN (CRESTOR) 40 MG TAB    TAKE 1 TABLET BY MOUTH EVERY DAY     Objective:     Vitals:    24 1039   BP: (!) 140/76   Pulse: 73   Temp: 98.3 °F (36.8 °C)       Lab Results   Component Value Date    WBC 7.53 2024    HGB 14.4 2024    HCT 44.1 2024    MCV 87 2024     2024       Lab Results   Component Value Date    IRON 61 2024    TRANSFERRIN 252 2024    TIBC 373 2024    FESATURATED 16 (L) 2024      BMP  Lab Results   Component Value Date     2024    K 3.7 2024     2024    CO2 23 2024    BUN 20 2024    CREATININE 1.3 2024    CALCIUM 10.4 2024    ANIONGAP 12 2024    EGFRNORACEVR 39 (A) 2024         Physical Exam  Pulmonary:      Effort: Pulmonary effort is normal. No respiratory distress.   Neurological:      Mental Status: She is alert and oriented to person, place, and time.        Assessment:     Problem List Items Addressed This Visit          Oncology    Iron deficiency anemia - Primary     Persistent mild iron deficiency s/p Injectafer x 1. No anemia. She notes fatigue.     For now will hold off on additional iron supplementation. F/u 3 months with cbc iron ferritin         Relevant Orders    CBC Auto Differential    Iron and TIBC    Ferritin         Plan:     Other iron deficiency anemia  -     CBC Auto Differential; Future; Expected date: 2024  -      Iron and TIBC; Future; Expected date: 12/19/2024  -     Ferritin; Future; Expected date: 12/19/2024          Med Onc Chart Routing      Follow up with physician    Follow up with LISA 3 months.   Infusion scheduling note    Injection scheduling note    Labs CBC, ferritin and iron and TIBC   Scheduling:  Preferred lab:  Lab interval:  1-2 days prior   Imaging None      Pharmacy appointment No pharmacy appointment needed      Other referrals       No additional referrals needed         RIO JamesC

## 2024-12-21 PROCEDURE — G0179 MD RECERTIFICATION HHA PT: HCPCS | Mod: ,,, | Performed by: FAMILY MEDICINE

## 2024-12-23 ENCOUNTER — LAB VISIT (OUTPATIENT)
Dept: LAB | Facility: HOSPITAL | Age: 89
End: 2024-12-23
Attending: UROLOGY
Payer: MEDICARE

## 2024-12-23 DIAGNOSIS — Z95.0 PRESENCE OF CARDIAC PACEMAKER: ICD-10-CM

## 2024-12-23 DIAGNOSIS — I48.0 PAROXYSMAL ATRIAL FIBRILLATION: ICD-10-CM

## 2024-12-23 DIAGNOSIS — R82.998 HYPERURICURIA: ICD-10-CM

## 2024-12-23 DIAGNOSIS — Z87.440 PERSONAL HISTORY OF URINARY (TRACT) INFECTION: ICD-10-CM

## 2024-12-23 DIAGNOSIS — I50.9 CONGESTIVE HEART FAILURE, UNSPECIFIED HF CHRONICITY, UNSPECIFIED HEART FAILURE TYPE: ICD-10-CM

## 2024-12-23 DIAGNOSIS — I50.9 CHRONIC CONGESTIVE HEART FAILURE, UNSPECIFIED HEART FAILURE TYPE: ICD-10-CM

## 2024-12-23 DIAGNOSIS — N39.0 URINARY TRACT INFECTION, SITE NOT SPECIFIED: ICD-10-CM

## 2024-12-23 DIAGNOSIS — N39.0 URINARY TRACT INFECTION, SITE NOT SPECIFIED: Primary | ICD-10-CM

## 2024-12-23 LAB
BACTERIA #/AREA URNS HPF: ABNORMAL /HPF
BILIRUB UR QL STRIP: NEGATIVE
CLARITY UR: ABNORMAL
COLOR UR: YELLOW
GLUCOSE UR QL STRIP: NEGATIVE
HGB UR QL STRIP: ABNORMAL
KETONES UR QL STRIP: NEGATIVE
LEUKOCYTE ESTERASE UR QL STRIP: ABNORMAL
MICROSCOPIC COMMENT: ABNORMAL
NITRITE UR QL STRIP: NEGATIVE
PH UR STRIP: 6.5 [PH] (ref 5–8)
PROT UR QL STRIP: ABNORMAL
RBC #/AREA URNS HPF: 4 /HPF (ref 0–4)
SP GR UR STRIP: 1.01 (ref 1–1.03)
URN SPEC COLLECT METH UR: ABNORMAL
WBC #/AREA URNS HPF: 50 /HPF (ref 0–5)
WBC CLUMPS URNS QL MICRO: ABNORMAL

## 2024-12-23 PROCEDURE — 81000 URINALYSIS NONAUTO W/SCOPE: CPT | Mod: HCNC,PO | Performed by: UROLOGY

## 2024-12-23 PROCEDURE — 87184 SC STD DISK METHOD PER PLATE: CPT | Mod: HCNC | Performed by: UROLOGY

## 2024-12-23 PROCEDURE — 87186 SC STD MICRODIL/AGAR DIL: CPT | Mod: HCNC | Performed by: UROLOGY

## 2024-12-23 PROCEDURE — 87086 URINE CULTURE/COLONY COUNT: CPT | Mod: HCNC | Performed by: UROLOGY

## 2024-12-23 PROCEDURE — 87088 URINE BACTERIA CULTURE: CPT | Mod: HCNC | Performed by: UROLOGY

## 2024-12-27 LAB — BACTERIA UR CULT: ABNORMAL

## 2025-01-01 ENCOUNTER — HOSPITAL ENCOUNTER (INPATIENT)
Facility: HOSPITAL | Age: OVER 89
LOS: 2 days | Discharge: HOME HEALTH | DRG: 699 | End: 2025-04-11
Attending: EMERGENCY MEDICINE | Admitting: INTERNAL MEDICINE
Payer: MEDICARE

## 2025-01-01 ENCOUNTER — RESULTS FOLLOW-UP (OUTPATIENT)
Dept: CARDIOLOGY | Facility: CLINIC | Age: OVER 89
End: 2025-01-01

## 2025-01-01 ENCOUNTER — RESULTS FOLLOW-UP (OUTPATIENT)
Dept: FAMILY MEDICINE | Facility: CLINIC | Age: OVER 89
End: 2025-01-01

## 2025-01-01 ENCOUNTER — OFFICE VISIT (OUTPATIENT)
Dept: VASCULAR SURGERY | Facility: CLINIC | Age: OVER 89
End: 2025-01-01
Payer: MEDICARE

## 2025-01-01 ENCOUNTER — OUTPATIENT CASE MANAGEMENT (OUTPATIENT)
Dept: ADMINISTRATIVE | Facility: OTHER | Age: OVER 89
End: 2025-01-01
Payer: MEDICARE

## 2025-01-01 VITALS
SYSTOLIC BLOOD PRESSURE: 137 MMHG | OXYGEN SATURATION: 95 % | WEIGHT: 185.44 LBS | HEART RATE: 55 BPM | RESPIRATION RATE: 16 BRPM | BODY MASS INDEX: 30.89 KG/M2 | TEMPERATURE: 98 F | DIASTOLIC BLOOD PRESSURE: 66 MMHG | HEIGHT: 65 IN

## 2025-01-01 VITALS
HEIGHT: 65 IN | WEIGHT: 184 LBS | SYSTOLIC BLOOD PRESSURE: 169 MMHG | HEART RATE: 73 BPM | DIASTOLIC BLOOD PRESSURE: 93 MMHG | BODY MASS INDEX: 30.66 KG/M2

## 2025-01-01 DIAGNOSIS — N39.0 URINARY TRACT INFECTION WITHOUT HEMATURIA, SITE UNSPECIFIED: Primary | ICD-10-CM

## 2025-01-01 DIAGNOSIS — T83.511A URINARY TRACT INFECTION ASSOCIATED WITH CATHETERIZATION OF URINARY TRACT, UNSPECIFIED INDWELLING URINARY CATHETER TYPE, INITIAL ENCOUNTER: Primary | ICD-10-CM

## 2025-01-01 DIAGNOSIS — N39.0 URINARY TRACT INFECTION ASSOCIATED WITH CATHETERIZATION OF URINARY TRACT, UNSPECIFIED INDWELLING URINARY CATHETER TYPE, INITIAL ENCOUNTER: Primary | ICD-10-CM

## 2025-01-01 DIAGNOSIS — R07.9 CHEST PAIN: ICD-10-CM

## 2025-01-01 DIAGNOSIS — I35.0 SEVERE AORTIC STENOSIS: Primary | ICD-10-CM

## 2025-01-01 DIAGNOSIS — R44.1 VISUAL HALLUCINATIONS: ICD-10-CM

## 2025-01-01 LAB
ABSOLUTE EOSINOPHIL (OHS): 0.31 K/UL
ABSOLUTE EOSINOPHIL (OHS): 0.33 K/UL
ABSOLUTE EOSINOPHIL (OHS): 0.34 K/UL
ABSOLUTE EOSINOPHIL (OHS): 0.4 K/UL
ABSOLUTE MONOCYTE (OHS): 0.85 K/UL (ref 0.3–1)
ABSOLUTE MONOCYTE (OHS): 0.86 K/UL (ref 0.3–1)
ABSOLUTE MONOCYTE (OHS): 1.02 K/UL (ref 0.3–1)
ABSOLUTE MONOCYTE (OHS): 1.14 K/UL (ref 0.3–1)
ABSOLUTE NEUTROPHIL COUNT (OHS): 3.54 K/UL (ref 1.8–7.7)
ABSOLUTE NEUTROPHIL COUNT (OHS): 4.04 K/UL (ref 1.8–7.7)
ABSOLUTE NEUTROPHIL COUNT (OHS): 4.27 K/UL (ref 1.8–7.7)
ABSOLUTE NEUTROPHIL COUNT (OHS): 4.55 K/UL (ref 1.8–7.7)
ALBUMIN SERPL BCP-MCNC: 3 G/DL (ref 3.5–5.2)
ALBUMIN SERPL BCP-MCNC: 3.1 G/DL (ref 3.5–5.2)
ALBUMIN SERPL BCP-MCNC: 3.2 G/DL (ref 3.5–5.2)
ALBUMIN SERPL BCP-MCNC: 3.4 G/DL (ref 3.5–5.2)
ALP SERPL-CCNC: 110 UNIT/L (ref 40–150)
ALP SERPL-CCNC: 75 UNIT/L (ref 40–150)
ALP SERPL-CCNC: 91 UNIT/L (ref 40–150)
ALP SERPL-CCNC: 91 UNIT/L (ref 40–150)
ALT SERPL W/O P-5'-P-CCNC: 20 UNIT/L (ref 10–44)
ALT SERPL W/O P-5'-P-CCNC: 25 UNIT/L (ref 10–44)
ALT SERPL W/O P-5'-P-CCNC: 29 UNIT/L (ref 10–44)
ALT SERPL W/O P-5'-P-CCNC: 31 UNIT/L (ref 10–44)
ANION GAP (OHS): 10 MMOL/L (ref 8–16)
ANION GAP (OHS): 6 MMOL/L (ref 8–16)
ANION GAP (OHS): 7 MMOL/L (ref 8–16)
ANION GAP (OHS): 9 MMOL/L (ref 8–16)
AST SERPL-CCNC: 16 UNIT/L (ref 11–45)
AST SERPL-CCNC: 18 UNIT/L (ref 11–45)
AST SERPL-CCNC: 25 UNIT/L (ref 11–45)
AST SERPL-CCNC: 26 UNIT/L (ref 11–45)
BACTERIA #/AREA URNS AUTO: ABNORMAL /HPF
BACTERIA BLD CULT: NORMAL
BACTERIA BLD CULT: NORMAL
BACTERIA UR CULT: ABNORMAL
BASOPHILS # BLD AUTO: 0.05 K/UL
BASOPHILS # BLD AUTO: 0.06 K/UL
BASOPHILS # BLD AUTO: 0.06 K/UL
BASOPHILS # BLD AUTO: 0.07 K/UL
BASOPHILS NFR BLD AUTO: 0.6 %
BASOPHILS NFR BLD AUTO: 0.8 %
BASOPHILS NFR BLD AUTO: 0.8 %
BASOPHILS NFR BLD AUTO: 0.9 %
BILIRUB SERPL-MCNC: 0.4 MG/DL (ref 0.1–1)
BILIRUB SERPL-MCNC: 0.4 MG/DL (ref 0.1–1)
BILIRUB SERPL-MCNC: 0.5 MG/DL (ref 0.1–1)
BILIRUB SERPL-MCNC: 0.5 MG/DL (ref 0.1–1)
BILIRUB UR QL STRIP.AUTO: NEGATIVE
BUN SERPL-MCNC: 22 MG/DL (ref 10–30)
BUN SERPL-MCNC: 24 MG/DL (ref 10–30)
BUN SERPL-MCNC: 27 MG/DL (ref 10–30)
BUN SERPL-MCNC: 27 MG/DL (ref 10–30)
CALCIUM SERPL-MCNC: 10.6 MG/DL (ref 8.7–10.5)
CALCIUM SERPL-MCNC: 10.7 MG/DL (ref 8.7–10.5)
CALCIUM SERPL-MCNC: 10.8 MG/DL (ref 8.7–10.5)
CALCIUM SERPL-MCNC: 11.2 MG/DL (ref 8.7–10.5)
CAOX CRY UR QL COMP ASSIST: ABNORMAL
CHLORIDE SERPL-SCNC: 104 MMOL/L (ref 95–110)
CHLORIDE SERPL-SCNC: 107 MMOL/L (ref 95–110)
CHLORIDE SERPL-SCNC: 108 MMOL/L (ref 95–110)
CHLORIDE SERPL-SCNC: 109 MMOL/L (ref 95–110)
CLARITY UR: ABNORMAL
CO2 SERPL-SCNC: 22 MMOL/L (ref 23–29)
CO2 SERPL-SCNC: 23 MMOL/L (ref 23–29)
CO2 SERPL-SCNC: 23 MMOL/L (ref 23–29)
CO2 SERPL-SCNC: 24 MMOL/L (ref 23–29)
COLOR UR AUTO: YELLOW
CREAT SERPL-MCNC: 1.7 MG/DL (ref 0.5–1.4)
CREAT SERPL-MCNC: 1.8 MG/DL (ref 0.5–1.4)
CREAT SERPL-MCNC: 1.8 MG/DL (ref 0.5–1.4)
CREAT SERPL-MCNC: 2 MG/DL (ref 0.5–1.4)
ERYTHROCYTE [DISTWIDTH] IN BLOOD BY AUTOMATED COUNT: 15.1 % (ref 11.5–14.5)
ERYTHROCYTE [DISTWIDTH] IN BLOOD BY AUTOMATED COUNT: 15.4 % (ref 11.5–14.5)
GFR SERPLBLD CREATININE-BSD FMLA CKD-EPI: 23 ML/MIN/1.73/M2
GFR SERPLBLD CREATININE-BSD FMLA CKD-EPI: 26 ML/MIN/1.73/M2
GFR SERPLBLD CREATININE-BSD FMLA CKD-EPI: 26 ML/MIN/1.73/M2
GFR SERPLBLD CREATININE-BSD FMLA CKD-EPI: 28 ML/MIN/1.73/M2
GLUCOSE SERPL-MCNC: 114 MG/DL (ref 70–110)
GLUCOSE SERPL-MCNC: 87 MG/DL (ref 70–110)
GLUCOSE SERPL-MCNC: 93 MG/DL (ref 70–110)
GLUCOSE SERPL-MCNC: 96 MG/DL (ref 70–110)
GLUCOSE UR QL STRIP: NEGATIVE
HCT VFR BLD AUTO: 35.4 % (ref 37–48.5)
HCT VFR BLD AUTO: 36.8 % (ref 37–48.5)
HCT VFR BLD AUTO: 37.2 % (ref 37–48.5)
HCT VFR BLD AUTO: 40.1 % (ref 37–48.5)
HGB BLD-MCNC: 11.4 GM/DL (ref 12–16)
HGB BLD-MCNC: 11.8 GM/DL (ref 12–16)
HGB BLD-MCNC: 11.9 GM/DL (ref 12–16)
HGB BLD-MCNC: 13 GM/DL (ref 12–16)
HGB UR QL STRIP: ABNORMAL
HYALINE CASTS UR QL AUTO: 3 /LPF (ref 0–1)
IMM GRANULOCYTES # BLD AUTO: 0.02 K/UL (ref 0–0.04)
IMM GRANULOCYTES # BLD AUTO: 0.02 K/UL (ref 0–0.04)
IMM GRANULOCYTES # BLD AUTO: 0.03 K/UL (ref 0–0.04)
IMM GRANULOCYTES # BLD AUTO: 0.03 K/UL (ref 0–0.04)
IMM GRANULOCYTES NFR BLD AUTO: 0.3 % (ref 0–0.5)
IMM GRANULOCYTES NFR BLD AUTO: 0.4 % (ref 0–0.5)
KETONES UR QL STRIP: NEGATIVE
LACTATE SERPL-SCNC: 1.4 MMOL/L (ref 0.5–2.2)
LEUKOCYTE ESTERASE UR QL STRIP: ABNORMAL
LYMPHOCYTES # BLD AUTO: 1.61 K/UL (ref 1–4.8)
LYMPHOCYTES # BLD AUTO: 2.11 K/UL (ref 1–4.8)
LYMPHOCYTES # BLD AUTO: 2.24 K/UL (ref 1–4.8)
LYMPHOCYTES # BLD AUTO: 2.95 K/UL (ref 1–4.8)
MAGNESIUM SERPL-MCNC: 1.8 MG/DL (ref 1.6–2.6)
MAGNESIUM SERPL-MCNC: 1.9 MG/DL (ref 1.6–2.6)
MCH RBC QN AUTO: 27.5 PG (ref 27–31)
MCH RBC QN AUTO: 27.9 PG (ref 27–31)
MCH RBC QN AUTO: 28 PG (ref 27–31)
MCH RBC QN AUTO: 28 PG (ref 27–31)
MCHC RBC AUTO-ENTMCNC: 32 G/DL (ref 32–36)
MCHC RBC AUTO-ENTMCNC: 32.1 G/DL (ref 32–36)
MCHC RBC AUTO-ENTMCNC: 32.2 G/DL (ref 32–36)
MCHC RBC AUTO-ENTMCNC: 32.4 G/DL (ref 32–36)
MCV RBC AUTO: 85 FL (ref 82–98)
MCV RBC AUTO: 87 FL (ref 82–98)
MCV RBC AUTO: 87 FL (ref 82–98)
MCV RBC AUTO: 88 FL (ref 82–98)
MICROSCOPIC COMMENT: ABNORMAL
NITRITE UR QL STRIP: POSITIVE
NUCLEATED RBC (/100WBC) (OHS): 0 /100 WBC
PH UR STRIP: 6 [PH]
PHOSPHATE SERPL-MCNC: 2.7 MG/DL (ref 2.7–4.5)
PHOSPHATE SERPL-MCNC: 3.1 MG/DL (ref 2.7–4.5)
PHOSPHATE SERPL-MCNC: 3.2 MG/DL (ref 2.7–4.5)
PLATELET # BLD AUTO: 229 K/UL (ref 150–450)
PLATELET # BLD AUTO: 238 K/UL (ref 150–450)
PLATELET # BLD AUTO: 246 K/UL (ref 150–450)
PLATELET # BLD AUTO: 298 K/UL (ref 150–450)
PMV BLD AUTO: 10.3 FL (ref 9.2–12.9)
PMV BLD AUTO: 10.3 FL (ref 9.2–12.9)
PMV BLD AUTO: 10.6 FL (ref 9.2–12.9)
PMV BLD AUTO: 10.7 FL (ref 9.2–12.9)
POTASSIUM SERPL-SCNC: 2.8 MMOL/L (ref 3.5–5.1)
POTASSIUM SERPL-SCNC: 3.1 MMOL/L (ref 3.5–5.1)
POTASSIUM SERPL-SCNC: 3.4 MMOL/L (ref 3.5–5.1)
POTASSIUM SERPL-SCNC: 3.8 MMOL/L (ref 3.5–5.1)
PROT SERPL-MCNC: 6.2 GM/DL (ref 6–8.4)
PROT SERPL-MCNC: 6.4 GM/DL (ref 6–8.4)
PROT SERPL-MCNC: 6.6 GM/DL (ref 6–8.4)
PROT SERPL-MCNC: 7.2 GM/DL (ref 6–8.4)
PROT UR QL STRIP: ABNORMAL
RBC # BLD AUTO: 4.07 M/UL (ref 4–5.4)
RBC # BLD AUTO: 4.23 M/UL (ref 4–5.4)
RBC # BLD AUTO: 4.25 M/UL (ref 4–5.4)
RBC # BLD AUTO: 4.73 M/UL (ref 4–5.4)
RBC #/AREA URNS AUTO: 24 /HPF (ref 0–4)
RELATIVE EOSINOPHIL (OHS): 4.4 %
RELATIVE EOSINOPHIL (OHS): 4.6 %
RELATIVE LYMPHOCYTE (OHS): 21.6 % (ref 18–48)
RELATIVE LYMPHOCYTE (OHS): 27 % (ref 18–48)
RELATIVE LYMPHOCYTE (OHS): 31.9 % (ref 18–48)
RELATIVE LYMPHOCYTE (OHS): 34.2 % (ref 18–48)
RELATIVE MONOCYTE (OHS): 11.6 % (ref 4–15)
RELATIVE MONOCYTE (OHS): 12.1 % (ref 4–15)
RELATIVE MONOCYTE (OHS): 13.1 % (ref 4–15)
RELATIVE MONOCYTE (OHS): 13.2 % (ref 4–15)
RELATIVE NEUTROPHIL (OHS): 46.9 % (ref 38–73)
RELATIVE NEUTROPHIL (OHS): 50.4 % (ref 38–73)
RELATIVE NEUTROPHIL (OHS): 54.6 % (ref 38–73)
RELATIVE NEUTROPHIL (OHS): 61.2 % (ref 38–73)
SODIUM SERPL-SCNC: 137 MMOL/L (ref 136–145)
SODIUM SERPL-SCNC: 137 MMOL/L (ref 136–145)
SODIUM SERPL-SCNC: 139 MMOL/L (ref 136–145)
SODIUM SERPL-SCNC: 139 MMOL/L (ref 136–145)
SP GR UR STRIP: 1.01
UROBILINOGEN UR STRIP-ACNC: NEGATIVE EU/DL
WBC # BLD AUTO: 7.02 K/UL (ref 3.9–12.7)
WBC # BLD AUTO: 7.44 K/UL (ref 3.9–12.7)
WBC # BLD AUTO: 7.81 K/UL (ref 3.9–12.7)
WBC # BLD AUTO: 8.63 K/UL (ref 3.9–12.7)
WBC #/AREA URNS AUTO: >100 /HPF (ref 0–5)
WBC CLUMPS UR QL AUTO: ABNORMAL

## 2025-01-01 PROCEDURE — 27000207 HC ISOLATION: Mod: HCNC

## 2025-01-01 PROCEDURE — 36415 COLL VENOUS BLD VENIPUNCTURE: CPT | Mod: HCNC | Performed by: STUDENT IN AN ORGANIZED HEALTH CARE EDUCATION/TRAINING PROGRAM

## 2025-01-01 PROCEDURE — 83605 ASSAY OF LACTIC ACID: CPT | Mod: HCNC | Performed by: EMERGENCY MEDICINE

## 2025-01-01 PROCEDURE — 84100 ASSAY OF PHOSPHORUS: CPT | Mod: HCNC | Performed by: STUDENT IN AN ORGANIZED HEALTH CARE EDUCATION/TRAINING PROGRAM

## 2025-01-01 PROCEDURE — 3288F FALL RISK ASSESSMENT DOCD: CPT | Mod: HCNC,CPTII,S$GLB, | Performed by: THORACIC SURGERY (CARDIOTHORACIC VASCULAR SURGERY)

## 2025-01-01 PROCEDURE — 25000003 PHARM REV CODE 250: Mod: HCNC | Performed by: STUDENT IN AN ORGANIZED HEALTH CARE EDUCATION/TRAINING PROGRAM

## 2025-01-01 PROCEDURE — 94761 N-INVAS EAR/PLS OXIMETRY MLT: CPT | Mod: HCNC

## 2025-01-01 PROCEDURE — 25000242 PHARM REV CODE 250 ALT 637 W/ HCPCS: Mod: HCNC | Performed by: INTERNAL MEDICINE

## 2025-01-01 PROCEDURE — 80053 COMPREHEN METABOLIC PANEL: CPT | Mod: HCNC | Performed by: STUDENT IN AN ORGANIZED HEALTH CARE EDUCATION/TRAINING PROGRAM

## 2025-01-01 PROCEDURE — 1101F PT FALLS ASSESS-DOCD LE1/YR: CPT | Mod: HCNC,CPTII,S$GLB, | Performed by: THORACIC SURGERY (CARDIOTHORACIC VASCULAR SURGERY)

## 2025-01-01 PROCEDURE — 85025 COMPLETE CBC W/AUTO DIFF WBC: CPT | Mod: HCNC | Performed by: STUDENT IN AN ORGANIZED HEALTH CARE EDUCATION/TRAINING PROGRAM

## 2025-01-01 PROCEDURE — 1111F DSCHRG MED/CURRENT MED MERGE: CPT | Mod: HCNC,CPTII,S$GLB, | Performed by: THORACIC SURGERY (CARDIOTHORACIC VASCULAR SURGERY)

## 2025-01-01 PROCEDURE — 97530 THERAPEUTIC ACTIVITIES: CPT | Mod: HCNC

## 2025-01-01 PROCEDURE — 63600175 PHARM REV CODE 636 W HCPCS: Mod: HCNC | Performed by: STUDENT IN AN ORGANIZED HEALTH CARE EDUCATION/TRAINING PROGRAM

## 2025-01-01 PROCEDURE — 99900035 HC TECH TIME PER 15 MIN (STAT): Mod: HCNC

## 2025-01-01 PROCEDURE — 25000003 PHARM REV CODE 250: Mod: HCNC | Performed by: INTERNAL MEDICINE

## 2025-01-01 PROCEDURE — 63600175 PHARM REV CODE 636 W HCPCS: Mod: HCNC | Performed by: INTERNAL MEDICINE

## 2025-01-01 PROCEDURE — 97166 OT EVAL MOD COMPLEX 45 MIN: CPT | Mod: HCNC

## 2025-01-01 PROCEDURE — 83735 ASSAY OF MAGNESIUM: CPT | Mod: HCNC | Performed by: STUDENT IN AN ORGANIZED HEALTH CARE EDUCATION/TRAINING PROGRAM

## 2025-01-01 PROCEDURE — 27000221 HC OXYGEN, UP TO 24 HOURS: Mod: HCNC

## 2025-01-01 PROCEDURE — 11000001 HC ACUTE MED/SURG PRIVATE ROOM: Mod: HCNC

## 2025-01-01 PROCEDURE — 99215 OFFICE O/P EST HI 40 MIN: CPT | Mod: HCNC,S$GLB,, | Performed by: THORACIC SURGERY (CARDIOTHORACIC VASCULAR SURGERY)

## 2025-01-01 PROCEDURE — 94640 AIRWAY INHALATION TREATMENT: CPT | Mod: HCNC

## 2025-01-01 PROCEDURE — 96366 THER/PROPH/DIAG IV INF ADDON: CPT

## 2025-01-01 PROCEDURE — 87086 URINE CULTURE/COLONY COUNT: CPT | Mod: HCNC

## 2025-01-01 PROCEDURE — 80053 COMPREHEN METABOLIC PANEL: CPT | Mod: HCNC

## 2025-01-01 PROCEDURE — 1126F AMNT PAIN NOTED NONE PRSNT: CPT | Mod: HCNC,CPTII,S$GLB, | Performed by: THORACIC SURGERY (CARDIOTHORACIC VASCULAR SURGERY)

## 2025-01-01 PROCEDURE — 85025 COMPLETE CBC W/AUTO DIFF WBC: CPT | Mod: HCNC

## 2025-01-01 PROCEDURE — 97163 PT EVAL HIGH COMPLEX 45 MIN: CPT | Mod: HCNC

## 2025-01-01 PROCEDURE — 83735 ASSAY OF MAGNESIUM: CPT | Mod: HCNC | Performed by: EMERGENCY MEDICINE

## 2025-01-01 PROCEDURE — 99999 PR PBB SHADOW E&M-EST. PATIENT-LVL III: CPT | Mod: PBBFAC,HCNC,, | Performed by: THORACIC SURGERY (CARDIOTHORACIC VASCULAR SURGERY)

## 2025-01-01 PROCEDURE — 96365 THER/PROPH/DIAG IV INF INIT: CPT

## 2025-01-01 PROCEDURE — 87040 BLOOD CULTURE FOR BACTERIA: CPT | Mod: HCNC | Performed by: STUDENT IN AN ORGANIZED HEALTH CARE EDUCATION/TRAINING PROGRAM

## 2025-01-01 PROCEDURE — 99285 EMERGENCY DEPT VISIT HI MDM: CPT | Mod: HCNC

## 2025-01-01 PROCEDURE — 1157F ADVNC CARE PLAN IN RCRD: CPT | Mod: HCNC,CPTII,S$GLB, | Performed by: THORACIC SURGERY (CARDIOTHORACIC VASCULAR SURGERY)

## 2025-01-01 RX ORDER — IPRATROPIUM BROMIDE AND ALBUTEROL SULFATE 2.5; .5 MG/3ML; MG/3ML
3 SOLUTION RESPIRATORY (INHALATION) EVERY 6 HOURS PRN
Status: DISCONTINUED | OUTPATIENT
Start: 2025-01-01 | End: 2025-01-01 | Stop reason: HOSPADM

## 2025-01-01 RX ORDER — AMOXICILLIN AND CLAVULANATE POTASSIUM 875; 125 MG/1; MG/1
TABLET, FILM COATED ORAL
Qty: 6 TABLET | Refills: 5 | Status: SHIPPED | OUTPATIENT
Start: 2025-01-01

## 2025-01-01 RX ORDER — POTASSIUM CHLORIDE 20 MEQ/1
40 TABLET, EXTENDED RELEASE ORAL ONCE
Status: COMPLETED | OUTPATIENT
Start: 2025-01-01 | End: 2025-01-01

## 2025-01-01 RX ORDER — ONDANSETRON HYDROCHLORIDE 2 MG/ML
4 INJECTION, SOLUTION INTRAVENOUS EVERY 8 HOURS PRN
Status: DISCONTINUED | OUTPATIENT
Start: 2025-01-01 | End: 2025-01-01

## 2025-01-01 RX ORDER — ATORVASTATIN CALCIUM 10 MG/1
10 TABLET, FILM COATED ORAL NIGHTLY
Status: DISCONTINUED | OUTPATIENT
Start: 2025-01-01 | End: 2025-01-01 | Stop reason: HOSPADM

## 2025-01-01 RX ORDER — SODIUM CHLORIDE 0.9 % (FLUSH) 0.9 %
10 SYRINGE (ML) INJECTION EVERY 12 HOURS PRN
Status: DISCONTINUED | OUTPATIENT
Start: 2025-01-01 | End: 2025-01-01 | Stop reason: HOSPADM

## 2025-01-01 RX ORDER — ATORVASTATIN CALCIUM 10 MG/1
10 TABLET, FILM COATED ORAL DAILY
Status: DISCONTINUED | OUTPATIENT
Start: 2025-01-01 | End: 2025-01-01

## 2025-01-01 RX ORDER — METOPROLOL TARTRATE 50 MG/1
100 TABLET ORAL 2 TIMES DAILY
Status: DISCONTINUED | OUTPATIENT
Start: 2025-01-01 | End: 2025-01-01 | Stop reason: HOSPADM

## 2025-01-01 RX ORDER — IBUPROFEN 200 MG
24 TABLET ORAL
Status: DISCONTINUED | OUTPATIENT
Start: 2025-01-01 | End: 2025-01-01 | Stop reason: HOSPADM

## 2025-01-01 RX ORDER — PROCHLORPERAZINE EDISYLATE 5 MG/ML
2.5 INJECTION INTRAMUSCULAR; INTRAVENOUS EVERY 8 HOURS PRN
Status: DISCONTINUED | OUTPATIENT
Start: 2025-01-01 | End: 2025-01-01 | Stop reason: HOSPADM

## 2025-01-01 RX ORDER — MONTELUKAST SODIUM 10 MG/1
10 TABLET ORAL NIGHTLY
Status: DISCONTINUED | OUTPATIENT
Start: 2025-01-01 | End: 2025-01-01 | Stop reason: HOSPADM

## 2025-01-01 RX ORDER — NALOXONE HCL 0.4 MG/ML
0.02 VIAL (ML) INJECTION
Status: DISCONTINUED | OUTPATIENT
Start: 2025-01-01 | End: 2025-01-01 | Stop reason: HOSPADM

## 2025-01-01 RX ORDER — ACETAMINOPHEN 500 MG
5000 TABLET ORAL DAILY
Status: DISCONTINUED | OUTPATIENT
Start: 2025-01-01 | End: 2025-01-01 | Stop reason: HOSPADM

## 2025-01-01 RX ORDER — ACETAMINOPHEN 325 MG/1
650 TABLET ORAL EVERY 6 HOURS PRN
Status: DISCONTINUED | OUTPATIENT
Start: 2025-01-01 | End: 2025-01-01 | Stop reason: HOSPADM

## 2025-01-01 RX ORDER — AMIODARONE HYDROCHLORIDE 100 MG/1
200 TABLET ORAL DAILY
Status: DISCONTINUED | OUTPATIENT
Start: 2025-01-01 | End: 2025-01-01 | Stop reason: HOSPADM

## 2025-01-01 RX ORDER — HYDRALAZINE HYDROCHLORIDE 20 MG/ML
5 INJECTION INTRAMUSCULAR; INTRAVENOUS EVERY 6 HOURS PRN
Status: DISCONTINUED | OUTPATIENT
Start: 2025-01-01 | End: 2025-01-01 | Stop reason: HOSPADM

## 2025-01-01 RX ORDER — IPRATROPIUM BROMIDE AND ALBUTEROL SULFATE 2.5; .5 MG/3ML; MG/3ML
3 SOLUTION RESPIRATORY (INHALATION) EVERY 12 HOURS
Status: DISCONTINUED | OUTPATIENT
Start: 2025-01-01 | End: 2025-01-01 | Stop reason: HOSPADM

## 2025-01-01 RX ORDER — GRANULES FOR ORAL 3 G/1
3 POWDER ORAL ONCE
Status: COMPLETED | OUTPATIENT
Start: 2025-01-01 | End: 2025-01-01

## 2025-01-01 RX ORDER — IBUPROFEN 200 MG
16 TABLET ORAL
Status: DISCONTINUED | OUTPATIENT
Start: 2025-01-01 | End: 2025-01-01 | Stop reason: HOSPADM

## 2025-01-01 RX ORDER — NITROFURANTOIN 25; 75 MG/1; MG/1
100 CAPSULE ORAL 2 TIMES DAILY
Qty: 14 CAPSULE | Refills: 0 | Status: ON HOLD | OUTPATIENT
Start: 2025-01-01 | End: 2025-01-01 | Stop reason: HOSPADM

## 2025-01-01 RX ORDER — FAMOTIDINE 20 MG/1
20 TABLET, FILM COATED ORAL
Status: DISCONTINUED | OUTPATIENT
Start: 2025-01-01 | End: 2025-01-01 | Stop reason: HOSPADM

## 2025-01-01 RX ORDER — FUROSEMIDE 40 MG/1
40 TABLET ORAL DAILY
Status: DISCONTINUED | OUTPATIENT
Start: 2025-01-01 | End: 2025-01-01 | Stop reason: HOSPADM

## 2025-01-01 RX ORDER — PROCHLORPERAZINE EDISYLATE 5 MG/ML
2.5 INJECTION INTRAMUSCULAR; INTRAVENOUS EVERY 8 HOURS PRN
Status: DISCONTINUED | OUTPATIENT
Start: 2025-01-01 | End: 2025-01-01

## 2025-01-01 RX ORDER — POLYETHYLENE GLYCOL 3350 17 G/17G
17 POWDER, FOR SOLUTION ORAL 2 TIMES DAILY PRN
Status: DISCONTINUED | OUTPATIENT
Start: 2025-01-01 | End: 2025-01-01 | Stop reason: HOSPADM

## 2025-01-01 RX ORDER — GLUCAGON 1 MG
1 KIT INJECTION
Status: DISCONTINUED | OUTPATIENT
Start: 2025-01-01 | End: 2025-01-01 | Stop reason: HOSPADM

## 2025-01-01 RX ORDER — TALC
6 POWDER (GRAM) TOPICAL NIGHTLY PRN
Status: DISCONTINUED | OUTPATIENT
Start: 2025-01-01 | End: 2025-01-01 | Stop reason: HOSPADM

## 2025-01-01 RX ORDER — FUROSEMIDE 40 MG/1
40 TABLET ORAL DAILY
Status: DISCONTINUED | OUTPATIENT
Start: 2025-01-01 | End: 2025-01-01

## 2025-01-01 RX ORDER — POTASSIUM CHLORIDE 20 MEQ/1
40 TABLET, EXTENDED RELEASE ORAL 3 TIMES DAILY
Status: DISCONTINUED | OUTPATIENT
Start: 2025-01-01 | End: 2025-01-01

## 2025-01-01 RX ADMIN — CHOLECALCIFEROL TAB 125 MCG (5000 UNIT) 5000 UNITS: 125 TAB at 08:04

## 2025-01-01 RX ADMIN — PIPERACILLIN SODIUM AND TAZOBACTAM SODIUM 4.5 G: 4; .5 INJECTION, POWDER, FOR SOLUTION INTRAVENOUS at 11:04

## 2025-01-01 RX ADMIN — APIXABAN 2.5 MG: 2.5 TABLET, FILM COATED ORAL at 09:04

## 2025-01-01 RX ADMIN — FAMOTIDINE 20 MG: 20 TABLET, FILM COATED ORAL at 08:04

## 2025-01-01 RX ADMIN — FUROSEMIDE 40 MG: 40 TABLET ORAL at 09:04

## 2025-01-01 RX ADMIN — PIPERACILLIN SODIUM AND TAZOBACTAM SODIUM 4.5 G: 4; .5 INJECTION, POWDER, FOR SOLUTION INTRAVENOUS at 07:04

## 2025-01-01 RX ADMIN — PIPERACILLIN SODIUM AND TAZOBACTAM SODIUM 4.5 G: 4; .5 INJECTION, POWDER, FOR SOLUTION INTRAVENOUS at 03:04

## 2025-01-01 RX ADMIN — IPRATROPIUM BROMIDE AND ALBUTEROL SULFATE 3 ML: 2.5; .5 SOLUTION RESPIRATORY (INHALATION) at 07:04

## 2025-01-01 RX ADMIN — POTASSIUM CHLORIDE 40 MEQ: 1500 TABLET, EXTENDED RELEASE ORAL at 03:04

## 2025-01-01 RX ADMIN — METOPROLOL TARTRATE 100 MG: 50 TABLET, FILM COATED ORAL at 08:04

## 2025-01-01 RX ADMIN — PIPERACILLIN SODIUM AND TAZOBACTAM SODIUM 4.5 G: 4; .5 INJECTION, POWDER, FOR SOLUTION INTRAVENOUS at 10:04

## 2025-01-01 RX ADMIN — APIXABAN 2.5 MG: 2.5 TABLET, FILM COATED ORAL at 08:04

## 2025-01-01 RX ADMIN — GRANULES FOR ORAL SOLUTION 3 G: 3 POWDER ORAL at 01:04

## 2025-01-01 RX ADMIN — METOPROLOL TARTRATE 100 MG: 50 TABLET, FILM COATED ORAL at 09:04

## 2025-01-01 RX ADMIN — CHOLECALCIFEROL TAB 125 MCG (5000 UNIT) 5000 UNITS: 125 TAB at 09:04

## 2025-01-01 RX ADMIN — MONTELUKAST 10 MG: 10 TABLET, FILM COATED ORAL at 08:04

## 2025-01-01 RX ADMIN — AMIODARONE HYDROCHLORIDE 200 MG: 100 TABLET ORAL at 09:04

## 2025-01-01 RX ADMIN — ATORVASTATIN CALCIUM 10 MG: 10 TABLET, FILM COATED ORAL at 09:04

## 2025-01-01 RX ADMIN — POTASSIUM CHLORIDE 40 MEQ: 1500 TABLET, EXTENDED RELEASE ORAL at 11:04

## 2025-01-01 RX ADMIN — AMIODARONE HYDROCHLORIDE 200 MG: 100 TABLET ORAL at 08:04

## 2025-01-01 RX ADMIN — POTASSIUM CHLORIDE 40 MEQ: 1500 TABLET, EXTENDED RELEASE ORAL at 08:04

## 2025-01-01 RX ADMIN — ATORVASTATIN CALCIUM 10 MG: 10 TABLET, FILM COATED ORAL at 08:04

## 2025-01-06 NOTE — TELEPHONE ENCOUNTER
Attempted to reach pt, lm for pt to call back regarding test results and ERIN FNA needed    Scribe Attestation (For Scribes USE Only)... Attending Attestation (For Attendings USE Only).../Scribe Attestation (For Scribes USE Only)...

## 2025-01-13 DIAGNOSIS — Z00.00 ENCOUNTER FOR MEDICARE ANNUAL WELLNESS EXAM: ICD-10-CM

## 2025-01-27 ENCOUNTER — CLINICAL SUPPORT (OUTPATIENT)
Dept: CARDIOLOGY | Facility: HOSPITAL | Age: OVER 89
End: 2025-01-27
Attending: INTERNAL MEDICINE
Payer: MEDICARE

## 2025-01-27 ENCOUNTER — CLINICAL SUPPORT (OUTPATIENT)
Dept: CARDIOLOGY | Facility: HOSPITAL | Age: OVER 89
End: 2025-01-27
Payer: MEDICARE

## 2025-01-27 DIAGNOSIS — Z95.0 PRESENCE OF CARDIAC PACEMAKER: ICD-10-CM

## 2025-01-27 DIAGNOSIS — I48.91 UNSPECIFIED ATRIAL FIBRILLATION: ICD-10-CM

## 2025-01-27 PROCEDURE — 93296 REM INTERROG EVL PM/IDS: CPT | Performed by: INTERNAL MEDICINE

## 2025-01-27 PROCEDURE — 93294 REM INTERROG EVL PM/LDLS PM: CPT | Mod: S$GLB,,, | Performed by: INTERNAL MEDICINE

## 2025-01-28 ENCOUNTER — TELEPHONE (OUTPATIENT)
Dept: FAMILY MEDICINE | Facility: CLINIC | Age: OVER 89
End: 2025-01-28
Payer: MEDICARE

## 2025-02-03 ENCOUNTER — EXTERNAL HOME HEALTH (OUTPATIENT)
Dept: HOME HEALTH SERVICES | Facility: HOSPITAL | Age: OVER 89
End: 2025-02-03
Payer: MEDICARE

## 2025-02-03 LAB
OHS CV AF BURDEN PERCENT: < 1
OHS CV DC REMOTE DEVICE TYPE: NORMAL
OHS CV RV PACING PERCENT: 21 %

## 2025-02-05 ENCOUNTER — TELEPHONE (OUTPATIENT)
Dept: FAMILY MEDICINE | Facility: CLINIC | Age: OVER 89
End: 2025-02-05
Payer: MEDICARE

## 2025-02-05 ENCOUNTER — PATIENT MESSAGE (OUTPATIENT)
Dept: HEMATOLOGY/ONCOLOGY | Facility: CLINIC | Age: OVER 89
End: 2025-02-05
Payer: MEDICARE

## 2025-02-05 DIAGNOSIS — R30.0 DYSURIA: Primary | ICD-10-CM

## 2025-02-10 DIAGNOSIS — Z95.0 PRESENCE OF CARDIAC PACEMAKER: Primary | ICD-10-CM

## 2025-02-11 ENCOUNTER — OFFICE VISIT (OUTPATIENT)
Dept: CARDIOLOGY | Facility: CLINIC | Age: OVER 89
End: 2025-02-11
Payer: MEDICARE

## 2025-02-11 ENCOUNTER — HOSPITAL ENCOUNTER (OUTPATIENT)
Dept: CARDIOLOGY | Facility: HOSPITAL | Age: OVER 89
Discharge: HOME | End: 2025-02-11
Attending: INTERNAL MEDICINE
Payer: MEDICARE

## 2025-02-11 VITALS
BODY MASS INDEX: 31.96 KG/M2 | DIASTOLIC BLOOD PRESSURE: 85 MMHG | OXYGEN SATURATION: 95 % | WEIGHT: 191.81 LBS | RESPIRATION RATE: 16 BRPM | SYSTOLIC BLOOD PRESSURE: 151 MMHG | HEIGHT: 65 IN | HEART RATE: 55 BPM

## 2025-02-11 DIAGNOSIS — N18.31 STAGE 3A CHRONIC KIDNEY DISEASE: ICD-10-CM

## 2025-02-11 DIAGNOSIS — R06.02 SHORTNESS OF BREATH: ICD-10-CM

## 2025-02-11 DIAGNOSIS — I50.42 CHRONIC COMBINED SYSTOLIC AND DIASTOLIC HEART FAILURE: ICD-10-CM

## 2025-02-11 DIAGNOSIS — Z95.0 PRESENCE OF CARDIAC PACEMAKER: ICD-10-CM

## 2025-02-11 DIAGNOSIS — I25.118 CORONARY ARTERY DISEASE OF NATIVE ARTERY OF NATIVE HEART WITH STABLE ANGINA PECTORIS: ICD-10-CM

## 2025-02-11 DIAGNOSIS — Z95.0 CARDIAC PACEMAKER IN SITU: ICD-10-CM

## 2025-02-11 DIAGNOSIS — Z86.16 HISTORY OF COVID-19: ICD-10-CM

## 2025-02-11 DIAGNOSIS — R00.1 SYMPTOMATIC BRADYCARDIA: ICD-10-CM

## 2025-02-11 DIAGNOSIS — I10 ESSENTIAL HYPERTENSION: ICD-10-CM

## 2025-02-11 DIAGNOSIS — I50.9 CHRONIC CONGESTIVE HEART FAILURE, UNSPECIFIED HEART FAILURE TYPE: ICD-10-CM

## 2025-02-11 DIAGNOSIS — E87.6 HYPOKALEMIA: ICD-10-CM

## 2025-02-11 DIAGNOSIS — I47.29 PAROXYSMAL VENTRICULAR TACHYCARDIA: ICD-10-CM

## 2025-02-11 DIAGNOSIS — I50.33 ACUTE ON CHRONIC DIASTOLIC HEART FAILURE: ICD-10-CM

## 2025-02-11 DIAGNOSIS — Z95.0 S/P PLACEMENT OF CARDIAC PACEMAKER: Primary | ICD-10-CM

## 2025-02-11 DIAGNOSIS — I35.0 NONRHEUMATIC AORTIC VALVE STENOSIS: ICD-10-CM

## 2025-02-11 DIAGNOSIS — R55 SYNCOPE, UNSPECIFIED SYNCOPE TYPE: ICD-10-CM

## 2025-02-11 DIAGNOSIS — I48.0 PAROXYSMAL ATRIAL FIBRILLATION: ICD-10-CM

## 2025-02-11 DIAGNOSIS — J44.9 CHRONIC OBSTRUCTIVE PULMONARY DISEASE, UNSPECIFIED COPD TYPE: ICD-10-CM

## 2025-02-11 LAB
OHS QRS DURATION: 92 MS
OHS QTC CALCULATION: 424 MS

## 2025-02-11 PROCEDURE — 93010 ELECTROCARDIOGRAM REPORT: CPT | Mod: HCNC,,, | Performed by: INTERNAL MEDICINE

## 2025-02-11 PROCEDURE — 93280 PM DEVICE PROGR EVAL DUAL: CPT | Mod: HCNC

## 2025-02-11 PROCEDURE — 93005 ELECTROCARDIOGRAM TRACING: CPT | Mod: HCNC

## 2025-02-11 PROCEDURE — 99999 PR PBB SHADOW E&M-EST. PATIENT-LVL V: CPT | Mod: PBBFAC,HCNC,, | Performed by: INTERNAL MEDICINE

## 2025-02-11 NOTE — PROGRESS NOTES
Subjective:   Patient ID:  Holli Landrum is a 89 y.o. female who presents for cardiac consult of No chief complaint on file.      HPI  The patient came in today for cardiac consult of No chief complaint on file.      Holli Landrum is a 89 y.o. female pt with  history of persistent atrial fibrillation, coronary artery disease status post PCI, sick sinus syndrome status post [employer] pacemaker implantation Biotronik, hypertrophic obstructive cardiomyopathy, diastolic congestive heart failure, severe aortic stenosis, history of GI bleeding here for CV follow up.        10/29/24    results reveal improving fluid level - BNP,  kidney function is stable but borderline dry - can back off fluid pills if feeling more dry/BP drops. Potassium and magnesium are normal.   BP and HR stable today. BMI 32 - 196 lbs   She is stable with amio. SHe is off oxygen now doing well.       2/11/25  BP elevated today. HR 55.   BMI 31 - 191 lbs.   Pt has more SOB, occ CP, L arm pain. She is not able to do as much therapy as in the past.   Discussed will repeat ECHO, may need urgent TAVR eval vs BAV.   ECG - Apaced       Results for orders placed during the hospital encounter of 06/20/23    Cardiac catheterization    Conclusion    The Ost Cx to Prox Cx lesion was 50% stenosed.    The Ost 1st Mrg to 1st Mrg lesion was 70% stenosed.    The pre-procedure left ventricular end diastolic pressure was 6.    The estimated blood loss was <50 mL.    There was non-obstructive coronary artery disease..    There was moderate aortic valve stenosis.    The procedure log was documented by Documenter: RT Cristopher; Morena Bradford and verified by Brice Guadarrama MD.    Date: 6/20/2023  Time: 2:46 PM      Results for orders placed during the hospital encounter of 05/17/23    Echo    Interpretation Summary  · The left ventricle is normal in size with mild concentric hypertrophy and normal systolic function.  · Moderate left atrial enlargement.  · The  estimated ejection fraction is 65%.  · Indeterminate left ventricular diastolic function.  · Normal right ventricular size with normal right ventricular systolic function.  · There is moderate-to-severe aortic valve stenosis.  · Aortic valve area is 0.80 cm2; peak velocity is 2.85 m/s; mean gradient is 21 mmHg.  · Mild tricuspid regurgitation.  · Intermediate central venous pressure (8 mmHg).  · The estimated PA systolic pressure is 33 mmHg.          Past Medical History:   Diagnosis Date    Anticoagulant long-term use     Arthritis     Asthma     Basal cell carcinoma     Cancer     skin cancer to face    Cataract     OU done//    CHF (congestive heart failure)     COPD (chronic obstructive pulmonary disease)     no oxygen; patient denies    cpap     Essential hypertension 05/05/2010    GERD (gastroesophageal reflux disease) 11/20/2012    Glaucoma     Hypertensive heart disease with heart failure 02/05/2013    Paroxysmal atrial fibrillation     Paroxysmal ventricular tachycardia     per problem list    Preop cardiovascular exam 1/26/2024    Renal disorder     french-1/3/2020    Squamous cell carcinoma of skin        Past Surgical History:   Procedure Laterality Date    A-V CARDIAC PACEMAKER INSERTION  06/16/2021    Procedure: INSERTION, CARDIAC PACEMAKER, DUAL CHAMBER;  Surgeon: Oscar Sommers MD;  [facility];  Service: Cardiovascular;;    ADENOIDECTOMY  191944    APPENDECTOMY  1948    Because of Ovarian Cyst surgery    BREAST BIOPSY Left 1995    neg    BREAST BIOPSY Right 1984    neg    BREAST BIOPSY Right 1992    neg    BREAST SURGERY      A number of biopsies    CARDIAC CATHETERIZATION  2013, 2014,2016, 2020    has 9 stents    CARDIAC SURGERY  2012    stents    CATARACT EXTRACTION W/  INTRAOCULAR LENS IMPLANT Left 11/01/2018    Dr Rose    CATARACT EXTRACTION W/  INTRAOCULAR LENS IMPLANT Right 12/13/2018    Dr Rose//    CHOLECYSTECTOMY      COLONOSCOPY  ~2005    Dr. Edward; normal per patient  report    COLONOSCOPY N/A 09/06/2022    Procedure: COLONOSCOPY 8/31/22-note in Dr Simms's office visit note that he spoke to her cardiologist and she was viable candidate for endoscopy;  Surgeon: Cordelia Bueno MD;  Location: [facility];  Service: Endoscopy;  Laterality: N/A;    CORONARY ANGIOGRAPHY N/A 03/20/2020    Procedure: ANGIOGRAM, CORONARY ARTERY;  Surgeon: Chuy Bryant MD;  Location: [facility] CATH;  Service: Cardiology;  Laterality: N/A;    CYSTOSCOPY W/ RETROGRADES Bilateral 02/12/2020    Procedure: CYSTOSCOPY, WITH RETROGRADE PYELOGRAM;  Surgeon: Gasper Feliz MD;  Location: [facility] OR;  Service: Urology;  Laterality: Bilateral;    ESOPHAGOGASTRODUODENOSCOPY N/A 08/13/2020    Procedure: EGD (ESOPHAGOGASTRODUODENOSCOPY);  Surgeon: Mika Solorzano MD;  Location: [facility];  Service: Endoscopy;  Laterality: N/A; Mild Schatzki ring. Biopsied. Dilated. small hiatal hernia, gastritis; biopsy: esophagus- SEVERE REFLUX ESOPHAGITIS, stomach- chronic gastritis, negative for H pylori    ESOPHAGOGASTRODUODENOSCOPY N/A 10/22/2020    Procedure: EGD (ESOPHAGOGASTRODUODENOSCOPY);  Surgeon: Fred Hendricks MD;  Location: [facility];  Service: Endoscopy;  Laterality: N/A;    ESOPHAGOGASTRODUODENOSCOPY N/A 3/21/2024    Procedure: EGD (ESOPHAGOGASTRODUODENOSCOPY);  Surgeon: Mika Solorzano MD;  Location: [facility];  Service: Gastroenterology;  Laterality: N/A;    EYE SURGERY  2017    Cataracs    FRACTIONAL FLOW RESERVE (FFR), CORONARY  6/20/2023    Procedure: Fractional Flow [place] (FFR), Coronary;  Surgeon: Brice Campuzano MD;  Location: [facility] CATH LAB;  Service: Cardiology;;    HYSTERECTOMY  1969    INSTANTANEOUS WAVE-FREE RATIO (IFR) N/A 6/20/2023    Procedure: Instantaneous Wave-Free Ratio (IFR);  Surgeon: Brice Campuzano MD;  Location: [facility] CATH LAB;  Service: Cardiology;  Laterality: N/A;    LEFT HEART CATHETERIZATION Left 03/03/2020    Procedure: Left heart cath;  Surgeon: Chuy Bryant MD;   Location: [facility] CATH;  Service: Cardiology;  Laterality: Left;    LEFT HEART CATHETERIZATION Left 03/20/2020    Procedure: Left heart cath;  Surgeon: Chuy Bryant MD;  Location: [facility] CATH;  Service: Cardiology;  Laterality: Left;    LEFT HEART CATHETERIZATION N/A 6/20/2023    Procedure: Left heart cath;  Surgeon: Brice Campuzano MD;  Location: [facility] CATH LAB;  Service: Cardiology;  Laterality: N/A;    OVARIAN CYST REMOVAL  teenager    PHACOEMULSIFICATION OF CATARACT Left 11/01/2018    Procedure: PHACOEMULSIFICATION, CATARACT;  Surgeon: Aleksandar Rose Jr., MD;  Location: [facility] OR;  Service: Ophthalmology;  Laterality: Left;    PHACOEMULSIFICATION OF CATARACT Right 12/13/2018    Procedure: PHACOEMULSIFICATION, CATARACT;  Surgeon: Aleksandar Rose Jr., MD;  Location: [facility] OR;  Service: Ophthalmology;  Laterality: Right;    TONSILLECTOMY      aw/denoids    TRANSESOPHAGEAL ECHOCARDIOGRAM WITH POSSIBLE CARDIOVERSION (ALFRED W/ POSS CARDIOVERSION) N/A 10/5/2023    Procedure: Transesophageal echo (ALFRED) intra-procedure log documentation/alfred/cv;  Surgeon: Bao Miguel MD;  Location: [facility] CATH LAB;  Service: Cardiology;  Laterality: N/A;   Alfred/Cv  MRI safe   Pacer & leads implanted 6/16/21, Antoun   Bio Edora 8 MICHELLE, [id number], PID: [id number]   A lead: Bio Solia S45, [id number]   V lead: Bio Solia S53, [id number]    TREATMENT OF CARDIAC ARRHYTHMIA N/A 10/5/2023    Procedure: Cardioversion or Defibrillation;  Surgeon: Bao Miguel MD;  Location: [facility] CATH LAB;  Service: Cardiology;  Laterality: N/A;    UPPER GASTROINTESTINAL ENDOSCOPY  ~2005    Dr. Solorzano    VALVE STUDY-AORTIC  6/20/2023    Procedure: Valve study-aortic;  Surgeon: Brice Campuzano MD;  Location: [facility] CATH LAB;  Service: Cardiology;;    VASCULAR SURGERY      to remove clot from right leg    Yag Capsulotomy Bilateral 11/05/2019    Dr Rose       Social History     Tobacco Use    Smoking status: Former     Current packs/day: 0.00     Types:  Cigarettes     Start date: 6/10/1954     Quit date: 9/15/1955     Years since quittin.4    Smokeless tobacco: Never    Tobacco comments:     I onlysmoked one year while [place] my  was oversees  during [place]   Substance Use Topics    Alcohol use: Not Currently     Comment: Only drank a little wine and haven't  drunk anything in 15 y    Drug use: Never       Family History   Problem Relation Name Age of Onset    Diabetes Brother Wili         Type 2    Heart disease Brother Wili          at 65 CHD    Diabetes Mother Holli COX         Type 1    Alcohol abuse Mother Holli COX         Dod 10/75    Heart disease Mother Holli COX         Arteriosclerosis    Hypertension Mother Holli COX     Stroke Mother Holli COX         3/15/1975 dod 10/1975    Cancer Maternal Grandfather P[place]aw         Dod     Clotting disorder Son          bleeding after tonsillectomy only    Heart disease Father Ronaldo Chaves         Heart attack. Afib, and Polyvcithemavera    Hypertension Father Ronaldo Chaves     Amblyopia Neg Hx      Blindness Neg Hx      Cataracts Neg Hx      Glaucoma Neg Hx      Macular degeneration Neg Hx      Retinal detachment Neg Hx      Strabismus Neg Hx      Thyroid disease Neg Hx      Colon cancer Neg Hx         Patient's Medications   New Prescriptions    No medications on file   Previous Medications    ACIDOPHILUS PROBIOTIC BLEND 175 MG CAP    Take 1 capsule by mouth every morning.    ALBUTEROL (PROVENTIL/VENTOLIN HFA) 90 MCG/ACTUATION INHALER    Inhale 1-2 puffs into the lungs every 4 (four) hours as needed for Wheezing or Shortness of Breath. Rescue    ALBUTEROL-IPRATROPIUM (DUO-NEB) 2.5 MG-0.5 MG/3 ML NEBULIZER SOLUTION    Take 3 mLs by nebulization every 6 (six) hours as needed for Wheezing or Shortness of Breath.    AMIODARONE (PACERONE) 200 MG TAB    Take 1 tablet (200 mg total) by mouth once daily.    APIXABAN (ELIQUIS) 2.5 MG TAB    Take 1 tablet (2.5 mg total) by mouth 2 (two) times daily.    CHOLECALCIFEROL,  VITAMIN D3, 125 MCG (5,000 UNIT) TAB    Take 5,000 Units by mouth once daily.    CLONIDINE (CATAPRES) 0.1 MG TABLET    Take 1 tablet (0.1 mg total) by mouth every 6 (six) hours as needed (take if BP is over 180/100).    DORZOLAMIDE (TRUSOPT) 2 % OPHTHALMIC SOLUTION    INSTILL 1 DROP INTO BOTH EYES TWICE DAILY    FAMOTIDINE (PEPCID) 40 MG TABLET    TAKE ONE TABLET BY MOUTH EVERY NIGHT AT BEDTIME    FLUTICASONE (FLONASE) 50 MCG/ACTUATION NASAL SPRAY    2 sprays (100 mcg total) by Each Nare route daily as needed for Allergies.    FLUTICASONE FUROATE-VILANTEROL (BREO ELLIPTA) 100-25 MCG/DOSE DISKUS INHALER    Inhale 1 puff into the lungs once daily.    FUROSEMIDE (LASIX) 40 MG TABLET    Take 1 tablet (40 mg total) by mouth once daily. Do not take if BP is below 110/70; recommend doubling up dose if BP is over 140/90    GEMTESA 75 MG TAB    Take 1 tablet by mouth every morning.    LACTOBACILLUS RHAMNOSUS GG (CULTURELLE) 10 BILLION CELL CAPSULE    Take 1 capsule by mouth once daily.    LATANOPROST 0.005 % OPHTHALMIC SOLUTION    Place 1 drop into both eyes every evening.    LORATADINE (CLARITIN) 10 MG TABLET    Take 1 tablet (10 mg total) by mouth once daily.    LUTEIN-ZEAXANTHIN EXTRACT 15-0.7 MG CAP    Take 1 capsule by mouth every morning.    METOPROLOL TARTRATE (LOPRESSOR) 100 MG TABLET    Take 1 tablet (100 mg total) by mouth 2 (two) times daily.    MONTELUKAST (SINGULAIR) 10 MG TABLET    Take 1 tablet (10 mg total) by mouth every evening.    MUPIROCIN (BACTROBAN) 2 % OINTMENT    SMARTSI Application Topical 2-3 Times Daily    NITROGLYCERIN 0.4 MG/HR TD PT24 (NITRODUR) 0.4 MG/HR    Place 1 patch onto the skin once daily.    NYSTATIN (MYCOSTATIN) CREAM    Apply topically 2 (two) times daily. for 7 days    POTASSIUM CHLORIDE (KLOR-CON) 10 MEQ TBSR    Take 2 tablets (20 mEq total) by mouth once daily. Take with Lasix    POTASSIUM CHLORIDE (MICRO-K) 10 MEQ CPSR    Take 1 capsule (10 mEq total) by mouth once daily.     "ROSUVASTATIN (CRESTOR) 40 MG TAB    TAKE 1 TABLET BY MOUTH EVERY DAY   Modified Medications    No medications on file   Discontinued Medications    No medications on file       Review of Systems   Constitutional: Negative.    HENT: Negative.     Eyes: Negative.    Respiratory:  Positive for shortness of breath.    Cardiovascular:  Positive for palpitations and leg swelling.   Gastrointestinal: Negative.    Genitourinary: Negative.    Musculoskeletal: Negative.    Skin: Negative.    Neurological: Negative.    Endo/Heme/Allergies: Negative.    Psychiatric/Behavioral: Negative.     All 12 systems otherwise negative.      Wt Readings from Last 3 Encounters:   02/11/25 87 kg (191 lb 12.8 oz)   12/19/24 89.8 kg (197 lb 13.8 oz)   11/06/24 88.9 kg (196 lb)     Temp Readings from Last 3 Encounters:   12/19/24 98.3 °F (36.8 °C) (Temporal)   09/27/24 96.9 °F (36.1 °C)   09/23/24 97.8 °F (36.6 °C)     BP Readings from Last 3 Encounters:   02/11/25 (!) 151/85   12/19/24 (!) 140/76   11/06/24 (!) 142/76     Pulse Readings from Last 3 Encounters:   02/11/25 (!) 55   12/19/24 73   11/06/24 72       BP (!) 151/85   Pulse (!) 55   Resp 16   Ht 5' 5" (1.651 m)   Wt 87 kg (191 lb 12.8 oz)   LMP  (LMP Unknown)   SpO2 95%   BMI 31.92 kg/m²     Objective:   Physical Exam  Vitals and nursing note reviewed.   Constitutional:       General: She is not in acute distress.     Appearance: She is well-developed. She is obese. She is not diaphoretic.   HENT:      Head: Normocephalic and atraumatic.      Nose: Nose normal.   Eyes:      General: No scleral icterus.     Conjunctiva/sclera: Conjunctivae normal.   Neck:      Thyroid: No thyromegaly.      Vascular: No JVD.   Cardiovascular:      Rate and Rhythm: Normal rate and regular rhythm.      Heart sounds: S1 normal and S2 normal. Murmur heard.      No friction rub. No gallop. No S3 or S4 sounds.   Pulmonary:      Effort: Pulmonary effort is normal. No respiratory distress.      Breath " sounds: Normal breath sounds. No stridor. No wheezing or rales.   Chest:      Chest wall: No tenderness.   Abdominal:      General: Bowel sounds are normal. There is no distension.      Palpations: Abdomen is soft. There is no mass.      Tenderness: There is no abdominal tenderness. There is no rebound.   Genitourinary:     Comments: Deferred  Musculoskeletal:         General: No tenderness or deformity. Normal range of motion.      Cervical back: Normal range of motion and neck supple.   Lymphadenopathy:      Cervical: No cervical adenopathy.   Skin:     General: Skin is warm and dry.      Coloration: Skin is not pale.      Findings: No erythema or rash.   Neurological:      Mental Status: She is alert and oriented to person, place, and time.      Motor: No abnormal muscle tone.      Coordination: Coordination normal.   Psychiatric:         Behavior: Behavior normal.         Thought Content: Thought content normal.         Judgment: Judgment normal.         Lab Results   Component Value Date     12/16/2024     (L) 08/15/2015    K 3.7 12/16/2024    K 4.0 08/15/2015     12/16/2024    CL 99 08/15/2015    CO2 23 12/16/2024    BUN 20 12/16/2024    CREATININE 1.3 12/16/2024    CREATININE 1.05 (H) 08/15/2015    GLU 99 12/16/2024    HGBA1C 5.5 09/06/2024    MG 2.4 09/18/2024    AST 21 09/06/2024    AST 23 04/05/2016    ALT 17 09/06/2024    ALBUMIN 3.7 09/06/2024    ALBUMIN 4.4 08/15/2015    PROT 6.8 09/06/2024    BILITOT 0.5 09/06/2024    WBC 7.53 12/16/2024    HGB 14.4 12/16/2024    HCT 44.1 12/16/2024    HCT 30 (L) 12/14/2023    MCV 87 12/16/2024     12/16/2024    INR 1.0 10/03/2023    TSH 0.887 09/06/2024    CHOL 126 09/06/2024    HDL 45 09/06/2024    LDLCALC 53.6 (L) 09/06/2024    LDLCALC 89 08/15/2015    TRIG 137 09/06/2024     (H) 09/18/2024     Assessment:      1. S/P placement of cardiac pacemaker    2. Chronic combined systolic and diastolic heart failure    3. Paroxysmal  ventricular tachycardia    4. Symptomatic bradycardia    5. Shortness of breath    6. History of COVID-19    7. Acute on chronic diastolic heart failure    8. Paroxysmal atrial fibrillation    9. Nonrheumatic aortic valve stenosis    10. Chronic obstructive pulmonary disease, unspecified COPD type    11. Essential hypertension    12. Stage 3a chronic kidney disease    13. Cardiac pacemaker in situ    14. Chronic congestive heart failure, unspecified heart failure type    15. Syncope, unspecified syncope type    16. Coronary artery disease of native artery of native heart with stable angina pectoris    17. Hypokalemia          Plan:     PAF, SSS s/p PPM - 6/2021 with recurrent Afib - s/p JÚNIOR/DCCV and reload with Amio 10/2023  - interrogate device and f/u device clinic as sched   - cont Amio 200mg daily and Eliquis  - has hemorrhoids occ - may be candidate for Watchman   - f/u EP     2. HTN - elevated lately; occ urgency - improved   - titrate meds  - cont BB to 50 mg BID --> increase to 100mg BID  - add Clonidine 0.1 mg PRN    3. Mod to severe AS - was not candidate for TAVR   - TAVR high risk for endocarditis, can consider Balloon AV - f/u with Dr. Guadarrama  - ECHO 5/2023 with normal bi V function, mod to severe AS, PASP 33 mmHg.  - [facility] 6/2023 with OM1 70% stenosis, LCX 50% stenosis, moderate AS.   - repeat ECHO     4. HFrEF --> improved to HFpEF , last BNP - 209 ---> 522  --> 503 --> 262  - cont tx - lasix 40mg BID and K 20 meq BID - hold If BP is low --changed to Lasix 40mg daily and extra PRN    5. CAD s/p PCI  - cont Eliquis, BB, statin  - cont nitro patch   - patent stents [facility] 6/2023    6. Obesity, BMI 35 - 216 lbs--> 197 lbs - BMI 32 - 198 lbs --> 195  --> 198 lbs --> BMI 33 - 201 lbs  BMI 32 - 195 lbs --> 196 lbs   - cont weight loss    7. JA, HIGGINS, h/o COVID 19, COPD  - f/u pulm  - needs to use CPAP    8. Anemia - iron def anemia   - cont iron and f/u GI/heme onc   - s/p EGD 3/2024    9. GERD  - cont PPI      Visit today included increased complexity associated with the care of the episodic problem dyspnea addressed and managing the longitudinal care of the patient due to the serious and/or complex managed problem(s) .      Thank you for allowing me to participate in this patient's care. Please do not hesitate to contact me with any questions or concerns. Consult note has been forwarded to the referral physician.     Fletcher Meyer MD, [facility]  Cardiovascular Disease  [facility], [place]  [phone] (P)

## 2025-02-12 ENCOUNTER — TELEPHONE (OUTPATIENT)
Dept: CARDIOLOGY | Facility: CLINIC | Age: OVER 89
End: 2025-02-12
Payer: MEDICARE

## 2025-02-12 NOTE — TELEPHONE ENCOUNTER
Spoke with patient to advise that per Dr. Meyer: labs reveal elevated BNP due to extra fluid - it has more than doubled since last lab in Sept; recommend double up lasix to twice a for next 7 days at least and monitor BP; if still having more fluid issues can continue taking double up - need to take extra potassium tablets too with extra lasix. Patient voiced understanding. Advised to call with needs.    ----- Message from Fletcher Meyer MD sent at 2/11/2025  5:56 PM CST -----  Please contact the patient and let them know that their results of labs reveal elevated BNP due to extra fluid - it has more than doubled since last lab in Sept; recommend double up lasix to twice a for next 7 days at least and monitor BP; if still having more fluid issues can continue taking double up - need to take extra potassium tablets too with extra lasix.    No

## 2025-02-13 RX ORDER — METOPROLOL TARTRATE 100 MG/1
100 TABLET ORAL 2 TIMES DAILY
Qty: 180 TABLET | Refills: 1 | Status: SHIPPED | OUTPATIENT
Start: 2025-02-13 | End: 2025-08-12

## 2025-02-16 ENCOUNTER — DOCUMENT SCAN (OUTPATIENT)
Dept: HOME HEALTH SERVICES | Facility: HOSPITAL | Age: OVER 89
End: 2025-02-16
Payer: MEDICARE

## 2025-02-19 ENCOUNTER — HOSPITAL ENCOUNTER (OUTPATIENT)
Dept: CARDIOLOGY | Facility: HOSPITAL | Age: OVER 89
Discharge: HOME | End: 2025-02-19
Attending: INTERNAL MEDICINE
Payer: MEDICARE

## 2025-02-19 VITALS
HEIGHT: 65 IN | BODY MASS INDEX: 31.82 KG/M2 | SYSTOLIC BLOOD PRESSURE: 151 MMHG | WEIGHT: 191 LBS | DIASTOLIC BLOOD PRESSURE: 85 MMHG

## 2025-02-19 DIAGNOSIS — E87.6 HYPOKALEMIA: ICD-10-CM

## 2025-02-19 DIAGNOSIS — I50.9 CHRONIC CONGESTIVE HEART FAILURE, UNSPECIFIED HEART FAILURE TYPE: ICD-10-CM

## 2025-02-19 DIAGNOSIS — R55 SYNCOPE, UNSPECIFIED SYNCOPE TYPE: ICD-10-CM

## 2025-02-19 DIAGNOSIS — I50.33 ACUTE ON CHRONIC DIASTOLIC HEART FAILURE: ICD-10-CM

## 2025-02-19 DIAGNOSIS — I48.0 PAROXYSMAL ATRIAL FIBRILLATION: ICD-10-CM

## 2025-02-19 DIAGNOSIS — N18.31 STAGE 3A CHRONIC KIDNEY DISEASE: ICD-10-CM

## 2025-02-19 DIAGNOSIS — I50.42 CHRONIC COMBINED SYSTOLIC AND DIASTOLIC HEART FAILURE: ICD-10-CM

## 2025-02-19 DIAGNOSIS — Z95.0 CARDIAC PACEMAKER IN SITU: ICD-10-CM

## 2025-02-19 DIAGNOSIS — I47.29 PAROXYSMAL VENTRICULAR TACHYCARDIA: ICD-10-CM

## 2025-02-19 DIAGNOSIS — J44.9 CHRONIC OBSTRUCTIVE PULMONARY DISEASE, UNSPECIFIED COPD TYPE: ICD-10-CM

## 2025-02-19 DIAGNOSIS — I25.118 CORONARY ARTERY DISEASE OF NATIVE ARTERY OF NATIVE HEART WITH STABLE ANGINA PECTORIS: ICD-10-CM

## 2025-02-19 DIAGNOSIS — Z95.0 S/P PLACEMENT OF CARDIAC PACEMAKER: ICD-10-CM

## 2025-02-19 DIAGNOSIS — Z86.16 HISTORY OF COVID-19: ICD-10-CM

## 2025-02-19 DIAGNOSIS — R06.02 SHORTNESS OF BREATH: ICD-10-CM

## 2025-02-19 DIAGNOSIS — I35.0 NONRHEUMATIC AORTIC VALVE STENOSIS: ICD-10-CM

## 2025-02-19 DIAGNOSIS — I10 ESSENTIAL HYPERTENSION: ICD-10-CM

## 2025-02-19 DIAGNOSIS — R00.1 SYMPTOMATIC BRADYCARDIA: ICD-10-CM

## 2025-02-19 LAB
ASCENDING AORTA: 3.14 CM
AV INDEX (PROSTH): 0.2
AV MEAN GRADIENT: 40 MMHG
AV PEAK GRADIENT: 61 MMHG
AV REGURGITATION PRESSURE HALF TIME: 625 MS
AV VALVE AREA BY VELOCITY RATIO: 0.6 CM²
AV VALVE AREA: 0.6 CM²
AV VELOCITY RATIO: 0.21
BSA FOR ECHO PROCEDURE: 1.99 M2
CV ECHO LV RWT: 0.6 CM
DOP CALC AO PEAK VEL: 3.9 M/S
DOP CALC AO VTI: 81.9 CM
DOP CALC LVOT AREA: 3.1 CM2
DOP CALC LVOT DIAMETER: 2 CM
DOP CALC LVOT PEAK VEL: 0.8 M/S
DOP CALC LVOT STROKE VOLUME: 50.6 CM3
DOP CALC RVOT PEAK VEL: 0.54 M/S
DOP CALC RVOT VTI: 11.3 CM
DOP CALCLVOT PEAK VEL VTI: 16.1 CM
E WAVE DECELERATION TIME: 337 MSEC
E/A RATIO: 1.63
E/E' RATIO: 16 M/S
ECHO LV POSTERIOR WALL: 1.3 CM (ref 0.6–1.1)
EJECTION FRACTION: 60 %
FRACTIONAL SHORTENING: 32.6 % (ref 28–44)
INTERVENTRICULAR SEPTUM: 1.3 CM (ref 0.6–1.1)
IVC DIAMETER: 1.06 CM
IVRT: 91 MSEC
LA MAJOR: 5.5 CM
LA MINOR: 6.8 CM
LA WIDTH: 3 CM
LEFT ATRIUM AREA SYSTOLIC (APICAL 2 CHAMBER): 23.71 CM2
LEFT ATRIUM AREA SYSTOLIC (APICAL 4 CHAMBER): 23.32 CM2
LEFT ATRIUM SIZE: 4.9 CM
LEFT ATRIUM VOLUME INDEX MOD: 36 ML/M2
LEFT ATRIUM VOLUME INDEX: 39 ML/M2
LEFT ATRIUM VOLUME MOD: 70 ML
LEFT ATRIUM VOLUME: 76 CM3
LEFT INTERNAL DIMENSION IN SYSTOLE: 2.9 CM (ref 2.1–4)
LEFT VENTRICLE DIASTOLIC VOLUME INDEX: 41.91 ML/M2
LEFT VENTRICLE DIASTOLIC VOLUME: 81.31 ML
LEFT VENTRICLE END SYSTOLIC VOLUME APICAL 2 CHAMBER: 67.97 ML
LEFT VENTRICLE END SYSTOLIC VOLUME APICAL 4 CHAMBER: 68.22 ML
LEFT VENTRICLE MASS INDEX: 107.1 G/M2
LEFT VENTRICLE SYSTOLIC VOLUME INDEX: 16.7 ML/M2
LEFT VENTRICLE SYSTOLIC VOLUME: 32.43 ML
LEFT VENTRICULAR INTERNAL DIMENSION IN DIASTOLE: 4.3 CM (ref 3.5–6)
LEFT VENTRICULAR MASS: 207.8 G
LV LATERAL E/E' RATIO: 13 M/S
LV SEPTAL E/E' RATIO: 19.5 M/S
LVED V (TEICH): 81.31 ML
LVES V (TEICH): 32.43 ML
LVOT MG: 1.59 MMHG
LVOT MV: 0.59 CM/S
MV PEAK A VEL: 0.48 M/S
MV PEAK E VEL: 0.78 M/S
MV STENOSIS PRESSURE HALF TIME: 97.79 MS
MV VALVE AREA P 1/2 METHOD: 2.25 CM2
OHS CV RV/LV RATIO: 0.72 CM
PISA AR MAX VEL: 4.38 M/S
PISA TR MAX VEL: 3.1 M/S
PV MEAN GRADIENT: 1 MMHG
PV MV: 1.01 M/S
PV PEAK GRADIENT: 8 MMHG
PV PEAK VELOCITY: 1.39 M/S
RA MAJOR: 5.28 CM
RA PRESSURE ESTIMATED: 3 MMHG
RA WIDTH: 3.11 CM
RIGHT VENTRICLE DIASTOLIC BASEL DIMENSION: 3.1 CM
RIGHT VENTRICULAR END-DIASTOLIC DIMENSION: 3.12 CM
RV TB RVSP: 6 MMHG
SINUS: 2.94 CM
STJ: 2.73 CM
TDI LATERAL: 0.06 M/S
TDI SEPTAL: 0.04 M/S
TDI: 0.05 M/S
TR MAX PG: 38 MMHG
TV REST PULMONARY ARTERY PRESSURE: 41 MMHG
Z-SCORE OF LEFT VENTRICULAR DIMENSION IN END DIASTOLE: -2.48
Z-SCORE OF LEFT VENTRICULAR DIMENSION IN END SYSTOLE: -1.24

## 2025-02-19 PROCEDURE — 93306 TTE W/DOPPLER COMPLETE: CPT | Mod: HCNC

## 2025-02-24 DIAGNOSIS — I35.0 SEVERE AORTIC STENOSIS: Primary | ICD-10-CM

## 2025-03-12 ENCOUNTER — DOCUMENT SCAN (OUTPATIENT)
Dept: HOME HEALTH SERVICES | Facility: HOSPITAL | Age: OVER 89
End: 2025-03-12
Payer: MEDICARE

## 2025-03-12 DIAGNOSIS — R10.13 EPIGASTRIC PAIN: ICD-10-CM

## 2025-03-12 DIAGNOSIS — I50.9 CHRONIC CONGESTIVE HEART FAILURE, UNSPECIFIED HEART FAILURE TYPE: ICD-10-CM

## 2025-03-12 DIAGNOSIS — E87.6 HYPOKALEMIA: ICD-10-CM

## 2025-03-12 DIAGNOSIS — R12 HEARTBURN: ICD-10-CM

## 2025-03-13 DIAGNOSIS — I50.9 CHRONIC CONGESTIVE HEART FAILURE, UNSPECIFIED HEART FAILURE TYPE: ICD-10-CM

## 2025-03-13 RX ORDER — POTASSIUM CHLORIDE 750 MG/1
20 TABLET, EXTENDED RELEASE ORAL EVERY MORNING
Qty: 180 TABLET | Refills: 1 | Status: SHIPPED | OUTPATIENT
Start: 2025-03-13

## 2025-03-13 RX ORDER — FUROSEMIDE 40 MG/1
40 TABLET ORAL DAILY
Qty: 180 TABLET | Refills: 1 | Status: SHIPPED | OUTPATIENT
Start: 2025-03-13

## 2025-03-13 NOTE — TELEPHONE ENCOUNTER
Refill Routing Note   Medication(s) are not appropriate for processing by [facility] for the following reason(s):        Required labs abnormal    [facility] action(s):  Defer             Appointments  past 12m or future 3m with PCP    Date Provider   Last Visit   8/14/2024 Rolando Cannon MD   Next Visit   Visit date not found Rolando Cannon MD   ED visits in past 90 days: 0        Note composed:9:47 PM 03/12/2025

## 2025-03-13 NOTE — TELEPHONE ENCOUNTER
No care due was identified.  [facility] Embedded Care Due Messages. Reference number: [id number].   3/12/2025 9:45:41 PM CDT

## 2025-03-14 DIAGNOSIS — R10.13 EPIGASTRIC PAIN: ICD-10-CM

## 2025-03-14 DIAGNOSIS — R12 HEARTBURN: ICD-10-CM

## 2025-03-14 DIAGNOSIS — J30.89 SEASONAL ALLERGIC RHINITIS DUE TO OTHER ALLERGIC TRIGGER: ICD-10-CM

## 2025-03-14 RX ORDER — FAMOTIDINE 40 MG/1
40 TABLET, FILM COATED ORAL NIGHTLY
Qty: 30 TABLET | Refills: 0 | Status: SHIPPED | OUTPATIENT
Start: 2025-03-14

## 2025-03-14 RX ORDER — FAMOTIDINE 40 MG/1
40 TABLET, FILM COATED ORAL NIGHTLY
Qty: 30 TABLET | Refills: 0 | OUTPATIENT
Start: 2025-03-14

## 2025-03-17 RX ORDER — LORATADINE 10 MG/1
10 TABLET ORAL DAILY
Qty: 30 TABLET | Refills: 11 | Status: SHIPPED | OUTPATIENT
Start: 2025-03-17 | End: 2026-03-17

## 2025-03-18 ENCOUNTER — DOCUMENT SCAN (OUTPATIENT)
Dept: HOME HEALTH SERVICES | Facility: HOSPITAL | Age: OVER 89
End: 2025-03-18
Payer: MEDICARE

## 2025-03-19 ENCOUNTER — LAB VISIT (OUTPATIENT)
Dept: LAB | Facility: HOSPITAL | Age: OVER 89
End: 2025-03-19
Attending: NURSE PRACTITIONER
Payer: MEDICARE

## 2025-03-19 DIAGNOSIS — D50.8 OTHER IRON DEFICIENCY ANEMIA: ICD-10-CM

## 2025-03-19 LAB
BASOPHILS # BLD AUTO: 0.06 K/UL (ref 0–0.2)
BASOPHILS NFR BLD: 0.7 % (ref 0–1.9)
DIFFERENTIAL METHOD BLD: ABNORMAL
EOSINOPHIL # BLD AUTO: 0.3 K/UL (ref 0–0.5)
EOSINOPHIL NFR BLD: 2.8 % (ref 0–8)
ERYTHROCYTE [DISTWIDTH] IN BLOOD BY AUTOMATED COUNT: 14.6 % (ref 11.5–14.5)
FERRITIN SERPL-MCNC: 595 NG/ML (ref 20–300)
HCT VFR BLD AUTO: 38 % (ref 37–48.5)
HGB BLD-MCNC: 12.2 G/DL (ref 12–16)
IMM GRANULOCYTES # BLD AUTO: 0.03 K/UL (ref 0–0.04)
IMM GRANULOCYTES NFR BLD AUTO: 0.3 % (ref 0–0.5)
IRON SERPL-MCNC: 38 UG/DL (ref 30–160)
LYMPHOCYTES # BLD AUTO: 1.5 K/UL (ref 1–4.8)
LYMPHOCYTES NFR BLD: 16.8 % (ref 18–48)
MCH RBC QN AUTO: 27.9 PG (ref 27–31)
MCHC RBC AUTO-ENTMCNC: 32.1 G/DL (ref 32–36)
MCV RBC AUTO: 87 FL (ref 82–98)
MONOCYTES # BLD AUTO: 0.9 K/UL (ref 0.3–1)
MONOCYTES NFR BLD: 10.7 % (ref 4–15)
NEUTROPHILS # BLD AUTO: 6 K/UL (ref 1.8–7.7)
NEUTROPHILS NFR BLD: 68.7 % (ref 38–73)
NRBC BLD-RTO: 0 /100 WBC
PLATELET # BLD AUTO: 306 K/UL (ref 150–450)
PMV BLD AUTO: 10.8 FL (ref 9.2–12.9)
RBC # BLD AUTO: 4.37 M/UL (ref 4–5.4)
SATURATED IRON: 13 % (ref 20–50)
TOTAL IRON BINDING CAPACITY: 296 UG/DL (ref 250–450)
TRANSFERRIN SERPL-MCNC: 200 MG/DL (ref 200–375)
WBC # BLD AUTO: 8.8 K/UL (ref 3.9–12.7)

## 2025-03-19 PROCEDURE — 85025 COMPLETE CBC W/AUTO DIFF WBC: CPT | Mod: HCNC | Performed by: NURSE PRACTITIONER

## 2025-03-19 PROCEDURE — 36415 COLL VENOUS BLD VENIPUNCTURE: CPT | Mod: HCNC | Performed by: NURSE PRACTITIONER

## 2025-03-19 PROCEDURE — 84466 ASSAY OF TRANSFERRIN: CPT | Mod: HCNC | Performed by: NURSE PRACTITIONER

## 2025-03-19 PROCEDURE — 82728 ASSAY OF FERRITIN: CPT | Mod: HCNC | Performed by: NURSE PRACTITIONER

## 2025-03-24 ENCOUNTER — OFFICE VISIT (OUTPATIENT)
Dept: HEMATOLOGY/ONCOLOGY | Facility: CLINIC | Age: OVER 89
End: 2025-03-24
Payer: MEDICARE

## 2025-03-24 VITALS
HEART RATE: 60 BPM | WEIGHT: 196.56 LBS | SYSTOLIC BLOOD PRESSURE: 134 MMHG | TEMPERATURE: 98 F | OXYGEN SATURATION: 94 % | HEIGHT: 65 IN | DIASTOLIC BLOOD PRESSURE: 79 MMHG | BODY MASS INDEX: 32.75 KG/M2

## 2025-03-24 DIAGNOSIS — D41.4 BLADDER NEOPLASM OF UNCERTAIN MALIGNANT POTENTIAL: ICD-10-CM

## 2025-03-24 DIAGNOSIS — D50.8 OTHER IRON DEFICIENCY ANEMIA: Primary | ICD-10-CM

## 2025-03-24 PROCEDURE — 1160F RVW MEDS BY RX/DR IN RCRD: CPT | Mod: HCNC,CPTII,S$GLB, | Performed by: NURSE PRACTITIONER

## 2025-03-24 PROCEDURE — 99999 PR PBB SHADOW E&M-EST. PATIENT-LVL IV: CPT | Mod: PBBFAC,HCNC,, | Performed by: NURSE PRACTITIONER

## 2025-03-24 PROCEDURE — 1101F PT FALLS ASSESS-DOCD LE1/YR: CPT | Mod: HCNC,CPTII,S$GLB, | Performed by: NURSE PRACTITIONER

## 2025-03-24 PROCEDURE — 1159F MED LIST DOCD IN RCRD: CPT | Mod: HCNC,CPTII,S$GLB, | Performed by: NURSE PRACTITIONER

## 2025-03-24 PROCEDURE — 3288F FALL RISK ASSESSMENT DOCD: CPT | Mod: HCNC,CPTII,S$GLB, | Performed by: NURSE PRACTITIONER

## 2025-03-24 PROCEDURE — 1125F AMNT PAIN NOTED PAIN PRSNT: CPT | Mod: HCNC,CPTII,S$GLB, | Performed by: NURSE PRACTITIONER

## 2025-03-24 PROCEDURE — 99214 OFFICE O/P EST MOD 30 MIN: CPT | Mod: HCNC,S$GLB,, | Performed by: NURSE PRACTITIONER

## 2025-03-24 PROCEDURE — 1157F ADVNC CARE PLAN IN RCRD: CPT | Mod: HCNC,CPTII,S$GLB, | Performed by: NURSE PRACTITIONER

## 2025-03-24 NOTE — PROGRESS NOTES
Subjective:       Patient ID: Holli Landrum is a 89 y.o. female.    Chief Complaint: ARLETTE. Fatigue      HPI: 89 y.o female with medical history as stated below presenting today for follow up of her iron deficiency anemia treated with IV iron PRN. She notes upcoming heart valve surgery pending angiogram.    Intolerant of oral iron due to constipation. Previuosly s/p weekly Venofer infusions x 5 completed 2024. EGD 3/2024 unrevealing. Colonoscopy 2022 unrevealing. No prior VCE. She follows with outside Urology. Chronic indwelling french catheter in place with intermittent hematuria.     S/p Injectafer x 1 2024. She notes ongoing chronic fatigue.       Social History     Socioeconomic History    Marital status:    Tobacco Use    Smoking status: Former     Current packs/day: 0.00     Types: Cigarettes     Start date: 6/10/1954     Quit date: 9/15/1955     Years since quittin.5    Smokeless tobacco: Never    Tobacco comments:     I onlysmoked one year while [place]  was [place]  during [place]   Substance and Sexual Activity    Alcohol use: Not Currently     Comment: Only drank a little wine and haven't  drunk anything in 15 y    Drug use: Never    Sexual activity: Not Currently     Partners: Male     Birth control/protection: Abstinence     Comment:  and 87 years of age     Social Drivers of Health     Financial Resource Strain: Low Risk  (2024)    Overall Financial Resource Strain (CARDIA)     Difficulty of Paying Living Expenses: Not hard at all   Food Insecurity: No Food Insecurity (2024)    Hunger Vital Sign     Worried About Running Out of Food in the Last Year: Never true     Ran Out of Food in the Last Year: Never true   Transportation Needs: No Transportation Needs (2023)    PRAPARE - Transportation     Lack of Transportation (Medical): No     Lack of Transportation (Non-Medical): No   Physical Activity: Insufficiently Active (2024)    Exercise Vital Sign      Days of Exercise per Week: 1 day     Minutes of Exercise per Session: 10 min   Stress: No Stress Concern Present (2024)    [employer] - Occupational Stress Questionnaire     Feeling of Stress : Not at all   Housing Stability: Low Risk  (2023)    Housing Stability Vital Sign     Unable to Pay for Housing in the Last Year: No     Number of Places Lived in the Last Year: 1     Unstable Housing in the Last Year: No       Past Medical History:   Diagnosis Date    Anticoagulant long-term use     Arthritis     Asthma     Basal cell carcinoma     Cancer     skin cancer to face    Cataract     OU done//    CHF (congestive heart failure)     COPD (chronic obstructive pulmonary disease)     no oxygen; patient denies    cpap     Essential hypertension 2010    GERD (gastroesophageal reflux disease) 2012    Glaucoma     Hypertensive heart disease with heart failure 2013    Paroxysmal atrial fibrillation     Paroxysmal ventricular tachycardia     per problem list    Preop cardiovascular exam 2024    Renal disorder     french-1/3/2020    Squamous cell carcinoma of skin        Family History   Problem Relation Name Age of Onset    Diabetes Brother Wili         Type 2    Heart disease Brother Wili          at 65 CHD    Diabetes Mother Holli COX         Type 1    Alcohol abuse Mother Holli COX         Dod 10/75    Heart disease Mother Holli COX         Arteriosclerosis    Hypertension Mother Holli S     Stroke Mother Holli S         3/15/1975 dod 10/1975    Cancer Maternal Grandfather [place]         Dod     Clotting disorder Son          bleeding after tonsillectomy only    Heart disease Father Ronaldo Lopes.         Heart attack. Afib, and Polyvcithemavera    Hypertension Father Ronaldo Lopes.     Amblyopia Neg Hx      Blindness Neg Hx      Cataracts Neg Hx      Glaucoma Neg Hx      Macular degeneration Neg Hx      Retinal detachment Neg Hx      Strabismus Neg Hx      Thyroid  disease Neg Hx      Colon cancer Neg Hx         Past Surgical History:   Procedure Laterality Date    A-V CARDIAC PACEMAKER INSERTION  06/16/2021    Procedure: INSERTION, CARDIAC PACEMAKER, DUAL CHAMBER;  Surgeon: Oscar Sommers MD;  Location: [facility] CATH;  Service: Cardiovascular;;    ADENOIDECTOMY  [id number]    APPENDECTOMY  1948    Because of Ovarian Cyst surgery    BREAST BIOPSY Left 1995    neg    BREAST BIOPSY Right 1984    neg    BREAST BIOPSY Right 1992    neg    BREAST SURGERY      A number of biopsies    CARDIAC CATHETERIZATION  2013, 2014,2016, 2020    has 9 stents    CARDIAC SURGERY  2012    stents    CATARACT EXTRACTION W/  INTRAOCULAR LENS IMPLANT Left 11/01/2018    Dr Rose    CATARACT EXTRACTION W/  INTRAOCULAR LENS IMPLANT Right 12/13/2018    Dr Rose//    CHOLECYSTECTOMY      COLONOSCOPY  ~2005    Dr. Edward; normal per patient report    COLONOSCOPY N/A 09/06/2022    Procedure: COLONOSCOPY 8/31/22-note in Dr Simms's office visit note that he spoke to her cardiologist and she was viable candidate for endoscopy;  Surgeon: Cordelia Bueno MD;  Location: [facility];  Service: Endoscopy;  Laterality: N/A;    CORONARY ANGIOGRAPHY N/A 03/20/2020    Procedure: ANGIOGRAM, CORONARY ARTERY;  Surgeon: Chuy Bryant MD;  Location: [facility];  Service: Cardiology;  Laterality: N/A;    CYSTOSCOPY W/ RETROGRADES Bilateral 02/12/2020    Procedure: CYSTOSCOPY, WITH RETROGRADE PYELOGRAM;  Surgeon: Gasper Feliz MD;  Location: [facility] OR;  Service: Urology;  Laterality: Bilateral;    ESOPHAGOGASTRODUODENOSCOPY N/A 08/13/2020    Procedure: EGD (ESOPHAGOGASTRODUODENOSCOPY);  Surgeon: Mika Solorzano MD;  Location: [facility];  Service: Endoscopy;  Laterality: N/A; Mild Schatzki ring. Biopsied. Dilated. small hiatal hernia, gastritis; biopsy: esophagus- SEVERE REFLUX ESOPHAGITIS, stomach- chronic gastritis, negative for H pylori    ESOPHAGOGASTRODUODENOSCOPY N/A 10/22/2020    Procedure: EGD  (ESOPHAGOGASTRODUODENOSCOPY);  Surgeon: Fred Hendricks MD;  Location: [facility] ENDO;  Service: Endoscopy;  Laterality: N/A;    ESOPHAGOGASTRODUODENOSCOPY N/A 3/21/2024    Procedure: EGD (ESOPHAGOGASTRODUODENOSCOPY);  Surgeon: Mika Solorzano MD;  Location: [facility] ENDO;  Service: Gastroenterology;  Laterality: N/A;    EYE SURGERY  2017    Cataracs    FRACTIONAL FLOW RESERVE (FFR), CORONARY  6/20/2023    Procedure: Fractional Flow [place] (FFR), Coronary;  Surgeon: Brice Campuzano MD;  Location: [facility] CATH LAB;  Service: Cardiology;;    HYSTERECTOMY  1969    INSTANTANEOUS WAVE-FREE RATIO (IFR) N/A 6/20/2023    Procedure: Instantaneous Wave-Free Ratio (IFR);  Surgeon: Brice Campuzano MD;  Location: [facility] CATH LAB;  Service: Cardiology;  Laterality: N/A;    LEFT HEART CATHETERIZATION Left 03/03/2020    Procedure: Left heart cath;  Surgeon: Chuy Bryant MD;  Location: [facility] CATH;  Service: Cardiology;  Laterality: Left;    LEFT HEART CATHETERIZATION Left 03/20/2020    Procedure: Left heart cath;  Surgeon: Chuy Bryant MD;  Location: [facility] CATH;  Service: Cardiology;  Laterality: Left;    LEFT HEART CATHETERIZATION N/A 6/20/2023    Procedure: Left heart cath;  Surgeon: Brice Campuzano MD;  Location: [facility] CATH LAB;  Service: Cardiology;  Laterality: N/A;    OVARIAN CYST REMOVAL  teenager    PHACOEMULSIFICATION OF CATARACT Left 11/01/2018    Procedure: PHACOEMULSIFICATION, CATARACT;  Surgeon: Aleksandar Rose Jr., MD;  Location: [facility] OR;  Service: Ophthalmology;  Laterality: Left;    PHACOEMULSIFICATION OF CATARACT Right 12/13/2018    Procedure: PHACOEMULSIFICATION, CATARACT;  Surgeon: Aleksandar Rose Jr., MD;  Location: [facility] OR;  Service: Ophthalmology;  Laterality: Right;    TONSILLECTOMY      aw/denoids    TRANSESOPHAGEAL ECHOCARDIOGRAM WITH POSSIBLE CARDIOVERSION (LIZ W/ POSS CARDIOVERSION) N/A 10/5/2023    Procedure: Transesophageal echo (LIZ) intra-procedure log documentation/liz/cv;  Surgeon: Myriam  MD Bao;  Location: [facility] CATH LAB;  Service: Cardiology;  Laterality: N/A;   Alfred/Cv  MRI safe   Pacer & leads implanted 6/16/21, Antoun   Bio Edora 8 MICHELLE, [id number], PID: [id number]   A lead: Bio Solia S45, [id number]   V lead: Bio Solia S53, [id number]    TREATMENT OF CARDIAC ARRHYTHMIA N/A 10/5/2023    Procedure: Cardioversion or Defibrillation;  Surgeon: Bao Miguel MD;  Location: [facility] CATH LAB;  Service: Cardiology;  Laterality: N/A;    UPPER GASTROINTESTINAL ENDOSCOPY  ~2005    Dr. Solorzano    VALVE STUDY-AORTIC  6/20/2023    Procedure: Valve study-aortic;  Surgeon: Brice Campuzano MD;  Location: [facility] CATH LAB;  Service: Cardiology;;    VASCULAR SURGERY      to remove clot from right leg    Yag Capsulotomy Bilateral 11/05/2019    Dr Rose       Review of Systems   Constitutional:  Positive for fatigue. Negative for appetite change, chills, fever and unexpected weight change.   HENT:  Negative for congestion, mouth sores, nosebleeds, sore throat, trouble swallowing and voice change.    Respiratory:  Negative for cough, chest tightness, shortness of breath and wheezing.    Cardiovascular:  Negative for chest pain and leg swelling.   Gastrointestinal:  Negative for abdominal distention, abdominal pain, blood in stool, constipation, diarrhea, nausea and vomiting.   Genitourinary:  Negative for difficulty urinating, dysuria and hematuria.   Musculoskeletal:  Negative for arthralgias, back pain and myalgias.   Skin:  Negative for pallor, rash and wound.   Neurological:  Positive for weakness. Negative for dizziness, syncope and headaches.   Hematological:  Negative for adenopathy. Does not bruise/bleed easily.   Psychiatric/Behavioral:  The patient is not nervous/anxious.          Medication List with Changes/Refills   Current Medications    ACIDOPHILUS PROBIOTIC BLEND 175 MG CAP    Take 1 capsule by mouth every morning.    ALBUTEROL (PROVENTIL/VENTOLIN HFA) 90 MCG/ACTUATION INHALER    Inhale 1-2  puffs into the lungs every 4 (four) hours as needed for Wheezing or Shortness of Breath. Rescue    ALBUTEROL-IPRATROPIUM (DUO-NEB) 2.5 MG-0.5 MG/3 ML NEBULIZER SOLUTION    Take 3 mLs by nebulization every 6 (six) hours as needed for Wheezing or Shortness of Breath.    AMIODARONE (PACERONE) 200 MG TAB    Take 1 tablet (200 mg total) by mouth once daily.    APIXABAN (ELIQUIS) 2.5 MG TAB    Take 1 tablet (2.5 mg total) by mouth 2 (two) times daily.    CHOLECALCIFEROL, VITAMIN D3, 125 MCG (5,000 UNIT) TAB    Take 5,000 Units by mouth once daily.    CLONIDINE (CATAPRES) 0.1 MG TABLET    Take 1 tablet (0.1 mg total) by mouth every 6 (six) hours as needed (take if BP is over 180/100).    DORZOLAMIDE (TRUSOPT) 2 % OPHTHALMIC SOLUTION    INSTILL 1 DROP INTO BOTH EYES TWICE DAILY    FAMOTIDINE (PEPCID) 40 MG TABLET    TAKE ONE TABLET BY MOUTH EVERY NIGHT AT BEDTIME    FLUTICASONE (FLONASE) 50 MCG/ACTUATION NASAL SPRAY    2 sprays (100 mcg total) by Each Nare route daily as needed for Allergies.    FLUTICASONE FUROATE-VILANTEROL (BREO ELLIPTA) 100-25 MCG/DOSE DISKUS INHALER    Inhale 1 puff into the lungs once daily.    FUROSEMIDE (LASIX) 40 MG TABLET    Take 1 tablet (40 mg total) by mouth once daily. Do not take if BP is below 110/70; recommend doubling up dose if BP is over 140/90    GEMTESA 75 MG TAB    Take 1 tablet by mouth every morning.    LACTOBACILLUS RHAMNOSUS GG (CULTURELLE) 10 BILLION CELL CAPSULE    Take 1 capsule by mouth once daily.    LATANOPROST 0.005 % OPHTHALMIC SOLUTION    Place 1 drop into both eyes every evening.    LORATADINE (CLARITIN) 10 MG TABLET    Take 1 tablet (10 mg total) by mouth once daily.    LUTEIN-ZEAXANTHIN EXTRACT 15-0.7 MG CAP    Take 1 capsule by mouth every morning.    METOPROLOL TARTRATE (LOPRESSOR) 100 MG TABLET    Take 1 tablet (100 mg total) by mouth 2 (two) times daily.    MONTELUKAST (SINGULAIR) 10 MG TABLET    Take 1 tablet (10 mg total) by mouth every evening.    MUPIROCIN  (BACTROBAN) 2 % OINTMENT    SMARTSI Application Topical 2-3 Times Daily    NITROGLYCERIN 0.4 MG/HR TD PT24 (NITRODUR) 0.4 MG/HR    Place 1 patch onto the skin once daily.    NYSTATIN (MYCOSTATIN) CREAM    Apply topically 2 (two) times daily. for 7 days    POTASSIUM CHLORIDE (KLOR-CON) 10 MEQ TBSR    TAKE TWO TABLETS BY MOUTH EVERY MORNING    POTASSIUM CHLORIDE (MICRO-K) 10 MEQ CPSR    Take 1 capsule (10 mEq total) by mouth once daily.    ROSUVASTATIN (CRESTOR) 40 MG TAB    TAKE 1 TABLET BY MOUTH EVERY DAY     Objective:     Vitals:    25 0959   BP: 134/79   Pulse: 60   Temp: 97.9 °F (36.6 °C)       Lab Results   Component Value Date    WBC 8.80 2025    HGB 12.2 2025    HCT 38.0 2025    MCV 87 2025     2025       Lab Results   Component Value Date    IRON 38 2025    TRANSFERRIN 200 2025    TIBC 296 2025    FESATURATED 13 (L) 2025      BMP  Lab Results   Component Value Date     2025    K 3.8 2025     2025    CO2 24 2025    BUN 22 2025    CREATININE 1.6 (H) 2025    CALCIUM 10.2 2025    ANIONGAP 9 2025    EGFRNORACEVR 30 (A) 2025         Physical Exam  Pulmonary:      Effort: Pulmonary effort is normal. No respiratory distress.   Neurological:      Mental Status: She is alert and oriented to person, place, and time.        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 No anemia. She notes fatigue. We discussed additional IV iron versus continued monitoring. She has upcoming heart procedure    For now will hold off on additional iron supplementation. F/u 6 weeks with cbc iron ferritin bmp 1-2 days prior               Plan:     Bladder neoplasm of uncertain malignant potential    Other iron deficiency  anemia          Med Onc Chart Routing      Follow up with physician    Follow up with LISA 6 weeks. VV please   Infusion scheduling note    Injection scheduling note    Labs CBC, CMP, ferritin and iron and TIBC   Scheduling:  Preferred lab:  Lab interval:  1-2 days prior   Imaging None      Pharmacy appointment No pharmacy appointment needed      Other referrals       No additional referrals needed       JURGEN JamesP-C

## 2025-03-24 NOTE — ASSESSMENT & PLAN NOTE
Persistent mild iron deficiency. No anemia. She notes fatigue. We discussed additional IV iron versus continued monitoring. She has upcoming heart procedure    For now will hold off on additional iron supplementation. F/u 6 weeks with cbc iron ferritin bmp 1-2 days prior

## 2025-03-26 ENCOUNTER — TELEPHONE (OUTPATIENT)
Dept: FAMILY MEDICINE | Facility: CLINIC | Age: OVER 89
End: 2025-03-26
Payer: MEDICARE

## 2025-03-26 DIAGNOSIS — R41.0 CONFUSION: Primary | ICD-10-CM

## 2025-03-26 NOTE — TELEPHONE ENCOUNTER
----- Message from Denia sent at 3/26/2025  3:56 PM CDT -----  Contact: Lilly/[facility]  Nurse Lilly from [facility] is calling in regards to speak about 's cath.She take care of her today and she needs to speak about it, it looks off.Please call her back at [phone]!

## 2025-03-26 NOTE — TELEPHONE ENCOUNTER
HH nurse called and stated that patient seem a little off not herself Nurse seems to think patient might be having a UTI. Gave verbal orders for a UA

## 2025-03-26 NOTE — TELEPHONE ENCOUNTER
----- Message from Domenica sent at 3/26/2025  4:18 PM CDT -----  Contact: EDWIN HART [[id number]]  .Type:  Patient Requesting CallWho Called:LillyDoes the patient know what this is regarding?:Returning a missed call and would like a call backWould the patient rather a call back or a response via [facility]? Call [facility] Call Back Number:[phone] Information:

## 2025-03-27 ENCOUNTER — LAB REQUISITION (OUTPATIENT)
Dept: LAB | Facility: HOSPITAL | Age: OVER 89
End: 2025-03-27
Payer: MEDICARE

## 2025-03-27 DIAGNOSIS — Z43.5 ENCOUNTER FOR ATTENTION TO CYSTOSTOMY: ICD-10-CM

## 2025-03-27 LAB
BACTERIA #/AREA URNS HPF: ABNORMAL /HPF
BILIRUB UR QL STRIP.AUTO: NEGATIVE
CLARITY UR: ABNORMAL
COLOR UR AUTO: YELLOW
GLUCOSE UR QL STRIP: NEGATIVE
HGB UR QL STRIP: ABNORMAL
HYALINE CASTS #/AREA URNS LPF: 0 /LPF (ref 0–1)
KETONES UR QL STRIP: NEGATIVE
LEUKOCYTE ESTERASE UR QL STRIP: ABNORMAL
MICROSCOPIC COMMENT: ABNORMAL
NITRITE UR QL STRIP: POSITIVE
PH UR STRIP: 6 [PH]
PROT UR QL STRIP: ABNORMAL
RBC #/AREA URNS HPF: 1 /HPF (ref 0–4)
SP GR UR STRIP: 1.01
UROBILINOGEN UR STRIP-ACNC: NEGATIVE EU/DL
WBC #/AREA URNS HPF: 35 /HPF (ref 0–5)
WBC CASTS #/AREA URNS LPF: 0 /LPF (ref ?–0)

## 2025-03-27 PROCEDURE — 87086 URINE CULTURE/COLONY COUNT: CPT | Mod: HCNC,PO | Performed by: FAMILY MEDICINE

## 2025-03-27 PROCEDURE — 81000 URINALYSIS NONAUTO W/SCOPE: CPT | Mod: HCNC,PO | Performed by: FAMILY MEDICINE

## 2025-03-27 NOTE — TELEPHONE ENCOUNTER
I have signed for the following orders AND/OR meds.  Please call the patient and ask the patient to schedule the testing AND/OR inform about any medications that were sent.      Orders Placed This Encounter   Procedures    Urinalysis, Reflex to Urine Culture     Standing Status:   Future     Expected Date:   3/27/2025     Expiration Date:   5/26/2026     Specimen Source:   Urine

## 2025-03-30 LAB — BACTERIA UR CULT: ABNORMAL

## 2025-04-01 NOTE — PROGRESS NOTES
Patient's urine culture is showing a multi-drug resistant form of bacteria.  It is intermediate to nitrofurantoin/Macrobid which is the antibiotic she was prescribed.  Please have the patient complete that antibiotic and follow-up/establish care with a urologist soon.  She will need a repeat urine culture.

## 2025-04-02 NOTE — PROGRESS NOTES
I have signed for the following orders AND/OR meds.  Please call the patient and ask the patient to schedule the testing AND/OR inform about any medications that were sent.      Orders Placed This Encounter   Procedures    Urine Culture High Risk           Standing Status:   Future     Expected Date:   4/15/2025     Expiration Date:   6/30/2026     Send normal result to authorizing provider's In Basket if patient is active on MyChart::   Yes       Medications Ordered This Encounter   Medications    nitrofurantoin, macrocrystal-monohydrate, (MACROBID) 100 MG capsule     Sig: Take 1 capsule (100 mg total) by mouth 2 (two) times daily.     Dispense:  14 capsule     Refill:  0

## 2025-04-08 ENCOUNTER — LAB VISIT (OUTPATIENT)
Dept: LAB | Facility: HOSPITAL | Age: OVER 89
End: 2025-04-08
Attending: NURSE PRACTITIONER
Payer: MEDICARE

## 2025-04-08 DIAGNOSIS — R41.0 CONFUSION: ICD-10-CM

## 2025-04-08 DIAGNOSIS — N39.0 URINARY TRACT INFECTION WITHOUT HEMATURIA, SITE UNSPECIFIED: ICD-10-CM

## 2025-04-08 LAB
AMORPH CRY URNS QL MICRO: ABNORMAL
BACTERIA #/AREA URNS HPF: ABNORMAL /HPF
BILIRUB UR QL STRIP.AUTO: NEGATIVE
CLARITY UR: ABNORMAL
COLOR UR AUTO: YELLOW
GLUCOSE UR QL STRIP: NEGATIVE
HGB UR QL STRIP: ABNORMAL
HYALINE CASTS #/AREA URNS LPF: 0 /LPF (ref 0–1)
KETONES UR QL STRIP: NEGATIVE
LEUKOCYTE ESTERASE UR QL STRIP: ABNORMAL
MICROSCOPIC COMMENT: ABNORMAL
NITRITE UR QL STRIP: POSITIVE
PH UR STRIP: 6 [PH]
PROT UR QL STRIP: ABNORMAL
RBC #/AREA URNS HPF: 20 /HPF (ref 0–4)
SP GR UR STRIP: 1.01
SQUAMOUS #/AREA URNS HPF: 3 /HPF
UROBILINOGEN UR STRIP-ACNC: NEGATIVE EU/DL
WBC #/AREA URNS HPF: >100 /HPF (ref 0–5)
WBC CLUMPS URNS QL MICRO: ABNORMAL

## 2025-04-08 PROCEDURE — 81002 URINALYSIS NONAUTO W/O SCOPE: CPT | Mod: HCNC

## 2025-04-08 PROCEDURE — 81000 URINALYSIS NONAUTO W/SCOPE: CPT | Mod: HCNC

## 2025-04-08 PROCEDURE — 87086 URINE CULTURE/COLONY COUNT: CPT | Mod: HCNC

## 2025-04-08 NOTE — PROGRESS NOTES
Please call to make sure patient get the results.  [phone], Your urinalysis is abnormal.  Showing persistent infection.  Given the urine culture from previous with the failure of nitrofurantoin and along with her allergies, I recommend that she report to the ER for evaluation.  She may need to be admitted for IV antibiotics.  Follow-up also with Urology.

## 2025-04-09 DIAGNOSIS — R12 HEARTBURN: ICD-10-CM

## 2025-04-09 DIAGNOSIS — R10.13 EPIGASTRIC PAIN: ICD-10-CM

## 2025-04-09 PROBLEM — N39.0 RECURRENT UTI: Status: ACTIVE | Noted: 2025-04-09

## 2025-04-09 RX ORDER — FAMOTIDINE 40 MG/1
40 TABLET, FILM COATED ORAL NIGHTLY
Qty: 30 TABLET | Refills: 0 | OUTPATIENT
Start: 2025-04-09

## 2025-04-09 NOTE — H&P
[facility] [place] [facility]  History & Physical    Patient Name: Holli Landrum  MRN: [id number]  Patient Class: OP- Observation  Admission Date: 4/8/2025  Attending Physician: Karoline Wong MD   Primary Care Provider: Rolando Cannon MD         Patient information was obtained from patient, past medical records, and ER records.     Subjective:     Principal Problem:UTI (urinary tract infection)    Chief Complaint:   Chief Complaint   Patient presents with    Urinary Tract Infection     Pt. Sent by her PCP due to having a severe Uti, even after a round of ABX, and was sent for possible admit. Pt has a suprapubic catheter in place.         HPI: Ms. Holli Landrum is a 89 y.o. female who  has a medical history of Anticoagulant long-term use, Arthritis, Asthma, Basal cell carcinoma, Cancer, Cataract, CHF (congestive heart failure), COPD (chronic obstructive pulmonary disease), cpap, Essential hypertension, GERD (gastroesophageal reflux disease), Glaucoma, Hypertensive heart disease with heart failure, Paroxysmal atrial fibrillation, Paroxysmal ventricular tachycardia, Renal disorder, and Squamous cell carcinoma of skin.    She  presented to the ED for evaluation of persistent UTI concerns despite antibiotics(nitrofurantoin) course by her PCP.  She reports symptoms which include fatigue, generalized weakness, nausea, decreased appetite, pain around suprapubic catheter.    ED course notable for potassium 3.1, creatinine 1.8, urinalysis with Wbc >100, nitrite positive, bacteria moderate, leukocyte esterase 3+.    Past Medical History:   Diagnosis Date    Anticoagulant long-term use     Arthritis     Asthma     Basal cell carcinoma     Cancer     skin cancer to face    Cataract     OU done//    CHF (congestive heart failure)     COPD (chronic obstructive pulmonary disease)     no oxygen; patient denies    cpap     Essential hypertension 05/05/2010    GERD (gastroesophageal reflux disease) 11/20/2012     Glaucoma     Hypertensive heart disease with heart failure 02/05/2013    Paroxysmal atrial fibrillation     Paroxysmal ventricular tachycardia     per problem list    Preop cardiovascular exam 1/26/2024    Renal disorder     french-1/3/2020    Squamous cell carcinoma of skin        Past Surgical History:   Procedure Laterality Date    A-V CARDIAC PACEMAKER INSERTION  06/16/2021    Procedure: INSERTION, CARDIAC PACEMAKER, DUAL CHAMBER;  Surgeon: Oscar Sommers MD;  Location: [facility];  Service: Cardiovascular;;    ADENOIDECTOMY  191944    APPENDECTOMY  1948    Because of Ovarian Cyst surgery    BREAST BIOPSY Left 1995    neg    BREAST BIOPSY Right 1984    neg    BREAST BIOPSY Right 1992    neg    BREAST SURGERY      A number of biopsies    CARDIAC CATHETERIZATION  2013, 2014,2016, 2020    has 9 stents    CARDIAC SURGERY  2012    stents    CATARACT EXTRACTION W/  INTRAOCULAR LENS IMPLANT Left 11/01/2018    Dr Rose    CATARACT EXTRACTION W/  INTRAOCULAR LENS IMPLANT Right 12/13/2018    Dr Rose//    CHOLECYSTECTOMY      COLONOSCOPY  ~2005    Dr. Edward; normal per patient report    COLONOSCOPY N/A 09/06/2022    Procedure: COLONOSCOPY 8/31/22-note in Dr Simms's office visit note that he spoke to her cardiologist and she was viable candidate for endoscopy;  Surgeon: Cordelia Bueno MD;  Location: [facility];  Service: Endoscopy;  Laterality: N/A;    CORONARY ANGIOGRAPHY N/A 03/20/2020    Procedure: ANGIOGRAM, CORONARY ARTERY;  Surgeon: Chuy Bryant MD;  Location: [facility];  Service: Cardiology;  Laterality: N/A;    CYSTOSCOPY W/ RETROGRADES Bilateral 02/12/2020    Procedure: CYSTOSCOPY, WITH RETROGRADE PYELOGRAM;  Surgeon: Gasper Feliz MD;  Location: [facility] OR;  Service: Urology;  Laterality: Bilateral;    ESOPHAGOGASTRODUODENOSCOPY N/A 08/13/2020    Procedure: EGD (ESOPHAGOGASTRODUODENOSCOPY);  Surgeon: Mika Solorzano MD;  Location: [facility] ENDO;  Service: Endoscopy;  Laterality: N/A; Mild Schatzki  ring. Biopsied. Dilated. small hiatal hernia, gastritis; biopsy: esophagus- SEVERE REFLUX ESOPHAGITIS, stomach- chronic gastritis, negative for H pylori    ESOPHAGOGASTRODUODENOSCOPY N/A 10/22/2020    Procedure: EGD (ESOPHAGOGASTRODUODENOSCOPY);  Surgeon: Fred Hendricks MD;  Location: [facility];  Service: Endoscopy;  Laterality: N/A;    ESOPHAGOGASTRODUODENOSCOPY N/A 03/21/2024    Procedure: EGD (ESOPHAGOGASTRODUODENOSCOPY);  Surgeon: Mika Solorzano MD;  Location: [facility];  Service: Gastroenterology;  Laterality: N/A;    EYE SURGERY  2017    Cataracs    FRACTIONAL FLOW RESERVE (FFR), CORONARY  06/20/2023    Procedure: Fractional Flow [place] (FFR), Coronary;  Surgeon: Brice Campuzano MD;  Location: [facility] CATH LAB;  Service: Cardiology;;    HYSTERECTOMY  1969    INSERTION OF CATHETER INTO CAUDAL EPIDURAL SPACE      sp cath    INSTANTANEOUS WAVE-FREE RATIO (IFR) N/A 06/20/2023    Procedure: Instantaneous Wave-Free Ratio (IFR);  Surgeon: Brice Campuzano MD;  Location: [facility] CATH LAB;  Service: Cardiology;  Laterality: N/A;    LEFT HEART CATHETERIZATION Left 03/03/2020    Procedure: Left heart cath;  Surgeon: Chuy Bryant MD;  Location: [facility] CATH;  Service: Cardiology;  Laterality: Left;    LEFT HEART CATHETERIZATION Left 03/20/2020    Procedure: Left heart cath;  Surgeon: Chuy Bryant MD;  Location: [facility] CATH;  Service: Cardiology;  Laterality: Left;    LEFT HEART CATHETERIZATION N/A 06/20/2023    Procedure: Left heart cath;  Surgeon: Brice Campuzano MD;  Location: [facility] CATH LAB;  Service: Cardiology;  Laterality: N/A;    LEFT HEART CATHETERIZATION  4/1/2025    Procedure: Coronary angiogram study;  Surgeon: Oscar Dyer MD;  Location: [facility] CATH;  Service: Cardiovascular;;    OVARIAN CYST REMOVAL  teenager    PHACOEMULSIFICATION OF CATARACT Left 11/01/2018    Procedure: PHACOEMULSIFICATION, CATARACT;  Surgeon: Aleksandar Rose Jr., MD;  Location: [facility] OR;  Service: Ophthalmology;   Laterality: Left;    PHACOEMULSIFICATION OF CATARACT Right 12/13/2018    Procedure: PHACOEMULSIFICATION, CATARACT;  Surgeon: Aleksandar Rose Jr., MD;  Location: [facility] OR;  Service: Ophthalmology;  Laterality: Right;    TONSILLECTOMY      aw/denoids    TRANSESOPHAGEAL ECHOCARDIOGRAM WITH POSSIBLE CARDIOVERSION (ALFRED W/ POSS CARDIOVERSION) N/A 10/05/2023    Procedure: Transesophageal echo (ALFRED) intra-procedure log documentation/alfred/cv;  Surgeon: Bao Miguel MD;  Location: [facility] CATH LAB;  Service: Cardiology;  Laterality: N/A;   Alfred/Cv  MRI safe   Pacer & leads implanted 6/16/21, Antoun   Bio Edora 8 MICHELLE, [id number], PID: [id number]   A lead: Bio Solia S45, [id number]   V lead: Bio Solia S53, [id number]    TREATMENT OF CARDIAC ARRHYTHMIA N/A 10/05/2023    Procedure: Cardioversion or Defibrillation;  Surgeon: Bao Miguel MD;  Location: [facility] CATH LAB;  Service: Cardiology;  Laterality: N/A;    UPPER GASTROINTESTINAL ENDOSCOPY  ~2005    Dr. Solorzano    VALVE STUDY-AORTIC  06/20/2023    Procedure: Valve study-aortic;  Surgeon: Brice Campuzano MD;  Location: [facility] CATH LAB;  Service: Cardiology;;    VASCULAR SURGERY      to remove clot from right leg    Yag Capsulotomy Bilateral 11/05/2019    Dr Rose       Review of patient's allergies indicates:   Allergen Reactions    Timolol maleate Shortness Of Breath     Other Reaction(s): Shortness Of Breath    Ciprofloxacin Other (See Comments)     Muscle ache    Sulfamethoxazole-trimethoprim        Current Facility-Administered Medications on File Prior to Encounter   Medication    lactated ringers infusion    sodium chloride 0.9% flush 10 mL     Current Outpatient Medications on File Prior to Encounter   Medication Sig    ACIDOPHILUS PROBIOTIC BLEND 175 mg Cap Take 1 capsule by mouth every morning.    albuterol (PROVENTIL/VENTOLIN HFA) 90 mcg/actuation inhaler Inhale 1-2 puffs into the lungs every 4 (four) hours as needed for Wheezing or Shortness of Breath.  Rescue    albuterol-ipratropium (DUO-NEB) 2.5 mg-0.5 mg/3 mL nebulizer solution Take 3 mLs by nebulization every 6 (six) hours as needed for Wheezing or Shortness of Breath.    amiodarone (PACERONE) 200 MG Tab Take 1 tablet (200 mg total) by mouth once daily.    apixaban (ELIQUIS) 2.5 mg Tab Take 1 tablet (2.5 mg total) by mouth 2 (two) times daily.    cholecalciferol, vitamin D3, 125 mcg (5,000 unit) Tab Take 5,000 Units by mouth once daily.    cloNIDine (CATAPRES) 0.1 MG tablet Take 1 tablet (0.1 mg total) by mouth every 6 (six) hours as needed (take if BP is over 180/100).    dorzolamide (TRUSOPT) 2 % ophthalmic solution INSTILL 1 DROP INTO BOTH EYES TWICE DAILY    famotidine (PEPCID) 40 MG tablet TAKE ONE TABLET BY MOUTH EVERY NIGHT AT BEDTIME    fluticasone (FLONASE) 50 mcg/actuation nasal spray 2 sprays (100 mcg total) by Each Nare route daily as needed for Allergies.    fluticasone furoate-vilanteroL (BREO ELLIPTA) 100-25 mcg/dose diskus inhaler Inhale 1 puff into the lungs once daily.    furosemide (LASIX) 40 MG tablet Take 1 tablet (40 mg total) by mouth once daily. Do not take if BP is below 110/70; recommend doubling up dose if BP is over 140/90    GEMTESA 75 mg Tab Take 1 tablet by mouth every morning.    Lactobacillus rhamnosus GG (CULTURELLE) 10 billion cell capsule Take 1 capsule by mouth once daily.    latanoprost 0.005 % ophthalmic solution Place 1 drop into both eyes every evening.    loratadine (CLARITIN) 10 mg tablet Take 1 tablet (10 mg total) by mouth once daily.    lutein-zeaxanthin extract 15-0.7 mg Cap Take 1 capsule by mouth every morning.    metoprolol tartrate (LOPRESSOR) 100 MG tablet Take 1 tablet (100 mg total) by mouth 2 (two) times daily.    montelukast (SINGULAIR) 10 mg tablet Take 1 tablet (10 mg total) by mouth every evening.    nitrofurantoin, macrocrystal-monohydrate, (MACROBID) 100 MG capsule Take 1 capsule (100 mg total) by mouth 2 (two) times daily.    nitroGLYCERIN 0.4 MG/HR  TD PT24 (NITRODUR) 0.4 mg/hr Place 1 patch onto the skin once daily.    nystatin (MYCOSTATIN) cream Apply topically 2 (two) times daily. for 7 days    potassium chloride (KLOR-CON) 10 MEQ TbSR TAKE TWO TABLETS BY MOUTH EVERY MORNING    rosuvastatin (CRESTOR) 40 MG Tab TAKE 1 TABLET BY MOUTH EVERY DAY     Family History       Problem Relation (Age of Onset)    Alcohol abuse Mother    Cancer Maternal Grandfather    Clotting disorder Son    Diabetes Brother, Mother    Heart disease Brother, Mother, Father    Hypertension Mother, Father    Stroke Mother          Tobacco Use    Smoking status: Former     Current packs/day: 0.00     Types: Cigarettes     Start date: 6/10/1954     Quit date: 9/15/1955     Years since quittin.6    Smokeless tobacco: Never    Tobacco comments:     I onlysmoked one year while [employer] my  was oversees  during [place]   Substance and Sexual Activity    Alcohol use: Not Currently     Comment: Only drank a little wine and haven't  drunk anything in 15 y    Drug use: Never    Sexual activity: Not Currently     Partners: Male     Birth control/protection: Abstinence     Comment:  and 87 years of age     Review of Systems   Constitutional:  Positive for fatigue. Negative for chills and fever.   HENT:  Negative for congestion and rhinorrhea.    Respiratory:  Negative for cough and shortness of breath.    Cardiovascular:  Negative for chest pain and leg swelling.   Gastrointestinal:  Positive for abdominal pain. Negative for constipation, diarrhea, nausea and vomiting.   Genitourinary:  Positive for pelvic pain (at suprapubic catheter).   Neurological:  Negative for dizziness and headaches.     Objective:     Vital Signs (Most Recent):  Temp: 97.7 °F (36.5 °C) (25)  Pulse: 66 (25 0518)  Resp: 19 (25)  BP: 129/63 (25)  SpO2: (!) 94 % (25) Vital Signs (24h Range):  Temp:  [97.7 °F (36.5 °C)-98.4 °F (36.9 °C)] 97.7 °F (36.5 °C)  Pulse:   [56-66] 66  Resp:  [15-20] 19  SpO2:  [94 %-96 %] 94 %  BP: (129-175)/(63-91) 129/63     Weight: 84.1 kg (185 lb 6.5 oz)  Body mass index is 30.85 kg/m².     Physical Exam  Vitals and nursing note reviewed.   Constitutional:       General: She is not in acute distress.     Appearance: She is ill-appearing. She is not toxic-appearing or diaphoretic.   HENT:      Head: Normocephalic and atraumatic.      Mouth/Throat:      Mouth: Mucous membranes are moist.   Eyes:      General: No scleral icterus.        Right eye: No discharge.         Left eye: No discharge.   Cardiovascular:      Rate and Rhythm: Normal rate and regular rhythm.      Heart sounds: Normal heart sounds.   Pulmonary:      Effort: Pulmonary effort is normal. No respiratory distress.      Breath sounds: Normal breath sounds.   Abdominal:      General: Bowel sounds are normal. There is no distension.      Palpations: Abdomen is soft.      Tenderness: There is abdominal tenderness (suprapubic). There is no guarding or rebound.   Musculoskeletal:      Cervical back: No rigidity.      Right lower leg: No edema.      Left lower leg: No edema.   Skin:     General: Skin is warm and dry.      Coloration: Skin is not jaundiced.   Neurological:      Mental Status: She is alert.   Psychiatric:         Mood and Affect: Mood normal.         Behavior: Behavior normal.                Significant Labs: All pertinent labs within the past 24 hours have been reviewed.  Recent Results (from the past 24 hours)   Urinalysis, Reflex to Urine Culture    Collection Time: 04/08/25  4:11 PM    Specimen: Urine   Result Value Ref Range    Color, UA Yellow Straw, Kailey, Yellow, Light-Orange    Appearance, UA Cloudy (A) Clear    pH, UA 6.0 5.0 - 8.0    Spec Grav UA 1.015 1.005 - 1.030    Protein, UA 1+ (A) Negative    Glucose, UA Negative Negative    Ketones, UA Negative Negative    Bilirubin, UA Negative Negative    Blood, UA 3+ (A) Negative    Nitrites, UA Positive (A) Negative     Urobilinogen, UA Negative <2.0 EU/dL    Leukocyte Esterase, UA 3+ (A) Negative   Urinalysis Microscopic    Collection Time: 04/08/25  4:11 PM   Result Value Ref Range    RBC, UA 20 (H) 0 - 4 /HPF    WBC, UA >100 (H) 0 - 5 /HPF    WBC Clumps, UA Moderate (A) None, Rare    Bacteria, UA Moderate (A) None, Rare, Occasional /HPF    Squamous Epithelial Cells, UA 3 /HPF    Hyaline Casts, UA 0 0 - 1 /LPF    Amorphous, UA Moderate None, Occasional, Few, Moderate, Rare    Microscopic Comment     Comprehensive metabolic panel    Collection Time: 04/08/25  9:41 PM   Result Value Ref Range    Sodium 137 136 - 145 mmol/L    Potassium 3.1 (L) 3.5 - 5.1 mmol/L    Chloride 104 95 - 110 mmol/L    CO2 23 23 - 29 mmol/L    Glucose 114 (H) 70 - 110 mg/dL    BUN 24 10 - 30 mg/dL    Creatinine 1.8 (H) 0.5 - 1.4 mg/dL    Calcium 11.2 (H) 8.7 - 10.5 mg/dL    Protein Total 7.2 6.0 - 8.4 gm/dL    Albumin 3.4 (L) 3.5 - 5.2 g/dL    Bilirubin Total 0.4 0.1 - 1.0 mg/dL     40 - 150 unit/L    AST 26 11 - 45 unit/L    ALT 31 10 - 44 unit/L    Anion Gap 10 8 - 16 mmol/L    eGFR 26 (L) >60 mL/min/1.73/m2   CBC with Differential    Collection Time: 04/08/25  9:41 PM   Result Value Ref Range    WBC 8.63 3.90 - 12.70 K/uL    RBC 4.73 4.00 - 5.40 M/uL    HGB 13.0 12.0 - 16.0 gm/dL    HCT 40.1 37.0 - 48.5 %    MCV 85 82 - 98 fL    MCH 27.5 27.0 - 31.0 pg    MCHC 32.4 32.0 - 36.0 g/dL    RDW 15.1 (H) 11.5 - 14.5 %    Platelet Count 298 150 - 450 K/uL    MPV 10.3 9.2 - 12.9 fL    Nucleated RBC 0 <=0 /100 WBC    Neut % 46.9 38 - 73 %    Lymph % 34.2 18 - 48 %    Mono % 13.2 4 - 15 %    Eos % 4.6 <=8 %    Basophil % 0.8 <=1.9 %    Imm Grans % 0.3 0.0 - 0.5 %    Neut # 4.04 1.8 - 7.7 K/uL    Lymph # 2.95 1 - 4.8 K/uL    Mono # 1.14 (H) 0.3 - 1 K/uL    Eos # 0.40 <=0.5 K/uL    Baso # 0.07 <=0.2 K/uL    Imm Grans # 0.03 0.00 - 0.04 K/uL   Magnesium    Collection Time: 04/08/25  9:41 PM   Result Value Ref Range    Magnesium  1.9 1.6 - 2.6 mg/dL    Urinalysis, Reflex to Urine Culture Urine, Supra Pubic    Collection Time: 04/08/25  9:42 PM    Specimen: Urine   Result Value Ref Range    Color, UA Yellow Straw, Kailey, Yellow, Light-Orange    Appearance, UA Cloudy (A) Clear    pH, UA 6.0 5.0 - 8.0    Spec Grav UA 1.015 1.005 - 1.030    Protein, UA 1+ (A) Negative    Glucose, UA Negative Negative    Ketones, UA Negative Negative    Bilirubin, UA Negative Negative    Blood, UA 2+ (A) Negative    Nitrites, UA Positive (A) Negative    Urobilinogen, UA Negative <2.0 EU/dL    Leukocyte Esterase, UA 3+ (A) Negative   Urinalysis Microscopic    Collection Time: 04/08/25  9:42 PM   Result Value Ref Range    RBC, UA 24 (H) 0 - 4 /HPF    WBC, UA >100 (H) 0 - 5 /HPF    WBC Clumps, UA Many (A) None, Rare    Bacteria, UA Moderate (A) None, Rare, Occasional /HPF    Hyaline Casts, UA 3 (H) 0 - 1 /LPF    Calcium Oxalate Crystals, UA Few None, Rare, Occasional, Few, Moderate    Microscopic Comment     Lactic acid, plasma    Collection Time: 04/08/25 10:11 PM   Result Value Ref Range    Lactic Acid Level 1.4 0.5 - 2.2 mmol/L   CBC with Differential    Collection Time: 04/09/25  5:37 AM   Result Value Ref Range    WBC 7.02 3.90 - 12.70 K/uL    RBC 4.25 4.00 - 5.40 M/uL    HGB 11.9 (L) 12.0 - 16.0 gm/dL    HCT 37.2 37.0 - 48.5 %    MCV 88 82 - 98 fL    MCH 28.0 27.0 - 31.0 pg    MCHC 32.0 32.0 - 36.0 g/dL    RDW 15.4 (H) 11.5 - 14.5 %    Platelet Count 238 150 - 450 K/uL    MPV 10.3 9.2 - 12.9 fL    Nucleated RBC 0 <=0 /100 WBC    Neut % 50.4 38 - 73 %    Lymph % 31.9 18 - 48 %    Mono % 12.1 4 - 15 %    Eos % 4.4 <=8 %    Basophil % 0.9 <=1.9 %    Imm Grans % 0.3 0.0 - 0.5 %    Neut # 3.54 1.8 - 7.7 K/uL    Lymph # 2.24 1 - 4.8 K/uL    Mono # 0.85 0.3 - 1 K/uL    Eos # 0.31 <=0.5 K/uL    Baso # 0.06 <=0.2 K/uL    Imm Grans # 0.02 0.00 - 0.04 K/uL     Assessment/Plan:     Assessment & Plan  UTI (urinary tract infection)  Suprapubic catheter  Recurrent UTI  History of recurrent UTI.  Risk factor: suprapubic catheter. Presented to the the ED with concerns for persistent symptomatic UTI despite oral antibiotics(nitrofurantoin) course by PCP.    Recent Labs   Lab 04/08/25  2142   COLORU Yellow   APPEARANCEUA Cloudy*   PHUR 6.0   SPECGRAV 1.015   PROTEINUA 1+*   GLUCUA Negative   BILIRUBINUA Negative   OCCULTUA 2+*   NITRITE Positive*   UROBILINOGEN Negative   LEUKOCYTESUR 3+*   RBCUA 24*   WBCUA >100*   BACTERIA Moderate*   HYALINECASTS 3*     PLAN  -Followup urine cultures, deescalate antibiotics as appropriate  Antibiotics (From admission, onward)      Start     Stop Route Frequency Ordered    04/09/25 0800  piperacillin-tazobactam (ZOSYN) 4.5 g in D5W 100 mL IVPB (MB+)         -- IV Every 8 hours (non-standard times) 04/09/25 0601          Paroxysmal atrial fibrillation  Patient with paroxysmal (<7 days) atrial fibrillation which is controlled currently with Beta Blocker and Amiodarone.     [id number] Score: 4.     The patients heart rate in the last 24 hours is as follows:  Pulse  Min: 56  Max: 66     PLAN:  - Antiarrhythmics: amiodarone tablet 200 mg, Daily, Oral  metoprolol tartrate (LOPRESSOR) tablet 100 mg, 2 times daily, Oral  - Anticoagulants: apixaban tablet 2.5 mg, 2 times daily, Oral  - Replete electrolytes PRN to  target Magnesium > 2, Potassium > 4  - Continuous telemetry  Hypertensive heart disease with heart failure  Hypertensive on admission    Home meds for hypertension were reviewed and noted below.   Home Medications:  Hypertension Medications              cloNIDine (CATAPRES) 0.1 MG tablet Take 1 tablet (0.1 mg total) by mouth every 6 (six) hours as needed (take if BP is over 180/100).    furosemide (LASIX) 40 MG tablet Take 1 tablet (40 mg total) by mouth once daily. Do not take if BP is below 110/70; recommend doubling up dose if BP is over 140/90    metoprolol tartrate (LOPRESSOR) 100 MG tablet Take 1 tablet (100 mg total) by mouth 2 (two) times daily.    nitroGLYCERIN 0.4  MG/HR TD PT24 (NITRODUR) 0.4 mg/hr Place 1 patch onto the skin once daily.            Patients blood pressure range in the last 24 hours was: BP  Min: 129/63  Max: 175/91.      PLAN:  -While in the hospital, will manage blood pressure as follows; Continue home antihypertensive regimen  -The patient's inpatient anti-hypertensive regimen is listed below:  Current Antihypertensives  furosemide tablet 40 mg, Daily, Oral  metoprolol tartrate (LOPRESSOR) tablet 100 mg, 2 times daily, Oral  hydrALAZINE injection 5 mg, Every 6 hours PRN, Intravenous  -Will utilize p.r.n. blood pressure medication only if patient's blood pressure greater than 180/110 and she develops symptoms such as worsening chest pain or shortness of breath.      Hypercholesteremia  Recent Lipid Panel reviewed--  Lab Results   Component Value Date    HDL 45 09/06/2024    LDLCALC 53.6 (L) 09/06/2024    TRIG 137 09/06/2024    CHOL 126 09/06/2024        -Home medications:   Hyperlipidemia Medications              rosuvastatin (CRESTOR) 40 MG Tab TAKE 1 TABLET BY MOUTH EVERY DAY            PLAN  Continue statin    CKD (chronic kidney disease) stage 3, GFR 30-59 ml/min  Creatine stable for now. BMP reviewed- noted Estimated Creatinine Clearance: 23.1 mL/min (A) (based on SCr of 1.7 mg/dL (H)). according to latest data. Based on current GFR, CKD stage is stage 4 - GFR 15-29.  Monitor UOP and serial BMP and adjust therapy as needed. Renally dose meds. Avoid nephrotoxic medications and procedures.  Asthma  Chronic.  Stable    PLAN:  DuoNebs p.r.n.      VTE Risk Mitigation (From admission, onward)           Ordered     apixaban tablet 2.5 mg  2 times daily         04/09/25 0101                       On 04/09/2025, patient should be placed in hospital observation services under my care.          Rustam Colby DO  Department of Hospital Medicine  [facility]        Voice recognition software was used in the creation of this note/communication and any  sound-alike/typographical errors which may have occurred despite initial review prior to signing should be taken in context when interpreting.  If such errors prevent a clear understanding of the note/communication, please contact the provider/office for clarification.

## 2025-04-09 NOTE — PLAN OF CARE
[facility] - Med Surg  Initial Discharge Assessment       Primary Care Provider: Rolando Cannon MD    Admission Diagnosis: Visual hallucinations [R44.1]  Urinary tract infection associated with catheterization of urinary tract, unspecified indwelling urinary catheter type, initial encounter [T83.511A, N39.0]    Admission Date: 4/8/2025  Expected Discharge Date: Per Attending     Transition of Care Barriers: None    Payor: [employer] MANAGED MEDICARE / Plan: [employer] MEDICARE HMO / Product Type: Capitation /     Extended Emergency Contact Information  Primary Emergency Contact: JollyAnnabel   [place]  Mobile Phone: [phone]  Relation: Daughter  Secondary Emergency Contact: Claudia Vasquez  Mobile Phone: [phone]  Relation: Daughter    Discharge Plan A: [facility] [address] [facility]  [address] [place]  [facility] [address]  Phone: [phone] Fax: [phone]    [facility] [address]  [address] [facility] [address]  Phone: [phone] Fax: [phone]      Initial Assessment (most recent)       Adult Discharge Assessment - 04/09/25 1230          Discharge Assessment    Assessment Type Discharge Planning Assessment     Confirmed/corrected address, phone number and insurance Yes     Confirmed Demographics Correct on Facesheet     Source of Information family     Communicated SCOTT with patient/caregiver Date not available/Unable to determine     Reason For Admission UTI     People in Home child(yuan), adult     Do you expect to return to your current living situation? Yes     Do you have help at home or someone to help you manage your care at home? Yes     Who are your caregiver(s) and their phone number(s)? daughter     Walking or Climbing Stairs Difficulty yes     Walking or Climbing Stairs ambulation difficulty, assistance 1 person;ambulation difficulty, dependent      Dressing/Bathing Difficulty yes     Dressing/Bathing bathing difficulty, requires equipment;bathing difficulty, assistance 1 person     Home Accessibility wheelchair accessible     Home Layout Able to live on 1st floor     Equipment Currently Used at Home wheelchair;walker, rolling;bedside commode     Readmission within 30 days? No     Patient currently being followed by outpatient case management? No     Do you currently have service(s) that help you manage your care at home? Yes     Name and Contact number of agency [facility]     Is the pt/caregiver preference to resume services with current agency Yes     Do you take prescription medications? Yes     Do you have prescription coverage? Yes     Coverage [employer]     Do you have any problems affording any of your prescribed medications? No     Is the patient taking medications as prescribed? yes     Who is going to help you get home at discharge? family     How do you get to doctors appointments? family or friend will provide     Are you on dialysis? No     Do you take coumadin? No     Discharge Plan A Home Health     DME Needed Upon Discharge  hospital bed     Discharge Plan discussed with: Adult children     Transition of Care Barriers None                   Anticipated DC dispo: Home   Prior Level of Function: Wheelchair bound   People in home:  Daughter, Claudia     Comments:  CM spoke with patient's dtrAnnabel, to introduce role and discuss discharge planning. Daughter  will be help at home and can provide transport at time of discharge. CM discharge needs depends on hospital progress. CM will continue following to assist with other needs.      Patient is current with [facility]; family requesting hospital bed upon d/c.

## 2025-04-09 NOTE — ED PROVIDER NOTES
SCRIBE #1 NOTE: I, Ewa Pineda, am scribing for, and in the presence of, Cb Santiago DO. I have scribed the entire note.       History     Chief Complaint   Patient presents with    Urinary Tract Infection     Pt. Sent by her PCP due to having a severe Uti, even after a round of ABX, and was sent for possible admit. Pt has a suprapubic catheter in place.      Review of patient's allergies indicates:   Allergen Reactions    Timolol maleate Shortness Of Breath     Other Reaction(s): Shortness Of Breath    Ciprofloxacin Other (See Comments)     Muscle ache    Sulfamethoxazole-trimethoprim          History of Present Illness     HPI    4/8/2025, 9:48 PM  History obtained from the patient      History of Present Illness: Holli Landrum is a 89 y.o. female patient with a PMHx of GERD, HTN, CHF, Arthritis, cancer, COPD, Asthma, renal disorder, and paroxymal atrial fibrillation who presents to the Emergency Department for evaluation of a UTI after being sent by her PCP to the ED. Symptoms are constant and moderate in severity. No mitigating or exacerbating factors reported. Associated sxs include N/V, decreased appetite, visual hallucinations, and pain around her suprapubic catheter. Patient denies any fever at this time.  Prior Tx includes abx with no relief. No further complaints or concerns at this time.       Arrival mode: Personal vehicle     PCP: Rolando Cannon MD        Past Medical History:  Past Medical History:   Diagnosis Date    Anticoagulant long-term use     Arthritis     Asthma     Basal cell carcinoma     Cancer     skin cancer to face    Cataract     OU done//    CHF (congestive heart failure)     COPD (chronic obstructive pulmonary disease)     no oxygen; patient denies    cpap     Essential hypertension 05/05/2010    GERD (gastroesophageal reflux disease) 11/20/2012    Glaucoma     Hypertensive heart disease with heart failure 02/05/2013    Paroxysmal atrial fibrillation     Paroxysmal  ventricular tachycardia     per problem list    Preop cardiovascular exam 1/26/2024    Renal disorder     french-1/3/2020    Squamous cell carcinoma of skin        Past Surgical History:  Past Surgical History:   Procedure Laterality Date    A-V CARDIAC PACEMAKER INSERTION  06/16/2021    Procedure: INSERTION, CARDIAC PACEMAKER, DUAL CHAMBER;  Surgeon: Oscar Sommers MD;  Location: [facility] CATH;  Service: Cardiovascular;;    ADENOIDECTOMY  [id number]    APPENDECTOMY  1948    Because of Ovarian Cyst surgery    BREAST BIOPSY Left 1995    neg    BREAST BIOPSY Right 1984    neg    BREAST BIOPSY Right 1992    neg    BREAST SURGERY      A number of biopsies    CARDIAC CATHETERIZATION  2013, 2014,2016, 2020    has 9 stents    CARDIAC SURGERY  2012    stents    CATARACT EXTRACTION W/  INTRAOCULAR LENS IMPLANT Left 11/01/2018    Dr Rose    CATARACT EXTRACTION W/  INTRAOCULAR LENS IMPLANT Right 12/13/2018    Dr Rose//    CHOLECYSTECTOMY      COLONOSCOPY  ~2005    Dr. Edward; normal per patient report    COLONOSCOPY N/A 09/06/2022    Procedure: COLONOSCOPY 8/31/22-note in Dr Simms's office visit note that he spoke to her cardiologist and she was viable candidate for endoscopy;  Surgeon: Cordelia Bueno MD;  Location: [facility];  Service: Endoscopy;  Laterality: N/A;    CORONARY ANGIOGRAPHY N/A 03/20/2020    Procedure: ANGIOGRAM, CORONARY ARTERY;  Surgeon: Chuy Bryant MD;  Location: [facility] CATH;  Service: Cardiology;  Laterality: N/A;    CYSTOSCOPY W/ RETROGRADES Bilateral 02/12/2020    Procedure: CYSTOSCOPY, WITH RETROGRADE PYELOGRAM;  Surgeon: Gasper Feliz MD;  Location: [facility] OR;  Service: Urology;  Laterality: Bilateral;    ESOPHAGOGASTRODUODENOSCOPY N/A 08/13/2020    Procedure: EGD (ESOPHAGOGASTRODUODENOSCOPY);  Surgeon: Mika Solorzano MD;  Location: [facility];  Service: Endoscopy;  Laterality: N/A; Mild Schatzki ring. Biopsied. Dilated. small hiatal hernia, gastritis; biopsy: esophagus- SEVERE REFLUX  ESOPHAGITIS, stomach- chronic gastritis, negative for H pylori    ESOPHAGOGASTRODUODENOSCOPY N/A 10/22/2020    Procedure: EGD (ESOPHAGOGASTRODUODENOSCOPY);  Surgeon: Fred Hendricks MD;  Location: [facility];  Service: Endoscopy;  Laterality: N/A;    ESOPHAGOGASTRODUODENOSCOPY N/A 03/21/2024    Procedure: EGD (ESOPHAGOGASTRODUODENOSCOPY);  Surgeon: Mika Solorzano MD;  Location: [facility];  Service: Gastroenterology;  Laterality: N/A;    EYE SURGERY  2017    Cataracs    FRACTIONAL FLOW RESERVE (FFR), CORONARY  06/20/2023    Procedure: Fractional Flow [place] (FFR), Coronary;  Surgeon: Brice Campuzano MD;  Location: [facility] CATH LAB;  Service: Cardiology;;    HYSTERECTOMY  1969    INSERTION OF CATHETER INTO CAUDAL EPIDURAL SPACE      sp cath    INSTANTANEOUS WAVE-FREE RATIO (IFR) N/A 06/20/2023    Procedure: Instantaneous Wave-Free Ratio (IFR);  Surgeon: Brice Campuzano MD;  Location: [facility] CATH LAB;  Service: Cardiology;  Laterality: N/A;    LEFT HEART CATHETERIZATION Left 03/03/2020    Procedure: Left heart cath;  Surgeon: Chuy Bryant MD;  Location: [facility] CATH;  Service: Cardiology;  Laterality: Left;    LEFT HEART CATHETERIZATION Left 03/20/2020    Procedure: Left heart cath;  Surgeon: Chuy Bryant MD;  Location: [facility] CATH;  Service: Cardiology;  Laterality: Left;    LEFT HEART CATHETERIZATION N/A 06/20/2023    Procedure: Left heart cath;  Surgeon: Brice Campuzano MD;  Location: [facility] CATH LAB;  Service: Cardiology;  Laterality: N/A;    LEFT HEART CATHETERIZATION  4/1/2025    Procedure: Coronary angiogram study;  Surgeon: Oscar Dyer MD;  Location: [facility] CATH;  Service: Cardiovascular;;    OVARIAN CYST REMOVAL  teenager    PHACOEMULSIFICATION OF CATARACT Left 11/01/2018    Procedure: PHACOEMULSIFICATION, CATARACT;  Surgeon: Aleksandar Rose Jr., MD;  Location: [facility] OR;  Service: Ophthalmology;  Laterality: Left;    PHACOEMULSIFICATION OF CATARACT Right 12/13/2018    Procedure:  PHACOEMULSIFICATION, CATARACT;  Surgeon: Aleksandar Rose Jr., MD;  Location: [facility] OR;  Service: Ophthalmology;  Laterality: Right;    TONSILLECTOMY      aw/denoids    TRANSESOPHAGEAL ECHOCARDIOGRAM WITH POSSIBLE CARDIOVERSION (ALFRED W/ POSS CARDIOVERSION) N/A 10/05/2023    Procedure: Transesophageal echo (ALFRED) intra-procedure log documentation/alfred/cv;  Surgeon: Bao Miguel MD;  Location: [facility] CATH LAB;  Service: Cardiology;  Laterality: N/A;   Alfred/Cv  MRI safe   Pacer & leads implanted 21, Antoun   Bio Edora 8 MICHELLE, [id number], PID: [id number]   A lead: Bio Solia S45, [id number]   V lead: Bio Solia S53, [id number]    TREATMENT OF CARDIAC ARRHYTHMIA N/A 10/05/2023    Procedure: Cardioversion or Defibrillation;  Surgeon: Bao Miguel MD;  Location: [facility] CATH LAB;  Service: Cardiology;  Laterality: N/A;    UPPER GASTROINTESTINAL ENDOSCOPY  ~    Dr. Solorzano    VALVE STUDY-AORTIC  2023    Procedure: Valve study-aortic;  Surgeon: Brice Campuzano MD;  Location: [facility] CATH LAB;  Service: Cardiology;;    VASCULAR SURGERY      to remove clot from right leg    Yag Capsulotomy Bilateral 2019    Dr Rose         Family History:  Family History   Problem Relation Name Age of Onset    Diabetes Brother Wili         Type 2    Heart disease Brother Wili          at 65 CHD    Diabetes Mother Holli S         Type 1    Alcohol abuse Mother Holli S         Dod 10/75    Heart disease Mother Holli S         Arteriosclerosis    Hypertension Mother Holli S     Stroke Mother Holli S         3/15/1975 dod 10/1975    Cancer Maternal Grandfather Pappaw         Dod     Clotting disorder Son          bleeding after tonsillectomy only    Heart disease Father Ronaldo Sr.         Heart attack. Afib, and Polyvcithemavera    Hypertension Father Ronaldo Sr.     Amblyopia Neg Hx      Blindness Neg Hx      Cataracts Neg Hx      Glaucoma Neg Hx      Macular degeneration Neg Hx      Retinal detachment Neg Hx       Strabismus Neg Hx      Thyroid disease Neg Hx      Colon cancer Neg Hx         Social History:  Social History     Tobacco Use    Smoking status: Former     Current packs/day: 0.00     Types: Cigarettes     Start date: 6/10/1954     Quit date: 9/15/1955     Years since quittin.6    Smokeless tobacco: Never    Tobacco comments:     I onlysmoked one year while [employer] my  was oversees  during [place]   Substance and Sexual Activity    Alcohol use: Not Currently     Comment: Only drank a little wine and haven't  drunk anything in 15 y    Drug use: Never    Sexual activity: Not Currently     Partners: Male     Birth control/protection: Abstinence     Comment:  and 87 years of age        Review of Systems     Review of Systems   Constitutional:  Positive for appetite change (decreased). Negative for fever.   Gastrointestinal:  Positive for nausea and vomiting.   Genitourinary:         (+) pain around suprapubic catheter   Psychiatric/Behavioral:  Positive for hallucinations (visual).         Physical Exam     Initial Vitals [25]   BP Pulse Resp Temp SpO2   138/86 (!) 58 18 98 °F (36.7 °C) 96 %      MAP       --          Physical Exam  Constitutional:       General: She is not in acute distress.     Appearance: Normal appearance.   Abdominal:      Palpations: Abdomen is soft.      Tenderness: There is no abdominal tenderness.   Genitourinary:     Comments: Suprapubic catheter is clean dry and intact  Musculoskeletal:         General: Normal range of motion.   Skin:     General: Skin is warm and dry.      Findings: No rash.   Neurological:      General: No focal deficit present.      Mental Status: She is alert.      Comments: Oriented to baseline per daughter at bedside   Psychiatric:      Comments: Patient having visual hallucinations            ED Course   Critical Care    Date/Time: 2025 11:00 PM    Performed by: Cb Santiago DO  Authorized by: Cb Santiago DO   Direct patient critical care time: 20 minutes  Ordering / reviewing critical care time: 5 minutes  Documentation critical care time: 5 minutes  Consulting other physicians critical care time: 5 minutes  Total critical care time (exclusive of procedural time) : 35 minutes  Critical care time was exclusive of separately billable procedures and treating other patients.  Critical care was necessary to treat or prevent imminent or life-threatening deterioration of the following conditions: sepsis.  Critical care was time spent personally by me on the following activities: discussions with consultants, interpretation of cardiac output measurements, evaluation of patient's response to treatment, examination of patient, obtaining history from patient or surrogate, ordering and performing treatments and interventions, ordering and review of laboratory studies, pulse oximetry, re-evaluation of patient's condition and review of old charts.        ED Vital Signs:  Vitals:    04/09/25 0320 04/09/25 0436 04/09/25 0518 04/09/25 0701   BP:  129/63     Pulse: 60 61 66 76   Resp:  19     Temp:  97.7 °F (36.5 °C)     TempSrc:  Oral     SpO2:  (!) 94%     Weight:       Height:        04/09/25 0730 04/09/25 0858 04/09/25 0900 04/09/25 0901   BP:  (!) 175/81     Pulse: (!) 58 63 62 66   Resp:  20     Temp:  98.3 °F (36.8 °C)     TempSrc:  Oral     SpO2:  (!) 92%     Weight:       Height:        04/09/25 1100 04/09/25 1140 04/09/25 1300 04/09/25 1500   BP:  (!) 163/74     Pulse: 64 64 62 (!) 54   Resp:       Temp:  97.6 °F (36.4 °C)     TempSrc:  Oral     SpO2:  (!) 94%     Weight:       Height:        04/09/25 1552 04/09/25 1624 04/09/25 1700   BP: (!) 147/70     Pulse: 65 (!) 55 (!) 55   Resp: 16     Temp: 97.6 °F (36.4 °C)     TempSrc: Oral     SpO2: (!) 93%     Weight:      Height:          Abnormal Lab Results:  Labs Reviewed   URINALYSIS, REFLEX TO URINE CULTURE - Abnormal       Result Value    Color, UA Yellow      Appearance, UA  Cloudy (*)     pH, UA 6.0      Spec Grav UA 1.015      Protein, UA 1+ (*)     Glucose, UA Negative      Ketones, UA Negative      Bilirubin, UA Negative      Blood, UA 2+ (*)     Nitrites, UA Positive (*)     Urobilinogen, UA Negative      Leukocyte Esterase, UA 3+ (*)    COMPREHENSIVE METABOLIC PANEL - Abnormal    Sodium 137      Potassium 3.1 (*)     Chloride 104      CO2 23      Glucose 114 (*)     BUN 24      Creatinine 1.8 (*)     Calcium 11.2 (*)     Protein Total 7.2      Albumin 3.4 (*)     Bilirubin Total 0.4            AST 26      ALT 31      Anion Gap 10      eGFR 26 (*)    CBC WITH DIFFERENTIAL - Abnormal    WBC 8.63      RBC 4.73      HGB 13.0      HCT 40.1      MCV 85      MCH 27.5      MCHC 32.4      RDW 15.1 (*)     Platelet Count 298      MPV 10.3      Nucleated RBC 0      Neut % 46.9      Lymph % 34.2      Mono % 13.2      Eos % 4.6      Basophil % 0.8      Imm Grans % 0.3      Neut # 4.04      Lymph # 2.95      Mono # 1.14 (*)     Eos # 0.40      Baso # 0.07      Imm Grans # 0.03     URINALYSIS MICROSCOPIC - Abnormal    RBC, UA 24 (*)     WBC, UA >100 (*)     WBC Clumps, UA Many (*)     Bacteria, UA Moderate (*)     Hyaline Casts, UA 3 (*)     Calcium Oxalate Crystals, UA Few      Microscopic Comment       LACTIC ACID, PLASMA - Normal    Lactic Acid Level 1.4      Narrative:     Falsely low lactic acid results can be found in samples containing >=13.0 mg/dL total bilirubin and/or >=3.5 mg/dL direct bilirubin.    MAGNESIUM - Normal    Magnesium  1.9     CULTURE, URINE   CBC W/ AUTO DIFFERENTIAL    Narrative:     The following orders were created for panel order CBC auto differential.  Procedure                               Abnormality         Status                     ---------                               -----------         ------                     CBC with Differential[[id number]]       Abnormal            Final result                 Please view results for these tests on the  individual orders.        All Lab Results:  Results for orders placed or performed during the hospital encounter of 04/08/25   Comprehensive metabolic panel    Collection Time: 04/08/25  9:41 PM   Result Value Ref Range    Sodium 137 136 - 145 mmol/L    Potassium 3.1 (L) 3.5 - 5.1 mmol/L    Chloride 104 95 - 110 mmol/L    CO2 23 23 - 29 mmol/L    Glucose 114 (H) 70 - 110 mg/dL    BUN 24 10 - 30 mg/dL    Creatinine 1.8 (H) 0.5 - 1.4 mg/dL    Calcium 11.2 (H) 8.7 - 10.5 mg/dL    Protein Total 7.2 6.0 - 8.4 gm/dL    Albumin 3.4 (L) 3.5 - 5.2 g/dL    Bilirubin Total 0.4 0.1 - 1.0 mg/dL     40 - 150 unit/L    AST 26 11 - 45 unit/L    ALT 31 10 - 44 unit/L    Anion Gap 10 8 - 16 mmol/L    eGFR 26 (L) >60 mL/min/1.73/m2   CBC with Differential    Collection Time: 04/08/25  9:41 PM   Result Value Ref Range    WBC 8.63 3.90 - 12.70 K/uL    RBC 4.73 4.00 - 5.40 M/uL    HGB 13.0 12.0 - 16.0 gm/dL    HCT 40.1 37.0 - 48.5 %    MCV 85 82 - 98 fL    MCH 27.5 27.0 - 31.0 pg    MCHC 32.4 32.0 - 36.0 g/dL    RDW 15.1 (H) 11.5 - 14.5 %    Platelet Count 298 150 - 450 K/uL    MPV 10.3 9.2 - 12.9 fL    Nucleated RBC 0 <=0 /100 WBC    Neut % 46.9 38 - 73 %    Lymph % 34.2 18 - 48 %    Mono % 13.2 4 - 15 %    Eos % 4.6 <=8 %    Basophil % 0.8 <=1.9 %    Imm Grans % 0.3 0.0 - 0.5 %    Neut # 4.04 1.8 - 7.7 K/uL    Lymph # 2.95 1 - 4.8 K/uL    Mono # 1.14 (H) 0.3 - 1 K/uL    Eos # 0.40 <=0.5 K/uL    Baso # 0.07 <=0.2 K/uL    Imm Grans # 0.03 0.00 - 0.04 K/uL   Magnesium    Collection Time: 04/08/25  9:41 PM   Result Value Ref Range    Magnesium  1.9 1.6 - 2.6 mg/dL   Urinalysis, Reflex to Urine Culture Urine, Supra Pubic    Collection Time: 04/08/25  9:42 PM    Specimen: Urine   Result Value Ref Range    Color, UA Yellow Straw, Kailey, Yellow, Light-Orange    Appearance, UA Cloudy (A) Clear    pH, UA 6.0 5.0 - 8.0    Spec Grav UA 1.015 1.005 - 1.030    Protein, UA 1+ (A) Negative    Glucose, UA Negative Negative    Ketones, UA  Negative Negative    Bilirubin, UA Negative Negative    Blood, UA 2+ (A) Negative    Nitrites, UA Positive (A) Negative    Urobilinogen, UA Negative <2.0 EU/dL    Leukocyte Esterase, UA 3+ (A) Negative   Urinalysis Microscopic    Collection Time: 04/08/25  9:42 PM   Result Value Ref Range    RBC, UA 24 (H) 0 - 4 /HPF    WBC, UA >100 (H) 0 - 5 /HPF    WBC Clumps, UA Many (A) None, Rare    Bacteria, UA Moderate (A) None, Rare, Occasional /HPF    Hyaline Casts, UA 3 (H) 0 - 1 /LPF    Calcium Oxalate Crystals, UA Few None, Rare, Occasional, Few, Moderate    Microscopic Comment     Lactic acid, plasma    Collection Time: 04/08/25 10:11 PM   Result Value Ref Range    Lactic Acid Level 1.4 0.5 - 2.2 mmol/L   Comprehensive Metabolic Panel    Collection Time: 04/09/25  5:37 AM   Result Value Ref Range    Sodium 139 136 - 145 mmol/L    Potassium 2.8 (L) 3.5 - 5.1 mmol/L    Chloride 108 95 - 110 mmol/L    CO2 22 (L) 23 - 29 mmol/L    Glucose 87 70 - 110 mg/dL    BUN 22 10 - 30 mg/dL    Creatinine 1.7 (H) 0.5 - 1.4 mg/dL    Calcium 10.8 (H) 8.7 - 10.5 mg/dL    Protein Total 6.6 6.0 - 8.4 gm/dL    Albumin 3.2 (L) 3.5 - 5.2 g/dL    Bilirubin Total 0.4 0.1 - 1.0 mg/dL    ALP 91 40 - 150 unit/L    AST 25 11 - 45 unit/L    ALT 29 10 - 44 unit/L    Anion Gap 9 8 - 16 mmol/L    eGFR 28 (L) >60 mL/min/1.73/m2   Magnesium    Collection Time: 04/09/25  5:37 AM   Result Value Ref Range    Magnesium  1.9 1.6 - 2.6 mg/dL   Phosphorus    Collection Time: 04/09/25  5:37 AM   Result Value Ref Range    Phosphorus Level 3.1 2.7 - 4.5 mg/dL   CBC with Differential    Collection Time: 04/09/25  5:37 AM   Result Value Ref Range    WBC 7.02 3.90 - 12.70 K/uL    RBC 4.25 4.00 - 5.40 M/uL    HGB 11.9 (L) 12.0 - 16.0 gm/dL    HCT 37.2 37.0 - 48.5 %    MCV 88 82 - 98 fL    MCH 28.0 27.0 - 31.0 pg    MCHC 32.0 32.0 - 36.0 g/dL    RDW 15.4 (H) 11.5 - 14.5 %    Platelet Count 238 150 - 450 K/uL    MPV 10.3 9.2 - 12.9 fL    Nucleated RBC 0 <=0 /100 WBC     Neut % 50.4 38 - 73 %    Lymph % 31.9 18 - 48 %    Mono % 12.1 4 - 15 %    Eos % 4.4 <=8 %    Basophil % 0.9 <=1.9 %    Imm Grans % 0.3 0.0 - 0.5 %    Neut # 3.54 1.8 - 7.7 K/uL    Lymph # 2.24 1 - 4.8 K/uL    Mono # 0.85 0.3 - 1 K/uL    Eos # 0.31 <=0.5 K/uL    Baso # 0.06 <=0.2 K/uL    Imm Grans # 0.02 0.00 - 0.04 K/uL     *Note: Due to a large number of results and/or encounters for the requested time period, some results have not been displayed. A complete set of results can be found in Results Review.         Imaging Results:  Imaging Results    None                 The Emergency Provider reviewed the vital signs and test results, which are outlined above.     ED Discussion       11:50 PM: Discussed case with Dr. Moore ([facility]). Dr. Moore agrees with current care and management of pt and accepts admission.   Admitting Service:   Admitting Physician: Dr. Moore  Admit to: obs med/surg    11:50 PM: Re-evaluated pt. I have discussed test results, shared treatment plan, and the need for admission with patient and family at bedside. Pt and family express understanding at this time and agree with all information. All questions answered. Pt and family have no further questions or concerns at this time. Pt is ready for admit.      ED Course as of 04/09/25 1739   Tue Apr 08, 2025 2336 Lactic acid, plasma  Within normal limits [CD]   2337 Urinalysis, Reflex to Urine Culture Urine, Supra Pubic(!)  UTI [CD]   2337 CBC auto differential(!)  Nonspecific findings [CD]   2337 Comprehensive metabolic panel(!)  Chronic kidney disease [CD]   2337 Magnesium  Thin normal limits [CD]   2337 Urine culture  Pending [CD]   2337 Urinalysis Microscopic(!)  UTI [CD]      ED Course User Index  [CD] Cb Santiago, DO     Medical Decision Making  Multiple resistances on past urine cultures, Zosyn is sensitive on everyone therefore will start Zosyn and await cultures.  Blood cultures ordered and pending.    Amount  and/or Complexity of Data Reviewed  Labs: ordered. Decision-making details documented in ED Course.    Risk  Risk Details: Differential diagnosis includes but is not limited to:  UTI, sepsis, electrolyte abnormality,                 ED Medication(s):  Medications   amiodarone tablet 200 mg (200 mg Oral Given 4/9/25 0900)   apixaban tablet 2.5 mg (2.5 mg Oral Given 4/9/25 0900)   cholecalciferol (vitamin D3) 125 mcg (5,000 unit) tablet 5,000 Units (5,000 Units Oral Given 4/9/25 0900)   famotidine tablet 20 mg (has no administration in time range)   metoprolol tartrate (LOPRESSOR) tablet 100 mg (100 mg Oral Given 4/9/25 0900)   montelukast tablet 10 mg (10 mg Oral Not Given 4/9/25 0500)   atorvastatin tablet 10 mg (10 mg Oral Given 4/9/25 0900)   sodium chloride 0.9% flush 10 mL (has no administration in time range)   naloxone 0.4 mg/mL injection 0.02 mg (has no administration in time range)   glucose chewable tablet 16 g (has no administration in time range)   glucose chewable tablet 24 g (has no administration in time range)   dextrose 50% injection 12.5 g (has no administration in time range)   dextrose 50% injection 25 g (has no administration in time range)   glucagon (human recombinant) injection 1 mg (has no administration in time range)   hydrALAZINE injection 5 mg (has no administration in time range)   polyethylene glycol packet 17 g (has no administration in time range)   acetaminophen tablet 650 mg (has no administration in time range)   melatonin tablet 6 mg (has no administration in time range)   piperacillin-tazobactam (ZOSYN) 4.5 g in D5W 100 mL IVPB (MB+) ( Intravenous Verify Only 4/9/25 1730)   prochlorperazine injection Soln 2.5 mg (has no administration in time range)   potassium chloride SA CR tablet 40 mEq (40 mEq Oral Given 4/9/25 1546)       Current Discharge Medication List                  Scribe Attestation:   Scribe #1: I performed the above scribed service and the documentation accurately  describes the services I performed. I attest to the accuracy of the note.     Attending:   Physician Attestation Statement for Scribe #1: I, Cb Santiago DO, personally performed the services described in this documentation, as scribed by Ewa Pineda, in my presence, and it is both accurate and complete.           Clinical Impression       ICD-10-CM ICD-9-CM   1. Urinary tract infection associated with catheterization of urinary tract, unspecified indwelling urinary catheter type, initial encounter  T83.511A 996.64    N39.0 599.0   2. Visual hallucinations  R44.1 368.16   3. Chest pain  R07.9 786.50       Disposition:   Disposition: Placed in Observation  Condition: Stable         Cb Santiago DO  04/09/25 5713

## 2025-04-09 NOTE — SUBJECTIVE & OBJECTIVE
Past Medical History:   Diagnosis Date    Anticoagulant long-term use     Arthritis     Asthma     Basal cell carcinoma     Cancer     skin cancer to face    Cataract     OU done//    CHF (congestive heart failure)     COPD (chronic obstructive pulmonary disease)     no oxygen; patient denies    cpap     Essential hypertension 05/05/2010    GERD (gastroesophageal reflux disease) 11/20/2012    Glaucoma     Hypertensive heart disease with heart failure 02/05/2013    Paroxysmal atrial fibrillation     Paroxysmal ventricular tachycardia     per problem list    Preop cardiovascular exam 1/26/2024    Renal disorder     french-1/3/2020    Squamous cell carcinoma of skin        Past Surgical History:   Procedure Laterality Date    A-V CARDIAC PACEMAKER INSERTION  06/16/2021    Procedure: INSERTION, CARDIAC PACEMAKER, DUAL CHAMBER;  Surgeon: Oscar Sommers MD;  Location: [facility] CATH;  Service: Cardiovascular;;    ADENOIDECTOMY  191944    APPENDECTOMY  1948    Because of Ovarian Cyst surgery    BREAST BIOPSY Left 1995    neg    BREAST BIOPSY Right 1984    neg    BREAST BIOPSY Right 1992    neg    BREAST SURGERY      A number of biopsies    CARDIAC CATHETERIZATION  2013, 2014,2016, 2020    has 9 stents    CARDIAC SURGERY  2012    stents    CATARACT EXTRACTION W/  INTRAOCULAR LENS IMPLANT Left 11/01/2018    Dr Rose    CATARACT EXTRACTION W/  INTRAOCULAR LENS IMPLANT Right 12/13/2018    Dr Rose//    CHOLECYSTECTOMY      COLONOSCOPY  ~2005    Dr. Edward; normal per patient report    COLONOSCOPY N/A 09/06/2022    Procedure: COLONOSCOPY 8/31/22-note in Dr Simms's office visit note that he spoke to her cardiologist and she was viable candidate for endoscopy;  Surgeon: Cordelia Bueno MD;  Location: [facility] ENDO;  Service: Endoscopy;  Laterality: N/A;    CORONARY ANGIOGRAPHY N/A 03/20/2020    Procedure: ANGIOGRAM, CORONARY ARTERY;  Surgeon: Chuy Bryant MD;  Location: [facility] CATH;  Service:  Cardiology;  Laterality: N/A;    CYSTOSCOPY W/ RETROGRADES Bilateral 02/12/2020    Procedure: CYSTOSCOPY, WITH RETROGRADE PYELOGRAM;  Surgeon: Gasper Feliz MD;  Location: [facility] OR;  Service: Urology;  Laterality: Bilateral;    ESOPHAGOGASTRODUODENOSCOPY N/A 08/13/2020    Procedure: EGD (ESOPHAGOGASTRODUODENOSCOPY);  Surgeon: Mika Solorzano MD;  Location: [facility];  Service: Endoscopy;  Laterality: N/A; Mild Schatzki ring. Biopsied. Dilated. small hiatal hernia, gastritis; biopsy: esophagus- SEVERE REFLUX ESOPHAGITIS, stomach- chronic gastritis, negative for H pylori    ESOPHAGOGASTRODUODENOSCOPY N/A 10/22/2020    Procedure: EGD (ESOPHAGOGASTRODUODENOSCOPY);  Surgeon: Fred Hendricks MD;  Location: [facility];  Service: Endoscopy;  Laterality: N/A;    ESOPHAGOGASTRODUODENOSCOPY N/A 03/21/2024    Procedure: EGD (ESOPHAGOGASTRODUODENOSCOPY);  Surgeon: Mika Solorzano MD;  Location: [facility];  Service: Gastroenterology;  Laterality: N/A;    EYE SURGERY  2017    Cataracs    FRACTIONAL FLOW RESERVE (FFR), CORONARY  06/20/2023    Procedure: Fractional Flow [place] (FFR), Coronary;  Surgeon: Brice Campuzano MD;  Location: [facility] CATH LAB;  Service: Cardiology;;    HYSTERECTOMY  1969    INSERTION OF CATHETER INTO CAUDAL EPIDURAL SPACE      sp cath    INSTANTANEOUS WAVE-FREE RATIO (IFR) N/A 06/20/2023    Procedure: Instantaneous Wave-Free Ratio (IFR);  Surgeon: Brice Campuzano MD;  Location: [facility] CATH LAB;  Service: Cardiology;  Laterality: N/A;    LEFT HEART CATHETERIZATION Left 03/03/2020    Procedure: Left heart cath;  Surgeon: Chuy Bryant MD;  Location: [facility] CATH;  Service: Cardiology;  Laterality: Left;    LEFT HEART CATHETERIZATION Left 03/20/2020    Procedure: Left heart cath;  Surgeon: Chuy Bryant MD;  Location: [facility] CATH;  Service: Cardiology;  Laterality: Left;    LEFT HEART CATHETERIZATION N/A 06/20/2023    Procedure: Left heart cath;  Surgeon: Brice Campuzano MD;   Location: [facility] CATH LAB;  Service: Cardiology;  Laterality: N/A;    LEFT HEART CATHETERIZATION  4/1/2025    Procedure: Coronary angiogram study;  Surgeon: Oscar Dyer MD;  Location: [facility] CATH;  Service: Cardiovascular;;    OVARIAN CYST REMOVAL  teenager    PHACOEMULSIFICATION OF CATARACT Left 11/01/2018    Procedure: PHACOEMULSIFICATION, CATARACT;  Surgeon: Aleksandar Rose Jr., MD;  Location: [facility] OR;  Service: Ophthalmology;  Laterality: Left;    PHACOEMULSIFICATION OF CATARACT Right 12/13/2018    Procedure: PHACOEMULSIFICATION, CATARACT;  Surgeon: Aleksandar Rose Jr., MD;  Location: [facility] OR;  Service: Ophthalmology;  Laterality: Right;    TONSILLECTOMY      aw/denoids    TRANSESOPHAGEAL ECHOCARDIOGRAM WITH POSSIBLE CARDIOVERSION (ALFRED W/ POSS CARDIOVERSION) N/A 10/05/2023    Procedure: Transesophageal echo (ALFRED) intra-procedure log documentation/alfred/cv;  Surgeon: Bao Miguel MD;  Location: [facility] CATH LAB;  Service: Cardiology;  Laterality: N/A;   Alfred/Cv  MRI safe   Pacer & leads implanted 6/16/21, Antoun   Bio Edora 8 MICHELLE, [id number], PID: [id number]   A lead: Bio Solia S45, [id number]   V lead: Bio Solia S53, [id number]    TREATMENT OF CARDIAC ARRHYTHMIA N/A 10/05/2023    Procedure: Cardioversion or Defibrillation;  Surgeon: Bao Miguel MD;  Location: [facility] CATH LAB;  Service: Cardiology;  Laterality: N/A;    UPPER GASTROINTESTINAL ENDOSCOPY  ~2005    Dr. Solorzano    VALVE STUDY-AORTIC  06/20/2023    Procedure: Valve study-aortic;  Surgeon: Brice Campuzano MD;  Location: [facility] CATH LAB;  Service: Cardiology;;    VASCULAR SURGERY      to remove clot from right leg    Yag Capsulotomy Bilateral 11/05/2019    Dr Rose       Review of patient's allergies indicates:   Allergen Reactions    Timolol maleate Shortness Of Breath     Other Reaction(s): Shortness Of Breath    Ciprofloxacin Other (See Comments)     Muscle ache    Sulfamethoxazole-trimethoprim        Current  Facility-Administered Medications on File Prior to Encounter   Medication    lactated ringers infusion    sodium chloride 0.9% flush 10 mL     Current Outpatient Medications on File Prior to Encounter   Medication Sig    ACIDOPHILUS PROBIOTIC BLEND 175 mg Cap Take 1 capsule by mouth every morning.    albuterol (PROVENTIL/VENTOLIN HFA) 90 mcg/actuation inhaler Inhale 1-2 puffs into the lungs every 4 (four) hours as needed for Wheezing or Shortness of Breath. Rescue    albuterol-ipratropium (DUO-NEB) 2.5 mg-0.5 mg/3 mL nebulizer solution Take 3 mLs by nebulization every 6 (six) hours as needed for Wheezing or Shortness of Breath.    amiodarone (PACERONE) 200 MG Tab Take 1 tablet (200 mg total) by mouth once daily.    apixaban (ELIQUIS) 2.5 mg Tab Take 1 tablet (2.5 mg total) by mouth 2 (two) times daily.    cholecalciferol, vitamin D3, 125 mcg (5,000 unit) Tab Take 5,000 Units by mouth once daily.    cloNIDine (CATAPRES) 0.1 MG tablet Take 1 tablet (0.1 mg total) by mouth every 6 (six) hours as needed (take if BP is over 180/100).    dorzolamide (TRUSOPT) 2 % ophthalmic solution INSTILL 1 DROP INTO BOTH EYES TWICE DAILY    famotidine (PEPCID) 40 MG tablet TAKE ONE TABLET BY MOUTH EVERY NIGHT AT BEDTIME    fluticasone (FLONASE) 50 mcg/actuation nasal spray 2 sprays (100 mcg total) by Each Nare route daily as needed for Allergies.    fluticasone furoate-vilanteroL (BREO ELLIPTA) 100-25 mcg/dose diskus inhaler Inhale 1 puff into the lungs once daily.    furosemide (LASIX) 40 MG tablet Take 1 tablet (40 mg total) by mouth once daily. Do not take if BP is below 110/70; recommend doubling up dose if BP is over 140/90    GEMTESA 75 mg Tab Take 1 tablet by mouth every morning.    Lactobacillus rhamnosus GG (CULTURELLE) 10 billion cell capsule Take 1 capsule by mouth once daily.    latanoprost 0.005 % ophthalmic solution Place 1 drop into both eyes every evening.    loratadine (CLARITIN) 10 mg tablet Take 1  tablet (10 mg total) by mouth once daily.    lutein-zeaxanthin extract 15-0.7 mg Cap Take 1 capsule by mouth every morning.    metoprolol tartrate (LOPRESSOR) 100 MG tablet Take 1 tablet (100 mg total) by mouth 2 (two) times daily.    montelukast (SINGULAIR) 10 mg tablet Take 1 tablet (10 mg total) by mouth every evening.    nitrofurantoin, macrocrystal-monohydrate, (MACROBID) 100 MG capsule Take 1 capsule (100 mg total) by mouth 2 (two) times daily.    nitroGLYCERIN 0.4 MG/HR TD PT24 (NITRODUR) 0.4 mg/hr Place 1 patch onto the skin once daily.    nystatin (MYCOSTATIN) cream Apply topically 2 (two) times daily. for 7 days    potassium chloride (KLOR-CON) 10 MEQ TbSR TAKE TWO TABLETS BY MOUTH EVERY MORNING    rosuvastatin (CRESTOR) 40 MG Tab TAKE 1 TABLET BY MOUTH EVERY DAY     Family History       Problem Relation (Age of Onset)    Alcohol abuse Mother    Cancer Maternal Grandfather    Clotting disorder Son    Diabetes Brother, Mother    Heart disease Brother, Mother, Father    Hypertension Mother, Father    Stroke Mother          Tobacco Use    Smoking status: Former     Current packs/day: 0.00     Types: Cigarettes     Start date: 6/10/1954     Quit date: 9/15/1955     Years since quittin.6    Smokeless tobacco: Never    Tobacco comments:     I onlysmoked one year while [employer] my  was oversees  during [place]   Substance and Sexual Activity    Alcohol use: Not Currently     Comment: Only drank a little wine and haven't  drunk anything in 15 y    Drug use: Never    Sexual activity: Not Currently     Partners: Male     Birth control/protection: Abstinence     Comment:  and 87 years of age     Review of Systems   Constitutional:  Positive for fatigue. Negative for chills and fever.   HENT:  Negative for congestion and rhinorrhea.    Respiratory:  Negative for cough and shortness of breath.    Cardiovascular:  Negative for chest pain and leg swelling.   Gastrointestinal:  Positive for  abdominal pain. Negative for constipation, diarrhea, nausea and vomiting.   Genitourinary:  Positive for pelvic pain (at suprapubic catheter).   Neurological:  Negative for dizziness and headaches.     Objective:     Vital Signs (Most Recent):  Temp: 97.7 °F (36.5 °C) (04/09/25 0436)  Pulse: 66 (04/09/25 0518)  Resp: 19 (04/09/25 0436)  BP: 129/63 (04/09/25 0436)  SpO2: (!) 94 % (04/09/25 0436) Vital Signs (24h Range):  Temp:  [97.7 °F (36.5 °C)-98.4 °F (36.9 °C)] 97.7 °F (36.5 °C)  Pulse:  [56-66] 66  Resp:  [15-20] 19  SpO2:  [94 %-96 %] 94 %  BP: (129-175)/(63-91) 129/63     Weight: 84.1 kg (185 lb 6.5 oz)  Body mass index is 30.85 kg/m².     Physical Exam  Vitals and nursing note reviewed.   Constitutional:       General: She is not in acute distress.     Appearance: She is ill-appearing. She is not toxic-appearing or diaphoretic.   HENT:      Head: Normocephalic and atraumatic.      Mouth/Throat:      Mouth: Mucous membranes are moist.   Eyes:      General: No scleral icterus.        Right eye: No discharge.         Left eye: No discharge.   Cardiovascular:      Rate and Rhythm: Normal rate and regular rhythm.      Heart sounds: Normal heart sounds.   Pulmonary:      Effort: Pulmonary effort is normal. No respiratory distress.      Breath sounds: Normal breath sounds.   Abdominal:      General: Bowel sounds are normal. There is no distension.      Palpations: Abdomen is soft.      Tenderness: There is abdominal tenderness (suprapubic). There is no guarding or rebound.   Musculoskeletal:      Cervical back: No rigidity.      Right lower leg: No edema.      Left lower leg: No edema.   Skin:     General: Skin is warm and dry.      Coloration: Skin is not jaundiced.   Neurological:      Mental Status: She is alert.   Psychiatric:         Mood and Affect: Mood normal.         Behavior: Behavior normal.                Significant Labs: All pertinent labs within the past 24 hours have been reviewed.  Recent Results  (from the past 24 hours)   Urinalysis, Reflex to Urine Culture    Collection Time: 04/08/25  4:11 PM    Specimen: Urine   Result Value Ref Range    Color, UA Yellow Straw, Kailey, Yellow, Light-Orange    Appearance, UA Cloudy (A) Clear    pH, UA 6.0 5.0 - 8.0    Spec Grav UA 1.015 1.005 - 1.030    Protein, UA 1+ (A) Negative    Glucose, UA Negative Negative    Ketones, UA Negative Negative    Bilirubin, UA Negative Negative    Blood, UA 3+ (A) Negative    Nitrites, UA Positive (A) Negative    Urobilinogen, UA Negative <2.0 EU/dL    Leukocyte Esterase, UA 3+ (A) Negative   Urinalysis Microscopic    Collection Time: 04/08/25  4:11 PM   Result Value Ref Range    RBC, UA 20 (H) 0 - 4 /HPF    WBC, UA >100 (H) 0 - 5 /HPF    WBC Clumps, UA Moderate (A) None, Rare    Bacteria, UA Moderate (A) None, Rare, Occasional /HPF    Squamous Epithelial Cells, UA 3 /HPF    Hyaline Casts, UA 0 0 - 1 /LPF    Amorphous, UA Moderate None, Occasional, Few, Moderate, Rare    Microscopic Comment     Comprehensive metabolic panel    Collection Time: 04/08/25  9:41 PM   Result Value Ref Range    Sodium 137 136 - 145 mmol/L    Potassium 3.1 (L) 3.5 - 5.1 mmol/L    Chloride 104 95 - 110 mmol/L    CO2 23 23 - 29 mmol/L    Glucose 114 (H) 70 - 110 mg/dL    BUN 24 10 - 30 mg/dL    Creatinine 1.8 (H) 0.5 - 1.4 mg/dL    Calcium 11.2 (H) 8.7 - 10.5 mg/dL    Protein Total 7.2 6.0 - 8.4 gm/dL    Albumin 3.4 (L) 3.5 - 5.2 g/dL    Bilirubin Total 0.4 0.1 - 1.0 mg/dL     40 - 150 unit/L    AST 26 11 - 45 unit/L    ALT 31 10 - 44 unit/L    Anion Gap 10 8 - 16 mmol/L    eGFR 26 (L) >60 mL/min/1.73/m2   CBC with Differential    Collection Time: 04/08/25  9:41 PM   Result Value Ref Range    WBC 8.63 3.90 - 12.70 K/uL    RBC 4.73 4.00 - 5.40 M/uL    HGB 13.0 12.0 - 16.0 gm/dL    HCT 40.1 37.0 - 48.5 %    MCV 85 82 - 98 fL    MCH 27.5 27.0 - 31.0 pg    MCHC 32.4 32.0 - 36.0 g/dL    RDW 15.1 (H) 11.5 - 14.5 %    Platelet Count 298 150 - 450 K/uL    MPV 10.3  9.2 - 12.9 fL    Nucleated RBC 0 <=0 /100 WBC    Neut % 46.9 38 - 73 %    Lymph % 34.2 18 - 48 %    Mono % 13.2 4 - 15 %    Eos % 4.6 <=8 %    Basophil % 0.8 <=1.9 %    Imm Grans % 0.3 0.0 - 0.5 %    Neut # 4.04 1.8 - 7.7 K/uL    Lymph # 2.95 1 - 4.8 K/uL    Mono # 1.14 (H) 0.3 - 1 K/uL    Eos # 0.40 <=0.5 K/uL    Baso # 0.07 <=0.2 K/uL    Imm Grans # 0.03 0.00 - 0.04 K/uL   Magnesium    Collection Time: 04/08/25  9:41 PM   Result Value Ref Range    Magnesium  1.9 1.6 - 2.6 mg/dL   Urinalysis, Reflex to Urine Culture Urine, Supra Pubic    Collection Time: 04/08/25  9:42 PM    Specimen: Urine   Result Value Ref Range    Color, UA Yellow Straw, Kailey, Yellow, Light-Orange    Appearance, UA Cloudy (A) Clear    pH, UA 6.0 5.0 - 8.0    Spec Grav UA 1.015 1.005 - 1.030    Protein, UA 1+ (A) Negative    Glucose, UA Negative Negative    Ketones, UA Negative Negative    Bilirubin, UA Negative Negative    Blood, UA 2+ (A) Negative    Nitrites, UA Positive (A) Negative    Urobilinogen, UA Negative <2.0 EU/dL    Leukocyte Esterase, UA 3+ (A) Negative   Urinalysis Microscopic    Collection Time: 04/08/25  9:42 PM   Result Value Ref Range    RBC, UA 24 (H) 0 - 4 /HPF    WBC, UA >100 (H) 0 - 5 /HPF    WBC Clumps, UA Many (A) None, Rare    Bacteria, UA Moderate (A) None, Rare, Occasional /HPF    Hyaline Casts, UA 3 (H) 0 - 1 /LPF    Calcium Oxalate Crystals, UA Few None, Rare, Occasional, Few, Moderate    Microscopic Comment     Lactic acid, plasma    Collection Time: 04/08/25 10:11 PM   Result Value Ref Range    Lactic Acid Level 1.4 0.5 - 2.2 mmol/L   CBC with Differential    Collection Time: 04/09/25  5:37 AM   Result Value Ref Range    WBC 7.02 3.90 - 12.70 K/uL    RBC 4.25 4.00 - 5.40 M/uL    HGB 11.9 (L) 12.0 - 16.0 gm/dL    HCT 37.2 37.0 - 48.5 %    MCV 88 82 - 98 fL    MCH 28.0 27.0 - 31.0 pg    MCHC 32.0 32.0 - 36.0 g/dL    RDW 15.4 (H) 11.5 - 14.5 %    Platelet Count 238 150 - 450 K/uL    MPV 10.3 9.2 - 12.9 fL     Nucleated RBC 0 <=0 /100 WBC    Neut % 50.4 38 - 73 %    Lymph % 31.9 18 - 48 %    Mono % 12.1 4 - 15 %    Eos % 4.4 <=8 %    Basophil % 0.9 <=1.9 %    Imm Grans % 0.3 0.0 - 0.5 %    Neut # 3.54 1.8 - 7.7 K/uL    Lymph # 2.24 1 - 4.8 K/uL    Mono # 0.85 0.3 - 1 K/uL    Eos # 0.31 <=0.5 K/uL    Baso # 0.06 <=0.2 K/uL    Imm Grans # 0.02 0.00 - 0.04 K/uL

## 2025-04-09 NOTE — PLAN OF CARE
Brief plan of care update     Patient is a 89-year-old  female with past medical history notable for CHF, COPD, paroxysmal AFib (on NOAC), chronic suprapubic catheter, recurrent UTIs who was admitted to hospital medicine earlier this morning for management of UTI.      Patient is seen and examined with daughter at bedside.  Patient reports some crampy abdominal pain, feels like she needs to have a bowel movement.  Reports that her suprapubic cath is last changed 03/06/2025 by her urologist Dr. Carrington.    Labs reviewed:  CBC stable, BMP with hypokalemia, creatinine 1.7 (baseline 1.5-1.6).  UA consistent with UTI.  Urine and blood cultures pending      will continue IV Zosyn based on previous cultures as patient has history of MDR UTIs  will exchange suprapubic cath today  Hold home Lasix for now, patient appears euvolemic at this time   Potassium repletion; monitor BMP

## 2025-04-09 NOTE — NURSING
Patient IV zoysn delayed due to pending lab collect blood cultures. On call hospital medicine and charge nurse notified. New IV zoysn time at 8 AM.       DR. Colby gave nursing communications orders for us to change her suprapubic cath dressing PRN and irrigate cath if needed.

## 2025-04-09 NOTE — PLAN OF CARE
Inpatient Upgrade Note    Holli Landrum has warranted treatment spanning two or more midnights of hospital level care for the management of  persistent symptomatic UTI despite outpatient oral antibiotic course, plan to continue IV antibiotics, pending final culture results, risk of worsening infection . She continues to require IV antibiotics, daily labs, and monitoring of vital signs. Her condition is also complicated by the following comorbidities: Coronary Artery Disease, Immunosuppression, and Heart failure.

## 2025-04-09 NOTE — ASSESSMENT & PLAN NOTE
Hypertensive on admission    Home meds for hypertension were reviewed and noted below.   Home Medications:  Hypertension Medications              cloNIDine (CATAPRES) 0.1 MG tablet Take 1 tablet (0.1 mg total) by mouth every 6 (six) hours as needed (take if BP is over 180/100).    furosemide (LASIX) 40 MG tablet Take 1 tablet (40 mg total) by mouth once daily. Do not take if BP is below 110/70; recommend doubling up dose if BP is over 140/90    metoprolol tartrate (LOPRESSOR) 100 MG tablet Take 1 tablet (100 mg total) by mouth 2 (two) times daily.    nitroGLYCERIN 0.4 MG/HR TD PT24 (NITRODUR) 0.4 mg/hr Place 1 patch onto the skin once daily.            Patients blood pressure range in the last 24 hours was: BP  Min: 129/63  Max: 175/91.      PLAN:  -While in the hospital, will manage blood pressure as follows; Continue home antihypertensive regimen  -The patient's inpatient anti-hypertensive regimen is listed below:  Current Antihypertensives  furosemide tablet 40 mg, Daily, Oral  metoprolol tartrate (LOPRESSOR) tablet 100 mg, 2 times daily, Oral  hydrALAZINE injection 5 mg, Every 6 hours PRN, Intravenous  -Will utilize p.r.n. blood pressure medication only if patient's blood pressure greater than 180/110 and she develops symptoms such as worsening chest pain or shortness of breath.

## 2025-04-09 NOTE — PLAN OF CARE
Problem: Adult Inpatient Plan of Care  Goal: Plan of Care Review  Outcome: Progressing  Goal: Patient-Specific Goal (Individualized)  Outcome: Progressing  Goal: Absence of Hospital-Acquired Illness or Injury  Outcome: Progressing  Goal: Optimal Comfort and Wellbeing  Outcome: Progressing  Goal: Readiness for Transition of Care  Outcome: Progressing     Problem: Skin Injury Risk Increased  Goal: Skin Health and Integrity  Outcome: Progressing     Problem: Wound  Goal: Optimal Coping  Outcome: Progressing  Goal: Optimal Functional Ability  Outcome: Progressing  Goal: Absence of Infection Signs and Symptoms  Outcome: Progressing  Goal: Improved Oral Intake  Outcome: Progressing  Goal: Optimal Pain Control and Function  Outcome: Progressing  Goal: Skin Health and Integrity  Outcome: Progressing  Goal: Optimal Wound Healing  Outcome: Progressing     Problem: Fall Injury Risk  Goal: Absence of Fall and Fall-Related Injury  Outcome: Progressing     Problem: Infection  Goal: Absence of Infection Signs and Symptoms  Outcome: Progressing

## 2025-04-09 NOTE — HPI
Ms. Holli Landrum is a 89 y.o. female who  has a medical history of Anticoagulant long-term use, Arthritis, Asthma, Basal cell carcinoma, Cancer, Cataract, CHF (congestive heart failure), COPD (chronic obstructive pulmonary disease), cpap, Essential hypertension, GERD (gastroesophageal reflux disease), Glaucoma, Hypertensive heart disease with heart failure, Paroxysmal atrial fibrillation, Paroxysmal ventricular tachycardia, Renal disorder, and Squamous cell carcinoma of skin.    She  presented to the ED for evaluation of persistent UTI concerns despite antibiotics(nitrofurantoin) course by her PCP.  She reports symptoms which include fatigue, generalized weakness, nausea, decreased appetite, pain around suprapubic catheter.    ED course notable for potassium 3.1, creatinine 1.8, urinalysis with Wbc >100, nitrite positive, bacteria moderate, leukocyte esterase 3+.

## 2025-04-09 NOTE — PROGRESS NOTES
Pharmacist Renal Dose Adjustment Note    Holli Landrum is a 89 y.o. female being treated with the medication famotidine.    Patient Data:    Vital Signs (Most Recent):  Temp: 98 °F (36.7 °C) (04/08/25 2123)  Pulse: 66 (04/09/25 0045)  Resp: 18 (04/09/25 0045)  BP: (!) 145/82 (04/09/25 0045)  SpO2: 95 % (04/09/25 0045) Vital Signs (72h Range):  Temp:  [98 °F (36.7 °C)]   Pulse:  [56-66]   Resp:  [18-20]   BP: (138-167)/(75-86)   SpO2:  [95 %-96 %]      Recent Labs   Lab 04/08/25 2141   CREATININE 1.8*     Serum creatinine: 1.8 mg/dL (H) 04/08/25 2141  Estimated creatinine clearance: 21.8 mL/min (A)    Famotidine 40 mg PO once every night will be changed to famotidine 20 mg PO once every other night for CrCl < 30 ml/min.    Pharmacist's Name: Ezekiel Mcbride  Pharmacist's Extension: [phone]

## 2025-04-09 NOTE — ASSESSMENT & PLAN NOTE
Creatine stable for now. BMP reviewed- noted Estimated Creatinine Clearance: 23.1 mL/min (A) (based on SCr of 1.7 mg/dL (H)). according to latest data. Based on current GFR, CKD stage is stage 4 - GFR 15-29.  Monitor UOP and serial BMP and adjust therapy as needed. Renally dose meds. Avoid nephrotoxic medications and procedures.

## 2025-04-09 NOTE — FIRST PROVIDER EVALUATION
Medical screening examination initiated.  I have conducted a focused provider triage encounter, findings are as follows:    Brief history of present illness:  Patient with a chronic suprapubic indwelling catheter was sent by primary care doctor for failed outpatient treatment of urinary tract infection.  She was prescribed Macrobid, and urine culture was obtained.     Vitals:    04/08/25 1954   BP: 138/86   BP Location: Right arm   Pulse: (!) 58   Resp: 18   Temp: 98 °F (36.7 °C)   TempSrc: Oral   SpO2: 96%       Pertinent physical exam:  Normotensive, afebrile.     Brief workup plan:  Repeat urinalysis, workup.     Preliminary workup initiated; this workup will be continued and followed by the physician or advanced practice provider that is assigned to the patient when roomed.  Urinalysis

## 2025-04-09 NOTE — ASSESSMENT & PLAN NOTE
Patient with paroxysmal (<7 days) atrial fibrillation which is controlled currently with Beta Blocker and Amiodarone.     [id number] Score: 4.     The patients heart rate in the last 24 hours is as follows:  Pulse  Min: 56  Max: 66     PLAN:  - Antiarrhythmics: amiodarone tablet 200 mg, Daily, Oral  metoprolol tartrate (LOPRESSOR) tablet 100 mg, 2 times daily, Oral  - Anticoagulants: apixaban tablet 2.5 mg, 2 times daily, Oral  - Replete electrolytes PRN to  target Magnesium > 2, Potassium > 4  - Continuous telemetry

## 2025-04-09 NOTE — ASSESSMENT & PLAN NOTE
Recent Lipid Panel reviewed--  Lab Results   Component Value Date    HDL 45 09/06/2024    LDLCALC 53.6 (L) 09/06/2024    TRIG 137 09/06/2024    CHOL 126 09/06/2024        -Home medications:   Hyperlipidemia Medications              rosuvastatin (CRESTOR) 40 MG Tab TAKE 1 TABLET BY MOUTH EVERY DAY            PLAN  Continue statin

## 2025-04-09 NOTE — ASSESSMENT & PLAN NOTE
History of recurrent UTI. Risk factor: suprapubic catheter. Presented to the the ED with concerns for persistent symptomatic UTI despite oral antibiotics(nitrofurantoin) course by PCP.    Recent Labs   Lab 04/08/25 2142   COLORU Yellow   APPEARANCEUA Cloudy*   PHUR 6.0   SPECGRAV 1.015   PROTEINUA 1+*   GLUCUA Negative   BILIRUBINUA Negative   OCCULTUA 2+*   NITRITE Positive*   UROBILINOGEN Negative   LEUKOCYTESUR 3+*   RBCUA 24*   WBCUA >100*   BACTERIA Moderate*   HYALINECASTS 3*     PLAN  -Followup urine cultures, deescalate antibiotics as appropriate  Antibiotics (From admission, onward)      Start     Stop Route Frequency Ordered    04/09/25 0800  piperacillin-tazobactam (ZOSYN) 4.5 g in D5W 100 mL IVPB (MB+)         -- IV Every 8 hours (non-standard times) 04/09/25 0601

## 2025-04-10 PROBLEM — R06.09 DOE (DYSPNEA ON EXERTION): Status: ACTIVE | Noted: 2025-04-10

## 2025-04-10 LAB — BACTERIA UR CULT: NORMAL

## 2025-04-10 NOTE — ASSESSMENT & PLAN NOTE
Creatine stable for now. BMP reviewed- noted Estimated Creatinine Clearance: 19.6 mL/min (A) (based on SCr of 2 mg/dL (H)). according to latest data. Based on current GFR, CKD stage is stage 4 - GFR 15-29.  Monitor UOP and serial BMP and adjust therapy as needed. Renally dose meds. Avoid nephrotoxic medications and procedures.  Hold lasix  Monitor BMP

## 2025-04-10 NOTE — PROGRESS NOTES
[facility] Medicine  Progress Note    Patient Name: Holli Landrum  MRN: [id number]  Patient Class: IP- Inpatient   Admission Date: 4/8/2025  Length of Stay: 1 days  Attending Physician: Karoline Wong MD  Primary Care Provider: Rolando Cannon MD        Subjective     Principal Problem:UTI (urinary tract infection)        HPI:  Ms. Holli Landrum is a 89 y.o. female who  has a medical history of Anticoagulant long-term use, Arthritis, Asthma, Basal cell carcinoma, Cancer, Cataract, CHF (congestive heart failure), COPD (chronic obstructive pulmonary disease), cpap, Essential hypertension, GERD (gastroesophageal reflux disease), Glaucoma, Hypertensive heart disease with heart failure, Paroxysmal atrial fibrillation, Paroxysmal ventricular tachycardia, Renal disorder, and Squamous cell carcinoma of skin.    She  presented to the ED for evaluation of persistent UTI concerns despite antibiotics(nitrofurantoin) course by her PCP.  She reports symptoms which include fatigue, generalized weakness, nausea, decreased appetite, pain around suprapubic catheter.    ED course notable for potassium 3.1, creatinine 1.8, urinalysis with Wbc >100, nitrite positive, bacteria moderate, leukocyte esterase 3+.    Overview/Hospital Course:  She was continued on empiric IV Zosyn pending urine culture.  Suprapubic cath exchanged by RN 04/09/2024    Interval History:  No acute events overnight.  Patient is seen and examined with daughter at bedside.  Nursing also at bedside.  Patient reports some shortness of breath as she was not receiving her home Breo.  Denies cough.  Denies abdominal pain.  Was able to have bowel movement yesterday.    Review of Systems  Objective:     Vital Signs (Most Recent):  Temp: 97.6 °F (36.4 °C) (04/10/25 1239)  Pulse: 76 (04/10/25 1239)  Resp: 20 (04/10/25 1239)  BP: 120/68 (04/10/25 1239)  SpO2: 96 % (04/10/25 1239) Vital Signs (24h Range):  Temp:  [97 °F (36.1 °C)-98.7 °F (37.1 °C)] 97.6 °F  (36.4 °C)  Pulse:  [54-76] 76  Resp:  [14-20] 20  SpO2:  [93 %-97 %] 96 %  BP: (120-147)/(64-70) 120/68     Weight: 84.1 kg (185 lb 6.5 oz)  Body mass index is 30.85 kg/m².    Intake/Output Summary (Last 24 hours) at 4/10/2025 1508  Last data filed at 4/10/2025 1330  Gross per 24 hour   Intake 539.36 ml   Output 2140 ml   Net -1600.64 ml         Physical Exam  Vitals and nursing note reviewed.   Constitutional:       General: She is not in acute distress.     Appearance: She is not ill-appearing.   Cardiovascular:      Rate and Rhythm: Normal rate and regular rhythm.      Heart sounds: No murmur heard.  Pulmonary:      Effort: Pulmonary effort is normal.      Breath sounds: Normal breath sounds. No wheezing, rhonchi or rales.   Abdominal:      General: Bowel sounds are normal. There is no distension.      Palpations: Abdomen is soft.      Tenderness: There is no abdominal tenderness.   Musculoskeletal:      Right lower leg: No edema.      Left lower leg: No edema.   Neurological:      Mental Status: She is alert. Mental status is at baseline.               Significant Labs: All pertinent labs within the past 24 hours have been reviewed.    Significant Imaging: I have reviewed all pertinent imaging results/findings within the past 24 hours.      Assessment & Plan  UTI (urinary tract infection)  Suprapubic catheter  Recurrent UTI  History of recurrent UTI. Risk factor: suprapubic catheter. Presented to the the ED with concerns for persistent symptomatic UTI despite oral antibiotics(nitrofurantoin) course by PCP.  - Suprapubic cath exchanged 04/09  - continue IV Zosyn pending urine cx        Paroxysmal atrial fibrillation  Patient with paroxysmal (<7 days) atrial fibrillation which is controlled currently with Beta Blocker and Amiodarone.     [id number] Score: 4.     The patients heart rate in the last 24 hours is as follows:  Pulse  Min: 54  Max: 76     PLAN:  - Antiarrhythmics: amiodarone tablet 200 mg, Daily,  Oral  metoprolol tartrate (LOPRESSOR) tablet 100 mg, 2 times daily, Oral  - Anticoagulants: apixaban tablet 2.5 mg, 2 times daily, Oral  - Replete electrolytes PRN to  target Magnesium > 2, Potassium > 4  - Continuous telemetry  Hypertensive heart disease with heart failure  - Continue home meds  - hold PO lasix   - monitor BP     Hypercholesteremia  Continue statin     CKD (chronic kidney disease) stage 3, GFR 30-59 ml/min  Creatine stable for now. BMP reviewed- noted Estimated Creatinine Clearance: 19.6 mL/min (A) (based on SCr of 2 mg/dL (H)). according to latest data. Based on current GFR, CKD stage is stage 4 - GFR 15-29.  Monitor UOP and serial BMP and adjust therapy as needed. Renally dose meds. Avoid nephrotoxic medications and procedures.  Hold lasix  Monitor BMP   Asthma  Chronic.  Stable  Schedule duonebs q12h scheduled,q6h PRN   Cont home Breo     Hypokalemia  Patient's most recent potassium results are listed below.   Recent Labs     04/08/25  2141 04/09/25  0537 04/10/25  0628   K 3.1* 2.8* 3.8     Plan  - Replete potassium per protocol  - Monitor potassium Daily  - Patient's hypokalemia is improving  - Monitor BMP; replete PRN     VTE Risk Mitigation (From admission, onward)           Ordered     apixaban tablet 2.5 mg  2 times daily         04/09/25 0101                    Discharge Planning   SCOTT: 4/11/2025     Code Status: Full Code   Medical Readiness for Discharge Date:   Discharge Plan A: Home Health                        Karoline Wong MD  Department of Hospital Medicine   [facility] - Med S[facility]

## 2025-04-10 NOTE — PLAN OF CARE
Problem: Adult Inpatient Plan of Care  Goal: Patient-Specific Goal (Individualized)  Outcome: Progressing     Problem: Adult Inpatient Plan of Care  Goal: Plan of Care Review  Outcome: Progressing     Problem: Skin Injury Risk Increased  Goal: Skin Health and Integrity  Outcome: Progressing          Problem: Wound  Goal: Optimal Functional Ability  Outcome: Progressing   Purposeful rounding   Call light within reach   Bed in lowest position  Free from falls  Meds given per doc order   Abx given per doc order  Chart check complete

## 2025-04-10 NOTE — ASSESSMENT & PLAN NOTE
History of recurrent UTI. Risk factor: suprapubic catheter. Presented to the the ED with concerns for persistent symptomatic UTI despite oral antibiotics(nitrofurantoin) course by PCP.  - Suprapubic cath exchanged 04/09  - continue IV Zosyn pending urine cx

## 2025-04-10 NOTE — SUBJECTIVE & OBJECTIVE
Interval History:  No acute events overnight.  Patient is seen and examined with daughter at bedside.  Nursing also at bedside.  Patient reports some shortness of breath as she was not receiving her home Breo.  Denies cough.  Denies abdominal pain.  Was able to have bowel movement yesterday.    Review of Systems  Objective:     Vital Signs (Most Recent):  Temp: 97.6 °F (36.4 °C) (04/10/25 1239)  Pulse: 76 (04/10/25 1239)  Resp: 20 (04/10/25 1239)  BP: 120/68 (04/10/25 1239)  SpO2: 96 % (04/10/25 1239) Vital Signs (24h Range):  Temp:  [97 °F (36.1 °C)-98.7 °F (37.1 °C)] 97.6 °F (36.4 °C)  Pulse:  [54-76] 76  Resp:  [14-20] 20  SpO2:  [93 %-97 %] 96 %  BP: (120-147)/(64-70) 120/68     Weight: 84.1 kg (185 lb 6.5 oz)  Body mass index is 30.85 kg/m².    Intake/Output Summary (Last 24 hours) at 4/10/2025 1508  Last data filed at 4/10/2025 1330  Gross per 24 hour   Intake 539.36 ml   Output 2140 ml   Net -1600.64 ml         Physical Exam  Vitals and nursing note reviewed.   Constitutional:       General: She is not in acute distress.     Appearance: She is not ill-appearing.   Cardiovascular:      Rate and Rhythm: Normal rate and regular rhythm.      Heart sounds: No murmur heard.  Pulmonary:      Effort: Pulmonary effort is normal.      Breath sounds: Normal breath sounds. No wheezing, rhonchi or rales.   Abdominal:      General: Bowel sounds are normal. There is no distension.      Palpations: Abdomen is soft.      Tenderness: There is no abdominal tenderness.   Musculoskeletal:      Right lower leg: No edema.      Left lower leg: No edema.   Neurological:      Mental Status: She is alert. Mental status is at baseline.               Significant Labs: All pertinent labs within the past 24 hours have been reviewed.    Significant Imaging: I have reviewed all pertinent imaging results/findings within the past 24 hours.

## 2025-04-10 NOTE — ASSESSMENT & PLAN NOTE
Patient with paroxysmal (<7 days) atrial fibrillation which is controlled currently with Beta Blocker and Amiodarone.     [id number] Score: 4.     The patients heart rate in the last 24 hours is as follows:  Pulse  Min: 54  Max: 76     PLAN:  - Antiarrhythmics: amiodarone tablet 200 mg, Daily, Oral  metoprolol tartrate (LOPRESSOR) tablet 100 mg, 2 times daily, Oral  - Anticoagulants: apixaban tablet 2.5 mg, 2 times daily, Oral  - Replete electrolytes PRN to  target Magnesium > 2, Potassium > 4  - Continuous telemetry

## 2025-04-10 NOTE — HOSPITAL COURSE
She was continued on empiric IV Zosyn pending urine culture.  Suprapubic cath exchanged by RN 04/09/2024    Urine culture returned with 10-49K CFU ESBL Klebsiella pneumoniae (sensitive to ertapenem, gentamicin, meropenem, tobramycin).  She was transitioned from IV Zosyn to p.o. fosfomycin x1 dose prior to discharge.  Patient is seen and examined on day of discharge and appears stable for discharge with home health and family per my exam.  Follow up with PCP within 3-5 days ([facility] NP at home referral sent), follow up with Urology Dr. Carrington within 1-2 weeks.

## 2025-04-10 NOTE — PLAN OF CARE
Discussed poc with pt, pt verbalized understanding    Purposeful rounding every 2hours    VS wnl  Fall precautions in place, remains injury free  Pt denies c/o pain    Accurate I&Os  Abx given as prescribed  Bed locked at lowest position  Call light within reach    Chart check complete  Will cont with POC

## 2025-04-10 NOTE — PROGRESS NOTES
Pharmacist Renal Dose Adjustment Note    Holli Landrum is a 89 y.o. female being treated with the medication Zosyn 4.5 mg    Patient Data:    Vital Signs (Most Recent):  Temp: 97.9 °F (36.6 °C) (04/10/25 0824)  Pulse: 73 (04/10/25 0824)  Resp: 18 (04/10/25 0338)  BP: 137/70 (04/10/25 0824)  SpO2: 96 % (04/10/25 0824) Vital Signs (72h Range):  Temp:  [97 °F (36.1 °C)-98.7 °F (37.1 °C)]   Pulse:  [54-76]   Resp:  [14-20]   BP: (129-175)/(63-91)   SpO2:  [92 %-97 %]      Recent Labs   Lab 04/08/25  2141 04/09/25  0537 04/10/25  0628   CREATININE 1.8* 1.7* 2.0*     Serum creatinine: 2 mg/dL (H) 04/10/25 0628  Estimated creatinine clearance: 19.6 mL/min (A)    Medication:Zosyn dose: 4.5 g frequency q8h will be changed to medication:Zosyn dose:4.5 g frequency:q12h    Pharmacist's Name: William Nazario  Pharmacist's Extension: [phone]

## 2025-04-10 NOTE — ASSESSMENT & PLAN NOTE
Patient's most recent potassium results are listed below.   Recent Labs     04/08/25  2141 04/09/25  0537 04/10/25  0628   K 3.1* 2.8* 3.8     Plan  - Replete potassium per protocol  - Monitor potassium Daily  - Patient's hypokalemia is improving  - Monitor BMP; replete PRN

## 2025-04-10 NOTE — PLAN OF CARE
04/10/25 1118   Rounds   Attendance Provider;;Charge nurse;Physical therapist   Discharge Plan A Home Health   Why the patient remains in the hospital Requires continued medical care   Transition of Care Barriers None     Sw to arrange HH through [facility] once medically cleared for d/c.

## 2025-04-11 ENCOUNTER — TELEPHONE (OUTPATIENT)
Dept: HOME HEALTH SERVICES | Facility: CLINIC | Age: OVER 89
End: 2025-04-11
Payer: MEDICARE

## 2025-04-11 NOTE — PROGRESS NOTES
Patient currently admitted to the hospital.  Please note patient has a catheter present with recurrent UTIs and multiple antibiotic resistant infections.  Further urinary symptoms and UTI as needed to be managed by Urology.  Patient needs to follow-up with Urology last visit with them on file is 2023.The patient's last visit with me was on 8/14/2024.

## 2025-04-11 NOTE — ASSESSMENT & PLAN NOTE
History of recurrent UTI. Risk factor: suprapubic catheter. Presented to the the ED with concerns for persistent symptomatic UTI despite oral antibiotics(nitrofurantoin) course by PCP.  - Suprapubic cath exchanged 04/09  - continue IV Zosyn pending urine cx   -urine culture preliminary with 10-49 K CFU GNR--speciation and sensitivities pending  -outpatient follow-up with Urology Dr. Carrington as previously scheduled

## 2025-04-11 NOTE — PROGRESS NOTES
Pharmacist Renal Dose Adjustment Note    Holli Landrum is a 89 y.o. female being treated with the medication Zosyn.    Patient Data:    Vital Signs (Most Recent):  Temp: 98.3 °F (36.8 °C) (04/11/25 0755)  Pulse: 75 (04/11/25 0734)  Resp: 18 (04/11/25 0734)  BP: (!) 154/89 (04/11/25 0443)  SpO2: (!) 94 % (04/11/25 0734) Vital Signs (72h Range):  Temp:  [95.2 °F (35.1 °C)-98.7 °F (37.1 °C)]   Pulse:  [54-76]   Resp:  [14-20]   BP: (120-175)/(63-91)   SpO2:  [92 %-97 %]      Recent Labs   Lab 04/09/25  0537 04/10/25  0628 04/11/25  0658   CREATININE 1.7* 2.0* 1.8*     Serum creatinine: 1.8 mg/dL (H) 04/11/25 0658  Estimated creatinine clearance: 21.8 mL/min (A)    Zosyn 4.5 g IV q12h will be changed to Zosyn 4.5 g IV q8h per pharmacy renal dosing protocol for CrCl > 20 mL/min.    Pharmacist's Name: Marcia Brown

## 2025-04-11 NOTE — SUBJECTIVE & OBJECTIVE
Interval History:  No acute events overnight.  Patient is seen and examined with daughter at bedside.  She has no complaints today.  Denies abdominal pain.  Shortness of breath is resolved after receiving neb treatment as well as Breo inhaler.  Awaiting urine culture    Review of Systems  Objective:     Vital Signs (Most Recent):  Temp: 98.3 °F (36.8 °C) (04/11/25 0755)  Pulse: 75 (04/11/25 0734)  Resp: 18 (04/11/25 0734)  BP: (!) 154/89 (04/11/25 0443)  SpO2: (!) 94 % (04/11/25 0734) Vital Signs (24h Range):  Temp:  [95.2 °F (35.1 °C)-98.7 °F (37.1 °C)] 98.3 °F (36.8 °C)  Pulse:  [68-76] 75  Resp:  [16-20] 18  SpO2:  [92 %-96 %] 94 %  BP: (120-170)/(68-89) 154/89     Weight: 84.1 kg (185 lb 6.5 oz)  Body mass index is 30.85 kg/m².    Intake/Output Summary (Last 24 hours) at 4/11/2025 1113  Last data filed at 4/11/2025 0629  Gross per 24 hour   Intake 319.38 ml   Output 1540 ml   Net -1220.62 ml         Physical Exam  Vitals and nursing note reviewed.   Constitutional:       General: She is not in acute distress.     Appearance: She is not ill-appearing.   Cardiovascular:      Rate and Rhythm: Normal rate and regular rhythm.      Heart sounds: No murmur heard.  Pulmonary:      Effort: Pulmonary effort is normal.      Breath sounds: Normal breath sounds. No wheezing, rhonchi or rales.   Abdominal:      General: Bowel sounds are normal. There is no distension.      Palpations: Abdomen is soft.      Tenderness: There is no abdominal tenderness.   Genitourinary:     Comments: Suprapubic cath in place  Musculoskeletal:      Right lower leg: No edema.      Left lower leg: No edema.   Neurological:      Mental Status: She is alert. Mental status is at baseline.               Significant Labs: All pertinent labs within the past 24 hours have been reviewed.    Significant Imaging: I have reviewed all pertinent imaging results/findings within the past 24 hours.

## 2025-04-11 NOTE — ASSESSMENT & PLAN NOTE
Patient with paroxysmal (<7 days) atrial fibrillation which is controlled currently with Beta Blocker and Amiodarone.     [id number] Score: 4.     The patients heart rate in the last 24 hours is as follows:  Pulse  Min: 55  Max: 75     PLAN:  - Antiarrhythmics: amiodarone tablet 200 mg, Daily, Oral  metoprolol tartrate (LOPRESSOR) tablet 100 mg, 2 times daily, Oral  - Anticoagulants: apixaban tablet 2.5 mg, 2 times daily, Oral  - Replete electrolytes PRN to  target Magnesium > 2, Potassium > 4  - Continuous telemetry

## 2025-04-11 NOTE — PLAN OF CARE
Pt being discharged Home in stable condition. IV removed, catheter intact, pt tolerated well. Discharge instructions given to pt, pt verbalized understanding.

## 2025-04-11 NOTE — DISCHARGE SUMMARY
[facility] Medicine  Discharge Summary      Patient Name: Holli Landrum  MRN: [id number]  CANDI: [id number]  Patient Class: IP- Inpatient  Admission Date: 4/8/2025  Hospital Length of Stay: 2 days  Discharge Date and Time: 04/11/2025 3:00 PM  Attending Physician: Karoline Wong MD   Discharging Provider: Karoline Wong MD  Primary Care Provider: Rolando Cannon MD    Primary Care Team: Networked reference to record PCT     HPI:   Ms. Holli Landrum is a 89 y.o. female who  has a medical history of Anticoagulant long-term use, Arthritis, Asthma, Basal cell carcinoma, Cancer, Cataract, CHF (congestive heart failure), COPD (chronic obstructive pulmonary disease), cpap, Essential hypertension, GERD (gastroesophageal reflux disease), Glaucoma, Hypertensive heart disease with heart failure, Paroxysmal atrial fibrillation, Paroxysmal ventricular tachycardia, Renal disorder, and Squamous cell carcinoma of skin.    She  presented to the ED for evaluation of persistent UTI concerns despite antibiotics(nitrofurantoin) course by her PCP.  She reports symptoms which include fatigue, generalized weakness, nausea, decreased appetite, pain around suprapubic catheter.    ED course notable for potassium 3.1, creatinine 1.8, urinalysis with Wbc >100, nitrite positive, bacteria moderate, leukocyte esterase 3+.    * No surgery found *      Hospital Course:   She was continued on empiric IV Zosyn pending urine culture.  Suprapubic cath exchanged by RN 04/09/2024    Urine culture returned with 10-49K CFU ESBL Klebsiella pneumoniae (sensitive to ertapenem, gentamicin, meropenem, tobramycin).  She was transitioned from IV Zosyn to p.o. fosfomycin x1 dose prior to discharge.  Patient is seen and examined on day of discharge and appears stable for discharge with home health and family per my exam.  Follow up with PCP within 3-5 days ([facility] referral sent), follow up with Urology Dr. Carrington within 1-2 weeks.      Goals of Care Treatment Preferences:  Code Status: Full Code    Living Will: Yes              SDOH Screening:  The patient declined to be screened for utility difficulties, food insecurity, transport difficulties, housing insecurity, and interpersonal safety, so no concerns could be identified this admission.     Consults:     Assessment & Plan  UTI (urinary tract infection)  Suprapubic catheter  Recurrent UTI  History of recurrent UTI. Risk factor: suprapubic catheter. Presented to the the ED with concerns for persistent symptomatic UTI despite oral antibiotics(nitrofurantoin) course by PCP.  - Suprapubic cath exchanged 04/09  - Urine cx with 10-49K CFU esbl Klebsiella- treated with Fosfomycin x 1 dose prior to discharge   -outpatient follow-up with Urology Dr. Carrington as previously scheduled       Paroxysmal atrial fibrillation  Patient with paroxysmal (<7 days) atrial fibrillation which is controlled currently with Beta Blocker and Amiodarone.     UCLUR3GACt Score: 4.     The patients heart rate in the last 24 hours is as follows:  Pulse  Min: 55  Max: 75     PLAN:  - Antiarrhythmics: amiodarone tablet 200 mg, Daily, Oral  metoprolol tartrate (LOPRESSOR) tablet 100 mg, 2 times daily, Oral  - Anticoagulants: apixaban tablet 2.5 mg, 2 times daily, Oral  - Replete electrolytes PRN to  target Magnesium > 2, Potassium > 4  - Continuous telemetry  Hypertensive heart disease with heart failure  - Continue home meds  - resume PO lasix in a.m.  - monitor BP     Hypercholesteremia  Continue statin     CKD (chronic kidney disease) stage 3, GFR 30-59 ml/min  Creatine stable for now. BMP reviewed- noted Estimated Creatinine Clearance: 21.8 mL/min (A) (based on SCr of 1.8 mg/dL (H)). according to latest data. Based on current GFR, CKD stage is stage 4 - GFR 15-29.  Monitor UOP and serial BMP and adjust therapy as needed. Renally dose meds. Avoid nephrotoxic medications and procedures.  Resume Lasix in a.m. as creatinine  improved  Monitor BMP   Asthma  Chronic.  Stable  Continue duonebs q12h scheduled,q6h PRN   Cont home Breo     Hypokalemia  Patient's most recent potassium results are listed below.   Recent Labs     04/09/25  0537 04/10/25  0628 04/11/25  0658   K 2.8* 3.8 3.4*     Plan  - Replete potassium per protocol  - Monitor potassium Daily  - Patient's hypokalemia is improving  - Monitor BMP; replete PRN     Final Active Diagnoses:    Diagnosis Date Noted POA    PRINCIPAL PROBLEM:  UTI (urinary tract infection) [N39.0] 08/01/2024 Yes    Recurrent UTI [N39.0] 04/09/2025 Yes    Asthma [J45.909] 08/01/2024 Yes    Suprapubic catheter [Z93.59] 10/10/2023 Not Applicable    CKD (chronic kidney disease) stage 3, GFR 30-59 ml/min [N18.30] 01/03/2020 Yes    Hypokalemia [E87.6] 07/11/2018 Yes    Hypercholesteremia [E78.00] 01/07/2015 Yes    Hypertensive heart disease with heart failure [I11.0] 02/05/2013 Yes    Paroxysmal atrial fibrillation [I48.0] 11/20/2012 Yes      Problems Resolved During this Admission:       Discharged Condition:  Good     Disposition: Home-Health Care [facility]    Follow Up:   Contact information for follow-up providers       Rolando Cannon MD. Schedule an appointment as soon as possible for a visit in 3 day(s).    Specialty: Family Medicine  Contact information:  [address] [facility] [address]  [phone]               Jude Carrington MD. Schedule an appointment as soon as possible for a visit.    Specialty: Urology  Contact information:  [address], [address]  [facility] [address]  [phone]                       Contact information for after-discharge care       Home Medical Care       [facility] .    Service: Home Rehabilitation  Contact information:  [address], [address]  [facility] [id number] [phone]                                 Patient Instructions:      ACCEPT - Ambulatory referral/consult to [facility]    Standing Status: Future   Referral Priority: Routine Referral Type: Consultation   Referral Reason: Specialty Services Required   Requested Specialty: Cardiology   Number of Visits Requested: 1     Ambulatory referral/consult to [facility]   Standing Status: Future   Referral Priority: Routine Referral Type: Consultation   Referral Reason: Specialty Services Required   Number of Visits Requested: 1     Diet Cardiac     SUBSEQUENT HOME HEALTH ORDERS   Order Comments: [facility]      Order Specific Question Answer Comments   What Home Health Agency is the patient currently using? [facility]      Notify your health care provider if you experience any of the following:  temperature >100.4     Notify your health care provider if you experience any of the following:  redness, tenderness, or signs of infection (pain, swelling, redness, odor or green/yellow discharge around incision site)     Activity as tolerated       Significant Diagnostic Studies:  See Hospital Course     Pending Diagnostic Studies:       None           Medications:  Reconciled Home Medications:      Medication List        CONTINUE taking these medications      ACIDOPHILUS PROBIOTIC BLEND 175 mg Cap  Generic drug: L.acidoph,saliva-B.bif-S.therm  Take 1 capsule by mouth every morning.     albuterol 90 mcg/actuation inhaler  Commonly known as: PROVENTIL/VENTOLIN HFA  Inhale 1-2 puffs into the lungs every 4 (four) hours as needed for Wheezing or Shortness of Breath. Rescue     albuterol-ipratropium 2.5 mg-0.5 mg/3 mL nebulizer solution  Commonly known as: DUO-NEB  Take 3 mLs by nebulization every 6 (six) hours as needed for Wheezing or Shortness of Breath.     amiodarone 200 MG Tab  Commonly known as: PACERONE  Take 1 tablet (200 mg total) by mouth once daily.     apixaban 2.5 mg Tab  Commonly known as: ELIQUIS  Take 1 tablet (2.5 mg total) by mouth 2 (two) times daily.     cholecalciferol (vitamin D3) 125 mcg (5,000  unit) Tab  Take 5,000 Units by mouth once daily.     cloNIDine 0.1 MG tablet  Commonly known as: CATAPRES  Take 1 tablet (0.1 mg total) by mouth every 6 (six) hours as needed (take if BP is over 180/100).     dorzolamide 2 % ophthalmic solution  Commonly known as: TRUSOPT  INSTILL 1 DROP INTO BOTH EYES TWICE DAILY     famotidine 40 MG tablet  Commonly known as: PEPCID  TAKE ONE TABLET BY MOUTH EVERY NIGHT AT BEDTIME     fluticasone furoate-vilanteroL 100-25 mcg/dose diskus inhaler  Commonly known as: BREO ELLIPTA  Inhale 1 puff into the lungs once daily.     fluticasone propionate 50 mcg/actuation nasal spray  Commonly known as: FLONASE  2 sprays (100 mcg total) by Each Nare route daily as needed for Allergies.     furosemide 40 MG tablet  Commonly known as: LASIX  Take 1 tablet (40 mg total) by mouth once daily. Do not take if BP is below 110/70; recommend doubling up dose if BP is over 140/90     GEMTESA 75 mg Tab  Generic drug: vibegron  Take 1 tablet by mouth every morning.     Lactobacillus rhamnosus GG 10 billion cell capsule  Commonly known as: CULTURELLE  Take 1 capsule by mouth once daily.     latanoprost 0.005 % ophthalmic solution  Place 1 drop into both eyes every evening.     loratadine 10 mg tablet  Commonly known as: CLARITIN  Take 1 tablet (10 mg total) by mouth once daily.     lutein-zeaxanthin extract 15-0.7 mg Cap  Take 1 capsule by mouth every morning.     metoprolol tartrate 100 MG tablet  Commonly known as: LOPRESSOR  Take 1 tablet (100 mg total) by mouth 2 (two) times daily.     montelukast 10 mg tablet  Commonly known as: SINGULAIR  Take 1 tablet (10 mg total) by mouth every evening.     nitroGLYCERIN 0.4 MG/HR TD PT24 0.4 mg/hr  Commonly known as: NITRODUR  Place 1 patch onto the skin once daily.     nystatin cream  Commonly known as: MYCOSTATIN  Apply topically 2 (two) times daily. for 7 days     potassium chloride 10 MEQ Tbsr  Commonly known as: KLOR-CON  TAKE TWO TABLETS BY MOUTH EVERY  MORNING     rosuvastatin 40 MG Tab  Commonly known as: CRESTOR  TAKE 1 TABLET BY MOUTH EVERY DAY            STOP taking these medications      nitrofurantoin (macrocrystal-monohydrate) 100 MG capsule  Commonly known as: MACROBID              Indwelling Lines/Drains at time of discharge:   Lines/Drains/Airways       Drain  Duration                  Suprapubic Catheter 04/09/25 1235 silicone coated 18 Fr. 2 days                    Time spent on the discharge of patient: 28 minutes         Karoline Wong MD  Department of Hospital Medicine  [facility] S[facility]

## 2025-04-11 NOTE — PT/OT/SLP EVAL
"Physical Therapy Evaluation    Patient Name:  Holli Landrum   MRN:  [id number]    Recommendations:     Discharge Recommendations: Low Intensity Therapy (24 HOUR CARE)   Discharge Equipment Recommendations: none   Barriers to discharge: None    Assessment:     Holli Landrum is a 89 y.o. female admitted with a medical diagnosis of UTI (urinary tract infection).  She presents with the following impairments/functional limitations: weakness, impaired endurance, impaired self care skills, impaired functional mobility, gait instability, decreased lower extremity function, decreased upper extremity function, impaired coordination, decreased ROM, pain, impaired cognition, impaired balance .    Rehab Prognosis: Good; patient would benefit from acute skilled PT services to address these deficits and reach maximum level of function.    Recent Surgery: * No surgery found *      Plan:     During this hospitalization, patient to be seen 3 x/week to address the identified rehab impairments via gait training, therapeutic activities, therapeutic exercises and progress toward the following goals:    Plan of Care Expires:  04/25/25    Subjective     Chief Complaint: ABD PAIN   Patient/Family Comments/goals: INC STRENGTH  Pain/Comfort:  Pain Rating 1: 5/10  Location 1: abdomen  Pain Rating Post-Intervention 1: 5/10    Patients cultural, spiritual, Judaism conflicts given the current situation:      Living Environment:  PT LIVES AT HOME WITH DAUGHTER IN A TRAILER WITH RAMP TO ENTER HOME   Prior to admission, patients level of function was " MY DAUGHTER PRETTY MUCH PICKS ME UP AND PUTS ME IN THE WC A LOT OF TIMES. " .  Equipment used at home: wheelchair, walker, rolling, rollator, bedside commode, shower chair.  DME owned (not currently used):  ROLLATOR .  Upon discharge, patient will have assistance from FAMILY .    Objective:     Communicated with NURSE AND EPIC CHART REVIEW  prior to session.  Patient found supine with telemetry, " "peripheral IV, oxygen  upon PT entry to room.    General Precautions: Standard, contact, fall  Orthopedic Precautions:N/A   Braces: N/A  Respiratory Status: Nasal cannula, flow 1 L/min    Exams:  Cognitive Exam:  Patient is oriented to Person, Place, Time, and Situation  RLE ROM: LIMITED  RLE Strength: LIMITED  LLE ROM: LIMITED  LLE Strength: LIMITED    Functional Mobility:  Bed Mobility:     Rolling Left:  minimum assistance  Scooting: minimum assistance  Supine to Sit: minimum assistance  Transfers:     Sit to Stand:  minimum assistance with rolling walker  Bed to Chair: minimum assistance with  rolling walker  using  Stand Pivot  Gait: UNABLE       AM-PAC 6 CLICK MOBILITY  Total Score:14       Treatment & Education:  GT. BELT AND  SOCKS DONNED PRIOR TO OOB MOBILITY.  PT EDUCATED ON "CALL DON'T FALL", ENCOURAGED TO CALL FOR ASSISTANCE WITH ALL NEEDS FOR OOB MOBILITY.  PT SEATED IN CHAIR AND EDUCATED ON ROLE OF P.T. AND REC FOR CONT P.T. @ D/C     Patient left up in chair with call button in reach and chair alarm on.    GOALS:   Multidisciplinary Problems       Physical Therapy Goals          Problem: Physical Therapy    Goal Priority Disciplines Outcome Interventions   Physical Therapy Goal     PT, PT/OT     Description: LT25  1. PT WILL COMPLETE BED MOBILITY  WITH SBA  2. PT WILL STAND PIVOT T/F TO CHAIR WITH RW AND CGA FOR OOB TOLERANCE  3. PT WILL COMPLETE B LE TE X 20 REPS FOR STRENGTHENING.   4. PT WILL INC AMPAC SCORE BY 2 POINTS TO PROGRESS GROSS FUNC MOBILITY.                          DME Justifications:  No DME recommended requiring DME justifications    History:     Past Medical History:   Diagnosis Date    Anticoagulant long-term use     Arthritis     Asthma     Basal cell carcinoma     Cancer     skin cancer to face    Cataract     OU done//    CHF (congestive heart failure)     COPD (chronic obstructive pulmonary disease)     no oxygen; patient denies    cpap     Essential hypertension " 05/05/2010    GERD (gastroesophageal reflux disease) 11/20/2012    Glaucoma     Hypertensive heart disease with heart failure 02/05/2013    Paroxysmal atrial fibrillation     Paroxysmal ventricular tachycardia     per problem list    Preop cardiovascular exam 1/26/2024    Renal disorder     french-1/3/2020    Squamous cell carcinoma of skin        Past Surgical History:   Procedure Laterality Date    A-V CARDIAC PACEMAKER INSERTION  06/16/2021    Procedure: INSERTION, CARDIAC PACEMAKER, DUAL CHAMBER;  Surgeon: Oscar Sommers MD;  Location: [facility] CATH;  Service: Cardiovascular;;    ADENOIDECTOMY  191944    APPENDECTOMY  1948    Because of Ovarian Cyst surgery    BREAST BIOPSY Left 1995    neg    BREAST BIOPSY Right 1984    neg    BREAST BIOPSY Right 1992    neg    BREAST SURGERY      A number of biopsies    CARDIAC CATHETERIZATION  2013, 2014,2016, 2020    has 9 stents    CARDIAC SURGERY  2012    stents    CATARACT EXTRACTION W/  INTRAOCULAR LENS IMPLANT Left 11/01/2018    Dr Rose    CATARACT EXTRACTION W/  INTRAOCULAR LENS IMPLANT Right 12/13/2018    Dr Rose//    CHOLECYSTECTOMY      COLONOSCOPY  ~2005    Dr. Edward; normal per patient report    COLONOSCOPY N/A 09/06/2022    Procedure: COLONOSCOPY 8/31/22-note in Dr Simms's office visit note that he spoke to her cardiologist and she was viable candidate for endoscopy;  Surgeon: Cordelia Bueno MD;  Location: [facility] ENDO;  Service: Endoscopy;  Laterality: N/A;    CORONARY ANGIOGRAPHY N/A 03/20/2020    Procedure: ANGIOGRAM, CORONARY ARTERY;  Surgeon: Chuy Bryant MD;  Location: [facility] CATH;  Service: Cardiology;  Laterality: N/A;    CYSTOSCOPY W/ RETROGRADES Bilateral 02/12/2020    Procedure: CYSTOSCOPY, WITH RETROGRADE PYELOGRAM;  Surgeon: Gasper Feliz MD;  Location: [facility] OR;  Service: Urology;  Laterality: Bilateral;    ESOPHAGOGASTRODUODENOSCOPY N/A 08/13/2020    Procedure: EGD (ESOPHAGOGASTRODUODENOSCOPY);  Surgeon: Mika Solorzano MD;  [facility];  Service: Endoscopy;  Laterality: N/A; Mild Schatzki ring. Biopsied. Dilated. small hiatal hernia, gastritis; biopsy: esophagus- SEVERE REFLUX ESOPHAGITIS, stomach- chronic gastritis, negative for H pylori    ESOPHAGOGASTRODUODENOSCOPY N/A 10/22/2020    Procedure: EGD (ESOPHAGOGASTRODUODENOSCOPY);  Surgeon: Fred Hendricks MD;  Location: [facility] ENDO;  Service: Endoscopy;  Laterality: N/A;    ESOPHAGOGASTRODUODENOSCOPY N/A 03/21/2024    Procedure: EGD (ESOPHAGOGASTRODUODENOSCOPY);  Surgeon: Mika Solorzano MD;  Location: [facility] ENDO;  Service: Gastroenterology;  Laterality: N/A;    EYE SURGERY  2017    Cataracs    FRACTIONAL FLOW RESERVE (FFR), CORONARY  06/20/2023    Procedure: Fractional Flow [place] (FFR), Coronary;  Surgeon: Brice Campuzano MD;  Location: [facility] CATH LAB;  Service: Cardiology;;    HYSTERECTOMY  1969    INSERTION OF CATHETER INTO CAUDAL EPIDURAL SPACE      sp cath    INSTANTANEOUS WAVE-FREE RATIO (IFR) N/A 06/20/2023    Procedure: Instantaneous Wave-Free Ratio (IFR);  Surgeon: Brice Campuzano MD;  Location: [facility] CATH LAB;  Service: Cardiology;  Laterality: N/A;    LEFT HEART CATHETERIZATION Left 03/03/2020    Procedure: Left heart cath;  Surgeon: Chuy Bryant MD;  Location: [facility] CATH;  Service: Cardiology;  Laterality: Left;    LEFT HEART CATHETERIZATION Left 03/20/2020    Procedure: Left heart cath;  Surgeon: Chuy Bryant MD;  Location: [facility] CATH;  Service: Cardiology;  Laterality: Left;    LEFT HEART CATHETERIZATION N/A 06/20/2023    Procedure: Left heart cath;  Surgeon: Brice Campuzano MD;  Location: [facility] CATH LAB;  Service: Cardiology;  Laterality: N/A;    LEFT HEART CATHETERIZATION  4/1/2025    Procedure: Coronary angiogram study;  Surgeon: Oscar Dyer MD;  Location: [facility] CATH;  Service: Cardiovascular;;    OVARIAN CYST REMOVAL  teenager    PHACOEMULSIFICATION OF CATARACT Left 11/01/2018    Procedure: PHACOEMULSIFICATION, CATARACT;  Surgeon: Aleksandar  Milton Gillis MD;  Location: [facility] OR;  Service: Ophthalmology;  Laterality: Left;    PHACOEMULSIFICATION OF CATARACT Right 12/13/2018    Procedure: PHACOEMULSIFICATION, CATARACT;  Surgeon: Aleksandar Rose Jr., MD;  Location: [facility] OR;  Service: Ophthalmology;  Laterality: Right;    TONSILLECTOMY      aw/denoids    TRANSESOPHAGEAL ECHOCARDIOGRAM WITH POSSIBLE CARDIOVERSION (ALFRED W/ POSS CARDIOVERSION) N/A 10/05/2023    Procedure: Transesophageal echo (ALFRED) intra-procedure log documentation/alfred/cv;  Surgeon: Bao Miguel MD;  Location: [facility] CATH LAB;  Service: Cardiology;  Laterality: N/A;   Alfred/Cv  MRI safe   Pacer & leads implanted 6/16/21, Antoun   Bio Hubert 8 MICHELLE, [id number], PID: 64   A lead: Bio Solia S45, [id number]   V lead: Bio Solia S53, [id number]    TREATMENT OF CARDIAC ARRHYTHMIA N/A 10/05/2023    Procedure: Cardioversion or Defibrillation;  Surgeon: Bao Miguel MD;  Location: [facility] CATH LAB;  Service: Cardiology;  Laterality: N/A;    UPPER GASTROINTESTINAL ENDOSCOPY  ~2005    Dr. Solorzano    VALVE STUDY-AORTIC  06/20/2023    Procedure: Valve study-aortic;  Surgeon: Brice Campuzano MD;  Location: [facility] CATH LAB;  Service: Cardiology;;    VASCULAR SURGERY      to remove clot from right leg    Yag Capsulotomy Bilateral 11/05/2019    Dr Rose       Time Tracking:     PT Received On: 04/11/25  PT Start Time: 0940     PT Stop Time: 1005  PT Total Time (min): 25 min     Billable Minutes: Evaluation 15 and Therapeutic Activity 10      04/11/2025

## 2025-04-11 NOTE — PROGRESS NOTES
[facility] Medicine  Progress Note    Patient Name: Holli Landrum  MRN: [id number]  Patient Class: IP- Inpatient   Admission Date: 4/8/2025  Length of Stay: 2 days  Attending Physician: Karoline Wong MD  Primary Care Provider: Rolando Cannon MD        Subjective     Principal Problem:UTI (urinary tract infection)        HPI:  Ms. Holli Landrum is a 89 y.o. female who  has a medical history of Anticoagulant long-term use, Arthritis, Asthma, Basal cell carcinoma, Cancer, Cataract, CHF (congestive heart failure), COPD (chronic obstructive pulmonary disease), cpap, Essential hypertension, GERD (gastroesophageal reflux disease), Glaucoma, Hypertensive heart disease with heart failure, Paroxysmal atrial fibrillation, Paroxysmal ventricular tachycardia, Renal disorder, and Squamous cell carcinoma of skin.    She  presented to the ED for evaluation of persistent UTI concerns despite antibiotics(nitrofurantoin) course by her PCP.  She reports symptoms which include fatigue, generalized weakness, nausea, decreased appetite, pain around suprapubic catheter.    ED course notable for potassium 3.1, creatinine 1.8, urinalysis with Wbc >100, nitrite positive, bacteria moderate, leukocyte esterase 3+.    Overview/Hospital Course:  She was continued on empiric IV Zosyn pending urine culture.  Suprapubic cath exchanged by RN 04/09/2024  Disposition pending urine culture, remains on IV Zosyn     Interval History:  No acute events overnight.  Patient is seen and examined with daughter at bedside.  She has no complaints today.  Denies abdominal pain.  Shortness of breath is resolved after receiving neb treatment as well as Breo inhaler.  Awaiting urine culture    Review of Systems  Objective:     Vital Signs (Most Recent):  Temp: 98.3 °F (36.8 °C) (04/11/25 0755)  Pulse: 75 (04/11/25 0734)  Resp: 18 (04/11/25 0734)  BP: (!) 154/89 (04/11/25 0443)  SpO2: (!) 94 % (04/11/25 0734) Vital Signs (24h Range):  Temp:   [95.2 °F (35.1 °C)-98.7 °F (37.1 °C)] 98.3 °F (36.8 °C)  Pulse:  [68-76] 75  Resp:  [16-20] 18  SpO2:  [92 %-96 %] 94 %  BP: (120-170)/(68-89) 154/89     Weight: 84.1 kg (185 lb 6.5 oz)  Body mass index is 30.85 kg/m².    Intake/Output Summary (Last 24 hours) at 4/11/2025 1113  Last data filed at 4/11/2025 0629  Gross per 24 hour   Intake 319.38 ml   Output 1540 ml   Net -1220.62 ml         Physical Exam  Vitals and nursing note reviewed.   Constitutional:       General: She is not in acute distress.     Appearance: She is not ill-appearing.   Cardiovascular:      Rate and Rhythm: Normal rate and regular rhythm.      Heart sounds: No murmur heard.  Pulmonary:      Effort: Pulmonary effort is normal.      Breath sounds: Normal breath sounds. No wheezing, rhonchi or rales.   Abdominal:      General: Bowel sounds are normal. There is no distension.      Palpations: Abdomen is soft.      Tenderness: There is no abdominal tenderness.   Genitourinary:     Comments: Suprapubic cath in place  Musculoskeletal:      Right lower leg: No edema.      Left lower leg: No edema.   Neurological:      Mental Status: She is alert. Mental status is at baseline.               Significant Labs: All pertinent labs within the past 24 hours have been reviewed.    Significant Imaging: I have reviewed all pertinent imaging results/findings within the past 24 hours.      Assessment & Plan  UTI (urinary tract infection)  Suprapubic catheter  Recurrent UTI  History of recurrent UTI. Risk factor: suprapubic catheter. Presented to the the ED with concerns for persistent symptomatic UTI despite oral antibiotics(nitrofurantoin) course by PCP.  - Suprapubic cath exchanged 04/09  - continue IV Zosyn pending urine cx   -urine culture preliminary with 10-49 K CFU GNR--speciation and sensitivities pending  -outpatient follow-up with Urology Dr. Carrington as previously scheduled       Paroxysmal atrial fibrillation  Patient with paroxysmal (<7 days) atrial  fibrillation which is controlled currently with Beta Blocker and Amiodarone.     [id number] Score: 4.     The patients heart rate in the last 24 hours is as follows:  Pulse  Min: 68  Max: 76     PLAN:  - Antiarrhythmics: amiodarone tablet 200 mg, Daily, Oral  metoprolol tartrate (LOPRESSOR) tablet 100 mg, 2 times daily, Oral  - Anticoagulants: apixaban tablet 2.5 mg, 2 times daily, Oral  - Replete electrolytes PRN to  target Magnesium > 2, Potassium > 4  - Continuous telemetry  Hypertensive heart disease with heart failure  - Continue home meds  - resume PO lasix in a.m.  - monitor BP     Hypercholesteremia  Continue statin     CKD (chronic kidney disease) stage 3, GFR 30-59 ml/min  Creatine stable for now. BMP reviewed- noted Estimated Creatinine Clearance: 21.8 mL/min (A) (based on SCr of 1.8 mg/dL (H)). according to latest data. Based on current GFR, CKD stage is stage 4 - GFR 15-29.  Monitor UOP and serial BMP and adjust therapy as needed. Renally dose meds. Avoid nephrotoxic medications and procedures.  Resume Lasix in a.m. as creatinine improved  Monitor BMP   Asthma  Chronic.  Stable  Continue duonebs q12h scheduled,q6h PRN   Cont home Breo     Hypokalemia  Patient's most recent potassium results are listed below.   Recent Labs     04/09/25  0537 04/10/25  0628 04/11/25  0658   K 2.8* 3.8 3.4*     Plan  - Replete potassium per protocol  - Monitor potassium Daily  - Patient's hypokalemia is improving  - Monitor BMP; replete PRN     VTE Risk Mitigation (From admission, onward)           Ordered     apixaban tablet 2.5 mg  2 times daily         04/09/25 0101                    Discharge Planning   SCOTT: 4/11/2025     Code Status: Full Code   Medical Readiness for Discharge Date:   Discharge Plan A: Home Health                Please place Justification for DME        Karoline Wnog MD  Department of Hospital Medicine   [facility]

## 2025-04-11 NOTE — TELEPHONE ENCOUNTER
Contacted pt daughter Annabel to schedule an appointment regarding a referral placed for a tcc home visit with a nurse practitioner.    Patient has been scheduled

## 2025-04-11 NOTE — PT/OT/SLP EVAL
"Occupational Therapy   Evaluation    Name: Holli Landrum  MRN: [id number]  Admitting Diagnosis: UTI (urinary tract infection)  Recent Surgery: * No surgery found *      Recommendations:     Discharge Recommendations: Low Intensity Therapy (with 24/7 care)  Discharge Equipment Recommendations:  none  Barriers to discharge:  None    Assessment:     Holli Landrum is a 89 y.o. female with a medical diagnosis of UTI (urinary tract infection).  She presents with the following performance deficits affecting function: weakness, impaired endurance, impaired self care skills, impaired functional mobility, gait instability, impaired balance, decreased coordination, decreased lower extremity function, decreased safety awareness, pain, impaired cardiopulmonary response to activity.      Rehab Prognosis: Good; patient would benefit from acute skilled OT services to address these deficits and reach maximum level of function.       Plan:     Patient to be seen 2 x/week to address the above listed problems via self-care/home management, therapeutic activities, therapeutic exercises  Plan of Care Expires: 04/25/25  Plan of Care Reviewed with: patient    Subjective     Chief Complaint: weakness  "I feel woozy" (after t/f to chair)  Patient/Family Comments/goals: improve strength and mobility.    Occupational Profile:  Living Environment: lives with daughter in a trailer home with ramp to enter. Walk-in shower.  Previous level of function: Pt was previously using RW for ambulation, but has had functional decline and has been using wheelchair for functional mobility. Pt reports her daughter "picks me up and puts me in the wheelchair." Requires assist with ADLs as needed; increased assist since getting sick. Pt receiving HH therapy PTA. Reports falls at home.  Roles and Routines: does not drive  Equipment Used at Home: wheelchair, walker, rolling, rollator, shower chair, bedside commode, grab bar, other (see comments) (bed rails)  Assistance " upon Discharge: daughter    Pain/Comfort:  Pain Rating 1: 5/10  Location - Side 1: Bilateral  Location - Orientation 1: generalized  Location 1: abdomen  Pain Addressed 1: Reposition, Distraction  Pain Rating Post-Intervention 1: 5/10    Objective:     Communicated with: Nurse and epic chart review prior to session.  Patient found HOB elevated with peripheral IV, telemetry, Other (comments) (suprapubic catheter) upon OT entry to room.    General Precautions: Standard, fall, contact  Orthopedic Precautions: N/A  Braces: N/A  Respiratory Status: Room air    Occupational Performance:    Bed Mobility:    Patient completed Rolling/Turning to Left with  minimum assistance  Patient completed Scooting/Bridging with minimum assistance  Patient completed Supine to Sit with minimum assistance    Functional Mobility/Transfers:  Patient completed Sit <> Stand Transfer with minimum assistance  with  rolling walker   Patient completed Bed <> Chair Transfer using Step Transfer technique with minimum assistance with rolling walker  Quick to fatigue.    Activities of Daily Living:  Upper Body Dressing: moderate assistance bernadette robe around back  Lower Body Dressing: total assistance bernadette socks    Cognitive/Visual Perceptual:  Cognitive/Psychosocial Skills:     -       Oriented to: Person, Place, Time, and Situation   -       Follows Commands/attention:Follows multistep  commands  -       Communication: clear/fluent  -       Memory: No Deficits noted  -       Safety awareness/insight to disability: intact   -       Mood/Affect/Coping skills/emotional control: Cooperative and Pleasant    Physical Exam:  Sensation:    -       Intact  Dominant hand:    -       right  Upper Extremity Range of Motion:     -       Right Upper Extremity: WFL  -       Left Upper Extremity: WFL  Upper Extremity Strength:    -       Right Upper Extremity: 4/5 grossly  -       Left Upper Extremity: 4/5 grossly   Strength:    -       Right Upper Extremity:  WFL  -       Left Upper Extremity: WFL    [facility] 6 Click ADL:  AMPAC Total Score: 15    Treatment & Education:  Patient educated on role of OT in acute setting and benefits of participation. Educated on techniques to use to increase independence and decrease fall risk with functional transfers. Educated on importance of OOB activity and calling for A to transfer back to bed and meet needs. Encouraged completion of B UE AROM therex throughout the day to tolerance to increase functional strength and activity tolerance. Educated patient on importance of increased tolerance to upright position and direct impact on CV endurance and strength. Patient encouraged to sit up in chair for a minimum of 2 consecutive hours per day.  Patient stated understanding and in agreement with POC.     Patient left up in chair with all lines intact, call button in reach, and chair alarm on    GOALS:   Multidisciplinary Problems       Occupational Therapy Goals          Problem: Occupational Therapy    Goal Priority Disciplines Outcome Interventions   Occupational Therapy Goal     OT, PT/OT     Description: Goals to be met by: 4/25/25     Patient will increase functional independence with ADLs by performing:    UE Dressing with Stand-by Assistance.  Grooming while standing at sink with Stand-by Assistance.  Toileting from toilet with Stand-by Assistance for hygiene and clothing management.   Toilet transfer to toilet with Stand-by Assistance with RW.  Upper extremity exercise program x15 reps per handout, with independence.                         DME Justifications:  No DME recommended requiring DME justifications    History:     Past Medical History:   Diagnosis Date    Anticoagulant long-term use     Arthritis     Asthma     Basal cell carcinoma     Cancer     skin cancer to face    Cataract     OU done//    CHF (congestive heart failure)     COPD (chronic obstructive pulmonary disease)     no oxygen; patient denies    cpap     Essential  hypertension 05/05/2010    GERD (gastroesophageal reflux disease) 11/20/2012    Glaucoma     Hypertensive heart disease with heart failure 02/05/2013    Paroxysmal atrial fibrillation     Paroxysmal ventricular tachycardia     per problem list    Preop cardiovascular exam 1/26/2024    Renal disorder     french-1/3/2020    Squamous cell carcinoma of skin          Past Surgical History:   Procedure Laterality Date    A-V CARDIAC PACEMAKER INSERTION  06/16/2021    Procedure: INSERTION, CARDIAC PACEMAKER, DUAL CHAMBER;  Surgeon: Oscar Sommers MD;  Location: [facility] CATH;  Service: Cardiovascular;;    ADENOIDECTOMY  191944    APPENDECTOMY  1948    Because of Ovarian Cyst surgery    BREAST BIOPSY Left 1995    neg    BREAST BIOPSY Right 1984    neg    BREAST BIOPSY Right 1992    neg    BREAST SURGERY      A number of biopsies    CARDIAC CATHETERIZATION  2013, 2014,2016, 2020    has 9 stents    CARDIAC SURGERY  2012    stents    CATARACT EXTRACTION W/  INTRAOCULAR LENS IMPLANT Left 11/01/2018    Dr Rose    CATARACT EXTRACTION W/  INTRAOCULAR LENS IMPLANT Right 12/13/2018    Dr Rose//    CHOLECYSTECTOMY      COLONOSCOPY  ~2005    Dr. Edward; normal per patient report    COLONOSCOPY N/A 09/06/2022    Procedure: COLONOSCOPY 8/31/22-note in Dr Simms's office visit note that he spoke to her cardiologist and she was viable candidate for endoscopy;  Surgeon: Cordelia Bueno MD;  Location: [facility] ENDO;  Service: Endoscopy;  Laterality: N/A;    CORONARY ANGIOGRAPHY N/A 03/20/2020    Procedure: ANGIOGRAM, CORONARY ARTERY;  Surgeon: Chuy Bryant MD;  Location: [facility];  Service: Cardiology;  Laterality: N/A;    CYSTOSCOPY W/ RETROGRADES Bilateral 02/12/2020    Procedure: CYSTOSCOPY, WITH RETROGRADE PYELOGRAM;  Surgeon: Gasper Feliz MD;  Location: [facility] OR;  Service: Urology;  Laterality: Bilateral;    ESOPHAGOGASTRODUODENOSCOPY N/A 08/13/2020    Procedure: EGD (ESOPHAGOGASTRODUODENOSCOPY);  Surgeon: Mika Solorzano,  MD;  Location: [facility] ENDO;  Service: Endoscopy;  Laterality: N/A; Mild Schatzki ring. Biopsied. Dilated. small hiatal hernia, gastritis; biopsy: esophagus- SEVERE REFLUX ESOPHAGITIS, stomach- chronic gastritis, negative for H pylori    ESOPHAGOGASTRODUODENOSCOPY N/A 10/22/2020    Procedure: EGD (ESOPHAGOGASTRODUODENOSCOPY);  Surgeon: Fred Hendricks MD;  Location: [facility] ENDO;  Service: Endoscopy;  Laterality: N/A;    ESOPHAGOGASTRODUODENOSCOPY N/A 03/21/2024    Procedure: EGD (ESOPHAGOGASTRODUODENOSCOPY);  Surgeon: Mika Solorzano MD;  Location: [facility] ENDO;  Service: Gastroenterology;  Laterality: N/A;    EYE SURGERY  2017    Cataracs    FRACTIONAL FLOW RESERVE (FFR), CORONARY  06/20/2023    Procedure: Fractional Flow [place] (FFR), Coronary;  Surgeon: Brice Campuzano MD;  Location: [facility] CATH LAB;  Service: Cardiology;;    HYSTERECTOMY  1969    INSERTION OF CATHETER INTO CAUDAL EPIDURAL SPACE      sp cath    INSTANTANEOUS WAVE-FREE RATIO (IFR) N/A 06/20/2023    Procedure: Instantaneous Wave-Free Ratio (IFR);  Surgeon: Brice Campuzano MD;  Location: [facility] CATH LAB;  Service: Cardiology;  Laterality: N/A;    LEFT HEART CATHETERIZATION Left 03/03/2020    Procedure: Left heart cath;  Surgeon: Chuy Bryant MD;  Location: [facility] CATH;  Service: Cardiology;  Laterality: Left;    LEFT HEART CATHETERIZATION Left 03/20/2020    Procedure: Left heart cath;  Surgeon: Chuy Bryant MD;  Location: [facility] CATH;  Service: Cardiology;  Laterality: Left;    LEFT HEART CATHETERIZATION N/A 06/20/2023    Procedure: Left heart cath;  Surgeon: Brice Campuzano MD;  Location: [facility] CATH LAB;  Service: Cardiology;  Laterality: N/A;    LEFT HEART CATHETERIZATION  4/1/2025    Procedure: Coronary angiogram study;  Surgeon: Oscar Dyer MD;  Location: [facility] CATH;  Service: Cardiovascular;;    OVARIAN CYST REMOVAL  teenager    PHACOEMULSIFICATION OF CATARACT Left 11/01/2018    Procedure: PHACOEMULSIFICATION, CATARACT;   Surgeon: Aleksandar Rose Jr., MD;  Location: [facility] OR;  Service: Ophthalmology;  Laterality: Left;    PHACOEMULSIFICATION OF CATARACT Right 12/13/2018    Procedure: PHACOEMULSIFICATION, CATARACT;  Surgeon: Aleksandar Rose Jr., MD;  Location: [facility] OR;  Service: Ophthalmology;  Laterality: Right;    TONSILLECTOMY      aw/denoids    TRANSESOPHAGEAL ECHOCARDIOGRAM WITH POSSIBLE CARDIOVERSION (ALFRED W/ POSS CARDIOVERSION) N/A 10/05/2023    Procedure: Transesophageal echo (LAFRED) intra-procedure log documentation/alfred/cv;  Surgeon: Bao Miguel MD;  Location: [facility] CATH LAB;  Service: Cardiology;  Laterality: N/A;   Alfred/Cv  MRI safe   Pacer & leads implanted 6/16/21, Antoun   Bio Edora 8 MICHELLE, [id number], PID: [id number]   A lead: Bio Solia S45, [id number]   V lead: Bio Solia S53, [id number]    TREATMENT OF CARDIAC ARRHYTHMIA N/A 10/05/2023    Procedure: Cardioversion or Defibrillation;  Surgeon: Bao Miguel MD;  Location: [facility] CATH LAB;  Service: Cardiology;  Laterality: N/A;    UPPER GASTROINTESTINAL ENDOSCOPY  ~2005    Dr. Solorzano    VALVE STUDY-AORTIC  06/20/2023    Procedure: Valve study-aortic;  Surgeon: Brice Campuzano MD;  Location: [facility] CATH LAB;  Service: Cardiology;;    VASCULAR SURGERY      to remove clot from right leg    Yag Capsulotomy Bilateral 11/05/2019    Dr Rose       Time Tracking:     OT Date of Treatment: 04/11/25  OT Start Time: 0920  OT Stop Time: 0945  OT Total Time (min): 25 min    Billable Minutes:Evaluation 15  Therapeutic Activity 10    4/11/2025  Ranjana Tinoco OT

## 2025-04-11 NOTE — ASSESSMENT & PLAN NOTE
History of recurrent UTI. Risk factor: suprapubic catheter. Presented to the the ED with concerns for persistent symptomatic UTI despite oral antibiotics(nitrofurantoin) course by PCP.  - Suprapubic cath exchanged 04/09  - Urine cx with 10-49K CFU esbl Klebsiella- treated with Fosfomycin x 1 dose prior to discharge   -outpatient follow-up with Urology Dr. Carrington as previously scheduled

## 2025-04-11 NOTE — PLAN OF CARE
[facility]  Discharge Final Note    Primary Care Provider: Rolando Cannon MD    Expected Discharge Date: 4/11/2025    Final Discharge Note (most recent)       Final Note - 04/11/25 1329          Final Note    Assessment Type Final Discharge Note     Anticipated Discharge Disposition Home-Health Care [facility]     Hospital Resources/Appts/Education Provided Appointments scheduled and added to AVS;Post-Acute resouces added to AVS        Post-Acute Status    Post-Acute Authorization Home Health     Home Health Status Set-up Complete/Auth obtained     Discharge Delays None known at this time                    Follow-up providers       Rolando Cannon MD   Specialty: Family Medicine   Relationship: PCP - General    [address] [address] [address]   Phone: [phone]       Next Steps: Schedule an appointment as soon as possible for a visit in 3 day(s)    Jude Carrington MD   Specialty: Urology    [facility]  [address], [address]  [facility] [address]   Phone: [phone]       Next Steps: Schedule an appointment as soon as possible for a visit              After-discharge care                Home Medical Care       *[facility]   Service: Home Rehabilitation    [address]  [place] [facility] [address]   Phone: [phone]                             [facility] notified of d/c plans for today.     Patient has no d/c needs at this time. Sw to follow up, as needed, for d/c planning purposes.

## 2025-04-11 NOTE — ASSESSMENT & PLAN NOTE
Creatine stable for now. BMP reviewed- noted Estimated Creatinine Clearance: 21.8 mL/min (A) (based on SCr of 1.8 mg/dL (H)). according to latest data. Based on current GFR, CKD stage is stage 4 - GFR 15-29.  Monitor UOP and serial BMP and adjust therapy as needed. Renally dose meds. Avoid nephrotoxic medications and procedures.  Resume Lasix in a.m. as creatinine improved  Monitor BMP

## 2025-04-11 NOTE — PLAN OF CARE
OT brissa completed. Recommends low intensity therapy with 24/7 care.  Min A for bed mobility and t/fs with RW.   C/o weakness and abdominal pain

## 2025-04-11 NOTE — PLAN OF CARE
Problem: Adult Inpatient Plan of Care  Goal: Plan of Care Review  Outcome: Progressing     Problem: Adult Inpatient Plan of Care  Goal: Patient-Specific Goal (Individualized)  Outcome: Progressing       Problem: Fall Injury Risk  Goal: Absence of Fall and Fall-Related Injury  Outcome: Progressing      Purposeful rounding  Call light within reach   Bed in lowest position   Free from falls  Meds given per doc order   Chart check complete

## 2025-04-11 NOTE — ASSESSMENT & PLAN NOTE
Patient's most recent potassium results are listed below.   Recent Labs     04/09/25  0537 04/10/25  0628 04/11/25  0658   K 2.8* 3.8 3.4*     Plan  - Replete potassium per protocol  - Monitor potassium Daily  - Patient's hypokalemia is improving  - Monitor BMP; replete PRN

## 2025-04-11 NOTE — ASSESSMENT & PLAN NOTE
Patient with paroxysmal (<7 days) atrial fibrillation which is controlled currently with Beta Blocker and Amiodarone.     [id number] Score: 4.     The patients heart rate in the last 24 hours is as follows:  Pulse  Min: 68  Max: 76     PLAN:  - Antiarrhythmics: amiodarone tablet 200 mg, Daily, Oral  metoprolol tartrate (LOPRESSOR) tablet 100 mg, 2 times daily, Oral  - Anticoagulants: apixaban tablet 2.5 mg, 2 times daily, Oral  - Replete electrolytes PRN to  target Magnesium > 2, Potassium > 4  - Continuous telemetry

## 2025-04-14 ENCOUNTER — PATIENT OUTREACH (OUTPATIENT)
Dept: ADMINISTRATIVE | Facility: CLINIC | Age: OVER 89
End: 2025-04-14
Payer: MEDICARE

## 2025-04-14 DIAGNOSIS — R10.13 EPIGASTRIC PAIN: ICD-10-CM

## 2025-04-14 DIAGNOSIS — R12 HEARTBURN: ICD-10-CM

## 2025-04-14 RX ORDER — FAMOTIDINE 40 MG/1
40 TABLET, FILM COATED ORAL NIGHTLY
Qty: 30 TABLET | Refills: 0 | OUTPATIENT
Start: 2025-04-14

## 2025-04-14 NOTE — PROGRESS NOTES
C3 nurse spoke with Holli Landrum for a TCC post hospital discharge follow up call. The patient has a scheduled [facility] appointment with Jude Campos NP., on 04/15/2025 @ 0800.

## 2025-04-14 NOTE — PROGRESS NOTES
[facility]  Transitional Care Management (TCM) Home Visit    Encounter Provider: Jude Campos   PCP: Rolando Cannon MD  Consult Requested By: Dr. Karoline Wong  Admit Date: 4/8/25   IP Discharge Date: 4/11/25  Hospital Length of Stay:RRHLOS@ days  Days since discharge (from IP or SNF): 4   [place] On [facility] Contact Note: 4/11  Hospital Diagnosis: Urinary tract infection associated with catheterization of urinary tract, unspecified indwelling urinary catheter type, initial encounter [T83.511A, N39.0]     HISTORY OF PRESENT ILLNESS      Patient ID: Holli Landrum is a 89 y.o. female was recently admitted to the hospital, this is their TCM encounter.    Hospital Course Synopsis:  Ms. Holli Landrum is a 89 y.o. female who  has a medical history of Anticoagulant long-term use, Arthritis, Asthma, Basal cell carcinoma, Cancer, Cataract, CHF (congestive heart failure), COPD (chronic obstructive pulmonary disease), cpap, Essential hypertension, GERD (gastroesophageal reflux disease), Glaucoma, Hypertensive heart disease with heart failure, Paroxysmal atrial fibrillation, Paroxysmal ventricular tachycardia, Renal disorder, and Squamous cell carcinoma of skin.     She  presented to the ED for evaluation of persistent UTI concerns despite antibiotics(nitrofurantoin) course by her PCP.  She reports symptoms which include fatigue, generalized weakness, nausea, decreased appetite, pain around suprapubic catheter.     ED course notable for potassium 3.1, creatinine 1.8, urinalysis with Wbc >100, nitrite positive, bacteria moderate, leukocyte esterase 3+.    Hospital Course:   She was continued on empiric IV Zosyn pending urine culture.  Suprapubic cath exchanged by RN 04/09/2024     Urine culture returned with 10-49K CFU ESBL Klebsiella pneumoniae (sensitive to ertapenem, gentamicin, meropenem, tobramycin).  She was transitioned from IV Zosyn to p.o. fosfomycin x1 dose prior to discharge.  Patient is seen and examined on  day of discharge and appears stable for discharge with home health and family per my exam.  Follow up with PCP within 3-5 days ([facility] referral sent), follow up with Urology Dr. Carrington within 1-2 weeks.      Patient being seen today for a hospital follow up. Daughter and son present during visit. Recent admission with concerns for UTI. Has suprapubic catheter in place. Needs follow up with urology, reports she will schedule after TAVR procedure next week. Has SOB that is worse with exertion. Has home oxygen at 2L, using as needed. Reports compliance with medications. Denies further complaints or concerns. Questions elicited. Time allowed for questions, all issues addressed. Contact info given for any concerns or changes. Has follow up with PCP 4/16.     DECISION MAKING TODAY       Assessment & Plan:  1. Hospital discharge follow-up    2. Urinary tract infection associated with catheterization of urinary tract, unspecified indwelling urinary catheter type, initial encounter  Overview:  Formatting of this note might be different from the original.   Last Assessment & Plan:     Formatting of this note might be different from the original.    On empiric antibiotics    Urine cs NGTD    monitor    Assessment & Plan:  Has suprapubic cath in place  Currently asymptomatic   Urine culture revealed ESBL Klebsiella  Treated with IV Zosyn then transitioned to Fosfomycin PO x 1 dose prior to discharge   Follow-up with Urology Dr. Carrington    Orders:  -     Ambulatory referral/consult to [facility]    3. Stage 3b chronic kidney disease  Assessment & Plan:  Prior to recent admission eGFR 30-39,  last eGFR 26  Renally dose meds, avoid nephrotoxins  F/U with nephrology      4. Suprapubic catheter  Assessment & Plan:  Exchanged 4/9  Urine culture with ESBL Klebsiella  Treated with IV zosyn then transitioned to PO Fosfomycin  F/U with urology           Medication List on Discharge:     Medication List             Accurate as of April 15, 2025 11:59 PM. If you have any questions, ask your nurse or doctor.                START taking these medications      amoxicillin 500 MG Tab  Commonly known as: AMOXIL  Take 4 tabs (2000 mg) once 60 minutes prior to dental cleaning or other high risk procedures  Started by: Sole Mora NP     chlorhexidine 0.12 % solution  Commonly known as: PERIDEX  Use as directed 15 mLs in the mouth or throat 2 (two) times daily. for 5 days  Started by: Sole Mora NP     mupirocin 2 % ointment  Commonly known as: BACTROBAN  by Nasal route 2 (two) times daily.  Started by: Sole Mora NP            CHANGE how you take these medications      nystatin cream  Commonly known as: MYCOSTATIN  Apply topically 2 (two) times daily. for 7 days  What changed:   when to take this  reasons to take this            CONTINUE taking these medications      ACIDOPHILUS PROBIOTIC BLEND 175 mg Cap  Generic drug: L.acidoph,saliva-B.bif-S.therm  Take 1 capsule by mouth every morning.     albuterol 90 mcg/actuation inhaler  Commonly known as: PROVENTIL/VENTOLIN HFA  Inhale 1-2 puffs into the lungs every 4 (four) hours as needed for Wheezing or Shortness of Breath. Rescue     albuterol-ipratropium 2.5 mg-0.5 mg/3 mL nebulizer solution  Commonly known as: DUO-NEB  Take 3 mLs by nebulization every 6 (six) hours as needed for Wheezing or Shortness of Breath.     amiodarone 200 MG Tab  Commonly known as: PACERONE  Take 1 tablet (200 mg total) by mouth once daily.     apixaban 2.5 mg Tab  Commonly known as: ELIQUIS  Take 1 tablet (2.5 mg total) by mouth 2 (two) times daily.     cholecalciferol (vitamin D3) 125 mcg (5,000 unit) Tab  Take 5,000 Units by mouth once daily.     cloNIDine 0.1 MG tablet  Commonly known as: CATAPRES  Take 1 tablet (0.1 mg total) by mouth every 6 (six) hours as needed (take if BP is over 180/100).     dorzolamide 2 % ophthalmic solution  Commonly known as: TRUSOPT  INSTILL 1 DROP  INTO BOTH EYES TWICE DAILY     famotidine 40 MG tablet  Commonly known as: PEPCID  TAKE ONE TABLET BY MOUTH EVERY NIGHT AT BEDTIME     fluticasone furoate-vilanteroL 100-25 mcg/dose diskus inhaler  Commonly known as: BREO ELLIPTA  Inhale 1 puff into the lungs once daily.     fluticasone propionate 50 mcg/actuation nasal spray  Commonly known as: FLONASE  2 sprays (100 mcg total) by Each Nare route daily as needed for Allergies.     furosemide 40 MG tablet  Commonly known as: LASIX  Take 1 tablet (40 mg total) by mouth once daily. Do not take if BP is below 110/70; recommend doubling up dose if BP is over 140/90     GEMTESA 75 mg Tab  Generic drug: vibegron  Take 1 tablet by mouth every morning.     Lactobacillus rhamnosus GG 10 billion cell capsule  Commonly known as: CULTURELLE  Take 1 capsule by mouth once daily.     latanoprost 0.005 % ophthalmic solution  Place 1 drop into both eyes every evening.     loratadine 10 mg tablet  Commonly known as: CLARITIN  Take 1 tablet (10 mg total) by mouth once daily.     lutein-zeaxanthin extract 15-0.7 mg Cap  Take 1 capsule by mouth every morning.     metoprolol tartrate 100 MG tablet  Commonly known as: LOPRESSOR  Take 1 tablet (100 mg total) by mouth 2 (two) times daily.     montelukast 10 mg tablet  Commonly known as: SINGULAIR  Take 1 tablet (10 mg total) by mouth every evening.     nitroGLYCERIN 0.4 MG/HR TD PT24 0.4 mg/hr  Commonly known as: NITRODUR  Place 1 patch onto the skin once daily.     potassium chloride 10 MEQ Tbsr  Commonly known as: KLOR-CON  TAKE TWO TABLETS BY MOUTH EVERY MORNING     rosuvastatin 40 MG Tab  Commonly known as: CRESTOR  TAKE 1 TABLET BY MOUTH EVERY DAY              Medication Reconciliation:  Were medications changed on discharge? No  Were medications in the home? Yes  Is the patient taking the medications as directed? Yes  Does the patient understand the medications and changes? Yes  Does updated med list accurately reflects meds patient  is currently taking? Yes    ENVIRONMENT OF CARE      Family and/or Caregiver present at visit?  Yes  Name of Caregiver: daughter, son  History provided by: patient and caregiver    Advance Care Planning   Advanced Care Planning Status:  Patient has had an ACP conversation  Living Will: Yes  Power of : No  [place]: No    Does Caregiver have HCPoA: Yes  Changes today: none  Is patient hospice appropriate: No  (If needed, use PPS <30 or FAST score >7)  Was referral to hospice placed: No       Impression upon entering the home:  Physical Dwelling: single family home   Appearance of home environment: cleaniness: clean, walking pathways: clear, lighting: adequate, and home structure: sound structure  Functional Status: moderate assistance  Mobility: ambulatory with device  Nutritional access: adequate intake and access  Home Health: No, and does not need it at this time   DME/Supplies: rolling walker, oxygen, and wheelchair     Diagnostic tests reviewed/disposition: I have reviewed all completed as well as pending diagnostic tests at the time of discharge.  Disease/illness education: Take all medication as prescribed. Activity as tolerated. Keep all upcoming appts.   Establishment or re-establishment of referral orders for community resources: No other necessary community resources.   Discussion with other health care providers: No discussion with other health care providers necessary.   Does patient have a PCP at [facility]? Yes   Repatriation plan with PCP? follow-up with PCP within 30d   Does patient have an ostomy (ileostomy, colostomy, suprapubic catheter, nephrostomy tube, tracheostomy, PEG tube, pleurex catheter, cholecystostomy, etc)? Yes. Is it on problem list?yes  Were BPAs reviewed? Yes    Social History     Socioeconomic History    Marital status:    Tobacco Use    Smoking status: Former     Current packs/day: 0.00     Types: Cigarettes     Start date: 6/10/1954     Quit date: 9/15/1955     Years since  quittin.6    Smokeless tobacco: Never    Tobacco comments:     I onlysmoked one year while ml[employer] my  was oversees  during [place]   Substance and Sexual Activity    Alcohol use: Not Currently     Comment: Only drank a little wine and haven't  drunk anything in 15 y    Drug use: Never    Sexual activity: Not Currently     Partners: Male     Birth control/protection: Abstinence     Comment:  and 87 years of age     Social Drivers of Health     Financial Resource Strain: Patient Declined (2025)    Overall Financial Resource Strain (CARDIA)     Difficulty of Paying Living Expenses: Patient declined   Food Insecurity: Patient Declined (2025)    Hunger Vital Sign     Worried About Running Out of Food in the Last Year: Patient declined     Ran Out of Food in the Last Year: Patient declined   Transportation Needs: Patient Declined (2025)    PRAPARE - Transportation     Lack of Transportation (Medical): Patient declined     Lack of Transportation (Non-Medical): Patient declined   Physical Activity: Insufficiently Active (2024)    Exercise Vital Sign     Days of Exercise per Week: 1 day     Minutes of Exercise per Session: 10 min   Stress: Patient Declined (2025)    [employer] - Occupational Stress Questionnaire     Feeling of Stress : Patient declined   Housing Stability: Patient Declined (2025)    Housing Stability Vital Sign     Unable to Pay for Housing in the Last Year: Patient declined     Homeless in the Last Year: Patient declined       OBJECTIVE:     Vital Signs:  Vitals:    04/15/25 0959   BP: 128/66   Pulse: 61   Resp: 18       Review of Systems   Constitutional: Negative.    HENT: Negative.     Eyes: Negative.    Respiratory:  Positive for cough and shortness of breath. Negative for chest tightness.    Cardiovascular: Negative.  Negative for leg swelling.   Gastrointestinal: Negative.    Endocrine: Negative.    Genitourinary: Negative.     Musculoskeletal:  Positive for gait problem.   Skin: Negative.    Allergic/Immunologic: Negative.    Neurological:  Positive for weakness.   Hematological: Negative.    Psychiatric/Behavioral: Negative.  Negative for agitation.    All other systems reviewed and are negative.      Physical Exam:  Physical Exam  Vitals reviewed.   Constitutional:       General: She is not in acute distress.     Appearance: Normal appearance. She is well-developed.   HENT:      Head: Normocephalic and atraumatic.      Nose: Nose normal.      Mouth/Throat:      Mouth: Mucous membranes are dry.      Pharynx: Oropharynx is clear.   Eyes:      Pupils: Pupils are equal, round, and reactive to light.   Cardiovascular:      Rate and Rhythm: Normal rate and regular rhythm.      Pulses: Normal pulses.      Heart sounds: Murmur heard.   Pulmonary:      Effort: Pulmonary effort is normal.      Breath sounds: Normal breath sounds.   Abdominal:      General: Bowel sounds are normal.      Palpations: Abdomen is soft.   Genitourinary:     Comments: Suprapubic cath in place  Musculoskeletal:         General: Normal range of motion.      Cervical back: Normal range of motion and neck supple.   Skin:     General: Skin is warm and dry.   Neurological:      General: No focal deficit present.      Mental Status: She is alert and oriented to person, place, and time. Mental status is at baseline.      Motor: Weakness present.      Gait: Gait abnormal.   Psychiatric:         Mood and Affect: Mood normal.         Behavior: Behavior normal.         Thought Content: Thought content normal.         Judgment: Judgment normal.         INSTRUCTIONS FOR PATIENT:   - Continue all medications, treatments and therapies as ordered.   - Follow all instructions, recommendations as discussed.  - Maintain Safety Precautions at all times.  - Attend all medical appointments as scheduled.  - For worsening symptoms: call Primary Care Physician or Nurse Practitioner.  - For  emergencies, call 911 or immediately report to the nearest emergency room.   Scheduled Follow-up, Appts Reviewed with Modifications if Needed: Yes  Future Appointments   Date Time Provider Department Center   4/16/2025  2:20 PM Rolando Cannon MD [facility]   4/17/2025 11:15 AM Grace Calderón MD [facility]   [facility]20[facility]   5/8/2025  2:00 PM Kimberli Jerez NP [facility] HEM ONC [facility]   5/20/2025  3:40 PM Fletcher Meyer MD ONLC CARDIO [facility] Medical C   2/17/2026  2:00 PM [facility] ON [facility] ON ARR PRO [facility] C       Signature: Jude Campos NP    Transition of Care Visit:  I have reviewed and updated the history and problem list.  I have reconciled the medication list.  I have discussed the hospitalization and current medical issues, prognosis and plans with the patient/family.

## 2025-04-14 NOTE — TELEPHONE ENCOUNTER
Spoke with patient she does not needs this medication refilled. She has heart surgery coming up and she is trying to stay out of doctors offices if possible.

## 2025-04-15 ENCOUNTER — OFFICE VISIT (OUTPATIENT)
Dept: HOME HEALTH SERVICES | Facility: CLINIC | Age: OVER 89
End: 2025-04-15
Payer: MEDICARE

## 2025-04-15 ENCOUNTER — TELEPHONE (OUTPATIENT)
Dept: FAMILY MEDICINE | Facility: CLINIC | Age: OVER 89
End: 2025-04-15
Payer: MEDICARE

## 2025-04-15 VITALS
OXYGEN SATURATION: 95 % | SYSTOLIC BLOOD PRESSURE: 128 MMHG | HEART RATE: 61 BPM | RESPIRATION RATE: 18 BRPM | DIASTOLIC BLOOD PRESSURE: 66 MMHG

## 2025-04-15 DIAGNOSIS — N18.32 STAGE 3B CHRONIC KIDNEY DISEASE: ICD-10-CM

## 2025-04-15 DIAGNOSIS — N39.0 URINARY TRACT INFECTION ASSOCIATED WITH CATHETERIZATION OF URINARY TRACT, UNSPECIFIED INDWELLING URINARY CATHETER TYPE, INITIAL ENCOUNTER: ICD-10-CM

## 2025-04-15 DIAGNOSIS — T83.511A URINARY TRACT INFECTION ASSOCIATED WITH CATHETERIZATION OF URINARY TRACT, UNSPECIFIED INDWELLING URINARY CATHETER TYPE, INITIAL ENCOUNTER: ICD-10-CM

## 2025-04-15 DIAGNOSIS — Z09 HOSPITAL DISCHARGE FOLLOW-UP: Primary | ICD-10-CM

## 2025-04-15 DIAGNOSIS — Z93.59 SUPRAPUBIC CATHETER: ICD-10-CM

## 2025-04-15 NOTE — TELEPHONE ENCOUNTER
Patient should go to the ER if this is an acute finding.  She could be having symptoms of a stroke if not being able to talk.

## 2025-04-15 NOTE — TELEPHONE ENCOUNTER
Called  nurse she stated this is not an new acute finding. Patient will come to appointment on tomorrow.

## 2025-04-15 NOTE — TELEPHONE ENCOUNTER
----- Message from Alfonso sent at 4/15/2025  8:59 AM CDT -----  Contact: home health  .Type:  Needs Medical AdviceWho Called: home healthSymptoms (please be specific):  How long has patient had these symptoms:  Pharmacy name and phone #:  Would the patient rather a call back or a response via [facility]? Call [facility] Call Back Number: [phone] Information: Home health nurse would like a call back in regard to some concerns she has. `

## 2025-04-15 NOTE — TELEPHONE ENCOUNTER
HH nurse called stating her concern about the patient not really being able to talk.  She is also worried about the patient under going a TAVR that is Scheduled for the 23rd of this month. Patient has hospital f/u with PCP on tomorrow.

## 2025-04-16 NOTE — ASSESSMENT & PLAN NOTE
Fwd to Dr Delfin Callahan, please advise  Has suprapubic cath in place  Currently asymptomatic   Urine culture revealed ESBL Klebsiella  Treated with IV Zosyn then transitioned to Fosfomycin PO x 1 dose prior to discharge   Follow-up with Urology Dr. Carrington

## 2025-04-16 NOTE — ASSESSMENT & PLAN NOTE
Exchanged 4/9  Urine culture with ESBL Klebsiella  Treated with IV zosyn then transitioned to PO Fosfomycin  F/U with urology

## 2025-04-16 NOTE — ASSESSMENT & PLAN NOTE
Prior to recent admission eGFR 30-39,  last eGFR 26  Renally dose meds, avoid nephrotoxins  F/U with nephrology

## 2025-04-17 ENCOUNTER — EXTERNAL HOME HEALTH (OUTPATIENT)
Dept: HOME HEALTH SERVICES | Facility: HOSPITAL | Age: OVER 89
End: 2025-04-17
Payer: MEDICARE

## 2025-04-17 NOTE — PROGRESS NOTES
DATE OF CONSULTATION: 4/17/2025     CONSULT REQUESTED BY:  Dr. Oscar Sommers     REASON FOR CONSULTATION:  Severe, symptomatic aortic valve stenosis     HPI:   The patient is a 89-year-old  female who has been referred to Dr. Sommers 02/27/2025 for TAVR consideration.    Echocardiography 02/19/2025 demonstrates an ejection fraction of 60%.  Transvalvular velocities are 390 cm/sec.  The mean gradient is 40 and the valve area is 0.6 cm2.  There is mild MR and moderate TR.  The PA pressure is 41.    Coronary angiography 04/01/2025 demonstrates no significant epicardial coronary artery disease.  The RPD stent is patent.    The patient is having a repeat CTA/TAVR protocol 04/18/2025    She has had symptomatic aortic valve stenosis since at least 2023.  She developed a retroperitoneal hematoma following a left heart catheterization (at [facility]) and then was deemed not a good TAVR candidate due to an indwelling suprapubic catheter.    The patient describes progressive exertional dyspnea, dizziness with near syncope, weakness and generalized fatigue for the past couple of years.  The symptoms have been more rapidly progressive over the last 6 months.    The patient had a recent hospitalization for urinary sepsis.  She is recovering, albeit slowly.     Data Review:  In preparation for this consultation, I have reviewed the patient's entire [facility] medical record.  I have personally reviewed images from the recent echocardiogram and coronary angiogram.      Past Medical History:   Diagnosis Date    Anticoagulant long-term use     Arthritis     Asthma     Basal cell carcinoma     Cancer     skin cancer to face    Cataract     OU done//    CHF (congestive heart failure)     COPD (chronic obstructive pulmonary disease)     no oxygen; patient denies    cpap     Essential hypertension 05/05/2010    GERD (gastroesophageal reflux disease) 11/20/2012    Glaucoma     Hypertensive heart disease with heart failure  02/05/2013    Paroxysmal atrial fibrillation     Paroxysmal ventricular tachycardia     per problem list    Preop cardiovascular exam 1/26/2024    Renal disorder     french-1/3/2020    Squamous cell carcinoma of skin           Past Surgical History:   Procedure Laterality Date    A-V CARDIAC PACEMAKER INSERTION  06/16/2021    Procedure: INSERTION, CARDIAC PACEMAKER, DUAL CHAMBER;  Surgeon: Oscar Sommers MD;  Location: [facility];  Service: Cardiovascular;;    ADENOIDECTOMY  191944    APPENDECTOMY  1948    Because of Ovarian Cyst surgery    BREAST BIOPSY Left 1995    neg    BREAST BIOPSY Right 1984    neg    BREAST BIOPSY Right 1992    neg    BREAST SURGERY      A number of biopsies    CARDIAC CATHETERIZATION  2013, 2014,2016, 2020    has 9 stents    CARDIAC SURGERY  2012    stents    CATARACT EXTRACTION W/  INTRAOCULAR LENS IMPLANT Left 11/01/2018    Dr Rose    CATARACT EXTRACTION W/  INTRAOCULAR LENS IMPLANT Right 12/13/2018    Dr Rose//    CHOLECYSTECTOMY      COLONOSCOPY  ~2005    Dr. Edward; normal per patient report    COLONOSCOPY N/A 09/06/2022    Procedure: COLONOSCOPY 8/31/22-note in Dr Simms's office visit note that he spoke to her cardiologist and she was viable candidate for endoscopy;  Surgeon: Cordelia Bueno MD;  Location: [facility];  Service: Endoscopy;  Laterality: N/A;    CORONARY ANGIOGRAPHY N/A 03/20/2020    Procedure: ANGIOGRAM, CORONARY ARTERY;  Surgeon: Chuy Bryant MD;  Location: [facility];  Service: Cardiology;  Laterality: N/A;    CYSTOSCOPY W/ RETROGRADES Bilateral 02/12/2020    Procedure: CYSTOSCOPY, WITH RETROGRADE PYELOGRAM;  Surgeon: Gasper Feliz MD;  Location: [facility] OR;  Service: Urology;  Laterality: Bilateral;    ESOPHAGOGASTRODUODENOSCOPY N/A 08/13/2020    Procedure: EGD (ESOPHAGOGASTRODUODENOSCOPY);  Surgeon: Mika Solorzano MD;  Location: [facility];  Service: Endoscopy;  Laterality: N/A; Mild Schatzki ring. Biopsied. Dilated. small hiatal hernia, gastritis;  biopsy: esophagus- SEVERE REFLUX ESOPHAGITIS, stomach- chronic gastritis, negative for H pylori    ESOPHAGOGASTRODUODENOSCOPY N/A 10/22/2020    Procedure: EGD (ESOPHAGOGASTRODUODENOSCOPY);  Surgeon: Fred Hendricks MD;  Location: [facility];  Service: Endoscopy;  Laterality: N/A;    ESOPHAGOGASTRODUODENOSCOPY N/A 03/21/2024    Procedure: EGD (ESOPHAGOGASTRODUODENOSCOPY);  Surgeon: Mika Solorzano MD;  Location: [facility];  Service: Gastroenterology;  Laterality: N/A;    EYE SURGERY  2017    Cataracs    FRACTIONAL FLOW RESERVE (FFR), CORONARY  06/20/2023    Procedure: Fractional Flow [place] (FFR), Coronary;  Surgeon: Brice Campuzano MD;  Location: [facility] CATH LAB;  Service: Cardiology;;    HYSTERECTOMY  1969    INSERTION OF CATHETER INTO CAUDAL EPIDURAL SPACE      sp cath    INSTANTANEOUS WAVE-FREE RATIO (IFR) N/A 06/20/2023    Procedure: Instantaneous Wave-Free Ratio (IFR);  Surgeon: Brice Campuzano MD;  Location: [facility] CATH LAB;  Service: Cardiology;  Laterality: N/A;    LEFT HEART CATHETERIZATION Left 03/03/2020    Procedure: Left heart cath;  Surgeon: Chuy Bryant MD;  Location: [facility] CATH;  Service: Cardiology;  Laterality: Left;    LEFT HEART CATHETERIZATION Left 03/20/2020    Procedure: Left heart cath;  Surgeon: Chuy Bryant MD;  Location: [facility] CATH;  Service: Cardiology;  Laterality: Left;    LEFT HEART CATHETERIZATION N/A 06/20/2023    Procedure: Left heart cath;  Surgeon: Brice Campuzano MD;  Location: [facility] CATH LAB;  Service: Cardiology;  Laterality: N/A;    LEFT HEART CATHETERIZATION  4/1/2025    Procedure: Coronary angiogram study;  Surgeon: Oscar Dyer MD;  Location: [facility] CATH;  Service: Cardiovascular;;    OVARIAN CYST REMOVAL  teenager    PHACOEMULSIFICATION OF CATARACT Left 11/01/2018    Procedure: PHACOEMULSIFICATION, CATARACT;  Surgeon: Aleksandar Rose Jr., MD;  Location: [facility] OR;  Service: Ophthalmology;  Laterality: Left;    PHACOEMULSIFICATION OF CATARACT Right  2018    Procedure: PHACOEMULSIFICATION, CATARACT;  Surgeon: Aleksandar Rose Jr., MD;  Location: [facility] OR;  Service: Ophthalmology;  Laterality: Right;    TONSILLECTOMY      aw/denoids    TRANSESOPHAGEAL ECHOCARDIOGRAM WITH POSSIBLE CARDIOVERSION (ALFRED W/ POSS CARDIOVERSION) N/A 10/05/2023    Procedure: Transesophageal echo (ALFRED) intra-procedure log documentation/alfred/cv;  Surgeon: Bao Miguel MD;  Location: [facility] CATH LAB;  Service: Cardiology;  Laterality: N/A;   Alfred/Cv  MRI safe   Pacer & leads implanted 21, Antoun   Bio Edora 8 MICHELLE, [id number], PID: [id number]   A lead: Bio Solia S45, [id number]   V lead: Bio Solia S53, [id number]    TREATMENT OF CARDIAC ARRHYTHMIA N/A 10/05/2023    Procedure: Cardioversion or Defibrillation;  Surgeon: Bao Miguel MD;  Location: [facility] CATH LAB;  Service: Cardiology;  Laterality: N/A;    UPPER GASTROINTESTINAL ENDOSCOPY  ~    Dr. Solorzano    VALVE STUDY-AORTIC  2023    Procedure: Valve study-aortic;  Surgeon: Brice Campuzano MD;  Location: [facility] CATH LAB;  Service: Cardiology;;    VASCULAR SURGERY      to remove clot from right leg    Yag Capsulotomy Bilateral 2019    Dr Rose             Social History     Socioeconomic History    Marital status:    Tobacco Use    Smoking status: Former     Current packs/day: 0.00     Types: Cigarettes     Start date: 6/10/1954     Quit date: 9/15/1955     Years since quittin.6    Smokeless tobacco: Never    Tobacco comments:     I onlysmoked one year while [place] my  was oversees  during [place]   Substance and Sexual Activity    Alcohol use: Not Currently     Comment: Only drank a little wine and haven't  drunk anything in 15 y    Drug use: Never    Sexual activity: Not Currently     Partners: Male     Birth control/protection: Abstinence     Comment:  and 87 years of age     Social Drivers of Health     Financial Resource Strain: Patient Declined (2025)    Overall Financial  Resource Strain (CARDIA)     Difficulty of Paying Living Expenses: Patient declined   Food Insecurity: Patient Declined (2025)    Hunger Vital Sign     Worried About Running Out of Food in the Last Year: Patient declined     Ran Out of Food in the Last Year: Patient declined   Transportation Needs: Patient Declined (2025)    PRAPARE - Transportation     Lack of Transportation (Medical): Patient declined     Lack of Transportation (Non-Medical): Patient declined   Physical Activity: Insufficiently Active (2024)    Exercise Vital Sign     Days of Exercise per Week: 1 day     Minutes of Exercise per Session: 10 min   Stress: Patient Declined (2025)    [employer] - Occupational Stress Questionnaire     Feeling of Stress : Patient declined   Housing Stability: Patient Declined (2025)    Housing Stability Vital Sign     Unable to Pay for Housing in the Last Year: Patient declined     Homeless in the Last Year: Patient declined            Family History   Problem Relation Name Age of Onset    Diabetes Brother Wili         Type 2    Heart disease Brother Wili          at 65 CHD    Diabetes Mother Holli COX         Type 1    Alcohol abuse Mother Holli COX         Dod 10/75    Heart disease Mother Holli COX         Arteriosclerosis    Hypertension Mother Holli COX     Stroke Mother Holli COX         3/15/1975 dod 10/1975    Cancer Maternal Grandfather [place]         Dod     Clotting disorder Son          bleeding after tonsillectomy only    Heart disease Father Ronaldo Sr.         Heart attack. Afib, and Polyvcithemavera    Hypertension Father Ronaldo Sr.     Amblyopia Neg Hx      Blindness Neg Hx      Cataracts Neg Hx      Glaucoma Neg Hx      Macular degeneration Neg Hx      Retinal detachment Neg Hx      Strabismus Neg Hx      Thyroid disease Neg Hx      Colon cancer Neg Hx         Allergies:  No known drug allergies   Sensitivities:  Cipro, Bactrim, timolol      Prior to  Admission Meds (Last known outpatient meds at time of note signature)   Prior to Admission medications    Medication Sig Start Date End Date Taking? Authorizing Provider   ACIDOPHILUS PROBIOTIC BLEND 175 mg Cap Take 1 capsule by mouth every morning. 10/24/24   Provider, Historical   albuterol (PROVENTIL/VENTOLIN HFA) 90 mcg/actuation inhaler Inhale 1-2 puffs into the lungs every 4 (four) hours as needed for Wheezing or Shortness of Breath. Rescue 11/6/24   Gabe Waller MD   albuterol-ipratropium (DUO-NEB) 2.5 mg-0.5 mg/3 mL nebulizer solution Take 3 mLs by nebulization every 6 (six) hours as needed for Wheezing or Shortness of Breath. 11/6/24   Gabe Waller MD   amiodarone (PACERONE) 200 MG Tab Take 1 tablet (200 mg total) by mouth once daily. 10/29/24   Fletcher Meyer MD   amoxicillin (AMOXIL) 500 MG Tab Take 4 tabs (2000 mg) once 60 minutes prior to dental cleaning or other high risk procedures 4/15/25   Sole Mora NP   apixaban (ELIQUIS) 2.5 mg Tab Take 1 tablet (2.5 mg total) by mouth 2 (two) times daily. 1/10/25   Fletcher Meyer MD   chlorhexidine (PERIDEX) 0.12 % solution Use as directed 15 mLs in the mouth or throat 2 (two) times daily. for 5 days 4/15/25 4/20/25  Sole Mora NP   cholecalciferol, vitamin D3, 125 mcg (5,000 unit) Tab Take 5,000 Units by mouth once daily.    Provider, Historical   cloNIDine (CATAPRES) 0.1 MG tablet Take 1 tablet (0.1 mg total) by mouth every 6 (six) hours as needed (take if BP is over 180/100). 10/29/24 10/29/25  Fletcher Meyer MD   dorzolamide (TRUSOPT) 2 % ophthalmic solution INSTILL 1 DROP INTO BOTH EYES TWICE DAILY 1/10/23   Aleksandar Rose Jr., MD   famotidine (PEPCID) 40 MG tablet TAKE ONE TABLET BY MOUTH EVERY NIGHT AT BEDTIME 3/14/25   Merari Sal, CHANTE   fluticasone (FLONASE) 50 mcg/actuation nasal spray 2 sprays (100 mcg total) by Each Nare route daily as needed for Allergies. 3/5/18   Marcia Crespo MD   fluticasone furoate-vilanteroL  (BREO ELLIPTA) 100-25 mcg/dose diskus inhaler Inhale 1 puff into the lungs once daily. 11/26/24   Gabe Waller MD   furosemide (LASIX) 40 MG tablet Take 1 tablet (40 mg total) by mouth once daily. Do not take if BP is below 110/70; recommend doubling up dose if BP is over 140/90 3/13/25   Fletcher Meyer MD   GEMTESA 75 mg Tab Take 1 tablet by mouth every morning. 8/1/24   Provider, Historical   Lactobacillus rhamnosus GG (CULTURELLE) 10 billion cell capsule Take 1 capsule by mouth once daily.    Provider, Historical   latanoprost 0.005 % ophthalmic solution Place 1 drop into both eyes every evening. 5/17/23   Cb Land MD   loratadine (CLARITIN) 10 mg tablet Take 1 tablet (10 mg total) by mouth once daily. 3/17/25 3/17/26  Gabe Waller MD   lutein-zeaxanthin extract 15-0.7 mg Cap Take 1 capsule by mouth every morning. 10/24/24   Provider, Historical   metoprolol tartrate (LOPRESSOR) 100 MG tablet Take 1 tablet (100 mg total) by mouth 2 (two) times daily.  Patient not taking: Reported on 4/14/2025 2/13/25 8/12/25  Fletcher Meyer MD   montelukast (SINGULAIR) 10 mg tablet Take 1 tablet (10 mg total) by mouth every evening. 11/6/24   Gabe Waller MD   mupirocin (BACTROBAN) 2 % ointment by Nasal route 2 (two) times daily. 4/15/25   Sole Mora, NP   nitroGLYCERIN 0.4 MG/HR TD PT24 (NITRODUR) 0.4 mg/hr Place 1 patch onto the skin once daily. 9/4/24   Fletcher Meyer MD   nystatin (MYCOSTATIN) cream Apply topically 2 (two) times daily. for 7 days  Patient taking differently: Apply topically as needed. 9/27/24 4/14/25  Melia Kaur DNP   potassium chloride (KLOR-CON) 10 MEQ TbSR TAKE TWO TABLETS BY MOUTH EVERY MORNING 3/13/25   Rolando Cannon MD   rosuvastatin (CRESTOR) 40 MG Tab TAKE 1 TABLET BY MOUTH EVERY DAY 6/10/24   Darshana Velez NP        Review of Systems:   Constitutional: no fever, no chills, no appetite change, no unexpected weight change   Dermatological: no  jaundice, no rash, no nodules, no ulcers, no pruritis   HEENT: no vision change, no hearing change, no nasal discharge, no sore throat   Neck: no unusual stiffness, no swollen glands, no goiter   Respiratory: (+) dyspnea, no cough, no hemoptysis, no wheezing,  Cardiovascular: no chest pain, no palpitations, (+)edema   Gastrointestinal: no abdominal discomfort   Musculoskeletal: no new joint pain, no joint swelling, no myalgia   : no dysuria, no frequency, no gross hematuria   HEME: no prolonged bleeding, no excessive bruising, no blood clots, no adenopathy   Endocrine: no excessive thirst, no unusual intolerance of heat or cold   Neurological: no confusion, no seizures, no syncope, no falls   Psychological: no anxiety, no depression     Objective:   There were no vitals filed for this visit.    Physical Exam:   Constitutional: Alert, appears stated age, cooperative and no distress.  Normal body habitus, no obvious deformities, good attention to grooming.   Head: Normocephalic, without obvious abnormality, atraumatic   Eyes: Conjunctivae/corneas clear. PERRL, EOM's intact. No exudate.  Nose: Nares normal. Septum midline. Mucosa normal. No drainage or sinus tenderness.   Throat: Lips, mucosa, and tongue normal; fair dentition  Neck: No adenopathy, (+)carotid bruit, no JVD, supple, symmetrical, trachea midline, and thyroid not enlarged, symmetric, no tenderness/mass/nodules.   Back: Symmetric, no curvature ROM normal No CVA tenderness.   Lungs: Clear to auscultation bilaterally and good air exchange. No tachypnea. No use of accessory muscles.   Heart:  Irregularly irregular rhythm, S1, S2 normal, systolic murmur, no click, rub or gallop. PMI nondisplaced.   Abdomen: Soft, non-tender, bowel sounds normal; No hepatosplenomegaly. No hernias   Aorta: no evidence of aneurysm   Musculoskeletal: No evidence of kyphosis or scoliosis. Normal muscle strength and tone. No evidence of limb atrophy or abnormal movements.    Extremities: Normal, atraumatic, no clubbing, cyanosis, or edema. There are no venous varicosities   Pulses: 2+ and symmetric in both radial and femoral regions.   Skin: Skin color, texture, turgor normal.  No rashes or lesions.  Lymph nodes: Cervical, supraclavicular, and axillary nodes normal   Neurologic/psychiatric: Cranial nerves 2-12 are grossly intact No obvious abnormality. Oriented to person, place, and time. No depression, anxiety or agitation.     Assessment:  Patient Active Problem List    Diagnosis Date Noted    Stage 3b chronic kidney disease 04/15/2025    HIGGINS (dyspnea on exertion) 04/10/2025    Recurrent UTI 04/09/2025    Encounter for medical examination to establish care 08/15/2024    History of falling 08/14/2024    Other cystostomy status 08/14/2024    Other thrombophilia 08/14/2024    Self-catheterizes urinary bladder 08/14/2024    Right hip pain 08/14/2024    Fall 08/14/2024    UTI (urinary tract infection) 08/01/2024    Asthma 08/01/2024    Anterolisthesis of lumbar spine 08/01/2024    Facet arthropathy, lumbar 08/01/2024    Cardiac pacemaker in situ 08/01/2024    Common bile duct dilatation 08/01/2024    Lumbar degenerative disc disease 08/01/2024    Iron deficiency anemia 04/25/2024    Elevated serum creatinine 04/25/2024    Syncope due to blood pressure medications 01/28/2024    Encounter for long-term (current) use of other medications 01/26/2024    Chronic congestive heart failure 01/12/2024    COVID 12/14/2023    Suprapubic catheter 10/10/2023    Other long term (current) drug therapy 06/30/2023    Hemorrhoids 05/02/2023    Symptomatic bradycardia 06/16/2021    S/P placement of cardiac pacemaker 06/16/2021    Severe aortic stenosis 11/04/2020    Goiter 09/17/2020    Dysphagia 08/13/2020    Adrenal nodule 06/11/2020    Pulmonary nodules 01/14/2020    Generalized weakness 01/14/2020    Pulmonary hypertension 01/14/2020    Irritable bowel syndrome with diarrhea 01/04/2020    Acquired  bladder diverticulum     CKD (chronic kidney disease) stage 3, GFR 30-59 ml/min 01/03/2020    Gout 03/14/2019    Age-related cataract of right eye 12/13/2018    Age-related nuclear cataract of left eye 11/01/2018    Urinary retention 07/11/2018    Hypokalemia 07/11/2018    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    Atherosclerosis of aorta 07/18/2016    Hypercholesteremia 01/07/2015    Venous insufficiency 01/07/2015    Asymmetric septal hypertrophy 09/25/2014    Hypertensive heart disease with heart failure 02/05/2013    Ventricular tachycardia, unspecified 02/05/2013    COPD (chronic obstructive pulmonary disease) 11/20/2012    GERD (gastroesophageal reflux disease) 11/20/2012    Paroxysmal atrial fibrillation 11/20/2012    Nuclear sclerosis 08/14/2012    Ocular hypertension 08/14/2012    Hyperopia with astigmatism and presbyopia 08/14/2012    Posterior vitreous detachment 08/14/2012    Round hole of retina without detachment - Left Eye 08/14/2012    Hypertrophic obstructive cardiomyopathy 08/09/2012    Obstructive sleep apnea 11/11/2010    Coronary artery disease involving native coronary artery of native heart without angina pectoris 05/05/2010    Chronic combined systolic and diastolic heart failure 05/05/2010    Essential hypertension 05/05/2010          Plan:  The patient has severe, symptomatic aortic valve stenosis with NYHA class III symptoms of congestive heart failure.    The patient is scheduled for transcatheter aortic valve replacement 04/23/2025.    The patient is not a candidate for surgical intervention in light of her advanced age and overall debilitated and frail state.     I had a long (68 minutes) discussion with the patient and her daughter in the office today.  I reviewed the anatomy and pathophysiology of aortic valve stenosis with them.    I also reviewed the conduct of a transcatheter aortic valve replacement procedure with them.  Many questions were asked and  answered.    I reviewed images from the Internet of the transcatheter valve prostheses which are available.    I did emphasized my concern about infection in light of her indwelling suprapubic urinary catheter.  I advised both of them that should she develop endocarditis, surgical intervention will not be considered.  They seem to understand and agree with this decision.    Thank you, Dr. Sommers, for allowing me to participate in the care of this patient.                    Patient was independently examined face-to-face and was educated about the treatment options including suitability for transcatheter aortic valve replacement (TAVR), a minimally invasive procedure, or a surgical aortic valve replacement (SAVR), also known as open heart surgery. Medical or palliative therapy was also discussed.   Patient was educated about the pathophysiology and natural history of severe aortic stenosis, was provided with education and information regarding his/her severely diseased aortic valve. The risks, benefits, and alternatives of transcatheter aortic valve replacement were discussed with the patient and family. I had a detailed discussion with the patient regarding risk of stroke, MI, bleeding access site complications including limb loss, allergy, kidney failure including dialysis and death.  Patient was educated regarding pacemaker risk factors, explained the procedure carries around a 12.5% risk of permanent pacemaker requirement and that risk depends on the patients underlying conduction system.    A SDM process and individualized approach to aortic stenosis care was used and patients preferences were incorporated, all options were discussed at length.  Patient decided to proceed with the TAVR work-up and procedure. Patient and family was educated about post procedure, recovery time, restrictions and expectations.   Agrees to be full code for the foreseeable future. Patient queried and all questions were answered. The  patient and family understands the risks and benefits and wishes to go ahead with the procedure.

## 2025-04-24 ENCOUNTER — OUTPATIENT CASE MANAGEMENT (OUTPATIENT)
Dept: ADMINISTRATIVE | Facility: OTHER | Age: OVER 89
End: 2025-04-24
Payer: MEDICARE

## 2025-04-24 ENCOUNTER — TELEPHONE (OUTPATIENT)
Dept: FAMILY MEDICINE | Facility: CLINIC | Age: OVER 89
End: 2025-04-24
Payer: MEDICARE

## 2025-04-24 ENCOUNTER — PATIENT OUTREACH (OUTPATIENT)
Dept: ADMINISTRATIVE | Facility: HOSPITAL | Age: OVER 89
End: 2025-04-24
Payer: MEDICARE

## 2025-04-24 NOTE — PROGRESS NOTES
Outpatient Care Management  Patient Not Qualified    Patient: Holli Landrum  MRN:  [id number]  Date of Service:  2025  Completed by:  Karli Arellano RN    Chief Complaint   Patient presents with    OPCM Enrollment Call    Case Closure            Patient Summary           Reason Not Qualified:

## 2025-04-24 NOTE — TELEPHONE ENCOUNTER
Called to schedule patient a hospital follow up. Spoke to Ronaldo Landrum, he made me aware that patient coded during her surgery and passed away.  Verbal condolences given to family member on the phone.     [place]- Condolence letter needs to be printed and mailed please.

## 2025-04-24 NOTE — LETTER
April 24, 2025    Family of Ms.Ruth CLAIR Landrum  [address]  [facility] [address]             [facility]  [address] [facility] [address]  Phone: [phone]  Fax: [phone] To the family of Ms. Landrum:    Please accept our deepest condolences in regards to the recent death of your family member, Ms. Landrum. She was a most remarkable person, and it was always a pleasure to see her. I hope that we were able to provide her with some relief during the time that she was under our care.     On behalf of myself and my staff, please also extend our condolences to all of your family. If there is anything else that we can do for you, please do not hesitate to contact us.    Sincerely,        Rolando Cannon MD

## 2025-06-23 ENCOUNTER — DOCUMENT SCAN (OUTPATIENT)
Dept: HOME HEALTH SERVICES | Facility: HOSPITAL | Age: OVER 89
End: 2025-06-23
Payer: MEDICARE

## 2025-08-15 ENCOUNTER — DOCUMENT SCAN (OUTPATIENT)
Dept: HOME HEALTH SERVICES | Facility: HOSPITAL | Age: OVER 89
End: 2025-08-15
Payer: MEDICARE

## 2025-08-29 ENCOUNTER — EXTERNAL HOME HEALTH (OUTPATIENT)
Dept: HOME HEALTH SERVICES | Facility: HOSPITAL | Age: OVER 89
End: 2025-08-29
Payer: MEDICARE

## (undated) DEVICE — DRAPE C ARM 42 X 120 10/BX

## (undated) DEVICE — SOL IRR WATER STRL 3000 ML

## (undated) DEVICE — PACK OPHTHALMIC

## (undated) DEVICE — SOL BETADINE 5%

## (undated) DEVICE — DEVICE PERCLOSE SUT CLSR 6FR

## (undated) DEVICE — DRAPE ANGIO BRACH 38X44IN

## (undated) DEVICE — SHEATH INTRODUCER 6FR 11CM

## (undated) DEVICE — NEPTUNE 4 PORT MANIFOLD

## (undated) DEVICE — GOWN SURG 2XL DISP TIE BACK

## (undated) DEVICE — SEE MEDLINE ITEM 157128

## (undated) DEVICE — SPIKE CONTRAST CONTROLLER

## (undated) DEVICE — COVER SURG LIGHT HANDLE

## (undated) DEVICE — TOWEL OR NONABSORB ADH 17X26

## (undated) DEVICE — SEE MEDLINE ITEM 152622

## (undated) DEVICE — CATH DIAG IMPULSE 6FR FR4

## (undated) DEVICE — GUIDEWIRE STF .035X260CM STR

## (undated) DEVICE — GLOVE 7.5 PROTEXIS PI BLUE

## (undated) DEVICE — SPONGE GAUZE 16PLY 4X4

## (undated) DEVICE — LINE 60IN PRESSURE MON.

## (undated) DEVICE — COVER LIGHT HANDLE 80/CA

## (undated) DEVICE — TIP I/A CURVED SINGLE USE

## (undated) DEVICE — OMNIPAQUE 350 200ML

## (undated) DEVICE — Device

## (undated) DEVICE — CYSTOTOME IRRIG 24G 13MM

## (undated) DEVICE — GOWN SURGICAL X-LARGE

## (undated) DEVICE — SHIELD COLLAGEN 12HR CORNEAL

## (undated) DEVICE — TRANSDUCER ADULT DISP

## (undated) DEVICE — STOPCOCK 3-WAY

## (undated) DEVICE — CATH PIG145 LANGSTON 6FR 110CM

## (undated) DEVICE — GUIDEWIRE OMNI J TIP 185CM

## (undated) DEVICE — WIRE GD LUB STD 3CM .035 150CM

## (undated) DEVICE — SYR 3CC LUER LOC

## (undated) DEVICE — SET IRR URLGY 2LINE UNIV SPIKE

## (undated) DEVICE — GLOVE PROTEXIS HYDROGEL SZ8

## (undated) DEVICE — SEE MEDLINE ITEM 152487

## (undated) DEVICE — GUIDEWIRE UROLOGY .038X150CM

## (undated) DEVICE — PAD RADIOLUCENT STAT ADULT

## (undated) DEVICE — KIT GLIDESHEATH SLEND 6FR 10CM

## (undated) DEVICE — CATH DXTERITY JL40 100CM 6FR

## (undated) DEVICE — SOL IRR BSS OPHTH 500ML STRL

## (undated) DEVICE — SEE MEDLINE ITEM 154981

## (undated) DEVICE — SPONGE WEC CEL SPEARS

## (undated) DEVICE — SYR DISP LL 5CC

## (undated) DEVICE — SYR 10CC LUER LOCK

## (undated) DEVICE — KIT COPILOT VALVE HEMO TOOL

## (undated) DEVICE — PACK EYE CUSTOM COVINGTON.

## (undated) DEVICE — CATH DXTERITY AL20 100CM 6FR

## (undated) DEVICE — CATH URTRL OPEN END STR TIP 5F

## (undated) DEVICE — PACK CYSTO

## (undated) DEVICE — KIT MICROINTRO 4F .018X40X7CM

## (undated) DEVICE — BAG URINARY LEG COMBO PACK STA

## (undated) DEVICE — GUIDE VISTA 6FR JR 4

## (undated) DEVICE — CATH JACKY RADIAL 5FR 100CM

## (undated) DEVICE — KIT CUSTOM MANIFOLD

## (undated) DEVICE — CONTAINER SPECIMEN STRL 4OZ

## (undated) DEVICE — GUIDEWIRE SUPRA CORE 035 190CM

## (undated) DEVICE — PROTECTION STATION PLUS

## (undated) DEVICE — SEE L#95700

## (undated) DEVICE — TRAY CATH LAB OMC

## (undated) DEVICE — BAG URO DRAIN